# Patient Record
Sex: MALE | Race: WHITE | Employment: UNEMPLOYED | URBAN - METROPOLITAN AREA
[De-identification: names, ages, dates, MRNs, and addresses within clinical notes are randomized per-mention and may not be internally consistent; named-entity substitution may affect disease eponyms.]

---

## 2017-01-09 ENCOUNTER — APPOINTMENT (OUTPATIENT)
Dept: INFUSION THERAPY | Age: 57
End: 2017-01-09

## 2017-01-10 ENCOUNTER — APPOINTMENT (OUTPATIENT)
Dept: INFUSION THERAPY | Age: 57
End: 2017-01-10

## 2017-01-16 ENCOUNTER — HOSPITAL ENCOUNTER (OUTPATIENT)
Dept: INFUSION THERAPY | Age: 57
Discharge: HOME | End: 2017-01-16
Payer: MEDICARE

## 2017-01-16 VITALS
RESPIRATION RATE: 18 BRPM | SYSTOLIC BLOOD PRESSURE: 151 MMHG | TEMPERATURE: 98.1 F | HEART RATE: 76 BPM | DIASTOLIC BLOOD PRESSURE: 87 MMHG

## 2017-01-16 PROCEDURE — 96372 THER/PROPH/DIAG INJ SC/IM: CPT

## 2017-01-16 PROCEDURE — 74011250636 HC RX REV CODE- 250/636: Performed by: INTERNAL MEDICINE

## 2017-01-16 RX ADMIN — THYROTROPIN ALFA 0.9 MG: KIT at 13:47

## 2017-01-16 NOTE — PROGRESS NOTES
[facility] Progress Note    3658 Pt admit to [facility] for Thyrogen ambulatory in stable condition. Assessment completed. No new concerns voiced. Medications:  Thyrogen 0.9 MG IM left Deltoid    Patient observed for 30 minutes post injection with no reactions noted. 4797 Pt tolerated treatment well. D/c home ambulatory in no distress. Pt aware of next appointment scheduled for 01/17/17.

## 2017-01-17 ENCOUNTER — HOSPITAL ENCOUNTER (OUTPATIENT)
Dept: INFUSION THERAPY | Age: 57
Discharge: HOME | End: 2017-01-17
Payer: MEDICARE

## 2017-01-17 VITALS
HEART RATE: 78 BPM | SYSTOLIC BLOOD PRESSURE: 149 MMHG | OXYGEN SATURATION: 100 % | TEMPERATURE: 97.5 F | DIASTOLIC BLOOD PRESSURE: 90 MMHG | RESPIRATION RATE: 18 BRPM

## 2017-01-17 PROCEDURE — 74011250636 HC RX REV CODE- 250/636: Performed by: INTERNAL MEDICINE

## 2017-01-17 PROCEDURE — 96372 THER/PROPH/DIAG INJ SC/IM: CPT

## 2017-01-17 RX ADMIN — THYROTROPIN ALFA 0.9 MG: KIT at 14:16

## 2017-01-17 NOTE — PROGRESS NOTES
OPIC Short Consult Note:    1330Pt arrived at [facility] ambulatory and in no distress for thyrogen dose 2. No new complaints voiced. Visit Vitals    /90 (BP 1 Location: Left arm, BP Patient Position: Sitting)    Pulse 78    Temp 97.5 °F (36.4 °C)    Resp 18    SpO2 100%       Medications received:  Thyrogen 0.9 mg IM right hip     1415 Tolerated treatment well, no adverse reaction noted. D/Cd from [facility] ambulatory and in no distress.   Pt has schedule for thyroid tests

## 2017-01-18 ENCOUNTER — HOSPITAL ENCOUNTER (OUTPATIENT)
Dept: NUCLEAR MEDICINE | Age: 57
Discharge: HOME | End: 2017-01-18
Attending: INTERNAL MEDICINE
Payer: MEDICARE

## 2017-01-18 DIAGNOSIS — C79.9 METASTASIS FROM THYROID CANCER (HCC): ICD-10-CM

## 2017-01-18 DIAGNOSIS — C73 METASTASIS FROM THYROID CANCER (HCC): ICD-10-CM

## 2017-01-18 PROCEDURE — 78018 THYROID MET IMAGING BODY: CPT

## 2017-01-20 ENCOUNTER — HOSPITAL ENCOUNTER (EMERGENCY)
Age: 57
Discharge: HOME | End: 2017-01-20
Attending: EMERGENCY MEDICINE
Payer: MEDICARE

## 2017-01-20 ENCOUNTER — HOSPITAL ENCOUNTER (OUTPATIENT)
Dept: NUCLEAR MEDICINE | Age: 57
End: 2017-01-20
Attending: INTERNAL MEDICINE
Payer: MEDICARE

## 2017-01-20 ENCOUNTER — APPOINTMENT (OUTPATIENT)
Dept: GENERAL RADIOLOGY | Age: 57
End: 2017-01-20
Attending: EMERGENCY MEDICINE
Payer: MEDICARE

## 2017-01-20 VITALS
WEIGHT: 210 LBS | DIASTOLIC BLOOD PRESSURE: 68 MMHG | RESPIRATION RATE: 22 BRPM | BODY MASS INDEX: 31.01 KG/M2 | HEART RATE: 87 BPM | SYSTOLIC BLOOD PRESSURE: 160 MMHG | TEMPERATURE: 98.6 F | OXYGEN SATURATION: 95 %

## 2017-01-20 DIAGNOSIS — T78.1XXA REACTION TO FOOD, INITIAL ENCOUNTER: ICD-10-CM

## 2017-01-20 DIAGNOSIS — R11.2 NON-INTRACTABLE VOMITING WITH NAUSEA, UNSPECIFIED VOMITING TYPE: Primary | ICD-10-CM

## 2017-01-20 DIAGNOSIS — R73.9 BLOOD GLUCOSE ELEVATED: ICD-10-CM

## 2017-01-20 DIAGNOSIS — R07.9 CHEST PAIN, UNSPECIFIED TYPE: ICD-10-CM

## 2017-01-20 LAB
ALBUMIN SERPL BCP-MCNC: 3.4 G/DL (ref 3.5–5)
ALBUMIN/GLOB SERPL: 0.7 {RATIO} (ref 1.1–2.2)
ALP SERPL-CCNC: 175 U/L (ref 45–117)
ALT SERPL-CCNC: 20 U/L (ref 12–78)
ANION GAP BLD CALC-SCNC: 9 MMOL/L (ref 5–15)
AST SERPL W P-5'-P-CCNC: 10 U/L (ref 15–37)
ATRIAL RATE: 98 BPM
BASOPHILS # BLD AUTO: 0 K/UL (ref 0–0.1)
BASOPHILS # BLD: 0 % (ref 0–1)
BILIRUB SERPL-MCNC: 0.2 MG/DL (ref 0.2–1)
BUN SERPL-MCNC: 55 MG/DL (ref 6–20)
BUN/CREAT SERPL: 28 (ref 12–20)
CALCIUM SERPL-MCNC: 8.6 MG/DL (ref 8.5–10.1)
CALCULATED P AXIS, ECG09: 20 DEGREES
CALCULATED R AXIS, ECG10: -47 DEGREES
CALCULATED T AXIS, ECG11: 42 DEGREES
CHLORIDE SERPL-SCNC: 98 MMOL/L (ref 97–108)
CK SERPL-CCNC: 78 U/L (ref 39–308)
CK SERPL-CCNC: 99 U/L (ref 39–308)
CO2 SERPL-SCNC: 28 MMOL/L (ref 21–32)
CREAT SERPL-MCNC: 1.97 MG/DL (ref 0.7–1.3)
DIAGNOSIS, 93000: NORMAL
EOSINOPHIL # BLD: 0.1 K/UL (ref 0–0.4)
EOSINOPHIL NFR BLD: 1 % (ref 0–7)
ERYTHROCYTE [DISTWIDTH] IN BLOOD BY AUTOMATED COUNT: 13 % (ref 11.5–14.5)
GLOBULIN SER CALC-MCNC: 4.7 G/DL (ref 2–4)
GLUCOSE BLD STRIP.AUTO-MCNC: 372 MG/DL (ref 65–100)
GLUCOSE SERPL-MCNC: 397 MG/DL (ref 65–100)
HCT VFR BLD AUTO: 40.6 % (ref 36.6–50.3)
HGB BLD-MCNC: 13.9 G/DL (ref 12.1–17)
INR PPP: 0.9 (ref 0.9–1.1)
LIPASE SERPL-CCNC: 220 U/L (ref 73–393)
LYMPHOCYTES # BLD AUTO: 7 % (ref 12–49)
LYMPHOCYTES # BLD: 0.9 K/UL (ref 0.8–3.5)
MCH RBC QN AUTO: 30 PG (ref 26–34)
MCHC RBC AUTO-ENTMCNC: 34.2 G/DL (ref 30–36.5)
MCV RBC AUTO: 87.5 FL (ref 80–99)
MONOCYTES # BLD: 0.6 K/UL (ref 0–1)
MONOCYTES NFR BLD AUTO: 5 % (ref 5–13)
NEUTS SEG # BLD: 11.8 K/UL (ref 1.8–8)
NEUTS SEG NFR BLD AUTO: 87 % (ref 32–75)
P-R INTERVAL, ECG05: 134 MS
PLATELET # BLD AUTO: 306 K/UL (ref 150–400)
POTASSIUM SERPL-SCNC: 4.8 MMOL/L (ref 3.5–5.1)
PROT SERPL-MCNC: 8.1 G/DL (ref 6.4–8.2)
PROTHROMBIN TIME: 9.4 SEC (ref 9–11.1)
Q-T INTERVAL, ECG07: 330 MS
QRS DURATION, ECG06: 76 MS
QTC CALCULATION (BEZET), ECG08: 421 MS
RBC # BLD AUTO: 4.64 M/UL (ref 4.1–5.7)
SERVICE CMNT-IMP: ABNORMAL
SODIUM SERPL-SCNC: 135 MMOL/L (ref 136–145)
TROPONIN I SERPL-MCNC: <0.04 NG/ML
TROPONIN I SERPL-MCNC: <0.04 NG/ML
VENTRICULAR RATE, ECG03: 98 BPM
WBC # BLD AUTO: 13.5 K/UL (ref 4.1–11.1)

## 2017-01-20 PROCEDURE — 36415 COLL VENOUS BLD VENIPUNCTURE: CPT | Performed by: EMERGENCY MEDICINE

## 2017-01-20 PROCEDURE — 74011250636 HC RX REV CODE- 250/636: Performed by: EMERGENCY MEDICINE

## 2017-01-20 PROCEDURE — 96375 TX/PRO/DX INJ NEW DRUG ADDON: CPT

## 2017-01-20 PROCEDURE — 82962 GLUCOSE BLOOD TEST: CPT

## 2017-01-20 PROCEDURE — 74011250636 HC RX REV CODE- 250/636

## 2017-01-20 PROCEDURE — 85025 COMPLETE CBC W/AUTO DIFF WBC: CPT | Performed by: EMERGENCY MEDICINE

## 2017-01-20 PROCEDURE — A9270 NON-COVERED ITEM OR SERVICE: HCPCS | Performed by: EMERGENCY MEDICINE

## 2017-01-20 PROCEDURE — 74011250637 HC RX REV CODE- 250/637: Performed by: EMERGENCY MEDICINE

## 2017-01-20 PROCEDURE — 74011636637 HC RX REV CODE- 636/637: Performed by: EMERGENCY MEDICINE

## 2017-01-20 PROCEDURE — 96361 HYDRATE IV INFUSION ADD-ON: CPT

## 2017-01-20 PROCEDURE — 99285 EMERGENCY DEPT VISIT HI MDM: CPT

## 2017-01-20 PROCEDURE — 80053 COMPREHEN METABOLIC PANEL: CPT | Performed by: EMERGENCY MEDICINE

## 2017-01-20 PROCEDURE — 96374 THER/PROPH/DIAG INJ IV PUSH: CPT

## 2017-01-20 PROCEDURE — 85610 PROTHROMBIN TIME: CPT | Performed by: EMERGENCY MEDICINE

## 2017-01-20 PROCEDURE — 82550 ASSAY OF CK (CPK): CPT | Performed by: EMERGENCY MEDICINE

## 2017-01-20 PROCEDURE — 71010 XR CHEST PORT: CPT

## 2017-01-20 PROCEDURE — 83690 ASSAY OF LIPASE: CPT | Performed by: EMERGENCY MEDICINE

## 2017-01-20 PROCEDURE — 93005 ELECTROCARDIOGRAM TRACING: CPT

## 2017-01-20 PROCEDURE — 84484 ASSAY OF TROPONIN QUANT: CPT | Performed by: EMERGENCY MEDICINE

## 2017-01-20 RX ORDER — ONDANSETRON 2 MG/ML
4 INJECTION INTRAMUSCULAR; INTRAVENOUS
Status: COMPLETED | OUTPATIENT
Start: 2017-01-20 | End: 2017-01-20

## 2017-01-20 RX ORDER — ONDANSETRON 4 MG/1
4 TABLET, ORALLY DISINTEGRATING ORAL
Status: COMPLETED | OUTPATIENT
Start: 2017-01-20 | End: 2017-01-20

## 2017-01-20 RX ORDER — ONDANSETRON 2 MG/ML
INJECTION INTRAMUSCULAR; INTRAVENOUS
Status: COMPLETED
Start: 2017-01-20 | End: 2017-01-20

## 2017-01-20 RX ORDER — ONDANSETRON 4 MG/1
4 TABLET, ORALLY DISINTEGRATING ORAL
Qty: 8 TAB | Refills: 0 | Status: SHIPPED | OUTPATIENT
Start: 2017-01-20 | End: 2017-01-22

## 2017-01-20 RX ORDER — DIPHENHYDRAMINE HYDROCHLORIDE 50 MG/ML
25 INJECTION, SOLUTION INTRAMUSCULAR; INTRAVENOUS
Status: COMPLETED | OUTPATIENT
Start: 2017-01-20 | End: 2017-01-20

## 2017-01-20 RX ORDER — HYDROMORPHONE HYDROCHLORIDE 1 MG/ML
1 INJECTION, SOLUTION INTRAMUSCULAR; INTRAVENOUS; SUBCUTANEOUS
Status: COMPLETED | OUTPATIENT
Start: 2017-01-20 | End: 2017-01-20

## 2017-01-20 RX ADMIN — ONDANSETRON 4 MG: 2 INJECTION INTRAMUSCULAR; INTRAVENOUS at 07:09

## 2017-01-20 RX ADMIN — HYDROMORPHONE HYDROCHLORIDE 1 MG: 1 INJECTION, SOLUTION INTRAMUSCULAR; INTRAVENOUS; SUBCUTANEOUS at 07:07

## 2017-01-20 RX ADMIN — INSULIN HUMAN 10 UNITS: 100 INJECTION, SOLUTION PARENTERAL at 07:07

## 2017-01-20 RX ADMIN — DIPHENHYDRAMINE HYDROCHLORIDE 25 MG: 50 INJECTION, SOLUTION INTRAMUSCULAR; INTRAVENOUS at 07:17

## 2017-01-20 RX ADMIN — ONDANSETRON 4 MG: 4 TABLET, ORALLY DISINTEGRATING ORAL at 12:20

## 2017-01-20 RX ADMIN — SODIUM CHLORIDE 500 ML: 900 INJECTION, SOLUTION INTRAVENOUS at 07:05

## 2017-01-20 NOTE — ED PROVIDER NOTES
HPI Comments: David Rivas is a 49 y.o. male with history significant for HTN and DM presenting via EMS to [facility] ED with c/o sudden onset of nausea and vomiting. Pt additionally informs of chest pain and congestion as well. Per EMS, pt called EMS around 0000 today after the onset of his nausea and vomiting. EMS states pt informs the onset of his chest pain began secondary to the nausea and one episode of emesis. EMS reports pt informed of diffuse chest pain which has now since resolved. Pt states he believes he might have had an episode of emesis secondary to ingesting peanut butter, which is known to make him feel unwell. Pt additionally informs he has thyroid cancer and has been undergoing chemotherapy with his last session x 2 days and his next session x today. Pt notes he has been having increased nausea recently at baseline due to the Thyrogen shots he receives. Pt also informs the shots have been altering his BG levels, leaving them a bit uncontrolled, albeit compliance with all medications. Pt states his BG levels normally run in the 160's. EMS additionally notes they administered 324 mg Aspirin en route. Pt specifically denies any fevers, chills, diarrhea, constipation, urinary complications, abdominal pain, or SOB. PCP: Juan Pablo Leyva MD    PMHx: hyperlipidemia  PSHx: cardiac catheterization  Social Hx: - EtOH; - Smoker; - Illicit Drugs    There are no other changes, complaints or physical findings at this time. The history is provided by the patient and the EMS personnel.       Past Medical History:   Diagnosis Date    Adverse effect of anesthesia      \" STOP BREATHING 1 TIME C ANESTH\"    Calculus of kidney     Cancer ([facility] [address] 2004     thyroid cancer    Chronic kidney disease     Chronic pain      BACK SHOULDER AND ARM    Depression     Diabetes ([address]     Hypercholesterolemia     Hypertension     Nausea & vomiting     Other ill-defined conditions(799.89)      cholesterol, thyroid    Sleep apnea      doesn't wear cpap    Thyroid cancer ([facility] [address]     TIA (transient ischemic attack) 2011     Past Surgical History:   Procedure Laterality Date    Vascular surgery procedure unlist       cardiac cath NEG.  Hx orthopaedic       back     Hx orthopaedic       ARM AND SHOULDER    Hx retinal detachment repair       left eye    Hx heent       THROAT SURGERY X 4    Hx other surgical       thyroid, lymphnode     Family History:   Problem Relation Age of Onset    Diabetes Mother     Elevated Lipids Mother    Stevo Moros Bladder Disease Mother     Headache Mother     Migraines Mother     Heart Disease Mother     Hypertension Mother     Stroke Mother     Other Mother      ANEURSYM BRAIN    Bleeding Prob Father     Cancer Father      LEUKEMIA    Diabetes Father     Elevated Lipids Father     Mental Retardation Sister     Psychiatric Disorder Sister     Cancer Brother      LUNGS     Social History     Social History    Marital status: LEGALLY      Spouse name: N/A    Number of children: N/A    Years of education: N/A     Occupational History    Not on file. Social History Main Topics    Smoking status: Former Smoker     Quit date: 8/22/1994    Smokeless tobacco: Never Used    Alcohol use No    Drug use: No    Sexual activity: No     Other Topics Concern    Not on file     Social History Narrative     ALLERGIES: Anesthetics - amide type; Flexeril [cyclobenzaprine]; and Tramadol    Review of Systems   Constitutional: Negative for chills and fever. HENT: Positive for congestion. Negative for rhinorrhea, sneezing and sore throat. Eyes: Negative. Negative for redness and visual disturbance. Respiratory: Negative. Negative for cough, shortness of breath and wheezing. Cardiovascular: Positive for chest pain. Negative for leg swelling. Gastrointestinal: Positive for nausea and vomiting. Negative for abdominal pain and diarrhea. Genitourinary: Negative.   Negative for difficulty urinating, discharge and frequency. Musculoskeletal: Negative. Negative for arthralgias, back pain, myalgias and neck stiffness. Skin: Negative. Negative for color change and rash. Neurological: Negative. Negative for dizziness, syncope, weakness, numbness and headaches. Hematological: Negative for adenopathy. Psychiatric/Behavioral: Negative. All other systems reviewed and are negative. Vitals:    01/20/17 1000 01/20/17 1030 01/20/17 1100 01/20/17 1130   BP: 156/84 172/84 160/68    Pulse: 87 86 86 87   Resp:       Temp:       SpO2: 93% 94% 93% 95%   Weight:              Physical Exam   Constitutional: He is oriented to person, place, and time. Obese   HENT:   Head: Atraumatic. 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   Cardiovascular: Normal rate, regular rhythm, normal heart sounds and intact distal pulses. Exam reveals no gallop and no friction rub. No murmur heard. Pulmonary/Chest: Effort normal and breath sounds normal. No respiratory distress. He has no wheezes. He has no rales. He exhibits no tenderness. Abdominal: Soft. Bowel sounds are normal. He exhibits no distension and no mass. There is no tenderness. There is no rebound and no guarding. Musculoskeletal: Normal range of motion. He exhibits no edema or tenderness. Neurological: He is alert and oriented to person, place, and time. Psychiatric: He has a normal mood and affect. Nursing note and vitals reviewed. MDM  Number of Diagnoses or Management Options  Diagnosis management comments: DDx: Uncontrolled BG levels possibly secondary to Thyrogen treatments, hypoglycemic reaction possibly, aspiration PNA, pneumonitis.  Allergic reaction to known allergin (peanut better); low suspicion dehydration or electrolyte abnormality secondary to one known episode of emesis; ACS and acute MI unlikely, will evaluate to rule out albeit pt denies current onset of chest pain. Amount and/or Complexity of Data Reviewed  Clinical lab tests: ordered and reviewed  Tests in the radiology section of CPT®: ordered and reviewed  Tests in the medicine section of CPT®: reviewed and ordered  Obtain history from someone other than the patient: yes (EMS)  Review and summarize past medical records: yes  Independent visualization of images, tracings, or specimens: yes    Patient Progress  Patient progress: stable    ED Course     Procedures    EKG interpretation: (Preliminary) 0502  Rhythm: normal sinus rhythm; and regular . Rate (approx.): 98; Axis: left axis deviation; AK interval: normal; QRS interval: normal ; ST/T wave: normal;   This note is prepared by Ange Grewal acting as Scribe for Kimberli Hazel MD.    Progress Note:   5:30 AM  Pt actively vomiting peanut butter. Written by Ange Grewal, ED Scribe, as dictated by Kimberli Hazel MD.     SIGN OUT:  7:07 AM  Patient's presentation, labs/imaging and plan of care was reviewed with Bill Nettles. Boy Velez MD as part of sign out. They will follow up with the repeat cardiac work-up as part of the plan discussed with the patient. Bill Velez MD's assistance in completion of this plan is greatly appreciated but it should be noted that I will be the provider of record for this patient. Kimberli Hazel MD    This note above is prepared by Ange Grewal acting as Scribe for Kimberli Hazel MD.    Kimberli Hazel MD: This scribe's documentation has been prepared under my direction and personally reviewed by me in its entirety. I confirm that the note above accurately reflects all work, treatment procedures and medical decision makings performed by me. CONSULT NOTE:  11:30 AM  Bill Velez MD spoke with Jaylene Lomas MD  Specialty: Cardiology  Discussed pt's hx, disposition, and available diagnostic and imaging results.  Reviewed care plans. Consultant recommends discharging pt. LABORATORY TESTS:  Recent Results (from the past 12 hour(s))   EKG, 12 LEAD, INITIAL    Collection Time: 01/20/17  5:01 AM   Result Value Ref Range    Ventricular Rate 98 BPM    Atrial Rate 98 BPM    P-R Interval 134 ms    QRS Duration 76 ms    Q-T Interval 330 ms    QTC Calculation (Bezet) 421 ms    Calculated P Axis 20 degrees    Calculated R Axis -47 degrees    Calculated T Axis 42 degrees    Diagnosis       Normal sinus rhythm  Left axis deviation    Confirmed by Katt Arroyo ([id number]) on 1/20/2017 7:56:34 AM     GLUCOSE, POC    Collection Time: 01/20/17  5:02 AM   Result Value Ref Range    Glucose (POC) 372 (H) 65 - 100 mg/dL    Performed by Catracho DENNIS    CBC WITH AUTOMATED DIFF    Collection Time: 01/20/17  5:17 AM   Result Value Ref Range    WBC 13.5 (H) 4.1 - 11.1 K/uL    RBC 4.64 4.10 - 5.70 M/uL    HGB 13.9 12.1 - 17.0 g/dL    HCT 40.6 36.6 - 50.3 %    MCV 87.5 80.0 - 99.0 FL    MCH 30.0 26.0 - 34.0 PG    MCHC 34.2 30.0 - 36.5 g/dL    RDW 13.0 11.5 - 14.5 %    PLATELET 331 364 - 229 K/uL    NEUTROPHILS 87 (H) 32 - 75 %    LYMPHOCYTES 7 (L) 12 - 49 %    MONOCYTES 5 5 - 13 %    EOSINOPHILS 1 0 - 7 %    BASOPHILS 0 0 - 1 %    ABS. NEUTROPHILS 11.8 (H) 1.8 - 8.0 K/UL    ABS. LYMPHOCYTES 0.9 0.8 - 3.5 K/UL    ABS. MONOCYTES 0.6 0.0 - 1.0 K/UL    ABS. EOSINOPHILS 0.1 0.0 - 0.4 K/UL    ABS.  BASOPHILS 0.0 0.0 - 0.1 K/UL   METABOLIC PANEL, COMPREHENSIVE    Collection Time: 01/20/17  5:17 AM   Result Value Ref Range    Sodium 135 (L) 136 - 145 mmol/L    Potassium 4.8 3.5 - 5.1 mmol/L    Chloride 98 97 - 108 mmol/L    CO2 28 21 - 32 mmol/L    Anion gap 9 5 - 15 mmol/L    Glucose 397 (H) 65 - 100 mg/dL    BUN 55 (H) 6 - 20 MG/DL    Creatinine 1.97 (H) 0.70 - 1.30 MG/DL    BUN/Creatinine ratio 28 (H) 12 - 20      GFR est AA 43 (L) >60 ml/min/1.73m2    GFR est non-AA 36 (L) >60 ml/min/1.73m2    Calcium 8.6 8.5 - 10.1 MG/DL    Bilirubin, total 0.2 0.2 - 1.0 MG/DL    ALT 20 12 - 78 U/L    AST 10 (L) 15 - 37 U/L    Alk. phosphatase 175 (H) 45 - 117 U/L    Protein, total 8.1 6.4 - 8.2 g/dL    Albumin 3.4 (L) 3.5 - 5.0 g/dL    Globulin 4.7 (H) 2.0 - 4.0 g/dL    A-G Ratio 0.7 (L) 1.1 - 2.2     TROPONIN I    Collection Time: 01/20/17  5:17 AM   Result Value Ref Range    Troponin-I, Qt. <0.04 <0.05 ng/mL   CK W/ REFLX CKMB    Collection Time: 01/20/17  5:17 AM   Result Value Ref Range    CK 99 39 - 308 U/L   PROTHROMBIN TIME + INR    Collection Time: 01/20/17  5:17 AM   Result Value Ref Range    INR 0.9 0.9 - 1.1      Prothrombin time 9.4 9.0 - 11.1 sec   LIPASE    Collection Time: 01/20/17  5:17 AM   Result Value Ref Range    Lipase 220 73 - 393 U/L   TROPONIN I    Collection Time: 01/20/17  9:33 AM   Result Value Ref Range    Troponin-I, Qt. <0.04 <0.05 ng/mL   CK W/ REFLX CKMB    Collection Time: 01/20/17  9:33 AM   Result Value Ref Range    CK 78 39 - 308 U/L     IMAGING RESULTS:  Clinical history: Chest pain  INDICATION: Chest pain     COMPARISON: 10/16/2016     FINDINGS:   AP semiupright view of the chest is obtained . The cardiopericardial silhouette  is stable. There is no pleural effusion, pneumothorax or focal consolidation  present. Chronic left clavicular dislocation. Orthopedic stabilization hardware  in the left humerus.     IMPRESSION  IMPRESSION:     No acute thoracic disease.     MEDICATIONS GIVEN:  Medications   sodium chloride 0.9 % bolus infusion 500 mL (0 mL IntraVENous IV Completed 1/20/17 0805)   insulin regular (NOVOLIN R, HUMULIN R) injection 10 Units (10 Units IntraVENous Given 1/20/17 0707)   HYDROmorphone (PF) (DILAUDID) injection 1 mg (1 mg IntraVENous Given 1/20/17 0707)   ondansetron (ZOFRAN) injection 4 mg (4 mg IntraVENous Given 1/20/17 0709)   diphenhydrAMINE (BENADRYL) injection 25 mg (25 mg IntraVENous Given 1/20/17 8796)   ondansetron (ZOFRAN ODT) tablet 4 mg (4 mg Oral Given 1/20/17 1220)     IMPRESSION:  1. Non-intractable vomiting with nausea, unspecified vomiting type    2. Reaction to food, initial encounter    3. Blood glucose elevated    4. Chest pain, unspecified type      PLAN:  1. Discharge Medication List as of 1/20/2017 11:39 AM      CONTINUE these medications which have NOT CHANGED    Details   HYDROcodone-acetaminophen (NORCO) 5-325 mg per tablet Take 1 Tab by mouth every six (6) hours as needed for Pain. Max Daily Amount: 4 Tabs., Print, Disp-12 Tab, R-0      Lancets misc Use 3-4 times daily to check blood sugar. DX E11.22, Normal, Disp-200 Each, R-11      glucose blood VI test strips (PHARMACIST CHOICE) strip Check glucose 3-4 times daily. DX E11.22, Normal, Disp-300 Strip, R-3      levothyroxine (SYNTHROID) 200 mcg tablet Take 1 Tab by mouth Daily (before breakfast). , Normal, Disp-90 Tab, R-3      glipiZIDE (GLUCOTROL) 5 mg tablet Take 1 Tab by mouth Before breakfast and dinner., Normal, Disp-180 Tab, R-3      insulin degludec (TRESIBA FLEXTOUCH U-200) 200 unit/mL (3 mL) inpn 20 Units by SubCUTAneous route nightly., Normal, Disp-1 Package, R-7      Insulin Needles, Disposable, 31 gauge x 5/16\" ndle by SubCUTAneous route daily. , Normal, Disp-1 Package, R-11      atorvastatin (LIPITOR) 40 mg tablet Take 1 Tab by mouth daily. , Normal, Disp-90 Tab, R-3      mupirocin (BACTROBAN) 2 % ointment Apply  to affected area two (2) times a day. Around wound site, Normal, Disp-22 g, R-0      ondansetron (ZOFRAN ODT) 4 mg disintegrating tablet Take 1 Tab by mouth every eight (8) hours as needed for Nausea. , Print, Disp-8 Tab, R-1      lisinopril (PRINIVIL, ZESTRIL) 20 mg tablet Take 20 mg by mouth daily. , Historical Med      Cholecalciferol, Vitamin D3, (VITAMIN D3) 2,000 unit cap capsule Take 2,000 Units by mouth daily. Indications: VITAMIN D DEFICIENCY (HIGH DOSE THERAPY), Normal, Disp-90 Cap, R-5           2.    Follow-up Information     Follow up With Details Comments Contact Info    Tyler Sanders MD In 1 day  [facility] [address], [place] In 3 days As needed FOR EVALUATION OF YOUR CHEST PAIN [address]  [phone]        Hem Onc as previously scheduled     [facility] [place] [facility]  As needed, If symptoms worsen/recur [address] [facility]  [address] [facility]  [phone]        Return to ED if worse     DISCHARGE NOTE:    11:39AM  The patient is ready for discharge. The patient signs, symptoms, diagnosis, and discharge instructions have been discussed and the patient has conveyed their understanding. The patient is to follow-up as reccommended or returned to the ER should their symptoms worsen. Plan has been discussed and patient is in agreement. This note is prepared by Ethel Pagan, acting as Scribe for Juan Antonio Browne. Rojas Trevino MD.    Juan Antonio Browne. Rojas Trevino MD: The scribe's documentation has been prepared under my direction and personally reviewed by me in its entirety. I confirm that the note above accurately reflects all work, treatment, procedures, and medical decision making performed by me.

## 2017-01-20 NOTE — ED NOTES
50yo male Pt with PMH of HTN, DM, thyroid CA, CKD, TIA, depression, sleep apnea and left retinal repair presents to ED for nausea, vomiting bile, pt currently on radiation iodine therapy for thyroid cancer, last dose 1/18/2017, BIBA, oriented to room and call bell, cardiac and continuous spO2 monitoring initiated, IV started, blood samples collected and sent to lab for analysis, EKG completed, plan of care reviewed, call bell in reach, side rails up x2, will continue to monitor.

## 2017-01-20 NOTE — DISCHARGE INSTRUCTIONS
Chest Pain: Care Instructions  Your Care Instructions  There are many things that can cause chest pain. Some are not serious and will get better on their own in a few days. But some kinds of chest pain need more testing and treatment. Your doctor may have recommended a follow-up visit in the next 8 to 12 hours. If you are not getting better, you may need more tests or treatment. Even though your doctor has released you, you still need to watch for any problems. The doctor carefully checked you, but sometimes problems can develop later. If you have new symptoms or if your symptoms do not get better, get medical care right away. If you have worse or different chest pain or pressure that lasts more than 5 minutes or you passed out (lost consciousness), call 911 or seek other emergency help right away. A medical visit is only one step in your treatment. Even if you feel better, you still need to do what your doctor recommends, such as going to all suggested follow-up appointments and taking medicines exactly as directed. This will help you recover and help prevent future problems. How can you care for yourself at home? · Rest until you feel better. · Take your medicine exactly as prescribed. Call your doctor if you think you are having a problem with your medicine. · Do not drive after taking a prescription pain medicine. When should you call for help? Call 911 if:  · You passed out (lost consciousness). · You have severe difficulty breathing. · You have symptoms of a heart attack. These may include:  ¨ Chest pain or pressure, or a strange feeling in your chest.  ¨ Sweating. ¨ Shortness of breath. ¨ Nausea or vomiting. ¨ Pain, pressure, or a strange feeling in your back, neck, jaw, or upper belly or in one or both shoulders or arms. ¨ Lightheadedness or sudden weakness. ¨ A fast or irregular heartbeat.   After you call 911, the  may tell you to chew 1 adult-strength or 2 to 4 low-dose aspirin. Wait for an ambulance. Do not try to drive yourself. Call your doctor today if:  · You have any trouble breathing. · Your chest pain gets worse. · You are dizzy or lightheaded, or you feel like you may faint. · You are not getting better as expected. · You are having new or different chest pain. Where can you learn more? Go to http://mayo-kelly.info/. Enter A120 in the search box to learn more about \"Chest Pain: Care Instructions. \"  Current as of: May 27, 2016  Content Version: 11.1  © 9998-0155 [employer]. Care instructions adapted under license by [employer] (which disclaims liability or warranty for this information). If you have questions about a medical condition or this instruction, always ask your healthcare professional. [address] any warranty or liability for your use of this information. Nausea and Vomiting: Care Instructions  Your Care Instructions    When you are nauseated, you may feel weak and sweaty and notice a lot of saliva in your mouth. Nausea often leads to vomiting. Most of the time you do not need to worry about nausea and vomiting, but they can be signs of other illnesses. Two common causes of nausea and vomiting are stomach flu and food poisoning. Nausea and vomiting from viral stomach flu will usually start to improve within 24 hours. Nausea and vomiting from food poisoning may last from 12 to 48 hours. The doctor has checked you carefully, but problems can develop later. If you notice any problems or new symptoms, get medical treatment right away. Follow-up care is a key part of your treatment and safety. Be sure to make and go to all appointments, and call your doctor if you are having problems. It's also a good idea to know your test results and keep a list of the medicines you take. How can you care for yourself at home?   · To prevent dehydration, drink plenty of fluids, enough so that your urine is light yellow or clear like water. Choose water and other caffeine-free clear liquids until you feel better. If you have kidney, heart, or liver disease and have to limit fluids, talk with your doctor before you increase the amount of fluids you drink. · Rest in bed until you feel better. · When you are able to eat, try clear soups, mild foods, and liquids until all symptoms are gone for 12 to 48 hours. Other good choices include dry toast, crackers, cooked cereal, and gelatin dessert, such as Jell-O. When should you call for help? Call 911 anytime you think you may need emergency care. For example, call if:  · You passed out (lost consciousness). Call your doctor now or seek immediate medical care if:  · You have symptoms of dehydration, such as:  ¨ Dry eyes and a dry mouth. ¨ Passing only a little dark urine. ¨ Feeling thirstier than usual.  · You have new or worsening belly pain. · You have a new or higher fever. · You vomit blood or what looks like coffee grounds. Watch closely for changes in your health, and be sure to contact your doctor if:  · You have ongoing nausea and vomiting. · Your vomiting is getting worse. · Your vomiting lasts longer than 2 days. · You are not getting better as expected. Where can you learn more? Go to http://mayo-kelly.info/. Enter 46 [id number] in the search box to learn more about \"Nausea and Vomiting: Care Instructions. \"  Current as of: May 27, 2016  Content Version: 11.1  © 5208-5173 [employer], "[employer]. Care instructions adapted under license by [employer] (which disclaims liability or warranty for this information). If you have questions about a medical condition or this instruction, always ask your healthcare professional. [address] any warranty or liability for your use of this information.

## 2017-01-20 NOTE — ED NOTES
Pt sleeping; pt woke up and states he has right sided chest pain 8/10 and then immediately falls back to sleep, will notify MD

## 2017-01-20 NOTE — ED NOTES
Assumed care, pt resting in bed, call bell within reach, pt states pain is 8/10 presently but states he just received pain medication, will continue to monitor

## 2017-01-23 ENCOUNTER — HOSPITAL ENCOUNTER (OUTPATIENT)
Dept: NUCLEAR MEDICINE | Age: 57
Discharge: HOME | End: 2017-01-23
Attending: INTERNAL MEDICINE
Payer: MEDICARE

## 2017-01-30 ENCOUNTER — OFFICE VISIT (OUTPATIENT)
Dept: ENDOCRINOLOGY | Age: 57
End: 2017-01-30

## 2017-01-30 VITALS
HEIGHT: 69 IN | DIASTOLIC BLOOD PRESSURE: 85 MMHG | SYSTOLIC BLOOD PRESSURE: 139 MMHG | WEIGHT: 223.6 LBS | HEART RATE: 73 BPM | BODY MASS INDEX: 33.12 KG/M2

## 2017-01-30 DIAGNOSIS — C73 PAPILLARY THYROID CARCINOMA (HCC): ICD-10-CM

## 2017-01-30 DIAGNOSIS — E78.5 HYPERLIPIDEMIA, UNSPECIFIED HYPERLIPIDEMIA TYPE: ICD-10-CM

## 2017-01-30 DIAGNOSIS — Z79.4 TYPE 2 DIABETES MELLITUS WITH STAGE 3 CHRONIC KIDNEY DISEASE, WITH LONG-TERM CURRENT USE OF INSULIN (HCC): Primary | ICD-10-CM

## 2017-01-30 DIAGNOSIS — N18.30 TYPE 2 DIABETES MELLITUS WITH STAGE 3 CHRONIC KIDNEY DISEASE, WITH LONG-TERM CURRENT USE OF INSULIN (HCC): Primary | ICD-10-CM

## 2017-01-30 DIAGNOSIS — E11.22 TYPE 2 DIABETES MELLITUS WITH STAGE 3 CHRONIC KIDNEY DISEASE, WITH LONG-TERM CURRENT USE OF INSULIN (HCC): Primary | ICD-10-CM

## 2017-01-30 DIAGNOSIS — I10 ESSENTIAL HYPERTENSION WITH GOAL BLOOD PRESSURE LESS THAN 130/80: ICD-10-CM

## 2017-01-30 LAB — HBA1C MFR BLD HPLC: 9.5 %

## 2017-01-30 RX ORDER — INSULIN DEGLUDEC 200 U/ML
25 INJECTION, SOLUTION SUBCUTANEOUS
Qty: 6 PEN | Refills: 3 | Status: SHIPPED | OUTPATIENT
Start: 2017-01-30 | End: 2017-10-18 | Stop reason: SDUPTHER

## 2017-01-30 RX ORDER — GLIPIZIDE 10 MG/1
10 TABLET ORAL
Qty: 90 TAB | Refills: 3 | Status: SHIPPED | OUTPATIENT
Start: 2017-01-30 | End: 2017-09-18 | Stop reason: SDUPTHER

## 2017-01-30 NOTE — PATIENT INSTRUCTIONS
Increase Tresiba to 25 units  Increase Glipizide to 10mg with breakfast and dinner         Learning About Diabetes Food Guidelines  Your Care Instructions  Meal planning is important to manage diabetes. It helps keep your blood sugar at a target level (which you set with your doctor). You don't have to eat special foods. You can eat what your family eats, including sweets once in a while. But you do have to pay attention to how often you eat and how much you eat of certain foods. You may want to work with a dietitian or a certified diabetes educator (CDE) to help you plan meals and snacks. A dietitian or CDE can also help you lose weight if that is one of your goals. What should you know about eating carbs? Managing the amount of carbohydrate (carbs) you eat is an important part of healthy meals when you have diabetes. Carbohydrate is found in many foods. · Learn which foods have carbs. And learn the amounts of carbs in different foods. ¨ Bread, cereal, pasta, and rice have about 15 grams of carbs in a serving. A serving is 1 slice of bread (1 ounce), ½ cup of cooked cereal, or 1/3 cup of cooked pasta or rice. ¨ Fruits have 15 grams of carbs in a serving. A serving is 1 small fresh fruit, such as an apple or orange; ½ of a banana; ½ cup of cooked or canned fruit; ½ cup of fruit juice; 1 cup of melon or raspberries; or 2 tablespoons of dried fruit. ¨ Milk and no-sugar-added yogurt have 15 grams of carbs in a serving. A serving is 1 cup of milk or 2/3 cup of no-sugar-added yogurt. ¨ Starchy vegetables have 15 grams of carbs in a serving. A serving is ½ cup of mashed potatoes or sweet potato; 1 cup winter squash; ½ of a small baked potato; ½ cup of cooked beans; or ½ cup cooked corn or green peas. · Learn how much carbs to eat each day and at each meal. A dietitian or CDE can teach you how to keep track of the amount of carbs you eat. This is called carbohydrate counting.   · If you are not sure how to count carbohydrate grams, use the Plate Method to plan meals. It is a good, quick way to make sure that you have a balanced meal. It also helps you spread carbs throughout the day. ¨ Divide your plate by types of foods. Put non-starchy vegetables on half the plate, meat or other protein food on one-quarter of the plate, and a grain or starchy vegetable in the final quarter of the plate. To this you can add a small piece of fruit and 1 cup of milk or yogurt, depending on how many carbs you are supposed to eat at a meal.  · Try to eat about the same amount of carbs at each meal. Do not \"save up\" your daily allowance of carbs to eat at one meal.  · Proteins have very little or no carbs per serving. Examples of proteins are beef, chicken, turkey, fish, eggs, tofu, cheese, cottage cheese, and peanut butter. A serving size of meat is 3 ounces, which is about the size of a deck of cards. Examples of meat substitute serving sizes (equal to 1 ounce of meat) are 1/4 cup of cottage cheese, 1 egg, 1 tablespoon of peanut butter, and ½ cup of tofu. How can you eat out and still eat healthy? · Learn to estimate the serving sizes of foods that have carbohydrate. If you measure food at home, it will be easier to estimate the amount in a serving of restaurant food. · If the meal you order has too much carbohydrate (such as potatoes, corn, or baked beans), ask to have a low-carbohydrate food instead. Ask for a salad or green vegetables. · If you use insulin, check your blood sugar before and after eating out to help you plan how much to eat in the future. · If you eat more carbohydrate at a meal than you had planned, take a walk or do other exercise. This will help lower your blood sugar. What else should you know? · Limit saturated fat, such as the fat from meat and dairy products. This is a healthy choice because people who have diabetes are at higher risk of heart disease.  So choose lean cuts of meat and nonfat or low-fat dairy products. Use olive or canola oil instead of butter or shortening when cooking. · Don't skip meals. Your blood sugar may drop too low if you skip meals and take insulin or certain medicines for diabetes. · Check with your doctor before you drink alcohol. Alcohol can cause your blood sugar to drop too low. Alcohol can also cause a bad reaction if you take certain diabetes medicines. Follow-up care is a key part of your treatment and safety. Be sure to make and go to all appointments, and call your doctor if you are having problems. It's also a good idea to know your test results and keep a list of the medicines you take. Where can you learn more? Go to http://mayo-kelly.info/. Enter W032 in the search box to learn more about \"Learning About Diabetes Food Guidelines. \"  Current as of: May 23, 2016  Content Version: 11.1  © 2714-9198 [employer]. Care instructions adapted under license by [employer] (which disclaims liability or warranty for this information). If you have questions about a medical condition or this instruction, always ask your healthcare professional. [address] any warranty or liability for your use of this information.

## 2017-01-30 NOTE — PROGRESS NOTES
CHIEF COMPLAINT: f/u evaluation for uncontrolled type 2 diabetes    HISTORY OF PRESENT ILLNESS:   Sue Spear is a 49 y.o. male with a PMHx as noted below who presents to the endocrinology clinic for f/u evaluation of uncontrolled type 2 diabetes. Diabetes type 2:    Currently taking the following meds:  Tresiba (changed from mixed insulin prev) taking 18 units at bedtime  Glipizide 5mg with breakfast and dinner    Review of home blood glucose: checks BID  Noon: 185-245  Bedtime: 180-400    Feel achy due to chronic pain        Thyroid Cancer:     2002 Biopsy suggesting PTC   S/p total thyroidectomy   S/p STRATTON  [id number]  Reports negative WBS  10/10/16  Repeat surgery, 2 right paratrachial LN's   Surgical Path: poorly differentiated PTC   Patient with hoarseness of voice, persistent   Continued on 200mcg LT4  11/29/16  Initial visit with me for thyroid cancer and diabetes  11/30/16 Tg 10.1, TgAb negative, TSH 1.93 (patient notes this is the lowest Tg level compared with prior)  12/14/16 Neck Ultrasound: \"Thyroid bed is empty, no anterior cervical mass or significant adenopathy\"  01/18/17 Thyrogen stimulated WBS: \"No evidence of uptake in thyroid bed, No evidence of metastatic disease\"      PAST MEDICAL/SURGICAL HISTORY:   Past Medical History   Diagnosis Date    Adverse effect of anesthesia      \" STOP BREATHING 1 TIME C ANESTH\"    Calculus of kidney     Cancer ([address] 2004     thyroid cancer    Chronic kidney disease     Chronic pain      BACK SHOULDER AND ARM    Depression     Diabetes ([address]     Hypercholesterolemia     Hypertension     Nausea & vomiting     Other ill-defined conditions(799.89)      cholesterol, thyroid    Sleep apnea      doesn't wear cpap    Thyroid cancer ([address]     TIA (transient ischemic attack) 2011     Past Surgical History   Procedure Laterality Date    Vascular surgery procedure unlist       cardiac cath NEG.     Hx orthopaedic       back     Hx orthopaedic       ARM AND SHOULDER    Hx retinal detachment repair       left eye    Hx heent       THROAT SURGERY X 4    Hx other surgical       thyroid, lymphnode       ALLERGIES:   Allergies   Allergen Reactions    Anesthetics - Amide Type Shortness of Breath    Flexeril [Cyclobenzaprine] Hives    Tramadol Hives       MEDICATIONS ON ADMISSION:     Current Outpatient Prescriptions:     Lancets misc, Use 3-4 times daily to check blood sugar. DX E11.22, Disp: 200 Each, Rfl: 11    glucose blood VI test strips (PHARMACIST CHOICE) strip, Check glucose 3-4 times daily. DX E11.22, Disp: 300 Strip, Rfl: 3    levothyroxine (SYNTHROID) 200 mcg tablet, Take 1 Tab by mouth Daily (before breakfast). , Disp: 90 Tab, Rfl: 3    glipiZIDE (GLUCOTROL) 5 mg tablet, Take 1 Tab by mouth Before breakfast and dinner., Disp: 180 Tab, Rfl: 3    insulin degludec (TRESIBA FLEXTOUCH U-200) 200 unit/mL (3 mL) inpn, 20 Units by SubCUTAneous route nightly., Disp: 1 Package, Rfl: 7    Insulin Needles, Disposable, 31 gauge x 5/16\" ndle, by SubCUTAneous route daily. , Disp: 1 Package, Rfl: 11    atorvastatin (LIPITOR) 40 mg tablet, Take 1 Tab by mouth daily. , Disp: 90 Tab, Rfl: 3    mupirocin (BACTROBAN) 2 % ointment, Apply  to affected area two (2) times a day. Around wound site, Disp: 22 g, Rfl: 0    Cholecalciferol, Vitamin D3, (VITAMIN D3) 2,000 unit cap capsule, Take 2,000 Units by mouth daily. Indications: VITAMIN D DEFICIENCY (HIGH DOSE THERAPY), Disp: 90 Cap, Rfl: 5    HYDROcodone-acetaminophen (NORCO) 5-325 mg per tablet, Take 1 Tab by mouth every six (6) hours as needed for Pain. Max Daily Amount: 4 Tabs., Disp: 12 Tab, Rfl: 0    lisinopril (PRINIVIL, ZESTRIL) 20 mg tablet, Take 20 mg by mouth daily. , Disp: , Rfl:     SOCIAL HISTORY:   Social History     Social History    Marital status: LEGALLY      Spouse name: N/A    Number of children: N/A    Years of education: N/A     Occupational History    Not on file.      Social History Main Topics    Smoking status: Former Smoker     Quit date: 8/22/1994    Smokeless tobacco: Never Used    Alcohol use No    Drug use: No    Sexual activity: No     Other Topics Concern    Not on file     Social History Narrative       FAMILY HISTORY:  Family History   Problem Relation Age of Onset    Diabetes Mother     Elevated Lipids Mother    Tintah Moros Bladder Disease Mother     Headache Mother     Migraines Mother     Heart Disease Mother     Hypertension Mother     Stroke Mother     Other Mother      ANEURSYM BRAIN    Bleeding Prob Father     Cancer Father      LEUKEMIA    Diabetes Father     Elevated Lipids Father     Mental Retardation Sister     Psychiatric Disorder Sister     Cancer Brother      LUNGS       REVIEW OF SYSTEMS: Complete ROS assessed and noted for that which is described above, all else are negative. Eyes: normal  ENT: hoarseness of voice  CVS: normal  Resp: normal  GI: normal  : normal  GYN: normal  Endocrine: normal  Integument: normal  Musculoskeletal: chronic pain  Neuro: normal  Psych: normal      PHYSICAL EXAMINATION:    VITAL SIGNS:  Visit Vitals    /85 (BP 1 Location: Left arm, BP Patient Position: Sitting)    Pulse 73    Ht 5' 9\" (1.753 m)    Wt 223 lb 9.6 oz (101.4 kg)    BMI 33.02 kg/m2       GENERAL: NCAT, Sitting comfortably, NAD  EYES: EOMI, non-icteric, no proptosis  Ear/Nose/Throat: NCAT, no inflammation, no masses  LYMPH NODES: No LAD  CARDIOVASCULAR: S1 S2, RRR, No murmur, 2+ radial pulses  RESPIRATORY: CTA b/l, no wheeze/rales  GASTROINTESTINAL: NT, ND,  MUSCULOSKELETAL: Normal ROM, no atrophy  SKIN: warm, no edema/rash/ or other skin changes  NEUROLOGIC: 5/5 power all extremities, no tremors, AAOx3  PSYCHIATRIC: Normal affect, Normal insight and judgement         REVIEW OF LABORATORY AND RADIOLOGY DATA:   Labs and documentation have been reviewed as described above.      ASSESSMENT AND PLAN:   David Rivas is a 49 y.o. male with a PMHx as noted above who presents to the endocrinology clinic for f/u evaluation of uncontrolled type 2 diabetes and thyroid cancer. DM2 uncontrolled  HTN  HLD  Thyroid cancer        DM2:  Increase Lantus to 25 units, instructed on when to increase up to 30 units  Increase Glipizide to 10mg BID  Next step will be a dose of short acting insulin with largest meal of the day (GFR 30-45 limits SGLT2 inhibitors + Hx of thyroid cancer limits incretin based therapy)  Continue to check glucose 2-3 times daily    HTN: BP stable continue current meds  HLD: stable, continue lipitor 40 mg daily    Labs: POC A1c today, result 9.5%, uncontrolled    Thyroid CA:   Reassuring neck ultrasound and thyrogen stimulated WBS without evidence of disease. We discussed however the need to watch his Tg level trends carefully with poorly differentiated thyroid cancer. Discussed role of TSH suppression with LT4, will check Tg, TgAb, TSH, FT4, adjust as needed. Will aim for a TSH of 0.1-0.5 initially for the duration of 2017, then will re-evaluate. *Advised patient to call in 2 weeks to discuss his blood sugars after the above changes. Would like to be more aggressive at stepping up his therapy and getting his diabetes under control. Also at that time we can discuss with him any pain management resources at [facility] per his request, as he is dealing with chronic pain. 3 month f/u visit. Suhas President.  [id number] [facility]

## 2017-01-30 NOTE — MR AVS SNAPSHOT
Visit Information Date & Time Provider Department Dept. Phone Encounter #  
 1/30/2017 12:10 PM Danielle Rod, [address] [place] [address] Diabetes and Endocrinology [phone] [id number] Follow-up Instructions Return in about 3 months (around 4/30/2017). Your Appointments 3/6/2017  3:30 PM  
New Patient with Benita Evans MD  
[facility] [address] [facility]) Appt Note: new pt for depression and anxiety [address] [facility] [address]  
  
   
 [address] [facility] [address] [facility] [address] Upcoming Health Maintenance Date Due Hepatitis C Screening 1960 EYE EXAM RETINAL OR DILATED Q1 2/25/1972 Pneumococcal 19-64 Highest Risk (1 of 3 - PCV13) 2/25/1981 DTaP/Tdap/Td series (1 - Tdap) 2/25/1983 FOBT Q 1 YEAR AGE 50-75 2/25/2012 HEMOGLOBIN A1C Q6M 4/10/2017 MICROALBUMIN Q1 8/22/2017 LIPID PANEL Q1 10/31/2017 FOOT EXAM Q1 11/29/2017 Allergies as of 1/30/2017  Review Complete On: 1/30/2017 By: Danielle Rod MD  
  
 Severity Noted Reaction Type Reactions Anesthetics - Amide Type  03/01/2016    Shortness of Breath Flexeril [Cyclobenzaprine]  07/26/2016    Hives Tramadol  03/01/2016    Hives Current Immunizations  Reviewed on 1/17/2017 Name Date Influenza Vaccine (Quad) PF 10/11/2016 10:16 AM  
  
 Not reviewed this visit You Were Diagnosed With   
  
 Codes Comments Type 2 diabetes mellitus with stage 3 chronic kidney disease, with long-term current use of insulin (HCC)    -  Primary ICD-10-CM: E11.22, N18.3, Z79.4 ICD-9-CM: 250.40, 585.3, V58.67 Essential hypertension with goal blood pressure less than 130/80     ICD-10-CM: I10 
ICD-9-CM: 401.9 Hyperlipidemia, unspecified hyperlipidemia type     ICD-10-CM: E78.5 ICD-9-CM: 272.4 Papillary thyroid carcinoma ([facility] [address]     ICD-10-CM: S24 ICD-9-CM: 970 Vitals BP Pulse Height(growth percentile) Weight(growth percentile) BMI Smoking Status 139/85 (BP 1 Location: Left arm, BP Patient Position: Sitting) 73 5' 9\" (1.753 m) 223 lb 9.6 oz (101.4 kg) 33.02 kg/m2 Former Smoker BMI and BSA Data Body Mass Index Body Surface Area 33.02 kg/m 2 2.22 m 2 Preferred Pharmacy Pharmacy Name Phone [facility] #[id number] [facility] [phone] Your Updated Medication List  
  
   
This list is accurate as of: 1/30/17 12:31 PM.  Always use your most recent med list.  
  
  
  
  
 atorvastatin 40 mg tablet Commonly known as:  LIPITOR Take 1 Tab by mouth daily. Cholecalciferol (Vitamin D3) 2,000 unit Cap capsule Commonly known as:  VITAMIN D3 Take 2,000 Units by mouth daily. Indications: VITAMIN D DEFICIENCY (HIGH DOSE THERAPY) glipiZIDE 10 mg tablet Commonly known as:  Grace Petri Take 1 Tab by mouth Before breakfast and dinner. glucose blood VI test strips strip Commonly known as:  PHARMACIST CHOICE Check glucose 3-4 times daily. DX E11.22 HYDROcodone-acetaminophen 5-325 mg per tablet Commonly known as:  Luis Gissell Take 1 Tab by mouth every six (6) hours as needed for Pain. Max Daily Amount: 4 Tabs. insulin degludec 200 unit/mL (3 mL) Inpn Commonly known as:  TRESIBA FLEXTOUCH U-200  
25 Units by SubCUTAneous route nightly. Insulin Needles (Disposable) 31 gauge x 5/16\" Ndle  
by SubCUTAneous route daily. Lancets Misc Use 3-4 times daily to check blood sugar. DX E11.22  
  
 levothyroxine 200 mcg tablet Commonly known as:  SYNTHROID Take 1 Tab by mouth Daily (before breakfast). lisinopril 20 mg tablet Commonly known as:  Melvin Wuman Take 20 mg by mouth daily. mupirocin 2 % ointment Commonly known as:  [facility] Apply  to affected area two (2) times a day. Around wound site Prescriptions Sent to Pharmacy Refills  
 glipiZIDE (GLUCOTROL) 10 mg tablet 3 Sig: Take 1 Tab by mouth Before breakfast and dinner. Class: Normal  
 Pharmacy: [address] Ph #: [phone] Route: Oral  
 insulin degludec (TRESIBA FLEXTOUCH U-200) 200 unit/mL (3 mL) inpn 3 Si Units by SubCUTAneous route nightly. Class: Normal  
 Pharmacy: [address] Ph #: [phone] Route: SubCUTAneous We Performed the Following AMB POC HEMOGLOBIN A1C [24182 CPT(R)] THYROGLOBULIN AB + THYROGLOBULIN C1160440 CPT(R)] Follow-up Instructions Return in about 3 months (around 2017). To-Do List   
 2017 9:15 AM  
  Appointment with [facility] MRI 2 at [facility] ([phone]) 1. Please bring a list or a bag of your current medications to your appointment 2. Please be sure to remove ALL hair clips, pins, extensions, etc., prior to arriving for your MRI procedure. 3. Bring any non [facility] films or CDs pertaining to the area being imaged with you on the day of appointment. 4. A written order with a valid diagnosis and Physicians  signature is required for all scheduled tests. 5. Check in at registration 30min before your appointment time unless you were instructed to do otherwise. Patient Instructions Increase Tresiba to 25 units Increase Glipizide to 10mg with breakfast and dinner Learning About Diabetes Food Guidelines Your Care Instructions Meal planning is important to manage diabetes. It helps keep your blood sugar at a target level (which you set with your doctor). You don't have to eat special foods. You can eat what your family eats, including sweets once in a while. But you do have to pay attention to how often you eat and how much you eat of certain foods. You may want to work with a dietitian or a certified diabetes educator (CDE) to help you plan meals and snacks. A dietitian or CDE can also help you lose weight if that is one of your goals. What should you know about eating carbs? Managing the amount of carbohydrate (carbs) you eat is an important part of healthy meals when you have diabetes. Carbohydrate is found in many foods. · Learn which foods have carbs. And learn the amounts of carbs in different foods. ¨ Bread, cereal, pasta, and rice have about 15 grams of carbs in a serving. A serving is 1 slice of bread (1 ounce), ½ cup of cooked cereal, or 1/3 cup of cooked pasta or rice. ¨ Fruits have 15 grams of carbs in a serving. A serving is 1 small fresh fruit, such as an apple or orange; ½ of a banana; ½ cup of cooked or canned fruit; ½ cup of fruit juice; 1 cup of melon or raspberries; or 2 tablespoons of dried fruit. ¨ Milk and no-sugar-added yogurt have 15 grams of carbs in a serving. A serving is 1 cup of milk or 2/3 cup of no-sugar-added yogurt. ¨ Starchy vegetables have 15 grams of carbs in a serving. A serving is ½ cup of mashed potatoes or sweet potato; 1 cup winter squash; ½ of a small baked potato; ½ cup of cooked beans; or ½ cup cooked corn or green peas. · Learn how much carbs to eat each day and at each meal. A dietitian or CDE can teach you how to keep track of the amount of carbs you eat. This is called carbohydrate counting. · If you are not sure how to count carbohydrate grams, use the Plate Method to plan meals. It is a good, quick way to make sure that you have a balanced meal. It also helps you spread carbs throughout the day. ¨ Divide your plate by types of foods. Put non-starchy vegetables on half the plate, meat or other protein food on one-quarter of the plate, and a grain or starchy vegetable in the final quarter of the plate.  To this you can add a small piece of fruit and 1 cup of milk or yogurt, depending on how many carbs you are supposed to eat at a meal. 
· Try to eat about the same amount of carbs at each meal. Do not \"save up\" your daily allowance of carbs to eat at one meal. 
· Proteins have very little or no carbs per serving. Examples of proteins are beef, chicken, turkey, fish, eggs, tofu, cheese, cottage cheese, and peanut butter. A serving size of meat is 3 ounces, which is about the size of a deck of cards. Examples of meat substitute serving sizes (equal to 1 ounce of meat) are 1/4 cup of cottage cheese, 1 egg, 1 tablespoon of peanut butter, and ½ cup of tofu. How can you eat out and still eat healthy? · Learn to estimate the serving sizes of foods that have carbohydrate. If you measure food at home, it will be easier to estimate the amount in a serving of restaurant food. · If the meal you order has too much carbohydrate (such as potatoes, corn, or baked beans), ask to have a low-carbohydrate food instead. Ask for a salad or green vegetables. · If you use insulin, check your blood sugar before and after eating out to help you plan how much to eat in the future. · If you eat more carbohydrate at a meal than you had planned, take a walk or do other exercise. This will help lower your blood sugar. What else should you know? · Limit saturated fat, such as the fat from meat and dairy products. This is a healthy choice because people who have diabetes are at higher risk of heart disease. So choose lean cuts of meat and nonfat or low-fat dairy products. Use olive or canola oil instead of butter or shortening when cooking. · Don't skip meals. Your blood sugar may drop too low if you skip meals and take insulin or certain medicines for diabetes. · Check with your doctor before you drink alcohol. Alcohol can cause your blood sugar to drop too low. Alcohol can also cause a bad reaction if you take certain diabetes medicines. Follow-up care is a key part of your treatment and safety.  Be sure to make and go to all appointments, and call your doctor if you are having problems. It's also a good idea to know your test results and keep a list of the medicines you take. Where can you learn more? Go to http://mayo-kelly.info/. Enter Q482 in the search box to learn more about \"Learning About Diabetes Food Guidelines. \" Current as of: May 23, 2016 Content Version: 11.1 © 9754-8813 [employer]. Care instructions adapted under license by [facility] (which disclaims liability or warranty for this information). If you have questions about a medical condition or this instruction, always ask your healthcare professional. [address] any warranty or liability for your use of this information. Introducing [facility]! Phi Irby introduces [employer] patient portal. Now you can access parts of your medical record, email your doctor's office, and request medication refills online. 1. In your internet browser, go to [email or web address] 2. Click on the First Time User? Click Here link in the Sign In box. You will see the New Member Sign Up page. 3. Enter your [employer] Access Code exactly as it appears below. You will not need to use this code after youve completed the sign-up process. If you do not sign up before the expiration date, you must request a new code. · [employer] Access Code: [email or web address] Expires: 4/30/2017  7:49 AM 
 
4. Enter the last four digits of your Social Security Number (xxxx) and Date of Birth (mm/dd/yyyy) as indicated and click Submit. You will be taken to the next sign-up page. 5. Create a [employer] ID. This will be your [employer] login ID and cannot be changed, so think of one that is secure and easy to remember. 6. Create a [employer] password. You can change your password at any time. 7. Enter your Password Reset Question and Answer.  This can be used at a later time if you forget your password. 8. Enter your e-mail address. You will receive e-mail notification when new information is available in [address] 9. Click Sign Up. You can now view and download portions of your medical record. 10. Click the Download Summary menu link to download a portable copy of your medical information. If you have questions, please visit the Frequently Asked Questions section of the [employer] website. Remember, [employer] is NOT to be used for urgent needs. For medical emergencies, dial 911. Now available from your iPhone and Android! Please provide this summary of care documentation to your next provider. Your primary care clinician is listed as Mony Shepherd. If you have any questions after today's visit, please call [phone].

## 2017-05-14 ENCOUNTER — APPOINTMENT (OUTPATIENT)
Dept: GENERAL RADIOLOGY | Age: 57
End: 2017-05-14
Attending: EMERGENCY MEDICINE
Payer: MEDICARE

## 2017-05-14 ENCOUNTER — HOSPITAL ENCOUNTER (EMERGENCY)
Age: 57
Discharge: HOME | End: 2017-05-14
Attending: EMERGENCY MEDICINE
Payer: MEDICARE

## 2017-05-14 VITALS
RESPIRATION RATE: 16 BRPM | HEART RATE: 73 BPM | HEIGHT: 69 IN | WEIGHT: 224.21 LBS | TEMPERATURE: 98.7 F | OXYGEN SATURATION: 92 % | BODY MASS INDEX: 33.21 KG/M2 | SYSTOLIC BLOOD PRESSURE: 124 MMHG | DIASTOLIC BLOOD PRESSURE: 75 MMHG

## 2017-05-14 DIAGNOSIS — R07.9 CHEST PAIN, UNSPECIFIED TYPE: Primary | ICD-10-CM

## 2017-05-14 LAB
ALBUMIN SERPL BCP-MCNC: 2.7 G/DL (ref 3.5–5)
ALBUMIN/GLOB SERPL: 0.6 {RATIO} (ref 1.1–2.2)
ALP SERPL-CCNC: 135 U/L (ref 45–117)
ALT SERPL-CCNC: 15 U/L (ref 12–78)
ANION GAP BLD CALC-SCNC: 10 MMOL/L (ref 5–15)
AST SERPL W P-5'-P-CCNC: 7 U/L (ref 15–37)
BASOPHILS # BLD AUTO: 0.1 K/UL (ref 0–0.1)
BASOPHILS # BLD: 1 % (ref 0–1)
BILIRUB SERPL-MCNC: 0.3 MG/DL (ref 0.2–1)
BUN SERPL-MCNC: 18 MG/DL (ref 6–20)
BUN/CREAT SERPL: 10 (ref 12–20)
CALCIUM SERPL-MCNC: 8.3 MG/DL (ref 8.5–10.1)
CHLORIDE SERPL-SCNC: 100 MMOL/L (ref 97–108)
CO2 SERPL-SCNC: 26 MMOL/L (ref 21–32)
CREAT SERPL-MCNC: 1.86 MG/DL (ref 0.7–1.3)
D DIMER PPP FEU-MCNC: 0.24 MG/L FEU (ref 0–0.65)
EOSINOPHIL # BLD: 0.2 K/UL (ref 0–0.4)
EOSINOPHIL NFR BLD: 3 % (ref 0–7)
ERYTHROCYTE [DISTWIDTH] IN BLOOD BY AUTOMATED COUNT: 13.3 % (ref 11.5–14.5)
GLOBULIN SER CALC-MCNC: 4.3 G/DL (ref 2–4)
GLUCOSE SERPL-MCNC: 356 MG/DL (ref 65–100)
HCT VFR BLD AUTO: 43 % (ref 36.6–50.3)
HGB BLD-MCNC: 15.1 G/DL (ref 12.1–17)
LYMPHOCYTES # BLD AUTO: 14 % (ref 12–49)
LYMPHOCYTES # BLD: 1 K/UL (ref 0.8–3.5)
MCH RBC QN AUTO: 30.3 PG (ref 26–34)
MCHC RBC AUTO-ENTMCNC: 35.1 G/DL (ref 30–36.5)
MCV RBC AUTO: 86.3 FL (ref 80–99)
MONOCYTES # BLD: 0.7 K/UL (ref 0–1)
MONOCYTES NFR BLD AUTO: 9 % (ref 5–13)
NEUTS SEG # BLD: 5.2 K/UL (ref 1.8–8)
NEUTS SEG NFR BLD AUTO: 73 % (ref 32–75)
PLATELET # BLD AUTO: 271 K/UL (ref 150–400)
POTASSIUM SERPL-SCNC: 3.8 MMOL/L (ref 3.5–5.1)
PROT SERPL-MCNC: 7 G/DL (ref 6.4–8.2)
RBC # BLD AUTO: 4.98 M/UL (ref 4.1–5.7)
SODIUM SERPL-SCNC: 136 MMOL/L (ref 136–145)
TROPONIN I SERPL-MCNC: <0.04 NG/ML
TROPONIN I SERPL-MCNC: <0.04 NG/ML
WBC # BLD AUTO: 7.2 K/UL (ref 4.1–11.1)

## 2017-05-14 PROCEDURE — 36415 COLL VENOUS BLD VENIPUNCTURE: CPT | Performed by: EMERGENCY MEDICINE

## 2017-05-14 PROCEDURE — 99285 EMERGENCY DEPT VISIT HI MDM: CPT

## 2017-05-14 PROCEDURE — 71020 XR CHEST PA LAT: CPT

## 2017-05-14 PROCEDURE — 74011250636 HC RX REV CODE- 250/636: Performed by: EMERGENCY MEDICINE

## 2017-05-14 PROCEDURE — 80053 COMPREHEN METABOLIC PANEL: CPT | Performed by: EMERGENCY MEDICINE

## 2017-05-14 PROCEDURE — 74011250637 HC RX REV CODE- 250/637: Performed by: EMERGENCY MEDICINE

## 2017-05-14 PROCEDURE — 85025 COMPLETE CBC W/AUTO DIFF WBC: CPT | Performed by: EMERGENCY MEDICINE

## 2017-05-14 PROCEDURE — 93005 ELECTROCARDIOGRAM TRACING: CPT

## 2017-05-14 PROCEDURE — 85379 FIBRIN DEGRADATION QUANT: CPT | Performed by: EMERGENCY MEDICINE

## 2017-05-14 PROCEDURE — 96374 THER/PROPH/DIAG INJ IV PUSH: CPT

## 2017-05-14 PROCEDURE — 96376 TX/PRO/DX INJ SAME DRUG ADON: CPT

## 2017-05-14 PROCEDURE — 84484 ASSAY OF TROPONIN QUANT: CPT | Performed by: EMERGENCY MEDICINE

## 2017-05-14 RX ORDER — MORPHINE SULFATE 4 MG/ML
4 INJECTION, SOLUTION INTRAMUSCULAR; INTRAVENOUS ONCE
Status: COMPLETED | OUTPATIENT
Start: 2017-05-14 | End: 2017-05-14

## 2017-05-14 RX ORDER — NITROGLYCERIN 0.4 MG/1
0.4 TABLET SUBLINGUAL
Status: DISCONTINUED | OUTPATIENT
Start: 2017-05-14 | End: 2017-05-14 | Stop reason: HOSPADM

## 2017-05-14 RX ORDER — ONDANSETRON 4 MG/1
8 TABLET, ORALLY DISINTEGRATING ORAL
Status: COMPLETED | OUTPATIENT
Start: 2017-05-14 | End: 2017-05-14

## 2017-05-14 RX ADMIN — MORPHINE SULFATE 4 MG: 4 INJECTION, SOLUTION INTRAMUSCULAR; INTRAVENOUS at 13:58

## 2017-05-14 RX ADMIN — ONDANSETRON 8 MG: 4 TABLET, ORALLY DISINTEGRATING ORAL at 13:59

## 2017-05-14 RX ADMIN — MORPHINE SULFATE 4 MG: 4 INJECTION, SOLUTION INTRAMUSCULAR; INTRAVENOUS at 11:27

## 2017-05-14 RX ADMIN — NITROGLYCERIN 0.4 MG: 0.4 TABLET SUBLINGUAL at 09:02

## 2017-05-14 NOTE — ED NOTES
Pt discharged by MD Vance Phillips. Pt provided with discharge instructions Rx and instructions on follow up care. Pt ambulated out of ED.

## 2017-05-14 NOTE — ED PROVIDER NOTES
HPI Comments: 50yom with PMH of thyroid CA, HTN, DM, who presents with CP/SOB, x 2 days. Started in the AM 2 days ago, and woke him up from sleep. States it is sharp and pleuritic left sided CP that radiates around to his left back, and is associated with left abd pain. States nausea, no vomiting, nonproductive cough, and also no fever. States he has been out of his anti hypertensives for the past few weeks. Patient is a 56 y.o. male presenting with shortness of breath and chest pain. The history is provided by the patient. Shortness of Breath   This is a new problem. The average episode lasts 2 days. The problem occurs intermittently. The current episode started 2 days ago. The problem has been gradually improving. Associated symptoms include cough, chest pain and abdominal pain. Pertinent negatives include no fever, no headaches, no coryza, no vomiting, no rash and no leg swelling. Associated medical issues do not include asthma, COPD, PE, past MI or DVT. Chest Pain (Angina)    Associated symptoms include abdominal pain, cough, nausea and shortness of breath. Pertinent negatives include no dizziness, no fever, no headaches, no palpitations and no vomiting. His past medical history does not include DVT or PE.         Past Medical History:   Diagnosis Date    Adverse effect of anesthesia     \" STOP BREATHING 1 TIME C ANESTH\"    Calculus of kidney     Cancer ([address] 2004    thyroid cancer    Chronic kidney disease     Chronic pain     BACK SHOULDER AND ARM    Depression     Diabetes ([address]     Hypercholesterolemia     Hypertension     Nausea & vomiting     Other ill-defined conditions     cholesterol, thyroid    Sleep apnea     doesn't wear cpap    Thyroid cancer ([address]     TIA (transient ischemic attack) 2011       Past Surgical History:   Procedure Laterality Date    CARDIAC SURG PROCEDURE UNLIST      HX HEENT      THROAT SURGERY X 4    HX ORTHOPAEDIC      back     HX ORTHOPAEDIC      ARM AND SHOULDER    HX OTHER SURGICAL      thyroid, lymphnode    HX RETINAL DETACHMENT REPAIR      left eye    VASCULAR SURGERY PROCEDURE UNLIST      cardiac cath NEG. Family History:   Problem Relation Age of Onset    Diabetes Mother     Elevated Lipids Mother    Tyler Mungo Bladder Disease Mother     Headache Mother     Migraines Mother     Heart Disease Mother     Hypertension Mother     Stroke Mother     Other Mother      ANEURSYM BRAIN    Bleeding Prob Father     Cancer Father      LEUKEMIA    Diabetes Father     Elevated Lipids Father     Mental Retardation Sister     Psychiatric Disorder Sister     Cancer Brother      LUNGS       Social History     Social History    Marital status: LEGALLY      Spouse name: N/A    Number of children: N/A    Years of education: N/A     Occupational History    Not on file. Social History Main Topics    Smoking status: Former Smoker     Quit date: 8/22/1994    Smokeless tobacco: Never Used    Alcohol use No    Drug use: No    Sexual activity: No     Other Topics Concern    Not on file     Social History Narrative         ALLERGIES: Anesthetics - amide type; Flexeril [cyclobenzaprine]; and Tramadol    Review of Systems   Constitutional: Negative for chills and fever. Eyes: Negative for photophobia and visual disturbance. Respiratory: Positive for cough and shortness of breath. Negative for chest tightness. Cardiovascular: Positive for chest pain. Negative for palpitations and leg swelling. Gastrointestinal: Positive for abdominal pain and nausea. Negative for vomiting. Musculoskeletal: Negative for arthralgias and myalgias. Skin: Negative for color change and rash. Neurological: Negative for dizziness, syncope and headaches. Psychiatric/Behavioral: Negative for agitation and behavioral problems.        Vitals:    05/14/17 0917 05/14/17 0930 05/14/17 0945 05/14/17 0946   BP: 141/79 141/79 124/75 124/75   Pulse:  71 73    Resp: 17 16    Temp:       SpO2:  97% 92%    Weight:       Height:                Physical Exam   Constitutional: He is oriented to person, place, and time. He appears well-developed and well-nourished. No distress. HENT:   Head: Normocephalic and atraumatic. Eyes: EOM are normal. Pupils are equal, round, and reactive to light. Neck: No JVD present. No tracheal deviation present. Cardiovascular: Normal rate and regular rhythm. Exam reveals no friction rub. No murmur heard. Pulmonary/Chest: Effort normal. No respiratory distress. He has no wheezes. He exhibits tenderness. Diminished breath sounds   Abdominal: Soft. He exhibits no distension. There is tenderness. There is no rebound and no guarding. Musculoskeletal: Normal range of motion. He exhibits no edema (negative for posterior  calf tenderness, kimberly's sign, and unilateral edema). Neurological: He is alert and oriented to person, place, and time. No cranial nerve deficit. Skin: Skin is warm and dry. He is not diaphoretic. Psychiatric: He has a normal mood and affect. His behavior is normal.        MDM  Number of Diagnoses or Management Options  Diagnosis management comments: 50yom with PMH of HTN and DM and thyroid CA presents with atypical pleuritic CP and SOB x 2 days, and presents with HTN, systolic > 366. DDx includes MI, ACS, PNA, PE, HTN emergency    Will check CBC to r/o anemia and bandemia  Will check BMP to evaluate kidney function and electrolytes  Will check troponin to evaluate for myocardial ischemia  Will check EKG to r/o arrhythmia and evaluate for STEMI  Will check CXR to evaluate cardiac silhouette and lung rodríguez  Will check Dimer, and give nitro for symptoms.       H- 0/2  E- 0/2  A- 1/2  R- 2/2  T- 0/2  Total HEART score is 3       Amount and/or Complexity of Data Reviewed  Clinical lab tests: ordered and reviewed  Tests in the radiology section of CPT®: ordered and reviewed  Review and summarize past medical records: yes    Patient Progress  Patient progress: stable    ED Course     1113: Pt has mild improvement in his pain, still waiting for 2nd troponin    1338: 2nd trop negative, will d/c with f/u with PCP  All findings were discussed with the patient/family, and the plan was explained to them. They state understanding of this, and agree with the plan. Procedures    EKG interpretation:   8:43 AM  Rhythm: normal sinus rhythm; and regular . Rate (approx.): 78 bpm; Axis: normal; HI interval: normal; QRS interval: normal ; ST/T wave: normal; No significant changes. Other: Possible left atrial enlargement. Written by [facility] ED Scribe, as dictated by Alexandro Lyles MD.    EKG interpretation:  12:40 PM  Rhythm: normal sinus rhythm; and regular . Rate (approx.): 65 bpm; Axis: left axis deviation; HI interval: normal; QRS interval: normal ; ST/T wave: normal; Other findings: No significant changes.   Written by [facility] ED Scribshan, as dictated by Alexandro Lyles MD.         LABORATORY TESTS:  Recent Results (from the past 12 hour(s))   EKG, 12 LEAD, INITIAL    Collection Time: 05/14/17  8:43 AM   Result Value Ref Range    Ventricular Rate 78 BPM    Atrial Rate 78 BPM    P-R Interval 152 ms    QRS Duration 90 ms    Q-T Interval 392 ms    QTC Calculation (Bezet) 446 ms    Calculated P Axis 71 degrees    Calculated R Axis -49 degrees    Calculated T Axis 45 degrees    Diagnosis       Normal sinus rhythm  Possible Left atrial enlargement  Pulmonary disease pattern  Left anterior fascicular block  Abnormal ECG  When compared with ECG of 20-JAN-2017 05:02,  No significant change was found     CBC WITH AUTOMATED DIFF    Collection Time: 05/14/17  8:57 AM   Result Value Ref Range    WBC 7.2 4.1 - 11.1 K/uL    RBC 4.98 4.10 - 5.70 M/uL    HGB 15.1 12.1 - 17.0 g/dL    HCT 43.0 36.6 - 50.3 %    MCV 86.3 80.0 - 99.0 FL    MCH 30.3 26.0 - 34.0 PG    MCHC 35.1 30.0 - 36.5 g/dL    RDW 13.3 11.5 - 14.5 %    PLATELET 252 461 - 806 K/uL    NEUTROPHILS 73 32 - 75 %    LYMPHOCYTES 14 12 - 49 %    MONOCYTES 9 5 - 13 %    EOSINOPHILS 3 0 - 7 %    BASOPHILS 1 0 - 1 %    ABS. NEUTROPHILS 5.2 1.8 - 8.0 K/UL    ABS. LYMPHOCYTES 1.0 0.8 - 3.5 K/UL    ABS. MONOCYTES 0.7 0.0 - 1.0 K/UL    ABS. EOSINOPHILS 0.2 0.0 - 0.4 K/UL    ABS. BASOPHILS 0.1 0.0 - 0.1 K/UL   METABOLIC PANEL, COMPREHENSIVE    Collection Time: 05/14/17  8:57 AM   Result Value Ref Range    Sodium 136 136 - 145 mmol/L    Potassium 3.8 3.5 - 5.1 mmol/L    Chloride 100 97 - 108 mmol/L    CO2 26 21 - 32 mmol/L    Anion gap 10 5 - 15 mmol/L    Glucose 356 (H) 65 - 100 mg/dL    BUN 18 6 - 20 MG/DL    Creatinine 1.86 (H) 0.70 - 1.30 MG/DL    BUN/Creatinine ratio 10 (L) 12 - 20      GFR est AA 46 (L) >60 ml/min/1.73m2    GFR est non-AA 38 (L) >60 ml/min/1.73m2    Calcium 8.3 (L) 8.5 - 10.1 MG/DL    Bilirubin, total 0.3 0.2 - 1.0 MG/DL    ALT (SGPT) 15 12 - 78 U/L    AST (SGOT) 7 (L) 15 - 37 U/L    Alk.  phosphatase 135 (H) 45 - 117 U/L    Protein, total 7.0 6.4 - 8.2 g/dL    Albumin 2.7 (L) 3.5 - 5.0 g/dL    Globulin 4.3 (H) 2.0 - 4.0 g/dL    A-G Ratio 0.6 (L) 1.1 - 2.2     TROPONIN I    Collection Time: 05/14/17  8:57 AM   Result Value Ref Range    Troponin-I, Qt. <0.04 <0.05 ng/mL   D DIMER    Collection Time: 05/14/17  8:57 AM   Result Value Ref Range    D-dimer 0.24 0.00 - 0.65 mg/L FEU   EKG, 12 LEAD, SUBSEQUENT    Collection Time: 05/14/17 12:40 PM   Result Value Ref Range    Ventricular Rate 65 BPM    Atrial Rate 65 BPM    P-R Interval 166 ms    QRS Duration 86 ms    Q-T Interval 422 ms    QTC Calculation (Bezet) 438 ms    Calculated P Axis 59 degrees    Calculated R Axis -34 degrees    Calculated T Axis 30 degrees    Diagnosis       Normal sinus rhythm  Left axis deviation  Abnormal ECG  When compared with ECG of 14-MAY-2017 08:43,  MANUAL COMPARISON REQUIRED, DATA IS UNCONFIRMED     TROPONIN I    Collection Time: 05/14/17 12:53 PM   Result Value Ref Range    Troponin-I, Qt. <0.04 <0.05 ng/mL       IMAGING RESULTS:  INDICATION: Shortness of breath and chest pain today.     COMPARISON: 1/20/2017.     FINDINGS: PA and lateral views were obtained of the chest.   The heart is normal in size. The lungs are clear. No consolidation, pulmonary edema, or pleural effusion is evident. The regional osseous structures are unremarkable.      IMPRESSION  IMPRESSION:   No pneumonia or CHF. MEDICATIONS GIVEN:  Medications   nitroglycerin (NITROSTAT) tablet 0.4 mg (0.4 mg SubLINGual Given 5/14/17 0902)   ondansetron (ZOFRAN ODT) tablet 8 mg (not administered)   morphine injection 4 mg (not administered)   morphine injection 4 mg (4 mg IntraVENous Given 5/14/17 1127)       IMPRESSION:  No diagnosis found. PLAN:  1. Discharge to f/u with PCP for atypical CP  Return to ED if worse                 CONSULT NOTE:   10:30 AM  Isabell Forde spoke with Suzi Sorto,   Specialty: Cardiology  Discussed pt's hx, disposition, and available diagnostic and imaging results. Reviewed care plans.  Consultant agrees to see patient, recommends 2 troponins and outpatient stress test

## 2017-05-14 NOTE — ED NOTES
Pt in bed resting at this time with no acute distress noted; Pt states chest pain continues to improve and is 4/10.

## 2017-05-14 NOTE — DISCHARGE INSTRUCTIONS
Chest Pain: Care Instructions  Your Care Instructions  There are many things that can cause chest pain. Some are not serious and will get better on their own in a few days. But some kinds of chest pain need more testing and treatment. Your doctor may have recommended a follow-up visit in the next 8 to 12 hours. If you are not getting better, you may need more tests or treatment. Even though your doctor has released you, you still need to watch for any problems. The doctor carefully checked you, but sometimes problems can develop later. If you have new symptoms or if your symptoms do not get better, get medical care right away. If you have worse or different chest pain or pressure that lasts more than 5 minutes or you passed out (lost consciousness), call 911 or seek other emergency help right away. A medical visit is only one step in your treatment. Even if you feel better, you still need to do what your doctor recommends, such as going to all suggested follow-up appointments and taking medicines exactly as directed. This will help you recover and help prevent future problems. How can you care for yourself at home? · Rest until you feel better. · Take your medicine exactly as prescribed. Call your doctor if you think you are having a problem with your medicine. · Do not drive after taking a prescription pain medicine. When should you call for help? Call 911 if:  · You passed out (lost consciousness). · You have severe difficulty breathing. · You have symptoms of a heart attack. These may include:  ¨ Chest pain or pressure, or a strange feeling in your chest.  ¨ Sweating. ¨ Shortness of breath. ¨ Nausea or vomiting. ¨ Pain, pressure, or a strange feeling in your back, neck, jaw, or upper belly or in one or both shoulders or arms. ¨ Lightheadedness or sudden weakness. ¨ A fast or irregular heartbeat.   After you call 911, the  may tell you to chew 1 adult-strength or 2 to 4 low-dose aspirin. Wait for an ambulance. Do not try to drive yourself. Call your doctor today if:  · You have any trouble breathing. · Your chest pain gets worse. · You are dizzy or lightheaded, or you feel like you may faint. · You are not getting better as expected. · You are having new or different chest pain. Where can you learn more? Go to http://mayo-kelly.info/. Enter A120 in the search box to learn more about \"Chest Pain: Care Instructions. \"  Current as of: May 27, 2016  Content Version: 11.2  © 9509-2855 [employer]. Care instructions adapted under license by [employer] (which disclaims liability or warranty for this information). If you have questions about a medical condition or this instruction, always ask your healthcare professional. [address] any warranty or liability for your use of this information.

## 2017-05-14 NOTE — CONSULTS
Cardiology Consult Note    CC: CP    PCP:Hans Hope MD  Reason for consult:  CP  Requesting MD:  Dr. Pascale Goncalves Date: 5/14/2017   Today's Date: 5/14/2017    Cardiac Assessment/Plan:   CP: atypical/non-cardiac CP (reproducible); Neg ecg x2 and enzymes x2. He needs Sx Rx for reproducible chest wall pain. For his chronic NUÑEZ/risk factors, he should have an outpt stress cardiolite; please have the patient call [phone] to schedule. HTN: stable. Rhythm: NSR. Volume status: looks euvolemic  CKD: stable  (cr 1.9 range). The patient reports neg stress test 15 yrs ago; he has chronic NUÑEZ (certainly could be related to reported untreated SHOLA). Usually has no CP; on disability for \"thyroid ca\" per pt. Yesterday and today, he's had several hours of chest pain (sharp; central; reproducible; no radiation/acute dyspnea). No PND, orthopnea, palpitations, pre-syncope, syncope, peripheral edema, or decrease in exercise tolerance. No current complaints. ECG: NSR; LAFB: no change on f/u; no acute ischemic changes. CXR: unremarkable; no chf. Notable labs: Normal trop x2.     Notable prior cardiac history:   none  ______________________________________________________________________  Patient Active Problem List    Diagnosis Date Noted    Papillary thyroid carcinoma ([address] 11/29/2016    Sepsis ([address] 10/16/2016    Chronic left shoulder pain 10/06/2016    Mixed hypercholesterolemia and hypertriglyceridemia 08/31/2016    Diabetes type 2, uncontrolled ([address] 08/31/2016    Hypovitaminosis D 08/31/2016    Proteinuria 08/31/2016    Microalbuminuria 08/31/2016    Essential hypertension with goal blood pressure less than 130/80 08/31/2016    Acquired hypothyroidism 08/31/2016        Past Medical History:   Diagnosis Date    Adverse effect of anesthesia     \" STOP BREATHING 1 TIME C ANESTH\"    Calculus of kidney     Cancer ([address] 2004    thyroid cancer    Chronic kidney disease     Chronic pain BACK SHOULDER AND ARM    Depression     Diabetes [facility]     Hypercholesterolemia     Hypertension     Nausea & vomiting     Other ill-defined conditions     cholesterol, thyroid    Sleep apnea     doesn't wear cpap    Thyroid cancer ([facility]     TIA (transient ischemic attack) 2011      Past Surgical History:   Procedure Laterality Date    CARDIAC SURG PROCEDURE UNLIST      HX HEENT      THROAT SURGERY X 4    HX ORTHOPAEDIC      back     HX ORTHOPAEDIC      ARM AND SHOULDER    HX OTHER SURGICAL      thyroid, lymphnode    HX RETINAL DETACHMENT REPAIR      left eye    VASCULAR SURGERY PROCEDURE UNLIST      cardiac cath NEG. Allergies   Allergen Reactions    Anesthetics - Amide Type Shortness of Breath    Flexeril [Cyclobenzaprine] Hives    Tramadol Hives      Family History   Problem Relation Age of Onset    Diabetes Mother     Elevated Lipids Mother    Jessica Mazzoni Bladder Disease Mother     Headache Mother     Migraines Mother     Heart Disease Mother     Hypertension Mother     Stroke Mother     Other Mother      ANEURSYM BRAIN    Bleeding Prob Father     Cancer Father      LEUKEMIA    Diabetes Father     Elevated Lipids Father     Mental Retardation Sister     Psychiatric Disorder Sister     Cancer Brother      LUNGS      Social History     Social History    Marital status: LEGALLY      Spouse name: N/A    Number of children: N/A    Years of education: N/A     Occupational History    Not on file.      Social History Main Topics    Smoking status: Former Smoker     Quit date: 8/22/1994    Smokeless tobacco: Never Used    Alcohol use No    Drug use: No    Sexual activity: No     Other Topics Concern    Not on file     Social History Narrative     Current Facility-Administered Medications   Medication Dose Route Frequency    nitroglycerin (NITROSTAT) tablet 0.4 mg  0.4 mg SubLINGual Q5MIN PRN    ondansetron (ZOFRAN ODT) tablet 8 mg  8 mg Oral NOW    morphine injection 4 mg  4 mg IntraVENous ONCE     Current Outpatient Prescriptions   Medication Sig    glipiZIDE (GLUCOTROL) 10 mg tablet Take 1 Tab by mouth Before breakfast and dinner.  insulin degludec (TRESIBA FLEXTOUCH U-200) 200 unit/mL (3 mL) inpn 25 Units by SubCUTAneous route nightly.  HYDROcodone-acetaminophen (NORCO) 5-325 mg per tablet Take 1 Tab by mouth every six (6) hours as needed for Pain. Max Daily Amount: 4 Tabs.  Lancets misc Use 3-4 times daily to check blood sugar. DX E11.22    glucose blood VI test strips (PHARMACIST CHOICE) strip Check glucose 3-4 times daily. DX E11.22    levothyroxine (SYNTHROID) 200 mcg tablet Take 1 Tab by mouth Daily (before breakfast).  Insulin Needles, Disposable, 31 gauge x 5/16\" ndle by SubCUTAneous route daily.  atorvastatin (LIPITOR) 40 mg tablet Take 1 Tab by mouth daily.  mupirocin (BACTROBAN) 2 % ointment Apply  to affected area two (2) times a day. Around wound site    lisinopril (PRINIVIL, ZESTRIL) 20 mg tablet Take 20 mg by mouth daily.  Cholecalciferol, Vitamin D3, (VITAMIN D3) 2,000 unit cap capsule Take 2,000 Units by mouth daily. Indications: VITAMIN D DEFICIENCY (HIGH DOSE THERAPY)        No reported FHx of early CAD or SCD    Prior to Admission Medications:  Prior to Admission medications    Medication Sig Start Date End Date Taking? Authorizing Provider   glipiZIDE (GLUCOTROL) 10 mg tablet Take 1 Tab by mouth Before breakfast and dinner. 1/30/17   Benedicto French MD   insulin degludec (TRESIBA FLEXTOUCH U-200) 200 unit/mL (3 mL) inpn 25 Units by SubCUTAneous route nightly. 1/30/17   Benedicto French MD   HYDROcodone-acetaminophen [facility]) 5-325 mg per tablet Take 1 Tab by mouth every six (6) hours as needed for Pain. Max Daily Amount: 4 Tabs. 12/31/16   JOSÉ Hall   Lancets misc Use 3-4 times daily to check blood sugar.  DX E11.22 12/1/16   Benedicto French MD   glucose blood VI test strips (PHARMACIST CHOICE) strip Check glucose 3-4 times daily. DX E11.22 16   Cynthia Danielle MD   levothyroxine (SYNTHROID) 200 mcg tablet Take 1 Tab by mouth Daily (before breakfast). 16   Cynthia Danielle MD   Insulin Needles, Disposable, 31 gauge x \" ndle by SubCUTAneous route daily. 16   Cynthia Danielle MD   atorvastatin (LIPITOR) 40 mg tablet Take 1 Tab by mouth daily. 16   Cynthia Danielle MD   mupirocin [facility]) 2 % ointment Apply  to affected area two (2) times a day. Around wound site 10/20/16   Wander Hopper MD   lisinopril (PRINIVIL, ZESTRIL) 20 mg tablet Take 20 mg by mouth daily. Historical Provider   Cholecalciferol, Vitamin D3, (VITAMIN D3) 2,000 unit cap capsule Take 2,000 Units by mouth daily. Indications: VITAMIN D DEFICIENCY (HIGH DOSE THERAPY) 16   Brooks Nielson MD        Review of Symptoms: Except as noted in HPI, patient denies recent fever or chills, nausea, vomiting, diarrhea, hemoptysis, hematemesis, dysuria, myalgias, focal neurologic symptoms, ecchymosis, angioedema, odynophagia, dysphagia, sore throat, earache,rash, melena, hematochezia, depression, GERD, cold intolerance, petechia, bleeding gums, or significant weight loss.      24 hr VS reviewed, overall VSSAF  Temp (24hrs), Av.7 °F (37.1 °C), Min:98.7 °F (37.1 °C), Max:98.7 °F (37.1 °C)    Patient Vitals for the past 8 hrs:   Pulse   17 0945 73   17 0930 71   17 0902 74   17 0900 75   17 0851 79   17 0845 79    Patient Vitals for the past 8 hrs:   Resp   17 0945 16   17 0930 17   17 0900 10   17 0851 16   17 0845 18    Patient Vitals for the past 8 hrs:   BP   17 0946 124/75   17 0945 124/75   17 0930 141/79   17 0917 141/79   17 0916 137/69   17 0915 137/69   17 0902 164/88   17 0900 164/88   17 0851 168/82   17 0845 (!) 211/97        No intake or output data in the 24 hours ending 17 1353      Physical Exam (complete single organ system exam)    Cons: The patient is no distress. Appears stated age. HEENT: Normal conjunctivae; poor dentition. No xanthelasma. Neck: Flat JVP without appreciable HJR. Resp: Normal respiratory effort with clear lungs bilaterally. CV: Regular rate and rhythm. PMI not palpated. Normal S1,S2  No gallop or rubs appreciated. No murmur appreciated. Intact carotid upstroke bilaterally without appreciated bruits. Abdominal aorta not palpated; no abdominal bruit noted. Intact pedal pulses. No peripheral edema. GI: No abd mass noted, soft; no organomegaly noted. Bowel sounds present. Muscular:  No significant kyphosis. Strength WNL for age. Ext: No cyanosis, clubbing, or stigmata of peripheral embolization. Derm: No ulcers or stasis dermatitis of lower extremities. Neuro: Alert and oriented x 3;  Grossly non-focal. Normal mood and affect.      Labs:   Recent Results (from the past 24 hour(s))   EKG, 12 LEAD, INITIAL    Collection Time: 05/14/17  8:43 AM   Result Value Ref Range    Ventricular Rate 78 BPM    Atrial Rate 78 BPM    P-R Interval 152 ms    QRS Duration 90 ms    Q-T Interval 392 ms    QTC Calculation (Bezet) 446 ms    Calculated P Axis 71 degrees    Calculated R Axis -49 degrees    Calculated T Axis 45 degrees    Diagnosis       Normal sinus rhythm  Possible Left atrial enlargement  Pulmonary disease pattern  Left anterior fascicular block  Abnormal ECG  When compared with ECG of 20-JAN-2017 05:02,  No significant change was found     CBC WITH AUTOMATED DIFF    Collection Time: 05/14/17  8:57 AM   Result Value Ref Range    WBC 7.2 4.1 - 11.1 K/uL    RBC 4.98 4.10 - 5.70 M/uL    HGB 15.1 12.1 - 17.0 g/dL    HCT 43.0 36.6 - 50.3 %    MCV 86.3 80.0 - 99.0 FL    MCH 30.3 26.0 - 34.0 PG    MCHC 35.1 30.0 - 36.5 g/dL    RDW 13.3 11.5 - 14.5 %    PLATELET 645 950 - 418 K/uL    NEUTROPHILS 73 32 - 75 %    LYMPHOCYTES 14 12 - 49 %    MONOCYTES 9 5 - 13 %    EOSINOPHILS 3 0 - 7 %    BASOPHILS 1 0 - 1 %    ABS. NEUTROPHILS 5.2 1.8 - 8.0 K/UL    ABS. LYMPHOCYTES 1.0 0.8 - 3.5 K/UL    ABS. MONOCYTES 0.7 0.0 - 1.0 K/UL    ABS. EOSINOPHILS 0.2 0.0 - 0.4 K/UL    ABS. BASOPHILS 0.1 0.0 - 0.1 K/UL   METABOLIC PANEL, COMPREHENSIVE    Collection Time: 05/14/17  8:57 AM   Result Value Ref Range    Sodium 136 136 - 145 mmol/L    Potassium 3.8 3.5 - 5.1 mmol/L    Chloride 100 97 - 108 mmol/L    CO2 26 21 - 32 mmol/L    Anion gap 10 5 - 15 mmol/L    Glucose 356 (H) 65 - 100 mg/dL    BUN 18 6 - 20 MG/DL    Creatinine 1.86 (H) 0.70 - 1.30 MG/DL    BUN/Creatinine ratio 10 (L) 12 - 20      GFR est AA 46 (L) >60 ml/min/1.73m2    GFR est non-AA 38 (L) >60 ml/min/1.73m2    Calcium 8.3 (L) 8.5 - 10.1 MG/DL    Bilirubin, total 0.3 0.2 - 1.0 MG/DL    ALT (SGPT) 15 12 - 78 U/L    AST (SGOT) 7 (L) 15 - 37 U/L    Alk.  phosphatase 135 (H) 45 - 117 U/L    Protein, total 7.0 6.4 - 8.2 g/dL    Albumin 2.7 (L) 3.5 - 5.0 g/dL    Globulin 4.3 (H) 2.0 - 4.0 g/dL    A-G Ratio 0.6 (L) 1.1 - 2.2     TROPONIN I    Collection Time: 05/14/17  8:57 AM   Result Value Ref Range    Troponin-I, Qt. <0.04 <0.05 ng/mL   D DIMER    Collection Time: 05/14/17  8:57 AM   Result Value Ref Range    D-dimer 0.24 0.00 - 0.65 mg/L FEU   EKG, 12 LEAD, SUBSEQUENT    Collection Time: 05/14/17 12:40 PM   Result Value Ref Range    Ventricular Rate 65 BPM    Atrial Rate 65 BPM    P-R Interval 166 ms    QRS Duration 86 ms    Q-T Interval 422 ms    QTC Calculation (Bezet) 438 ms    Calculated P Axis 59 degrees    Calculated R Axis -34 degrees    Calculated T Axis 30 degrees    Diagnosis       Normal sinus rhythm  Left axis deviation  Abnormal ECG  When compared with ECG of 14-MAY-2017 08:43,  MANUAL COMPARISON REQUIRED, DATA IS UNCONFIRMED     TROPONIN I    Collection Time: 05/14/17 12:53 PM   Result Value Ref Range    Troponin-I, Qt. <0.04 <0.05 ng/mL     Recent Labs      05/14/17   1253  05/14/17   0857   TROIQ  <0.04  <0.04       Zo Lyons, MD

## 2017-05-16 LAB
ATRIAL RATE: 65 BPM
ATRIAL RATE: 78 BPM
CALCULATED P AXIS, ECG09: 59 DEGREES
CALCULATED P AXIS, ECG09: 71 DEGREES
CALCULATED R AXIS, ECG10: -34 DEGREES
CALCULATED R AXIS, ECG10: -49 DEGREES
CALCULATED T AXIS, ECG11: 30 DEGREES
CALCULATED T AXIS, ECG11: 45 DEGREES
DIAGNOSIS, 93000: NORMAL
DIAGNOSIS, 93000: NORMAL
P-R INTERVAL, ECG05: 152 MS
P-R INTERVAL, ECG05: 166 MS
Q-T INTERVAL, ECG07: 392 MS
Q-T INTERVAL, ECG07: 422 MS
QRS DURATION, ECG06: 86 MS
QRS DURATION, ECG06: 90 MS
QTC CALCULATION (BEZET), ECG08: 438 MS
QTC CALCULATION (BEZET), ECG08: 446 MS
VENTRICULAR RATE, ECG03: 65 BPM
VENTRICULAR RATE, ECG03: 78 BPM

## 2017-05-23 ENCOUNTER — APPOINTMENT (OUTPATIENT)
Dept: GENERAL RADIOLOGY | Age: 57
End: 2017-05-23
Attending: EMERGENCY MEDICINE
Payer: MEDICARE

## 2017-05-23 ENCOUNTER — HOSPITAL ENCOUNTER (EMERGENCY)
Age: 57
Discharge: HOME | End: 2017-05-23
Attending: EMERGENCY MEDICINE
Payer: MEDICARE

## 2017-05-23 VITALS
RESPIRATION RATE: 16 BRPM | SYSTOLIC BLOOD PRESSURE: 177 MMHG | WEIGHT: 217.59 LBS | HEART RATE: 81 BPM | DIASTOLIC BLOOD PRESSURE: 89 MMHG | TEMPERATURE: 97.7 F | OXYGEN SATURATION: 100 % | BODY MASS INDEX: 34.15 KG/M2 | HEIGHT: 67 IN

## 2017-05-23 DIAGNOSIS — M54.41 ACUTE RIGHT-SIDED LOW BACK PAIN WITH RIGHT-SIDED SCIATICA: Primary | ICD-10-CM

## 2017-05-23 PROCEDURE — 99282 EMERGENCY DEPT VISIT SF MDM: CPT

## 2017-05-23 PROCEDURE — 72100 X-RAY EXAM L-S SPINE 2/3 VWS: CPT

## 2017-05-23 RX ORDER — OXYCODONE HYDROCHLORIDE 5 MG/1
5 CAPSULE ORAL
Qty: 8 CAP | Refills: 0 | Status: SHIPPED | OUTPATIENT
Start: 2017-05-23 | End: 2017-08-08

## 2017-05-23 RX ORDER — DIAZEPAM 2 MG/1
2 TABLET ORAL
Qty: 8 TAB | Refills: 0 | Status: SHIPPED | OUTPATIENT
Start: 2017-05-23 | End: 2017-09-18 | Stop reason: SDUPTHER

## 2017-05-23 RX ORDER — PREDNISONE 10 MG/1
60 TABLET ORAL
Qty: 27 TAB | Refills: 0 | Status: SHIPPED | OUTPATIENT
Start: 2017-05-23 | End: 2017-08-16

## 2017-05-23 NOTE — DISCHARGE INSTRUCTIONS
Use medications as prescribed. Please monitor your blood sugar at home as the prednisone may elevate your blood sugar. Return for fever, uncontrolled pain or loss of bowel or bladder control. Low Back Pain: Exercises  Your Care Instructions  Here are some examples of typical rehabilitation exercises for your condition. Start each exercise slowly. Ease off the exercise if you start to have pain. Your doctor or physical therapist will tell you when you can start these exercises and which ones will work best for you. How to do the exercises  Press-up    1. Lie on your stomach, supporting your body with your forearms. 2. Press your elbows down into the floor to raise your upper back. As you do this, relax your stomach muscles and allow your back to arch without using your back muscles. As your press up, do not let your hips or pelvis come off the floor. 3. Hold for 15 to 30 seconds, then relax. 4. Repeat 2 to 4 times. Alternate arm and leg (bird dog) exercise    Note: Do this exercise slowly. Try to keep your body straight at all times, and do not let one hip drop lower than the other. 1. Start on the floor, on your hands and knees. 2. Tighten your belly muscles. 3. Raise one leg off the floor, and hold it straight out behind you. Be careful not to let your hip drop down, because that will twist your trunk. 4. Hold for about 6 seconds, then lower your leg and switch to the other leg. 5. Repeat 8 to 12 times on each leg. 6. Over time, work up to holding for 10 to 30 seconds each time. 7. If you feel stable and secure with your leg raised, try raising the opposite arm straight out in front of you at the same time. Knee-to-chest exercise    1. Lie on your back with your knees bent and your feet flat on the floor. 2. Bring one knee to your chest, keeping the other foot flat on the floor (or keeping the other leg straight, whichever feels better on your lower back).   3. Keep your lower back pressed to the floor. Hold for at least 15 to 30 seconds. 4. Relax, and lower the knee to the starting position. 5. Repeat with the other leg. Repeat 2 to 4 times with each leg. 6. To get more stretch, put your other leg flat on the floor while pulling your knee to your chest.  Curl-ups    1. Lie on the floor on your back with your knees bent at a 90-degree angle. Your feet should be flat on the floor, about 12 inches from your buttocks. 2. Cross your arms over your chest. If this bothers your neck, try putting your hands behind your neck (not your head), with your elbows spread apart. 3. Slowly tighten your belly muscles and raise your shoulder blades off the floor. 4. Keep your head in line with your body, and do not press your chin to your chest.  5. Hold this position for 1 or 2 seconds, then slowly lower yourself back down to the floor. 6. Repeat 8 to 12 times. Pelvic tilt exercise    1. Lie on your back with your knees bent. 2. \"Brace\" your stomach. This means to tighten your muscles by pulling in and imagining your belly button moving toward your spine. You should feel like your back is pressing to the floor and your hips and pelvis are rocking back. 3. Hold for about 6 seconds while you breathe smoothly. 4. Repeat 8 to 12 times. Heel dig bridging    1. Lie on your back with both knees bent and your ankles bent so that only your heels are digging into the floor. Your knees should be bent about 90 degrees. 2. Then push your heels into the floor, squeeze your buttocks, and lift your hips off the floor until your shoulders, hips, and knees are all in a straight line. 3. Hold for about 6 seconds as you continue to breathe normally, and then slowly lower your hips back down to the floor and rest for up to 10 seconds. 4. Do 8 to 12 repetitions. Hamstring stretch in doorway    1. Lie on your back in a doorway, with one leg through the open door. 2. Slide your leg up the wall to straighten your knee.  You should feel a gentle stretch down the back of your leg. 3. Hold the stretch for at least 15 to 30 seconds. Do not arch your back, point your toes, or bend either knee. Keep one heel touching the floor and the other heel touching the wall. 4. Repeat with your other leg. 5. Do 2 to 4 times for each leg. Hip flexor stretch    1. Kneel on the floor with one knee bent and one leg behind you. Place your forward knee over your foot. Keep your other knee touching the floor. 2. Slowly push your hips forward until you feel a stretch in the upper thigh of your rear leg. 3. Hold the stretch for at least 15 to 30 seconds. Repeat with your other leg. 4. Do 2 to 4 times on each side. Wall sit    1. Stand with your back 10 to 12 inches away from a wall. 2. Lean into the wall until your back is flat against it. 3. Slowly slide down until your knees are slightly bent, pressing your lower back into the wall. 4. Hold for about 6 seconds, then slide back up the wall. 5. Repeat 8 to 12 times. Follow-up care is a key part of your treatment and safety. Be sure to make and go to all appointments, and call your doctor if you are having problems. It's also a good idea to know your test results and keep a list of the medicines you take. Where can you learn more? Go to http://mayo-kelly.info/. Enter L555 in the search box to learn more about \"Low Back Pain: Exercises. \"  Current as of: May 23, 2016  Content Version: 11.2  © 6790-5276 [employer]. Care instructions adapted under license by [employer] (which disclaims liability or warranty for this information). If you have questions about a medical condition or this instruction, always ask your healthcare professional. [address] any warranty or liability for your use of this information.
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
## 2017-08-08 ENCOUNTER — HOSPITAL ENCOUNTER (EMERGENCY)
Age: 57
Discharge: HOME | End: 2017-08-08
Attending: EMERGENCY MEDICINE
Payer: MEDICARE

## 2017-08-08 VITALS
DIASTOLIC BLOOD PRESSURE: 85 MMHG | HEIGHT: 69 IN | TEMPERATURE: 98.9 F | WEIGHT: 231.48 LBS | OXYGEN SATURATION: 96 % | BODY MASS INDEX: 34.29 KG/M2 | SYSTOLIC BLOOD PRESSURE: 160 MMHG | HEART RATE: 70 BPM | RESPIRATION RATE: 18 BRPM

## 2017-08-08 DIAGNOSIS — M54.32 BILATERAL SCIATICA: ICD-10-CM

## 2017-08-08 DIAGNOSIS — R03.0 ELEVATED BLOOD PRESSURE READING: ICD-10-CM

## 2017-08-08 DIAGNOSIS — M54.31 BILATERAL SCIATICA: ICD-10-CM

## 2017-08-08 DIAGNOSIS — M51.36 DEGENERATIVE DISC DISEASE, LUMBAR: Primary | ICD-10-CM

## 2017-08-08 PROCEDURE — 99283 EMERGENCY DEPT VISIT LOW MDM: CPT

## 2017-08-08 PROCEDURE — 74011250637 HC RX REV CODE- 250/637: Performed by: PHYSICIAN ASSISTANT

## 2017-08-08 RX ORDER — OXYCODONE AND ACETAMINOPHEN 5; 325 MG/1; MG/1
.5-1 TABLET ORAL
Qty: 20 TAB | Refills: 0 | Status: SHIPPED | OUTPATIENT
Start: 2017-08-08 | End: 2017-08-16

## 2017-08-08 RX ORDER — OXYCODONE AND ACETAMINOPHEN 5; 325 MG/1; MG/1
1 TABLET ORAL
Status: COMPLETED | OUTPATIENT
Start: 2017-08-08 | End: 2017-08-08

## 2017-08-08 RX ADMIN — OXYCODONE HYDROCHLORIDE AND ACETAMINOPHEN 1 TABLET: 5; 325 TABLET ORAL at 20:58

## 2017-08-09 NOTE — ED NOTES
Assumed care of patient from triage. Patient is A&Ox4, respirations even and unlabored. Pt. States he has had pain running down his legs after lifting something heavy yesterday, with history of back pain. Denies numbness, tingling, loss of bowels/bladder. Pt. Merl Media in low bed in POC, side rail x1, call light within reach.

## 2017-08-09 NOTE — DISCHARGE INSTRUCTIONS
Elevated Blood Pressure: Care Instructions  Your Care Instructions    Blood pressure is a measure of how hard the blood pushes against the walls of your arteries. It's normal for blood pressure to go up and down throughout the day. But if it stays up over time, you have high blood pressure. Two numbers tell you your blood pressure. The first number is the systolic pressure. It shows how hard the blood pushes when your heart is pumping. The second number is the diastolic pressure. It shows how hard the blood pushes between heartbeats, when your heart is relaxed and filling with blood. An ideal blood pressure in adults is less than 120/80 (say \"120 over 80\"). High blood pressure is 140/90 or higher. You have high blood pressure if your top number is 140 or higher or your bottom number is 90 or higher, or both. The main test for high blood pressure is simple, fast, and painless. To diagnose high blood pressure, your doctor will test your blood pressure at different times. After testing your blood pressure, your doctor may ask you to test it again when you are home. If you are diagnosed with high blood pressure, you can work with your doctor to make a long-term plan to manage it. Follow-up care is a key part of your treatment and safety. Be sure to make and go to all appointments, and call your doctor if you are having problems. It's also a good idea to know your test results and keep a list of the medicines you take. How can you care for yourself at home? · Do not smoke. Smoking increases your risk for heart attack and stroke. If you need help quitting, talk to your doctor about stop-smoking programs and medicines. These can increase your chances of quitting for good. · Stay at a healthy weight. · Try to limit how much sodium you eat to less than 2,300 milligrams (mg) a day. Your doctor may ask you to try to eat less than 1,500 mg a day. · Be physically active.  Get at least 30 minutes of exercise on most days of the week. Walking is a good choice. You also may want to do other activities, such as running, swimming, cycling, or playing tennis or team sports. · Avoid or limit alcohol. Talk to your doctor about whether you can drink any alcohol. · Eat plenty of fruits, vegetables, and low-fat dairy products. Eat less saturated and total fats. · Learn how to check your blood pressure at home. When should you call for help? Call your doctor now or seek immediate medical care if:  · Your blood pressure is much higher than normal (such as 180/110 or higher). · You think high blood pressure is causing symptoms such as:  ¨ Severe headache. ¨ Blurry vision. Watch closely for changes in your health, and be sure to contact your doctor if:  · You do not get better as expected. Where can you learn more? Go to http://mayo-kelly.info/. Enter D891 in the search box to learn more about \"Elevated Blood Pressure: Care Instructions. \"  Current as of: April 3, 2017  Content Version: 11.3  © 5322-2499 [employer]. Care instructions adapted under license by [employer] (which disclaims liability or warranty for this information). If you have questions about a medical condition or this instruction, always ask your healthcare professional. [address] any warranty or liability for your use of this information. Learning About Degenerative Disc Disease  What is degenerative disc disease? Degenerative disc disease is not really a disease. It's a term used to describe the normal changes in your spinal discs as you age. Spinal discs are small, spongy discs that separate the bones (vertebrae) that make up the spine. The discs act as shock absorbers for the spine, so it can flex, bend, and twist.  Degenerative disc disease can take place in one or more places along the spine. It most often occurs in the discs in the lower back and the neck. What causes it?   As we age, our spinal discs break down, or degenerate. This breakdown causes the symptoms of degenerative disc disease in some people. When the discs break down, they can lose fluid and dry out, and their outer layers can have tiny cracks or tears. This leads to less padding and less space between the bones in the spine. The body reacts to this by making bony growths on the spine called bone spurs. These spurs can press on the spinal nerve roots or spinal cord. This can cause pain and can affect how well the nerves work. What are the symptoms? Many people with degenerative disc disease have no pain. But others have severe pain or other symptoms that limit their activities. Some of the most common symptoms are:  · Pain in the back or neck. Where the pain occurs depends on which discs are affected. · Pain that gets worse when you move, such as bending over, reaching up, or twisting. · Pain that may occur in the rear end (buttocks), arm, or leg if a nerve is pinched. · Numbness or tingling in your arm or leg. How is it diagnosed? A doctor can often diagnose degenerative disc disease while doing a physical exam. If your exam shows no signs of a serious condition, imaging tests (such as an X-ray) aren't likely to help your doctor find the cause of your symptoms. Sometimes degenerative disc disease is found when an X-ray is taken for another reason, such as an injury or other health problem. But even if the doctor finds degenerative disc disease, that doesn't always mean that you will have symptoms. How is it treated? There are several things you can do at home to manage pain from this problem. · To relieve pain, use ice or heat (whichever feels better) on the affected area. ¨ Put ice or a cold pack on the area for 10 to 20 minutes at a time. Put a thin cloth between the ice and your skin. ¨ Put a warm water bottle, a heating pad set on low, or a warm cloth on your back.  Put a thin cloth between the heating pad and your skin. Do not go to sleep with a heating pad on your skin. · Ask your doctor if you can take acetaminophen (such as Tylenol) or nonsteroidal anti-inflammatory drugs, such as ibuprofen or naproxen. Your doctor can prescribe stronger medicines if needed. Be safe with medicines. Read and follow all instructions on the label. · Get some exercise every day. Exercise is one of the best ways to help your back feel better and stay better. It's best to start each exercise slowly. You may notice a little soreness, and that's okay. But if an exercise makes your pain worse, stop doing it. Here are things you can try:  ¨ Walking. It's the simplest and maybe the best activity for your back. It gets your blood moving and helps your muscles stay strong. ¨ Exercises that gently stretch and strengthen your stomach, back, and leg muscles. The stronger those muscles are, the better they're able to protect your back. If you have constant or severe pain in your back or spine, you may need other treatments, such as physical therapy. In some cases, your doctor may suggest surgery. Follow-up care is a key part of your treatment and safety. Be sure to make and go to all appointments, and call your doctor if you are having problems. It's also a good idea to know your test results and keep a list of the medicines you take. Where can you learn more? Go to http://mayo-kelly.info/. Enter U766 in the search box to learn more about \"Learning About Degenerative Disc Disease. \"  Current as of: March 21, 2017  Content Version: 11.3  © 6549-9757 [employer]. Care instructions adapted under license by [employer] (which disclaims liability or warranty for this information). If you have questions about a medical condition or this instruction, always ask your healthcare professional. [address] any warranty or liability for your use of this information.          Sciatica: Care Instructions  Your Care Instructions    Sciatica (say \"vza-VG-mc-kuh\") is an irritation of one of the sciatic nerves, which come from the spinal cord in the lower back. The sciatic nerves and their branches extend down through the buttock to the foot. Sciatica can develop when an injured disc in the back presses against a spinal nerve root. Its main symptom is pain, numbness, or weakness that is often worse in the leg or foot than in the back. Sciatica often will improve and go away with time. Early treatment usually includes medicines and exercises to relieve pain. Follow-up care is a key part of your treatment and safety. Be sure to make and go to all appointments, and call your doctor if you are having problems. It's also a good idea to know your test results and keep a list of the medicines you take. How can you care for yourself at home? · Take pain medicines exactly as directed. ¨ If the doctor gave you a prescription medicine for pain, take it as prescribed. ¨ If you are not taking a prescription pain medicine, ask your doctor if you can take an over-the-counter medicine. · Use heat or ice to relieve pain. ¨ To apply heat, put a warm water bottle, heating pad set on low, or warm cloth on your back. Do not go to sleep with a heating pad on your skin. ¨ To use ice, put ice or a cold pack on the area for 10 to 20 minutes at a time. Put a thin cloth between the ice and your skin. · Avoid sitting if possible, unless it feels better than standing. · Alternate lying down with short walks. Increase your walking distance as you are able to without making your symptoms worse. · Do not do anything that makes your symptoms worse. When should you call for help? Call 911 anytime you think you may need emergency care. For example, call if:  · You are unable to move a leg at all. Call your doctor now or seek immediate medical care if:  · You have new or worse symptoms in your legs or buttocks.  Symptoms may include:  ¨ Numbness or tingling. ¨ Weakness. ¨ Pain. · You lose bladder or bowel control. Watch closely for changes in your health, and be sure to contact your doctor if:  · You are not getting better as expected. Where can you learn more? Go to http://mayo-kelly.info/. Enter [phone] in the search box to learn more about \"Sciatica: Care Instructions. \"  Current as of: March 21, 2017  Content Version: 11.3  © 4752-9847 [employer]. Care instructions adapted under license by [employer] (which disclaims liability or warranty for this information). If you have questions about a medical condition or this instruction, always ask your healthcare professional. [address] any warranty or liability for your use of this information. Sciatica: Exercises  Your Care Instructions  Here are some examples of typical rehabilitation exercises for your condition. Start each exercise slowly. Ease off the exercise if you start to have pain. Your doctor or physical therapist will tell you when you can start these exercises and which ones will work best for you. When you are not being active, find a comfortable position for rest. Some people are comfortable on the floor or a medium-firm bed with a small pillow under their head and another under their knees. Some people prefer to lie on their side with a pillow between their knees. Don't stay in one position for too long. Take short walks (10 to 20 minutes) every 2 to 3 hours. Avoid slopes, hills, and stairs until you feel better. Walk only distances you can manage without pain, especially leg pain. How to do the exercises  Back stretches    1. Get down on your hands and knees on the floor. 2. Relax your head and allow it to droop. Round your back up toward the ceiling until you feel a nice stretch in your upper, middle, and lower back.  Hold this stretch for as long as it feels comfortable, or about 15 to 30 seconds. 3. Return to the starting position with a flat back while you are on your hands and knees. 4. Let your back sway by pressing your stomach toward the floor. Lift your buttocks toward the ceiling. 5. Hold this position for 15 to 30 seconds. 6. Repeat 2 to 4 times. Follow-up care is a key part of your treatment and safety. Be sure to make and go to all appointments, and call your doctor if you are having problems. It's also a good idea to know your test results and keep a list of the medicines you take. Where can you learn more? Go to http://mayo-kelly.info/. Enter U763 in the search box to learn more about \"Sciatica: Exercises. \"  Current as of: March 21, 2017  Content Version: 11.3  © 9189-6415 [employer]. Care instructions adapted under license by [employer] (which disclaims liability or warranty for this information). If you have questions about a medical condition or this instruction, always ask your healthcare professional. [address] any warranty or liability for your use of this information.

## 2017-08-09 NOTE — ED PROVIDER NOTES
HPI Comments: Brittney Borrero is a 56 y.o. male with PMHx of chronic back pain, presenting ambulatory to ED c/o acute on chronic constant diffuse low back pain radiating down his BLE's since picking up something heavy yesterday. Pt notes the pain is worse with any movement. He rates current pain 8/10 and notes it is unchanged with taking 6 tablets of Motrin today. Per pt records, his back XR reveals DDD. He reports he has seen an ortho physician with [facility] in the past. Pt notes history of DM and reports his BGL has been ~ 220 recently. He specifically denies fecal/urinary incontinence or saddle anesthesia. PCP: Aye Almonte MD  Social Hx: former smoker (quit date: 8/22/1994); - EtOH; - drug use. There are no other complaints, changes, or physical findings at this time. Written by Sweta Kirby ED Scribshan, as dictated by Myrna Jaime PA-C. The history is provided by the patient. Past Medical History:   Diagnosis Date    Adverse effect of anesthesia     \" STOP BREATHING 1 TIME C ANESTH\"    Calculus of kidney     Cancer ([address] 2004    thyroid cancer    Chronic kidney disease     Chronic pain     BACK SHOULDER AND ARM    Depression     Diabetes ([address]     Hypercholesterolemia     Hypertension     Nausea & vomiting     Other ill-defined conditions     cholesterol, thyroid    Sleep apnea     doesn't wear cpap    Thyroid cancer ([address]     TIA (transient ischemic attack) 2011       Past Surgical History:   Procedure Laterality Date    CARDIAC SURG PROCEDURE UNLIST      HX HEENT      THROAT SURGERY X 4    HX ORTHOPAEDIC      back     HX ORTHOPAEDIC      ARM AND SHOULDER    HX OTHER SURGICAL      thyroid, lymphnode    HX RETINAL DETACHMENT REPAIR      left eye    VASCULAR SURGERY PROCEDURE UNLIST      cardiac cath NEG.          Family History:   Problem Relation Age of Onset    Diabetes Mother     Elevated Lipids Mother    Ragini Carolus Bladder Disease Mother     Headache Mother    [facility] Migraines Mother     Heart Disease Mother     Hypertension Mother     Stroke Mother     Other Mother      ANEURSYM BRAIN    Bleeding Prob Father     Cancer Father      LEUKEMIA    Diabetes Father     Elevated Lipids Father     Mental Retardation Sister     Psychiatric Disorder Sister     Cancer Brother      LUNGS       Social History     Social History    Marital status: LEGALLY      Spouse name: N/A    Number of children: N/A    Years of education: N/A     Occupational History    Not on file. Social History Main Topics    Smoking status: Former Smoker     Quit date: 8/22/1994    Smokeless tobacco: Never Used    Alcohol use No    Drug use: No    Sexual activity: No     Other Topics Concern    Not on file     Social History Narrative         ALLERGIES: Anesthetics - amide type; Flexeril [cyclobenzaprine]; and Tramadol    Review of Systems   Constitutional: Negative. Negative for fever. HENT: Negative. Eyes: Negative. Respiratory: Negative. Cardiovascular: Negative. Gastrointestinal: Negative. Negative for constipation, diarrhea, nausea and vomiting. Denies liver disease   Endocrine:        Denies thyroid disease   Genitourinary: Negative. Negative for dysuria. Denies kidney disease   Musculoskeletal: Positive for back pain (diffuse lower, radiating down BLE's). Skin: Negative. Neurological: Negative. Denies fecal/urinary incontinence; denies saddle anesthesia; All other systems reviewed and are negative. Vitals:    08/08/17 2012   BP: (!) 168/95   Pulse: 78   Resp: 16   Temp: 98.9 °F (37.2 °C)   SpO2: 97%   Weight: 105 kg (231 lb 7.7 oz)   Height: 5' 9\" (1.753 m)            Physical Exam   Constitutional: He is oriented to person, place, and time. He appears well-developed and well-nourished. No distress. Overweight;   HENT:   Head: Normocephalic and atraumatic.    Right Ear: External ear normal.   Left Ear: External ear normal. Nose: Nose normal.   Mouth/Throat: Oropharynx is clear and moist. No oropharyngeal exudate. Eyes: Conjunctivae and EOM are normal. Pupils are equal, round, and reactive to light. Right eye exhibits no discharge. Left eye exhibits no discharge. No scleral icterus. Neck: Normal range of motion. Neck supple. No tracheal deviation present. Cardiovascular: Normal rate, regular rhythm, normal heart sounds and intact distal pulses. Exam reveals no gallop and no friction rub. No murmur heard. Pulmonary/Chest: Effort normal and breath sounds normal. No respiratory distress. He has no wheezes. He has no rales. He exhibits no tenderness. Abdominal: Soft. Bowel sounds are normal. He exhibits no distension and no mass. There is no tenderness. There is no rebound and no guarding. Musculoskeletal: He exhibits no edema.   + SLR bilaterally; NVI intact distally to BLE's; tenderness to BL lumbar musculature and BL glutes; no bony tenderness of the spine or contusion or deformity of the spine;   Lymphadenopathy:     He has no cervical adenopathy. Neurological: He is alert and oriented to person, place, and time. No cranial nerve deficit. Coordination normal.   Skin: Skin is warm and dry. No rash noted. No erythema. Psychiatric: He has a normal mood and affect. His behavior is normal. Judgment and thought content normal.   Nursing note and vitals reviewed. MDM  Number of Diagnoses or Management Options  Diagnosis management comments: DDx: DDD, sciatica, sprain, strain. Amount and/or Complexity of Data Reviewed  Review and summarize past medical records: yes    Patient Progress  Patient progress: stable    Procedures    MEDICATIONS GIVEN:  Medications   oxyCODONE-acetaminophen (PERCOCET) 5-325 mg per tablet 1 Tab (1 Tab Oral Given 8/8/17 2058)       IMPRESSION:  1. Degenerative disc disease, lumbar    2. Bilateral sciatica    3. Elevated blood pressure reading        PLAN:  1.    Current Discharge Medication List      START taking these medications    Details   oxyCODONE-acetaminophen (PERCOCET) 5-325 mg per tablet Take 0.5-1 Tabs by mouth every six (6) hours as needed for Pain. Max Daily Amount: 4 Tabs. Qty: 20 Tab, Refills: 0         STOP taking these medications       oxyCODONE (OXYIR) 5 mg capsule Comments:   Reason for Stopping:         HYDROcodone-acetaminophen ([address]) 5-325 mg per tablet Comments:   Reason for Stoppin.   Follow-up Information     Follow up With Details Comments [id number] MD   [address]  [phone]      [facility] Schedule an appointment as soon as possible for a visit      [facility]  If symptoms worsen [address] [place] [address]  [phone]        Return to ED if worse     DISCHARGE NOTE  9:08 PM  The patient has been re-evaluated and is ready for discharge. Reviewed available results with patient. Counseled pt on diagnosis and care plan. Pt has expressed understanding, and all questions have been answered. Pt agrees with plan and agrees to F/U as recommended, or return to the ED if their sxs worsen. Discharge instructions have been provided and explained to the pt, along with reasons to return to the ED. Written by Damon Mota, ED Scribe, as dictated by Jose De Jesus Giles PA-C. This note is prepared by Damon Mota, acting as Scribe for [employer]. Jose De Jesus Giles PA-C: The scribe's documentation has been prepared under my direction and personally reviewed by me in its entirety. I confirm that the note above accurately reflects all work, treatment, procedures, and medical decision making performed by me.

## 2017-08-09 NOTE — ED NOTES
.Discharge instructions reviewed with patient and given to pt per JOSÉ Martinez. Pt able to return/verbalize discharge instructions. Copy of discharge instructions given. RX given to pt. Pt condition stable, no further complaints. Pt out of ER, accompanied by self. Ambulatory, steady gait. Wheelchair offered & pt declined. Patient out of ED prior to obtaining discharge vitals. Belongings & discharge paperwork out of ED with patient. Family to drive patient home.

## 2017-08-16 ENCOUNTER — HOSPITAL ENCOUNTER (EMERGENCY)
Age: 57
Discharge: HOME | End: 2017-08-16
Attending: EMERGENCY MEDICINE
Payer: MEDICARE

## 2017-08-16 ENCOUNTER — APPOINTMENT (OUTPATIENT)
Dept: GENERAL RADIOLOGY | Age: 57
End: 2017-08-16
Attending: PHYSICIAN ASSISTANT
Payer: MEDICARE

## 2017-08-16 VITALS
OXYGEN SATURATION: 98 % | BODY MASS INDEX: 32.58 KG/M2 | DIASTOLIC BLOOD PRESSURE: 80 MMHG | TEMPERATURE: 97.6 F | HEIGHT: 69 IN | SYSTOLIC BLOOD PRESSURE: 159 MMHG | HEART RATE: 62 BPM | RESPIRATION RATE: 16 BRPM | WEIGHT: 220 LBS

## 2017-08-16 DIAGNOSIS — M79.602 PAIN OF LEFT UPPER EXTREMITY: Primary | ICD-10-CM

## 2017-08-16 PROCEDURE — 99283 EMERGENCY DEPT VISIT LOW MDM: CPT

## 2017-08-16 PROCEDURE — 73060 X-RAY EXAM OF HUMERUS: CPT

## 2017-08-16 PROCEDURE — 73080 X-RAY EXAM OF ELBOW: CPT

## 2017-08-16 PROCEDURE — 74011250637 HC RX REV CODE- 250/637: Performed by: PHYSICIAN ASSISTANT

## 2017-08-16 RX ORDER — ACETAMINOPHEN 325 MG/1
650 TABLET ORAL
Qty: 20 TAB | Refills: 0 | Status: SHIPPED | OUTPATIENT
Start: 2017-08-16 | End: 2017-09-18 | Stop reason: ALTCHOICE

## 2017-08-16 RX ORDER — ACETAMINOPHEN 325 MG/1
650 TABLET ORAL
Status: COMPLETED | OUTPATIENT
Start: 2017-08-16 | End: 2017-08-16

## 2017-08-16 RX ADMIN — ACETAMINOPHEN 650 MG: 325 TABLET, FILM COATED ORAL at 15:02

## 2017-08-16 NOTE — ED TRIAGE NOTES
C/o LUE pain since working on a vehicle yesterday, \"pulling a lot,\" denies specific injury/trauma, no obvious deformities noted

## 2017-08-16 NOTE — DISCHARGE INSTRUCTIONS
Arm Pain: Care Instructions  Your Care Instructions  You can hurt your arm by using it too much or by injuring it. Biking, wrestling, and home repair projects are examples of activities that can lead to arm pain. Everyday wear and tear, especially as you get older, can cause arm pain. Your forearms, wrists, hands, and fingers are the parts of your arm that are most likely to become painful. A minor arm injury usually will heal on its own with home treatment to relieve swelling and pain. If you have a more serious injury, you may need tests and treatment. Follow-up care is a key part of your treatment and safety. Be sure to make and go to all appointments, and call your doctor if you are having problems. Its also a good idea to know your test results and keep a list of the medicines you take. How can you care for yourself at home? · Take pain medicines exactly as directed. ¨ If the doctor gave you a prescription medicine for pain, take it as prescribed. ¨ If you are not taking a prescription pain medicine, ask your doctor if you can take an over-the-counter medicine. · Rest and protect your arm. Take a break from any activity that may cause pain. · Put ice or a cold pack on your arm for 10 to 20 minutes at a time. Put a thin cloth between the ice and your skin. · Prop up the sore arm on a pillow when you ice it or anytime you sit or lie down during the next 3 days. Try to keep it above the level of your heart. This will help reduce swelling. · If your doctor recommends a sling to support your arm, wear it as directed. When should you call for help? Call 911 anytime you think you may need emergency care. For example, call if:  · Your arm or hand is cool or pale or changes color. Call your doctor now or seek immediate medical care if:  · You cannot use your arm. · You have signs of infection, such as:  ¨ Increased pain, swelling, warmth, or redness. ¨ Red streaks running up or down your arm.   ¨ Pus draining from an area of your arm. ¨ A fever. · You have tingling, weakness, or numbness in your arm. Watch closely for changes in your health, and be sure to contact your doctor if:  · You do not get better as expected. Where can you learn more? Go to http://mayo-kelly.info/. Enter B641 in the search box to learn more about \"Arm Pain: Care Instructions. \"  Current as of: March 20, 2017  Content Version: 11.3  © 6034-9580 [employer]. Care instructions adapted under license by [employer] (which disclaims liability or warranty for this information). If you have questions about a medical condition or this instruction, always ask your healthcare professional. [address] any warranty or liability for your use of this information.

## 2017-08-16 NOTE — ED NOTES
Emergency Department Nursing Plan of Care       The Nursing Plan of Care is developed from the Nursing assessment and Emergency Department Attending provider initial evaluation. The plan of care may be reviewed in the ED Provider note. The Plan of Care was developed with the following considerations:   Patient / Family readiness to learn indicated by:verbalized understanding  Persons(s) to be included in education: patient  Barriers to Learning/Limitations:No    Signed     Kirti Chávez    8/16/2017   2:08 PM    See nursing assessment    Patient is alert and oriented x 4 and in no acute distress at this time. Respirations are at a regular rate, depth, and pattern. Patient updated on plan of care and has no questions or concerns at this time.

## 2017-08-16 NOTE — ED PROVIDER NOTES
Patient is a 56 y.o. male presenting with arm pain. The history is provided by the patient. Arm Pain    This is a new (Pt ednorses left upper arm pain after working on car yesterday and falling on it.) problem. The current episode started yesterday. The problem occurs constantly. The problem has not changed since onset. The pain is present in the left arm. The quality of the pain is described as aching. The pain is at a severity of 8/10. The pain is moderate. Pertinent negatives include no numbness, full range of motion, no stiffness, no tingling, no itching, no back pain and no neck pain. The symptoms are aggravated by activity, movement, contact and palpation. He has tried nothing for the symptoms. There has been a history of trauma. Past Medical History:   Diagnosis Date    Adverse effect of anesthesia     \" STOP BREATHING 1 TIME C ANESTH\"    Calculus of kidney     Cancer ([address] 2004    thyroid cancer    Chronic kidney disease     Chronic pain     BACK SHOULDER AND ARM    Depression     Diabetes ([address]     Hypercholesterolemia     Hypertension     Nausea & vomiting     Other ill-defined conditions     cholesterol, thyroid    Sleep apnea     doesn't wear cpap    Thyroid cancer ([address]     TIA (transient ischemic attack) 2011       Past Surgical History:   Procedure Laterality Date    CARDIAC SURG PROCEDURE UNLIST      HX HEENT      THROAT SURGERY X 4    HX ORTHOPAEDIC      back     HX ORTHOPAEDIC      ARM AND SHOULDER    HX OTHER SURGICAL      thyroid, lymphnode    HX RETINAL DETACHMENT REPAIR      left eye    VASCULAR SURGERY PROCEDURE UNLIST      cardiac cath NEG.          Family History:   Problem Relation Age of Onset    Diabetes Mother     Elevated Lipids Mother    Lesia Lipschutz Bladder Disease Mother     Headache Mother    [facility] Migraines Mother     Heart Disease Mother     Hypertension Mother     Stroke Mother     Other Mother      ANEURSYM BRAIN    Bleeding Prob Father     Cancer Father      LEUKEMIA    Diabetes Father    [facility] Elevated Lipids Father     Mental Retardation Sister     Psychiatric Disorder Sister     Cancer Brother      LUNGS       Social History     Social History    Marital status: LEGALLY      Spouse name: N/A    Number of children: N/A    Years of education: N/A     Occupational History    Not on file. Social History Main Topics    Smoking status: Former Smoker     Quit date: 8/22/1994    Smokeless tobacco: Never Used    Alcohol use No    Drug use: No    Sexual activity: No     Other Topics Concern    Not on file     Social History Narrative         ALLERGIES: Anesthetics - amide type; Flexeril [cyclobenzaprine]; and Tramadol    Review of Systems   Constitutional: Negative for activity change, chills, fatigue and fever. HENT: Negative. Eyes: Negative. Respiratory: Negative. Negative for cough and shortness of breath. Cardiovascular: Negative for chest pain, palpitations and leg swelling. Gastrointestinal: Negative for abdominal pain, diarrhea, nausea and vomiting. Genitourinary: Negative for dysuria. Musculoskeletal: Positive for arthralgias. Negative for back pain, joint swelling, neck pain, neck stiffness and stiffness. Skin: Negative. Negative for itching, rash and wound. Neurological: Negative. Negative for tingling, weakness, numbness and headaches. Psychiatric/Behavioral: Negative. Vitals:    08/16/17 1402   BP: 159/80   Pulse: 62   Resp: 16   Temp: 97.6 °F (36.4 °C)   SpO2: 98%   Weight: 99.8 kg (220 lb)   Height: 5' 9\" (1.753 m)            Physical Exam   Constitutional: He is oriented to person, place, and time. He appears well-developed and well-nourished. He appears distressed. HENT:   Head: Normocephalic and atraumatic.    Right Ear: Hearing and external ear normal.   Left Ear: Hearing and external ear normal.   Nose: Nose normal.   Eyes: Conjunctivae and EOM are normal. Pupils are equal, round, and reactive to light. Neck: Normal range of motion. Neck supple. Pulmonary/Chest: Effort normal. No accessory muscle usage. No respiratory distress. Musculoskeletal:        Left shoulder: He exhibits tenderness and pain. He exhibits normal range of motion, no bony tenderness, no swelling, no effusion, no crepitus, no deformity, no laceration, no spasm and normal strength. Right elbow: Normal.       Left elbow: He exhibits normal range of motion, no swelling, no effusion, no deformity and no laceration. Tenderness found. Right upper arm: Normal.        Left upper arm: He exhibits tenderness and bony tenderness. He exhibits no swelling, no edema, no deformity and no laceration. Left forearm: Normal.        Arms:  Neurological: He is alert and oriented to person, place, and time. No cranial nerve deficit. Skin: Skin is warm, dry and intact. No abrasion, no bruising, no ecchymosis, no laceration and no rash noted. He is not diaphoretic. No erythema. No pallor. Psychiatric: He has a normal mood and affect. His speech is normal and behavior is normal. Judgment and thought content normal.   Nursing note and vitals reviewed. MDM  Number of Diagnoses or Management Options  Pain of left upper extremity:   Diagnosis management comments: DDx: strain, sprain, contusion, fx, dislocation unlikely    LABORATORY TESTS:  No results found for this or any previous visit (from the past 12 hour(s)). IMAGING RESULTS:  XR ELBOW LT MIN 3 V   Final Result  FINDINGS: Three views of the left elbow demonstrate no fracture, dislocation,  effusion or other acute abnormality. Prior internal fixation device distal  humerus, proximal radius and ulna.     IMPRESSION  IMPRESSION: No acute abnormality. XR HUMERUS LT   Final Result   FINDINGS: Two views of the left humerus demonstrate no fracture or other acute  osseous, articular or soft tissue abnormality. Prior internal fixation plates. Alignment.    IMPRESSION  IMPRESSION:  No acute abnormality. MEDICATIONS GIVEN:  Medications  acetaminophen (TYLENOL) tablet 650 mg (650 mg Oral Given 8/16/17 1502)    IMPRESSION:  Pain of left upper extremity  (primary encounter diagnosis)    PLAN:  1. Current Discharge Medication List    START taking these medications    acetaminophen (TYLENOL) 325 mg tablet  Take 2 Tabs by mouth every four (4) hours as needed for Pain. Qty: 20 Tab Refills: 0      CONTINUE these medications which have NOT CHANGED    diazePAM (VALIUM) 2 mg tablet  Take 1 Tab by mouth every eight (8) hours as needed (muscle spasm) for up to 8 doses. Max Daily Amount: 6 mg. Qty: 8 Tab Refills: 0    glipiZIDE (GLUCOTROL) 10 mg tablet  Take 1 Tab by mouth Before breakfast and dinner. Qty: 90 Tab Refills: 3    insulin degludec (TRESIBA FLEXTOUCH U-200) 200 unit/mL (3 mL) inpn  25 Units by SubCUTAneous route nightly. Qty: 6 Pen Refills: 3    Lancets misc  Use 3-4 times daily to check blood sugar. DX E11.22  Qty: 200 Each Refills: 11    glucose blood VI test strips (PHARMACIST CHOICE) strip  Check glucose 3-4 times daily. DX E11.22  Qty: 300 Strip Refills: 3    levothyroxine (SYNTHROID) 200 mcg tablet  Take 1 Tab by mouth Daily (before breakfast). Qty: 90 Tab Refills: 3    Insulin Needles, Disposable, 31 gauge x 5/16\" ndle  by SubCUTAneous route daily. Qty: 1 Package Refills: 11  Comments: Diagnosis type 2 diabetes    lisinopril (PRINIVIL, ZESTRIL) 20 mg tablet  Take 20 mg by mouth daily.         2. Follow-up Information     Follow up With Details Comments Contact Info    Merline Stack, MD Schedule an appointment as soon as possible for a visit   in 1 week As needed, If symptoms worsen [id number] [facility]  [address]  [phone]        Return to ED if worse                  Amount and/or Complexity of Data Reviewed  Tests in the radiology section of CPT®: ordered and reviewed  Tests in the medicine section of CPT®: ordered and reviewed    Patient Progress  Patient progress: stable    ED Course       Procedures    3:57 PM  I have discussed with patient their diagnosis, treatment, and follow up plan. The patient agrees to follow up as outlined in discharge paperwork and also to return to the ED with any worsening.  Rah Cornelius PA-C

## 2017-09-18 ENCOUNTER — HOSPITAL ENCOUNTER (OUTPATIENT)
Dept: LAB | Age: 57
Discharge: HOME | End: 2017-09-18
Payer: MEDICARE

## 2017-09-18 ENCOUNTER — OFFICE VISIT (OUTPATIENT)
Dept: FAMILY MEDICINE CLINIC | Age: 57
End: 2017-09-18

## 2017-09-18 VITALS
HEART RATE: 70 BPM | DIASTOLIC BLOOD PRESSURE: 85 MMHG | WEIGHT: 231.8 LBS | SYSTOLIC BLOOD PRESSURE: 164 MMHG | HEIGHT: 69 IN | OXYGEN SATURATION: 99 % | TEMPERATURE: 97.2 F | BODY MASS INDEX: 34.33 KG/M2 | RESPIRATION RATE: 18 BRPM

## 2017-09-18 DIAGNOSIS — C73 PAPILLARY THYROID CARCINOMA (HCC): ICD-10-CM

## 2017-09-18 DIAGNOSIS — E55.9 HYPOVITAMINOSIS D: ICD-10-CM

## 2017-09-18 DIAGNOSIS — Z00.00 MEDICARE ANNUAL WELLNESS VISIT, SUBSEQUENT: Primary | ICD-10-CM

## 2017-09-18 DIAGNOSIS — Z11.59 NEED FOR HEPATITIS C SCREENING TEST: ICD-10-CM

## 2017-09-18 DIAGNOSIS — E78.2 MIXED HYPERCHOLESTEROLEMIA AND HYPERTRIGLYCERIDEMIA: ICD-10-CM

## 2017-09-18 DIAGNOSIS — I10 ESSENTIAL HYPERTENSION WITH GOAL BLOOD PRESSURE LESS THAN 130/80: ICD-10-CM

## 2017-09-18 DIAGNOSIS — E11.00 UNCONTROLLED TYPE 2 DIABETES MELLITUS WITH HYPEROSMOLARITY WITHOUT COMA, UNSPECIFIED LONG TERM INSULIN USE STATUS: ICD-10-CM

## 2017-09-18 DIAGNOSIS — M54.5 BILATERAL LOW BACK PAIN, UNSPECIFIED CHRONICITY, WITH SCIATICA PRESENCE UNSPECIFIED: ICD-10-CM

## 2017-09-18 LAB
BILIRUB UR QL STRIP: NEGATIVE
GLUCOSE POC: 228 MG/DL
GLUCOSE UR-MCNC: NORMAL MG/DL
HBA1C MFR BLD HPLC: 10.8 %
KETONES P FAST UR STRIP-MCNC: NEGATIVE MG/DL
PH UR STRIP: 5.5 [PH] (ref 4.6–8)
PROT UR QL STRIP: NORMAL MG/DL
SP GR UR STRIP: 1.02 (ref 1–1.03)
UA UROBILINOGEN AMB POC: NORMAL (ref 0.2–1)
URINALYSIS CLARITY POC: CLEAR
URINALYSIS COLOR POC: YELLOW
URINE BLOOD POC: NORMAL
URINE LEUKOCYTES POC: NEGATIVE
URINE NITRITES POC: NEGATIVE

## 2017-09-18 PROCEDURE — 80053 COMPREHEN METABOLIC PANEL: CPT

## 2017-09-18 PROCEDURE — 84443 ASSAY THYROID STIM HORMONE: CPT

## 2017-09-18 PROCEDURE — 82043 UR ALBUMIN QUANTITATIVE: CPT

## 2017-09-18 PROCEDURE — 85027 COMPLETE CBC AUTOMATED: CPT

## 2017-09-18 PROCEDURE — 86803 HEPATITIS C AB TEST: CPT

## 2017-09-18 PROCEDURE — 36415 COLL VENOUS BLD VENIPUNCTURE: CPT

## 2017-09-18 PROCEDURE — 80061 LIPID PANEL: CPT

## 2017-09-18 PROCEDURE — 82306 VITAMIN D 25 HYDROXY: CPT

## 2017-09-18 RX ORDER — GLIPIZIDE 10 MG/1
10 TABLET ORAL
Qty: 90 TAB | Refills: 3 | Status: SHIPPED | OUTPATIENT
Start: 2017-09-18 | End: 2017-11-22 | Stop reason: SDUPTHER

## 2017-09-18 RX ORDER — LANCETS
EACH MISCELLANEOUS
Qty: 200 EACH | Refills: 11 | Status: SHIPPED | OUTPATIENT
Start: 2017-09-18 | End: 2017-10-18 | Stop reason: ALTCHOICE

## 2017-09-18 RX ORDER — DIAZEPAM 2 MG/1
2 TABLET ORAL
Qty: 8 TAB | Refills: 0 | Status: SHIPPED | OUTPATIENT
Start: 2017-09-18 | End: 2017-10-20 | Stop reason: ALTCHOICE

## 2017-09-18 NOTE — MR AVS SNAPSHOT
Visit Information Date & Time Provider Department Dept. Phone Encounter #  
 9/18/2017 10:00 AM Fransico Giordano MD [facility] at [address] [id number] Follow-up Instructions Return in about 4 weeks (around 10/16/2017), or if symptoms worsen or fail to improve, for routine follow up. Your Appointments 11/24/2017 12:10 PM  
Follow Up with MD Mayank Brown Diabetes and Endocrinology [facility] Appt Note: f/u visit [employer] [phone] [facility] Upcoming Health Maintenance Date Due Hepatitis C Screening 1960 EYE EXAM RETINAL OR DILATED Q1 2/25/1972 MEDICARE YEARLY EXAM 2/25/1980 FOBT Q 1 YEAR AGE 50-75 2/25/2012 HEMOGLOBIN A1C Q6M 7/30/2017 MICROALBUMIN Q1 8/22/2017 LIPID PANEL Q1 10/31/2017 FOOT EXAM Q1 11/29/2017 Pneumococcal 19-64 Highest Risk (2 of 3 - PCV13) 9/18/2018 DTaP/Tdap/Td series (2 - Td) 9/18/2027 Allergies as of 9/18/2017  Review Complete On: 9/18/2017 By: Fransico Giordano MD  
  
 Severity Noted Reaction Type Reactions Anesthetics - Amide Type  03/01/2016    Shortness of Breath Flexeril [Cyclobenzaprine]  07/26/2016    Hives Tramadol  03/01/2016    Hives Current Immunizations  Reviewed on 1/17/2017 Name Date Influenza Vaccine (Quad) PF 10/11/2016 10:16 AM  
  
 Not reviewed this visit You Were Diagnosed With   
  
 Codes Comments Medicare annual wellness visit, subsequent    -  Primary ICD-10-CM: Z00.00 ICD-9-CM: V70.0 Bilateral low back pain, unspecified chronicity, with sciatica presence unspecified     ICD-10-CM: M54.5 ICD-9-CM: 724.2 Uncontrolled type 2 diabetes mellitus with hyperosmolarity without coma, unspecified long term insulin use status (HCC)     ICD-10-CM: E11.00 ICD-9-CM: 250.22   
 Need for hepatitis C screening test     ICD-10-CM: Z11.59 
ICD-9-CM: V73.89 Mixed hypercholesterolemia and hypertriglyceridemia     ICD-10-CM: E78.2 ICD-9-CM: 272.2 Hypovitaminosis D     ICD-10-CM: E55.9 ICD-9-CM: 268.9 Essential hypertension with goal blood pressure less than 130/80     ICD-10-CM: I10 
ICD-9-CM: 401.9 Papillary thyroid carcinoma ([facility] [address]     ICD-10-CM: F59 ICD-9-CM: 677 Vitals BP Pulse Temp Resp Height(growth percentile) Weight(growth percentile) 164/85 (BP 1 Location: Left arm, BP Patient Position: Sitting) 70 97.2 °F (36.2 °C) (Oral) 18 5' 9\" (1.753 m) 231 lb 12.8 oz (105.1 kg) SpO2 BMI Smoking Status 99% 34.23 kg/m2 Former Smoker Vitals History BMI and BSA Data Body Mass Index Body Surface Area  
 34.23 kg/m 2 2.26 m 2 Preferred Pharmacy Pharmacy Name Phone [id number] [phone] [facility] [phone] Your Updated Medication List  
  
   
This list is accurate as of: 9/18/17 12:15 PM.  Always use your most recent med list.  
  
  
  
  
 diazePAM 2 mg tablet Commonly known as:  VALIUM Take 1 Tab by mouth every eight (8) hours as needed (muscle spasm) for up to 20 doses. Max Daily Amount: 6 mg.  
  
 glipiZIDE 10 mg tablet Commonly known as:  Zaira Gibbons Take 1 Tab by mouth Before breakfast and dinner. glucose blood VI test strips strip Commonly known as:  PHARMACIST CHOICE Check glucose 3-4 times daily. DX E11.22  
  
 insulin degludec 200 unit/mL (3 mL) Inpn Commonly known as:  TRESIBA FLEXTOUCH U-200  
25 Units by SubCUTAneous route nightly. Insulin Needles (Disposable) 31 gauge x 5/16\" Ndle  
by SubCUTAneous route daily. Lancets Misc Use 3-4 times daily to check blood sugar. DX E11.22  
  
 levothyroxine 200 mcg tablet Commonly known as:  SYNTHROID Take 1 Tab by mouth Daily (before breakfast). lisinopril 20 mg tablet Commonly known as:  Tildon Asher Take 20 mg by mouth daily. Prescriptions Printed Refills  
 diazePAM (VALIUM) 2 mg tablet 0 Sig: Take 1 Tab by mouth every eight (8) hours as needed (muscle spasm) for up to 20 doses. Max Daily Amount: 6 mg. Class: Print Route: Oral  
  
Prescriptions Sent to Pharmacy Refills  
 glipiZIDE (GLUCOTROL) 10 mg tablet 3 Sig: Take 1 Tab by mouth Before breakfast and dinner. Class: Normal  
 Pharmacy: [address] Ph #: [phone] Route: Oral  
 Lancets misc 11 Sig: Use 3-4 times daily to check blood sugar. DX E11.22 Class: Normal  
 Pharmacy: [address] Ph #: [phone] We Performed the Following AMB POC GLUCOSE BLOOD, BY GLUCOSE MONITORING DEVICE [50600 CPT(R)] AMB POC HEMOGLOBIN A1C [31301 CPT(R)] AMB POC URINALYSIS DIP STICK AUTO W/O MICRO [64853 CPT(R)] CBC W/O DIFF [04391 CPT(R)] HEPATITIS C AB [17768 CPT(R)] LIPID PANEL [35119 CPT(R)] METABOLIC PANEL, COMPREHENSIVE [58689 CPT(R)] MICROALBUMIN, UR, RAND W/ MICROALBUMIN/CREA RATIO [id number] CPT(R)] TSH 3RD GENERATION [55272 CPT(R)] VITAMIN D, 25 HYDROXY [id number] CPT(R)] Follow-up Instructions Return in about 4 weeks (around 10/16/2017), or if symptoms worsen or fail to improve, for routine follow up. Patient Instructions Well Visit, Men 48 to 72: Care Instructions Your Care Instructions Physical exams can help you stay healthy. Your doctor has checked your overall health and may have suggested ways to take good care of yourself. He or she also may have recommended tests. At home, you can help prevent illness with healthy eating, regular exercise, and other steps. Follow-up care is a key part of your treatment and safety. Be sure to make and go to all appointments, and call your doctor if you are having problems. It's also a good idea to know your test results and keep a list of the medicines you take. How can you care for yourself at home? · Reach and stay at a healthy weight. This will lower your risk for many problems, such as obesity, diabetes, heart disease, and high blood pressure. · Get at least 30 minutes of exercise on most days of the week. Walking is a good choice. You also may want to do other activities, such as running, swimming, cycling, or playing tennis or team sports. · Do not smoke. Smoking can make health problems worse. If you need help quitting, talk to your doctor about stop-smoking programs and medicines. These can increase your chances of quitting for good. · Protect your skin from too much sun. When you're outdoors from 10 a.m. to 4 p.m., stay in the shade or cover up with clothing and a hat with a wide brim. Wear sunglasses that block UV rays. Even when it's cloudy, put broad-spectrum sunscreen (SPF 30 or higher) on any exposed skin. · See a dentist one or two times a year for checkups and to have your teeth cleaned. · Wear a seat belt in the car. · Limit alcohol to 2 drinks a day. Too much alcohol can cause health problems. Follow your doctor's advice about when to have certain tests. These tests can spot problems early. · Cholesterol. Your doctor will tell you how often to have this done based on your overall health and other things that can increase your risk for heart attack and stroke. · Blood pressure. Have your blood pressure checked during a routine doctor visit. Your doctor will tell you how often to check your blood pressure based on your age, your blood pressure results, and other factors.  
· Prostate exam. Talk to your doctor about whether you should have a blood test (called a PSA test) for prostate cancer. Experts disagree on whether men should have this test. Some experts recommend that you discuss the benefits and risks of the test with your doctor. · Diabetes. Ask your doctor whether you should have tests for diabetes. · Vision. Some experts recommend that you have yearly exams for glaucoma and other age-related eye problems starting at age 48. · Hearing. Tell your doctor if you notice any change in your hearing. You can have tests to find out how well you hear. · Colon cancer. You should begin tests for colon cancer at age 48. You may have one of several tests. Your doctor will tell you how often to have tests based on your age and risk. Risks include whether you already had a precancerous polyp removed from your colon or whether your parent, brother, sister, or child has had colon cancer. · Heart attack and stroke risk. At least every 4 to 6 years, you should have your risk for heart attack and stroke assessed. Your doctor uses factors such as your age, blood pressure, cholesterol, and whether you smoke or have diabetes to show what your risk for a heart attack or stroke is over the next 10 years. · Abdominal aortic aneurysm. Ask your doctor whether you should have a test to check for an aneurysm. You may need a test if you ever smoked or if your parent, brother, sister, or child has had an aneurysm. When should you call for help? Watch closely for changes in your health, and be sure to contact your doctor if you have any problems or symptoms that concern you. Where can you learn more? Go to http://mayo-kelly.info/. Enter J672 in the search box to learn more about \"Well Visit, Men 48 to 72: Care Instructions. \" Current as of: July 19, 2016 Content Version: 11.3 © 5480-3822 Healthwise, Incorporated.  Care instructions adapted under license by [employer] (which disclaims liability or warranty for this information). If you have questions about a medical condition or this instruction, always ask your healthcare professional. [address] any warranty or liability for your use of this information. Introducing [facility] SERVICES! Maximiliano Burks introduces [employer] patient portal. Now you can access parts of your medical record, email your doctor's office, and request medication refills online. 1. In your internet browser, go to [email or web address] 2. Click on the First Time User? Click Here link in the Sign In box. You will see the New Member Sign Up page. 3. Enter your Accellos Access Code exactly as it appears below. You will not need to use this code after youve completed the sign-up process. If you do not sign up before the expiration date, you must request a new code. · Accellos Access Code: [email or web address] Expires: 11/14/2017  3:57 PM 
 
4. Enter the last four digits of your Social Security Number (xxxx) and Date of Birth (mm/dd/yyyy) as indicated and click Submit. You will be taken to the next sign-up page. 5. Create a Accellos ID. This will be your Accellos login ID and cannot be changed, so think of one that is secure and easy to remember. 6. Create a Accellos password. You can change your password at any time. 7. Enter your Password Reset Question and Answer. This can be used at a later time if you forget your password. 8. Enter your e-mail address. You will receive e-mail notification when new information is available in [address] 9. Click Sign Up. You can now view and download portions of your medical record. 10. Click the Download Summary menu link to download a portable copy of your medical information. If you have questions, please visit the Frequently Asked Questions section of the Acc[employer]os website. Remember, [employer]os is NOT to be used for urgent needs. For medical emergencies, dial 911. Now available from your iPhone and Android! Please provide this summary of care documentation to your next provider. Your primary care clinician is listed as Croona Issa. If you have any questions after today's visit, please call [phone].

## 2017-09-18 NOTE — PROGRESS NOTES
1. Have you been to the ER, urgent care clinic since your last visit? Hospitalized since your last visit? Yes Where: [facility]    2. Have you seen or consulted any other health care providers outside of the [address] [place] [address] since your last visit? Include any pap smears or colon screening.  No     Pt states he is here for follow up on diabetes and back pain

## 2017-09-18 NOTE — PATIENT INSTRUCTIONS
Well Visit, Men 48 to 72: Care Instructions  Your Care Instructions  Physical exams can help you stay healthy. Your doctor has checked your overall health and may have suggested ways to take good care of yourself. He or she also may have recommended tests. At home, you can help prevent illness with healthy eating, regular exercise, and other steps. Follow-up care is a key part of your treatment and safety. Be sure to make and go to all appointments, and call your doctor if you are having problems. It's also a good idea to know your test results and keep a list of the medicines you take. How can you care for yourself at home? · Reach and stay at a healthy weight. This will lower your risk for many problems, such as obesity, diabetes, heart disease, and high blood pressure. · Get at least 30 minutes of exercise on most days of the week. Walking is a good choice. You also may want to do other activities, such as running, swimming, cycling, or playing tennis or team sports. · Do not smoke. Smoking can make health problems worse. If you need help quitting, talk to your doctor about stop-smoking programs and medicines. These can increase your chances of quitting for good. · Protect your skin from too much sun. When you're outdoors from 10 a.m. to 4 p.m., stay in the shade or cover up with clothing and a hat with a wide brim. Wear sunglasses that block UV rays. Even when it's cloudy, put broad-spectrum sunscreen (SPF 30 or higher) on any exposed skin. · See a dentist one or two times a year for checkups and to have your teeth cleaned. · Wear a seat belt in the car. · Limit alcohol to 2 drinks a day. Too much alcohol can cause health problems. Follow your doctor's advice about when to have certain tests. These tests can spot problems early. · Cholesterol.  Your doctor will tell you how often to have this done based on your overall health and other things that can increase your risk for heart attack and stroke. · Blood pressure. Have your blood pressure checked during a routine doctor visit. Your doctor will tell you how often to check your blood pressure based on your age, your blood pressure results, and other factors. · Prostate exam. Talk to your doctor about whether you should have a blood test (called a PSA test) for prostate cancer. Experts disagree on whether men should have this test. Some experts recommend that you discuss the benefits and risks of the test with your doctor. · Diabetes. Ask your doctor whether you should have tests for diabetes. · Vision. Some experts recommend that you have yearly exams for glaucoma and other age-related eye problems starting at age 48. · Hearing. Tell your doctor if you notice any change in your hearing. You can have tests to find out how well you hear. · Colon cancer. You should begin tests for colon cancer at age 48. You may have one of several tests. Your doctor will tell you how often to have tests based on your age and risk. Risks include whether you already had a precancerous polyp removed from your colon or whether your parent, brother, sister, or child has had colon cancer. · Heart attack and stroke risk. At least every 4 to 6 years, you should have your risk for heart attack and stroke assessed. Your doctor uses factors such as your age, blood pressure, cholesterol, and whether you smoke or have diabetes to show what your risk for a heart attack or stroke is over the next 10 years. · Abdominal aortic aneurysm. Ask your doctor whether you should have a test to check for an aneurysm. You may need a test if you ever smoked or if your parent, brother, sister, or child has had an aneurysm. When should you call for help? Watch closely for changes in your health, and be sure to contact your doctor if you have any problems or symptoms that concern you. Where can you learn more? Go to http://mayo-kelly.info/.   Enter H698 in the search box to learn more about \"Well Visit, Men 48 to 72: Care Instructions. \"  Current as of: July 19, 2016  Content Version: 11.3  © 1506-2090 [employer]. Care instructions adapted under license by [employer] (which disclaims liability or warranty for this information). If you have questions about a medical condition or this instruction, always ask your healthcare professional. [place] [address] any warranty or liability for your use of this information.

## 2017-09-18 NOTE — PROGRESS NOTES
Chief Complaint   Patient presents with    Annual Wellness Visit    Back Pain     HPI:  Mohsen Valdez is a 56 y.[facility]. [facility] significant medical history listed below presents for long overdue follow up. Also due for Medicare Annual Wellness Visit. Patient is complaining of lower back pain, this chronic, denies urinary sx, fever/chills. Review of Systems  As per hpi    Past Medical History:   Diagnosis Date    Adverse effect of anesthesia     \" STOP BREATHING 1 TIME C ANESTH\"    Calculus of kidney     Cancer ([address] 2004    thyroid cancer    Chronic kidney disease     Chronic pain     BACK SHOULDER AND ARM    Depression     Diabetes ([address]     Hypercholesterolemia     Hypertension     Nausea & vomiting     Other ill-defined conditions     cholesterol, thyroid    Sleep apnea     doesn't wear cpap    [facility]     TIA (transient ischemic attack) 2011     Past Surgical History:   Procedure Laterality Date    CARDIAC SURG PROCEDURE UNLIST      HX HEENT      THROAT SURGERY X 4    HX ORTHOPAEDIC      back     HX ORTHOPAEDIC      ARM AND SHOULDER    HX OTHER SURGICAL      thyroid, lymphnode    HX RETINAL DETACHMENT REPAIR      left eye    VASCULAR SURGERY PROCEDURE UNLIST      cardiac cath NEG. Social History     Social History    Marital status: LEGALLY      Spouse name: N/A    Number of children: N/A    Years of education: N/A     Social History Main Topics    Smoking status: Former Smoker     Quit date: 8/22/1994    Smokeless tobacco: Never Used    Alcohol use No    Drug use: No    Sexual activity: No     Other Topics Concern    None     Social History Narrative     Current Outpatient Prescriptions   Medication Sig Dispense Refill    diazePAM (VALIUM) 2 mg tablet Take 1 Tab by mouth every eight (8) hours as needed (muscle spasm) for up to 8 doses.  Max Daily Amount: 6 mg. 8 Tab 0    glipiZIDE (GLUCOTROL) 10 mg tablet Take 1 Tab by mouth Before breakfast and dinner. 90 Tab 3    insulin degludec (TRESIBA FLEXTOUCH U-200) 200 unit/mL (3 mL) inpn 25 Units by SubCUTAneous route nightly. 6 Pen 3    Lancets misc Use 3-4 times daily to check blood sugar. DX E11.22 200 Each 11    glucose blood VI test strips (PHARMACIST CHOICE) strip Check glucose 3-4 times daily. DX E11.22 300 Strip 3    levothyroxine (SYNTHROID) 200 mcg tablet Take 1 Tab by mouth Daily (before breakfast). 90 Tab 3    Insulin Needles, Disposable, 31 gauge x 5/16\" ndle by SubCUTAneous route daily. 1 Package 11    lisinopril (PRINIVIL, ZESTRIL) 20 mg tablet Take 20 mg by mouth daily.        Allergies   Allergen Reactions    Anesthetics - Amide Type Shortness of Breath    Flexeril [Cyclobenzaprine] Hives    Tramadol Hives     Objective:  Visit Vitals    /85 (BP 1 Location: Left arm, BP Patient Position: Sitting)    Pulse 70    Temp 97.2 °F (36.2 °C) (Oral)    Resp 18    Ht 5' 9\" (1.753 m)    Wt 231 lb 12.8 oz (105.1 kg)    SpO2 99%    BMI 34.23 kg/m2     Physical Exam:   General appearance - alert, well appearing in no distress  Mental status - alert, oriented to person, place, and time  EYE-PERRL, EOMI  Neck - supple, no significant adenopathy   Chest - clear to auscultation, no wheezes, rales or rhonchi  Heart - normal rate, regular rhythm, elevated blood pressure  Abdomen - soft, nontender, nondistended, no organomegaly  Ext-peripheral pulses normal, no pedal edema  Neuro -alert, oriented, normal speech, no focal findings     Results for orders placed or performed in visit on 09/18/17   AMB POC URINALYSIS DIP STICK AUTO W/O MICRO   Result Value Ref Range    Color (UA POC) Yellow     Clarity (UA POC) Clear     Glucose (UA POC) 2+ Negative    Bilirubin (UA POC) Negative Negative    Ketones (UA POC) Negative Negative    Specific gravity (UA POC) 1.025 1.001 - 1.035    Blood (UA POC) 1+ Negative    pH (UA POC) 5.5 4.6 - 8.0    Protein (UA POC) 3+ Negative mg/dL    Urobilinogen (UA POC) 0.2 mg/dL 0.2 - 1    Nitrites (UA POC) Negative Negative    Leukocyte esterase (UA POC) Negative Negative   AMB POC GLUCOSE BLOOD, BY GLUCOSE MONITORING DEVICE   Result Value Ref Range    Glucose  mg/dL   AMB POC HEMOGLOBIN A1C   Result Value Ref Range    Hemoglobin A1c (POC) 10.8 %     Assessment/Plan:    ICD-10-CM ICD-9-CM    1. Medicare annual wellness visit, subsequent Z00.00 V70.0 CBC W/O DIFF      METABOLIC PANEL, COMPREHENSIVE   2. Bilateral low back pain, unspecified chronicity, with sciatica presence unspecified M54.5 724.2 AMB POC URINALYSIS DIP STICK AUTO W/O MICRO      diazePAM (VALIUM) 2 mg tablet   3. Uncontrolled type 2 diabetes mellitus with hyperosmolarity without coma, unspecified long term insulin use status E11.00 250.22 AMB POC GLUCOSE BLOOD, BY GLUCOSE MONITORING DEVICE      AMB POC HEMOGLOBIN A1C      glipiZIDE (GLUCOTROL) 10 mg tablet      Lancets misc      METABOLIC PANEL, COMPREHENSIVE      MICROALBUMIN, UR, RAND W/ MICROALBUMIN/CREA RATIO   4. Need for hepatitis C screening test Z11.59 V73.89 HEPATITIS C AB   5. Mixed hypercholesterolemia and hypertriglyceridemia E78.2 272.2 LIPID PANEL   6. Hypovitaminosis D E55.9 268.9 VITAMIN D, 25 HYDROXY   7. Essential hypertension with goal blood pressure less than 130/80 E86 548.9 METABOLIC PANEL, COMPREHENSIVE   8. [facility] ([facility] C73 193 TSH 3RD GENERATION     Patient Instructions        Well Visit, Men 48 to 72: Care Instructions  Your Care Instructions  Physical exams can help you stay healthy. Your doctor has checked your overall health and may have suggested ways to take good care of yourself. He or she also may have recommended tests. At home, you can help prevent illness with healthy eating, regular exercise, and other steps. Follow-up care is a key part of your treatment and safety. Be sure to make and go to all appointments, and call your doctor if you are having problems.  It's also a good idea to know your test results and keep a list of the medicines you take. How can you care for yourself at home? · Reach and stay at a healthy weight. This will lower your risk for many problems, such as obesity, diabetes, heart disease, and high blood pressure. · Get at least 30 minutes of exercise on most days of the week. Walking is a good choice. You also may want to do other activities, such as running, swimming, cycling, or playing tennis or team sports. · Do not smoke. Smoking can make health problems worse. If you need help quitting, talk to your doctor about stop-smoking programs and medicines. These can increase your chances of quitting for good. · Protect your skin from too much sun. When you're outdoors from 10 a.m. to 4 p.m., stay in the shade or cover up with clothing and a hat with a wide brim. Wear sunglasses that block UV rays. Even when it's cloudy, put broad-spectrum sunscreen (SPF 30 or higher) on any exposed skin. · See a dentist one or two times a year for checkups and to have your teeth cleaned. · Wear a seat belt in the car. · Limit alcohol to 2 drinks a day. Too much alcohol can cause health problems. Follow your doctor's advice about when to have certain tests. These tests can spot problems early. · Cholesterol. Your doctor will tell you how often to have this done based on your overall health and other things that can increase your risk for heart attack and stroke. · Blood pressure. Have your blood pressure checked during a routine doctor visit. Your doctor will tell you how often to check your blood pressure based on your age, your blood pressure results, and other factors. · Prostate exam. Talk to your doctor about whether you should have a blood test (called a PSA test) for prostate cancer. Experts disagree on whether men should have this test. Some experts recommend that you discuss the benefits and risks of the test with your doctor. · Diabetes.  Ask your doctor whether you should have tests for diabetes. · Vision. Some experts recommend that you have yearly exams for glaucoma and other age-related eye problems starting at age 48. · Hearing. Tell your doctor if you notice any change in your hearing. You can have tests to find out how well you hear. · Colon cancer. You should begin tests for colon cancer at age 48. You may have one of several tests. Your doctor will tell you how often to have tests based on your age and risk. Risks include whether you already had a precancerous polyp removed from your colon or whether your parent, brother, sister, or child has had colon cancer. · Heart attack and stroke risk. At least every 4 to 6 years, you should have your risk for heart attack and stroke assessed. Your doctor uses factors such as your age, blood pressure, cholesterol, and whether you smoke or have diabetes to show what your risk for a heart attack or stroke is over the next 10 years. · Abdominal aortic aneurysm. Ask your doctor whether you should have a test to check for an aneurysm. You may need a test if you ever smoked or if your parent, brother, sister, or child has had an aneurysm. When should you call for help? Watch closely for changes in your health, and be sure to contact your doctor if you have any problems or symptoms that concern you. Where can you learn more? Go to http://mayo-kelly.info/. Enter F338 in the search box to learn more about \"Well Visit, Men 48 to 72: Care Instructions. \"  Current as of: July 19, 2016  Content Version: 11.3  © 9228-4865 [employer]. Care instructions adapted under license by [employer] (which disclaims liability or warranty for this information). If you have questions about a medical condition or this instruction, always ask your healthcare professional. [place] [address] any warranty or liability for your use of this information.       Follow-up Disposition:  Return in about 4 weeks (around 10/16/2017), or if symptoms worsen or fail to improve, for routine follow up.

## 2017-09-20 LAB
25(OH)D3+25(OH)D2 SERPL-MCNC: 18.4 NG/ML (ref 30–100)
ALBUMIN SERPL-MCNC: 3.8 G/DL (ref 3.5–5.5)
ALBUMIN/CREAT UR: 2606.9 MG/G CREAT (ref 0–30)
ALBUMIN/GLOB SERPL: 1.2 {RATIO} (ref 1.2–2.2)
ALP SERPL-CCNC: 92 IU/L (ref 39–117)
ALT SERPL-CCNC: 13 IU/L (ref 0–44)
AST SERPL-CCNC: 8 IU/L (ref 0–40)
BILIRUB SERPL-MCNC: 0.4 MG/DL (ref 0–1.2)
BUN SERPL-MCNC: 38 MG/DL (ref 6–24)
BUN/CREAT SERPL: 24 (ref 9–20)
CALCIUM SERPL-MCNC: 8.8 MG/DL (ref 8.7–10.2)
CHLORIDE SERPL-SCNC: 100 MMOL/L (ref 96–106)
CHOLEST SERPL-MCNC: 322 MG/DL (ref 100–199)
CO2 SERPL-SCNC: 21 MMOL/L (ref 18–29)
COMMENT, 011824: ABNORMAL
CREAT SERPL-MCNC: 1.58 MG/DL (ref 0.76–1.27)
CREAT UR-MCNC: 79.8 MG/DL
ERYTHROCYTE [DISTWIDTH] IN BLOOD BY AUTOMATED COUNT: 13.3 % (ref 12.3–15.4)
GLOBULIN SER CALC-MCNC: 3.1 G/DL (ref 1.5–4.5)
GLUCOSE SERPL-MCNC: 250 MG/DL (ref 65–99)
HCT VFR BLD AUTO: 44.2 % (ref 37.5–51)
HCV AB S/CO SERPL IA: 0.1 S/CO RATIO (ref 0–0.9)
HDLC SERPL-MCNC: 39 MG/DL
HGB BLD-MCNC: 14.6 G/DL (ref 12.6–17.7)
INTERPRETATION: NORMAL
LDLC SERPL CALC-MCNC: 228 MG/DL (ref 0–99)
Lab: NORMAL
MCH RBC QN AUTO: 29.7 PG (ref 26.6–33)
MCHC RBC AUTO-ENTMCNC: 33 G/DL (ref 31.5–35.7)
MCV RBC AUTO: 90 FL (ref 79–97)
MICROALBUMIN UR-MCNC: 2080.3 UG/ML
PLATELET # BLD AUTO: 331 X10E3/UL (ref 150–379)
POTASSIUM SERPL-SCNC: 4.5 MMOL/L (ref 3.5–5.2)
PROT SERPL-MCNC: 6.9 G/DL (ref 6–8.5)
RBC # BLD AUTO: 4.91 X10E6/UL (ref 4.14–5.8)
SODIUM SERPL-SCNC: 138 MMOL/L (ref 134–144)
TRIGL SERPL-MCNC: 277 MG/DL (ref 0–149)
TSH SERPL DL<=0.005 MIU/L-ACNC: 0.59 UIU/ML (ref 0.45–4.5)
VLDLC SERPL CALC-MCNC: 55 MG/DL (ref 5–40)
WBC # BLD AUTO: 6.9 X10E3/UL (ref 3.4–10.8)

## 2017-10-16 ENCOUNTER — OFFICE VISIT (OUTPATIENT)
Dept: FAMILY MEDICINE CLINIC | Age: 57
End: 2017-10-16

## 2017-10-16 ENCOUNTER — TELEPHONE (OUTPATIENT)
Dept: FAMILY MEDICINE CLINIC | Age: 57
End: 2017-10-16

## 2017-10-16 VITALS
RESPIRATION RATE: 20 BRPM | OXYGEN SATURATION: 97 % | SYSTOLIC BLOOD PRESSURE: 170 MMHG | HEART RATE: 72 BPM | TEMPERATURE: 96.9 F | HEIGHT: 69 IN | BODY MASS INDEX: 34.83 KG/M2 | WEIGHT: 235.2 LBS | DIASTOLIC BLOOD PRESSURE: 83 MMHG

## 2017-10-16 DIAGNOSIS — E78.2 MIXED HYPERCHOLESTEROLEMIA AND HYPERTRIGLYCERIDEMIA: ICD-10-CM

## 2017-10-16 DIAGNOSIS — R80.9 MICROALBUMINURIA: ICD-10-CM

## 2017-10-16 DIAGNOSIS — I10 ESSENTIAL HYPERTENSION WITH GOAL BLOOD PRESSURE LESS THAN 130/80: ICD-10-CM

## 2017-10-16 DIAGNOSIS — E08.21 DIABETIC NEPHROPATHY ASSOCIATED WITH DIABETES MELLITUS DUE TO UNDERLYING CONDITION (HCC): ICD-10-CM

## 2017-10-16 DIAGNOSIS — E55.9 HYPOVITAMINOSIS D: ICD-10-CM

## 2017-10-16 RX ORDER — HYDROCODONE BITARTRATE AND ACETAMINOPHEN 5; 325 MG/1; MG/1
TABLET ORAL
Refills: 0 | COMMUNITY
Start: 2017-08-17 | End: 2017-10-20 | Stop reason: ALTCHOICE

## 2017-10-16 RX ORDER — DIAZEPAM 5 MG/1
TABLET ORAL
Refills: 0 | COMMUNITY
Start: 2017-08-16 | End: 2017-10-20 | Stop reason: ALTCHOICE

## 2017-10-16 RX ORDER — CHOLECALCIFEROL TAB 125 MCG (5000 UNIT) 125 MCG
5000 TAB ORAL DAILY
Qty: 90 TAB | Refills: 1 | Status: SHIPPED | OUTPATIENT
Start: 2017-10-16 | End: 2018-02-02 | Stop reason: SDUPTHER

## 2017-10-16 RX ORDER — GEMFIBROZIL 600 MG/1
600 TABLET, FILM COATED ORAL 2 TIMES DAILY
Qty: 60 TAB | Refills: 7 | Status: SHIPPED | OUTPATIENT
Start: 2017-10-16 | End: 2017-10-18 | Stop reason: ALTCHOICE

## 2017-10-16 RX ORDER — OXYCODONE AND ACETAMINOPHEN 5; 325 MG/1; MG/1
TABLET ORAL
Refills: 0 | COMMUNITY
Start: 2017-08-13 | End: 2017-10-20 | Stop reason: ALTCHOICE

## 2017-10-16 RX ORDER — LISINOPRIL 20 MG/1
20 TABLET ORAL DAILY
Qty: 30 TAB | Refills: 11 | Status: SHIPPED | OUTPATIENT
Start: 2017-10-16 | End: 2017-11-22 | Stop reason: SDUPTHER

## 2017-10-16 NOTE — PROGRESS NOTES
Chief Complaint   Patient presents with    Diabetes    Hypertension     HPI:  Kamar Nguyen is a 56 y.o.  male with hypertension, diabetes type 2, hypercholesterolemia, severe hypothyroidism presents for lab results. Tot chol, trig, LDL are elevated, HDL is below normal indicating mixed hypercholesterolemia. Blood pressure is elevated, diabetes is poorly controlled with A1C of 10.8%, serum vit D level is way below normal. There results are not any better than last. He seems to be non compliant with treatment. Patient does follow endocrinology.      Review of Systems  As per hpi    Past Medical History:   Diagnosis Date    Adverse effect of anesthesia     \" STOP BREATHING 1 TIME C ANESTH\"    Calculus of kidney     Cancer ([address] 2004    thyroid cancer    Chronic kidney disease     Chronic pain     BACK SHOULDER AND ARM    Depression     Diabetes ([address]     Hypercholesterolemia     Hypertension     Nausea & vomiting     Other ill-defined conditions(799.89)     cholesterol, thyroid    Sleep apnea     doesn't wear cpap    [facility]     TIA (transient ischemic attack) 2011     Past Surgical History:   Procedure Laterality Date    CARDIAC SURG PROCEDURE UNLIST      HX HEENT      THROAT SURGERY X 4    HX ORTHOPAEDIC      back     HX ORTHOPAEDIC      ARM AND SHOULDER    HX OTHER SURGICAL      thyroid, lymphnode    HX RETINAL DETACHMENT REPAIR      left eye    VASCULAR SURGERY PROCEDURE UNLIST      cardiac cath NEG.      Social History     Social History    Marital status: LEGALLY      Spouse name: N/A    Number of children: N/A    Years of education: N/A     Social History Main Topics    Smoking status: Former Smoker     Quit date: 8/22/1994    Smokeless tobacco: Never Used    Alcohol use No    Drug use: No    Sexual activity: No     Other Topics Concern    None     Social History Narrative     Current Outpatient Prescriptions   Medication Sig Dispense Refill    glipiZIDE (GLUCOTROL) 10 mg tablet Take 1 Tab by mouth Before breakfast and dinner. 90 Tab 3    levothyroxine (SYNTHROID) 200 mcg tablet Take 1 Tab by mouth Daily (before breakfast). 90 Tab 3    diazePAM (VALIUM) 5 mg tablet TAKE 1 TABLET PO QID FOR 5 DAYS  0    HYDROcodone-acetaminophen (NORCO) 5-325 mg per tablet TAKE 1 TABLET PO Q 4 TO 6 HOURS PRN FOR PAIN  0    oxyCODONE-acetaminophen (PERCOCET) 5-325 mg per tablet TAKE 1 TO 2 TABLETS BY MOUTH EVERY 4 TO 6 HOURS AS NEEDED FOR PAIN  0    diazePAM (VALIUM) 2 mg tablet Take 1 Tab by mouth every eight (8) hours as needed (muscle spasm) for up to 20 doses. Max Daily Amount: 6 mg. 8 Tab 0    Lancets misc Use 3-4 times daily to check blood sugar. DX E11.22 200 Each 11    insulin degludec (TRESIBA FLEXTOUCH U-200) 200 unit/mL (3 mL) inpn 25 Units by SubCUTAneous route nightly. 6 Pen 3    glucose blood VI test strips (PHARMACIST CHOICE) strip Check glucose 3-4 times daily. DX E11.22 300 Strip 3    Insulin Needles, Disposable, 31 gauge x 5/16\" ndle by SubCUTAneous route daily. 1 Package 11    lisinopril (PRINIVIL, ZESTRIL) 20 mg tablet Take 20 mg by mouth daily.        Allergies   Allergen Reactions    Anesthetics - Amide Type Shortness of Breath    Flexeril [Cyclobenzaprine] Hives    Tramadol Hives     Objective:  Visit Vitals    /83 (BP 1 Location: Right arm, BP Patient Position: Sitting)    Pulse 72    Temp 96.9 °F (36.1 °C) (Oral)    Resp 20    Ht 5' 9\" (1.753 m)    Wt 235 lb 3.2 oz (106.7 kg)    SpO2 97%    BMI 34.73 kg/m2     Physical Exam:   General appearance - alert, well appearing in no distress  Mental status - alert, oriented to person, place, and time  EYE-PERRL, EOMI  Neck - supple, no significant adenopathy   Chest - clear to auscultation, no wheezes, rales or rhonchi  Heart - normal rate, regular rhythm, normal blood pressure  Abdomen - soft, nontender, nondistended, no organomegaly  Ext-peripheral pulses normal, no pedal edema  Neuro -alert, oriented, normal speech, no focal findings     Results for orders placed or performed in visit on 09/18/17   HEPATITIS C AB   Result Value Ref Range    Hep C Virus Ab 0.1 0.0 - 0.9 s/co ratio   CBC W/O DIFF   Result Value Ref Range    WBC 6.9 3.4 - 10.8 x10E3/uL    RBC 4.91 4.14 - 5.80 x10E6/uL    HGB 14.6 12.6 - 17.7 g/dL    HCT 44.2 37.5 - 51.0 %    MCV 90 79 - 97 fL    MCH 29.7 26.6 - 33.0 pg    MCHC 33.0 31.5 - 35.7 g/dL    RDW 13.3 12.3 - 15.4 %    PLATELET 770 545 - 051 x10E3/uL   LIPID PANEL   Result Value Ref Range    Cholesterol, total 322 (H) 100 - 199 mg/dL    Triglyceride 277 (H) 0 - 149 mg/dL    HDL Cholesterol 39 (L) >39 mg/dL    VLDL, calculated 55 (H) 5 - 40 mg/dL    LDL, calculated 228 (H) 0 - 99 mg/dL    Comment Comment    METABOLIC PANEL, COMPREHENSIVE   Result Value Ref Range    Glucose 250 (H) 65 - 99 mg/dL    BUN 38 (H) 6 - 24 mg/dL    Creatinine 1.58 (H) 0.76 - 1.27 mg/dL    GFR est non-AA 49 (L) >59 mL/min/1.73    GFR est AA 56 (L) >59 mL/min/1.73    BUN/Creatinine ratio 24 (H) 9 - 20    Sodium 138 134 - 144 mmol/L    Potassium 4.5 3.5 - 5.2 mmol/L    Chloride 100 96 - 106 mmol/L    CO2 21 18 - 29 mmol/L    Calcium 8.8 8.7 - 10.2 mg/dL    Protein, total 6.9 6.0 - 8.5 g/dL    Albumin 3.8 3.5 - 5.5 g/dL    GLOBULIN, TOTAL 3.1 1.5 - 4.5 g/dL    A-G Ratio 1.2 1.2 - 2.2    Bilirubin, total 0.4 0.0 - 1.2 mg/dL    Alk. phosphatase 92 39 - 117 IU/L    AST (SGOT) 8 0 - 40 IU/L    ALT (SGPT) 13 0 - 44 IU/L   TSH 3RD GENERATION   Result Value Ref Range    TSH 0.595 0.450 - 4.500 uIU/mL   VITAMIN D, 25 HYDROXY   Result Value Ref Range    VITAMIN D, 25-HYDROXY 18.4 (L) 30.0 - 100.0 ng/mL   MICROALBUMIN, UR, RAND W/ MICROALBUMIN/CREA RATIO   Result Value Ref Range    Creatinine, urine 79.8 Not Estab. mg/dL    Microalbumin, urine 2080. 3 Not Estab. ug/mL    Microalb/Creat ratio (ug/mg creat.) 2606.9 (H) 0.0 - 30.0 mg/g creat   CKD REPORT   Result Value Ref Range    Interpretation Note DIABETES PATIENT EDUCATION   Result Value Ref Range    PDF Image Not applicable    AMB POC URINALYSIS DIP STICK AUTO W/O MICRO   Result Value Ref Range    Color (UA POC) Yellow     Clarity (UA POC) Clear     Glucose (UA POC) 2+ Negative    Bilirubin (UA POC) Negative Negative    Ketones (UA POC) Negative Negative    Specific gravity (UA POC) 1.025 1.001 - 1.035    Blood (UA POC) 1+ Negative    pH (UA POC) 5.5 4.6 - 8.0    Protein (UA POC) 3+ Negative mg/dL    Urobilinogen (UA POC) 0.2 mg/dL 0.2 - 1    Nitrites (UA POC) Negative Negative    Leukocyte esterase (UA POC) Negative Negative   AMB POC GLUCOSE BLOOD, BY GLUCOSE MONITORING DEVICE   Result Value Ref Range    Glucose  mg/dL   AMB POC HEMOGLOBIN A1C   Result Value Ref Range    Hemoglobin A1c (POC) 10.8 %     Assessment/Plan:    ICD-10-CM ICD-9-CM    1. Uncontrolled type 2 diabetes mellitus with diabetic nephropathy, without long-term current use of insulin ([facility]) E11.21 250.42     E11.65 583.81    2. Hypovitaminosis D E55.9 268.9 cholecalciferol, VITAMIN D3, (VITAMIN D3) 5,000 unit tab tablet   3. Mixed hypercholesterolemia and hypertriglyceridemia E78.2 272.2 DISCONTINUED: gemfibrozil (LOPID) 600 mg tablet   4. Microalbuminuria R80.9 791.0 REFERRAL TO NEPHROLOGY   5. Essential hypertension with goal blood pressure less than 130/80 I10 401.9 lisinopril (PRINIVIL, ZESTRIL) 20 mg tablet   6. Diabetic nephropathy associated with diabetes mellitus due to underlying condition ([facility] E08.21 249.40 lisinopril (PRINIVIL, ZESTRIL) 20 mg tablet     583.81 REFERRAL TO NEPHROLOGY     Patient Instructions        Eating Healthy Foods: Care Instructions  Your Care Instructions  Eating healthy foods can help lower your risk for disease. Healthy food gives you energy and keeps your heart strong, your brain active, your muscles working, and your bones strong.   A healthy diet includes a variety of foods from the basic food groups: grains, vegetables, fruits, milk and milk products, and meat and beans. Some people may eat more of their favorite foods from only one food group and, as a result, miss getting the nutrients they need. So, it is important to pay attention not only to what you eat but also to what you are missing from your diet. You can eat a healthy, balanced diet by making a few small changes. Follow-up care is a key part of your treatment and safety. Be sure to make and go to all appointments, and call your doctor if you are having problems. It's also a good idea to know your test results and keep a list of the medicines you take. How can you care for yourself at home? Look at what you eat  · Keep a food diary for a week or two and record everything you eat or drink. Track the number of servings you eat from each food group. · For a balanced diet every day, eat a variety of:  ¨ 6 or more ounce-equivalents of grains, such as cereals, breads, crackers, rice, or pasta, every day. An ounce-equivalent is 1 slice of bread, 1 cup of ready-to-eat cereal, or ½ cup of cooked rice, cooked pasta, or cooked cereal.  ¨ 2½ cups of vegetables, especially:  § Dark-green vegetables such as broccoli and spinach. § Orange vegetables such as carrots and sweet potatoes. § Dry beans (such as woody and kidney beans) and peas (such as lentils). ¨ 2 cups of fresh, frozen, or canned fruit. A small apple or 1 banana or orange equals 1 cup. ¨ 3 cups of nonfat or low-fat milk, yogurt, or other milk products. ¨ 5½ ounces of meat and beans, such as chicken, fish, lean meat, beans, nuts, and seeds. One egg, 1 tablespoon of peanut butter, ½ ounce nuts or seeds, or ¼ cup of cooked beans equals 1 ounce of meat. · Learn how to read food labels for serving sizes and ingredients. Fast-food and convenience-food meals often contain few or no fruits or vegetables. Make sure you eat some fruits and vegetables to make the meal more nutritious. · Look at your food diary.  For each food group, add up what you have eaten and then divide the total by the number of days. This will give you an idea of how much you are eating from each food group. See if you can find some ways to change your diet to make it more healthy. Start small  · Do not try to make dramatic changes to your diet all at once. You might feel that you are missing out on your favorite foods and then be more likely to fail. · Start slowly, and gradually change your habits. Try some of the following:  ¨ Use whole wheat bread instead of white bread. ¨ Use nonfat or low-fat milk instead of whole milk. ¨ Eat brown rice instead of white rice, and eat whole wheat pasta instead of white-flour pasta. ¨ Try low-fat cheeses and low-fat yogurt. ¨ Add more fruits and vegetables to meals and have them for snacks. ¨ Add lettuce, tomato, cucumber, and onion to sandwiches. ¨ Add fruit to yogurt and cereal.  Enjoy food  · You can still eat your favorite foods. You just may need to eat less of them. If your favorite foods are high in fat, salt, and sugar, limit how often you eat them, but do not cut them out entirely. · Eat a wide variety of foods. Make healthy choices when eating out  · The type of restaurant you choose can help you make healthy choices. Even fast-food chains are now offering more low-fat or healthier choices on the menu. · Choose smaller portions, or take half of your meal home. · When eating out, try:  ¨ A veggie pizza with a whole wheat crust or grilled chicken (instead of sausage or pepperoni). ¨ Pasta with roasted vegetables, grilled chicken, or marinara sauce instead of cream sauce. ¨ A vegetable wrap or grilled chicken wrap. ¨ Broiled or poached food instead of fried or breaded items. Make healthy choices easy  · Buy packaged, prewashed, ready-to-eat fresh vegetables and fruits, such as baby carrots, salad mixes, and chopped or shredded broccoli and cauliflower.   · Buy packaged, presliced fruits, such as melon or pineapple. · Choose 100% fruit or vegetable juice instead of soda. Limit juice intake to 4 to 6 oz (½ to ¾ cup) a day. · Blend low-fat yogurt, fruit juice, and canned or frozen fruit to make a smoothie for breakfast or a snack. Where can you learn more? Go to http://mayo-kelly.info/. Enter T756 in the search box to learn more about \"Eating Healthy Foods: Care Instructions. \"  Current as of: April 3, 2017  Content Version: 11.3  © 1578-6229 [employer]. Care instructions adapted under license by [employer] (which disclaims liability or warranty for this information). If you have questions about a medical condition or this instruction, always ask your healthcare professional. [address] any warranty or liability for your use of this information. Follow-up Disposition:  Return in about 3 months (around 1/16/2018), or if symptoms worsen or fail to improve, for routine follow up.

## 2017-10-16 NOTE — LETTER
10/16/2017 12:05 PM 
 
Mr. Inessa Mg [address] [place] [address] [address] [id number] Dear Inessa Mg: 
 
Please find your most recent results below. Increased urine albumin, cholesterol continues to go up, ckd, vit d level is low Resulted Orders AMB POC URINALYSIS DIP STICK AUTO W/O MICRO Result Value Ref Range Color (UA POC) Yellow Clarity (UA POC) Clear Glucose (UA POC) 2+ Negative Bilirubin (UA POC) Negative Negative Ketones (UA POC) Negative Negative Specific gravity (UA POC) 1.025 1.001 - 1.035 Blood (UA POC) 1+ Negative pH (UA POC) 5.5 4.6 - 8.0 Protein (UA POC) 3+ Negative mg/dL Urobilinogen (UA POC) 0.2 mg/dL 0.2 - 1 Nitrites (UA POC) Negative Negative Leukocyte esterase (UA POC) Negative Negative AMB POC GLUCOSE BLOOD, BY GLUCOSE MONITORING DEVICE Result Value Ref Range Glucose  mg/dL AMB POC HEMOGLOBIN A1C Result Value Ref Range Hemoglobin A1c (POC) 10.8 % HEPATITIS C AB Result Value Ref Range Hep C Virus Ab 0.1 0.0 - 0.9 s/co ratio Comment:  
                                     Negative:     < 0.8 Indeterminate: 0.8 - 0.9 Positive:     > 0.9 The CDC recommends that a positive HCV antibody result 
 be followed up with a HCV Nucleic Acid Amplification 
 test (508756). Narrative Performed at:  [address]  [id number] : Rodrigo Briscoe MD, Phone:  [phone] CBC W/O DIFF Result Value Ref Range WBC 6.9 3.4 - 10.8 x10E3/uL  
 RBC 4.91 4.14 - 5.80 x10E6/uL HGB 14.6 12.6 - 17.7 g/dL HCT 44.2 37.5 - 51.0 % MCV 90 79 - 97 fL  
 MCH 29.7 26.6 - 33.0 pg  
 MCHC 33.0 31.5 - 35.7 g/dL  
 RDW 13.3 12.3 - 15.4 % PLATELET 023 953 - 106 x10E3/uL Narrative Performed at:  [address]  [id number] : David Wilcox MD, Phone:  [phone] LIPID PANEL Result Value Ref Range Cholesterol, total 322 (H) 100 - 199 mg/dL Triglyceride 277 (H) 0 - 149 mg/dL HDL Cholesterol 39 (L) >39 mg/dL VLDL, calculated 55 (H) 5 - 40 mg/dL LDL, calculated 228 (H) 0 - 99 mg/dL Comment Comment Comment:  
   Possible Familial Hypercholesterolemia. FH should be suspected when 
fasting LDL cholesterol is above 189 mg/dL or non-HDL cholesterol 
is above 219 mg/dL. A family history of high cholesterol and heart 
disease in 1st degree relatives should be collected. J Clin Lipidol 8879;0:591-401 Narrative Performed at:  [address]  [id number] : David Wilcox MD, Phone:  [phone] METABOLIC PANEL, COMPREHENSIVE Result Value Ref Range Glucose 250 (H) 65 - 99 mg/dL Comment:  
   Specimen received in contact with cells. No visible hemolysis 
present. However GLUC may be decreased and K increased. Clinical 
correlation indicated. BUN 38 (H) 6 - 24 mg/dL Creatinine 1.58 (H) 0.76 - 1.27 mg/dL GFR est non-AA 49 (L) >59 mL/min/1.73 GFR est AA 56 (L) >59 mL/min/1.73  
 BUN/Creatinine ratio 24 (H) 9 - 20 Sodium 138 134 - 144 mmol/L Potassium 4.5 3.5 - 5.2 mmol/L Chloride 100 96 - 106 mmol/L  
 CO2 21 18 - 29 mmol/L Calcium 8.8 8.7 - 10.2 mg/dL Protein, total 6.9 6.0 - 8.5 g/dL Albumin 3.8 3.5 - 5.5 g/dL GLOBULIN, TOTAL 3.1 1.5 - 4.5 g/dL A-G Ratio 1.2 1.2 - 2.2 Bilirubin, total 0.4 0.0 - 1.2 mg/dL Alk. phosphatase 92 39 - 117 IU/L  
 AST (SGOT) 8 0 - 40 IU/L  
 ALT (SGPT) 13 0 - 44 IU/L Narrative Performed at:  [address]  [id number] : David Wilcox MD, Phone:  [phone] TSH 3RD GENERATION Result Value Ref Range TSH 0.595 0.450 - 4.500 uIU/mL Narrative Performed at:  [place] [address] [address]  [id number] : Lyubov Avitia MD, Phone:  [phone] VITAMIN D, 25 HYDROXY Result Value Ref Range VITAMIN D, 25-HYDROXY 18.4 (L) 30.0 - 100.0 ng/mL Comment:  
   Vitamin D deficiency has been defined by the [address] [place] [address] practice guideline as a 
level of serum 25-OH vitamin D less than 20 ng/mL (1,2). The Endocrine Society went on to further define vitamin D 
insufficiency as a level between 21 and 29 ng/mL (2). 1. IOM ([place] of [employer]). 2010. Dietary reference 
   intakes for calcium and D. [facility]: The 
   [employer]. 2. Garcia MF, Arsalan NC, Mohinder MARQUEZ, et al. 
   Evaluation, treatment, and prevention of vitamin D 
   deficiency: an Endocrine Society clinical practice 
   guideline. JCEM. 2011 Jul; 96(7):1911-30. Narrative Performed at:  [address]  [id number] : Lyubov Avitia MD, Phone:  [phone] MICROALBUMIN, UR, RAND W/ MICROALBUMIN/CREA RATIO Result Value Ref Range Creatinine, urine 79.8 Not Estab. mg/dL Microalbumin, urine 2080. 3 Not Estab. ug/mL Comment:  
   Results confirmed on 
dilution. Microalb/Creat ratio (ug/mg creat.) 2606.9 (H) 0.0 - 30.0 mg/g creat Narrative Performed at:  [address]  [id number] : Lyubov Avitia MD, Phone:  [phone] CKD REPORT Result Value Ref Range Interpretation Note Comment:  
   Supplement report is available. Narrative Performed at:  [facility] [address] [place] [address]  [id number] : Octavio Robb PhD, Phone:  [phone] DIABETES PATIENT EDUCATION Result Value Ref Range PDF Image Not applicable Narrative Performed at:  [facility] [address] [place] [address]  [id number] : Simon Du PhD, Phone:  [phone] RECOMMENDATIONS: 
Discussed in clinic today Please call me if you have any questions: [phone] Sincerely, Tyler Sanders MD

## 2017-10-16 NOTE — TELEPHONE ENCOUNTER
[facility], [address]            Phone:  Fax: Afua Perales [phone]        Pt calling about apptmnt w/Dr. Lorie Gilford - (Info above)     He called to schedule apptmnt and was told that his PCP is supposed to do that and send over records   Pls call to advise     Best number to reach him is [phone]

## 2017-10-16 NOTE — MR AVS SNAPSHOT
Visit Information Date & Time Provider Department Dept. Phone Encounter #  
 10/16/2017 11:30 AM Maurizio Mancera MD [facility] at [address] [id number] Follow-up Instructions Return in about 3 months (around 1/16/2018), or if symptoms worsen or fail to improve, for routine follow up. Your Appointments 10/18/2017 12:10 PM  
Follow Up with MD Mayank [facility] Appt Note: f/u  
 [address] [facility] [address] [id number] [address] [facility] Upcoming Health Maintenance Date Due  
 EYE EXAM RETINAL OR DILATED Q1 2/25/1972 FOBT Q 1 YEAR AGE 50-75 2/25/2012 FOOT EXAM Q1 11/29/2017 HEMOGLOBIN A1C Q6M 3/18/2018 Pneumococcal 19-64 Highest Risk (2 of 3 - PCV13) 9/18/2018 MICROALBUMIN Q1 9/18/2018 LIPID PANEL Q1 9/18/2018 MEDICARE YEARLY EXAM 9/19/2018 DTaP/Tdap/Td series (2 - Td) 9/18/2027 Allergies as of 10/16/2017  Review Complete On: 10/16/2017 By: Luma Bowling LPN Severity Noted Reaction Type Reactions Anesthetics - Amide Type  03/01/2016    Shortness of Breath Flexeril [Cyclobenzaprine]  07/26/2016    Hives Tramadol  03/01/2016    Hives Current Immunizations  Reviewed on 1/17/2017 Name Date Influenza Vaccine (Quad) PF 10/11/2016 10:16 AM  
  
 Not reviewed this visit You Were Diagnosed With   
  
 Codes Comments Uncontrolled type 2 diabetes mellitus with diabetic nephropathy, without long-term current use of insulin [facility]    -  Primary ICD-10-CM: E11.21, E11.65 ICD-9-CM: 250.42, 583.81 Hypovitaminosis D     ICD-10-CM: E55.9 ICD-9-CM: 268.9 Mixed hypercholesterolemia and hypertriglyceridemia     ICD-10-CM: E78.2 ICD-9-CM: 272.2 Microalbuminuria     ICD-10-CM: R80.9 ICD-9-CM: 791.0 Essential hypertension with goal blood pressure less than 130/80     ICD-10-CM: I10 
ICD-9-CM: 401.9 Diabetic nephropathy associated with diabetes mellitus due to underlying condition ([facility].)     ICD-10-CM: E08.21 
ICD-9-CM: 249.40, 583.81 Vitals BP Pulse Temp Resp Height(growth percentile) Weight(growth percentile) 170/83 (BP 1 Location: Right arm, BP Patient Position: Sitting) 72 96.9 °F (36.1 °C) (Oral) 20 5' 9\" (1.753 m) 235 lb 3.2 oz (106.7 kg) SpO2 BMI Smoking Status 97% 34.73 kg/m2 Former Smoker Vitals History BMI and BSA Data Body Mass Index Body Surface Area 34.73 kg/m 2 2.28 m 2 Preferred Pharmacy Pharmacy Name Phone [id number] Flory Croft [address] [place] [phone] Your Updated Medication List  
  
   
This list is accurate as of: 10/16/17 12:24 PM.  Always use your most recent med list.  
  
  
  
  
 cholecalciferol (VITAMIN D3) 5,000 unit Tab tablet Commonly known as:  VITAMIN D3 Take 1 Tab by mouth daily. * diazePAM 5 mg tablet Commonly known as:  VALIUM  
TAKE 1 TABLET PO QID FOR 5 DAYS  
  
 * diazePAM 2 mg tablet Commonly known as:  VALIUM Take 1 Tab by mouth every eight (8) hours as needed (muscle spasm) for up to 20 doses. Max Daily Amount: 6 mg.  
  
 gemfibrozil 600 mg tablet Commonly known as:  LOPID Take 1 Tab by mouth two (2) times a day. Indications: hypertriglyceridemia  
  
 glipiZIDE 10 mg tablet Commonly known as:  [place] Take 1 Tab by mouth Before breakfast and dinner. glucose blood VI test strips strip Commonly known as:  PHARMACIST CHOICE Check glucose 3-4 times daily. DX E11.22 HYDROcodone-acetaminophen 5-325 mg per tablet Commonly known as:  NORCO  
TAKE 1 TABLET PO Q 4 TO 6 HOURS PRN FOR PAIN  
  
 insulin degludec 200 unit/mL (3 mL) Inpn Commonly known as:  TRESIBA FLEXTOUCH U-200  
25 Units by SubCUTAneous route nightly. Insulin Needles (Disposable) 31 gauge x 5/16\" Ndle  
by SubCUTAneous route daily. Lancets Misc Use 3-4 times daily to check blood sugar. DX E11.22  
  
 levothyroxine 200 mcg tablet Commonly known as:  SYNTHROID Take 1 Tab by mouth Daily (before breakfast). lisinopril 20 mg tablet Commonly known as:  Lisa Lights Take 1 Tab by mouth daily. Indications: Diabetic Nephropathy, hypertension  
  
 oxyCODONE-acetaminophen 5-325 mg per tablet Commonly known as:  PERCOCET TAKE 1 TO 2 TABLETS BY MOUTH EVERY 4 TO 6 HOURS AS NEEDED FOR PAIN  
  
 * Notice: This list has 2 medication(s) that are the same as other medications prescribed for you. Read the directions carefully, and ask your doctor or other care provider to review them with you. Prescriptions Sent to Pharmacy Refills  
 lisinopril (PRINIVIL, ZESTRIL) 20 mg tablet 11 Sig: Take 1 Tab by mouth daily. Indications: Diabetic Nephropathy, hypertension Class: Normal  
 Pharmacy: [address] [place] [address] Ph #: [phone] Route: Oral  
 gemfibrozil (LOPID) 600 mg tablet 7 Sig: Take 1 Tab by mouth two (2) times a day. Indications: hypertriglyceridemia Class: Normal  
 Pharmacy: [address] [place] [address] Ph #: [phone] Route: Oral  
 cholecalciferol, VITAMIN D3, (VITAMIN D3) 5,000 unit tab tablet 1 Sig: Take 1 Tab by mouth daily. Class: Normal  
 Pharmacy: [address] [place] [address] Ph #: [phone] Route: Oral  
  
We Performed the Following REFERRAL TO NEPHROLOGY [UXQ64 Custom] Comments:  
 Diabetic nephropathy Follow-up Instructions Return in about 3 months (around 1/16/2018), or if symptoms worsen or fail to improve, for routine follow up. Referral Information Referral ID Referred By Referred To [id number] [facility] Woo Tiwari, [facility] Visits Status Start Date End Date 1 New Request 10/16/17 10/16/18 If your referral has a status of pending review or denied, additional information will be sent to support the outcome of this decision. Patient Instructions Eating Healthy Foods: Care Instructions Your Care Instructions Eating healthy foods can help lower your risk for disease. Healthy food gives you energy and keeps your heart strong, your brain active, your muscles working, and your bones strong. A healthy diet includes a variety of foods from the basic food groups: grains, vegetables, fruits, milk and milk products, and meat and beans. Some people may eat more of their favorite foods from only one food group and, as a result, miss getting the nutrients they need. So, it is important to pay attention not only to what you eat but also to what you are missing from your diet. You can eat a healthy, balanced diet by making a few small changes. Follow-up care is a key part of your treatment and safety. Be sure to make and go to all appointments, and call your doctor if you are having problems. It's also a good idea to know your test results and keep a list of the medicines you take. How can you care for yourself at home? Look at what you eat · Keep a food diary for a week or two and record everything you eat or drink. Track the number of servings you eat from each food group. · For a balanced diet every day, eat a variety of: ¨ 6 or more ounce-equivalents of grains, such as cereals, breads, crackers, rice, or pasta, every day. An ounce-equivalent is 1 slice of bread, 1 cup of ready-to-eat cereal, or ½ cup of cooked rice, cooked pasta, or cooked cereal. 
¨ 2½ cups of vegetables, especially: § Dark-green vegetables such as broccoli and spinach. § Orange vegetables such as carrots and sweet potatoes. § Dry beans (such as woody and kidney beans) and peas (such as lentils). ¨ 2 cups of fresh, frozen, or canned fruit. A small apple or 1 banana or orange equals 1 cup. ¨ 3 cups of nonfat or low-fat milk, yogurt, or other milk products. ¨ 5½ ounces of meat and beans, such as chicken, fish, lean meat, beans, nuts, and seeds. One egg, 1 tablespoon of peanut butter, ½ ounce nuts or seeds, or ¼ cup of cooked beans equals 1 ounce of meat. · Learn how to read food labels for serving sizes and ingredients. Fast-food and convenience-food meals often contain few or no fruits or vegetables. Make sure you eat some fruits and vegetables to make the meal more nutritious. · Look at your food diary. For each food group, add up what you have eaten and then divide the total by the number of days. This will give you an idea of how much you are eating from each food group. See if you can find some ways to change your diet to make it more healthy. Start small · Do not try to make dramatic changes to your diet all at once. You might feel that you are missing out on your favorite foods and then be more likely to fail. · Start slowly, and gradually change your habits. Try some of the following: ¨ Use whole wheat bread instead of white bread. ¨ Use nonfat or low-fat milk instead of whole milk. ¨ Eat brown rice instead of white rice, and eat whole wheat pasta instead of white-flour pasta. ¨ Try low-fat cheeses and low-fat yogurt. ¨ Add more fruits and vegetables to meals and have them for snacks. ¨ Add lettuce, tomato, cucumber, and onion to sandwiches. ¨ Add fruit to yogurt and cereal. 
Enjoy food · You can still eat your favorite foods. You just may need to eat less of them. If your favorite foods are high in fat, salt, and sugar, limit how often you eat them, but do not cut them out entirely. · Eat a wide variety of foods. Make healthy choices when eating out · The type of restaurant you choose can help you make healthy choices. Even fast-food chains are now offering more low-fat or healthier choices on the menu. · Choose smaller portions, or take half of your meal home. · When eating out, try: ¨ A veggie pizza with a whole wheat crust or grilled chicken (instead of sausage or pepperoni). ¨ Pasta with roasted vegetables, grilled chicken, or marinara sauce instead of cream sauce. ¨ A vegetable wrap or grilled chicken wrap. ¨ Broiled or poached food instead of fried or breaded items. Make healthy choices easy · Buy packaged, prewashed, ready-to-eat fresh vegetables and fruits, such as baby carrots, salad mixes, and chopped or shredded broccoli and cauliflower. · Buy packaged, presliced fruits, such as melon or pineapple. · Choose 100% fruit or vegetable juice instead of soda. Limit juice intake to 4 to 6 oz (½ to ¾ cup) a day. · Blend low-fat yogurt, fruit juice, and canned or frozen fruit to make a smoothie for breakfast or a snack. Where can you learn more? Go to http://mayo-kelly.info/. Enter T756 in the search box to learn more about \"Eating Healthy Foods: Care Instructions. \" Current as of: April 3, 2017 Content Version: 11.3 © 3235-2403 [employer]. Care instructions adapted under license by [employer] (which disclaims liability or warranty for this information). If you have questions about a medical condition or this instruction, always ask your healthcare professional. [place] [address] any warranty or liability for your use of this information. Introducing [facility] & HE[facility] SERVICES! [facility] introduces [employer] patient portal. Now you can access parts of your medical record, email your doctor's office, and request medication refills online. 1. In your internet browser, go to [email or web address] 2. Click on the First Time User? Click Here link in the Sign In box. You will see the New Member Sign Up page. 3. Enter your HowDo Access Code exactly as it appears below. You will not need to use this code after youve completed the sign-up process. If you do not sign up before the expiration date, you must request a new code. · HowDo Access Code: [email or web address] Expires: 11/14/2017  3:57 PM 
 
4. Enter the last four digits of your Social Security Number (xxxx) and Date of Birth (mm/dd/yyyy) as indicated and click Submit. You will be taken to the next sign-up page. 5. Create a HowDo ID. This will be your HowDo login ID and cannot be changed, so think of one that is secure and easy to remember. 6. Create a HowDo password. You can change your password at any time. 7. Enter your Password Reset Question and Answer. This can be used at a later time if you forget your password. 8. Enter your e-mail address. You will receive e-mail notification when new information is available in [address] 9. Click Sign Up. You can now view and download portions of your medical record. 10. Click the Download Summary menu link to download a portable copy of your medical information. If you have questions, please visit the Frequently Asked Questions section of the HowDo website. Remember, HowDo is NOT to be used for urgent needs. For medical emergencies, dial 911. Now available from your iPhone and Android! Please provide this summary of care documentation to your next provider. Your primary care clinician is listed as Patti Nazario. If you have any questions after today's visit, please call [phone].

## 2017-10-16 NOTE — PATIENT INSTRUCTIONS

## 2017-10-18 ENCOUNTER — OFFICE VISIT (OUTPATIENT)
Dept: ENDOCRINOLOGY | Age: 57
End: 2017-10-18

## 2017-10-18 VITALS
HEART RATE: 79 BPM | SYSTOLIC BLOOD PRESSURE: 115 MMHG | BODY MASS INDEX: 34.7 KG/M2 | HEIGHT: 69 IN | WEIGHT: 234.3 LBS | DIASTOLIC BLOOD PRESSURE: 78 MMHG

## 2017-10-18 DIAGNOSIS — E78.5 HYPERLIPIDEMIA, UNSPECIFIED HYPERLIPIDEMIA TYPE: ICD-10-CM

## 2017-10-18 DIAGNOSIS — C73 PAPILLARY THYROID CARCINOMA (HCC): ICD-10-CM

## 2017-10-18 DIAGNOSIS — Z79.4 TYPE 2 DIABETES MELLITUS WITH STAGE 3 CHRONIC KIDNEY DISEASE, WITH LONG-TERM CURRENT USE OF INSULIN (HCC): Primary | ICD-10-CM

## 2017-10-18 DIAGNOSIS — N18.30 TYPE 2 DIABETES MELLITUS WITH STAGE 3 CHRONIC KIDNEY DISEASE, WITH LONG-TERM CURRENT USE OF INSULIN (HCC): Primary | ICD-10-CM

## 2017-10-18 DIAGNOSIS — I10 ESSENTIAL HYPERTENSION WITH GOAL BLOOD PRESSURE LESS THAN 130/80: ICD-10-CM

## 2017-10-18 DIAGNOSIS — E89.0 POSTOPERATIVE HYPOTHYROIDISM: ICD-10-CM

## 2017-10-18 DIAGNOSIS — E11.22 TYPE 2 DIABETES MELLITUS WITH STAGE 3 CHRONIC KIDNEY DISEASE, WITH LONG-TERM CURRENT USE OF INSULIN (HCC): Primary | ICD-10-CM

## 2017-10-18 RX ORDER — INSULIN DEGLUDEC 200 U/ML
25 INJECTION, SOLUTION SUBCUTANEOUS
Qty: 6 PEN | Refills: 3 | Status: SHIPPED | OUTPATIENT
Start: 2017-10-18 | End: 2018-10-04 | Stop reason: SDUPTHER

## 2017-10-18 RX ORDER — ROSUVASTATIN CALCIUM 20 MG/1
20 TABLET, COATED ORAL
Qty: 90 TAB | Refills: 3 | Status: SHIPPED | OUTPATIENT
Start: 2017-10-18 | End: 2018-01-15 | Stop reason: CLARIF

## 2017-10-18 RX ORDER — INSULIN PUMP SYRINGE, 3 ML
EACH MISCELLANEOUS
Qty: 1 KIT | Refills: 0 | Status: SHIPPED | OUTPATIENT
Start: 2017-10-18 | End: 2020-07-30 | Stop reason: SDUPTHER

## 2017-10-18 RX ORDER — LANCETS
EACH MISCELLANEOUS
Qty: 2 PACKAGE | Refills: 3 | Status: SHIPPED | OUTPATIENT
Start: 2017-10-18 | End: 2018-02-02 | Stop reason: SDUPTHER

## 2017-10-18 NOTE — MR AVS SNAPSHOT
Visit Information Date & Time Provider Department Dept. Phone Encounter #  
 10/18/2017 12:10 PM Wagner Marquez, [address] [place] [address] Diabetes and Endocrinology [phone] [id number] Follow-up Instructions Return in about 6 weeks (around 11/29/2017). Upcoming Health Maintenance Date Due  
 EYE EXAM RETINAL OR DILATED Q1 2/25/1972 FOBT Q 1 YEAR AGE 50-75 2/25/2012 FOOT EXAM Q1 11/29/2017 HEMOGLOBIN A1C Q6M 3/18/2018 Pneumococcal 19-64 Highest Risk (2 of 3 - PCV13) 9/18/2018 MICROALBUMIN Q1 9/18/2018 LIPID PANEL Q1 9/18/2018 MEDICARE YEARLY EXAM 9/19/2018 DTaP/Tdap/Td series (2 - Td) 9/18/2027 Allergies as of 10/18/2017  Review Complete On: 10/18/2017 By: Wagner Marquez MD  
  
 Severity Noted Reaction Type Reactions Anesthetics - Amide Type  03/01/2016    Shortness of Breath Flexeril [Cyclobenzaprine]  07/26/2016    Hives Tramadol  03/01/2016    Hives Current Immunizations  Reviewed on 1/17/2017 Name Date Influenza Vaccine (Quad) PF 10/11/2016 10:16 AM  
  
 Not reviewed this visit You Were Diagnosed With   
  
 Codes Comments Type 2 diabetes mellitus with stage 3 chronic kidney disease, with long-term current use of insulin (HCC)    -  Primary ICD-10-CM: E11.22, N18.3, Z79.4 ICD-9-CM: 250.40, 585.3, V58.67 Essential hypertension with goal blood pressure less than 130/80     ICD-10-CM: I10 
ICD-9-CM: 401.9 Hyperlipidemia, unspecified hyperlipidemia type     ICD-10-CM: E78.5 ICD-9-CM: 272.4 Papillary thyroid carcinoma ([facility] [address]     ICD-10-CM: R64 ICD-9-CM: 720 Postoperative hypothyroidism     ICD-10-CM: E89.0 ICD-9-CM: 244.0 Vitals BP Pulse Height(growth percentile) Weight(growth percentile) BMI Smoking Status 115/78 (BP 1 Location: Left arm, BP Patient Position: Sitting) 79 5' 9\" (1.753 m) 234 lb 4.8 oz (106.3 kg) 34.6 kg/m2 Former Smoker BMI and BSA Data Body Mass Index Body Surface Area 34.6 kg/m 2 2.27 m 2 Preferred Pharmacy Pharmacy Name Phone [id number], [facility] [phone] Your Updated Medication List  
  
   
This list is accurate as of: 10/18/17 12:33 PM.  Always use your most recent med list.  
  
  
  
  
 Blood-Glucose Meter monitoring kit 1 preferred brand glucometer for checking home glucose, E11.22  
  
 cholecalciferol (VITAMIN D3) 5,000 unit Tab tablet Commonly known as:  VITAMIN D3 Take 1 Tab by mouth daily. * diazePAM 5 mg tablet Commonly known as:  VALIUM  
TAKE 1 TABLET PO QID FOR 5 DAYS  
  
 * diazePAM 2 mg tablet Commonly known as:  VALIUM Take 1 Tab by mouth every eight (8) hours as needed (muscle spasm) for up to 20 doses. Max Daily Amount: 6 mg.  
  
 glipiZIDE 10 mg tablet Commonly known as:  Seferino Correa Take 1 Tab by mouth Before breakfast and dinner. glucose blood VI test strips strip Commonly known as:  PHARMACIST CHOICE Use preferred strips: Check glucose 3-4 times daily. DX E11.22 HYDROcodone-acetaminophen 5-325 mg per tablet Commonly known as:  NORCO  
TAKE 1 TABLET PO Q 4 TO 6 HOURS PRN FOR PAIN  
  
 insulin degludec 200 unit/mL (3 mL) Inpn Commonly known as:  TRESIBA FLEXTOUCH U-200  
25 Units by SubCUTAneous route nightly. Insulin Needles (Disposable) 31 gauge x 5/16\" Ndle  
by SubCUTAneous route daily. Lancets Misc Use preferred brand; Check glucose 3-4 times daily, Diagnosis E11.22  
  
 levothyroxine 200 mcg tablet Commonly known as:  SYNTHROID Take 1 Tab by mouth Daily (before breakfast). lisinopril 20 mg tablet Commonly known as:  Char Pinch Take 1 Tab by mouth daily. Indications: Diabetic Nephropathy, hypertension  
  
 oxyCODONE-acetaminophen 5-325 mg per tablet Commonly known as:  PERCOCET TAKE 1 TO 2 TABLETS BY MOUTH EVERY 4 TO 6 HOURS AS NEEDED FOR PAIN  
  
 rosuvastatin 20 mg tablet Commonly known as:  CRESTOR Take 1 Tab by mouth nightly. * Notice: This list has 2 medication(s) that are the same as other medications prescribed for you. Read the directions carefully, and ask your doctor or other care provider to review them with you. Prescriptions Sent to Pharmacy Refills  
 glucose blood VI test strips (PHARMACIST CHOICE) strip 3 Sig: Use preferred strips: Check glucose 3-4 times daily. DX E11. Class: Normal  
 Pharmacy: [address] [place] [address] Ph #: [phone] Lancets misc 3 Sig: Use preferred brand; Check glucose 3-4 times daily, Diagnosis E11 Class: Normal  
 Pharmacy: [address] [place] [address] Ph #: [phone] Blood-Glucose Meter monitoring kit 0 Si preferred brand glucometer for checking home glucose,  Class: Normal  
 Pharmacy: [address] [place] [address] Ph #: [phone]  
 rosuvastatin (CRESTOR) 20 mg tablet 3 Sig: Take 1 Tab by mouth nightly. Class: Normal  
 Pharmacy: [address] [place] [address] Ph #: [phone] Route: Oral  
 insulin degludec (TRESIBA FLEXTOUCH U-200) 200 unit/mL (3 mL) inpn 3 Si Units by SubCUTAneous route nightly. Class: Normal  
 Pharmacy: [address] [place] [address] Ph #: [phone] Route: SubCUTAneous Follow-up Instructions Return in about 6 weeks (around 2017). Patient Instructions Start taking Tresiba at 25 units each bedtime Continue taking 10mg of glipizide with breakfast and dinner Start taking crestor 20 mg each evening for your cholesterol STOP the gemfibrozil Plan to have your thyroid tumor marker completed at the lab, order provided.   
Plan for a repeat thyroid ultrasound before your next visit in 6 weeks, 
 Continue 200 mcg of levothyroxine daily Franck Aleman. [address] [facility] Diabetes & Endocrinology [address] 
 
---------------------------------------------------------------------------------------------------------------------- Below you will find a glucose log sheet which you can use to record your blood sugars. Without checking and recording what your home glucose levels are, it will be difficult to make any changes to your medication dose, even when significant changes may be needed. Please feel free to use the log below to record your home glucose levels. At the very least, I would like for you to login the entire 2-3 weeks just before your visit so we can make your visit much more productive and beneficial to you. GLUCOSE LOG SHEET: 
 
Date Breakfast Lunch Dinner Bedtime Comments ? GLUCOSE LOG SHEET: 
 
Date Breakfast Lunch Dinner Bedtime Comments ? GLUCOSE LOG SHEET: 
 
Date Breakfast Lunch Dinner Bedtime Comments ? Introducing [facility]! Dana Delgado introduces Self-[employer] patient portal. Now you can access parts of your medical record, email your doctor's office, and request medication refills online.    
 
1. In your internet browser, go to [email or web address] 2. Click on the First Time User? Click Here link in the Sign In box. You will see the New Member Sign Up page. 3. Enter your [employer] Access Code exactly as it appears below. You will not need to use this code after youve completed the sign-up process. If you do not sign up before the expiration date, you must request a new code. · [employer] Access Code: [email or web address] Expires: 11/14/2017  3:57 PM 
 
4. Enter the last four digits of your Social Security Number (xxxx) and Date of Birth (mm/dd/yyyy) as indicated and click Submit. You will be taken to the next sign-up page. 5. Create a [employer] ID. This will be your [employer] login ID and cannot be changed, so think of one that is secure and easy to remember. 6. Create a [employer] password. You can change your password at any time. 7. Enter your Password Reset Question and Answer. This can be used at a later time if you forget your password. 8. Enter your e-mail address. You will receive e-mail notification when new information is available in [address] 9. Click Sign Up. You can now view and download portions of your medical record. 10. Click the Download Summary menu link to download a portable copy of your medical information. If you have questions, please visit the Frequently Asked Questions section of the [employer] website. Remember, [employer] is NOT to be used for urgent needs. For medical emergencies, dial 911. Now available from your iPhone and Android! Please provide this summary of care documentation to your next provider. Your primary care clinician is listed as Patti Nazario. If you have any questions after today's visit, please call [phone].

## 2017-10-18 NOTE — PROGRESS NOTES
CHIEF COMPLAINT: f/u evaluation for uncontrolled type 2 diabetes    HISTORY OF PRESENT ILLNESS:   Valeria Alczaar is a 56 y.o. male with a PMHx as noted below who presents to the endocrinology clinic for f/u evaluation of uncontrolled type 2 diabetes. Patient has not followed up since last visit in January, had appointments rescheduled. Diabetes poorly controlled with A1c around 10% recently,   Has not been taking his insulin or checking his blood sugars over the past few weeks. Diabetes type 2:  Currently taking the following meds:  Tresiba 25 units (was only taking 25, ran out 3 weeks ago, has not been taking)  Glipizide 10 mg with breakfast and dinner    Review of home blood glucose:   Has not checked for 1 month,   Notes his machine is not working    Review of most recent diabetes-related labs:  Lab Results   Component Value Date    HBA1C 9.7 (H) 10/10/2016    HBA1C 11.1 (H) 10/06/2016    HBA1C 8.9 (H) 06/17/2009    QMY0VBTQ 10.8 09/18/2017    KOW3MUGM 9.5 01/30/2017    OIP7IXQX 11.5 08/22/2016    CHOL 322 (H) 09/18/2017    LDLC 228 (H) 09/18/2017    GFRAA 56 (L) 09/18/2017    GFRNA 49 (L) 09/18/2017    MCACR 2606.9 (H) 09/18/2017    TSH 0.595 09/18/2017    VITD3 18.4 (L) 09/18/2017     125206 = IA-2 pancreatic islet cell autoantibody  GADLT = CLAIRE-65 autoantibody   MCACR = Urine Microalbumin    --------------------------------------------------    Thyroid Cancer:     2002 Biopsy suggesting PTC   S/p total thyroidectomy   S/p STRATTON  [id number]  Reports negative WBS  10/10/16  Repeat surgery, 2 right paratrachial LN's   Surgical Path: poorly differentiated PTC   Patient with hoarseness of voice, persistent   Continued on 200mcg LT4  11/29/16  Initial visit with me for thyroid cancer and diabetes  11/30/16 Tg 10.1, TgAb negative, TSH 1.93 (patient notes this is the lowest Tg level compared with prior)  12/14/16 Neck Ultrasound:  \"Thyroid bed is empty, no anterior cervical mass or significant adenopathy\"  01/18/17 Thyrogen stimulated WBS: \"No evidence of uptake in thyroid bed, No evidence of metastatic disease\"  Did not follow up since January. Reports he is feeling well,  Continues on 200 mcg of levothyroxine daily, takes with his other meds, but TSH remains 0.5,  Follows up with his ENT. Needs a new ultrasound and thyroid globulin level. PAST MEDICAL/SURGICAL HISTORY:   Past Medical History:   Diagnosis Date    Adverse effect of anesthesia     \" STOP BREATHING 1 TIME C ANESTH\"    Calculus of kidney     Cancer ([address] 2004    thyroid cancer    Chronic kidney disease     Chronic pain     BACK SHOULDER AND ARM    Depression     Diabetes ([address]     Hypercholesterolemia     Hypertension     Nausea & vomiting     Other ill-defined conditions(799.89)     cholesterol, thyroid    Sleep apnea     doesn't wear cpap    Thyroid cancer ([address]     TIA (transient ischemic attack) 2011     Past Surgical History:   Procedure Laterality Date    CARDIAC SURG PROCEDURE UNLIST      HX HEENT      THROAT SURGERY X 4    HX ORTHOPAEDIC      back     HX ORTHOPAEDIC      ARM AND SHOULDER    HX OTHER SURGICAL      thyroid, lymphnode    HX RETINAL DETACHMENT REPAIR      left eye    VASCULAR SURGERY PROCEDURE UNLIST      cardiac cath NEG. ALLERGIES:   Allergies   Allergen Reactions    Anesthetics - Amide Type Shortness of Breath    Flexeril [Cyclobenzaprine] Hives    Tramadol Hives       MEDICATIONS ON ADMISSION:     Current Outpatient Prescriptions:     diazePAM (VALIUM) 5 mg tablet, TAKE 1 TABLET PO QID FOR 5 DAYS, Disp: , Rfl: 0    lisinopril (PRINIVIL, ZESTRIL) 20 mg tablet, Take 1 Tab by mouth daily. Indications: Diabetic Nephropathy, hypertension, Disp: 30 Tab, Rfl: 11    gemfibrozil (LOPID) 600 mg tablet, Take 1 Tab by mouth two (2) times a day.  Indications: hypertriglyceridemia, Disp: 60 Tab, Rfl: 7    cholecalciferol, VITAMIN D3, (VITAMIN D3) 5,000 unit tab tablet, Take 1 Tab by mouth daily., Disp: 90 Tab, Rfl: 1    glipiZIDE (GLUCOTROL) 10 mg tablet, Take 1 Tab by mouth Before breakfast and dinner., Disp: 90 Tab, Rfl: 3    Lancets misc, Use 3-4 times daily to check blood sugar. DX E11.22, Disp: 200 Each, Rfl: 11    glucose blood VI test strips (PHARMACIST CHOICE) strip, Check glucose 3-4 times daily. DX E11.22, Disp: 300 Strip, Rfl: 3    levothyroxine (SYNTHROID) 200 mcg tablet, Take 1 Tab by mouth Daily (before breakfast). , Disp: 90 Tab, Rfl: 3    Insulin Needles, Disposable, 31 gauge x 5/16\" ndle, by SubCUTAneous route daily. , Disp: 1 Package, Rfl: 11    HYDROcodone-acetaminophen (NORCO) 5-325 mg per tablet, TAKE 1 TABLET PO Q 4 TO 6 HOURS PRN FOR PAIN, Disp: , Rfl: 0    oxyCODONE-acetaminophen (PERCOCET) 5-325 mg per tablet, TAKE 1 TO 2 TABLETS BY MOUTH EVERY 4 TO 6 HOURS AS NEEDED FOR PAIN, Disp: , Rfl: 0    diazePAM (VALIUM) 2 mg tablet, Take 1 Tab by mouth every eight (8) hours as needed (muscle spasm) for up to 20 doses. Max Daily Amount: 6 mg., Disp: 8 Tab, Rfl: 0    insulin degludec (TRESIBA FLEXTOUCH U-200) 200 unit/mL (3 mL) inpn, 25 Units by SubCUTAneous route nightly., Disp: 6 Pen, Rfl: 3    SOCIAL HISTORY:   Social History     Social History    Marital status: LEGALLY      Spouse name: N/A    Number of children: N/A    Years of education: N/A     Occupational History    Not on file.      Social History Main Topics    Smoking status: Former Smoker     Quit date: 8/22/1994    Smokeless tobacco: Never Used    Alcohol use No    Drug use: No    Sexual activity: No     Other Topics Concern    Not on file     Social History Narrative       FAMILY HISTORY:  Family History   Problem Relation Age of Onset    Diabetes Mother     Elevated Lipids Mother    Hazeline Popper Bladder Disease Mother     Headache Mother    [facility] Migraines Mother     Heart Disease Mother     Hypertension Mother     Stroke Mother     Other Mother      ANEURSYM BRAIN    Bleeding Prob Father     Cancer Father      LEUKEMIA    Diabetes Father    Calixto Dominguez Elevated Lipids Father     Mental Retardation Sister     Psychiatric Disorder Sister     Cancer Brother      LUNGS       REVIEW OF SYSTEMS: Complete ROS assessed and noted for that which is described above, all else are negative. Eyes: normal  ENT: hoarseness of voice  CVS: normal  Resp: normal  GI: normal  : normal  GYN: normal  Endocrine: normal  Integument: normal  Musculoskeletal: chronic pain  Neuro: normal  Psych: normal      PHYSICAL EXAMINATION:    VITAL SIGNS:  Visit Vitals    /78 (BP 1 Location: Left arm, BP Patient Position: Sitting)    Pulse 79    Ht 5' 9\" (1.753 m)    Wt 234 lb 4.8 oz (106.3 kg)    BMI 34.6 kg/m2       GENERAL: NCAT, Sitting comfortably, NAD  EYES: EOMI, non-icteric, no proptosis  Ear/Nose/Throat: NCAT, no inflammation, no masses  LYMPH NODES: No LAD  CARDIOVASCULAR: S1 S2, RRR, No murmur, 2+ radial pulses  RESPIRATORY: CTA b/l, no wheeze/rales  GASTROINTESTINAL: NT, ND,  MUSCULOSKELETAL: Normal ROM, no atrophy  SKIN: warm, no edema/rash/ or other skin changes  NEUROLOGIC: 5/5 power all extremities, no tremors, AAOx3  PSYCHIATRIC: Normal affect, Normal insight and judgement         REVIEW OF LABORATORY AND RADIOLOGY DATA:   Labs and documentation have been reviewed as described above. ASSESSMENT AND PLAN:   Tutu Kenyon is a 56 y.o. male with a PMHx as noted above who presents to the endocrinology clinic for f/u evaluation of uncontrolled type 2 diabetes and thyroid cancer. DM2 uncontrolled  HTN  HLD  Thyroid cancer  Post surgical hypothyrodism    Patient seems to have fallen behind in his diabetes self management. He missed a few appointments since the last visit, has not taken insulin in 3 weeks, and has not checked his blood sugar in 1 month, with an A1c just above >10%. Today we are refilling his medications, with minor adjustments, and prescribing new supplies including glucometer / strips / lancets. Concerning his thyroid it is necessary to repeat an ultrasound and tumor markers as his level was elevated before at 10. His negative WBS and US had been reassuring however. DM2:  Restart Tresiba at 25 units, reordered  Continue Glipizide to 10mg BID  Prescribed all new diabetes supplies  Next step will be a dose of short acting insulin with largest meal of the day (GFR 30-45 limits SGLT2 inhibitors + Hx of thyroid cancer limits incretin based therapy)  Continue to check glucose 2-3 times daily    HTN: BP stable continue current meds  HLD:  VERY HIGH, Stop gemfibrozil, trial on crestor 20mg, notes hx of dizziness on lipitor in the past. The statin will have a greater benefit toward his lipids and cardiovascular risk, diabetes control will improve his triglycerides. Preferred to avoid gemfibrozil with statin due to interaction. Thyroid CA:   Goal TSH 0.1-0.5, currently stable  Continue 200 mcg daily, though taking it with other meds and not optimal, I am reassured that he is at least taking it and keeping his TSH at goal. This is true considering he has not taken some of his other meds regularly. It would be preferred in this case not to complicate his dosing regimen to maintain the best adherence to his meds. Will check a f/u thyroid ultrasound and Tg / TgAb. Advised to f/u in 6 weeks with thyroid US / thyroid prelabs to be completed few days prior. Gabrielle Green.  [address] [facility] Diabetes & Endocrinology

## 2017-10-18 NOTE — PATIENT INSTRUCTIONS
Start taking Tresiba at 25 units each bedtime  Continue taking 10mg of glipizide with breakfast and dinner  Start taking crestor 20 mg each evening for your cholesterol  STOP the gemfibrozil     Plan to have your thyroid tumor marker completed at the lab, order provided. Plan for a repeat thyroid ultrasound before your next visit in 6 weeks,  Continue 200 mcg of levothyroxine daily    [facility] [address] End[facility]  [address] [place] [address]    ----------------------------------------------------------------------------------------------------------------------    Below you will find a glucose log sheet which you can use to record your blood sugars. Without checking and recording what your home glucose levels are, it will be difficult to make any changes to your medication dose, even when significant changes may be needed. Please feel free to use the log below to record your home glucose levels. At the very least, I would like for you to login the entire 2-3 weeks just before your visit so we can make your visit much more productive and beneficial to you. GLUCOSE LOG SHEET:    Date Breakfast Lunch Dinner Bedtime Comments ? GLUCOSE LOG SHEET:    Date Breakfast Lunch Dinner Bedtime Comments ? GLUCOSE LOG SHEET:    Date Breakfast Lunch Dinner Bedtime Comments ?

## 2017-11-08 ENCOUNTER — HOSPITAL ENCOUNTER (OUTPATIENT)
Dept: ULTRASOUND IMAGING | Age: 57
Discharge: HOME | End: 2017-11-08
Attending: INTERNAL MEDICINE
Payer: MEDICARE

## 2017-11-08 DIAGNOSIS — C73 PAPILLARY THYROID CARCINOMA (HCC): ICD-10-CM

## 2017-11-08 PROCEDURE — 76536 US EXAM OF HEAD AND NECK: CPT

## 2017-11-09 ENCOUNTER — TELEPHONE (OUTPATIENT)
Dept: ENDOCRINOLOGY | Age: 57
End: 2017-11-09

## 2017-11-09 NOTE — TELEPHONE ENCOUNTER
I attempted to call Sara Julia Harmon and reached his voice mail. I left a message regarding the information from Dr. Sheng Brown and said to give me a call back with any questions.   Jesse Ro

## 2017-11-09 NOTE — PROGRESS NOTES
Great News, Ultrasound of thyroid looks good. No evidence of residual thyroid tissue or abnormal lymph nodes. We will continue routine monitoring of his prior thyroid cancer as we had discussed. Lane Hooker.  [address] [facility] Endocrinology  [address] [place] [address]

## 2017-11-09 NOTE — TELEPHONE ENCOUNTER
----- Message from Lucille Munguia MD sent at 11/9/2017  9:24 AM EST -----  Great News, Ultrasound of thyroid looks good. No evidence of residual thyroid tissue or abnormal lymph nodes. We will continue routine monitoring of his prior thyroid cancer as we had discussed. Gabrielle Green.  [address] [facility] Endocrinology  [address] [place] [address]

## 2017-11-22 DIAGNOSIS — E11.00 UNCONTROLLED TYPE 2 DIABETES MELLITUS WITH HYPEROSMOLARITY WITHOUT COMA, UNSPECIFIED LONG TERM INSULIN USE STATUS: ICD-10-CM

## 2017-11-22 DIAGNOSIS — E08.21 DIABETIC NEPHROPATHY ASSOCIATED WITH DIABETES MELLITUS DUE TO UNDERLYING CONDITION (HCC): ICD-10-CM

## 2017-11-22 DIAGNOSIS — I10 ESSENTIAL HYPERTENSION WITH GOAL BLOOD PRESSURE LESS THAN 130/80: ICD-10-CM

## 2017-11-24 RX ORDER — GLIPIZIDE 10 MG/1
10 TABLET ORAL
Qty: 180 TAB | Refills: 1 | Status: SHIPPED | OUTPATIENT
Start: 2017-11-24 | End: 2018-11-18 | Stop reason: SDUPTHER

## 2017-11-24 RX ORDER — LISINOPRIL 20 MG/1
20 TABLET ORAL DAILY
Qty: 90 TAB | Refills: 1 | Status: SHIPPED | OUTPATIENT
Start: 2017-11-24 | End: 2018-01-30 | Stop reason: DRUGHIGH

## 2018-01-05 RX ORDER — LEVOTHYROXINE SODIUM 200 UG/1
200 TABLET ORAL
Qty: 90 TAB | Refills: 3 | Status: SHIPPED | OUTPATIENT
Start: 2018-01-05 | End: 2018-10-17 | Stop reason: SDUPTHER

## 2018-01-15 ENCOUNTER — TELEPHONE (OUTPATIENT)
Dept: ENDOCRINOLOGY | Age: 58
End: 2018-01-15

## 2018-01-15 RX ORDER — SIMVASTATIN 20 MG/1
20 TABLET, FILM COATED ORAL
Qty: 90 TAB | Refills: 3 | Status: SHIPPED | OUTPATIENT
Start: 2018-01-15 | End: 2020-07-28 | Stop reason: SDUPTHER

## 2018-01-15 NOTE — TELEPHONE ENCOUNTER
Received letter from patients pharmacy noting that he will no longer have coverage for crestor for his cholesterol. Previously he noted that the lipitor had given him dizziness. We can either retrial the lipitor or trial him on zocor (simvastatin) instead. I am not sure if he has tried the simvastatin previously so if he has not, then we could try that,     Thanks,     Apryl Judge.  39 [facility]

## 2018-01-15 NOTE — TELEPHONE ENCOUNTER
I called  Natasha Mansoor and relayed the message from Dr. Benavidez Monday. He seemed to understand the information and will do as instructed.   Timbo Hopper

## 2018-01-15 NOTE — TELEPHONE ENCOUNTER
Ordered, 20mg simvastatin to take each evening. Take after completing his crestor. Do not take together with crestor.

## 2018-01-28 ENCOUNTER — APPOINTMENT (OUTPATIENT)
Dept: GENERAL RADIOLOGY | Age: 58
End: 2018-01-28
Attending: EMERGENCY MEDICINE
Payer: MEDICARE

## 2018-01-28 ENCOUNTER — HOSPITAL ENCOUNTER (EMERGENCY)
Age: 58
Discharge: HOME | End: 2018-01-28
Attending: EMERGENCY MEDICINE
Payer: MEDICARE

## 2018-01-28 VITALS
TEMPERATURE: 98.4 F | OXYGEN SATURATION: 94 % | DIASTOLIC BLOOD PRESSURE: 74 MMHG | BODY MASS INDEX: 37.36 KG/M2 | HEART RATE: 106 BPM | RESPIRATION RATE: 27 BRPM | SYSTOLIC BLOOD PRESSURE: 140 MMHG | WEIGHT: 252.21 LBS | HEIGHT: 69 IN

## 2018-01-28 DIAGNOSIS — R07.89 ATYPICAL CHEST PAIN: ICD-10-CM

## 2018-01-28 DIAGNOSIS — R11.2 NAUSEA AND VOMITING, INTRACTABILITY OF VOMITING NOT SPECIFIED, UNSPECIFIED VOMITING TYPE: Primary | ICD-10-CM

## 2018-01-28 LAB
ALBUMIN SERPL-MCNC: 2.7 G/DL (ref 3.5–5)
ALBUMIN/GLOB SERPL: 0.7 {RATIO} (ref 1.1–2.2)
ALP SERPL-CCNC: 87 U/L (ref 45–117)
ALT SERPL-CCNC: 29 U/L (ref 12–78)
ANION GAP SERPL CALC-SCNC: 10 MMOL/L (ref 5–15)
AST SERPL-CCNC: 13 U/L (ref 15–37)
ATRIAL RATE: 81 BPM
BASOPHILS # BLD: 0 K/UL (ref 0–0.1)
BASOPHILS NFR BLD: 0 % (ref 0–1)
BILIRUB SERPL-MCNC: 0.3 MG/DL (ref 0.2–1)
BUN SERPL-MCNC: 50 MG/DL (ref 6–20)
BUN/CREAT SERPL: 23 (ref 12–20)
CALCIUM SERPL-MCNC: 7.8 MG/DL (ref 8.5–10.1)
CALCULATED P AXIS, ECG09: 64 DEGREES
CALCULATED R AXIS, ECG10: -40 DEGREES
CALCULATED T AXIS, ECG11: 45 DEGREES
CHLORIDE SERPL-SCNC: 99 MMOL/L (ref 97–108)
CO2 SERPL-SCNC: 25 MMOL/L (ref 21–32)
CREAT SERPL-MCNC: 2.17 MG/DL (ref 0.7–1.3)
DIAGNOSIS, 93000: NORMAL
DIFFERENTIAL METHOD BLD: ABNORMAL
EOSINOPHIL # BLD: 0 K/UL (ref 0–0.4)
EOSINOPHIL NFR BLD: 0 % (ref 0–7)
ERYTHROCYTE [DISTWIDTH] IN BLOOD BY AUTOMATED COUNT: 12.8 % (ref 11.5–14.5)
GLOBULIN SER CALC-MCNC: 4 G/DL (ref 2–4)
GLUCOSE SERPL-MCNC: 357 MG/DL (ref 65–100)
HCT VFR BLD AUTO: 39.3 % (ref 36.6–50.3)
HGB BLD-MCNC: 13.5 G/DL (ref 12.1–17)
IMM GRANULOCYTES # BLD: 0.1 K/UL (ref 0–0.04)
IMM GRANULOCYTES NFR BLD AUTO: 1 % (ref 0–0.5)
LYMPHOCYTES # BLD: 0.3 K/UL (ref 0.8–3.5)
LYMPHOCYTES NFR BLD: 2 % (ref 12–49)
MCH RBC QN AUTO: 29.8 PG (ref 26–34)
MCHC RBC AUTO-ENTMCNC: 34.4 G/DL (ref 30–36.5)
MCV RBC AUTO: 86.8 FL (ref 80–99)
MONOCYTES # BLD: 1.3 K/UL (ref 0–1)
MONOCYTES NFR BLD: 9 % (ref 5–13)
NEUTS SEG # BLD: 12.9 K/UL (ref 1.8–8)
NEUTS SEG NFR BLD: 88 % (ref 32–75)
NRBC # BLD: 0 K/UL (ref 0–0.01)
NRBC BLD-RTO: 0 PER 100 WBC
P-R INTERVAL, ECG05: 138 MS
PLATELET # BLD AUTO: 289 K/UL (ref 150–400)
PMV BLD AUTO: 10.3 FL (ref 8.9–12.9)
POTASSIUM SERPL-SCNC: 4.5 MMOL/L (ref 3.5–5.1)
PROT SERPL-MCNC: 6.7 G/DL (ref 6.4–8.2)
Q-T INTERVAL, ECG07: 362 MS
QRS DURATION, ECG06: 90 MS
QTC CALCULATION (BEZET), ECG08: 420 MS
RBC # BLD AUTO: 4.53 M/UL (ref 4.1–5.7)
RBC MORPH BLD: ABNORMAL
SODIUM SERPL-SCNC: 134 MMOL/L (ref 136–145)
TROPONIN I SERPL-MCNC: <0.04 NG/ML
TROPONIN I SERPL-MCNC: <0.04 NG/ML
VENTRICULAR RATE, ECG03: 81 BPM
WBC # BLD AUTO: 14.6 K/UL (ref 4.1–11.1)

## 2018-01-28 PROCEDURE — 85025 COMPLETE CBC W/AUTO DIFF WBC: CPT | Performed by: EMERGENCY MEDICINE

## 2018-01-28 PROCEDURE — 74011250636 HC RX REV CODE- 250/636

## 2018-01-28 PROCEDURE — 96374 THER/PROPH/DIAG INJ IV PUSH: CPT

## 2018-01-28 PROCEDURE — 94640 AIRWAY INHALATION TREATMENT: CPT

## 2018-01-28 PROCEDURE — 80053 COMPREHEN METABOLIC PANEL: CPT | Performed by: EMERGENCY MEDICINE

## 2018-01-28 PROCEDURE — 84484 ASSAY OF TROPONIN QUANT: CPT | Performed by: EMERGENCY MEDICINE

## 2018-01-28 PROCEDURE — 71045 X-RAY EXAM CHEST 1 VIEW: CPT

## 2018-01-28 PROCEDURE — 99285 EMERGENCY DEPT VISIT HI MDM: CPT

## 2018-01-28 PROCEDURE — 74011250637 HC RX REV CODE- 250/637: Performed by: EMERGENCY MEDICINE

## 2018-01-28 PROCEDURE — 74011250636 HC RX REV CODE- 250/636: Performed by: EMERGENCY MEDICINE

## 2018-01-28 PROCEDURE — 77030029684 HC NEB SM VOL KT MONA -A

## 2018-01-28 PROCEDURE — 36415 COLL VENOUS BLD VENIPUNCTURE: CPT | Performed by: EMERGENCY MEDICINE

## 2018-01-28 PROCEDURE — 74011000250 HC RX REV CODE- 250: Performed by: EMERGENCY MEDICINE

## 2018-01-28 PROCEDURE — 93005 ELECTROCARDIOGRAM TRACING: CPT

## 2018-01-28 PROCEDURE — 96361 HYDRATE IV INFUSION ADD-ON: CPT

## 2018-01-28 PROCEDURE — 96375 TX/PRO/DX INJ NEW DRUG ADDON: CPT

## 2018-01-28 RX ORDER — METOCLOPRAMIDE HYDROCHLORIDE 5 MG/ML
10 INJECTION INTRAMUSCULAR; INTRAVENOUS
Status: COMPLETED | OUTPATIENT
Start: 2018-01-28 | End: 2018-01-28

## 2018-01-28 RX ORDER — ASPIRIN 325 MG
325 TABLET ORAL ONCE
Status: DISCONTINUED | OUTPATIENT
Start: 2018-01-28 | End: 2018-01-28

## 2018-01-28 RX ORDER — PANTOPRAZOLE SODIUM 40 MG/1
40 TABLET, DELAYED RELEASE ORAL DAILY
Qty: 30 TAB | Refills: 0 | Status: SHIPPED | OUTPATIENT
Start: 2018-01-28 | End: 2018-01-30 | Stop reason: ALTCHOICE

## 2018-01-28 RX ORDER — MORPHINE SULFATE 4 MG/ML
INJECTION, SOLUTION INTRAMUSCULAR; INTRAVENOUS
Status: COMPLETED
Start: 2018-01-28 | End: 2018-01-28

## 2018-01-28 RX ORDER — IPRATROPIUM BROMIDE AND ALBUTEROL SULFATE 2.5; .5 MG/3ML; MG/3ML
3 SOLUTION RESPIRATORY (INHALATION) ONCE
Status: COMPLETED | OUTPATIENT
Start: 2018-01-28 | End: 2018-01-28

## 2018-01-28 RX ORDER — MORPHINE SULFATE 4 MG/ML
4 INJECTION, SOLUTION INTRAMUSCULAR; INTRAVENOUS ONCE
Status: COMPLETED | OUTPATIENT
Start: 2018-01-28 | End: 2018-01-28

## 2018-01-28 RX ORDER — FAMOTIDINE 10 MG/ML
20 INJECTION INTRAVENOUS
Status: COMPLETED | OUTPATIENT
Start: 2018-01-28 | End: 2018-01-28

## 2018-01-28 RX ORDER — ONDANSETRON 2 MG/ML
8 INJECTION INTRAMUSCULAR; INTRAVENOUS
Status: COMPLETED | OUTPATIENT
Start: 2018-01-28 | End: 2018-01-28

## 2018-01-28 RX ORDER — DIPHENHYDRAMINE HYDROCHLORIDE 50 MG/ML
25 INJECTION, SOLUTION INTRAMUSCULAR; INTRAVENOUS
Status: COMPLETED | OUTPATIENT
Start: 2018-01-28 | End: 2018-01-28

## 2018-01-28 RX ORDER — NITROGLYCERIN 0.4 MG/1
0.4 TABLET SUBLINGUAL
Status: COMPLETED | OUTPATIENT
Start: 2018-01-28 | End: 2018-01-28

## 2018-01-28 RX ADMIN — FAMOTIDINE 20 MG: 10 INJECTION, SOLUTION INTRAVENOUS at 06:05

## 2018-01-28 RX ADMIN — ONDANSETRON HYDROCHLORIDE 8 MG: 2 INJECTION, SOLUTION INTRAMUSCULAR; INTRAVENOUS at 06:03

## 2018-01-28 RX ADMIN — NITROGLYCERIN 0.4 MG: 0.4 TABLET SUBLINGUAL at 06:06

## 2018-01-28 RX ADMIN — DIPHENHYDRAMINE HYDROCHLORIDE 25 MG: 50 INJECTION, SOLUTION INTRAMUSCULAR; INTRAVENOUS at 06:43

## 2018-01-28 RX ADMIN — IPRATROPIUM BROMIDE AND ALBUTEROL SULFATE 3 ML: .5; 3 SOLUTION RESPIRATORY (INHALATION) at 08:20

## 2018-01-28 RX ADMIN — MORPHINE SULFATE 4 MG: 4 INJECTION, SOLUTION INTRAMUSCULAR; INTRAVENOUS at 06:07

## 2018-01-28 RX ADMIN — IPRATROPIUM BROMIDE AND ALBUTEROL SULFATE 3 ML: .5; 3 SOLUTION RESPIRATORY (INHALATION) at 06:52

## 2018-01-28 RX ADMIN — SODIUM CHLORIDE 1000 ML: 900 INJECTION, SOLUTION INTRAVENOUS at 06:43

## 2018-01-28 RX ADMIN — METOCLOPRAMIDE 10 MG: 5 INJECTION, SOLUTION INTRAMUSCULAR; INTRAVENOUS at 06:43

## 2018-01-28 NOTE — ED NOTES
Splinting cough deep breathing with pt before the nebulizer and after the nebulizer  Pt is not making the exhaling high pitch whistle sound the neb mask off

## 2018-01-28 NOTE — ED NOTES
Received pt very fatigue  Pt flushed  Pt has the iv infusing  Pt cough is coarse and gutteral tight  Pt has received the benadryl and is tired now  Finished the neb with him I held it for him because he is weak  Voided clear yellow urine in urinal  Cool cloth to head and neck and face  Placed on oxygen 1lpm nc due to sats 84-91%  Bedside report with Flex MATA

## 2018-01-28 NOTE — ED NOTES
Patient identified and read over and explained discharge instructions with time for questions by attending MD/PA. Patient has verbalized understanding of discharge instructions.  wc to discharge

## 2018-01-28 NOTE — ED PROVIDER NOTES
EMERGENCY DEPARTMENT HISTORY AND PHYSICAL EXAM      Date: 1/28/2018  Patient Name: Kemal Beltre    History of Presenting Illness     Chief Complaint   Patient presents with    Chest Pain     Woke up at 0430 with this chest pain described as burning sensation    Nausea       History Provided By: Patient    HPI: Kemal Beltre, 56 y.o. male with PMHx significant for CKD, DM, presents ambulatory to the ED with c/o acute onset non-radiating substernal CP that he describes as burning that woke him up from his sleep ~1 hour ago. He reports experiencing constant nausea since then. Additionally, he notes mild productive cough with yellow sputum. Pt states he was evaluated by an outside hospital 6 days ago for bronchitis and was treated with Albuterol, Z-nasrin, and Prednisone. He reports he is currently only prescribed 20mg Lisinopril. Pt denies any recent vomiting, SOB, leg swelling, or abdominal pain. PCP: Kaleigh Aleman MD    There are no other complaints, changes, or physical findings at this time. Current Outpatient Prescriptions   Medication Sig Dispense Refill    pantoprazole (PROTONIX) 40 mg tablet Take 1 Tab by mouth daily for 20 days. 30 Tab 0    simvastatin (ZOCOR) 20 mg tablet Take 1 Tab by mouth nightly. 90 Tab 3    levothyroxine (SYNTHROID) 200 mcg tablet Take 1 Tab by mouth Daily (before breakfast). 90 Tab 3    lisinopril (PRINIVIL, ZESTRIL) 20 mg tablet Take 1 Tab by mouth daily. Indications: Diabetic Nephropathy, hypertension 90 Tab 1    glipiZIDE (GLUCOTROL) 10 mg tablet Take 1 Tab by mouth Before breakfast and dinner. 180 Tab 1    glucose blood VI test strips (PHARMACIST CHOICE) strip Use preferred strips: Check glucose 3-4 times daily. DX E11.22 300 Strip 3    Lancets misc Use preferred brand;  Check glucose 3-4 times daily, Diagnosis E11.22 2 Package 3    Blood-Glucose Meter monitoring kit 1 preferred brand glucometer for checking home glucose, E11.22 1 Kit 0    insulin degludec (TRESIBA FLEXTOUCH U-200) 200 unit/mL (3 mL) inpn 25 Units by SubCUTAneous route nightly. 6 Pen 3    cholecalciferol, VITAMIN D3, (VITAMIN D3) 5,000 unit tab tablet Take 1 Tab by mouth daily. 90 Tab 1       Past History     Past Medical History:  Past Medical History:   Diagnosis Date    Adverse effect of anesthesia     \" STOP BREATHING 1 TIME C ANESTH\"    Calculus of kidney     Cancer ([facility] [address] 2004    thyroid cancer    Chronic kidney disease     Chronic pain     BACK SHOULDER AND ARM    Depression     Diabetes ([address]     Hypercholesterolemia     Hypertension     Nausea & vomiting     Other ill-defined conditions(799.89)     cholesterol, thyroid    Sleep apnea     doesn't wear cpap    Thyroid cancer ([address]     TIA (transient ischemic attack) 2011       Past Surgical History:  Past Surgical History:   Procedure Laterality Date    CARDIAC SURG PROCEDURE UNLIST      HX HEENT      THROAT SURGERY X 4    HX ORTHOPAEDIC      back     HX ORTHOPAEDIC      ARM AND SHOULDER    HX OTHER SURGICAL      thyroid, lymphnode    HX RETINAL DETACHMENT REPAIR      left eye    VASCULAR SURGERY PROCEDURE UNLIST      cardiac cath NEG. Family History:  Family History   Problem Relation Age of Onset    Diabetes Mother     Elevated Lipids Mother    Mohit Pollen Bladder Disease Mother     Headache Mother    [facility] Migraines Mother     Heart Disease Mother     Hypertension Mother     Stroke Mother     Other Mother      ANEURSYM BRAIN    Bleeding Prob Father     Cancer Father      LEUKEMIA    Diabetes Father     Elevated Lipids Father     Mental Retardation Sister     Psychiatric Disorder Sister     Cancer Brother      LUNGS       Social History:  Social History   Substance Use Topics    Smoking status: Former Smoker     Quit date: 8/22/1994    Smokeless tobacco: Never Used    Alcohol use No       Allergies:   Allergies   Allergen Reactions    Anesthetics - Amide Type Shortness of Breath    Flexeril [Cyclobenzaprine] Hives    Tramadol Hives         Review of Systems   Review of Systems   Constitutional: Negative for activity change, appetite change, chills, fever and unexpected weight change. HENT: Negative for congestion. Eyes: Negative for pain and visual disturbance. Respiratory: Positive for cough. Negative for shortness of breath. Cardiovascular: Positive for chest pain. Gastrointestinal: Positive for nausea. Negative for abdominal pain, diarrhea and vomiting. Genitourinary: Negative for dysuria. Musculoskeletal: Negative for back pain. Skin: Negative for rash. Neurological: Negative for headaches. Physical Exam   Physical Exam   Constitutional: He is oriented to person, place, and time. He appears well-developed and well-nourished. Appears anxious   HENT:   Head: Normocephalic and atraumatic. Mouth/Throat: Oropharynx is clear and moist.   Eyes: Conjunctivae and EOM are normal. Pupils are equal, round, and reactive to light. Right eye exhibits no discharge. Left eye exhibits no discharge. Neck: Normal range of motion. Neck supple. Cardiovascular: Normal rate, regular rhythm and normal heart sounds. No murmur heard. Pulmonary/Chest: Effort normal and breath sounds normal. No respiratory distress. He has no wheezes. He has no rales. Bronchospasm present   Abdominal: Soft. Bowel sounds are normal. He exhibits no distension and no mass. There is no tenderness. There is no rebound and no guarding. Musculoskeletal: Normal range of motion. He exhibits no edema. Neurological: He is alert and oriented to person, place, and time. No cranial nerve deficit. He exhibits normal muscle tone. Skin: Skin is warm and dry. No rash noted. He is not diaphoretic. Nursing note and vitals reviewed.         Diagnostic Study Results     Labs -     Recent Results (from the past 12 hour(s))   EKG, 12 LEAD, INITIAL    Collection Time: 01/28/18  5:31 AM   Result Value Ref Range Ventricular Rate 81 BPM    Atrial Rate 81 BPM    P-R Interval 138 ms    QRS Duration 90 ms    Q-T Interval 362 ms    QTC Calculation (Bezet) 420 ms    Calculated P Axis 64 degrees    Calculated R Axis -40 degrees    Calculated T Axis 45 degrees    Diagnosis       Normal sinus rhythm  Possible Left atrial enlargement  Left axis deviation  Pulmonary disease pattern  When compared with ECG of 14-MAY-2017 12:40,  No significant change was found     CBC WITH AUTOMATED DIFF    Collection Time: 01/28/18  5:48 AM   Result Value Ref Range    WBC 14.6 (H) 4.1 - 11.1 K/uL    RBC 4.53 4.10 - 5.70 M/uL    HGB 13.5 12.1 - 17.0 g/dL    HCT 39.3 36.6 - 50.3 %    MCV 86.8 80.0 - 99.0 FL    MCH 29.8 26.0 - 34.0 PG    MCHC 34.4 30.0 - 36.5 g/dL    RDW 12.8 11.5 - 14.5 %    PLATELET 736 252 - 457 K/uL    MPV 10.3 8.9 - 12.9 FL    NRBC 0.0 0  WBC    ABSOLUTE NRBC 0.00 0.00 - 0.01 K/uL    NEUTROPHILS 88 (H) 32 - 75 %    LYMPHOCYTES 2 (L) 12 - 49 %    MONOCYTES 9 5 - 13 %    EOSINOPHILS 0 0 - 7 %    BASOPHILS 0 0 - 1 %    IMMATURE GRANULOCYTES 1 (H) 0.0 - 0.5 %    ABS. NEUTROPHILS 12.9 (H) 1.8 - 8.0 K/UL    ABS. LYMPHOCYTES 0.3 (L) 0.8 - 3.5 K/UL    ABS. MONOCYTES 1.3 (H) 0.0 - 1.0 K/UL    ABS. EOSINOPHILS 0.0 0.0 - 0.4 K/UL    ABS. BASOPHILS 0.0 0.0 - 0.1 K/UL    ABS. IMM.  GRANS. 0.1 (H) 0.00 - 0.04 K/UL    DF SMEAR SCANNED      RBC COMMENTS NORMOCYTIC, NORMOCHROMIC     METABOLIC PANEL, COMPREHENSIVE    Collection Time: 01/28/18  5:48 AM   Result Value Ref Range    Sodium 134 (L) 136 - 145 mmol/L    Potassium 4.5 3.5 - 5.1 mmol/L    Chloride 99 97 - 108 mmol/L    CO2 25 21 - 32 mmol/L    Anion gap 10 5 - 15 mmol/L    Glucose 357 (H) 65 - 100 mg/dL    BUN 50 (H) 6 - 20 MG/DL    Creatinine 2.17 (H) 0.70 - 1.30 MG/DL    BUN/Creatinine ratio 23 (H) 12 - 20      GFR est AA 38 (L) >60 ml/min/1.73m2    GFR est non-AA 32 (L) >60 ml/min/1.73m2    Calcium 7.8 (L) 8.5 - 10.1 MG/DL    Bilirubin, total 0.3 0.2 - 1.0 MG/DL    ALT (SGPT) 29 12 - 78 U/L    AST (SGOT) 13 (L) 15 - 37 U/L    Alk. phosphatase 87 45 - 117 U/L    Protein, total 6.7 6.4 - 8.2 g/dL    Albumin 2.7 (L) 3.5 - 5.0 g/dL    Globulin 4.0 2.0 - 4.0 g/dL    A-G Ratio 0.7 (L) 1.1 - 2.2     TROPONIN I    Collection Time: 01/28/18  5:48 AM   Result Value Ref Range    Troponin-I, Qt. <0.04 <0.05 ng/mL   TROPONIN I    Collection Time: 01/28/18  8:23 AM   Result Value Ref Range    Troponin-I, Qt. <0.04 <0.05 ng/mL       Radiologic Studies -   XR CHEST PORT   Final Result        CT Results  (Last 48 hours)    None        CXR Results  (Last 48 hours)               01/28/18 0553  XR CHEST PORT Final result    Impression:  IMPRESSION:   1. No radiographic evidence of acute cardiopulmonary disease. Narrative:  INDICATION: . Chest Pain   COMPARISON: Previous chest xray, 5/14/2017. LIMITATIONS: Portable technique. Calli Barboza FINDINGS: Single frontal view of the chest.    .   Lines/tubes/surgical: Cardiac monitor leads overly the patient. Heart/mediastinum: Unremarkable. Lungs/pleura:  No focal consolidation or mass. No visualized pleural effusion or   pneumothorax. Additional Comments: Surgical clips projecting of the left side of the neck. .               Medical Decision Making   I am the first provider for this patient. I reviewed the vital signs, available nursing notes, past medical history, past surgical history, family history and social history. Vital Signs-Reviewed the patient's vital signs.   Patient Vitals for the past 12 hrs:   Temp Pulse Resp BP SpO2   01/28/18 0826 - 92 21 153/79 96 %   01/28/18 0747 - 100 24 - 93 %   01/28/18 0746 - 96 22 - 95 %   01/28/18 0716 98.4 °F (36.9 °C) 96 21 119/87 (!) 88 %   01/28/18 0645 - 87 14 (!) 184/97 95 %   01/28/18 0600 - 78 17 (!) 215/108 99 %   01/28/18 0534 98.6 °F (37 °C) 81 14 (!) 211/114 100 %       Pulse Oximetry Analysis - 100% on RA    Cardiac Monitor:   Rate: 81 bpm  Rhythm: Normal Sinus Rhythm      EKG interpretation: (Preliminary) 05:31  Rhythm: normal sinus rhythm; and regular . Rate (approx.): 81; Axis: left axis deviation; WA interval: normal; QRS interval: normal ; ST/T wave: normal.  Written by BASHIR Bee, as dictated by Sandy Montoya MD.    Records Reviewed: Nursing Notes, Old Medical Records, Previous Radiology Studies and Previous Laboratory Studies    Provider Notes (Medical Decision Making):   Recent treatment for bronchitis with Prednisone now presenting with dyspepsia type symptoms of nausea and CP. EKG without ischemic appearance. Given symptoms are possibly due to Prednisone course, will hold on ASA and will treat with pain control medication including NTG and attempt to reduce BP. ED Course:   Initial assessment performed. The patients presenting problems have been discussed, and they are in agreement with the care plan formulated and outlined with them. I have encouraged them to ask questions as they arise throughout their visit.    6:26 AM  Re-evaluated pt. He states his CP has resolved, but is still nauseas. BP now 185/93.     6:55 AM  Per RN, pt had an episode of vomiting that was brown and pink tinged. SIGN OUT:  7:47 AM  Patient's presentation, labs/imaging and plan of care was reviewed with Prudencio Holland M.D as part of sign out. They will wait for labs and dispo as part of the plan discussed with the patient. Prudencio Holland M.D's assistance in completion of this plan is greatly appreciated but it should be noted that I will be the provider of record for this patient. Sandy Montoya MD    8:30 AM  Pt's oxygen sats dipped to 89-90% when asleep. Pt is obese and has a thick neck. Not currently on CPAP. Pt reports undergoing a sleep study many years ago. When pt awakens and sits up, sats are improved. Advised him to f/u about getting CPAP and re-evaluation for bronchitis ASAP. White count likely related to being on high dose of steroids and/or vomiting. CXR negative for pneumonia. Pt is not septic at this time. Written by Jessica Cline ED Scribe, as dictated by Joshua Verma MD    9:05 AM  Pt's 2nd troponin is negative. Written by Jessica Cline ED Scribe, as dictated by Joshua Verma MD    Disposition:  Discharge Note:  9:16 AM  The patient has been re-evaluated and is ready for discharge. Reviewed available results with patient. Counseled patient/parent/guardian on diagnosis and care plan. Patient has expressed understanding, and all questions have been answered. Patient agrees with plan and agrees to follow up as recommended, or return to the ED if their symptoms worsen. Discharge instructions have been provided and explained to the patient, along with reasons to return to the ED. PLAN:  1. Current Discharge Medication List      START taking these medications    Details   pantoprazole (PROTONIX) 40 mg tablet Take 1 Tab by mouth daily for 20 days. Qty: 30 Tab, Refills: 0           2. Follow-up Information     Follow up With Details Comments Contact Info    [facility] EMERGENCY DEPT  If symptoms worsen [address] Art Levy MD Call in 1 day For a recheck appointment [address]  [phone]          Return to ED if worse     Diagnosis     Clinical Impression:   1. Nausea and vomiting, intractability of vomiting not specified, unspecified vomiting type    2. Atypical chest pain        Attestations: This note is prepared by Wendy Lopez, acting as Scribe for Alexey Wetzel MD.    The scribe's documentation has been prepared under my direction and personally reviewed by me in its entirety. I confirm that the note above accurately reflects all work, treatment, procedures, and medical decision making performed by me.   Alexey Wetzel MD

## 2018-01-28 NOTE — ED NOTES
Bedside shift change report given to [address] (oncoming nurse) by Eldon Saint RN (offgoing nurse). Report included the following information SBAR, Kardex, ED Summary, [facility] and Recent Results.

## 2018-01-28 NOTE — DISCHARGE INSTRUCTIONS
Please stop taking your prednisone      Indigestion (Dyspepsia or Heartburn): Care Instructions  Your Care Instructions  Sometimes it can be hard to pinpoint the cause of indigestion. (It is also called dyspepsia or heartburn.) Most cases of an upset stomach with bloating, burning, burping, and nausea are minor and go away within several hours. Home treatment and over-the-counter medicine often are able to control symptoms. But if you take medicine to relieve your indigestion without making diet and lifestyle changes, your symptoms are likely to return again and again. If you get indigestion often, it may be a sign of a more serious medical problem. Be sure to follow up with your doctor, who may want to do tests to be sure of the cause of your indigestion. Follow-up care is a key part of your treatment and safety. Be sure to make and go to all appointments, and call your doctor if you are having problems. It's also a good idea to know your test results and keep a list of the medicines you take. How can you care for yourself at home? · Your doctor may recommend over-the-counter medicine. For mild or occasional indigestion, antacids such as Gaviscon, Mylanta, Maalox, or Tums, may help. Be safe with medicines. Be careful when you take over-the-counter antacid medicines. Many of these medicines have aspirin in them. Read the label to make sure that you are not taking more than the recommended dose. Too much aspirin can be harmful. · Your doctor also may recommend over-the-counter acid reducers, such as Pepcid AC, Tagamet HB, Zantac 75, or Prilosec. Read and follow all instructions on the label. If you use these medicines often, talk with your doctor. · Change your eating habits. ¨ It's best to eat several small meals instead of two or three large meals. ¨ After you eat, wait 2 to 3 hours before you lie down. ¨ Chocolate, mint, and alcohol can make GERD worse.   ¨ Spicy foods, foods that have a lot of acid (like tomatoes and oranges), and coffee can make GERD symptoms worse in some people. If your symptoms are worse after you eat a certain food, you may want to stop eating that food to see if your symptoms get better. · Do not smoke or chew tobacco. Smoking can make GERD worse. If you need help quitting, talk to your doctor about stop-smoking programs and medicines. These can increase your chances of quitting for good. · If you have GERD symptoms at night, raise the head of your bed 6 to 8 inches. You can do this by putting the frame on blocks or placing a foam wedge under the head of your mattress. (Adding extra pillows does not work.)  · Do not wear tight clothing around your middle. · Lose weight if you need to. Losing just 5 to 10 pounds can help. · Do not take anti-inflammatory medicines, such as aspirin, ibuprofen (Advil, Motrin), or naproxen (Aleve). These can irritate the stomach. If you need a pain medicine, try acetaminophen (Tylenol), which does not cause stomach upset. When should you call for help? Call your doctor now or seek immediate medical care if:  ? · You have new or worse belly pain. ? · You are vomiting. ? Watch closely for changes in your health, and be sure to contact your doctor if:  ? · You have new or worse symptoms of indigestion. ? · You have trouble or pain swallowing. ? · You are losing weight. ? · You do not get better as expected. Where can you learn more? Go to http://mayo-kelly.info/. Enter F941 in the search box to learn more about \"Indigestion (Dyspepsia or Heartburn): Care Instructions. \"  Current as of: May 12, 2017  Content Version: 11.4  © 7069-9471 [employer]. Care instructions adapted under license by [employer] (which disclaims liability or warranty for this information).  If you have questions about a medical condition or this instruction, always ask your healthcare professional. [employer]  any warranty or liability for your use of this information.

## 2018-01-28 NOTE — ED TRIAGE NOTES
Patient states that he woke up about an hour ago with chest pain. Patient reports nausea at the time of waking up as well. Patient denies eating or drinking anything recently. Patient reports being sick with a cough and had a fever earlier this week of 101. Patient states he was seen at another hospital and was put on a Z-nasrin, steroid, and albuterol for his cough and cold on Tuesday. Patient denies other symptoms. Patient updated on plan of care and call bell within reach.

## 2018-01-30 ENCOUNTER — OFFICE VISIT (OUTPATIENT)
Dept: FAMILY MEDICINE CLINIC | Age: 58
End: 2018-01-30

## 2018-01-30 VITALS
WEIGHT: 247 LBS | HEART RATE: 76 BPM | BODY MASS INDEX: 36.58 KG/M2 | HEIGHT: 69 IN | DIASTOLIC BLOOD PRESSURE: 96 MMHG | OXYGEN SATURATION: 96 % | RESPIRATION RATE: 20 BRPM | TEMPERATURE: 96.8 F | SYSTOLIC BLOOD PRESSURE: 165 MMHG

## 2018-01-30 DIAGNOSIS — E11.21 TYPE 2 DIABETES MELLITUS WITH NEPHROPATHY (HCC): ICD-10-CM

## 2018-01-30 DIAGNOSIS — E03.9 ACQUIRED HYPOTHYROIDISM: ICD-10-CM

## 2018-01-30 DIAGNOSIS — I10 ESSENTIAL HYPERTENSION WITH GOAL BLOOD PRESSURE LESS THAN 130/80: ICD-10-CM

## 2018-01-30 DIAGNOSIS — E55.9 HYPOVITAMINOSIS D: ICD-10-CM

## 2018-01-30 DIAGNOSIS — E78.2 MIXED HYPERCHOLESTEROLEMIA AND HYPERTRIGLYCERIDEMIA: ICD-10-CM

## 2018-01-30 DIAGNOSIS — J20.9 ACUTE BRONCHITIS DUE TO INFECTION: ICD-10-CM

## 2018-01-30 LAB
QUICKVUE INFLUENZA TEST: NEGATIVE
VALID INTERNAL CONTROL?: YES

## 2018-01-30 RX ORDER — AMOXICILLIN AND CLAVULANATE POTASSIUM 500; 125 MG/1; MG/1
1 TABLET, FILM COATED ORAL EVERY 12 HOURS
Qty: 20 TAB | Refills: 0 | Status: SHIPPED | OUTPATIENT
Start: 2018-01-30 | End: 2018-02-09

## 2018-01-30 RX ORDER — LISINOPRIL 20 MG/1
40 TABLET ORAL DAILY
Qty: 60 TAB | Refills: 5 | Status: SHIPPED | OUTPATIENT
Start: 2018-01-30 | End: 2018-03-21 | Stop reason: SDUPTHER

## 2018-01-30 RX ORDER — ALBUTEROL SULFATE 90 UG/1
AEROSOL, METERED RESPIRATORY (INHALATION)
Refills: 0 | COMMUNITY
Start: 2018-01-24 | End: 2020-07-28 | Stop reason: SDUPTHER

## 2018-01-30 RX ORDER — PROMETHAZINE HYDROCHLORIDE AND DEXTROMETHORPHAN HYDROBROMIDE 6.25; 15 MG/5ML; MG/5ML
5 SYRUP ORAL
Qty: 118 ML | Refills: 0 | Status: SHIPPED | OUTPATIENT
Start: 2018-01-30 | End: 2018-02-06

## 2018-01-30 NOTE — PROGRESS NOTES
Chief Complaint   Patient presents with    Cold Symptoms     HPI:  Maverick Choi is a 56 y.o.  male with hypertension, diabetes type 2, hypercholesterolemia, severe hypothyroidism presents for long overdue follow up. Cardiovascular Review:  Patient has hypertension and hyperlipidemia. Current medications are Zocor and lisinopril. Diet and Lifestyle: generally does not follow low fat/cholesterol, low sodium diet. Does not exercises   Home BP Monitoring: is not measured at home. Patient is not taking medications as instructed, no chest pain on exertion, no dyspnea on exertion, no swelling of ankles. Blood pressure is elevated in clinic today    Diabetes Mellitus Review:  Patient has diabetes mellitus. Last A1C=10%4 months ago. Patient follows specialist.  Diabetic ROS - medication compliance: compliant some of the time, diabetic diet compliance: compliant some of the time, home glucose monitoring: not performed. Known diabetic complications: diabetic retinopathy  Cardiovascular risk factors: family history, dyslipidemia, diabetes mellitus, obesity, hypertension  Current diabetic medications include oral agents/insulin   Eye exam current (within one year): yes  Weight trend: stable  Prior visit with dietician: yes  Current diet: \"healthy\" diet  in general  Current exercise: walking  Current monitoring regimen: home blood tests - some of the time     Hypothyroidism:   last TSH was within normal range, current treatment is levothyroxine 200 mg    Acute bronchitis:  Patient complains of congestion and cough described as productive of yellow sputum for 2 weeks. He denies a history of fevers, shortness of breath and wheezing. Patient does smoke cigarettes.      Review of Systems  As per hpi    Past Medical History:   Diagnosis Date    Adverse effect of anesthesia     \" STOP BREATHING 1 TIME C ANESTH\"    Calculus of kidney     Cancer ([facility] [address] 2004    thyroid cancer    Chronic kidney disease     Chronic pain     BACK SHOULDER AND ARM    Depression     Diabetes ([address]     Hypercholesterolemia     Hypertension     Nausea & vomiting     Other ill-defined conditions(799.89)     cholesterol, thyroid    Sleep apnea     doesn't wear cpap    Thyroid cancer ([facility] [address]     TIA (transient ischemic attack) 2011     Past Surgical History:   Procedure Laterality Date    CARDIAC SURG PROCEDURE UNLIST      HX HEENT      THROAT SURGERY X 4    HX ORTHOPAEDIC      back     HX ORTHOPAEDIC      ARM AND SHOULDER    HX OTHER SURGICAL      thyroid, lymphnode    HX RETINAL DETACHMENT REPAIR      left eye    VASCULAR SURGERY PROCEDURE UNLIST      cardiac cath NEG. Social History     Social History    Marital status: LEGALLY      Spouse name: N/A    Number of children: N/A    Years of education: N/A     Social History Main Topics    Smoking status: Former Smoker     Quit date: 8/22/1994    Smokeless tobacco: Never Used    Alcohol use No    Drug use: No    Sexual activity: No     Other Topics Concern    None     Social History Narrative     Current Outpatient Prescriptions   Medication Sig Dispense Refill    simvastatin (ZOCOR) 20 mg tablet Take 1 Tab by mouth nightly. 90 Tab 3    levothyroxine (SYNTHROID) 200 mcg tablet Take 1 Tab by mouth Daily (before breakfast). 90 Tab 3    lisinopril (PRINIVIL, ZESTRIL) 20 mg tablet Take 1 Tab by mouth daily. Indications: Diabetic Nephropathy, hypertension 90 Tab 1    glipiZIDE (GLUCOTROL) 10 mg tablet Take 1 Tab by mouth Before breakfast and dinner. 180 Tab 1    glucose blood VI test strips (PHARMACIST CHOICE) strip Use preferred strips: Check glucose 3-4 times daily. DX E11.22 300 Strip 3    Blood-Glucose Meter monitoring kit 1 preferred brand glucometer for checking home glucose, E11.22 1 Kit 0    insulin degludec (TRESIBA FLEXTOUCH U-200) 200 unit/mL (3 mL) inpn 25 Units by SubCUTAneous route nightly.  6 Pen 3    cholecalciferol, VITAMIN D3, (VITAMIN D3) 5,000 unit tab tablet Take 1 Tab by mouth daily. 90 Tab 1    VENTOLIN HFA 90 mcg/actuation inhaler TAKE 1-2 PUFFS EVEERY 4-6 HOURS AS NEEDED FOR DYSPNEA AND WHEEZING  0    pantoprazole (PROTONIX) 40 mg tablet Take 1 Tab by mouth daily for 20 days. 30 Tab 0    Lancets misc Use preferred brand;  Check glucose 3-4 times daily, Diagnosis E11.22 2 Package 3     Allergies   Allergen Reactions    Anesthetics - Amide Type Shortness of Breath    Flexeril [Cyclobenzaprine] Hives    Tramadol Hives     Objective:  Visit Vitals    BP (!) 165/96    Pulse 76    Temp 96.8 °F (36 °C) (Oral)    Resp 20    Ht 5' 9\" (1.753 m)    Wt 247 lb (112 kg)    SpO2 96%    BMI 36.48 kg/m2     Physical Exam:   General appearance - alert, obese, well appearing in no distress  EYE-PERRL, EOMI  ENT-ENT exam normal, no neck nodes or sinus tenderness  Nose - normal and patent, no erythema, discharge or polyps  Mouth - mucous membranes moist, pharynx normal without lesions  Neck - supple, no significant adenopathy   Chest - clear to auscultation, no wheezes, rales or rhonchi  Heart - normal rate, regular rhythm, normal blood pressure  Abdomen - soft, nontender, nondistended, no organomegaly  Ext-peripheral pulses normal, no pedal edema  Neuro -alert, oriented, normal speech, no focal findings   Back-full range of motion, no tenderness, palpable spasm or pain on motion     Recent Results (from the past 12 hour(s))   AMB POC RAPID INFLUENZA TEST    Collection Time: 01/30/18 10:56 AM   Result Value Ref Range    VALID INTERNAL CONTROL POC Yes     QuickVue Influenza test Negative Negative     Assessment/Plan:  Diagnoses and all orders for this visit:    Cold  -     AMB POC RAPID INFLUENZA TEST    Hypovitaminosis D  -     VITAMIN D, 25 HYDROXY    Mixed hypercholesterolemia and hypertriglyceridemia  -     LIPID PANEL    Type 2 diabetes mellitus with nephropathy (HCC)  -     METABOLIC PANEL, COMPREHENSIVE    Essential hypertension with goal blood pressure less than 613/02  -     METABOLIC PANEL, COMPREHENSIVE  -     lisinopril (PRINIVIL, ZESTRIL) 20 mg tablet; Take 2 Tabs by mouth daily. , Normal, Disp-60 Tab, R-5    Acquired hypothyroidism  -     TSH 3RD GENERATION    Acute bronchitis due to infection  -     METABOLIC PANEL, COMPREHENSIVE  -     amoxicillin-clavulanate (AUGMENTIN) 500-125 mg per tablet; Take 1 Tab by mouth every twelve (12) hours for 10 days. , Normal, Disp-20 Tab, R-0  -     promethazine-dextromethorphan (PROMETHAZINE-DM) 6.25-15 mg/5 mL syrup; Take 5 mL by mouth every four (4) hours as needed for Cough for up to 7 days. Indications: Cough, Normal, Disp-118 mL, R-0      Patient Instructions          Bronchitis: Care Instructions  Your Care Instructions    Bronchitis is inflammation of the bronchial tubes, which carry air to the lungs. The tubes swell and produce mucus, or phlegm. The mucus and inflamed bronchial tubes make you cough. You may have trouble breathing. Most cases of bronchitis are caused by viruses like those that cause colds. Antibiotics usually do not help and they may be harmful. Bronchitis usually develops rapidly and lasts about 2 to 3 weeks in otherwise healthy people. Follow-up care is a key part of your treatment and safety. Be sure to make and go to all appointments, and call your doctor if you are having problems. It's also a good idea to know your test results and keep a list of the medicines you take. How can you care for yourself at home? · Take all medicines exactly as prescribed. Call your doctor if you think you are having a problem with your medicine. · Get some extra rest.  · Take an over-the-counter pain medicine, such as acetaminophen (Tylenol), ibuprofen (Advil, Motrin), or naproxen (Aleve) to reduce fever and relieve body aches. Read and follow all instructions on the label. · Do not take two or more pain medicines at the same time unless the doctor told you to.  Many pain medicines have acetaminophen, which is Tylenol. Too much acetaminophen (Tylenol) can be harmful. · Take an over-the-counter cough medicine that contains dextromethorphan to help quiet a dry, hacking cough so that you can sleep. Avoid cough medicines that have more than one active ingredient. Read and follow all instructions on the label. · Breathe moist air from a humidifier, hot shower, or sink filled with hot water. The heat and moisture will thin mucus so you can cough it out. · Do not smoke. Smoking can make bronchitis worse. If you need help quitting, talk to your doctor about stop-smoking programs and medicines. These can increase your chances of quitting for good. When should you call for help? Call 911 anytime you think you may need emergency care. For example, call if:  ? · You have severe trouble breathing. ?Call your doctor now or seek immediate medical care if:  ? · You have new or worse trouble breathing. ? · You cough up dark brown or bloody mucus (sputum). ? · You have a new or higher fever. ? · You have a new rash. ? Watch closely for changes in your health, and be sure to contact your doctor if:  ? · You cough more deeply or more often, especially if you notice more mucus or a change in the color of your mucus. ? · You are not getting better as expected. Where can you learn more? Go to http://mayo-kelly.info/. Enter H333 in the search box to learn more about \"Bronchitis: Care Instructions. \"  Current as of: May 12, 2017  Content Version: 11.4  © 3863-5364 [employer]. Care instructions adapted under license by [employer] (which disclaims liability or warranty for this information). If you have questions about a medical condition or this instruction, always ask your healthcare professional. [place] [address] any warranty or liability for your use of this information.       Follow-up Disposition:  Return in about 4 months (around 5/30/2018), or if symptoms worsen or fail to improve, for routine follow up.

## 2018-01-30 NOTE — PROGRESS NOTES
Chief Complaint   Patient presents with    Cold Symptoms     Patient here today with cold symptoms, chest congestion ,fever & chills with vomiting and diarrhea x 2 weeks. Patient went [facility] 2 weeks ago and [facility]. Patient given prescribed medications for the symptoms.

## 2018-01-30 NOTE — MR AVS SNAPSHOT
[address] [facility] 
[phone] Patient: Governor Elias MRN: [id number] CTR:[id number] Visit Information Date & Time Provider Department Dept. Phone Encounter #  
 1/30/2018 10:15 AM Leyla Lobo MD [facility] at [address] [id number] Follow-up Instructions Return in about 4 months (around 5/30/2018), or if symptoms worsen or fail to improve, for routine follow up. Your Appointments 2/2/2018 11:50 AM  
Follow Up with MD [facility] [facility]) Appt Note: F/u, diabetes [address] [phone] [address] [place] [address] Upcoming Health Maintenance Date Due  
 EYE EXAM RETINAL OR DILATED Q1 2/25/1972 FOBT Q 1 YEAR AGE 50-75 2/25/2012 FOOT EXAM Q1 11/29/2017 HEMOGLOBIN A1C Q6M 3/18/2018 Pneumococcal 19-64 Highest Risk (2 of 3 - PCV13) 9/18/2018 MICROALBUMIN Q1 9/18/2018 LIPID PANEL Q1 9/18/2018 MEDICARE YEARLY EXAM 9/19/2018 DTaP/Tdap/Td series (2 - Td) 9/18/2027 Allergies as of 1/30/2018  Review Complete On: 1/30/2018 By: Leyla Lobo MD  
  
 Severity Noted Reaction Type Reactions Anesthetics - Amide Type  03/01/2016    Shortness of Breath Flexeril [Cyclobenzaprine]  07/26/2016    Hives Tramadol  03/01/2016    Hives Current Immunizations  Reviewed on 1/17/2017 Name Date Influenza Vaccine (Quad) PF 10/11/2016 10:16 AM  
  
 Not reviewed this visit You Were Diagnosed With   
  
 Codes Comments Cold    -  Primary ICD-10-CM: Gio Rota ICD-9-CM: 204 Hypovitaminosis D     ICD-10-CM: E55.9 ICD-9-CM: 268.9 Mixed hypercholesterolemia and hypertriglyceridemia     ICD-10-CM: E78.2 ICD-9-CM: 272.2  Type 2 diabetes mellitus with nephropathy (HCC)     ICD-10-CM: E11.21 
 ICD-9-CM: 250.40, 583.81 Essential hypertension with goal blood pressure less than 130/80     ICD-10-CM: I10 
ICD-9-CM: 401.9 Acquired hypothyroidism     ICD-10-CM: E03.9 ICD-9-CM: 244.9 Acute bronchitis due to infection     ICD-10-CM: J20.8 ICD-9-CM: 466.0 Vitals BP Pulse Temp Resp Height(growth percentile) Weight(growth percentile) (!) 165/96 76 96.8 °F (36 °C) (Oral) 20 5' 9\" (1.753 m) 247 lb (112 kg) SpO2 BMI Smoking Status 96% 36.48 kg/m2 Former Smoker Vitals History BMI and BSA Data Body Mass Index Body Surface Area  
 36.48 kg/m 2 2.34 m 2 Preferred Pharmacy Pharmacy Name Phone [id number] [address] [phone] Your Updated Medication List  
  
   
This list is accurate as of: 1/30/18 11:34 AM.  Always use your most recent med list.  
  
  
  
  
 amoxicillin-clavulanate 500-125 mg per tablet Commonly known as:  AUGMENTIN Take 1 Tab by mouth every twelve (12) hours for 10 days. Blood-Glucose Meter monitoring kit 1 preferred brand glucometer for checking home glucose, E11.22  
  
 cholecalciferol (VITAMIN D3) 5,000 unit Tab tablet Commonly known as:  VITAMIN D3 Take 1 Tab by mouth daily. glipiZIDE 10 mg tablet Commonly known as:  Leonetta Fili Take 1 Tab by mouth Before breakfast and dinner. glucose blood VI test strips strip Commonly known as:  PHARMACIST CHOICE Use preferred strips: Check glucose 3-4 times daily. DX E11.22  
  
 insulin degludec 200 unit/mL (3 mL) Inpn Commonly known as:  TRESIBA FLEXTOUCH U-200  
25 Units by SubCUTAneous route nightly. Lancets Misc Use preferred brand; Check glucose 3-4 times daily, Diagnosis E11.22  
  
 levothyroxine 200 mcg tablet Commonly known as:  SYNTHROID Take 1 Tab by mouth Daily (before breakfast). lisinopril 20 mg tablet Commonly known as:  Kristian Dumont  
 Take 2 Tabs by mouth daily. promethazine-dextromethorphan 6.25-15 mg/5 mL syrup Commonly known as:  PROMETHAZINE-DM Take 5 mL by mouth every four (4) hours as needed for Cough for up to 7 days. Indications: Cough  
  
 simvastatin 20 mg tablet Commonly known as:  ZOCOR Take 1 Tab by mouth nightly. VENTOLIN HFA 90 mcg/actuation inhaler Generic drug:  albuterol TAKE 1-2 PUFFS EVEERY 4-6 HOURS AS NEEDED FOR DYSPNEA AND WHEEZING Prescriptions Sent to Pharmacy Refills  
 amoxicillin-clavulanate (AUGMENTIN) 500-125 mg per tablet 0 Sig: Take 1 Tab by mouth every twelve (12) hours for 10 days. Class: Normal  
 Pharmacy: [address] [place] [address] Ph #: [phone] Route: Oral  
 promethazine-dextromethorphan (PROMETHAZINE-DM) 6.25-15 mg/5 mL syrup 0 Sig: Take 5 mL by mouth every four (4) hours as needed for Cough for up to 7 days. Indications: Cough Class: Normal  
 Pharmacy: [address] [place] [address] Ph #: [phone] Route: Oral  
 lisinopril (PRINIVIL, ZESTRIL) 20 mg tablet 5 Sig: Take 2 Tabs by mouth daily. Class: Normal  
 Pharmacy: [address] [place] [address] #: [phone] Route: Oral  
  
We Performed the Following AMB POC RAPID INFLUENZA TEST [16654 CPT(R)] LIPID PANEL [80995 CPT(R)] METABOLIC PANEL, COMPREHENSIVE [44168 CPT(R)] TSH 3RD GENERATION [99460 CPT(R)] VITAMIN D, 25 HYDROXY [id number] CPT(R)] Follow-up Instructions Return in about 4 months (around 5/30/2018), or if symptoms worsen or fail to improve, for routine follow up. Patient Instructions Bronchitis: Care Instructions Your Care Instructions Bronchitis is inflammation of the bronchial tubes, which carry air to the lungs. The tubes swell and produce mucus, or phlegm.  The mucus and inflamed bronchial tubes make you cough. You may have trouble breathing. Most cases of bronchitis are caused by viruses like those that cause colds. Antibiotics usually do not help and they may be harmful. Bronchitis usually develops rapidly and lasts about 2 to 3 weeks in otherwise healthy people. Follow-up care is a key part of your treatment and safety. Be sure to make and go to all appointments, and call your doctor if you are having problems. It's also a good idea to know your test results and keep a list of the medicines you take. How can you care for yourself at home? · Take all medicines exactly as prescribed. Call your doctor if you think you are having a problem with your medicine. · Get some extra rest. 
· Take an over-the-counter pain medicine, such as acetaminophen (Tylenol), ibuprofen (Advil, Motrin), or naproxen (Aleve) to reduce fever and relieve body aches. Read and follow all instructions on the label. · Do not take two or more pain medicines at the same time unless the doctor told you to. Many pain medicines have acetaminophen, which is Tylenol. Too much acetaminophen (Tylenol) can be harmful. · Take an over-the-counter cough medicine that contains dextromethorphan to help quiet a dry, hacking cough so that you can sleep. Avoid cough medicines that have more than one active ingredient. Read and follow all instructions on the label. · Breathe moist air from a humidifier, hot shower, or sink filled with hot water. The heat and moisture will thin mucus so you can cough it out. · Do not smoke. Smoking can make bronchitis worse. If you need help quitting, talk to your doctor about stop-smoking programs and medicines. These can increase your chances of quitting for good. When should you call for help? Call 911 anytime you think you may need emergency care. For example, call if: 
? · You have severe trouble breathing. ?Call your doctor now or seek immediate medical care if: ? · You have new or worse trouble breathing. ? · You cough up dark brown or bloody mucus (sputum). ? · You have a new or higher fever. ? · You have a new rash. ? Watch closely for changes in your health, and be sure to contact your doctor if: 
? · You cough more deeply or more often, especially if you notice more mucus or a change in the color of your mucus. ? · You are not getting better as expected. Where can you learn more? Go to http://mayo-kelly.info/. Enter H333 in the search box to learn more about \"Bronchitis: Care Instructions. \" Current as of: May 12, 2017 Content Version: 11.4 © 2616-0873 [employer]. Care instructions adapted under license by [employer] (which disclaims liability or warranty for this information). If you have questions about a medical condition or this instruction, always ask your healthcare professional. [address] any warranty or liability for your use of this information. Introducing [facility]! Pierce Mello introduces Repligen patient portal. Now you can access parts of your medical record, email your doctor's office, and request medication refills online. 1. In your internet browser, go to [email or web address] 2. Click on the First Time User? Click Here link in the Sign In box. You will see the New Member Sign Up page. 3. Enter your Repligen Access Code exactly as it appears below. You will not need to use this code after youve completed the sign-up process. If you do not sign up before the expiration date, you must request a new code. · Repligen Access Code: [email or web address] Expires: 4/28/2018  9:12 AM 
 
4. Enter the last four digits of your Social Security Number (xxxx) and Date of Birth (mm/dd/yyyy) as indicated and click Submit. You will be taken to the next sign-up page. 5. Create a Repligen ID.  This will be your Repligen login ID and cannot be changed, so think of one that is secure and easy to remember. 6. Create a Callvine password. You can change your password at any time. 7. Enter your Password Reset Question and Answer. This can be used at a later time if you forget your password. 8. Enter your e-mail address. You will receive e-mail notification when new information is available in [address] 9. Click Sign Up. You can now view and download portions of your medical record. 10. Click the Download Summary menu link to download a portable copy of your medical information. If you have questions, please visit the Frequently Asked Questions section of the Callvine website. Remember, Callvine is NOT to be used for urgent needs. For medical emergencies, dial 911. Now available from your iPhone and Android! Please provide this summary of care documentation to your next provider. Your primary care clinician is listed as Griselda Huff. If you have any questions after today's visit, please call [phone].

## 2018-01-30 NOTE — PATIENT INSTRUCTIONS
Bronchitis: Care Instructions  Your Care Instructions    Bronchitis is inflammation of the bronchial tubes, which carry air to the lungs. The tubes swell and produce mucus, or phlegm. The mucus and inflamed bronchial tubes make you cough. You may have trouble breathing. Most cases of bronchitis are caused by viruses like those that cause colds. Antibiotics usually do not help and they may be harmful. Bronchitis usually develops rapidly and lasts about 2 to 3 weeks in otherwise healthy people. Follow-up care is a key part of your treatment and safety. Be sure to make and go to all appointments, and call your doctor if you are having problems. It's also a good idea to know your test results and keep a list of the medicines you take. How can you care for yourself at home? · Take all medicines exactly as prescribed. Call your doctor if you think you are having a problem with your medicine. · Get some extra rest.  · Take an over-the-counter pain medicine, such as acetaminophen (Tylenol), ibuprofen (Advil, Motrin), or naproxen (Aleve) to reduce fever and relieve body aches. Read and follow all instructions on the label. · Do not take two or more pain medicines at the same time unless the doctor told you to. Many pain medicines have acetaminophen, which is Tylenol. Too much acetaminophen (Tylenol) can be harmful. · Take an over-the-counter cough medicine that contains dextromethorphan to help quiet a dry, hacking cough so that you can sleep. Avoid cough medicines that have more than one active ingredient. Read and follow all instructions on the label. · Breathe moist air from a humidifier, hot shower, or sink filled with hot water. The heat and moisture will thin mucus so you can cough it out. · Do not smoke. Smoking can make bronchitis worse. If you need help quitting, talk to your doctor about stop-smoking programs and medicines. These can increase your chances of quitting for good.   When should you call for help? Call 911 anytime you think you may need emergency care. For example, call if:  ? · You have severe trouble breathing. ?Call your doctor now or seek immediate medical care if:  ? · You have new or worse trouble breathing. ? · You cough up dark brown or bloody mucus (sputum). ? · You have a new or higher fever. ? · You have a new rash. ? Watch closely for changes in your health, and be sure to contact your doctor if:  ? · You cough more deeply or more often, especially if you notice more mucus or a change in the color of your mucus. ? · You are not getting better as expected. Where can you learn more? Go to http://mayo-kelly.info/. Enter H333 in the search box to learn more about \"Bronchitis: Care Instructions. \"  Current as of: May 12, 2017  Content Version: 11.4  © 9248-0670 [employer]. Care instructions adapted under license by [employer] (which disclaims liability or warranty for this information). If you have questions about a medical condition or this instruction, always ask your healthcare professional. [address] any warranty or liability for your use of this information.

## 2018-01-31 ENCOUNTER — HOSPITAL ENCOUNTER (OUTPATIENT)
Dept: LAB | Age: 58
Discharge: HOME | End: 2018-01-31
Payer: MEDICARE

## 2018-01-31 PROCEDURE — 80061 LIPID PANEL: CPT

## 2018-01-31 PROCEDURE — 80053 COMPREHEN METABOLIC PANEL: CPT

## 2018-01-31 PROCEDURE — 82306 VITAMIN D 25 HYDROXY: CPT

## 2018-01-31 PROCEDURE — 36415 COLL VENOUS BLD VENIPUNCTURE: CPT

## 2018-01-31 PROCEDURE — 84443 ASSAY THYROID STIM HORMONE: CPT

## 2018-02-01 LAB
25(OH)D3+25(OH)D2 SERPL-MCNC: 23.7 NG/ML (ref 30–100)
ALBUMIN SERPL-MCNC: 2.9 G/DL (ref 3.5–5.5)
ALBUMIN/GLOB SERPL: 1.1 {RATIO} (ref 1.2–2.2)
ALP SERPL-CCNC: 89 IU/L (ref 39–117)
ALT SERPL-CCNC: 18 IU/L (ref 0–44)
AST SERPL-CCNC: 11 IU/L (ref 0–40)
BILIRUB SERPL-MCNC: <0.2 MG/DL (ref 0–1.2)
BUN SERPL-MCNC: 29 MG/DL (ref 6–24)
BUN/CREAT SERPL: 16 (ref 9–20)
CALCIUM SERPL-MCNC: 7.5 MG/DL (ref 8.7–10.2)
CHLORIDE SERPL-SCNC: 105 MMOL/L (ref 96–106)
CHOLEST SERPL-MCNC: 129 MG/DL (ref 100–199)
CO2 SERPL-SCNC: 22 MMOL/L (ref 18–29)
CREAT SERPL-MCNC: 1.81 MG/DL (ref 0.76–1.27)
GFR SERPLBLD CREATININE-BSD FMLA CKD-EPI: 41 ML/MIN/1.73
GFR SERPLBLD CREATININE-BSD FMLA CKD-EPI: 48 ML/MIN/1.73
GLOBULIN SER CALC-MCNC: 2.6 G/DL (ref 1.5–4.5)
GLUCOSE SERPL-MCNC: 240 MG/DL (ref 65–99)
HDLC SERPL-MCNC: 29 MG/DL
INTERPRETATION: NORMAL
LDLC SERPL CALC-MCNC: 56 MG/DL (ref 0–99)
Lab: NORMAL
POTASSIUM SERPL-SCNC: 4.2 MMOL/L (ref 3.5–5.2)
PROT SERPL-MCNC: 5.5 G/DL (ref 6–8.5)
SODIUM SERPL-SCNC: 143 MMOL/L (ref 134–144)
TRIGL SERPL-MCNC: 218 MG/DL (ref 0–149)
TSH SERPL DL<=0.005 MIU/L-ACNC: 2.22 UIU/ML (ref 0.45–4.5)
VLDLC SERPL CALC-MCNC: 44 MG/DL (ref 5–40)

## 2018-02-02 ENCOUNTER — OFFICE VISIT (OUTPATIENT)
Dept: ENDOCRINOLOGY | Age: 58
End: 2018-02-02

## 2018-02-02 VITALS
BODY MASS INDEX: 37.03 KG/M2 | SYSTOLIC BLOOD PRESSURE: 154 MMHG | DIASTOLIC BLOOD PRESSURE: 82 MMHG | WEIGHT: 250 LBS | HEART RATE: 72 BPM | HEIGHT: 69 IN

## 2018-02-02 DIAGNOSIS — E11.22 TYPE 2 DIABETES MELLITUS WITH STAGE 3 CHRONIC KIDNEY DISEASE, WITH LONG-TERM CURRENT USE OF INSULIN (HCC): Primary | ICD-10-CM

## 2018-02-02 DIAGNOSIS — E89.0 POSTOPERATIVE HYPOTHYROIDISM: ICD-10-CM

## 2018-02-02 DIAGNOSIS — E78.5 HYPERLIPIDEMIA, UNSPECIFIED HYPERLIPIDEMIA TYPE: ICD-10-CM

## 2018-02-02 DIAGNOSIS — I10 ESSENTIAL HYPERTENSION WITH GOAL BLOOD PRESSURE LESS THAN 130/80: ICD-10-CM

## 2018-02-02 DIAGNOSIS — Z79.4 TYPE 2 DIABETES MELLITUS WITH STAGE 3 CHRONIC KIDNEY DISEASE, WITH LONG-TERM CURRENT USE OF INSULIN (HCC): Primary | ICD-10-CM

## 2018-02-02 DIAGNOSIS — N18.30 TYPE 2 DIABETES MELLITUS WITH STAGE 3 CHRONIC KIDNEY DISEASE, WITH LONG-TERM CURRENT USE OF INSULIN (HCC): Primary | ICD-10-CM

## 2018-02-02 DIAGNOSIS — E55.9 HYPOVITAMINOSIS D: ICD-10-CM

## 2018-02-02 DIAGNOSIS — C73 PAPILLARY THYROID CARCINOMA (HCC): ICD-10-CM

## 2018-02-02 LAB — HBA1C MFR BLD HPLC: 8.8 %

## 2018-02-02 RX ORDER — LANCETS
EACH MISCELLANEOUS
Qty: 2 PACKAGE | Refills: 3 | Status: SHIPPED | OUTPATIENT
Start: 2018-02-02 | End: 2021-01-01 | Stop reason: SDUPTHER

## 2018-02-02 RX ORDER — CHOLECALCIFEROL TAB 125 MCG (5000 UNIT) 125 MCG
5000 TAB ORAL DAILY
Qty: 90 TAB | Refills: 3 | Status: SHIPPED | OUTPATIENT
Start: 2018-02-02 | End: 2020-07-28

## 2018-02-02 NOTE — MR AVS SNAPSHOT
[address] [place] [address] [address] [address] [phone] Patient: Zay Jay MRN: [id number] HDK:[birth year]54[birth year]8 Visit Information Date & Time Provider Department Dept. Phone Encounter #  
 2/2/2018 11:50 AM Leonardo Almeida, [address] [place] [address] Diabetes and Endocrinology [phone] [id number] Follow-up Instructions Return in about 3 months (around 5/2/2018). Upcoming Health Maintenance Date Due  
 EYE EXAM RETINAL OR DILATED Q1 2/25/1972 FOBT Q 1 YEAR AGE 50-75 2/25/2012 FOOT EXAM Q1 11/29/2017 HEMOGLOBIN A1C Q6M 3/18/2018 Pneumococcal 19-64 Highest Risk (2 of 3 - PCV13) 9/18/2018 MICROALBUMIN Q1 9/18/2018 MEDICARE YEARLY EXAM 9/19/2018 LIPID PANEL Q1 1/31/2019 DTaP/Tdap/Td series (2 - Td) 9/18/2027 Allergies as of 2/2/2018  Review Complete On: 2/2/2018 By: Leonardo Almeida MD  
  
 Severity Noted Reaction Type Reactions Anesthetics - Amide Type  03/01/2016    Shortness of Breath Flexeril [Cyclobenzaprine]  07/26/2016    Hives Tramadol  03/01/2016    Hives Current Immunizations  Reviewed on 1/17/2017 Name Date Influenza Vaccine (Quad) PF 10/11/2016 10:16 AM  
  
 Not reviewed this visit You Were Diagnosed With   
  
 Codes Comments Type 2 diabetes mellitus with stage 3 chronic kidney disease, with long-term current use of insulin (HCC)    -  Primary ICD-10-CM: E11.22, N18.3, Z79.4 ICD-9-CM: 250.40, 585.3, V58.67 Essential hypertension with goal blood pressure less than 130/80     ICD-10-CM: I10 
ICD-9-CM: 401.9 Hyperlipidemia, unspecified hyperlipidemia type     ICD-10-CM: E78.5 ICD-9-CM: 272.4 Papillary thyroid carcinoma ([facility] [address]     ICD-10-CM: B10 ICD-9-CM: 958 Postoperative hypothyroidism     ICD-10-CM: E89.0 ICD-9-CM: 244.0 Hypovitaminosis D     ICD-10-CM: E55.9 ICD-9-CM: 268.9 Vitals BP Pulse Height(growth percentile) Weight(growth percentile) BMI Smoking Status 154/82 (BP 1 Location: Right arm, BP Patient Position: Sitting) 72 5' 9\" (1.753 m) 250 lb (113.4 kg) 36.92 kg/m2 Former Smoker Vitals History BMI and BSA Data Body Mass Index Body Surface Area  
 36.92 kg/m 2 2.35 m 2 Preferred Pharmacy Pharmacy Name Phone [id number] Adis Armenta, [address] [place] [address] [phone] Your Updated Medication List  
  
   
This list is accurate as of: 2/2/18 12:28 PM.  Always use your most recent med list.  
  
  
  
  
 amoxicillin-clavulanate 500-125 mg per tablet Commonly known as:  AUGMENTIN Take 1 Tab by mouth every twelve (12) hours for 10 days. Blood-Glucose Meter monitoring kit 1 preferred brand glucometer for checking home glucose, E11.22  
  
 cholecalciferol (VITAMIN D3) 5,000 unit Tab tablet Commonly known as:  VITAMIN D3 Take 1 Tab by mouth daily. glipiZIDE 10 mg tablet Commonly known as:  Martín Barbone Take 1 Tab by mouth Before breakfast and dinner. glucose blood VI test strips strip Commonly known as:  PHARMACIST CHOICE One Touch  Check glucose 3-4 times daily. DX E11.22  
  
 insulin degludec 200 unit/mL (3 mL) Inpn Commonly known as:  TRESIBA FLEXTOUCH U-200  
25 Units by SubCUTAneous route nightly. Lancets Misc Use preferred brand; Check glucose 3-4 times daily, Diagnosis E11.22  
  
 levothyroxine 200 mcg tablet Commonly known as:  SYNTHROID Take 1 Tab by mouth Daily (before breakfast). lisinopril 20 mg tablet Commonly known as:  Loralie Abebe Take 2 Tabs by mouth daily. promethazine-dextromethorphan 6.25-15 mg/5 mL syrup Commonly known as:  PROMETHAZINE-DM Take 5 mL by mouth every four (4) hours as needed for Cough for up to 7 days. Indications: Cough  
  
 simvastatin 20 mg tablet Commonly known as:  ZOCOR Take 1 Tab by mouth nightly. VENTOLIN HFA 90 mcg/actuation inhaler Generic drug:  albuterol TAKE 1-2 PUFFS EVEERY 4-6 HOURS AS NEEDED FOR DYSPNEA AND WHEEZING Prescriptions Sent to Pharmacy Refills  
 cholecalciferol, VITAMIN D3, (VITAMIN D3) 5,000 unit tab tablet 3 Sig: Take 1 Tab by mouth daily. Class: Normal  
 Pharmacy: [address] [place] [address] Ph #: [phone] Route: Oral  
 glucose blood VI test strips (PHARMACIST CHOICE) strip 3 Sig: One Touch  Check glucose 3-4 times daily. DX E11.22 Class: Normal  
 Pharmacy: [address] [place] [address] Ph #: [phone] Lancets misc 3 Sig: Use preferred brand; Check glucose 3-4 times daily, Diagnosis E11.22 Class: Normal  
 Pharmacy: [address] [place] [address] Ph #: [phone] We Performed the Following AMB POC HEMOGLOBIN A1C [53210 CPT(R)] [facility] AND LH [39935 CPT(R)]  DIABETES FOOT EXAM [HM7 Custom] TESTOSTERONE, FREE & TOTAL [48081 CPT(R)] THYROGLOBULIN AB + THYROGLOBULIN U5112404 CPT(R)] Follow-up Instructions Return in about 3 months (around 5/2/2018). Introducing [facility]! Jyoti Turner introduces HackMyPic patient portal. Now you can access parts of your medical record, email your doctor's office, and request medication refills online. 1. In your internet browser, go to [email or web address] 2. Click on the First Time User? Click Here link in the Sign In box. You will see the New Member Sign Up page. 3. Enter your HackMyPic Access Code exactly as it appears below. You will not need to use this code after youve completed the sign-up process. If you do not sign up before the expiration date, you must request a new code. · HackMyPic Access Code: [email or web address] Expires: 4/28/2018  9:12 AM 
 
4.  Enter the last four digits of your Social Security Number (xxxx) and Date of Birth (mm/dd/yyyy) as indicated and click Submit. You will be taken to the next sign-up page. 5. Create a Conjur ID. This will be your Conjur login ID and cannot be changed, so think of one that is secure and easy to remember. 6. Create a Conjur password. You can change your password at any time. 7. Enter your Password Reset Question and Answer. This can be used at a later time if you forget your password. 8. Enter your e-mail address. You will receive e-mail notification when new information is available in [address] 9. Click Sign Up. You can now view and download portions of your medical record. 10. Click the Download Summary menu link to download a portable copy of your medical information. If you have questions, please visit the Frequently Asked Questions section of the Conjur website. Remember, Conjur is NOT to be used for urgent needs. For medical emergencies, dial 911. Now available from your iPhone and Android! Please provide this summary of care documentation to your next provider. Your primary care clinician is listed as Anthony Padilla. If you have any questions after today's visit, please call [phone].

## 2018-02-02 NOTE — PROGRESS NOTES
CHIEF COMPLAINT: f/u evaluation for uncontrolled type 2 diabetes and thyroid cancer s/p thyroidectomy and STRATTON    HISTORY OF PRESENT ILLNESS:   Silverio Bee is a 56 y.o. male with a PMHx as noted below who presents to the endocrinology clinic for f/u evaluation of uncontrolled type 2 diabetes. A1c today is 8.8%, previously had not taken insulin for 3 weeks, was restarted. Also his cholesterol was high, we restarted crestor which caused dizziness so we changed to simvastatin with improved lipids. Prescribed all new DM supplies on last visit as he had not been checking his blood sugars. Has had a respiratory infection in the past few weeks, reports having taken a z-pack previously and had been on steroids. He is also concerned about his testosterone level reporting he was told it may be low.      Diabetes type 2:  Currently taking the following meds:  Tresiba 25 units   Glipizide 10 mg with breakfast and dinner (forgets dinner dose at times)    Review of home blood glucose:   AM: Reports 80's -140's    Review of most recent diabetes-related labs:  Lab Results   Component Value Date    HBA1C 9.7 (H) 10/10/2016    HBA1C 11.1 (H) 10/06/2016    HBA1C 8.9 (H) 06/17/2009    DBH2EELP 10.8 09/18/2017    OEW0UCEW 9.5 01/30/2017    VQH3GQTO 11.5 08/22/2016    CHOL 129 01/31/2018    LDLC 56 01/31/2018    GFRAA 48 (L) 01/31/2018    GFRNA 41 (L) 01/31/2018    MCACR 2606.9 (H) 09/18/2017    TSH 2.220 01/31/2018    VITD3 23.7 (L) 01/31/2018     880388 = IA-2 pancreatic islet cell autoantibody  GADLT = CLAIRE-65 autoantibody   MCACR = Urine Microalbumin    --------------------------------------------------    Thyroid Cancer:     2002 Biopsy suggesting PTC   S/p total thyroidectomy   S/p STRATTON  5829-1767  Reports negative WBS  10/10/16  Repeat surgery, 2 right paratrachial LN's   Surgical Path: poorly differentiated PTC   Patient with hoarseness of voice, persistent   Continued on 200mcg LT4  11/29/16  Initial visit with me for thyroid cancer and diabetes  11/30/16 Tg 10.1, TgAb negative, TSH 1.93 (patient notes this is the lowest Tg level compared with prior)  12/14/16 Neck Ultrasound: \"Thyroid bed is empty, no anterior cervical mass or significant adenopathy\"  01/18/17 Thyrogen stimulated WBS: \"No evidence of uptake in thyroid bed, No evidence of metastatic disease\"  11/08/17 Thyroid US, no residual thyroid tissue, no pathologic adenopathy. 1/31/18: TSH 2.2 on 200 mcg LT4,  Tg/TgAb was not collected  Reports he is feeling well,  Continues on 200 mcg of levothyroxine daily, takes with his other meds, but TSH remains normal at 2.2. Previously ordered thyroglobulin and antibody levels for evaluation, was not obtained. Feeling well, swallowing and breathing comfortably. PAST MEDICAL/SURGICAL HISTORY:   Past Medical History:   Diagnosis Date    Adverse effect of anesthesia     \" STOP BREATHING 1 TIME C ANESTH\"    Calculus of kidney     Cancer ([address] 2004    thyroid cancer    Chronic kidney disease     Chronic pain     BACK SHOULDER AND ARM    Depression     Diabetes ([address]     Hypercholesterolemia     Hypertension     Nausea & vomiting     Other ill-defined conditions(799.89)     cholesterol, thyroid    Sleep apnea     doesn't wear cpap    Thyroid cancer ([address]     TIA (transient ischemic attack) 2011     Past Surgical History:   Procedure Laterality Date    CARDIAC SURG PROCEDURE UNLIST      HX HEENT      THROAT SURGERY X 4    HX ORTHOPAEDIC      back     HX ORTHOPAEDIC      ARM AND SHOULDER    HX OTHER SURGICAL      thyroid, lymphnode    HX RETINAL DETACHMENT REPAIR      left eye    VASCULAR SURGERY PROCEDURE UNLIST      cardiac cath NEG.        ALLERGIES:   Allergies   Allergen Reactions    Anesthetics - Amide Type Shortness of Breath    Flexeril [Cyclobenzaprine] Hives    Tramadol Hives       MEDICATIONS ON ADMISSION:     Current Outpatient Prescriptions:     VENTOLIN HFA 90 mcg/actuation inhaler, TAKE 1-2 PUFFS EVEERY 4-6 HOURS AS NEEDED FOR DYSPNEA AND WHEEZING, Disp: , Rfl: 0    amoxicillin-clavulanate (AUGMENTIN) 500-125 mg per tablet, Take 1 Tab by mouth every twelve (12) hours for 10 days. , Disp: 20 Tab, Rfl: 0    promethazine-dextromethorphan (PROMETHAZINE-DM) 6.25-15 mg/5 mL syrup, Take 5 mL by mouth every four (4) hours as needed for Cough for up to 7 days. Indications: Cough, Disp: 118 mL, Rfl: 0    lisinopril (PRINIVIL, ZESTRIL) 20 mg tablet, Take 2 Tabs by mouth daily. , Disp: 60 Tab, Rfl: 5    simvastatin (ZOCOR) 20 mg tablet, Take 1 Tab by mouth nightly., Disp: 90 Tab, Rfl: 3    levothyroxine (SYNTHROID) 200 mcg tablet, Take 1 Tab by mouth Daily (before breakfast). , Disp: 90 Tab, Rfl: 3    glipiZIDE (GLUCOTROL) 10 mg tablet, Take 1 Tab by mouth Before breakfast and dinner., Disp: 180 Tab, Rfl: 1    glucose blood VI test strips (PHARMACIST CHOICE) strip, Use preferred strips: Check glucose 3-4 times daily. DX E11.22 (Patient taking differently: One Touch  Check glucose 3-4 times daily. DX E11.22), Disp: 300 Strip, Rfl: 3    Lancets misc, Use preferred brand; Check glucose 3-4 times daily, Diagnosis E11.22, Disp: 2 Package, Rfl: 3    Blood-Glucose Meter monitoring kit, 1 preferred brand glucometer for checking home glucose, E11.22 (Patient taking differently: 1 preferred brand glucometer for checking home glucose, E11.22 One Touch), Disp: 1 Kit, Rfl: 0    insulin degludec (TRESIBA FLEXTOUCH U-200) 200 unit/mL (3 mL) inpn, 25 Units by SubCUTAneous route nightly., Disp: 6 Pen, Rfl: 3    cholecalciferol, VITAMIN D3, (VITAMIN D3) 5,000 unit tab tablet, Take 1 Tab by mouth daily. , Disp: 80 Tab, Rfl: 1    SOCIAL HISTORY:   Social History     Social History    Marital status: LEGALLY      Spouse name: N/A    Number of children: N/A    Years of education: N/A     Occupational History    Not on file.      Social History Main Topics    Smoking status: Former Smoker     Quit date: 8/22/1994    Smokeless tobacco: Never Used    Alcohol use No    Drug use: No    Sexual activity: No     Other Topics Concern    Not on file     Social History Narrative       FAMILY HISTORY:  Family History   Problem Relation Age of Onset    Diabetes Mother     Elevated Lipids Mother    Vanessa Mon Bladder Disease Mother     Headache Mother    [facility] Migraines Mother     Heart Disease Mother     Hypertension Mother     Stroke Mother     Other Mother      ANEURSYM BRAIN    Bleeding Prob Father     Cancer Father      LEUKEMIA    Diabetes Father     Elevated Lipids Father     Mental Retardation Sister     Psychiatric Disorder Sister     Cancer Brother      LUNGS       REVIEW OF SYSTEMS: Complete ROS assessed and noted for that which is described above, all else are negative.   Eyes: normal  ENT: hoarseness of voice  CVS: normal  Resp: normal  GI: normal  : normal  GYN: normal  Endocrine: normal  Integument: normal  Musculoskeletal: chronic pain  Neuro: normal  Psych: normal      PHYSICAL EXAMINATION:    VITAL SIGNS:  Visit Vitals    /82 (BP 1 Location: Right arm, BP Patient Position: Sitting)    Pulse 72    Ht 5' 9\" (1.753 m)    Wt 250 lb (113.4 kg)    BMI 36.92 kg/m2       GENERAL: NCAT, Sitting comfortably, NAD  EYES: EOMI, non-icteric, no proptosis  Ear/Nose/Throat: NCAT, no inflammation, no masses  LYMPH NODES: No LAD  CARDIOVASCULAR: S1 S2, RRR, No murmur, 2+ radial pulses  RESPIRATORY: CTA b/l, no wheeze/rales  GASTROINTESTINAL: NT, ND,  MUSCULOSKELETAL: Normal ROM, no atrophy  SKIN: warm, no edema/rash/ or other skin changes  NEUROLOGIC: 5/5 power all extremities, no tremors, AAOx3  PSYCHIATRIC: Normal affect, Normal insight and judgement            Diabetic foot exam performed by Meaghan Pina MD     Measurement  Response Nurse Comment Physician Comment   Monofilament  R - normal sensation with micro filament  L - normal sensation with micro filament     Pulse DP R - 2+ (normal)  L - 2+ (normal) Vibration R - Normal    L - Normal     Structural deformity R - Mild - Thick pads at ball of foot  L - Mild - Thick pads at ball of foot     Skin Integrity / Deformity R - Yes - Thick nails needing care  L - Yes - Thick nails needing care        Reviewed by:          REVIEW OF LABORATORY AND RADIOLOGY DATA:   Labs and documentation have been reviewed as described above. ASSESSMENT AND PLAN:   Felisha Burns is a 56 y.o. male with a PMHx as noted above who presents to the endocrinology clinic for f/u evaluation of uncontrolled type 2 diabetes and thyroid cancer. DM2 uncontrolled  HTN  HLD  Thyroid cancer  Post surgical hypothyrodism    Taking his medications which is a good change. A1c is down from >10 to 8.8% which is partly affected by his recent illness and steroids. Current home blood sugars reported seem to be much better though based only on fasting numbers. If A1c does not continue to trend down will consider optimizing post prandial blood sugars as that would be the most likely issue. He does onte that he forgets to take glipizide with dinner at times. Tolerating his simvastatin with improvement in his cholesterol. DM2:  Tresiba at 25 units, reordered  Continue Glipizide to 10mg BID  Next step will be a dose of short acting insulin with largest meal of the day   Continue to check glucose 2 times daily,  Foot exam was completed today,    HTN: BP slightly elevated as recovering from illness, reports recent change in lisinopril, continue current meds  HLD: LDL improved on simvastatin 20mg, tolerating. Testosterone: ? Will check level as he is concerned about it being low.    Vitamin D deficiency: requesting refills, refilled his 5000 units, was previously low    Thyroid CA:   Continue 200 mcg once daily  Goal TSH close to 0.5 however he takes with his other meds and thus the high dose, will be difficult to achieve the optimal level in his case but TSH of 2 is reasonable without evidence of recurrence at this time. Ultrasound was reassuring. Will obtain Tg and TgAb at this time. Dami Griffin.  [address] [facility] Diabetes & Endocrinology

## 2018-02-03 LAB
FSH SERPL-ACNC: 11.3 MIU/ML (ref 1.5–12.4)
INTERPRETATION: NORMAL
LH SERPL-ACNC: 7.6 MIU/ML (ref 1.7–8.6)
Lab: NORMAL
TESTOST FREE SERPL-MCNC: 6.2 PG/ML (ref 7.2–24)
TESTOST SERPL-MCNC: 403 NG/DL (ref 264–916)
THYROGLOB AB SERPL-ACNC: <1 IU/ML (ref 0–0.9)
THYROGLOB SERPL-MCNC: 24.4 NG/ML (ref 1.4–29.2)

## 2018-02-09 ENCOUNTER — TELEPHONE (OUTPATIENT)
Dept: ENDOCRINOLOGY | Age: 58
End: 2018-02-09

## 2018-02-09 DIAGNOSIS — C79.9 METASTASIS FROM THYROID CANCER (HCC): Primary | ICD-10-CM

## 2018-02-09 DIAGNOSIS — C73 METASTASIS FROM THYROID CANCER (HCC): Primary | ICD-10-CM

## 2018-02-09 NOTE — TELEPHONE ENCOUNTER
Patients unstimulated thyroglobulin level continues to slowly rise. See below. Note also that he is thyroglobulin antibody negative, thus the values are valid and suggestive of slowly progressive residual disease. 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 He was reported to have had STRATTON ab lation post-op years ago, the records of which are not available. Notably:  Component      Latest Ref Rng & Units 2/2/2018 11/30/2016          12:29 PM  2:23 PM   Thyroglobulin by LOCO      1.4 - 29.2 ng/mL 24.4 10.1     Negative whole body scan on 1/18/17  Negative ultrasound on 11/8/17    I recommend repeating a whole body scan, and if negative, we may consider checking a PET/CT for further evaluation while monitoring thyroglobulin levels. With poorly differentiated disease, disease may be present which may not take up iodine on WBS. Concerning his testosterone which he wanted for us to check, Total is >400 however his free T is low around 6, not clear why this is the case, albumin is low not elevated and no medications in his list would be suspected to cause this. Recommend repeating in the future. I have called the patient to discuss all of the above with him, he is in agreement. America Milner.  [facility] Endocrinology  [address] [place] [address]

## 2018-02-14 ENCOUNTER — HOSPITAL ENCOUNTER (OUTPATIENT)
Dept: INFUSION THERAPY | Age: 58
End: 2018-02-14

## 2018-02-15 ENCOUNTER — APPOINTMENT (OUTPATIENT)
Dept: INFUSION THERAPY | Age: 58
End: 2018-02-15

## 2018-02-19 ENCOUNTER — HOSPITAL ENCOUNTER (OUTPATIENT)
Dept: INFUSION THERAPY | Age: 58
Discharge: HOME | End: 2018-02-19
Payer: MEDICARE

## 2018-02-19 VITALS
HEART RATE: 70 BPM | RESPIRATION RATE: 18 BRPM | SYSTOLIC BLOOD PRESSURE: 189 MMHG | DIASTOLIC BLOOD PRESSURE: 90 MMHG | TEMPERATURE: 98.6 F | OXYGEN SATURATION: 98 %

## 2018-02-19 PROCEDURE — 74011250636 HC RX REV CODE- 250/636: Performed by: INTERNAL MEDICINE

## 2018-02-19 PROCEDURE — 96372 THER/PROPH/DIAG INJ SC/IM: CPT

## 2018-02-19 RX ADMIN — THYROTROPIN ALFA 0.9 MG: KIT at 15:16

## 2018-02-19 NOTE — PROGRESS NOTES
[facility] Visit Note:     1500 Pt arrived at [facility] ambulatory and in no distress for Thyrogen day 1. Assessment unremarkable, no new concerns voiced. Pt aware of scheduled appointments at [facility] Nuclear Medicine on Wed and Fri. Visit Vitals    /90 (BP 1 Location: Left arm, BP Patient Position: Sitting)    Pulse 70    Temp 98.6 °F (37 °C)    Resp 18    SpO2 98%       Medications received:  Thyrogen 0.9mg IM to right buttock    1520 Tolerated treatment well, no adverse reaction noted. D/Cd from [facility] ambulatory and in no distress accompanied by self. Next appt 2/20/18 @ 1500.

## 2018-02-20 ENCOUNTER — HOSPITAL ENCOUNTER (OUTPATIENT)
Dept: INFUSION THERAPY | Age: 58
Discharge: HOME | End: 2018-02-20
Payer: MEDICARE

## 2018-02-20 VITALS
SYSTOLIC BLOOD PRESSURE: 173 MMHG | OXYGEN SATURATION: 97 % | TEMPERATURE: 99.6 F | DIASTOLIC BLOOD PRESSURE: 84 MMHG | HEART RATE: 81 BPM | RESPIRATION RATE: 20 BRPM

## 2018-02-20 PROCEDURE — 74011250636 HC RX REV CODE- 250/636: Performed by: INTERNAL MEDICINE

## 2018-02-20 PROCEDURE — 96372 THER/PROPH/DIAG INJ SC/IM: CPT

## 2018-02-20 RX ADMIN — THYROTROPIN ALFA 0.9 MG: KIT at 15:14

## 2018-02-20 NOTE — PROGRESS NOTES
1450 Pt arrived at [facility] ambulatory and in no distress for Thyrogen Day 2. Assessment unchanged, no new complaints voiced. Reminded pt of his scheduled appointments at [facility] Nuclear Medicine on Wed and Fri, pt verbalized understanding. Visit Vitals    /84 (BP 1 Location: Left arm, BP Patient Position: At rest)    Pulse 81    Temp 99.6 °F (37.6 °C)    Resp 20    SpO2 97%       Medications received:  Thyrogen 0.9 mg IM in Left Buttock    1520 Tolerated treatment well, no adverse reaction noted. D/Cd from [facility] ambulatory and in no distress accompanied by self.

## 2018-02-21 ENCOUNTER — HOSPITAL ENCOUNTER (OUTPATIENT)
Dept: NUCLEAR MEDICINE | Age: 58
Discharge: HOME | End: 2018-02-21
Attending: INTERNAL MEDICINE

## 2018-02-21 DIAGNOSIS — C79.9 METASTASIS FROM THYROID CANCER (HCC): ICD-10-CM

## 2018-02-21 DIAGNOSIS — C73 METASTASIS FROM THYROID CANCER (HCC): ICD-10-CM

## 2018-02-23 ENCOUNTER — HOSPITAL ENCOUNTER (OUTPATIENT)
Dept: NUCLEAR MEDICINE | Age: 58
Discharge: HOME | End: 2018-02-23
Attending: INTERNAL MEDICINE
Payer: MEDICARE

## 2018-02-23 PROCEDURE — A9528 IODINE I-131 IODIDE CAP, DX: HCPCS

## 2018-03-07 ENCOUNTER — TELEPHONE (OUTPATIENT)
Dept: ENDOCRINOLOGY | Age: 58
End: 2018-03-07

## 2018-03-07 NOTE — TELEPHONE ENCOUNTER
Called patient to discuss the results of the whole body scan which did suggest any abnormal uptake. However, with his rising thyroglobulin level, it seems there is likely poorly differentiated thyroid tissue remaining, and is consistent with his prior hx of residual disease in the neck for which he had surgery in the past.     Needs further investigation with CT neck and chest. Notably Ultrasound of the neck was not revealing in November 2017. No answer, left general message for patient to call back,    America Milner.  39 [facility] Endocrinology  [address] [place] [address]

## 2018-03-08 ENCOUNTER — TELEPHONE (OUTPATIENT)
Dept: ENDOCRINOLOGY | Age: 58
End: 2018-03-08

## 2018-03-08 DIAGNOSIS — C73 METASTASIS FROM THYROID CANCER (HCC): Primary | ICD-10-CM

## 2018-03-08 DIAGNOSIS — C79.9 METASTASIS FROM THYROID CANCER (HCC): Primary | ICD-10-CM

## 2018-03-08 DIAGNOSIS — R97.8 OTHER ABNORMAL TUMOR MARKERS: ICD-10-CM

## 2018-03-08 NOTE — TELEPHONE ENCOUNTER
Thank you, attempted to reach him yesterday left message. I have attempted to reach him again by phone now, unable to reach, left a general message. I will plan to try to call again soon. Thanks,     Shashank Houser.  [address]

## 2018-03-08 NOTE — TELEPHONE ENCOUNTER
Called patient, attempt #4, was able to reach him, discussed the normal uptake on whole body scan despite elevated thyroglobulin level, and the normal thyroid ultrasound back in November. Discussed the need to proceed with a CT Neck/chest/abdomen/pelvis for further evaluation. He agrees and we will consider if he will need certain treatments/surgery/radiation depending on his result. Not clear that he would benefit from STRATTON considering known poor differentiation, and lack of uptake on whole body scan. Jonathan Mitchell.  [address] [facility] Endocrinology  [facility]

## 2018-03-08 NOTE — TELEPHONE ENCOUNTER
Attempted to reach patient again just now, attempt #3, no answer, left another general message to call back. Will try to reach him again tomorrow. Deshaun Leija.  [address]

## 2018-03-21 DIAGNOSIS — I10 ESSENTIAL HYPERTENSION WITH GOAL BLOOD PRESSURE LESS THAN 130/80: ICD-10-CM

## 2018-03-21 RX ORDER — LISINOPRIL 20 MG/1
40 TABLET ORAL DAILY
Qty: 180 TAB | Refills: 1 | Status: SHIPPED | OUTPATIENT
Start: 2018-03-21 | End: 2019-03-31 | Stop reason: SDUPTHER

## 2018-03-22 ENCOUNTER — HOSPITAL ENCOUNTER (OUTPATIENT)
Dept: CT IMAGING | Age: 58
Discharge: HOME | End: 2018-03-22
Attending: INTERNAL MEDICINE
Payer: MEDICARE

## 2018-03-22 ENCOUNTER — TELEPHONE (OUTPATIENT)
Dept: ENDOCRINOLOGY | Age: 58
End: 2018-03-22

## 2018-03-22 ENCOUNTER — APPOINTMENT (OUTPATIENT)
Dept: CT IMAGING | Age: 58
End: 2018-03-22
Attending: INTERNAL MEDICINE
Payer: MEDICARE

## 2018-03-22 DIAGNOSIS — R97.8 OTHER ABNORMAL TUMOR MARKERS: ICD-10-CM

## 2018-03-22 DIAGNOSIS — C73 METASTASIS FROM THYROID CANCER (HCC): ICD-10-CM

## 2018-03-22 DIAGNOSIS — C73 METASTASIS FROM THYROID CANCER (HCC): Primary | ICD-10-CM

## 2018-03-22 DIAGNOSIS — C79.9 METASTASIS FROM THYROID CANCER (HCC): Primary | ICD-10-CM

## 2018-03-22 DIAGNOSIS — C79.9 METASTASIS FROM THYROID CANCER (HCC): ICD-10-CM

## 2018-03-22 PROCEDURE — 71250 CT THORAX DX C-: CPT

## 2018-03-22 PROCEDURE — 74011636320 HC RX REV CODE- 636/320: Performed by: INTERNAL MEDICINE

## 2018-03-22 PROCEDURE — 70490 CT SOFT TISSUE NECK W/O DYE: CPT

## 2018-03-22 PROCEDURE — 82565 ASSAY OF CREATININE: CPT

## 2018-03-22 PROCEDURE — 74176 CT ABD & PELVIS W/O CONTRAST: CPT

## 2018-03-22 RX ORDER — SODIUM CHLORIDE 9 MG/ML
50 INJECTION, SOLUTION INTRAVENOUS
Status: DISCONTINUED | OUTPATIENT
Start: 2018-03-22 | End: 2018-03-22

## 2018-03-22 RX ORDER — SODIUM CHLORIDE 0.9 % (FLUSH) 0.9 %
10 SYRINGE (ML) INJECTION
Status: DISCONTINUED | OUTPATIENT
Start: 2018-03-22 | End: 2018-03-22

## 2018-03-22 RX ADMIN — IOHEXOL 40 ML: 240 INJECTION, SOLUTION INTRATHECAL; INTRAVASCULAR; INTRAVENOUS; ORAL at 14:04

## 2018-03-22 NOTE — TELEPHONE ENCOUNTER
I called Sara Tracey Lira and relayed the message from Dr. Hilary Jurado regarding the CT results. He understood the information and would like to see a Oncologist. I told him that Dr. Hilary Jurado would make that referral and somone would be contacting him to schedule a appointment.   Andrew Marsh

## 2018-03-22 NOTE — TELEPHONE ENCOUNTER
Thank you,    Referral placed for patient to visit Dr. Kimberly Arenas. Would be good to establish care for consideration of advanced therapies in the setting of metastatic poorly differentiated thyroid cancer with multiple lung nodules that take up iodine poorly. Giovana Ramon.  39 [facility] Endocrinology  [address] [place] [address]

## 2018-03-22 NOTE — PROGRESS NOTES
CT chest abdomen noted for pulmonary nodules which have reportedly mostly decreased in size, onlly several of which have increased slightly in size. Per CT check in September 2016 it was suggested that the pulmonary nodules were slow indolent growths, particularly metastasis from his thyroid cancer. I attempted to call patient to discuss since we are aware that he has persistent metastatic disease, and that it would be a good idea to establish care with an oncologist for joint treatment plans moving forward. His lesions are not iodine-avid and further STRATTON therapy will not likely produce effective treatment. There had been some studies in th past demonstrating restoration of iodine-avidity following TKI therapy, just interesting to note since future STRATTON may still be a consideration. ATTEMPTED TO CALL: NO ANSWER, LEFT GENERAL MESSAGE    Zara DENNIS.  39 [facility] Endocrinology  [address] [place] [address]

## 2018-03-23 ENCOUNTER — OFFICE VISIT (OUTPATIENT)
Dept: ONCOLOGY | Age: 58
End: 2018-03-23

## 2018-03-23 VITALS
TEMPERATURE: 98.6 F | HEIGHT: 69 IN | SYSTOLIC BLOOD PRESSURE: 176 MMHG | WEIGHT: 252.6 LBS | RESPIRATION RATE: 16 BRPM | OXYGEN SATURATION: 96 % | BODY MASS INDEX: 37.41 KG/M2 | HEART RATE: 75 BPM | DIASTOLIC BLOOD PRESSURE: 91 MMHG

## 2018-03-23 DIAGNOSIS — C73 PAPILLARY THYROID CARCINOMA (HCC): Primary | ICD-10-CM

## 2018-03-23 DIAGNOSIS — C78.00 MALIGNANT NEOPLASM METASTATIC TO LUNG, UNSPECIFIED LATERALITY (HCC): ICD-10-CM

## 2018-03-23 LAB — CREAT BLD-MCNC: 1.9 MG/DL (ref 0.6–1.3)

## 2018-03-23 NOTE — PROGRESS NOTES
Ronny Benton is a 66 y.o. male new patient referred by Dr. Martinez Pinedo to provider for met from thyroid cancer. CT on 3/22/18 shows multiple pulmonary nodules, the majority of which have decreased in size, however several have increased slightly in size. Elevated B/P noted; pt states he takes his B/P medication at night and his B/P is always elevated.; other VS stable. Patient denies pain. Good appetite. Patient denies N/V/D and constipation. Visit Vitals    BP (!) 176/91 (BP 1 Location: Left arm, BP Patient Position: Sitting)    Pulse 75    Temp 98.6 °F (37 °C) (Oral)    Resp 16    Ht 5' 9\" (1.753 m)    Wt 252 lb 9.6 oz (114.6 kg)    SpO2 96%    BMI 37.3 kg/m2     Health Maintenance Review: Patient reminded of \"due or due soon\" health maintenance. I have asked the patient to contact his/her primary care provider (PCP) for follow-up on his/her health maintenance.

## 2018-03-24 PROBLEM — E66.01 SEVERE OBESITY (BMI 35.0-39.9) WITH COMORBIDITY (HCC): Status: ACTIVE | Noted: 2018-03-24

## 2018-03-24 NOTE — PROGRESS NOTES
Oncology Consultation Note        Patient: Felisha Burns MRN: [id number]  SSN: [id number]    YOB: 1960  Age: 66 y.o. Sex: male      Subjective:      Felisha Burns is a 66 y.o. male who has been referred for evaluation of recurrent/metastatic carcinoma of the thyroid. He was diagnosed with papillary thyroid in 2002. He underwent a total thyroidectomy by Dr. Dr. Nathaniel Cain. This was followed by STRATTON ablation. He has been on TSH suppression since that time. He is relatively asymptomatic. Last serum thyroglobulin obtained in Feb 2018 is within normal range. A CT obtained recently shows numerous small lung nodules. He is asymptomatic. He denies bone pains, dyspnea, weight loss. He suffers with hoarseness of voice for a long time.        Review of Systems:    Constitutional: negative  Eyes: negative  Ears, Nose, Mouth, Throat, and Face: negative  Respiratory: negative  Cardiovascular: negative  Gastrointestinal: negative  Genitourinary:negative  Integument/Breast: negative  Hematologic/Lymphatic: negative  Musculoskeletal:negative  Neurological: negative        Past Medical History:   Diagnosis Date    Adverse effect of anesthesia     \" STOP BREATHING 1 TIME C ANESTH\"    Calculus of kidney     Cancer ([address] 2004    thyroid cancer    Chronic kidney disease     Chronic pain     BACK SHOULDER AND ARM    Depression     Diabetes ([address]     Hypercholesterolemia     Hypertension     Nausea & vomiting     Other ill-defined conditions(799.89)     cholesterol, thyroid    Sleep apnea     doesn't wear cpap    Thyroid cancer ([address]     TIA (transient ischemic attack) 2011     Past Surgical History:   Procedure Laterality Date    CARDIAC SURG PROCEDURE UNLIST      HX HEENT      THROAT SURGERY X 4    HX ORTHOPAEDIC      back     HX ORTHOPAEDIC      ARM AND SHOULDER    HX OTHER SURGICAL      thyroid, lymphnode    HX RETINAL DETACHMENT REPAIR      left eye    VASCULAR SURGERY PROCEDURE UNLIST cardiac cath NEG. Family History   Problem Relation Age of Onset    Diabetes Mother     Elevated Lipids Mother    Shaneka Waller Bladder Disease Mother     Headache Mother    [facility] Migraines Mother     Heart Disease Mother     Hypertension Mother     Stroke Mother     Other Mother      ANEURSYM BRAIN    Bleeding Prob Father     Cancer Father      LEUKEMIA    Diabetes Father     Elevated Lipids Father     Mental Retardation Sister     Psychiatric Disorder Sister     Cancer Brother      LUNGS     Social History   Substance Use Topics    Smoking status: Former Smoker     Quit date: 8/22/1994    Smokeless tobacco: Never Used    Alcohol use Yes      Comment: Occasionally      Prior to Admission medications    Medication Sig Start Date End Date Taking? Authorizing Provider   lisinopril (PRINIVIL, ZESTRIL) 20 mg tablet Take 2 Tabs by mouth daily. 3/21/18  Yes Griselda Huff MD   cholecalciferol, VITAMIN D3, (VITAMIN D3) 5,000 unit tab tablet Take 1 Tab by mouth daily. 2/2/18  Yes Maryam Estrada MD   glucose blood VI test strips (PHARMACIST CHOICE) strip One Touch  Check glucose 3-4 times daily. DX E11.22 2/2/18  Yes Maryam Estrada MD   Lancets misc Use preferred brand; Check glucose 3-4 times daily, Diagnosis E11.22 2/2/18  Yes Maryam Estrada MD   simvastatin (ZOCOR) 20 mg tablet Take 1 Tab by mouth nightly. 1/15/18  Yes Maryam Estrada MD   levothyroxine (SYNTHROID) 200 mcg tablet Take 1 Tab by mouth Daily (before breakfast). 1/5/18  Yes Chilango Murcia MD   glipiZIDE (GLUCOTROL) 10 mg tablet Take 1 Tab by mouth Before breakfast and dinner.  11/24/17  Yes Griselda Huff MD   Blood-Glucose Meter monitoring kit 1 preferred brand glucometer for checking home glucose, E11.22  Patient taking differently: 1 preferred brand glucometer for checking home glucose, E11.22 One Touch 10/18/17  Yes Maryam Estrada MD   insulin degludec (TRESIBA FLEXTOUCH U-200) 200 unit/mL (3 mL) inpn 25 Units by SubCUTAneous route nightly. 10/18/17  Yes Nia Quinones MD   VENTOLIN HFA 90 mcg/actuation inhaler TAKE 1-2 PUFFS EVEERY 4-6 HOURS AS NEEDED FOR DYSPNEA AND WHEEZING 1/24/18   Historical Provider              Allergies   Allergen Reactions    Anesthetics - Amide Type Shortness of Breath    Flexeril [Cyclobenzaprine] Hives    Tramadol Hives           Objective:     Vitals:    03/23/18 1430   BP: (!) 176/91   Pulse: 75   Resp: 16   Temp: 98.6 °F (37 °C)   TempSrc: Oral   SpO2: 96%   Weight: 252 lb 9.6 oz (114.6 kg)   Height: 5' 9\" (1.753 m)            Physical Exam:    GENERAL: alert, cooperative, no distress, appears stated age  EYE: negative  LYMPHATIC: Cervical, supraclavicular, and axillary nodes normal.   THROAT & NECK: normal and no erythema or exudates noted. Scar from thyroidectomy. LUNG: clear to auscultation bilaterally  HEART: regular rate and rhythm  ABDOMEN: soft, non-tender  EXTREMITIES:  no edema  SKIN: Normal.  NEUROLOGIC: negative        CT Results (most recent):    Results from Hospital Encounter encounter on 03/22/18   CT NECK SOFT TISSUE WO CONT   Narrative INDICATION: Poorly differentiated thyroid cancer, elevated tumor marker, -131  thyroid scan. CT of the neck is performed with 2.5 mm collimation and CT of the chest, abdomen  and pelvis is performed with 5 mm collimation. Study is performed with PO  contrast only. Sagittal and coronal reformatted images were also performed. CT dose reduction was achieved with the use of the standardized protocol  tailored for this examination and automatic exposure control for dose  modulation. Direct comparison is made to prior thyroid scan dated February 23, 2018, thyroid  ultrasound dated November 2017, prior CT of the abdomen and pelvis dated October 2016 and prior CT of the chest dated September 2016. Neck: There is no cervical lymphadenopathy. There is no focal fluid collection  or soft tissue mass. Airway is midline and patent.  Thyroid gland is surgically  absent and there is no soft tissue within the thyroid surgical bed to suggest  recurrent or residual disease. No lytic or sclerotic osseous lesion is  visualized. Mastoid air cells are well pneumatized. Chest:    Lungs: There are multiple bilateral subcentimeter pulmonary nodules,  predominantly within the lung bases. The majority of the lung nodules have  decreased slightly in size, over several have increased slightly in size,  compared to prior CT dated September 2016. Largest left lung nodules located  within the left lower lobe and measures 8 mm in diameter measured 9 mm on prior  CT dated September 2016. Largest right lung nodule is located within the right  lower lobe and measures 1.1 cm in diameter and measured 9 mm in size on prior CT  dated September 2016. As an additional example, there is a 3 mm pulmonary nodule  within the right lower lobe which measured 5 mm in size on prior CT dated  September 2016. Lymph nodes: There is no mediastinal, hilar or axillary lymphadenopathy. Pleura: The pleural spaces are normal.    Heart: The heart is normal in size and there is no pericardial fluid. Bones: No lytic or sclerotic osseous lesion is visualized. Abdomen/pelvis:    Liver: The liver is normal on noncontrast images. Lymph nodes: There is no jude hepatis, mesenteric, retroperitoneal or pelvic  lymphadenopathy. Spleen: The spleen is normal on noncontrast images. Pancreas: The pancreas is normal on noncontrast images. Adrenals: There is a 1.7 cm x 1.5 cm left adrenal adenoma, unchanged. Right  adrenal gland is normal.    Gallbladder: There are several gallstones layering within the gallbladder; there  is no CT evidence of acute cholecystitis. Kidneys: The kidneys are normal on noncontrast images. Bowel: No dilated or thickened loop of large or small bowel is seen.     Appendix: The appendix is normal.    Urinary bladder: Urinary bladder is partially filled and grossly normal.    Bones: No lytic or sclerotic osseous lesion is visualized. Miscellaneous: There is no free intraperitoneal gas or fluid. There is no focal  fluid collection to suggest abscess. Impression IMPRESSION: Multiple pulmonary nodules, the majority of which have decreased in  size, however several have increased slightly in size. Assessment:     1. 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   Radio-iodine refractory     ECOG PS 0  Intent of treatment - palliative      2002 Biopsy suggesting PTC                        S/p total thyroidectomy                        S/p STRATTON  [id number]  Reports negative WBS  10/10/16  Repeat surgery, 2 right paratrachial LN's                        Surgical Path: poorly differentiated PTC                        Patient with hoarseness of voice, persistent                          On 200mcg LT4    11/30/16 Tg 10.1, TgAb negative, TSH 1.93  12/14/16 Neck Ultrasound: \"Thyroid bed is empty, no anterior cervical mass or significant adenopathy\"  01/18/17 Thyrogen stimulated WBS: \"No evidence of uptake in thyroid bed, No evidence of metastatic disease\"    He has never been on any of the approved TKI   CT shows numerous lung nodules.     He is asymptomatic from lung nodules     I discussed possibility of observation and close follow up. I also discussed approved treatment such as lenvatinib and sorafenib, both treatment are endorsed by NCCN guidelines.      SELECT trial - a phase 3, randomized, double-blind, multicenter study involving patients with progressive thyroid cancer that was refractory to iodine-131 were randomly assigned to receive lenvatinib (at a daily dose of 24 mg per day in 28-day cycles) or to receive placebo. At the time of disease progression, patients in the placebo group could receive open-label lenvatinib.  The primary end point was progression-freesurvival. Secondary end points included the response rate, overall survival, and safety. The median progression-free survival was 18.3 months in the lenvatinib group and 3.6 months in the placebo group (hazard ratio for progression or death, 0.21; 99% confidence interval, 0.14 to 0.31; P<0.001). A progression-free survival benefit associated with lenvatinib was observed in all prespecified subgroups. The response rate was 64.8% in the lenvatinib group (4 complete responses and 165 partial responses) and 1.5% in the placebo group (P<0.001). The median overall survival was not reached in either group.      Treatment-related adverse effects of any grade, which occurred in more than 40% of patients in the lenvatinib group, were hypertension (in 67.8% of the patients), diarrhea (in 59.4%), fatigue or asthenia (in 59.0%), decreased appetite (in 50.2%), decreased weight (in 46.4%), and nausea (in 41.0%). Discontinuations of the study drug because of adverse effects occurred in 37 patients who received lenvatinib (14.2%) and 3 patients who received placebo (2.3%). In the lenvatinib group, 6 of 20 deaths that occurred during the treatment period were considered to be drug-related.      I spent 65 minute with the patient in a face-to-face encounter. I explained him the stage of the disease, pathophysiology of the disease and the treatment approaches. I answered all his questions. More than 50% of the time was utilized in education, counseling and co-ordination of care. After a detailed discussion, I recommended observation at this time. If and when the disease progressed significantly or he becomes symptomatic from the existing disease, I will treat him with Lenvatinib. The patient's emotional well being was addressed during this office visit and patient seems to be coping well with the diagnosis and the treatment. Patient will be meeting with navigation services to discuss any financial barriers to care/estimated cost of care. Plan:       1. TSH suppression with Levothyroxine   2. Observation  3. Bone scan  4. Return in 6 months with CT of the lungs        Signed By: Agata Enriquez MD     March 24, 2018          CC. Gabriel Gray MD  CC.  Oracio Arango MD

## 2018-04-04 ENCOUNTER — HOSPITAL ENCOUNTER (OUTPATIENT)
Dept: NUCLEAR MEDICINE | Age: 58
Discharge: HOME | End: 2018-04-04
Attending: INTERNAL MEDICINE
Payer: MEDICARE

## 2018-04-04 DIAGNOSIS — C73 PAPILLARY THYROID CARCINOMA (HCC): ICD-10-CM

## 2018-04-04 PROCEDURE — A9503 TC99M MEDRONATE: HCPCS

## 2018-07-05 ENCOUNTER — OFFICE VISIT (OUTPATIENT)
Dept: ENDOCRINOLOGY | Age: 58
End: 2018-07-05

## 2018-07-05 VITALS
SYSTOLIC BLOOD PRESSURE: 130 MMHG | BODY MASS INDEX: 36.18 KG/M2 | HEIGHT: 69 IN | DIASTOLIC BLOOD PRESSURE: 75 MMHG | HEART RATE: 73 BPM | WEIGHT: 244.3 LBS

## 2018-07-05 DIAGNOSIS — E78.5 HYPERLIPIDEMIA, UNSPECIFIED HYPERLIPIDEMIA TYPE: ICD-10-CM

## 2018-07-05 DIAGNOSIS — E89.0 POST-SURGICAL HYPOTHYROIDISM: ICD-10-CM

## 2018-07-05 DIAGNOSIS — I10 ESSENTIAL HYPERTENSION WITH GOAL BLOOD PRESSURE LESS THAN 130/80: ICD-10-CM

## 2018-07-05 DIAGNOSIS — E11.22 TYPE 2 DIABETES MELLITUS WITH STAGE 3 CHRONIC KIDNEY DISEASE, WITH LONG-TERM CURRENT USE OF INSULIN (HCC): Primary | ICD-10-CM

## 2018-07-05 DIAGNOSIS — C73 METASTASIS FROM THYROID CANCER (HCC): ICD-10-CM

## 2018-07-05 DIAGNOSIS — E29.1 MALE HYPOGONADISM: ICD-10-CM

## 2018-07-05 DIAGNOSIS — N18.30 TYPE 2 DIABETES MELLITUS WITH STAGE 3 CHRONIC KIDNEY DISEASE, WITH LONG-TERM CURRENT USE OF INSULIN (HCC): Primary | ICD-10-CM

## 2018-07-05 DIAGNOSIS — Z12.5 ENCOUNTER FOR SCREENING FOR MALIGNANT NEOPLASM OF PROSTATE: ICD-10-CM

## 2018-07-05 DIAGNOSIS — C73 PAPILLARY THYROID CARCINOMA (HCC): ICD-10-CM

## 2018-07-05 DIAGNOSIS — C79.9 METASTASIS FROM THYROID CANCER (HCC): ICD-10-CM

## 2018-07-05 DIAGNOSIS — Z79.4 TYPE 2 DIABETES MELLITUS WITH STAGE 3 CHRONIC KIDNEY DISEASE, WITH LONG-TERM CURRENT USE OF INSULIN (HCC): Primary | ICD-10-CM

## 2018-07-05 RX ORDER — METFORMIN HYDROCHLORIDE 500 MG/1
500 TABLET, EXTENDED RELEASE ORAL
Qty: 180 TAB | Refills: 3 | Status: SHIPPED | OUTPATIENT
Start: 2018-07-05 | End: 2020-07-22

## 2018-07-05 RX ORDER — TESTOSTERONE 30 MG/1.5ML
2 SOLUTION TOPICAL DAILY
Qty: 1 BOTTLE | Refills: 5 | Status: SHIPPED | OUTPATIENT
Start: 2018-07-05 | End: 2019-01-08 | Stop reason: SDUPTHER

## 2018-07-05 NOTE — PROGRESS NOTES
CHIEF COMPLAINT: f/u evaluation for uncontrolled type 2 diabetes and thyroid cancer s/p thyroidectomy and STRATTON    HISTORY OF PRESENT ILLNESS:   Inessa Mg is a 66 y.o. male with a PMHx as noted below who presents to the endocrinology clinic for f/u evaluation of uncontrolled type 2 diabetes. A1c today climbed to 9.2%, Not     Diabetes type 2:  Currently taking the following meds:  Tresiba 25 units   Glipizide 10 mg with breakfast and dinner (forgets dinner dose at times)    Review of home blood glucose:   AM: 147 - 170's  No log or meter    Review of most recent diabetes-related labs:  Lab Results   Component Value Date    HBA1C 9.7 (H) 10/10/2016    HBA1C 11.1 (H) 10/06/2016    HBA1C 8.9 (H) 06/17/2009    TYL2IBTZ 8.8 02/02/2018    EUJ7BNUF 10.8 09/18/2017    MIG9DMGP 9.5 01/30/2017    CHOL 129 01/31/2018    LDLC 56 01/31/2018    GFRAA 48 (L) 01/31/2018    GFRNA 41 (L) 01/31/2018    MCACR 2606.9 (H) 09/18/2017    TSH 2.220 01/31/2018    VITD3 23.7 (L) 01/31/2018     896508 = IA-2 pancreatic islet cell autoantibody  GADLT = CLAIRE-65 autoantibody   MCACR = Urine Microalbumin    --------------------------------------------------    Thyroid Cancer:     2002 Biopsy suggesting PTC   S/p total thyroidectomy   S/p STRATTON  [id number]  Reports negative WBS  10/10/16  Repeat surgery, 2 right paratrachial LN's   Surgical Path: poorly differentiated PTC   Patient with hoarseness of voice, persistent   Continued on 200mcg LT4  11/29/16  Initial visit with me for thyroid cancer and diabetes  11/30/16 Tg 10.1, TgAb negative, TSH 1.93 (patient notes this is the lowest Tg level compared with prior)  12/14/16 Neck Ultrasound: \"Thyroid bed is empty, no anterior cervical mass or significant adenopathy\"  01/18/17 Thyrogen stimulated WBS: \"No evidence of uptake in thyroid bed, No evidence of metastatic disease\"  11/08/17 Thyroid US, no residual thyroid tissue, no pathologic adenopathy.    01/31/18: TSH 2.2 on 200 mcg LT4,  Tg/TgAb was not collected  02/02/18: Tg 24.4 unstimulated, TgAb <1  (led to further eval below)  02/21/18: I-131 WBS: no uptake in thyroid, normal physiologic update otherwise noted. 03/22/18: CT Neck/chest/abdomen: Multiple pulm nodules, some larger some smaller. 04/04/18: NM Bone Scan: No evidence of bony metastases. Reports he is feeling well,  Continues on 200 mcg of levothyroxine daily,  He met with Dr. Beata Momin to discuss his condition and they agreed on monitoring for asymptomatic disease for now.       ------------  Male hypogonadism:   Checked based on patients request previously,   Free testosterone is low and patient is symptomatic,  Component      Latest Ref Rng & Units 2/2/2018 2/2/2018          12:29 PM 12:29 PM   Testosterone      264 - 916 ng/dL  403   Free testosterone (Direct)      7.2 - 24.0 pg/mL  6.2 (L)   Luteinizing hormone      1.7 - 8.6 mIU/mL 7.6    FSH      1.5 - 12.4 mIU/mL 11.3        PAST MEDICAL/SURGICAL HISTORY:   Past Medical History:   Diagnosis Date    Adverse effect of anesthesia     \" STOP BREATHING 1 TIME C ANESTH\"    Calculus of kidney     Cancer ([address] 2004    thyroid cancer    Chronic kidney disease     Chronic pain     BACK SHOULDER AND ARM    Depression     Diabetes ([address]     Hypercholesterolemia     Hypertension     Nausea & vomiting     Other ill-defined conditions(799.89)     cholesterol, thyroid    Sleep apnea     doesn't wear cpap    Thyroid cancer ([address]     TIA (transient ischemic attack) 2011     Past Surgical History:   Procedure Laterality Date    CARDIAC SURG PROCEDURE UNLIST      HX HEENT      THROAT SURGERY X 4    HX ORTHOPAEDIC      back     HX ORTHOPAEDIC      ARM AND SHOULDER    HX OTHER SURGICAL      thyroid, lymphnode    HX RETINAL DETACHMENT REPAIR      left eye    VASCULAR SURGERY PROCEDURE UNLIST      cardiac cath NEG.        ALLERGIES:   Allergies   Allergen Reactions    Anesthetics - Amide Type Shortness of Breath    Flexeril [Cyclobenzaprine] Hives    Tramadol Hives       MEDICATIONS ON ADMISSION:     Current Outpatient Prescriptions:     lisinopril (PRINIVIL, ZESTRIL) 20 mg tablet, Take 2 Tabs by mouth daily. , Disp: 180 Tab, Rfl: 1    cholecalciferol, VITAMIN D3, (VITAMIN D3) 5,000 unit tab tablet, Take 1 Tab by mouth daily. , Disp: 90 Tab, Rfl: 3    glucose blood VI test strips (PHARMACIST CHOICE) strip, One Touch  Check glucose 3-4 times daily. DX E11.22, Disp: 300 Strip, Rfl: 3    Lancets misc, Use preferred brand; Check glucose 3-4 times daily, Diagnosis E11.22, Disp: 2 Package, Rfl: 3    simvastatin (ZOCOR) 20 mg tablet, Take 1 Tab by mouth nightly., Disp: 90 Tab, Rfl: 3    glipiZIDE (GLUCOTROL) 10 mg tablet, Take 1 Tab by mouth Before breakfast and dinner., Disp: 180 Tab, Rfl: 1    Blood-Glucose Meter monitoring kit, 1 preferred brand glucometer for checking home glucose, E11.22 (Patient taking differently: 1 preferred brand glucometer for checking home glucose, E11.22 One Touch), Disp: 1 Kit, Rfl: 0    VENTOLIN HFA 90 mcg/actuation inhaler, TAKE 1-2 PUFFS EVEERY 4-6 HOURS AS NEEDED FOR DYSPNEA AND WHEEZING, Disp: , Rfl: 0    levothyroxine (SYNTHROID) 200 mcg tablet, Take 1 Tab by mouth Daily (before breakfast). , Disp: 90 Tab, Rfl: 3    insulin degludec (TRESIBA FLEXTOUCH U-200) 200 unit/mL (3 mL) inpn, 25 Units by SubCUTAneous route nightly., Disp: 6 Pen, Rfl: 3    SOCIAL HISTORY:   Social History     Social History    Marital status: LEGALLY      Spouse name: N/A    Number of children: N/A    Years of education: N/A     Occupational History    Not on file.      Social History Main Topics    Smoking status: Former Smoker     Quit date: 8/22/1994    Smokeless tobacco: Never Used    Alcohol use Yes      Comment: Occasionally    Drug use: No    Sexual activity: No     Other Topics Concern    Not on file     Social History Narrative       FAMILY HISTORY:  Family History   Problem Relation Age of Onset    Diabetes Mother     Elevated Lipids Mother    Hazeline Popper Bladder Disease Mother     Headache Mother    [facility] Migraines Mother     Heart Disease Mother     Hypertension Mother     Stroke Mother     Other Mother      ANEURSYM BRAIN    Bleeding Prob Father     Cancer Father      LEUKEMIA    Diabetes Father     Elevated Lipids Father     Mental Retardation Sister     Psychiatric Disorder Sister     Cancer Brother      LUNGS       REVIEW OF SYSTEMS: Complete ROS assessed and noted for that which is described above, all else are negative. Eyes: normal  ENT: hoarseness of voice  CVS: normal  Resp: normal  GI: normal  : normal  GYN: normal  Endocrine: normal  Integument: normal  Musculoskeletal: chronic pain  Neuro: normal  Psych: normal      PHYSICAL EXAMINATION:    VITAL SIGNS:  Visit Vitals    /75 (BP 1 Location: Right arm, BP Patient Position: Sitting)    Pulse 73    Ht 5' 9\" (1.753 m)    Wt 244 lb 4.8 oz (110.8 kg)    BMI 36.08 kg/m2       GENERAL: NCAT, Sitting comfortably, NAD  EYES: EOMI, non-icteric, no proptosis  Ear/Nose/Throat: NCAT, no inflammation, no masses  LYMPH NODES: No LAD  CARDIOVASCULAR: S1 S2, RRR, No murmur, 2+ radial pulses  RESPIRATORY: CTA b/l, no wheeze/rales  GASTROINTESTINAL: NT, ND,  MUSCULOSKELETAL: Normal ROM, no atrophy  SKIN: warm, no edema/rash/ or other skin changes  NEUROLOGIC: 5/5 power all extremities, no tremors, AAOx3  PSYCHIATRIC: Normal affect, Normal insight and judgement    REVIEW OF LABORATORY AND RADIOLOGY DATA:   Labs and documentation have been reviewed as described above. ASSESSMENT AND PLAN:   Kamar Nguyen is a 66 y.o. male with a PMHx as noted above who presents to the endocrinology clinic for f/u evaluation of uncontrolled type 2 diabetes and thyroid cancer.     DM2 uncontrolled  HTN  HLD  Thyroid cancer with active metastases   Post surgical hypothyroidism  Male Hypogonadism    Ultimately patients diabetes remains poorly controled as he himself noted due to missing the tresiba doses often. He does still try to take it now and then. Glipizide alone will not get his diabetes controlled but I believe half dose of metformin should be a big help, and his GFR is in the 40's which is reasonable, and I believe complication from his blood sugars is more significantly likely compared with any complication from his treatment. Concerning his testosterone I believe a trial of testosterone gel is warranted to improve the quality of his life, and he is interested in treatment. I advised him of the monitoring parameters/labs, and the need to notify me if he begins to feel ill or uncomfortable. His thyroid status is stable for now, though his unstimulated thyroglobulin level has been trending upward with known pulmonary nodules. He will be monitoring it for now and has established care with Dr. Nithya Alejo, and I am thankful for his help on this case. DM2:  Tresiba: reduce to 18 units  Start Metformin 500mg BID, stop only if GFR <30  Continue Glipizide to 10mg BID  Next step will be a dose of short acting insulin with largest meal of the day   Continue to check glucose 2 times daily,  Foot exam was completed today,    HTN: BP stable, on lisinopril  HLD: simvastatin 20mg, tolerating. Male Hypogonadism: Start trial of testosterone gel, 2 pumps / day, will reassess with pre-labs. Vitamin D deficiency:  5000 units/day    Thyroid CA:   Continue 200 mcg once daily  TSH/Tg/TgAb  He will f/u with Dr. Nithya Alejo in the future with repeat imaging  Monitoring for progression while asymptomatic for now,    RTC in 3 months with Richelle dasilva  [address] [facility]

## 2018-07-05 NOTE — MR AVS SNAPSHOT
[address] [address] [phone] Patient: Leroy Perez MRN: [id number] ZWS:2/23/3120 Visit Information Date & Time Provider Department Dept. Phone Encounter #  
 7/5/2018  3:50 PM Edinson Louie, 1024 [facility] [id number] [phone] Follow-up Instructions Return in about 3 months (around 10/5/2018). Your Appointments 9/27/2018  1:15 PM  
ESTABLISHED PATIENT with Chandrakant Julien MD  
[address] [facility] at [facility]) Appt Note: onc 6 mth f/u  
 200 [facility] [address] [address]  
327 [facility] 600 [facility] [address] Dates Dr Mitch Potter Upcoming Health Maintenance Date Due  
 EYE EXAM RETINAL OR DILATED Q1 2/25/1972 FOBT Q 1 YEAR AGE 50-75 2/25/2012 HEMOGLOBIN A1C Q6M 8/2/2018 Influenza Age 5 to Adult 8/1/2018 Pneumococcal 19-64 Highest Risk (2 of 3 - PCV13) 9/18/2018 MICROALBUMIN Q1 9/18/2018 MEDICARE YEARLY EXAM 9/19/2018 LIPID PANEL Q1 1/31/2019 FOOT EXAM Q1 2/2/2019 DTaP/Tdap/Td series (2 - Td) 9/18/2027 Allergies as of 7/5/2018  Review Complete On: 7/5/2018 By: Edinson Louie MD  
  
 Severity Noted Reaction Type Reactions Anesthetics - Amide Type  03/01/2016    Shortness of Breath Flexeril [Cyclobenzaprine]  07/26/2016    Hives Tramadol  03/01/2016    Hives Current Immunizations  Reviewed on 2/20/2018 Name Date Influenza Vaccine (Quad) PF 10/11/2016 10:16 AM  
  
 Not reviewed this visit You Were Diagnosed With   
  
 Codes Comments Type 2 diabetes mellitus with stage 3 chronic kidney disease, with long-term current use of insulin (HCC)    -  Primary ICD-10-CM: E11.22, N18.3, Z79.4 ICD-9-CM: 250.40, 585.3, V58.67  Essential hypertension with goal blood pressure less than 130/80 ICD-10-CM: I10 
ICD-9-CM: 401.9 Hyperlipidemia, unspecified hyperlipidemia type     ICD-10-CM: E78.5 ICD-9-CM: 272.4 Papillary thyroid carcinoma ([facility]     ICD-10-CM: E12 ICD-9-CM: 883 Metastasis from thyroid cancer ([facility].)     ICD-10-CM: C79.9, C73 ICD-9-CM: 199.1, 193 Male hypogonadism     ICD-10-CM: E29.1 ICD-9-CM: 257.2 Post-surgical hypothyroidism     ICD-10-CM: E89.0 ICD-9-CM: 244.0 Encounter for screening for malignant neoplasm of prostate     ICD-10-CM: Z12.5 ICD-9-CM: V76.44 Vitals BP Pulse Height(growth percentile) Weight(growth percentile) BMI Smoking Status 130/75 (BP 1 Location: Right arm, BP Patient Position: Sitting) 73 5' 9\" (1.753 m) 244 lb 4.8 oz (110.8 kg) 36.08 kg/m2 Former Smoker Vitals History BMI and BSA Data Body Mass Index Body Surface Area 36.08 kg/m 2 2.32 m 2 Preferred Pharmacy Pharmacy Name Phone [id number] [place] [phone] Your Updated Medication List  
  
   
This list is accurate as of 7/5/18  4:42 PM.  Always use your most recent med list.  
  
  
  
  
 Blood-Glucose Meter monitoring kit 1 preferred brand glucometer for checking home glucose, E11.22  
  
 cholecalciferol (VITAMIN D3) 5,000 unit Tab tablet Commonly known as:  VITAMIN D3 Take 1 Tab by mouth daily. glipiZIDE 10 mg tablet Commonly known as:  Tyesha Betters Take 1 Tab by mouth Before breakfast and dinner. glucose blood VI test strips strip Commonly known as:  PHARMACIST CHOICE One Touch  Check glucose 3-4 times daily. DX E11.22  
  
 insulin degludec 200 unit/mL (3 mL) Inpn Commonly known as:  TRESIBA FLEXTOUCH U-200  
25 Units by SubCUTAneous route nightly. Lancets Misc Use preferred brand; Check glucose 3-4 times daily, Diagnosis E11.22  
  
 levothyroxine 200 mcg tablet Commonly known as:  SYNTHROID Take 1 Tab by mouth Daily (before breakfast). lisinopril 20 mg tablet Commonly known as:  Sabrina Needy Take 2 Tabs by mouth daily. metFORMIN  mg tablet Commonly known as:  GLUCOPHAGE XR Take 1 Tab by mouth Before breakfast and dinner. simvastatin 20 mg tablet Commonly known as:  ZOCOR Take 1 Tab by mouth nightly. testosterone 30 mg/actuation (1.5 mL) Slpm  
2 Actuation(s) by TransDERmal route daily. Max Daily Amount: 2 Actuation(s). VENTOLIN HFA 90 mcg/actuation inhaler Generic drug:  albuterol TAKE 1-2 PUFFS EVEERY 4-6 HOURS AS NEEDED FOR DYSPNEA AND WHEEZING Prescriptions Printed Refills  
 testosterone 30 mg/actuation (1.5 mL) slpm 5 Si Actuation(s) by TransDERmal route daily. Max Daily Amount: 2 Actuation(s). Class: Print Route: TransDERmal  
  
Prescriptions Sent to Pharmacy Refills  
 metFORMIN ER (GLUCOPHAGE XR) 500 mg tablet 3 Sig: Take 1 Tab by mouth Before breakfast and dinner. Class: Normal  
 Pharmacy: [address] [place] [address] Ph #: [phone] Route: Oral  
  
We Performed the Following AMB POC HEMOGLOBIN A1C [48800 CPT(R)] CBC W/O DIFF [61570 CPT(R)] HEMOGLOBIN A1C WITH EAG [88313 CPT(R)] MICROALBUMIN, UR, RAND W/ MICROALB/CREAT RATIO [id number] CPT(R)] PSA SCREENING (SCREENING) [ HCP] T4, FREE [id number] CPT(R)] TESTOSTERONE, FREE & TOTAL [35740 CPT(R)] THYROGLOBULIN AB + THYROGLOBULIN [id number] CPT(R)] TSH 3RD GENERATION [44463 CPT(R)] Follow-up Instructions Return in about 3 months (around 10/5/2018). To-Do List   
 2018 10:30 AM  
  Appointment with [facility] 2 at [facility] ([phone]) NON-CONTRAST STUDY: 1. Bring any non [facility] facility films/images pertaining to the area of interest with you on the day of appointment.  2. Check in at registration at least 30 minutes before appt time unless you were instructed to do otherwise. 3. If you have to drink oral contrast please pick it up any weekday prior to your appointment, if you cannot please check in 2 hrs  before appt time. Patient Instructions Diabetes:  
Toni Dines: Reduce to 18 units once daily Glipizide: Continue 10mg with breakfast and dinner Start metformin, take twice daily with the glipizide Thyroid:  
Continue 200 mcg daily of levothyroxine, We will check your levels with labs before next visit. Testosterone:  
Start testosterone gel, use 2 pumps daily, one pump for each shoulder, Wash hands after, and allow to dry before coming into contact with anyone or putting on your shirt, Be sure to repeat your labs the week before your next visit,  
 
Call with Glen foster. [address] [facility]! Janelle Delcid introduces [employer] patient portal. Now you can access parts of your medical record, email your doctor's office, and request medication refills online. 1. In your internet browser, go to [email or web address] 2. Click on the First Time User? Click Here link in the Sign In box. You will see the New Member Sign Up page. 3. Enter your [employer] Access Code exactly as it appears below. You will not need to use this code after youve completed the sign-up process. If you do not sign up before the expiration date, you must request a new code. · [employer] Access Code: [email or web address] Expires: 10/3/2018  4:30 PM 
 
4. Enter the last four digits of your Social Security Number (xxxx) and Date of Birth (mm/dd/yyyy) as indicated and click Submit. You will be taken to the next sign-up page. 5. Create a [employer] ID. This will be your [employer] login ID and cannot be changed, so think of one that is secure and easy to remember. 6. Create a [employer] password. You can change your password at any time. 7. Enter your Password Reset Question and Answer. This can be used at a later time if you forget your password. 8. Enter your e-mail address. You will receive e-mail notification when new information is available in [address] 9. Click Sign Up. You can now view and download portions of your medical record. 10. Click the Download Summary menu link to download a portable copy of your medical information. If you have questions, please visit the Frequently Asked Questions section of the [employer] website. Remember, [employer] is NOT to be used for urgent needs. For medical emergencies, dial 911. Now available from your iPhone and Android! Please provide this summary of care documentation to your next provider. Your primary care clinician is listed as Ulisses Reeder. If you have any questions after today's visit, please call [phone].

## 2018-07-05 NOTE — PATIENT INSTRUCTIONS
Diabetes:   Massimo Young: Reduce to 18 units once daily  Glipizide: Continue 10mg with breakfast and dinner  Start metformin, take twice daily with the glipizide    Thyroid:   Continue 200 mcg daily of levothyroxine,   We will check your levels with labs before next visit. Testosterone:   Start testosterone gel, use 2 pumps daily, one pump for each shoulder,   Wash hands after, and allow to dry before coming into contact with anyone or putting on your shirt,   Be sure to repeat your labs the week before your next visit,     Call with Lane foster.  39 [facility] Endocrinology  [address] [place] [address]

## 2018-07-06 ENCOUNTER — TELEPHONE (OUTPATIENT)
Dept: ENDOCRINOLOGY | Age: 58
End: 2018-07-06

## 2018-07-06 DIAGNOSIS — E29.1 MALE HYPOGONADISM: Primary | ICD-10-CM

## 2018-07-06 LAB — HBA1C MFR BLD HPLC: 9.2 %

## 2018-07-06 NOTE — TELEPHONE ENCOUNTER
Patient is calling in regards to a prescription that was given to him on yesterday(07.05.18) at his visit , [facility] and was informed that [facility] was \"something wrong with the script. \"    Patient contact:  [phone].

## 2018-07-06 NOTE — TELEPHONE ENCOUNTER
I returned Mr. Box's call. He said that the testosterone that was prescribed yesterday needed a P/A. I told him I was already working on this but I needed him to go to the lab and have a 2nd testosterone level before I could go any further with this. He will go have this done.   Abhishek Cuellar

## 2018-07-11 ENCOUNTER — TELEPHONE (OUTPATIENT)
Dept: ENDOCRINOLOGY | Age: 58
End: 2018-07-11

## 2018-07-11 LAB
ALBUMIN/CREAT UR: 1802.8 MG/G CREAT (ref 0–30)
CREAT UR-MCNC: 68.3 MG/DL
ERYTHROCYTE [DISTWIDTH] IN BLOOD BY AUTOMATED COUNT: 13.7 % (ref 12.3–15.4)
EST. AVERAGE GLUCOSE BLD GHB EST-MCNC: 226 MG/DL
HBA1C MFR BLD: 9.5 % (ref 4.8–5.6)
HCT VFR BLD AUTO: 37.9 % (ref 37.5–51)
HGB BLD-MCNC: 12.4 G/DL (ref 13–17.7)
INTERPRETATION: NORMAL
INTERPRETATION: NORMAL
Lab: NORMAL
MCH RBC QN AUTO: 29.6 PG (ref 26.6–33)
MCHC RBC AUTO-ENTMCNC: 32.7 G/DL (ref 31.5–35.7)
MCV RBC AUTO: 91 FL (ref 79–97)
MICROALBUMIN UR-MCNC: 1231.3 UG/ML
PLATELET # BLD AUTO: 357 X10E3/UL (ref 150–379)
PSA SERPL-MCNC: 1 NG/ML (ref 0–4)
RBC # BLD AUTO: 4.19 X10E6/UL (ref 4.14–5.8)
T4 FREE SERPL-MCNC: 1.49 NG/DL (ref 0.82–1.77)
TESTOST FREE SERPL-MCNC: 11.1 PG/ML (ref 7.2–24)
TESTOST SERPL-MCNC: 347 NG/DL (ref 264–916)
THYROGLOB AB SERPL-ACNC: <1 IU/ML (ref 0–0.9)
THYROGLOB SERPL-MCNC: 13.6 NG/ML (ref 1.4–29.2)
TSH SERPL DL<=0.005 MIU/L-ACNC: 0.07 UIU/ML (ref 0.45–4.5)
WBC # BLD AUTO: 8.2 X10E3/UL (ref 3.4–10.8)

## 2018-07-11 NOTE — TELEPHONE ENCOUNTER
Joselin Narayanan,    1. Could you notify Mr. Puja Bar that his repeat testosterone level was normal and healthy. It is not clear why it was low before but he certainly does not need treatment currently. 2. The lab collected all of his orders that were dated for the October, rather than the new testosterone order we were repeating. Can you make our [address] Mahesh aware of this, and advise her that we need feedback about why orders like this are being collected before their start dates ? This is a very important issue. I would like for her to look into his case and get back to us because this patient and other should not get charged for these mistakes. He should not be charged for any of the labs that were just completed other than testosterone since that was ordered separately to be checked. Thank you,    Rosa Maria Lin.  39 [facility] Endocrinology  [address] [place] [address]

## 2018-07-11 NOTE — TELEPHONE ENCOUNTER
I attempted to call Mr. Box. I reached his voice mail. I left him a message asking him to give me a call back. I will also call the [facility] Rep and arrange for her to come in to look into this.   Franky

## 2018-07-11 NOTE — TELEPHONE ENCOUNTER
Allegra Mcpherson called me back and I relayed the message from Dr. Jada Jones. He understood the information.   Pino Mack

## 2018-07-16 ENCOUNTER — APPOINTMENT (OUTPATIENT)
Dept: ULTRASOUND IMAGING | Age: 58
End: 2018-07-16
Attending: PHYSICIAN ASSISTANT
Payer: MEDICARE

## 2018-07-16 ENCOUNTER — HOSPITAL ENCOUNTER (EMERGENCY)
Age: 58
Discharge: LEFT BEFORE DISCHARGE | End: 2018-07-16
Attending: EMERGENCY MEDICINE
Payer: MEDICARE

## 2018-07-16 VITALS
SYSTOLIC BLOOD PRESSURE: 164 MMHG | HEART RATE: 84 BPM | TEMPERATURE: 98.4 F | DIASTOLIC BLOOD PRESSURE: 93 MMHG | RESPIRATION RATE: 16 BRPM | OXYGEN SATURATION: 100 %

## 2018-07-16 PROCEDURE — 75810000275 HC EMERGENCY DEPT VISIT NO LEVEL OF CARE

## 2018-09-14 ENCOUNTER — HOSPITAL ENCOUNTER (OUTPATIENT)
Dept: CT IMAGING | Age: 58
Discharge: HOME | End: 2018-09-14
Attending: INTERNAL MEDICINE
Payer: MEDICARE

## 2018-09-14 DIAGNOSIS — C73 PAPILLARY THYROID CARCINOMA (HCC): ICD-10-CM

## 2018-09-14 PROCEDURE — 71250 CT THORAX DX C-: CPT

## 2018-09-27 ENCOUNTER — OFFICE VISIT (OUTPATIENT)
Dept: ONCOLOGY | Age: 58
End: 2018-09-27

## 2018-09-27 VITALS
BODY MASS INDEX: 35.4 KG/M2 | TEMPERATURE: 98.8 F | SYSTOLIC BLOOD PRESSURE: 163 MMHG | HEART RATE: 74 BPM | RESPIRATION RATE: 18 BRPM | DIASTOLIC BLOOD PRESSURE: 96 MMHG | HEIGHT: 69 IN | OXYGEN SATURATION: 95 % | WEIGHT: 239 LBS

## 2018-09-27 DIAGNOSIS — C78.00 MALIGNANT NEOPLASM METASTATIC TO LUNG, UNSPECIFIED LATERALITY (HCC): ICD-10-CM

## 2018-09-27 DIAGNOSIS — C73 PAPILLARY THYROID CARCINOMA (HCC): Primary | ICD-10-CM

## 2018-09-27 NOTE — PROGRESS NOTES
Joseph Bojorquez is a 66 y.o. male    Chief Complaint   Patient presents with    Follow-up     6 month       1. Have you been to the ER, urgent care clinic since your last visit? Hospitalized since your last visit? ER for tick bite at [facility] about 3 month ago. 2. Have you seen or consulted any other health care providers outside of the [address] [place] [address] since your last visit? Include any pap smears or colon screening.  No

## 2018-09-27 NOTE — PROGRESS NOTES
Oncology Follow Up Note        Patient: Matthieu Hoang MRN: [id number]  SSN: [id number]    YOB: 1960  Age: 66 y.o. Sex: male      Subjective:      Matthieu Hoang is a 66 y.o. male with a diagnosis of recurrent/metastatic carcinoma of the thyroid. He was diagnosed with papillary thyroid in 2002. He underwent a total thyroidectomy by Dr. Dr. Jahaira Fowler. This was followed by STRATTON ablation. He has been on TSH suppression since that time. He is relatively asymptomatic. Last serum thyroglobulin obtained in Feb 2018 is within normal range. A CT obtained recently shows numerous small lung nodules. He is asymptomatic. He denies bone pains, dyspnea, weight loss. He suffers with hoarseness of voice for a long time. Review of Systems:    Constitutional: negative  Eyes: negative  Ears, Nose, Mouth, Throat, and Face: negative  Respiratory: negative  Cardiovascular: negative  Gastrointestinal: negative  Genitourinary:negative  Integument/Breast: negative  Hematologic/Lymphatic: negative  Musculoskeletal:negative  Neurological: negative        Past Medical History:   Diagnosis Date    Adverse effect of anesthesia     \" STOP BREATHING 1 TIME C ANESTH\"    Calculus of kidney     Cancer ([address] 2004    thyroid cancer    Chronic kidney disease     Chronic pain     BACK SHOULDER AND ARM    Depression     Diabetes ([address]     Hypercholesterolemia     Hypertension     Nausea & vomiting     Other ill-defined conditions(799.89)     cholesterol, thyroid    Sleep apnea     doesn't wear cpap    Thyroid cancer ([address]     TIA (transient ischemic attack) 2011     Past Surgical History:   Procedure Laterality Date    CARDIAC SURG PROCEDURE UNLIST      HX HEENT      THROAT SURGERY X 4    HX ORTHOPAEDIC      back     HX ORTHOPAEDIC      ARM AND SHOULDER    HX OTHER SURGICAL      thyroid, lymphnode    HX RETINAL DETACHMENT REPAIR      left eye    VASCULAR SURGERY PROCEDURE UNLIST      cardiac cath NEG. Family History   Problem Relation Age of Onset    Diabetes Mother     Elevated Lipids Mother    Jered Ennis Bladder Disease Mother     Headache Mother    [facility] Migraines Mother     Heart Disease Mother     Hypertension Mother     Stroke Mother     Other Mother      ANEURSYM BRAIN    Bleeding Prob Father     Cancer Father      LEUKEMIA    Diabetes Father     Elevated Lipids Father     Mental Retardation Sister     Psychiatric Disorder Sister     Cancer Brother      LUNGS     Social History   Substance Use Topics    Smoking status: Former Smoker     Quit date: 8/22/1994    Smokeless tobacco: Never Used    Alcohol use Yes      Comment: Occasionally      Prior to Admission medications    Medication Sig Start Date End Date Taking? Authorizing Provider   metFORMIN ER (GLUCOPHAGE XR) 500 mg tablet Take 1 Tab by mouth Before breakfast and dinner. 7/5/18   Arron Freitas MD   testosterone 30 mg/actuation (1.5 mL) slpm 2 Actuation(s) by TransDERmal route daily. Max Daily Amount: 2 Actuation(s). 7/5/18   Arron Freitas MD   lisinopril (PRINIVIL, ZESTRIL) 20 mg tablet Take 2 Tabs by mouth daily. 3/21/18   Shun Holland MD   cholecalciferol, VITAMIN D3, (VITAMIN D3) 5,000 unit tab tablet Take 1 Tab by mouth daily. 2/2/18   Arron Freitas MD   glucose blood VI test strips (PHARMACIST CHOICE) strip One Touch  Check glucose 3-4 times daily. DX E11.22 2/2/18   Arron Freitas MD   Lancets misc Use preferred brand; Check glucose 3-4 times daily, Diagnosis E11.22 2/2/18   Arron Freitas MD   VENTOLIN HFA 90 mcg/actuation inhaler TAKE 1-2 PUFFS EVEERY 4-6 HOURS AS NEEDED FOR DYSPNEA AND WHEEZING 1/24/18   Historical Provider   simvastatin (ZOCOR) 20 mg tablet Take 1 Tab by mouth nightly. 1/15/18   Arron Freitas MD   levothyroxine (SYNTHROID) 200 mcg tablet Take 1 Tab by mouth Daily (before breakfast).  1/5/18   Shai Ospina MD   glipiZIDE (GLUCOTROL) 10 mg tablet Take 1 Tab by mouth Before breakfast and dinner. 11/24/17   Mary Daniels MD   Blood-Glucose Meter monitoring kit 1 preferred brand glucometer for checking home glucose, E11.22  Patient taking differently: 1 preferred brand glucometer for checking home glucose, E11.22 One Touch 10/18/17   Rodrigo Betancur MD   insulin degludec (TRESIBA FLEXTOUCH U-200) 200 unit/mL (3 mL) inpn 25 Units by SubCUTAneous route nightly. 10/18/17   Rodrigo Betancur MD              Allergies   Allergen Reactions    Anesthetics - Amide Type Shortness of Breath    Flexeril [Cyclobenzaprine] Hives    Tramadol Hives           Objective: Wt Readings from Last 3 Encounters:   10/04/18 238 lb 12.8 oz (108.3 kg)   09/27/18 239 lb (108.4 kg)   07/05/18 244 lb 4.8 oz (110.8 kg)     Temp Readings from Last 3 Encounters:   09/27/18 98.8 °F (37.1 °C) (Oral)   07/16/18 98.4 °F (36.9 °C)   03/23/18 98.6 °F (37 °C) (Oral)     BP Readings from Last 3 Encounters:   10/04/18 136/77   09/27/18 (!) 163/96   07/16/18 (!) 164/93     Pulse Readings from Last 3 Encounters:   10/04/18 80   09/27/18 74   07/16/18 84             Physical Exam:    GENERAL: alert, cooperative, no distress, appears stated age  EYE: negative  LYMPHATIC: Cervical, supraclavicular, and axillary nodes normal.   THROAT & NECK: normal and no erythema or exudates noted. Scar from thyroidectomy. LUNG: clear to auscultation bilaterally  HEART: regular rate and rhythm  ABDOMEN: soft, non-tender  EXTREMITIES:  no edema  SKIN: Normal.  NEUROLOGIC: negative        CT Results (most recent):    Results from Hospital Encounter encounter on 09/14/18   CT CHEST WO CONT   Narrative INDICATION: metastatic thyroid cancer lung nodules     Noncontrast CT of the chest is performed with 5 mm collimation. Coronal and  sagittal reformatted images were also performed. CT dose reduction was achieved through use of a standardized protocol tailored  for this examination and automatic exposure control for dose modulation.     Direct comparison is made to prior March 2018. Chest:     Lungs: There are multiple bilateral pulmonary nodules which are unchanged in  size and number compared to prior CT dated March 2018. The largest left lung  pulmonary nodule is located within the left lower lobe and measures 6 mm  unchanged compared to prior CT dated March 2018. The largest right lung lesion  is located within the medial right lower lobe and measures 1.1 cm x 1.1 cm  unchanged compared to prior CT dated arch 2018. Lymph nodes: There is no axillary, mediastinal or hilar lymphadenopathy. Heart: The heart is of normal size and there is no pericardial effusion. Pleura: There is no pleural fluid. Bones: No lytic or sclerotic osseous lesion is visualized. Upper abdomen: There is a 1.6 cm x 1.4 cm left adrenal adenoma, unchanged  compared prior CT dated March 2018. The visualized abdominal structures are  otherwise normal.         Impression IMPRESSION: Multiple bilateral pulmonary nodules, not significantly changed in  size and number compared to prior CT dated March 2018. Assessment:     1. 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   Radio-iodine refractory     ECOG PS 0  Intent of treatment - palliative      2002 Biopsy suggesting PTC                        S/p total thyroidectomy                        S/p STRATTON  [id number]  Reports negative WBS  10/10/16  Repeat surgery, 2 right paratrachial LN's                        Surgical Path: poorly differentiated PTC                        Patient with hoarseness of voice, persistent                          On 200mcg LT4    11/30/16 Tg 10.1, TgAb negative, TSH 1.93  12/14/16 Neck Ultrasound: \"Thyroid bed is empty, no anterior cervical mass or significant adenopathy\"  01/18/17 Thyrogen stimulated WBS: \"No evidence of uptake in thyroid bed, No evidence of metastatic disease\"    He has never been on any of the approved TKI   CT shows numerous lung nodules.   He is asymptomatic from lung nodules  Continue observation       Plan:       1. TSH suppression with Levothyroxine   2. Observation  3. Return in 6 months with CT of the lungs      Signed by: Donna Kathleen MD                     October 21, 2018        CC. Evelyne Brown MD  CC.  Saskia Guillermo MD

## 2018-09-28 LAB
ERYTHROCYTE [DISTWIDTH] IN BLOOD BY AUTOMATED COUNT: 13.5 % (ref 12.3–15.4)
EST. AVERAGE GLUCOSE BLD GHB EST-MCNC: 212 MG/DL
HBA1C MFR BLD: 9 % (ref 4.8–5.6)
HCT VFR BLD AUTO: 38.1 % (ref 37.5–51)
HGB BLD-MCNC: 12.3 G/DL (ref 13–17.7)
INTERPRETATION: NORMAL
Lab: NORMAL
MCH RBC QN AUTO: 29.5 PG (ref 26.6–33)
MCHC RBC AUTO-ENTMCNC: 32.3 G/DL (ref 31.5–35.7)
MCV RBC AUTO: 91 FL (ref 79–97)
PLATELET # BLD AUTO: 355 X10E3/UL (ref 150–379)
PSA SERPL-MCNC: 0.7 NG/ML (ref 0–4)
RBC # BLD AUTO: 4.17 X10E6/UL (ref 4.14–5.8)
T4 FREE SERPL-MCNC: 1.65 NG/DL (ref 0.82–1.77)
TESTOST FREE SERPL-MCNC: 8.7 PG/ML (ref 7.2–24)
TESTOST SERPL-MCNC: 487 NG/DL (ref 264–916)
THYROGLOB AB SERPL-ACNC: <1 IU/ML (ref 0–0.9)
THYROGLOB SERPL-MCNC: 11.7 NG/ML (ref 1.4–29.2)
TSH SERPL DL<=0.005 MIU/L-ACNC: 0.01 UIU/ML (ref 0.45–4.5)
WBC # BLD AUTO: 8.4 X10E3/UL (ref 3.4–10.8)

## 2018-10-04 ENCOUNTER — OFFICE VISIT (OUTPATIENT)
Dept: ENDOCRINOLOGY | Age: 58
End: 2018-10-04

## 2018-10-04 VITALS
WEIGHT: 238.8 LBS | DIASTOLIC BLOOD PRESSURE: 77 MMHG | SYSTOLIC BLOOD PRESSURE: 136 MMHG | HEIGHT: 69 IN | HEART RATE: 80 BPM | BODY MASS INDEX: 35.37 KG/M2

## 2018-10-04 DIAGNOSIS — N18.30 TYPE 2 DIABETES MELLITUS WITH STAGE 3 CHRONIC KIDNEY DISEASE, WITH LONG-TERM CURRENT USE OF INSULIN (HCC): Primary | ICD-10-CM

## 2018-10-04 DIAGNOSIS — I10 ESSENTIAL HYPERTENSION WITH GOAL BLOOD PRESSURE LESS THAN 130/80: ICD-10-CM

## 2018-10-04 DIAGNOSIS — E11.22 TYPE 2 DIABETES MELLITUS WITH STAGE 3 CHRONIC KIDNEY DISEASE, WITH LONG-TERM CURRENT USE OF INSULIN (HCC): Primary | ICD-10-CM

## 2018-10-04 DIAGNOSIS — Z79.4 TYPE 2 DIABETES MELLITUS WITH STAGE 3 CHRONIC KIDNEY DISEASE, WITH LONG-TERM CURRENT USE OF INSULIN (HCC): Primary | ICD-10-CM

## 2018-10-04 DIAGNOSIS — C73 METASTASIS FROM THYROID CANCER (HCC): ICD-10-CM

## 2018-10-04 DIAGNOSIS — C79.9 METASTASIS FROM THYROID CANCER (HCC): ICD-10-CM

## 2018-10-04 DIAGNOSIS — E78.5 HYPERLIPIDEMIA, UNSPECIFIED HYPERLIPIDEMIA TYPE: ICD-10-CM

## 2018-10-04 DIAGNOSIS — E89.0 POST-SURGICAL HYPOTHYROIDISM: ICD-10-CM

## 2018-10-04 DIAGNOSIS — C73 PAPILLARY THYROID CARCINOMA (HCC): ICD-10-CM

## 2018-10-04 DIAGNOSIS — E29.1 HYPOGONADISM IN MALE: ICD-10-CM

## 2018-10-04 RX ORDER — PEN NEEDLE, DIABETIC 31 GX3/16"
NEEDLE, DISPOSABLE MISCELLANEOUS
Qty: 100 PEN NEEDLE | Refills: 3 | Status: SHIPPED | OUTPATIENT
Start: 2018-10-04 | End: 2020-07-28

## 2018-10-04 RX ORDER — INSULIN DEGLUDEC 200 U/ML
15 INJECTION, SOLUTION SUBCUTANEOUS
Qty: 6 PEN | Refills: 3 | Status: SHIPPED | OUTPATIENT
Start: 2018-10-04 | End: 2020-01-31

## 2018-10-04 NOTE — MR AVS SNAPSHOT
[address] [address] [phone] Patient: Sharon Jacques MRN: [id number] [facility]:0/21/2271 Visit Information Date & Time Provider Department Dept. Phone Encounter #  
 10/4/2018  3:30 PM Franca Morales, [address] [facility] Diabetes and Endocrinology [phone] [id number] Follow-up Instructions Return in about 4 months (around 2/4/2019). Your Appointments 3/27/2019  9:15 AM  
ESTABLISHED PATIENT with Shonna Chacko MD  
[address]) Appt Note: 6 month f/u  
 [address] [facility] [address] [address]  
327 [facility] [address] [facility] [address] Mitch Potter Upcoming Health Maintenance Date Due  
 EYE EXAM RETINAL OR DILATED Q1 2/25/1972 Shingrix Vaccine Age 50> (1 of 2) 2/25/2012 FOBT Q 1 YEAR AGE 50-75 2/25/2012 Influenza Age 5 to Adult 8/1/2018 Pneumococcal 19-64 Highest Risk (2 of 3 - PCV13) 9/18/2018 MEDICARE YEARLY EXAM 9/19/2018 LIPID PANEL Q1 1/31/2019 FOOT EXAM Q1 2/2/2019 HEMOGLOBIN A1C Q6M 3/27/2019 MICROALBUMIN Q1 7/5/2019 DTaP/Tdap/Td series (2 - Td) 9/18/2027 Allergies as of 10/4/2018  Review Complete On: 10/4/2018 By: Blake Renteria Severity Noted Reaction Type Reactions Anesthetics - Amide Type  03/01/2016    Shortness of Breath Flexeril [Cyclobenzaprine]  07/26/2016    Hives Tramadol  03/01/2016    Hives Current Immunizations  Reviewed on 2/20/2018 Name Date Influenza Vaccine (Quad) PF 10/11/2016 10:16 AM  
  
 Not reviewed this visit You Were Diagnosed With   
  
 Codes Comments Type 2 diabetes mellitus with stage 3 chronic kidney disease, with long-term current use of insulin (HCC)    -  Primary ICD-10-CM: E11.22, N18.3, Z79.4 ICD-9-CM: 250.40, 585.3, V58.67   
 Essential hypertension with goal blood pressure less than 130/80     ICD-10-CM: I10 
ICD-9-CM: 401.9 Hyperlipidemia, unspecified hyperlipidemia type     ICD-10-CM: E78.5 ICD-9-CM: 272.4 Papillary thyroid carcinoma ([facility] [address]     ICD-10-CM: H18 ICD-9-CM: 948 Metastasis from thyroid cancer ([facility]     ICD-10-CM: C79.9, C73 ICD-9-CM: 199.1, 193 Post-surgical hypothyroidism     ICD-10-CM: E89.0 ICD-9-CM: 244.0 Hypogonadism in male     ICD-10-CM: E29.1 ICD-9-CM: 257.2 Vitals BP Pulse Height(growth percentile) Weight(growth percentile) BMI Smoking Status 136/77 (BP 1 Location: Left arm, BP Patient Position: Sitting) 80 5' 9\" (1.753 m) 238 lb 12.8 oz (108.3 kg) 35.26 kg/m2 Former Smoker BMI and BSA Data Body Mass Index Body Surface Area  
 35.26 kg/m 2 2.3 m 2 Preferred Pharmacy Pharmacy Name Phone [id number] Jovan Jorgensen, [address] [place] [address] [phone] Your Updated Medication List  
  
   
This list is accurate as of 10/4/18  3:59 PM.  Always use your most recent med list.  
  
  
  
  
 Blood-Glucose Meter monitoring kit 1 preferred brand glucometer for checking home glucose, E11.22  
  
 cholecalciferol (VITAMIN D3) 5,000 unit Tab tablet Commonly known as:  VITAMIN D3 Take 1 Tab by mouth daily. glipiZIDE 10 mg tablet Commonly known as:  Deyanira Charly Take 1 Tab by mouth Before breakfast and dinner. glucose blood VI test strips strip Commonly known as:  PHARMACIST CHOICE One Touch  Check glucose 3-4 times daily. DX E11.22  
  
 insulin degludec 200 unit/mL (3 mL) Inpn Commonly known as:  TRESIBA FLEXTOUCH U-200  
15 Units by SubCUTAneous route nightly. Insulin Needles (Disposable) 32 gauge x 5/32\" Ndle Commonly known as:  Jigna Pen Needle Use once daily with insulin pen Lancets Misc Use preferred brand; Check glucose 3-4 times daily, Diagnosis E11.22  
  
 levothyroxine 200 mcg tablet Commonly known as:  SYNTHROID Take 1 Tab by mouth Daily (before breakfast). lisinopril 20 mg tablet Commonly known as:  Cydne Kevin Take 2 Tabs by mouth daily. metFORMIN  mg tablet Commonly known as:  GLUCOPHAGE XR Take 1 Tab by mouth Before breakfast and dinner. simvastatin 20 mg tablet Commonly known as:  ZOCOR Take 1 Tab by mouth nightly. testosterone 30 mg/actuation (1.5 mL) Slpm  
2 Actuation(s) by TransDERmal route daily. Max Daily Amount: 2 Actuation(s). VENTOLIN HFA 90 mcg/actuation inhaler Generic drug:  albuterol TAKE 1-2 PUFFS EVEERY 4-6 HOURS AS NEEDED FOR DYSPNEA AND WHEEZING Prescriptions Sent to Pharmacy Refills  
 insulin degludec (TRESIBA FLEXTOUCH U-200) 200 unit/mL (3 mL) inpn 3 Sig: 15 Units by SubCUTAneous route nightly. Class: Normal  
 Pharmacy: [facility] Ph #: [phone] Route: SubCUTAneous Insulin Needles, Disposable, (DINH PEN NEEDLE) 32 gauge x 5/32\" ndle 3 Sig: Use once daily with insulin pen Class: Normal  
 Pharmacy: [facility] Ph #: [phone] We Performed the Following HEMOGLOBIN A1C WITH EAG [18699 CPT(R)] LIPID PANEL [32753 CPT(R)] METABOLIC PANEL, BASIC [90562 CPT(R)] MICROALBUMIN, UR, RAND W/ MICROALB/CREAT RATIO [id number] CPT(R)] TESTOSTERONE, FREE & TOTAL [54345 CPT(R)] TSH 3RD GENERATION [26143 CPT(R)] Follow-up Instructions Return in about 4 months (around 2/4/2019). Patient Instructions Diabetes:  
Hina Lopez at 15 units Continue Metformin 500mg twice daily, stop only if GFR <30 Continue Glipizide to 10mg with breakfast and dinner Call and let us know what meter you were using as we may have one that can replace it Levothyroxine: Reduce to 200 mcg 6 days/week, and 1/2 tab once per week Testosterone: Start trial of testosterone gel, 2 pumps / day REPEAT LABS 3 DAYS BEFORE NEXT VISIT 
 
. ........... Please note our new policy, you must arrive to the clinic 15 minutes before your appointment time to allow enough time for proper check-in, adequate time to spend with your doctor, and also to respect the appointment time of the next patient. Not arriving 15 minutes in advance may result in having your appointment rescheduled for the next available day/time. 
---------------------------------------------------------------------------------------------------------------------- Below you will find a glucose log sheet which you can use to record your blood sugars. Without checking and recording what your home glucose levels are, it will be difficult to make any changes to your medication dose, even when significant changes may be needed. Please feel free to use the log below to record your home glucose levels. At the very least, I would like for you to login the entire 2-3 weeks just before your visit so we can make your visit much more productive and beneficial to you. GLUCOSE LOG SHEET: 
 
Date Breakfast Lunch Dinner Bedtime Comments ? GLUCOSE LOG SHEET: 
 
Date Breakfast Lunch Dinner Bedtime Comments ? GLUCOSE LOG SHEET: 
 
Date Breakfast Lunch Dinner Bedtime Comments ? Introducing [facility] [place] [facility]! [facility] introduces OneTouchEMR patient portal. Now you can access parts of your medical record, email your doctor's office, and request medication refills online. 1. In your internet browser, go to [email or web address] 2. Click on the First Time User? Click Here link in the Sign In box. You will see the New Member Sign Up page. 3. Enter your OneTouchEMR Access Code exactly as it appears below. You will not need to use this code after youve completed the sign-up process. If you do not sign up before the expiration date, you must request a new code. · OneTouchEMR Access Code: [email or web address] Expires: 1/2/2019  3:54 PM 
 
4. Enter the last four digits of your Social Security Number (xxxx) and Date of Birth (mm/dd/yyyy) as indicated and click Submit. You will be taken to the next sign-up page. 5. Create a OneTouchEMR ID. This will be your OneTouchEMR login ID and cannot be changed, so think of one that is secure and easy to remember. 6. Create a OneTouchEMR password. You can change your password at any time. 7. Enter your Password Reset Question and Answer. This can be used at a later time if you forget your password. 8. Enter your e-mail address. You will receive e-mail notification when new information is available in [address] 9. Click Sign Up. You can now view and download portions of your medical record. 10. Click the Download Summary menu link to download a portable copy of your medical information. If you have questions, please visit the Frequently Asked Questions section of the OneTouchEMR website. Remember, OneTouchEMR is NOT to be used for urgent needs. For medical emergencies, dial 911. Now available from your iPhone and Android! Please provide this summary of care documentation to your next provider. Your primary care clinician is listed as Betsey Perera. If you have any questions after today's visit, please call [phone].

## 2018-10-04 NOTE — PATIENT INSTRUCTIONS
Diabetes:   Dennisjoseiftikhar Freddie at 15 units  Continue Metformin 500mg twice daily, stop only if GFR <30  Continue Glipizide to 10mg with breakfast and dinner  Call and let us know what meter you were using as we may have one that can replace it    Levothyroxine: Reduce to 200 mcg 6 days/week, and 1/2 tab once per week    Testosterone: Start trial of testosterone gel, 2 pumps / day    REPEAT LABS 3 DAYS BEFORE NEXT VISIT    . ........... Please note our new policy, you must arrive to the clinic 15 minutes before your appointment time to allow enough time for proper check-in, adequate time to spend with your doctor, and also to respect the appointment time of the next patient. Not arriving 15 minutes in advance may result in having your appointment rescheduled for the next available day/time.  ----------------------------------------------------------------------------------------------------------------------    Below you will find a glucose log sheet which you can use to record your blood sugars. Without checking and recording what your home glucose levels are, it will be difficult to make any changes to your medication dose, even when significant changes may be needed. Please feel free to use the log below to record your home glucose levels. At the very least, I would like for you to login the entire 2-3 weeks just before your visit so we can make your visit much more productive and beneficial to you. GLUCOSE LOG SHEET:    Date Breakfast Lunch Dinner Bedtime Comments ? GLUCOSE LOG SHEET:    Date Breakfast Lunch Dinner Bedtime Comments ? GLUCOSE LOG SHEET:    Date Breakfast Lunch Dinner Bedtime Comments ?

## 2018-10-04 NOTE — PROGRESS NOTES
CHIEF COMPLAINT: f/u evaluation for uncontrolled type 2 diabetes and thyroid cancer s/p thyroidectomy and STRATTON    HISTORY OF PRESENT ILLNESS:   Karina Trevino is a 66 y.o. male with a PMHx as noted below who presents to the endocrinology clinic for f/u evaluation of uncontrolled type 2 diabetes. A1c remains elevated at 9%    Diabetes type 2:  Currently taking the following meds:  Metformin 500mg BID, was taking once daily  Tresiba 18 units, Ran out a month ago and did not take since then  Glipizide 10 mg with breakfast and dinner (forgets dinner dose at times)    Review of home blood glucose:   No log or meter today  Can't find his meter, has not checked in a few weeks    Review of most recent diabetes-related labs:  Lab Results   Component Value Date    HBA1C 9.0 (H) 09/27/2018    HBA1C 9.5 (H) 07/10/2018    HBA1C 9.7 (H) 10/10/2016    NGD8IIXD 9.2 07/05/2018    DYK2MKIE 8.8 02/02/2018    YDR5VXQP 10.8 09/18/2017    CHOL 129 01/31/2018    LDLC 56 01/31/2018    GFRAA 48 (L) 01/31/2018    GFRNA 41 (L) 01/31/2018    MCACR 1802.8 (H) 07/05/2018    TSH 0.010 (L) 09/27/2018    VITD3 23.7 (L) 01/31/2018     843156 = IA-2 pancreatic islet cell autoantibody  GADLT = CLAIRE-65 autoantibody   MCACR = Urine Microalbumin    --------------------------------------------------    Thyroid Cancer:     2002 Biopsy suggesting PTC   S/p total thyroidectomy   S/p STRATTON  [id number]  Reports negative WBS  10/10/16  Repeat surgery, 2 right paratrachial LN's   Surgical Path: poorly differentiated PTC   Patient with hoarseness of voice, persistent   Continued on 200mcg LT4  11/29/16  Initial visit with me for thyroid cancer and diabetes  11/30/16 Tg 10.1, TgAb negative, TSH 1.93 (patient notes this is the lowest Tg level compared with prior)  12/14/16 Neck Ultrasound:  \"Thyroid bed is empty, no anterior cervical mass or significant adenopathy\"  01/18/17 Thyrogen stimulated WBS: \"No evidence of uptake in thyroid bed, No evidence of metastatic disease\"  11/08/17 Thyroid US, no residual thyroid tissue, no pathologic adenopathy. 01/31/18: TSH 2.2 on 200 mcg LT4,  Tg/TgAb was not collected  02/02/18: Tg 24.4 unstimulated, TgAb <1  (led to further eval below)  02/21/18: I-131 WBS: no uptake in thyroid, normal physiologic update otherwise noted. 03/22/18: CT Neck/chest/abdomen: Multiple pulm nodules, some larger some smaller. 03/23/18: Oncology consultation w/ Dr. Sandhya Parker, agreed on monitoring asymptomatic disease  04/04/18: NM Bone Scan: No evidence of bony metastases. 09/14/18: CT Chest: Multiple bilateral pulmonary nodules, not significantly changed in size /number. 09/27/18: Tg 11.7 unstimulated, stable, TgAb <1.0, TSH 0.01, FT4 1.65  Denies symptoms of hyperthyroidism,  Continues on 200 mcg of levothyroxine daily,  Stable Tg levels on TSH suppression currently.    ------------  Male hypogonadism:   Testosterone panel stable but fluctuates and patient feeling worn down,  Has picked up the testosterone ordered previously but not started it yet. PAST MEDICAL/SURGICAL HISTORY:   Past Medical History:   Diagnosis Date    Adverse effect of anesthesia     \" STOP BREATHING 1 TIME C ANESTH\"    Calculus of kidney     Cancer ([address] 2004    thyroid cancer    Chronic kidney disease     Chronic pain     BACK SHOULDER AND ARM    Depression     Diabetes ([address]     Hypercholesterolemia     Hypertension     Nausea & vomiting     Other ill-defined conditions(799.89)     cholesterol, thyroid    Sleep apnea     doesn't wear cpap    Thyroid cancer ([address]     TIA (transient ischemic attack) 2011     Past Surgical History:   Procedure Laterality Date    CARDIAC SURG PROCEDURE UNLIST      HX HEENT      THROAT SURGERY X 4    HX ORTHOPAEDIC      back     HX ORTHOPAEDIC      ARM AND SHOULDER    HX OTHER SURGICAL      thyroid, lymphnode    HX RETINAL DETACHMENT REPAIR      left eye    VASCULAR SURGERY PROCEDURE UNLIST      cardiac cath NEG. ALLERGIES:   Allergies   Allergen Reactions    Anesthetics - Amide Type Shortness of Breath    Flexeril [Cyclobenzaprine] Hives    Tramadol Hives       MEDICATIONS ON ADMISSION:     Current Outpatient Prescriptions:     metFORMIN ER (GLUCOPHAGE XR) 500 mg tablet, Take 1 Tab by mouth Before breakfast and dinner., Disp: 180 Tab, Rfl: 3    lisinopril (PRINIVIL, ZESTRIL) 20 mg tablet, Take 2 Tabs by mouth daily. , Disp: 180 Tab, Rfl: 1    cholecalciferol, VITAMIN D3, (VITAMIN D3) 5,000 unit tab tablet, Take 1 Tab by mouth daily. , Disp: 90 Tab, Rfl: 3    glucose blood VI test strips (PHARMACIST CHOICE) strip, One Touch  Check glucose 3-4 times daily. DX E11.22, Disp: 300 Strip, Rfl: 3    Lancets misc, Use preferred brand; Check glucose 3-4 times daily, Diagnosis E11.22, Disp: 2 Package, Rfl: 3    simvastatin (ZOCOR) 20 mg tablet, Take 1 Tab by mouth nightly., Disp: 90 Tab, Rfl: 3    levothyroxine (SYNTHROID) 200 mcg tablet, Take 1 Tab by mouth Daily (before breakfast). , Disp: 90 Tab, Rfl: 3    glipiZIDE (GLUCOTROL) 10 mg tablet, Take 1 Tab by mouth Before breakfast and dinner., Disp: 180 Tab, Rfl: 1    Blood-Glucose Meter monitoring kit, 1 preferred brand glucometer for checking home glucose, E11.22 (Patient taking differently: 1 preferred brand glucometer for checking home glucose, E11.22 One Touch), Disp: 1 Kit, Rfl: 0    testosterone 30 mg/actuation (1.5 mL) slpm, 2 Actuation(s) by TransDERmal route daily. Max Daily Amount: 2 Actuation(s). , Disp: 1 Bottle, Rfl: 5    VENTOLIN HFA 90 mcg/actuation inhaler, TAKE 1-2 PUFFS EVEERY 4-6 HOURS AS NEEDED FOR DYSPNEA AND WHEEZING, Disp: , Rfl: 0    insulin degludec (TRESIBA FLEXTOUCH U-200) 200 unit/mL (3 mL) inpn, 25 Units by SubCUTAneous route nightly., Disp: 6 Pen, Rfl: 3    SOCIAL HISTORY:   Social History     Social History    Marital status: LEGALLY      Spouse name: N/A    Number of children: N/A    Years of education: N/A     Occupational History    Not on file. Social History Main Topics    Smoking status: Former Smoker     Quit date: 8/22/1994    Smokeless tobacco: Never Used    Alcohol use Yes      Comment: Occasionally    Drug use: No    Sexual activity: No     Other Topics Concern    Not on file     Social History Narrative       FAMILY HISTORY:  Family History   Problem Relation Age of Onset    Diabetes Mother     Elevated Lipids Mother    Nisreen Reba Bladder Disease Mother     Headache Mother     Migraines Mother     Heart Disease Mother     Hypertension Mother     Stroke Mother     Other Mother      ANEURSYM BRAIN    Bleeding Prob Father     Cancer Father      LEUKEMIA    Diabetes Father     Elevated Lipids Father     Mental Retardation Sister     Psychiatric Disorder Sister     Cancer Brother      LUNGS       REVIEW OF SYSTEMS: Complete ROS assessed and noted for that which is described above, all else are negative. Eyes: normal  ENT: hoarseness of voice  CVS: normal  Resp: normal  GI: normal  : normal  GYN: normal  Endocrine: normal  Integument: normal  Musculoskeletal: chronic pain  Neuro: normal  Psych: normal      PHYSICAL EXAMINATION:    VITAL SIGNS:  Visit Vitals    /77 (BP 1 Location: Left arm, BP Patient Position: Sitting)    Pulse 80    Ht 5' 9\" (1.753 m)    Wt 238 lb 12.8 oz (108.3 kg)    BMI 35.26 kg/m2       GENERAL: NCAT, Sitting comfortably, NAD  EYES: EOMI, non-icteric, no proptosis  Ear/Nose/Throat: NCAT, no inflammation, no masses  LYMPH NODES: No LAD  CARDIOVASCULAR: S1 S2, RRR, No murmur, 2+ radial pulses  RESPIRATORY: CTA b/l, no wheeze/rales  GASTROINTESTINAL: NT, ND,  MUSCULOSKELETAL: Normal ROM, no atrophy  SKIN: warm, no edema/rash/ or other skin changes  NEUROLOGIC: 5/5 power all extremities, no tremors, AAOx3  PSYCHIATRIC: Normal affect, Normal insight and judgement    REVIEW OF LABORATORY AND RADIOLOGY DATA:   Labs and documentation have been reviewed as described above. ASSESSMENT AND PLAN:   Erika Davis is a 66 y.o. male with a PMHx as noted above who presents to the endocrinology clinic for f/u evaluation of uncontrolled type 2 diabetes and thyroid cancer. DM2 uncontrolled  HTN  HLD  Thyroid cancer with active metastases   Post surgical hypothyroidism  Male Hypogonadism    DM2:  Has been off his insulin, advised him to call and let us know before he ever runs out of medicine. Reordered together with new pen needles. Katerine Severino at 15 units  Continue Metformin 500mg twice daily, stop only if GFR <30  Continue Glipizide to 10mg with breakfast and dinner    HTN: BP stable, on lisinopril  HLD: simvastatin 20mg, tolerating. Male Hypogonadism: Start trial of testosterone gel, 2 pumps / day, will reassess with pre-labs. Vitamin D deficiency:  5000 units/day, ran out, he will  some more    Thyroid Cancer / Hypothyroidism  Stable imaging and his thyroid tumor marker came down by half,  Thyroid hormone suppression ok, but currently over-suppressed, will adjust as follows:  Reduce to 200 mcg 6 days/week, and 1/2 tab once per week  TSH/FT4 on next visit,  Conservative treatment with surveillance and TSH suppression at this time      RTC in 4 months with prelabs, ordered    Arpan Yusuf.  [address] [facility] End[facility]

## 2018-10-05 LAB
BUN SERPL-MCNC: 45 MG/DL (ref 6–24)
BUN/CREAT SERPL: 21 (ref 9–20)
CALCIUM SERPL-MCNC: 9.4 MG/DL (ref 8.7–10.2)
CHLORIDE SERPL-SCNC: 107 MMOL/L (ref 96–106)
CO2 SERPL-SCNC: 19 MMOL/L (ref 20–29)
CREAT SERPL-MCNC: 2.11 MG/DL (ref 0.76–1.27)
GLUCOSE SERPL-MCNC: 228 MG/DL (ref 65–99)
INTERPRETATION: NORMAL
Lab: NORMAL
POTASSIUM SERPL-SCNC: 5.3 MMOL/L (ref 3.5–5.2)
SODIUM SERPL-SCNC: 139 MMOL/L (ref 134–144)

## 2018-10-18 RX ORDER — LEVOTHYROXINE SODIUM 200 UG/1
200 TABLET ORAL
Qty: 90 TAB | Refills: 3 | Status: SHIPPED | OUTPATIENT
Start: 2018-10-18 | End: 2019-11-04 | Stop reason: SDUPTHER

## 2018-11-18 DIAGNOSIS — E11.00 UNCONTROLLED TYPE 2 DIABETES MELLITUS WITH HYPEROSMOLARITY WITHOUT COMA (HCC): ICD-10-CM

## 2018-11-19 RX ORDER — GLIPIZIDE 10 MG/1
10 TABLET ORAL
Qty: 180 TAB | Refills: 1 | Status: SHIPPED | OUTPATIENT
Start: 2018-11-19 | End: 2020-07-22

## 2019-01-08 DIAGNOSIS — E29.1 MALE HYPOGONADISM: ICD-10-CM

## 2019-01-08 RX ORDER — TESTOSTERONE 30 MG/1.5ML
2 SOLUTION TOPICAL DAILY
Qty: 1 BOTTLE | Refills: 5 | Status: SHIPPED | OUTPATIENT
Start: 2019-01-08 | End: 2020-02-03

## 2019-03-27 ENCOUNTER — OFFICE VISIT (OUTPATIENT)
Dept: ONCOLOGY | Age: 59
End: 2019-03-27

## 2019-03-27 VITALS
TEMPERATURE: 98.1 F | OXYGEN SATURATION: 99 % | WEIGHT: 239 LBS | HEART RATE: 71 BPM | HEIGHT: 69 IN | RESPIRATION RATE: 18 BRPM | BODY MASS INDEX: 35.4 KG/M2 | DIASTOLIC BLOOD PRESSURE: 88 MMHG | SYSTOLIC BLOOD PRESSURE: 172 MMHG

## 2019-03-27 DIAGNOSIS — C73 PRIMARY CANCER OF THYROID WITH METASTASIS TO OTHER SITE (HCC): Primary | ICD-10-CM

## 2019-03-27 DIAGNOSIS — C78.00 MALIGNANT NEOPLASM METASTATIC TO LUNG, UNSPECIFIED LATERALITY (HCC): ICD-10-CM

## 2019-03-27 DIAGNOSIS — C73 PAPILLARY THYROID CARCINOMA (HCC): ICD-10-CM

## 2019-03-27 NOTE — PROGRESS NOTES
Follow-Up Note        Patient: Avtar Brown MRN: [id number]  SSN: [id number]    YOB: 1960  Age: 64 y.o. Sex: male      Subjective:      Avtar Brown is a 64 y.o. male with a diagnosis of recurrent/metastatic carcinoma of the thyroid. He was diagnosed with papillary thyroid in 2002. He underwent a total thyroidectomy by Dr. Dr. Jarvis Whitman. This was followed by STRATTON ablation. He has been on TSH suppression since that time. He is relatively asymptomatic. Last serum thyroglobulin obtained in Feb 2018 is within normal range. His voice has been hoarse for some time. A CT showed numerous small lung nodules which remain stable. He feels well today with no complaints. He denies bone pains, dyspnea, weight loss. Review of Systems:    Constitutional: negative  Eyes: negative  Ears, Nose, Mouth, Throat, and Face: hoarseness  Respiratory: negative  Cardiovascular: negative  Gastrointestinal: negative  Genitourinary:negative  Integument/Breast: negative  Hematologic/Lymphatic: negative  Musculoskeletal:negative  Neurological: negative        Past Medical History:   Diagnosis Date    Adverse effect of anesthesia     \" STOP BREATHING 1 TIME C ANESTH\"    Calculus of kidney     Cancer ([address] 2004    thyroid cancer    Chronic kidney disease     Chronic pain     BACK SHOULDER AND ARM    Depression     Diabetes ([address]     Hypercholesterolemia     Hypertension     Nausea & vomiting     Other ill-defined conditions(799.89)     cholesterol, thyroid    Sleep apnea     doesn't wear cpap    Thyroid cancer ([address]     TIA (transient ischemic attack) 2011     Past Surgical History:   Procedure Laterality Date    CARDIAC SURG PROCEDURE UNLIST      HX HEENT      THROAT SURGERY X 4    HX ORTHOPAEDIC      back     HX ORTHOPAEDIC      ARM AND SHOULDER    HX OTHER SURGICAL      thyroid, lymphnode    HX RETINAL DETACHMENT REPAIR      left eye    VASCULAR SURGERY PROCEDURE UNLIST      cardiac cath NEG. Family History   Problem Relation Age of Onset    Diabetes Mother     Elevated Lipids Mother    Dylon Camacho Bladder Disease Mother     Headache Mother    [facility] Migraines Mother     Heart Disease Mother     Hypertension Mother     Stroke Mother     Other Mother         ANEURSYM BRAIN    Bleeding Prob Father     Cancer Father         LEUKEMIA    Diabetes Father     Elevated Lipids Father     Mental Retardation Sister     Psychiatric Disorder Sister     Cancer Brother         LUNGS     Social History     Tobacco Use    Smoking status: Former Smoker     Last attempt to quit: 1994     Years since quittin.6    Smokeless tobacco: Never Used   Substance Use Topics    Alcohol use: Yes     Comment: Occasionally      Prior to Admission medications    Medication Sig Start Date End Date Taking? Authorizing Provider   testosterone 30 mg/actuation (1.5 mL) slpm 2 Actuation(s) by TransDERmal route daily. Max Daily Amount: 2 Actuation(s). 19   Maty Olson MD   glipiZIDE (GLUCOTROL) 10 mg tablet TAKE 1 TAB BY MOUTH BEFORE BREAKFAST AND DINNER. 18   Elizabeth Prado MD   levothyroxine (SYNTHROID) 200 mcg tablet TAKE 1 TAB BY MOUTH DAILY (BEFORE BREAKFAST). 10/18/18   Jordon Paniagua MD   insulin degludec (TRESIBA FLEXTOUCH U-200) 200 unit/mL (3 mL) inpn 15 Units by SubCUTAneous route nightly. 10/4/18   Jordon Paniagua MD   Insulin Needles, Disposable, (DINH PEN NEEDLE) 32 gauge x 32\" ndle Use once daily with insulin pen 10/4/18   Jordon Paniagua MD   metFORMIN ER (GLUCOPHAGE XR) 500 mg tablet Take 1 Tab by mouth Before breakfast and dinner. 18   Jordon Paniagua MD   lisinopril (PRINIVIL, ZESTRIL) 20 mg tablet Take 2 Tabs by mouth daily. 3/21/18   Elizabeth Prado MD   cholecalciferol, VITAMIN D3, (VITAMIN D3) 5,000 unit tab tablet Take 1 Tab by mouth daily.  18   Jordon Paniagua MD   glucose blood VI test strips (PHARMACIST CHOICE) strip One Touch  Check glucose 3-4 times daily. DX E11.22 2/2/18   Thao Bernstein MD   Lancets misc Use preferred brand; Check glucose 3-4 times daily, Diagnosis E11.22 2/2/18   Thao Bernstein MD   VENTOLIN HFA 90 mcg/actuation inhaler TAKE 1-2 PUFFS EVEERY 4-6 HOURS AS NEEDED FOR DYSPNEA AND WHEEZING 1/24/18   Provider, Historical   simvastatin (ZOCOR) 20 mg tablet Take 1 Tab by mouth nightly. 1/15/18   Thao Bernstein MD   Blood-Glucose Meter monitoring kit 1 preferred brand glucometer for checking home glucose, E11.22  Patient taking differently: 1 preferred brand glucometer for checking home glucose, E11.22 One Touch 10/18/17   Thao Bernstein MD              Allergies   Allergen Reactions    Anesthetics - Amide Type Shortness of Breath    Flexeril [Cyclobenzaprine] Hives    Tramadol Hives           Objective:     Visit Vitals  /88 (BP 1 Location: Left arm, BP Patient Position: Sitting)   Pulse 71   Temp 98.1 °F (36.7 °C) (Oral)   Resp 18   Ht 5' 9\" (1.753 m)   Wt 239 lb (108.4 kg)   SpO2 99%   BMI 35.29 kg/m²       Pain Scale: 0 - No pain/10  Pain Location:       Physical Exam:    GENERAL: alert, cooperative, no distress, appears stated age  EYE: negative  LYMPHATIC: Cervical, supraclavicular, and axillary nodes normal.   THROAT & NECK: normal and no erythema or exudates noted. Scar from thyroidectomy. LUNG: clear to auscultation bilaterally  HEART: regular rate and rhythm  ABDOMEN: soft, non-tender  EXTREMITIES:  no edema  SKIN: Normal.  NEUROLOGIC: negative        CT Results (most recent):  Results from Hospital Encounter encounter on 09/14/18   CT CHEST WO CONT    Narrative INDICATION: metastatic thyroid cancer lung nodules     Noncontrast CT of the chest is performed with 5 mm collimation. Coronal and  sagittal reformatted images were also performed. CT dose reduction was achieved through use of a standardized protocol tailored  for this examination and automatic exposure control for dose modulation.     Direct comparison is made to prior March 2018. Chest:     Lungs: There are multiple bilateral pulmonary nodules which are unchanged in  size and number compared to prior CT dated March 2018. The largest left lung  pulmonary nodule is located within the left lower lobe and measures 6 mm  unchanged compared to prior CT dated March 2018. The largest right lung lesion  is located within the medial right lower lobe and measures 1.1 cm x 1.1 cm  unchanged compared to prior CT dated arch 2018. Lymph nodes: There is no axillary, mediastinal or hilar lymphadenopathy. Heart: The heart is of normal size and there is no pericardial effusion. Pleura: There is no pleural fluid. Bones: No lytic or sclerotic osseous lesion is visualized. Upper abdomen: There is a 1.6 cm x 1.4 cm left adrenal adenoma, unchanged  compared prior CT dated March 2018. The visualized abdominal structures are  otherwise normal.      Impression IMPRESSION: Multiple bilateral pulmonary nodules, not significantly changed in  size and number compared to prior CT dated March 2018. Assessment:     1. 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   Radio-iodine refractory     ECOG PS 0  Intent of treatment - palliative      2002 Biopsy suggesting PTC                        S/p total thyroidectomy                        S/p STRATTON  [id number]  Reports negative WBS  10/10/16  Repeat surgery, 2 right paratrachial LN's                        Surgical Path: poorly differentiated PTC                        Patient with hoarseness of voice, persistent                          On 200mcg LT4    11/30/16 Tg 10.1, TgAb negative, TSH 1.93  12/14/16 Neck Ultrasound: \"Thyroid bed is empty, no anterior cervical mass or significant adenopathy\"  01/18/17 Thyrogen stimulated WBS: \"No evidence of uptake in thyroid bed, No evidence of metastatic disease\"    He has never been on any of the approved TKI   CT shows numerous lung nodules.   He is asymptomatic from lung nodules  Continue observation     Symptom management form reviewed with patient. Plan:       > TSH suppression with Levothyroxine   > Observation  > MSI and BRAF testing on tumor tissue  > CT Chest, Neck  > Follow-up in 6 months        Signed by: Ritika Emery MD                     March 27, 2019        CC. Emely Rao MD  CC.  Milton Mendoza MD

## 2019-03-27 NOTE — PROGRESS NOTES
Lilia Torres is a 64 y.o. male here today for recurrent/metastatic carcinoma of the thyroid f/u. Elevated B/P; pt denies taking B/P medication today; other VS stable. Patient denies pain. Good appetite. Patient denies N/V/D and constipation. Patient denies numbness and tingling. Patient denies mouth ulcers. Patient denies cough. Patient denies SOB. Patient denies falls. Visit Vitals  /88 (BP 1 Location: Left arm, BP Patient Position: Sitting)   Pulse 71   Temp 98.1 °F (36.7 °C) (Oral)   Resp 18   Ht 5' 9\" (1.753 m)   Wt 239 lb (108.4 kg)   SpO2 99%   BMI 35.29 kg/m²       Pain Scale: 0 - No pain/10  Pain Location:     1. Have you been to the ER, urgent care clinic since your last visit? Hospitalized since your last visit? No    2. Have you seen or consulted any other health care providers outside of the [address] [place] [address] since your last visit? Include any pap smears or colon screening. No     Health Maintenance Review: Patient reminded of \"due or due soon\" health maintenance. I have asked the patient to contact his/her primary care provider (PCP) for follow-up on his/her health maintenance.

## 2019-04-10 ENCOUNTER — HOSPITAL ENCOUNTER (OUTPATIENT)
Dept: CT IMAGING | Age: 59
Discharge: HOME | End: 2019-04-10
Attending: INTERNAL MEDICINE
Payer: MEDICARE

## 2019-04-10 DIAGNOSIS — C78.00 MALIGNANT NEOPLASM METASTATIC TO LUNG, UNSPECIFIED LATERALITY (HCC): ICD-10-CM

## 2019-04-10 DIAGNOSIS — C73 PAPILLARY THYROID CARCINOMA (HCC): ICD-10-CM

## 2019-04-10 PROCEDURE — 70490 CT SOFT TISSUE NECK W/O DYE: CPT

## 2019-04-10 PROCEDURE — 71250 CT THORAX DX C-: CPT

## 2019-04-11 NOTE — PROGRESS NOTES
Slight increase in the lung nodules. Continue follow up with the office visit and CT in the future. No treatment at this time.

## 2019-04-15 DIAGNOSIS — I10 ESSENTIAL HYPERTENSION WITH GOAL BLOOD PRESSURE LESS THAN 130/80: ICD-10-CM

## 2019-04-15 RX ORDER — LISINOPRIL 20 MG/1
40 TABLET ORAL DAILY
Qty: 180 TAB | Refills: 1 | Status: SHIPPED | OUTPATIENT
Start: 2019-04-15 | End: 2020-07-22

## 2019-04-15 NOTE — TELEPHONE ENCOUNTER
PCP: Severo Carol, MD    Last appt: 1/30/2018  Future Appointments   Date Time Provider Ness Hawkins   5/6/2019  3:30 PM Gioia Leventhal, MD RDE [place] [address] [facility]   9/27/2019 10:15 AM [facility]       Requested Prescriptions      No prescriptions requested or ordered in this encounter       Prior labs and Blood pressures:  BP Readings from Last 3 Encounters:   03/27/19 172/88   10/04/18 136/77   09/27/18 (!) 163/96     Lab Results   Component Value Date/Time    Sodium 139 10/04/2018 04:05 PM    Potassium 5.3 (H) 10/04/2018 04:05 PM    Chloride 107 (H) 10/04/2018 04:05 PM    CO2 19 (L) 10/04/2018 04:05 PM    Anion gap 10 01/28/2018 05:48 AM    Glucose 228 (H) 10/04/2018 04:05 PM    BUN 45 (H) 10/04/2018 04:05 PM    Creatinine 2.11 (H) 10/04/2018 04:05 PM    BUN/Creatinine ratio 21 (H) 10/04/2018 04:05 PM    GFR est AA 39 (L) 10/04/2018 04:05 PM    GFR est non-AA 34 (L) 10/04/2018 04:05 PM    Calcium 9.4 10/04/2018 04:05 PM     Lab Results   Component Value Date/Time    Hemoglobin A1c 9.0 (H) 09/27/2018 12:01 PM    Hemoglobin A1c (POC) 9.2 07/05/2018 03:45 PM     Lab Results   Component Value Date/Time    Cholesterol, total 129 01/31/2018 08:24 AM    HDL Cholesterol 29 (L) 01/31/2018 08:24 AM    LDL, calculated 56 01/31/2018 08:24 AM    VLDL, calculated 44 (H) 01/31/2018 08:24 AM    Triglyceride 218 (H) 01/31/2018 08:24 AM     Lab Results   Component Value Date/Time    VITAMIN D, 25-HYDROXY 23.7 (L) 01/31/2018 08:24 AM       Lab Results   Component Value Date/Time    TSH 0.010 (L) 09/27/2018 12:01 PM

## 2019-05-03 LAB
ALBUMIN/CREAT UR: 1464.7 MG/G CREAT (ref 0–30)
BUN SERPL-MCNC: 39 MG/DL (ref 6–24)
BUN/CREAT SERPL: 17 (ref 9–20)
CALCIUM SERPL-MCNC: 8.6 MG/DL (ref 8.7–10.2)
CHLORIDE SERPL-SCNC: 109 MMOL/L (ref 96–106)
CHOLEST SERPL-MCNC: 198 MG/DL (ref 100–199)
CO2 SERPL-SCNC: 17 MMOL/L (ref 20–29)
CREAT SERPL-MCNC: 2.32 MG/DL (ref 0.76–1.27)
CREAT UR-MCNC: 103.6 MG/DL
EST. AVERAGE GLUCOSE BLD GHB EST-MCNC: 166 MG/DL
GLUCOSE SERPL-MCNC: 198 MG/DL (ref 65–99)
HBA1C MFR BLD: 7.4 % (ref 4.8–5.6)
HDLC SERPL-MCNC: 35 MG/DL
INTERPRETATION, 910389: NORMAL
INTERPRETATION: NORMAL
LDLC SERPL CALC-MCNC: 133 MG/DL (ref 0–99)
Lab: NORMAL
MICROALBUMIN UR-MCNC: 1517.4 UG/ML
PDF IMAGE, 910387: NORMAL
POTASSIUM SERPL-SCNC: 4.9 MMOL/L (ref 3.5–5.2)
SODIUM SERPL-SCNC: 139 MMOL/L (ref 134–144)
TESTOST FREE SERPL-MCNC: 17.8 PG/ML (ref 7.2–24)
TESTOST SERPL-MCNC: 721 NG/DL (ref 264–916)
TRIGL SERPL-MCNC: 149 MG/DL (ref 0–149)
TSH SERPL DL<=0.005 MIU/L-ACNC: 0.02 UIU/ML (ref 0.45–4.5)
VLDLC SERPL CALC-MCNC: 30 MG/DL (ref 5–40)

## 2019-05-06 ENCOUNTER — OFFICE VISIT (OUTPATIENT)
Dept: ENDOCRINOLOGY | Age: 59
End: 2019-05-06

## 2019-05-06 VITALS
WEIGHT: 240 LBS | HEART RATE: 69 BPM | HEIGHT: 69 IN | SYSTOLIC BLOOD PRESSURE: 156 MMHG | DIASTOLIC BLOOD PRESSURE: 84 MMHG | BODY MASS INDEX: 35.55 KG/M2

## 2019-05-06 DIAGNOSIS — E78.5 HYPERLIPIDEMIA, UNSPECIFIED HYPERLIPIDEMIA TYPE: ICD-10-CM

## 2019-05-06 DIAGNOSIS — C73 PAPILLARY THYROID CARCINOMA (HCC): ICD-10-CM

## 2019-05-06 DIAGNOSIS — C79.9 METASTASIS FROM THYROID CANCER (HCC): ICD-10-CM

## 2019-05-06 DIAGNOSIS — I10 ESSENTIAL HYPERTENSION WITH GOAL BLOOD PRESSURE LESS THAN 130/80: ICD-10-CM

## 2019-05-06 DIAGNOSIS — E11.22 TYPE 2 DIABETES MELLITUS WITH STAGE 3 CHRONIC KIDNEY DISEASE, WITH LONG-TERM CURRENT USE OF INSULIN (HCC): Primary | ICD-10-CM

## 2019-05-06 DIAGNOSIS — N18.30 TYPE 2 DIABETES MELLITUS WITH STAGE 3 CHRONIC KIDNEY DISEASE, WITH LONG-TERM CURRENT USE OF INSULIN (HCC): Primary | ICD-10-CM

## 2019-05-06 DIAGNOSIS — Z79.4 TYPE 2 DIABETES MELLITUS WITH STAGE 3 CHRONIC KIDNEY DISEASE, WITH LONG-TERM CURRENT USE OF INSULIN (HCC): Primary | ICD-10-CM

## 2019-05-06 DIAGNOSIS — E29.1 HYPOGONADISM IN MALE: ICD-10-CM

## 2019-05-06 DIAGNOSIS — E89.0 POST-SURGICAL HYPOTHYROIDISM: ICD-10-CM

## 2019-05-06 DIAGNOSIS — Z12.5 ENCOUNTER FOR SCREENING FOR MALIGNANT NEOPLASM OF PROSTATE: ICD-10-CM

## 2019-05-06 DIAGNOSIS — C73 METASTASIS FROM THYROID CANCER (HCC): ICD-10-CM

## 2019-05-06 NOTE — PROGRESS NOTES
CHIEF COMPLAINT: f/u evaluation for uncontrolled type 2 diabetes and thyroid cancer s/p thyroidectomy and STRATTON    HISTORY OF PRESENT ILLNESS:   Jose G Miguel is a 64 y.o. male with a PMHx as noted below who presents to the endocrinology clinic for f/u evaluation of uncontrolled type 2 diabetes. A1c 7.4%. Diabetes type 2:  Currently taking the following meds:  Metformin 500mg BID  Tresiba 15 units (NOT TAKING), reports was feeling shakey (likely low), was taking 20 units  Glipizide 10 mg with breakfast and dinner    Review of home blood glucose:   AM: Mostly 150-180 mostly reported    Review of most recent diabetes-related labs:  Lab Results   Component Value Date    HBA1C 7.4 (H) 05/02/2019    HBA1C 9.0 (H) 09/27/2018    HBA1C 9.5 (H) 07/10/2018    ZXX2ZDGG 9.2 07/05/2018    AKF3JGIQ 8.8 02/02/2018    AIK6AYBX 10.8 09/18/2017    CHOL 198 05/02/2019    LDLC 133 (H) 05/02/2019    GFRAA 35 (L) 05/02/2019    GFRNA 30 (L) 05/02/2019    MCACR 1,464.7 (H) 05/02/2019    TSH 0.020 (L) 05/02/2019    VITD3 23.7 (L) 01/31/2018     624236 = IA-2 pancreatic islet cell autoantibody  GADLT = CLAIRE-65 autoantibody   MCACR = Urine Microalbumin    --------------------------------------------------    Thyroid Cancer:     2002 Biopsy suggesting PTC   S/p total thyroidectomy   S/p STRATTON  [id number]  Reports negative WBS  10/10/16  Repeat surgery, 2 right paratrachial LN's   Surgical Path: poorly differentiated PTC   Patient with hoarseness of voice, persistent   Continued on 200mcg LT4  11/29/16  Initial visit with me for thyroid cancer and diabetes  11/30/16 Tg 10.1, TgAb negative, TSH 1.93 (patient notes this is the lowest Tg level compared with prior)  12/14/16 Neck Ultrasound:  \"Thyroid bed is empty, no anterior cervical mass or significant adenopathy\"  01/18/17 Thyrogen stimulated WBS: \"No evidence of uptake in thyroid bed, No evidence of metastatic disease\"  11/08/17 Thyroid US, no residual thyroid tissue, no pathologic adenopathy. 01/31/18: TSH 2.2 on 200 mcg LT4,  Tg/TgAb was not collected  02/02/18: Tg 24.4 unstimulated, TgAb <1  (led to further eval below)  02/21/18: I-131 WBS: no uptake in thyroid, normal physiologic update otherwise noted. 03/22/18: CT Neck/chest/abdomen: Multiple pulm nodules, some larger some smaller. 03/23/18: Oncology consultation w/ Dr. Nicolette Victor, agreed on monitoring asymptomatic disease  04/04/18: NM Bone Scan: No evidence of bony metastases. 09/14/18: CT Chest: Multiple bilateral pulmonary nodules, not significantly changed in size /number. 09/27/18: Tg 11.7 unstimulated, stable, TgAb <1.0, TSH 0.01, FT4 1.65  Denies symptoms of hyperthyroidism,  Taking 200 mcg 6 days/week, and 1/2 tab once per week  Recent TSH 0.02, purposeful TSH suppression in setting of metastatic thyroid cancer,  Has seen Oncology in March, conservative management / observation.     ------------  Male hypogonadism:   Started on testosterone gel, 2 pumps daily back in October,   Levels look stable, could even be reduced a bit,       PAST MEDICAL/SURGICAL HISTORY:   Past Medical History:   Diagnosis Date    Adverse effect of anesthesia     \" STOP BREATHING 1 TIME C ANESTH\"    Calculus of kidney     Cancer ([address] 2004    thyroid cancer    Chronic kidney disease     Chronic pain     BACK SHOULDER AND ARM    Depression     Diabetes ([address]     Hypercholesterolemia     Hypertension     Nausea & vomiting     Other ill-defined conditions(799.89)     cholesterol, thyroid    Sleep apnea     doesn't wear cpap    Thyroid cancer ([address]     TIA (transient ischemic attack) 2011     Past Surgical History:   Procedure Laterality Date    CARDIAC SURG PROCEDURE UNLIST      HX HEENT      THROAT SURGERY X 4    HX ORTHOPAEDIC      back     HX ORTHOPAEDIC      ARM AND SHOULDER    HX OTHER SURGICAL      thyroid, lymphnode    HX RETINAL DETACHMENT REPAIR      left eye    VASCULAR SURGERY PROCEDURE UNLIST      cardiac cath NEG. ALLERGIES:   Allergies   Allergen Reactions    Anesthetics - Amide Type Shortness of Breath    Flexeril [Cyclobenzaprine] Hives    Tramadol Hives       MEDICATIONS ON ADMISSION:     Current Outpatient Medications:     lisinopril (PRINIVIL, ZESTRIL) 20 mg tablet, Take 2 Tabs by mouth daily. , Disp: 180 Tab, Rfl: 1    testosterone 30 mg/actuation (1.5 mL) slpm, 2 Actuation(s) by TransDERmal route daily. Max Daily Amount: 2 Actuation(s). , Disp: 1 Bottle, Rfl: 5    glipiZIDE (GLUCOTROL) 10 mg tablet, TAKE 1 TAB BY MOUTH BEFORE BREAKFAST AND DINNER., Disp: 180 Tab, Rfl: 1    levothyroxine (SYNTHROID) 200 mcg tablet, TAKE 1 TAB BY MOUTH DAILY (BEFORE BREAKFAST). , Disp: 90 Tab, Rfl: 3    metFORMIN ER (GLUCOPHAGE XR) 500 mg tablet, Take 1 Tab by mouth Before breakfast and dinner., Disp: 180 Tab, Rfl: 3    cholecalciferol, VITAMIN D3, (VITAMIN D3) 5,000 unit tab tablet, Take 1 Tab by mouth daily. , Disp: 90 Tab, Rfl: 3    glucose blood VI test strips (PHARMACIST CHOICE) strip, One Touch  Check glucose 3-4 times daily. DX E11.22, Disp: 300 Strip, Rfl: 3    Lancets misc, Use preferred brand;  Check glucose 3-4 times daily, Diagnosis E11.22, Disp: 2 Package, Rfl: 3    Blood-Glucose Meter monitoring kit, 1 preferred brand glucometer for checking home glucose, E11.22 (Patient taking differently: 1 preferred brand glucometer for checking home glucose, E11.22 One Touch), Disp: 1 Kit, Rfl: 0    insulin degludec (TRESIBA FLEXTOUCH U-200) 200 unit/mL (3 mL) inpn, 15 Units by SubCUTAneous route nightly., Disp: 6 Pen, Rfl: 3    Insulin Needles, Disposable, (DINH PEN NEEDLE) 32 gauge x 5/32\" ndle, Use once daily with insulin pen, Disp: 100 Pen Needle, Rfl: 3    VENTOLIN HFA 90 mcg/actuation inhaler, TAKE 1-2 PUFFS EVEERY 4-6 HOURS AS NEEDED FOR DYSPNEA AND WHEEZING, Disp: , Rfl: 0    simvastatin (ZOCOR) 20 mg tablet, Take 1 Tab by mouth nightly., Disp: 90 Tab, Rfl: 3    SOCIAL HISTORY:   Social History Socioeconomic History    Marital status: LEGALLY      Spouse name: Not on file    Number of children: Not on file    Years of education: Not on file    Highest education level: Not on file   Occupational History    Not on file   Social Needs    Financial resource strain: Not on file    Food insecurity:     Worry: Not on file     Inability: Not on file    Transportation needs:     Medical: Not on file     Non-medical: Not on file   Tobacco Use    Smoking status: Former Smoker     Last attempt to quit: 1994     Years since quittin.7    Smokeless tobacco: Never Used   Substance and Sexual Activity    Alcohol use: Yes     Comment: Occasionally    Drug use: No    Sexual activity: Never   Lifestyle    Physical activity:     Days per week: Not on file     Minutes per session: Not on file    Stress: Not on file   Relationships    Social connections:     Talks on phone: Not on file     Gets together: Not on file     Attends Restorationism service: Not on file     Active member of club or organization: Not on file     Attends meetings of clubs or organizations: Not on file     Relationship status: Not on file    Intimate partner violence:     Fear of current or ex partner: Not on file     Emotionally abused: Not on file     Physically abused: Not on file     Forced sexual activity: Not on file   Other Topics Concern    Not on file   Social History Narrative    Not on file       FAMILY HISTORY:  Family History   Problem Relation Age of Onset    Diabetes Mother     Elevated Lipids Mother    Kyra Bream Bladder Disease Mother     Headache Mother     Migraines Mother     Heart Disease Mother     Hypertension Mother     Stroke Mother     Other Mother         ANEURSYM BRAIN    Bleeding Prob Father     Cancer Father         LEUKEMIA    Diabetes Father     Elevated Lipids Father     Mental Retardation Sister     Psychiatric Disorder Sister     Cancer Brother         LUNGS       REVIEW OF SYSTEMS: Complete ROS assessed and noted for that which is described above, all else are negative. Eyes: normal  ENT: hoarseness of voice  CVS: normal  Resp: normal  GI: normal  : normal  GYN: normal  Endocrine: normal  Integument: normal  Musculoskeletal: chronic pain  Neuro: normal  Psych: normal      PHYSICAL EXAMINATION:    VITAL SIGNS:  Visit Vitals  /84 (BP 1 Location: Right arm, BP Patient Position: Sitting)   Pulse 69   Ht 5' 9\" (1.753 m)   Wt 240 lb (108.9 kg)   BMI 35.44 kg/m²       GENERAL: NCAT, Sitting comfortably, NAD  EYES: EOMI, non-icteric, no proptosis  Ear/Nose/Throat: NCAT, no inflammation, no masses  LYMPH NODES: No LAD  CARDIOVASCULAR: S1 S2, RRR, No murmur, 2+ radial pulses  RESPIRATORY: CTA b/l, no wheeze/rales  GASTROINTESTINAL: NT, ND,  MUSCULOSKELETAL: Normal ROM, no atrophy  SKIN: warm, no edema/rash/ or other skin changes  NEUROLOGIC: 5/5 power all extremities, no tremors, AAOx3  PSYCHIATRIC: Normal affect, Normal insight and judgement    REVIEW OF LABORATORY AND RADIOLOGY DATA:   Labs and documentation have been reviewed as described above. ASSESSMENT AND PLAN:   Ofelia Kearney is a 64 y.o. male with a PMHx as noted above who presents to the endocrinology clinic for f/u evaluation of uncontrolled type 2 diabetes and thyroid cancer.     DM2 uncontrolled  HTN  HLD  Thyroid cancer with active metastases   Post surgical hypothyroidism  Male Hypogonadism    DM2:  Tresiba: Ollen Cleverly at only 10 units  Continue Metformin 500mg twice daily, stop only if GFR <30  Continue Glipizide to 10mg with breakfast and dinner    HTN: BP elevated, on lisinopril (did not take yet today)  HLD: simvastatin 20mg, had held for a while reporting he was not feeling well, will restart,   Male Hypogonadism: Testosterone gel, reduce to 1 pump per day,  Vitamin D deficiency:  5000 units/day, taking, continue,    Thyroid Cancer / Hypothyroidism  Levothyroxine 200 mcg 6 days/week, and 1/2 tab once per week  Conservative treatment with surveillance and TSH suppression,      RTC in 4 months with prelabs, ordered    Fam Gutierrez.  [address] [facility]

## 2019-05-06 NOTE — PATIENT INSTRUCTIONS
DIABETES:   Pepe Allen Fix at only 10 units  Continue Metformin 500mg twice daily, stop only if GFR <30  Continue Glipizide to 10mg with breakfast and dinner    Blood PRESSURE:  lisinopril   CHOLESTEROL: simvastatin 20mg  Male Hypogonadism: Testosterone gel, reduce to 1 pump per day,  Vitamin D deficiency:  5000 units/day    THYROID  Levothyroxine 200 mcg 6 days/week, and 1/2 tab once per week    Return to clinic in 4 months with labs few days prior,       Francesco DE LA PAZ  [address] [facility]  [address] [place] [address]

## 2019-09-27 ENCOUNTER — OFFICE VISIT (OUTPATIENT)
Dept: ONCOLOGY | Age: 59
End: 2019-09-27

## 2019-09-27 VITALS
OXYGEN SATURATION: 97 % | HEIGHT: 69 IN | RESPIRATION RATE: 16 BRPM | DIASTOLIC BLOOD PRESSURE: 83 MMHG | HEART RATE: 76 BPM | SYSTOLIC BLOOD PRESSURE: 179 MMHG | WEIGHT: 223.6 LBS | BODY MASS INDEX: 33.12 KG/M2 | TEMPERATURE: 98.3 F

## 2019-09-27 DIAGNOSIS — C73 PRIMARY CANCER OF THYROID WITH METASTASIS TO OTHER SITE (HCC): Primary | ICD-10-CM

## 2019-09-27 DIAGNOSIS — C73 PAPILLARY THYROID CARCINOMA (HCC): ICD-10-CM

## 2019-09-27 NOTE — PROGRESS NOTES
2001 [facility]  at [address] [place] [address], [address] [facility]  [phone]        Follow-Up Note        Patient: Braeden Bui MRN: [id number]  SSN: [id number]    YOB: 1960  Age: 64 y.o. Sex: male      Subjective:      Braeden Bui is a 64 y.o. 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 He was diagnosed with papillary thyroid carcinoma in 2002. He underwent a total thyroidectomy by Dr. Kvng Anderson. This was followed by STRATTON ablation. He has been on TSH suppression since that time. He is relatively asymptomatic. Last serum thyroglobulin obtained in Feb 2018 is within normal range. His voice has been hoarse for some time. A CT showed numerous small lung nodules which is increasing only slightly in size. He feels well today with no complaints. He denies bone pains, dyspnea, weight loss.         Review of Systems:    Constitutional: negative  Eyes: negative  Ears, Nose, Mouth, Throat, and Face: hoarseness  Respiratory: negative  Cardiovascular: negative  Gastrointestinal: negative  Genitourinary:negative  Integument/Breast: negative  Hematologic/Lymphatic: negative  Musculoskeletal:negative  Neurological: negative        Past Medical History:   Diagnosis Date    Adverse effect of anesthesia     \" STOP BREATHING 1 TIME C ANESTH\"    Calculus of kidney     Cancer ([address] 2004    thyroid cancer    Chronic kidney disease     Chronic pain     BACK SHOULDER AND ARM    Depression     Diabetes ([address]     Hypercholesterolemia     Hypertension     Nausea & vomiting     Other ill-defined conditions(799.89)     cholesterol, thyroid    Sleep apnea     doesn't wear cpap    Thyroid cancer ([address]     TIA (transient ischemic attack) 2011     Past Surgical History:   Procedure Laterality Date    CARDIAC SURG PROCEDURE UNLIST      HX HEENT      THROAT SURGERY X 4    HX ORTHOPAEDIC back     HX ORTHOPAEDIC      ARM AND SHOULDER    HX OTHER SURGICAL      thyroid, lymphnode    HX RETINAL DETACHMENT REPAIR      left eye    VASCULAR SURGERY PROCEDURE UNLIST      cardiac cath NEG. Family History   Problem Relation Age of Onset    Diabetes Mother     Elevated Lipids Mother    Natalie Raymond Bladder Disease Mother     Headache Mother    Nakita Orchreba Migraines Mother     Heart Disease Mother     Hypertension Mother     Stroke Mother     Other Mother         ANEURSYM BRAIN    Bleeding Prob Father     Cancer Father         LEUKEMIA    Diabetes Father     Elevated Lipids Father     Mental Retardation Sister     Psychiatric Disorder Sister     Cancer Brother         LUNGS     Social History     Tobacco Use    Smoking status: Former Smoker     Last attempt to quit: 1994     Years since quittin.1    Smokeless tobacco: Never Used   Substance Use Topics    Alcohol use: Yes     Comment: Occasionally      Prior to Admission medications    Medication Sig Start Date End Date Taking? Authorizing Provider   lisinopril (PRINIVIL, ZESTRIL) 20 mg tablet Take 2 Tabs by mouth daily. 4/15/19  Yes Melba David MD   testosterone 30 mg/actuation (1.5 mL) slpm 2 Actuation(s) by TransDERmal route daily. Max Daily Amount: 2 Actuation(s). 19  Yes Nahid Montesinos MD   glipiZIDE (GLUCOTROL) 10 mg tablet TAKE 1 TAB BY MOUTH BEFORE BREAKFAST AND DINNER. 18  Yes Camelia TANNER MD   levothyroxine (SYNTHROID) 200 mcg tablet TAKE 1 TAB BY MOUTH DAILY (BEFORE BREAKFAST). 10/18/18  Yes Miguel Clemente MD   insulin degludec (TRESIBA FLEXTOUCH U-200) 200 unit/mL (3 mL) inpn 15 Units by SubCUTAneous route nightly. 10/4/18  Yes Miguel Clemente MD   Insulin Needles, Disposable, (DINH PEN NEEDLE) 32 gauge x /32\" ndle Use once daily with insulin pen 10/4/18  Yes Miguel Clemente MD   metFORMIN ER (GLUCOPHAGE XR) 500 mg tablet Take 1 Tab by mouth Before breakfast and dinner.  18  Yes Miguel Clemente MD   cholecalciferol, VITAMIN D3, (VITAMIN D3) 5,000 unit tab tablet Take 1 Tab by mouth daily. 2/2/18  Yes Aric Ye MD   glucose blood VI test strips (PHARMACIST CHOICE) strip One Touch  Check glucose 3-4 times daily. DX E11.22 2/2/18  Yes Aric Ye MD   Lancets misc Use preferred brand; Check glucose 3-4 times daily, Diagnosis E11.22 2/2/18  Yes Aric Ye MD   VENTOLIN HFA 90 mcg/actuation inhaler TAKE 1-2 PUFFS EVEERY 4-6 HOURS AS NEEDED FOR DYSPNEA AND WHEEZING 1/24/18  Yes Provider, Historical   simvastatin (ZOCOR) 20 mg tablet Take 1 Tab by mouth nightly. 1/15/18  Yes Aric Ye MD   Blood-Glucose Meter monitoring kit 1 preferred brand glucometer for checking home glucose, E11.22  Patient taking differently: 1 preferred brand glucometer for checking home glucose, E11.22 One Touch 10/18/17  Yes Aric Ye MD              Allergies   Allergen Reactions    Anesthetics - Amide Type Shortness of Breath    Flexeril [Cyclobenzaprine] Hives    Tramadol Hives           Objective:     Visit Vitals  /83 (BP 1 Location: Left arm, BP Patient Position: At rest)   Pulse 76   Temp 98.3 °F (36.8 °C) (Oral)   Resp 16   Ht 5' 9\" (1.753 m)   Wt 223 lb 9.6 oz (101.4 kg)   SpO2 97% Comment: RA   BMI 33.02 kg/m²       Pain Scale: 4/10  Pain Location: Neck (LEFT SIDE R/T SURGERY INTERMITTENT, SHOULDER,STOMA)      Physical Exam:    GENERAL: alert, cooperative, no distress, appears stated age  EYE: negative  LYMPHATIC: Cervical, supraclavicular, and axillary nodes normal.   THROAT & NECK: normal and no erythema or exudates noted. Scar from thyroidectomy.    LUNG: clear to auscultation bilaterally  HEART: regular rate and rhythm  ABDOMEN: soft, non-tender  EXTREMITIES:  no edema  SKIN: Normal.  NEUROLOGIC: negative        CT Results (most recent):  Results from Hospital Encounter encounter on 04/10/19   CT NECK SOFT TISSUE WO CONT    Addendum Addendum:   Correction to the technique as follows: Intravenous contrast was not administered. Marvin Matt MD 4/16/2019  4:17 PM          Narrative CT NECK WITH CONTRAST, 4/10/2019 1:13 PM  INDICATION: Malignant neoplasm of thyroid gland Restaging disease  COMPARISON: CT of the neck, chest, abdomen and pelvis, 3/22/2018. CT of the  chest, 9/21/2016. [facility] TECHNIQUE:   Multislice helical CT was performed from the hemidiaphragms to the skull base  after intravenous administration of iodine-based contrast for the primary  purpose of visualizing the soft tissues of the neck. Portions of the brain,  face, and cervical spine were also included in the imaging field. Supplemental  2D reformatted images were generated and reviewed as needed. CT dose reduction was achieved through use of a standardized protocol tailored  for this examination and automatic exposure control for dose modulation. [facility] FINDINGS:  HEAD and NECK:  Soft tissues/Face: Mucous retention cyst versus polyp in the left maxillary  sinus. Left sternocleidomastoid muscle is surgically absent versus atrophied. Normal parotid and submandibular glands. Brain: Visualized portions unremarkable. Oral Cavity/Pharynx: Within normal limits. Larynx: Within normal limits. Thyroid: Status post thyroidectomy. Lymph nodes: No pathologically enlarged nodes. .  CHEST:  Chest wall: Within normal limits. Mediastinum/derek: Patulous appearance of the esophagus. Heart/vessels: Calcifications in the coronary arteries. Lungs/Pleura: There are numerous pulmonary nodules throughout the lungs which do  not appear significantly changed. There is a nodule in the right upper lobe  (#26, series 4) which is minimally increased in size, currently measuring 0.3  cm. There are 2 pulmonary nodules in the right lower lobe which have increased  in size, one measures 1.3 x 1.1 cm as compared to 1.1 x 0.8 cm previously and  the other measures 1 x 0.9 cm as compared to 0.9 x 0.5 cm previously.   .  INCIDENTALLY IMAGED ABDOMEN:  Left adrenal adenoma is not significantly changed. Tiny, ill-defined, small area  of diminished attenuation in the spleen possibly no clinical significance and  nonspecific measuring 0.8 cm   . MSK:   Fixation hardware in the left humerus. .    Impression IMPRESSION:  1. Status post thyroidectomy. 2. Increased size of pulmonary nodules in the right upper lobe and right lower  lobe as above. 3. Incidental findings as above. I personally reviewed the images. Slightly increased pulmonary nodules. Assessment:     1. 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   Radio-iodine refractory               BRAF mutation - detected              MSI - stable       ECOG PS 0  Intent of treatment - palliative      2002 Biopsy suggesting PTC                        S/p total thyroidectomy                        S/p STRATTON  [id number]  Reports negative WBS  10/10/16  Repeat surgery, 2 right paratrachial LN's                        Surgical Path: poorly differentiated PTC                        Patient with hoarseness of voice, persistent                          On 200mcg LT4    11/30/16 Tg 10.1, TgAb negative, TSH 1.93  12/14/16 Neck Ultrasound: \"Thyroid bed is empty, no anterior cervical mass or significant adenopathy\"  01/18/17 Thyrogen stimulated WBS: \"No evidence of uptake in thyroid bed, No evidence of metastatic disease\"    He has never been on any of the approved TKI   CT shows numerous lung nodules. He is asymptomatic from lung nodules  Continue observation     Symptom management form reviewed with patient. Plan:       > TSH suppression with Levothyroxine   > Observation  > CT Chest next month  > Follow-up in 6 months        Signed by: Robbie Baumgarten, MD                     September 28, 2019        CC. Ryan Cole MD  CC.  Ashely Soto MD

## 2019-09-27 NOTE — PROGRESS NOTES
65 y/o cauc male here for f/u appt for thyroid cancer. 4/11/19 pt had CT scan done which showed slight increase. Pt has BRAF (+). 1. Have you been to the ER, urgent care clinic since your last visit? Hospitalized since your last visit? No    2. Have you seen or consulted any other health care providers outside of the [address] [place] [address] since your last visit? Include any pap smears or colon screening.  No

## 2019-10-23 ENCOUNTER — HOSPITAL ENCOUNTER (OUTPATIENT)
Dept: CT IMAGING | Age: 59
Discharge: HOME | End: 2019-10-23
Attending: INTERNAL MEDICINE
Payer: MEDICARE

## 2019-10-23 DIAGNOSIS — C73 PAPILLARY THYROID CARCINOMA (HCC): ICD-10-CM

## 2019-10-23 PROCEDURE — 71250 CT THORAX DX C-: CPT

## 2019-11-04 RX ORDER — LEVOTHYROXINE SODIUM 200 UG/1
TABLET ORAL
Qty: 90 TAB | Refills: 1 | Status: SHIPPED | OUTPATIENT
Start: 2019-11-04 | End: 2019-11-04 | Stop reason: SDUPTHER

## 2019-11-04 RX ORDER — LEVOTHYROXINE SODIUM 200 UG/1
TABLET ORAL
Qty: 90 TAB | Refills: 1 | Status: SHIPPED | OUTPATIENT
Start: 2019-11-04 | End: 2020-11-18

## 2020-01-01 ENCOUNTER — HOSPITAL ENCOUNTER (OUTPATIENT)
Dept: LAB | Age: 60
Discharge: HOME | End: 2020-12-28
Payer: MEDICARE

## 2020-01-01 ENCOUNTER — OFFICE VISIT (OUTPATIENT)
Dept: FAMILY MEDICINE CLINIC | Age: 60
End: 2020-01-01
Payer: MEDICARE

## 2020-01-01 VITALS
TEMPERATURE: 98 F | OXYGEN SATURATION: 96 % | BODY MASS INDEX: 32.7 KG/M2 | HEIGHT: 69 IN | WEIGHT: 220.8 LBS | RESPIRATION RATE: 20 BRPM | DIASTOLIC BLOOD PRESSURE: 75 MMHG | SYSTOLIC BLOOD PRESSURE: 128 MMHG | HEART RATE: 64 BPM

## 2020-01-01 DIAGNOSIS — N18.4 CKD (CHRONIC KIDNEY DISEASE) STAGE 4, GFR 15-29 ML/MIN (HCC): Primary | ICD-10-CM

## 2020-01-01 DIAGNOSIS — I10 ESSENTIAL HYPERTENSION WITH GOAL BLOOD PRESSURE LESS THAN 130/80: ICD-10-CM

## 2020-01-01 DIAGNOSIS — C79.9 METASTATIC CARCINOMA (HCC): Primary | ICD-10-CM

## 2020-01-01 DIAGNOSIS — C73 MALIGNANT NEOPLASM OF THYROID GLAND (HCC): ICD-10-CM

## 2020-01-01 DIAGNOSIS — Z12.11 COLON CANCER SCREENING: ICD-10-CM

## 2020-01-01 DIAGNOSIS — I10 HYPERTENSION, WELL CONTROLLED: ICD-10-CM

## 2020-01-01 DIAGNOSIS — D64.9 NORMOCHROMIC NORMOCYTIC ANEMIA: ICD-10-CM

## 2020-01-01 DIAGNOSIS — E11.21 CONTROLLED TYPE 2 DIABETES MELLITUS WITH DIABETIC NEPHROPATHY, WITHOUT LONG-TERM CURRENT USE OF INSULIN (HCC): ICD-10-CM

## 2020-01-01 DIAGNOSIS — E03.9 ACQUIRED HYPOTHYROIDISM: ICD-10-CM

## 2020-01-01 LAB
ALBUMIN SERPL-MCNC: 3.9 G/DL (ref 3.8–4.9)
ALBUMIN/GLOB SERPL: 1.4 {RATIO} (ref 1.2–2.2)
ALP SERPL-CCNC: 114 IU/L (ref 39–117)
ALT SERPL-CCNC: 14 IU/L (ref 0–44)
AST SERPL-CCNC: 13 IU/L (ref 0–40)
BILIRUB SERPL-MCNC: 0.2 MG/DL (ref 0–1.2)
BUN SERPL-MCNC: 80 MG/DL (ref 6–24)
BUN/CREAT SERPL: 25 (ref 9–20)
CALCIUM SERPL-MCNC: 9.5 MG/DL (ref 8.7–10.2)
CHLORIDE SERPL-SCNC: 110 MMOL/L (ref 96–106)
CO2 SERPL-SCNC: 16 MMOL/L (ref 20–29)
CREAT SERPL-MCNC: 3.19 MG/DL (ref 0.76–1.27)
ERYTHROCYTE [DISTWIDTH] IN BLOOD BY AUTOMATED COUNT: 14.5 % (ref 11.6–15.4)
GLOBULIN SER CALC-MCNC: 2.7 G/DL (ref 1.5–4.5)
GLUCOSE SERPL-MCNC: 134 MG/DL (ref 65–99)
HCT VFR BLD AUTO: 31.9 % (ref 37.5–51)
HGB BLD-MCNC: 10.4 G/DL (ref 13–17.7)
INTERPRETATION: NORMAL
Lab: NORMAL
MCH RBC QN AUTO: 28.7 PG (ref 26.6–33)
MCHC RBC AUTO-ENTMCNC: 32.6 G/DL (ref 31.5–35.7)
MCV RBC AUTO: 88 FL (ref 79–97)
PLATELET # BLD AUTO: 269 X10E3/UL (ref 150–450)
POTASSIUM SERPL-SCNC: 5.4 MMOL/L (ref 3.5–5.2)
PROT SERPL-MCNC: 6.6 G/DL (ref 6–8.5)
RBC # BLD AUTO: 3.62 X10E6/UL (ref 4.14–5.8)
SODIUM SERPL-SCNC: 138 MMOL/L (ref 134–144)
TSH SERPL DL<=0.005 MIU/L-ACNC: 0.04 UIU/ML (ref 0.45–4.5)
WBC # BLD AUTO: 9.6 X10E3/UL (ref 3.4–10.8)

## 2020-01-01 PROCEDURE — 99495 TRANSJ CARE MGMT MOD F2F 14D: CPT | Performed by: INTERNAL MEDICINE

## 2020-01-01 PROCEDURE — 84443 ASSAY THYROID STIM HORMONE: CPT

## 2020-01-01 PROCEDURE — 36415 COLL VENOUS BLD VENIPUNCTURE: CPT

## 2020-01-01 PROCEDURE — 85027 COMPLETE CBC AUTOMATED: CPT

## 2020-01-01 PROCEDURE — 80053 COMPREHEN METABOLIC PANEL: CPT

## 2020-01-01 PROCEDURE — G8427 DOCREV CUR MEDS BY ELIG CLIN: HCPCS | Performed by: INTERNAL MEDICINE

## 2020-01-01 RX ORDER — OLMESARTAN MEDOXOMIL 20 MG/1
TABLET ORAL
Qty: 30 TAB | Refills: 4 | Status: SHIPPED | OUTPATIENT
Start: 2020-01-01 | End: 2021-01-01 | Stop reason: SDUPTHER

## 2020-01-01 RX ORDER — OMEPRAZOLE 20 MG/1
20 CAPSULE, DELAYED RELEASE ORAL DAILY
Qty: 90 CAP | Refills: 1 | Status: SHIPPED | OUTPATIENT
Start: 2020-01-01 | End: 2021-01-01 | Stop reason: SDUPTHER

## 2020-01-31 ENCOUNTER — OFFICE VISIT (OUTPATIENT)
Dept: ENDOCRINOLOGY | Age: 60
End: 2020-01-31

## 2020-01-31 VITALS
BODY MASS INDEX: 34.36 KG/M2 | WEIGHT: 232 LBS | HEIGHT: 69 IN | DIASTOLIC BLOOD PRESSURE: 89 MMHG | HEART RATE: 83 BPM | SYSTOLIC BLOOD PRESSURE: 155 MMHG

## 2020-01-31 DIAGNOSIS — N18.30 TYPE 2 DIABETES MELLITUS WITH STAGE 3 CHRONIC KIDNEY DISEASE, WITH LONG-TERM CURRENT USE OF INSULIN (HCC): Primary | ICD-10-CM

## 2020-01-31 DIAGNOSIS — E29.1 HYPOGONADISM IN MALE: ICD-10-CM

## 2020-01-31 DIAGNOSIS — Z12.5 ENCOUNTER FOR SCREENING FOR MALIGNANT NEOPLASM OF PROSTATE: ICD-10-CM

## 2020-01-31 DIAGNOSIS — I10 ESSENTIAL HYPERTENSION WITH GOAL BLOOD PRESSURE LESS THAN 130/80: ICD-10-CM

## 2020-01-31 DIAGNOSIS — E89.0 POST-SURGICAL HYPOTHYROIDISM: ICD-10-CM

## 2020-01-31 DIAGNOSIS — C73 METASTASIS FROM THYROID CANCER (HCC): ICD-10-CM

## 2020-01-31 DIAGNOSIS — E11.22 TYPE 2 DIABETES MELLITUS WITH STAGE 3 CHRONIC KIDNEY DISEASE, WITH LONG-TERM CURRENT USE OF INSULIN (HCC): Primary | ICD-10-CM

## 2020-01-31 DIAGNOSIS — C79.9 METASTASIS FROM THYROID CANCER (HCC): ICD-10-CM

## 2020-01-31 DIAGNOSIS — Z79.4 TYPE 2 DIABETES MELLITUS WITH STAGE 3 CHRONIC KIDNEY DISEASE, WITH LONG-TERM CURRENT USE OF INSULIN (HCC): Primary | ICD-10-CM

## 2020-01-31 DIAGNOSIS — C73 PAPILLARY THYROID CARCINOMA (HCC): ICD-10-CM

## 2020-01-31 DIAGNOSIS — E78.5 HYPERLIPIDEMIA, UNSPECIFIED HYPERLIPIDEMIA TYPE: ICD-10-CM

## 2020-01-31 LAB — HBA1C MFR BLD HPLC: 8 %

## 2020-01-31 RX ORDER — IBUPROFEN 200 MG
TABLET ORAL
COMMUNITY
End: 2020-07-22

## 2020-01-31 NOTE — PROGRESS NOTES
CHIEF COMPLAINT: f/u evaluation for uncontrolled type 2 diabetes and thyroid cancer s/p thyroidectomy and STRATTON    HISTORY OF PRESENT ILLNESS:   Maryam Frankel is a 64 y.o. male with a PMHx as noted below who presents to the endocrinology clinic for f/u evaluation of uncontrolled type 2 diabetes. Not seen since May 2019, A1c today is 8.0%, was at 7.4. Diabetes type 2:  Currently taking the following meds:  Tresiba: not taking (was causing him to feel light headed)  Metformin 500mg twice daily, stop only if GFR <30 (not taking due to reports of harmful effects)  Glipizide to 10mg with breakfast and dinner    Review of most recent diabetes-related labs:  Lab Results   Component Value Date    HBA1C 7.4 (H) 05/02/2019    HBA1C 9.0 (H) 09/27/2018    HBA1C 9.5 (H) 07/10/2018    PMD1CPSO 9.2 07/05/2018    CXR1PJFV 8.8 02/02/2018    NZG2WETP 10.8 09/18/2017    CHOL 198 05/02/2019    LDLC 133 (H) 05/02/2019    GFRAA 35 (L) 05/02/2019    GFRNA 30 (L) 05/02/2019    MCACR 1,464.7 (H) 05/02/2019    TSH 0.020 (L) 05/02/2019    VITD3 23.7 (L) 01/31/2018     948341 = IA-2 pancreatic islet cell autoantibody  GADLT = CLAIRE-65 autoantibody   MCACR = Urine Microalbumin    --------------------------------------------------    Thyroid Cancer:     2002 Biopsy suggesting PTC   S/p total thyroidectomy   S/p STRATTON  [id number]  Reports negative WBS  10/10/16  Repeat surgery, 2 right paratrachial LN's   Surgical Path: poorly differentiated PTC   Patient with hoarseness of voice, persistent   Continued on 200mcg LT4  11/29/16  Initial visit with me for thyroid cancer and diabetes  11/30/16 Tg 10.1, TgAb negative, TSH 1.93 (patient notes this is the lowest Tg level compared with prior)  12/14/16 Neck Ultrasound:  \"Thyroid bed is empty, no anterior cervical mass or significant adenopathy\"  01/18/17 Thyrogen stimulated WBS: \"No evidence of uptake in thyroid bed, No evidence of metastatic disease\"  11/08/17 Thyroid US, no residual thyroid tissue, no pathologic adenopathy. 01/31/18: TSH 2.2 on 200 mcg LT4,  Tg/TgAb was not collected  02/02/18: Tg 24.4 unstimulated, TgAb <1  (led to further eval below)  02/21/18: I-131 WBS: no uptake in thyroid, normal physiologic update otherwise noted. 03/22/18: CT Neck/chest/abdomen: Multiple pulm nodules, some larger some smaller. 03/23/18: Oncology consultation w/ Dr. Mary Cherry, agreed on monitoring asymptomatic disease  04/04/18: NM Bone Scan: No evidence of bony metastases. 09/14/18: CT Chest: Multiple bilateral pulmonary nodules, not significantly changed in size /number.   09/27/18: Tg 11.7 unstimulated, stable, TgAb <1.0, TSH 0.01, FT4 1.65  Denies symptoms of hyperthyroidism,  Taking 200 mcg 6 days/week, and 1/2 tab once per week  Recent TSH 0.02, purposeful TSH suppression in setting of metastatic thyroid cancer,  Has seen Oncology in March, conservative management / observation.     ------------  Male hypogonadism:   Testosterone gel, prev reduced to 1 pump daily,  He has been taking only 2 days per week, feeling fatigue,  No recent levels      PAST MEDICAL/SURGICAL HISTORY:   Past Medical History:   Diagnosis Date    Adverse effect of anesthesia     \" STOP BREATHING 1 TIME C ANESTH\"    Calculus of kidney     Cancer ([address] 2004    thyroid cancer    Chronic kidney disease     Chronic pain     BACK SHOULDER AND ARM    Depression     Diabetes ([address]     Hypercholesterolemia     Hypertension     Nausea & vomiting     Other ill-defined conditions(799.89)     cholesterol, thyroid    Sleep apnea     doesn't wear cpap    Thyroid cancer ([address]     TIA (transient ischemic attack) 2011     Past Surgical History:   Procedure Laterality Date    CARDIAC SURG PROCEDURE UNLIST      HX HEENT      THROAT SURGERY X 4    HX ORTHOPAEDIC      back     HX ORTHOPAEDIC      ARM AND SHOULDER    HX OTHER SURGICAL      thyroid, lymphnode    HX RETINAL DETACHMENT REPAIR      left eye    VASCULAR SURGERY PROCEDURE UNLIST      cardiac cath NEG. ALLERGIES:   Allergies   Allergen Reactions    Anesthetics - Amide Type Shortness of Breath    Flexeril [Cyclobenzaprine] Hives    Tramadol Hives       MEDICATIONS ON ADMISSION:     Current Outpatient Medications:     ibuprofen (ADVIL) 200 mg tablet, Take  by mouth., Disp: , Rfl:     levothyroxine (SYNTHROID) 200 mcg tablet, TAKE 1 TABLET BY MOUTH ONCE DAILY BEFORE BREAKFAST, Disp: 90 Tab, Rfl: 1    lisinopril (PRINIVIL, ZESTRIL) 20 mg tablet, Take 2 Tabs by mouth daily. , Disp: 180 Tab, Rfl: 1    testosterone 30 mg/actuation (1.5 mL) slpm, 2 Actuation(s) by TransDERmal route daily. Max Daily Amount: 2 Actuation(s). , Disp: 1 Bottle, Rfl: 5    glipiZIDE (GLUCOTROL) 10 mg tablet, TAKE 1 TAB BY MOUTH BEFORE BREAKFAST AND DINNER., Disp: 180 Tab, Rfl: 1    insulin degludec (TRESIBA FLEXTOUCH U-200) 200 unit/mL (3 mL) inpn, 15 Units by SubCUTAneous route nightly., Disp: 6 Pen, Rfl: 3    Insulin Needles, Disposable, (DINH PEN NEEDLE) 32 gauge x 5/32\" ndle, Use once daily with insulin pen, Disp: 100 Pen Needle, Rfl: 3    metFORMIN ER (GLUCOPHAGE XR) 500 mg tablet, Take 1 Tab by mouth Before breakfast and dinner., Disp: 180 Tab, Rfl: 3    cholecalciferol, VITAMIN D3, (VITAMIN D3) 5,000 unit tab tablet, Take 1 Tab by mouth daily. , Disp: 90 Tab, Rfl: 3    glucose blood VI test strips (PHARMACIST CHOICE) strip, One Touch  Check glucose 3-4 times daily. DX E11.22, Disp: 300 Strip, Rfl: 3    Lancets misc, Use preferred brand;  Check glucose 3-4 times daily, Diagnosis E11.22, Disp: 2 Package, Rfl: 3    VENTOLIN HFA 90 mcg/actuation inhaler, TAKE 1-2 PUFFS EVEERY 4-6 HOURS AS NEEDED FOR DYSPNEA AND WHEEZING, Disp: , Rfl: 0    simvastatin (ZOCOR) 20 mg tablet, Take 1 Tab by mouth nightly., Disp: 90 Tab, Rfl: 3    Blood-Glucose Meter monitoring kit, 1 preferred brand glucometer for checking home glucose, E11.22 (Patient taking differently: 1 preferred brand glucometer for checking home glucose, E11.22 One Touch), Disp: 1 Kit, Rfl: 0    SOCIAL HISTORY:   Social History     Socioeconomic History    Marital status: LEGALLY      Spouse name: Not on file    Number of children: Not on file    Years of education: Not on file    Highest education level: Not on file   Occupational History    Not on file   Social Needs    Financial resource strain: Not on file    Food insecurity:     Worry: Not on file     Inability: Not on file    Transportation needs:     Medical: Not on file     Non-medical: Not on file   Tobacco Use    Smoking status: Former Smoker     Last attempt to quit: 1994     Years since quittin.4    Smokeless tobacco: Never Used   Substance and Sexual Activity    Alcohol use: Yes     Comment: Occasionally    Drug use: No    Sexual activity: Never   Lifestyle    Physical activity:     Days per week: Not on file     Minutes per session: Not on file    Stress: Not on file   Relationships    Social connections:     Talks on phone: Not on file     Gets together: Not on file     Attends Anabaptist service: Not on file     Active member of club or organization: Not on file     Attends meetings of clubs or organizations: Not on file     Relationship status: Not on file    Intimate partner violence:     Fear of current or ex partner: Not on file     Emotionally abused: Not on file     Physically abused: Not on file     Forced sexual activity: Not on file   Other Topics Concern    Not on file   Social History Narrative    Not on file       FAMILY HISTORY:  Family History   Problem Relation Age of Onset    Diabetes Mother     Elevated Lipids Mother    Tere Dye Bladder Disease Mother     Headache Mother     Migraines Mother     Heart Disease Mother     Hypertension Mother     Stroke Mother     Other Mother         ANEURSYM BRAIN    Bleeding Prob Father     Cancer Father         LEUKEMIA    Diabetes Father     Elevated Lipids Father     Mental Retardation Sister     Psychiatric Disorder Sister     Cancer Brother         LUNGS       REVIEW OF SYSTEMS: Complete ROS assessed and noted for that which is described above, all else are negative. Eyes: normal  ENT: hoarseness of voice  CVS: normal  Resp: normal  GI: normal  : normal  GYN: normal  Endocrine: normal  Integument: normal  Musculoskeletal: chronic pain  Neuro: normal  Psych: normal      PHYSICAL EXAMINATION:    VITAL SIGNS:  Visit Vitals  /89 (BP 1 Location: Left arm, BP Patient Position: Sitting)   Pulse 83   Ht 5' 9\" (1.753 m)   Wt 232 lb (105.2 kg)   BMI 34.26 kg/m²       GENERAL: NCAT, Sitting comfortably, NAD  EYES: EOMI, non-icteric, no proptosis  Ear/Nose/Throat: NCAT, no inflammation, no masses  LYMPH NODES: No LAD  CARDIOVASCULAR: S1 S2, RRR, No murmur, 2+ radial pulses  RESPIRATORY: CTA b/l, no wheeze/rales  GASTROINTESTINAL: NT, ND,  MUSCULOSKELETAL: Normal ROM, no atrophy  SKIN: warm, no edema/rash/ or other skin changes  NEUROLOGIC: 5/5 power all extremities, no tremors, AAOx3  PSYCHIATRIC: Normal affect, Normal insight and judgement    REVIEW OF LABORATORY AND RADIOLOGY DATA:   Labs and documentation have been reviewed as described above. ASSESSMENT AND PLAN:   Juan Gutierrez is a 64 y.o. male with a PMHx as noted above who presents to the endocrinology clinic for f/u evaluation of uncontrolled type 2 diabetes and thyroid cancer.     DM2 uncontrolled  HTN  HLD  Thyroid cancer with active metastases   Post surgical hypothyroidism  Male Hypogonadism    DM2:  Tresiba: Ok to stay off  Restart Metformin 500mg twice daily, stop only if GFR <30   Assured that if there was any confirmation of harm from metformin will advise him  Glipizide to 10mg with breakfast and dinner    HTN: BP reviewed, didn't take his BP med today  HLD: simvastatin 20mg (not taking)  Male Hypogonadism: Testosterone gel, 1 pump per day, prelabs for next visit  Vitamin D deficiency:  5000 units/day, not taking any more, reports drinking lots of milk instead    Thyroid Cancer / Hypothyroidism  Levothyroxine 200 mcg 6 days/week, and 1/2 tab once per week,  Conservative treatment with surveillance and TSH suppression,  Following with oncology,  Prior CT showing some growth of lung nodule, no new nodules,    Need to assure TSH suppression, check level today,  Needs to complete his prelabs for evaluation      RTC in 3 months with full set of prelabs ordered, will check a TSH/FT4 level today however    Romi Prophet.  [address] [facility]

## 2020-01-31 NOTE — PATIENT INSTRUCTIONS
DIABETES:   Denis Carrera: ok to stay off  restart Metformin 500mg twice daily, stop only if GFR <30  Continue Glipizide to 10mg with breakfast and dinner    BLOOD PRESSURE:  lisinopril   CHOLESTEROL: simvastatin 20mg   Male Hypogonadism: Testosterone gel, reduce to 1 pump per day,  Vitamin D deficiency:  5000 units/day, ok to stay off but may need again later    THYROID  Levothyroxine 200 mcg 6 days/week, and 1/2 tab once per week     ** Lab today, only TSH and FT4  ** Lab: full panel 3 days before next visit    Return to clinic in 3 months,         Yessica DE LA PAZ  [address] [facility]  [address] [place] [address]

## 2020-02-01 LAB
T4 FREE SERPL-MCNC: 1.55 NG/DL (ref 0.82–1.77)
TSH SERPL DL<=0.005 MIU/L-ACNC: 0.04 UIU/ML (ref 0.45–4.5)

## 2020-02-17 ENCOUNTER — DOCUMENTATION ONLY (OUTPATIENT)
Dept: ENDOCRINOLOGY | Age: 60
End: 2020-02-17

## 2020-03-24 DIAGNOSIS — C73 PRIMARY CANCER OF THYROID WITH METASTASIS TO OTHER SITE (HCC): Primary | ICD-10-CM

## 2020-05-11 ENCOUNTER — DOCUMENTATION ONLY (OUTPATIENT)
Dept: ENDOCRINOLOGY | Age: 60
End: 2020-05-11

## 2020-05-11 NOTE — PROGRESS NOTES
I made 3 phone calls to Mr. Box and left 3 messages regarding his VIRTUAL APPOINTMENT today. The final one said that he would have to give us a call to reschedule this appointment.   Richard Wood

## 2020-05-17 ENCOUNTER — HOSPITAL ENCOUNTER (EMERGENCY)
Age: 60
Discharge: HOME | End: 2020-05-17
Attending: EMERGENCY MEDICINE
Payer: MEDICARE

## 2020-05-17 ENCOUNTER — APPOINTMENT (OUTPATIENT)
Dept: GENERAL RADIOLOGY | Age: 60
End: 2020-05-17
Attending: NURSE PRACTITIONER
Payer: MEDICARE

## 2020-05-17 VITALS
BODY MASS INDEX: 34.43 KG/M2 | SYSTOLIC BLOOD PRESSURE: 162 MMHG | OXYGEN SATURATION: 100 % | HEART RATE: 77 BPM | DIASTOLIC BLOOD PRESSURE: 75 MMHG | TEMPERATURE: 98.1 F | HEIGHT: 69 IN | RESPIRATION RATE: 17 BRPM | WEIGHT: 232.44 LBS

## 2020-05-17 DIAGNOSIS — R79.89 LOW SERUM THYROID STIMULATING HORMONE (TSH): Primary | ICD-10-CM

## 2020-05-17 DIAGNOSIS — K21.00 GASTROESOPHAGEAL REFLUX DISEASE WITH ESOPHAGITIS: ICD-10-CM

## 2020-05-17 DIAGNOSIS — R11.2 NON-INTRACTABLE VOMITING WITH NAUSEA, UNSPECIFIED VOMITING TYPE: ICD-10-CM

## 2020-05-17 LAB
ALBUMIN SERPL-MCNC: 3.2 G/DL (ref 3.5–5)
ALBUMIN/GLOB SERPL: 0.8 {RATIO} (ref 1.1–2.2)
ALP SERPL-CCNC: 87 U/L (ref 45–117)
ALT SERPL-CCNC: 18 U/L (ref 12–78)
ANION GAP SERPL CALC-SCNC: 13 MMOL/L (ref 5–15)
AST SERPL-CCNC: 13 U/L (ref 15–37)
BASOPHILS # BLD: 0.1 K/UL (ref 0–0.1)
BASOPHILS NFR BLD: 1 % (ref 0–1)
BILIRUB SERPL-MCNC: 0.3 MG/DL (ref 0.2–1)
BUN SERPL-MCNC: 73 MG/DL (ref 6–20)
BUN/CREAT SERPL: 20 (ref 12–20)
CALCIUM SERPL-MCNC: 8.2 MG/DL (ref 8.5–10.1)
CHLORIDE SERPL-SCNC: 106 MMOL/L (ref 97–108)
CO2 SERPL-SCNC: 18 MMOL/L (ref 21–32)
CREAT SERPL-MCNC: 3.72 MG/DL (ref 0.7–1.3)
DIFFERENTIAL METHOD BLD: ABNORMAL
EOSINOPHIL # BLD: 0.1 K/UL (ref 0–0.4)
EOSINOPHIL NFR BLD: 1 % (ref 0–7)
ERYTHROCYTE [DISTWIDTH] IN BLOOD BY AUTOMATED COUNT: 13.2 % (ref 11.5–14.5)
GLOBULIN SER CALC-MCNC: 4.1 G/DL (ref 2–4)
GLUCOSE SERPL-MCNC: 193 MG/DL (ref 65–100)
HCT VFR BLD AUTO: 33.4 % (ref 36.6–50.3)
HGB BLD-MCNC: 10.9 G/DL (ref 12.1–17)
IMM GRANULOCYTES # BLD AUTO: 0 K/UL (ref 0–0.04)
IMM GRANULOCYTES NFR BLD AUTO: 0 % (ref 0–0.5)
LYMPHOCYTES # BLD: 1 K/UL (ref 0.8–3.5)
LYMPHOCYTES NFR BLD: 10 % (ref 12–49)
MCH RBC QN AUTO: 29.1 PG (ref 26–34)
MCHC RBC AUTO-ENTMCNC: 32.6 G/DL (ref 30–36.5)
MCV RBC AUTO: 89.3 FL (ref 80–99)
MONOCYTES # BLD: 0.5 K/UL (ref 0–1)
MONOCYTES NFR BLD: 5 % (ref 5–13)
NEUTS SEG # BLD: 8.6 K/UL (ref 1.8–8)
NEUTS SEG NFR BLD: 83 % (ref 32–75)
NRBC # BLD: 0 K/UL (ref 0–0.01)
NRBC BLD-RTO: 0 PER 100 WBC
PLATELET # BLD AUTO: 282 K/UL (ref 150–400)
PMV BLD AUTO: 9.9 FL (ref 8.9–12.9)
POTASSIUM SERPL-SCNC: 6.2 MMOL/L (ref 3.5–5.1)
PROT SERPL-MCNC: 7.3 G/DL (ref 6.4–8.2)
RBC # BLD AUTO: 3.74 M/UL (ref 4.1–5.7)
SODIUM SERPL-SCNC: 137 MMOL/L (ref 136–145)
TROPONIN I SERPL-MCNC: <0.05 NG/ML
TSH SERPL DL<=0.05 MIU/L-ACNC: 0.03 UIU/ML (ref 0.36–3.74)
WBC # BLD AUTO: 10.3 K/UL (ref 4.1–11.1)

## 2020-05-17 PROCEDURE — 84443 ASSAY THYROID STIM HORMONE: CPT

## 2020-05-17 PROCEDURE — 99284 EMERGENCY DEPT VISIT MOD MDM: CPT

## 2020-05-17 PROCEDURE — 93005 ELECTROCARDIOGRAM TRACING: CPT

## 2020-05-17 PROCEDURE — 74011250637 HC RX REV CODE- 250/637: Performed by: NURSE PRACTITIONER

## 2020-05-17 PROCEDURE — 36415 COLL VENOUS BLD VENIPUNCTURE: CPT

## 2020-05-17 PROCEDURE — 84484 ASSAY OF TROPONIN QUANT: CPT

## 2020-05-17 PROCEDURE — 80053 COMPREHEN METABOLIC PANEL: CPT

## 2020-05-17 PROCEDURE — 85025 COMPLETE CBC W/AUTO DIFF WBC: CPT

## 2020-05-17 PROCEDURE — 71045 X-RAY EXAM CHEST 1 VIEW: CPT

## 2020-05-17 RX ORDER — MAG HYDROX/ALUMINUM HYD/SIMETH 200-200-20
30 SUSPENSION, ORAL (FINAL DOSE FORM) ORAL
Status: COMPLETED | OUTPATIENT
Start: 2020-05-17 | End: 2020-05-17

## 2020-05-17 RX ORDER — ONDANSETRON 4 MG/1
4 TABLET, ORALLY DISINTEGRATING ORAL
Status: COMPLETED | OUTPATIENT
Start: 2020-05-17 | End: 2020-05-17

## 2020-05-17 RX ORDER — ONDANSETRON 4 MG/1
4 TABLET, ORALLY DISINTEGRATING ORAL
Qty: 20 TAB | Refills: 0 | Status: SHIPPED | OUTPATIENT
Start: 2020-05-17 | End: 2020-07-28

## 2020-05-17 RX ORDER — MAG HYDROX/ALUMINUM HYD/SIMETH 200-200-20
30 SUSPENSION, ORAL (FINAL DOSE FORM) ORAL
Qty: 1 BOTTLE | Refills: 0 | Status: SHIPPED | OUTPATIENT
Start: 2020-05-17 | End: 2020-07-28 | Stop reason: CLARIF

## 2020-05-17 RX ORDER — FAMOTIDINE 20 MG/1
20 TABLET, FILM COATED ORAL 2 TIMES DAILY
Qty: 20 TAB | Refills: 0 | Status: SHIPPED | OUTPATIENT
Start: 2020-05-17 | End: 2020-05-27

## 2020-05-17 RX ADMIN — ONDANSETRON 4 MG: 4 TABLET, ORALLY DISINTEGRATING ORAL at 17:02

## 2020-05-17 RX ADMIN — ALUMINUM HYDROXIDE, MAGNESIUM HYDROXIDE, AND SIMETHICONE 30 ML: 200; 200; 20 SUSPENSION ORAL at 15:59

## 2020-05-17 NOTE — DISCHARGE INSTRUCTIONS
Patient Education        Gastroesophageal Reflux Disease (GERD): Care Instructions  Your Care Instructions    Gastroesophageal reflux disease (GERD) is the backward flow of stomach acid into the esophagus. The esophagus is the tube that leads from your throat to your stomach. A one-way valve prevents the stomach acid from moving up into this tube. When you have GERD, this valve does not close tightly enough. If you have mild GERD symptoms including heartburn, you may be able to control the problem with antacids or over-the-counter medicine. Changing your diet, losing weight, and making other lifestyle changes can also help reduce symptoms. Follow-up care is a key part of your treatment and safety. Be sure to make and go to all appointments, and call your doctor if you are having problems. It's also a good idea to know your test results and keep a list of the medicines you take. How can you care for yourself at home? · Take your medicines exactly as prescribed. Call your doctor if you think you are having a problem with your medicine. · Your doctor may recommend over-the-counter medicine. For mild or occasional indigestion, antacids, such as Tums, Gaviscon, Mylanta, or Maalox, may help. Your doctor also may recommend over-the-counter acid reducers, such as Pepcid AC, Tagamet HB, Zantac 75, or Prilosec. Read and follow all instructions on the label. If you use these medicines often, talk with your doctor. · Change your eating habits. ? It's best to eat several small meals instead of two or three large meals. ? After you eat, wait 2 to 3 hours before you lie down. ? Chocolate, mint, and alcohol can make GERD worse. ? Spicy foods, foods that have a lot of acid (like tomatoes and oranges), and coffee can make GERD symptoms worse in some people. If your symptoms are worse after you eat a certain food, you may want to stop eating that food to see if your symptoms get better.   · Do not smoke or chew tobacco. Smoking can make GERD worse. If you need help quitting, talk to your doctor about stop-smoking programs and medicines. These can increase your chances of quitting for good. · If you have GERD symptoms at night, raise the head of your bed 6 to 8 inches by putting the frame on blocks or placing a foam wedge under the head of your mattress. (Adding extra pillows does not work.)  · Do not wear tight clothing around your middle. · Lose weight if you need to. Losing just 5 to 10 pounds can help. When should you call for help? Call your doctor now or seek immediate medical care if:    · You have new or different belly pain.     · Your stools are black and tarlike or have streaks of blood.    Watch closely for changes in your health, and be sure to contact your doctor if:    · Your symptoms have not improved after 2 days.     · Food seems to catch in your throat or chest.   Where can you learn more? Go to http://mayo-kelly.info/  Enter V190 in the search box to learn more about \"Gastroesophageal Reflux Disease (GERD): Care Instructions. \"  Current as of: August 11, 2019Content Version: 12.4  © 6040-8405 [employer]. Care instructions adapted under license by [employer] (which disclaims liability or warranty for this information). If you have questions about a medical condition or this instruction, always ask your healthcare professional. [address] any warranty or liability for your use of this information.

## 2020-05-17 NOTE — ED TRIAGE NOTES
EMS reported patient transported for chest pain. The patient reported chest pain subsided following one episode of vomiting. Stated clear vomitus with \"some blood in it\".   C/O throat burning

## 2020-05-18 ENCOUNTER — PATIENT OUTREACH (OUTPATIENT)
Dept: INTERNAL MEDICINE CLINIC | Age: 60
End: 2020-05-18

## 2020-05-18 LAB
ATRIAL RATE: 82 BPM
CALCULATED P AXIS, ECG09: 46 DEGREES
CALCULATED R AXIS, ECG10: -42 DEGREES
CALCULATED T AXIS, ECG11: 51 DEGREES
DIAGNOSIS, 93000: NORMAL
P-R INTERVAL, ECG05: 148 MS
Q-T INTERVAL, ECG07: 366 MS
QRS DURATION, ECG06: 80 MS
QTC CALCULATION (BEZET), ECG08: 427 MS
VENTRICULAR RATE, ECG03: 82 BPM

## 2020-05-18 NOTE — ED PROVIDER NOTES
EMERGENCY DEPARTMENT HISTORY AND PHYSICAL EXAM    Date: 5/17/2020  Patient Name: Abbi Gonzales    History of Presenting Illness     Chief Complaint   Patient presents with    Sore Throat         History Provided By: Patient    Chief Complaint: sore throat  Duration: 2 Weeks  Timing:  Gradual and Progressive  Location: throat and esophagus  Quality: Burning  Severity: 10 out of 10  Modifying Factors: none  Associated Symptoms: nausea vomiting      HPI: Abbi Gonzales is a 64 y.o. male with a PMH of thyroid cancer lung cancer diabetes who presents with sore throat for 2 weeks. states he has had nausea and yesterday vomited a large amount of water. States he has not eaten today. States he did eat yesterday. States he has had a burning sensation in his chest and back of his throat. Patient reports occasional cough but states he always has a cough. Denies fever. Denies shortness of breath. Denies abdominal pain headache numbness or tingling. PCP: Namita Gipson MD    Current Outpatient Medications   Medication Sig Dispense Refill    ondansetron (Zofran ODT) 4 mg disintegrating tablet Take 1 Tab by mouth every eight (8) hours as needed for Nausea or Vomiting. 20 Tab 0    famotidine (Pepcid) 20 mg tablet Take 1 Tab by mouth two (2) times a day for 10 days. 20 Tab 0    alum-mag hydroxide-simeth (MYLANTA) 200-200-20 mg/5 mL susp Take 30 mL by mouth every four (4) hours as needed for Indigestion. 1 Bottle 0    testosterone 30 mg/actuation (1.5 mL) slpm Apply 1-2 pumps daily as directed on your visit 90 mL 3    ibuprofen (ADVIL) 200 mg tablet Take  by mouth.  levothyroxine (SYNTHROID) 200 mcg tablet TAKE 1 TABLET BY MOUTH ONCE DAILY BEFORE BREAKFAST 90 Tab 1    lisinopril (PRINIVIL, ZESTRIL) 20 mg tablet Take 2 Tabs by mouth daily.  180 Tab 1    glipiZIDE (GLUCOTROL) 10 mg tablet TAKE 1 TAB BY MOUTH BEFORE BREAKFAST AND DINNER. 180 Tab 1    Insulin Needles, Disposable, (DINH PEN NEEDLE) 32 gauge x 5/32\" ndle Use once daily with insulin pen 100 Pen Needle 3    metFORMIN ER (GLUCOPHAGE XR) 500 mg tablet Take 1 Tab by mouth Before breakfast and dinner. 180 Tab 3    cholecalciferol, VITAMIN D3, (VITAMIN D3) 5,000 unit tab tablet Take 1 Tab by mouth daily. 90 Tab 3    glucose blood VI test strips (PHARMACIST CHOICE) strip One Touch  Check glucose 3-4 times daily. DX E11.22 300 Strip 3    Lancets misc Use preferred brand; Check glucose 3-4 times daily, Diagnosis E11.22 2 Package 3    VENTOLIN HFA 90 mcg/actuation inhaler TAKE 1-2 PUFFS EVEERY 4-6 HOURS AS NEEDED FOR DYSPNEA AND WHEEZING  0    simvastatin (ZOCOR) 20 mg tablet Take 1 Tab by mouth nightly. 90 Tab 3    Blood-Glucose Meter monitoring kit 1 preferred brand glucometer for checking home glucose, E11.22 (Patient taking differently: 1 preferred brand glucometer for checking home glucose, E11.22 One Touch) 1 Kit 0       Past History     Past Medical History:  Past Medical History:   Diagnosis Date    Adverse effect of anesthesia     \" STOP BREATHING 1 TIME C ANESTH\"    Calculus of kidney     Cancer ([address] 2004    thyroid cancer    Chronic kidney disease     Chronic pain     BACK SHOULDER AND ARM    Depression     Diabetes ([address]     Hypercholesterolemia     Hypertension     Nausea & vomiting     Other ill-defined conditions(799.89)     cholesterol, thyroid    Sleep apnea     doesn't wear cpap    Thyroid cancer ([address]     TIA (transient ischemic attack) 2011       Past Surgical History:  Past Surgical History:   Procedure Laterality Date    CARDIAC SURG PROCEDURE UNLIST      HX HEENT      THROAT SURGERY X 4    HX ORTHOPAEDIC      back     HX ORTHOPAEDIC      ARM AND SHOULDER    HX OTHER SURGICAL      thyroid, lymphnode    HX RETINAL DETACHMENT REPAIR      left eye    VASCULAR SURGERY PROCEDURE UNLIST      cardiac cath NEG.        Family History:  Family History   Problem Relation Age of Onset    Diabetes Mother     Elevated Lipids Mother    [facility] Gall Bladder Disease Mother     Headache Mother    [facility] Migraines Mother     Heart Disease Mother     Hypertension Mother     Stroke Mother     Other Mother         ANEURSYM BRAIN    Bleeding Prob Father     Cancer Father         LEUKEMIA    Diabetes Father     Elevated Lipids Father     Mental Retardation Sister     Psychiatric Disorder Sister     Cancer Brother         LUNGS       Social History:  Social History     Tobacco Use    Smoking status: Former Smoker     Last attempt to quit: 1994     Years since quittin.7    Smokeless tobacco: Never Used   Substance Use Topics    Alcohol use: Yes     Comment: Occasionally    Drug use: No       Allergies: Allergies   Allergen Reactions    Anesthetics - Amide Type Shortness of Breath    Flexeril [Cyclobenzaprine] Hives    Tramadol Hives         Review of Systems   Review of Systems   Constitutional: Negative for chills, fatigue and fever. HENT: Positive for sore throat. Negative for congestion. Eyes: Negative for redness. Respiratory: Negative for cough, chest tightness and wheezing. Cardiovascular: Negative for chest pain. Burning in chest   Gastrointestinal: Positive for nausea and vomiting. Negative for abdominal pain. Genitourinary: Negative for dysuria. Musculoskeletal: Negative for arthralgias, back pain, myalgias, neck pain and neck stiffness. Skin: Negative for rash. Neurological: Negative for dizziness, syncope, weakness, light-headedness, numbness and headaches. Hematological: Negative for adenopathy. Psychiatric/Behavioral: Negative for agitation and behavioral problems. All other systems reviewed and are negative. Physical Exam     Vitals:    20 1402 20 1612   BP: 175/71 162/75   Pulse: 79 77   Resp: 20 17   Temp: 98.1 °F (36.7 °C)    SpO2: 100% 100%   Weight: 105.4 kg (232 lb 7 oz)    Height: 5' 9\" (1.753 m)      Physical Exam  Vitals signs and nursing note reviewed.    Constitutional: Appearance: He is well-developed. He is obese. HENT:      Head: Normocephalic and atraumatic. Right Ear: External ear normal.   Eyes:      General:         Right eye: No discharge. Left eye: No discharge. Conjunctiva/sclera: Conjunctivae normal.   Neck:      Musculoskeletal: Normal range of motion and neck supple. Cardiovascular:      Rate and Rhythm: Normal rate and regular rhythm. Heart sounds: Normal heart sounds. Pulmonary:      Effort: Pulmonary effort is normal. No respiratory distress. Breath sounds: Normal breath sounds. No wheezing. Abdominal:      General: Bowel sounds are normal.      Palpations: Abdomen is soft. Tenderness: There is no abdominal tenderness. Musculoskeletal: Normal range of motion. Lymphadenopathy:      Cervical: No cervical adenopathy. Skin:     General: Skin is warm and dry. Neurological:      Mental Status: He is alert and oriented to person, place, and time. Cranial Nerves: No cranial nerve deficit. Psychiatric:         Behavior: Behavior normal.         Thought Content:  Thought content normal.         Judgment: Judgment normal.           Diagnostic Study Results     Labs -     Recent Results (from the past 12 hour(s))   EKG, 12 LEAD, INITIAL    Collection Time: 05/17/20  1:51 PM   Result Value Ref Range    Ventricular Rate 82 BPM    Atrial Rate 82 BPM    P-R Interval 148 ms    QRS Duration 80 ms    Q-T Interval 366 ms    QTC Calculation (Bezet) 427 ms    Calculated P Axis 46 degrees    Calculated R Axis -42 degrees    Calculated T Axis 51 degrees    Diagnosis       Normal sinus rhythm  Left axis deviation  Pulmonary disease pattern  Abnormal ECG  When compared with ECG of 28-JAN-2018 05:31,  No significant change was found     METABOLIC PANEL, COMPREHENSIVE    Collection Time: 05/17/20  2:30 PM   Result Value Ref Range    Sodium 137 136 - 145 mmol/L    Potassium 6.2 (H) 3.5 - 5.1 mmol/L    Chloride 106 97 - 108 mmol/L CO2 18 (L) 21 - 32 mmol/L    Anion gap 13 5 - 15 mmol/L    Glucose 193 (H) 65 - 100 mg/dL    BUN 73 (H) 6 - 20 MG/DL    Creatinine 3.72 (H) 0.70 - 1.30 MG/DL    BUN/Creatinine ratio 20 12 - 20      GFR est AA 20 (L) >60 ml/min/1.73m2    GFR est non-AA 17 (L) >60 ml/min/1.73m2    Calcium 8.2 (L) 8.5 - 10.1 MG/DL    Bilirubin, total 0.3 0.2 - 1.0 MG/DL    ALT (SGPT) 18 12 - 78 U/L    AST (SGOT) 13 (L) 15 - 37 U/L    Alk. phosphatase 87 45 - 117 U/L    Protein, total 7.3 6.4 - 8.2 g/dL    Albumin 3.2 (L) 3.5 - 5.0 g/dL    Globulin 4.1 (H) 2.0 - 4.0 g/dL    A-G Ratio 0.8 (L) 1.1 - 2.2     TROPONIN I    Collection Time: 05/17/20  2:30 PM   Result Value Ref Range    Troponin-I, Qt. <0.05 <0.05 ng/mL   TSH 3RD GENERATION    Collection Time: 05/17/20  2:30 PM   Result Value Ref Range    TSH 0.03 (L) 0.36 - 3.74 uIU/mL   CBC WITH AUTOMATED DIFF    Collection Time: 05/17/20  3:33 PM   Result Value Ref Range    WBC 10.3 4.1 - 11.1 K/uL    RBC 3.74 (L) 4.10 - 5.70 M/uL    HGB 10.9 (L) 12.1 - 17.0 g/dL    HCT 33.4 (L) 36.6 - 50.3 %    MCV 89.3 80.0 - 99.0 FL    MCH 29.1 26.0 - 34.0 PG    MCHC 32.6 30.0 - 36.5 g/dL    RDW 13.2 11.5 - 14.5 %    PLATELET 759 350 - 006 K/uL    MPV 9.9 8.9 - 12.9 FL    NRBC 0.0 0  WBC    ABSOLUTE NRBC 0.00 0.00 - 0.01 K/uL    NEUTROPHILS 83 (H) 32 - 75 %    LYMPHOCYTES 10 (L) 12 - 49 %    MONOCYTES 5 5 - 13 %    EOSINOPHILS 1 0 - 7 %    BASOPHILS 1 0 - 1 %    IMMATURE GRANULOCYTES 0 0.0 - 0.5 %    ABS. NEUTROPHILS 8.6 (H) 1.8 - 8.0 K/UL    ABS. LYMPHOCYTES 1.0 0.8 - 3.5 K/UL    ABS. MONOCYTES 0.5 0.0 - 1.0 K/UL    ABS. EOSINOPHILS 0.1 0.0 - 0.4 K/UL    ABS. BASOPHILS 0.1 0.0 - 0.1 K/UL    ABS. IMM. GRANS. 0.0 0.00 - 0.04 K/UL    DF AUTOMATED         Radiologic Studies -   XR CHEST PORT   Final Result   IMPRESSION:   No acute process.         CT Results  (Last 48 hours)    None        CXR Results  (Last 48 hours)               05/17/20 1510  XR CHEST PORT Final result    Impression:  IMPRESSION: No acute process. Narrative:  INDICATION: cp and covid testing       EXAM:  AP CHEST RADIOGRAPH       COMPARISON: January 28, 2018       FINDINGS:       AP portable view of the chest demonstrates a normal cardiomediastinal   silhouette. There is no edema, effusion, consolidation, or pneumothorax. The   osseous structures are unremarkable. EKG: normal EKG, normal sinus rhythm, left axis deviation pulmonary disease pattern. Medical Decision Making   I am the first provider for this patient. I reviewed the vital signs, available nursing notes, past medical history, past surgical history, family history and social history. Vital Signs-Reviewed the patient's vital signs. Records Reviewed: Nursing Notes and Old Medical Records            Disposition:  Home patient states he feels better . States burning in throat relieved after mylanta. Taking oral fluids . No vomiting . DISCHARGE NOTE:         Care plan outlined and precautions discussed. Patient has no new complaints, changes, or physical findings. Results of tests were reviewed with the patient. All medications were reviewed with the patient; will d/c home with zofran mylanta. All of pt's questions and concerns were addressed. Patient was instructed and agrees to follow up with PCP, as well as to return to the ED upon further deterioration. Patient is ready to go home. Follow-up Information     Follow up With Specialties Details Why Contact Info    Pavel Holden MD Internal Medicine In 3 days  [address]  [phone]            Discharge Medication List as of 5/17/2020  5:10 PM      START taking these medications    Details   ondansetron (Zofran ODT) 4 mg disintegrating tablet Take 1 Tab by mouth every eight (8) hours as needed for Nausea or Vomiting., Normal, Disp-20 Tab, R-0      famotidine (Pepcid) 20 mg tablet Take 1 Tab by mouth two (2) times a day for 10 days. , Normal, Disp-20 Tab, R-0      alum-mag hydroxide-simeth (MYLANTA) 200-200-20 mg/5 mL susp Take 30 mL by mouth every four (4) hours as needed for Indigestion. , Normal, Disp-1 Bottle, R-0         CONTINUE these medications which have NOT CHANGED    Details   testosterone 30 mg/actuation (1.5 mL) slpm Apply 1-2 pumps daily as directed on your visit, Normal, Disp-90 mL, R-3      ibuprofen (ADVIL) 200 mg tablet Take  by mouth., Historical Med      levothyroxine (SYNTHROID) 200 mcg tablet TAKE 1 TABLET BY MOUTH ONCE DAILY BEFORE BREAKFAST, Normal, Disp-90 Tab, R-1      lisinopril (PRINIVIL, ZESTRIL) 20 mg tablet Take 2 Tabs by mouth daily. , Normal, Disp-180 Tab, R-1      glipiZIDE (GLUCOTROL) 10 mg tablet TAKE 1 TAB BY MOUTH BEFORE BREAKFAST AND DINNER., Normal, Disp-180 Tab, R-1      Insulin Needles, Disposable, (DINH PEN NEEDLE) 32 gauge x 5/32\" ndle Use once daily with insulin pen, Normal, Disp-100 Pen Needle, R-3      metFORMIN ER (GLUCOPHAGE XR) 500 mg tablet Take 1 Tab by mouth Before breakfast and dinner., Normal, Disp-180 Tab, R-3      cholecalciferol, VITAMIN D3, (VITAMIN D3) 5,000 unit tab tablet Take 1 Tab by mouth daily. , Normal, Disp-90 Tab, R-3      glucose blood VI test strips (PHARMACIST CHOICE) strip One Touch  Check glucose 3-4 times daily. DX E11.22, Normal, Disp-300 Strip, R-3      Lancets misc Use preferred brand;  Check glucose 3-4 times daily, Diagnosis E11.22, Normal, Disp-2 Package, R-3      VENTOLIN HFA 90 mcg/actuation inhaler TAKE 1-2 PUFFS EVEERY 4-6 HOURS AS NEEDED FOR DYSPNEA AND WHEEZING, Historical Med, R-0, LISA      simvastatin (ZOCOR) 20 mg tablet Take 1 Tab by mouth nightly., Normal, Disp-90 Tab, R-3      Blood-Glucose Meter monitoring kit 1 preferred brand glucometer for checking home glucose, E11.22, Normal, Disp-1 Kit, R-0             Provider Notes (Medical Decision Making):   DDX esophagitis GERD dyspepsia dehydration PUD  Procedures:  Procedures    Please note that this dictation was completed with Dragon, computer voice recognition software. Quite often unanticipated grammatical, syntax, homophones, and other interpretive errors are inadvertently transcribed by the computer software. Please disregard these errors. Additionally, please excuse any errors that have escaped final proofreading. Diagnosis     Clinical Impression:   1. Low serum thyroid stimulating hormone (TSH)    2. Gastroesophageal reflux disease with esophagitis    3.  Non-intractable vomiting with nausea, unspecified vomiting type

## 2020-06-24 ENCOUNTER — HOSPITAL ENCOUNTER (OUTPATIENT)
Dept: NUCLEAR MEDICINE | Age: 60
Discharge: HOME | End: 2020-06-24
Attending: INTERNAL MEDICINE
Payer: MEDICARE

## 2020-06-24 ENCOUNTER — HOSPITAL ENCOUNTER (OUTPATIENT)
Dept: CT IMAGING | Age: 60
Discharge: HOME | End: 2020-06-24
Attending: INTERNAL MEDICINE
Payer: MEDICARE

## 2020-06-24 DIAGNOSIS — C73 PRIMARY CANCER OF THYROID WITH METASTASIS TO OTHER SITE (HCC): ICD-10-CM

## 2020-06-24 PROCEDURE — 71250 CT THORAX DX C-: CPT

## 2020-06-24 PROCEDURE — 78306 BONE IMAGING WHOLE BODY: CPT

## 2020-07-17 ENCOUNTER — APPOINTMENT (OUTPATIENT)
Dept: GENERAL RADIOLOGY | Age: 60
DRG: 392 | End: 2020-07-17
Attending: EMERGENCY MEDICINE
Payer: MEDICARE

## 2020-07-17 ENCOUNTER — HOSPITAL ENCOUNTER (INPATIENT)
Age: 60
LOS: 5 days | Discharge: HOME | DRG: 392 | End: 2020-07-22
Attending: EMERGENCY MEDICINE | Admitting: INTERNAL MEDICINE
Payer: MEDICARE

## 2020-07-17 ENCOUNTER — APPOINTMENT (OUTPATIENT)
Dept: CT IMAGING | Age: 60
DRG: 392 | End: 2020-07-17
Attending: EMERGENCY MEDICINE
Payer: MEDICARE

## 2020-07-17 ENCOUNTER — ANESTHESIA EVENT (OUTPATIENT)
Dept: SURGERY | Age: 60
DRG: 392 | End: 2020-07-17
Payer: MEDICARE

## 2020-07-17 ENCOUNTER — ANESTHESIA (OUTPATIENT)
Dept: SURGERY | Age: 60
DRG: 392 | End: 2020-07-17
Payer: MEDICARE

## 2020-07-17 DIAGNOSIS — R11.2 NON-INTRACTABLE VOMITING WITH NAUSEA, UNSPECIFIED VOMITING TYPE: ICD-10-CM

## 2020-07-17 DIAGNOSIS — N28.9 ACUTE ON CHRONIC RENAL INSUFFICIENCY: Primary | ICD-10-CM

## 2020-07-17 DIAGNOSIS — R19.7 DIARRHEA, UNSPECIFIED TYPE: ICD-10-CM

## 2020-07-17 DIAGNOSIS — N18.9 ACUTE ON CHRONIC RENAL INSUFFICIENCY: Primary | ICD-10-CM

## 2020-07-17 DIAGNOSIS — D72.829 LEUKOCYTOSIS, UNSPECIFIED TYPE: ICD-10-CM

## 2020-07-17 PROBLEM — N17.9 AKI (ACUTE KIDNEY INJURY) (HCC): Status: ACTIVE | Noted: 2020-07-17

## 2020-07-17 LAB
25(OH)D3 SERPL-MCNC: 24.9 NG/ML (ref 30–100)
ALBUMIN SERPL-MCNC: 3.3 G/DL (ref 3.5–5)
ALBUMIN/GLOB SERPL: 0.8 {RATIO} (ref 1.1–2.2)
ALP SERPL-CCNC: 132 U/L (ref 45–117)
ALT SERPL-CCNC: 23 U/L (ref 12–78)
ANION GAP SERPL CALC-SCNC: 9 MMOL/L (ref 5–15)
APPEARANCE UR: CLEAR
AST SERPL-CCNC: 8 U/L (ref 15–37)
ATRIAL RATE: 87 BPM
BACTERIA URNS QL MICRO: NEGATIVE /HPF
BASOPHILS # BLD: 0.1 K/UL (ref 0–0.1)
BASOPHILS NFR BLD: 1 % (ref 0–1)
BILIRUB SERPL-MCNC: 0.2 MG/DL (ref 0.2–1)
BILIRUB UR QL: NEGATIVE
BNP SERPL-MCNC: 934 PG/ML
BUN SERPL-MCNC: 74 MG/DL (ref 6–20)
BUN/CREAT SERPL: 18 (ref 12–20)
CALCIUM SERPL-MCNC: 8.3 MG/DL (ref 8.5–10.1)
CALCIUM SERPL-MCNC: 8.4 MG/DL (ref 8.5–10.1)
CALCULATED P AXIS, ECG09: 66 DEGREES
CALCULATED R AXIS, ECG10: -31 DEGREES
CALCULATED T AXIS, ECG11: 54 DEGREES
CHLORIDE SERPL-SCNC: 113 MMOL/L (ref 97–108)
CHLORIDE UR-SCNC: 48 MMOL/L
CK SERPL-CCNC: 90 U/L (ref 39–308)
CO2 SERPL-SCNC: 20 MMOL/L (ref 21–32)
COLOR UR: ABNORMAL
COMMENT, HOLDF: NORMAL
COMMENT, HOLDF: NORMAL
CREAT SERPL-MCNC: 4.01 MG/DL (ref 0.7–1.3)
CREAT UR-MCNC: 105 MG/DL
CREAT UR-MCNC: 92.5 MG/DL
DIAGNOSIS, 93000: NORMAL
DIFFERENTIAL METHOD BLD: ABNORMAL
EOSINOPHIL # BLD: 0.3 K/UL (ref 0–0.4)
EOSINOPHIL #/AREA URNS HPF: NEGATIVE /[HPF]
EOSINOPHIL NFR BLD: 2 % (ref 0–7)
EPITH CASTS URNS QL MICRO: ABNORMAL /LPF
ERYTHROCYTE [DISTWIDTH] IN BLOOD BY AUTOMATED COUNT: 13.9 % (ref 11.5–14.5)
GLOBULIN SER CALC-MCNC: 4.4 G/DL (ref 2–4)
GLUCOSE BLD STRIP.AUTO-MCNC: 101 MG/DL (ref 65–100)
GLUCOSE BLD STRIP.AUTO-MCNC: 102 MG/DL (ref 65–100)
GLUCOSE BLD STRIP.AUTO-MCNC: 130 MG/DL (ref 65–100)
GLUCOSE BLD STRIP.AUTO-MCNC: 289 MG/DL (ref 65–100)
GLUCOSE BLD STRIP.AUTO-MCNC: 83 MG/DL (ref 65–100)
GLUCOSE BLD STRIP.AUTO-MCNC: 97 MG/DL (ref 65–100)
GLUCOSE SERPL-MCNC: 142 MG/DL (ref 65–100)
GLUCOSE UR STRIP.AUTO-MCNC: NEGATIVE MG/DL
HCT VFR BLD AUTO: 34 % (ref 36.6–50.3)
HGB BLD-MCNC: 10.9 G/DL (ref 12.1–17)
HGB UR QL STRIP: ABNORMAL
HYALINE CASTS URNS QL MICRO: ABNORMAL /LPF (ref 0–5)
IMM GRANULOCYTES # BLD AUTO: 0.1 K/UL (ref 0–0.04)
IMM GRANULOCYTES NFR BLD AUTO: 1 % (ref 0–0.5)
KETONES UR QL STRIP.AUTO: NEGATIVE MG/DL
LEUKOCYTE ESTERASE UR QL STRIP.AUTO: NEGATIVE
LYMPHOCYTES # BLD: 1.1 K/UL (ref 0.8–3.5)
LYMPHOCYTES NFR BLD: 7 % (ref 12–49)
MCH RBC QN AUTO: 29.2 PG (ref 26–34)
MCHC RBC AUTO-ENTMCNC: 32.1 G/DL (ref 30–36.5)
MCV RBC AUTO: 91.2 FL (ref 80–99)
MONOCYTES # BLD: 0.8 K/UL (ref 0–1)
MONOCYTES NFR BLD: 6 % (ref 5–13)
NEUTS SEG # BLD: 12.8 K/UL (ref 1.8–8)
NEUTS SEG NFR BLD: 83 % (ref 32–75)
NITRITE UR QL STRIP.AUTO: NEGATIVE
NRBC # BLD: 0 K/UL (ref 0–0.01)
NRBC BLD-RTO: 0 PER 100 WBC
P-R INTERVAL, ECG05: 148 MS
PH UR STRIP: 5 [PH] (ref 5–8)
PHOSPHATE SERPL-MCNC: 3.6 MG/DL (ref 2.6–4.7)
PLATELET # BLD AUTO: 293 K/UL (ref 150–400)
PMV BLD AUTO: 10.7 FL (ref 8.9–12.9)
POTASSIUM SERPL-SCNC: 5.3 MMOL/L (ref 3.5–5.1)
POTASSIUM SERPL-SCNC: 5.3 MMOL/L (ref 3.5–5.1)
PROT SERPL-MCNC: 7.7 G/DL (ref 6.4–8.2)
PROT UR STRIP-MCNC: 300 MG/DL
PROT UR-MCNC: 196 MG/DL (ref 0–11.9)
PROT UR-MCNC: 227 MG/DL (ref 0–11.9)
PROT/CREAT UR-RTO: 2.1
PROT/CREAT UR-RTO: 2.2
PTH-INTACT SERPL-MCNC: 183.6 PG/ML (ref 18.4–88)
Q-T INTERVAL, ECG07: 344 MS
QRS DURATION, ECG06: 84 MS
QTC CALCULATION (BEZET), ECG08: 413 MS
RBC # BLD AUTO: 3.73 M/UL (ref 4.1–5.7)
RBC #/AREA URNS HPF: ABNORMAL /HPF (ref 0–5)
SAMPLES BEING HELD,HOLD: NORMAL
SERVICE CMNT-IMP: ABNORMAL
SERVICE CMNT-IMP: NORMAL
SERVICE CMNT-IMP: NORMAL
SODIUM SERPL-SCNC: 142 MMOL/L (ref 136–145)
SODIUM UR-SCNC: 43 MMOL/L
SP GR UR REFRACTOMETRY: 1.01 (ref 1–1.03)
T4 FREE SERPL-MCNC: 1.2 NG/DL (ref 0.8–1.5)
TROPONIN I SERPL-MCNC: <0.05 NG/ML
TSH SERPL DL<=0.05 MIU/L-ACNC: 0.02 UIU/ML (ref 0.36–3.74)
UA: UC IF INDICATED,UAUC: ABNORMAL
UROBILINOGEN UR QL STRIP.AUTO: 0.2 EU/DL (ref 0.2–1)
VENTRICULAR RATE, ECG03: 87 BPM
WBC # BLD AUTO: 15.1 K/UL (ref 4.1–11.1)
WBC URNS QL MICRO: ABNORMAL /HPF (ref 0–4)

## 2020-07-17 PROCEDURE — 74011250637 HC RX REV CODE- 250/637: Performed by: EMERGENCY MEDICINE

## 2020-07-17 PROCEDURE — 88305 TISSUE EXAM BY PATHOLOGIST: CPT

## 2020-07-17 PROCEDURE — 0DB68ZX EXCISION OF STOMACH, VIA NATURAL OR ARTIFICIAL OPENING ENDOSCOPIC, DIAGNOSTIC: ICD-10-PCS | Performed by: SPECIALIST

## 2020-07-17 PROCEDURE — 0DB48ZX EXCISION OF ESOPHAGOGASTRIC JUNCTION, VIA NATURAL OR ARTIFICIAL OPENING ENDOSCOPIC, DIAGNOSTIC: ICD-10-PCS | Performed by: SPECIALIST

## 2020-07-17 PROCEDURE — 85025 COMPLETE CBC W/AUTO DIFF WBC: CPT

## 2020-07-17 PROCEDURE — 83970 ASSAY OF PARATHORMONE: CPT

## 2020-07-17 PROCEDURE — 84156 ASSAY OF PROTEIN URINE: CPT

## 2020-07-17 PROCEDURE — 77030008684 HC TU ET CUF COVD -B: Performed by: ANESTHESIOLOGY

## 2020-07-17 PROCEDURE — 0D738ZZ DILATION OF LOWER ESOPHAGUS, VIA NATURAL OR ARTIFICIAL OPENING ENDOSCOPIC: ICD-10-PCS | Performed by: SPECIALIST

## 2020-07-17 PROCEDURE — 76010000138 HC OR TIME 0.5 TO 1 HR: Performed by: SPECIALIST

## 2020-07-17 PROCEDURE — 77030019988 HC FCPS ENDOSC DISP BSC -B: Performed by: SPECIALIST

## 2020-07-17 PROCEDURE — 83880 ASSAY OF NATRIURETIC PEPTIDE: CPT

## 2020-07-17 PROCEDURE — 82550 ASSAY OF CK (CPK): CPT

## 2020-07-17 PROCEDURE — 96374 THER/PROPH/DIAG INJ IV PUSH: CPT

## 2020-07-17 PROCEDURE — 71046 X-RAY EXAM CHEST 2 VIEWS: CPT

## 2020-07-17 PROCEDURE — 74011250636 HC RX REV CODE- 250/636: Performed by: INTERNAL MEDICINE

## 2020-07-17 PROCEDURE — 84300 ASSAY OF URINE SODIUM: CPT

## 2020-07-17 PROCEDURE — 96372 THER/PROPH/DIAG INJ SC/IM: CPT

## 2020-07-17 PROCEDURE — 74011250636 HC RX REV CODE- 250/636: Performed by: SPECIALIST

## 2020-07-17 PROCEDURE — 93005 ELECTROCARDIOGRAM TRACING: CPT

## 2020-07-17 PROCEDURE — 77030020268 HC MISC GENERAL SUPPLY: Performed by: SPECIALIST

## 2020-07-17 PROCEDURE — 77030018712 HC DEV BLLN INFL BSC -B: Performed by: SPECIALIST

## 2020-07-17 PROCEDURE — 84439 ASSAY OF FREE THYROXINE: CPT

## 2020-07-17 PROCEDURE — 77030026438 HC STYL ET INTUB CARD -A: Performed by: ANESTHESIOLOGY

## 2020-07-17 PROCEDURE — 74011250636 HC RX REV CODE- 250/636: Performed by: PHYSICIAN ASSISTANT

## 2020-07-17 PROCEDURE — 74011636637 HC RX REV CODE- 636/637: Performed by: INTERNAL MEDICINE

## 2020-07-17 PROCEDURE — 74011250637 HC RX REV CODE- 250/637: Performed by: INTERNAL MEDICINE

## 2020-07-17 PROCEDURE — C1726 CATH, BAL DIL, NON-VASCULAR: HCPCS | Performed by: SPECIALIST

## 2020-07-17 PROCEDURE — 74011000258 HC RX REV CODE- 258: Performed by: INTERNAL MEDICINE

## 2020-07-17 PROCEDURE — 36415 COLL VENOUS BLD VENIPUNCTURE: CPT

## 2020-07-17 PROCEDURE — 0DB28ZX EXCISION OF MIDDLE ESOPHAGUS, VIA NATURAL OR ARTIFICIAL OPENING ENDOSCOPIC, DIAGNOSTIC: ICD-10-PCS | Performed by: SPECIALIST

## 2020-07-17 PROCEDURE — 0DB98ZX EXCISION OF DUODENUM, VIA NATURAL OR ARTIFICIAL OPENING ENDOSCOPIC, DIAGNOSTIC: ICD-10-PCS | Performed by: SPECIALIST

## 2020-07-17 PROCEDURE — C9113 INJ PANTOPRAZOLE SODIUM, VIA: HCPCS | Performed by: PHYSICIAN ASSISTANT

## 2020-07-17 PROCEDURE — 74011250637 HC RX REV CODE- 250/637: Performed by: SPECIALIST

## 2020-07-17 PROCEDURE — 74011250636 HC RX REV CODE- 250/636: Performed by: NURSE ANESTHETIST, CERTIFIED REGISTERED

## 2020-07-17 PROCEDURE — 84132 ASSAY OF SERUM POTASSIUM: CPT

## 2020-07-17 PROCEDURE — 84484 ASSAY OF TROPONIN QUANT: CPT

## 2020-07-17 PROCEDURE — 74011250636 HC RX REV CODE- 250/636: Performed by: EMERGENCY MEDICINE

## 2020-07-17 PROCEDURE — 74011000250 HC RX REV CODE- 250: Performed by: INTERNAL MEDICINE

## 2020-07-17 PROCEDURE — 65660000000 HC RM CCU STEPDOWN

## 2020-07-17 PROCEDURE — 81001 URINALYSIS AUTO W/SCOPE: CPT

## 2020-07-17 PROCEDURE — 82962 GLUCOSE BLOOD TEST: CPT

## 2020-07-17 PROCEDURE — 87205 SMEAR GRAM STAIN: CPT

## 2020-07-17 PROCEDURE — 84443 ASSAY THYROID STIM HORMONE: CPT

## 2020-07-17 PROCEDURE — 84100 ASSAY OF PHOSPHORUS: CPT

## 2020-07-17 PROCEDURE — 80053 COMPREHEN METABOLIC PANEL: CPT

## 2020-07-17 PROCEDURE — 82436 ASSAY OF URINE CHLORIDE: CPT

## 2020-07-17 PROCEDURE — 74176 CT ABD & PELVIS W/O CONTRAST: CPT

## 2020-07-17 PROCEDURE — 82306 VITAMIN D 25 HYDROXY: CPT

## 2020-07-17 PROCEDURE — 99284 EMERGENCY DEPT VISIT MOD MDM: CPT

## 2020-07-17 PROCEDURE — 76060000032 HC ANESTHESIA 0.5 TO 1 HR: Performed by: SPECIALIST

## 2020-07-17 PROCEDURE — 77030039825 HC MSK NSL PAP SUPERNO2VA VYRM -B: Performed by: ANESTHESIOLOGY

## 2020-07-17 PROCEDURE — 74011000250 HC RX REV CODE- 250: Performed by: PHYSICIAN ASSISTANT

## 2020-07-17 PROCEDURE — 76210000063 HC OR PH I REC FIRST 0.5 HR: Performed by: SPECIALIST

## 2020-07-17 RX ORDER — FENTANYL CITRATE 50 UG/ML
25 INJECTION, SOLUTION INTRAMUSCULAR; INTRAVENOUS
Status: DISCONTINUED | OUTPATIENT
Start: 2020-07-17 | End: 2020-07-17 | Stop reason: HOSPADM

## 2020-07-17 RX ORDER — METOCLOPRAMIDE HYDROCHLORIDE 5 MG/ML
10 INJECTION INTRAMUSCULAR; INTRAVENOUS
Status: COMPLETED | OUTPATIENT
Start: 2020-07-17 | End: 2020-07-17

## 2020-07-17 RX ORDER — HEPARIN SODIUM 5000 [USP'U]/ML
5000 INJECTION, SOLUTION INTRAVENOUS; SUBCUTANEOUS EVERY 8 HOURS
Status: DISCONTINUED | OUTPATIENT
Start: 2020-07-17 | End: 2020-07-22 | Stop reason: HOSPADM

## 2020-07-17 RX ORDER — SUCCINYLCHOLINE CHLORIDE 20 MG/ML
INJECTION INTRAMUSCULAR; INTRAVENOUS AS NEEDED
Status: DISCONTINUED | OUTPATIENT
Start: 2020-07-17 | End: 2020-07-17 | Stop reason: HOSPADM

## 2020-07-17 RX ORDER — IPRATROPIUM BROMIDE AND ALBUTEROL SULFATE 2.5; .5 MG/3ML; MG/3ML
3 SOLUTION RESPIRATORY (INHALATION)
Status: DISCONTINUED | OUTPATIENT
Start: 2020-07-17 | End: 2020-07-22 | Stop reason: HOSPADM

## 2020-07-17 RX ORDER — METOCLOPRAMIDE HYDROCHLORIDE 5 MG/ML
10 INJECTION INTRAMUSCULAR; INTRAVENOUS
Status: DISCONTINUED | OUTPATIENT
Start: 2020-07-17 | End: 2020-07-22 | Stop reason: HOSPADM

## 2020-07-17 RX ORDER — SODIUM CHLORIDE 0.9 % (FLUSH) 0.9 %
5-40 SYRINGE (ML) INJECTION EVERY 8 HOURS
Status: DISCONTINUED | OUTPATIENT
Start: 2020-07-17 | End: 2020-07-22 | Stop reason: HOSPADM

## 2020-07-17 RX ORDER — ONDANSETRON 4 MG/1
4 TABLET, ORALLY DISINTEGRATING ORAL
Status: DISCONTINUED | OUTPATIENT
Start: 2020-07-17 | End: 2020-07-22 | Stop reason: HOSPADM

## 2020-07-17 RX ORDER — FAMOTIDINE 20 MG/1
20 TABLET, FILM COATED ORAL
Status: COMPLETED | OUTPATIENT
Start: 2020-07-17 | End: 2020-07-17

## 2020-07-17 RX ORDER — DEXAMETHASONE SODIUM PHOSPHATE 4 MG/ML
INJECTION, SOLUTION INTRA-ARTICULAR; INTRALESIONAL; INTRAMUSCULAR; INTRAVENOUS; SOFT TISSUE AS NEEDED
Status: DISCONTINUED | OUTPATIENT
Start: 2020-07-17 | End: 2020-07-17 | Stop reason: HOSPADM

## 2020-07-17 RX ORDER — ONDANSETRON 2 MG/ML
INJECTION INTRAMUSCULAR; INTRAVENOUS AS NEEDED
Status: DISCONTINUED | OUTPATIENT
Start: 2020-07-17 | End: 2020-07-17 | Stop reason: HOSPADM

## 2020-07-17 RX ORDER — HYDROCODONE BITARTRATE AND ACETAMINOPHEN 10; 325 MG/1; MG/1
1 TABLET ORAL
Status: DISCONTINUED | OUTPATIENT
Start: 2020-07-17 | End: 2020-07-22 | Stop reason: HOSPADM

## 2020-07-17 RX ORDER — SODIUM CHLORIDE, SODIUM LACTATE, POTASSIUM CHLORIDE, CALCIUM CHLORIDE 600; 310; 30; 20 MG/100ML; MG/100ML; MG/100ML; MG/100ML
25 INJECTION, SOLUTION INTRAVENOUS CONTINUOUS
Status: DISCONTINUED | OUTPATIENT
Start: 2020-07-17 | End: 2020-07-17 | Stop reason: HOSPADM

## 2020-07-17 RX ORDER — FENTANYL CITRATE 50 UG/ML
50 INJECTION, SOLUTION INTRAMUSCULAR; INTRAVENOUS AS NEEDED
Status: DISCONTINUED | OUTPATIENT
Start: 2020-07-17 | End: 2020-07-17 | Stop reason: HOSPADM

## 2020-07-17 RX ORDER — SODIUM CHLORIDE 9 MG/ML
25 INJECTION, SOLUTION INTRAVENOUS CONTINUOUS
Status: DISCONTINUED | OUTPATIENT
Start: 2020-07-18 | End: 2020-07-17 | Stop reason: HOSPADM

## 2020-07-17 RX ORDER — MORPHINE SULFATE 10 MG/ML
2 INJECTION, SOLUTION INTRAMUSCULAR; INTRAVENOUS
Status: DISCONTINUED | OUTPATIENT
Start: 2020-07-17 | End: 2020-07-17 | Stop reason: HOSPADM

## 2020-07-17 RX ORDER — INSULIN LISPRO 100 [IU]/ML
INJECTION, SOLUTION INTRAVENOUS; SUBCUTANEOUS
Status: DISCONTINUED | OUTPATIENT
Start: 2020-07-17 | End: 2020-07-22 | Stop reason: HOSPADM

## 2020-07-17 RX ORDER — AMLODIPINE BESYLATE 5 MG/1
5 TABLET ORAL DAILY
Status: DISCONTINUED | OUTPATIENT
Start: 2020-07-17 | End: 2020-07-19

## 2020-07-17 RX ORDER — DEXTROSE 50 % IN WATER (D50W) INTRAVENOUS SYRINGE
12.5-25 AS NEEDED
Status: DISCONTINUED | OUTPATIENT
Start: 2020-07-17 | End: 2020-07-22 | Stop reason: HOSPADM

## 2020-07-17 RX ORDER — ONDANSETRON 2 MG/ML
4 INJECTION INTRAMUSCULAR; INTRAVENOUS
Status: DISCONTINUED | OUTPATIENT
Start: 2020-07-17 | End: 2020-07-22 | Stop reason: HOSPADM

## 2020-07-17 RX ORDER — PROPOFOL 10 MG/ML
INJECTION, EMULSION INTRAVENOUS AS NEEDED
Status: DISCONTINUED | OUTPATIENT
Start: 2020-07-17 | End: 2020-07-17 | Stop reason: HOSPADM

## 2020-07-17 RX ORDER — HYDRALAZINE HYDROCHLORIDE 20 MG/ML
20 INJECTION INTRAMUSCULAR; INTRAVENOUS
Status: DISCONTINUED | OUTPATIENT
Start: 2020-07-17 | End: 2020-07-22 | Stop reason: HOSPADM

## 2020-07-17 RX ORDER — PROPOFOL 10 MG/ML
INJECTION, EMULSION INTRAVENOUS
Status: DISCONTINUED | OUTPATIENT
Start: 2020-07-17 | End: 2020-07-17 | Stop reason: HOSPADM

## 2020-07-17 RX ORDER — SODIUM CHLORIDE 0.9 % (FLUSH) 0.9 %
5-40 SYRINGE (ML) INJECTION AS NEEDED
Status: DISCONTINUED | OUTPATIENT
Start: 2020-07-17 | End: 2020-07-22 | Stop reason: HOSPADM

## 2020-07-17 RX ORDER — LEVOTHYROXINE SODIUM 200 UG/1
200 TABLET ORAL
Status: DISCONTINUED | OUTPATIENT
Start: 2020-07-17 | End: 2020-07-22 | Stop reason: HOSPADM

## 2020-07-17 RX ORDER — SODIUM CHLORIDE 9 MG/ML
100 INJECTION, SOLUTION INTRAVENOUS CONTINUOUS
Status: DISPENSED | OUTPATIENT
Start: 2020-07-17 | End: 2020-07-17

## 2020-07-17 RX ORDER — PHENYLEPHRINE HCL IN 0.9% NACL 0.4MG/10ML
SYRINGE (ML) INTRAVENOUS AS NEEDED
Status: DISCONTINUED | OUTPATIENT
Start: 2020-07-17 | End: 2020-07-17 | Stop reason: HOSPADM

## 2020-07-17 RX ORDER — MAGNESIUM SULFATE 100 %
4 CRYSTALS MISCELLANEOUS AS NEEDED
Status: DISCONTINUED | OUTPATIENT
Start: 2020-07-17 | End: 2020-07-22 | Stop reason: HOSPADM

## 2020-07-17 RX ORDER — SODIUM CHLORIDE 0.9 % (FLUSH) 0.9 %
10 SYRINGE (ML) INJECTION
Status: DISCONTINUED | OUTPATIENT
Start: 2020-07-17 | End: 2020-07-17

## 2020-07-17 RX ORDER — MIDAZOLAM HYDROCHLORIDE 1 MG/ML
1 INJECTION, SOLUTION INTRAMUSCULAR; INTRAVENOUS AS NEEDED
Status: DISCONTINUED | OUTPATIENT
Start: 2020-07-17 | End: 2020-07-17 | Stop reason: HOSPADM

## 2020-07-17 RX ORDER — ONDANSETRON 2 MG/ML
4 INJECTION INTRAMUSCULAR; INTRAVENOUS AS NEEDED
Status: DISCONTINUED | OUTPATIENT
Start: 2020-07-17 | End: 2020-07-17 | Stop reason: HOSPADM

## 2020-07-17 RX ADMIN — SODIUM CHLORIDE 25 ML/HR: 900 INJECTION, SOLUTION INTRAVENOUS at 14:21

## 2020-07-17 RX ADMIN — SODIUM CHLORIDE 40 MG: 9 INJECTION, SOLUTION INTRAMUSCULAR; INTRAVENOUS; SUBCUTANEOUS at 09:54

## 2020-07-17 RX ADMIN — INSULIN LISPRO 3 UNITS: 100 INJECTION, SOLUTION INTRAVENOUS; SUBCUTANEOUS at 21:49

## 2020-07-17 RX ADMIN — PROPOFOL 50 MCG/KG/MIN: 10 INJECTION, EMULSION INTRAVENOUS at 14:37

## 2020-07-17 RX ADMIN — SODIUM CHLORIDE 100 ML/HR: 900 INJECTION, SOLUTION INTRAVENOUS at 07:43

## 2020-07-17 RX ADMIN — LEVOTHYROXINE SODIUM 200 MCG: 200 TABLET ORAL at 07:43

## 2020-07-17 RX ADMIN — SODIUM BICARBONATE: 84 INJECTION, SOLUTION INTRAVENOUS at 11:34

## 2020-07-17 RX ADMIN — METOCLOPRAMIDE 10 MG: 5 INJECTION, SOLUTION INTRAMUSCULAR; INTRAVENOUS at 04:06

## 2020-07-17 RX ADMIN — Medication 80 MCG: at 14:53

## 2020-07-17 RX ADMIN — HYDROCODONE BITARTRATE AND ACETAMINOPHEN 1 TABLET: 10; 325 TABLET ORAL at 16:08

## 2020-07-17 RX ADMIN — FAMOTIDINE 20 MG: 20 TABLET, FILM COATED ORAL at 04:18

## 2020-07-17 RX ADMIN — SODIUM CHLORIDE 40 MG: 9 INJECTION, SOLUTION INTRAMUSCULAR; INTRAVENOUS; SUBCUTANEOUS at 21:39

## 2020-07-17 RX ADMIN — Medication 80 MCG: at 14:57

## 2020-07-17 RX ADMIN — ONDANSETRON HYDROCHLORIDE 4 MG: 2 INJECTION, SOLUTION INTRAMUSCULAR; INTRAVENOUS at 15:00

## 2020-07-17 RX ADMIN — HEPARIN SODIUM 5000 UNITS: 5000 INJECTION INTRAVENOUS; SUBCUTANEOUS at 07:38

## 2020-07-17 RX ADMIN — Medication 80 MCG: at 15:04

## 2020-07-17 RX ADMIN — Medication 80 MCG: at 15:01

## 2020-07-17 RX ADMIN — HEPARIN SODIUM 5000 UNITS: 5000 INJECTION INTRAVENOUS; SUBCUTANEOUS at 21:41

## 2020-07-17 RX ADMIN — DEXAMETHASONE SODIUM PHOSPHATE 8 MG: 4 INJECTION, SOLUTION INTRAMUSCULAR; INTRAVENOUS at 15:00

## 2020-07-17 RX ADMIN — AMLODIPINE BESYLATE 5 MG: 5 TABLET ORAL at 09:54

## 2020-07-17 RX ADMIN — Medication 80 MCG: at 14:55

## 2020-07-17 RX ADMIN — HYDROCODONE BITARTRATE AND ACETAMINOPHEN 1 TABLET: 10; 325 TABLET ORAL at 09:54

## 2020-07-17 RX ADMIN — Medication 3 AMPULE: at 14:21

## 2020-07-17 RX ADMIN — ALUMINUM HYDROXIDE AND MAGNESIUM HYDROXIDE 30 ML: 200; 200 SUSPENSION ORAL at 04:18

## 2020-07-17 RX ADMIN — SUCCINYLCHOLINE CHLORIDE 160 MG: 20 INJECTION, SOLUTION INTRAMUSCULAR; INTRAVENOUS at 14:45

## 2020-07-17 RX ADMIN — SODIUM CHLORIDE: 900 INJECTION, SOLUTION INTRAVENOUS at 14:35

## 2020-07-17 RX ADMIN — PROPOFOL 50 MG: 10 INJECTION, EMULSION INTRAVENOUS at 14:45

## 2020-07-17 RX ADMIN — Medication 10 ML: at 21:40

## 2020-07-17 NOTE — PERIOP NOTES
1350 - SBAR IN NOTE. PT ARRIVED INTO PRE-OP HOLDING 16.  PT A&OX4. ZAPATA TO COMMAND. RESP EVEN AND UNLABORED. PT SOB ON EXERTION. BSB AND CLEAR. POX ON 2L NC > 97%. LAC IV SITE BENIGN. PT DENIES DISCOMFORT 0/10 ON PAIN SCALE. RESTS IN NAPS. SR UP X3.  CB IN PLACE.

## 2020-07-17 NOTE — PROGRESS NOTES
See egd note    Plan:  PPI  Check biopsies  Clear liquids today, ok for gi lite tomorrow if he does well and hida is back     No definite cause of nausea and vomiting seen    It is possible it is a dysphagia equivalent--empiric biopsy performed. I ordered HIDA scan for tomorrow     He may need an eus for the duodenal mass in the future.   (ct was negative but no oral contrast)    Call partner to see tomorrow 7.18    Jovita Weinstein MD  3:36 PM  7/17/2020

## 2020-07-17 NOTE — PERIOP NOTES
TRANSFER - OUT REPORT:    Verbal report given to [facility]) on [address]  being transferred to 2134(unit) for routine progression of care       Report consisted of patients Situation, Background, Assessment and   Recommendations(SBAR). Information from the following report(s) OR Summary, Intake/Output and MAR was reviewed with the receiving nurse. Opportunity for questions and clarification was provided.       Patient transported with:   Monitor  Registered Nurse

## 2020-07-17 NOTE — ANESTHESIA PREPROCEDURE EVALUATION
Anesthetic History     PONV          Review of Systems / Medical History  Patient summary reviewed, nursing notes reviewed and pertinent labs reviewed    Pulmonary        Sleep apnea: No treatment           Neuro/Psych         TIA and psychiatric history     Cardiovascular    Hypertension: well controlled          Hyperlipidemia    Exercise tolerance: >4 METS     GI/Hepatic/Renal         Renal disease: stones       Endo/Other    Diabetes: type 2, using insulin  Hypothyroidism  Obesity     Other Findings   Comments: hypertension, diabetes type 2, hypercholesterolemia, severe hypothyroidism     Hoarseness sp thryroidectomy         Physical Exam    Airway  Mallampati: II  TM Distance: 4 - 6 cm  Neck ROM: normal range of motion   Mouth opening: Normal     Cardiovascular  Regular rate and rhythm,  S1 and S2 normal,  no murmur, click, rub, or gallop             Dental    Dentition: Poor dentition     Pulmonary  Breath sounds clear to auscultation              Comments: Hoarse voice Abdominal  GI exam deferred       Other Findings            Anesthetic Plan    ASA: 3  Anesthesia type: total IV anesthesia and MAC          Induction: Intravenous  Anesthetic plan and risks discussed with: Patient

## 2020-07-17 NOTE — PROCEDURES
Esophagogastroduodenoscopy    Indications:  Dysphagia, esophageal  Nausea and vomiting      Medications:  See anesthesia form  The procedure was stopped after the diagnostic portion so that anesthesia could intubate the patient  We resumed after intubation. Assistants:  Tiffanie Macias RN      Post procedure diagnosis:  spencer metzger tear, duodenitis, duodenal polyp, gastric polyps, successful dilation of esophagus    Description of Procedure:    Prior to the procedure its objectives, risks, consequences and alternatives were discussed with the patient who then elected to proceed. The Olympus video endoscope was inserted under direct vision into the mouth and then into the esophagus. The esophagus looked normal to the distal esophagus. The z-line was located at 39 cm. There was a small Spencer Metzger tear. It was not bleeding. There was an irregular z line with 1 cm tongues of columnar mucosa extending proximally. A few 5mm gastric polyps were present in the body of the stomach. There were no other diagnostic abnormalities of the body, fundus, antrum, cardia and incisura of the stomach. This included direct and retroflexion examination. The first portion of the duodenum showed erythema consistent with duodenitis. The second portion of the duodenum showed a 15mm polyp adjacent to the papilla. It appeared adenomatous by nbi. The remaining mucosa appeared normal.  I took biopsies of the duodenum, stomach (including of a polyp), esophago-gastric junction and the mid-esophagus. I took biopsies of the duodenal polyp in a separate jar. I then dilated the distal esophagus with a through the scope balloon. I dilated from 15mm to 16.5mm to 18 mm. Each time the balloon was inflated for sixty seconds. There were no apparent complications. Complications: There were no apparent complications and the patient tolerated the procedure well.         Implants:  none    Estimated Blood Loss: About 15ml at biopsy sites  Specimens Removed:  Duodenum  Stomach and stomach polyps  ge junction  Mid esophagus  Duodenal polyp  Impressions:  Padmini gauthier tear, small  Duodenitis  Possible Garza's  Gastric polyps, not impressive  15mm duodenal polyp      Signed By: Adelfo Amin MD                        July 17, 2020     3:28 PM

## 2020-07-17 NOTE — ED PROVIDER NOTES
EMERGENCY DEPARTMENT HISTORY AND PHYSICAL EXAM     ----------------------------------------------------------------------------  Please note that this dictation was completed with [employer], the computer voice recognition software. Quite often unanticipated grammatical, syntax, homophones, and other interpretive errors are inadvertently transcribed by the computer software. Please disregard these errors. Please excuse any errors that have escaped final proofreading  ----------------------------------------------------------------------------      Date: 7/17/2020  Patient Name: Carmen Kunz    History of Presenting Illness     Chief Complaint   Patient presents with    Shortness of Breath     Pt reports SOB and burning in his chest starting about 1.5hours ago suddenly. Pt also reports n/v/d, and lower abd pain o2domvka. Pt has hx of HTN. Pt also has wet hacking cough and is loosing his voice. History Provided By:  Patient    HPI: Carmen Kunz is a 64 y.o. male, with significant pmhx of hypertension, diabetes, cholesterol, TIA, sleep apnea, chronic kidney disease, previous thyroid cancer, who presents via private vehicle to the ED with c/o nausea, vomiting, diarrhea. Patient reports that his diarrhea started several hours ago and is that he has been vomiting for the last 2 months. Patient previously provided with antiemetic (Zofran) but notes that it is not very helpful. Patient notes that when he vomits it often affects his vocal cords and causes him to have a voice change. Patient is having associated shortness of breath with the vomiting and diarrhea this evening. Denies recent fever, denies known sick contacts. Patient also specifically denies any associated CP, pain, changes in BM, urinary sxs, or headache. Social Hx: denie tobacco  denies EtOH , denies Illicit Drugs    There are no other complaints, changes, or physical findings at this time.      PCP: Carmen Mcnamara MD    Allergies Allergen Reactions    Anesthetics - Amide Type Shortness of Breath    Flexeril [Cyclobenzaprine] Hives    Tramadol Hives       Current Facility-Administered Medications   Medication Dose Route Frequency Provider Last Rate Last Dose    metoclopramide HCl (REGLAN) injection 10 mg  10 mg IntraVENous Q6H PRN Jose Hamilton MD         Current Outpatient Medications   Medication Sig Dispense Refill    ondansetron (Zofran ODT) 4 mg disintegrating tablet Take 1 Tab by mouth every eight (8) hours as needed for Nausea or Vomiting. 20 Tab 0    alum-mag hydroxide-simeth (MYLANTA) 200-200-20 mg/5 mL susp Take 30 mL by mouth every four (4) hours as needed for Indigestion. 1 Bottle 0    testosterone 30 mg/actuation (1.5 mL) slpm Apply 1-2 pumps daily as directed on your visit 90 mL 3    ibuprofen (ADVIL) 200 mg tablet Take  by mouth.  levothyroxine (SYNTHROID) 200 mcg tablet TAKE 1 TABLET BY MOUTH ONCE DAILY BEFORE BREAKFAST 90 Tab 1    lisinopril (PRINIVIL, ZESTRIL) 20 mg tablet Take 2 Tabs by mouth daily. 180 Tab 1    glipiZIDE (GLUCOTROL) 10 mg tablet TAKE 1 TAB BY MOUTH BEFORE BREAKFAST AND DINNER. 180 Tab 1    Insulin Needles, Disposable, (DINH PEN NEEDLE) 32 gauge x 5/32\" ndle Use once daily with insulin pen 100 Pen Needle 3    metFORMIN ER (GLUCOPHAGE XR) 500 mg tablet Take 1 Tab by mouth Before breakfast and dinner. 180 Tab 3    cholecalciferol, VITAMIN D3, (VITAMIN D3) 5,000 unit tab tablet Take 1 Tab by mouth daily. 90 Tab 3    glucose blood VI test strips (PHARMACIST CHOICE) strip One Touch  Check glucose 3-4 times daily. DX E11.22 300 Strip 3    Lancets misc Use preferred brand; Check glucose 3-4 times daily, Diagnosis E11.22 2 Package 3    VENTOLIN HFA 90 mcg/actuation inhaler TAKE 1-2 PUFFS EVEERY 4-6 HOURS AS NEEDED FOR DYSPNEA AND WHEEZING  0    simvastatin (ZOCOR) 20 mg tablet Take 1 Tab by mouth nightly.  90 Tab 3    Blood-Glucose Meter monitoring kit 1 preferred brand glucometer for checking home glucose, E11.22 (Patient taking differently: 1 preferred brand glucometer for checking home glucose, E11.22 One Touch) 1 Kit 0       Past History     Past Medical History:  Past Medical History:   Diagnosis Date    Adverse effect of anesthesia     \" STOP BREATHING 1 TIME C ANESTH\"    Calculus of kidney     Cancer ([facility] [address]     thyroid cancer    Chronic kidney disease     Chronic pain     BACK SHOULDER AND ARM    Depression     Diabetes ([facility] [address]     Hypercholesterolemia     Hypertension     Nausea & vomiting     Other ill-defined conditions(799.89)     cholesterol, thyroid    Sleep apnea     doesn't wear cpap    Thyroid cancer ([facility] [address]     TIA (transient ischemic attack)        Past Surgical History:  Past Surgical History:   Procedure Laterality Date    CARDIAC SURG PROCEDURE UNLIST      HX HEENT      THROAT SURGERY X 4    HX ORTHOPAEDIC      back     HX ORTHOPAEDIC      ARM AND SHOULDER    HX OTHER SURGICAL      thyroid, lymphnode    HX RETINAL DETACHMENT REPAIR      left eye    VASCULAR SURGERY PROCEDURE UNLIST      cardiac cath NEG. Family History:  Family History   Problem Relation Age of Onset    Diabetes Mother     Elevated Lipids Mother    [place] Bladder Disease Mother     Headache Mother    [facility] Migraines Mother     Heart Disease Mother     Hypertension Mother     Stroke Mother     Other Mother         ANEURSYM BRAIN    Bleeding Prob Father     Cancer Father         LEUKEMIA    Diabetes Father     Elevated Lipids Father     Mental Retardation Sister     Psychiatric Disorder Sister     Cancer Brother         LUNGS       Social History:  Social History     Tobacco Use    Smoking status: Former Smoker     Last attempt to quit: 1994     Years since quittin.9    Smokeless tobacco: Never Used   Substance Use Topics    Alcohol use: Yes     Comment: Occasionally    Drug use: No       Allergies:   Allergies   Allergen Reactions    Anesthetics - Amide Type Shortness of Breath    Flexeril [Cyclobenzaprine] Hives    Tramadol Hives         Review of Systems   Review of Systems   Constitutional: Negative for chills and fever. HENT: Negative. Eyes: Negative. Respiratory: Positive for cough and shortness of breath. Negative for chest tightness. Cardiovascular: Negative for chest pain and leg swelling. Gastrointestinal: Positive for diarrhea, nausea and vomiting. Endocrine: Negative. Genitourinary: Negative for difficulty urinating and dysuria. Musculoskeletal: Negative for myalgias. Skin: Negative. Neurological: Negative. Psychiatric/Behavioral: Negative. All other systems reviewed and are negative. Physical Exam   Physical Exam  Vitals signs and nursing note reviewed. Constitutional:       General: He is not in acute distress. Appearance: He is well-developed. He is not diaphoretic. HENT:      Head: Normocephalic and atraumatic. Nose: Nose normal.   Eyes:      Conjunctiva/sclera: Conjunctivae normal.      Pupils: Pupils are equal, round, and reactive to light. Neck:      Musculoskeletal: Normal range of motion and neck supple. Vascular: No JVD. Cardiovascular:      Rate and Rhythm: Normal rate and regular rhythm. Heart sounds: Normal heart sounds. No murmur. No friction rub. Pulmonary:      Effort: Pulmonary effort is normal. Tachypnea present. No respiratory distress. Breath sounds: Stridor (Mild, pt with chronic hoarseness per old notes) present. Examination of the right-lower field reveals decreased breath sounds. Examination of the left-lower field reveals decreased breath sounds. Decreased breath sounds and wheezing present. No rales. Abdominal:      General: Bowel sounds are normal. There is no distension. Palpations: Abdomen is soft. Tenderness: There is no abdominal tenderness. There is no rebound. Musculoskeletal: Normal range of motion. General: No tenderness. Skin:     General: Skin is warm and dry. Findings: No rash. Neurological:      Mental Status: He is alert and oriented to person, place, and time. Cranial Nerves: No cranial nerve deficit. Psychiatric:         Speech: Speech normal.         Behavior: Behavior normal.         Thought Content: Thought content normal.         Judgment: Judgment normal.           Diagnostic Study Results     Labs -     Recent Results (from the past 12 hour(s))   EKG, 12 LEAD, INITIAL    Collection Time: 07/17/20  3:46 AM   Result Value Ref Range    Ventricular Rate 87 BPM    Atrial Rate 87 BPM    P-R Interval 148 ms    QRS Duration 84 ms    Q-T Interval 344 ms    QTC Calculation (Bezet) 413 ms    Calculated P Axis 66 degrees    Calculated R Axis -31 degrees    Calculated T Axis 54 degrees    Diagnosis       Normal sinus rhythm  Left axis deviation  When compared with ECG of 17-MAY-2020 13:51,  No significant change was found     CBC WITH AUTOMATED DIFF    Collection Time: 07/17/20  3:53 AM   Result Value Ref Range    WBC 15.1 (H) 4.1 - 11.1 K/uL    RBC 3.73 (L) 4.10 - 5.70 M/uL    HGB 10.9 (L) 12.1 - 17.0 g/dL    HCT 34.0 (L) 36.6 - 50.3 %    MCV 91.2 80.0 - 99.0 FL    MCH 29.2 26.0 - 34.0 PG    MCHC 32.1 30.0 - 36.5 g/dL    RDW 13.9 11.5 - 14.5 %    PLATELET 246 929 - 757 K/uL    MPV 10.7 8.9 - 12.9 FL    NRBC 0.0 0  WBC    ABSOLUTE NRBC 0.00 0.00 - 0.01 K/uL    NEUTROPHILS 83 (H) 32 - 75 %    LYMPHOCYTES 7 (L) 12 - 49 %    MONOCYTES 6 5 - 13 %    EOSINOPHILS 2 0 - 7 %    BASOPHILS 1 0 - 1 %    IMMATURE GRANULOCYTES 1 (H) 0.0 - 0.5 %    ABS. NEUTROPHILS 12.8 (H) 1.8 - 8.0 K/UL    ABS. LYMPHOCYTES 1.1 0.8 - 3.5 K/UL    ABS. MONOCYTES 0.8 0.0 - 1.0 K/UL    ABS. EOSINOPHILS 0.3 0.0 - 0.4 K/UL    ABS. BASOPHILS 0.1 0.0 - 0.1 K/UL    ABS. IMM.  GRANS. 0.1 (H) 0.00 - 0.04 K/UL    DF AUTOMATED     METABOLIC PANEL, COMPREHENSIVE    Collection Time: 07/17/20  3:53 AM   Result Value Ref Range    Sodium 142 136 - 145 mmol/L Potassium 5.3 (H) 3.5 - 5.1 mmol/L    Chloride 113 (H) 97 - 108 mmol/L    CO2 20 (L) 21 - 32 mmol/L    Anion gap 9 5 - 15 mmol/L    Glucose 142 (H) 65 - 100 mg/dL    BUN 74 (H) 6 - 20 MG/DL    Creatinine 4.01 (H) 0.70 - 1.30 MG/DL    BUN/Creatinine ratio 18 12 - 20      GFR est AA 19 (L) >60 ml/min/1.73m2    GFR est non-AA 15 (L) >60 ml/min/1.73m2    Calcium 8.3 (L) 8.5 - 10.1 MG/DL    Bilirubin, total 0.2 0.2 - 1.0 MG/DL    ALT (SGPT) 23 12 - 78 U/L    AST (SGOT) 8 (L) 15 - 37 U/L    Alk. phosphatase 132 (H) 45 - 117 U/L    Protein, total 7.7 6.4 - 8.2 g/dL    Albumin 3.3 (L) 3.5 - 5.0 g/dL    Globulin 4.4 (H) 2.0 - 4.0 g/dL    A-G Ratio 0.8 (L) 1.1 - 2.2     CK W/ REFLX CKMB    Collection Time: 07/17/20  3:53 AM   Result Value Ref Range    CK 90 39 - 308 U/L   TROPONIN I    Collection Time: 07/17/20  3:53 AM   Result Value Ref Range    Troponin-I, Qt. <0.05 <0.05 ng/mL   SAMPLES BEING HELD    Collection Time: 07/17/20  3:53 AM   Result Value Ref Range    COMMENT        Add-on orders for these samples will be processed based on acceptable specimen integrity and analyte stability, which may vary by analyte.    NT-PRO BNP    Collection Time: 07/17/20  3:53 AM   Result Value Ref Range    NT pro- (H) <125 PG/ML   URINALYSIS W/ REFLEX CULTURE    Collection Time: 07/17/20  4:37 AM    Specimen: Urine   Result Value Ref Range    Color YELLOW/STRAW      Appearance CLEAR CLEAR      Specific gravity 1.014 1.003 - 1.030      pH (UA) 5.0 5.0 - 8.0      Protein 300 (A) NEG mg/dL    Glucose Negative NEG mg/dL    Ketone Negative NEG mg/dL    Bilirubin Negative NEG      Blood TRACE (A) NEG      Urobilinogen 0.2 0.2 - 1.0 EU/dL    Nitrites Negative NEG      Leukocyte Esterase Negative NEG      WBC 0-4 0 - 4 /hpf    RBC 5-10 0 - 5 /hpf    Epithelial cells FEW FEW /lpf    Bacteria Negative NEG /hpf    UA:UC IF INDICATED CULTURE NOT INDICATED BY UA RESULT CNI      Hyaline cast 2-5 0 - 5 /lpf       Radiologic Studies -   CT ABD PELV WO CONT   Final Result   IMPRESSION:      1. No acute process in the abdomen or pelvis. 2. Bibasilar pulmonary nodules without definite change. 3. Left adrenal adenoma, unchanged. XR CHEST PA LAT   Final Result   IMPRESSION:   No acute process. CT Results  (Last 48 hours)               07/17/20 0542  CT ABD PELV WO CONT Final result    Impression:  IMPRESSION:       1. No acute process in the abdomen or pelvis. 2. Bibasilar pulmonary nodules without definite change. 3. Left adrenal adenoma, unchanged. Narrative:  INDICATION: n/v/d, sob       COMPARISON: CT chest June 24, 2020 and prior CT abdomen March 22, 2018       TECHNIQUE:    Noncontrast thin axial images were obtained through the abdomen and pelvis. Coronal and sagittal reconstructions were generated. CT dose reduction was   achieved through use of a standardized protocol tailored for this examination   and automatic exposure control for dose modulation. FINDINGS:    LUNG BASES: Multiple bilateral pulmonary nodules, measuring up to 17 mm medial   right lower lobe without definite change. LIVER: No mass or biliary dilatation. GALLBLADDER: Contracted. SPLEEN: No enlargement or lesion. PANCREAS: No mass or ductal dilatation. ADRENALS: 15 mm left adrenal adenoma, unchanged. No right adrenal mass. KIDNEYS: No nephrolithiasis or hydronephrosis. GI TRACT:  No bowel obstruction. Difficult to assess bowel wall thickening given   lack of oral contrast material.   PERITONEUM: No free air or free fluid. APPENDIX: Unremarkable. RETROPERITONEUM: No aortic aneurysm. LYMPH NODES:  None enlarged. ADDITIONAL COMMENTS: N/A.       URINARY BLADDER: Unremarkable. REPRODUCTIVE ORGANS: Unremarkable. LYMPH NODES:  None enlarged. FREE FLUID:  None. BONES: No destructive bone lesion. ADDITIONAL COMMENTS: N/A.                CXR Results  (Last 48 hours)               07/17/20 0405  XR CHEST PA LAT Final result    Impression:  IMPRESSION:   No acute process. Narrative:  INDICATION: sob       COMPARISON: May 17, 2020       FINDINGS:       Frontal and lateral views of the chest demonstrate a normal cardiomediastinal   silhouette. The lungs are adequately expanded. There is no edema, effusion,   consolidation, or pneumothorax. The osseous structures are unremarkable. Medical Decision Making   I am the first provider for this patient. I reviewed the vital signs, available nursing notes, past medical history, past surgical history, family history and social history. Vital Signs-Reviewed the patient's vital signs. Patient Vitals for the past 12 hrs:   Temp Pulse Resp BP SpO2   07/17/20 0430 -- 83 18 172/84 98 %   07/17/20 0420 -- 89 18 163/69 100 %   07/17/20 0415 -- 84 15 163/69 100 %   07/17/20 0341 98.7 °F (37.1 °C) 85 18 (!) 202/77 100 %       Pulse Oximetry Analysis - 100% on RA    Cardiac Monitor:   Rate: 85 bpm  Rhythm: Normal sinus      Provider Notes (Medical Decision Making):     DDX:  Pneumonia, COVID-19, electrolyte abnormality, dehydration, UTI, arrhythmia, heart failure, pulmonary edema    Plan:  EKG, labs, chest x-ray, UA, antiemetic    Impression:  Acute on chronic renal insufficiency, hyperkalemia, nausea, vomiting, diarrhea, leukocytosis    ED Course:   Initial assessment performed. The patients presenting problems have been discussed, and they are in agreement with the care plan formulated and outlined with them. I have encouraged them to ask questions as they arise throughout their visit. I reviewed our electronic medical record system for any past medical records that were available that may contribute to the patients current condition, the nursing notes and and vital signs from today's visit    Nursing notes will be reviewed as they become available in realtime while the pt has been in the ED.   Franklin Baires MD    EKG interpretation 7841: NSR, L Axis, rate 87; , QRS 84, QTc 413; no acute ischemia; interpreted by Virgilio Sanchez MD    I personally reviewed/interpreted pt's imaging. Agree with official read by radiology as noted above. Virgilio Sanchez MD    PROGRESS NOTE:  6:13 AM  Pt notes that he does not follow with anybody for nephrology and is not been told of chronic renal insufficiency. This that he does not have a primary care doctor and only sees Dr. Sotero Dias and Dr. Brian Hernández for endocrine. Patient informed of elevated white blood cell count as well without known source. CT scan without contrast notes no acute findings. Will discuss with hospitalist for admission with further work-up. Virgilio Sanchez MD    CONSULT NOTE:   Guy Kirk MD spoke with Dr. Becca Hartman,   Specialty: Cher Hartman due to concerns for acute on chronic renal insufficiency without proper follow-up or evaluation. Patient also with leukocytosis without known source. Discussed pt's HPI and available diagnostic results thus far. Expressed concerns for needed admission. Consultant will evaluate for admission. Virgilio Sanchez MD      ADMISSION NOTE:  6:14 AM  Patient is being admitted to the hospital by Dr. Becca Hartman. The results of their tests and reasons for their admission have been discussed with them and/or available family. They convey agreement and understanding for the need to be admitted and for their admission diagnosis. Virgilio Sanchez MD             Critical Care Time:     none      Diagnosis     Clinical Impression:   1. Acute on chronic renal insufficiency    2. Leukocytosis, unspecified type    3. Non-intractable vomiting with nausea, unspecified vomiting type    4. Diarrhea, unspecified type        PLAN:  1. Admit to hospitalist          This note will not be viewable in [address]

## 2020-07-17 NOTE — ANESTHESIA POSTPROCEDURE EVALUATION
Procedure(s):  ESOPHAGOGASTRODUODENOSCOPY (EGD) WITH DILATION, ESOPHAGOGASTRODUODENOSCOPY (EGD) WITH BIOPSY. total IV anesthesia, MAC    Anesthesia Post Evaluation        Patient location during evaluation: PACU  Note status: Adequate. Level of consciousness: responsive to verbal stimuli and sleepy but conscious  Pain management: satisfactory to patient  Airway patency: patent  Anesthetic complications: no  Cardiovascular status: acceptable  Respiratory status: acceptable  Hydration status: acceptable  Comments: +Post-Anesthesia Evaluation and Assessment    Patient: Aniya Alejandra MRN: [id number]  SSN: [id number]   YOB: 1960  Age: 64 y.o. Sex: male      Cardiovascular Function/Vital Signs    /42   Pulse 83   Temp 37.1 °C (98.8 °F)   Resp 18   Ht 5' 9\" (1.753 m)   Wt 103.9 kg (229 lb 0.9 oz)   SpO2 98%   BMI 33.83 kg/m²     Patient is status post Procedure(s):  ESOPHAGOGASTRODUODENOSCOPY (EGD) WITH DILATION, ESOPHAGOGASTRODUODENOSCOPY (EGD) WITH BIOPSY. Nausea/Vomiting: Controlled. Postoperative hydration reviewed and adequate. Pain:  Pain Scale 1: Numeric (0 - 10) (07/17/20 1530)  Pain Intensity 1: 0 (07/17/20 1521)   Managed. Neurological Status:   Neuro (WDL): Exceptions to WDL (07/17/20 1521)   At baseline. Mental Status and Level of Consciousness: Arousable. Pulmonary Status:   O2 Device: Room air (07/17/20 1529)   Adequate oxygenation and airway patent. Complications related to anesthesia: None    Post-anesthesia assessment completed. No concerns. Signed By: Cecily Reyes MD    7/17/2020  Post anesthesia nausea and vomiting:  controlled      INITIAL Post-op Vital signs:   Vitals Value Taken Time   /42 7/17/2020  3:30 PM   Temp 37.1 °C (98.8 °F) 7/17/2020  3:21 PM   Pulse 80 7/17/2020  3:44 PM   Resp 15 7/17/2020  3:44 PM   SpO2 96 % 7/17/2020  3:44 PM   Vitals shown include unvalidated device data.

## 2020-07-17 NOTE — PERIOP NOTES
1312:  TRANSFER - IN REPORT:    Verbal report received from [place], [facility] on [address]ball[address]  being received from 2134 for ordered procedure      Report consisted of patients Situation, Background, Assessment and   Recommendations(SBAR). Information from the following report(s) SBAR, Kardex, Intake/Output and MAR was reviewed with the receiving nurse. Opportunity for questions and clarification was provided. Assessment completed upon patients arrival to unit and care assumed.

## 2020-07-17 NOTE — ED NOTES
Bedside shift change report given to River RN  (oncoming nurse) by Renu Spicer  (offgoing nurse). Report included the following information SBAR, Kardex and ED Summary.
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
## 2020-07-17 NOTE — H&P
Hospitalist Admission Note    NAME: Aniya Alejandra   :  1960   MRN:  [id number]     Date/Time:  2020 6:35 AM    Patient PCP: Preston Tracy MD  ______________________________________________________________________  Given the patient's current clinical presentation, I have a high level of concern for decompensation if discharged from the emergency department. Complex decision making was performed, which includes reviewing the patient's available past medical records, laboratory results, and x-ray films. My assessment of this patient's clinical condition and my plan of care is as follows. Assessment / Plan:  Nausea and vomiting POA  Chronic NSAIDs use  Epigastric pain    -2 months history of nausea and vomiting, associated with food. Takes 2 to 3 tablets of Advil daily  -Received Pepcid in the ED  -We will ask GI opinion, regarding EGD, has high concern for peptic ulcer disease, PPI as per GI  -N.p.o. for now  -PRN Zofran    NERY POA  Mild Hyperkalemia  Likely underlying CKD stage III  -Creatinine 2.32 in May 2019, May 2020 creatinine 3.72 and today 4.01.  -CT abdomen without any hydronephrosis  -CK normal, check PTH/phosphorus  -Consult nephrology  -Gentle hydration for now, recheck K  -Hold lisinopril, metformin, and other nephrotoxic medication    History of diabetes mellitus  History of hypertension  -Hold nephrotoxic medication  -SSI, check A1c    History of thyroid cancer with mets to lung  -Follows Dr. Shawna Pantoja  -Continue levothyroxine        Code Status: Full code  Surrogate Decision Maker: Daughter Francisco    DVT Prophylaxis: Heparin  GI Prophylaxis: not indicated    Baseline: Ambulatory      Subjective:   CHIEF COMPLAINT: Nausea and vomiting    HISTORY OF PRESENT ILLNESS:     Enedelia Kohli is a 64 y.o.    male who presents with past medical history of recurrent/metastatic STRATTON refractory carcinoma thyroid, diabetes mellitus, hypertension presented to ED with chief complaint of nausea and vomiting. As per him he has been having vomiting at least 2 or 3 times a day since past 2 months. He reports he has a burning sensation in the chest with the food and also burning sensation in the epigastric region. He takes 3 tablets of Advil daily for at least past 1 year. Denies any fever, chills, shortness of breath, chest pain, palpitation. He denies any melena or hematemesis. During ED evaluation he also found to have a acute kidney injury, he is not aware of any kidney problem in the past does not follow any nephrology. We were asked to admit for work up and evaluation of the above problems. Past Medical History:   Diagnosis Date    Adverse effect of anesthesia     \" STOP BREATHING 1 TIME C ANESTH\"    Calculus of kidney     Cancer ([address]     thyroid cancer    Chronic kidney disease     Chronic pain     BACK SHOULDER AND ARM    Depression     Diabetes ([address]     Hypercholesterolemia     Hypertension     Nausea & vomiting     Other ill-defined conditions(799.89)     cholesterol, thyroid    Sleep apnea     doesn't wear cpap    Thyroid cancer ([address]     TIA (transient ischemic attack)         Past Surgical History:   Procedure Laterality Date    CARDIAC SURG PROCEDURE UNLIST      HX HEENT      THROAT SURGERY X 4    HX ORTHOPAEDIC      back     HX ORTHOPAEDIC      ARM AND SHOULDER    HX OTHER SURGICAL      thyroid, lymphnode    HX RETINAL DETACHMENT REPAIR      left eye    VASCULAR SURGERY PROCEDURE UNLIST      cardiac cath NEG.        Social History     Tobacco Use    Smoking status: Former Smoker     Last attempt to quit: 1994     Years since quittin.9    Smokeless tobacco: Never Used   Substance Use Topics    Alcohol use: Yes     Comment: Occasionally        Family History   Problem Relation Age of Onset    Diabetes Mother     Elevated Lipids Mother    [place] Bladder Disease Mother     Headache Mother     Migraines Mother    [facility] Heart Disease Mother     Hypertension Mother     Stroke Mother     Other Mother         ANEURSYM BRAIN    Bleeding Prob Father     Cancer Father         LEUKEMIA    Diabetes Father     Elevated Lipids Father     Mental Retardation Sister     Psychiatric Disorder Sister     Cancer Brother         LUNGS     Allergies   Allergen Reactions    Anesthetics - Amide Type Shortness of Breath    Flexeril [Cyclobenzaprine] Hives    Tramadol Hives        Prior to Admission medications    Medication Sig Start Date End Date Taking? Authorizing Provider   ondansetron (Zofran ODT) 4 mg disintegrating tablet Take 1 Tab by mouth every eight (8) hours as needed for Nausea or Vomiting. 5/17/20   Yvonne Butterfield NP   alum-mag hydroxide-simeth (MYLANTA) 200-200-20 mg/5 mL susp Take 30 mL by mouth every four (4) hours as needed for Indigestion. 5/17/20   Yvonne Butterfield NP   testosterone 30 mg/actuation (1.5 mL) slpm Apply 1-2 pumps daily as directed on your visit 2/3/20   Agnieszka Hutson MD   ibuprofen (ADVIL) 200 mg tablet Take  by mouth. Provider, Historical   levothyroxine (SYNTHROID) 200 mcg tablet TAKE 1 TABLET BY MOUTH ONCE DAILY BEFORE BREAKFAST 11/4/19   Agnieszka Hutson MD   lisinopril (PRINIVIL, ZESTRIL) 20 mg tablet Take 2 Tabs by mouth daily. 4/15/19   Natalie Prado MD   glipiZIDE (GLUCOTROL) 10 mg tablet TAKE 1 TAB BY MOUTH BEFORE BREAKFAST AND DINNER. 11/19/18   Natalie Prado MD   Insulin Needles, Disposable, (DINH PEN NEEDLE) 32 gauge x 5/32\" ndle Use once daily with insulin pen 10/4/18   Agnieszka Hutson MD   metFORMIN ER (GLUCOPHAGE XR) 500 mg tablet Take 1 Tab by mouth Before breakfast and dinner. 7/5/18   Agnieszka Hutson MD   cholecalciferol, VITAMIN D3, (VITAMIN D3) 5,000 unit tab tablet Take 1 Tab by mouth daily. 2/2/18   Agnieszka Hutson MD   glucose blood VI test strips (PHARMACIST CHOICE) strip One Touch  Check glucose 3-4 times daily.  DX E11.22 2/2/18   Agnieszka Hutson MD Lancets misc Use preferred brand; Check glucose 3-4 times daily, Diagnosis E11.22 2/2/18   Nikky Mcfarlane MD   VENTOLIN HFA 90 mcg/actuation inhaler TAKE 1-2 PUFFS EVEERY 4-6 HOURS AS NEEDED FOR DYSPNEA AND WHEEZING 1/24/18   Provider, Historical   simvastatin (ZOCOR) 20 mg tablet Take 1 Tab by mouth nightly. 1/15/18   Nikky Mcfarlane MD   Blood-Glucose Meter monitoring kit 1 preferred brand glucometer for checking home glucose, E11.22  Patient taking differently: 1 preferred brand glucometer for checking home glucose, E11.22 One Touch 10/18/17   Nikky Mcfarlane MD       REVIEW OF SYSTEMS:     I am not able to complete the review of systems because:    The patient is intubated and sedated    The patient has altered mental status due to his acute medical problems    The patient has baseline aphasia from prior stroke(s)    The patient has baseline dementia and is not reliable historian    The patient is in acute medical distress and unable to provide information           Total of 12 systems reviewed as follows:       POSITIVE= underlined text  Negative = text not underlined  General:  fever, chills, sweats, generalized weakness, weight loss/gain,      loss of appetite   Eyes:    blurred vision, eye pain, loss of vision, double vision  ENT:    rhinorrhea, pharyngitis   Respiratory:   cough, sputum production, SOB, NUÑEZ, wheezing, pleuritic pain   Cardiology:   chest pain, palpitations, orthopnea, PND, edema, syncope   Gastrointestinal:  abdominal pain , N/V, diarrhea, dysphagia, constipation, bleeding   Genitourinary:  frequency, urgency, dysuria, hematuria, incontinence   Muskuloskeletal :  arthralgia, myalgia, back pain  Hematology:  easy bruising, nose or gum bleeding, lymphadenopathy   Dermatological: rash, ulceration, pruritis, color change / jaundice  Endocrine:   hot flashes or polydipsia   Neurological:  headache, dizziness, confusion, focal weakness, paresthesia,     Speech difficulties, memory loss, gait difficulty  Psychological: Feelings of anxiety, depression, agitation    Objective:   VITALS:    Visit Vitals  /84   Pulse 83   Temp 98.7 °F (37.1 °C)   Resp 18   Ht 5' 9\" (1.753 m)   Wt 103.9 kg (229 lb 0.9 oz)   SpO2 98%   BMI 33.83 kg/m²       PHYSICAL EXAM:    General:    Alert, cooperative, no distress, appears stated age. HEENT: Atraumatic, anicteric sclerae, pink conjunctivae  Neck:  Supple, symmetrical,  thyroid: non tender  Lungs:   Clear to auscultation bilaterally. No Wheezing or Rhonchi. No rales. Chest wall:  No tenderness  No Accessory muscle use. Heart:   Regular  rhythm,  No  murmur   No edema  Abdomen:   Soft, non-tender. Not distended. Bowel sounds normal  Extremities: No cyanosis. No clubbing,    Skin:     Not pale. Not Jaundiced  No rashes   Psych:  Good insight. Not depressed. Not anxious or agitated. Neurologic: EOMs intact. No facial asymmetry. No aphasia or slurred speech. Symmetrical strength, Sensation grossly intact.  Alert and oriented X 4.     _______________________________________________________________________  Care Plan discussed with:    Comments   Patient x    Family      RN x    Care Manager                    Consultant:      _______________________________________________________________________  Expected  Disposition:   Home with Family x   HH/PT/OT/RN    SNF/LTC    [facility]    ________________________________________________________________________  TOTAL TIME:  48 Minutes    Critical Care Provided     Minutes non procedure based      Comments     Reviewed previous records   >50% of visit spent in counseling and coordination of care  Discussion with patient and/or family and questions answered       ________________________________________________________________________  Signed: Calli Martinez MD    Procedures: see electronic medical records for all procedures/Xrays and details which were not copied into this note but were reviewed prior to creation of Plan. LAB DATA REVIEWED:    Recent Results (from the past 24 hour(s))   EKG, 12 LEAD, INITIAL    Collection Time: 07/17/20  3:46 AM   Result Value Ref Range    Ventricular Rate 87 BPM    Atrial Rate 87 BPM    P-R Interval 148 ms    QRS Duration 84 ms    Q-T Interval 344 ms    QTC Calculation (Bezet) 413 ms    Calculated P Axis 66 degrees    Calculated R Axis -31 degrees    Calculated T Axis 54 degrees    Diagnosis       Normal sinus rhythm  Left axis deviation  When compared with ECG of 17-MAY-2020 13:51,  No significant change was found     CBC WITH AUTOMATED DIFF    Collection Time: 07/17/20  3:53 AM   Result Value Ref Range    WBC 15.1 (H) 4.1 - 11.1 K/uL    RBC 3.73 (L) 4.10 - 5.70 M/uL    HGB 10.9 (L) 12.1 - 17.0 g/dL    HCT 34.0 (L) 36.6 - 50.3 %    MCV 91.2 80.0 - 99.0 FL    MCH 29.2 26.0 - 34.0 PG    MCHC 32.1 30.0 - 36.5 g/dL    RDW 13.9 11.5 - 14.5 %    PLATELET 530 357 - 191 K/uL    MPV 10.7 8.9 - 12.9 FL    NRBC 0.0 0  WBC    ABSOLUTE NRBC 0.00 0.00 - 0.01 K/uL    NEUTROPHILS 83 (H) 32 - 75 %    LYMPHOCYTES 7 (L) 12 - 49 %    MONOCYTES 6 5 - 13 %    EOSINOPHILS 2 0 - 7 %    BASOPHILS 1 0 - 1 %    IMMATURE GRANULOCYTES 1 (H) 0.0 - 0.5 %    ABS. NEUTROPHILS 12.8 (H) 1.8 - 8.0 K/UL    ABS. LYMPHOCYTES 1.1 0.8 - 3.5 K/UL    ABS. MONOCYTES 0.8 0.0 - 1.0 K/UL    ABS. EOSINOPHILS 0.3 0.0 - 0.4 K/UL    ABS. BASOPHILS 0.1 0.0 - 0.1 K/UL    ABS. IMM.  GRANS. 0.1 (H) 0.00 - 0.04 K/UL    DF AUTOMATED     METABOLIC PANEL, COMPREHENSIVE    Collection Time: 07/17/20  3:53 AM   Result Value Ref Range    Sodium 142 136 - 145 mmol/L    Potassium 5.3 (H) 3.5 - 5.1 mmol/L    Chloride 113 (H) 97 - 108 mmol/L    CO2 20 (L) 21 - 32 mmol/L    Anion gap 9 5 - 15 mmol/L    Glucose 142 (H) 65 - 100 mg/dL    BUN 74 (H) 6 - 20 MG/DL    Creatinine 4.01 (H) 0.70 - 1.30 MG/DL    BUN/Creatinine ratio 18 12 - 20      GFR est AA 19 (L) >60 ml/min/1.73m2    GFR est non-AA 15 (L) >60 ml/min/1.73m2    Calcium 8.3 (L) 8.5 - 10.1 MG/DL    Bilirubin, total 0.2 0.2 - 1.0 MG/DL    ALT (SGPT) 23 12 - 78 U/L    AST (SGOT) 8 (L) 15 - 37 U/L    Alk. phosphatase 132 (H) 45 - 117 U/L    Protein, total 7.7 6.4 - 8.2 g/dL    Albumin 3.3 (L) 3.5 - 5.0 g/dL    Globulin 4.4 (H) 2.0 - 4.0 g/dL    A-G Ratio 0.8 (L) 1.1 - 2.2     CK W/ REFLX CKMB    Collection Time: 07/17/20  3:53 AM   Result Value Ref Range    CK 90 39 - 308 U/L   TROPONIN I    Collection Time: 07/17/20  3:53 AM   Result Value Ref Range    Troponin-I, Qt. <0.05 <0.05 ng/mL   SAMPLES BEING HELD    Collection Time: 07/17/20  3:53 AM   Result Value Ref Range    COMMENT        Add-on orders for these samples will be processed based on acceptable specimen integrity and analyte stability, which may vary by analyte.    NT-PRO BNP    Collection Time: 07/17/20  3:53 AM   Result Value Ref Range    NT pro- (H) <125 PG/ML   URINALYSIS W/ REFLEX CULTURE    Collection Time: 07/17/20  4:37 AM    Specimen: Urine   Result Value Ref Range    Color YELLOW/STRAW      Appearance CLEAR CLEAR      Specific gravity 1.014 1.003 - 1.030      pH (UA) 5.0 5.0 - 8.0      Protein 300 (A) NEG mg/dL    Glucose Negative NEG mg/dL    Ketone Negative NEG mg/dL    Bilirubin Negative NEG      Blood TRACE (A) NEG      Urobilinogen 0.2 0.2 - 1.0 EU/dL    Nitrites Negative NEG      Leukocyte Esterase Negative NEG      WBC 0-4 0 - 4 /hpf    RBC 5-10 0 - 5 /hpf    Epithelial cells FEW FEW /lpf    Bacteria Negative NEG /hpf    UA:UC IF INDICATED CULTURE NOT INDICATED BY UA RESULT CNI      Hyaline cast 2-5 0 - 5 /lpf

## 2020-07-17 NOTE — PROGRESS NOTES
Comprehensive Nutrition Assessment    Type and Reason for Visit: Initial    Nutrition Recommendations/Plan:   · RD will continue to follow for further nutrition intervention and care pending EGD, further medical workup and goals of care. Nutrition Assessment:    7/17:  Chart reviewed; med noted for NERY, SOB. Pt arrives with persistent n/v and abdominal pain x 2 months with no relief. Hx of thyroid cancer. Other significant PMH includes GERD, weight loss as documented by GI PA @ 30 lbs x 6 months. Symptoms appear to worsen with some foods/beverages. Also, with progressive dysphagia. Noted that pt now has mets to the lungs. Currently, NPO for EGD. Estimated Daily Nutrient Needs:  Energy (kcal):  2402 (BMR 1848 x 1. 3AF)  Protein (g):  104 (1.0 g/kg bw)       Fluid (ml/day):  2400 ml/day    Nutrition Related Findings:  Persistent n/v      Wounds:    None       Current Nutrition Therapies:   DIET NPO    Anthropometric Measures:  · Height:  5' 9\" (175.3 cm)  · Current Body Wt:  103.9 kg (229 lb 0.9 oz)    · Ideal Body Wt:  160:  143.2 %   · BMI Categories:  Obese class 1 (BMI 30.0-34. 9)       Nutrition Diagnosis:   · Inadequate protein-energy intake related to catabolic illness as evidenced by NPO or clear liquid status due to medical condition(weight loss)    Nutrition Interventions:   Food and/or Nutrient Delivery: Continue NPO  Nutrition Education and Counseling: No recommendations at this time  Coordination of Nutrition Care: No recommendation at this time    Goals:  Pt will be able to resume a nutrition regimen within 24-72 hrs       Nutrition Monitoring and Evaluation:   Food/Nutrient Intake Outcomes: Diet advancement/tolerance  Physical Signs/Symptoms Outcomes: Biochemical data, GI status, Weight    Discharge Planning:     Too soon to determine     Electronically signed by Laverne Dumont RD on 7/17/2020 at 1:53 PM

## 2020-07-17 NOTE — PROGRESS NOTES
Angelica Kitchen, RN called from ED t o give report    26[address] Stevan Fox RN from Endoscopy called for report    [address], RN called from Endoscopy pt will be having EGD in OR due to respiratory issues. He is scheduled for [address], RN OR called for report    200 - Transport here to pick pt up for procedure    [address] [facility], RN called from PACU to give report for pt returning to the unit.     1600 - Pt back on unit, VS obtained, dual skin completed

## 2020-07-17 NOTE — ROUTINE PROCESS
TRANSFER - IN REPORT:    Verbal report received from CARLOS Lenz RN(name) on Katy Rubio  being received from OR(unit) for routine post - op      Report consisted of patients Situation, Background, Assessment and   Recommendations(SBAR). Information from the following report(s) OR Summary was reviewed with the receiving nurse. Opportunity for questions and clarification was provided. Assessment completed upon patients arrival to unit and care assumed.

## 2020-07-17 NOTE — H&P
Pre-endoscopy H and P    The patient was seen and examined in the endoscopy suite. The airway was assessed and docuemented. The problem list, past medical history, and medications were reviewed. Patient Active Problem List   Diagnosis Code    Mixed hypercholesterolemia and hypertriglyceridemia E78.2    Diabetes type 2, uncontrolled ([facility] E11.65    Hypovitaminosis D E55.9    Proteinuria R80.9    Microalbuminuria R80.9    Essential hypertension with goal blood pressure less than 130/80 I10    Acquired hypothyroidism E03.9    Chronic left shoulder pain M25.512, G89.29    Sepsis ([facility] A41.9    [facility]illary thyroid carcinoma [facility] C73    Type 2 diabetes mellitus with nephropathy ([facility] E11.21    Severe obesity (BMI 35.0-39. 9) with comorbidity (HCC) E66.01    NERY (acute kidney injury) ([facility] N17.9     Social History     Socioeconomic History    Marital status: LEGALLY      Spouse name: Not on file    Number of children: Not on file    Years of education: Not on file    Highest education level: Not on file   Occupational History    Not on file   Social Needs    Financial resource strain: Not on file    Food insecurity     Worry: Not on file     Inability: Not on file    Transportation needs     Medical: Not on file     Non-medical: Not on file   Tobacco Use    Smoking status: Former Smoker     Last attempt to quit: 1994     Years since quittin.9    Smokeless tobacco: Never Used   Substance and Sexual Activity    Alcohol use: Yes     Comment: Occasionally    Drug use: No    Sexual activity: Never   Lifestyle    Physical activity     Days per week: Not on file     Minutes per session: Not on file    Stress: Not on file   Relationships    Social connections     Talks on phone: Not on file     Gets together: Not on file     Attends Yazdanism service: Not on file     Active member of club or organization: Not on file     Attends meetings of clubs or organizations: Not on file Relationship status: Not on file    Intimate partner violence     Fear of current or ex partner: Not on file     Emotionally abused: Not on file     Physically abused: Not on file     Forced sexual activity: Not on file   Other Topics Concern    Not on file   Social History Narrative    Not on file     Past Medical History:   Diagnosis Date    Adverse effect of anesthesia     \" STOP BREATHING 1 TIME C ANESTH\"    Calculus of kidney     Cancer ([facility]     thyroid cancer    Chronic kidney disease     Chronic pain     BACK SHOULDER AND ARM    Depression     Diabetes ([facility]     Encounter for long-term (current) use of NSAIDs     Hypercholesterolemia     Hypertension     Nausea & vomiting     Other ill-defined conditions(799.89)     cholesterol, thyroid    Sleep apnea     doesn't wear cpap    Thyroid cancer ([facility]     TIA (transient ischemic attack)     Vitamin D deficiency      The patient has a family history of na    Prior to Admission Medications   Prescriptions Last Dose Informant Patient Reported? Taking? Blood-Glucose Meter monitoring kit   No No   Si preferred brand glucometer for checking home glucose, E11.22   Patient taking differently: 1 preferred brand glucometer for checking home glucose, E11.22 One Touch   Insulin Needles, Disposable, (DINH PEN NEEDLE) 32 gauge x 5/32\" ndle   No No   Sig: Use once daily with insulin pen   Lancets misc   No No   Sig: Use preferred brand; Check glucose 3-4 times daily, Diagnosis E11.22   VENTOLIN HFA 90 mcg/actuation inhaler   Yes No   Sig: TAKE 1-2 PUFFS EVEERY 4-6 HOURS AS NEEDED FOR DYSPNEA AND WHEEZING   alum-mag hydroxide-simeth (MYLANTA) 200-200-20 mg/5 mL susp   No No   Sig: Take 30 mL by mouth every four (4) hours as needed for Indigestion. cholecalciferol, VITAMIN D3, (VITAMIN D3) 5,000 unit tab tablet   No No   Sig: Take 1 Tab by mouth daily.    glipiZIDE (GLUCOTROL) 10 mg tablet   No No   Sig: TAKE 1 TAB BY MOUTH BEFORE BREAKFAST AND DINNER. glucose blood VI test strips (PHARMACIST CHOICE) strip   No No   Sig: One Touch  Check glucose 3-4 times daily. DX E11.22   ibuprofen (ADVIL) 200 mg tablet   Yes No   Sig: Take  by mouth.   levothyroxine (SYNTHROID) 200 mcg tablet   No No   Sig: TAKE 1 TABLET BY MOUTH ONCE DAILY BEFORE BREAKFAST   lisinopril (PRINIVIL, ZESTRIL) 20 mg tablet   No No   Sig: Take 2 Tabs by mouth daily. metFORMIN ER (GLUCOPHAGE XR) 500 mg tablet   No No   Sig: Take 1 Tab by mouth Before breakfast and dinner. ondansetron (Zofran ODT) 4 mg disintegrating tablet   No No   Sig: Take 1 Tab by mouth every eight (8) hours as needed for Nausea or Vomiting.   simvastatin (ZOCOR) 20 mg tablet   No No   Sig: Take 1 Tab by mouth nightly. testosterone 30 mg/actuation (1.5 mL) slpm   No No   Sig: Apply 1-2 pumps daily as directed on your visit      Facility-Administered Medications: None           The review of systems is:  negative for shortness of breath or chest pain      The heart, lungs, and mental status were satisfactory for the administration of anesthesia sedation and for the procedure. I discussed with the patient the objectives, risks, consequences and alternatives to the procedure. The patient was counseled at length about the risks of sly Covid-19 during their perioperative period and any recovery window from their procedure. The patient was made aware that sly Covid-19  may worsen their prognosis for recovering from their procedure and lend to a higher morbidity and/or mortality risk. All material risks, benefits, and reasonable alternatives including postponing the procedure were discussed. The patient does  wish to proceed with the procedure at this time.         Adelfo Amin MD  7/17/2020  2:18 PM

## 2020-07-17 NOTE — CONSULTS
Nephrology Consult Note     José Miguel Butt     [email or web address]. [email or web address]              Phone - [phone]   Patient: Unique Brown   YOB: 1960    Date- 7/17/2020  MRN: [id number]             REASON FOR CONSULTATION: NERY ON CKD  CONSULTING PHYSICIAN: DR. ORTEGA    ADMIT DATE:7/17/2020 PATIENT PCP:Hans Prado MD     IMPRESSION:   Acute Kidney injury secondary to multiple possibilities. 1.Pre renal factors -  Dehydration due to vomiting  Hemodynamic changes - lisinopril , NSAIDS/advile use - drop in GFR in setting of dehydration  2. Intra reanl factors -progression of ckd , AIN due to NSAIDS. 3.Post renal factors -no hydro on renal usg    Hyperkalemia  proteinuria  CKD 3- bl. Cr. 2.1-2.3  Nausea and vomiting  H/o thyroid cancer  Adrenal adenoma  Hypertension  Hyperlipidemia  DM 2- uncontrolled   PLAN:   Continue ivf- add bicarb to ivf  Check urine lytes  Check post void bladder scan  Hold metformin  Hold lisinopril  Check serology - c3,c4,librado, hepatitis panel, spep  Check urine pr/cr  Avoid NSAIDS- advil, toradol  Start norvasc  Check bmp in am     · Active Problems:  ·   NERY (acute kidney injury) ([facility] (7/17/2020)  ·   ·     [x] High complexity decision making was performed  [x] Patient is at high-risk of decompensation with multiple organ involvement    Subjective:   HPI: Unique Brown is a 64 y.o.  male. He is admitted with c.o nausea and vomiting. He is found to have nery with cr 3.7 and hyperkalemia with k 6.2 on admission  His cr was 2.3 in may 2019  He has longstanding h/o dm and htn  His dm is not well controlled.   He denies any h/o renal stone  He is on lisinopril as out pt  He has been taking advil  He has h/o thyroid cancer-metastatic  He has anemia with hb 10.9  No recent antibiotics use  His BP IS high since admission  No hypotension since admission  No recent iv contrast exposure  Patient denies any history of hematuria, proteinuria. - HIS URINE IS POSITIVE FOR BLOOD AND PROTEIN  No history of nephrolithiasis in the past.   no history of pyelonephritis. no history of hepatitis or jaundice. No h/o  herbal supplements use      Review of Systems:     A 11 point review of system was performed today. Pertinent positives and negatives are mentioned in the HPI. The reminder of the ROS is negative and noncontributory. Past Medical History:   Diagnosis Date    Adverse effect of anesthesia     \" STOP BREATHING 1 TIME C ANESTH\"    Calculus of kidney     Cancer ([address] 2004    thyroid cancer    Chronic kidney disease     Chronic pain     BACK SHOULDER AND ARM    Depression     Diabetes ([address]     Encounter for long-term (current) use of NSAIDs     Hypercholesterolemia     Hypertension     Nausea & vomiting     Other ill-defined conditions(799.89)     cholesterol, thyroid    Sleep apnea     doesn't wear cpap    Thyroid cancer ([address]     TIA (transient ischemic attack) 2011    Vitamin D deficiency       Past Surgical History:   Procedure Laterality Date    CARDIAC SURG PROCEDURE UNLIST      HX HEENT      THROAT SURGERY X 4    HX ORTHOPAEDIC      back     HX ORTHOPAEDIC      ARM AND SHOULDER    HX OTHER SURGICAL      thyroid, lymphnode    HX RETINAL DETACHMENT REPAIR      left eye    VASCULAR SURGERY PROCEDURE UNLIST      cardiac cath NEG. Prior to Admission medications    Medication Sig Start Date End Date Taking? Authorizing Provider   ondansetron (Zofran ODT) 4 mg disintegrating tablet Take 1 Tab by mouth every eight (8) hours as needed for Nausea or Vomiting. 5/17/20   Rika Sears NP   alum-mag hydroxide-simeth (MYLANTA) 200-200-20 mg/5 mL susp Take 30 mL by mouth every four (4) hours as needed for Indigestion. 5/17/20   Riak Sears NP   testosterone 30 mg/actuation (1.5 mL) slpm Apply 1-2 pumps daily as directed on your visit 2/3/20   Jn Coker MD   ibuprofen (ADVIL) 200 mg tablet Take  by mouth.     Provider, Historical   levothyroxine (SYNTHROID) 200 mcg tablet TAKE 1 TABLET BY MOUTH ONCE DAILY BEFORE BREAKFAST 19   Nellie Bender MD   lisinopril (PRINIVIL, ZESTRIL) 20 mg tablet Take 2 Tabs by mouth daily. 4/15/19   Eliel Prado MD   glipiZIDE (GLUCOTROL) 10 mg tablet TAKE 1 TAB BY MOUTH BEFORE BREAKFAST AND DINNER. 18   Eliel Prado MD   Insulin Needles, Disposable, (DINH PEN NEEDLE) 32 gauge x /32\" ndle Use once daily with insulin pen 10/4/18   Nellie Bender MD   metFORMIN ER (GLUCOPHAGE XR) 500 mg tablet Take 1 Tab by mouth Before breakfast and dinner. 18   Nellie Bender MD   cholecalciferol, VITAMIN D3, (VITAMIN D3) 5,000 unit tab tablet Take 1 Tab by mouth daily. 18   Nellie Bender MD   glucose blood VI test strips (PHARMACIST CHOICE) strip One Touch  Check glucose 3-4 times daily. DX E11.22 18   Nellie Bender MD   Lancets misc Use preferred brand; Check glucose 3-4 times daily, Diagnosis E11.22 18   Nellie Bender MD   VENTOLIN HFA 90 mcg/actuation inhaler TAKE 1-2 PUFFS EVEERY 4-6 HOURS AS NEEDED FOR DYSPNEA AND WHEEZING 18   Provider, Historical   simvastatin (ZOCOR) 20 mg tablet Take 1 Tab by mouth nightly.  1/15/18   Nellie Bender MD   Blood-Glucose Meter monitoring kit 1 preferred brand glucometer for checking home glucose, E11.22  Patient taking differently: 1 preferred brand glucometer for checking home glucose, E11.22 One Touch 10/18/17   Nellie Bender MD     Allergies   Allergen Reactions    Anesthetics - Amide Type Shortness of Breath    Flexeril [Cyclobenzaprine] Hives    Tramadol Hives      Social History     Tobacco Use    Smoking status: Former Smoker     Last attempt to quit: 1994     Years since quittin.9    Smokeless tobacco: Never Used   Substance Use Topics    Alcohol use: Yes     Comment: Occasionally      Family History   Problem Relation Age of Onset    Diabetes Mother     Elevated Lipids Mother    Karie Peabody Gall Bladder Disease Mother     Headache Mother    [facility] Migraines Mother     Heart Disease Mother     Hypertension Mother     Stroke Mother     Other Mother         ANEURSYM BRAIN    Bleeding Prob Father     Cancer Father         LEUKEMIA    Diabetes Father     Elevated Lipids Father     Mental Retardation Sister     Psychiatric Disorder Sister     Cancer Brother         LUNGS        Objective:      Patient Vitals for the past 24 hrs:   Temp Pulse Resp BP SpO2   07/17/20 0757 97.8 °F (36.6 °C) 79 22 159/80 100 %   07/17/20 0430 -- 83 18 172/84 98 %   07/17/20 0420 -- 89 18 163/69 100 %   07/17/20 0415 -- 84 15 163/69 100 %   07/17/20 0341 98.7 °F (37.1 °C) 85 18 (!) 202/77 100 %     07/17 0701 - 07/17 1900  In: -   Out: 250 [Urine:250]  Physical Exam:  General:Alert, No distress,   Eyes:No scleral icterus, No conjunctival pallor  Neck:Supple,no mass palpable,no thyromegaly  Lungs:Clears to auscultation Bilaterally, normal respiratory effort  CVS:RRR, S1 S2 normal,  No rub,  Abdomen:Soft, Non tender, No hepatosplenomegaly  Extremities: no LE edema  Skin:No rash or lesions, Warm and DRY   Psych: appropriate affect    :  No estrada  Musculoskeletal : no redness, no joint tenderness  NEURO: Normal Speech, Non focal        CODE STATUS:  full  Care Plan discussed with:  Patient,     Chart reviewed.    y Reviewed previous records   y Discussion with patient and/or family and questions answered     ECG[de-identified] Rev:yes  Xray/CT/US/MRI REV:yes  FINDINGS:   LUNG BASES: Multiple bilateral pulmonary nodules, measuring up to 17 mm medial  right lower lobe without definite change. LIVER: No mass or biliary dilatation. GALLBLADDER: Contracted. SPLEEN: No enlargement or lesion. PANCREAS: No mass or ductal dilatation. ADRENALS: 15 mm left adrenal adenoma, unchanged. No right adrenal mass. KIDNEYS: No nephrolithiasis or hydronephrosis. GI TRACT:  No bowel obstruction.  Difficult to assess bowel wall thickening given  lack of oral contrast material.  PERITONEUM: No free air or free fluid. APPENDIX: Unremarkable. RETROPERITONEUM: No aortic aneurysm. LYMPH NODES:  None enlarged. ADDITIONAL COMMENTS: N/A.     URINARY BLADDER: Unremarkable. REPRODUCTIVE ORGANS: Unremarkable. LYMPH NODES:  None enlarged. FREE FLUID:  None. BONES: No destructive bone lesion. ADDITIONAL COMMENTS: N/A.     IMPRESSION  IMPRESSION:     1. No acute process in the abdomen or pelvis. 2. Bibasilar pulmonary nodules without definite change. 3. Left adrenal adenoma, unchanged  Lab Data Personally Reviewed: (see below)  Recent Labs     07/17/20  0649 07/17/20  0353   WBC  --  15.1*   HGB  --  10.9*   PLT  --  293   ANEU  --  12.8*   NA  --  142   K  --  5.3*   GLU  --  142*   BUN  --  74*   CREA  --  4.01*   ALT  --  23   TBILI  --  0.2   AP  --  132*   CA  --  8.3*   PHOS 3.6  --      Lab Results   Component Value Date/Time    Color YELLOW/STRAW 07/17/2020 04:37 AM    Appearance CLEAR 07/17/2020 04:37 AM    Specific gravity 1.014 07/17/2020 04:37 AM    pH (UA) 5.0 07/17/2020 04:37 AM    Protein 300 (A) 07/17/2020 04:37 AM    Glucose Negative 07/17/2020 04:37 AM    Ketone Negative 07/17/2020 04:37 AM    Bilirubin Negative 07/17/2020 04:37 AM    Urobilinogen 0.2 07/17/2020 04:37 AM    Nitrites Negative 07/17/2020 04:37 AM    Leukocyte Esterase Negative 07/17/2020 04:37 AM    Epithelial cells FEW 07/17/2020 04:37 AM    Bacteria Negative 07/17/2020 04:37 AM    WBC 0-4 07/17/2020 04:37 AM    RBC 5-10 07/17/2020 04:37 AM       No results found for: IRON, FE, TIBC, IBCT, PSAT, FERR  Lab Results   Component Value Date/Time    Culture result: HEAVY  STAPHYLOCOCCUS AUREUS   10/16/2016 01:15 PM    Culture result: MODERATE  ENTEROBACTER CLOACAE   10/16/2016 01:15 PM    Culture result: NO ANAEROBES ISOLATED 10/16/2016 01:15 PM     Prior to Admission Medications   Prescriptions Last Dose Informant Patient Reported? Taking?    Blood-Glucose Meter monitoring kit   No No Si preferred brand glucometer for checking home glucose, E11.22   Patient taking differently: 1 preferred brand glucometer for checking home glucose, E11.22 One Touch   Insulin Needles, Disposable, (DINH PEN NEEDLE) 32 gauge x \" ndle   No No   Sig: Use once daily with insulin pen   Lancets misc   No No   Sig: Use preferred brand; Check glucose 3-4 times daily, Diagnosis E11.22   VENTOLIN HFA 90 mcg/actuation inhaler   Yes No   Sig: TAKE 1-2 PUFFS EVEERY 4-6 HOURS AS NEEDED FOR DYSPNEA AND WHEEZING   alum-mag hydroxide-simeth (MYLANTA) 200-200-20 mg/5 mL susp   No No   Sig: Take 30 mL by mouth every four (4) hours as needed for Indigestion. cholecalciferol, VITAMIN D3, (VITAMIN D3) 5,000 unit tab tablet   No No   Sig: Take 1 Tab by mouth daily. glipiZIDE (GLUCOTROL) 10 mg tablet   No No   Sig: TAKE 1 TAB BY MOUTH BEFORE BREAKFAST AND DINNER. glucose blood VI test strips (PHARMACIST CHOICE) strip   No No   Sig: One Touch  Check glucose 3-4 times daily. DX E11.22   ibuprofen (ADVIL) 200 mg tablet   Yes No   Sig: Take  by mouth.   levothyroxine (SYNTHROID) 200 mcg tablet   No No   Sig: TAKE 1 TABLET BY MOUTH ONCE DAILY BEFORE BREAKFAST   lisinopril (PRINIVIL, ZESTRIL) 20 mg tablet   No No   Sig: Take 2 Tabs by mouth daily. metFORMIN ER (GLUCOPHAGE XR) 500 mg tablet   No No   Sig: Take 1 Tab by mouth Before breakfast and dinner. ondansetron (Zofran ODT) 4 mg disintegrating tablet   No No   Sig: Take 1 Tab by mouth every eight (8) hours as needed for Nausea or Vomiting.   simvastatin (ZOCOR) 20 mg tablet   No No   Sig: Take 1 Tab by mouth nightly. testosterone 30 mg/actuation (1.5 mL) slpm   No No   Sig: Apply 1-2 pumps daily as directed on your visit      Facility-Administered Medications: None     Imaging:    Medications list Personally Reviewed   [x]      Yes     []               No    Thank you for allowing us to participate in the care this patient. We will follow patient with you.   Signed By: Amanda Hamilton, [facility]  [address] [facility]  [address], [place] [address]  [place], [address]  Phone - [phone]         Fax - [phone] [facility] Office  [address] [place] [address]  Phone - [phone]        Fax - [phone]     [email or web address]

## 2020-07-17 NOTE — PROGRESS NOTES
General Surgery End of Shift Nursing Note    Bedside shift change report given to Cristina Max RN (oncoming nurse) by Laverne Keita RN (offgoing nurse). Report included the following information SBAR, Kardex and MAR. Shift worked:   [id number]   Summary of shift:    Pt went to the OR today for his EGD. He has been resting in bed. Pt complained of a sore throat and being uncomfortable. His daughter visited for several hours and he was able to facetime with his family. Pt requested PRN pain medication once this shift. His lungs sounds are coarse with wheezing. Issues for physician to address:   None at this time     Number times ambulated in hallway past shift: 0    Number of times OOB to chair past shift: 0    Pain Management:  Current medication: See MAR  Patient states pain is manageable on current pain medication: YES    GI:    Current diet:  DIET CLEAR LIQUID    Tolerating current diet: YES  Passing flatus: YES  Last Bowel Movement: today    Respiratory:    Incentive Spirometer at bedside: YES  Patient instructed on use: YES    Patient Safety:    Falls Score: 2  Bed Alarm On? No  Sitter?  No    Estefanía Dec

## 2020-07-17 NOTE — ED NOTES
Beau Pérez MD at bedside for eval    ED visit d/t sob / change in voice quality     Reports having nausea / vomiting / lower abd pain for 2 months - no relief from zofran with sxs - pt also reports when he vomits, it affected his vocal cords leading change in voice quality - pt is also having sob with persistent N&V    Hx of thyroid CA / diabetes / cholesterol / TIA / chronic kidney disease       Denies fever / sick contacts / changes in BM / urinary sxs / headache / change in mentation / change in vision      0400 - pt sent and returned from imaging - reapplied to cardiac monitoring - family member at bedside for comfort and care;    0430 - pt ambulated to the bathroom to urinate;; strong and steady gait    0520 - pt sleeping comfortable in bed with no acute distress noted at this time - remains on cardiac monitor x4 - daughter at bedside for comfort and care - pending for reeval from MD for plan of care     6552 - pt sent and returned from radiology - reapplied to cardiac monitor x4     Te Vann MD at bedside for reeval and discussion of results with the pt and his daughter     12 - TRANSFER - OUT REPORT:    Verbal report given to [employer] (name) on Dru Duke  being transferred to [facility] (unit) for routine progression of care       Report consisted of patients Situation, Background, Assessment and   Recommendations(SBAR). Information from the following report(s) SBAR, Kardex, ED Summary, Intake/Output and MAR was reviewed with the receiving nurse. Lines:   Peripheral IV 07/17/20 Left Antecubital (Active)   Site Assessment Clean, dry, & intact 07/17/20 0352   Phlebitis Assessment 0 07/17/20 0352   Infiltration Assessment 0 07/17/20 0352   Dressing Status Clean, dry, & intact 07/17/20 0352   Dressing Type Tape;Transparent 07/17/20 0352        Opportunity for questions and clarification was provided.       Patient transported with:   Monitor

## 2020-07-18 ENCOUNTER — APPOINTMENT (OUTPATIENT)
Dept: NUCLEAR MEDICINE | Age: 60
DRG: 392 | End: 2020-07-18
Attending: SPECIALIST
Payer: MEDICARE

## 2020-07-18 LAB
ALBUMIN SERPL-MCNC: 2.9 G/DL (ref 3.5–5)
ANION GAP SERPL CALC-SCNC: 6 MMOL/L (ref 5–15)
BASOPHILS # BLD: 0 K/UL (ref 0–0.1)
BASOPHILS NFR BLD: 0 % (ref 0–1)
BUN SERPL-MCNC: 74 MG/DL (ref 6–20)
BUN/CREAT SERPL: 18 (ref 12–20)
CALCIUM SERPL-MCNC: 8.4 MG/DL (ref 8.5–10.1)
CHLORIDE SERPL-SCNC: 113 MMOL/L (ref 97–108)
CO2 SERPL-SCNC: 19 MMOL/L (ref 21–32)
CREAT SERPL-MCNC: 4.07 MG/DL (ref 0.7–1.3)
DIFFERENTIAL METHOD BLD: ABNORMAL
EOSINOPHIL # BLD: 0 K/UL (ref 0–0.4)
EOSINOPHIL NFR BLD: 0 % (ref 0–7)
ERYTHROCYTE [DISTWIDTH] IN BLOOD BY AUTOMATED COUNT: 14 % (ref 11.5–14.5)
EST. AVERAGE GLUCOSE BLD GHB EST-MCNC: 134 MG/DL
GLUCOSE BLD STRIP.AUTO-MCNC: 182 MG/DL (ref 65–100)
GLUCOSE BLD STRIP.AUTO-MCNC: 202 MG/DL (ref 65–100)
GLUCOSE BLD STRIP.AUTO-MCNC: 240 MG/DL (ref 65–100)
GLUCOSE BLD STRIP.AUTO-MCNC: 257 MG/DL (ref 65–100)
GLUCOSE SERPL-MCNC: 289 MG/DL (ref 65–100)
HAV IGM SER QL: NONREACTIVE
HBA1C MFR BLD: 6.3 % (ref 4–5.6)
HBV CORE IGM SER QL: NONREACTIVE
HBV SURFACE AG SER QL: <0.1 INDEX
HBV SURFACE AG SER QL: NEGATIVE
HCT VFR BLD AUTO: 31.8 % (ref 36.6–50.3)
HCV AB SERPL QL IA: NONREACTIVE
HCV COMMENT,HCGAC: NORMAL
HGB BLD-MCNC: 10.3 G/DL (ref 12.1–17)
IMM GRANULOCYTES # BLD AUTO: 0 K/UL (ref 0–0.04)
IMM GRANULOCYTES NFR BLD AUTO: 0 % (ref 0–0.5)
LYMPHOCYTES # BLD: 0.5 K/UL (ref 0.8–3.5)
LYMPHOCYTES NFR BLD: 5 % (ref 12–49)
MAGNESIUM SERPL-MCNC: 2.1 MG/DL (ref 1.6–2.4)
MCH RBC QN AUTO: 29.9 PG (ref 26–34)
MCHC RBC AUTO-ENTMCNC: 32.4 G/DL (ref 30–36.5)
MCV RBC AUTO: 92.4 FL (ref 80–99)
MONOCYTES # BLD: 0.1 K/UL (ref 0–1)
MONOCYTES NFR BLD: 1 % (ref 5–13)
NEUTS SEG # BLD: 10.7 K/UL (ref 1.8–8)
NEUTS SEG NFR BLD: 94 % (ref 32–75)
NRBC # BLD: 0 K/UL (ref 0–0.01)
NRBC BLD-RTO: 0 PER 100 WBC
PHOSPHATE SERPL-MCNC: 4.2 MG/DL (ref 2.6–4.7)
PLATELET # BLD AUTO: 273 K/UL (ref 150–400)
PMV BLD AUTO: 10.6 FL (ref 8.9–12.9)
POTASSIUM SERPL-SCNC: 6.5 MMOL/L (ref 3.5–5.1)
RBC # BLD AUTO: 3.44 M/UL (ref 4.1–5.7)
SERVICE CMNT-IMP: ABNORMAL
SODIUM SERPL-SCNC: 138 MMOL/L (ref 136–145)
SP1: NORMAL
SP2: NORMAL
SP3: NORMAL
WBC # BLD AUTO: 11.4 K/UL (ref 4.1–11.1)

## 2020-07-18 PROCEDURE — 74011250636 HC RX REV CODE- 250/636: Performed by: INTERNAL MEDICINE

## 2020-07-18 PROCEDURE — 80074 ACUTE HEPATITIS PANEL: CPT

## 2020-07-18 PROCEDURE — 82784 ASSAY IGA/IGD/IGG/IGM EACH: CPT

## 2020-07-18 PROCEDURE — A9537 TC99M MEBROFENIN: HCPCS

## 2020-07-18 PROCEDURE — 80069 RENAL FUNCTION PANEL: CPT

## 2020-07-18 PROCEDURE — 74011250637 HC RX REV CODE- 250/637: Performed by: INTERNAL MEDICINE

## 2020-07-18 PROCEDURE — C9113 INJ PANTOPRAZOLE SODIUM, VIA: HCPCS | Performed by: PHYSICIAN ASSISTANT

## 2020-07-18 PROCEDURE — 74011000250 HC RX REV CODE- 250: Performed by: PHYSICIAN ASSISTANT

## 2020-07-18 PROCEDURE — 83036 HEMOGLOBIN GLYCOSYLATED A1C: CPT

## 2020-07-18 PROCEDURE — 86038 ANTINUCLEAR ANTIBODIES: CPT

## 2020-07-18 PROCEDURE — 85025 COMPLETE CBC W/AUTO DIFF WBC: CPT

## 2020-07-18 PROCEDURE — 86160 COMPLEMENT ANTIGEN: CPT

## 2020-07-18 PROCEDURE — 74011000258 HC RX REV CODE- 258: Performed by: INTERNAL MEDICINE

## 2020-07-18 PROCEDURE — 83735 ASSAY OF MAGNESIUM: CPT

## 2020-07-18 PROCEDURE — 74011250636 HC RX REV CODE- 250/636: Performed by: PHYSICIAN ASSISTANT

## 2020-07-18 PROCEDURE — 94760 N-INVAS EAR/PLS OXIMETRY 1: CPT

## 2020-07-18 PROCEDURE — 74011636637 HC RX REV CODE- 636/637: Performed by: INTERNAL MEDICINE

## 2020-07-18 PROCEDURE — 74011000250 HC RX REV CODE- 250: Performed by: INTERNAL MEDICINE

## 2020-07-18 PROCEDURE — 65660000000 HC RM CCU STEPDOWN

## 2020-07-18 PROCEDURE — 82595 ASSAY OF CRYOGLOBULIN: CPT

## 2020-07-18 PROCEDURE — 82962 GLUCOSE BLOOD TEST: CPT

## 2020-07-18 PROCEDURE — 83520 IMMUNOASSAY QUANT NOS NONAB: CPT

## 2020-07-18 PROCEDURE — 36415 COLL VENOUS BLD VENIPUNCTURE: CPT

## 2020-07-18 RX ADMIN — HYDROCODONE BITARTRATE AND ACETAMINOPHEN 1 TABLET: 10; 325 TABLET ORAL at 16:33

## 2020-07-18 RX ADMIN — INSULIN LISPRO 2 UNITS: 100 INJECTION, SOLUTION INTRAVENOUS; SUBCUTANEOUS at 21:15

## 2020-07-18 RX ADMIN — SODIUM BICARBONATE: 84 INJECTION, SOLUTION INTRAVENOUS at 21:20

## 2020-07-18 RX ADMIN — HEPARIN SODIUM 5000 UNITS: 5000 INJECTION INTRAVENOUS; SUBCUTANEOUS at 05:54

## 2020-07-18 RX ADMIN — Medication 10 ML: at 05:55

## 2020-07-18 RX ADMIN — SODIUM CHLORIDE 40 MG: 9 INJECTION, SOLUTION INTRAMUSCULAR; INTRAVENOUS; SUBCUTANEOUS at 21:14

## 2020-07-18 RX ADMIN — LEVOTHYROXINE SODIUM 200 MCG: 200 TABLET ORAL at 05:53

## 2020-07-18 RX ADMIN — Medication 10 ML: at 21:21

## 2020-07-18 RX ADMIN — SODIUM CHLORIDE 40 MG: 9 INJECTION, SOLUTION INTRAMUSCULAR; INTRAVENOUS; SUBCUTANEOUS at 08:12

## 2020-07-18 RX ADMIN — Medication 10 ML: at 15:42

## 2020-07-18 RX ADMIN — SODIUM BICARBONATE: 84 INJECTION, SOLUTION INTRAVENOUS at 04:30

## 2020-07-18 RX ADMIN — HYDROCODONE BITARTRATE AND ACETAMINOPHEN 1 TABLET: 10; 325 TABLET ORAL at 08:12

## 2020-07-18 RX ADMIN — HYDROCODONE BITARTRATE AND ACETAMINOPHEN 1 TABLET: 10; 325 TABLET ORAL at 03:25

## 2020-07-18 RX ADMIN — HEPARIN SODIUM 5000 UNITS: 5000 INJECTION INTRAVENOUS; SUBCUTANEOUS at 15:41

## 2020-07-18 RX ADMIN — METOCLOPRAMIDE 10 MG: 5 INJECTION, SOLUTION INTRAMUSCULAR; INTRAVENOUS at 15:41

## 2020-07-18 RX ADMIN — AMLODIPINE BESYLATE 5 MG: 5 TABLET ORAL at 08:12

## 2020-07-18 RX ADMIN — HEPARIN SODIUM 5000 UNITS: 5000 INJECTION INTRAVENOUS; SUBCUTANEOUS at 21:14

## 2020-07-18 NOTE — PROGRESS NOTES
[address]  YOB: 1960          Assessment & Plan:   NERY presumed secondary to IVVD  CKD 3  HTN  Hyperkalemia  Acidosis  N/V  H/o metastatic thyroid Ca    Rec:  Continue IVF with BCB  Awaiting labs today  No need for RRT  Anticipate improvement       Subjective:   CC: f/u NERY  HPI: Good UOP. Labs pending from this am. On 1/2NS + 75 bcb. HTN is controlled. ROS: No n/v/sob  Current Facility-Administered Medications   Medication Dose Route Frequency    metoclopramide HCl (REGLAN) injection 10 mg  10 mg IntraVENous Q6H PRN    sodium chloride (NS) flush 5-40 mL  5-40 mL IntraVENous Q8H    sodium chloride (NS) flush 5-40 mL  5-40 mL IntraVENous PRN    HYDROcodone-acetaminophen (NORCO)  mg tablet 1 Tab  1 Tab Oral Q4H PRN    ondansetron (ZOFRAN) injection 4 mg  4 mg IntraVENous Q4H PRN    heparin (porcine) injection 5,000 Units  5,000 Units SubCUTAneous Q8H    albuterol-ipratropium (DUO-NEB) 2.5 MG-0.5 MG/3 ML  3 mL Nebulization Q4H PRN    ondansetron (ZOFRAN ODT) tablet 4 mg  4 mg Oral Q8H PRN    levothyroxine (SYNTHROID) tablet 200 mcg  200 mcg Oral 6am    insulin lispro (HUMALOG) injection   SubCUTAneous AC&HS    glucose chewable tablet 16 g  4 Tab Oral PRN    dextrose (D50W) injection syrg 12.5-25 g  12.5-25 g IntraVENous PRN    glucagon (GLUCAGEN) injection 1 mg  1 mg IntraMUSCular PRN    pantoprazole (PROTONIX) 40 mg in 0.9% sodium chloride 10 mL injection  40 mg IntraVENous Q12H    amLODIPine (NORVASC) tablet 5 mg  5 mg Oral DAILY    hydrALAZINE (APRESOLINE) 20 mg/mL injection 20 mg  20 mg IntraVENous Q6H PRN    0.45% sodium chloride 1,000 mL with sodium bicarbonate (8.4%) 75 mEq infusion   IntraVENous CONTINUOUS          Objective:     Vitals:  Blood pressure 145/62, pulse 75, temperature 98.6 °F (37 °C), resp. rate 18, height 5' 9\" (1.753 m), weight 103.9 kg (229 lb 0.9 oz), SpO2 96 %.   Temp (24hrs), Av.3 °F (36.8 °C), Min:97.8 °F (36.6 °C), Max:98.8 °F (37.1 °C)      Intake and Output:  No intake/output data recorded.  1901 -  0700  In: 636.7 [I.V.:636.7]  Out: 900 [Urine:900]    Physical Exam:               GENERAL ASSESSMENT: NAD  CHEST: CTA  HEART: S1S2  ABDOMEN: Soft,NT  EXTREMITY: no EDEMA      ECG/rhythm:    Data Review      No results for input(s): TNIPOC in the last 72 hours. No lab exists for component: ITNL   Recent Labs     20   TROIQ <0.05     Recent Labs     20  0530 20  0126 20  0809 20  0649 20  0353   NA  --   --   --   --  142   K  --   --  5.3*  --  5.3*   CL  --   --   --   --  113*   CO2  --   --   --   --  20*   BUN  --   --   --   --  74*   CREA  --   --   --   --  4.01*   GLU  --   --   --   --  142*   PHOS  --   --   --  3.6  --    MG 2.1  --   --   --   --    CA  --   --   --  8.4* 8.3*   ALB  --   --   --   --  3.3*   WBC  --  11.4*  --   --  15.1*   HGB  --  10.3*  --   --  10.9*   HCT  --  31.8*  --   --  34.0*   PLT  --  273  --   --  293      No results for input(s): INR, PTP, APTT, INREXT in the last 72 hours. Needs: urine analysis, urine sodium, protein and creatinine  Lab Results   Component Value Date/Time    Sodium,urine random 43 2020 07:39 PM    Creatinine, urine 92.50 2020 07:39 PM           : Makayla Guzman MD  2020        [facility]:  [email or web address]  [facility] [place] office:  [address] [place] [address], [address] [place] [address] [address]  [place], [address] [facility]  Phone: [phone]  Fax :     [phone]    [place] office:  [address] [facility], [address] [facility]  Phone - [phone]  Fax - [phone]

## 2020-07-18 NOTE — PROGRESS NOTES
Problem: Falls - Risk of  Goal: *Absence of Falls  Description: Document Jaden Kumar Fall Risk and appropriate interventions in the flowsheet.   Outcome: Progressing Towards Goal  Note: Fall Risk Interventions:            Medication Interventions: Patient to call before getting OOB    Elimination Interventions: Call light in reach

## 2020-07-18 NOTE — PROGRESS NOTES
Verbal and bedside report given to Luise Dubin by Ge Nino. Report included SBAR, Kardex, Intake/Output, Mar and Recent results. Pt had Hida scan performed today.

## 2020-07-18 NOTE — PROGRESS NOTES
Hospitalist Progress Note    NAME: Carmen Kunz   :  1960   MRN:  [id number]       Assessment / Plan:  Patient is a 64 YOCM w/Hx recurrent/metastatic STRATTON carcinoma of the thyroid, DM, HTN, admitted for renal failure 2/2 IVVD from PO intolerance with nausea and vomiting. He is now s/p EGD which showed a spencer quynh tear, duodenitis, duodenal polyp, gastric polyps, and dilation fo the esophagus. He also had a HIDA scan which showed non visualization fo the duodenum at 90 minutes, and possible common bile duct obstruction. He is awaiting further eval for possible cholecystectomy on the [facility] floor. Nausea and vomiting POA  Chronic NSAIDs use  Epigastric pain     -2 months history of nausea and vomiting, associated with food. Takes 2 to 3 tablets of Advil daily  -Received Pepcid in the ED  -now s/p EGD which showed:  spencer quynh tear, duodenitis, duodenal polyp, gastric polyps, and dilation of the esophagus  -N.p.o. for now  -PRN Zofran     Acute renal failure 2/2 Intravascular volume depletion and NSAID use, POA  Mild Hyperkalemia  Likely underlying CKD stage III  -Creatinine 2.32 in May 2019, May 2020 creatinine 3.72 and no 4.07, unchanged from admission  -CT abdomen without any hydronephrosis  -CK normal, check PTH/phosphorus  -Nephrology following, patient getting Bicarb drip and IV fluids  -Gentle hydration for now, recheck K  -Hold lisinopril, metformin, and other nephrotoxic medication     History of diabetes mellitus  History of hypertension  -Hold nephrotoxic medication  -SSI, check A1c     History of thyroid cancer with mets to lung  -Follows Dr. Michael Condon  -Continue levothyroxine           Code Status: Full code  Surrogate Decision Maker: Daughter Santo Sanchez     DVT Prophylaxis: Heparin  GI Prophylaxis: not indicated     Baseline: Ambulatory     Subjective:     Chief Complaint / Reason for Physician Visit  \"I feel good, but I still cant eat. \". Discussed with RN events overnight. Review of Systems:  Symptom Y/N Comments  Symptom Y/N Comments   Fever/Chills n   Chest Pain n    Poor Appetite y   Edema n    Cough n   Abdominal Pain y    Sputum n   Joint Pain n    SOB/NUÑEZ n   Pruritis/Rash n    Nausea/vomit y   Tolerating PT/OT nna    Diarrhea n   Tolerating Diet n    Constipation n   Other       Could NOT obtain due to:      Objective:     VITALS:   Last 24hrs VS reviewed since prior progress note. Most recent are:  Patient Vitals for the past 24 hrs:   Temp Pulse Resp BP SpO2   07/18/20 1453 98.5 °F (36.9 °C) 75 18 135/64 98 %   07/18/20 1048 98.7 °F (37.1 °C) 78 18 142/60 96 %   07/18/20 0730 98.6 °F (37 °C) 75 18 145/62 96 %   07/18/20 0306 98 °F (36.7 °C) 71 18 157/66 98 %   07/17/20 1856 98.5 °F (36.9 °C) 77 18 139/61 96 %   07/17/20 1656 98.2 °F (36.8 °C) 74 18 143/71 97 %       Intake/Output Summary (Last 24 hours) at 7/18/2020 1635  Last data filed at 7/18/2020 1048  Gross per 24 hour   Intake --   Output 1000 ml   Net -1000 ml        PHYSICAL EXAM:  General: WD, WN. Alert, cooperative, no acute distress    EENT:  EOMI. Anicteric sclerae. MMM  Resp:  CTA bilaterally, no wheezing or rales. No accessory muscle use  CV:  Regular  rhythm,  No edema  GI:  Soft, Non distended, Non tender.  +Bowel sounds  Neurologic:  Alert and oriented X 3, normal speech,   Psych:   Good insight. Not anxious nor agitated  Skin:  No rashes.   No jaundice    Reviewed most current lab test results and cultures  YES  Reviewed most current radiology test results   YES  Review and summation of old records today    NO  Reviewed patient's current orders and MAR    YES  PMH/SH reviewed - no change compared to H&P  ________________________________________________________________________  Care Plan discussed with:    Comments   Patient x    Family      RN x    Care Manager     Consultant                        Multidiciplinary team rounds were held today with , nursing, pharmacist and clinical coordinator. Patient's plan of care was discussed; medications were reviewed and discharge planning was addressed. ________________________________________________________________________  Total NON critical care TIME: 35  Minutes    Total CRITICAL CARE TIME Spent:   Minutes non procedure based      Comments   >50% of visit spent in counseling and coordination of care     ________________________________________________________________________  Bonita Burciaga MD     Procedures: see electronic medical records for all procedures/Xrays and details which were not copied into this note but were reviewed prior to creation of Plan. LABS:  I reviewed today's most current labs and imaging studies.   Pertinent labs include:  Recent Labs     07/18/20  0126 07/17/20  0353   WBC 11.4* 15.1*   HGB 10.3* 10.9*   HCT 31.8* 34.0*    293     Recent Labs     07/18/20  0530 07/17/20  0809 07/17/20  0649 07/17/20  0353     --   --  142   K 6.5* 5.3*  --  5.3*   *  --   --  113*   CO2 19*  --   --  20*   *  --   --  142*   BUN 74*  --   --  74*   CREA 4.07*  --   --  4.01*   CA 8.4*  --  8.4* 8.3*   MG 2.1  --   --   --    PHOS 4.2  --  3.6  --    ALB 2.9*  --   --  3.3*   TBILI  --   --   --  0.2   ALT  --   --   --  23       Signed: Bonita Burciaga MD

## 2020-07-18 NOTE — PROGRESS NOTES
[address]  [address]  [phone]                                                GI PROGRESS NOTE      NAME: Laquita Alexandre   :  1960   MRN:  [id number]          Assessment:   He is on PPI  Biopsies pending  HIDA didn't fill duodenum but LFTs all normal  Ok for gi lite today  No nausea and vomiting today          Plan:   He may need an eus for the duodenal mass in the future. (ct was negative but no oral contrast)  Could probably go home soon    ·     Subjective:   Discussed with RN events overnight. Complaint Y/N Description   Abdominal Pain     Hematemesis     Hematochezia     Melena     Constipation     Diarrhea     Dyspepsia     Dysphagia     Jaundiced         Review of Systems:    Symptom Y/N Comments  Symptom Y/N Comments   Fever/Chills    Chest Pain     Cough    Abdominal Pain     Sputum    Joint Pain     SOB/NUÑEZ    Pruritis/Rash     Nausea/vomit    Tolerating PT/OT     Diarrhea    Tolerating Diet     Constipation    Other       Could not obtain due to AMS vs intubation        Objective:     VITALS:   Last 24hrs VS reviewed since prior progress note. Most recent are:  Visit Vitals  /64   Pulse 75   Temp 98.5 °F (36.9 °C)   Resp 18   Ht 5' 9\" (1.753 m)   Wt 103.9 kg (229 lb 0.9 oz)   SpO2 98%   BMI 33.83 kg/m²       Intake/Output Summary (Last 24 hours) at 2020 1806  Last data filed at 2020 1048  Gross per 24 hour   Intake --   Output 800 ml   Net -800 ml       PHYSICAL EXAM:  General: WD, WN. Alert, cooperative, no acute distress    HEENT: NC, Atraumatic. PERRLA, EOMI. Anicteric sclerae. Lungs:  CTA Bilaterally. No Wheezing/Rhonchi/Rales. Heart:  Regular  rhythm,  No murmur (), No Rubs, No Gallops  Abdomen: Soft, Non distended, Non tender.  +Bowel sounds, no HSM  Extremities: No c/c/e  Neurologic:  CN 2-12 gi, Alert and oriented X 3.   No acute neurological distress   Psych:   Good insight. Not anxious nor agitated. Lab Data Reviewed: (see below)    Medications Reviewed: (see below)    PMH/SH reviewed - no change compared to H&P  ________________________________________________________________________  Total time spent with patient: 20 minutes     Critical Care Provided     Minutes non procedure based    Care Plan discussed with:  Patient    Family     RN    Care Manager    Consultant/Specialist:       >50% of visit spent in counseling and coordination of care       Recommended Disposition:   Home with Family    HH/PT/OT/RN    SNF/LTC    [facility]      Code Status:  Full Code    DNR/DNI      ________________________________________________________________________  [facility], MD   ________________________________________________________________________________________________________________________________________________  Procedures: see electronic medical records for all procedures/Xrays and details which  were not copied into this note but were reviewed prior to creation of Plan. LABS:  Recent Labs     07/18/20  0126 07/17/20  0353   WBC 11.4* 15.1*   HGB 10.3* 10.9*   HCT 31.8* 34.0*    293     Recent Labs     07/18/20  0530 07/17/20  0809 07/17/20  0649 07/17/20  0353     --   --  142   K 6.5* 5.3*  --  5.3*   *  --   --  113*   CO2 19*  --   --  20*   BUN 74*  --   --  74*   CREA 4.07*  --   --  4.01*   *  --   --  142*   CA 8.4*  --  8.4* 8.3*   MG 2.1  --   --   --    PHOS 4.2  --  3.6  --      Recent Labs     07/18/20 0530 07/17/20  0353   AP  --  132*   TP  --  7.7   ALB 2.9* 3.3*   GLOB  --  4.4*     No results for input(s): INR, PTP, APTT, INREXT in the last 72 hours. No results for input(s): FE, TIBC, PSAT, FERR in the last 72 hours. No results found for: FOL, RBCF  No results for input(s): PH, PCO2, PO2 in the last 72 hours. No results for input(s): CPK, CKMB in the last 72 hours.     No lab exists for component: TROPONINI  Lab Results   Component Value Date/Time Color YELLOW/STRAW 07/17/2020 04:37 AM    Appearance CLEAR 07/17/2020 04:37 AM    Specific gravity 1.014 07/17/2020 04:37 AM    pH (UA) 5.0 07/17/2020 04:37 AM    Protein 300 (A) 07/17/2020 04:37 AM    Glucose Negative 07/17/2020 04:37 AM    Ketone Negative 07/17/2020 04:37 AM    Bilirubin Negative 07/17/2020 04:37 AM    Urobilinogen 0.2 07/17/2020 04:37 AM    Nitrites Negative 07/17/2020 04:37 AM    Leukocyte Esterase Negative 07/17/2020 04:37 AM    Epithelial cells FEW 07/17/2020 04:37 AM    Bacteria Negative 07/17/2020 04:37 AM    WBC 0-4 07/17/2020 04:37 AM    RBC 5-10 07/17/2020 04:37 AM       MEDICATIONS:  Current Facility-Administered Medications   Medication Dose Route Frequency    metoclopramide HCl (REGLAN) injection 10 mg  10 mg IntraVENous Q6H PRN    sodium chloride (NS) flush 5-40 mL  5-40 mL IntraVENous Q8H    sodium chloride (NS) flush 5-40 mL  5-40 mL IntraVENous PRN    HYDROcodone-acetaminophen (NORCO)  mg tablet 1 Tab  1 Tab Oral Q4H PRN    ondansetron (ZOFRAN) injection 4 mg  4 mg IntraVENous Q4H PRN    heparin (porcine) injection 5,000 Units  5,000 Units SubCUTAneous Q8H    albuterol-ipratropium (DUO-NEB) 2.5 MG-0.5 MG/3 ML  3 mL Nebulization Q4H PRN    ondansetron (ZOFRAN ODT) tablet 4 mg  4 mg Oral Q8H PRN    levothyroxine (SYNTHROID) tablet 200 mcg  200 mcg Oral 6am    insulin lispro (HUMALOG) injection   SubCUTAneous AC&HS    glucose chewable tablet 16 g  4 Tab Oral PRN    dextrose (D50W) injection syrg 12.5-25 g  12.5-25 g IntraVENous PRN    glucagon (GLUCAGEN) injection 1 mg  1 mg IntraMUSCular PRN    pantoprazole (PROTONIX) 40 mg in 0.9% sodium chloride 10 mL injection  40 mg IntraVENous Q12H    amLODIPine (NORVASC) tablet 5 mg  5 mg Oral DAILY    hydrALAZINE (APRESOLINE) 20 mg/mL injection 20 mg  20 mg IntraVENous Q6H PRN    0.45% sodium chloride 1,000 mL with sodium bicarbonate (8.4%) 75 mEq infusion   IntraVENous CONTINUOUS

## 2020-07-19 PROBLEM — R11.2 NAUSEA AND VOMITING: Status: ACTIVE | Noted: 2020-07-19

## 2020-07-19 LAB
ANION GAP SERPL CALC-SCNC: 3 MMOL/L (ref 5–15)
BUN SERPL-MCNC: 61 MG/DL (ref 6–20)
BUN/CREAT SERPL: 17 (ref 12–20)
C3 SERPL-MCNC: 104 MG/DL (ref 82–167)
C4 SERPL-MCNC: 31 MG/DL (ref 14–44)
CALCIUM SERPL-MCNC: 8.7 MG/DL (ref 8.5–10.1)
CHLORIDE SERPL-SCNC: 107 MMOL/L (ref 97–108)
CO2 SERPL-SCNC: 29 MMOL/L (ref 21–32)
CREAT SERPL-MCNC: 3.52 MG/DL (ref 0.7–1.3)
GLUCOSE BLD STRIP.AUTO-MCNC: 106 MG/DL (ref 65–100)
GLUCOSE BLD STRIP.AUTO-MCNC: 106 MG/DL (ref 65–100)
GLUCOSE BLD STRIP.AUTO-MCNC: 107 MG/DL (ref 65–100)
GLUCOSE BLD STRIP.AUTO-MCNC: 89 MG/DL (ref 65–100)
GLUCOSE SERPL-MCNC: 115 MG/DL (ref 65–100)
POTASSIUM SERPL-SCNC: 4.6 MMOL/L (ref 3.5–5.1)
SERVICE CMNT-IMP: ABNORMAL
SERVICE CMNT-IMP: NORMAL
SODIUM SERPL-SCNC: 139 MMOL/L (ref 136–145)

## 2020-07-19 PROCEDURE — 74011250637 HC RX REV CODE- 250/637: Performed by: INTERNAL MEDICINE

## 2020-07-19 PROCEDURE — C9113 INJ PANTOPRAZOLE SODIUM, VIA: HCPCS | Performed by: PHYSICIAN ASSISTANT

## 2020-07-19 PROCEDURE — 65660000000 HC RM CCU STEPDOWN

## 2020-07-19 PROCEDURE — 74011250636 HC RX REV CODE- 250/636: Performed by: INTERNAL MEDICINE

## 2020-07-19 PROCEDURE — 80048 BASIC METABOLIC PNL TOTAL CA: CPT

## 2020-07-19 PROCEDURE — 82962 GLUCOSE BLOOD TEST: CPT

## 2020-07-19 PROCEDURE — 74011250636 HC RX REV CODE- 250/636: Performed by: PHYSICIAN ASSISTANT

## 2020-07-19 PROCEDURE — 36415 COLL VENOUS BLD VENIPUNCTURE: CPT

## 2020-07-19 PROCEDURE — 74011000250 HC RX REV CODE- 250: Performed by: INTERNAL MEDICINE

## 2020-07-19 PROCEDURE — 74011000250 HC RX REV CODE- 250: Performed by: PHYSICIAN ASSISTANT

## 2020-07-19 PROCEDURE — 74011000258 HC RX REV CODE- 258: Performed by: INTERNAL MEDICINE

## 2020-07-19 RX ORDER — AMLODIPINE BESYLATE 5 MG/1
5 TABLET ORAL 2 TIMES DAILY
Status: DISCONTINUED | OUTPATIENT
Start: 2020-07-19 | End: 2020-07-22 | Stop reason: HOSPADM

## 2020-07-19 RX ORDER — SODIUM CHLORIDE 450 MG/100ML
50 INJECTION, SOLUTION INTRAVENOUS CONTINUOUS
Status: DISPENSED | OUTPATIENT
Start: 2020-07-19 | End: 2020-07-20

## 2020-07-19 RX ADMIN — HYDROCODONE BITARTRATE AND ACETAMINOPHEN 1 TABLET: 10; 325 TABLET ORAL at 19:55

## 2020-07-19 RX ADMIN — HEPARIN SODIUM 5000 UNITS: 5000 INJECTION INTRAVENOUS; SUBCUTANEOUS at 13:44

## 2020-07-19 RX ADMIN — AMLODIPINE BESYLATE 5 MG: 5 TABLET ORAL at 08:26

## 2020-07-19 RX ADMIN — HYDRALAZINE HYDROCHLORIDE 20 MG: 20 INJECTION INTRAMUSCULAR; INTRAVENOUS at 15:13

## 2020-07-19 RX ADMIN — Medication 10 ML: at 13:44

## 2020-07-19 RX ADMIN — HEPARIN SODIUM 5000 UNITS: 5000 INJECTION INTRAVENOUS; SUBCUTANEOUS at 05:18

## 2020-07-19 RX ADMIN — HEPARIN SODIUM 5000 UNITS: 5000 INJECTION INTRAVENOUS; SUBCUTANEOUS at 21:07

## 2020-07-19 RX ADMIN — HYDROCODONE BITARTRATE AND ACETAMINOPHEN 1 TABLET: 10; 325 TABLET ORAL at 04:10

## 2020-07-19 RX ADMIN — SODIUM CHLORIDE 50 ML/HR: 450 INJECTION, SOLUTION INTRAVENOUS at 17:54

## 2020-07-19 RX ADMIN — Medication 10 ML: at 05:20

## 2020-07-19 RX ADMIN — SODIUM CHLORIDE 40 MG: 9 INJECTION, SOLUTION INTRAMUSCULAR; INTRAVENOUS; SUBCUTANEOUS at 08:25

## 2020-07-19 RX ADMIN — SODIUM BICARBONATE: 84 INJECTION, SOLUTION INTRAVENOUS at 05:31

## 2020-07-19 RX ADMIN — HYDROCODONE BITARTRATE AND ACETAMINOPHEN 1 TABLET: 10; 325 TABLET ORAL at 14:20

## 2020-07-19 RX ADMIN — AMLODIPINE BESYLATE 5 MG: 5 TABLET ORAL at 17:55

## 2020-07-19 RX ADMIN — Medication 10 ML: at 21:07

## 2020-07-19 RX ADMIN — SODIUM CHLORIDE 40 MG: 9 INJECTION, SOLUTION INTRAMUSCULAR; INTRAVENOUS; SUBCUTANEOUS at 21:06

## 2020-07-19 RX ADMIN — LEVOTHYROXINE SODIUM 200 MCG: 200 TABLET ORAL at 05:18

## 2020-07-19 NOTE — PROGRESS NOTES
Hospitalist Progress Note    NAME: Juliocesar Byers   :  1960   MRN:  [id number]       Assessment / Plan:  Patient is a 64 YOCM w/Hx recurrent/metastatic STRATTON carcinoma of the thyroid, DM, HTN, admitted for renal failure 2/2 IVVD from PO intolerance with nausea and vomiting. Nausea and vomiting POA  Chronic NSAIDs use  Epigastric pain  -2 months history of nausea and vomiting, associated with food.   Takes 2 to 3 tablets of Advil daily  -Received Pepcid in the ED  -now s/p EGD which showed:  spencer quynh tear, duodenitis, duodenal polyp, gastric polyps, and dilation of the esophagus  -HIDA scan which showed non visualization fo the duodenum at 90 minutes, and possible common bile duct obstruction.  -Per patient, he was told that he would need cholecystectomy  -Clears, advancedd to GI ite diet  -PRN Zofran     Acute renal failure 2/2 Intravascular volume depletion and NSAID use, POA  Mild Hyperkalemia  Likely underlying CKD stage III  -Creatinine 2.32 in May 2019, May 2020 creatinine 3.72 and no 4.07, unchanged from admission  -CT abdomen without any hydronephrosis  -CK normal, check PTH/phosphorus  -Nephrology following, patient getting Bicarb drip and IV fluids  -Gentle hydration for now, recheck K  -Hold lisinopril, metformin, and other nephrotoxic medication     History of diabetes mellitus  History of hypertension  -Hold nephrotoxic medication  -SSI, check A1c     History of thyroid cancer with mets to lung  -Follows Dr. Shanika Laboy  -Continue levothyroxine     GI lite diet     Code Status: Full code  Surrogate Decision Maker: Daughter Magalie Beyer   DVT Prophylaxis: Heparin    Plan: Follow GI     Baseline: Ambulatory     Subjective:     Chief Complaint / Reason for Physician Visit  Still has upper abd pain/tenderness mainly in RUQ region    Review of Systems:  Symptom Y/N Comments  Symptom Y/N Comments   Fever/Chills n   Chest Pain n    Poor Appetite y   Edema n    Cough n   Abdominal Pain y    Sputum n Joint Pain n    SOB/NUÑEZ n   Pruritis/Rash n    Nausea/vomit y   Tolerating PT/OT nna    Diarrhea n   Tolerating Diet y    Constipation n   Other       Could NOT obtain due to:      Objective:     VITALS:   Last 24hrs VS reviewed since prior progress note. Most recent are:  Patient Vitals for the past 24 hrs:   Temp Pulse Resp BP SpO2   07/19/20 1106 98 °F (36.7 °C) 70 18 191/84 97 %   07/19/20 0719 98 °F (36.7 °C) 73 18 157/69 96 %   07/19/20 0314 98.5 °F (36.9 °C) 73 18 160/79 98 %   07/18/20 1930 98.1 °F (36.7 °C) 78 18 165/55 96 %   07/18/20 1453 98.5 °F (36.9 °C) 75 18 135/64 98 %       Intake/Output Summary (Last 24 hours) at 7/19/2020 1137  Last data filed at 7/19/2020 0719  Gross per 24 hour   Intake --   Output 1800 ml   Net -1800 ml        PHYSICAL EXAM:  General: WD, WN. Alert, cooperative, no acute distress    EENT:  EOMI. Anicteric sclerae. MMM  Resp:  CTA bilaterally, no wheezing or rales. No accessory muscle use  CV:  Regular  rhythm,  No edema  GI:  Soft, Non distended, Non tender.  +Bowel sounds  Neurologic:  Alert and oriented X 3, normal speech,   Psych:   Good insight. Not anxious nor agitated  Skin:  No rashes. No jaundice    Reviewed most current lab test results and cultures  YES  Reviewed most current radiology test results   YES  Review and summation of old records today    NO  Reviewed patient's current orders and MAR    YES  PMH/ reviewed - no change compared to H&P  ________________________________________________________________________  Care Plan discussed with:    Comments   Patient x    Family      RN x    Care Manager     Consultant                        Multidiciplinary team rounds were held today with , nursing, pharmacist and clinical coordinator. Patient's plan of care was discussed; medications were reviewed and discharge planning was addressed.      ________________________________________________________________________  Total NON critical care TIME: 35 Minutes    Total CRITICAL CARE TIME Spent:   Minutes non procedure based      Comments   >50% of visit spent in counseling and coordination of care     ________________________________________________________________________  Kilo Tony MD     Procedures: see electronic medical records for all procedures/Xrays and details which were not copied into this note but were reviewed prior to creation of Plan. LABS:  I reviewed today's most current labs and imaging studies.   Pertinent labs include:  Recent Labs     07/18/20  0126 07/17/20  0353   WBC 11.4* 15.1*   HGB 10.3* 10.9*   HCT 31.8* 34.0*    293     Recent Labs     07/18/20  0530 07/17/20  0809 07/17/20  0649 07/17/20  0353     --   --  142   K 6.5* 5.3*  --  5.3*   *  --   --  113*   CO2 19*  --   --  20*   *  --   --  142*   BUN 74*  --   --  74*   CREA 4.07*  --   --  4.01*   CA 8.4*  --  8.4* 8.3*   MG 2.1  --   --   --    PHOS 4.2  --  3.6  --    ALB 2.9*  --   --  3.3*   TBILI  --   --   --  0.2   ALT  --   --   --  23       Signed: Kilo Tony MD

## 2020-07-19 NOTE — PROGRESS NOTES
Nephrology Progress Note  [facility]  [place] [facility] [employer]  [address], [place], [address]  Phone - [phone]               Fax - [phone]  Patient: Nico Sanches    YOB: 1960     Admit Date: 7/17/2020   Date- 7/19/2020     CC: Follow up for cynthia, hyperkalemia       IMPRESSION & PLAN:   CYNTHIA secondary to multiple possibilities. 1.Pre renal factors -  Dehydration due to vomiting  Hemodynamic changes - lisinopril , NSAIDS/advile use - drop in GFR in setting of dehydration  2. Intra reanl factors -progression of ckd , AIN due to NSAIDS. 3.Post renal factors -no hydro on renal usg     - CHECK STAT BMP NOW  - CONTINUE IVF      HYPERKALEMIA  - check bmp now  - hold lisinopril    HYPERTENSION  - increase norvasc to 5 mg bid    Proteinuria  - work up in progress    CKD 3- bl. Cr. 2.1-2.3  Nausea and vomiting  H/o thyroid cancer  Adrenal adenoma  Hypertension  Hyperlipidemia  DM 2- uncontrolled         Subjective: Interval History:   -  No labs done today  k was high 6.5 yesterday  ivf stopped this am  bp high       ROS:- No c/o sob,  No c/o chest pain,   No c/o nausea or vomiting, No c/o  fever. Objective:   Vitals:    07/19/20 0314 07/19/20 0719 07/19/20 1106 07/19/20 1508   BP: 160/79 157/69 191/84 167/73   Pulse: 73 73 70 69   Resp: 18 18 18 18   Temp: 98.5 °F (36.9 °C) 98 °F (36.7 °C) 98 °F (36.7 °C) 98.4 °F (36.9 °C)   SpO2: 98% 96% 97% 96%   Weight:       Height:          07/18 0701 - 07/19 0700  In: -   Out: 2400 [Urine:2400]  Physical exam:   GEN: NAD  NECK- Supple, no mass  RESP: Clear b/l, no wheezing  CVS: S1,S2  RRR  NEURO: Normal speech, Non focal  Abdo- soft, NT  EXT: No Edema   PSYCH: Normal Mood    Discussed with:- Nurse , Patient    Chart reviewed. Pertinent Notes reviewed.      Data Review :  Recent Labs     07/18/20  0530 07/17/20  0809 07/17/20  2319 07/17/20  5128   --   --  142   K 6.5* 5.3*  --  5.3*   *  --   --  113*   CO2 19*  --   --  20*   BUN 74*  --   --  74*   CREA 4.07*  --   --  4.01*   *  --   --  142*   CA 8.4*  --  8.4* 8.3*   MG 2.1  --   --   --    PHOS 4.2  --  3.6  --      Recent Labs     07/18/20  0126 07/17/20  0353   WBC 11.4* 15.1*   HGB 10.3* 10.9*   HCT 31.8* 34.0*    293     No results for input(s): FE, TIBC, PSAT, FERR in the last 72 hours.    Medication list  reviewed  Current Facility-Administered Medications   Medication Dose Route Frequency    metoclopramide HCl (REGLAN) injection 10 mg  10 mg IntraVENous Q6H PRN    sodium chloride (NS) flush 5-40 mL  5-40 mL IntraVENous Q8H    sodium chloride (NS) flush 5-40 mL  5-40 mL IntraVENous PRN    HYDROcodone-acetaminophen (NORCO)  mg tablet 1 Tab  1 Tab Oral Q4H PRN    ondansetron (ZOFRAN) injection 4 mg  4 mg IntraVENous Q4H PRN    heparin (porcine) injection 5,000 Units  5,000 Units SubCUTAneous Q8H    albuterol-ipratropium (DUO-NEB) 2.5 MG-0.5 MG/3 ML  3 mL Nebulization Q4H PRN    ondansetron (ZOFRAN ODT) tablet 4 mg  4 mg Oral Q8H PRN    levothyroxine (SYNTHROID) tablet 200 mcg  200 mcg Oral 6am    insulin lispro (HUMALOG) injection   SubCUTAneous AC&HS    glucose chewable tablet 16 g  4 Tab Oral PRN    dextrose (D50W) injection syrg 12.5-25 g  12.5-25 g IntraVENous PRN    glucagon (GLUCAGEN) injection 1 mg  1 mg IntraMUSCular PRN    pantoprazole (PROTONIX) 40 mg in 0.9% sodium chloride 10 mL injection  40 mg IntraVENous Q12H    amLODIPine (NORVASC) tablet 5 mg  5 mg Oral DAILY    hydrALAZINE (APRESOLINE) 20 mg/mL injection 20 mg  20 mg IntraVENous Q6H PRN                          MD [facility]                   [email or web address]

## 2020-07-19 NOTE — PROGRESS NOTES
Problem: Falls - Risk of  Goal: *Absence of Falls  Description: Document Hermelinda Evans Fall Risk and appropriate interventions in the flowsheet.   Outcome: Progressing Towards Goal  Note: Fall Risk Interventions:            Medication Interventions: Patient to call before getting OOB    Elimination Interventions: Call light in reach

## 2020-07-19 NOTE — PROGRESS NOTES
Repeat labs noted  k improved  Cr. Much better  Change ivf  .45 nacl at 50 cc/hr      Sole Inman MD    Results for Lakeisha Mistry (MRN [id number]) as of 7/19/2020 17:20   Ref.  Range 5/17/2020 14:30 7/17/2020 03:53 7/17/2020 06:49 7/17/2020 08:09 7/18/2020 01:26 7/18/2020 05:30 7/19/2020 16:33   Sodium Latest Ref Range: 136 - 145 mmol/L 137 142    138 139   Potassium Latest Ref Range: 3.5 - 5.1 mmol/L 6.2 (H) 5.3 (H)  5.3 (H)  6.5 (H) 4.6   Chloride Latest Ref Range: 97 - 108 mmol/L 106 113 (H)    113 (H) 107   CO2 Latest Ref Range: 21 - 32 mmol/L 18 (L) 20 (L)    19 (L) 29   Anion gap Latest Ref Range: 5 - 15 mmol/L 13 9    6 3 (L)   Glucose Latest Ref Range: 65 - 100 mg/dL 193 (H) 142 (H)    289 (H) 115 (H)   BUN Latest Ref Range: 6 - 20 MG/DL 73 (H) 74 (H)    74 (H) 61 (H)   Creatinine Latest Ref Range: 0.70 - 1.30 MG/DL 3.72 (H) 4.01 (H)    4.07 (H) 3.52 (H)   BUN/Creatinine ratio Latest Ref Range: 12 - 20   20 18    18 17   Calcium Latest Ref Range: 8.5 - 10.1 MG/DL 8.2 (L) 8.3 (L) 8.4 (L)   8.4 (L) 8.7   Phosphorus Latest Ref Range: 2.6 - 4.7 MG/DL   3.6   4.2    Magnesium Latest Ref Range: 1.6 - 2.4 mg/dL      2.1    GFR est non-AA Latest Ref Range: >60 ml/min/1.73m2 17 (L) 15 (L)    15 (L) 18 (L)   GFR est AA Latest Ref Range: >60 ml/min/1.73m2 20 (L) 19 (L)    18 (L) 22 (L)   Bilirubin, total Latest Ref Range: 0.2 - 1.0 MG/DL 0.3 0.2        Protein, total Latest Ref Range: 6.4 - 8.2 g/dL 7.3 7.7

## 2020-07-19 NOTE — PROGRESS NOTES
[address]  [address] [facility], [address] [place] [address]  [phone]                                                GI PROGRESS NOTE      NAME: Olivia Mg   :  1960   MRN:  [id number]          Assessment:   He is on PPI  Biopsies pending  HIDA didn't fill duodenum but LFTs all normal  Now taking GI lite diet  No nausea and vomiting today       Plan:   He may need an eus for the duodenal mass in the future. (CT was negative but no oral contrast)  Could probably go home soon  Still has some mild upper abdominal discomfort but no tenderness    ·     Subjective:   Discussed with RN events overnight. Complaint Y/N Description   Abdominal Pain     Hematemesis     Hematochezia     Melena     Constipation     Diarrhea     Dyspepsia     Dysphagia     Jaundiced         Review of Systems:    Symptom Y/N Comments  Symptom Y/N Comments   Fever/Chills    Chest Pain     Cough    Abdominal Pain     Sputum    Joint Pain     SOB/NUÑEZ    Pruritis/Rash     Nausea/vomit    Tolerating PT/OT     Diarrhea    Tolerating Diet     Constipation    Other       Could not obtain due to AMS vs intubation        Objective:     VITALS:   Last 24hrs VS reviewed since prior progress note. Most recent are:  Visit Vitals  /84   Pulse 70   Temp 98 °F (36.7 °C)   Resp 18   Ht 5' 9\" (1.753 m)   Wt 103.9 kg (229 lb 0.9 oz)   SpO2 97%   BMI 33.83 kg/m²       Intake/Output Summary (Last 24 hours) at 2020 1435  Last data filed at 2020 1148  Gross per 24 hour   Intake --   Output 2500 ml   Net -2500 ml       PHYSICAL EXAM:  General: WD, WN. Alert, cooperative, no acute distress    HEENT: NC, Atraumatic. PERRLA, EOMI. Anicteric sclerae. Lungs:  CTA Bilaterally. No Wheezing/Rhonchi/Rales. Heart:  Regular  rhythm,  No murmur (), No Rubs, No Gallops  Abdomen: Soft, Non distended, Non tender.  +Bowel sounds, no HSM  Extremities: No c/c/e  Neurologic:  CN 2-12 gi, Alert and oriented X 3.   No acute neurological distress   Psych:   Good insight. Not anxious nor agitated. Lab Data Reviewed: (see below)    Medications Reviewed: (see below)    PMH/SH reviewed - no change compared to H&P  ________________________________________________________________________  Total time spent with patient: 20 minutes     Critical Care Provided     Minutes non procedure based    Care Plan discussed with:  Patient    Family     RN    Care Manager    Consultant/Specialist:       >50% of visit spent in counseling and coordination of care       Recommended Disposition:   Home with Family    HH/PT/OT/RN    SNF/LTC    MARION      Code Status:  Full Code    DNR/DNI      ________________________________________________________________________  Jim Linares MD   ________________________________________________________________________________________________________________________________________________  Procedures: see electronic medical records for all procedures/Xrays and details which  were not copied into this note but were reviewed prior to creation of Plan. LABS:  Recent Labs     07/18/20  0126 07/17/20  0353   WBC 11.4* 15.1*   HGB 10.3* 10.9*   HCT 31.8* 34.0*    293     Recent Labs     07/18/20  0530 07/17/20  08 07/17/20  0649 07/17/20  0353     --   --  142   K 6.5* 5.3*  --  5.3*   *  --   --  113*   CO2 19*  --   --  20*   BUN 74*  --   --  74*   CREA 4.07*  --   --  4.01*   *  --   --  142*   CA 8.4*  --  8.4* 8.3*   MG 2.1  --   --   --    PHOS 4.2  --  3.6  --      Recent Labs     07/18/20  0530 07/17/20  0353   AP  --  132*   TP  --  7.7   ALB 2.9* 3.3*   GLOB  --  4.4*     No results for input(s): INR, PTP, APTT, INREXT, INREXT in the last 72 hours. No results for input(s): FE, TIBC, PSAT, FERR in the last 72 hours. No results found for: FOL, RBCF  No results for input(s): PH, PCO2, PO2 in the last 72 hours. No results for input(s): CPK, CKMB in the last 72 hours.     No lab exists for component: TROPONINI  Lab Results   Component Value Date/Time    Color YELLOW/STRAW 07/17/2020 04:37 AM    Appearance CLEAR 07/17/2020 04:37 AM    Specific gravity 1.014 07/17/2020 04:37 AM    pH (UA) 5.0 07/17/2020 04:37 AM    Protein 300 (A) 07/17/2020 04:37 AM    Glucose Negative 07/17/2020 04:37 AM    Ketone Negative 07/17/2020 04:37 AM    Bilirubin Negative 07/17/2020 04:37 AM    Urobilinogen 0.2 07/17/2020 04:37 AM    Nitrites Negative 07/17/2020 04:37 AM    Leukocyte Esterase Negative 07/17/2020 04:37 AM    Epithelial cells FEW 07/17/2020 04:37 AM    Bacteria Negative 07/17/2020 04:37 AM    WBC 0-4 07/17/2020 04:37 AM    RBC 5-10 07/17/2020 04:37 AM       MEDICATIONS:  Current Facility-Administered Medications   Medication Dose Route Frequency    metoclopramide HCl (REGLAN) injection 10 mg  10 mg IntraVENous Q6H PRN    sodium chloride (NS) flush 5-40 mL  5-40 mL IntraVENous Q8H    sodium chloride (NS) flush 5-40 mL  5-40 mL IntraVENous PRN    HYDROcodone-acetaminophen (NORCO)  mg tablet 1 Tab  1 Tab Oral Q4H PRN    ondansetron (ZOFRAN) injection 4 mg  4 mg IntraVENous Q4H PRN    heparin (porcine) injection 5,000 Units  5,000 Units SubCUTAneous Q8H    albuterol-ipratropium (DUO-NEB) 2.5 MG-0.5 MG/3 ML  3 mL Nebulization Q4H PRN    ondansetron (ZOFRAN ODT) tablet 4 mg  4 mg Oral Q8H PRN    levothyroxine (SYNTHROID) tablet 200 mcg  200 mcg Oral 6am    insulin lispro (HUMALOG) injection   SubCUTAneous AC&HS    glucose chewable tablet 16 g  4 Tab Oral PRN    dextrose (D50W) injection syrg 12.5-25 g  12.5-25 g IntraVENous PRN    glucagon (GLUCAGEN) injection 1 mg  1 mg IntraMUSCular PRN    pantoprazole (PROTONIX) 40 mg in 0.9% sodium chloride 10 mL injection  40 mg IntraVENous Q12H    amLODIPine (NORVASC) tablet 5 mg  5 mg Oral DAILY    hydrALAZINE (APRESOLINE) 20 mg/mL injection 20 mg  20 mg IntraVENous Q6H PRN

## 2020-07-19 NOTE — PROGRESS NOTES
Verbal and bedside report given to [address] [place] [address] by Ge Nino. Report included SBAR, Kardex, Intake/Output, Mar and Recent results.

## 2020-07-20 ENCOUNTER — APPOINTMENT (OUTPATIENT)
Dept: ULTRASOUND IMAGING | Age: 60
DRG: 392 | End: 2020-07-20
Attending: SURGERY
Payer: MEDICARE

## 2020-07-20 ENCOUNTER — APPOINTMENT (OUTPATIENT)
Dept: GENERAL RADIOLOGY | Age: 60
DRG: 392 | End: 2020-07-20
Attending: PHYSICIAN ASSISTANT
Payer: MEDICARE

## 2020-07-20 LAB
ALBUMIN SERPL-MCNC: 2.6 G/DL (ref 3.5–5)
ALBUMIN/GLOB SERPL: 0.7 {RATIO} (ref 1.1–2.2)
ALP SERPL-CCNC: 92 U/L (ref 45–117)
ALT SERPL-CCNC: 14 U/L (ref 12–78)
ANA SER QL: NEGATIVE
ANION GAP SERPL CALC-SCNC: 6 MMOL/L (ref 5–15)
AST SERPL-CCNC: 12 U/L (ref 15–37)
BASOPHILS # BLD: 0.1 K/UL (ref 0–0.1)
BASOPHILS NFR BLD: 1 % (ref 0–1)
BILIRUB SERPL-MCNC: 0.5 MG/DL (ref 0.2–1)
BUN SERPL-MCNC: 60 MG/DL (ref 6–20)
BUN/CREAT SERPL: 18 (ref 12–20)
CALCIUM SERPL-MCNC: 8.3 MG/DL (ref 8.5–10.1)
CHLORIDE SERPL-SCNC: 108 MMOL/L (ref 97–108)
CO2 SERPL-SCNC: 25 MMOL/L (ref 21–32)
CREAT SERPL-MCNC: 3.35 MG/DL (ref 0.7–1.3)
DIFFERENTIAL METHOD BLD: ABNORMAL
EOSINOPHIL # BLD: 0.4 K/UL (ref 0–0.4)
EOSINOPHIL NFR BLD: 5 % (ref 0–7)
ERYTHROCYTE [DISTWIDTH] IN BLOOD BY AUTOMATED COUNT: 13.4 % (ref 11.5–14.5)
GLOBULIN SER CALC-MCNC: 3.7 G/DL (ref 2–4)
GLUCOSE BLD STRIP.AUTO-MCNC: 128 MG/DL (ref 65–100)
GLUCOSE BLD STRIP.AUTO-MCNC: 132 MG/DL (ref 65–100)
GLUCOSE BLD STRIP.AUTO-MCNC: 134 MG/DL (ref 65–100)
GLUCOSE BLD STRIP.AUTO-MCNC: 211 MG/DL (ref 65–100)
GLUCOSE SERPL-MCNC: 123 MG/DL (ref 65–100)
HCT VFR BLD AUTO: 29.6 % (ref 36.6–50.3)
HGB BLD-MCNC: 9.5 G/DL (ref 12.1–17)
IMM GRANULOCYTES # BLD AUTO: 0 K/UL (ref 0–0.04)
IMM GRANULOCYTES NFR BLD AUTO: 0 % (ref 0–0.5)
LYMPHOCYTES # BLD: 1.6 K/UL (ref 0.8–3.5)
LYMPHOCYTES NFR BLD: 19 % (ref 12–49)
MCH RBC QN AUTO: 29.4 PG (ref 26–34)
MCHC RBC AUTO-ENTMCNC: 32.1 G/DL (ref 30–36.5)
MCV RBC AUTO: 91.6 FL (ref 80–99)
MONOCYTES # BLD: 0.8 K/UL (ref 0–1)
MONOCYTES NFR BLD: 9 % (ref 5–13)
NEUTS SEG # BLD: 5.4 K/UL (ref 1.8–8)
NEUTS SEG NFR BLD: 66 % (ref 32–75)
NRBC # BLD: 0 K/UL (ref 0–0.01)
NRBC BLD-RTO: 0 PER 100 WBC
PLATELET # BLD AUTO: 276 K/UL (ref 150–400)
PMV BLD AUTO: 10.9 FL (ref 8.9–12.9)
POTASSIUM SERPL-SCNC: 4.8 MMOL/L (ref 3.5–5.1)
PROT SERPL-MCNC: 6.3 G/DL (ref 6.4–8.2)
RBC # BLD AUTO: 3.23 M/UL (ref 4.1–5.7)
SERVICE CMNT-IMP: ABNORMAL
SODIUM SERPL-SCNC: 139 MMOL/L (ref 136–145)
WBC # BLD AUTO: 8.2 K/UL (ref 4.1–11.1)

## 2020-07-20 PROCEDURE — 74011636637 HC RX REV CODE- 636/637: Performed by: INTERNAL MEDICINE

## 2020-07-20 PROCEDURE — 74011250637 HC RX REV CODE- 250/637: Performed by: PHYSICIAN ASSISTANT

## 2020-07-20 PROCEDURE — 85025 COMPLETE CBC W/AUTO DIFF WBC: CPT

## 2020-07-20 PROCEDURE — 76705 ECHO EXAM OF ABDOMEN: CPT

## 2020-07-20 PROCEDURE — 74011250637 HC RX REV CODE- 250/637: Performed by: INTERNAL MEDICINE

## 2020-07-20 PROCEDURE — C9113 INJ PANTOPRAZOLE SODIUM, VIA: HCPCS | Performed by: PHYSICIAN ASSISTANT

## 2020-07-20 PROCEDURE — 82962 GLUCOSE BLOOD TEST: CPT

## 2020-07-20 PROCEDURE — 65660000000 HC RM CCU STEPDOWN

## 2020-07-20 PROCEDURE — 80053 COMPREHEN METABOLIC PANEL: CPT

## 2020-07-20 PROCEDURE — 74011250636 HC RX REV CODE- 250/636: Performed by: PHYSICIAN ASSISTANT

## 2020-07-20 PROCEDURE — 74018 RADEX ABDOMEN 1 VIEW: CPT

## 2020-07-20 PROCEDURE — 74011000250 HC RX REV CODE- 250: Performed by: PHYSICIAN ASSISTANT

## 2020-07-20 PROCEDURE — 74011250636 HC RX REV CODE- 250/636: Performed by: INTERNAL MEDICINE

## 2020-07-20 PROCEDURE — 36415 COLL VENOUS BLD VENIPUNCTURE: CPT

## 2020-07-20 RX ORDER — POLYETHYLENE GLYCOL 3350 17 G/17G
17 POWDER, FOR SOLUTION ORAL 2 TIMES DAILY
Status: DISCONTINUED | OUTPATIENT
Start: 2020-07-20 | End: 2020-07-22 | Stop reason: HOSPADM

## 2020-07-20 RX ADMIN — HEPARIN SODIUM 5000 UNITS: 5000 INJECTION INTRAVENOUS; SUBCUTANEOUS at 05:57

## 2020-07-20 RX ADMIN — SODIUM CHLORIDE 40 MG: 9 INJECTION, SOLUTION INTRAMUSCULAR; INTRAVENOUS; SUBCUTANEOUS at 08:10

## 2020-07-20 RX ADMIN — POLYETHYLENE GLYCOL 3350 17 G: 17 POWDER, FOR SOLUTION ORAL at 17:32

## 2020-07-20 RX ADMIN — Medication 10 ML: at 16:47

## 2020-07-20 RX ADMIN — AMLODIPINE BESYLATE 5 MG: 5 TABLET ORAL at 08:10

## 2020-07-20 RX ADMIN — AMLODIPINE BESYLATE 5 MG: 5 TABLET ORAL at 17:32

## 2020-07-20 RX ADMIN — LEVOTHYROXINE SODIUM 200 MCG: 200 TABLET ORAL at 05:57

## 2020-07-20 RX ADMIN — SODIUM CHLORIDE 40 MG: 9 INJECTION, SOLUTION INTRAMUSCULAR; INTRAVENOUS; SUBCUTANEOUS at 22:10

## 2020-07-20 RX ADMIN — INSULIN LISPRO 2 UNITS: 100 INJECTION, SOLUTION INTRAVENOUS; SUBCUTANEOUS at 21:49

## 2020-07-20 RX ADMIN — METOCLOPRAMIDE 10 MG: 5 INJECTION, SOLUTION INTRAMUSCULAR; INTRAVENOUS at 08:10

## 2020-07-20 RX ADMIN — HEPARIN SODIUM 5000 UNITS: 5000 INJECTION INTRAVENOUS; SUBCUTANEOUS at 21:49

## 2020-07-20 RX ADMIN — Medication 10 ML: at 21:49

## 2020-07-20 RX ADMIN — Medication 10 ML: at 05:58

## 2020-07-20 RX ADMIN — HEPARIN SODIUM 5000 UNITS: 5000 INJECTION INTRAVENOUS; SUBCUTANEOUS at 16:46

## 2020-07-20 NOTE — PROGRESS NOTES
Hospitalist Progress Note    NAME: Stephenie Talbot   :  1960   MRN:  [id number]       Assessment / Plan:  Patient is a 64 YOCM w/Hx recurrent/metastatic STRATTON carcinoma of the thyroid, DM, HTN, admitted for renal failure 2/2 IVVD from PO intolerance with nausea and vomiting. Nausea and vomiting POA  Chronic NSAIDs use  Epigastric pain  -2 months history of nausea and vomiting, associated with food. Takes 2 to 3 tablets of Advil daily  -Received Pepcid in the ED  -now s/p EGD which showed:  spencer quynh tear, duodenitis, duodenal polyp, gastric polyps, and dilation of the esophagus  -HIDA scan which showed non visualization fo the duodenum at 90 minutes, and possible common bile duct obstruction.  -Per patient, he was told that he would need cholecystectomy  -Clears, advancedd to GI ite diet  -PRN Zofran  - HIDA scan showed possible CBD obstruction   - surgery consulted , US abdomen to be done  - awaiting path from bx done during EGD   - pt still c/o RUQ pain 7/10     Acute renal failure 2/2 Intravascular volume depletion and NSAID use, POA  Mild Hyperkalemia resolved   Likely underlying CKD stage III  -Creatinine 2.32 in May 2019, May 2020 creatinine 3.72 and no 4.07, unchanged from admission  -CT abdomen without any hydronephrosis  -CK normal, check PTH/phosphorus  -Nephrology following, patient getting Bicarb drip and IV fluids  S/p ivf k down to 4.8 and creat trending down   -Hold lisinopril, metformin, and other nephrotoxic medication     History of diabetes mellitus  History of hypertension  -Hold nephrotoxic medication  -SSI, A1c 6.3     History of thyroid cancer with mets to lung  -Follows Dr. Julio C Adrian  -Continue levothyroxine     GI lite diet     Code Status: Full code  Surrogate Decision Maker: Daughter Tapan Garcia   DVT Prophylaxis: Heparin    Plan: Follow GI     Baseline: Ambulatory     Subjective:     Chief Complaint / Reason for Physician Visit  FU abdomninal pain .  Persistent RUQ pain 7/10, not much appetite     Review of Systems:  Symptom Y/N Comments  Symptom Y/N Comments   Fever/Chills n   Chest Pain n    Poor Appetite y   Edema n    Cough n   Abdominal Pain y    Sputum n   Joint Pain n    SOB/NUÑEZ n   Pruritis/Rash n    Nausea/vomit y   Tolerating PT/OT nna    Diarrhea n   Tolerating Diet y    Constipation n   Other       Could NOT obtain due to:      Objective:     VITALS:   Last 24hrs VS reviewed since prior progress note. Most recent are:  Patient Vitals for the past 24 hrs:   Temp Pulse Resp BP SpO2   07/20/20 0720 97.8 °F (36.6 °C) 75 18 163/64 91 %   07/20/20 0344 98 °F (36.7 °C) 66 18 164/75 92 %   07/19/20 2331 98.4 °F (36.9 °C) 75 18 144/66 94 %   07/19/20 2029 98.5 °F (36.9 °C) 76 18 173/78 98 %   07/19/20 1508 98.4 °F (36.9 °C) 69 18 167/73 96 %   07/19/20 1106 98 °F (36.7 °C) 70 18 191/84 97 %       Intake/Output Summary (Last 24 hours) at 7/20/2020 0809  Last data filed at 7/20/2020 0749  Gross per 24 hour   Intake --   Output 1950 ml   Net -1950 ml        PHYSICAL EXAM:  General: WD, WN. Alert, cooperative, no acute distress    EENT:  EOMI. Anicteric sclerae. MMM  Resp:  CTA bilaterally, no wheezing or rales. No accessory muscle use  CV:  Regular  rhythm,  No edema  GI:  Soft, Non distended, +tender.  +Bowel sounds  Neurologic:  Alert and oriented X 3, normal speech,   Psych:   Good insight. Not anxious nor agitated  Skin:  No rashes.   No jaundice    Reviewed most current lab test results and cultures  YES  Reviewed most current radiology test results   YES  Review and summation of old records today    NO  Reviewed patient's current orders and MAR    YES  PMH/SH reviewed - no change compared to H&P  ________________________________________________________________________  Care Plan discussed with:    Comments   Patient x    Family      RN x    Care Manager     Consultant                        Multidiciplinary team rounds were held today with , nursing, pharmacist and clinical coordinator. Patient's plan of care was discussed; medications were reviewed and discharge planning was addressed. ________________________________________________________________________  Total NON critical care TIME: 35  Minutes    Total CRITICAL CARE TIME Spent:   Minutes non procedure based      Comments   >50% of visit spent in counseling and coordination of care x    ________________________________________________________________________  Kate Canas MD     Procedures: see electronic medical records for all procedures/Xrays and details which were not copied into this note but were reviewed prior to creation of Plan. LABS:  I reviewed today's most current labs and imaging studies.   Pertinent labs include:  Recent Labs     07/20/20  0332 07/18/20  0126   WBC 8.2 11.4*   HGB 9.5* 10.3*   HCT 29.6* 31.8*    273     Recent Labs     07/20/20  0332 07/19/20  1633 07/18/20  0530    139 138   K 4.8 4.6 6.5*    107 113*   CO2 25 29 19*   * 115* 289*   BUN 60* 61* 74*   CREA 3.35* 3.52* 4.07*   CA 8.3* 8.7 8.4*   MG  --   --  2.1   PHOS  --   --  4.2   ALB 2.6*  --  2.9*   TBILI 0.5  --   --    ALT 14  --   --        Signed: Kate Canas MD

## 2020-07-20 NOTE — CONSULTS
Consult Date: 7/20/2020    IP CONSULT TO GENERAL SURGERY  Consult performed by: Mandeep Mendoza MD  Consult ordered by: JOSÉ Luong  Reason for consult: CBD obstruction  Assessment/Recommendations: This HIDA scan is a rather interesting finding since on admission he had a CT scan which showed a completely collapsed gallbladder and no dilated ducts. I think it is unlikely that he has continued common duct obstruction given he is also noted to have a normal alkaline phosphatase today and normal bilirubin. Could he have had transient edema of the ampulla due to the periampullary biopsy? HIDA scan has already ruled out acute cholecystitis. We will check an ultrasound today to look for stones and get another look at the ducts. Follow LFTs. Agree with following up on the pathology and treating the duodenitis. Could consider cholecystectomy with cholangiogram if biliary colic symptoms remain. Subjective      63 yo with history of metastatic thyroid cancer. He has been admitted with a 2-month history of nausea and vomiting and epigastric pain. He reports lower chest dysphasia. Denies bloating or diarrhea. On admission pain was in the epigastrium now it is epigastric and right upper quadrant. He underwent EGD by Dr Shane Mcmahon on Friday:  Wilhemena Haus tear, small  Duodenitis  Possible Garza's  Gastric polyps, not impressive  15mm duodenal polyp  Path pending      I was asked to evaluate Morgan Dayhoff due to a HIDA scan that was obtained on Saturday that revealed prompt filling of the gallbladder and common bile duct but no emptying into the duodenum. Concerning for common bile duct obstruction. In 2016 GB was worked up and [address] [facility] showed trace sludge but no cholecystitis.         Past Medical History:   Diagnosis Date    Adverse effect of anesthesia     \" STOP BREATHING 1 TIME C ANESTH\"    Calculus of kidney     Cancer ([facility] 2004    thyroid cancer    Chronic kidney disease     Chronic pain     BACK SHOULDER AND ARM    Depression     Diabetes ([facility]     Encounter for long-term (current) use of NSAIDs     Hypercholesterolemia     Hypertension     Nausea & vomiting     Other ill-defined conditions(799.89)     cholesterol, thyroid    Sleep apnea     doesn't wear cpap    Thyroid cancer ([facility]     TIA (transient ischemic attack)     Vitamin D deficiency       Past Surgical History:   Procedure Laterality Date    CARDIAC SURG PROCEDURE UNLIST      HX HEENT      THROAT SURGERY X 4    HX ORTHOPAEDIC      back     HX ORTHOPAEDIC      ARM AND SHOULDER    HX OTHER SURGICAL      thyroid, lymphnode    HX RETINAL DETACHMENT REPAIR      left eye    UPPER GI ENDOSCOPY,BALL DIL,30MM  2020         UPPER GI ENDOSCOPY,BIOPSY  2020         VASCULAR SURGERY PROCEDURE UNLIST      cardiac cath NEG.      Family History   Problem Relation Age of Onset    Diabetes Mother     Elevated Lipids Mother    Conda Dys Bladder Disease Mother     Headache Mother    [facility] Migraines Mother     Heart Disease Mother     Hypertension Mother     Stroke Mother     Other Mother         ANEURSYM BRAIN    Bleeding Prob Father     Cancer Father         LEUKEMIA    Diabetes Father     Elevated Lipids Father     Mental Retardation Sister     Psychiatric Disorder Sister     Cancer Brother         LUNGS      Social History     Tobacco Use    Smoking status: Former Smoker     Last attempt to quit: 1994     Years since quittin.9    Smokeless tobacco: Never Used   Substance Use Topics    Alcohol use: Yes     Comment: Occasionally       Current Facility-Administered Medications   Medication Dose Route Frequency Provider Last Rate Last Dose    polyethylene glycol (MIRALAX) packet 17 g  17 g Oral BID Preisner, Cheyenne BARRIOS PA        amLODIPine (NORVASC) tablet 5 mg  5 mg Oral BID Leatha Barrett MD   5 mg at 20 0810    0.45% sodium chloride infusion  50 mL/hr IntraVENous CONTINUOUS Logan Panda MD 50 mL/hr at 07/19/20 1754 50 mL/hr at 07/19/20 1754    metoclopramide HCl (REGLAN) injection 10 mg  10 mg IntraVENous Q6H PRN Conchita Vences MD   10 mg at 07/20/20 0810    sodium chloride (NS) flush 5-40 mL  5-40 mL IntraVENous Q8H Conchita Vences MD   10 mL at 07/20/20 0558    sodium chloride (NS) flush 5-40 mL  5-40 mL IntraVENous PRN Conchita Vences MD        HYDROcodone-acetaminophen [facility])  mg tablet 1 Tab  1 Tab Oral Q4H PRN Conchita Vences MD   1 Tab at 07/19/20 1955    ondansetron (ZOFRAN) injection 4 mg  4 mg IntraVENous Q4H PRN Conchita Vences MD        heparin (porcine) injection 5,000 Units  5,000 Units SubCUTAneous Q8H Conchita Vences MD   5,000 Units at 07/20/20 0557    albuterol-ipratropium (DUO-NEB) 2.5 MG-0.5 MG/3 ML  3 mL Nebulization Q4H PRN Conchita Vences MD        ondansetron (ZOFRAN ODT) tablet 4 mg  4 mg Oral Q8H PRN Conchita Vences MD        levothyroxine (SYNTHROID) tablet 200 mcg  200 mcg Oral Jair Graham MD   200 mcg at 07/20/20 0557    insulin lispro (HUMALOG) injection   SubCUTAneous AC&HS Conchita Vences MD   Stopped at 07/19/20 0730    glucose chewable tablet 16 g  4 Tab Oral PRN Conchita Vences MD        dextrose (D50W) injection syrg 12.5-25 g  12.5-25 g IntraVENous PRN Conchita Vences MD        glucagon (GLUCAGEN) injection 1 mg  1 mg IntraMUSCular PRN Conchita Vences MD        pantoprazole (PROTONIX) 40 mg in 0.9% sodium chloride 10 mL injection  40 mg IntraVENous Q12H Kathrene Castleman H, PA   40 mg at 07/20/20 0810    hydrALAZINE (APRESOLINE) 20 mg/mL injection 20 mg  20 mg IntraVENous Q6H PRN Logan Panda MD   20 mg at 07/19/20 1513        Review of Systems   Constitutional: Negative for chills and fever. HENT: Negative for nosebleeds, sore throat and trouble swallowing. Gastrointestinal: Positive for abdominal pain, nausea and vomiting. Negative for constipation.    Musculoskeletal: Negative for neck pain and neck stiffness. Skin: Negative for rash. Neurological: Negative for dizziness and seizures. Psychiatric/Behavioral: Negative for agitation and hallucinations.        Objective     Vital signs for last 24 hours:  Visit Vitals  /63   Pulse 78   Temp 98.3 °F (36.8 °C)   Resp 18   Ht 5' 9\" (1.753 m)   Wt 103.9 kg (229 lb 0.9 oz)   SpO2 94%   BMI 33.83 kg/m²       Intake/Output this shift:  Current Shift: 07/20 0701 - 07/20 1900  In: -   Out: 270 [Urine:270]  Last 3 Shifts: 07/18 1901 - 07/20 0700  In: -   Out: 3600 [Urine:3600]    Data Review:   Recent Results (from the past 24 hour(s))   GLUCOSE, POC    Collection Time: 07/19/20  3:54 PM   Result Value Ref Range    Glucose (POC) 106 (H) 65 - 100 mg/dL    Performed by Erman Rubinstein (MARINE RN)    METABOLIC PANEL, BASIC    Collection Time: 07/19/20  4:33 PM   Result Value Ref Range    Sodium 139 136 - 145 mmol/L    Potassium 4.6 3.5 - 5.1 mmol/L    Chloride 107 97 - 108 mmol/L    CO2 29 21 - 32 mmol/L    Anion gap 3 (L) 5 - 15 mmol/L    Glucose 115 (H) 65 - 100 mg/dL    BUN 61 (H) 6 - 20 MG/DL    Creatinine 3.52 (H) 0.70 - 1.30 MG/DL    BUN/Creatinine ratio 17 12 - 20      GFR est AA 22 (L) >60 ml/min/1.73m2    GFR est non-AA 18 (L) >60 ml/min/1.73m2    Calcium 8.7 8.5 - 10.1 MG/DL   GLUCOSE, POC    Collection Time: 07/19/20  9:16 PM   Result Value Ref Range    Glucose (POC) 107 (H) 65 - 100 mg/dL    Performed by Vaishali Castro (MARINE RN)    CBC WITH AUTOMATED DIFF    Collection Time: 07/20/20  3:32 AM   Result Value Ref Range    WBC 8.2 4.1 - 11.1 K/uL    RBC 3.23 (L) 4.10 - 5.70 M/uL    HGB 9.5 (L) 12.1 - 17.0 g/dL    HCT 29.6 (L) 36.6 - 50.3 %    MCV 91.6 80.0 - 99.0 FL    MCH 29.4 26.0 - 34.0 PG    MCHC 32.1 30.0 - 36.5 g/dL    RDW 13.4 11.5 - 14.5 %    PLATELET 019 708 - 636 K/uL    MPV 10.9 8.9 - 12.9 FL    NRBC 0.0 0  WBC    ABSOLUTE NRBC 0.00 0.00 - 0.01 K/uL    NEUTROPHILS 66 32 - 75 %    LYMPHOCYTES 19 12 - 49 % MONOCYTES 9 5 - 13 %    EOSINOPHILS 5 0 - 7 %    BASOPHILS 1 0 - 1 %    IMMATURE GRANULOCYTES 0 0.0 - 0.5 %    ABS. NEUTROPHILS 5.4 1.8 - 8.0 K/UL    ABS. LYMPHOCYTES 1.6 0.8 - 3.5 K/UL    ABS. MONOCYTES 0.8 0.0 - 1.0 K/UL    ABS. EOSINOPHILS 0.4 0.0 - 0.4 K/UL    ABS. BASOPHILS 0.1 0.0 - 0.1 K/UL    ABS. IMM. GRANS. 0.0 0.00 - 0.04 K/UL    DF AUTOMATED     METABOLIC PANEL, COMPREHENSIVE    Collection Time: 07/20/20  3:32 AM   Result Value Ref Range    Sodium 139 136 - 145 mmol/L    Potassium 4.8 3.5 - 5.1 mmol/L    Chloride 108 97 - 108 mmol/L    CO2 25 21 - 32 mmol/L    Anion gap 6 5 - 15 mmol/L    Glucose 123 (H) 65 - 100 mg/dL    BUN 60 (H) 6 - 20 MG/DL    Creatinine 3.35 (H) 0.70 - 1.30 MG/DL    BUN/Creatinine ratio 18 12 - 20      GFR est AA 23 (L) >60 ml/min/1.73m2    GFR est non-AA 19 (L) >60 ml/min/1.73m2    Calcium 8.3 (L) 8.5 - 10.1 MG/DL    Bilirubin, total 0.5 0.2 - 1.0 MG/DL    ALT (SGPT) 14 12 - 78 U/L    AST (SGOT) 12 (L) 15 - 37 U/L    Alk. phosphatase 92 45 - 117 U/L    Protein, total 6.3 (L) 6.4 - 8.2 g/dL    Albumin 2.6 (L) 3.5 - 5.0 g/dL    Globulin 3.7 2.0 - 4.0 g/dL    A-G Ratio 0.7 (L) 1.1 - 2.2     GLUCOSE, POC    Collection Time: 07/20/20  6:34 AM   Result Value Ref Range    Glucose (POC) 128 (H) 65 - 100 mg/dL    Performed by Mera Narvaez    GLUCOSE, POC    Collection Time: 07/20/20 10:44 AM   Result Value Ref Range    Glucose (POC) 132 (H) 65 - 100 mg/dL    Performed by Jazz Ratliff PCT        Physical Exam  Constitutional:       General: He is not in acute distress. Appearance: He is well-developed. HENT:      Head: Normocephalic. Mouth/Throat:      Pharynx: No oropharyngeal exudate. Eyes:      General: No scleral icterus. Pupils: Pupils are equal, round, and reactive to light. Neck:      Musculoskeletal: Neck supple. Trachea: No tracheal deviation. Cardiovascular:      Rate and Rhythm: Normal rate and regular rhythm. Heart sounds: Normal heart sounds. Pulmonary:      Effort: Pulmonary effort is normal.      Breath sounds: Normal breath sounds. No wheezing. Abdominal:      General: There is no distension. Palpations: Abdomen is soft. There is no mass. Tenderness: There is abdominal tenderness in the right upper quadrant, epigastric area and left upper quadrant. Hernia: No hernia is present. Musculoskeletal: Normal range of motion. Lymphadenopathy:      Cervical: No cervical adenopathy. Skin:     General: Skin is warm. Findings: No erythema or rash. Neurological:      Mental Status: He is alert and oriented to person, place, and time.    Psychiatric:         Behavior: Behavior normal.

## 2020-07-20 NOTE — PROGRESS NOTES
Nephrology Progress Note  [address] [facility]  [place] [facility] [employer]  [address], [place], [address]  Phone - [phone]               Fax - [phone]  Patient: Roldan Cassidy    YOB: 1960     Admit Date: 7/17/2020   Date- 7/20/2020     CC: Follow up for nery, hyperkalemia       IMPRESSION & PLAN:   # NERY on CKD3  - baseline Scr: 2.1-2.3  - etiology most likely due to volume depletion from ongoing N/V/poor PO intake  - possibly contributive factors:  lisinopril , NSAIDS use   - on IVF, progressively improving  - peak Scr: 4.0  - current Scr: 3.3, down from 3.5 yesterday  - good UOP  - continue monitoring I/O's  - avoid nephrotoxics  - renal panel daily    # HTN  - reasonably controlled  - continue Amlodipine 10mg/day  - PRN Hydralazine     # Ongoing nausea/vomiting  - GI following  -  HIDA showing possible CBD obstruction, Surgery consulted for possible cholecystectomy    #H/o recurrent  thyroid cancer w/ pulmonary mets  #Hyperlipidemia  #DM 2- uncontrolled         Subjective: Interval History:   -  Remains w/ N/V/poor PO intake  - on IVF 1/2NS 50cc/h         ROS:-c/o nausea/ vomiting/ poor PO intake and abd pain  Denies diarrhea   No SOB, chest pain      Objective:   Vitals:    07/19/20 2331 07/20/20 0344 07/20/20 0720 07/20/20 1042   BP: 144/66 164/75 163/64 141/63   Pulse: 75 66 75 78   Resp: 18 18 18 18   Temp: 98.4 °F (36.9 °C) 98 °F (36.7 °C) 97.8 °F (36.6 °C) 98.3 °F (36.8 °C)   SpO2: 94% 92% 91% 94%   Weight:       Height:          07/19 0701 - 07/20 0700  In: -   Out: 2000 [Urine:2000]  Physical exam:   GEN: NAD  NECK- Supple, no mass  RESP: Clear b/l, no wheezing  CVS: S1,S2  RRR  NEURO: Normal speech, Non focal  Abdo- soft, NT  EXT: No Edema   PSYCH: Normal Mood    Discussed with:- Nurse , Patient    Chart reviewed. Pertinent Notes reviewed.      Data Review :  Recent Labs 07/20/20  0332 07/19/20  1633 07/18/20  0530    139 138   K 4.8 4.6 6.5*    107 113*   CO2 25 29 19*   BUN 60* 61* 74*   CREA 3.35* 3.52* 4.07*   * 115* 289*   CA 8.3* 8.7 8.4*   MG  --   --  2.1   PHOS  --   --  4.2     Recent Labs     07/20/20  0332 07/18/20  0126   WBC 8.2 11.4*   HGB 9.5* 10.3*   HCT 29.6* 31.8*    273     No results for input(s): FE, TIBC, PSAT, FERR in the last 72 hours.    Medication list  reviewed  Current Facility-Administered Medications   Medication Dose Route Frequency    polyethylene glycol (MIRALAX) packet 17 g  17 g Oral BID    amLODIPine (NORVASC) tablet 5 mg  5 mg Oral BID    0.45% sodium chloride infusion  50 mL/hr IntraVENous CONTINUOUS    metoclopramide HCl (REGLAN) injection 10 mg  10 mg IntraVENous Q6H PRN    sodium chloride (NS) flush 5-40 mL  5-40 mL IntraVENous Q8H    sodium chloride (NS) flush 5-40 mL  5-40 mL IntraVENous PRN    HYDROcodone-acetaminophen (NORCO)  mg tablet 1 Tab  1 Tab Oral Q4H PRN    ondansetron (ZOFRAN) injection 4 mg  4 mg IntraVENous Q4H PRN    heparin (porcine) injection 5,000 Units  5,000 Units SubCUTAneous Q8H    albuterol-ipratropium (DUO-NEB) 2.5 MG-0.5 MG/3 ML  3 mL Nebulization Q4H PRN    ondansetron (ZOFRAN ODT) tablet 4 mg  4 mg Oral Q8H PRN    levothyroxine (SYNTHROID) tablet 200 mcg  200 mcg Oral 6am    insulin lispro (HUMALOG) injection   SubCUTAneous AC&HS    glucose chewable tablet 16 g  4 Tab Oral PRN    dextrose (D50W) injection syrg 12.5-25 g  12.5-25 g IntraVENous PRN    glucagon (GLUCAGEN) injection 1 mg  1 mg IntraMUSCular PRN    pantoprazole (PROTONIX) 40 mg in 0.9% sodium chloride 10 mL injection  40 mg IntraVENous Q12H    hydrALAZINE (APRESOLINE) 20 mg/mL injection 20 mg  20 mg IntraVENous Q6H PRN                          Sandra Meyer MD                   [facility]                   [email or web address]

## 2020-07-20 NOTE — PROGRESS NOTES
Nutrition Assessment     Type and Reason for Visit: Reassess    Nutrition Recommendations/Plan: Continue diet  RD to add Ensure Clear BID     Nutrition Assessment:   Chart reviewed, GI in speaking with pt at time of attempted visit. Surgeon is following for possible CBD obstruction. US done today, may need cholecystectomy. Per H&P pt had 30lb wt loss over the past 6 months. Only 10lb wt loss noted per EMR hx. Will need to assess malnutrition status upon F/U once NFPE and interview can be completed. Still having issues with n/v per GI. No zofran given today thus far. Poor appetite, will add PO supplements. -132. Wt Readings from Last 15 Encounters:   07/17/20 103.9 kg (229 lb 0.9 oz)   05/17/20 105.4 kg (232 lb 7 oz)   01/31/20 105.2 kg (232 lb)   09/27/19 101.4 kg (223 lb 9.6 oz)   05/06/19 108.9 kg (240 lb)   03/27/19 108.4 kg (239 lb)   10/04/18 108.3 kg (238 lb 12.8 oz)   09/27/18 108.4 kg (239 lb)   07/05/18 110.8 kg (244 lb 4.8 oz)   03/23/18 114.6 kg (252 lb 9.6 oz)   02/02/18 113.4 kg (250 lb)   01/30/18 112 kg (247 lb)   01/28/18 114.4 kg (252 lb 3.3 oz)   10/18/17 106.3 kg (234 lb 4.8 oz)   10/16/17 106.7 kg (235 lb 3.2 oz)     Malnutrition Assessment:  Malnutrition Status:   Not yet assessed. Estimated Daily Nutrient Needs:  Energy (kcal):  2402 (BMR 1848 x 1. 3AF)  Protein (g):  104 (1.0 g/kg bw)       Fluid (ml/day):  2400 ml/day    Nutrition Related Findings:  Persistent n/v, BM 7/15     Current Nutrition Therapies:  DIET GI LITE (POST SURGICAL)  DIET NUTRITIONAL SUPPLEMENTS Breakfast, Dinner; Ensure Clear    Anthropometric Measures:  · Height:  5' 9\" (175.3 cm)  · Current Body Wt:  103.9 kg (229 lb 0.9 oz)  · BMI: 33.8    Nutrition Diagnosis:   · Inadequate protein-energy intake related to catabolic illness as evidenced by NPO or clear liquid status due to medical condition(weight loss) (continues)      Nutrition Intervention:  Food and/or Nutrient Delivery: Continue current diet, Start oral nutrition supplement  Nutrition Education and Counseling: No recommendations at this time  Coordination of Nutrition Care: No recommendation at this time    Goals:  Pt will consume >50% of meals/supplements with no vomiting in 2-4 days. Nutrition Monitoring and Evaluation:   Food/Nutrient Intake Outcomes: Diet advancement/tolerance, Supplement intake  Physical Signs/Symptoms Outcomes: Biochemical data, Weight, Skin, GI status, Nausea/vomiting    Discharge Planning:     Too soon to determine     Electronically signed by Dale Damian RD on 7/20/2020 at 3:06 PM    Contact Number: [phone]

## 2020-07-20 NOTE — PROGRESS NOTES
Gastroenterology Progress Note  JOSÉ Jones   for Dr. Irwin Beasley    7/20/2020    Admit Date: 7/17/2020    Subjective: Follow up for:  1) Epigastric pain, N/V    2) Duodenal polyp- path pending    3) abnormal HIDA scan    4) Dysphagia    Patient is on GI lite diet. Pain: Patient complains of abdominal pain yes. The pain is located in the epigastrium. He declines any reproduction of pain or N/V with HIDA. Still having dysphagia and reports some vomiting this morning after feeling food getting stuck in lower chest area. Otherwise tolerating most of diet. Bowel Movements: None since last week    There is no bleeding    HIDA yesterday: FINDINGS:  Initial images demonstrate prompt hepatic tracer uptake. The gallbladder is  visualized at 14 minutes. It demonstrates progressive filling. The common bile  duct is visualized at 17minutes. The duodenum is not clearly visualized.     IMPRESSION  IMPRESSION:   1. Nonvisualization of the duodenum at 90 minutes. Possible common bile duct  obstruction. 2. No cystic duct obstruction.     Path pending      Current Facility-Administered Medications   Medication Dose Route Frequency    amLODIPine (NORVASC) tablet 5 mg  5 mg Oral BID    0.45% sodium chloride infusion  50 mL/hr IntraVENous CONTINUOUS    metoclopramide HCl (REGLAN) injection 10 mg  10 mg IntraVENous Q6H PRN    sodium chloride (NS) flush 5-40 mL  5-40 mL IntraVENous Q8H    sodium chloride (NS) flush 5-40 mL  5-40 mL IntraVENous PRN    HYDROcodone-acetaminophen (NORCO)  mg tablet 1 Tab  1 Tab Oral Q4H PRN    ondansetron (ZOFRAN) injection 4 mg  4 mg IntraVENous Q4H PRN    heparin (porcine) injection 5,000 Units  5,000 Units SubCUTAneous Q8H    albuterol-ipratropium (DUO-NEB) 2.5 MG-0.5 MG/3 ML  3 mL Nebulization Q4H PRN    ondansetron (ZOFRAN ODT) tablet 4 mg  4 mg Oral Q8H PRN    levothyroxine (SYNTHROID) tablet 200 mcg  200 mcg Oral 6am    insulin lispro (HUMALOG) injection SubCUTAneous AC&HS    glucose chewable tablet 16 g  4 Tab Oral PRN    dextrose (D50W) injection syrg 12.5-25 g  12.5-25 g IntraVENous PRN    glucagon (GLUCAGEN) injection 1 mg  1 mg IntraMUSCular PRN    pantoprazole (PROTONIX) 40 mg in 0.9% sodium chloride 10 mL injection  40 mg IntraVENous Q12H    hydrALAZINE (APRESOLINE) 20 mg/mL injection 20 mg  20 mg IntraVENous Q6H PRN        Objective:     Blood pressure 163/64, pulse 75, temperature 97.8 °F (36.6 °C), resp. rate 18, height 5' 9\" (1.753 m), weight 103.9 kg (229 lb 0.9 oz), SpO2 91 %.    07/20 0701 - 07/20 1900  In: -   Out: 270 [Urine:270]    07/18 1901 - 07/20 0700  In: -   Out: 3600 [Urine:3600]    EXAM:    GEN: WM, NAD  HEENT: NCAT  Heart: RRR  Lungs: CTA  Abdomen: obese, soft, epigastric but worse RUQ tenderness, no guarding or rebound.   Normal BS  Ext: no edema    Data Review    Recent Results (from the past 24 hour(s))   GLUCOSE, POC    Collection Time: 07/19/20 11:15 AM   Result Value Ref Range    Glucose (POC) 89 65 - 100 mg/dL    Performed by George Mann (MARINE MATA)    GLUCOSE, POC    Collection Time: 07/19/20  3:54 PM   Result Value Ref Range    Glucose (POC) 106 (H) 65 - 100 mg/dL    Performed by George Mann (MARINE MATA)    METABOLIC PANEL, BASIC    Collection Time: 07/19/20  4:33 PM   Result Value Ref Range    Sodium 139 136 - 145 mmol/L    Potassium 4.6 3.5 - 5.1 mmol/L    Chloride 107 97 - 108 mmol/L    CO2 29 21 - 32 mmol/L    Anion gap 3 (L) 5 - 15 mmol/L    Glucose 115 (H) 65 - 100 mg/dL    BUN 61 (H) 6 - 20 MG/DL    Creatinine 3.52 (H) 0.70 - 1.30 MG/DL    BUN/Creatinine ratio 17 12 - 20      GFR est AA 22 (L) >60 ml/min/1.73m2    GFR est non-AA 18 (L) >60 ml/min/1.73m2    Calcium 8.7 8.5 - 10.1 MG/DL   GLUCOSE, POC    Collection Time: 07/19/20  9:16 PM   Result Value Ref Range    Glucose (POC) 107 (H) 65 - 100 mg/dL    Performed by Alysha Burciaga (MARINE RN)    CBC WITH AUTOMATED DIFF    Collection Time: 07/20/20  3:32 AM   Result Value Ref Range    WBC 8.2 4.1 - 11.1 K/uL    RBC 3.23 (L) 4.10 - 5.70 M/uL    HGB 9.5 (L) 12.1 - 17.0 g/dL    HCT 29.6 (L) 36.6 - 50.3 %    MCV 91.6 80.0 - 99.0 FL    MCH 29.4 26.0 - 34.0 PG    MCHC 32.1 30.0 - 36.5 g/dL    RDW 13.4 11.5 - 14.5 %    PLATELET 174 995 - 431 K/uL    MPV 10.9 8.9 - 12.9 FL    NRBC 0.0 0  WBC    ABSOLUTE NRBC 0.00 0.00 - 0.01 K/uL    NEUTROPHILS 66 32 - 75 %    LYMPHOCYTES 19 12 - 49 %    MONOCYTES 9 5 - 13 %    EOSINOPHILS 5 0 - 7 %    BASOPHILS 1 0 - 1 %    IMMATURE GRANULOCYTES 0 0.0 - 0.5 %    ABS. NEUTROPHILS 5.4 1.8 - 8.0 K/UL    ABS. LYMPHOCYTES 1.6 0.8 - 3.5 K/UL    ABS. MONOCYTES 0.8 0.0 - 1.0 K/UL    ABS. EOSINOPHILS 0.4 0.0 - 0.4 K/UL    ABS. BASOPHILS 0.1 0.0 - 0.1 K/UL    ABS. IMM. GRANS. 0.0 0.00 - 0.04 K/UL    DF AUTOMATED     METABOLIC PANEL, COMPREHENSIVE    Collection Time: 07/20/20  3:32 AM   Result Value Ref Range    Sodium 139 136 - 145 mmol/L    Potassium 4.8 3.5 - 5.1 mmol/L    Chloride 108 97 - 108 mmol/L    CO2 25 21 - 32 mmol/L    Anion gap 6 5 - 15 mmol/L    Glucose 123 (H) 65 - 100 mg/dL    BUN 60 (H) 6 - 20 MG/DL    Creatinine 3.35 (H) 0.70 - 1.30 MG/DL    BUN/Creatinine ratio 18 12 - 20      GFR est AA 23 (L) >60 ml/min/1.73m2    GFR est non-AA 19 (L) >60 ml/min/1.73m2    Calcium 8.3 (L) 8.5 - 10.1 MG/DL    Bilirubin, total 0.5 0.2 - 1.0 MG/DL    ALT (SGPT) 14 12 - 78 U/L    AST (SGOT) 12 (L) 15 - 37 U/L    Alk.  phosphatase 92 45 - 117 U/L    Protein, total 6.3 (L) 6.4 - 8.2 g/dL    Albumin 2.6 (L) 3.5 - 5.0 g/dL    Globulin 3.7 2.0 - 4.0 g/dL    A-G Ratio 0.7 (L) 1.1 - 2.2     GLUCOSE, POC    Collection Time: 07/20/20  6:34 AM   Result Value Ref Range    Glucose (POC) 128 (H) 65 - 100 mg/dL    Performed by Megan Gee      Recent Labs     07/20/20  0332 07/18/20  0126   WBC 8.2 11.4*   HGB 9.5* 10.3*   HCT 29.6* 31.8*    273     Recent Labs     07/20/20  0332 07/19/20  1633 07/18/20  0530    139 138   K 4.8 4.6 6.5*    107 113*   CO2 25 29 19*   BUN 60* 61* 74*   CREA 3.35* 3.52* 4.07*   * 115* 289*   CA 8.3* 8.7 8.4*   MG  --   --  2.1   PHOS  --   --  4.2     Recent Labs     07/20/20  0332 07/18/20  0530   ALT 14  --    AP 92  --    TBILI 0.5  --    TP 6.3*  --    ALB 2.6* 2.9*   GLOB 3.7  --      No results for input(s): INR, PTP, APTT, INREXT in the last 72 hours. No results for input(s): FE, TIBC, PSAT, FERR in the last 72 hours. No results found for: FOL, RBCF   No results for input(s): PH, PCO2, PO2 in the last 72 hours. No results for input(s): CPK, CKNDX, TROIQ in the last 72 hours.     No lab exists for component: CPKMB  Lab Results   Component Value Date/Time    Cholesterol, total 198 05/02/2019 04:04 PM    HDL Cholesterol 35 (L) 05/02/2019 04:04 PM    LDL, calculated 133 (H) 05/02/2019 04:04 PM    Triglyceride 149 05/02/2019 04:04 PM     Lab Results   Component Value Date/Time    Glucose (POC) 128 (H) 07/20/2020 06:34 AM    Glucose (POC) 107 (H) 07/19/2020 09:16 PM    Glucose (POC) 106 (H) 07/19/2020 03:54 PM    Glucose (POC) 89 07/19/2020 11:15 AM    Glucose (POC) 106 (H) 07/19/2020 06:11 AM     Lab Results   Component Value Date/Time    Color YELLOW/STRAW 07/17/2020 04:37 AM    Appearance CLEAR 07/17/2020 04:37 AM    Specific gravity 1.014 07/17/2020 04:37 AM    pH (UA) 5.0 07/17/2020 04:37 AM    Protein 300 (A) 07/17/2020 04:37 AM    Glucose Negative 07/17/2020 04:37 AM    Ketone Negative 07/17/2020 04:37 AM    Bilirubin Negative 07/17/2020 04:37 AM    Urobilinogen 0.2 07/17/2020 04:37 AM    Nitrites Negative 07/17/2020 04:37 AM    Leukocyte Esterase Negative 07/17/2020 04:37 AM    Epithelial cells FEW 07/17/2020 04:37 AM    Bacteria Negative 07/17/2020 04:37 AM    WBC 0-4 07/17/2020 04:37 AM    RBC 5-10 07/17/2020 04:37 AM     Nuclear Medicine Results (most recent):  Results from Hospital Encounter encounter on 07/17/20   NM HEPATOBILIARY DUCT SCAN    Narrative EXAM:  NM HEPATOBILIARY DUCT SCAN    INDICATION:  Nausea and vomiting, intermittent bile duct obstruction by duodenal  polyp. COMPARISON: CT abdomen pelvis 7/17/2020. TRACER: 6 mCi of Tc-99m Choletec. TECHNIQUE:   Following the uneventful intravenous administration of Tc 99m Choletec, imaging  of the abdomen was performed in the anterior projection for 60 minutes. Delayed  anterior and right lateral views were also obtained. FINDINGS:  Initial images demonstrate prompt hepatic tracer uptake. The gallbladder is  visualized at 14 minutes. It demonstrates progressive filling. The common bile  duct is visualized at 17minutes. The duodenum is not clearly visualized. Impression IMPRESSION:   1. Nonvisualization of the duodenum at 90 minutes. Possible common bile duct  obstruction. 2. No cystic duct obstruction. Assessment:     Principal Problem:    Nausea and vomiting (7/19/2020)    Active Problems:    NERY (acute kidney injury) ([address] (7/17/2020)    Abnormal HIDA scan: possible CBD obstruction    Plan:   Patient continues with pain mostly in RUQ with tenderness. Tolerating most of diet. Path from EGD pending. HIDA showing possible CBD obstruction. Considered MRCP but elevated Cr and patient adamantly refuses MRI due to claustrophobia, declines premedication. Will place general surgery consult to consider CCY given symptoms and HIDA findings, may have biliary dyskinesia. Would benefit from O/P EUS to further evaluate CBD as well as duodenal polyp.     JOSÉ Dyer    07/20/20  9:03 AM    [address] [facility], [address] [place] [address]  [address] [id number] [address] [address] [place]: [phone]

## 2020-07-20 NOTE — PROGRESS NOTES
Reason for Admission: NERY                    RUR Score:  18                   Plan for utilizing home health: not at this time         PCP: First and Last name:  Pt looking for new PCP   Name of Practice:    Are you a current patient: Yes/No:    Approximate date of last visit: March 2019   Can you participate in a virtual visit with your PCP:                     Current Advanced Directive/Advance Care Plan:                          Transition of Care Plan:   Prior to admission pt was independent with ADL/IADL to include driving. Patient denies past HH/Rehab and DME use. Patients support system includes, brother, 4 daughters and sister. Patient stated that he no longer see PCP on file. CM offered to set pt up with a new PCP, pt declined, stating that he would like to find his own PCP. Patient does not have any needs or concerns at this time. CM will continue to follow. PCP-pt looking for new PCP  Pharmacy-[facility] in [facility] Management Interventions  PCP Verified by CM: Yes(none)  Mode of Transport at Discharge: Other (see comment)(daughter)  Transition of Care Consult (CM Consult): Discharge Planning  Discharge Durable Medical Equipment: No(no DME use)  Physical Therapy Consult: No  Occupational Therapy Consult: No  Speech Therapy Consult: No  Current Support Network:  Other, Family Lives Nearby(Lives with brother in a one story home wtih 3 YULIANA)  Confirm Follow Up Transport: Self  Discharge Location  Discharge Placement: (Anticipate home)      [id number]    KIMMY Plan:     *Home w/family   *daughter to transport at d/c

## 2020-07-21 ENCOUNTER — APPOINTMENT (OUTPATIENT)
Dept: NUCLEAR MEDICINE | Age: 60
DRG: 392 | End: 2020-07-21
Attending: SPECIALIST
Payer: MEDICARE

## 2020-07-21 LAB
ALBUMIN SERPL ELPH-MCNC: 3 G/DL (ref 2.9–4.4)
ALBUMIN/GLOB SERPL: 1.1 {RATIO} (ref 0.7–1.7)
ALPHA1 GLOB SERPL ELPH-MCNC: 0.2 G/DL (ref 0–0.4)
ALPHA2 GLOB SERPL ELPH-MCNC: 0.9 G/DL (ref 0.4–1)
B-GLOBULIN SERPL ELPH-MCNC: 0.8 G/DL (ref 0.7–1.3)
C-ANCA TITR SER IF: NORMAL TITER
GAMMA GLOB SERPL ELPH-MCNC: 1 G/DL (ref 0.4–1.8)
GLOBULIN SER-MCNC: 2.9 G/DL (ref 2.2–3.9)
GLUCOSE BLD STRIP.AUTO-MCNC: 103 MG/DL (ref 65–100)
GLUCOSE BLD STRIP.AUTO-MCNC: 123 MG/DL (ref 65–100)
GLUCOSE BLD STRIP.AUTO-MCNC: 144 MG/DL (ref 65–100)
GLUCOSE BLD STRIP.AUTO-MCNC: 179 MG/DL (ref 65–100)
IGA SERPL-MCNC: 292 MG/DL (ref 90–386)
IGG SERPL-MCNC: 1061 MG/DL (ref 603–1613)
IGM SERPL-MCNC: 37 MG/DL (ref 20–172)
INTERPRETATION SERPL IEP-IMP: ABNORMAL
KAPPA LC FREE SER-MCNC: 109.9 MG/L (ref 3.3–19.4)
KAPPA LC FREE/LAMBDA FREE SER: 1.93 {RATIO} (ref 0.26–1.65)
LAMBDA LC FREE SERPL-MCNC: 56.8 MG/L (ref 5.7–26.3)
M PROTEIN SERPL ELPH-MCNC: ABNORMAL G/DL
MYELOPEROXIDASE AB SER IA-ACNC: <9 U/ML (ref 0–9)
P-ANCA ATYPICAL TITR SER IF: NORMAL TITER
P-ANCA TITR SER IF: NORMAL TITER
PROT SERPL-MCNC: 5.9 G/DL (ref 6–8.5)
PROTEINASE3 AB SER IA-ACNC: <3.5 U/ML (ref 0–3.5)
SERVICE CMNT-IMP: ABNORMAL

## 2020-07-21 PROCEDURE — 74011250636 HC RX REV CODE- 250/636: Performed by: PHYSICIAN ASSISTANT

## 2020-07-21 PROCEDURE — 74011250637 HC RX REV CODE- 250/637: Performed by: INTERNAL MEDICINE

## 2020-07-21 PROCEDURE — 74011250636 HC RX REV CODE- 250/636: Performed by: INTERNAL MEDICINE

## 2020-07-21 PROCEDURE — A9541 TC99M SULFUR COLLOID: HCPCS

## 2020-07-21 PROCEDURE — 94760 N-INVAS EAR/PLS OXIMETRY 1: CPT

## 2020-07-21 PROCEDURE — 65660000000 HC RM CCU STEPDOWN

## 2020-07-21 PROCEDURE — 82962 GLUCOSE BLOOD TEST: CPT

## 2020-07-21 PROCEDURE — 74011636637 HC RX REV CODE- 636/637: Performed by: INTERNAL MEDICINE

## 2020-07-21 PROCEDURE — 74011000250 HC RX REV CODE- 250: Performed by: PHYSICIAN ASSISTANT

## 2020-07-21 PROCEDURE — C9113 INJ PANTOPRAZOLE SODIUM, VIA: HCPCS | Performed by: PHYSICIAN ASSISTANT

## 2020-07-21 RX ORDER — ACETAMINOPHEN 325 MG/1
650 TABLET ORAL
Status: DISCONTINUED | OUTPATIENT
Start: 2020-07-21 | End: 2020-07-22 | Stop reason: HOSPADM

## 2020-07-21 RX ORDER — CARVEDILOL 6.25 MG/1
6.25 TABLET ORAL 2 TIMES DAILY WITH MEALS
Status: DISCONTINUED | OUTPATIENT
Start: 2020-07-21 | End: 2020-07-22

## 2020-07-21 RX ADMIN — HYDROCODONE BITARTRATE AND ACETAMINOPHEN 1 TABLET: 10; 325 TABLET ORAL at 23:55

## 2020-07-21 RX ADMIN — CARVEDILOL 6.25 MG: 6.25 TABLET, FILM COATED ORAL at 17:07

## 2020-07-21 RX ADMIN — INSULIN LISPRO 2 UNITS: 100 INJECTION, SOLUTION INTRAVENOUS; SUBCUTANEOUS at 11:45

## 2020-07-21 RX ADMIN — HEPARIN SODIUM 5000 UNITS: 5000 INJECTION INTRAVENOUS; SUBCUTANEOUS at 05:45

## 2020-07-21 RX ADMIN — Medication 10 ML: at 14:00

## 2020-07-21 RX ADMIN — LEVOTHYROXINE SODIUM 200 MCG: 200 TABLET ORAL at 05:46

## 2020-07-21 RX ADMIN — Medication 10 ML: at 22:21

## 2020-07-21 RX ADMIN — AMLODIPINE BESYLATE 5 MG: 5 TABLET ORAL at 17:06

## 2020-07-21 RX ADMIN — HEPARIN SODIUM 5000 UNITS: 5000 INJECTION INTRAVENOUS; SUBCUTANEOUS at 22:21

## 2020-07-21 RX ADMIN — AMLODIPINE BESYLATE 5 MG: 5 TABLET ORAL at 09:25

## 2020-07-21 RX ADMIN — Medication 10 ML: at 05:46

## 2020-07-21 RX ADMIN — HEPARIN SODIUM 5000 UNITS: 5000 INJECTION INTRAVENOUS; SUBCUTANEOUS at 14:00

## 2020-07-21 RX ADMIN — SODIUM CHLORIDE 40 MG: 9 INJECTION, SOLUTION INTRAMUSCULAR; INTRAVENOUS; SUBCUTANEOUS at 21:21

## 2020-07-21 RX ADMIN — Medication 10 ML: at 09:28

## 2020-07-21 RX ADMIN — SODIUM CHLORIDE 40 MG: 9 INJECTION, SOLUTION INTRAMUSCULAR; INTRAVENOUS; SUBCUTANEOUS at 09:26

## 2020-07-21 RX ADMIN — ACETAMINOPHEN 650 MG: 325 TABLET ORAL at 18:11

## 2020-07-21 NOTE — PROGRESS NOTES
Nephrology Progress Note  [address] [facility]  [facility] / [facility], [place], [address]  Phone - [phone]               Fax - [phone]  Patient: Nico Sanches    YOB: 1960     Admit Date: 7/17/2020   Date- 7/21/2020     CC: Follow up for NERY  IMPRESSION & PLAN:   # NERY on CKD3  - baseline Scr: 2.1-2.3  - etiology most likely due to volume depletion from ongoing N/V/poor PO intake  - possibly contributive factors:  lisinopril , NSAIDS use   -progressively improving on IVF. Today off IVF, BP on the high side  - peak Scr: 4.0  -  Yesterday's Scr: 3.3, down from 3.5 the day before. No labs today, ordered   - good UOP  - continue monitoring I/O's  - avoid nephrotoxics  - renal panel daily    # HTN  - uncontrolled  - start Carvedilol 6.25mg BID  - continue Amlodipine 10mg/day  - PRN Hydralazine   - monitor BP/pulse  - low sodium diet    # Nausea/vomiting/abd pain  - improving  - GI following:\" Pain is a lot better today, tolerated diet without N/V.  GES normal, US does not show any biliary abnormalities. Dr. Chlio Shah may consider CCY if biliary colic persists. Awaiting path from EGD\"    #H/o recurrent  thyroid cancer w/ pulmonary mets  #Hyperlipidemia  #DM 2- uncontrolled         Subjective: Interval History:   -  Remains w/ N/V/poor PO intake  - on IVF 1/2NS 50cc/h         ROS:-c/o nausea/ vomiting/ poor PO intake and abd pain  Denies diarrhea   No SOB, chest pain      Objective:   Vitals:    07/20/20 1852 07/20/20 2345 07/21/20 0330 07/21/20 0716   BP: 159/59 175/85 172/65 174/60   Pulse: 81 73 83 87   Resp: 18 18 17 17   Temp: 98.8 °F (37.1 °C) 98.4 °F (36.9 °C) 98.3 °F (36.8 °C) 98.4 °F (36.9 °C)   SpO2: 94% 96% 96% 97%   Weight:       Height:          07/20 0701 - 07/21 0700  In: 1754.2 [P.O.:500;  I.V.:1254.2]  Out: 1115 [Urine:1115]  Physical exam:   GEN: AAOx3, in NAD  NECK- Supple, no mass  RESP: Clear b/l, no wheezing  CVS: S1,S2  RRR  NEURO: Normal speech, Non focal  Abdo- soft, NT, obese  EXT: moves all, No Edema   PSYCH: Normal Mood    Discussed with:- Nurse , Patient    Chart reviewed. Pertinent Notes reviewed. Data Review :  Recent Labs     07/20/20  0332 07/19/20  1633    139   K 4.8 4.6    107   CO2 25 29   BUN 60* 61*   CREA 3.35* 3.52*   * 115*   CA 8.3* 8.7     Recent Labs     07/20/20  0332   WBC 8.2   HGB 9.5*   HCT 29.6*        No results for input(s): FE, TIBC, PSAT, FERR in the last 72 hours.    Medication list  reviewed  Current Facility-Administered Medications   Medication Dose Route Frequency    polyethylene glycol (MIRALAX) packet 17 g  17 g Oral BID    amLODIPine (NORVASC) tablet 5 mg  5 mg Oral BID    metoclopramide HCl (REGLAN) injection 10 mg  10 mg IntraVENous Q6H PRN    sodium chloride (NS) flush 5-40 mL  5-40 mL IntraVENous Q8H    sodium chloride (NS) flush 5-40 mL  5-40 mL IntraVENous PRN    HYDROcodone-acetaminophen (NORCO)  mg tablet 1 Tab  1 Tab Oral Q4H PRN    ondansetron (ZOFRAN) injection 4 mg  4 mg IntraVENous Q4H PRN    heparin (porcine) injection 5,000 Units  5,000 Units SubCUTAneous Q8H    albuterol-ipratropium (DUO-NEB) 2.5 MG-0.5 MG/3 ML  3 mL Nebulization Q4H PRN    ondansetron (ZOFRAN ODT) tablet 4 mg  4 mg Oral Q8H PRN    levothyroxine (SYNTHROID) tablet 200 mcg  200 mcg Oral 6am    insulin lispro (HUMALOG) injection   SubCUTAneous AC&HS    glucose chewable tablet 16 g  4 Tab Oral PRN    dextrose (D50W) injection syrg 12.5-25 g  12.5-25 g IntraVENous PRN    glucagon (GLUCAGEN) injection 1 mg  1 mg IntraMUSCular PRN    pantoprazole (PROTONIX) 40 mg in 0.9% sodium chloride 10 mL injection  40 mg IntraVENous Q12H    hydrALAZINE (APRESOLINE) 20 mg/mL injection 20 mg  20 mg IntraVENous Q6H PRN                          Pilo Regina, MD                   [facility] [email or web address]

## 2020-07-21 NOTE — PROGRESS NOTES
Gastroenterology Progress Note  JOSÉ Martinez   for Dr. Nereyda Hernandez    7/21/2020    Admit Date: 7/17/2020    Subjective: Follow up for:  1) Epigastric pain, N/V    2) Duodenal polyp- path pending    3) abnormal HIDA scan    4) Dysphagia    Patient is on GI lite diet. Tolerated breakfast    Pain: Patient complains of abdominal pain no. Feeling a lot better,      Bowel Movements: Passed a significant amount of stool and some liquid stool. Feels cleaned out now after miralax.      There is no bleeding    US done showing normal patially imaging GB and CBD was 0.3 cm,  Mild right hydronephrosis    Normal GES    KUB yesterday was normal     Path pending from EGD    Dr. Deonte Nguyen note reviewed    No repeat labs today      Current Facility-Administered Medications   Medication Dose Route Frequency    polyethylene glycol (MIRALAX) packet 17 g  17 g Oral BID    amLODIPine (NORVASC) tablet 5 mg  5 mg Oral BID    metoclopramide HCl (REGLAN) injection 10 mg  10 mg IntraVENous Q6H PRN    sodium chloride (NS) flush 5-40 mL  5-40 mL IntraVENous Q8H    sodium chloride (NS) flush 5-40 mL  5-40 mL IntraVENous PRN    HYDROcodone-acetaminophen (NORCO)  mg tablet 1 Tab  1 Tab Oral Q4H PRN    ondansetron (ZOFRAN) injection 4 mg  4 mg IntraVENous Q4H PRN    heparin (porcine) injection 5,000 Units  5,000 Units SubCUTAneous Q8H    albuterol-ipratropium (DUO-NEB) 2.5 MG-0.5 MG/3 ML  3 mL Nebulization Q4H PRN    ondansetron (ZOFRAN ODT) tablet 4 mg  4 mg Oral Q8H PRN    levothyroxine (SYNTHROID) tablet 200 mcg  200 mcg Oral 6am    insulin lispro (HUMALOG) injection   SubCUTAneous AC&HS    glucose chewable tablet 16 g  4 Tab Oral PRN    dextrose (D50W) injection syrg 12.5-25 g  12.5-25 g IntraVENous PRN    glucagon (GLUCAGEN) injection 1 mg  1 mg IntraMUSCular PRN    pantoprazole (PROTONIX) 40 mg in 0.9% sodium chloride 10 mL injection  40 mg IntraVENous Q12H    hydrALAZINE (APRESOLINE) 20 mg/mL injection 20 mg  20 mg IntraVENous Q6H PRN        Objective:     Blood pressure 174/60, pulse 87, temperature 98.4 °F (36.9 °C), resp. rate 17, height 5' 9\" (1.753 m), weight 103.9 kg (229 lb 0.9 oz), SpO2 97 %. No intake/output data recorded. 07/19 1901 - 07/21 0700  In: 1754.2 [P.O.:500; I.V.:1254.2]  Out: 2215 [Urine:2215]    EXAM:    GEN: Pleasant WM, NAD  HEENT: NCAT  Heart: RRR  Lungs: CTA  Abdomen: obese, soft, mild RUQ tenderness, no guarding or rebound. Normal BS  Ext: no edema    Data Review    Recent Results (from the past 24 hour(s))   GLUCOSE, POC    Collection Time: 07/20/20 10:44 AM   Result Value Ref Range    Glucose (POC) 132 (H) 65 - 100 mg/dL    Performed by [facility]    GLUCOSE, POC    Collection Time: 07/20/20  3:09 PM   Result Value Ref Range    Glucose (POC) 134 (H) 65 - 100 mg/dL    Performed by [facility]    GLUCOSE, POC    Collection Time: 07/20/20  6:50 PM   Result Value Ref Range    Glucose (POC) 211 (H) 65 - 100 mg/dL    Performed by [facility]    GLUCOSE, POC    Collection Time: 07/21/20  7:22 AM   Result Value Ref Range    Glucose (POC) 103 (H) 65 - 100 mg/dL    Performed by [facility] (PCT)      Recent Labs     07/20/20  0332   WBC 8.2   HGB 9.5*   HCT 29.6*        Recent Labs     07/20/20  0332 07/19/20  1633    139   K 4.8 4.6    107   CO2 25 29   BUN 60* 61*   CREA 3.35* 3.52*   * 115*   CA 8.3* 8.7     Recent Labs     07/20/20  0332   ALT 14   AP 92   TBILI 0.5   TP 6.3*   ALB 2.6*   GLOB 3.7     No results for input(s): INR, PTP, APTT, INREXT, INREXT in the last 72 hours. No results for input(s): FE, TIBC, PSAT, FERR in the last 72 hours. No results found for: FOL, RBCF   No results for input(s): PH, PCO2, PO2 in the last 72 hours. No results for input(s): CPK, CKNDX, TROIQ in the last 72 hours.     No lab exists for component: CPKMB  Lab Results   Component Value Date/Time    Cholesterol, total 198 05/02/2019 04:04 PM    HDL Cholesterol 35 (L) 05/02/2019 04:04 PM    LDL, calculated 133 (H) 05/02/2019 04:04 PM    Triglyceride 149 05/02/2019 04:04 PM     Lab Results   Component Value Date/Time    Glucose (POC) 103 (H) 07/21/2020 07:22 AM    Glucose (POC) 211 (H) 07/20/2020 06:50 PM    Glucose (POC) 134 (H) 07/20/2020 03:09 PM    Glucose (POC) 132 (H) 07/20/2020 10:44 AM    Glucose (POC) 128 (H) 07/20/2020 06:34 AM     Lab Results   Component Value Date/Time    Color YELLOW/STRAW 07/17/2020 04:37 AM    Appearance CLEAR 07/17/2020 04:37 AM    Specific gravity 1.014 07/17/2020 04:37 AM    pH (UA) 5.0 07/17/2020 04:37 AM    Protein 300 (A) 07/17/2020 04:37 AM    Glucose Negative 07/17/2020 04:37 AM    Ketone Negative 07/17/2020 04:37 AM    Bilirubin Negative 07/17/2020 04:37 AM    Urobilinogen 0.2 07/17/2020 04:37 AM    Nitrites Negative 07/17/2020 04:37 AM    Leukocyte Esterase Negative 07/17/2020 04:37 AM    Epithelial cells FEW 07/17/2020 04:37 AM    Bacteria Negative 07/17/2020 04:37 AM    WBC 0-4 07/17/2020 04:37 AM    RBC 5-10 07/17/2020 04:37 AM     Nuclear Medicine Results (most recent):  Results from Hospital Encounter encounter on 07/17/20   NM GASTRIC EMPTY STDY    Narrative EXAM: NM GASTRIC EMPTY STDY    INDICATION: vomitng and nausea. History of diabetes    COMPARISON: None. CORRELATIVE IMAGING STUDIES:    TRACER: 0.5 mCi Tc 99m sulfur colloid. TECHNIQUE: Following oral administration of a solid meal consisting of Tc 99m  sulfur colloid in oatmeal, imaging of abdomen was performed in the anterior and  posterior projections for 90 minutes. Gastric emptying curve was calculated. FINDINGS: The images demonstrate normal gastric emptying. T 1/2 for gastric  emptying was approximately 48 minutes (normal is less than 90 minutes). Appropriate small bowel activity is noted. No gastroesophageal reflux is  evident. Impression IMPRESSION: Normal gastric emptying study.        US Results (most recent):  Results from [facility] [place] on [place]/17/20   US ABD LTD    Narrative Limited ultrasound OF THE RIGHT UPPER QUADRANT    INDICATION: Epigastric and right upper quadrant abdominal pain. COMPARISON: 10/16/2016; CT abdomen pelvis 7/17/2020. TECHNIQUE:  Limited sonography of the right upper quadrant was performed. FINDINGS:    LIVER: Poorly visualized due to body habitus and ribs. GALLBLADDER: Partially imaged. Grossly normal.  COMMON DUCT: 0.3 cm in diameter. The duct is normal caliber. PANCREAS: A small portion of the visualized proximal pancreas is normal; the  remainder of the pancreas is obscured by overlying bowel gas. RIGHT KIDNEY: 11.0 cm in length. Mild hydronephrosis. Atrophic. Impression IMPRESSION:   Mild right hydronephrosis. Limited study due to body habitus. Assessment:     Principal Problem:    Nausea and vomiting (7/19/2020)    Active Problems:    NERY (acute kidney injury) ([facility] [address] (7/17/2020)    Abnormal HIDA scan: possible CBD obstruction    Plan:   Pain is a lot better today, tolerated diet without N/V.  GES normal, US does not show any biliary abnormalities. Dr. Gisele De La Cruz may consider CCY if biliary colic persists. For worsening dysphagia consider EMOT. Awaiting path from EGD, can f/u to further monitor progress.      JOSÉ Vail    07/21/20  9:03 AM    [address] [facility], [address] [place] [address]  [phone]  [address] [place] [address] [place]: [phone] Manual Repair Warning Statement: We plan on removing the manually selected variable below in favor of our much easier automatic structured text blocks found in the previous tab. We decided to do this to help make the flow better and give you the full power of structured data. Manual selection is never going to be ideal in our platform and I would encourage you to avoid using manual selection from this point on, especially since I will be sunsetting this feature. It is important that you do one of two things with the customized text below. First, you can save all of the text in a word file so you can have it for future reference. Second, transfer the text to the appropriate area in the Library tab. Lastly, if there is a flap or graft type which we do not have you need to let us know right away so I can add it in before the variable is hidden. No need to panic, we plan to give you roughly 6 months to make the change.

## 2020-07-21 NOTE — PROGRESS NOTES
General Surgery End of Shift Nursing Note    admitted 07/17/20 Gary Nguyen is a 64 y.o.  male , c/o nausea and vomiting. per patient vomiting at least 2 or 3 times a day for past 2 mo's. Reports a burning sensation in the chest with the food and also burning sensation in the epigastric region. takes 3 tablets of Advil daily for at least past 1 year. past medical history of recurrent/metastatic STRATTON refractory carcinoma thyroid, diabetes mellitus, hypertension,Hypercholesterolemia,Thyroid cancer. TIA. During ED evaluation he also found to have a acute kidney injury, he is not aware of any kidney problem in the past does not follow any nephrology. 1) Epigastric pain, N/V  2) Duodenal polyp- path pending  3) abnormal HIDA scan  4) Dysphagia  Plan:  Patient continues with pain mostly in RUQ with tenderness. Tolerating most of diet. Path from EGD pending. HIDA showing possible CBD obstruction. Considered MRCP but elevated Cr and patient adamantly refuses MRI due to claustrophobia, declines premedication. Will place general surgery consult to consider CCY given symptoms and HIDA findings, may have biliary dyskinesia. Would benefit from O/P EUS to further evaluate CBD as well as duodenal polyp. Shift worked:  1900 - -0700   Summary of shift:    A&O x4 verbal able to voice needs and wants, c/o pain x1 medicated and relief achieved.  Tolerated all PO meds whole with thin liquids   Issues for physician to address:  no new issues to report     Number times ambulated in hallway past shift: 0    Number of times OOB to chair past shift: 0    Pain Management:  Current medication: see MAR   Patient states pain is manageable on current pain medication: YES    GI:    Current diet:  DIET GI LITE (POST SURGICAL)  DIET NUTRITIONAL SUPPLEMENTS Breakfast, Dinner; Ensure Clear    Tolerating current diet: YES  Passing flatus: YES    Respiratory:    Incentive Spirometer at bedside: YES  Patient instructed on use: YES    Patient Safety:    Falls Score: 2  Bed Alarm On? No  Sitter?  No    Kandy Norman, MARIBETHN

## 2020-07-21 NOTE — PROGRESS NOTES
Surgical Note    Date/Time:  7/21/2020 2:20 PM        Assessment :    Plan:  Patient Active Problem List   Diagnosis Code    Mixed hypercholesterolemia and hypertriglyceridemia E78.2    Diabetes type 2, uncontrolled ([facility] [address] E11.65    Hypovitaminosis D E55.9    Proteinuria R80.9    Microalbuminuria R80.9    Essential hypertension with goal blood pressure less than 130/80 I10    Acquired hypothyroidism E03.9    Chronic left shoulder pain M25.512, G89.29    Sepsis ([facility] [address] A41.9    Papillary thyroid carcinoma ([facility] [address] C73    Type 2 diabetes mellitus with nephropathy ([facility] [address] E11.21    Severe obesity (BMI 35.0-39. 9) with comorbidity ([facility] [address] E66.01    NERY (acute kidney injury) ([facility] [address] N17.9    Nausea and vomiting R11.2      Feeling better  Nausea resolved    Tenderness better epigastric/RUQ    Discussed with Dr Petra Don the GI w/o    GES normal today    Would treat the duodenitis  Consider cholecystectomy for biliary dyskinesia if symptoms return/continue  Can discuss in the office. Subjective:     Chief Complaint:      Review of Systems:  Y  N       Y  N  [] [x]  Fever/chills                                               [] [x]  Chest Pain  [] [x]  Cough                                                       [] [x]  Diarrhea   [] [x]  Sputum                                                     [] [x]  Constipation  [] [x]  SOB/NUÑEZ                                                [] [x]  Nausea/Vomit  [x] []  Abd Pain                                                    [x] []  Tolerating diet  [] [x]  Dysuria                                                           []Unable to obtain  ROS due to  []mental status change  []sedated   []intubated    Objective:     VITALS:   Last 24hrs VS reviewed since prior hospitalist progress note.  Most recent are:  Visit Vitals  /64   Pulse 87   Temp 98.6 °F (37 °C)   Resp 18   Ht 5' 9\" (1.753 m)   Wt 103.9 kg (229 lb 0.9 oz)   SpO2 98%   BMI 33.83 kg/m²     Temp (24hrs), Av.4 °F (36.9 °C), Min:98 °F (36.7 °C), Max:98.8 °F (37.1 °C)      Intake/Output Summary (Last 24 hours) at 2020 1420  Last data filed at 2020 1213  Gross per 24 hour   Intake 2354.17 ml   Output 725 ml   Net 1629.17 ml         []Day []NGT  []Intubated on vent    PHYSICAL EXAM:  Gen:  [] A&O  []non-toxic  [x] No acute distress             [] ill apearing  []  Critical        HEENT:   [x]NC/AT/PERRLA/EOMI    []pink conjunctivae      []pale conjunctivae                  PERRL  []yes  []no      []moist mucosa    []dry mucosa    hearing intact to voice []yes  []No    RESP:   [x]CTA bialterally/no wheezing/rhonchi/rales/crackles    []clear bilaterally  []wheezing   []rhonchi   []crackles     use of accessory muscles []yes []no    CARD:   [x] regular rate and rhythm/No murmurs/rubs/gallops    murmur  []yes ()  []no      Rubs  []yes  []no       Gallops []yes  []no    Rate [] regular  [] irregular        carotid bruits  []Right  [] Left                 LE edema []yes  []no           JVP  [] yes   [] no    ABD:    [x]soft  [x]non distended  [x] tender mild RUQ/epigastric  [] NABS    SKIN:   Rashes [] yes   [x] no    Ulcers [] yes   [x] no               [x]normal   []tight to palpitation    skin turgor [] good  []poor  []decreased               Cyanosis/clubbing [] yes [x] no    NEUR:   [x]cranial nerves II-XII grossly intact       []Cranial nerves deficit                 [] facial droop    [] slurred speech   []aphasic     []Strength normal     [] weakness  [] LUE  []  RUE/ [] LLE  []  RLE    follows commands  [] yes [] no           PSYCH:   insight []poor [x]good   Alert and Oriented to  [x]person  [x]place  [x] time                    []depressed []anxious []agitated  []lethargic []stuporous  []sedated           Lab Data Reviewed: (see below)    Medications Reviewed: (see below)    PMH/SH reviewed - no change compared to H&P    Care Plan discussed with:  [x]Patient   [x]Family    []Care Manager []RN    []Consultant/Specialist :    Prophylaxis:  []Lovenox  []Coumadin  []Hep SQ  []SCDs  []H2B/PPI    Disposition:  []Home w/ Family   []HH PT,OT,RN   []SNF/LTC   []SAH/Rehab    Ancillary Serices:   [] PT     []OT      []SW      []Nut      []HH ________________________________________________________________________  LABS:  Recent Labs     07/20/20  0332   WBC 8.2   HGB 9.5*   HCT 29.6*        Recent Labs     07/20/20  0332 07/19/20  1633    139   K 4.8 4.6    107   CO2 25 29   BUN 60* 61*   CREA 3.35* 3.52*   * 115*   CA 8.3* 8.7     Recent Labs     07/20/20  0332   ALT 14   AP 92   TBILI 0.5   TP 6.3*   ALB 2.6*   GLOB 3.7     No results for input(s): INR, PTP, APTT, INREXT in the last 72 hours. No results for input(s): FE, TIBC, PSAT, FERR in the last 72 hours. No results for input(s): PH, PCO2, PO2 in the last 72 hours. No results for input(s): CPK, CKMB in the last 72 hours.     No lab exists for component: TROPONINI  Lab Results   Component Value Date/Time    Glucose (POC) 144 (H) 07/21/2020 11:37 AM    Glucose (POC) 103 (H) 07/21/2020 07:22 AM    Glucose (POC) 211 (H) 07/20/2020 06:50 PM    Glucose (POC) 134 (H) 07/20/2020 03:09 PM    Glucose (POC) 132 (H) 07/20/2020 10:44 AM       MEDICATIONS:  Current Facility-Administered Medications   Medication Dose Route Frequency    carvediloL (COREG) tablet 6.25 mg  6.25 mg Oral BID WITH MEALS    polyethylene glycol (MIRALAX) packet 17 g  17 g Oral BID    amLODIPine (NORVASC) tablet 5 mg  5 mg Oral BID    metoclopramide HCl (REGLAN) injection 10 mg  10 mg IntraVENous Q6H PRN    sodium chloride (NS) flush 5-40 mL  5-40 mL IntraVENous Q8H    sodium chloride (NS) flush 5-40 mL  5-40 mL IntraVENous PRN    HYDROcodone-acetaminophen (NORCO)  mg tablet 1 Tab  1 Tab Oral Q4H PRN    ondansetron (ZOFRAN) injection 4 mg  4 mg IntraVENous Q4H PRN    heparin (porcine) injection 5,000 Units  5,000 Units SubCUTAneous Q8H    albuterol-ipratropium (DUO-NEB) 2.5 MG-0.5 MG/3 ML  3 mL Nebulization Q4H PRN    ondansetron (ZOFRAN ODT) tablet 4 mg  4 mg Oral Q8H PRN    levothyroxine (SYNTHROID) tablet 200 mcg  200 mcg Oral 6am    insulin lispro (HUMALOG) injection   SubCUTAneous AC&HS    glucose chewable tablet 16 g  4 Tab Oral PRN    dextrose (D50W) injection syrg 12.5-25 g  12.5-25 g IntraVENous PRN    glucagon (GLUCAGEN) injection 1 mg  1 mg IntraMUSCular PRN    pantoprazole (PROTONIX) 40 mg in 0.9% sodium chloride 10 mL injection  40 mg IntraVENous Q12H    hydrALAZINE (APRESOLINE) 20 mg/mL injection 20 mg  20 mg IntraVENous Q6H PRN

## 2020-07-21 NOTE — PROGRESS NOTES
Hospitalist Progress Note    NAME: Daryn Lee   :  1960   MRN:  [id number]       Assessment / Plan:    Nausea and vomiting POA  Epigastric pain POA  Duodenitis POA  - 2 months history of nausea and vomiting, associated with food. - Takes 2 to 3 tablets of Advil daily  - Received Pepcid in the ED  - EGD which showed:  spencer quynh tear, duodenitis, duodenal polyp, gastric polyps, and dilation of the esophagus  -HIDA scan which showed non visualization fo the duodenum at 90 minutes, and possible common bile duct obstruction. - GI lite diet  - clinically improved  - PRN Zofran  - PPI  - abdominal pain and N/V resolved, Dr Ga Prior holding on cholecystectomy       Outpatient follow up if symptoms recurrent  - likely d/c soon if symptoms remain resolved and renal function improving  - Spoke with daughter at baseline     Acute renal injury POA creat 4.04  Mild Hyperkalemia resolved   CKD stage III POA baseline creatinine 2.0 to 2.2 last year  - 2/2 Intravascular volume depletion and NSAID use   -CT abdomen without any hydronephrosis  -CK normal, check PTH/phosphorus  -Nephrology following, s/p Bicarb drip and IV fluids  -Hold lisinopril, metformin, and other nephrotoxic medication  -recheck creatinine in AM  - consider discharge if continues to improve  - No NSAIDs     History of diabetes mellitus  History of hypertension  -Hold nephrotoxic medication  -SSI, A1c 6.3     History of thyroid cancer with mets to lung  -Follows Dr. Enrique Linares  -Continue levothyroxine     Code Status: Full code  Surrogate Decision Maker: Daughter Amber Wadsworth   DVT Prophylaxis: Heparin    Plan: Follow GI     Baseline: Ambulatory     Subjective:     Chief Complaint / Reason for Physician Visit  FU abdomninal pain .   \"My stomach is not really hurting now\"  Significant improvement in his abdominal pain, no further N/V  Tolerating GI lite diet    Review of Systems:  Symptom Y/N Comments  Symptom Y/N Comments   Fever/Chills n   Chest Pain n    Poor Appetite    Edema     Cough n   Abdominal Pain less    Sputum    Joint Pain n    SOB/NUÑEZ n   Pruritis/Rash n    Nausea/vomit n   Tolerating PT/OT     Diarrhea n   Tolerating Diet y    Constipation    Other       Could NOT obtain due to:      Objective:     VITALS:   Last 24hrs VS reviewed since prior progress note. Most recent are:  Patient Vitals for the past 24 hrs:   Temp Pulse Resp BP SpO2   07/21/20 1533 98.6 °F (37 °C) 69 18 176/78 96 %   07/21/20 1131 98.6 °F (37 °C) 87 18 167/64 98 %   07/21/20 0716 98.4 °F (36.9 °C) 87 17 174/60 97 %   07/21/20 0330 98.3 °F (36.8 °C) 83 17 172/65 96 %   07/20/20 2345 98.4 °F (36.9 °C) 73 18 175/85 96 %   07/20/20 1852 98.8 °F (37.1 °C) 81 18 159/59 94 %       Intake/Output Summary (Last 24 hours) at 7/21/2020 1557  Last data filed at 7/21/2020 1213  Gross per 24 hour   Intake 2354.17 ml   Output 725 ml   Net 1629.17 ml        PHYSICAL EXAM:  General: WD, WN. Alert, cooperative, no acute distress    EENT:  EOMI. Anicteric sclerae. MMM  Resp:  CTA bilaterally, no wheezing or rales. No accessory muscle use  CV:  Regular  rhythm,  No edema  GI:  Soft, Non distended, non tender.  +Bowel sounds  Neurologic:  Alert and oriented X 3, normal speech,   Psych:   Good insight. Not anxious nor agitated  Skin:  No rashes. No jaundice    Reviewed most current lab test results and cultures  YES  Reviewed most current radiology test results   YES  Review and summation of old records today    NO  Reviewed patient's current orders and MAR    YES  PMH/SH reviewed - no change compared to H&P  ________________________________________________________________________  Care Plan discussed with:    Comments   Patient x    Family  x Daughter at bedside   RN x    Care Manager     Consultant                        [facility] rounds were held today with , nursing, pharmacist and clinical coordinator.   Patient's plan of care was discussed; medications were reviewed and discharge planning was addressed. ________________________________________________________________________      Comments   >50% of visit spent in counseling and coordination of care x    ________________________________________________________________________  Michaela Ahn MD     Procedures: see electronic medical records for all procedures/Xrays and details which were not copied into this note but were reviewed prior to creation of Plan. LABS:  I reviewed today's most current labs and imaging studies.   Pertinent labs include:  Recent Labs     07/20/20  0332   WBC 8.2   HGB 9.5*   HCT 29.6*        Recent Labs     07/20/20 0332 07/19/20  1633    139   K 4.8 4.6    107   CO2 25 29   * 115*   BUN 60* 61*   CREA 3.35* 3.52*   CA 8.3* 8.7   ALB 2.6*  --    TBILI 0.5  --    ALT 14  --        Signed: Michaela Ahn MD

## 2020-07-21 NOTE — PROGRESS NOTES
General Surgery End of Shift Nursing Note    Bedside shift change report given to [address] [place] [address] (oncoming nurse) by Lilia Chavez (offgoing nurse). Report included the following information SBAR, Kardex, Intake/Output, MAR and Recent Results. Shift worked:   7a-7p   Summary of shift:    Pt uneventful. Voiding in urinal. No c/o pain. Pt went for U/S of abdomen. Consults from GI and gen-surg came to see patient as well. Gave prn anti-nausea medication as well.    Issues for physician to address:   None          Sherlyn

## 2020-07-21 NOTE — PROGRESS NOTES
General Surgery End of Shift Nursing Note    Bedside shift change report given to Philippe (oncoming nurse) by Abigail Mckeon (offgoing nurse). Report included the following information SBAR, Kardex, Intake/Output, MAR and Recent Results. Shift worked:   7a-7p   Summary of shift:    Pt uneventful. Voiding in Charli Garcia LPN and Maki Lim RN. Assisted with care of patient today. Pt went to nuc med for test done. Tylenol was given for complaint of headache.    Issues for physician to address:   None          [employer]

## 2020-07-22 VITALS
BODY MASS INDEX: 33.93 KG/M2 | RESPIRATION RATE: 18 BRPM | OXYGEN SATURATION: 95 % | TEMPERATURE: 98 F | WEIGHT: 229.06 LBS | HEIGHT: 69 IN | HEART RATE: 65 BPM | DIASTOLIC BLOOD PRESSURE: 70 MMHG | SYSTOLIC BLOOD PRESSURE: 160 MMHG

## 2020-07-22 LAB
ALBUMIN SERPL-MCNC: 2.6 G/DL (ref 3.5–5)
ALBUMIN SERPL-MCNC: 2.6 G/DL (ref 3.5–5)
ALBUMIN/GLOB SERPL: 0.7 {RATIO} (ref 1.1–2.2)
ALP SERPL-CCNC: 100 U/L (ref 45–117)
ALT SERPL-CCNC: 30 U/L (ref 12–78)
ANION GAP SERPL CALC-SCNC: 4 MMOL/L (ref 5–15)
AST SERPL-CCNC: 26 U/L (ref 15–37)
BASOPHILS # BLD: 0 K/UL (ref 0–0.1)
BASOPHILS NFR BLD: 1 % (ref 0–1)
BILIRUB DIRECT SERPL-MCNC: 0.1 MG/DL (ref 0–0.2)
BILIRUB SERPL-MCNC: 0.2 MG/DL (ref 0.2–1)
BUN SERPL-MCNC: 49 MG/DL (ref 6–20)
BUN/CREAT SERPL: 16 (ref 12–20)
CALCIUM SERPL-MCNC: 8.6 MG/DL (ref 8.5–10.1)
CHLORIDE SERPL-SCNC: 107 MMOL/L (ref 97–108)
CO2 SERPL-SCNC: 27 MMOL/L (ref 21–32)
CREAT SERPL-MCNC: 3.06 MG/DL (ref 0.7–1.3)
CRYOGLOB SER QL 1D COLD INC: NORMAL
DIFFERENTIAL METHOD BLD: ABNORMAL
EOSINOPHIL # BLD: 0.3 K/UL (ref 0–0.4)
EOSINOPHIL NFR BLD: 5 % (ref 0–7)
ERYTHROCYTE [DISTWIDTH] IN BLOOD BY AUTOMATED COUNT: 13.2 % (ref 11.5–14.5)
GLOBULIN SER CALC-MCNC: 4 G/DL (ref 2–4)
GLUCOSE BLD STRIP.AUTO-MCNC: 145 MG/DL (ref 65–100)
GLUCOSE BLD STRIP.AUTO-MCNC: 161 MG/DL (ref 65–100)
GLUCOSE SERPL-MCNC: 153 MG/DL (ref 65–100)
HCT VFR BLD AUTO: 30 % (ref 36.6–50.3)
HGB BLD-MCNC: 9.7 G/DL (ref 12.1–17)
IMM GRANULOCYTES # BLD AUTO: 0 K/UL (ref 0–0.04)
IMM GRANULOCYTES NFR BLD AUTO: 1 % (ref 0–0.5)
LYMPHOCYTES # BLD: 1.3 K/UL (ref 0.8–3.5)
LYMPHOCYTES NFR BLD: 19 % (ref 12–49)
MCH RBC QN AUTO: 29 PG (ref 26–34)
MCHC RBC AUTO-ENTMCNC: 32.3 G/DL (ref 30–36.5)
MCV RBC AUTO: 89.8 FL (ref 80–99)
MONOCYTES # BLD: 0.7 K/UL (ref 0–1)
MONOCYTES NFR BLD: 10 % (ref 5–13)
NEUTS SEG # BLD: 4.6 K/UL (ref 1.8–8)
NEUTS SEG NFR BLD: 64 % (ref 32–75)
NRBC # BLD: 0 K/UL (ref 0–0.01)
NRBC BLD-RTO: 0 PER 100 WBC
PHOSPHATE SERPL-MCNC: 3.3 MG/DL (ref 2.6–4.7)
PLATELET # BLD AUTO: 250 K/UL (ref 150–400)
PMV BLD AUTO: 10.7 FL (ref 8.9–12.9)
POTASSIUM SERPL-SCNC: 4.9 MMOL/L (ref 3.5–5.1)
PROT SERPL-MCNC: 6.6 G/DL (ref 6.4–8.2)
RBC # BLD AUTO: 3.34 M/UL (ref 4.1–5.7)
SERVICE CMNT-IMP: ABNORMAL
SERVICE CMNT-IMP: ABNORMAL
SODIUM SERPL-SCNC: 138 MMOL/L (ref 136–145)
WBC # BLD AUTO: 6.9 K/UL (ref 4.1–11.1)

## 2020-07-22 PROCEDURE — 74011250637 HC RX REV CODE- 250/637: Performed by: INTERNAL MEDICINE

## 2020-07-22 PROCEDURE — 85025 COMPLETE CBC W/AUTO DIFF WBC: CPT

## 2020-07-22 PROCEDURE — 94760 N-INVAS EAR/PLS OXIMETRY 1: CPT

## 2020-07-22 PROCEDURE — 74011250636 HC RX REV CODE- 250/636: Performed by: PHYSICIAN ASSISTANT

## 2020-07-22 PROCEDURE — 80069 RENAL FUNCTION PANEL: CPT

## 2020-07-22 PROCEDURE — 74011000250 HC RX REV CODE- 250: Performed by: PHYSICIAN ASSISTANT

## 2020-07-22 PROCEDURE — C9113 INJ PANTOPRAZOLE SODIUM, VIA: HCPCS | Performed by: PHYSICIAN ASSISTANT

## 2020-07-22 PROCEDURE — 74011250636 HC RX REV CODE- 250/636: Performed by: INTERNAL MEDICINE

## 2020-07-22 PROCEDURE — 80076 HEPATIC FUNCTION PANEL: CPT

## 2020-07-22 PROCEDURE — 82962 GLUCOSE BLOOD TEST: CPT

## 2020-07-22 PROCEDURE — 36415 COLL VENOUS BLD VENIPUNCTURE: CPT

## 2020-07-22 PROCEDURE — 74011636637 HC RX REV CODE- 636/637: Performed by: INTERNAL MEDICINE

## 2020-07-22 RX ORDER — CARVEDILOL 12.5 MG/1
12.5 TABLET ORAL 2 TIMES DAILY WITH MEALS
Qty: 60 TAB | Refills: 3 | Status: SHIPPED | OUTPATIENT
Start: 2020-07-22 | End: 2020-08-27

## 2020-07-22 RX ORDER — PHENOL/SODIUM PHENOLATE
20 AEROSOL, SPRAY (ML) MUCOUS MEMBRANE DAILY
Qty: 30 TAB | Refills: 1 | Status: SHIPPED | OUTPATIENT
Start: 2020-07-22 | End: 2020-09-01 | Stop reason: SDUPTHER

## 2020-07-22 RX ORDER — CARVEDILOL 12.5 MG/1
12.5 TABLET ORAL 2 TIMES DAILY WITH MEALS
Status: DISCONTINUED | OUTPATIENT
Start: 2020-07-22 | End: 2020-07-22 | Stop reason: HOSPADM

## 2020-07-22 RX ORDER — AMLODIPINE BESYLATE 5 MG/1
5 TABLET ORAL 2 TIMES DAILY
Qty: 60 TAB | Refills: 3 | Status: ON HOLD | OUTPATIENT
Start: 2020-07-22 | End: 2020-08-27 | Stop reason: SDUPTHER

## 2020-07-22 RX ADMIN — SODIUM CHLORIDE 40 MG: 9 INJECTION, SOLUTION INTRAMUSCULAR; INTRAVENOUS; SUBCUTANEOUS at 08:09

## 2020-07-22 RX ADMIN — LEVOTHYROXINE SODIUM 200 MCG: 200 TABLET ORAL at 06:14

## 2020-07-22 RX ADMIN — INSULIN LISPRO 2 UNITS: 100 INJECTION, SOLUTION INTRAVENOUS; SUBCUTANEOUS at 08:09

## 2020-07-22 RX ADMIN — CARVEDILOL 6.25 MG: 6.25 TABLET, FILM COATED ORAL at 08:10

## 2020-07-22 RX ADMIN — INSULIN LISPRO 2 UNITS: 100 INJECTION, SOLUTION INTRAVENOUS; SUBCUTANEOUS at 11:42

## 2020-07-22 RX ADMIN — AMLODIPINE BESYLATE 5 MG: 5 TABLET ORAL at 08:10

## 2020-07-22 RX ADMIN — Medication 10 ML: at 06:17

## 2020-07-22 RX ADMIN — HEPARIN SODIUM 5000 UNITS: 5000 INJECTION INTRAVENOUS; SUBCUTANEOUS at 06:14

## 2020-07-22 NOTE — PROGRESS NOTES
Gastroenterology Daily Progress Note   (Petty Friedman PA-C for Dr. DENIA GEORGE [facility])   [facility]    Admit Date: 7/17/2020       Subjective:       Patient seen during morning rounds. He reports no further pain at all. No N/V, tolerated breakfast without any issues. Path returned:    FINAL PATHOLOGIC DIAGNOSIS   1. 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 No blunting of villi or increased intraepithelial lymphocytes. 2. Gastric and gastric polyps, biopsy:   Few dilated fundic glands, rule out small fundic gland polyp. Benign gastric oxyntic mucosa, no pathologic diagnosis. 3. Gastroesophageal junction, biopsy:   Chronic esophagitis, suggestive of reflux. Gastric junctional mucosa; negative for specialized metaplastic epithelium and dysplasia. 4. Mid esophagus, biopsy:   Focal acute esophagitis with erosions and histologic features suggestive of reflux. Intramucosal eosinophils are not significantly increased. Negative for viral cytopathic effect, dysplasia, carcinoma and fungal organisms on routine stain. 5. Duodenal polyp, biopsy:   Brunner's gland hyperplasia; negative for adenomatous epithelium.      Current Facility-Administered Medications   Medication Dose Route Frequency    carvediloL (COREG) tablet 12.5 mg  12.5 mg Oral BID WITH MEALS    acetaminophen (TYLENOL) tablet 650 mg  650 mg Oral Q6H PRN    polyethylene glycol (MIRALAX) packet 17 g  17 g Oral BID    amLODIPine (NORVASC) tablet 5 mg  5 mg Oral BID    metoclopramide HCl (REGLAN) injection 10 mg  10 mg IntraVENous Q6H PRN    sodium chloride (NS) flush 5-40 mL  5-40 mL IntraVENous Q8H    sodium chloride (NS) flush 5-40 mL  5-40 mL IntraVENous PRN    HYDROcodone-acetaminophen (NORCO)  mg tablet 1 Tab  1 Tab Oral Q4H PRN    ondansetron (ZOFRAN) injection 4 mg  4 mg IntraVENous Q4H PRN    heparin (porcine) injection 5,000 Units  5,000 Units SubCUTAneous Q8H    albuterol-ipratropium (DUO-NEB) 2.5 MG-0.5 MG/3 ML  3 mL Nebulization Q4H PRN    ondansetron (ZOFRAN ODT) tablet 4 mg  4 mg Oral Q8H PRN    levothyroxine (SYNTHROID) tablet 200 mcg  200 mcg Oral 6am    insulin lispro (HUMALOG) injection   SubCUTAneous AC&HS    glucose chewable tablet 16 g  4 Tab Oral PRN    dextrose (D50W) injection syrg 12.5-25 g  12.5-25 g IntraVENous PRN    glucagon (GLUCAGEN) injection 1 mg  1 mg IntraMUSCular PRN    pantoprazole (PROTONIX) 40 mg in 0.9% sodium chloride 10 mL injection  40 mg IntraVENous Q12H    hydrALAZINE (APRESOLINE) 20 mg/mL injection 20 mg  20 mg IntraVENous Q6H PRN        Objective:     Visit Vitals  /70   Pulse 65   Temp 98 °F (36.7 °C)   Resp 18   Ht 5' 9\" (1.753 m)   Wt 103.9 kg (229 lb 0.9 oz)   SpO2 95%   BMI 33.83 kg/m²   Blood pressure 160/70, pulse 65, temperature 98 °F (36.7 °C), resp. rate 18, height 5' 9\" (1.753 m), weight 103.9 kg (229 lb 0.9 oz), SpO2 95 %. No intake/output data recorded. 07/20 1901 - 07/22 0700  In: 1958 [P.O.:1958]  Out: 550 [Urine:550]      Intake/Output Summary (Last 24 hours) at 7/22/2020 1121  Last data filed at 7/22/2020 0423  Gross per 24 hour   Intake 1460 ml   Output 125 ml   Net 1335 ml         Physical Exam:       General: WM, NAD  Chest:  CTA, No rhonchi, rales or rubs.   Heart: S1, S2, RRR  GI: Obese, Soft, NT, ND + bowel sounds  Extremities:   CNS: CN II-XII normal.      Labs:       Recent Results (from the past 24 hour(s))   GLUCOSE, POC    Collection Time: 07/21/20 11:37 AM   Result Value Ref Range    Glucose (POC) 144 (H) 65 - 100 mg/dL    Performed by Braxton Orozco (PCT)    GLUCOSE, POC    Collection Time: 07/21/20  4:12 PM   Result Value Ref Range    Glucose (POC) 123 (H) 65 - 100 mg/dL    Performed by Tamara PYLE    GLUCOSE, POC    Collection Time: 07/21/20 11:37 PM   Result Value Ref Range    Glucose (POC) 179 (H) 65 - 100 mg/dL    Performed by Jesus Galindo RENAL FUNCTION PANEL    Collection Time: 07/22/20  2:24 AM   Result Value Ref Range    Sodium 138 136 - 145 mmol/L    Potassium 4.9 3.5 - 5.1 mmol/L    Chloride 107 97 - 108 mmol/L    CO2 27 21 - 32 mmol/L    Anion gap 4 (L) 5 - 15 mmol/L    Glucose 153 (H) 65 - 100 mg/dL    BUN 49 (H) 6 - 20 MG/DL    Creatinine 3.06 (H) 0.70 - 1.30 MG/DL    BUN/Creatinine ratio 16 12 - 20      GFR est AA 26 (L) >60 ml/min/1.73m2    GFR est non-AA 21 (L) >60 ml/min/1.73m2    Calcium 8.6 8.5 - 10.1 MG/DL    Phosphorus 3.3 2.6 - 4.7 MG/DL    Albumin 2.6 (L) 3.5 - 5.0 g/dL   HEPATIC FUNCTION PANEL    Collection Time: 07/22/20  2:24 AM   Result Value Ref Range    Protein, total 6.6 6.4 - 8.2 g/dL    Albumin 2.6 (L) 3.5 - 5.0 g/dL    Globulin 4.0 2.0 - 4.0 g/dL    A-G Ratio 0.7 (L) 1.1 - 2.2      Bilirubin, total 0.2 0.2 - 1.0 MG/DL    Bilirubin, direct 0.1 0.0 - 0.2 MG/DL    Alk. phosphatase 100 45 - 117 U/L    AST (SGOT) 26 15 - 37 U/L    ALT (SGPT) 30 12 - 78 U/L   CBC WITH AUTOMATED DIFF    Collection Time: 07/22/20  2:24 AM   Result Value Ref Range    WBC 6.9 4.1 - 11.1 K/uL    RBC 3.34 (L) 4.10 - 5.70 M/uL    HGB 9.7 (L) 12.1 - 17.0 g/dL    HCT 30.0 (L) 36.6 - 50.3 %    MCV 89.8 80.0 - 99.0 FL    MCH 29.0 26.0 - 34.0 PG    MCHC 32.3 30.0 - 36.5 g/dL    RDW 13.2 11.5 - 14.5 %    PLATELET 890 602 - 656 K/uL    MPV 10.7 8.9 - 12.9 FL    NRBC 0.0 0  WBC    ABSOLUTE NRBC 0.00 0.00 - 0.01 K/uL    NEUTROPHILS 64 32 - 75 %    LYMPHOCYTES 19 12 - 49 %    MONOCYTES 10 5 - 13 %    EOSINOPHILS 5 0 - 7 %    BASOPHILS 1 0 - 1 %    IMMATURE GRANULOCYTES 1 (H) 0.0 - 0.5 %    ABS. NEUTROPHILS 4.6 1.8 - 8.0 K/UL    ABS. LYMPHOCYTES 1.3 0.8 - 3.5 K/UL    ABS. MONOCYTES 0.7 0.0 - 1.0 K/UL    ABS. EOSINOPHILS 0.3 0.0 - 0.4 K/UL    ABS. BASOPHILS 0.0 0.0 - 0.1 K/UL    ABS. IMM.  GRANS. 0.0 0.00 - 0.04 K/UL    DF AUTOMATED     GLUCOSE, POC    Collection Time: 07/22/20  6:13 AM   Result Value Ref Range    Glucose (POC) 161 (H) 65 - 100 mg/dL Performed by Ge Woody    GLUCOSE, POC    Collection Time: 07/22/20 11:17 AM   Result Value Ref Range    Glucose (POC) 145 (H) 65 - 100 mg/dL    Performed by Ivory Gross PCT    LABRCNT(wbc:2,hgb:2,hct:2,plt:2,)  Recent Labs     07/22/20 0224 07/20/20 0332 07/19/20  1633    139 139   K 4.9 4.8 4.6    108 107   CO2 27 25 29   BUN 49* 60* 61*   CREA 3.06* 3.35* 3.52*   * 123* 115*   CA 8.6 8.3* 8.7   PHOS 3.3  --   --    LABRCNT(sgot:3,gpt:3,ap:3,tbiL:3,TP:3,ALB:3,GLOB:3,ggt:3,aml:3,amyp:3,lpse:3,hlpse:3)No results for input(s): INR, PTP, APTT, INREXT in the last 72 hours. Recent Labs     07/22/20 0224 07/20/20 0332    92   TP 6.6 6.3*   ALB 2.6*  2.6* 2.6*   GLOB 4.0 3.7   BRIEFLAB(B12,FOL,FOLAT,RBCF)No results found for: FOL, RBCFLABRCNT(CPK:3,CpKMB:3,ckndx:3,troiq:3)No components found for: GLPOCBRIEFLAB(CHOL,CHOLX,CHOLP,CHLST,CHOLV,HDL,HDLC,HDLP,LDL,DLDL,LDLC,DLDLP,TGL,TGLX,TRIGL,TRIGP,CHHD,CHHDX)No results for input(s): PH, PCO2, PO2 in the last 72 hours. LABRCNT(CPK:3,CpKMB:3,ckndx:3,troiq:3)JOSÉ Lopez  No results for input(s): CPK, CKNDX, TROIQ in the last 72 hours. 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 Recommend continue PPI on discharge. Will arrange for further O/P w/u with EMOT for further evaluate dysphagia. Also consider EUS to further evaluate duodenal polyp. Reviewed with patient and he is in agreement with plan.   General surgery considering CCY if biliary sx persist.  D/C plans per hospitalist. JOSÉ Flores    7/22/2020  11:21 AM   [id number] [facility], [facility]  [facility] [id number]  [address] [place] [address]: [phone]

## 2020-07-22 NOTE — PROGRESS NOTES
- General Surgery End of Shift Nursing Note    admitted 07/17/20 Claudette Alvarado is a 64 y.o.  male , c/o nausea and vomiting. per patient vomiting at least 2 or 3 times a day for past 2 mo's. Reports a burning sensation in the chest with the food and also burning sensation in the epigastric region. takes 3 tablets of Advil daily for at least past 1 year. past medical history of recurrent/metastatic STRATTON refractory carcinoma thyroid, diabetes mellitus, hypertension,Hypercholesterolemia,Thyroid cancer. TIA. During ED evaluation he also found to have a acute kidney injury, he is not aware of any kidney problem in the past does not follow any nephrology. 1) Epigastric pain, N/V  2) Duodenal polyp- path pending  3) abnormal HIDA scan  4) Dysphagia  Plan:  Patient continues with pain mostly in RUQ with tenderness. HIDA showing possible CBD obstruction. Considered MRCP but elevated Cr and patient adamantly refuses MRI due to claustrophobia. Will place general surgery consult to consider CCY given symptoms and HIDA findings, may have biliary dyskinesia. Would benefit from O/P EUS to further evaluate CBD as well as duodenal polyp. Feeling better, Nausea resolved, Tenderness better epigastric/RUQ, Continuing the GI w/o. treat the duodenitis  Consider cholecystectomy for biliary dyskinesia if symptoms return/continue  07/21/20 - abdominal pain and N/V resolved, Dr Jermain Parrish holding on cholecystectomy, Outpatient follow up if symptoms recurrent, - likely d/c soon if symptoms remain resolved and renal function improving      Shift worked:   1900 - 0700   Summary of shift:      A&O x4 verbal able to voice needs and wants, c/o pain x1 medicated and relief achieved.  Tolerated all PO meds whole with thin liquids   Issues for physician to address:   No new issues to report     Number times ambulated in hallway past shift: 0    Number of times OOB to chair past shift: 0    Pain Management:  Current medication: Irais  Patient states pain is manageable on current pain medication: YES    GI:    Current diet:  DIET GI LITE (POST SURGICAL)  DIET NUTRITIONAL SUPPLEMENTS Breakfast, Dinner; Ensure Clear    Tolerating current diet: YES  Passing flatus: YES  Last Bowel Movement: yesterday   Appearance: watery    Respiratory:    Incentive Spirometer at bedside: YES  Patient instructed on use: YES    Patient Safety:    Falls Score: 2  Bed Alarm On? No  Sitter?  No    Betty Muir LPN

## 2020-07-22 NOTE — PROGRESS NOTES
Problem: Falls - Risk of  Goal: *Absence of Falls  Description: Document [place] Fall Risk and appropriate interventions in the flowsheet.   Outcome: Progressing Towards Goal  Note: Fall Risk Interventions:            Medication Interventions: Patient to call before getting OOB    Elimination Interventions: Call light in reach              Problem: Patient Education: Go to Patient Education Activity  Goal: Patient/Family Education  Outcome: Progressing Towards Goal

## 2020-07-22 NOTE — PROGRESS NOTES
1900--bedside report received from [address] [place] [address], rn pt resting in bed oriented x 4, has no complaints at this time, LPN, jose, also assigned to this pt, call bell in reach assessment as noted    8665--norco given per prn orders    0400--pt resting comfortably call bell in reach    0700--bedside report given to harinder abreu who is assuming care of pt

## 2020-07-22 NOTE — PROGRESS NOTES
Nephrology Progress Note  [address] [facility]  [place] [facility] [employer]  [address], [address]  [place], [address]  Phone - [phone]               Fax - [phone]  Patient: Antonio Kendrick    YOB: 1960     Admit Date: 7/17/2020   Date- 7/22/2020     CC: Follow up for NERY  IMPRESSION & PLAN:   # NERY on CKD3  - baseline Scr: 2.1-2.3  - etiology most likely due to volume depletion from ongoing N/V/poor PO intake  - possibly contributive factors:  lisinopril , NSAIDS use   -progressively improving on IVF, stopped on 7/21 due to HTN and pt now already accepting diet and PO fluid intake  - peak Scr: 4.0  - current Scr: 3.0<-3.3<-3.5   - good UOP  -  Encouraged higher volume of PO fluid intake  - avoid nephrotoxics  - renal panel daily while inpt  - please do not resume ACEi/ARBs until Scr reach baseline  ( can be done in outpt setting after checking renal panel by his nephrologist)    # HTN  - uncontrolled  - increased Carvedilol to 12.5mg BID  - continue Amlodipine 10mg/day  - PRN Hydralazine   - monitor BP/pulse  - low sodium diet    # Nausea/vomiting/abd pain  - improving  - GI following:\" Pain is a lot better today, tolerated diet without N/V.  GES normal, US does not show any biliary abnormalities. Dr. Iwona Ernst may consider CCY if biliary colic persists. Awaiting path from EGD\"    #H/o recurrent  thyroid cancer w/ pulmonary mets  #Hyperlipidemia  #DM 2- uncontrolled         Subjective:   Interval History:   -  Reports improvement in  N/V/abd pain  Good appetite, better PO intake       ROS:-Denies diarrhea   No SOB, chest pain      Objective:   Vitals:    07/21/20 2344 07/22/20 0322 07/22/20 0712 07/22/20 1113   BP: 160/66 167/67 163/58 160/70   Pulse: 69 70 66 65   Resp: 18 18 16 18   Temp: 98.8 °F (37.1 °C) 98.4 °F (36.9 °C) 97.9 °F (36.6 °C) 98 °F (36.7 °C)   SpO2: 95% 96% 96% 95%   Weight:       Height: 07/21 0701 - 07/22 0700  In: 1460 [P.O.:1460]  Out: 125 [Urine:125]  Physical exam:   GEN: AAOx3, in NAD  NECK- Supple, no mass  RESP: Clear b/l, no wheezing  CVS: S1,S2  RRR  NEURO: Normal speech, Non focal  Abdo- soft, NT, obese  EXT: moves all, No Edema   PSYCH: Normal Mood    Discussed with: Patient    Chart reviewed. Pertinent Notes reviewed. Data Review :  Recent Labs     07/22/20 0224 07/20/20 0332 07/19/20  1633    139 139   K 4.9 4.8 4.6    108 107   CO2 27 25 29   BUN 49* 60* 61*   CREA 3.06* 3.35* 3.52*   * 123* 115*   CA 8.6 8.3* 8.7   PHOS 3.3  --   --      Recent Labs     07/22/20 0224 07/20/20 0332   WBC 6.9 8.2   HGB 9.7* 9.5*   HCT 30.0* 29.6*    276     No results for input(s): FE, TIBC, PSAT, FERR in the last 72 hours.    Medication list  reviewed  Current Facility-Administered Medications   Medication Dose Route Frequency    carvediloL (COREG) tablet 12.5 mg  12.5 mg Oral BID WITH MEALS    acetaminophen (TYLENOL) tablet 650 mg  650 mg Oral Q6H PRN    polyethylene glycol (MIRALAX) packet 17 g  17 g Oral BID    amLODIPine (NORVASC) tablet 5 mg  5 mg Oral BID    metoclopramide HCl (REGLAN) injection 10 mg  10 mg IntraVENous Q6H PRN    sodium chloride (NS) flush 5-40 mL  5-40 mL IntraVENous Q8H    sodium chloride (NS) flush 5-40 mL  5-40 mL IntraVENous PRN    HYDROcodone-acetaminophen (NORCO)  mg tablet 1 Tab  1 Tab Oral Q4H PRN    ondansetron (ZOFRAN) injection 4 mg  4 mg IntraVENous Q4H PRN    heparin (porcine) injection 5,000 Units  5,000 Units SubCUTAneous Q8H    albuterol-ipratropium (DUO-NEB) 2.5 MG-0.5 MG/3 ML  3 mL Nebulization Q4H PRN    ondansetron (ZOFRAN ODT) tablet 4 mg  4 mg Oral Q8H PRN    levothyroxine (SYNTHROID) tablet 200 mcg  200 mcg Oral 6am    insulin lispro (HUMALOG) injection   SubCUTAneous AC&HS    glucose chewable tablet 16 g  4 Tab Oral PRN    dextrose (D50W) injection syrg 12.5-25 g  12.5-25 g IntraVENous PRN  glucagon (GLUCAGEN) injection 1 mg  1 mg IntraMUSCular PRN    pantoprazole (PROTONIX) 40 mg in 0.9% sodium chloride 10 mL injection  40 mg IntraVENous Q12H    hydrALAZINE (APRESOLINE) 20 mg/mL injection 20 mg  20 mg IntraVENous Q6H PRN                          Zachary Casanova MD                   [facility]                   [email or web address]

## 2020-07-22 NOTE — DISCHARGE INSTRUCTIONS
Patient Discharge Instructions    Nik Elkins / [id number] : 1960    Admitted 2020 Discharged: 2020         DISCHARGE DIAGNOSIS:       Nausea and vomiting (2020) and abdominal pain      NERY (acute kidney injury) ([facility] [address] (2020)       What to do at Home    1. Recommended diet: Cardiac Diet    2. Recommended activity: Activity as tolerated    3. If you experience any of the following symptoms then please call your primary care physician or return to the emergency room if you cannot get hold of your doctor:   Fevers > 100.5, chills   Nausea or vomiting, persistent diarrhea > 24 hours   Blood in stool or black stools   Chest pain or SOB      Follow-up Information     Follow up With Specialties Details Why Contact Info    Sonya Bender MD Internal Medicine Schedule an appointment as soon as possible for a visit in 1 week recheck kidney function [address]  [address] [facility]      Arben Lloyd MD Gastroenterology Schedule an appointment as soon as possible for a visit in 4 weeks follow up for stomach pain [address] [facility]      Helga Brady MD Nephrology Schedule an appointment as soon as possible for a visit in 2 weeks follow up for kidney infection [address]  [phone]          Stop lisinopril due to the kidney failure    Stop any NSAIDs like motrin/ibuprofen, aleve/naprosyn    Stop the metformin    Hold the glipizide till you see Dr Ze Godfrey obtained by :  I understand that if any problems occur once I am at home I am to contact my physician. I understand and acknowledge receipt of the instructions indicated above.                                                                                                                                            Physician's or R.N.'s Signature Date/Time                                                                                                                                              Patient or Representative Signature                                                          Date/Time

## 2020-07-22 NOTE — PROGRESS NOTES
Spiritual Care Assessment/Progress Note  [facility]      NAME: Jarod Cervantes      MRN: [id number]  AGE: 64 y.o.  SEX: male  Gnosticism Affiliation: Adventist   Language: English     7/22/2020     Total Time (in minutes): 10     Spiritual Assessment begun in [facility] GENERAL SURGERY through conversation with:         [x]Patient        [] Family    [] Friend(s)        Reason for Consult: Initial/Spiritual assessment, patient floor     Spiritual beliefs: (Please include comment if needed)     [x] Identifies with a roselia tradition:         [] Supported by a roselia community:            [] Claims no spiritual orientation:           [] Seeking spiritual identity:                [] Adheres to an individual form of spirituality:           [] Not able to assess:                           Identified resources for coping:      [] Prayer                               [] Music                  [] Guided Imagery     [x] Family/friends                 [] Pet visits     [] Devotional reading                         [] Unknown     [] Other:                                             Interventions offered during this visit: (See comments for more details)    Patient Interventions: Initial/Spiritual assessment, patient floor, Affirmation of emotions/emotional suffering, Catharsis/review of pertinent events in supportive environment           Plan of Care:     [] Support spiritual and/or cultural needs    [] Support AMD and/or advance care planning process      [] Support grieving process   [] Coordinate Rites and/or Rituals    [] Coordination with community clergy   [x] No spiritual needs identified at this time   [] Detailed Plan of Care below (See Comments)  [] Make referral to Music Therapy  [] Make referral to Pet Therapy     [] Make referral to Addiction services  [] Make referral to [facility]  [] Make referral to Spiritual Care Partner  [] No future visits requested        [x] Follow up visits as needed Comments: Provided support for this pt in [facility]134. Facilitated life review to assess support needs. Pt offered review of events that led to this admission and feelings about the initial experience of pt's medical situation. Pt had family that came in room as [address] [place] [address] was leaving. Pt expressed roselia in God as a coping strategy in difficult times. Pt expressed no support needs at this time. Affirmed ongoing availability of support. Donavon Romero MDiv.  Staff   Request  Support/Spiritual Care Services via [address] [place] [address]

## 2020-07-22 NOTE — PROGRESS NOTES
KIMMY Plan:                 *Home w/family              *daughter to transport at d/c     *IM letter signed & placed on chart    Patient is discharging home today without any needs or concerns. Follow-up appointment is on the S    Care Management Interventions  PCP Verified by CM: Yes(none)  Mode of Transport at Discharge: Other (see comment)(daughter)  Transition of Care Consult (CM Consult): Discharge Planning  Discharge Durable Medical Equipment: No(no DME use)  Physical Therapy Consult: No  Occupational Therapy Consult: No  Speech Therapy Consult: No  Current Support Network:  Other, Family Lives Nearby(Lives with brother in a one story home wtih 3 YUILANA)  Confirm Follow Up Transport: Self  Discharge Location  Discharge Placement: Home with family assistance      Roel Deluna  Ext [phone]

## 2020-07-22 NOTE — PROGRESS NOTES
Problem: Falls - Risk of  Goal: *Absence of Falls  Description: Document Jeffrey Parekh Fall Risk and appropriate interventions in the flowsheet.   Outcome: Progressing Towards Goal  Note: Fall Risk Interventions:            Medication Interventions: Patient to call before getting OOB, Teach patient to arise slowly    Elimination Interventions: Call light in reach, Toilet paper/wipes in reach, Toileting schedule/hourly rounds              Problem: Patient Education: Go to Patient Education Activity  Goal: Patient/Family Education  Outcome: Progressing Towards Goal

## 2020-07-22 NOTE — ROUTINE PROCESS
The following appointments have been successfully scheduled:    Date/time Tuesday, July 28, 2020 11:30 AM  Patient Eren Bobo 1960 (52FY M) #[id number] O#[id number]  Department [facility] [address]  Appointment type Transitional Care  Provider [facility]

## 2020-07-22 NOTE — DISCHARGE SUMMARY
Hospitalist Discharge Note    NAME: Juliocesar Byers   :  1960   MRN:  [id number]     Admit date: 2020    Discharge date: 20    PCP: Laquita Mora MD    Discharge Diagnoses:    Nausea and vomiting POA    Epigastric pain POA    Duodenitis POA     Acute renal injury POA creat 4.04     Mild Hyperkalemia resolved     Padmini-gauthier tear POA not clinically significant    CKD stage III POA baseline creatinine 2.0 to 2.2 last year    Diabetes mellitus type 2 HgBa1c 6.3    Essential hypertension POA     Thyroid cancer with mets to lung    Hypothyroidism POA     Code Status: Full code      Discharge Medications:  Current Discharge Medication List      START taking these medications    Details   amLODIPine (NORVASC) 5 mg tablet Take 1 Tab by mouth two (2) times a day. Qty: 60 Tab, Refills: 3      carvediloL (COREG) 12.5 mg tablet Take 1 Tab by mouth two (2) times daily (with meals). Indications: high blood pressure  Qty: 60 Tab, Refills: 3      Omeprazole delayed release (PRILOSEC D/R) 20 mg tablet Take 1 Tab by mouth daily. Qty: 30 Tab, Refills: 1         CONTINUE these medications which have NOT CHANGED    Details   ondansetron (Zofran ODT) 4 mg disintegrating tablet Take 1 Tab by mouth every eight (8) hours as needed for Nausea or Vomiting. Qty: 20 Tab, Refills: 0      alum-mag hydroxide-simeth (MYLANTA) 200-200-20 mg/5 mL susp Take 30 mL by mouth every four (4) hours as needed for Indigestion.   Qty: 1 Bottle, Refills: 0      testosterone 30 mg/actuation (1.5 mL) slpm Apply 1-2 pumps daily as directed on your visit  Qty: 90 mL, Refills: 3    Associated Diagnoses: Male hypogonadism      levothyroxine (SYNTHROID) 200 mcg tablet TAKE 1 TABLET BY MOUTH ONCE DAILY BEFORE BREAKFAST  Qty: 90 Tab, Refills: 1      Insulin Needles, Disposable, (DINH PEN NEEDLE) 32 gauge x \" ndle Use once daily with insulin pen  Qty: 100 Pen Needle, Refills: 3      cholecalciferol, VITAMIN D3, (VITAMIN D3) 5,000 unit tab tablet Take 1 Tab by mouth daily. Qty: 90 Tab, Refills: 3    Associated Diagnoses: Hypovitaminosis D      glucose blood VI test strips (PHARMACIST CHOICE) strip One Touch  Check glucose 3-4 times daily. DX E11.22  Qty: 300 Strip, Refills: 3      Lancets misc Use preferred brand; Check glucose 3-4 times daily, Diagnosis E11.22  Qty: 2 Package, Refills: 3      VENTOLIN HFA 90 mcg/actuation inhaler TAKE 1-2 PUFFS EVEERY 4-6 HOURS AS NEEDED FOR DYSPNEA AND WHEEZING  Refills: 0      simvastatin (ZOCOR) 20 mg tablet Take 1 Tab by mouth nightly. Qty: 90 Tab, Refills: 3      Blood-Glucose Meter monitoring kit 1 preferred brand glucometer for checking home glucose, E11.22  Qty: 1 Kit, Refills: 0         STOP taking these medications       ibuprofen (ADVIL) 200 mg tablet Comments:   Reason for Stopping:         lisinopril (PRINIVIL, ZESTRIL) 20 mg tablet Comments:   Reason for Stopping:         glipiZIDE (GLUCOTROL) 10 mg tablet Comments:   Reason for Stopping:         metFORMIN ER (GLUCOPHAGE XR) 500 mg tablet Comments:   Reason for Stopping:                Follow-up Information     Follow up With Specialties Details Why Contact Info    Juliana Grigsby MD Internal Medicine Go on 7/28/2020 recheck kidney function at 11:30AM  [address]      Rudy Logan MD Gastroenterology Schedule an appointment as soon as possible for a visit in 4 weeks follow up for stomach pain [address]  [phone]      Evelyne Ruiz MD Nephrology Schedule an appointment as soon as possible for a visit in 2 weeks follow up for kidney infection [address] [facility]  [phone]            Time spent on discharge:   I spent greater than 30 minutes on discharge, seeing and examining the patient, reconciling home meds and new meds, coordinating care with case management, doing the discharge papers and the D/C summary    Discharge disposition: home    Discharge Condition: Stable    Summary of admission H+P(copied from Dr Sandy Boyd Note):     CHIEF COMPLAINT: Nausea and vomiting     HISTORY OF PRESENT ILLNESS:     Sudheer Pete is a 64 y.o.  male who presents with past medical history of recurrent/metastatic STRATTON refractory carcinoma thyroid, diabetes mellitus, hypertension presented to ED with chief complaint of nausea and vomiting. As per him he has been having vomiting at least 2 or 3 times a day since past 2 months. He reports he has a burning sensation in the chest with the food and also burning sensation in the epigastric region. He takes 3 tablets of Advil daily for at least past 1 year. Denies any fever, chills, shortness of breath, chest pain, palpitation. He denies any melena or hematemesis. During ED evaluation he also found to have a acute kidney injury, he is not aware of any kidney problem in the past does not follow any nephrology.     We were asked to admit for work up and evaluation of the above problems.           Past Medical History:   Diagnosis Date    Adverse effect of anesthesia       \" STOP BREATHING 1 TIME C ANESTH\"    Calculus of kidney      Cancer ([address] 2004     thyroid cancer    Chronic kidney disease      Chronic pain       BACK SHOULDER AND ARM    Depression      Diabetes ([address]      Hypercholesterolemia      Hypertension      Nausea & vomiting      Other ill-defined conditions(799.89)       cholesterol, thyroid    Sleep apnea       doesn't wear cpap    Thyroid cancer ([address]      TIA (transient ischemic attack) 2011      Admit CXR read by radiology FINDINGS:  Frontal and lateral views of the chest demonstrate a normal cardiomediastinal  silhouette. The lungs are adequately expanded. There is no edema, effusion,  consolidation, or pneumothorax. The osseous structures are unremarkable. IMPRESSION:  No acute process.      Admit CT abdomen/pelvis read by radiology FINDINGS:   LUNG BASES: Multiple bilateral pulmonary nodules, measuring up to 17 mm medial  right lower lobe without definite change. LIVER: No mass or biliary dilatation. GALLBLADDER: Contracted. SPLEEN: No enlargement or lesion. PANCREAS: No mass or ductal dilatation. ADRENALS: 15 mm left adrenal adenoma, unchanged. No right adrenal mass. KIDNEYS: No nephrolithiasis or hydronephrosis. GI TRACT:  No bowel obstruction. Difficult to assess bowel wall thickening given  lack of oral contrast material.  PERITONEUM: No free air or free fluid. APPENDIX: Unremarkable. RETROPERITONEUM: No aortic aneurysm. LYMPH NODES:  None enlarged. ADDITIONAL COMMENTS: N/A.  URINARY BLADDER: Unremarkable. REPRODUCTIVE ORGANS: Unremarkable. LYMPH NODES:  None enlarged. FREE FLUID:  None. BONES: No destructive bone lesion. ADDITIONAL COMMENTS: N/A. IMPRESSION:  1. No acute process in the abdomen or pelvis. 2. Bibasilar pulmonary nodules without definite change. 3. Left adrenal adenoma, unchanged. Hospital course:       Nausea and vomiting POA  Epigastric pain POA  Duodenitis POA  - 2 months history of nausea and vomiting, associated with food. - Takes 2 to 3 tablets of Advil daily  - Received Pepcid in the ED  - EGD which showed:  spencer quynh tear, duodenitis, duodenal polyp, gastric polyps, and dilation of the esophagus  -HIDA scan which showed non visualization fo the duodenum at 90 minutes, and possible common bile duct obstruction.   - GI lite diet  - clinically improved, no N/V or abdominal pain  - PRN Zofran  - PPI  - abdominal pain and N/V resolved, Dr David Jensen holding on cholecystectomy       Outpatient follow up if symptoms recurrent  - D/C to home with outpatient GI follow up  - Spoke with daughter at bedside     Acute renal injury POA creat 4.04  Mild Hyperkalemia resolved   CKD stage III POA baseline creatinine 2.0 to 2.2 last year  - 2/2 Intravascular volume depletion and NSAID use   -CT abdomen without any hydronephrosis  -CK normal, check PTH/phosphorus  -Nephrology following, s/p Bicarb drip and IV fluids  -Hold lisinopril, metformin, and other nephrotoxic medication  - Creatinine improving to 3.0  - No NSAIDs  - Discharge to home with recheck creatinine next week    History of diabetes mellitus  History of hypertension  - Hold hypoglycemic drugs till outpatient follow up  - SSI, A1c 6.3  - BS stable     History of thyroid cancer with mets to lung  -Follows Dr. Yusef Harris  -Continue levothyroxine     Code Status: Full code    Surrogate Decision Maker: Daughter Mile Holm     DVT Prophylaxis: Heparin    Plan: Follow GI     Baseline: Ambulatory     Subjective:     Chief Complaint / Reason for Physician Visit  FU abdominal pain . \"My stomach feels good\"  No abdominal pain, no further N/V, feels ready to go home  Tolerating GI lite diet  Creatinine improving    Review of Systems:  Symptom Y/N Comments  Symptom Y/N Comments   Fever/Chills n   Chest Pain n    Poor Appetite    Edema     Cough n   Abdominal Pain less    Sputum    Joint Pain n    SOB/NUÑEZ n   Pruritis/Rash n    Nausea/vomit n   Tolerating PT/OT     Diarrhea n   Tolerating Diet y    Constipation    Other       Could NOT obtain due to:      Objective:     VITALS:   Last 24hrs VS reviewed since prior progress note. Most recent are:  Patient Vitals for the past 24 hrs:   Temp Pulse Resp BP SpO2   07/22/20 1113 98 °F (36.7 °C) 65 18 160/70 95 %   07/22/20 0712 97.9 °F (36.6 °C) 66 16 163/58 96 %   07/22/20 0322 98.4 °F (36.9 °C) 70 18 167/67 96 %   07/21/20 2344 98.8 °F (37.1 °C) 69 18 160/66 95 %   07/21/20 2039 98.2 °F (36.8 °C) 70 18 182/83 96 %   07/21/20 1533 98.6 °F (37 °C) 69 18 176/78 96 %       Intake/Output Summary (Last 24 hours) at 7/22/2020 1217  Last data filed at 7/22/2020 0423  Gross per 24 hour   Intake 860 ml   Output 125 ml   Net 735 ml        PHYSICAL EXAM:  General: WD, WN. Alert, cooperative, no acute distress    EENT:  EOMI. Anicteric sclerae. MMM  Resp:  CTA bilaterally, no wheezing or rales. No accessory muscle use  CV:  Regular  rhythm,  No edema  GI:  Soft, Non distended, non tender.  +Bowel sounds  Neurologic:  Alert and oriented X 3, normal speech,   Psych:   Good insight. Not anxious nor agitated  Skin:  No rashes. No jaundice    Reviewed most current lab test results and cultures  YES  Reviewed most current radiology test results   YES  Review and summation of old records today    NO  Reviewed patient's current orders and MAR    YES  PMH/SH reviewed - no change compared to H&P  ________________________________________________________________________  Care Plan discussed with:    Comments   Patient x    Family  x Daughter at bedside   RN x    Care Manager     Consultant                        Multidiciplinary team rounds were held today with , nursing, pharmacist and clinical coordinator. Patient's plan of care was discussed; medications were reviewed and discharge planning was addressed. ________________________________________________________________________      Comments   >50% of visit spent in counseling and coordination of care x    ________________________________________________________________________  Baldo Brewer MD     Procedures: see electronic medical records for all procedures/Xrays and details which were not copied into this note but were reviewed prior to creation of Plan. LABS:  I reviewed today's most current labs and imaging studies.   Pertinent labs include:  Recent Labs     07/22/20 0224 07/20/20  0332   WBC 6.9 8.2   HGB 9.7* 9.5*   HCT 30.0* 29.6*    276     Recent Labs     07/22/20 0224 07/20/20  0332 07/19/20  1633    139 139   K 4.9 4.8 4.6    108 107   CO2 27 25 29   * 123* 115*   BUN 49* 60* 61*   CREA 3.06* 3.35* 3.52*   CA 8.6 8.3* 8.7   PHOS 3.3  --   --    ALB 2.6*  2.6* 2.6*  --    TBILI 0.2 0.5  --    ALT 30 14  --        Signed: Baldo Brewer, MD

## 2020-07-22 NOTE — PROGRESS NOTES
Problem: Falls - Risk of  Goal: *Absence of Falls  Description: Document Krystyna Cristobal Fall Risk and appropriate interventions in the flowsheet.   7/22/2020 1308 by Ashleigh An  Outcome: Resolved/Met  7/22/2020 1049 by Lalo LANDEROS  Outcome: Progressing Towards Goal  Note: Fall Risk Interventions:            Medication Interventions: Patient to call before getting OOB, Teach patient to arise slowly    Elimination Interventions: Call light in reach, Toilet paper/wipes in reach, Toileting schedule/hourly rounds              Problem: Patient Education: Go to Patient Education Activity  Goal: Patient/Family Education  7/22/2020 1308 by Ashleigh An  Outcome: Resolved/Met  7/22/2020 1049 by Lalo LANDEROS  Outcome: Progressing Towards Goal

## 2020-07-22 NOTE — PROGRESS NOTES
Discharge teaching done at the bedside with patient. Peripheral IV taken out, catheter tip intact. Patient has all personal belongings with them. Prescriptions sent to the pharmacy. The opportunity for any questions were given, patient did not have any questions or concerns at this time. Patient ready for discharge. Patient taken down with: Caryl Everett.  MANDY Barber

## 2020-07-23 ENCOUNTER — PATIENT OUTREACH (OUTPATIENT)
Dept: CASE MANAGEMENT | Age: 60
End: 2020-07-23

## 2020-07-23 NOTE — PROGRESS NOTES
Patient was admitted to [facility] on 20 and discharged on 20 for N/V, Acute Renal Injury. Patient was contacted within 1 business day of discharge. Top Discharge Challenges to be reviewed by the provider   Additional needs identified to be addressed with provider yes  medications- currently not on any DM meds, Glipizide & Metformin stopped d/t renal function. , labs- needs renal function checked. Serum Creat baseline 2.1-2.3 and needs Neph 2 week f/u & GI 4 week f/u appts  Discussed COVID-19 related testing which was not done at this time. Test results were not done. Patient informed of results, if available? n/a Results for Carlos Reed (MRN [id number]) as of 2020 10:44   2020 07:22 2020 11:37 2020 16:12 2020 23:37 2020 02:24 2020 02:24 2020 06:13   GLUCOSE,FAST -  (H) 144 (H) 123 (H) 179 (H)   161 (H)   RBC     3.34 (L)     HGB     9.7 (L)     HCT     30.0 (L)     Anion gap     4 (L)     Glucose     153 (H)     BUN     49 (H)     Creatinine     3.06 (H)     GFR est non-AA     21 (L)     GFR est AA     26 (L)     Albumin     2.6 (L) 2.6 (L)    A-G Ratio      0.7 (L)      Method of communication with provider : chart routing       Advance Care Planning:   Does patient have an Advance Directive:  decision makers updated     Inpatient Readmission Risk score: n/a  Was this a readmission? no   Patient stated reason for the admission: n/a  Patients top risk factors for readmission: medical condition, medication management and PCP relationship  Interventions to address risk factors: pt to start new PCP relationship on 20 w/ Dr Refugio Castro. Attend scheduled appts w/ Specialists. Med adherence, obtain needed refills during PCP appt. Avoid Nephrotoxic meds. Clear OTC meds & Supplments w/ PCP, Neph. Care Transition Nurse (CTN) contacted the patient by telephone to perform post hospital discharge assessment.  Verified name and  with patient as identifiers. Provided introduction to self, and explanation of the CTN role. CTN reviewed discharge instructions, medical action plan and red flags with patient who verbalized understanding. Patient given an opportunity to ask questions and does not have any further questions or concerns at this time. The patient agrees to contact the PCP office for questions related to their healthcare. Medication reconciliation was performed with patient, who verbalizes understanding of administration of home medications. Advised obtaining a 90-day supply of all daily and as-needed medications. 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 Education provided regarding infection prevention, and signs and symptoms of COVID-19 and when to seek medical attention with patient who verbalized understanding. Discussed exposure protocols and quarantine From CDC: Are you at higher risk for severe illness?  and given an opportunity for questions and concerns. The patient agrees to contact the COVID-19 hotline [phone] or PCP office for questions related to COVID-19. For more information on steps you can take to protect yourself, see CDC's How to Protect Yourself     Patient/family/caregiver given information for Carey Raymond and agrees to enroll no  Patient's preferred e-mail: declines  Patient's preferred phone number: declines    Discussed follow-up appointments.  If no appointment was previously scheduled, appointment scheduling offered: yes  [facility] follow up appointment(s):   Future Appointments   Date Time Provider Ness Hawkins   7/28/2020 11:30 AM Yao Lyle MD [address]   9/23/2020 11:00 AM [facility]     [facility] follow [facility] appointment([facility]): needs Neph 2 week appt & GI 4 week appt. Pt to schedule per his request.    Plan for follow-up call in ~ 2 1/2 weeks based on severity of symptoms and risk factors. CTN provided contact information for future needs. Goals Addressed                 This Visit's Progress     Transition of Care- collaboration & coordination of care to prevent complications post hospitalization. 7/23/20- pt denied n/v, dysphagia, epigastric pain, fever, SOB. +Hoarseness. + Right side abd pain-intermittent. Last BM 7/22/20 night, formed brown colored stool. Med rec done. Pt reported only meds in home are 3 news Rxs Amlodipine, Coreg, Omeprazole & routine med Levothyroxine. Reported needs refill for Simvastatin. Pt inquired about \"Glipizide & Metformin\". Advised meds d/c'd d/t kidney function. Currently not on any DM meds. +SMBG 2-3x/day. 7/22/20 HS BG \"238\". FBS today \"138\". Advised PCP to determine DM meds based on kidney function tests. Reiterated avoidance of NSAIDs. No OTC meds or Supplements w/o discussing w/ PCP, Neph. PCP appt 7/28/20 @ 11:30 AM. Needs Neph 2 week appt & GI 4 week appt. Plan- (pt) med adherence. Refill for Simvastatin. Renal function labs during PCP appt. Discuss DM meds w/ PCP. Attend 7/28/20 PCP appt. Cont Nep & GI offices to schedule appts. CTN to collaborate w/ pt & Care Team Members to coordinate care as needed. CTN F/u ~ 2 1/2 weeks-ID.

## 2020-07-28 ENCOUNTER — OFFICE VISIT (OUTPATIENT)
Dept: FAMILY MEDICINE CLINIC | Age: 60
End: 2020-07-28

## 2020-07-28 VITALS
TEMPERATURE: 98.2 F | HEIGHT: 69 IN | HEART RATE: 74 BPM | RESPIRATION RATE: 20 BRPM | OXYGEN SATURATION: 98 % | SYSTOLIC BLOOD PRESSURE: 146 MMHG | DIASTOLIC BLOOD PRESSURE: 66 MMHG | BODY MASS INDEX: 34.51 KG/M2 | WEIGHT: 233 LBS

## 2020-07-28 DIAGNOSIS — Z76.89 ENCOUNTER FOR SUPPORT AND COORDINATION OF TRANSITION OF CARE: Primary | ICD-10-CM

## 2020-07-28 DIAGNOSIS — E55.9 VITAMIN D DEFICIENCY: ICD-10-CM

## 2020-07-28 DIAGNOSIS — E11.21 CONTROLLED TYPE 2 DIABETES MELLITUS WITH DIABETIC NEPHROPATHY, WITHOUT LONG-TERM CURRENT USE OF INSULIN (HCC): ICD-10-CM

## 2020-07-28 DIAGNOSIS — D64.9 NORMOCHROMIC NORMOCYTIC ANEMIA: ICD-10-CM

## 2020-07-28 DIAGNOSIS — Z00.00 ENCOUNTER FOR MEDICARE ANNUAL WELLNESS EXAM: ICD-10-CM

## 2020-07-28 DIAGNOSIS — I10 ESSENTIAL HYPERTENSION WITH GOAL BLOOD PRESSURE LESS THAN 130/80: ICD-10-CM

## 2020-07-28 DIAGNOSIS — E03.9 ACQUIRED HYPOTHYROIDISM: ICD-10-CM

## 2020-07-28 DIAGNOSIS — E66.01 SEVERE OBESITY (BMI 35.0-39.9) WITH COMORBIDITY (HCC): ICD-10-CM

## 2020-07-28 DIAGNOSIS — Z12.5 ENCOUNTER FOR PROSTATE CANCER SCREENING: ICD-10-CM

## 2020-07-28 DIAGNOSIS — N18.4 CKD (CHRONIC KIDNEY DISEASE) STAGE 4, GFR 15-29 ML/MIN (HCC): ICD-10-CM

## 2020-07-28 DIAGNOSIS — Z12.11 COLON CANCER SCREENING: ICD-10-CM

## 2020-07-28 DIAGNOSIS — E78.5 DYSLIPIDEMIA (HIGH LDL; LOW HDL): ICD-10-CM

## 2020-07-28 DIAGNOSIS — E11.9 COMPREHENSIVE DIABETIC FOOT EXAMINATION, TYPE 2 DM, ENCOUNTER FOR (HCC): ICD-10-CM

## 2020-07-28 RX ORDER — ALBUTEROL SULFATE 90 UG/1
AEROSOL, METERED RESPIRATORY (INHALATION)
Qty: 1 INHALER | Refills: 2 | Status: SHIPPED | OUTPATIENT
Start: 2020-07-28

## 2020-07-28 RX ORDER — SIMVASTATIN 20 MG/1
20 TABLET, FILM COATED ORAL
Qty: 90 TAB | Refills: 3 | Status: SHIPPED | OUTPATIENT
Start: 2020-07-28 | End: 2021-01-01 | Stop reason: SDUPTHER

## 2020-07-28 RX ORDER — INSULIN PUMP SYRINGE, 3 ML
EACH MISCELLANEOUS
Qty: 1 KIT | Refills: 0 | Status: CANCELLED | OUTPATIENT
Start: 2020-07-28

## 2020-07-28 RX ORDER — LANCING DEVICE
EACH MISCELLANEOUS
Qty: 300 STRIP | Refills: 3 | Status: CANCELLED | OUTPATIENT
Start: 2020-07-28

## 2020-07-28 NOTE — PROGRESS NOTES
.  Chief Complaint   Patient presents with    Transitions Of Care     HPI:  Nighat Stevens is a 64 y.o.  male male with h/o recurrent/metastatic STRATTON refractory carcinoma thyroid, diabetes mellitus hypertension, diabetes type 2(recent A1C of 6.2%), hypercholesterolemia, severe hypothyroidism is seen for transition care. Patient was admitted 7/17/20-7/22/20 with nausea/vomiting found to have chronic renal failure, duodenitis. He was advised to follow up with nephrologist and  Gastroenterologist.  Patient reports is doing a lot better since discharge. Note, pt has not been seen in clinic since Jan/2018. He is also due for medicare wellness visit. This is a Subsequent Medicare Annual Wellness Exam (AWV) (Performed 12 months after IPPE or effective date of Medicare Part B enrollment)  I have reviewed the patient's medical history in detail and updated the computerized patient record.      History     Past Medical History:   Diagnosis Date    Adverse effect of anesthesia     \" STOP BREATHING 1 TIME C ANESTH\"    Calculus of kidney     Cancer ([address] 2004    thyroid cancer    Chronic kidney disease     Chronic pain     BACK SHOULDER AND ARM    Depression     Diabetes ([address]     Encounter for long-term (current) use of NSAIDs     Hypercholesterolemia     Hypertension     Nausea & vomiting     Other ill-defined conditions(799.89)     cholesterol, thyroid    Sleep apnea     doesn't wear cpap    Thyroid cancer ([address]     TIA (transient ischemic attack) 2011    Vitamin D deficiency       Past Surgical History:   Procedure Laterality Date    CARDIAC SURG PROCEDURE UNLIST      HX HEENT      THROAT SURGERY X 4    HX ORTHOPAEDIC      back     HX ORTHOPAEDIC      ARM AND SHOULDER    HX OTHER SURGICAL      thyroid, lymphnode    HX RETINAL DETACHMENT REPAIR      left eye    UPPER GI ENDOSCOPY,BALL DIL,30MM  7/17/2020         UPPER GI ENDOSCOPY,BIOPSY  7/17/2020         VASCULAR SURGERY PROCEDURE UNLIST      cardiac cath NEG. Current Outpatient Medications   Medication Sig Dispense Refill    amLODIPine (NORVASC) 5 mg tablet Take 1 Tab by mouth two (2) times a day. 60 Tab 3    carvediloL (COREG) 12.5 mg tablet Take 1 Tab by mouth two (2) times daily (with meals). Indications: high blood pressure 60 Tab 3    Omeprazole delayed release (PRILOSEC D/R) 20 mg tablet Take 1 Tab by mouth daily. 30 Tab 1    testosterone 30 mg/actuation (1.5 mL) slpm Apply 1-2 pumps daily as directed on your visit 90 mL 3    levothyroxine (SYNTHROID) 200 mcg tablet TAKE 1 TABLET BY MOUTH ONCE DAILY BEFORE BREAKFAST 90 Tab 1    glucose blood VI test strips (PHARMACIST CHOICE) strip One Touch  Check glucose 3-4 times daily. DX E11.22 300 Strip 3    VENTOLIN HFA 90 mcg/actuation inhaler TAKE 1-2 PUFFS EVEERY 4-6 HOURS AS NEEDED FOR DYSPNEA AND WHEEZING  0    simvastatin (ZOCOR) 20 mg tablet Take 1 Tab by mouth nightly. 90 Tab 3    Blood-Glucose Meter monitoring kit 1 preferred brand glucometer for checking home glucose, E11.22 (Patient taking differently: 1 preferred brand glucometer for checking home glucose, E11.22 One Touch) 1 Kit 0    Lancets misc Use preferred brand;  Check glucose 3-4 times daily, Diagnosis E11.22 2 Package 3     Allergies   Allergen Reactions    Anesthetics - Amide Type Shortness of Breath    Flexeril [Cyclobenzaprine] Hives    Tramadol Hives     Family History   Problem Relation Age of Onset    Diabetes Mother     Elevated Lipids Mother    Cherlynn Cambric Bladder Disease Mother     Headache Mother    Steele Gess Migraines Mother     Heart Disease Mother     Hypertension Mother     Stroke Mother     Other Mother         ANEURSYM BRAIN    Bleeding Prob Father     Cancer Father         LEUKEMIA    Diabetes Father     Elevated Lipids Father     Mental Retardation Sister     Psychiatric Disorder Sister     Cancer Brother         LUNGS     Social History     Tobacco Use    Smoking status: Former Smoker Last attempt to quit: 1994     Years since quittin.9    Smokeless tobacco: Never Used   Substance Use Topics    Alcohol use: Yes     Comment: Occasionally     Patient Active Problem List   Diagnosis Code    Mixed hypercholesterolemia and hypertriglyceridemia E78.2    Diabetes type 2, uncontrolled ([facility] E11.65    Hypovitaminosis D E55.9    Proteinuria R80.9    Microalbuminuria R80.9    Essential hypertension with goal blood pressure less than 130/80 I10    Acquired hypothyroidism E03.9    Chronic left shoulder pain M25.512, G89.29    Sepsis ([facility] A41.9    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 9) with comorbidity ([facility] E66.01    NERY (acute kidney injury) ([facility] N17.9    Nausea and vomiting R11.2       Depression Risk Factor Screening:     3 most recent PHQ Screens 3/27/2019   Little interest or pleasure in doing things Not at all   Feeling down, depressed, irritable, or hopeless Not at all   Total Score PHQ 2 0     Alcohol Risk Factor Screening: You do not drink alcohol or very rarely. Functional Ability and Level of Safety:   Hearing Loss  Hearing is good. Activities of Daily Living  The home contains: no safety equipment. Patient does total self care    Fall Risk  Fall Risk Assessment, last 12 mths 3/27/2019   Able to walk? Yes   Fall in past 12 months?  No       Abuse Screen  Patient is not abused    Cognitive Screening   Evaluation of Cognitive Function:  Has your family/caregiver stated any concerns about your memory: no  Normal    Patient Care Team   Patient Care Team:  Yana Delacruz MD as PCP - General (Internal Medicine)  Yana Delacruz MD as PCP - Rosangela Rushing Provider  Nikky Mcfarlane MD (Endocrinology)  Cassidy Kramer MD (Hematology and Oncology)  Sheridan Ohara, RN as Care Transitions Nurse    Assessment/Plan   Education and counseling provided:  Are appropriate based on today's review and evaluation  Diagnoses and all orders for this visit:    Encounter for Medicare annual wellness exam  -     METABOLIC PANEL, COMPREHENSIVE    CKD (chronic kidney disease) stage 4, GFR 15-29 ml/min ([facility])  -     REFERRAL TO NEPHROLOGY  -     METABOLIC PANEL, COMPREHENSIVE  -     MICROALBUMIN, UR, RAND W/ MICROALB/CREAT RATIO    Severe obesity (BMI 35.0-39. 9) with comorbidity ([facility]  -     LIPID PANEL    Colon cancer screening  -     REFERRAL TO GASTROENTEROLOGY    Encounter for support and coordination of transition of care  -     REFERRAL TO NEPHROLOGY  -     REFERRAL TO GASTROENTEROLOGY    Comprehensive diabetic foot examination, type 2 DM, encounter for ([facility]  -     REFERRAL TO PODIATRY    Dyslipidemia (high LDL; low HDL)  -     simvastatin (ZOCOR) 20 mg tablet; Take 1 Tab by mouth nightly., Normal, Disp-90 Tab,R-3  -     LIPID PANEL    Normochromic normocytic anemia  -     CBC W/O DIFF    Vitamin D deficiency  -     VITAMIN D, 25 HYDROXY; Future    Encounter for prostate cancer screening  -     PSA SCREENING (SCREENING)    Essential hypertension with goal blood pressure less than 070/37  -     METABOLIC PANEL, COMPREHENSIVE    Acquired hypothyroidism    Controlled type 2 diabetes mellitus with diabetic nephropathy, without long-term current use of insulin ([facility])  -     METABOLIC PANEL, COMPREHENSIVE  -     MICROALBUMIN, UR, RAND W/ MICROALB/CREAT RATIO    Other orders  -     Ventolin HFA 90 mcg/actuation inhaler; TAKE 1-2 PUFFS EVEERY 4-6 HOURS AS NEEDED FOR DYSPNEA AND WHEEZING, Normal, Disp-1 Inhaler,R-2,LISA  -     Cancel: Blood-Glucose Meter monitoring kit; 1 preferred brand glucometer for checking home glucose, E11.22 One Touch, Normal, Disp-1 Kit,R-0  -     Cancel: glucose blood VI test strips (Pharmacist Choice) strip; One Touch  Check glucose 3-4 times daily.  DX E11.22, Normal, Disp-300 Strip,R-3      Patient Instructions        DASH Diet: Care Instructions  Your Care Instructions     The DASH diet is an eating plan that can help lower your blood pressure. DASH stands for Dietary Approaches to Stop Hypertension. Hypertension is high blood pressure. The DASH diet focuses on eating foods that are high in calcium, potassium, and magnesium. These nutrients can lower blood pressure. The foods that are highest in these nutrients are fruits, vegetables, low-fat dairy products, nuts, seeds, and legumes. But taking calcium, potassium, and magnesium supplements instead of eating foods that are high in those nutrients does not have the same effect. The DASH diet also includes whole grains, fish, and poultry. The DASH diet is one of several lifestyle changes your doctor may recommend to lower your high blood pressure. Your doctor may also want you to decrease the amount of sodium in your diet. Lowering sodium while following the DASH diet can lower blood pressure even further than just the DASH diet alone. Follow-up care is a key part of your treatment and safety. Be sure to make and go to all appointments, and call your doctor if you are having problems. It's also a good idea to know your test results and keep a list of the medicines you take. How can you care for yourself at home? Following the DASH diet  · Eat 4 to 5 servings of fruit each day. A serving is 1 medium-sized piece of fruit, ½ cup chopped or canned fruit, 1/4 cup dried fruit, or 4 ounces (½ cup) of fruit juice. Choose fruit more often than fruit juice. · Eat 4 to 5 servings of vegetables each day. A serving is 1 cup of lettuce or raw leafy vegetables, ½ cup of chopped or cooked vegetables, or 4 ounces (½ cup) of vegetable juice. Choose vegetables more often than vegetable juice. · Get 2 to 3 servings of low-fat and fat-free dairy each day. A serving is 8 ounces of milk, 1 cup of yogurt, or 1 ½ ounces of cheese. · Eat 6 to 8 servings of grains each day.  A serving is 1 slice of bread, 1 ounce of dry cereal, or ½ cup of cooked rice, pasta, or cooked cereal. Try to choose whole-grain products as much as possible. · Limit lean meat, poultry, and fish to 2 servings each day. A serving is 3 ounces, about the size of a deck of cards. · Eat 4 to 5 servings of nuts, seeds, and legumes (cooked dried beans, lentils, and split peas) each week. A serving is 1/3 cup of nuts, 2 tablespoons of seeds, or ½ cup of cooked beans or peas. · Limit fats and oils to 2 to 3 servings each day. A serving is 1 teaspoon of vegetable oil or 2 tablespoons of salad dressing. · Limit sweets and added sugars to 5 servings or less a week. A serving is 1 tablespoon jelly or jam, ½ cup sorbet, or 1 cup of lemonade. · Eat less than 2,300 milligrams (mg) of sodium a day. If you limit your sodium to 1,500 mg a day, you can lower your blood pressure even more. Tips for success  · Start small. Do not try to make dramatic changes to your diet all at once. You might feel that you are missing out on your favorite foods and then be more likely to not follow the plan. Make small changes, and stick with them. Once those changes become habit, add a few more changes. · Try some of the following:  ? Make it a goal to eat a fruit or vegetable at every meal and at snacks. This will make it easy to get the recommended amount of fruits and vegetables each day. ? Try yogurt topped with fruit and nuts for a snack or healthy dessert. ? Add lettuce, tomato, cucumber, and onion to sandwiches. ? Combine a ready-made pizza crust with low-fat mozzarella cheese and lots of vegetable toppings. Try using tomatoes, squash, spinach, broccoli, carrots, cauliflower, and onions. ? Have a variety of cut-up vegetables with a low-fat dip as an appetizer instead of chips and dip. ? Sprinkle sunflower seeds or chopped almonds over salads. Or try adding chopped walnuts or almonds to cooked vegetables. ? Try some vegetarian meals using beans and peas. Add garbanzo or kidney beans to salads.  Make burritos and tacos with mashed woody beans or black beans. Where can you learn more? Go to http://www.[employer].com/  Enter H967 in the search box to learn more about \"DASH Diet: Care Instructions. \"  Current as of: December 16, 2019               Content Version: 12.5  © 1767-9650 [employer]. Care instructions adapted under license by [employer] (which disclaims liability or warranty for this information). If you have questions about a medical condition or this instruction, always ask your healthcare professional. [place] [address] any warranty or liability for your use of this information. Follow-up and Dispositions    · Return in about 3 months (around 10/28/2020), or if symptoms worsen or fail to improve, for routine follow up.

## 2020-07-28 NOTE — PATIENT INSTRUCTIONS
DASH Diet: Care Instructions  Your Care Instructions     The DASH diet is an eating plan that can help lower your blood pressure. DASH stands for Dietary Approaches to Stop Hypertension. Hypertension is high blood pressure. The DASH diet focuses on eating foods that are high in calcium, potassium, and magnesium. These nutrients can lower blood pressure. The foods that are highest in these nutrients are fruits, vegetables, low-fat dairy products, nuts, seeds, and legumes. But taking calcium, potassium, and magnesium supplements instead of eating foods that are high in those nutrients does not have the same effect. The DASH diet also includes whole grains, fish, and poultry. The DASH diet is one of several lifestyle changes your doctor may recommend to lower your high blood pressure. Your doctor may also want you to decrease the amount of sodium in your diet. Lowering sodium while following the DASH diet can lower blood pressure even further than just the DASH diet alone. Follow-up care is a key part of your treatment and safety. Be sure to make and go to all appointments, and call your doctor if you are having problems. It's also a good idea to know your test results and keep a list of the medicines you take. How can you care for yourself at home? Following the DASH diet  · Eat 4 to 5 servings of fruit each day. A serving is 1 medium-sized piece of fruit, ½ cup chopped or canned fruit, 1/4 cup dried fruit, or 4 ounces (½ cup) of fruit juice. Choose fruit more often than fruit juice. · Eat 4 to 5 servings of vegetables each day. A serving is 1 cup of lettuce or raw leafy vegetables, ½ cup of chopped or cooked vegetables, or 4 ounces (½ cup) of vegetable juice. Choose vegetables more often than vegetable juice. · Get 2 to 3 servings of low-fat and fat-free dairy each day. A serving is 8 ounces of milk, 1 cup of yogurt, or 1 ½ ounces of cheese. · Eat 6 to 8 servings of grains each day.  A serving is 1 slice of bread, 1 ounce of dry cereal, or ½ cup of cooked rice, pasta, or cooked cereal. Try to choose whole-grain products as much as possible. · Limit lean meat, poultry, and fish to 2 servings each day. A serving is 3 ounces, about the size of a deck of cards. · Eat 4 to 5 servings of nuts, seeds, and legumes (cooked dried beans, lentils, and split peas) each week. A serving is 1/3 cup of nuts, 2 tablespoons of seeds, or ½ cup of cooked beans or peas. · Limit fats and oils to 2 to 3 servings each day. A serving is 1 teaspoon of vegetable oil or 2 tablespoons of salad dressing. · Limit sweets and added sugars to 5 servings or less a week. A serving is 1 tablespoon jelly or jam, ½ cup sorbet, or 1 cup of lemonade. · Eat less than 2,300 milligrams (mg) of sodium a day. If you limit your sodium to 1,500 mg a day, you can lower your blood pressure even more. Tips for success  · Start small. Do not try to make dramatic changes to your diet all at once. You might feel that you are missing out on your favorite foods and then be more likely to not follow the plan. Make small changes, and stick with them. Once those changes become habit, add a few more changes. · Try some of the following:  ? Make it a goal to eat a fruit or vegetable at every meal and at snacks. This will make it easy to get the recommended amount of fruits and vegetables each day. ? Try yogurt topped with fruit and nuts for a snack or healthy dessert. ? Add lettuce, tomato, cucumber, and onion to sandwiches. ? Combine a ready-made pizza crust with low-fat mozzarella cheese and lots of vegetable toppings. Try using tomatoes, squash, spinach, broccoli, carrots, cauliflower, and onions. ? Have a variety of cut-up vegetables with a low-fat dip as an appetizer instead of chips and dip. ? Sprinkle sunflower seeds or chopped almonds over salads. Or try adding chopped walnuts or almonds to cooked vegetables.   ? Try some vegetarian meals using beans and peas. Add garbanzo or kidney beans to salads. Make burritos and tacos with mashed woody beans or black beans. Where can you learn more? Go to http://mayo-kelly.info/  Enter H967 in the search box to learn more about \"DASH Diet: Care Instructions. \"  Current as of: December 16, 2019               Content Version: 12.5  © 5723-6475 [employer]. Care instructions adapted under license by [employer] (which disclaims liability or warranty for this information). If you have questions about a medical condition or this instruction, always ask your healthcare professional. [address] any warranty or liability for your use of this information.

## 2020-07-29 ENCOUNTER — HOSPITAL ENCOUNTER (OUTPATIENT)
Dept: LAB | Age: 60
Discharge: HOME | End: 2020-07-29
Payer: MEDICARE

## 2020-07-29 PROCEDURE — 85027 COMPLETE CBC AUTOMATED: CPT

## 2020-07-29 PROCEDURE — 36415 COLL VENOUS BLD VENIPUNCTURE: CPT

## 2020-07-29 PROCEDURE — 80061 LIPID PANEL: CPT

## 2020-07-29 PROCEDURE — 80053 COMPREHEN METABOLIC PANEL: CPT

## 2020-07-29 PROCEDURE — 82043 UR ALBUMIN QUANTITATIVE: CPT

## 2020-07-29 PROCEDURE — 84153 ASSAY OF PSA TOTAL: CPT

## 2020-07-29 NOTE — CDMP QUERY
Patient was admitted with nausea and vomiting and epigastric pain. EGD 
revealed multiple diagnoses including a small MWT. The patient did undergo an 
esophageal dilation for dysphagia at the end of the procedure as well. For clarification purposes, and to determine reportability, was the small [employer] tear? 
 
=> Spencer Metzger tear was clinically significant 
=> Spencer Metzger tear was not clinically significant 
=> Other_________________________________ 
=> Clinically indeterminable Risk Factors: 
Nausea & Vomiting Clinical Indicators: EGD which showed:  spencer quynh tear, duodenitis, duodenal polyp, gastric polyps, and dilation of the esophagus Treatment: None documented Thank you MARLIN GillilandN, RN, [address] [place] [address] Supervisor -[facility]

## 2020-07-30 ENCOUNTER — TELEPHONE (OUTPATIENT)
Dept: ENDOCRINOLOGY | Age: 60
End: 2020-07-30

## 2020-07-30 DIAGNOSIS — E11.22 TYPE 2 DIABETES MELLITUS WITH STAGE 3 CHRONIC KIDNEY DISEASE, WITH LONG-TERM CURRENT USE OF INSULIN (HCC): Primary | ICD-10-CM

## 2020-07-30 DIAGNOSIS — N18.30 TYPE 2 DIABETES MELLITUS WITH STAGE 3 CHRONIC KIDNEY DISEASE, WITH LONG-TERM CURRENT USE OF INSULIN (HCC): Primary | ICD-10-CM

## 2020-07-30 DIAGNOSIS — Z79.4 TYPE 2 DIABETES MELLITUS WITH STAGE 3 CHRONIC KIDNEY DISEASE, WITH LONG-TERM CURRENT USE OF INSULIN (HCC): Primary | ICD-10-CM

## 2020-07-30 LAB
ALBUMIN SERPL-MCNC: 3.6 G/DL (ref 3.8–4.9)
ALBUMIN/CREAT UR: 910 MG/G CREAT (ref 0–29)
ALBUMIN/GLOB SERPL: 1.4 {RATIO} (ref 1.2–2.2)
ALP SERPL-CCNC: 126 IU/L (ref 39–117)
ALT SERPL-CCNC: 21 IU/L (ref 0–44)
AST SERPL-CCNC: 9 IU/L (ref 0–40)
BILIRUB SERPL-MCNC: 0.2 MG/DL (ref 0–1.2)
BUN SERPL-MCNC: 50 MG/DL (ref 6–24)
BUN/CREAT SERPL: 15 (ref 9–20)
CALCIUM SERPL-MCNC: 9 MG/DL (ref 8.7–10.2)
CHLORIDE SERPL-SCNC: 104 MMOL/L (ref 96–106)
CHOLEST SERPL-MCNC: 175 MG/DL (ref 100–199)
CO2 SERPL-SCNC: 20 MMOL/L (ref 20–29)
CREAT SERPL-MCNC: 3.33 MG/DL (ref 0.76–1.27)
CREAT UR-MCNC: 86.5 MG/DL
ERYTHROCYTE [DISTWIDTH] IN BLOOD BY AUTOMATED COUNT: 13.2 % (ref 11.6–15.4)
GLOBULIN SER CALC-MCNC: 2.5 G/DL (ref 1.5–4.5)
GLUCOSE SERPL-MCNC: 272 MG/DL (ref 65–99)
HCT VFR BLD AUTO: 30.5 % (ref 37.5–51)
HDLC SERPL-MCNC: 31 MG/DL
HGB BLD-MCNC: 9.9 G/DL (ref 13–17.7)
INTERPRETATION: NORMAL
LDLC SERPL CALC-MCNC: 102 MG/DL (ref 0–99)
Lab: NORMAL
MCH RBC QN AUTO: 28.6 PG (ref 26.6–33)
MCHC RBC AUTO-ENTMCNC: 32.5 G/DL (ref 31.5–35.7)
MCV RBC AUTO: 88 FL (ref 79–97)
MICROALBUMIN UR-MCNC: 787.3 UG/ML
PLATELET # BLD AUTO: 321 X10E3/UL (ref 150–450)
POTASSIUM SERPL-SCNC: 5.4 MMOL/L (ref 3.5–5.2)
PROT SERPL-MCNC: 6.1 G/DL (ref 6–8.5)
PSA SERPL-MCNC: 1 NG/ML (ref 0–4)
RBC # BLD AUTO: 3.46 X10E6/UL (ref 4.14–5.8)
SODIUM SERPL-SCNC: 138 MMOL/L (ref 134–144)
TRIGL SERPL-MCNC: 212 MG/DL (ref 0–149)
VLDLC SERPL CALC-MCNC: 42 MG/DL (ref 5–40)
WBC # BLD AUTO: 9.4 X10E3/UL (ref 3.4–10.8)

## 2020-07-30 RX ORDER — INSULIN PUMP SYRINGE, 3 ML
EACH MISCELLANEOUS
Qty: 1 KIT | Refills: 0 | Status: ON HOLD | OUTPATIENT
Start: 2020-07-30 | End: 2021-01-01

## 2020-07-30 RX ORDER — PEN NEEDLE, DIABETIC 29 G X1/2"
1 NEEDLE, DISPOSABLE MISCELLANEOUS DAILY
Qty: 90 SYRINGE | Refills: 3 | Status: ON HOLD | OUTPATIENT
Start: 2020-07-30 | End: 2021-01-01

## 2020-07-30 RX ORDER — IBUPROFEN 200 MG
CAPSULE ORAL
Qty: 300 STRIP | Refills: 3 | Status: ON HOLD | OUTPATIENT
Start: 2020-07-30 | End: 2021-01-01

## 2020-07-30 NOTE — TELEPHONE ENCOUNTER
----- Message from Arthur Good sent at 7/28/2020  3:30 PM EDT -----  Regarding: Dr Oksana Lynch  General Message/Vendor Calls    Caller's first and last name: Kaushal Muse, daughter      Reason for call: Ada Goncalves  is requesting a KIMMY appt, Pt was hospitalized at [address] [facility], Discharged on 07/22/20 Dx- Chronic Kidney Failure. Pt was referred by the hospital to follow up with Dr Fatimah Coello due to his sugar levels being off.  Caller is also indicating that pt is in need of a new Meter, Test Strips, Syringes and Insulin      Callback required yes/no and why:yes      Best contact number(s):[phone]      Details to clarify the request:      Arthur Good

## 2020-07-30 NOTE — TELEPHONE ENCOUNTER
7/30/2020  3:58 PM    See message below 7/28/2020  Mr. Ugarte Seats daughter Britton called stating that her dad need a  New meter,Test Strips, Syringes and Insulin.       Thanks

## 2020-08-02 ENCOUNTER — HOSPITAL ENCOUNTER (OUTPATIENT)
Dept: PREADMISSION TESTING | Age: 60
Discharge: HOME | End: 2020-08-02
Payer: MEDICARE

## 2020-08-02 PROCEDURE — 87635 SARS-COV-2 COVID-19 AMP PRB: CPT

## 2020-08-02 NOTE — PROGRESS NOTES
Placed call to patient when he had not arrived for testing by 1000, patient states that he was told to arrive between 1000 and 1400, nurse will wait for patient

## 2020-08-03 LAB
SARS-COV-2, COV2NT: NOT DETECTED
SOURCE, COVRS: NORMAL
SPECIMEN SOURCE, FCOV2M: NORMAL

## 2020-08-04 ENCOUNTER — VIRTUAL VISIT (OUTPATIENT)
Dept: ENDOCRINOLOGY | Age: 60
End: 2020-08-04
Payer: MEDICARE

## 2020-08-04 DIAGNOSIS — I10 ESSENTIAL HYPERTENSION WITH GOAL BLOOD PRESSURE LESS THAN 130/80: ICD-10-CM

## 2020-08-04 DIAGNOSIS — Z79.4 TYPE 2 DIABETES MELLITUS WITH STAGE 3 CHRONIC KIDNEY DISEASE, WITH LONG-TERM CURRENT USE OF INSULIN (HCC): Primary | ICD-10-CM

## 2020-08-04 DIAGNOSIS — Z12.5 PROSTATE CANCER SCREENING: ICD-10-CM

## 2020-08-04 DIAGNOSIS — E89.0 POST-SURGICAL HYPOTHYROIDISM: ICD-10-CM

## 2020-08-04 DIAGNOSIS — N18.30 TYPE 2 DIABETES MELLITUS WITH STAGE 3 CHRONIC KIDNEY DISEASE, WITH LONG-TERM CURRENT USE OF INSULIN (HCC): Primary | ICD-10-CM

## 2020-08-04 DIAGNOSIS — E78.5 HYPERLIPIDEMIA, UNSPECIFIED HYPERLIPIDEMIA TYPE: ICD-10-CM

## 2020-08-04 DIAGNOSIS — E11.22 TYPE 2 DIABETES MELLITUS WITH STAGE 3 CHRONIC KIDNEY DISEASE, WITH LONG-TERM CURRENT USE OF INSULIN (HCC): Primary | ICD-10-CM

## 2020-08-04 DIAGNOSIS — C73 PAPILLARY THYROID CARCINOMA (HCC): ICD-10-CM

## 2020-08-04 DIAGNOSIS — E29.1 MALE HYPOGONADISM: ICD-10-CM

## 2020-08-04 DIAGNOSIS — C79.9 METASTASIS FROM THYROID CANCER (HCC): ICD-10-CM

## 2020-08-04 DIAGNOSIS — C73 METASTASIS FROM THYROID CANCER (HCC): ICD-10-CM

## 2020-08-04 PROCEDURE — 99442 PR PHYS/QHP TELEPHONE EVALUATION 11-20 MIN: CPT | Performed by: INTERNAL MEDICINE

## 2020-08-04 NOTE — PROGRESS NOTES
**DUE TO PANDEMIC AND HEALTH CONCERNS IN THE COMMUNITY, THIS PATIENT WAS EITHER ILL OR FOUND TO BE HIGH RISK FOR IN-PERSON EVALUATION WITHIN THE CLINIC. THE FOLLOWING IS A VIRTUAL VISIT VIA A TELEPHONE ENCOUNTER TO WHICH THE PATIENT AGREED. THE PURPOSE IS TO LIMIT INTERRUPTIONS IN HEALTHCARE AND TO PROVIDE FOR ONGOING URGENT NEEDS UNDER THE CURRENT CONDITIONS. THE PATIENT CONFIRMS THEY ARE AWARE OF THE LIMITATIONS OF THE TELEPHONE VISIT. CHIEF COMPLAINT: f/u evaluation for uncontrolled type 2 diabetes and thyroid cancer s/p thyroidectomy and STRATTON 
 
HISTORY OF PRESENT ILLNESS:  
Dru Duke is a 64 y.o. male with a PMHx as noted below who presents to the endocrinology clinic for f/u evaluation of uncontrolled type 2 diabetes. A1c recently 6.3% and stable. Had been on metformin though renal function has declined since then. Diabetes type 2: 
Currently taking the following meds: 
Was taken off glipizide and metformin in the hospital 
He was sent home on NPH 2-10 units Reports home sugars 130's-150 in the AM 
 
Review of most recent diabetes-related labs: 
Lab Results Component Value Date HBA1C 6.3 (H) 07/18/2020 HBA1C 7.4 (H) 05/02/2019 HBA1C 9.0 (H) 09/27/2018 BFH3FFUI 8.0 01/31/2020 JDJ7QQLW 9.2 07/05/2018 WXC0ZMUS 8.8 02/02/2018 CHOL 175 07/29/2020 LDLC 102 (H) 07/29/2020 GFRAA 22 (L) 07/29/2020 GFRNA 19 (L) 07/29/2020 MCACR 910 (H) 07/29/2020 TSH 0.02 (L) 07/17/2020 VITD3 24.9 (L) 07/17/2020 [id number] = IA-2 pancreatic islet cell autoantibody GADLT = CLAIRE-65 autoantibody MCACR = Urine Microalbumin 
 
-------------------------------------------------- Thyroid Cancer:  
 
2002 Biopsy suggesting PTC 
 S/p total thyroidectomy S/p STRATTON 
[id number]  Reports negative WBS 
10/10/16  Repeat surgery, 2 right paratrachial LN's 
 Surgical Path: poorly differentiated PTC Patient with hoarseness of voice, persistent  Continued on 200mcg LT4 
 11/29/16  Initial visit with me for thyroid cancer and diabetes 11/30/16 Tg 10.1, TgAb negative, TSH 1.93 (patient notes this is the lowest Tg level compared with prior) 12/14/16 Neck Ultrasound: \"Thyroid bed is empty, no anterior cervical mass or significant adenopathy\" 01/18/17 Thyrogen stimulated WBS: \"No evidence of uptake in thyroid bed, No evidence of metastatic disease\" 11/08/17 Thyroid US, no residual thyroid tissue, no pathologic adenopathy. 01/31/18: TSH 2.2 on 200 mcg LT4,  Tg/TgAb was not collected 02/02/18: Tg 24.4 unstimulated, TgAb <1  (led to further eval below) 02/21/18: I-131 WBS: no uptake in thyroid, normal physiologic update otherwise noted. 03/22/18: CT Neck/chest/abdomen: Multiple pulm nodules, some larger some smaller. 03/23/18: Oncology consultation w/ Dr. Hasmukh Lorenzo, agreed on monitoring asymptomatic disease 04/04/18: NM Bone Scan: No evidence of bony metastases. 09/14/18: CT Chest: Multiple bilateral pulmonary nodules, not significantly changed in size /number. 09/27/18: Tg 11.7 unstimulated, stable, TgAb <1.0, TSH 0.01, FT4 1.65 Denies symptoms of hyperthyroidism, Taking 200 mcg 6 days/week, and 1/2 tab once per week Recent TSH is again 0.02, purposeful TSH suppression in setting of metastatic thyroid cancer Has seen Oncology in March, conservative management / observation. He has not yet repeated his labs / Thyroglobulin levels, Review of most recent thyroid function: 
Lab Results Component Value Date TSH 0.02 (L) 07/17/2020 TSH 0.03 (L) 05/17/2020 TSH 0.038 (L) 01/31/2020 FT4 1.2 07/17/2020 FT4 1.55 01/31/2020 FT4 1.65 09/27/2018 FRET3 2.1 (L) 10/16/2016 THYG 11.7 09/27/2018 THYG 13.6 07/10/2018 THYG 24.4 02/02/2018 TGAB <1.0 09/27/2018 TGAB <1.0 07/10/2018 TGAB <1.0 02/02/2018 Thyroid Lab Key: 
TSILT = Thyroid stimulating antibodies TRALT = TSH Receptor Antibodies TMCLT = TPO antibodies T3LT = Total T3 levels [id number] = Direct FT4 
[id number] = Free T3 
 
------------ Male hypogonadism:  
Testosterone gel, 1 pump daily, Taking, No recent levels, PAST MEDICAL/SURGICAL HISTORY:  
Past Medical History:  
Diagnosis Date  Adverse effect of anesthesia \" STOP BREATHING 1 TIME C ANESTH\"  Calculus of kidney  Cancer [facility]) 2004 thyroid cancer  Chronic kidney disease  Chronic pain BACK SHOULDER AND ARM  Depression  Diabetes ([facility] [address]  Encounter for long-term (current) use of NSAIDs  Hypercholesterolemia  Hypertension  Nausea & vomiting  Other ill-defined conditions(799.89) cholesterol, thyroid  Sleep apnea   
 doesn't wear cpap  Thyroid cancer ([facility] [address]  TIA (transient ischemic attack) 2011  Vitamin D deficiency Past Surgical History:  
Procedure Laterality Date  CARDIAC SURG PROCEDURE UNLIST  HX HEENT    
 THROAT SURGERY X 4  
 HX ORTHOPAEDIC    
 back  HX ORTHOPAEDIC    
 ARM AND SHOULDER  
 HX OTHER SURGICAL    
 thyroid, lymphnode  HX RETINAL DETACHMENT REPAIR    
 left eye  UPPER GI ENDOSCOPY,BALL DIL,30MM  7/17/2020  UPPER GI ENDOSCOPY,BIOPSY  7/17/2020  VASCULAR SURGERY PROCEDURE UNLIST    
 cardiac cath NEG. ALLERGIES:  
Allergies Allergen Reactions  Anesthetics - Amide Type Shortness of Breath  Flexeril [Cyclobenzaprine] Hives  Tramadol Hives MEDICATIONS ON ADMISSION:  
 
Current Outpatient Medications:  
  insulin NPH (HumuLIN N NPH U-100 Insulin) 100 unit/mL injection, 2-10 units daily, Disp: 3 Vial, Rfl: 3 
  simvastatin (ZOCOR) 20 mg tablet, Take 1 Tab by mouth nightly., Disp: 90 Tab, Rfl: 3   Ventolin HFA 90 mcg/actuation inhaler, TAKE 1-2 PUFFS EVEERY 4-6 HOURS AS NEEDED FOR DYSPNEA AND WHEEZING, Disp: 1 Inhaler, Rfl: 2 
  amLODIPine (NORVASC) 5 mg tablet, Take 1 Tab by mouth two (2) times a day., Disp: 60 Tab, Rfl: 3   carvediloL (COREG) 12.5 mg tablet, Take 1 Tab by mouth two (2) times daily (with meals). Indications: high blood pressure, Disp: 60 Tab, Rfl: 3 
  Omeprazole delayed release (PRILOSEC D/R) 20 mg tablet, Take 1 Tab by mouth daily. , Disp: 30 Tab, Rfl: 1 
  testosterone 30 mg/actuation (1.5 mL) slpm, Apply 1-2 pumps daily as directed on your visit, Disp: 90 mL, Rfl: 3 
  levothyroxine (SYNTHROID) 200 mcg tablet, TAKE 1 TABLET BY MOUTH ONCE DAILY BEFORE BREAKFAST, Disp: 90 Tab, Rfl: 1   Blood-Glucose Meter monitoring kit, 1 preferred brand glucometer for checking home glucose, E11.22, Disp: 1 Kit, Rfl: 0 
  glucose blood VI test strips (blood glucose test) strip, Pharmacist to choose preferred meter and strips. Dx:E11.65, Z79.4 Monitor 3 times daily, Disp: 300 Strip, Rfl: 3 
  Insulin Syringe-Needle U-100 (Advocate Syringes) 0.3 mL 30 gauge x 5/16\" syrg, 1 Each by Does Not Apply route daily. , Disp: 90 Syringe, Rfl: 3 
  glucose blood VI test strips (PHARMACIST CHOICE) strip, One Touch  Check glucose 3-4 times daily. DX E11.22, Disp: 300 Strip, Rfl: 3 
  Lancets misc, Use preferred brand; Check glucose 3-4 times daily, Diagnosis E11.22, Disp: 2 Package, Rfl: 3 SOCIAL HISTORY:  
Social History Socioeconomic History  Marital status: LEGALLY  Spouse name: Not on file  Number of children: Not on file  Years of education: Not on file  Highest education level: Not on file Occupational History  Not on file Social Needs  Financial resource strain: Not on file  Food insecurity Worry: Not on file Inability: Not on file  Transportation needs Medical: Not on file Non-medical: Not on file Tobacco Use  Smoking status: Former Smoker Last attempt to quit: 1994 Years since quittin.9  Smokeless tobacco: Never Used Substance and Sexual Activity  Alcohol use: Yes Comment: Occasionally  Drug use: No  
 Sexual activity: Never Lifestyle  Physical activity Days per week: Not on file Minutes per session: Not on file  Stress: Not on file Relationships  Social connections Talks on phone: Not on file Gets together: Not on file Attends Sabianism service: Not on file Active member of club or organization: Not on file Attends meetings of clubs or organizations: Not on file Relationship status: Not on file  Intimate partner violence Fear of current or ex partner: Not on file Emotionally abused: Not on file Physically abused: Not on file Forced sexual activity: Not on file Other Topics Concern  Not on file Social History Narrative  Not on file FAMILY HISTORY: 
Family History Problem Relation Age of Onset  Diabetes Mother  Elevated Lipids Mother Cherlynn Cambric Bladder Disease Mother  Headache Mother  Migraines Mother  Heart Disease Mother  Hypertension Mother  Stroke Mother [employer] Other Mother ANEURSYM BRAIN  
 Bleeding Prob Father  Cancer Father LEUKEMIA  Diabetes Father  Elevated Lipids Father  Mental Retardation Sister  Psychiatric Disorder Sister  Cancer Brother LUNGS REVIEW OF SYSTEMS: Complete ROS assessed and noted for that which is described above, all else are negative. Eyes: normal 
ENT: hoarseness of voice CVS: normal 
Resp: normal 
GI: normal 
: normal 
GYN: normal 
Endocrine: normal 
Integument: normal 
Musculoskeletal: chronic pain Neuro: normal 
Psych: normal 
 
 
PHYSICAL EXAMINATION: 
Telephone visit REVIEW OF LABORATORY AND RADIOLOGY DATA:  
Labs and documentation have been reviewed as described above. ASSESSMENT AND PLAN:  
Ava Hwang is a 64 y.o. male with a PMHx as noted above who presents to the endocrinology clinic for f/u evaluation of uncontrolled type 2 diabetes and thyroid cancer. DM2 uncontrolled HTN 
HLD Thyroid cancer with active metastases Post surgical hypothyroidism Male Hypogonadism DM2: 
Rudy Celestin: Ok to stay off Metformin off due to renal function Glipizide off per hospital discharge NPH insulin: trial 5 units BID 
 
HTN: telemedicine visit HLD: simvastatin 20mg Male Hypogonadism: Testosterone gel, 1 pump per day, checking level Vitamin D deficiency:  Calcium and D were low, not on supplement as reported he was consuming dietary sources, will need to reconsider supplement. Thyroid Cancer / Hypothyroidism Levothyroxine 200 mcg 6 days/week, and 1/2 tab once per week, Conservative treatment with surveillance and TSH suppression, Following with oncology, 
Prior CT showing some growth of lung nodule, no new nodules, Need to assure TSH suppression, check level today, Again, updating his thyroid panel for assessment RTC in 3-4 months, Nov 24 at 11:30 
 
20 minutes spent toward telephone visit today of which >50% of this time was spent in counseling and coordination of care. Fleet Grate. [address] [facility]

## 2020-08-06 ENCOUNTER — HOSPITAL ENCOUNTER (OUTPATIENT)
Age: 60
Setting detail: OUTPATIENT SURGERY
Discharge: HOME | End: 2020-08-06
Attending: SPECIALIST | Admitting: SPECIALIST
Payer: MEDICARE

## 2020-08-06 ENCOUNTER — TELEPHONE (OUTPATIENT)
Dept: FAMILY MEDICINE CLINIC | Age: 60
End: 2020-08-06

## 2020-08-06 VITALS
WEIGHT: 234 LBS | OXYGEN SATURATION: 95 % | BODY MASS INDEX: 34.66 KG/M2 | RESPIRATION RATE: 22 BRPM | HEIGHT: 69 IN | SYSTOLIC BLOOD PRESSURE: 155 MMHG | DIASTOLIC BLOOD PRESSURE: 69 MMHG | HEART RATE: 78 BPM

## 2020-08-06 PROCEDURE — 76040000019: Performed by: SPECIALIST

## 2020-08-06 RX ORDER — LIDOCAINE HYDROCHLORIDE 20 MG/ML
JELLY TOPICAL ONCE
Status: DISCONTINUED | OUTPATIENT
Start: 2020-08-06 | End: 2020-08-06 | Stop reason: HOSPADM

## 2020-08-06 NOTE — DISCHARGE INSTRUCTIONS
Vicki Moyer  [id number]  1960      MANOMETRY DISCHARGE INSTRUCTION    You may resume your regular diet as tolerated. You may resume your normal daily activities. If you develop a sore throat- throat lozenges or warm salt water gargles will help. Call your Physician if you have any complications or questions.

## 2020-08-06 NOTE — TELEPHONE ENCOUNTER
Dajuan Camarillo dtr calling     States patient is unable to sleep       pls call her at [phone]

## 2020-08-06 NOTE — PROGRESS NOTES
Pt unable to have lidocaine gel. Hurricane spray discussed with pt and is ok. Sprayed back of pt throat and inserted probe into left nare without difficulty, however, during advancement of probe, pt began coughing. Pt requested probe be removed and did not want to proceed any further.

## 2020-08-06 NOTE — TELEPHONE ENCOUNTER
Phoned daughter is not listed on HIP PA form no information given. Patient phoned at number listed no answer voicemail message left to call the office.

## 2020-08-15 ENCOUNTER — HOSPITAL ENCOUNTER (INPATIENT)
Age: 60
LOS: 12 days | Discharge: HOME HEALTH | DRG: 673 | End: 2020-08-27
Attending: HOSPITALIST | Admitting: HOSPITALIST
Payer: MEDICARE

## 2020-08-15 ENCOUNTER — APPOINTMENT (OUTPATIENT)
Dept: CT IMAGING | Age: 60
DRG: 673 | End: 2020-08-15
Attending: HOSPITALIST
Payer: MEDICARE

## 2020-08-15 DIAGNOSIS — C79.9 METASTATIC CARCINOMA (HCC): ICD-10-CM

## 2020-08-15 DIAGNOSIS — Z71.89 ADVANCED CARE PLANNING/COUNSELING DISCUSSION: ICD-10-CM

## 2020-08-15 DIAGNOSIS — Z71.89 GOALS OF CARE, COUNSELING/DISCUSSION: ICD-10-CM

## 2020-08-15 DIAGNOSIS — E79.0 HYPERURICEMIA: ICD-10-CM

## 2020-08-15 DIAGNOSIS — F43.21 GRIEF: ICD-10-CM

## 2020-08-15 PROBLEM — D64.9 ANEMIA: Status: ACTIVE | Noted: 2020-08-15

## 2020-08-15 PROBLEM — Z20.822 SUSPECTED COVID-19 VIRUS INFECTION: Status: ACTIVE | Noted: 2020-08-15

## 2020-08-15 PROBLEM — C34.90 LUNG CANCER (HCC): Status: ACTIVE | Noted: 2020-08-15

## 2020-08-15 PROBLEM — J18.9 PNEUMONIA: Status: ACTIVE | Noted: 2020-08-15

## 2020-08-15 PROBLEM — R07.9 CHEST PAIN: Status: ACTIVE | Noted: 2020-08-15

## 2020-08-15 LAB
ANION GAP SERPL CALC-SCNC: 7 MMOL/L (ref 5–15)
BASOPHILS # BLD: 0.1 K/UL (ref 0–0.1)
BASOPHILS NFR BLD: 1 % (ref 0–1)
BNP SERPL-MCNC: 3674 PG/ML
BUN SERPL-MCNC: 60 MG/DL (ref 6–20)
BUN/CREAT SERPL: 15 (ref 12–20)
CALCIUM SERPL-MCNC: 8.1 MG/DL (ref 8.5–10.1)
CHLORIDE SERPL-SCNC: 110 MMOL/L (ref 97–108)
CO2 SERPL-SCNC: 23 MMOL/L (ref 21–32)
CREAT SERPL-MCNC: 4.03 MG/DL (ref 0.7–1.3)
DIFFERENTIAL METHOD BLD: ABNORMAL
EOSINOPHIL # BLD: 0.4 K/UL (ref 0–0.4)
EOSINOPHIL NFR BLD: 3 % (ref 0–7)
ERYTHROCYTE [DISTWIDTH] IN BLOOD BY AUTOMATED COUNT: 13.5 % (ref 11.5–14.5)
EST. AVERAGE GLUCOSE BLD GHB EST-MCNC: 171 MG/DL
GLUCOSE BLD STRIP.AUTO-MCNC: 174 MG/DL (ref 65–100)
GLUCOSE BLD STRIP.AUTO-MCNC: 214 MG/DL (ref 65–100)
GLUCOSE BLD STRIP.AUTO-MCNC: 225 MG/DL (ref 65–100)
GLUCOSE BLD STRIP.AUTO-MCNC: 254 MG/DL (ref 65–100)
GLUCOSE SERPL-MCNC: 222 MG/DL (ref 65–100)
HBA1C MFR BLD: 7.6 % (ref 4–5.6)
HCT VFR BLD AUTO: 24.9 % (ref 36.6–50.3)
HEALTH STATUS, XMCV2T: NORMAL
HGB BLD-MCNC: 7.9 G/DL (ref 12.1–17)
IMM GRANULOCYTES # BLD AUTO: 0.1 K/UL (ref 0–0.04)
IMM GRANULOCYTES NFR BLD AUTO: 1 % (ref 0–0.5)
LYMPHOCYTES # BLD: 0.9 K/UL (ref 0.8–3.5)
LYMPHOCYTES NFR BLD: 9 % (ref 12–49)
MAGNESIUM SERPL-MCNC: 2 MG/DL (ref 1.6–2.4)
MCH RBC QN AUTO: 28.8 PG (ref 26–34)
MCHC RBC AUTO-ENTMCNC: 31.7 G/DL (ref 30–36.5)
MCV RBC AUTO: 90.9 FL (ref 80–99)
MONOCYTES # BLD: 0.8 K/UL (ref 0–1)
MONOCYTES NFR BLD: 8 % (ref 5–13)
NEUTS SEG # BLD: 8.1 K/UL (ref 1.8–8)
NEUTS SEG NFR BLD: 78 % (ref 32–75)
NRBC # BLD: 0 K/UL (ref 0–0.01)
NRBC BLD-RTO: 0 PER 100 WBC
PLATELET # BLD AUTO: 356 K/UL (ref 150–400)
PMV BLD AUTO: 9.8 FL (ref 8.9–12.9)
POTASSIUM SERPL-SCNC: 4.9 MMOL/L (ref 3.5–5.1)
RBC # BLD AUTO: 2.74 M/UL (ref 4.1–5.7)
SARS-COV-2, COV2: NOT DETECTED
SERVICE CMNT-IMP: ABNORMAL
SODIUM SERPL-SCNC: 140 MMOL/L (ref 136–145)
SOURCE, COVRS: NORMAL
SPECIMEN SOURCE, FCOV2M: NORMAL
SPECIMEN TYPE, XMCV1T: NORMAL
TROPONIN I SERPL-MCNC: <0.05 NG/ML
TROPONIN I SERPL-MCNC: <0.05 NG/ML
WBC # BLD AUTO: 10.2 K/UL (ref 4.1–11.1)

## 2020-08-15 PROCEDURE — 74011000250 HC RX REV CODE- 250: Performed by: HOSPITALIST

## 2020-08-15 PROCEDURE — 74011250636 HC RX REV CODE- 250/636: Performed by: INTERNAL MEDICINE

## 2020-08-15 PROCEDURE — 87449 NOS EACH ORGANISM AG IA: CPT

## 2020-08-15 PROCEDURE — 74011000258 HC RX REV CODE- 258: Performed by: HOSPITALIST

## 2020-08-15 PROCEDURE — 84484 ASSAY OF TROPONIN QUANT: CPT

## 2020-08-15 PROCEDURE — 36415 COLL VENOUS BLD VENIPUNCTURE: CPT

## 2020-08-15 PROCEDURE — 80048 BASIC METABOLIC PNL TOTAL CA: CPT

## 2020-08-15 PROCEDURE — 85025 COMPLETE CBC W/AUTO DIFF WBC: CPT

## 2020-08-15 PROCEDURE — C9113 INJ PANTOPRAZOLE SODIUM, VIA: HCPCS | Performed by: HOSPITALIST

## 2020-08-15 PROCEDURE — 71250 CT THORAX DX C-: CPT

## 2020-08-15 PROCEDURE — 82962 GLUCOSE BLOOD TEST: CPT

## 2020-08-15 PROCEDURE — 83036 HEMOGLOBIN GLYCOSYLATED A1C: CPT

## 2020-08-15 PROCEDURE — 77010033678 HC OXYGEN DAILY

## 2020-08-15 PROCEDURE — 74011636637 HC RX REV CODE- 636/637: Performed by: HOSPITALIST

## 2020-08-15 PROCEDURE — 83735 ASSAY OF MAGNESIUM: CPT

## 2020-08-15 PROCEDURE — 83880 ASSAY OF NATRIURETIC PEPTIDE: CPT

## 2020-08-15 PROCEDURE — 94640 AIRWAY INHALATION TREATMENT: CPT

## 2020-08-15 PROCEDURE — 65660000000 HC RM CCU STEPDOWN

## 2020-08-15 PROCEDURE — 87635 SARS-COV-2 COVID-19 AMP PRB: CPT

## 2020-08-15 PROCEDURE — 74011250637 HC RX REV CODE- 250/637: Performed by: HOSPITALIST

## 2020-08-15 PROCEDURE — 74011250636 HC RX REV CODE- 250/636: Performed by: HOSPITALIST

## 2020-08-15 RX ORDER — AMLODIPINE BESYLATE 5 MG/1
5 TABLET ORAL 2 TIMES DAILY
Status: DISCONTINUED | OUTPATIENT
Start: 2020-08-15 | End: 2020-08-18

## 2020-08-15 RX ORDER — ONDANSETRON 2 MG/ML
4 INJECTION INTRAMUSCULAR; INTRAVENOUS
Status: DISCONTINUED | OUTPATIENT
Start: 2020-08-15 | End: 2020-08-27 | Stop reason: HOSPADM

## 2020-08-15 RX ORDER — DEXTROSE 50 % IN WATER (D50W) INTRAVENOUS SYRINGE
12.5-25 AS NEEDED
Status: DISCONTINUED | OUTPATIENT
Start: 2020-08-15 | End: 2020-08-27 | Stop reason: HOSPADM

## 2020-08-15 RX ORDER — SODIUM CHLORIDE 0.9 % (FLUSH) 0.9 %
5-40 SYRINGE (ML) INJECTION EVERY 8 HOURS
Status: DISCONTINUED | OUTPATIENT
Start: 2020-08-15 | End: 2020-08-27 | Stop reason: HOSPADM

## 2020-08-15 RX ORDER — SODIUM CHLORIDE 9 MG/ML
75 INJECTION, SOLUTION INTRAVENOUS CONTINUOUS
Status: DISCONTINUED | OUTPATIENT
Start: 2020-08-15 | End: 2020-08-16

## 2020-08-15 RX ORDER — ACETAMINOPHEN 325 MG/1
650 TABLET ORAL
Status: DISCONTINUED | OUTPATIENT
Start: 2020-08-15 | End: 2020-08-27 | Stop reason: HOSPADM

## 2020-08-15 RX ORDER — SUCRALFATE 1 G/1
1 TABLET ORAL
Status: DISCONTINUED | OUTPATIENT
Start: 2020-08-15 | End: 2020-08-27 | Stop reason: HOSPADM

## 2020-08-15 RX ORDER — INSULIN LISPRO 100 [IU]/ML
INJECTION, SOLUTION INTRAVENOUS; SUBCUTANEOUS
Status: DISCONTINUED | OUTPATIENT
Start: 2020-08-15 | End: 2020-08-27 | Stop reason: HOSPADM

## 2020-08-15 RX ORDER — ALBUTEROL SULFATE 90 UG/1
2 AEROSOL, METERED RESPIRATORY (INHALATION)
Status: DISCONTINUED | OUTPATIENT
Start: 2020-08-15 | End: 2020-08-15

## 2020-08-15 RX ORDER — ALBUTEROL SULFATE 90 UG/1
2 AEROSOL, METERED RESPIRATORY (INHALATION)
Status: DISCONTINUED | OUTPATIENT
Start: 2020-08-15 | End: 2020-08-27 | Stop reason: HOSPADM

## 2020-08-15 RX ORDER — POLYETHYLENE GLYCOL 3350 17 G/17G
17 POWDER, FOR SOLUTION ORAL DAILY PRN
Status: DISCONTINUED | OUTPATIENT
Start: 2020-08-15 | End: 2020-08-17

## 2020-08-15 RX ORDER — LEVOTHYROXINE SODIUM 100 UG/1
200 TABLET ORAL
Status: DISCONTINUED | OUTPATIENT
Start: 2020-08-15 | End: 2020-08-27 | Stop reason: HOSPADM

## 2020-08-15 RX ORDER — ATORVASTATIN CALCIUM 10 MG/1
10 TABLET, FILM COATED ORAL
Status: DISCONTINUED | OUTPATIENT
Start: 2020-08-15 | End: 2020-08-27 | Stop reason: HOSPADM

## 2020-08-15 RX ORDER — MORPHINE SULFATE 2 MG/ML
1 INJECTION, SOLUTION INTRAMUSCULAR; INTRAVENOUS
Status: DISCONTINUED | OUTPATIENT
Start: 2020-08-15 | End: 2020-08-17

## 2020-08-15 RX ORDER — SODIUM CHLORIDE 0.9 % (FLUSH) 0.9 %
5-40 SYRINGE (ML) INJECTION AS NEEDED
Status: DISCONTINUED | OUTPATIENT
Start: 2020-08-15 | End: 2020-08-27 | Stop reason: HOSPADM

## 2020-08-15 RX ORDER — ACETAMINOPHEN 650 MG/1
650 SUPPOSITORY RECTAL
Status: DISCONTINUED | OUTPATIENT
Start: 2020-08-15 | End: 2020-08-27 | Stop reason: HOSPADM

## 2020-08-15 RX ORDER — CARVEDILOL 12.5 MG/1
12.5 TABLET ORAL 2 TIMES DAILY WITH MEALS
Status: DISCONTINUED | OUTPATIENT
Start: 2020-08-15 | End: 2020-08-18

## 2020-08-15 RX ORDER — MAGNESIUM SULFATE 100 %
4 CRYSTALS MISCELLANEOUS AS NEEDED
Status: DISCONTINUED | OUTPATIENT
Start: 2020-08-15 | End: 2020-08-27 | Stop reason: HOSPADM

## 2020-08-15 RX ORDER — PROMETHAZINE HYDROCHLORIDE 25 MG/1
12.5 TABLET ORAL
Status: DISCONTINUED | OUTPATIENT
Start: 2020-08-15 | End: 2020-08-27 | Stop reason: HOSPADM

## 2020-08-15 RX ADMIN — ALBUTEROL SULFATE 2 PUFF: 90 AEROSOL, METERED RESPIRATORY (INHALATION) at 11:12

## 2020-08-15 RX ADMIN — LEVOTHYROXINE SODIUM 200 MCG: 0.15 TABLET ORAL at 08:12

## 2020-08-15 RX ADMIN — SUCRALFATE 1 G: 1 TABLET ORAL at 11:39

## 2020-08-15 RX ADMIN — INSULIN LISPRO 2 UNITS: 100 INJECTION, SOLUTION INTRAVENOUS; SUBCUTANEOUS at 09:19

## 2020-08-15 RX ADMIN — SUCRALFATE 1 G: 1 TABLET ORAL at 08:14

## 2020-08-15 RX ADMIN — SODIUM CHLORIDE 10 ML: 9 INJECTION, SOLUTION INTRAMUSCULAR; INTRAVENOUS; SUBCUTANEOUS at 13:41

## 2020-08-15 RX ADMIN — SODIUM CHLORIDE 75 ML/HR: 900 INJECTION, SOLUTION INTRAVENOUS at 08:11

## 2020-08-15 RX ADMIN — INSULIN LISPRO 3 UNITS: 100 INJECTION, SOLUTION INTRAVENOUS; SUBCUTANEOUS at 17:19

## 2020-08-15 RX ADMIN — SODIUM CHLORIDE 10 ML: 9 INJECTION, SOLUTION INTRAMUSCULAR; INTRAVENOUS; SUBCUTANEOUS at 22:48

## 2020-08-15 RX ADMIN — AMLODIPINE BESYLATE 5 MG: 5 TABLET ORAL at 17:19

## 2020-08-15 RX ADMIN — AZITHROMYCIN MONOHYDRATE 500 MG: 500 INJECTION, POWDER, LYOPHILIZED, FOR SOLUTION INTRAVENOUS at 08:11

## 2020-08-15 RX ADMIN — AMLODIPINE BESYLATE 5 MG: 5 TABLET ORAL at 08:13

## 2020-08-15 RX ADMIN — SODIUM CHLORIDE 40 MG: 9 INJECTION, SOLUTION INTRAMUSCULAR; INTRAVENOUS; SUBCUTANEOUS at 22:47

## 2020-08-15 RX ADMIN — SODIUM CHLORIDE 40 MG: 9 INJECTION, SOLUTION INTRAMUSCULAR; INTRAVENOUS; SUBCUTANEOUS at 08:12

## 2020-08-15 RX ADMIN — CARVEDILOL 12.5 MG: 12.5 TABLET, FILM COATED ORAL at 08:13

## 2020-08-15 RX ADMIN — INSULIN LISPRO 2 UNITS: 100 INJECTION, SOLUTION INTRAVENOUS; SUBCUTANEOUS at 22:47

## 2020-08-15 RX ADMIN — SUCRALFATE 1 G: 1 TABLET ORAL at 16:12

## 2020-08-15 RX ADMIN — ATORVASTATIN CALCIUM 10 MG: 20 TABLET, FILM COATED ORAL at 22:47

## 2020-08-15 RX ADMIN — CEFTRIAXONE SODIUM 1 G: 1 INJECTION, POWDER, FOR SOLUTION INTRAMUSCULAR; INTRAVENOUS at 08:20

## 2020-08-15 RX ADMIN — CARVEDILOL 12.5 MG: 12.5 TABLET, FILM COATED ORAL at 16:12

## 2020-08-15 RX ADMIN — SUCRALFATE 1 G: 1 TABLET ORAL at 22:47

## 2020-08-15 NOTE — CONSULTS
NEPHROLOGY CONSULT NOTE     Patient: Roldan Cassidy MRN: [id number]  PCP: Adelaide Chilel MD   :     1960  Age:   64 y.o. Sex:  male      Referring physician: Marnie Chawla MD  Reason for consultation: 64 y.o. male with Pneumonia [J18.9]  Suspected COVID-19 virus infection [Z20.828]  NERY (acute kidney injury) ([facility] [address] [N17.9]  Anemia [D64.9]  Lung cancer ([facility] [address] [C34.90]  Chest pain [I86.9] complicated by NERY   Admission Date: 8/15/2020  5:47 AM  LOS: 0 days      ASSESSMENT and PLAN :   NERY on CKD:  - 2/2 volume depletion vs ATN, AIN or obstruction  - repeat labs pending  - start NS at 75cc/hr  - daily labs  - repeat U/S once COVID ruled out    CKD III w/ baseline Cr around 2    COVID-19 PUI    Hypoxia:  - stable on RA  - on abx per primary team  - CT chest w/o contrast ordered    HTN:  - on norvasc and coreg  - no ACE or ARBs    ? CHF:  - ECHO being performed  - no prior EF on file    Chronic Anemia:  - 2/2 CKD, ? GI losses  - repeat CBC pending     DM2:  - per hospitalist    Hx of thyroid cancer w/ lung mets       Active Problems / Assessment AAActive  : Active Problems:    NERY (acute kidney injury) ([facility] [address] (2020)      Lung cancer ([facility] [address] (8/15/2020)      Chest pain (8/15/2020)      Pneumonia (8/15/2020)      Anemia (8/15/2020)      Suspected COVID-19 virus infection (8/15/2020)         Subjective:   HPI: Roldan Cassidy is a 64 y.o.  male who has been admitted to the hospital for CP and SOB. He presented to an OSH w/ the above complaints. CXR showed possible viral PNA. Currently COVID-19 PUI. Hx obtained from chart. He has ahx of CKD III, recent admission for NERY on CKD at [address] 2/2 volume depletion from n/v and poor po intake. His U/S showed mild R hydro at that time. His Cr improved from 4 to 3 prior to transfer to [address] [place] [address]. Per report, his Cr was elevated at 4.6 at the outside ER prior to transfer. Awaiting current repeat labs. Not on fluids. O2 sats stable.   No complaints or other issues per RN.    Past Medical Hx:   Past Medical History:   Diagnosis Date    Adverse effect of anesthesia     \" STOP BREATHING 1 TIME C ANESTH\"    Calculus of kidney     Cancer ([facility] [address] 2004    thyroid cancer    Chronic kidney disease     Chronic pain     BACK SHOULDER AND ARM    Depression     Diabetes ([facility] [address]     Encounter for long-term (current) use of NSAIDs     Hypercholesterolemia     Hypertension     Nausea & vomiting     Other ill-defined conditions(799.89)     cholesterol, thyroid    Sleep apnea     doesn't wear cpap    Thyroid cancer ([facility] [address]     TIA (transient ischemic attack) 2011    Vitamin D deficiency         Past Surgical Hx:     Past Surgical History:   Procedure Laterality Date    CARDIAC SURG PROCEDURE UNLIST      HX HEENT      THROAT SURGERY X 4    HX ORTHOPAEDIC      back     HX ORTHOPAEDIC      ARM AND SHOULDER    HX OTHER SURGICAL      thyroid, lymphnode    HX RETINAL DETACHMENT REPAIR      left eye    UPPER GI ENDOSCOPY,BALL DIL,30MM  7/17/2020         UPPER GI ENDOSCOPY,BIOPSY  7/17/2020         VASCULAR SURGERY PROCEDURE UNLIST      cardiac cath NEG. Medications:  Prior to Admission medications    Medication Sig Start Date End Date Taking? Authorizing Provider   Blood-Glucose Meter monitoring kit 1 preferred brand glucometer for checking home glucose, E11.22 7/30/20  Yes Mark Monsalve MD   glucose blood VI test strips (blood glucose test) strip Pharmacist to choose preferred meter and strips. Dx:E11.65, Z79.4 Monitor 3 times daily 7/30/20  Yes Mark Monsalve MD   insulin NPH (HumuLIN N NPH U-100 Insulin) 100 unit/mL injection 2-10 units daily 7/30/20  Yes Mark Monsalve MD   Insulin Syringe-Needle U-100 (Advocate Syringes) 0.3 mL 30 gauge x 5/16\" syrg 1 Each by Does Not Apply route daily. 7/30/20  Yes Mark Monsalve MD   amLODIPine (NORVASC) 5 mg tablet Take 1 Tab by mouth two (2) times a day.  7/22/20  Yes Ignacia Curtis MD   carvediloL (COREG) 12.5 mg tablet Take 1 Tab by mouth two (2) times daily (with meals). Indications: high blood pressure 7/22/20  Yes Lisseth Florentino MD   levothyroxine (SYNTHROID) 200 mcg tablet TAKE 1 TABLET BY MOUTH ONCE DAILY BEFORE BREAKFAST 11/4/19  Yes Lele Alonzo MD   glucose blood VI test strips (PHARMACIST CHOICE) strip One Touch  Check glucose 3-4 times daily. DX E11.22 2/2/18  Yes Lele Alonzo MD   Lancets misc Use preferred brand; Check glucose 3-4 times daily, Diagnosis E11.22 2/2/18  Yes Lele Alonzo MD   simvastatin (ZOCOR) 20 mg tablet Take 1 Tab by mouth nightly. 7/28/20   Vincenzo Prado MD   Ventolin HFA 90 mcg/actuation inhaler TAKE 1-2 PUFFS EVEERY 4-6 HOURS AS NEEDED FOR DYSPNEA AND WHEEZING 7/28/20   Vincenzo Prado MD   Omeprazole delayed release (PRILOSEC D/R) 20 mg tablet Take 1 Tab by mouth daily. 7/22/20   Lisseth Florentino MD   testosterone 30 mg/actuation (1.5 mL) slpm Apply 1-2 pumps daily as directed on your visit 2/3/20   Lele Alonzo MD       Allergies   Allergen Reactions    Anesthetics - Amide Type Shortness of Breath    Flexeril [Cyclobenzaprine] Hives    Tramadol Hives       Social Hx:  reports that he quit smoking about 26 years ago. He has never used smokeless tobacco. He reports current alcohol use. He reports that he does not use drugs. Family History   Problem Relation Age of Onset    Diabetes Mother     Elevated Lipids Mother    Ama Fickle Bladder Disease Mother     Headache Mother    Aishwarya Acres Migraines Mother     Heart Disease Mother     Hypertension Mother     Stroke Mother     Other Mother         ANEURSYM BRAIN    Bleeding Prob Father     Cancer Father         LEUKEMIA    Diabetes Father     Elevated Lipids Father     Mental Retardation Sister     Psychiatric Disorder Sister     Cancer Brother         LUNGS       Review of Systems:  A twelve point review of system was performed today. Pertinent positives and negatives are mentioned in the HPI.  The reminder of the ROS is negative and noncontributory. Objective:    Vitals:    Vitals:    08/15/20 0552   BP: (!) 156/113   Pulse: 74   Resp: 18   Temp: 98.1 °F (36.7 °C)   SpO2: 100%     I&O's:  No intake/output data recorded.   Visit Vitals  BP (!) 156/113   Pulse 74   Temp 98.1 °F (36.7 °C)   Resp 18   SpO2 100%       Physical Exam:    Not examined in the room due to COVID-19 PUI:    General:Appears stable and in NAD  Lungs : stable oxygenation on NC  CVS: RRR on the monitor  Abdomen: obese  Neurologic: non focal per RN  : no estrada    Laboratory Results:    Lab Results   Component Value Date    BUN 50 (H) 07/29/2020     07/29/2020    K 5.4 (H) 07/29/2020     07/29/2020    CO2 20 07/29/2020       Lab Results   Component Value Date    BUN 50 (H) 07/29/2020    BUN 49 (H) 07/22/2020    BUN 60 (H) 07/20/2020    BUN 61 (H) 07/19/2020    BUN 74 (H) 07/18/2020    K 5.4 (H) 07/29/2020    K 4.9 07/22/2020    K 4.8 07/20/2020    K 4.6 07/19/2020    K 6.5 (H) 07/18/2020       Lab Results   Component Value Date    WBC 9.4 07/29/2020    RBC 3.46 (L) 07/29/2020    HGB 9.9 (L) 07/29/2020    HCT 30.5 (L) 07/29/2020    MCV 88 07/29/2020    MCH 28.6 07/29/2020    RDW 13.2 07/29/2020     07/29/2020       Lab Results   Component Value Date    PHOS 3.3 07/22/2020       Urine dipstick:   Lab Results   Component Value Date/Time    Color YELLOW/STRAW 07/17/2020 04:37 AM    Appearance CLEAR 07/17/2020 04:37 AM    Specific gravity 1.014 07/17/2020 04:37 AM    pH (UA) 5.0 07/17/2020 04:37 AM    Protein 300 (A) 07/17/2020 04:37 AM    Glucose Negative 07/17/2020 04:37 AM    Ketone Negative 07/17/2020 04:37 AM    Bilirubin Negative 07/17/2020 04:37 AM    Urobilinogen 0.2 07/17/2020 04:37 AM    Nitrites Negative 07/17/2020 04:37 AM    Leukocyte Esterase Negative 07/17/2020 04:37 AM    Epithelial cells FEW 07/17/2020 04:37 AM    Bacteria Negative 07/17/2020 04:37 AM    WBC 0-4 07/17/2020 04:37 AM    RBC 5-10 07/17/2020 04:37 AM       I have reviewed the following: All pertinent labs, microbiology data, radiology imaging for my assessment           Thank you for allowing us to participate in the care of this patient. We will follow patient. Please dont hesitate to call with any questions    Renetta Stewart MD  8/15/2020        [facility] [address]  Phone - [phone]   Fax - [phone]  www. [facility]

## 2020-08-15 NOTE — PROGRESS NOTES
0700H- Verbal shift change report given to [facility] RN (oncoming nurse) by Garett Vargas RN (offgoing nurse). Report included the following     information Procedure Summary, Intake/Output, MAR and Recent Results. 0900H- PCR-swab for covid19 collected and sent. 1200H- Seen and examined by Dr. Benitez Bañuelos no new orders was made. 1650H- CT chest was done. 1900H- Verbal shift change report given to [address] [place] [address] (oncoming nurse) by [facility] RN (offgoing nurse). Report included the following information Kardex, Procedure Summary, Intake/Output and MAR.

## 2020-08-15 NOTE — PROGRESS NOTES
[id number] TRANSFER - IN REPORT:    Verbal report received from Stacy Wallace on Farhat Dials  being received from [facility] ED for routine progression of care      Report consisted of patients Situation, Background, Assessment and   Recommendations(SBAR). Information from the following report(s) SBAR, ED Summary, Recent Results and Cardiac Rhythm NSR was reviewed with the receiving nurse. Opportunity for questions and clarification was provided. Assessment completed upon patients arrival to unit and care assumed.

## 2020-08-15 NOTE — PROGRESS NOTES
Unable to do echo at this time, per protocol, echo will be completed once patient is off droplet precautions. Unless emergent.

## 2020-08-15 NOTE — H&P
Hospitalist Admission Note    NAME: Unique Brown   :  1960   MRN:  [id number]     Date/Time:  8/15/2020 2:50 AM    Patient PCP: Oumar Bennett MD  _____________________________________________________________________  Given the patient's current clinical presentation, I have a high level of concern for decompensation if discharged from the emergency department. Complex decision making was performed, which includes reviewing the patient's available past medical records, laboratory results, and x-ray films. My assessment of this patient's clinical condition and my plan of care is as follows. Assessment / Plan:    Acute hypoxic resp. Failure  Currently on 2l NC sat. 100 and No distress  ProBNP 39K  Per report CXR at [facility]   Viral pneumonia vs edema vs lungs mets  B/L pneumonia vs pulm edema to r/o other causes to r/o COVID19  Per ed MD at [facility] they did not send covid pcr  Last night pending result  Cont. Complete covid isolation  -CT Chest wo con done at [facility]  But no report in the chart(Disc is in the chart)  -Primary team to upload the CT scan to the system in am and need to review with radiologist  -cont iv ceftriaxone/azihtro for now  -urine antigen  -check covid markers and procalcitonin level    Probable CHF:POA-  -check 2d echo to asses LVEF Cont tele serial enzymes will low Gonzalez prn in view of his h/o renal failure    Recurrent Chest pain could be 2/2 His gastritis/duodenitis  -IV PPI Sucralfate   -Primary GI  [facility] gastroenterologist specilaist consulted    Acute on chronic anemia- Hct last night 26 at [facility].  Previously  was 30  -Possible GI bleed  -Check FOBT  -CLD  -IVPP  -GI Consulted  -closely monitor h/h      NERY on CKD stage III POA  creatinine  Was 3.3 on 2020  Today at [facility] was 4.6  -Nephrology consulted  [facility]   -Hold  nephrotoxic medication  - Creatinine improving to 3.0  - No NSAIDs       History of diabetes mellitus  - SSI, A1c 6.3 on 7/2020     History of thyroid cancer with mets to lung  -Follows Dr. Lynda Larsen  -Hypothyroidsim-Continue levothyroxine      HX of Duodenitis POA  - 7/2020 EGD which showed:  spencer quynh tear, duodenitis, duodenal polyp, gastric polyps, and dilation of the esophagus    HTN-POA-Cont. Home meds      Code Status: full  Surrogate Decision Maker:  Valeria Coto  Daughter  [phone]          DVT Prophylaxis:   GI Prophylaxis: not indicated    Baseline: independent        Subjective:   CHIEF COMPLAINT: SOB / CP    HISTORY OF PRESENT ILLNESS:   Tre Elkins is a 64 y.o.  male who presents with past medical history of recurrent/metastatic STRATTON refractory carcinoma thyroid ,Mets to the lung, diabetes mellitus, hypertension, gastritis/deuodenitis currenntly on PPI  presented to ED with chief complaint of  Sob and was found to Hypoxic in 80s on RA  And 100% On 2L. Pt. c/o intermittent chest pain  For last few days . Pt. Was recently admitted to [facility] [place] 7/2020  Denies Flu like symptoms or sick Contact. Per pt. After Discharge from [facility] in 7/2020 he was seen by GI recently but they could not  Do the EGD. We were asked to admit for work up and evaluation of the above problems. 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 Recommend continue PPI on discharge. Will arrange for further O/P w/u with EMOT for further evaluate dysphagia. Also consider EUS to further evaluate duodenal polyp. Reviewed with patient and he is in agreement with plan.   General surgery considering CCY if biliary sx persist.  D/C plans per hospitalist.                Past Medical History:   Diagnosis Date    Adverse effect of anesthesia     \" STOP BREATHING 1 TIME C ANESTH\"    Calculus of kidney     Cancer ([facility]     thyroid cancer    Chronic kidney disease     Chronic pain     BACK SHOULDER AND ARM    Depression     Diabetes ([facility]     Encounter for long-term (current) use of NSAIDs     Hypercholesterolemia     Hypertension     Nausea & vomiting     Other ill-defined conditions(799.89)     cholesterol, thyroid    Sleep apnea     doesn't wear cpap    Thyroid cancer ([facility]     TIA (transient ischemic attack)     Vitamin D deficiency         Past Surgical History:   Procedure Laterality Date    CARDIAC SURG PROCEDURE UNLIST      HX HEENT      THROAT SURGERY X 4    HX ORTHOPAEDIC      back     HX ORTHOPAEDIC      ARM AND SHOULDER    HX OTHER SURGICAL      thyroid, lymphnode    HX RETINAL DETACHMENT REPAIR      left eye    UPPER GI ENDOSCOPY,BALL DIL,30MM  2020         UPPER GI ENDOSCOPY,BIOPSY  2020         VASCULAR SURGERY PROCEDURE UNLIST      cardiac cath NEG. Social History     Tobacco Use    Smoking status: Former Smoker     Last attempt to quit: 1994     Years since quittin.0    Smokeless tobacco: Never Used   Substance Use Topics    Alcohol use: Yes     Comment: Occasionally        Family History   Problem Relation Age of Onset    Diabetes Mother     Elevated Lipids Mother    Lesta Mast Bladder Disease Mother     Headache Mother    Zada Sprain Migraines Mother     Heart Disease Mother     Hypertension Mother     Stroke Mother     Other Mother         ANEURSYM BRAIN    Bleeding Prob Father     Cancer Father         LEUKEMIA    Diabetes Father     Elevated Lipids Father     Mental Retardation Sister     Psychiatric Disorder Sister     Cancer Brother         LUNGS     Allergies   Allergen Reactions    Anesthetics - Amide Type Shortness of Breath    Flexeril [Cyclobenzaprine] Hives    Tramadol Hives        Prior to Admission medications    Medication Sig Start Date End Date Taking?  Authorizing Provider   Blood-Glucose Meter monitoring kit 1 preferred brand glucometer for checking home glucose, E11.22 7/30/20   Christine Brian MD   glucose blood VI test strips (blood glucose test) strip Pharmacist to choose preferred meter and strips. Dx:E11.65, Z79.4 Monitor 3 times daily 7/30/20   Christine Brian MD   insulin NPH (HumuLIN N NPH U-100 Insulin) 100 unit/mL injection 2-10 units daily 7/30/20   Christine Brian MD   Insulin Syringe-Needle U-100 ([facility] Syringes) 0.3 mL 30 gauge x 5/16\" syrg 1 Each by Does Not Apply route daily. 7/30/20   Christine Brian MD   simvastatin (ZOCOR) 20 mg tablet Take 1 Tab by mouth nightly. 7/28/20   Ankush Prado MD   Ventolin HFA 90 mcg/actuation inhaler TAKE 1-2 PUFFS EVEERY 4-6 HOURS AS NEEDED FOR DYSPNEA AND WHEEZING 7/28/20   Ankush Prado MD   amLODIPine (NORVASC) 5 mg tablet Take 1 Tab by mouth two (2) times a day. 7/22/20   Samy Wallace MD   carvediloL (COREG) 12.5 mg tablet Take 1 Tab by mouth two (2) times daily (with meals). Indications: high blood pressure 7/22/20   Akash Infante MD   Omeprazole delayed release (PRILOSEC D/R) 20 mg tablet Take 1 Tab by mouth daily. 7/22/20   Samy Wallace MD   testosterone 30 mg/actuation (1.5 mL) slpm Apply 1-2 pumps daily as directed on your visit 2/3/20   Juan Luis Ireland MD   levothyroxine (SYNTHROID) 200 mcg tablet TAKE 1 TABLET BY MOUTH ONCE DAILY BEFORE BREAKFAST 11/4/19   Juan Luis Ireland MD   glucose blood VI test strips (PHARMACIST CHOICE) strip One Touch  Check glucose 3-4 times daily. DX E11.22 2/2/18   Juan Luis Ireland MD   Lancets misc Use preferred brand; Check glucose 3-4 times daily, Diagnosis E11.22 2/2/18   Juan Luis Ireland MD       REVIEW OF SYSTEMS:     I am not able to complete the review of systems because:    The patient is intubated and sedated    The patient has altered mental status due to his acute medical problems    The patient has baseline aphasia from prior stroke(s)    The patient has baseline dementia and is not reliable historian    The patient is in acute medical distress and unable to provide information           Total of 12 systems reviewed as follows:       POSITIVE= underlined text  Negative = text not underlined  General:  fever, chills, sweats, generalized weakness, weight loss/gain,      loss of appetite   Eyes:    blurred vision, eye pain, loss of vision, double vision  ENT:    rhinorrhea, pharyngitis   Respiratory:   cough, sputum production, SOB, NUÑEZ, wheezing, pleuritic pain   Cardiology:   chest pain, palpitations, orthopnea, PND, edema, syncope   Gastrointestinal:  abdominal pain , N/V, diarrhea, dysphagia, constipation, bleeding   Genitourinary:  frequency, urgency, dysuria, hematuria, incontinence   Muskuloskeletal :  arthralgia, myalgia, back pain  Hematology:  easy bruising, nose or gum bleeding, lymphadenopathy   Dermatological: rash, ulceration, pruritis, color change / jaundice  Endocrine:   hot flashes or polydipsia   Neurological:  headache, dizziness, confusion, focal weakness, paresthesia,     Speech difficulties, memory loss, gait difficulty  Psychological: Feelings of anxiety, depression, agitation    Objective:   VITALS:    There were no vitals taken for this visit. PHYSICAL EXAM:    General:    Alert, cooperative, no distress, appears stated age. HEENT: Atraumatic, anicteric sclerae, pink conjunctivae     No oral ulcers, mucosa moist, throat clear, dentition fair  Neck:  Supple, symmetrical,  thyroid: non tender  Lungs:   Crackles and  Rales to auscultation bilaterally. No Wheezing   Chest wall:  No tenderness  No Accessory muscle use. Heart:   Regular  rhythm,  No  murmur   No edema  Abdomen:   Soft, non-tender. Not distended. Bowel sounds normal  Extremities: No cyanosis. No clubbing,      Skin turgor normal, Capillary refill normal, Radial dial pulse 2+  Skin:     Not pale. Not Jaundiced  No rashes   Psych:  Good insight. Not depressed. Not anxious or agitated. Neurologic: EOMs intact. No facial asymmetry. No aphasia or slurred speech. Symmetrical strength, Sensation grossly intact. Alert and oriented X 4.     _______________________________________________________________________  Care Plan discussed with:    Comments   Patient y    Family      RN y    Care Manager                    Consultant:  lizandro Ed md   _______________________________________________________________________  Expected  Disposition:   Home with Family y   HH/PT/OT/RN    SNF/LTC    [facility]    ________________________________________________________________________  TOTAL TIME: 61   Minutes    Critical Care Provided     Minutes non procedure based      Comments    y Reviewed previous records   >50% of visit spent in counseling and coordination of care y Discussion with patient and/or family and questions answered       Given the patient's current clinical presentation, I have a high level of concern for decompensation if discharged from the ED. Complex decision making was performed which includes reviewing the patient's available past medical records, laboratory results, and Xray films. I have also directly communicated my plan and discussed this case with the involved ED physician.     ____________________________________________________________________  Chula Khan MD    Procedures: see electronic medical records for all procedures/Xrays and details which were not copied into this note but were reviewed prior to creation of Plan. LAB DATA REVIEWED:    No results found for this or any previous visit (from the past 24 hour(s)).

## 2020-08-15 NOTE — PROGRESS NOTES
Patient at ct scan  I will see tomorrow    No role for egd or colonoscopy at this time    Please call for questions or concerns     Lucho Lozada MD  5:07 PM  8/15/2020

## 2020-08-16 ENCOUNTER — APPOINTMENT (OUTPATIENT)
Dept: NON INVASIVE DIAGNOSTICS | Age: 60
DRG: 673 | End: 2020-08-16
Attending: HOSPITALIST
Payer: MEDICARE

## 2020-08-16 LAB
ALBUMIN SERPL-MCNC: 2.6 G/DL (ref 3.5–5)
ANION GAP SERPL CALC-SCNC: 7 MMOL/L (ref 5–15)
BUN SERPL-MCNC: 59 MG/DL (ref 6–20)
BUN/CREAT SERPL: 15 (ref 12–20)
CALCIUM SERPL-MCNC: 8.4 MG/DL (ref 8.5–10.1)
CHLORIDE SERPL-SCNC: 111 MMOL/L (ref 97–108)
CO2 SERPL-SCNC: 20 MMOL/L (ref 21–32)
CREAT SERPL-MCNC: 3.93 MG/DL (ref 0.7–1.3)
ECHO AO ROOT DIAM: 2.98 CM
ECHO AV AREA PEAK VELOCITY: 3.17 CM2
ECHO AV CUSP MM: 2.02 CM
ECHO AV PEAK GRADIENT: 6.34 MMHG
ECHO AV PEAK VELOCITY: 125.62 CM/S
ECHO EST RA PRESSURE: 10 MMHG
ECHO LA MAJOR AXIS: 4.1 CM
ECHO LA TO AORTIC ROOT RATIO: 1.28
ECHO LA TO AORTIC ROOT RATIO: 1.28
ECHO LV E' SEPTAL VELOCITY: 7.85 CM/S
ECHO LV INTERNAL DIMENSION DIASTOLIC: 5.01 CM (ref 4.2–5.9)
ECHO LV INTERNAL DIMENSION SYSTOLIC: 2.76 CM
ECHO LV IVSD: 1.51 CM (ref 0.6–1)
ECHO LV IVSS: 2.41 CM
ECHO LV MASS 2D: 349.7 G (ref 88–224)
ECHO LV POSTERIOR WALL DIASTOLIC: 1.65 CM (ref 0.6–1)
ECHO LV POSTERIOR WALL SYSTOLIC: 2.02 CM
ECHO LVOT DIAM: 2.15 CM
ECHO LVOT PEAK GRADIENT: 4.85 MMHG
ECHO LVOT PEAK VELOCITY: 110.1 CM/S
ECHO MV A VELOCITY: 110.47 CM/S
ECHO MV E DECELERATION TIME (DT): 0.2 S
ECHO MV E VELOCITY: 112.29 CM/S
ECHO MV E/A RATIO: 1.02
ECHO MV E/E' SEPTAL: 14.3
ECHO PV MAX VELOCITY: 93.41 CM/S
ECHO PV PEAK INSTANTANEOUS GRADIENT SYSTOLIC: 3.51 MMHG
ECHO RIGHT VENTRICULAR SYSTOLIC PRESSURE (RVSP): 35.09 MMHG
ECHO RV INTERNAL DIMENSION: 2.98 CM
ECHO TV REGURGITANT MAX VELOCITY: 250.46 CM/S
ECHO TV REGURGITANT PEAK GRADIENT: 25.09 MMHG
ERYTHROCYTE [DISTWIDTH] IN BLOOD BY AUTOMATED COUNT: 13.8 % (ref 11.5–14.5)
GLUCOSE BLD STRIP.AUTO-MCNC: 144 MG/DL (ref 65–100)
GLUCOSE BLD STRIP.AUTO-MCNC: 172 MG/DL (ref 65–100)
GLUCOSE BLD STRIP.AUTO-MCNC: 219 MG/DL (ref 65–100)
GLUCOSE BLD STRIP.AUTO-MCNC: 220 MG/DL (ref 65–100)
GLUCOSE SERPL-MCNC: 120 MG/DL (ref 65–100)
HCT VFR BLD AUTO: 24.2 % (ref 36.6–50.3)
HGB BLD-MCNC: 7.7 G/DL (ref 12.1–17)
MCH RBC QN AUTO: 28.9 PG (ref 26–34)
MCHC RBC AUTO-ENTMCNC: 31.8 G/DL (ref 30–36.5)
MCV RBC AUTO: 91 FL (ref 80–99)
NRBC # BLD: 0 K/UL (ref 0–0.01)
NRBC BLD-RTO: 0 PER 100 WBC
PHOSPHATE SERPL-MCNC: 5 MG/DL (ref 2.6–4.7)
PLATELET # BLD AUTO: 334 K/UL (ref 150–400)
PMV BLD AUTO: 9.2 FL (ref 8.9–12.9)
POTASSIUM SERPL-SCNC: 4.9 MMOL/L (ref 3.5–5.1)
RBC # BLD AUTO: 2.66 M/UL (ref 4.1–5.7)
SERVICE CMNT-IMP: ABNORMAL
SODIUM SERPL-SCNC: 138 MMOL/L (ref 136–145)
WBC # BLD AUTO: 10.8 K/UL (ref 4.1–11.1)

## 2020-08-16 PROCEDURE — C9113 INJ PANTOPRAZOLE SODIUM, VIA: HCPCS | Performed by: HOSPITALIST

## 2020-08-16 PROCEDURE — 74011000250 HC RX REV CODE- 250: Performed by: HOSPITALIST

## 2020-08-16 PROCEDURE — 36415 COLL VENOUS BLD VENIPUNCTURE: CPT

## 2020-08-16 PROCEDURE — 74011000258 HC RX REV CODE- 258: Performed by: HOSPITALIST

## 2020-08-16 PROCEDURE — 82962 GLUCOSE BLOOD TEST: CPT

## 2020-08-16 PROCEDURE — 80069 RENAL FUNCTION PANEL: CPT

## 2020-08-16 PROCEDURE — 74011636637 HC RX REV CODE- 636/637: Performed by: HOSPITALIST

## 2020-08-16 PROCEDURE — 74011250637 HC RX REV CODE- 250/637: Performed by: INTERNAL MEDICINE

## 2020-08-16 PROCEDURE — 93306 TTE W/DOPPLER COMPLETE: CPT

## 2020-08-16 PROCEDURE — 74011250636 HC RX REV CODE- 250/636: Performed by: HOSPITALIST

## 2020-08-16 PROCEDURE — 85027 COMPLETE CBC AUTOMATED: CPT

## 2020-08-16 PROCEDURE — 65660000000 HC RM CCU STEPDOWN

## 2020-08-16 PROCEDURE — 74011250637 HC RX REV CODE- 250/637: Performed by: HOSPITALIST

## 2020-08-16 PROCEDURE — 74011250636 HC RX REV CODE- 250/636: Performed by: INTERNAL MEDICINE

## 2020-08-16 PROCEDURE — 77010033678 HC OXYGEN DAILY

## 2020-08-16 RX ORDER — FUROSEMIDE 10 MG/ML
80 INJECTION INTRAMUSCULAR; INTRAVENOUS 2 TIMES DAILY
Status: DISCONTINUED | OUTPATIENT
Start: 2020-08-16 | End: 2020-08-17

## 2020-08-16 RX ORDER — LANOLIN ALCOHOL/MO/W.PET/CERES
3 CREAM (GRAM) TOPICAL
Status: DISCONTINUED | OUTPATIENT
Start: 2020-08-16 | End: 2020-08-27 | Stop reason: HOSPADM

## 2020-08-16 RX ORDER — PANTOPRAZOLE SODIUM 40 MG/1
40 TABLET, DELAYED RELEASE ORAL
Status: DISCONTINUED | OUTPATIENT
Start: 2020-08-16 | End: 2020-08-19

## 2020-08-16 RX ORDER — LORAZEPAM 1 MG/1
1 TABLET ORAL
Status: DISCONTINUED | OUTPATIENT
Start: 2020-08-16 | End: 2020-08-27 | Stop reason: HOSPADM

## 2020-08-16 RX ADMIN — SUCRALFATE 1 G: 1 TABLET ORAL at 16:37

## 2020-08-16 RX ADMIN — MORPHINE SULFATE 1 MG: 2 INJECTION, SOLUTION INTRAMUSCULAR; INTRAVENOUS at 16:37

## 2020-08-16 RX ADMIN — INSULIN LISPRO 2 UNITS: 100 INJECTION, SOLUTION INTRAVENOUS; SUBCUTANEOUS at 17:41

## 2020-08-16 RX ADMIN — MORPHINE SULFATE 1 MG: 2 INJECTION, SOLUTION INTRAMUSCULAR; INTRAVENOUS at 20:53

## 2020-08-16 RX ADMIN — SUCRALFATE 1 G: 1 TABLET ORAL at 23:51

## 2020-08-16 RX ADMIN — AMLODIPINE BESYLATE 5 MG: 5 TABLET ORAL at 19:12

## 2020-08-16 RX ADMIN — CARVEDILOL 12.5 MG: 12.5 TABLET, FILM COATED ORAL at 08:15

## 2020-08-16 RX ADMIN — SUCRALFATE 1 G: 1 TABLET ORAL at 08:15

## 2020-08-16 RX ADMIN — PANTOPRAZOLE SODIUM 40 MG: 40 TABLET, DELAYED RELEASE ORAL at 16:37

## 2020-08-16 RX ADMIN — LORAZEPAM 1 MG: 1 TABLET ORAL at 01:27

## 2020-08-16 RX ADMIN — SODIUM CHLORIDE 40 MG: 9 INJECTION, SOLUTION INTRAMUSCULAR; INTRAVENOUS; SUBCUTANEOUS at 09:54

## 2020-08-16 RX ADMIN — FUROSEMIDE 80 MG: 10 INJECTION, SOLUTION INTRAMUSCULAR; INTRAVENOUS at 19:10

## 2020-08-16 RX ADMIN — LEVOTHYROXINE SODIUM 200 MCG: 0.15 TABLET ORAL at 05:51

## 2020-08-16 RX ADMIN — LORAZEPAM 1 MG: 1 TABLET ORAL at 23:51

## 2020-08-16 RX ADMIN — ACETAMINOPHEN 650 MG: 325 TABLET ORAL at 11:55

## 2020-08-16 RX ADMIN — AZITHROMYCIN MONOHYDRATE 500 MG: 500 INJECTION, POWDER, LYOPHILIZED, FOR SOLUTION INTRAVENOUS at 05:50

## 2020-08-16 RX ADMIN — AMLODIPINE BESYLATE 5 MG: 5 TABLET ORAL at 08:15

## 2020-08-16 RX ADMIN — ATORVASTATIN CALCIUM 10 MG: 20 TABLET, FILM COATED ORAL at 23:51

## 2020-08-16 RX ADMIN — CEFTRIAXONE SODIUM 1 G: 1 INJECTION, POWDER, FOR SOLUTION INTRAMUSCULAR; INTRAVENOUS at 09:55

## 2020-08-16 RX ADMIN — ACETAMINOPHEN 650 MG: 325 TABLET ORAL at 19:12

## 2020-08-16 RX ADMIN — SODIUM CHLORIDE 10 ML: 9 INJECTION, SOLUTION INTRAMUSCULAR; INTRAVENOUS; SUBCUTANEOUS at 05:51

## 2020-08-16 RX ADMIN — SODIUM CHLORIDE 75 ML/HR: 900 INJECTION, SOLUTION INTRAVENOUS at 03:52

## 2020-08-16 RX ADMIN — MELATONIN 3 MG: at 01:27

## 2020-08-16 RX ADMIN — SUCRALFATE 1 G: 1 TABLET ORAL at 11:55

## 2020-08-16 RX ADMIN — SODIUM CHLORIDE 10 ML: 9 INJECTION, SOLUTION INTRAMUSCULAR; INTRAVENOUS; SUBCUTANEOUS at 23:55

## 2020-08-16 RX ADMIN — SODIUM CHLORIDE 10 ML: 9 INJECTION, SOLUTION INTRAMUSCULAR; INTRAVENOUS; SUBCUTANEOUS at 06:13

## 2020-08-16 NOTE — PROGRESS NOTES
1900:Verbal  shift change report given to Po park RN (oncoming nurse) by Kia Troncoso RN  (offgoing nurse). Report included the following information SBAR, Kardex, [facility] and Cardiac Rhythm nsr.     0115: Pt is requesting to leave AMA. Spoke with Pt and told him all the consequesnses of leaving the facility AMA. Pt still wanted to leave. Daughter called and told me that she was not picking him and renee she feels as though he is having anxiety because he is accustomed to being with someone all the time. Went in and talked to Pt again. Asked if I could get him some medicine to help him rest and help with the anxiety if he would be willing to stay. Pt stated that he would stay \" if his needs were met this time, that he had told the Dr that he needed something to sleep and to help him calm down\". Will contact tele hospitalist     Sent message to tele med. Orders for Melatonin and ativan at bedtime     Both melatonin and ativan given ( See MAR)     0238: Pt resting quietly in bed. No complaints at this time, No acute changes in Pt condition    *Negative Covid test noted in results review. 0630: Pt accidentally pulled out IV. Will place new line     0650: 20G in LAC See LDA     6009: MD at bedside. 0720: Bedside and Verbal shift change report given to Felicitas Dwyer  (oncoming nurse) by Po park RN  (offgoing nurse). Report included the following information SBAR, Kardex, MAR and Cardiac Rhythm NSR .

## 2020-08-16 NOTE — PROGRESS NOTES
[facility]   [address] [address], [facility] [address], [facility]  Phone: [phone]   [phone]       Nephrology Progress Note  Unique Brown     1960     [id number]  Date of Admission : 8/15/2020  08/16/20    CC: Follow up for NERY      Assessment and Plan   NERY on CKD:  - 2/2 volume depletion vs ATN, AIN or obstruction  -he seems volume replete - hold IVF given his resp status  -crat remains elevated but stable  -renal US in am     CKD III w/ baseline Cr around 2     COVID-19 PUI  -COVID negative x 2 over the past 2 weeks     Hypoxia:  - CT chest results noted     HTN:  - on norvasc and coreg  - no ACE or ARBs     ? CHF:  - ECHO pending  - no prior EF on file     Chronic Anemia:  - 2/2 CKD, ? GI losses  - repeat CBC pending     DM2:  - per hospitalist     Hx of thyroid cancer w/ lung mets     Interval History:  Decent U/O; COVID (-); ECHO in progress; BP stable      Review of Systems: Pertinent items are noted in HPI.     Current Medications:   Current Facility-Administered Medications   Medication Dose Route Frequency    melatonin tablet 3 mg  3 mg Oral QHS PRN    LORazepam (ATIVAN) tablet 1 mg  1 mg Oral Q6H PRN    amLODIPine (NORVASC) tablet 5 mg  5 mg Oral BID    carvediloL (COREG) tablet 12.5 mg  12.5 mg Oral BID WITH MEALS    levothyroxine (SYNTHROID) tablet 200 mcg  200 mcg Oral 6am    atorvastatin (LIPITOR) tablet 10 mg  10 mg Oral QHS    sodium chloride (NS) flush 5-40 mL  5-40 mL IntraVENous Q8H    sodium chloride (NS) flush 5-40 mL  5-40 mL IntraVENous PRN    acetaminophen (TYLENOL) tablet 650 mg  650 mg Oral Q6H PRN    Or    acetaminophen (TYLENOL) suppository 650 mg  650 mg Rectal Q6H PRN    polyethylene glycol (MIRALAX) packet 17 g  17 g Oral DAILY PRN    promethazine (PHENERGAN) tablet 12.5 mg  12.5 mg Oral Q6H PRN    Or    ondansetron (ZOFRAN) injection 4 mg  4 mg IntraVENous Q6H PRN    pantoprazole (PROTONIX) 40 mg in 0.9% sodium chloride 10 mL injection 40 mg IntraVENous Q12H    sucralfate (CARAFATE) tablet 1 g  1 g Oral AC&HS    azithromycin (ZITHROMAX) 500 mg in 0.9% sodium chloride (MBP/ADV) 250 mL  500 mg IntraVENous Q24H    cefTRIAXone (ROCEPHIN) 1 g in 0.9% sodium chloride (MBP/ADV) 50 mL  1 g IntraVENous Q24H    morphine injection 1 mg  1 mg IntraVENous Q4H PRN    insulin lispro (HUMALOG) injection   SubCUTAneous AC&HS    glucose chewable tablet 16 g  4 Tab Oral PRN    dextrose (D50W) injection syrg 12.5-25 g  12.5-25 g IntraVENous PRN    glucagon (GLUCAGEN) injection 1 mg  1 mg IntraMUSCular PRN    albuterol (PROVENTIL HFA, VENTOLIN HFA, PROAIR HFA) inhaler 2 Puff  2 Puff Inhalation Q4H PRN      Allergies   Allergen Reactions    Anesthetics - Amide Type Shortness of Breath    Flexeril [Cyclobenzaprine] Hives    Tramadol Hives       Objective:  Vitals:    Vitals:    08/16/20 0000 08/16/20 0350 08/16/20 0800 08/16/20 0816   BP:  131/53  153/63   Pulse: 63 67 71 71   Resp:  20  20   Temp:  97.9 °F (36.6 °C)  97.1 °F (36.2 °C)   SpO2:  93%  97%     Intake and Output:  No intake/output data recorded. 08/14 1901 - 08/16 0700  In: 850 [P.O.:850]  Out: 750 [Urine:750]    Physical Examination:  Pt intubated    No  General: NAD  Neck:  Supple, no JVD  Resp:  Few caorse rales and ronchi  CV:  RRR,  no rub, 1+ LE edema  GI:  Soft, NT, + Bowel sounds  Neurologic:  Non focal  Psych:             lethargic  Skin:  No Rash  :  No estrada    []    High complexity decision making was performed  []    Patient is at high-risk of decompensation with multiple organ involvement    Lab Data Personally Reviewed: I have reviewed all the pertinent labs, microbiology data and radiology studies during assessment.     Recent Labs     08/16/20  0400 08/15/20  0822    140   K 4.9 4.9   * 110*   CO2 20* 23   * 222*   BUN 59* 60*   CREA 3.93* 4.03*   CA 8.4* 8.1*   MG  --  2.0   PHOS 5.0*  --    ALB 2.6*  --      Recent Labs     08/16/20  0400 08/15/20  2806 WBC 10.8 10.2   HGB 7.7* 7.9*   HCT 24.2* 24.9*    356     Lab Results   Component Value Date/Time    Specimen Description: BLOOD 11/09/2010 09:40 AM     Lab Results   Component Value Date/Time    Culture result: HEAVY  STAPHYLOCOCCUS AUREUS   10/16/2016 01:15 PM    Culture result: MODERATE  ENTEROBACTER CLOACAE   10/16/2016 01:15 PM    Culture result: NO ANAEROBES ISOLATED 10/16/2016 01:15 PM    Culture result: NO GROWTH 6 DAYS 10/16/2016 03:56 AM    Culture result: NO GROWTH 6 DAYS 10/16/2016 03:56 AM     Recent Results (from the past 24 hour(s))   GLUCOSE, POC    Collection Time: 08/15/20 11:38 AM   Result Value Ref Range    Glucose (POC) 174 (H) 65 - 100 mg/dL    Performed by Radha Jauregui RN    TROPONIN I    Collection Time: 08/15/20  2:35 PM   Result Value Ref Range    Troponin-I, Qt. <0.05 <0.05 ng/mL   GLUCOSE, POC    Collection Time: 08/15/20  4:10 PM   Result Value Ref Range    Glucose (POC) 254 (H) 65 - 100 mg/dL    Performed by Radha Jauregui RN    GLUCOSE, POC    Collection Time: 08/15/20 10:30 PM   Result Value Ref Range    Glucose (POC) 214 (H) 65 - 100 mg/dL    Performed by Emerald Ivey RN    RENAL FUNCTION PANEL    Collection Time: 08/16/20  4:00 AM   Result Value Ref Range    Sodium 138 136 - 145 mmol/L    Potassium 4.9 3.5 - 5.1 mmol/L    Chloride 111 (H) 97 - 108 mmol/L    CO2 20 (L) 21 - 32 mmol/L    Anion gap 7 5 - 15 mmol/L    Glucose 120 (H) 65 - 100 mg/dL    BUN 59 (H) 6 - 20 MG/DL    Creatinine 3.93 (H) 0.70 - 1.30 MG/DL    BUN/Creatinine ratio 15 12 - 20      GFR est AA 19 (L) >60 ml/min/1.73m2    GFR est non-AA 16 (L) >60 ml/min/1.73m2    Calcium 8.4 (L) 8.5 - 10.1 MG/DL    Phosphorus 5.0 (H) 2.6 - 4.7 MG/DL    Albumin 2.6 (L) 3.5 - 5.0 g/dL   CBC W/O DIFF    Collection Time: 08/16/20  4:00 AM   Result Value Ref Range    WBC 10.8 4.1 - 11.1 K/uL    RBC 2.66 (L) 4.10 - 5.70 M/uL    HGB 7.7 (L) 12.1 - 17.0 g/dL    HCT 24.2 (L) 36.6 - 50.3 %    MCV 91.0 80.0 - 99.0 FL    MCH 28.9 26.0 - 34.0 PG    MCHC 31.8 30.0 - 36.5 g/dL    RDW 13.8 11.5 - 14.5 %    PLATELET 195 484 - 671 K/uL    MPV 9.2 8.9 - 12.9 FL    NRBC 0.0 0  WBC    ABSOLUTE NRBC 0.00 0.00 - 0.01 K/uL   GLUCOSE, POC    Collection Time: 08/16/20  8:39 AM   Result Value Ref Range    Glucose (POC) 144 (H) 65 - 100 mg/dL    Performed by Jose Suazo            Total time spent with patient:  xxx   min. Care Plan discussed with:  Patient     Family      RN      Consulting Physician [address] [facility],         I have reviewed the flowsheets. Chart and Pertinent Notes have been reviewed. No change in PMH ,family and social history from Consult note.       Joshua Verduzco MD

## 2020-08-16 NOTE — PROGRESS NOTES
Hospitalist Progress Note    NAME: Carmen Kunz   :  1960   MRN:  [id number]     Admit date: 8/15/2020    Today's date: 20    PCP: MD Perla Smith M.D. Cell [phone]      Assessment / Plan:    Acute hypoxic respiratory Failure POA  Acute on chronic diastolic CHF POA    ? Bacterial pneumonia POA  Currently on NC O2  ProBNP 3674  CT reviewed patchy ASD, mod bilateral effusions  ? B/L pneumonia vs pulm edema       Clinically suspect CHF  SARS-CoV-2 testing negative  Continue antibiotics, check procalcitonin  D/C isolation  Add IV diuretics, watch creatinine     Recurrent Chest pain could be 2/2 His gastritis/duodenitis  -PPI and Sucralfate   -Primary GI  [facility] gastroenterologist specilaist consulted     Acute on chronic anemia POA  -Check FOBT  -GI Consulted     NERY on CKD stage III POA  creatinine  Was 3.3 on 2020  Today at osh was 4.6  - Nephrology consulted  [facility]   - Hold  nephrotoxic medication  - No NSAIDs     History of diabetes mellitus  - SSI, A1c 6.3 on 2020     History of thyroid cancer with mets to lung  -Follows Dr. Michael Condon  -Hypothyroidsim-Continue levothyroxine        HX of Duodenitis POA  - 2020 EGD which showed:  spencer quynh tear, duodenitis, duodenal polyp, gastric polyps, and dilation of the esophagus     HTN-POA-Cont. Home meds        Code Status: full  Surrogate Decision Maker:  Valeria Coto  Daughter  [phone]         DVT Prophylaxis: SCDs  GI Prophylaxis: not indicated     Baseline: independent       Subjective:     Chief Complaint / Reason for Physician Visit  \" my breathing feels okay\". Discussed with RN events overnight.    Currently with mild SOB  No more CP  Denies fevers, cough, sore throat, myalgias  COVID-19 test negative  Hoarse voice which he says is chronic and unchanged    Review of Systems:  Symptom Y/N Comments  Symptom Y/N Comments   Fever/Chills n   Chest Pain n    Poor Appetite    Edema     Cough n   Abdominal Pain n    Sputum    Joint Pain     SOB/NUÑEZ y   Headache     Nausea/vomit n   Tolerating PT/OT     Diarrhea n   Tolerating Diet y    Constipation    Other       Could NOT obtain due to:      Objective:     VITALS:   Last 24hrs VS reviewed since prior progress note. Most recent are:  Patient Vitals for the past 24 hrs:   Temp Pulse Resp BP SpO2   08/16/20 1152 (!) 96.6 °F (35.9 °C) 61 20 111/50 96 %   08/16/20 0816 97.1 °F (36.2 °C) 71 20 153/63 97 %   08/16/20 0800 -- 71 -- -- --   08/16/20 0350 97.9 °F (36.6 °C) 67 20 131/53 93 %   08/16/20 0000 -- 63 -- -- --   08/15/20 2234 97.9 °F (36.6 °C) 63 16 116/48 96 %   08/15/20 2230 -- 65 -- 116/48 92 %   08/15/20 2009 97.6 °F (36.4 °C) 60 19 121/64 93 %       Intake/Output Summary (Last 24 hours) at 8/16/2020 1624  Last data filed at 8/15/2020 1830  Gross per 24 hour   Intake 300 ml   Output 300 ml   Net 0 ml        Wt Readings from Last 12 Encounters:   08/06/20 106.1 kg (234 lb)   07/28/20 105.7 kg (233 lb)   07/17/20 103.9 kg (229 lb 0.9 oz)   05/17/20 105.4 kg (232 lb 7 oz)   01/31/20 105.2 kg (232 lb)   09/27/19 101.4 kg (223 lb 9.6 oz)   05/06/19 108.9 kg (240 lb)   03/27/19 108.4 kg (239 lb)   10/04/18 108.3 kg (238 lb 12.8 oz)   09/27/18 108.4 kg (239 lb)   07/05/18 110.8 kg (244 lb 4.8 oz)   03/23/18 114.6 kg (252 lb 9.6 oz)       PHYSICAL EXAM:  General: WD, WN. Alert, cooperative, no acute distress    EENT:  PERRL. Anicteric sclerae. MMM  Neck:  No meningismus, no thyromegaly  Resp:  Decreased BS bilaterally, no wheezing or rales. No accessory muscle use  CV:  Regular  rhythm,  No edema  GI:  Soft, Non distended, Non tender. +Bowel sounds, no rebound  LN:  No cervical or inguinal ARIAS  Neurologic:  Alert and oriented X 3, normal speech, non focal motor exam  Psych:   Good insight. Not anxious nor agitated  Skin:  No rashes.   No jaundice    Reviewed most current lab test results and cultures  YES  Reviewed most current radiology test results   YES  Review and summation of old records today    NO  Reviewed patient's current orders and MAR    YES  PMH/SH reviewed - no change compared to H&P  ________________________________________________________________________  Care Plan discussed with:    Comments   Patient x    Family      RN x    Care Manager     Consultant                        Multidiciplinary team rounds were held today with , nursing, pharmacist and clinical coordinator. Patient's plan of care was discussed; medications were reviewed and discharge planning was addressed. ________________________________________________________________________      Comments   >50% of visit spent in counseling and coordination of care     ________________________________________________________________________  Louise Matias MD     Procedures: see electronic medical records for all procedures/Xrays and details which were not copied into this note but were reviewed prior to creation of Plan. LABS:  I reviewed today's most current labs and imaging studies.   Pertinent labs include:  Recent Labs     08/16/20  0400 08/15/20  0822   WBC 10.8 10.2   HGB 7.7* 7.9*   HCT 24.2* 24.9*    356     Recent Labs     08/16/20  0400 08/15/20  0822    140   K 4.9 4.9   * 110*   CO2 20* 23   * 222*   BUN 59* 60*   CREA 3.93* 4.03*   CA 8.4* 8.1*   MG  --  2.0   PHOS 5.0*  --    ALB 2.6*  --

## 2020-08-16 NOTE — PROGRESS NOTES
Bedside shift change report given to akin (oncoming nurse) by Laron Moon (offgoing nurse). Report included the following information SBAR, MAR, Recent Results and Cardiac Rhythm nsr. Rec'd report, pt resting in bed, 0 s/sx of distress.

## 2020-08-16 NOTE — PROGRESS NOTES
[address] RN gave report for pt coming to unit. 56 - Pt arrived on unit  General Surgery End of Shift Nursing Note    Bedside shift change report given to Catrachito Camacho RN (oncoming nurse) by Manolo Hudson RN (offgoing nurse). Report included the following information SBAR, Kardex and MAR. Shift worked:   [id number]   Summary of shift:   Pt arrived on unit, dual skin completed, and vitals obtained. Had difficulty obtaining pt's temperature, attempted multiple times via oral and axillary route without success. Obtained a temperature of 94.2 via rectal. Heart rate was 54. Pt's MEWS score was a 3, perfect served Dr. Pramod Duke regarding recent vital signs and MEWS score. Treated pt for bilateral leg pain with PRN Tylenol. Issues for physician to address:  Difficulty obtaining pt's temperature. Low temperature. Number times ambulated in hallway past shift: 0    Number of times OOB to chair past shift: 0    Pain Management:  Current medication: See MAR  Patient states pain is manageable on current pain medication: YES    GI:    Current diet:  DIET CLEAR LIQUID    Tolerating current diet: YES    Respiratory:    Head of bed elevated    Patient Safety:    Falls Score: 2  Bed Alarm On? No  Sitter?  No    Sary Almaguer

## 2020-08-16 NOTE — CONSULTS
[address] [facility] Name:  Lico Moreland 
MR#:  [id number] :  1960 ACCOUNT #:  [de-identified] DATE OF SERVICE:  2020 CHIEF COMPLAINT:  I am asked to see by Dr. Aldo Gibbons because of gastrointestinal bleeding ? Cassia Marks HISTORY OF PRESENT ILLNESS:  The history is obtained from the patient. It is difficult to obtain, because his speech is hard to understand and he does not give a lot of details. I reviewed the admission history and physical from Dr. Timothy Ariza and also my previous evaluation. The patient was admitted with acute hypoxic respiratory failure. His main symptoms are respiratory. He has had some burning, indigestion in the past, but no recent burning, indigestion, sour stomach, heartburn, black or bloody stools. His GI workup 2020 is remarkable for the followin. Evaluation for chronic nausea and vomiting, weight loss and dysphagia on 2020. 
2. Upper endoscopy 2020, which showed duodenitis, a small Padmini-Metzger tear, possible Garza's and a \"duodenal polyp. \"  It is worth noting that this was performed in the operating room and that [employer] 27 anesthesia was started and the patient was switched to general anesthesia during the procedure. Biopsies of the \"duodenal polyp\" showed Brunner's gland hyperplasia and with no evidence of a polyp. Mid esophageal biopsy showed acute esophagitis suggestive of reflux. GE junction biopsy did not show Garza's esophagus, and biopsy of the gastric polyps showed a few dilated fundic glands and benign gastric oxyntic mucosa. In other words, biopsies were unremarkable. I do not have a record of the last colonoscopy. PAST MEDICAL HISTORY:  Diabetes mellitus, thyroid cancer, chronic kidney disease with superimposed acute kidney injury. PAST SURGICAL HISTORY:  Orthopedic procedures, thyroid and eye procedures.  
 
PRIOR TO ADMISSION MEDICATIONS:  Listed on the history and physical and included Coreg, Synthroid, Zocor, Ventolin, omeprazole, testosterone, amlodipine. ALLERGIES:  HE IS ALLERGIC TO FLEXERIL, TRAMADOL AND CERTAIN TYPES OF ANESTHETICS. SOCIAL HISTORY:  He quit smoking 26 years ago. He uses alcohol. FAMILY HISTORY:  Positive for diabetes. REVIEW OF SYSTEMS:  Unchanged since the July visit except his respiratory symptoms. The remainder as noted above. PHYSICAL EXAMINATION: 
GENERAL:  Appears considerably older than his started age of 64. VITAL SIGNS:  Temperature 97.9, pulse 67, respirations 20, blood pressure 131/53. HEENT:  Round face, small mouth, crowded oropharynx without evidence of thrush. Extraocular motions intact. CHEST:  Poor airway movement. No abnormal sounds. HEART:  Regular rate and rhythm. No abnormal sounds. ABDOMEN:  Distended. Bowel sounds are present. No palpable masses. LYMPHATIC:  No anterior, posterior cervical, supraclavicular or inguinal lymphadenopathy. NEUROLOGIC:  Alert and oriented. LABORATORIES:  Reviewed. Today's hemoglobin 7.7, baseline two and a half weeks ago 9.9. Sodium 139, potassium 4.9, chloride 111, CO2 20, albumin 2.6. Cross sectional imaging, no new abdominal imaging. I note that his gastric emptying study on 07/21/2020 was normal and that his HIDA scan on 07/18/2020 showed non-visualization of the duodenum, possible common bile duct obstruction. No cystic duct obstruction. He was seen by Dr. Antonietta Rios regarding that during that admission. IMPRESSION REPORT AND PLAN: 
1. Acute-on-chronic anemia. 2.  High risk patient with respiratory issues and prior need for general anesthesia for endoscopy. COVID-19 has been performed once and a second test is pending. 3.  His nausea and vomiting has improved. If we were to get recurrent nausea and vomiting, consideration for cholecystectomy. 4.  We will consider colonoscopy in several days after his respiratory status has improved. 5.  I will transfuse for a drop in hemoglobin that is significant and if he had major hematochezia, I would check red blood cell scan. I thank Dr. Cheryl Bragg for this interesting consultation. MD KATI [id number] 
D:  08/16/2020 7:26 
T:  08/16/2020 12:54 JOB #:  [id number] CC:  MD Zen Castillo MD

## 2020-08-16 NOTE — PROGRESS NOTES
Dictated  [id number]    No egd or colonosocopy at this time  We will follow  Supportive care    Morro Crane MD  7:26 AM  8/16/2020

## 2020-08-17 ENCOUNTER — PATIENT OUTREACH (OUTPATIENT)
Dept: CASE MANAGEMENT | Age: 60
End: 2020-08-17

## 2020-08-17 ENCOUNTER — APPOINTMENT (OUTPATIENT)
Dept: ULTRASOUND IMAGING | Age: 60
DRG: 673 | End: 2020-08-17
Attending: INTERNAL MEDICINE
Payer: MEDICARE

## 2020-08-17 LAB
ALBUMIN SERPL-MCNC: 2.7 G/DL (ref 3.5–5)
ANION GAP SERPL CALC-SCNC: 7 MMOL/L (ref 5–15)
BASOPHILS # BLD: 0.1 K/UL (ref 0–0.1)
BASOPHILS NFR BLD: 1 % (ref 0–1)
BUN SERPL-MCNC: 70 MG/DL (ref 6–20)
BUN/CREAT SERPL: 14 (ref 12–20)
CALCIUM SERPL-MCNC: 8.4 MG/DL (ref 8.5–10.1)
CHLORIDE SERPL-SCNC: 109 MMOL/L (ref 97–108)
CO2 SERPL-SCNC: 20 MMOL/L (ref 21–32)
CREAT SERPL-MCNC: 4.94 MG/DL (ref 0.7–1.3)
DIFFERENTIAL METHOD BLD: ABNORMAL
EOSINOPHIL # BLD: 0.3 K/UL (ref 0–0.4)
EOSINOPHIL NFR BLD: 4 % (ref 0–7)
ERYTHROCYTE [DISTWIDTH] IN BLOOD BY AUTOMATED COUNT: 13.8 % (ref 11.5–14.5)
GLUCOSE BLD STRIP.AUTO-MCNC: 110 MG/DL (ref 65–100)
GLUCOSE BLD STRIP.AUTO-MCNC: 140 MG/DL (ref 65–100)
GLUCOSE BLD STRIP.AUTO-MCNC: 176 MG/DL (ref 65–100)
GLUCOSE BLD STRIP.AUTO-MCNC: 99 MG/DL (ref 65–100)
GLUCOSE SERPL-MCNC: 106 MG/DL (ref 65–100)
HCT VFR BLD AUTO: 25.4 % (ref 36.6–50.3)
HGB BLD-MCNC: 7.9 G/DL (ref 12.1–17)
IMM GRANULOCYTES # BLD AUTO: 0.1 K/UL (ref 0–0.04)
IMM GRANULOCYTES NFR BLD AUTO: 1 % (ref 0–0.5)
L PNEUMO1 AG UR QL IA: NEGATIVE
LYMPHOCYTES # BLD: 0.9 K/UL (ref 0.8–3.5)
LYMPHOCYTES NFR BLD: 10 % (ref 12–49)
MCH RBC QN AUTO: 28.6 PG (ref 26–34)
MCHC RBC AUTO-ENTMCNC: 31.1 G/DL (ref 30–36.5)
MCV RBC AUTO: 92 FL (ref 80–99)
MONOCYTES # BLD: 0.7 K/UL (ref 0–1)
MONOCYTES NFR BLD: 8 % (ref 5–13)
NEUTS SEG # BLD: 6.8 K/UL (ref 1.8–8)
NEUTS SEG NFR BLD: 76 % (ref 32–75)
NRBC # BLD: 0 K/UL (ref 0–0.01)
NRBC BLD-RTO: 0 PER 100 WBC
PHOSPHATE SERPL-MCNC: 6.4 MG/DL (ref 2.6–4.7)
PLATELET # BLD AUTO: 384 K/UL (ref 150–400)
PMV BLD AUTO: 9.7 FL (ref 8.9–12.9)
POTASSIUM SERPL-SCNC: 5.4 MMOL/L (ref 3.5–5.1)
PROCALCITONIN SERPL-MCNC: 0.06 NG/ML
RBC # BLD AUTO: 2.76 M/UL (ref 4.1–5.7)
SERVICE CMNT-IMP: ABNORMAL
SERVICE CMNT-IMP: NORMAL
SODIUM SERPL-SCNC: 136 MMOL/L (ref 136–145)
SPECIMEN SOURCE: NORMAL
TROPONIN I SERPL-MCNC: <0.05 NG/ML
WBC # BLD AUTO: 8.8 K/UL (ref 4.1–11.1)

## 2020-08-17 PROCEDURE — 74011250637 HC RX REV CODE- 250/637: Performed by: HOSPITALIST

## 2020-08-17 PROCEDURE — 84145 PROCALCITONIN (PCT): CPT

## 2020-08-17 PROCEDURE — 36415 COLL VENOUS BLD VENIPUNCTURE: CPT

## 2020-08-17 PROCEDURE — 74011250637 HC RX REV CODE- 250/637: Performed by: PHYSICIAN ASSISTANT

## 2020-08-17 PROCEDURE — 74011250637 HC RX REV CODE- 250/637: Performed by: INTERNAL MEDICINE

## 2020-08-17 PROCEDURE — 74011000258 HC RX REV CODE- 258: Performed by: HOSPITALIST

## 2020-08-17 PROCEDURE — 65660000000 HC RM CCU STEPDOWN

## 2020-08-17 PROCEDURE — 84484 ASSAY OF TROPONIN QUANT: CPT

## 2020-08-17 PROCEDURE — 85025 COMPLETE CBC W/AUTO DIFF WBC: CPT

## 2020-08-17 PROCEDURE — 80069 RENAL FUNCTION PANEL: CPT

## 2020-08-17 PROCEDURE — 82962 GLUCOSE BLOOD TEST: CPT

## 2020-08-17 PROCEDURE — 74011250636 HC RX REV CODE- 250/636: Performed by: INTERNAL MEDICINE

## 2020-08-17 PROCEDURE — 77010033678 HC OXYGEN DAILY

## 2020-08-17 PROCEDURE — 74011250636 HC RX REV CODE- 250/636: Performed by: HOSPITALIST

## 2020-08-17 PROCEDURE — 76770 US EXAM ABDO BACK WALL COMP: CPT

## 2020-08-17 PROCEDURE — 74011000250 HC RX REV CODE- 250: Performed by: INTERNAL MEDICINE

## 2020-08-17 RX ORDER — SODIUM BICARBONATE IN D5W 150/1000ML
PLASTIC BAG, INJECTION (ML) INTRAVENOUS CONTINUOUS
Status: DISPENSED | OUTPATIENT
Start: 2020-08-17 | End: 2020-08-18

## 2020-08-17 RX ORDER — SEVELAMER CARBONATE 800 MG/1
800 TABLET, FILM COATED ORAL
Status: DISCONTINUED | OUTPATIENT
Start: 2020-08-17 | End: 2020-08-20

## 2020-08-17 RX ORDER — SEVELAMER HYDROCHLORIDE 800 MG/1
800 TABLET, FILM COATED ORAL
Status: DISCONTINUED | OUTPATIENT
Start: 2020-08-17 | End: 2020-08-17

## 2020-08-17 RX ORDER — HYDROCODONE BITARTRATE AND ACETAMINOPHEN 7.5; 325 MG/1; MG/1
1 TABLET ORAL
Status: DISCONTINUED | OUTPATIENT
Start: 2020-08-17 | End: 2020-08-27 | Stop reason: HOSPADM

## 2020-08-17 RX ORDER — MORPHINE SULFATE 2 MG/ML
4 INJECTION, SOLUTION INTRAMUSCULAR; INTRAVENOUS
Status: DISCONTINUED | OUTPATIENT
Start: 2020-08-17 | End: 2020-08-23

## 2020-08-17 RX ORDER — POLYETHYLENE GLYCOL 3350 17 G/17G
17 POWDER, FOR SOLUTION ORAL 2 TIMES DAILY
Status: DISCONTINUED | OUTPATIENT
Start: 2020-08-17 | End: 2020-08-27 | Stop reason: HOSPADM

## 2020-08-17 RX ADMIN — HYDROCODONE BITARTRATE AND ACETAMINOPHEN 1 TABLET: 7.5; 325 TABLET ORAL at 18:54

## 2020-08-17 RX ADMIN — SUCRALFATE 1 G: 1 TABLET ORAL at 17:35

## 2020-08-17 RX ADMIN — POLYETHYLENE GLYCOL 3350 17 G: 17 POWDER, FOR SOLUTION ORAL at 12:00

## 2020-08-17 RX ADMIN — SODIUM CHLORIDE 10 ML: 9 INJECTION, SOLUTION INTRAMUSCULAR; INTRAVENOUS; SUBCUTANEOUS at 21:50

## 2020-08-17 RX ADMIN — CARVEDILOL 12.5 MG: 12.5 TABLET, FILM COATED ORAL at 17:35

## 2020-08-17 RX ADMIN — SODIUM CHLORIDE 10 ML: 9 INJECTION, SOLUTION INTRAMUSCULAR; INTRAVENOUS; SUBCUTANEOUS at 07:33

## 2020-08-17 RX ADMIN — ATORVASTATIN CALCIUM 10 MG: 20 TABLET, FILM COATED ORAL at 21:50

## 2020-08-17 RX ADMIN — SODIUM BICARBONATE 150 MEQ/1,000 ML IN DEXTROSE 5 % INTRAVENOUS: SOLUTION at 13:39

## 2020-08-17 RX ADMIN — SUCRALFATE 1 G: 1 TABLET ORAL at 22:00

## 2020-08-17 RX ADMIN — PANTOPRAZOLE SODIUM 40 MG: 40 TABLET, DELAYED RELEASE ORAL at 06:39

## 2020-08-17 RX ADMIN — LEVOTHYROXINE SODIUM 200 MCG: 0.15 TABLET ORAL at 06:39

## 2020-08-17 RX ADMIN — SEVELAMER CARBONATE 800 MG: 800 TABLET, FILM COATED ORAL at 12:05

## 2020-08-17 RX ADMIN — SEVELAMER CARBONATE 800 MG: 800 TABLET, FILM COATED ORAL at 17:35

## 2020-08-17 RX ADMIN — AMLODIPINE BESYLATE 5 MG: 5 TABLET ORAL at 09:34

## 2020-08-17 RX ADMIN — MORPHINE SULFATE 4 MG: 2 INJECTION, SOLUTION INTRAMUSCULAR; INTRAVENOUS at 15:48

## 2020-08-17 RX ADMIN — SUCRALFATE 1 G: 1 TABLET ORAL at 06:39

## 2020-08-17 RX ADMIN — PANTOPRAZOLE SODIUM 40 MG: 40 TABLET, DELAYED RELEASE ORAL at 17:35

## 2020-08-17 RX ADMIN — AZITHROMYCIN MONOHYDRATE 500 MG: 500 INJECTION, POWDER, LYOPHILIZED, FOR SOLUTION INTRAVENOUS at 07:34

## 2020-08-17 RX ADMIN — SODIUM CHLORIDE 10 ML: 9 INJECTION, SOLUTION INTRAMUSCULAR; INTRAVENOUS; SUBCUTANEOUS at 15:54

## 2020-08-17 RX ADMIN — MORPHINE SULFATE 4 MG: 2 INJECTION, SOLUTION INTRAMUSCULAR; INTRAVENOUS at 09:54

## 2020-08-17 RX ADMIN — AMLODIPINE BESYLATE 5 MG: 5 TABLET ORAL at 17:35

## 2020-08-17 RX ADMIN — CEFTRIAXONE SODIUM 1 G: 1 INJECTION, POWDER, FOR SOLUTION INTRAMUSCULAR; INTRAVENOUS at 06:38

## 2020-08-17 RX ADMIN — CARVEDILOL 12.5 MG: 12.5 TABLET, FILM COATED ORAL at 09:33

## 2020-08-17 RX ADMIN — SUCRALFATE 1 G: 1 TABLET ORAL at 12:00

## 2020-08-17 RX ADMIN — POLYETHYLENE GLYCOL 3350 17 G: 17 POWDER, FOR SOLUTION ORAL at 17:35

## 2020-08-17 NOTE — PROGRESS NOTES
Pt left floor at 0813 for CT with tele box ST-17      Pt returned to floor at 0915 with tele box ST-17        General Surgery End of Shift Nursing Note    Bedside shift change report given to Marleny Armando (oncoming nurse) by Tricia Ventura (offgoing nurse). Report included the following information SBAR, Intake/Output, MAR and Recent Results. Shift worked:   7a-7p   Summary of shift:   Pts pain was controlled by Morphine and Norco and repositioning. Pt has difficulty breathing at rest, night nurse is to give albuterol treatment. Pts temperature was stable today. Issues for physician to address:   None     Number times ambulated in hallway past shift: 0    Number of times OOB to chair past shift: 0    Pain Management:  Current medication: See MAR  Patient states pain is manageable on current pain medication: YES    GI:    Current diet:  DIET RENAL Regular; FR 1500ML    Tolerating current diet: YES  Passing flatus: YES  Last Bowel Movement: yesterday   Appearance: unknown    Respiratory:    Incentive Spirometer at bedside: NO  Patient instructed on use: NO    Patient Safety:    Falls Score: 2  Bed Alarm On? No  Sitter?  Leah Martinez

## 2020-08-17 NOTE — PROGRESS NOTES
[facility]   [address] [address], [place] [facility]  Phone: [phone]   [phone]       Nephrology Progress Note  Marcial Bar     1960     [id number]  Date of Admission : 8/15/2020  08/17/20    CC: Follow up for NERY      Assessment and Plan   NERY on CKD   - 2/2 volume depletion vs ATN or AIN   - no hydronephrosis on renal US  - worsening renal function  - Lasix held   - check FEUrea  - associated metabolic acidosis and hyperkalemia today ( start bicarb drip lower rate as tolerated)   - if no improvement in renal function, may need dialysis over the next 24-48hs  - will follow closely  - strict I/O's  - renal diet  - daily weights    Hyperphosphatemia  - started binders  - follow daily    CKD IV w/ baseline Cr around 3 on 7/22     Hypoxic resp failure/pneumonia in CT chest  In the setting of lung mets  - on Abx and O2     HTN:  - controlled  - on norvasc and coreg  - no ACE or ARBs     ? CHF:  - ECHO pending  - no prior EF on file     Chronic Anemia:  - 2/2 CKD, ? GI losses  - repeat CBC pending     DM2:  - per hospitalist     Hx of thyroid cancer w/ lung mets     Interval History:  Decent U/O; COVID (-); ECHO in progress; BP stable      Review of Systems: Pertinent items are noted in HPI.     Current Medications:   Current Facility-Administered Medications   Medication Dose Route Frequency    morphine injection 4 mg  4 mg IntraVENous Q4H PRN    HYDROcodone-acetaminophen (NORCO) 7.5-325 mg per tablet 1 Tab  1 Tab Oral Q4H PRN    polyethylene glycol (MIRALAX) packet 17 g  17 g Oral BID    melatonin tablet 3 mg  3 mg Oral QHS PRN    LORazepam (ATIVAN) tablet 1 mg  1 mg Oral Q6H PRN    pantoprazole (PROTONIX) tablet 40 mg  40 mg Oral ACB&D    amLODIPine (NORVASC) tablet 5 mg  5 mg Oral BID    carvediloL (COREG) tablet 12.5 mg  12.5 mg Oral BID WITH MEALS    levothyroxine (SYNTHROID) tablet 200 mcg  200 mcg Oral 6am    atorvastatin (LIPITOR) tablet 10 mg  10 mg Oral QHS    sodium chloride (NS) flush 5-40 mL  5-40 mL IntraVENous Q8H    sodium chloride (NS) flush 5-40 mL  5-40 mL IntraVENous PRN    acetaminophen (TYLENOL) tablet 650 mg  650 mg Oral Q6H PRN    Or    acetaminophen (TYLENOL) suppository 650 mg  650 mg Rectal Q6H PRN    promethazine (PHENERGAN) tablet 12.5 mg  12.5 mg Oral Q6H PRN    Or    ondansetron (ZOFRAN) injection 4 mg  4 mg IntraVENous Q6H PRN    sucralfate (CARAFATE) tablet 1 g  1 g Oral AC&HS    azithromycin (ZITHROMAX) 500 mg in 0.9% sodium chloride (MBP/ADV) 250 mL  500 mg IntraVENous Q24H    cefTRIAXone (ROCEPHIN) 1 g in 0.9% sodium chloride (MBP/ADV) 50 mL  1 g IntraVENous Q24H    insulin lispro (HUMALOG) injection   SubCUTAneous AC&HS    glucose chewable tablet 16 g  4 Tab Oral PRN    dextrose (D50W) injection syrg 12.5-25 g  12.5-25 g IntraVENous PRN    glucagon (GLUCAGEN) injection 1 mg  1 mg IntraMUSCular PRN    albuterol (PROVENTIL HFA, VENTOLIN HFA, PROAIR HFA) inhaler 2 Puff  2 Puff Inhalation Q4H PRN      Allergies   Allergen Reactions    Anesthetics - Amide Type Shortness of Breath    Flexeril [Cyclobenzaprine] Hives    Tramadol Hives       Objective:  Vitals:    Vitals:    08/17/20 0256 08/17/20 0637 08/17/20 0728 08/17/20 1110   BP: 114/59 148/54 145/55 141/71   Pulse: 71 79 79 67   Resp: 18 18 18 18   Temp: 97.9 °F (36.6 °C) 98.2 °F (36.8 °C) 98.2 °F (36.8 °C) 97.8 °F (36.6 °C)   SpO2: 100% 96% 98% 98%     Intake and Output:  08/17 0701 - 08/17 1900  In: 120 [P.O.:120]  Out: -   No intake/output data recorded.     Physical Examination:  Pt intubated    No  General: NAD  Neck:  Supple, no JVD  Resp:  Unlabored breaths  CV:  RRR  Neurologic:  Non focal  :  No estrada    [x]    High complexity decision making was performed  [x]    Patient is at high-risk of decompensation with multiple organ involvement    Lab Data Personally Reviewed: I have reviewed all the pertinent labs, microbiology data and radiology studies during assessment.     Recent Labs     08/17/20  0419 08/16/20  0400 08/15/20  0822    138 140   K 5.4* 4.9 4.9   * 111* 110*   CO2 20* 20* 23   * 120* 222*   BUN 70* 59* 60*   CREA 4.94* 3.93* 4.03*   CA 8.4* 8.4* 8.1*   MG  --   --  2.0   PHOS 6.4* 5.0*  --    ALB 2.7* 2.6*  --      Recent Labs     08/17/20  0419 08/16/20  0400 08/15/20  0822   WBC 8.8 10.8 10.2   HGB 7.9* 7.7* 7.9*   HCT 25.4* 24.2* 24.9*    334 356     Lab Results   Component Value Date/Time    Specimen Description: BLOOD 11/09/2010 09:40 AM     Lab Results   Component Value Date/Time    Culture result: HEAVY  STAPHYLOCOCCUS AUREUS   10/16/2016 01:15 PM    Culture result: MODERATE  ENTEROBACTER CLOACAE   10/16/2016 01:15 PM    Culture result: NO ANAEROBES ISOLATED 10/16/2016 01:15 PM    Culture result: NO GROWTH 6 DAYS 10/16/2016 03:56 AM    Culture result: NO GROWTH 6 DAYS 10/16/2016 03:56 AM     Recent Results (from the past 24 hour(s))   GLUCOSE, POC    Collection Time: 08/16/20 12:17 PM   Result Value Ref Range    Glucose (POC) 220 (H) 65 - 100 mg/dL    Performed by Trent Opitz    GLUCOSE, POC    Collection Time: 08/16/20  5:07 PM   Result Value Ref Range    Glucose (POC) 219 (H) 65 - 100 mg/dL    Performed by Trent Opitz    GLUCOSE, POC    Collection Time: 08/16/20  9:13 PM   Result Value Ref Range    Glucose (POC) 172 (H) 65 - 100 mg/dL    Performed by Kristi Colon    CBC WITH AUTOMATED DIFF    Collection Time: 08/17/20  4:19 AM   Result Value Ref Range    WBC 8.8 4.1 - 11.1 K/uL    RBC 2.76 (L) 4.10 - 5.70 M/uL    HGB 7.9 (L) 12.1 - 17.0 g/dL    HCT 25.4 (L) 36.6 - 50.3 %    MCV 92.0 80.0 - 99.0 FL    MCH 28.6 26.0 - 34.0 PG    MCHC 31.1 30.0 - 36.5 g/dL    RDW 13.8 11.5 - 14.5 %    PLATELET 253 949 - 345 K/uL    MPV 9.7 8.9 - 12.9 FL    NRBC 0.0 0  WBC    ABSOLUTE NRBC 0.00 0.00 - 0.01 K/uL    NEUTROPHILS 76 (H) 32 - 75 %    LYMPHOCYTES 10 (L) 12 - 49 %    MONOCYTES 8 5 - 13 %    EOSINOPHILS 4 0 - 7 % BASOPHILS 1 0 - 1 %    IMMATURE GRANULOCYTES 1 (H) 0.0 - 0.5 %    ABS. NEUTROPHILS 6.8 1.8 - 8.0 K/UL    ABS. LYMPHOCYTES 0.9 0.8 - 3.5 K/UL    ABS. MONOCYTES 0.7 0.0 - 1.0 K/UL    ABS. EOSINOPHILS 0.3 0.0 - 0.4 K/UL    ABS. BASOPHILS 0.1 0.0 - 0.1 K/UL    ABS. IMM. GRANS. 0.1 (H) 0.00 - 0.04 K/UL    DF AUTOMATED     TROPONIN I    Collection Time: 08/17/20  4:19 AM   Result Value Ref Range    Troponin-I, Qt. <0.05 <0.05 ng/mL   PROCALCITONIN    Collection Time: 08/17/20  4:19 AM   Result Value Ref Range    Procalcitonin 0.06 ng/mL   RENAL FUNCTION PANEL    Collection Time: 08/17/20  4:19 AM   Result Value Ref Range    Sodium 136 136 - 145 mmol/L    Potassium 5.4 (H) 3.5 - 5.1 mmol/L    Chloride 109 (H) 97 - 108 mmol/L    CO2 20 (L) 21 - 32 mmol/L    Anion gap 7 5 - 15 mmol/L    Glucose 106 (H) 65 - 100 mg/dL    BUN 70 (H) 6 - 20 MG/DL    Creatinine 4.94 (H) 0.70 - 1.30 MG/DL    BUN/Creatinine ratio 14 12 - 20      GFR est AA 15 (L) >60 ml/min/1.73m2    GFR est non-AA 12 (L) >60 ml/min/1.73m2    Calcium 8.4 (L) 8.5 - 10.1 MG/DL    Phosphorus 6.4 (H) 2.6 - 4.7 MG/DL    Albumin 2.7 (L) 3.5 - 5.0 g/dL   GLUCOSE, POC    Collection Time: 08/17/20  6:44 AM   Result Value Ref Range    Glucose (POC) 99 65 - 100 mg/dL    Performed by Dangelo Ro I have reviewed the flowsheets. Chart and Pertinent Notes have been reviewed. No change in PMH ,family and social history from Consult note.       Danielle Omer MD

## 2020-08-17 NOTE — PROGRESS NOTES
CTN KIMMY F/U Progress Note     Goals Addressed                 This Visit's Progress     COMPLETED: Transition of Care- collaboration & coordination of care to prevent complications post hospitalization. 8/17/20- Hosp ADT message pt admitted 8/15/20 to [facility] Dx Acute Resp Failure. Per EMR review noted pt was a Tessy Adm 8/6/20 for Esophageal Manometry, but unsuccessful. Plan- current KIMMY Episode resolved-ID.    7/23/20- pt denied n/v, dysphagia, epigastric pain, fever, SOB. +Hoarseness. + Right side abd pain-intermittent. Last BM 7/22/20 night, formed brown colored stool. Med rec done. Pt reported only meds in home are 3 news Rxs Amlodipine, Coreg, Omeprazole & routine med Levothyroxine. Reported needs refill for Simvastatin. Pt inquired about \"Glipizide & Metformin\". Advised meds d/c'd d/t kidney function. Currently not on any DM meds. +SMBG 2-3x/day. 7/22/20 HS BG \"238\". FBS today \"138\". Advised PCP to determine DM meds based on kidney function tests. Reiterated avoidance of NSAIDs. No OTC meds or Supplements w/o discussing w/ PCP, Neph. PCP appt 7/28/20 @ 11:30 AM. Needs Neph 2 week appt & GI 4 week appt. Plan- (pt) med adherence. Refill for Simvastatin. Renal function labs during PCP appt. Discuss DM meds w/ PCP. Attend 7/28/20 PCP appt. Cont Nep & GI offices to schedule appts. CTN to collaborate w/ pt & Care Team Members to coordinate care as needed. CTN F/u ~ 2 1/2 weeks-ID.

## 2020-08-17 NOTE — PROGRESS NOTES
Nani Peguero w/ MEWS Score (last day)     Date/Time MEWS Score Pulse Resp Temp BP Level of Consciousness SpO2    08/16/20 2055  3  (!) 58  17  (!) 94.7 °F (34.8 °C)  120/56  Alert  95 %    08/16/20 1804  3  (!) 54  17  (!) 94.2 °F (34.6 °C)  121/53  Alert  98 %    08/16/20 1633  --  (!) 57  20  --  113/55  Alert  99 %    08/16/20 1600  --  (!) 58  --  --  --  --  --    08/16/20 1152  1  61  20  (!) 96.6 °F (35.9 °C)  111/50  Alert  96 %    08/16/20 0816  1  71  20  97.1 °F (36.2 °C)  153/63  Alert  97 %    08/16/20 0800  --  71  --  --  --  --  --    08/16/20 0350  1  67  20  97.9 °F (36.6 °C)  131/53  Alert  93 %    08/16/20 0000  --  63  --  --  --  --  --    08/15/20 2234  1  63  16  97.9 °F (36.6 °C)  116/48  Alert  96 %    08/15/20 2230  --  65  --  --  116/48  --  92 %    08/15/20 2009  1  60  19  97.6 °F (36.4 °C)  121/64  Alert  93 %    08/15/20 1500  1  61  19  97 °F (36.1 °C)  139/76  Alert  93 %    08/15/20 1112  --  --  --  --  --  --  96 %    08/15/20 1100  1  63  20  98.2 °F (36.8 °C)  134/75  Alert  98 %    08/15/20 0730  1  71  19  98.1 °F (36.7 °C)  (!) 160/93  Alert  97 %    08/15/20 0552  1  74  18  98.1 °F (36.7 °C)  (!) 156/113  Alert  100 %            2135 - notified MD of low rectal & axillary temperatures.

## 2020-08-17 NOTE — PROGRESS NOTES
Problem: Falls - Risk of  Goal: *Absence of Falls  Description: Document Romain Greer Fall Risk and appropriate interventions in the flowsheet. Outcome: Progressing Towards Goal  Note: Fall Risk Interventions:  Mobility Interventions: Patient to call before getting OOB         Medication Interventions: Patient to call before getting OOB    Elimination Interventions: Call light in reach              Problem: Diabetes Self-Management  Goal: *Disease process and treatment process  Description: Define diabetes and identify own type of diabetes; list 3 options for treating diabetes. Outcome: Progressing Towards Goal  Goal: *Incorporating nutritional management into lifestyle  Description: Describe effect of type, amount and timing of food on blood glucose; list 3 methods for planning meals. Outcome: Progressing Towards Goal  Goal: *Incorporating physical activity into lifestyle  Description: State effect of exercise on blood glucose levels. Outcome: Progressing Towards Goal  Goal: *Developing strategies to promote health/change behavior  Description: Define the ABC's of diabetes; identify appropriate screenings, schedule and personal plan for screenings. Outcome: Progressing Towards Goal  Goal: *Using medications safely  Description: State effect of diabetes medications on diabetes; name diabetes medication taking, action and side effects. Outcome: Progressing Towards Goal  Goal: *Monitoring blood glucose, interpreting and using results  Description: Identify recommended blood glucose targets  and personal targets. Outcome: Progressing Towards Goal  Goal: *Prevention, detection, treatment of acute complications  Description: List symptoms of hyper- and hypoglycemia; describe how to treat low blood sugar and actions for lowering  high blood glucose level.   Outcome: Progressing Towards Goal  Goal: *Prevention, detection and treatment of chronic complications  Description: Define the natural course of diabetes and describe the relationship of blood glucose levels to long term complications of diabetes.   Outcome: Progressing Towards Goal  Goal: *Developing strategies to address psychosocial issues  Description: Describe feelings about living with diabetes; identify support needed and support network  Outcome: Progressing Towards Goal  Goal: *Insulin pump training  Outcome: Progressing Towards Goal  Goal: *Sick day guidelines  Outcome: Progressing Towards Goal  Goal: *Patient Specific Goal (EDIT GOAL, INSERT TEXT)  Outcome: Progressing Towards Goal

## 2020-08-17 NOTE — PROGRESS NOTES
Hospitalist Progress Note    NAME: Olivia Mg   :  1960   MRN:  [id number]     Admit date: 8/15/2020    Today's date: 20    PCP: MD Ryan Adame M.D. Cell [phone]      Assessment / Plan:    Acute hypoxic respiratory Failure POA  Acute on chronic diastolic CHF POA    ? Bacterial pneumonia POA  Currently on NC O2 4 liters  ProBNP 3674  CT reviewed patchy ASD, mod bilateral effusions  ? B/L pneumonia vs pulm edema       Clinically suspect CHF  SARS-CoV-2 testing negative  Continue antibiotics x 5 days, procalcitonin 0.06  Creatinine rising, hold diuretics     Recurrent Chest pain could be 2/2 His gastritis/duodenitis  -PPI and Sucralfate   -Primary GI  [facility] gastro[facility]ologist specilaist consulted  - Resolved, serial cardiac enzymes negative     Acute on chronic anemia POA  HX of Duodenitis POA  - 2020 EGD which showed:  spencer quynh tear, duodenitis, duodenal polyp, gastric polyps, and dilation of the esophagus  -GI Consulted and following  - HgB stable     Acute kidney injury POA creat 4.03  CKD stage IV POA ? Baseline creat 3.0 to 3.3 recently  - Nephrology consulted  [facility]   - Hold  nephrotoxic medication, No NSAIDs  - stop diuretics with rising creatinine  - Renal US     History of diabetes mellitus  - SSI, A1c 6.3 on 2020  - 144, 220, 219, 172, 99     History of thyroid cancer with mets to lung  -Follows Dr. Ana Maria Johnston  -Hypothyroidsim-Continue levothyroxine     Essential HTN POA-Cont. Home meds        Code Status: full  Surrogate Decision Maker:  Valeria Coto  Daughter  [phone]         DVT Prophylaxis: SCDs  GI Prophylaxis: not indicated     Baseline: independent       Subjective:     Chief Complaint / Reason for Physician Visit  \"No problems\". Discussed with RN events overnight.    Denies SOB, remains on 4 liters NC  No more CP  Denies fevers, cough, sore throat, myalgias  Hoarse voice which he says is chronic and unchanged    Review of Systems:  Symptom Y/N Comments  Symptom Y/N Comments   Fever/Chills n   Chest Pain n    Poor Appetite    Edema     Cough n   Abdominal Pain n    Sputum    Joint Pain     SOB/NUÑEZ y   Headache     Nausea/vomit n   Tolerating PT/OT     Diarrhea n   Tolerating Diet y    Constipation    Other       Could NOT obtain due to:      Objective:     VITALS:   Last 24hrs VS reviewed since prior progress note. Most recent are:  Patient Vitals for the past 24 hrs:   Temp Pulse Resp BP SpO2   08/17/20 0728 98.2 °F (36.8 °C) 79 18 145/55 98 %   08/17/20 0637 98.2 °F (36.8 °C) 79 18 148/54 96 %   08/17/20 0256 97.9 °F (36.6 °C) 71 18 114/59 100 %   08/16/20 2253 (!) 94 °F (34.4 °C) (!) 59 18 114/55 91 %   08/16/20 2055 (!) 94.7 °F (34.8 °C) (!) 58 17 120/56 95 %   08/16/20 1804 (!) 94.2 °F (34.6 °C) (!) 54 17 121/53 98 %   08/16/20 1633 -- (!) 57 20 113/55 99 %   08/16/20 1600 -- (!) 58 -- -- --   08/16/20 1152 (!) 96.6 °F (35.9 °C) 61 20 111/50 96 %       Intake/Output Summary (Last 24 hours) at 8/17/2020 0828  Last data filed at 8/17/2020 0825  Gross per 24 hour   Intake 120 ml   Output --   Net 120 ml        Wt Readings from Last 12 Encounters:   08/06/20 106.1 kg (234 lb)   07/28/20 105.7 kg (233 lb)   07/17/20 103.9 kg (229 lb 0.9 oz)   05/17/20 105.4 kg (232 lb 7 oz)   01/31/20 105.2 kg (232 lb)   09/27/19 101.4 kg (223 lb 9.6 oz)   05/06/19 108.9 kg (240 lb)   03/27/19 108.4 kg (239 lb)   10/04/18 108.3 kg (238 lb 12.8 oz)   09/27/18 108.4 kg (239 lb)   07/05/18 110.8 kg (244 lb 4.8 oz)   03/23/18 114.6 kg (252 lb 9.6 oz)       PHYSICAL EXAM:  General: WD, WN. Alert, cooperative, no acute distress    EENT:  PERRL. Anicteric sclerae. MMM  Resp:  Decreased BS bilaterally, no wheezing or rales. No accessory muscle use  CV:  Regular  rhythm,  No edema  GI:  Soft, Non distended, Non tender.   +Bowel sounds, no rebound  Neurologic:  Alert and oriented X 3, normal speech, non focal motor exam  Psych:   Good insight. Not anxious nor agitated  Skin:  No rashes. No jaundice    Reviewed most current lab test results and cultures  YES  Reviewed most current radiology test results   YES  Review and summation of old records today    NO  Reviewed patient's current orders and MAR    YES  PMH/SH reviewed - no change compared to H&P  ________________________________________________________________________  Care Plan discussed with:    Comments   Patient x    Family      RN x    Care Manager     Consultant                        Multidiciplinary team rounds were held today with , nursing, pharmacist and clinical coordinator. Patient's plan of care was discussed; medications were reviewed and discharge planning was addressed. ________________________________________________________________________      Comments   >50% of visit spent in counseling and coordination of care     ________________________________________________________________________  Mariam Carrillo MD     Procedures: see electronic medical records for all procedures/Xrays and details which were not copied into this note but were reviewed prior to creation of Plan. LABS:  I reviewed today's most current labs and imaging studies.   Pertinent labs include:  Recent Labs     08/17/20 0419 08/16/20  0400 08/15/20  0822   WBC 8.8 10.8 10.2   HGB 7.9* 7.7* 7.9*   HCT 25.4* 24.2* 24.9*    334 356     Recent Labs     08/17/20 0419 08/16/20  0400 08/15/20  0822    138 140   K 5.4* 4.9 4.9   * 111* 110*   CO2 20* 20* 23   * 120* 222*   BUN 70* 59* 60*   CREA 4.94* 3.93* 4.03*   CA 8.4* 8.4* 8.1*   MG  --   --  2.0   PHOS 6.4* 5.0*  --    ALB 2.7* 2.6*  --

## 2020-08-17 NOTE — PROGRESS NOTES
Gastroenterology Progress Note  JOSÉ Kwon for Dr. Magdiel Broderick    8/17/2020    Admit Date: 8/15/2020    Subjective: Follow up for:  1)  Acute on chronic anemia    2) Nausea and vomiting- Improved    3) Respiratory failure    Patient is on Clear Liquid. Pain: Patient complains of abdominal pain no. Bowel Movements: None, LBM was Saturday. Has hx of constipation. There is no bleeding. Breathing is a little better, maybe about 75% today per patient, on 4L NC O2.     Repeat COVID testing was negative    Hgb 7.9 today from 7.7 yesterday, Bun up to 70, Cr 4.94, nephrology following    Stool for occult blood ordered    Current Facility-Administered Medications   Medication Dose Route Frequency    morphine injection 4 mg  4 mg IntraVENous Q4H PRN    HYDROcodone-acetaminophen (NORCO) 7.5-325 mg per tablet 1 Tab  1 Tab Oral Q4H PRN    melatonin tablet 3 mg  3 mg Oral QHS PRN    LORazepam (ATIVAN) tablet 1 mg  1 mg Oral Q6H PRN    pantoprazole (PROTONIX) tablet 40 mg  40 mg Oral ACB&D    amLODIPine (NORVASC) tablet 5 mg  5 mg Oral BID    carvediloL (COREG) tablet 12.5 mg  12.5 mg Oral BID WITH MEALS    levothyroxine (SYNTHROID) tablet 200 mcg  200 mcg Oral 6am    atorvastatin (LIPITOR) tablet 10 mg  10 mg Oral QHS    sodium chloride (NS) flush 5-40 mL  5-40 mL IntraVENous Q8H    sodium chloride (NS) flush 5-40 mL  5-40 mL IntraVENous PRN    acetaminophen (TYLENOL) tablet 650 mg  650 mg Oral Q6H PRN    Or    acetaminophen (TYLENOL) suppository 650 mg  650 mg Rectal Q6H PRN    polyethylene glycol (MIRALAX) packet 17 g  17 g Oral DAILY PRN    promethazine (PHENERGAN) tablet 12.5 mg  12.5 mg Oral Q6H PRN    Or    ondansetron (ZOFRAN) injection 4 mg  4 mg IntraVENous Q6H PRN    sucralfate (CARAFATE) tablet 1 g  1 g Oral AC&HS    azithromycin (ZITHROMAX) 500 mg in 0.9% sodium chloride (MBP/ADV) 250 mL  500 mg IntraVENous Q24H    cefTRIAXone (ROCEPHIN) 1 g in 0.9% sodium chloride (MBP/ADV) 50 mL  1 g IntraVENous Q24H    insulin lispro (HUMALOG) injection   SubCUTAneous AC&HS    glucose chewable tablet 16 g  4 Tab Oral PRN    dextrose (D50W) injection syrg 12.5-25 g  12.5-25 g IntraVENous PRN    glucagon (GLUCAGEN) injection 1 mg  1 mg IntraMUSCular PRN    albuterol (PROVENTIL HFA, VENTOLIN HFA, PROAIR HFA) inhaler 2 Puff  2 Puff Inhalation Q4H PRN        Objective:     Blood pressure 145/55, pulse 79, temperature 98.2 °F (36.8 °C), resp. rate 18, SpO2 98 %.    08/17 0701 - 08/17 1900  In: 120 [P.O.:120]  Out: -     No intake/output data recorded.     EXAM:   GEN: Pleasant WM, NAD  HEENT: NCAT, on 4L O2  Heart: RRR  Lungs; CTA  Abdomen: Obese, soft, NT, ND, normal BS  Ext: no edema    Data Review    Recent Results (from the past 24 hour(s))   ECHO ADULT COMPLETE    Collection Time: 08/16/20 10:36 AM   Result Value Ref Range    IVSd 1.51 (A) 0.6 - 1.0 cm    IVSs 2.41 cm    LVIDd 5.01 4.2 - 5.9 cm    LVIDs 2.76 cm    LVOT d 2.15 cm    LVPWd 1.65 (A) 0.6 - 1.0 cm    LVPWs 2.02 cm    LVOT Peak Gradient 4.85 mmHg    LVOT Peak Velocity 110.10 cm/s    RVIDd 2.98 cm    RVSP 35.09 mmHg    Left Atrium Major Axis 4.10 cm    Left Atrium to Aortic Root Ratio 1.28     Left Atrium to Aortic Root Ratio 1.28     Est. RA Pressure 10.00 mmHg    AV Cusp 2.02 cm    Aortic Valve Area by Continuity of Peak Velocity 3.17 cm2    AoV PG 6.34 mmHg    Aortic Valve Systolic Peak Velocity 585.29 cm/s    MV A Rigoberto 110.47 cm/s    Mitral Valve E Wave Deceleration Time 0.20 s    MV E Rigoberto 112.29 cm/s    E/E' septal 14.30     LV E' Septal Velocity 7.85 cm/s    Pulmonic Valve Systolic Peak Instantaneous Gradient 3.51 mmHg    Pulmonic Valve Max Velocity 93.41 cm/s    Triscuspid Valve Regurgitation Peak Gradient 25.09 mmHg    TR Max Velocity 250.46 cm/s    Ao Root D 2.98 cm    MV E/A 1.02     LV Mass .7 88 - 224 g   GLUCOSE, POC    Collection Time: 08/16/20 12:17 PM   Result Value Ref Range    Glucose (POC) 220 (H) 65 - 100 mg/dL    Performed by Cheryl Ruggiero    GLUCOSE, POC    Collection Time: 08/16/20  5:07 PM   Result Value Ref Range    Glucose (POC) 219 (H) 65 - 100 mg/dL    Performed by Cheryl Ruggiero    GLUCOSE, POC    Collection Time: 08/16/20  9:13 PM   Result Value Ref Range    Glucose (POC) 172 (H) 65 - 100 mg/dL    Performed by Keanu Osullivan    CBC WITH AUTOMATED DIFF    Collection Time: 08/17/20  4:19 AM   Result Value Ref Range    WBC 8.8 4.1 - 11.1 K/uL    RBC 2.76 (L) 4.10 - 5.70 M/uL    HGB 7.9 (L) 12.1 - 17.0 g/dL    HCT 25.4 (L) 36.6 - 50.3 %    MCV 92.0 80.0 - 99.0 FL    MCH 28.6 26.0 - 34.0 PG    MCHC 31.1 30.0 - 36.5 g/dL    RDW 13.8 11.5 - 14.5 %    PLATELET 937 516 - 071 K/uL    MPV 9.7 8.9 - 12.9 FL    NRBC 0.0 0  WBC    ABSOLUTE NRBC 0.00 0.00 - 0.01 K/uL    NEUTROPHILS 76 (H) 32 - 75 %    LYMPHOCYTES 10 (L) 12 - 49 %    MONOCYTES 8 5 - 13 %    EOSINOPHILS 4 0 - 7 %    BASOPHILS 1 0 - 1 %    IMMATURE GRANULOCYTES 1 (H) 0.0 - 0.5 %    ABS. NEUTROPHILS 6.8 1.8 - 8.0 K/UL    ABS. LYMPHOCYTES 0.9 0.8 - 3.5 K/UL    ABS. MONOCYTES 0.7 0.0 - 1.0 K/UL    ABS. EOSINOPHILS 0.3 0.0 - 0.4 K/UL    ABS. BASOPHILS 0.1 0.0 - 0.1 K/UL    ABS. IMM.  GRANS. 0.1 (H) 0.00 - 0.04 K/UL    DF AUTOMATED     TROPONIN I    Collection Time: 08/17/20  4:19 AM   Result Value Ref Range    Troponin-I, Qt. <0.05 <0.05 ng/mL   PROCALCITONIN    Collection Time: 08/17/20  4:19 AM   Result Value Ref Range    Procalcitonin 0.06 ng/mL   RENAL FUNCTION PANEL    Collection Time: 08/17/20  4:19 AM   Result Value Ref Range    Sodium 136 136 - 145 mmol/L    Potassium 5.4 (H) 3.5 - 5.1 mmol/L    Chloride 109 (H) 97 - 108 mmol/L    CO2 20 (L) 21 - 32 mmol/L    Anion gap 7 5 - 15 mmol/L    Glucose 106 (H) 65 - 100 mg/dL    BUN 70 (H) 6 - 20 MG/DL    Creatinine 4.94 (H) 0.70 - 1.30 MG/DL    BUN/Creatinine ratio 14 12 - 20      GFR est AA 15 (L) >60 ml/min/1.73m2    GFR est non-AA 12 (L) >60 ml/min/1.73m2    Calcium 8.4 (L) 8.5 - 10.1 MG/DL Phosphorus 6.4 (H) 2.6 - 4.7 MG/DL    Albumin 2.7 (L) 3.5 - 5.0 g/dL   GLUCOSE, POC    Collection Time: 08/17/20  6:44 AM   Result Value Ref Range    Glucose (POC) 99 65 - 100 mg/dL    Performed by Misti Ross      Recent Labs     08/17/20  0419 08/16/20  0400   WBC 8.8 10.8   HGB 7.9* 7.7*   HCT 25.4* 24.2*    334     Recent Labs     08/17/20  0419 08/16/20  0400 08/15/20  0822    138 140   K 5.4* 4.9 4.9   * 111* 110*   CO2 20* 20* 23   BUN 70* 59* 60*   CREA 4.94* 3.93* 4.03*   * 120* 222*   CA 8.4* 8.4* 8.1*   MG  --   --  2.0   PHOS 6.4* 5.0*  --      Recent Labs     08/17/20 0419 08/16/20  0400   ALB 2.7* 2.6*     No results for input(s): INR, PTP, APTT, INREXT in the last 72 hours. No results for input(s): FE, TIBC, PSAT, FERR in the last 72 hours. No results found for: FOL, RBCF   No results for input(s): PH, PCO2, PO2 in the last 72 hours.   Recent Labs     08/17/20  0419 08/15/20  1435 08/15/20  0822   TROIQ <0.05 <0.05 <0.05     Lab Results   Component Value Date/Time    Cholesterol, total 175 07/29/2020 12:09 PM    HDL Cholesterol 31 (L) 07/29/2020 12:09 PM    LDL, calculated 102 (H) 07/29/2020 12:09 PM    Triglyceride 212 (H) 07/29/2020 12:09 PM     Lab Results   Component Value Date/Time    Glucose (POC) 99 08/17/2020 06:44 AM    Glucose (POC) 172 (H) 08/16/2020 09:13 PM    Glucose (POC) 219 (H) 08/16/2020 05:07 PM    Glucose (POC) 220 (H) 08/16/2020 12:17 PM    Glucose (POC) 144 (H) 08/16/2020 08:39 AM     Lab Results   Component Value Date/Time    Color YELLOW/STRAW 07/17/2020 04:37 AM    Appearance CLEAR 07/17/2020 04:37 AM    Specific gravity 1.014 07/17/2020 04:37 AM    pH (UA) 5.0 07/17/2020 04:37 AM    Protein 300 (A) 07/17/2020 04:37 AM    Glucose Negative 07/17/2020 04:37 AM    Ketone Negative 07/17/2020 04:37 AM    Bilirubin Negative 07/17/2020 04:37 AM    Urobilinogen 0.2 07/17/2020 04:37 AM    Nitrites Negative 07/17/2020 04:37 AM    Leukocyte Esterase Negative 07/17/2020 04:37 AM    Epithelial cells FEW 07/17/2020 04:37 AM    Bacteria Negative 07/17/2020 04:37 AM    WBC 0-4 07/17/2020 04:37 AM    RBC 5-10 07/17/2020 04:37 AM           Assessment:     Active Problems:    NERY (acute kidney injury) ([facility] [address] (7/17/2020)      Lung cancer ([facility] [address] (8/15/2020)      Chest pain (8/15/2020)      Pneumonia (8/15/2020)      Anemia (8/15/2020)      Suspected COVID-19 virus infection (8/15/2020)        Plan:   Repeat COVID testing was negative, respiratory status per patient is a little better. Will see how he is feeling tomorrow, if continues to improve consider prepping tomorrow and colonoscopy Wednesday. Continue to monitor hgb, for any acute bleeding proceed with NM tagged RBC scan.      JOSÉ Burrell    08/17/20  9:10 AM  [address] [facility], [address] [place] [address]. [id number]  [address] [place] [address] [place]: [phone]

## 2020-08-17 NOTE — PROGRESS NOTES
2300 - MD ordered Bear josé miguelgger to increase pt temp. Pt rectal temp was below 95.0. 
0300 - Axillary temperature up to 97.9 bringing MEWS up to 1 from a 3. General Surgery End of Shift Nursing Note Bedside shift change report given to *** (oncoming nurse) by Kaley Watson RN (offgoing nurse). Report included the following information SBAR, Kardex, Intake/Output, MAR and Recent Results. Shift worked:   [id number] Summary of shift:    Patient had very low temperature. MD contacted and bear hugger applied. Pt temp back to normal.  Otherwise no acute events. Patient was medicated for pain as needed and rested comfortably throughout the shift. Issues for physician to address:   Not at this time. Clement Banks

## 2020-08-17 NOTE — PROGRESS NOTES
Reason for Readmission:   Pneumonia           RUR Score/Risk Level:   25      PCP: First and Last name:  Ashtyn Martell MD   Name of Practice: [facility]   Are you a current patient: Yes/No: Yes   Approximate date of last visit: last month after d/c   Can you participate in a virtual visit with your PCP:     Is a Care Conference indicated:       Did you attend your follow up appointment (s): If not, why not:Yes         Resources/supports as identified by patient/family:  7 children        Top Challenges facing patient (as identified by patient/family and CM): Finances/Medication cost?   No issues identified    Transportation     No issues identified   Support system or lack thereof? Living arrangements? Lives alone in a one story home with 3 YULIANA         Self-care/ADLs/Cognition? Independent         Current Advanced Directive/Advance Care Plan:             Plan for utilizing home health:  TBD              Transition of Care Plan:    Based on readmission, the patient's previous Plan of Care   has been evaluated and/or modified. The current Transition of Care Plan is:         CM met with pt at bedside. Prior to admission pt was independent with ADL/IADL to include driving. Patient denies past HH/Rehab. Patient has home oxygen provided by [employer]. Patients support system includes 7 children, 3 daughter and 4 son's. Patient does not have any needs or concerns at this time. CM will continue to follow. PCP- Dr Melly Harman in [address] [place] [address] Interventions  PCP Verified by CM: Jason Carballo MD)  Mode of Transport at Discharge:  Other (see comment)(daughter)  Transition of Care Consult (CM Consult): Discharge Planning  Discharge Durable Medical Equipment: No(oxygen-[employer])  Physical Therapy Consult: No  Occupational Therapy Consult: No  Speech Therapy Consult: No  Current Support Network: Lives Alone, Family Lives Nearby(Lives in a one story home with 3 YULIANA)  Confirm Follow Up Transport: Self  Discharge Location  Discharge Placement: (Anticipate home)      [facility]  Ext [phone]    KIMMY Plan:     *Home   *daughter to transport at d/c   *BPCI provided

## 2020-08-17 NOTE — PROGRESS NOTES
Problem: Falls - Risk of  Goal: *Absence of Falls  Description: Document Maryellen Gandhi Fall Risk and appropriate interventions in the flowsheet. Outcome: Progressing Towards Goal  Note: Fall Risk Interventions:  Mobility Interventions: Patient to call before getting OOB         Medication Interventions: Patient to call before getting OOB    Elimination Interventions: Call light in reach              Problem: Patient Education: Go to Patient Education Activity  Goal: Patient/Family Education  Outcome: Progressing Towards Goal     Problem: Diabetes Self-Management  Goal: *Disease process and treatment process  Description: Define diabetes and identify own type of diabetes; list 3 options for treating diabetes. Outcome: Progressing Towards Goal  Goal: *Incorporating nutritional management into lifestyle  Description: Describe effect of type, amount and timing of food on blood glucose; list 3 methods for planning meals. Outcome: Progressing Towards Goal  Goal: *Incorporating physical activity into lifestyle  Description: State effect of exercise on blood glucose levels. Outcome: Progressing Towards Goal  Goal: *Developing strategies to promote health/change behavior  Description: Define the ABC's of diabetes; identify appropriate screenings, schedule and personal plan for screenings. Outcome: Progressing Towards Goal  Goal: *Using medications safely  Description: State effect of diabetes medications on diabetes; name diabetes medication taking, action and side effects. Outcome: Progressing Towards Goal  Goal: *Monitoring blood glucose, interpreting and using results  Description: Identify recommended blood glucose targets  and personal targets. Outcome: Progressing Towards Goal  Goal: *Prevention, detection, treatment of acute complications  Description: List symptoms of hyper- and hypoglycemia; describe how to treat low blood sugar and actions for lowering  high blood glucose level.   Outcome: Progressing Towards Goal  Goal: *Prevention, detection and treatment of chronic complications  Description: Define the natural course of diabetes and describe the relationship of blood glucose levels to long term complications of diabetes. Outcome: Progressing Towards Goal  Goal: *Developing strategies to address psychosocial issues  Description: Describe feelings about living with diabetes; identify support needed and support network  Outcome: Progressing Towards Goal  Goal: *Insulin pump training  Outcome: Progressing Towards Goal  Goal: *Sick day guidelines  Outcome: Progressing Towards Goal  Goal: *Patient Specific Goal (EDIT GOAL, INSERT TEXT)  Outcome: Progressing Towards Goal     Problem: Patient Education: Go to Patient Education Activity  Goal: Patient/Family Education  Outcome: Progressing Towards Goal     Problem: Pressure Injury - Risk of  Goal: *Prevention of pressure injury  Description: Document Ge Scale and appropriate interventions in the flowsheet.   Outcome: Progressing Towards Goal  Note: Pressure Injury Interventions:       Moisture Interventions: Absorbent underpads    Activity Interventions: Increase time out of bed    Mobility Interventions: HOB 30 degrees or less    Nutrition Interventions: Document food/fluid/supplement intake

## 2020-08-18 ENCOUNTER — APPOINTMENT (OUTPATIENT)
Dept: GENERAL RADIOLOGY | Age: 60
DRG: 673 | End: 2020-08-18
Attending: INTERNAL MEDICINE
Payer: MEDICARE

## 2020-08-18 ENCOUNTER — HOSPITAL ENCOUNTER (INPATIENT)
Dept: INTERVENTIONAL RADIOLOGY/VASCULAR | Age: 60
Discharge: HOME | DRG: 673 | End: 2020-08-18
Attending: INTERNAL MEDICINE
Payer: MEDICARE

## 2020-08-18 LAB
AMORPH CRY URNS QL MICRO: ABNORMAL
ANION GAP SERPL CALC-SCNC: 4 MMOL/L (ref 5–15)
APPEARANCE UR: ABNORMAL
ARTERIAL PATENCY WRIST A: YES
BACTERIA URNS QL MICRO: ABNORMAL /HPF
BASE DEFICIT BLD-SCNC: 7 MMOL/L
BDY SITE: ABNORMAL
BILIRUB UR QL: NEGATIVE
BUN SERPL-MCNC: 82 MG/DL (ref 6–20)
BUN/CREAT SERPL: 13 (ref 12–20)
CA-I BLD-SCNC: 1.25 MMOL/L (ref 1.12–1.32)
CALCIUM SERPL-MCNC: 7.6 MG/DL (ref 8.5–10.1)
CHLORIDE SERPL-SCNC: 104 MMOL/L (ref 97–108)
CO2 SERPL-SCNC: 26 MMOL/L (ref 21–32)
COLOR UR: ABNORMAL
CREAT SERPL-MCNC: 6.49 MG/DL (ref 0.7–1.3)
EPITH CASTS URNS QL MICRO: ABNORMAL /LPF
ERYTHROCYTE [DISTWIDTH] IN BLOOD BY AUTOMATED COUNT: 13.9 % (ref 11.5–14.5)
GAS FLOW.O2 O2 DELIVERY SYS: ABNORMAL L/MIN
GAS FLOW.O2 SETTING OXYMISER: 4 L/M
GLUCOSE BLD STRIP.AUTO-MCNC: 190 MG/DL (ref 65–100)
GLUCOSE BLD STRIP.AUTO-MCNC: 202 MG/DL (ref 65–100)
GLUCOSE BLD STRIP.AUTO-MCNC: 219 MG/DL (ref 65–100)
GLUCOSE BLD STRIP.AUTO-MCNC: 87 MG/DL (ref 65–100)
GLUCOSE BLD STRIP.AUTO-MCNC: 88 MG/DL (ref 65–100)
GLUCOSE SERPL-MCNC: 195 MG/DL (ref 65–100)
GLUCOSE UR STRIP.AUTO-MCNC: NEGATIVE MG/DL
HCO3 BLD-SCNC: 21.2 MMOL/L (ref 22–26)
HCT VFR BLD AUTO: 23.8 % (ref 36.6–50.3)
HGB BLD-MCNC: 7.3 G/DL (ref 12.1–17)
HGB UR QL STRIP: ABNORMAL
KETONES UR QL STRIP.AUTO: NEGATIVE MG/DL
LEUKOCYTE ESTERASE UR QL STRIP.AUTO: NEGATIVE
MCH RBC QN AUTO: 28.7 PG (ref 26–34)
MCHC RBC AUTO-ENTMCNC: 30.7 G/DL (ref 30–36.5)
MCV RBC AUTO: 93.7 FL (ref 80–99)
MUCOUS THREADS URNS QL MICRO: ABNORMAL /LPF
NITRITE UR QL STRIP.AUTO: NEGATIVE
NRBC # BLD: 0 K/UL (ref 0–0.01)
NRBC BLD-RTO: 0 PER 100 WBC
PCO2 BLD: 53.2 MMHG (ref 35–45)
PH BLD: 7.21 [PH] (ref 7.35–7.45)
PH UR STRIP: 5 [PH] (ref 5–8)
PLATELET # BLD AUTO: 325 K/UL (ref 150–400)
PMV BLD AUTO: 9.7 FL (ref 8.9–12.9)
PO2 BLD: 57 MMHG (ref 80–100)
POTASSIUM SERPL-SCNC: 6 MMOL/L (ref 3.5–5.1)
PROT UR STRIP-MCNC: 100 MG/DL
RBC # BLD AUTO: 2.54 M/UL (ref 4.1–5.7)
RBC #/AREA URNS HPF: ABNORMAL /HPF (ref 0–5)
SAO2 % BLD: 82 % (ref 92–97)
SERVICE CMNT-IMP: ABNORMAL
SERVICE CMNT-IMP: NORMAL
SERVICE CMNT-IMP: NORMAL
SODIUM SERPL-SCNC: 134 MMOL/L (ref 136–145)
SP GR UR REFRACTOMETRY: 1.02 (ref 1–1.03)
SPECIMEN TYPE: ABNORMAL
TOTAL RESP. RATE, ITRR: 20
URATE SERPL-MCNC: 12.5 MG/DL (ref 3.5–7.2)
UROBILINOGEN UR QL STRIP.AUTO: 0.2 EU/DL (ref 0.2–1)
WBC # BLD AUTO: 8.4 K/UL (ref 4.1–11.1)
WBC URNS QL MICRO: ABNORMAL /HPF (ref 0–4)

## 2020-08-18 PROCEDURE — 74011250637 HC RX REV CODE- 250/637: Performed by: INTERNAL MEDICINE

## 2020-08-18 PROCEDURE — 74011250636 HC RX REV CODE- 250/636: Performed by: INTERNAL MEDICINE

## 2020-08-18 PROCEDURE — 36600 WITHDRAWAL OF ARTERIAL BLOOD: CPT

## 2020-08-18 PROCEDURE — 65660000000 HC RM CCU STEPDOWN

## 2020-08-18 PROCEDURE — 74011250636 HC RX REV CODE- 250/636: Performed by: HOSPITALIST

## 2020-08-18 PROCEDURE — 71045 X-RAY EXAM CHEST 1 VIEW: CPT

## 2020-08-18 PROCEDURE — 74011000258 HC RX REV CODE- 258: Performed by: INTERNAL MEDICINE

## 2020-08-18 PROCEDURE — 74011250636 HC RX REV CODE- 250/636: Performed by: STUDENT IN AN ORGANIZED HEALTH CARE EDUCATION/TRAINING PROGRAM

## 2020-08-18 PROCEDURE — 94760 N-INVAS EAR/PLS OXIMETRY 1: CPT

## 2020-08-18 PROCEDURE — 90935 HEMODIALYSIS ONE EVALUATION: CPT

## 2020-08-18 PROCEDURE — 74011000250 HC RX REV CODE- 250: Performed by: INTERNAL MEDICINE

## 2020-08-18 PROCEDURE — 81001 URINALYSIS AUTO W/SCOPE: CPT

## 2020-08-18 PROCEDURE — 85027 COMPLETE CBC AUTOMATED: CPT

## 2020-08-18 PROCEDURE — 82803 BLOOD GASES ANY COMBINATION: CPT

## 2020-08-18 PROCEDURE — 02HV33Z INSERTION OF INFUSION DEVICE INTO SUPERIOR VENA CAVA, PERCUTANEOUS APPROACH: ICD-10-PCS | Performed by: STUDENT IN AN ORGANIZED HEALTH CARE EDUCATION/TRAINING PROGRAM

## 2020-08-18 PROCEDURE — 74011000258 HC RX REV CODE- 258: Performed by: HOSPITALIST

## 2020-08-18 PROCEDURE — 74011636637 HC RX REV CODE- 636/637: Performed by: INTERNAL MEDICINE

## 2020-08-18 PROCEDURE — 74011250637 HC RX REV CODE- 250/637: Performed by: HOSPITALIST

## 2020-08-18 PROCEDURE — P9047 ALBUMIN (HUMAN), 25%, 50ML: HCPCS | Performed by: INTERNAL MEDICINE

## 2020-08-18 PROCEDURE — 94640 AIRWAY INHALATION TREATMENT: CPT

## 2020-08-18 PROCEDURE — 77010033678 HC OXYGEN DAILY

## 2020-08-18 PROCEDURE — 87340 HEPATITIS B SURFACE AG IA: CPT

## 2020-08-18 PROCEDURE — 77030005513 HC CATH URETH FOL11 MDII -B

## 2020-08-18 PROCEDURE — C1892 INTRO/SHEATH,FIXED,PEEL-AWAY: HCPCS

## 2020-08-18 PROCEDURE — 80048 BASIC METABOLIC PNL TOTAL CA: CPT

## 2020-08-18 PROCEDURE — 36415 COLL VENOUS BLD VENIPUNCTURE: CPT

## 2020-08-18 PROCEDURE — 74011636637 HC RX REV CODE- 636/637: Performed by: HOSPITALIST

## 2020-08-18 PROCEDURE — 84550 ASSAY OF BLOOD/URIC ACID: CPT

## 2020-08-18 PROCEDURE — 36556 INSERT NON-TUNNEL CV CATH: CPT

## 2020-08-18 PROCEDURE — 82962 GLUCOSE BLOOD TEST: CPT

## 2020-08-18 PROCEDURE — 74011250636 HC RX REV CODE- 250/636

## 2020-08-18 PROCEDURE — C1752 CATH,HEMODIALYSIS,SHORT-TERM: HCPCS

## 2020-08-18 RX ORDER — HEPARIN SODIUM 200 [USP'U]/100ML
200 INJECTION, SOLUTION INTRAVENOUS ONCE
Status: DISCONTINUED | OUTPATIENT
Start: 2020-08-18 | End: 2020-08-19 | Stop reason: HOSPADM

## 2020-08-18 RX ORDER — SODIUM CHLORIDE 0.9 % (FLUSH) 0.9 %
5-40 SYRINGE (ML) INJECTION EVERY 8 HOURS
Status: CANCELLED | OUTPATIENT
Start: 2020-08-18

## 2020-08-18 RX ORDER — HEPARIN 100 UNIT/ML
500 SYRINGE INTRAVENOUS ONCE
Status: COMPLETED | OUTPATIENT
Start: 2020-08-18 | End: 2020-08-18

## 2020-08-18 RX ORDER — DEXTROMETHORPHAN/PSEUDOEPHED 2.5-7.5/.8
1.2 DROPS ORAL
Status: CANCELLED | OUTPATIENT
Start: 2020-08-18

## 2020-08-18 RX ORDER — CHLOROPROCAINE HYDROCHLORIDE 30 MG/ML
INJECTION, SOLUTION EPIDURAL; INFILTRATION; INTRACAUDAL; PERINEURAL ONCE
Status: DISCONTINUED | OUTPATIENT
Start: 2020-08-18 | End: 2020-08-18

## 2020-08-18 RX ORDER — MIDODRINE HYDROCHLORIDE 5 MG/1
15 TABLET ORAL ONCE
Status: ACTIVE | OUTPATIENT
Start: 2020-08-18 | End: 2020-08-19

## 2020-08-18 RX ORDER — EPINEPHRINE 0.1 MG/ML
1 INJECTION INTRACARDIAC; INTRAVENOUS
Status: CANCELLED | OUTPATIENT
Start: 2020-08-18 | End: 2020-08-19

## 2020-08-18 RX ORDER — MIDAZOLAM HYDROCHLORIDE 1 MG/ML
.25-5 INJECTION, SOLUTION INTRAMUSCULAR; INTRAVENOUS
Status: CANCELLED | OUTPATIENT
Start: 2020-08-18 | End: 2020-08-18

## 2020-08-18 RX ORDER — IPRATROPIUM BROMIDE AND ALBUTEROL SULFATE 2.5; .5 MG/3ML; MG/3ML
3 SOLUTION RESPIRATORY (INHALATION)
Status: DISPENSED | OUTPATIENT
Start: 2020-08-18 | End: 2020-08-19

## 2020-08-18 RX ORDER — NALOXONE HYDROCHLORIDE 0.4 MG/ML
0.4 INJECTION, SOLUTION INTRAMUSCULAR; INTRAVENOUS; SUBCUTANEOUS
Status: CANCELLED | OUTPATIENT
Start: 2020-08-18 | End: 2020-08-18

## 2020-08-18 RX ORDER — DEXTROSE 50 % IN WATER (D50W) INTRAVENOUS SYRINGE
25 ONCE
Status: COMPLETED | OUTPATIENT
Start: 2020-08-18 | End: 2020-08-18

## 2020-08-18 RX ORDER — SODIUM CHLORIDE 9 MG/ML
75 INJECTION, SOLUTION INTRAVENOUS CONTINUOUS
Status: CANCELLED | OUTPATIENT
Start: 2020-08-18 | End: 2020-08-18

## 2020-08-18 RX ORDER — ATROPINE SULFATE 0.1 MG/ML
0.5 INJECTION INTRAVENOUS
Status: CANCELLED | OUTPATIENT
Start: 2020-08-18 | End: 2020-08-19

## 2020-08-18 RX ORDER — FLUMAZENIL 0.1 MG/ML
0.2 INJECTION INTRAVENOUS
Status: CANCELLED | OUTPATIENT
Start: 2020-08-18 | End: 2020-08-18

## 2020-08-18 RX ORDER — NALOXONE HYDROCHLORIDE 0.4 MG/ML
INJECTION, SOLUTION INTRAMUSCULAR; INTRAVENOUS; SUBCUTANEOUS
Status: COMPLETED
Start: 2020-08-18 | End: 2020-08-18

## 2020-08-18 RX ORDER — NALOXONE HYDROCHLORIDE 0.4 MG/ML
0.4 INJECTION, SOLUTION INTRAMUSCULAR; INTRAVENOUS; SUBCUTANEOUS ONCE
Status: COMPLETED | OUTPATIENT
Start: 2020-08-18 | End: 2020-08-18

## 2020-08-18 RX ORDER — SODIUM CHLORIDE 0.9 % (FLUSH) 0.9 %
5-40 SYRINGE (ML) INJECTION AS NEEDED
Status: CANCELLED | OUTPATIENT
Start: 2020-08-18

## 2020-08-18 RX ORDER — ALBUMIN HUMAN 250 G/1000ML
12.5 SOLUTION INTRAVENOUS AS NEEDED
Status: DISCONTINUED | OUTPATIENT
Start: 2020-08-18 | End: 2020-08-27 | Stop reason: HOSPADM

## 2020-08-18 RX ORDER — CHLOROPROCAINE HYDROCHLORIDE 30 MG/ML
INJECTION, SOLUTION EPIDURAL; INFILTRATION; INTRACAUDAL; PERINEURAL ONCE
Status: COMPLETED | OUTPATIENT
Start: 2020-08-18 | End: 2020-08-18

## 2020-08-18 RX ADMIN — NALOXONE HYDROCHLORIDE 0.4 MG: 0.4 INJECTION, SOLUTION INTRAMUSCULAR; INTRAVENOUS; SUBCUTANEOUS at 11:51

## 2020-08-18 RX ADMIN — SODIUM CHLORIDE, PRESERVATIVE FREE 500 UNITS: 5 INJECTION INTRAVENOUS at 14:02

## 2020-08-18 RX ADMIN — ALBUMIN (HUMAN) 12.5 G: 0.25 INJECTION, SOLUTION INTRAVENOUS at 19:30

## 2020-08-18 RX ADMIN — Medication 1 AMPULE: at 22:13

## 2020-08-18 RX ADMIN — SODIUM CHLORIDE 10 ML: 9 INJECTION, SOLUTION INTRAMUSCULAR; INTRAVENOUS; SUBCUTANEOUS at 05:12

## 2020-08-18 RX ADMIN — INSULIN LISPRO 2 UNITS: 100 INJECTION, SOLUTION INTRAVENOUS; SUBCUTANEOUS at 09:10

## 2020-08-18 RX ADMIN — CEFTRIAXONE SODIUM 1 G: 1 INJECTION, POWDER, FOR SOLUTION INTRAMUSCULAR; INTRAVENOUS at 09:41

## 2020-08-18 RX ADMIN — LORAZEPAM 1 MG: 1 TABLET ORAL at 04:13

## 2020-08-18 RX ADMIN — ALBUMIN (HUMAN) 12.5 G: 0.25 INJECTION, SOLUTION INTRAVENOUS at 18:30

## 2020-08-18 RX ADMIN — SODIUM CHLORIDE 1 G: 9 INJECTION, SOLUTION INTRAVENOUS at 12:35

## 2020-08-18 RX ADMIN — SODIUM CHLORIDE 10 ML: 9 INJECTION, SOLUTION INTRAMUSCULAR; INTRAVENOUS; SUBCUTANEOUS at 22:19

## 2020-08-18 RX ADMIN — HUMAN INSULIN 15 UNITS: 100 INJECTION, SOLUTION SUBCUTANEOUS at 13:10

## 2020-08-18 RX ADMIN — HYDROCODONE BITARTRATE AND ACETAMINOPHEN 1 TABLET: 7.5; 325 TABLET ORAL at 04:13

## 2020-08-18 RX ADMIN — IPRATROPIUM BROMIDE AND ALBUTEROL SULFATE 3 ML: .5; 3 SOLUTION RESPIRATORY (INHALATION) at 23:03

## 2020-08-18 RX ADMIN — LEVOTHYROXINE SODIUM 200 MCG: 0.15 TABLET ORAL at 05:12

## 2020-08-18 RX ADMIN — CHLOROPROCAINE HYDROCHLORIDE: 30 INJECTION, SOLUTION EPIDURAL; INFILTRATION; INTRACAUDAL; PERINEURAL at 13:00

## 2020-08-18 RX ADMIN — AZITHROMYCIN MONOHYDRATE 500 MG: 500 INJECTION, POWDER, LYOPHILIZED, FOR SOLUTION INTRAVENOUS at 06:02

## 2020-08-18 RX ADMIN — DEXTROSE MONOHYDRATE 25 G: 25 INJECTION, SOLUTION INTRAVENOUS at 13:09

## 2020-08-18 NOTE — PROGRESS NOTES
8/18/2020    INTENSIVIST PROGRESS NOTE:     Patient seen and evaluated, chart reviewed   65 yo male obese admitted with the dx of pneumonia, started on IV abx  Today transferred to ccu by hospitalist because of worsening respiratory failure  Now pt in CCU awake, on NC O2  No severe distress    ROS: + SOB, no CP    Visit Vitals  /56   Pulse (!) 53   Temp 97.9 °F (36.6 °C)   Resp 18   SpO2 97%       General: moderately ill  Eyes: anicteric  HEENT: clear  Neck: FROM  CV: RRR  Lungs: decreased BS  Abd: soft, obese  : no flank pain  Ext: no edema  Skin: no rash  Musculoskeletal: normal inspection  Neuro: non focal    CXR: none done today    Labs reviewed    A/P:  - acute respiratory failure: O2  - pneumonia: IV abx per hospitalist  - AMS: due to worsening renal failure and hypoxemia most likely  - ESRD: needs RRT  - hyperkalemia: needs RRT  - DVT prophylaxis  - avoid sedatives  - Will assist on disposition planning when stable for transfer  Dinora Griffin MD

## 2020-08-18 NOTE — PROGRESS NOTES
I talked with the patient's daughter, Amber Wadsworth,  and she was updated on the plan, all questions were answered.

## 2020-08-18 NOTE — PROGRESS NOTES
Talk to Dr. Tomas Tobar from interventional radiology and he already received a consult and he will put in a temporary dialysis catheter now. Dr. Juan Burns input is appreciated.

## 2020-08-18 NOTE — PROGRESS NOTES
Gastroenterology Progress Note  Karthikeyan Desir MD for Dr. Nereyda Hernandez    8/18/2020    Admit Date: 8/15/2020    Subjective: Follow up for:  1)  Acute on chronic anemia    2) Nausea and vomiting- Improved    3) Respiratory failure    Patient is on a diet  He was sedated and is not awake despite my trying. There is no bleeding.   Breathing is a  better  Repeat COVID testing was negative  Hgb 7.9 from 7 yesterday      Current Facility-Administered Medications   Medication Dose Route Frequency    morphine injection 4 mg  4 mg IntraVENous Q4H PRN    HYDROcodone-acetaminophen (NORCO) 7.5-325 mg per tablet 1 Tab  1 Tab Oral Q4H PRN    polyethylene glycol (MIRALAX) packet 17 g  17 g Oral BID    sodium bicarbonate 150 mEq/1000 mL D5W (premix)   IntraVENous CONTINUOUS    sevelamer carbonate (RENVELA) tab 800 mg  800 mg Oral TID WITH MEALS    melatonin tablet 3 mg  3 mg Oral QHS PRN    LORazepam (ATIVAN) tablet 1 mg  1 mg Oral Q6H PRN    pantoprazole (PROTONIX) tablet 40 mg  40 mg Oral ACB&D    amLODIPine (NORVASC) tablet 5 mg  5 mg Oral BID    carvediloL (COREG) tablet 12.5 mg  12.5 mg Oral BID WITH MEALS    levothyroxine (SYNTHROID) tablet 200 mcg  200 mcg Oral 6am    atorvastatin (LIPITOR) tablet 10 mg  10 mg Oral QHS    sodium chloride (NS) flush 5-40 mL  5-40 mL IntraVENous Q8H    sodium chloride (NS) flush 5-40 mL  5-40 mL IntraVENous PRN    acetaminophen (TYLENOL) tablet 650 mg  650 mg Oral Q6H PRN    Or    acetaminophen (TYLENOL) suppository 650 mg  650 mg Rectal Q6H PRN    promethazine (PHENERGAN) tablet 12.5 mg  12.5 mg Oral Q6H PRN    Or    ondansetron (ZOFRAN) injection 4 mg  4 mg IntraVENous Q6H PRN    sucralfate (CARAFATE) tablet 1 g  1 g Oral AC&HS    azithromycin (ZITHROMAX) 500 mg in 0.9% sodium chloride (MBP/ADV) 250 mL  500 mg IntraVENous Q24H    cefTRIAXone (ROCEPHIN) 1 g in 0.9% sodium chloride (MBP/ADV) 50 mL  1 g IntraVENous Q24H    insulin lispro (HUMALOG) injection SubCUTAneous AC&HS    glucose chewable tablet 16 g  4 Tab Oral PRN    dextrose (D50W) injection syrg 12.5-25 g  12.5-25 g IntraVENous PRN    glucagon (GLUCAGEN) injection 1 mg  1 mg IntraMUSCular PRN    albuterol (PROVENTIL HFA, VENTOLIN HFA, PROAIR HFA) inhaler 2 Puff  2 Puff Inhalation Q4H PRN        Objective:     Blood pressure 130/50, pulse 66, temperature 97.9 °F (36.6 °C), resp. rate 18, SpO2 94 %. No intake/output data recorded. 08/16 1901 - 08/18 0700  In: 673.8 [P.O.:240; I.V.:433.8]  Out: 750 [Urine:750]    EXAM:   GEN: Sedated  HEENT: eyes closed  Heart: RRR  Lungs; snoring  Abdomen: Obese, soft,  Ext: NA    Data Review    Recent Results (from the past 24 hour(s))   GLUCOSE, POC    Collection Time: 08/17/20 11:44 AM   Result Value Ref Range    Glucose (POC) 110 (H) 65 - 100 mg/dL    Performed by Meli Calles*    GLUCOSE, POC    Collection Time: 08/17/20  5:27 PM   Result Value Ref Range    Glucose (POC) 140 (H) 65 - 100 mg/dL    Performed by Ross More (ESPERANZA)    GLUCOSE, POC    Collection Time: 08/17/20  9:41 PM   Result Value Ref Range    Glucose (POC) 176 (H) 65 - 100 mg/dL    Performed by Bobbi Solis LPN    GLUCOSE, POC    Collection Time: 08/18/20  6:32 AM   Result Value Ref Range    Glucose (POC) 219 (H) 65 - 100 mg/dL    Performed by Cortney Madden      Recent Labs     08/17/20  0419 08/16/20  0400   WBC 8.8 10.8   HGB 7.9* 7.7*   HCT 25.4* 24.2*    334     Recent Labs     08/17/20  0419 08/16/20  0400 08/15/20  0822    138 140   K 5.4* 4.9 4.9   * 111* 110*   CO2 20* 20* 23   BUN 70* 59* 60*   CREA 4.94* 3.93* 4.03*   * 120* 222*   CA 8.4* 8.4* 8.1*   MG  --   --  2.0   PHOS 6.4* 5.0*  --      Recent Labs     08/17/20  0419 08/16/20  0400   ALB 2.7* 2.6*     No results for input(s): INR, PTP, APTT, INREXT, INREXT in the last 72 hours. No results for input(s): FE, TIBC, PSAT, FERR in the last 72 hours.    No results found for: FOL, RBCF   No results for input(s): PH, PCO2, PO2 in the last 72 hours. Recent Labs     08/17/20  0419 08/15/20  1435 08/15/20  0822   TROIQ <0.05 <0.05 <0.05     Lab Results   Component Value Date/Time    Cholesterol, total 175 07/29/2020 12:09 PM    HDL Cholesterol 31 (L) 07/29/2020 12:09 PM    LDL, calculated 102 (H) 07/29/2020 12:09 PM    Triglyceride 212 (H) 07/29/2020 12:09 PM     Lab Results   Component Value Date/Time    Glucose (POC) 219 (H) 08/18/2020 06:32 AM    Glucose (POC) 176 (H) 08/17/2020 09:41 PM    Glucose (POC) 140 (H) 08/17/2020 05:27 PM    Glucose (POC) 110 (H) 08/17/2020 11:44 AM    Glucose (POC) 99 08/17/2020 06:44 AM     Lab Results   Component Value Date/Time    Color YELLOW/STRAW 07/17/2020 04:37 AM    Appearance CLEAR 07/17/2020 04:37 AM    Specific gravity 1.014 07/17/2020 04:37 AM    pH (UA) 5.0 07/17/2020 04:37 AM    Protein 300 (A) 07/17/2020 04:37 AM    Glucose Negative 07/17/2020 04:37 AM    Ketone Negative 07/17/2020 04:37 AM    Bilirubin Negative 07/17/2020 04:37 AM    Urobilinogen 0.2 07/17/2020 04:37 AM    Nitrites Negative 07/17/2020 04:37 AM    Leukocyte Esterase Negative 07/17/2020 04:37 AM    Epithelial cells FEW 07/17/2020 04:37 AM    Bacteria Negative 07/17/2020 04:37 AM    WBC 0-4 07/17/2020 04:37 AM    RBC 5-10 07/17/2020 04:37 AM           Assessment:     Active Problems:    NERY (acute kidney injury) ([address] (7/17/2020)      Lung cancer ([facility] [address] (8/15/2020)      Chest pain (8/15/2020)      Pneumonia (8/15/2020)      Anemia (8/15/2020)      Suspected COVID-19 virus infection (8/15/2020)        Plan:   Repeat COVID testing was negative and his respiratory status is improving    We will prep today for colonoscopy tomorrow. Discussed with his RNs at bedside. CLD today with PEG 3350 prep and NPO after MN.     Amauri Cook MD    08/18/20  9:10 AM  [address] [facility], [address] [place] [address]  [phone] [id number]  [address] [place] [address] [place]: [phone]

## 2020-08-18 NOTE — PROGRESS NOTES
1300 Patient transferred from general surgery for worsening resp failure, hypoxia and lethargy. Recent labs reflect potassium level of 6.0. Patient arrived on 100% NRB, oxygen sats 100%, changed over to nasal cannula. Scattered wheezes noted throughout lung fields. Patient lethargic, but opens eyes to name, answers questions appropriately with short answers. Oriented x2. Initial /44, sinus otto on the monitor. 555 [facility] placed for hemodialysis. 1410 Xray obtained. Minimal urinary output, patient states he feels like he needs to pee. Straight cath for only 15 ml of urine. All sent to lab for required labs. 1815 HD to start    1900 Report given to ESPERANZA Fitzgerald.

## 2020-08-18 NOTE — PROGRESS NOTES
Hospitalist Progress Note    NAME: Nighat Stevens   :  1960   MRN:  [id number]       Assessment / Plan:    Acute hypoxic respiratory failure, POA  Acute on chronic diastolic heart failure, POA  Community-acquired pneumonia, likely bacterial, POA  - COVID-19 test negative on admission  - Was started on antibiotics, will continue  - The setting today with oxygen saturation around 80s on 4 L nasal cannula but is not very awake. ABG revealed hypoxemia, respiratory acidosis. Will try nonrebreather he may need to be intubated if no response  - Transferred to ICU for appropriate level of care  - Blood pressure on the lower side, diuresis continue to be on hold    Acute encephalopathy, unknown etiology yet  - ABG revealed respiratory acidosis, hypoxemia  -She was given IV Ativan and opioid early this a.m. around 4 AM.  1 dose of Narcan was given as well today.  -Avoid all sedating medications  -Worsening kidney function today. Discussed with nephrology and they will start hemodialysis today but they prefer that to be in ICU given the patient may require pressors with his low blood pressure  -If no improvement of his mentation then will obtain CT head    Acute kidney disease, worsening, POA  Hyperkalemia with junctional rhythm on EKG  Hypophosphatemia  -CKD stage IV  - Kidney functions much worse today  - Discussed with nephrology as above and they will start hemodialysis. - Status post calcium gluconate, regular insulin, D50  -Continue to p.o. diuresis  - Renal ultrasound with renal cortical thinning, no overt hydronephrosis  -Discussed with nephrology. Follow-up BMP acid level. Will consult heme-onc for possible tumor lysis syndrome and eval for rasburicase treatment    Acute on chronic anemia,.   History of duodenitis, POA  -EGD 2020 revealed Padmini-Metzger tear, duodenitis, duodenal polyp, gastric polyps, and dilation of the esophagus  -Hemoglobin on the lower side but has been stable although it is trending down    Bradycardia  -Heart rate around 40s to 50s  -Likely secondary to hyperkalemia  - Treatment as above  -EKG with no AV block    Diabetes mellitus type 2  -Hemoglobin A1c 6.3% in July 2020    History of thyroid cancer with mets to lung  -Follows Dr. Petar Smith  -Hypothyroidsim-Continue levothyroxine     Essential HTN POA-hold home medications given hypotension at this time    Code Status: full  Surrogate Decision Maker:  Valeria Coto  Daughter  [phone]          DVT Prophylaxis: SCDs  GI Prophylaxis: not indicated     Baseline: independent       Subjective:     Chief Complaint / Reason for Physician Visit  Patient is lying in bed, barely following commands very lethargic and sleepy. Review of Systems:  Symptom Y/N Comments  Symptom Y/N Comments   Fever/Chills    Chest Pain     Poor Appetite    Edema     Cough    Abdominal Pain     Sputum    Joint Pain     SOB/NUÑEZ    Pruritis/Rash     Nausea/vomit    Tolerating PT/OT     Diarrhea    Tolerating Diet     Constipation    Other       Could NOT obtain due to: Unable to obtain     Objective:     VITALS:   Last 24hrs VS reviewed since prior progress note. Most recent are:  Patient Vitals for the past 24 hrs:   Temp Pulse Resp BP SpO2   08/18/20 1236 -- (!) 53 -- -- 97 %   08/18/20 1139 97.9 °F (36.6 °C) (!) 44 18 92/51 98 %   08/18/20 0801 98.9 °F (37.2 °C) 67 18 91/56 96 %   08/18/20 0000 97.9 °F (36.6 °C) 66 18 130/50 94 %   08/17/20 1955 97.9 °F (36.6 °C) 66 18 136/60 94 %   08/17/20 1447 97.5 °F (36.4 °C) 68 18 134/53 90 %       Intake/Output Summary (Last 24 hours) at 8/18/2020 1238  Last data filed at 8/18/2020 0000  Gross per 24 hour   Intake 553.75 ml   Output 350 ml   Net 203.75 ml        PHYSICAL EXAM:  General: Lethargic, not following commands, no acute distress    EENT:  EOMI. Anicteric sclerae. MMM  Resp:  CTA bilaterally, no wheezing or rales.   No accessory muscle use  CV:  Regular  rhythm,  No edema  GI:  Soft, Non distended, Non tender.  +Bowel sounds  Neurologic:  Move all extremities, not following commands  Psych:   Poor insight. Not anxious nor agitated  Skin:  No rashes. No jaundice    Reviewed most current lab test results and cultures  YES  Reviewed most current radiology test results   YES  Review and summation of old records today    NO  Reviewed patient's current orders and MAR    YES  PMH/SH reviewed - no change compared to H&P  ________________________________________________________________________  Care Plan discussed with:    Comments   Patient x    Family      RN x    Care Manager x    Consultant                       x Multidiciplinary team rounds were held today with , nursing, pharmacist and clinical coordinator. Patient's plan of care was discussed; medications were reviewed and discharge planning was addressed. ________________________________________________________________________  Total NON critical care TIME: 60   Minutes    Total CRITICAL CARE TIME Spent:  35 Minutes non procedure based      Comments   >50% of visit spent in counseling and coordination of care x    ________________________________________________________________________  Js Siddiqi MD     Procedures: see electronic medical records for all procedures/Xrays and details which were not copied into this note but were reviewed prior to creation of Plan. LABS:  I reviewed today's most current labs and imaging studies.   Pertinent labs include:  Recent Labs     08/18/20  1117 08/17/20  0419 08/16/20  0400   WBC 8.4 8.8 10.8   HGB 7.3* 7.9* 7.7*   HCT 23.8* 25.4* 24.2*    384 334     Recent Labs     08/18/20  1117 08/17/20  0419 08/16/20  0400   * 136 138   K 6.0* 5.4* 4.9    109* 111*   CO2 26 20* 20*   * 106* 120*   BUN 82* 70* 59*   CREA 6.49* 4.94* 3.93*   CA 7.6* 8.4* 8.4*   PHOS  --  6.4* 5.0*   ALB  --  2.7* 2.6*       Signed: Js Siddiqi MD

## 2020-08-18 NOTE — DIALYSIS
Robi Dialysis Team [facility] Acutes  [phone]    Vitals   Pre   Post   Assessment   Pre   Post     Temp  Temp: (!) 95.7 °F (35.4 °C)(Viri Hugger is on) (08/18/20 1830)  96. 1-CORE LOC  Mostly lethargic, can be aroused Lethargic,hard to arouse   HR   Pulse (Heart Rate): (!) 57 (08/18/20 1830) 55 Lungs   Scattered insp/exp wheezes, humidified O2 5L/nc, sats 92-94%  Wheezes,unlabored respirations, sats 94%   B/P   BP: (!) 87/29(Albumin 12.5 5 g IV is infusing) (08/18/20 1830) 98/29 Cardiac   SB  SB   Resp   Resp Rate: 14 (08/18/20 1830) 14 Skin   W/D  W/D   Pain level  Denies Denies Edema    Traces   Traces   Orders:    Duration:   Start:   2594 End:    2035 Total:   2 hours   Dialyzer:   Dialyzer/Set Up Inspection: Akin Landaverde (08/18/20 1830)   K Bath:   Dialysate K (mEq/L): 2 (08/18/20 1830)   Ca Bath:   Dialysate CA (mEq/L): 2.5 (08/18/20 1830)   Na/Bicarb:   Dialysate NA (mEq/L): 138 (08/18/20 1830)   Target Fluid Removal:   Goal/Amount of Fluid to Remove (mL): 500 mL (08/18/20 1830)   Access     Type & Location:   R IJ non tunneled CVC inserted and verified for use 08/18/20. Minimal drainage under dsg. Each catheter limb disinfected for 60 seconds per limb with alcohol swabs. Caps removed, dialysis CVC hub scrubbed with Prevantics for 5 seconds, followed by a 5 second dry time per Hospital P&P.        Labs     Obtained/Reviewed   Critical Results Called   Date when labs were drawn-  Hgb-    HGB   Date Value Ref Range Status   08/18/2020 7.3 (L) 12.1 - 17.0 g/dL Final     K-    Potassium   Date Value Ref Range Status   08/18/2020 6.0 (H) 3.5 - 5.1 mmol/L Final     Ca-   Calcium   Date Value Ref Range Status   08/18/2020 7.6 (L) 8.5 - 10.1 MG/DL Final     Bun-   BUN   Date Value Ref Range Status   08/18/2020 82 (H) 6 - 20 MG/DL Final     Creat-   Creatinine   Date Value Ref Range Status   08/18/2020 6.49 (H) 0.70 - 1.30 MG/DL Final     Comment:     INVESTIGATED PER DELTA CHECK PROTOCOL        Medications/ Blood Products Given     Name   Dose   Route and Time     Albumin 12.5 g  IV @4216,8537             Blood Volume Processed (BVP):    28.9 Net Fluid   Removed:  +100 ml   Comments   Time Out Done: 8399  Primary Nurse Rpt [place], RN  Primary Nurse Rpt Alta Ahumada RN  Pt Education:Procedure to Primary RN, pt is lethargic. Care Plan:Hypotension management. Consider CRRT. HD as per nephrologists. Tx Summary:Completed 1st acute HD. Pt did not receive Midodrine po d/t lethargy and responded poorly to Albumin IV X 2 .NO fluid pulled . All blood returned AET, ports flushed and packed with NS only, new caps applied and clamps secured. Pt remains lethargic, discussed condition with pt's primary RN Lia. Admiting Diagnosis:  Pt's previous clinic-N/A  Consent signed - Informed Consent Verified: Yes (08/18/20 2407)  Robi Consent - Obtained 08/18/20  Hepatitis Status- Pending 08/18/20  Machine #- Machine Number: [id number] (08/18/20 1830)  Telemetry status-Bedside/remote  Pre-dialysis wt. - Pre-Dialysis Weight: 113.5 kg (250 lb 3.6 oz) (08/18/20 1830)

## 2020-08-18 NOTE — PROGRESS NOTES
Attempted Initial Spiritual Assessment in CCU. Unable to assess patients needs. Patient was unable to carry on a meaningful conversation at the time of the visit. No family present at this time. Consulted with nurse. Chaplains will follow as able and/or as needed. Rev.  Shena Guzman EdD  MDiv     For  Assistance Page [phone])

## 2020-08-18 NOTE — PROGRESS NOTES
[facility]   [address] [facility], [facility] [address], [facility]  Phone: [phone]   [phone]       Nephrology Progress Note  Farhat Croft     1960     [id number]  Date of Admission : 8/15/2020  08/18/20    CC:   Follow up for NERY      Assessment and Plan   Nonoliguric severe NERY on CKD   -unknown etiology at this time  - worsening renal function despite diuretics discontinuation and bicarb drip  -  Worsening in hyperkalemia and AMS today  - IR consult for urgent non-TDC placement   - 1st HD today for clearance mainly, 2hs/low flows to avoid dysequilibrium syndrome  - Midodrine 15mg once as borderline low BP  - may need pressors ( agree with transfer to ICU)  - UOP:750cc/24hs  - will follow closely  - strict I/O's  - renal diet  - daily weights    Hyperphosphatemia   - check uric acid to r/o TLS  - Check uric acid and recommend Heme/Onc consult to eval for Rasburicase and commend on pt's prognosis given h/o  thyroid cancer w/ lung mets  - continue binders  - follow daily    CKD IV w/ baseline Cr around 3 on 7/22     Hypoxic resp failure/pneumonia in CT chest  In the setting of lung mets  - on Abx and O2     HTN:  - hypotensive today  - hold antihypertensives  - no ACE or ARBs       Anemia  - 2/2 CKD, malignancy?  - repeat CBC pending  - Hb: 7.3<-7.9  - if ok with Heme/Onc, recommend to start EPO    DM2:  - per hospitalist     Hx of thyroid cancer w/ lung mets  ?treatment     Interval History:   Worsening clinical picture, renal function and electrolyte disturbances    Review of Systems:   unable to do due to medical condition  ( AMS)    Current Medications:   Current Facility-Administered Medications   Medication Dose Route Frequency    peg 3350-electrolytes (COLYTE) 4000 mL  4,000 mL Oral ONCE    insulin regular (NOVOLIN R, HUMULIN R) injection 15 Units  15 Units IntraVENous ONCE    dextrose (D50W) injection syrg 25 g  25 g IntraVENous ONCE    calcium gluconate 1 g in 0.9% sodium chloride 100 mL IVPB  1 g IntraVENous ONCE    morphine injection 4 mg  4 mg IntraVENous Q4H PRN    HYDROcodone-acetaminophen (NORCO) 7.5-325 mg per tablet 1 Tab  1 Tab Oral Q4H PRN    polyethylene glycol (MIRALAX) packet 17 g  17 g Oral BID    sevelamer carbonate (RENVELA) tab 800 mg  800 mg Oral TID WITH MEALS    melatonin tablet 3 mg  3 mg Oral QHS PRN    LORazepam (ATIVAN) tablet 1 mg  1 mg Oral Q6H PRN    pantoprazole (PROTONIX) tablet 40 mg  40 mg Oral ACB&D    amLODIPine (NORVASC) tablet 5 mg  5 mg Oral BID    carvediloL (COREG) tablet 12.5 mg  12.5 mg Oral BID WITH MEALS    levothyroxine (SYNTHROID) tablet 200 mcg  200 mcg Oral 6am    atorvastatin (LIPITOR) tablet 10 mg  10 mg Oral QHS    sodium chloride (NS) flush 5-40 mL  5-40 mL IntraVENous Q8H    sodium chloride (NS) flush 5-40 mL  5-40 mL IntraVENous PRN    acetaminophen (TYLENOL) tablet 650 mg  650 mg Oral Q6H PRN    Or    acetaminophen (TYLENOL) suppository 650 mg  650 mg Rectal Q6H PRN    promethazine (PHENERGAN) tablet 12.5 mg  12.5 mg Oral Q6H PRN    Or    ondansetron (ZOFRAN) injection 4 mg  4 mg IntraVENous Q6H PRN    sucralfate (CARAFATE) tablet 1 g  1 g Oral AC&HS    azithromycin (ZITHROMAX) 500 mg in 0.9% sodium chloride (MBP/ADV) 250 mL  500 mg IntraVENous Q24H    cefTRIAXone (ROCEPHIN) 1 g in 0.9% sodium chloride (MBP/ADV) 50 mL  1 g IntraVENous Q24H    insulin lispro (HUMALOG) injection   SubCUTAneous AC&HS    glucose chewable tablet 16 g  4 Tab Oral PRN    dextrose (D50W) injection syrg 12.5-25 g  12.5-25 g IntraVENous PRN    glucagon (GLUCAGEN) injection 1 mg  1 mg IntraMUSCular PRN    albuterol (PROVENTIL HFA, VENTOLIN HFA, PROAIR HFA) inhaler 2 Puff  2 Puff Inhalation Q4H PRN      Allergies   Allergen Reactions    Anesthetics - Amide Type Shortness of Breath    Flexeril [Cyclobenzaprine] Hives    Tramadol Hives       Objective:  Vitals:    Vitals:    08/17/20 1955 08/18/20 0000 08/18/20 0801 08/18/20 1139   BP: 136/60 130/50 91/56 92/51   Pulse: 66 66 67 (!) 44   Resp: 18 18 18 18   Temp: 97.9 °F (36.6 °C) 97.9 °F (36.6 °C) 98.9 °F (37.2 °C) 97.9 °F (36.6 °C)   SpO2: 94% 94% 96% 98%     Intake and Output:  No intake/output data recorded. 08/16 1901 - 08/18 0700  In: 673.8 [P.O.:240; I.V.:433.8]  Out: 750 [Urine:750]    Physical Examination:  Pt intubated    No  General: NAD  Neck:  Supple, no JVD  Resp:  Unlabored breaths  CV:  RRR  Neurologic:  Non focal  :  No estrada    [x]    High complexity decision making was performed  [x]    Patient is at high-risk of decompensation with multiple organ involvement    Lab Data Personally Reviewed: I have reviewed all the pertinent labs, microbiology data and radiology studies during assessment.     Recent Labs     08/18/20  1117 08/17/20  0419 08/16/20  0400   * 136 138   K 6.0* 5.4* 4.9    109* 111*   CO2 26 20* 20*   * 106* 120*   BUN 82* 70* 59*   CREA 6.49* 4.94* 3.93*   CA 7.6* 8.4* 8.4*   PHOS  --  6.4* 5.0*   ALB  --  2.7* 2.6*     Recent Labs     08/18/20  1117 08/17/20  0419 08/16/20  0400   WBC 8.4 8.8 10.8   HGB 7.3* 7.9* 7.7*   HCT 23.8* 25.4* 24.2*    384 334     Lab Results   Component Value Date/Time    Specimen Description: BLOOD 11/09/2010 09:40 AM     Lab Results   Component Value Date/Time    Culture result: HEAVY  STAPHYLOCOCCUS AUREUS   10/16/2016 01:15 PM    Culture result: MODERATE  ENTEROBACTER CLOACAE   10/16/2016 01:15 PM    Culture result: NO ANAEROBES ISOLATED 10/16/2016 01:15 PM    Culture result: NO GROWTH 6 DAYS 10/16/2016 03:56 AM    Culture result: NO GROWTH 6 DAYS 10/16/2016 03:56 AM     Recent Results (from the past 24 hour(s))   GLUCOSE, POC    Collection Time: 08/17/20  5:27 PM   Result Value Ref Range    Glucose (POC) 140 (H) 65 - 100 mg/dL    Performed by Shai Odell (ESPERANZA)    GLUCOSE, POC    Collection Time: 08/17/20  9:41 PM   Result Value Ref Range    Glucose (POC) 176 (H) 65 - 100 mg/dL    Performed by [facility] AILYN GALVAN    GLUCOSE, POC    Collection Time: 08/18/20  6:32 AM   Result Value Ref Range    Glucose (POC) 219 (H) 65 - 100 mg/dL    Performed by Ayan Banerjee    GLUCOSE, POC    Collection Time: 08/18/20  7:45 AM   Result Value Ref Range    Glucose (POC) 202 (H) 65 - 100 mg/dL    Performed by Radha Delgado (PCT)    CBC W/O DIFF    Collection Time: 08/18/20 11:17 AM   Result Value Ref Range    WBC 8.4 4.1 - 11.1 K/uL    RBC 2.54 (L) 4.10 - 5.70 M/uL    HGB 7.3 (L) 12.1 - 17.0 g/dL    HCT 23.8 (L) 36.6 - 50.3 %    MCV 93.7 80.0 - 99.0 FL    MCH 28.7 26.0 - 34.0 PG    MCHC 30.7 30.0 - 36.5 g/dL    RDW 13.9 11.5 - 14.5 %    PLATELET 903 097 - 740 K/uL    MPV 9.7 8.9 - 12.9 FL    NRBC 0.0 0  WBC    ABSOLUTE NRBC 0.00 0.00 - 4.60 K/uL   METABOLIC PANEL, BASIC    Collection Time: 08/18/20 11:17 AM   Result Value Ref Range    Sodium 134 (L) 136 - 145 mmol/L    Potassium 6.0 (H) 3.5 - 5.1 mmol/L    Chloride 104 97 - 108 mmol/L    CO2 26 21 - 32 mmol/L    Anion gap 4 (L) 5 - 15 mmol/L    Glucose 195 (H) 65 - 100 mg/dL    BUN 82 (H) 6 - 20 MG/DL    Creatinine 6.49 (H) 0.70 - 1.30 MG/DL    BUN/Creatinine ratio 13 12 - 20      GFR est AA 11 (L) >60 ml/min/1.73m2    GFR est non-AA 9 (L) >60 ml/min/1.73m2    Calcium 7.6 (L) 8.5 - 10.1 MG/DL   GLUCOSE, POC    Collection Time: 08/18/20 11:43 AM   Result Value Ref Range    Glucose (POC) 190 (H) 65 - 100 mg/dL    Performed by Radha Delgado (PCT)    POC EG7    Collection Time: 08/18/20 12:15 PM   Result Value Ref Range    Calcium, ionized (POC) 1.25 1.12 - 1.32 mmol/L    pH (POC) 7.21 (LL) 7.35 - 7.45      pCO2 (POC) 53.2 (H) 35.0 - 45.0 MMHG    pO2 (POC) 57 (L) 80 - 100 MMHG    HCO3 (POC) 21.2 (L) 22 - 26 MMOL/L    Base deficit (POC) 7 mmol/L    sO2 (POC) 82 (L) 92 - 97 %    Site LEFT RADIAL      Device: NASAL CANNULA      Flow rate (POC) 4 L/M    Allens test (POC) YES      Specimen type (POC) ARTERIAL      Total resp.  rate 20             I have reviewed the flowsheets. Chart and Pertinent Notes have been reviewed. No change in PMH ,family and social history from Consult note.       Master Orellana MD

## 2020-08-18 NOTE — PROGRESS NOTES
This RN came onto to shift and this pt was very lethargic. Pt could be aroused with taping or shaking. At 1130 this RN noticed the pts HR was 44. The pts O2 saturation was at 98% and began to drop and steady around 88-90. This RN notifed Jaylene, RRT for an opinion. A 12 lead EKG was done. Pt had a potassium of 6 and a creatinine of 6.49. ABGs were ordered and done. The pt was put on a non-rebreather mask to help with O2. The decision was to transfer this pt to CCU. Pt was transferred to CCU. Report was given to CCU RN by this RN.

## 2020-08-18 NOTE — PROGRESS NOTES
[id number] [facility] Progress Note  JOSÉ Hurt   for Dr. Braxton Lawson    Chart reviewed, spoke with nurse about patient's current respiratory status and he remains lethargic. Prepping patient for colonoscopy would be a challenge, await for improvement in mentation and respiratory status. Will cancel colonoscopy and reaccess tomorrow.      JOSÉ Salcido  08/18/20   4:44 PM

## 2020-08-19 ENCOUNTER — TELEPHONE (OUTPATIENT)
Dept: ONCOLOGY | Age: 60
End: 2020-08-19

## 2020-08-19 PROBLEM — C79.9 METASTATIC CARCINOMA (HCC): Status: ACTIVE | Noted: 2020-08-19

## 2020-08-19 PROBLEM — E79.0 HYPERURICEMIA: Status: ACTIVE | Noted: 2020-08-19

## 2020-08-19 LAB
ALBUMIN SERPL-MCNC: 2.9 G/DL (ref 3.5–5)
ANION GAP SERPL CALC-SCNC: 7 MMOL/L (ref 5–15)
ARTERIAL PATENCY WRIST A: YES
ARTERIAL PATENCY WRIST A: YES
BASE DEFICIT BLD-SCNC: 1 MMOL/L
BASE DEFICIT BLD-SCNC: 2 MMOL/L
BDY SITE: ABNORMAL
BDY SITE: ABNORMAL
BUN SERPL-MCNC: 58 MG/DL (ref 6–20)
BUN/CREAT SERPL: 11 (ref 12–20)
CA-I BLD-SCNC: 1.3 MMOL/L (ref 1.12–1.32)
CA-I BLD-SCNC: 1.35 MMOL/L (ref 1.12–1.32)
CALCIUM SERPL-MCNC: 7.9 MG/DL (ref 8.5–10.1)
CHLORIDE SERPL-SCNC: 106 MMOL/L (ref 97–108)
CO2 SERPL-SCNC: 23 MMOL/L (ref 21–32)
CREAT SERPL-MCNC: 5.36 MG/DL (ref 0.7–1.3)
ERYTHROCYTE [DISTWIDTH] IN BLOOD BY AUTOMATED COUNT: 14 % (ref 11.5–14.5)
GAS FLOW.O2 O2 DELIVERY SYS: ABNORMAL L/MIN
GAS FLOW.O2 O2 DELIVERY SYS: ABNORMAL L/MIN
GLUCOSE BLD STRIP.AUTO-MCNC: 72 MG/DL (ref 65–100)
GLUCOSE BLD STRIP.AUTO-MCNC: 74 MG/DL (ref 65–100)
GLUCOSE BLD STRIP.AUTO-MCNC: 82 MG/DL (ref 65–100)
GLUCOSE BLD STRIP.AUTO-MCNC: 82 MG/DL (ref 65–100)
GLUCOSE SERPL-MCNC: 83 MG/DL (ref 65–100)
HBV SURFACE AG SER QL: 0.41 INDEX
HBV SURFACE AG SER QL: NEGATIVE
HCO3 BLD-SCNC: 24.5 MMOL/L (ref 22–26)
HCO3 BLD-SCNC: 25.6 MMOL/L (ref 22–26)
HCT VFR BLD AUTO: 22.5 % (ref 36.6–50.3)
HGB BLD-MCNC: 7.2 G/DL (ref 12.1–17)
LACTATE SERPL-SCNC: 0.8 MMOL/L (ref 0.4–2)
MCH RBC QN AUTO: 29.5 PG (ref 26–34)
MCHC RBC AUTO-ENTMCNC: 32 G/DL (ref 30–36.5)
MCV RBC AUTO: 92.2 FL (ref 80–99)
NRBC # BLD: 0.03 K/UL (ref 0–0.01)
NRBC BLD-RTO: 0.2 PER 100 WBC
O2/TOTAL GAS SETTING VFR VENT: 50 %
O2/TOTAL GAS SETTING VFR VENT: 50 %
PCO2 BLD: 47.8 MMHG (ref 35–45)
PCO2 BLD: 55.9 MMHG (ref 35–45)
PEEP RESPIRATORY: 6 CMH2O
PH BLD: 7.27 [PH] (ref 7.35–7.45)
PH BLD: 7.32 [PH] (ref 7.35–7.45)
PHOSPHATE SERPL-MCNC: 7 MG/DL (ref 2.6–4.7)
PIP ISTAT,IPIP: 12
PLATELET # BLD AUTO: 284 K/UL (ref 150–400)
PMV BLD AUTO: 9.5 FL (ref 8.9–12.9)
PO2 BLD: 87 MMHG (ref 80–100)
PO2 BLD: 94 MMHG (ref 80–100)
POTASSIUM SERPL-SCNC: 5.3 MMOL/L (ref 3.5–5.1)
RBC # BLD AUTO: 2.44 M/UL (ref 4.1–5.7)
SAO2 % BLD: 95 % (ref 92–97)
SAO2 % BLD: 96 % (ref 92–97)
SERVICE CMNT-IMP: NORMAL
SODIUM SERPL-SCNC: 136 MMOL/L (ref 136–145)
SPECIMEN TYPE: ABNORMAL
SPECIMEN TYPE: ABNORMAL
TOTAL RESP. RATE, ITRR: 15
TOTAL RESP. RATE, ITRR: 18
WBC # BLD AUTO: 12.8 K/UL (ref 4.1–11.1)

## 2020-08-19 PROCEDURE — 77010033678 HC OXYGEN DAILY

## 2020-08-19 PROCEDURE — 74011000250 HC RX REV CODE- 250: Performed by: INTERNAL MEDICINE

## 2020-08-19 PROCEDURE — 5A1D70Z PERFORMANCE OF URINARY FILTRATION, INTERMITTENT, LESS THAN 6 HOURS PER DAY: ICD-10-PCS | Performed by: INTERNAL MEDICINE

## 2020-08-19 PROCEDURE — 85027 COMPLETE CBC AUTOMATED: CPT

## 2020-08-19 PROCEDURE — 82962 GLUCOSE BLOOD TEST: CPT

## 2020-08-19 PROCEDURE — 65610000006 HC RM INTENSIVE CARE

## 2020-08-19 PROCEDURE — 74011250636 HC RX REV CODE- 250/636: Performed by: INTERNAL MEDICINE

## 2020-08-19 PROCEDURE — 74011250637 HC RX REV CODE- 250/637: Performed by: INTERNAL MEDICINE

## 2020-08-19 PROCEDURE — 36600 WITHDRAWAL OF ARTERIAL BLOOD: CPT

## 2020-08-19 PROCEDURE — 36415 COLL VENOUS BLD VENIPUNCTURE: CPT

## 2020-08-19 PROCEDURE — 74011000258 HC RX REV CODE- 258: Performed by: INTERNAL MEDICINE

## 2020-08-19 PROCEDURE — 83605 ASSAY OF LACTIC ACID: CPT

## 2020-08-19 PROCEDURE — 82803 BLOOD GASES ANY COMBINATION: CPT

## 2020-08-19 PROCEDURE — 74011250636 HC RX REV CODE- 250/636: Performed by: HOSPITALIST

## 2020-08-19 PROCEDURE — 94660 CPAP INITIATION&MGMT: CPT

## 2020-08-19 PROCEDURE — 80069 RENAL FUNCTION PANEL: CPT

## 2020-08-19 PROCEDURE — 90935 HEMODIALYSIS ONE EVALUATION: CPT

## 2020-08-19 PROCEDURE — 5A09357 ASSISTANCE WITH RESPIRATORY VENTILATION, LESS THAN 24 CONSECUTIVE HOURS, CONTINUOUS POSITIVE AIRWAY PRESSURE: ICD-10-PCS | Performed by: INTERNAL MEDICINE

## 2020-08-19 PROCEDURE — 87040 BLOOD CULTURE FOR BACTERIA: CPT

## 2020-08-19 PROCEDURE — 74011000258 HC RX REV CODE- 258: Performed by: HOSPITALIST

## 2020-08-19 PROCEDURE — C9113 INJ PANTOPRAZOLE SODIUM, VIA: HCPCS | Performed by: INTERNAL MEDICINE

## 2020-08-19 PROCEDURE — 99221 1ST HOSP IP/OBS SF/LOW 40: CPT | Performed by: INTERNAL MEDICINE

## 2020-08-19 RX ORDER — HEPARIN SODIUM 1000 [USP'U]/ML
2500 INJECTION, SOLUTION INTRAVENOUS; SUBCUTANEOUS
Status: DISCONTINUED | OUTPATIENT
Start: 2020-08-19 | End: 2020-08-26

## 2020-08-19 RX ADMIN — SODIUM CHLORIDE 10 ML: 9 INJECTION, SOLUTION INTRAMUSCULAR; INTRAVENOUS; SUBCUTANEOUS at 16:00

## 2020-08-19 RX ADMIN — SODIUM CHLORIDE 20 ML: 9 INJECTION, SOLUTION INTRAMUSCULAR; INTRAVENOUS; SUBCUTANEOUS at 21:28

## 2020-08-19 RX ADMIN — CEFTRIAXONE SODIUM 1 G: 1 INJECTION, POWDER, FOR SOLUTION INTRAMUSCULAR; INTRAVENOUS at 08:14

## 2020-08-19 RX ADMIN — SODIUM CHLORIDE 10 ML: 9 INJECTION, SOLUTION INTRAMUSCULAR; INTRAVENOUS; SUBCUTANEOUS at 06:11

## 2020-08-19 RX ADMIN — Medication 1 AMPULE: at 21:29

## 2020-08-19 RX ADMIN — Medication 1 AMPULE: at 08:14

## 2020-08-19 RX ADMIN — SODIUM CHLORIDE 40 MG: 9 INJECTION, SOLUTION INTRAMUSCULAR; INTRAVENOUS; SUBCUTANEOUS at 11:42

## 2020-08-19 RX ADMIN — AZITHROMYCIN MONOHYDRATE 500 MG: 500 INJECTION, POWDER, LYOPHILIZED, FOR SOLUTION INTRAVENOUS at 06:12

## 2020-08-19 RX ADMIN — HEPARIN SODIUM 2500 UNITS: 1000 INJECTION INTRAVENOUS; SUBCUTANEOUS at 11:54

## 2020-08-19 RX ADMIN — SODIUM CHLORIDE 6 MG: 9 INJECTION, SOLUTION INTRAVENOUS at 19:00

## 2020-08-19 NOTE — PROGRESS NOTES
Gastroenterology Progress Note  JOSÉ Gardner for Dr. Efrain Mcdonough    8/19/2020    Admit Date: 8/15/2020    Subjective: Follow up for:  1)  Acute on chronic anemia    2) Nausea and vomiting- Improved    3) Respiratory failure    Patient remains lethargic, cannot provide history. Getting dialysis today. No improvement in respiratory status.      Hgb today 7.2 from 7.3 yesterday        Current Facility-Administered Medications   Medication Dose Route Frequency    albumin human 25% (BUMINATE) solution 12.5 g  12.5 g IntraVENous PRN    alcohol 62% (NOZIN) nasal  1 Ampule  1 Ampule Topical Q12H    albuterol-ipratropium (DUO-NEB) 2.5 MG-0.5 MG/3 ML  3 mL Nebulization Q6H PRN    morphine injection 4 mg  4 mg IntraVENous Q4H PRN    HYDROcodone-acetaminophen (NORCO) 7.5-325 mg per tablet 1 Tab  1 Tab Oral Q4H PRN    polyethylene glycol (MIRALAX) packet 17 g  17 g Oral BID    sevelamer carbonate (RENVELA) tab 800 mg  800 mg Oral TID WITH MEALS    melatonin tablet 3 mg  3 mg Oral QHS PRN    LORazepam (ATIVAN) tablet 1 mg  1 mg Oral Q6H PRN    pantoprazole (PROTONIX) tablet 40 mg  40 mg Oral ACB&D    levothyroxine (SYNTHROID) tablet 200 mcg  200 mcg Oral 6am    atorvastatin (LIPITOR) tablet 10 mg  10 mg Oral QHS    sodium chloride (NS) flush 5-40 mL  5-40 mL IntraVENous Q8H    sodium chloride (NS) flush 5-40 mL  5-40 mL IntraVENous PRN    acetaminophen (TYLENOL) tablet 650 mg  650 mg Oral Q6H PRN    Or    acetaminophen (TYLENOL) suppository 650 mg  650 mg Rectal Q6H PRN    promethazine (PHENERGAN) tablet 12.5 mg  12.5 mg Oral Q6H PRN    Or    ondansetron (ZOFRAN) injection 4 mg  4 mg IntraVENous Q6H PRN    sucralfate (CARAFATE) tablet 1 g  1 g Oral AC&HS    azithromycin (ZITHROMAX) 500 mg in 0.9% sodium chloride (MBP/ADV) 250 mL  500 mg IntraVENous Q24H    cefTRIAXone (ROCEPHIN) 1 g in 0.9% sodium chloride (MBP/ADV) 50 mL  1 g IntraVENous Q24H    insulin lispro (HUMALOG) injection   SubCUTAneous AC&HS    glucose chewable tablet 16 g  4 Tab Oral PRN    dextrose (D50W) injection syrg 12.5-25 g  12.5-25 g IntraVENous PRN    glucagon (GLUCAGEN) injection 1 mg  1 mg IntraMUSCular PRN    albuterol (PROVENTIL HFA, VENTOLIN HFA, PROAIR HFA) inhaler 2 Puff  2 Puff Inhalation Q4H PRN        Objective:     Blood pressure 110/40, pulse 67, temperature 98.9 °F (37.2 °C), resp. rate 15, weight 113.5 kg (250 lb 3.6 oz), SpO2 98 %.    08/19 0701 - 08/19 1900  In: 50 [I.V.:50]  Out: -     08/17 1901 - 08/19 0700  In: 1863.8 [P.O.:120;  I.V.:1743.8]  Out: 315 [Urine:215]    EXAM:   GEN: Lethargic WM, Increased WOB  HEENT: eyes closed  Heart: RRR  Lungs: snoring, using accessory muscles to breath  Abdomen: Obese, soft, NT, ND, normal BS  Ext: No edema    Data Review    Recent Results (from the past 24 hour(s))   CBC W/O DIFF    Collection Time: 08/18/20 11:17 AM   Result Value Ref Range    WBC 8.4 4.1 - 11.1 K/uL    RBC 2.54 (L) 4.10 - 5.70 M/uL    HGB 7.3 (L) 12.1 - 17.0 g/dL    HCT 23.8 (L) 36.6 - 50.3 %    MCV 93.7 80.0 - 99.0 FL    MCH 28.7 26.0 - 34.0 PG    MCHC 30.7 30.0 - 36.5 g/dL    RDW 13.9 11.5 - 14.5 %    PLATELET 093 693 - 647 K/uL    MPV 9.7 8.9 - 12.9 FL    NRBC 0.0 0  WBC    ABSOLUTE NRBC 0.00 0.00 - 6.63 K/uL   METABOLIC PANEL, BASIC    Collection Time: 08/18/20 11:17 AM   Result Value Ref Range    Sodium 134 (L) 136 - 145 mmol/L    Potassium 6.0 (H) 3.5 - 5.1 mmol/L    Chloride 104 97 - 108 mmol/L    CO2 26 21 - 32 mmol/L    Anion gap 4 (L) 5 - 15 mmol/L    Glucose 195 (H) 65 - 100 mg/dL    BUN 82 (H) 6 - 20 MG/DL    Creatinine 6.49 (H) 0.70 - 1.30 MG/DL    BUN/Creatinine ratio 13 12 - 20      GFR est AA 11 (L) >60 ml/min/1.73m2    GFR est non-AA 9 (L) >60 ml/min/1.73m2    Calcium 7.6 (L) 8.5 - 10.1 MG/DL   GLUCOSE, POC    Collection Time: 08/18/20 11:43 AM   Result Value Ref Range    Glucose (POC) 190 (H) 65 - 100 mg/dL    Performed by Radha Delgado (PCT)    POC EG7 Collection Time: 08/18/20 12:15 PM   Result Value Ref Range    Calcium, ionized (POC) 1.25 1.12 - 1.32 mmol/L    pH (POC) 7.21 (LL) 7.35 - 7.45      pCO2 (POC) 53.2 (H) 35.0 - 45.0 MMHG    pO2 (POC) 57 (L) 80 - 100 MMHG    HCO3 (POC) 21.2 (L) 22 - 26 MMOL/L    Base deficit (POC) 7 mmol/L    sO2 (POC) 82 (L) 92 - 97 %    Site LEFT RADIAL      Device: NASAL CANNULA      Flow rate (POC) 4 L/M    Allens test (POC) YES      Specimen type (POC) ARTERIAL      Total resp. rate 20     URINALYSIS W/ RFLX MICROSCOPIC    Collection Time: 08/18/20  5:34 PM   Result Value Ref Range    Color YELLOW/STRAW      Appearance CLOUDY (A) CLEAR      Specific gravity 1.019 1.003 - 1.030      pH (UA) 5.0 5.0 - 8.0      Protein 100 (A) NEG mg/dL    Glucose Negative NEG mg/dL    Ketone Negative NEG mg/dL    Bilirubin Negative NEG      Blood TRACE (A) NEG      Urobilinogen 0.2 0.2 - 1.0 EU/dL    Nitrites Negative NEG      Leukocyte Esterase Negative NEG      WBC 0-4 0 - 4 /hpf    RBC 5-10 0 - 5 /hpf    Epithelial cells MODERATE (A) FEW /lpf    Bacteria 1+ (A) NEG /hpf    Mucus TRACE (A) NEG /lpf    Amorphous Crystals 2+ (A) NEG   URIC ACID    Collection Time: 08/18/20  5:38 PM   Result Value Ref Range    Uric acid 12.5 (H) 3.5 - 7.2 MG/DL   HEP B SURFACE AG    Collection Time: 08/18/20  5:38 PM   Result Value Ref Range    Hepatitis B surface Ag 0.41 Index    Hep B surface Ag Interp.  Negative NEG     GLUCOSE, POC    Collection Time: 08/18/20  6:21 PM   Result Value Ref Range    Glucose (POC) 87 65 - 100 mg/dL    Performed by Digna Russ [facility]) [facility]    GLUCOSE, POC    Collection Time: 08/18/20 10:10 PM   Result Value Ref Range    Glucose (POC) 88 65 - 100 mg/dL    Performed by Alison Kiser RN BSN    RENAL FUNCTION PANEL    Collection Time: 08/19/20  3:55 AM   Result Value Ref Range    Sodium 136 136 - 145 mmol/L    Potassium 5.3 (H) 3.5 - 5.1 mmol/L    Chloride 106 97 - 108 mmol/L    CO2 23 21 - 32 mmol/L    Anion gap 7 5 - 15 mmol/L    Glucose 83 65 - 100 mg/dL    BUN 58 (H) 6 - 20 MG/DL    Creatinine 5.36 (H) 0.70 - 1.30 MG/DL    BUN/Creatinine ratio 11 (L) 12 - 20      GFR est AA 13 (L) >60 ml/min/1.73m2    GFR est non-AA 11 (L) >60 ml/min/1.73m2    Calcium 7.9 (L) 8.5 - 10.1 MG/DL    Phosphorus 7.0 (H) 2.6 - 4.7 MG/DL    Albumin 2.9 (L) 3.5 - 5.0 g/dL   CBC W/O DIFF    Collection Time: 08/19/20  3:55 AM   Result Value Ref Range    WBC 12.8 (H) 4.1 - 11.1 K/uL    RBC 2.44 (L) 4.10 - 5.70 M/uL    HGB 7.2 (L) 12.1 - 17.0 g/dL    HCT 22.5 (L) 36.6 - 50.3 %    MCV 92.2 80.0 - 99.0 FL    MCH 29.5 26.0 - 34.0 PG    MCHC 32.0 30.0 - 36.5 g/dL    RDW 14.0 11.5 - 14.5 %    PLATELET 045 000 - 837 K/uL    MPV 9.5 8.9 - 12.9 FL    NRBC 0.2 (H) 0  WBC    ABSOLUTE NRBC 0.03 (H) 0.00 - 0.01 K/uL   GLUCOSE, POC    Collection Time: 08/19/20  8:16 AM   Result Value Ref Range    Glucose (POC) 72 65 - 100 mg/dL    Performed by [facility]      Recent Labs     08/19/20  0355 08/18/20  1117   WBC 12.8* 8.4   HGB 7.2* 7.3*   HCT 22.5* 23.8*    325     Recent Labs     08/19/20  0355 08/18/20  1738 08/18/20  1117 08/17/20  0419     --  134* 136   K 5.3*  --  6.0* 5.4*     --  104 109*   CO2 23  --  26 20*   BUN 58*  --  82* 70*   CREA 5.36*  --  6.49* 4.94*   GLU 83  --  195* 106*   CA 7.9*  --  7.6* 8.4*   PHOS 7.0*  --   --  6.4*   URICA  --  12.5*  --   --      Recent Labs     08/19/20  0355 08/17/20  0419   ALB 2.9* 2.7*     No results for input(s): INR, PTP, APTT, INREXT, INREXT in the last 72 hours. No results for input(s): FE, TIBC, PSAT, FERR in the last 72 hours. No results found for: FOL, RBCF   No results for input(s): PH, PCO2, PO2 in the last 72 hours.   Recent Labs     08/17/20  0419   TROIQ <0.05     Lab Results   Component Value Date/Time    Cholesterol, total 175 07/29/2020 12:09 PM    HDL Cholesterol 31 (L) 07/29/2020 12:09 PM    LDL, calculated 102 (H) 07/29/2020 12:09 PM    Triglyceride 212 (H) 07/29/2020 12:09 PM     Lab Results   Component Value Date/Time    Glucose (POC) 72 08/19/2020 08:16 AM    Glucose (POC) 88 08/18/2020 10:10 PM    Glucose (POC) 87 08/18/2020 06:21 PM    Glucose (POC) 190 (H) 08/18/2020 11:43 AM    Glucose (POC) 202 (H) 08/18/2020 07:45 AM     Lab Results   Component Value Date/Time    Color YELLOW/STRAW 08/18/2020 05:34 PM    Appearance CLOUDY (A) 08/18/2020 05:34 PM    Specific gravity 1.019 08/18/2020 05:34 PM    pH (UA) 5.0 08/18/2020 05:34 PM    Protein 100 (A) 08/18/2020 05:34 PM    Glucose Negative 08/18/2020 05:34 PM    Ketone Negative 08/18/2020 05:34 PM    Bilirubin Negative 08/18/2020 05:34 PM    Urobilinogen 0.2 08/18/2020 05:34 PM    Nitrites Negative 08/18/2020 05:34 PM    Leukocyte Esterase Negative 08/18/2020 05:34 PM    Epithelial cells MODERATE (A) 08/18/2020 05:34 PM    Bacteria 1+ (A) 08/18/2020 05:34 PM    WBC 0-4 08/18/2020 05:34 PM    RBC 5-10 08/18/2020 05:34 PM           Assessment:     Active Problems:    NERY (acute kidney injury) ([facility] [address] (7/17/2020)      Lung cancer ([facility] [address] (8/15/2020)      Chest pain (8/15/2020)      Pneumonia (8/15/2020)      Anemia (8/15/2020)      Suspected COVID-19 virus infection (8/15/2020)        Plan:   Patient remains lethargic and respiratory status does not appear to be improved. Starting on dialysis for worsening renal function and electrolyte abnormalities. No active bleeding, continue to monitor hgb. Hold off on colonoscopy until patient is stabilized and will tolerate prep. We will follow.     JOSÉ Betancourt    08/19/20  10:02 AM  [address] [facility], [address] [place] [address]  [address] [id number]  [facility]: [phone]

## 2020-08-19 NOTE — PROGRESS NOTES
1945 Bedside and Verbal shift change report given to ESPERANZA Fitzgerald (oncoming nurse) by Srikanth Wagner (offgoing nurse). Report included the following information SBAR, Kardex, ED Summary, Intake/Output, MAR, Recent Results and Cardiac Rhythm sinus otto. 2130 shift assessment complete,see flowsheet for details. 2235 Noted patient to be wheezing more. too lethargic for inhaler, paged tele-hospitalist for nebulizer    0015 reassessment complete , see flowsheet for details    0300 Pt desat into upper 80%. Placed Ventimask 50%. 8296 reassessment complete, see flowsheet for details.     0700 Report given to Maverick Wright

## 2020-08-19 NOTE — PROGRESS NOTES
[facility]   [address] [facility], [facility] [address], [facility]  Phone: [phone]   [phone]       Nephrology Progress Note  Reagan Laughlin     1960     [id number]  Date of Admission : 8/15/2020  08/19/20    CC: Follow up for NERY      Assessment and Plan   Anuric severe NERY on CKD   -unknown etiology at this time( likely ATN from sepsis from resp source vs hemodynamics)  -  HD started yesterday due to worsening azotemia, AMS and hyperkalemia  - completed 2hs but unable to remove any fluid due to hypotension  - BP better this AM( SBP up to 150's). Remains encephalopatic, with hyperkalemia  - will attempt 2nd HD ( 2:30hs; increasing flows, with sodium profile and low temperature to avoid hypotension) 2k; 3Ca; UF goal 1L if tolerated  - may need pressors during HD. If so and remains hypotensive, will transition to CRRT  tomorrow  - will follow closely  - strict I/O's  - renal diet once able to eat  - daily weights    Hyperphosphatemia   - change Sevelamer to Lanthanum as pt is NPO  - follow daily    CKD IV w/ baseline Cr around 3 on 7/22  - likely due to longstanding HTN     Hypoxic resp failure/atypical pneumonia in CT chest  In the setting of lung mets  - on Abx and O2  - CT chest: There are bilateral pleural effusions. There are multifocal areas of airspace disease which may represent atypical infection.  Pulmonary nodules are obscured by the multifocal airspace disease except right lower lobe nodule measuring 9 mm which is stable.     HTN:  - continue to  hold antihypertensives  - no ACE or ARBs       Anemia  - 2/2 CKD, malignancy?  - Hb: 7.2<-7.3<-7.9  - if ok with Heme/Onc, recommend to start EPO  - transfuse PRBCs PRN  to keep Hb>7    DM2:  - per hospitalist     Hx of thyroid cancer w/ lung mets  ?treatment     Interval History:   Worsening clinical picture, didn't tolerate fluid removal yesterday due to hypotension    Review of Systems:   unable to do due to medical condition  ( AMS)    Current Medications:   Current Facility-Administered Medications   Medication Dose Route Frequency    albumin human 25% (BUMINATE) solution 12.5 g  12.5 g IntraVENous PRN    alcohol 62% (NOZIN) nasal  1 Ampule  1 Ampule Topical Q12H    albuterol-ipratropium (DUO-NEB) 2.5 MG-0.5 MG/3 ML  3 mL Nebulization Q6H PRN    morphine injection 4 mg  4 mg IntraVENous Q4H PRN    HYDROcodone-acetaminophen (NORCO) 7.5-325 mg per tablet 1 Tab  1 Tab Oral Q4H PRN    polyethylene glycol (MIRALAX) packet 17 g  17 g Oral BID    sevelamer carbonate (RENVELA) tab 800 mg  800 mg Oral TID WITH MEALS    melatonin tablet 3 mg  3 mg Oral QHS PRN    LORazepam (ATIVAN) tablet 1 mg  1 mg Oral Q6H PRN    pantoprazole (PROTONIX) tablet 40 mg  40 mg Oral ACB&D    levothyroxine (SYNTHROID) tablet 200 mcg  200 mcg Oral 6am    atorvastatin (LIPITOR) tablet 10 mg  10 mg Oral QHS    sodium chloride (NS) flush 5-40 mL  5-40 mL IntraVENous Q8H    sodium chloride (NS) flush 5-40 mL  5-40 mL IntraVENous PRN    acetaminophen (TYLENOL) tablet 650 mg  650 mg Oral Q6H PRN    Or    acetaminophen (TYLENOL) suppository 650 mg  650 mg Rectal Q6H PRN    promethazine (PHENERGAN) tablet 12.5 mg  12.5 mg Oral Q6H PRN    Or    ondansetron (ZOFRAN) injection 4 mg  4 mg IntraVENous Q6H PRN    sucralfate (CARAFATE) tablet 1 g  1 g Oral AC&HS    azithromycin (ZITHROMAX) 500 mg in 0.9% sodium chloride (MBP/ADV) 250 mL  500 mg IntraVENous Q24H    cefTRIAXone (ROCEPHIN) 1 g in 0.9% sodium chloride (MBP/ADV) 50 mL  1 g IntraVENous Q24H    insulin lispro (HUMALOG) injection   SubCUTAneous AC&HS    glucose chewable tablet 16 g  4 Tab Oral PRN    dextrose (D50W) injection syrg 12.5-25 g  12.5-25 g IntraVENous PRN    glucagon (GLUCAGEN) injection 1 mg  1 mg IntraMUSCular PRN    albuterol (PROVENTIL HFA, VENTOLIN HFA, PROAIR HFA) inhaler 2 Puff  2 Puff Inhalation Q4H PRN      Allergies   Allergen Reactions    Anesthetics - Amide Type Shortness of Breath    Flexeril [Cyclobenzaprine] Hives    Tramadol Hives       Objective:  Vitals:    Vitals:    08/19/20 0600 08/19/20 0630 08/19/20 0700 08/19/20 0730   BP: 132/44 (!) 142/38 122/40 110/40   Pulse: 72 73 72 67   Resp: 17 16 16 15   Temp:       TempSrc:       SpO2: 92% 95% 94% 98%   Weight:         Intake and Output:  No intake/output data recorded. 08/17 1901 - 08/19 0700  In: 1863.8 [P.O.:120; I.V.:1743.8]  Out: 315 [Urine:215]    Physical Examination:  General:encephalopathic,agitated on bed  HEENT: AT/NC  Neck:Supple,no JVD  Lungs: on NC, SO2 92%  CVS:RRR, S1 S2 normal  Abdomen: obese, Soft, no tenderness   Extremities:moves all,1+ pitting LE edema  Skin:No rash or lesions seen     [x]    High complexity decision making was performed  [x]    Patient is at high-risk of decompensation with multiple organ involvement    Lab Data Personally Reviewed: I have reviewed all the pertinent labs, microbiology data and radiology studies during assessment.     Recent Labs     08/19/20  0355 08/18/20 1117 08/17/20 0419    134* 136   K 5.3* 6.0* 5.4*    104 109*   CO2 23 26 20*   GLU 83 195* 106*   BUN 58* 82* 70*   CREA 5.36* 6.49* 4.94*   CA 7.9* 7.6* 8.4*   PHOS 7.0*  --  6.4*   ALB 2.9*  --  2.7*     Recent Labs     08/19/20  0355 08/18/20  1117 08/17/20  0419   WBC 12.8* 8.4 8.8   HGB 7.2* 7.3* 7.9*   HCT 22.5* 23.8* 25.4*    325 384     Lab Results   Component Value Date/Time    Specimen Description: BLOOD 11/09/2010 09:40 AM     Recent Results (from the past 24 hour(s))   CBC W/O DIFF    Collection Time: 08/18/20 11:17 AM   Result Value Ref Range    WBC 8.4 4.1 - 11.1 K/uL    RBC 2.54 (L) 4.10 - 5.70 M/uL    HGB 7.3 (L) 12.1 - 17.0 g/dL    HCT 23.8 (L) 36.6 - 50.3 %    MCV 93.7 80.0 - 99.0 FL    MCH 28.7 26.0 - 34.0 PG    MCHC 30.7 30.0 - 36.5 g/dL    RDW 13.9 11.5 - 14.5 %    PLATELET 747 872 - 123 K/uL    MPV 9.7 8.9 - 12.9 FL    NRBC 0.0 0  WBC    ABSOLUTE NRBC 0.00 0.00 - 0.82 K/uL   METABOLIC PANEL, BASIC    Collection Time: 08/18/20 11:17 AM   Result Value Ref Range    Sodium 134 (L) 136 - 145 mmol/L    Potassium 6.0 (H) 3.5 - 5.1 mmol/L    Chloride 104 97 - 108 mmol/L    CO2 26 21 - 32 mmol/L    Anion gap 4 (L) 5 - 15 mmol/L    Glucose 195 (H) 65 - 100 mg/dL    BUN 82 (H) 6 - 20 MG/DL    Creatinine 6.49 (H) 0.70 - 1.30 MG/DL    BUN/Creatinine ratio 13 12 - 20      GFR est AA 11 (L) >60 ml/min/1.73m2    GFR est non-AA 9 (L) >60 ml/min/1.73m2    Calcium 7.6 (L) 8.5 - 10.1 MG/DL   GLUCOSE, POC    Collection Time: 08/18/20 11:43 AM   Result Value Ref Range    Glucose (POC) 190 (H) 65 - 100 mg/dL    Performed by Ifrah Laguna (PCT)    POC EG7    Collection Time: 08/18/20 12:15 PM   Result Value Ref Range    Calcium, ionized (POC) 1.25 1.12 - 1.32 mmol/L    pH (POC) 7.21 (LL) 7.35 - 7.45      pCO2 (POC) 53.2 (H) 35.0 - 45.0 MMHG    pO2 (POC) 57 (L) 80 - 100 MMHG    HCO3 (POC) 21.2 (L) 22 - 26 MMOL/L    Base deficit (POC) 7 mmol/L    sO2 (POC) 82 (L) 92 - 97 %    Site LEFT RADIAL      Device: NASAL CANNULA      Flow rate (POC) 4 L/M    Allens test (POC) YES      Specimen type (POC) ARTERIAL      Total resp.  rate 20     URINALYSIS W/ RFLX MICROSCOPIC    Collection Time: 08/18/20  5:34 PM   Result Value Ref Range    Color YELLOW/STRAW      Appearance CLOUDY (A) CLEAR      Specific gravity 1.019 1.003 - 1.030      pH (UA) 5.0 5.0 - 8.0      Protein 100 (A) NEG mg/dL    Glucose Negative NEG mg/dL    Ketone Negative NEG mg/dL    Bilirubin Negative NEG      Blood TRACE (A) NEG      Urobilinogen 0.2 0.2 - 1.0 EU/dL    Nitrites Negative NEG      Leukocyte Esterase Negative NEG      WBC 0-4 0 - 4 /hpf    RBC 5-10 0 - 5 /hpf    Epithelial cells MODERATE (A) FEW /lpf    Bacteria 1+ (A) NEG /hpf    Mucus TRACE (A) NEG /lpf    Amorphous Crystals 2+ (A) NEG   URIC ACID    Collection Time: 08/18/20  5:38 PM   Result Value Ref Range    Uric acid 12.5 (H) 3.5 - 7.2 MG/DL   HEP B SURFACE AG Collection Time: 08/18/20  5:38 PM   Result Value Ref Range    Hepatitis B surface Ag 0.41 Index    Hep B surface Ag Interp. Negative NEG     GLUCOSE, POC    Collection Time: 08/18/20  6:21 PM   Result Value Ref Range    Glucose (POC) 87 65 - 100 mg/dL    Performed by Kathleen Vargas [facility]    GLUCOSE, POC    Collection Time: 08/18/20 10:10 PM   Result Value Ref Range    Glucose (POC) 88 65 - 100 mg/dL    Performed by Mariam Osman RN BSN    RENAL FUNCTION PANEL    Collection Time: 08/19/20  3:55 AM   Result Value Ref Range    Sodium 136 136 - 145 mmol/L    Potassium 5.3 (H) 3.5 - 5.1 mmol/L    Chloride 106 97 - 108 mmol/L    CO2 23 21 - 32 mmol/L    Anion gap 7 5 - 15 mmol/L    Glucose 83 65 - 100 mg/dL    BUN 58 (H) 6 - 20 MG/DL    Creatinine 5.36 (H) 0.70 - 1.30 MG/DL    BUN/Creatinine ratio 11 (L) 12 - 20      GFR est AA 13 (L) >60 ml/min/1.73m2    GFR est non-AA 11 (L) >60 ml/min/1.73m2    Calcium 7.9 (L) 8.5 - 10.1 MG/DL    Phosphorus 7.0 (H) 2.6 - 4.7 MG/DL    Albumin 2.9 (L) 3.5 - 5.0 g/dL   CBC W/O DIFF    Collection Time: 08/19/20  3:55 AM   Result Value Ref Range    WBC 12.8 (H) 4.1 - 11.1 K/uL    RBC 2.44 (L) 4.10 - 5.70 M/uL    HGB 7.2 (L) 12.1 - 17.0 g/dL    HCT 22.5 (L) 36.6 - 50.3 %    MCV 92.2 80.0 - 99.0 FL    MCH 29.5 26.0 - 34.0 PG    MCHC 32.0 30.0 - 36.5 g/dL    RDW 14.0 11.5 - 14.5 %    PLATELET 685 747 - 211 K/uL    MPV 9.5 8.9 - 12.9 FL    NRBC 0.2 (H) 0  WBC    ABSOLUTE NRBC 0.03 (H) 0.00 - 0.01 K/uL   GLUCOSE, POC    Collection Time: 08/19/20  8:16 AM   Result Value Ref Range    Glucose (POC) 72 65 - 100 mg/dL    Performed by Viral Miranda            I have reviewed the flowsheets. Chart and Pertinent Notes have been reviewed. No change in PMH ,family and social history from Consult note.       Bonnie Jacinto MD

## 2020-08-19 NOTE — PROGRESS NOTES
8/19/2020    INTENSIVIST PROGRESS NOTE:     Patient seen and evaluated, chart reviewed   65 yo male obese admitted with the dx of pneumonia, started on IV abx  Today transferred to ccu by hospitalist because of worsening respiratory failure  Underwent HD late yesterday  Now pt in CCU awake, on O2  No severe distress    ROS: + SOB, no CP    Visit Vitals  /49   Pulse 68   Temp 99 °F (37.2 °C)   Resp 16   Wt 113.5 kg (250 lb 3.6 oz)   SpO2 94%   BMI 36.95 kg/m²       General: moderately ill  Eyes: anicteric  HEENT: clear  Neck: FROM  CV: RRR  Lungs: decreased BS  Abd: soft, obese  : no flank pain  Ext: no edema  Skin: no rash  Musculoskeletal: normal inspection  Neuro: non focal    CXR: none done today    Labs reviewed    A/P:  - acute respiratory failure: wean O2  - pneumonia: IV abx per hospitalist  - AMS: due to worsening renal failure and hypoxemia most likely  - ESRD: RRT per renal  - hyperkalemia: RRT per renal  - DVT prophylaxis  - avoid sedatives  - Will assist on disposition planning when stable for transfer  Jackie Sun MD

## 2020-08-19 NOTE — PROGRESS NOTES
Received patient from [place] [id number] at 1600. Patient is lethargic and now on bipap. Intensivist has been notified. Patient blood gas, cultures and lactic drawn and sent. Patient 1800 medication still waiting for med to come in from [facility] per pharmacy. Patient daughter is requesting for the doctor to speak with her tomorrow. Patient daughter is aware that the intensivist is no longer here this evening to take such a call.

## 2020-08-19 NOTE — TELEPHONE ENCOUNTER
CONSULT    Patient:  Orlando Pederson     : 1960     Referring Physician: Teresa Carlos    Reason:   In [address]    Room #:  6943    Nurse: Landy Perkins     Number: [phone]

## 2020-08-19 NOTE — PROCEDURES
[facility] Dialysis Team [facility]  [phone]    Vitals   Pre   Post   Assessment   Pre   Post     Temp  Temp: 98.9 °F (37.2 °C) (08/19/20 0800)  97.6 ax LOC  A & o X 1 No change   HR   Pulse (Heart Rate): 70 (08/19/20 0945) 73 Lungs   Coarse rales  No change   B/P   BP: (!) 121/37 (08/19/20 0945) 188/59 Cardiac   Reg/Irreg  No Change   Resp   Resp Rate: 17 (08/19/20 0945) 18 Skin   Warm dry & intact  no change   Pain level  Pain Intensity 1: 0 (08/19/20 0800) 0 Edema  Generalized     No change   Orders:    Duration:   Start:    0945 End:    1215 Total:   2.5   Dialyzer:   Dialyzer/Set Up Inspection: Revaclear (08/19/20 0945)   K Bath:   Dialysate K (mEq/L): 2 (08/19/20 0945)   Ca Bath:   Dialysate CA (mEq/L): 3.5 (08/19/20 0945)   Na/Bicarb:   Dialysate NA (mEq/L): 140 (08/19/20 0945)   Target Fluid Removal:   Goal/Amount of Fluid to Remove (mL): 1000 mL (08/19/20 0945)   Access  CVC   Type & Location:   Right IJ quintion cath   Labs     Obtained/Reviewed   Critical Results Called   Date when labs were drawn-  Hgb-    HGB   Date Value Ref Range Status   08/19/2020 7.2 (L) 12.1 - 17.0 g/dL Final     K-    Potassium   Date Value Ref Range Status   08/19/2020 5.3 (H) 3.5 - 5.1 mmol/L Final     Ca-   Calcium   Date Value Ref Range Status   08/19/2020 7.9 (L) 8.5 - 10.1 MG/DL Final     Bun-   BUN   Date Value Ref Range Status   08/19/2020 58 (H) 6 - 20 MG/DL Final     Creat-   Creatinine   Date Value Ref Range Status   08/19/2020 5.36 (H) 0.70 - 1.30 MG/DL Final        Medications/ Blood Products Given     Name   Dose   Route and Time     Heparin arterial port 1.1ml  IV 1215   Heparin venous 1.4ml IV 1215        Blood Volume Processed (BVP):    45.0 Net Fluid   Removed:  1500mkl   Comments RN reviewed LPN assessment and completed RN assessment. RN completed patient assessment. RN reviewed technicians vital signs and procedure note. Tx completed.  Reviewed by ESPEARNZA Medina  Time Out Done: 0991  Primary Nurse Rpt Pre:ESPERANZA Ley  Primary Nurse Rpt Post: ESPERANZA Ley  Pt Education:access care  Care Plan:Continue HD  Tx Summary:RIJ CVC: Dressing CDI. No s/s of infection. Both lumens aspirate & flush well. Running well at . SBAR received from Primary RN. I arrived to pt's room A&Ox4. Consent signed & on file. 0945: Both lumens of Rubio disinfected with Prevantics per policy. Each lumen aspirated for blood return and flushed with Normal Saline per policy. Labs drawn per request/ order. VSS. Dialysis Tx initiated. 1000: Pt. Restless,remove breathing mask. 1015: Pt. Stable, lines secure and visible  1030: Pt. Appears to be more relaxed. Lines secure and visible  1045: Pt. Resting well, no s/s of distress  1100: Pt. Stable, resting well,lines secure and visible  1115: Pt. Resting well, lines secure  1130: Increased goal due to bp. Lines secure  1145: Pt. Stable, lines secure   1215: Tx ended. VSS. All possible blood returned to patient. Central line catheter flushed with normal saline per policy. Ports disinfected with Prevantics per policy, lines disconnected, Heparin dwells instilled, and lines capped using aseptic technique. Bed locked and in the lowest position, call bell and belongings in reach. SBAR given to Primary, RN. Patient is stable at time of their/ my departure. All Dialysis related medications have been reviewed. Admiting Diagnosis:Acute hypoxic resp. Failure  Pt's previous clinic- Acute  Consent signed - Informed Consent Verified: Yes (08/19/20 0945)  Robi Consent - verified  Hepatitis Status- negative/Susc 8/18/20  Machine #- Machine Number: [id number] (08/19/20 0945)  Telemetry status- yes  Pre-dialysis wt. - Pre-Dialysis Weight: 113.5 kg (250 lb 3.6 oz) (08/18/20 1830)

## 2020-08-19 NOTE — PROGRESS NOTES
HD TRANSFER - OUT REPORT:    Verbal report given to Ynhi< RNon Daryn Lee being transferred to CCU (Unit) for ordered procedure       Report consisted of patient's Situation, Background, Assessment and   Recommendations(SBAR). Information from the following report(s) Kardex was reviewed with the receiving nurse. Method:  $$ Method: Hemodialysis (08/19/20 1215)    Fluid Removed  NET Fluid Removed (mL): 1500 ml (08/19/20 1215)     Patient response to treatment:  Stable    End Time  Hemodialysis End Time: 1485 (08/19/20 1215)  If not documented, dialysis nurse to update post-dialysis row in HD/Filtration flowsheet     Medications /Volume expansion agents or Fluid boluses administered during treatment? no    Post-dialysis medication administration due?  no  Remind nurse to administer post-HD medication upon return to unit. Fistula hemostasis? no    Line heparinization? yes    Lines: secure    Opportunity for questions and clarification was provided.       Patient transported with: HD at bedside

## 2020-08-19 NOTE — PROGRESS NOTES
0900 Bedside and Verbal shift change report received from Amarilys Olivares 05, [id number] (offgoing nurse). Report included the following information SBAR, Kardex, Intake/Output, MAR, Accordion and Recent Results. 0945 Dialysis RN at bedside.

## 2020-08-19 NOTE — INTERDISCIPLINARY ROUNDS
Interdisciplinary team rounds were held 8/19/2020 with the following team members:Care Management, Diabetes Treatment Specialist, Nursing, Nutrition, Pharmacy, Physical Therapy, Physician, Respiratory Therapy and Clinical Coordinator. Plan of care discussed. See clinical pathway and/or care plan for interventions and desired outcomes.

## 2020-08-19 NOTE — PROGRESS NOTES
Hospitalist Progress Note    NAME: Nik Elkins   :  1960   MRN:  [id number]       Assessment / Plan:    Acute hypoxic respiratory failure, POA  Acute on chronic diastolic heart failure, POA  Community-acquired pneumonia, likely bacterial, POA  - COVID-19 test negative on admission  - Was started on antibiotics, will continue  - on 6 L o2 Sat 93%  - cont ICU care  - send blood cultures   - Blood pressure improved now    Acute encephalopathy, unknown etiology yet  - ABG revealed respiratory acidosis, hypoxemia  -Avoid all sedating medications  -Worsening kidney function .    -If no improvement of his mentation then will obtain CT head    Acute kidney disease, worsening, POA  Hyperkalemia with junctional rhythm on EKG  Hypophosphatemia  -CKD stage IV  - Kidney functions improved a little today  - s/p hemodialysis  and one more session today. - Status post calcium gluconate, regular insulin, D50   -Continue to p.o. diuresis  - Renal ultrasound with renal cortical thinning, no overt hydronephrosis  -uric acid level are elevated. heme-onc for possible tumor lysis syndrome and eval for rasburicase treatment    Acute on chronic anemia,.   History of duodenitis, POA  -EGD 2020 revealed Padmini-Metzger tear, duodenitis, duodenal polyp, gastric polyps, and dilation of the esophagus  -Hemoglobin on the lower side but has been stable although it is trending down  -transfuse if Hbg 7 or less    Bradycardia  -Heart rate around 40s to 50s on   -Likely secondary to hyperkalemia  - Treatment as above  -EKG with no AV block    Diabetes mellitus type 2  -Hemoglobin A1c 6.3% in 2020    History of thyroid cancer with mets to lung  -Follows Dr. Larry Sanches  -Hypothyroidsim-Continue levothyroxine     Essential HTN POA-hold home medications given hypotension at this time    Code Status: full  Surrogate Decision Maker:  Valeria Coto  Daughter  [phone]          DVT Prophylaxis: SCDs  GI Prophylaxis: not indicated     Baseline: independent       Subjective:     Chief Complaint / Reason for Physician Visit  Patient is lying in bed, barely following commands very lethargic and sleepy. Review of Systems:  Symptom Y/N Comments  Symptom Y/N Comments   Fever/Chills    Chest Pain     Poor Appetite    Edema     Cough    Abdominal Pain     Sputum    Joint Pain     SOB/NUÑEZ    Pruritis/Rash     Nausea/vomit    Tolerating PT/OT     Diarrhea    Tolerating Diet     Constipation    Other       Could NOT obtain due to: Unable to obtain     Objective:     VITALS:   Last 24hrs VS reviewed since prior progress note.  Most recent are:  Patient Vitals for the past 24 hrs:   Temp Pulse Resp BP SpO2   08/19/20 1215 97.6 °F (36.4 °C) 73 13 -- --   08/19/20 1200 -- 71 15 172/51 93 %   08/19/20 1145 -- 70 16 161/52 92 %   08/19/20 1130 -- 67 16 167/51 93 %   08/19/20 1115 -- 66 17 159/59 95 %   08/19/20 1100 -- 67 18 158/49 95 %   08/19/20 1045 -- 71 17 175/52 94 %   08/19/20 1030 -- 72 18 168/53 --   08/19/20 1015 -- 73 18 143/73 --   08/19/20 1000 -- 69 18 134/80 --   08/19/20 0945 -- 70 17 (!) 121/37 93 %   08/19/20 0911 -- 69 21 123/49 --   08/19/20 0800 98.9 °F (37.2 °C) -- -- -- --   08/19/20 0730 -- 67 15 110/40 98 %   08/19/20 0700 -- 72 16 122/40 94 %   08/19/20 0630 -- 73 16 (!) 142/38 95 %   08/19/20 0600 -- 72 17 132/44 92 %   08/19/20 0530 -- 72 22 162/51 95 %   08/19/20 0500 -- 68 16 148/49 94 %   08/19/20 0430 -- 73 22 -- 93 %   08/19/20 0400 -- 70 20 122/85 96 %   08/19/20 0345 99 °F (37.2 °C) 68 15 -- 96 %   08/19/20 0330 -- 72 20 156/52 96 %   08/19/20 0303 -- 71 20 121/75 92 %   08/19/20 0300 -- 72 22 -- 91 %   08/19/20 0230 -- 69 23 130/74 91 %   08/19/20 0200 -- 67 14 (!) 120/35 95 %   08/19/20 0145 -- 64 16 (!) 129/38 95 %   08/19/20 0130 -- 65 16 (!) 130/39 94 %   08/19/20 0115 -- 65 16 130/47 94 %   08/19/20 0100 -- 64 15 (!) 126/39 98 %   08/19/20 0045 -- 62 15 (!) 133/39 98 %   08/19/20 0030 -- 62 15 (!) 126/37 98 %   08/19/20 0015 -- 66 13 129/43 94 %   08/19/20 0003 98.5 °F (36.9 °C) 69 18 (!) 144/38 92 %   08/19/20 0000 -- 72 19 -- 92 %   08/18/20 2345 -- 68 18 (!) 95/32 --   08/18/20 2330 -- 68 20 134/51 92 %   08/18/20 2315 -- 66 20 138/61 90 %   08/18/20 2300 -- 63 17 134/54 96 %   08/18/20 2245 -- 62 16 133/44 96 %   08/18/20 2230 -- 62 15 (!) 129/39 95 %   08/18/20 2215 -- 62 15 (!) 122/35 97 %   08/18/20 2200 -- 60 14 (!) 113/36 96 %   08/18/20 2145 -- (!) 57 14 (!) 115/35 95 %   08/18/20 2130 98.2 °F (36.8 °C) (!) 59 14 (!) 121/24 96 %   08/18/20 2115 -- (!) 57 15 (!) 102/22 93 %   08/18/20 2100 -- (!) 55 14 (!) 94/20 94 %   08/18/20 2045 -- (!) 55 13 (!) 98/29 92 %   08/18/20 2035 -- (!) 55 14 (!) 98/20 90 %   08/18/20 2030 -- (!) 57 13 (!) 98/12 92 %   08/18/20 2015 -- (!) 57 17 (!) 105/27 (!) 89 %   08/18/20 2000 -- (!) 52 15 (!) 108/26 93 %   08/18/20 1950 -- (!) 52 14 (!) 101/20 95 %   08/18/20 1945 -- (!) 53 -- (!) 88/12 --   08/18/20 1930 97.7 °F (36.5 °C) (!) 53 13 100/68 92 %   08/18/20 1915 -- (!) 54 -- (!) 103/22 --   08/18/20 1900 -- (!) 56 17 96/42 90 %   08/18/20 1845 -- (!) 55 -- (!) 104/33 --   08/18/20 1830 (!) 95.7 °F (35.4 °C) (!) 57 14 (!) 87/29 93 %   08/18/20 1815 -- (!) 57 15 98/52 94 %   08/18/20 1700 -- (!) 57 14 (!) 92/36 93 %   08/18/20 1600 (!) 96 °F (35.6 °C) (!) 57 15 (!) 102/35 96 %   08/18/20 1516 -- (!) 54 17 (!) 102/28 90 %   08/18/20 1500 -- (!) 52 14 (!) 83/36 (!) 89 %       Intake/Output Summary (Last 24 hours) at 8/19/2020 1435  Last data filed at 8/19/2020 1215  Gross per 24 hour   Intake 50 ml   Output 1600 ml   Net -1550 ml        PHYSICAL EXAM:  General: Lethargic, not following commands, no acute distress    EENT:  EOMI. Anicteric sclerae. MMM  Resp:  CTA bilaterally, no wheezing or rales.   No accessory muscle use  CV:  Regular  rhythm,  No edema  GI:  Soft, Non distended, Non tender.  +Bowel sounds  Neurologic:  Move all extremities, not following commands  Psych:   Poor insight. Not anxious nor agitated  Skin:  No rashes. No jaundice    Reviewed most current lab test results and cultures  YES  Reviewed most current radiology test results   YES  Review and summation of old records today    NO  Reviewed patient's current orders and MAR    YES  PMH/SH reviewed - no change compared to H&P  ________________________________________________________________________  Care Plan discussed with:    Comments   Patient x    Family      RN x    Care Manager x    Consultant                       x Multidiciplinary team rounds were held today with , nursing, pharmacist and clinical coordinator. Patient's plan of care was discussed; medications were reviewed and discharge planning was addressed. ______________________________________________________________________      Comments   >50% of visit spent in counseling and coordination of care x    ________________________________________________________________________  David Dai MD     Procedures: see electronic medical records for all procedures/Xrays and details which were not copied into this note but were reviewed prior to creation of Plan. LABS:  I reviewed today's most current labs and imaging studies.   Pertinent labs include:  Recent Labs     08/19/20  0355 08/18/20  1117 08/17/20  0419   WBC 12.8* 8.4 8.8   HGB 7.2* 7.3* 7.9*   HCT 22.5* 23.8* 25.4*    325 384     Recent Labs     08/19/20  0355 08/18/20  1117 08/17/20  0419    134* 136   K 5.3* 6.0* 5.4*    104 109*   CO2 23 26 20*   GLU 83 195* 106*   BUN 58* 82* 70*   CREA 5.36* 6.49* 4.94*   CA 7.9* 7.6* 8.4*   PHOS 7.0*  --  6.4*   ALB 2.9*  --  2.7*       Signed: David Dai MD

## 2020-08-19 NOTE — PROGRESS NOTES
TRANSFER - IN REPORT:    Verbal report received from ESPERANZA Helms on Elizabeth Bass  being received from CCU(unit) for ordered procedure      Report consisted of patients Situation, Background, Assessment and   Recommendations(SBAR). Information from the following report(s) Kardex was reviewed with the receiving nurse. Opportunity for questions and clarification was provided. Assessment completed upon patients arrival to unit and care assumed.

## 2020-08-19 NOTE — PROGRESS NOTES
telemed: Pt is wheezing, currently has an active order for albuterol inhaler but is too sleepy/lethargic to properly use. Can I have an order for albuterol nebulization?     Plan: Nebulizer ordered

## 2020-08-19 NOTE — CONSULTS
[facility]  at [address] [facility], , [address] [place] [address] [address] [place], [address] [facility]  [phone]    Oncology Inpatient Consult      Reason for consultation:     Metastatic thyroid carcinoma admitted with respiratory failure - evaluate for rasburicase treatment    Subjective:     Vicki Moyer is a 64 y.o.male who we were asked to see by Dr. Kymberly Vail. 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 He was diagnosed with papillary thyroid carcinoma in 2002. He underwent a total thyroidectomy by Dr. Noble Joy. This was followed by STRATTON ablation. He has been on TSH suppression since that time. Subsequent CT scans showed numerous small lung nodules which have been stable. He was transferred from [facility] ED with acute hypoxic respiratory failure and admitted for further evaluation and management. He is currently on ventimask in the CCU and unable to give any history.        Past Medical History:   Diagnosis Date    Adverse effect of anesthesia     \" STOP BREATHING 1 TIME C ANESTH\"    Calculus of kidney     Cancer ([address] 2004    thyroid cancer    Chronic kidney disease     Chronic pain     BACK SHOULDER AND ARM    Depression     Diabetes ([address]     Encounter for long-term (current) use of NSAIDs     Hypercholesterolemia     Hypertension     Nausea & vomiting     Other ill-defined conditions(799.89)     cholesterol, thyroid    Sleep apnea     doesn't wear cpap    Thyroid cancer ([address]     TIA (transient ischemic attack) 2011    Vitamin D deficiency      Past Surgical History:   Procedure Laterality Date    CARDIAC SURG PROCEDURE UNLIST      HX HEENT      THROAT SURGERY X 4    HX ORTHOPAEDIC      back     HX ORTHOPAEDIC      ARM AND SHOULDER    HX OTHER SURGICAL      thyroid, lymphnode    HX RETINAL DETACHMENT REPAIR      left eye    IR INSERT NON TUNL CVC OVER 5 YRS  8/18/2020    UPPER GI ENDOSCOPY,BALL DIL,30MM 2020         UPPER GI ENDOSCOPY,BIOPSY  2020         VASCULAR SURGERY PROCEDURE UNLIST      cardiac cath NEG.       Family History   Problem Relation Age of Onset    Diabetes Mother     Elevated Lipids Mother    Carley [place] Bladder Disease Mother     Headache Mother     Migraines Mother     Heart Disease Mother     Hypertension Mother     Stroke Mother     Other Mother         ANEURSYM BRAIN    Bleeding Prob Father     Cancer Father         LEUKEMIA    Diabetes Father     Elevated Lipids Father     Mental Retardation Sister     Psychiatric Disorder Sister     Cancer Brother         LUNGS     Social History     Tobacco Use    Smoking status: Former Smoker     Last attempt to quit: 1994     Years since quittin.0    Smokeless tobacco: Never Used   Substance Use Topics    Alcohol use: Yes     Comment: Occasionally      Current Facility-Administered Medications   Medication Dose Route Frequency Provider Last Rate Last Dose    pantoprazole (PROTONIX) 40 mg in 0.9% sodium chloride 10 mL injection  40 mg IntraVENous DAILY Lena Adkins MD   40 mg at 20 1142    heparin (porcine) 1,000 unit/mL injection 2,500 Units  2,500 Units Hemodialysis DIALYSIS PRN Kevin Reyes MD   2,500 Units at 20 1154    rasburicase (ELITEK) 6 mg in 0.9% sodium chloride 50 mL IVPB  6 mg IntraVENous ONCE Fox Hart MD        albumin human 25% (BUMINATE) solution 12.5 g  12.5 g IntraVENous PRN Kevin Reyes MD   12.5 g at 20 1930    alcohol 62% (NOZIN) nasal  1 Ampule  1 Ampule Topical Q12H Nina Bonilla MD   1 Ampule at 20 0814    albuterol-ipratropium (DUO-NEB) 2.5 MG-0.5 MG/3 ML  3 mL Nebulization Q6H PRN Lisa Her MD   3 mL at 20 2303    morphine injection 4 mg  4 mg IntraVENous Q4H PRN Pam Edward MD   4 mg at 20 1548    HYDROcodone-acetaminophen (NORCO) 7.5-325 mg per tablet 1 Tab  1 Tab Oral Q4H PRN Hebert Rodriguez MD   1 Tab at 08/18/20 0413    polyethylene glycol (MIRALAX) packet 17 g  17 g Oral BID [place]   Stopped at 08/18/20 0912    sevelamer carbonate (RENVELA) tab 800 mg  800 mg Oral TID WITH MEALS Leilani Ivey MD   Stopped at 08/18/20 0914    melatonin tablet 3 mg  3 mg Oral QHS PRN Krystyna Sanabria MD   3 mg at 08/16/20 0127    LORazepam (ATIVAN) tablet 1 mg  1 mg Oral Q6H PRN Krystyna Sanabria MD   1 mg at 08/18/20 0413    levothyroxine (SYNTHROID) tablet 200 mcg  200 mcg Oral Jona Ohara MD   Stopped at 08/19/20 0600    atorvastatin (LIPITOR) tablet 10 mg  10 mg Oral QHS Leslie Davila MD   Stopped at 08/18/20 2200    sodium chloride (NS) flush 5-40 mL  5-40 mL IntraVENous Q8H Din, Vidal STOUT MD   10 mL at 08/19/20 1600    sodium chloride (NS) flush 5-40 mL  5-40 mL IntraVENous PRN Leslie Davila MD   10 mL at 08/18/20 0512    acetaminophen (TYLENOL) tablet 650 mg  650 mg Oral Q6H PRN Leslie Davila MD   650 mg at 08/16/20 1912    Or    acetaminophen (TYLENOL) suppository 650 mg  650 mg Rectal Q6H PRN Din, Rosanna Huang MD        promethazine (PHENERGAN) tablet 12.5 mg  12.5 mg Oral Q6H PRN Din, Rosanna Huang MD        Or    ondansetron (ZOFRAN) injection 4 mg  4 mg IntraVENous Q6H PRN Din, Rosanna Huang MD        sucralfate (CARAFATE) tablet 1 g  1 g Oral AC&HS Leslie Davila MD   Stopped at 08/18/20 0913    cefTRIAXone (ROCEPHIN) 1 g in 0.9% sodium chloride (MBP/ADV) 50 mL  1 g IntraVENous Q24H Nicholas STOUT  mL/hr at 08/19/20 0814 1 g at 08/19/20 1430    insulin lispro (HUMALOG) injection   SubCUTAneous AC&ALONZO Davila MD   Stopped at 08/18/20 1130    glucose chewable tablet 16 g  4 Tab Oral PRN Leslie Davila MD        dextrose (D50W) injection syrg 12.5-25 g  12.5-25 g IntraVENous PRN Rosanna Cano MD        glucagon (GLUCAGEN) injection 1 mg  1 mg IntraMUSCular PRN Rosanna Cano MD        albuterol (PROVENTIL HFA, VENTOLIN HFA, PROAIR HFA) inhaler 2 Puff  2 Puff Inhalation Q4H PRN Nicholas Lovell MD GIRISH   2 Puff at 08/15/20 1112        Allergies   Allergen Reactions    Anesthetics - Amide Type Shortness of Breath    Flexeril [Cyclobenzaprine] Hives    Tramadol Hives        Review of Systems:  Review of systems not obtained due to patient factors.        Objective:     Patient Vitals for the past 8 hrs:   BP Temp Temp src Pulse Resp SpO2   20 1600 149/54 98.6 °F (37 °C) -- 68 16 95 %   20 1500 157/56 -- -- 68 17 94 %   20 1400 186/52 -- -- 74 19 93 %   20 1300 164/55 -- -- 69 15 95 %   20 1215 -- 97.6 °F (36.4 °C) Axillary 73 13 --   20 1200 172/51 -- -- 74 18 92 %   20 1145 161/52 -- -- 70 16 92 %   20 1130 167/51 -- -- 67 16 93 %   20 1115 159/59 -- -- 66 17 95 %   20 1100 158/49 -- -- 65 14 94 %   20 1045 175/52 -- -- 71 17 94 %   20 1030 168/53 -- -- 72 18 --   20 1015 143/73 -- -- 73 18 --   20 1000 134/80 -- -- 71 19 92 %   20 0945 (!) 121/37 -- -- 70 17 93 %   20 0911 123/49 -- -- 69 21 --     Temp (24hrs), Av °F (36.7 °C), Min:95.7 °F (35.4 °C), Max:99 °F (37.2 °C)     07 -  1900  In: 50 [I.V.:50]  Out: 1500     Physical Exam:   General appearance: Lethargic, obese, on O2  Lungs: CTA bilaterally  Heart: regular rate and rhythm  Abdomen: soft, non-tender  Extremities: extremities normal, atraumatic, no cyanosis or edema  Neurologic: arousable    Lab/Data Review:  Recent Results (from the past 24 hour(s))   URINALYSIS W/ RFLX MICROSCOPIC    Collection Time: 20  5:34 PM   Result Value Ref Range    Color YELLOW/STRAW      Appearance CLOUDY (A) CLEAR      Specific gravity 1.019 1.003 - 1.030      pH (UA) 5.0 5.0 - 8.0      Protein 100 (A) NEG mg/dL    Glucose Negative NEG mg/dL    Ketone Negative NEG mg/dL    Bilirubin Negative NEG      Blood TRACE (A) NEG      Urobilinogen 0.2 0.2 - 1.0 EU/dL    Nitrites Negative NEG      Leukocyte Esterase Negative NEG      WBC 0-4 0 - 4 /hpf    RBC 5-10 0 - 5 /hpf    Epithelial cells MODERATE (A) FEW /lpf    Bacteria 1+ (A) NEG /hpf    Mucus TRACE (A) NEG /lpf    Amorphous Crystals 2+ (A) NEG   URIC ACID    Collection Time: 08/18/20  5:38 PM   Result Value Ref Range    Uric acid 12.5 (H) 3.5 - 7.2 MG/DL   HEP B SURFACE AG    Collection Time: 08/18/20  5:38 PM   Result Value Ref Range    Hepatitis B surface Ag 0.41 Index    Hep B surface Ag Interp.  Negative NEG     GLUCOSE, POC    Collection Time: 08/18/20  6:21 PM   Result Value Ref Range    Glucose (POC) 87 65 - 100 mg/dL    Performed by Jackie Devine [facility]    GLUCOSE, POC    Collection Time: 08/18/20 10:10 PM   Result Value Ref Range    Glucose (POC) 88 65 - 100 mg/dL    Performed by Nica Gonzalez RN BSN    RENAL FUNCTION PANEL    Collection Time: 08/19/20  3:55 AM   Result Value Ref Range    Sodium 136 136 - 145 mmol/L    Potassium 5.3 (H) 3.5 - 5.1 mmol/L    Chloride 106 97 - 108 mmol/L    CO2 23 21 - 32 mmol/L    Anion gap 7 5 - 15 mmol/L    Glucose 83 65 - 100 mg/dL    BUN 58 (H) 6 - 20 MG/DL    Creatinine 5.36 (H) 0.70 - 1.30 MG/DL    BUN/Creatinine ratio 11 (L) 12 - 20      GFR est AA 13 (L) >60 ml/min/1.73m2    GFR est non-AA 11 (L) >60 ml/min/1.73m2    Calcium 7.9 (L) 8.5 - 10.1 MG/DL    Phosphorus 7.0 (H) 2.6 - 4.7 MG/DL    Albumin 2.9 (L) 3.5 - 5.0 g/dL   CBC W/O DIFF    Collection Time: 08/19/20  3:55 AM   Result Value Ref Range    WBC 12.8 (H) 4.1 - 11.1 K/uL    RBC 2.44 (L) 4.10 - 5.70 M/uL    HGB 7.2 (L) 12.1 - 17.0 g/dL    HCT 22.5 (L) 36.6 - 50.3 %    MCV 92.2 80.0 - 99.0 FL    MCH 29.5 26.0 - 34.0 PG    MCHC 32.0 30.0 - 36.5 g/dL    RDW 14.0 11.5 - 14.5 %    PLATELET 054 140 - 087 K/uL    MPV 9.5 8.9 - 12.9 FL    NRBC 0.2 (H) 0  WBC    ABSOLUTE NRBC 0.03 (H) 0.00 - 0.01 K/uL   GLUCOSE, POC    Collection Time: 08/19/20  8:16 AM   Result Value Ref Range    Glucose (POC) 72 65 - 100 mg/dL    Performed by Gio Her    GLUCOSE, POC    Collection Time: 08/19/20 11:39 AM   Result Value Ref Range    Glucose (POC) 82 65 - 100 mg/dL    Performed by German Armenta (MARINE RN)    POC EG7    Collection Time: 08/19/20  4:10 PM   Result Value Ref Range    Calcium, ionized (POC) 1.35 (H) 1.12 - 1.32 mmol/L    FIO2 (POC) 50 %    pH (POC) 7.27 (L) 7.35 - 7.45      pCO2 (POC) 55.9 (H) 35.0 - 45.0 MMHG    pO2 (POC) 87 80 - 100 MMHG    HCO3 (POC) 25.6 22 - 26 MMOL/L    Base deficit (POC) 1 mmol/L    sO2 (POC) 95 92 - 97 %    Site RIGHT RADIAL      Device: VENTURI MASK      Allens test (POC) YES      Specimen type (POC) ARTERIAL      Total resp. rate 15             Assessment:     1. Anemia    Check ferritin and iron profile  Transfuse for Hgb < 7.0  Okay to start EPO - nephrology to dose      2. Hyperuricemia    Uric acid - 12.5  Rasburicase 6 mg IV once  Monitor      3. 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              Radio-iodine refractory               BRAF mutation - detected              MSI - stable       ECOG PS 0  Intent of treatment - palliative      2002 Biopsy suggesting PTC                        S/p total thyroidectomy                        S/p STRATTON  0226-7472  Reports negative WBS  10/10/16  Repeat surgery, 2 right paratrachial LN's                        Surgical Path: poorly differentiated PTC                        Patient with hoarseness of voice, persistent     11/30/16 Tg 10.1, TgAb negative, TSH 1.93  12/14/16 Neck Ultrasound:  \"Thyroid bed is empty, no anterior cervical mass or significant adenopathy\"  01/18/17 Thyrogen stimulated WBS: \"No evidence of uptake in thyroid bed, No evidence of metastatic disease\"     He has never been on any of the approved TKI   CT shows numerous lung nodules - stable on most recent CT scan  He is asymptomatic from lung nodules  Continue observation      Plan:     > Rasburicase 6 mg IV once  > Additional labs: iron profile and ferritin  > Start EPO per nephrology  > Supportive care      Signed By: Ke García NP     August 19, 2020

## 2020-08-19 NOTE — PROGRESS NOTES
Problem: Falls - Risk of  Goal: *Absence of Falls  Description: Document Michelle Hoyt Fall Risk and appropriate interventions in the flowsheet.   Outcome: Progressing Towards Goal  Note: Fall Risk Interventions:  Mobility Interventions: Bed/chair exit alarm    Mentation Interventions: Bed/chair exit alarm    Medication Interventions: Bed/chair exit alarm    Elimination Interventions: Bed/chair exit alarm              Problem: Patient Education: Go to Patient Education Activity  Goal: Patient/Family Education  Outcome: Progressing Towards Goal

## 2020-08-20 ENCOUNTER — APPOINTMENT (OUTPATIENT)
Dept: GENERAL RADIOLOGY | Age: 60
DRG: 673 | End: 2020-08-20
Attending: INTERNAL MEDICINE
Payer: MEDICARE

## 2020-08-20 LAB
ALBUMIN SERPL-MCNC: 3 G/DL (ref 3.5–5)
AMMONIA PLAS-SCNC: 26 UMOL/L
ANION GAP SERPL CALC-SCNC: 8 MMOL/L (ref 5–15)
ARTERIAL PATENCY WRIST A: YES
BASE DEFICIT BLD-SCNC: 1 MMOL/L
BDY SITE: ABNORMAL
BUN SERPL-MCNC: 50 MG/DL (ref 6–20)
BUN/CREAT SERPL: 11 (ref 12–20)
CA-I BLD-SCNC: 1.24 MMOL/L (ref 1.12–1.32)
CALCIUM SERPL-MCNC: 8.2 MG/DL (ref 8.5–10.1)
CHLORIDE SERPL-SCNC: 103 MMOL/L (ref 97–108)
CO2 SERPL-SCNC: 26 MMOL/L (ref 21–32)
CREAT SERPL-MCNC: 4.36 MG/DL (ref 0.7–1.3)
CREAT UR-MCNC: 202 MG/DL
ERYTHROCYTE [DISTWIDTH] IN BLOOD BY AUTOMATED COUNT: 14 % (ref 11.5–14.5)
FERRITIN SERPL-MCNC: 312 NG/ML (ref 26–388)
GAS FLOW.O2 O2 DELIVERY SYS: ABNORMAL L/MIN
GLUCOSE BLD STRIP.AUTO-MCNC: 120 MG/DL (ref 65–100)
GLUCOSE BLD STRIP.AUTO-MCNC: 65 MG/DL (ref 65–100)
GLUCOSE BLD STRIP.AUTO-MCNC: 71 MG/DL (ref 65–100)
GLUCOSE BLD STRIP.AUTO-MCNC: 76 MG/DL (ref 65–100)
GLUCOSE BLD STRIP.AUTO-MCNC: 78 MG/DL (ref 65–100)
GLUCOSE BLD STRIP.AUTO-MCNC: 82 MG/DL (ref 65–100)
GLUCOSE SERPL-MCNC: 176 MG/DL (ref 65–100)
HCO3 BLD-SCNC: 25.3 MMOL/L (ref 22–26)
HCT VFR BLD AUTO: 23.6 % (ref 36.6–50.3)
HGB BLD-MCNC: 7.5 G/DL (ref 12.1–17)
IRON SATN MFR SERPL: 16 % (ref 20–50)
IRON SERPL-MCNC: 32 UG/DL (ref 35–150)
MCH RBC QN AUTO: 29 PG (ref 26–34)
MCHC RBC AUTO-ENTMCNC: 31.8 G/DL (ref 30–36.5)
MCV RBC AUTO: 91.1 FL (ref 80–99)
NRBC # BLD: 0.02 K/UL (ref 0–0.01)
NRBC BLD-RTO: 0.2 PER 100 WBC
O2/TOTAL GAS SETTING VFR VENT: 0.5 %
PCO2 BLD: 47.8 MMHG (ref 35–45)
PH BLD: 7.33 [PH] (ref 7.35–7.45)
PHOSPHATE SERPL-MCNC: 5.1 MG/DL (ref 2.6–4.7)
PLATELET # BLD AUTO: 311 K/UL (ref 150–400)
PMV BLD AUTO: 10.7 FL (ref 8.9–12.9)
PO2 BLD: 40 MMHG (ref 80–100)
POTASSIUM SERPL-SCNC: 4.3 MMOL/L (ref 3.5–5.1)
PROCALCITONIN SERPL-MCNC: 0.15 NG/ML
RBC # BLD AUTO: 2.59 M/UL (ref 4.1–5.7)
SAO2 % BLD: 71 % (ref 92–97)
SERVICE CMNT-IMP: ABNORMAL
SERVICE CMNT-IMP: NORMAL
SODIUM SERPL-SCNC: 137 MMOL/L (ref 136–145)
SPECIMEN TYPE: ABNORMAL
TIBC SERPL-MCNC: 202 UG/DL (ref 250–450)
TOTAL RESP. RATE, ITRR: 15
URATE SERPL-MCNC: <0.2 MG/DL (ref 3.5–7.2)
URATE SERPL-MCNC: <0.2 MG/DL (ref 3.5–7.2)
UUN UR-MCNC: 390 MG/DL
WBC # BLD AUTO: 11.2 K/UL (ref 4.1–11.1)

## 2020-08-20 PROCEDURE — 84550 ASSAY OF BLOOD/URIC ACID: CPT

## 2020-08-20 PROCEDURE — 82140 ASSAY OF AMMONIA: CPT

## 2020-08-20 PROCEDURE — 74011000250 HC RX REV CODE- 250: Performed by: INTERNAL MEDICINE

## 2020-08-20 PROCEDURE — 74011250637 HC RX REV CODE- 250/637: Performed by: INTERNAL MEDICINE

## 2020-08-20 PROCEDURE — 85027 COMPLETE CBC AUTOMATED: CPT

## 2020-08-20 PROCEDURE — 80069 RENAL FUNCTION PANEL: CPT

## 2020-08-20 PROCEDURE — 82962 GLUCOSE BLOOD TEST: CPT

## 2020-08-20 PROCEDURE — 36415 COLL VENOUS BLD VENIPUNCTURE: CPT

## 2020-08-20 PROCEDURE — 84145 PROCALCITONIN (PCT): CPT

## 2020-08-20 PROCEDURE — 77010033678 HC OXYGEN DAILY

## 2020-08-20 PROCEDURE — 83540 ASSAY OF IRON: CPT

## 2020-08-20 PROCEDURE — 82728 ASSAY OF FERRITIN: CPT

## 2020-08-20 PROCEDURE — 84540 ASSAY OF URINE/UREA-N: CPT

## 2020-08-20 PROCEDURE — 82803 BLOOD GASES ANY COMBINATION: CPT

## 2020-08-20 PROCEDURE — C9113 INJ PANTOPRAZOLE SODIUM, VIA: HCPCS | Performed by: INTERNAL MEDICINE

## 2020-08-20 PROCEDURE — 74011250636 HC RX REV CODE- 250/636: Performed by: INTERNAL MEDICINE

## 2020-08-20 PROCEDURE — 74011250636 HC RX REV CODE- 250/636: Performed by: HOSPITALIST

## 2020-08-20 PROCEDURE — 90935 HEMODIALYSIS ONE EVALUATION: CPT

## 2020-08-20 PROCEDURE — 65610000006 HC RM INTENSIVE CARE

## 2020-08-20 PROCEDURE — 71045 X-RAY EXAM CHEST 1 VIEW: CPT

## 2020-08-20 PROCEDURE — 74011000258 HC RX REV CODE- 258: Performed by: HOSPITALIST

## 2020-08-20 PROCEDURE — 36600 WITHDRAWAL OF ARTERIAL BLOOD: CPT

## 2020-08-20 PROCEDURE — 74011000250 HC RX REV CODE- 250: Performed by: HOSPITALIST

## 2020-08-20 PROCEDURE — 82570 ASSAY OF URINE CREATININE: CPT

## 2020-08-20 RX ORDER — LANTHANUM CARBONATE 500 MG/1
1000 TABLET, CHEWABLE ORAL EVERY 12 HOURS
Status: DISCONTINUED | OUTPATIENT
Start: 2020-08-20 | End: 2020-08-21

## 2020-08-20 RX ADMIN — CEFTRIAXONE SODIUM 1 G: 1 INJECTION, POWDER, FOR SOLUTION INTRAMUSCULAR; INTRAVENOUS at 08:42

## 2020-08-20 RX ADMIN — HEPARIN SODIUM 2500 UNITS: 1000 INJECTION INTRAVENOUS; SUBCUTANEOUS at 18:24

## 2020-08-20 RX ADMIN — SODIUM CHLORIDE 10 ML: 9 INJECTION, SOLUTION INTRAMUSCULAR; INTRAVENOUS; SUBCUTANEOUS at 21:45

## 2020-08-20 RX ADMIN — SODIUM CHLORIDE 30 ML: 9 INJECTION, SOLUTION INTRAMUSCULAR; INTRAVENOUS; SUBCUTANEOUS at 06:08

## 2020-08-20 RX ADMIN — SODIUM CHLORIDE 40 MG: 9 INJECTION, SOLUTION INTRAMUSCULAR; INTRAVENOUS; SUBCUTANEOUS at 08:41

## 2020-08-20 RX ADMIN — SODIUM CHLORIDE 10 ML: 9 INJECTION, SOLUTION INTRAMUSCULAR; INTRAVENOUS; SUBCUTANEOUS at 08:41

## 2020-08-20 RX ADMIN — SODIUM CHLORIDE 10 ML: 9 INJECTION, SOLUTION INTRAMUSCULAR; INTRAVENOUS; SUBCUTANEOUS at 15:07

## 2020-08-20 RX ADMIN — DEXTROSE MONOHYDRATE 12.5 G: 25 INJECTION, SOLUTION INTRAVENOUS at 02:57

## 2020-08-20 RX ADMIN — Medication 1 AMPULE: at 08:13

## 2020-08-20 RX ADMIN — Medication 1 AMPULE: at 21:44

## 2020-08-20 NOTE — PROCEDURES
[facility]i Dialysis Team [facility]  [phone]    Vitals   Pre   Post   Assessment   Pre   Post     Temp  Temp: 98.9 °F (37.2 °C) (08/20/20 1540)  98. .9 LOC  A/O x3 No change   HR   Pulse (Heart Rate): 72 (08/20/20 1540) 79 Lungs   Diminished  No Change   B/P   BP: 168/61 (08/20/20 1540) 180/80   Cardiac   Regular  No change   Resp   Resp Rate: 16 (08/20/20 1540) 21 Skin   Warm/dry/intact  No Change   Pain level  0 0 Edema  Generalized     No Change   Orders:    Duration:   Start:    1540 End:    1840 Total:   3 hrs   Dialyzer:   Dialyzer/Set Up Inspection: Revaclear (08/20/20 1540)   K Bath:   Dialysate K (mEq/L): 3 (08/20/20 1540)   Ca Bath:   Dialysate CA (mEq/L): 3.0 (08/20/20 1540)   Na/Bicarb:   Dialysate NA (mEq/L): 140 (08/20/20 1540)   Target Fluid Removal:   Goal/Amount of Fluid to Remove (mL): 2500 mL (08/20/20 1540)   Access     Type & Location:   RIJ Rubio Catheter aspirated and flushed with NS with no  problems. Prepped with Alcohol as per policy. Site dry and intact. No S/S of infection. Dressing Change today   Labs     Obtained/Reviewed   Critical Results Called   Date when labs were drawn-  Hgb-    HGB   Date Value Ref Range Status   08/20/2020 7.5 (L) 12.1 - 17.0 g/dL Final     K-    Potassium   Date Value Ref Range Status   08/20/2020 4.3 3.5 - 5.1 mmol/L Final     Ca-   Calcium   Date Value Ref Range Status   08/20/2020 8.2 (L) 8.5 - 10.1 MG/DL Final     Bun-   BUN   Date Value Ref Range Status   08/20/2020 50 (H) 6 - 20 MG/DL Final     Creat-   Creatinine   Date Value Ref Range Status   08/20/2020 4.36 (H) 0.70 - 1.30 MG/DL Final        Medications/ Blood Products Given     Name   Dose   Route and Time     Heparin  2,500 units IV at 1845             Blood Volume Processed (BVP):    58.9 L Net Fluid   Removed:  2500 ML   Comments   Time Out Done: 5412  Primary Nurse Rpt Pre:Prince Carrera RN  Primary Nurse Rpt Post:Prince Hall RN  Pt Education:Procedural  Care Plan:Continue HD Plan of Care  Tx Summary:Arrived at pt's room HD set up HD RN assessment done by [facility] (UNIT 6 AND 7) RN. After Timeout done HD started  1540: HD started with no problems  1615:Pt Resting no distress  1700: Access secured Lines visible  1730: Tolerating treatment  1840:HD completed all possible blood returned with NS rinsed back. Catheter flushed with NS and blocked with Heparin A 1.1 ml V 1.5 ml and disinfected and capped. Pt stable in the room and left in the lowest possible position with call bell within reached. report given to James Whitehead RN     HD Related Medications Reviewed      Admiting Diagnosis: Acte Hypoxic, Respiratory Failure  Pt's previous clinic-  Consent signed - Informed Consent Verified: Yes (08/20/20 1540)  Robi Consent - Yes on File  Hepatitis Status- Hep B Antigen Negative 8/18/20 Hep B Antibody Susceptible 8/18/20  Machine #- Machine Number: [employer] (08/20/20 1540)  Telemetry status-  Pre-dialysis wt. - Pre-Dialysis Weight: 113.5 kg (250 lb 3.6 oz) (08/18/20 1830)

## 2020-08-20 NOTE — PROGRESS NOTES
Bedside and Verbal shift change report given to [facility]    (oncoming nurse) by Bharath Bird (offgoing nurse). Report included the following information SBAR, Kardex, Procedure Summary, Intake/Output, MAR, Recent Results, Med Rec Status and Cardiac Rhythm nsr.     1954. Assessment as noted. Pt a&oX3, no complaints. Breathing somewhat labored without a activity. Has just completed an HD treatment, is slightly hypertensive but SBP <180 and decreasing.    2040. Updated daughter on status via phone call.    0100. Assessment unchanged, pt voided x1 continent in urinal.  No complaints, resting.    0400. Assessment, no changes. Resting intermittantly, no complaints.

## 2020-08-20 NOTE — PROGRESS NOTES
0130- Pt increasingly restless, continuously pulling off bipap and ventimask when switched. Dr Moulton Son on unit and updated. Orders received for restraints to ensure patients receives O2 and Climmie Leni is protected, ok to trial NC and ventimask to keep sats >90%. Orders also received for chest xray in am and ABG for any distress.

## 2020-08-20 NOTE — PROGRESS NOTES
Problem: Non-Violent Restraints  Goal: *Removal from restraints as soon as assessed to be safe  Outcome: Progressing Towards Goal  Goal: *No harm/injury to patient while restraints in use  Outcome: Progressing Towards Goal  Goal: *Patient's dignity will be maintained  Outcome: Progressing Towards Goal

## 2020-08-20 NOTE — PROGRESS NOTES
19: 10-Received bedside report from Kenney Phoenix and [employer]. Pt on BIPAP, lethargic but responsive to voice, eyes open,  follows basic commands, still nonverbal, no distress observed. 19:15-RT at bedside, new ABG obtained. 19:38-Complex assessment completed. 21:30-Pt still lethargic and nonverbal. Will respond to voice, open eyes, but quickly goes back to sleep. Not able to take medications. 00:45-pt started to arouse and began pulling at his Bipap.   01:15 with RT tried transitioning pt to Ventimask @50%, pt tolerated but continued to try to pull off Ventimask.  at bedside, discussed pts ABGs, vital signs, and mentation. Will put in orders for PRN restraints if pt does not tolerate trialing nasal cannal and ventimask and to prevent pt from pulling sultana catheter out, chest Xray for the morning, and ABG for any AMS changes or distress. Goal for SPO2, per Hospitalist, is above 90%. 02:15-Pt tolerating nasal cannala, SPO2 above 90%. 03:00-Pt extremely agitated off of nasal cannal, transitioned back to [email or web address]. Checked Pt blood sugar, was 65. Gave pt D50 per Mar protocol.  03:20-rechecked pt sugar, 120. Pt still agitated, but able to minimally verbalize needs at this time. 05:00-Pt labs obtained and sent down, repositioned in bed, and gown changed. 05:36-Paged on-Call Nephrology, Dr. Montez Jose, pt BP has been elevated 137S systolically, MD stated that pt would have dialysis today and to not treat the elevated blood pressure. 07:30-Gave report to ESPERANZA Morrison at bedside.

## 2020-08-20 NOTE — PROGRESS NOTES
[facility]  Pulmonary, Critical Care, and Sleep Medicine    Name: Betsy Kaye MRN: [id number]   : 1960 Hospital: [place] [address]   Date: 2020        Critical Care    IMPRESSION:   · Acute hypoxic/hypercapnic respiratory failure  · Acute pulmonary edema  · Acute anuric renal failure requiring dialysis  · Pneumonia - COVID negative  · Metabolic encephalopathy   · Anemia  · Hyperuricemia   · Metastatic thyroid cancer with lung mets      RECOMMENDATIONS:   · O2 with PRN NIV  · Check ABG  · Bronchodilators  · RRT per nephrology, probably needs more volume off  · GI evaluation ongoing  · Empiric antibiotics   · Oncology following  · DVT prophylaxis      Subjective/History:     20: started on BiPAP yesterday with improving alertness. Converted to ventimask this morning. He denies complaint currently.      Past Medical History:   Diagnosis Date    Adverse effect of anesthesia     \" STOP BREATHING 1 TIME C ANESTH\"    Calculus of kidney     Cancer ([address]     thyroid cancer    Chronic kidney disease     Chronic pain     BACK SHOULDER AND ARM    Depression     Diabetes ([address]     Encounter for long-term (current) use of NSAIDs     Hypercholesterolemia     Hypertension     Nausea & vomiting     Other ill-defined conditions(799.89)     cholesterol, thyroid    Sleep apnea     doesn't wear cpap    Thyroid cancer ([address]     TIA (transient ischemic attack)     Vitamin D deficiency       Past Surgical History:   Procedure Laterality Date    CARDIAC SURG PROCEDURE UNLIST      HX HEENT      THROAT SURGERY X 4    HX ORTHOPAEDIC      back     HX ORTHOPAEDIC      ARM AND SHOULDER    HX OTHER SURGICAL      thyroid, lymphnode    HX RETINAL DETACHMENT REPAIR      left eye    IR INSERT NON TUNL CVC OVER 5 YRS  2020    UPPER GI ENDOSCOPY,BALL DIL,30MM  2020         UPPER GI ENDOSCOPY,BIOPSY  2020         VASCULAR SURGERY PROCEDURE UNLIST cardiac cath NEG. Prior to Admission medications    Medication Sig Start Date End Date Taking? Authorizing Provider   Blood-Glucose Meter monitoring kit 1 preferred brand glucometer for checking home glucose, E11.22 7/30/20  Yes Garett Alvarado MD   glucose blood VI test strips (blood glucose test) strip Pharmacist to choose preferred meter and strips. Dx:E11.65, Z79.4 Monitor 3 times daily 7/30/20  Yes Garett Alvarado MD   insulin NPH (HumuLIN N NPH U-100 Insulin) 100 unit/mL injection 2-10 units daily 7/30/20  Yes Garett Alvarado MD   Insulin Syringe-Needle U-100 ([employer] Syringes) 0.3 mL 30 gauge x 5/16\" syrg 1 Each by Does Not Apply route daily. 7/30/20  Yes Garett Alvarado MD   amLODIPine (NORVASC) 5 mg tablet Take 1 Tab by mouth two (2) times a day. 7/22/20  Yes Dl Burgess MD   carvediloL (COREG) 12.5 mg tablet Take 1 Tab by mouth two (2) times daily (with meals). Indications: high blood pressure 7/22/20  Yes Dl Burgess MD   Omeprazole delayed release (PRILOSEC D/R) 20 mg tablet Take 1 Tab by mouth daily. 7/22/20  Yes Dl Burgess MD   testosterone 30 mg/actuation (1.5 mL) slpm Apply 1-2 pumps daily as directed on your visit 2/3/20  Yes Rody Mccray MD   levothyroxine (SYNTHROID) 200 mcg tablet TAKE 1 TABLET BY MOUTH ONCE DAILY BEFORE BREAKFAST 11/4/19  Yes Rody Mccray MD   glucose blood VI test strips (PHARMACIST CHOICE) strip One Touch  Check glucose 3-4 times daily. DX E11.22 2/2/18  Yes Rody Mccray MD   Lancets misc Use preferred brand; Check glucose 3-4 times daily, Diagnosis E11.22 2/2/18  Yes Rody Mccray MD   simvastatin (ZOCOR) 20 mg tablet Take 1 Tab by mouth nightly.  7/28/20   Emilie Prado MD   Ventolin HFA 90 mcg/actuation inhaler TAKE 1-2 PUFFS EVEERY 4-6 HOURS AS NEEDED FOR DYSPNEA AND WHEEZING 7/28/20   Preston Tracy MD     Current Facility-Administered Medications   Medication Dose Route Frequency    pantoprazole (PROTONIX) 40 mg in 0.9% sodium chloride 10 mL injection  40 mg IntraVENous DAILY    alcohol 62% (NOZIN) nasal  1 Ampule  1 Ampule Topical Q12H    polyethylene glycol (MIRALAX) packet 17 g  17 g Oral BID    sevelamer carbonate (RENVELA) tab 800 mg  800 mg Oral TID WITH MEALS    levothyroxine (SYNTHROID) tablet 200 mcg  200 mcg Oral 6am    atorvastatin (LIPITOR) tablet 10 mg  10 mg Oral QHS    sodium chloride (NS) flush 5-40 mL  5-40 mL IntraVENous Q8H    sucralfate (CARAFATE) tablet 1 g  1 g Oral AC&HS    cefTRIAXone (ROCEPHIN) 1 g in 0.9% sodium chloride (MBP/ADV) 50 mL  1 g IntraVENous Q24H    insulin lispro (HUMALOG) injection   SubCUTAneous AC&HS     Allergies   Allergen Reactions    Anesthetics - Amide Type Shortness of Breath    Flexeril [Cyclobenzaprine] Hives    Tramadol Hives      Social History     Tobacco Use    Smoking status: Former Smoker     Last attempt to quit: 1994     Years since quittin.0    Smokeless tobacco: Never Used   Substance Use Topics    Alcohol use: Yes     Comment: Occasionally      Family History   Problem Relation Age of Onset    Diabetes Mother     Elevated Lipids Mother    Sarath James Bladder Disease Mother     Headache Mother    Linnea Ely Migraines Mother     Heart Disease Mother     Hypertension Mother     Stroke Mother     Other Mother         ANEURSYM BRAIN    Bleeding Prob Father     Cancer Father         LEUKEMIA    Diabetes Father     Elevated Lipids Father     Mental Retardation Sister     Psychiatric Disorder Sister     Cancer Brother         LUNGS        Objective:   Vital Signs:    Visit Vitals  /59 (BP 1 Location: Right arm, BP Patient Position: At rest)   Pulse 69   Temp 99.3 °F (37.4 °C)   Resp 18   Wt 113.7 kg (250 lb 10.6 oz)   SpO2 96%   BMI 37.02 kg/m²       O2 Device: Ventimask   O2 Flow Rate (L/min): 6 l/min   Temp (24hrs), Av.7 °F (37.1 °C), Min:97.6 °F (36.4 °C), Max:99.5 °F (37.5 °C)       Intake/Output:   Last shift:      No intake/output data recorded. Last 3 shifts: 08/18 1901 - 08/20 0700  In: 50 [I.V.:50]  Out: 1600     Intake/Output Summary (Last 24 hours) at 8/20/2020 0835  Last data filed at 8/19/2020 1938  Gross per 24 hour   Intake --   Output 1500 ml   Net -1500 ml     Hemodynamics:   PAP:   CO:     Wedge:   CI:     CVP:    SVR:       PVR:       Ventilator Settings:  Mode Rate Tidal Volume Pressure FiO2 PEEP            50 %       Peak airway pressure:      Minute ventilation: 5.1 l/min      Physical Exam:    General:  Alert, obese, ill appearing   Head:  Normocephalic, without obvious abnormality, atraumatic. Eyes:  Conjunctivae/corneas clear. Nose: Nares normal. Septum midline. Mucosa normal.     Throat: Lips, mucosa, and tongue normal. Teeth and gums normal.   Neck: Supple, symmetrical, trachea midline    Lungs:   Bilateral rales   Chest wall:  No tenderness or deformity. Heart:  Regular rate and rhythm    Abdomen:   Soft, non-tender. Bowel sounds normal. No masses,  No organomegaly.    Extremities: Extremities normal, atraumatic, no cyanosis or clubbing   Skin: Skin color, texture, turgor normal. No rashes or lesions   Neurologic: Grossly nonfocal     Data:     Current Facility-Administered Medications   Medication Dose Route Frequency    pantoprazole (PROTONIX) 40 mg in 0.9% sodium chloride 10 mL injection  40 mg IntraVENous DAILY    alcohol 62% (NOZIN) nasal  1 Ampule  1 Ampule Topical Q12H    polyethylene glycol (MIRALAX) packet 17 g  17 g Oral BID    sevelamer carbonate (RENVELA) tab 800 mg  800 mg Oral TID WITH MEALS    levothyroxine (SYNTHROID) tablet 200 mcg  200 mcg Oral 6am    atorvastatin (LIPITOR) tablet 10 mg  10 mg Oral QHS    sodium chloride (NS) flush 5-40 mL  5-40 mL IntraVENous Q8H    sucralfate (CARAFATE) tablet 1 g  1 g Oral AC&HS    cefTRIAXone (ROCEPHIN) 1 g in 0.9% sodium chloride (MBP/ADV) 50 mL  1 g IntraVENous Q24H    insulin lispro (HUMALOG) injection   SubCUTAneous AC&HS Labs:  Recent Labs     08/20/20  0446 08/19/20  0355 08/18/20  1117   WBC 11.2* 12.8* 8.4   HGB 7.5* 7.2* 7.3*   HCT 23.6* 22.5* 23.8*    284 325     Recent Labs     08/20/20  0446 08/19/20  0355 08/18/20  1117    136 134*   K 4.3 5.3* 6.0*    106 104   CO2 26 23 26   * 83 195*   BUN 50* 58* 82*   CREA 4.36* 5.36* 6.49*   CA 8.2* 7.9* 7.6*   PHOS 5.1* 7.0*  --    ALB 3.0* 2.9*  --      Recent Labs     08/19/20  1916 08/19/20  1610 08/18/20  1215   PHI 7.32* 7.27* 7.21*   PCO2I 47.8* 55.9* 53.2*   PO2I 94 87 57*   HCO3I 24.5 25.6 21.2*   FIO2I 50 50  --      Imaging:  I have personally reviewed the patients radiographs and have reviewed the reports:  Increased edema vs infiltrate        Total critical care time exclusive of procedures: 25 minutes  Omar Lovell MD

## 2020-08-20 NOTE — PROGRESS NOTES
TRANSFER - IN REPORT:    Verbal report received from Corrinne Cheng RN  on Laquita Alexandre  being received from [id number]    Report consisted of patients Situation, Background, Assessment and   Recommendations(SBAR). Information from the following report(s) [employer]   was reviewed with the receiving nurse. Opportunity for questions and clarification was provided. Assessment completed upon patients arrival to unit and care assumed.

## 2020-08-20 NOTE — PROGRESS NOTES
Care Management:    TO Plan: TBD  Second IM letter. Bundle Letter. ? [facility]  ? New HD. Started on HD while here in hospital ad we will follow and watch for kidney recovery. Anticipate family will transport home, he has seven children. Follow up appointments. Patient is in the ICU on venti mask and started on HD yesterday. Hx of CHF , thyroid cancer with lung mets. He has 02 at home through [employer]. He lives alone in a one story home and has seven children who are supportive. Independent PTA.      Luis E Shannon RN

## 2020-08-20 NOTE — PROGRESS NOTES
6410 - report received from Mariel/Steff RN. 1815 - patient able to take small sips but UNABLE to swallow / chew pills at this time. 1905 - report given to [facility].

## 2020-08-20 NOTE — PROGRESS NOTES
Hospitalist Progress Note    NAME: Vicki Moyer   :  1960   MRN:  [id number]       Assessment / Plan:    Acute hypoxic respiratory failure, POA  Acute on chronic diastolic heart failure, POA  Community-acquired pneumonia, likely bacterial, POA  - COVID-19 test negative on admission  - Was started on antibiotics, will continue. Last day   - on ventimask L o2 Sat 96%  - cont ICU care  - blood cultures pending  - Blood pressure improved now    Acute encephalopathy, unknown etiology yet  - ABG revealed respiratory acidosis, hypoxemia  -Avoid all sedating medications  -resolved  -Worsening kidney function . Acute kidney disease, worsening, POA  Hyperkalemia with junctional rhythm on EKG  Hypophosphatemia  -CKD stage IV  - Kidney functions cont to improve today  - s/p hemodialysis  and one more session .  - Status post calcium gluconate, regular insulin, D50   - Renal ultrasound with renal cortical thinning, no overt hydronephrosis  -uric acid level are elevated. heme-onc's input is appreciated, on rasburicase treatment    Acute on chronic anemia,.   History of duodenitis, POA  -EGD 2020 revealed Padmini-Metzger tear, duodenitis, duodenal polyp, gastric polyps, and dilation of the esophagus  -Hemoglobin on the lower side but has been stable although it is trending down  -transfuse if Hbg 7 or less    Bradycardia  -Heart rate around 40s to 50s on   -Likely secondary to hyperkalemia  - Treatment as above  -EKG with no AV block  -resolved    Diabetes mellitus type 2  -Hemoglobin A1c 6.3% in 2020    History of thyroid cancer with mets to lung  -Follows Dr. Sotero Dias  -Hypothyroidsim-Continue levothyroxine     Essential HTN POA-hold home medications given hypotension at this time    Code Status: full  Surrogate Decision Maker:  Valeria Coto  Daughter  [phone]          DVT Prophylaxis: SCDs  GI Prophylaxis: not indicated     Baseline: independent Subjective:     Chief Complaint / Reason for Physician Visit  Patient is lying in bed,     Awake and following all commands. I talked with the patient's daughter and she was updated on the plan, all questions were answered. \"feeling better\"    Review of Systems:  Symptom Y/N Comments  Symptom Y/N Comments   Fever/Chills n   Chest Pain n    Poor Appetite    Edema     Cough n   Abdominal Pain n    Sputum    Joint Pain     SOB/NUÑEZ n   Pruritis/Rash     Nausea/vomit n   Tolerating PT/OT     Diarrhea    Tolerating Diet     Constipation    Other       Could NOT obtain due to:          Objective:     VITALS:   Last 24hrs VS reviewed since prior progress note.  Most recent are:  Patient Vitals for the past 24 hrs:   Temp Pulse Resp BP SpO2   08/20/20 1400 -- 70 17 169/59 93 %   08/20/20 1300 -- 67 14 154/46 92 %   08/20/20 1200 98.6 °F (37 °C) 71 21 174/65 92 %   08/20/20 1100 -- 70 17 170/59 95 %   08/20/20 1000 -- 75 27 172/65 98 %   08/20/20 0900 -- 73 20 179/63 92 %   08/20/20 0800 99.3 °F (37.4 °C) 69 18 159/59 96 %   08/20/20 0700 -- 73 19 166/65 --   08/20/20 0500 -- 74 24 174/63 95 %   08/20/20 0400 99.5 °F (37.5 °C) 74 21 173/59 97 %   08/20/20 0330 -- 77 27 175/59 94 %   08/20/20 0230 -- 76 14 147/52 (!) 82 %   08/20/20 0130 -- 76 18 (!) 146/36 93 %   08/20/20 0030 -- 71 18 172/68 95 %   08/20/20 0000 98.7 °F (37.1 °C) 75 20 162/52 94 %   08/19/20 2330 -- 70 15 143/48 96 %   08/19/20 2300 -- 73 20 161/57 96 %   08/19/20 2200 -- 70 15 147/51 96 %   08/19/20 2130 -- 69 15 137/51 90 %   08/19/20 2030 -- 73 18 150/54 95 %   08/19/20 1942 99.2 °F (37.3 °C) 73 16 138/47 99 %   08/19/20 1938 99.2 °F (37.3 °C) 73 15 -- 97 %   08/19/20 1930 -- 74 18 153/53 97 %   08/19/20 1900 -- 71 16 142/52 96 %   08/19/20 1800 -- 71 15 146/55 96 %   08/19/20 1700 -- 73 16 177/59 94 %   08/19/20 1656 -- -- -- -- 94 %   08/19/20 1600 98.6 °F (37 °C) 68 16 149/54 95 %   08/19/20 1500 -- 68 17 157/56 94 %       Intake/Output Summary (Last 24 hours) at 8/20/2020 1452  Last data filed at 8/20/2020 1048  Gross per 24 hour   Intake 50 ml   Output 250 ml   Net -200 ml        PHYSICAL EXAM:  General: awake, following commands, no acute distress    EENT:  EOMI. Anicteric sclerae. MMM  Resp:  CTA bilaterally, no wheezing or rales. No accessory muscle use  CV:  Regular  rhythm,  No edema  GI:  Soft, Non distended, Non tender.  +Bowel sounds  Neurologic:  Move all extremities, following commands, normal speech, no focal deficit   Psych:   Good insight. Not anxious nor agitated  Skin:  No rashes. No jaundice    Reviewed most current lab test results and cultures  YES  Reviewed most current radiology test results   YES  Review and summation of old records today    NO  Reviewed patient's current orders and MAR    YES  PMH/SH reviewed - no change compared to H&P  ________________________________________________________________________  Care Plan discussed with:    Comments   Patient x    Family  x    RN x    Care Manager     Consultant                        Multidiciplinary team rounds were held today with , nursing, pharmacist and clinical coordinator. Patient's plan of care was discussed; medications were reviewed and discharge planning was addressed. ______________________________________________________________________      Comments   >50% of visit spent in counseling and coordination of care x    ________________________________________________________________________  Markie Contreras MD     Procedures: see electronic medical records for all procedures/Xrays and details which were not copied into this note but were reviewed prior to creation of Plan. LABS:  I reviewed today's most current labs and imaging studies.   Pertinent labs include:  Recent Labs     08/20/20  0446 08/19/20  0355 08/18/20  1117   WBC 11.2* 12.8* 8.4   HGB 7.5* 7.2* 7.3*   HCT 23.6* 22.5* 23.8*    284 325     Recent Labs     08/20/20  0446 08/19/20  0355 08/18/20  1117    136 134*   K 4.3 5.3* 6.0*    106 104   CO2 26 23 26   * 83 195*   BUN 50* 58* 82*   CREA 4.36* 5.36* 6.49*   CA 8.2* 7.9* 7.6*   PHOS 5.1* 7.0*  --    ALB 3.0* 2.9*  --        Signed: Seamus Rodrigez MD

## 2020-08-20 NOTE — PROGRESS NOTES
[facility]   [id number] [facility], [facility] [address], [facility]  Phone: [phone]   [phone]       Nephrology Progress Note  Vassie Gilford     1960     [id number]  Date of Admission : 8/15/2020  08/20/20    CC: Follow up for NERY      Assessment and Plan   Anuric severe NERY on CKD   -unknown etiology at this time( likely ATN from sepsis from resp source vs hemodynamics)  -  HD started on 8/18 due to worsening azotemia, AMS and hyperkalemia  - 2nd HD yesterday, some improvement in AMS  - 3rd HD today  - will follow closely  - strict I/O's  - renal diet once able to eat  - daily weights    Hyperphosphatemia   - changed Sevelamer to Lanthanum as pt is NPO  - follow daily    CKD IV w/ baseline Cr around 3 on 7/22  - likely due to longstanding HTN     Hypoxic resp failure/atypical pneumonia in CT chest  In the setting of lung mets  - on Abx and O2  - CT chest: There are bilateral pleural effusions. There are multifocal areas of airspace disease which may represent atypical infection.  Pulmonary nodules are obscured by the multifocal airspace disease except right lower lobe nodule measuring 9 mm which is stable.     HTN:  - continue to  hold antihypertensives  - no ACE or ARBs       Anemia  - 2/2 CKD, malignancy?  - Hb: 7.5<-7.2<-7.3<-7.9  - if ok with Heme/Onc, recommend to start EPO  - transfuse PRBCs PRN  to keep Hb>7    DM2:       Hx of thyroid cancer w/ lung mets  ?treatment     Interval History:   Worsening clinical picture, didn't tolerate fluid removal yesterday due to hypotension    Review of Systems:   unable to do due to medical condition  ( AMS)    Current Medications:   Current Facility-Administered Medications   Medication Dose Route Frequency    uric acid reminder note  1 Each Other Q12H    pantoprazole (PROTONIX) 40 mg in 0.9% sodium chloride 10 mL injection  40 mg IntraVENous DAILY    heparin (porcine) 1,000 unit/mL injection 2,500 Units  2,500 Units Hemodialysis DIALYSIS PRN    albumin human 25% (BUMINATE) solution 12.5 g  12.5 g IntraVENous PRN    alcohol 62% (NOZIN) nasal  1 Ampule  1 Ampule Topical Q12H    morphine injection 4 mg  4 mg IntraVENous Q4H PRN    HYDROcodone-acetaminophen (NORCO) 7.5-325 mg per tablet 1 Tab  1 Tab Oral Q4H PRN    polyethylene glycol (MIRALAX) packet 17 g  17 g Oral BID    sevelamer carbonate (RENVELA) tab 800 mg  800 mg Oral TID WITH MEALS    melatonin tablet 3 mg  3 mg Oral QHS PRN    LORazepam (ATIVAN) tablet 1 mg  1 mg Oral Q6H PRN    levothyroxine (SYNTHROID) tablet 200 mcg  200 mcg Oral 6am    atorvastatin (LIPITOR) tablet 10 mg  10 mg Oral QHS    sodium chloride (NS) flush 5-40 mL  5-40 mL IntraVENous Q8H    sodium chloride (NS) flush 5-40 mL  5-40 mL IntraVENous PRN    acetaminophen (TYLENOL) tablet 650 mg  650 mg Oral Q6H PRN    Or    acetaminophen (TYLENOL) suppository 650 mg  650 mg Rectal Q6H PRN    promethazine (PHENERGAN) tablet 12.5 mg  12.5 mg Oral Q6H PRN    Or    ondansetron (ZOFRAN) injection 4 mg  4 mg IntraVENous Q6H PRN    sucralfate (CARAFATE) tablet 1 g  1 g Oral AC&HS    cefTRIAXone (ROCEPHIN) 1 g in 0.9% sodium chloride (MBP/ADV) 50 mL  1 g IntraVENous Q24H    insulin lispro (HUMALOG) injection   SubCUTAneous AC&HS    glucose chewable tablet 16 g  4 Tab Oral PRN    dextrose (D50W) injection syrg 12.5-25 g  12.5-25 g IntraVENous PRN    glucagon (GLUCAGEN) injection 1 mg  1 mg IntraMUSCular PRN    albuterol (PROVENTIL HFA, VENTOLIN HFA, PROAIR HFA) inhaler 2 Puff  2 Puff Inhalation Q4H PRN      Allergies   Allergen Reactions    Anesthetics - Amide Type Shortness of Breath    Flexeril [Cyclobenzaprine] Hives    Tramadol Hives       Objective:  Vitals:    Vitals:    08/20/20 1600 08/20/20 1615 08/20/20 1630 08/20/20 1645   BP: 175/57 (!) 167/144 182/60 180/59   Pulse: 72 75 75 72   Resp: 17 19 21 19   Temp: 98.9 °F (37.2 °C)      TempSrc:       SpO2: 96% 96% 97% 96%   Weight: Intake and Output:  08/20 0701 - 08/20 1900  In: 50 [I.V.:50]  Out: 250 [Urine:250]  08/18 1901 - 08/20 0700  In: 50 [I.V.:50]  Out: 1600     Physical Examination:  General:more awake but still on restraints on bed  HEENT: AT/NC  Neck:Supple,no JVD  Lungs: on Venturi mask  CVS:RRR, S1 S2 normal  Abdomen: obese, Soft, no tenderness   Extremities:moves all,no LE edema  Skin:No rash or lesions seen     [x]    High complexity decision making was performed  [x]    Patient is at high-risk of decompensation with multiple organ involvement    Lab Data Personally Reviewed: I have reviewed all the pertinent labs, microbiology data and radiology studies during assessment.     Recent Labs     08/20/20 0446 08/19/20 0355 08/18/20  1117    136 134*   K 4.3 5.3* 6.0*    106 104   CO2 26 23 26   * 83 195*   BUN 50* 58* 82*   CREA 4.36* 5.36* 6.49*   CA 8.2* 7.9* 7.6*   PHOS 5.1* 7.0*  --    ALB 3.0* 2.9*  --      Recent Labs     08/20/20 0446 08/19/20 0355 08/18/20  1117   WBC 11.2* 12.8* 8.4   HGB 7.5* 7.2* 7.3*   HCT 23.6* 22.5* 23.8*    284 325     Lab Results   Component Value Date/Time    Specimen Description: BLOOD 11/09/2010 09:40 AM     Recent Results (from the past 24 hour(s))   CULTURE, BLOOD, PAIRED    Collection Time: 08/19/20  5:03 PM    Specimen: Blood   Result Value Ref Range    Special Requests: NO SPECIAL REQUESTS      Culture result: NO GROWTH AFTER 14 HOURS     LACTIC ACID    Collection Time: 08/19/20  5:03 PM   Result Value Ref Range    Lactic acid 0.8 0.4 - 2.0 MMOL/L   POC EG7    Collection Time: 08/19/20  7:16 PM   Result Value Ref Range    Calcium, ionized (POC) 1.30 1.12 - 1.32 mmol/L    FIO2 (POC) 50 %    pH (POC) 7.32 (L) 7.35 - 7.45      pCO2 (POC) 47.8 (H) 35.0 - 45.0 MMHG    pO2 (POC) 94 80 - 100 MMHG    HCO3 (POC) 24.5 22 - 26 MMOL/L    Base deficit (POC) 2 mmol/L    sO2 (POC) 96 92 - 97 %    Site LEFT RADIAL      Device: BIPAP      PEEP/CPAP (POC) 6 cmH2O    PIP (POC) 12      Allens test (POC) YES      Specimen type (POC) ARTERIAL      Total resp.  rate 18     GLUCOSE, POC    Collection Time: 08/19/20  9:24 PM   Result Value Ref Range    Glucose (POC) 82 65 - 100 mg/dL    Performed by Laura Cueto RN    GLUCOSE, POC    Collection Time: 08/20/20  2:53 AM   Result Value Ref Range    Glucose (POC) 65 65 - 100 mg/dL    Performed by Laura Cueto RN    GLUCOSE, POC    Collection Time: 08/20/20  3:19 AM   Result Value Ref Range    Glucose (POC) 120 (H) 65 - 100 mg/dL    Performed by Laura Cueto RN    RENAL FUNCTION PANEL    Collection Time: 08/20/20  4:46 AM   Result Value Ref Range    Sodium 137 136 - 145 mmol/L    Potassium 4.3 3.5 - 5.1 mmol/L    Chloride 103 97 - 108 mmol/L    CO2 26 21 - 32 mmol/L    Anion gap 8 5 - 15 mmol/L    Glucose 176 (H) 65 - 100 mg/dL    BUN 50 (H) 6 - 20 MG/DL    Creatinine 4.36 (H) 0.70 - 1.30 MG/DL    BUN/Creatinine ratio 11 (L) 12 - 20      GFR est AA 17 (L) >60 ml/min/1.73m2    GFR est non-AA 14 (L) >60 ml/min/1.73m2    Calcium 8.2 (L) 8.5 - 10.1 MG/DL    Phosphorus 5.1 (H) 2.6 - 4.7 MG/DL    Albumin 3.0 (L) 3.5 - 5.0 g/dL   CBC W/O DIFF    Collection Time: 08/20/20  4:46 AM   Result Value Ref Range    WBC 11.2 (H) 4.1 - 11.1 K/uL    RBC 2.59 (L) 4.10 - 5.70 M/uL    HGB 7.5 (L) 12.1 - 17.0 g/dL    HCT 23.6 (L) 36.6 - 50.3 %    MCV 91.1 80.0 - 99.0 FL    MCH 29.0 26.0 - 34.0 PG    MCHC 31.8 30.0 - 36.5 g/dL    RDW 14.0 11.5 - 14.5 %    PLATELET 114 993 - 616 K/uL    MPV 10.7 8.9 - 12.9 FL    NRBC 0.2 (H) 0  WBC    ABSOLUTE NRBC 0.02 (H) 0.00 - 0.01 K/uL   URIC ACID    Collection Time: 08/20/20  4:46 AM   Result Value Ref Range    Uric acid <0.2 (L) 3.5 - 7.2 MG/DL   FERRITIN    Collection Time: 08/20/20  4:58 AM   Result Value Ref Range    Ferritin 312 26 - 388 NG/ML   IRON PROFILE    Collection Time: 08/20/20  4:58 AM   Result Value Ref Range    Iron 32 (L) 35 - 150 ug/dL    TIBC 202 (L) 250 - 450 ug/dL    Iron % saturation 16 (L) 20 - 50 % PROCALCITONIN    Collection Time: 08/20/20  6:02 AM   Result Value Ref Range    Procalcitonin 0.15 ng/mL   GLUCOSE, POC    Collection Time: 08/20/20  8:11 AM   Result Value Ref Range    Glucose (POC) 76 65 - 100 mg/dL    Performed by [id number]    Collection Time: 08/20/20  8:51 AM   Result Value Ref Range    Ammonia 26 <32 UMOL/L   POC EG7    Collection Time: 08/20/20  9:34 AM   Result Value Ref Range    Calcium, ionized (POC) 1.24 1.12 - 1.32 mmol/L    FIO2 (POC) 0.50 %    pH (POC) 7.33 (L) 7.35 - 7.45      pCO2 (POC) 47.8 (H) 35.0 - 45.0 MMHG    pO2 (POC) 40 (LL) 80 - 100 MMHG    HCO3 (POC) 25.3 22 - 26 MMOL/L    Base deficit (POC) 1 mmol/L    sO2 (POC) 71 (L) 92 - 97 %    Site LEFT RADIAL      Device: VENTURI MASK      Allens test (POC) YES      Specimen type (POC) BLOOD DARK/?VENOUS      Total resp. rate 15     UREA NITROGEN, UR, RANDOM    Collection Time: 08/20/20 11:00 AM   Result Value Ref Range    Urea Nitrogen,urine random 390 MG/DL   CREATININE, UR, RANDOM    Collection Time: 08/20/20 11:00 AM   Result Value Ref Range    Creatinine, urine 202.00 mg/dL   GLUCOSE, POC    Collection Time: 08/20/20 11:25 AM   Result Value Ref Range    Glucose (POC) 78 65 - 100 mg/dL    Performed by [address] ACID    Collection Time: 08/20/20  3:08 PM   Result Value Ref Range    Uric acid <0.2 (L) 3.5 - 7.2 MG/DL   GLUCOSE, POC    Collection Time: 08/20/20  4:51 PM   Result Value Ref Range    Glucose (POC) 82 65 - 100 mg/dL    Performed by Wilmar Harry I have reviewed the flowsheets. Chart and Pertinent Notes have been reviewed. No change in PMH ,family and social history from Consult note.       Milagros Desir MD

## 2020-08-21 ENCOUNTER — APPOINTMENT (OUTPATIENT)
Dept: GENERAL RADIOLOGY | Age: 60
DRG: 673 | End: 2020-08-21
Attending: INTERNAL MEDICINE
Payer: MEDICARE

## 2020-08-21 LAB
ALBUMIN SERPL-MCNC: 2.7 G/DL (ref 3.5–5)
ALBUMIN/GLOB SERPL: 0.7 {RATIO} (ref 1.1–2.2)
ALP SERPL-CCNC: 115 U/L (ref 45–117)
ALT SERPL-CCNC: 21 U/L (ref 12–78)
ANION GAP SERPL CALC-SCNC: 4 MMOL/L (ref 5–15)
AST SERPL-CCNC: 30 U/L (ref 15–37)
BASOPHILS # BLD: 0.1 K/UL (ref 0–0.1)
BASOPHILS NFR BLD: 1 % (ref 0–1)
BILIRUB SERPL-MCNC: 0.6 MG/DL (ref 0.2–1)
BUN SERPL-MCNC: 32 MG/DL (ref 6–20)
BUN/CREAT SERPL: 12 (ref 12–20)
CALCIUM SERPL-MCNC: 8.4 MG/DL (ref 8.5–10.1)
CHLORIDE SERPL-SCNC: 103 MMOL/L (ref 97–108)
CO2 SERPL-SCNC: 31 MMOL/L (ref 21–32)
CREAT SERPL-MCNC: 2.74 MG/DL (ref 0.7–1.3)
DIFFERENTIAL METHOD BLD: ABNORMAL
EOSINOPHIL # BLD: 0.2 K/UL (ref 0–0.4)
EOSINOPHIL NFR BLD: 2 % (ref 0–7)
ERYTHROCYTE [DISTWIDTH] IN BLOOD BY AUTOMATED COUNT: 13.6 % (ref 11.5–14.5)
GLOBULIN SER CALC-MCNC: 4.1 G/DL (ref 2–4)
GLUCOSE BLD STRIP.AUTO-MCNC: 101 MG/DL (ref 65–100)
GLUCOSE BLD STRIP.AUTO-MCNC: 101 MG/DL (ref 65–100)
GLUCOSE BLD STRIP.AUTO-MCNC: 75 MG/DL (ref 65–100)
GLUCOSE BLD STRIP.AUTO-MCNC: 80 MG/DL (ref 65–100)
GLUCOSE BLD STRIP.AUTO-MCNC: 99 MG/DL (ref 65–100)
GLUCOSE SERPL-MCNC: 70 MG/DL (ref 65–100)
HCT VFR BLD AUTO: 24.5 % (ref 36.6–50.3)
HGB BLD-MCNC: 7.9 G/DL (ref 12.1–17)
IMM GRANULOCYTES # BLD AUTO: 0.1 K/UL (ref 0–0.04)
IMM GRANULOCYTES NFR BLD AUTO: 1 % (ref 0–0.5)
LYMPHOCYTES # BLD: 0.8 K/UL (ref 0.8–3.5)
LYMPHOCYTES NFR BLD: 8 % (ref 12–49)
MAGNESIUM SERPL-MCNC: 2 MG/DL (ref 1.6–2.4)
MCH RBC QN AUTO: 28.7 PG (ref 26–34)
MCHC RBC AUTO-ENTMCNC: 32.2 G/DL (ref 30–36.5)
MCV RBC AUTO: 89.1 FL (ref 80–99)
MONOCYTES # BLD: 1 K/UL (ref 0–1)
MONOCYTES NFR BLD: 10 % (ref 5–13)
NEUTS SEG # BLD: 7.9 K/UL (ref 1.8–8)
NEUTS SEG NFR BLD: 78 % (ref 32–75)
NRBC # BLD: 0 K/UL (ref 0–0.01)
NRBC BLD-RTO: 0 PER 100 WBC
PHOSPHATE SERPL-MCNC: 3.6 MG/DL (ref 2.6–4.7)
PLATELET # BLD AUTO: 267 K/UL (ref 150–400)
PMV BLD AUTO: 9.9 FL (ref 8.9–12.9)
POTASSIUM SERPL-SCNC: 4 MMOL/L (ref 3.5–5.1)
PROT SERPL-MCNC: 6.8 G/DL (ref 6.4–8.2)
RBC # BLD AUTO: 2.75 M/UL (ref 4.1–5.7)
RBC MORPH BLD: ABNORMAL
SERVICE CMNT-IMP: ABNORMAL
SERVICE CMNT-IMP: ABNORMAL
SERVICE CMNT-IMP: NORMAL
SODIUM SERPL-SCNC: 138 MMOL/L (ref 136–145)
URATE SERPL-MCNC: 0.2 MG/DL (ref 3.5–7.2)
URATE SERPL-MCNC: 0.6 MG/DL (ref 3.5–7.2)
WBC # BLD AUTO: 10.1 K/UL (ref 4.1–11.1)

## 2020-08-21 PROCEDURE — 74011000258 HC RX REV CODE- 258: Performed by: HOSPITALIST

## 2020-08-21 PROCEDURE — 74011250636 HC RX REV CODE- 250/636: Performed by: INTERNAL MEDICINE

## 2020-08-21 PROCEDURE — 92610 EVALUATE SWALLOWING FUNCTION: CPT

## 2020-08-21 PROCEDURE — 74011250636 HC RX REV CODE- 250/636: Performed by: HOSPITALIST

## 2020-08-21 PROCEDURE — 80053 COMPREHEN METABOLIC PANEL: CPT

## 2020-08-21 PROCEDURE — 83735 ASSAY OF MAGNESIUM: CPT

## 2020-08-21 PROCEDURE — 71045 X-RAY EXAM CHEST 1 VIEW: CPT

## 2020-08-21 PROCEDURE — 84100 ASSAY OF PHOSPHORUS: CPT

## 2020-08-21 PROCEDURE — 74011250637 HC RX REV CODE- 250/637: Performed by: PHYSICIAN ASSISTANT

## 2020-08-21 PROCEDURE — 74011250637 HC RX REV CODE- 250/637: Performed by: INTERNAL MEDICINE

## 2020-08-21 PROCEDURE — 82962 GLUCOSE BLOOD TEST: CPT

## 2020-08-21 PROCEDURE — 36415 COLL VENOUS BLD VENIPUNCTURE: CPT

## 2020-08-21 PROCEDURE — 65610000006 HC RM INTENSIVE CARE

## 2020-08-21 PROCEDURE — 74011000250 HC RX REV CODE- 250: Performed by: INTERNAL MEDICINE

## 2020-08-21 PROCEDURE — 74011250637 HC RX REV CODE- 250/637: Performed by: HOSPITALIST

## 2020-08-21 PROCEDURE — C9113 INJ PANTOPRAZOLE SODIUM, VIA: HCPCS | Performed by: INTERNAL MEDICINE

## 2020-08-21 PROCEDURE — 77010033678 HC OXYGEN DAILY

## 2020-08-21 PROCEDURE — 85025 COMPLETE CBC W/AUTO DIFF WBC: CPT

## 2020-08-21 PROCEDURE — 84550 ASSAY OF BLOOD/URIC ACID: CPT

## 2020-08-21 RX ADMIN — SODIUM CHLORIDE 10 ML: 9 INJECTION, SOLUTION INTRAMUSCULAR; INTRAVENOUS; SUBCUTANEOUS at 05:43

## 2020-08-21 RX ADMIN — LORAZEPAM 1 MG: 1 TABLET ORAL at 08:39

## 2020-08-21 RX ADMIN — SUCRALFATE 1 G: 1 TABLET ORAL at 22:47

## 2020-08-21 RX ADMIN — CEFTRIAXONE SODIUM 1 G: 1 INJECTION, POWDER, FOR SOLUTION INTRAMUSCULAR; INTRAVENOUS at 08:40

## 2020-08-21 RX ADMIN — SODIUM CHLORIDE 10 ML: 9 INJECTION, SOLUTION INTRAMUSCULAR; INTRAVENOUS; SUBCUTANEOUS at 22:48

## 2020-08-21 RX ADMIN — MORPHINE SULFATE 4 MG: 2 INJECTION, SOLUTION INTRAMUSCULAR; INTRAVENOUS at 06:17

## 2020-08-21 RX ADMIN — HYDROCODONE BITARTRATE AND ACETAMINOPHEN 1 TABLET: 7.5; 325 TABLET ORAL at 08:40

## 2020-08-21 RX ADMIN — ATORVASTATIN CALCIUM 10 MG: 20 TABLET, FILM COATED ORAL at 22:47

## 2020-08-21 RX ADMIN — SUCRALFATE 1 G: 1 TABLET ORAL at 08:39

## 2020-08-21 RX ADMIN — POLYETHYLENE GLYCOL 3350 17 G: 17 POWDER, FOR SOLUTION ORAL at 08:40

## 2020-08-21 RX ADMIN — Medication 1 AMPULE: at 08:41

## 2020-08-21 RX ADMIN — SODIUM CHLORIDE 40 MG: 9 INJECTION, SOLUTION INTRAMUSCULAR; INTRAVENOUS; SUBCUTANEOUS at 08:39

## 2020-08-21 RX ADMIN — Medication 1 AMPULE: at 22:47

## 2020-08-21 RX ADMIN — SUCRALFATE 1 G: 1 TABLET ORAL at 16:12

## 2020-08-21 RX ADMIN — SODIUM CHLORIDE 10 ML: 9 INJECTION, SOLUTION INTRAMUSCULAR; INTRAVENOUS; SUBCUTANEOUS at 14:27

## 2020-08-21 RX ADMIN — POLYETHYLENE GLYCOL 3350 17 G: 17 POWDER, FOR SOLUTION ORAL at 18:41

## 2020-08-21 NOTE — PROGRESS NOTES
Comprehensive Nutrition Assessment    Type and Reason for Visit: Initial, RD nutrition re-screen/LOS    Nutrition Recommendations/Plan:   Continue diet per SLP  RD to add Magic Cup, Ensure Clear and Glucerna (must be thickened)    Nutrition Assessment:   Pt admitted with NERY. PMH: CKD, HTN, DM, thyroid CA + mets. Chart reviewed, case discussed during CCU rounds. Pt sleeping soundly at time of visit. Daughter at the bedside reports appetite poor PTA 2' lethargy. Although MST is negative for poor appetite or wt loss PTA? He has been NPO the past few days 2' mental status and poor respiratory function. SLP evaluated today and advanced to dysphagia diet with thickened liquids. He is for HD tomorrow. Ka dn phos WNL today but have been high a few days ago. Will add PO supplements and monitor lytes. I'm not optimistic he will eat much unless he perks up a lot more so am not too concerned about lytes increasing significantly. Malnutrition Assessment:  Malnutrition Status:     UTD    Estimated Daily Nutrient Needs:  Energy (kcal):  2100 (MSJ 1947 x 1.2 -250 for obesity)  Protein (g):   (0.8-1gPro/kg)       Fluid (ml/day):  1800mL    Nutrition Related Findings:  Meds: humalog, protonix, miralax, carafate. BM 8/20      Wounds:            Current Nutrition Therapies:   DIET PUREED 3 Honey/3 Moderately Thick  DIET NUTRITIONAL SUPPLEMENTS Lunch; Magic Cups  DIET NUTRITIONAL SUPPLEMENTS Dinner; Glucerna Shake  DIET NUTRITIONAL SUPPLEMENTS Breakfast; Ensure Clear    Anthropometric Measures:  · Height:  5' 9\" (175.3 cm)  · Current Body Wt:  113.7 kg (250 lb 10.6 oz)   · Ideal Body Wt:  160 lbs:  156.7 %   · BMI Category:  Obese class 2 (BMI 35.0-39. 9)       Nutrition Diagnosis:   · Inadequate protein-energy intake related to swallowing difficulty, cognitive or neurological impairment as evidenced by intake 0-25%      Nutrition Interventions:   Food and/or Nutrient Delivery: Continue current diet, Start oral nutrition supplement  Nutrition Education and Counseling: No recommendations at this time  Coordination of Nutrition Care: Interdisciplinary rounds    Goals:  Pt will consume >50% of meals/supplements in 2-4 days with brennan ROBLERO       Nutrition Monitoring and Evaluation:   Food/Nutrient Intake Outcomes: Diet advancement/tolerance, Supplement intake  Physical Signs/Symptoms Outcomes: Biochemical data, Fluid status or edema, Weight, GI status    Discharge Planning:     Too soon to determine     Electronically signed by Dilan King RD, [address]  on 8/21/2020 at 3:35 PM    Contact: [phone]

## 2020-08-21 NOTE — PROGRESS NOTES
Hospitalist Progress Note    NAME: Duke Huang   :  1960   MRN:  [id number]       Assessment / Plan:    Acute hypoxic respiratory failure, POA  Acute on chronic diastolic heart failure, POA  Community-acquired pneumonia, likely bacterial, POA  - COVID-19 test negative on admission  - completed antibiotics,  Last day   - on NC todayL o2 Sat 94%  - cont ICU care  - blood cultures pending  -ECHO this admission   · LV: Estimated LVEF is 65 - 70%. Visually measured ejection fraction. Normal cavity size and systolic function (ejection fraction normal). Moderate concentric hypertrophy. Wall motion: normal. Age-appropriate left ventricular diastolic function. · PA: Pulmonary arterial systolic pressure is 35 mmHg. · MV: Trace mitral valve regurgitation is present. · LA: Mildly dilated left atrium.  - Blood pressure improved now    Acute encephalopathy, unknown etiology yet  - ABG revealed respiratory acidosis, hypoxemia  -Avoid all sedating medications  -resolved, cont to be alert and awake  -2/2  Worsening kidney function . Acute kidney disease, worsening, POA  Hyperkalemia with junctional rhythm on EKG  Hypophosphatemia  -CKD stage IV  - Kidney functions cont to improve today  - s/p hemodialysis started   - Status post calcium gluconate, regular insulin, D50   - Renal ultrasound with renal cortical thinning, no overt hydronephrosis  -uric acid level are elevated. heme-onc's input is appreciated, on rasburicase treatment    Acute on chronic anemia,.   History of duodenitis, POA  -EGD 2020 revealed Padmini-Metzger tear, duodenitis, duodenal polyp, gastric polyps, and dilation of the esophagus  -Hemoglobin on the lower side but has been stable,   -transfuse if Hbg 7 or less    Bradycardia  -Heart rate around 40s to 50s on   -Likely secondary to hyperkalemia  - Treatment as above  -EKG with no AV block  -resolved    Diabetes mellitus type 2  -Hemoglobin A1c 6.3% in July 2020    History of thyroid cancer with mets to lung  -Follows Dr. Kalpesh Devries  -Hypothyroidsim-Continue levothyroxine     Essential HTN POA-hold home medications given hypotension at this time    Code Status: full  Surrogate Decision Maker:  Valeria Coto  Daughter  [phone]          DVT Prophylaxis: SCDs  GI Prophylaxis: not indicated     Baseline: independent       Subjective:     Chief Complaint / Reason for Physician Visit  Patient is lying in bed,     Awake and following all commands. I talked with the patient's daughter, at bedside, and she was updated on the plan, all questions were answered. \"doing well but tired\"    Review of Systems:  Symptom Y/N Comments  Symptom Y/N Comments   Fever/Chills n   Chest Pain n    Poor Appetite    Edema     Cough n   Abdominal Pain n    Sputum    Joint Pain     SOB/NUÑEZ n   Pruritis/Rash     Nausea/vomit n   Tolerating PT/OT     Diarrhea    Tolerating Diet     Constipation    Other       Could NOT obtain due to:          Objective:     VITALS:   Last 24hrs VS reviewed since prior progress note.  Most recent are:  Patient Vitals for the past 24 hrs:   Temp Pulse Resp BP SpO2   08/21/20 1300 -- 64 14 152/50 90 %   08/21/20 1200 98 °F (36.7 °C) 60 14 137/49 93 %   08/21/20 1130 -- 64 15 146/61 --   08/21/20 1100 -- 62 14 144/49 98 %   08/21/20 1000 -- 66 14 -- 96 %   08/21/20 0900 -- 73 19 -- 97 %   08/21/20 0830 -- 70 14 138/53 96 %   08/21/20 0800 98.2 °F (36.8 °C) 68 14 158/77 100 %   08/21/20 0730 -- 67 14 150/76 94 %   08/21/20 0700 -- 68 13 151/72 98 %   08/21/20 0600 -- 66 20 169/69 --   08/21/20 0500 -- 68 16 -- --   08/21/20 0400 -- 69 17 145/53 99 %   08/21/20 0330 -- 74 21 138/46 97 %   08/21/20 0326 -- 74 22 168/73 100 %   08/21/20 0300 -- 74 17 -- 100 %   08/21/20 0200 -- 69 14 167/67 100 %   08/21/20 0100 -- 74 17 181/55 100 %   08/21/20 0000 98 °F (36.7 °C) 74 17 175/56 100 %   08/20/20 2300 -- 73 17 184/63 98 %   08/20/20 2230 -- 75 22 176/63 94 %   08/20/20 2200 -- 75 24 174/77 100 %   08/20/20 2139 -- 71 16 178/55 100 %   08/20/20 2100 -- 73 15 -- 100 %   08/20/20 2016 -- -- -- -- 96 %   08/20/20 2015 -- 76 17 185/65 --   08/20/20 2000 -- 73 18 174/54 96 %   08/20/20 1915 98.7 °F (37.1 °C) 75 15 185/60 96 %   08/20/20 1911 -- 78 19 192/58 97 %   08/20/20 1840 98.9 °F (37.2 °C) 78 19 175/60 97 %   08/20/20 1830 -- 77 17 187/64 97 %   08/20/20 1815 -- 79 19 190/62 96 %   08/20/20 1800 -- 77 18 185/69 96 %   08/20/20 1745 -- 76 16 174/63 97 %   08/20/20 1730 -- 78 24 195/74 97 %   08/20/20 1715 -- 74 16 184/55 98 %   08/20/20 1700 -- 74 17 185/60 97 %   08/20/20 1645 -- 72 19 180/59 96 %   08/20/20 1630 -- 75 21 182/60 97 %   08/20/20 1615 -- 75 19 (!) 167/144 96 %   08/20/20 1600 98.9 °F (37.2 °C) 72 17 175/57 96 %   08/20/20 1540 98.9 °F (37.2 °C) 72 16 168/61 97 %   08/20/20 1500 -- 69 17 168/61 96 %   08/20/20 1400 -- 70 17 169/59 93 %       Intake/Output Summary (Last 24 hours) at 8/21/2020 1342  Last data filed at 8/21/2020 1100  Gross per 24 hour   Intake 150 ml   Output 5375 ml   Net -5225 ml        PHYSICAL EXAM:  General: awake, following commands, no acute distress    EENT:  EOMI. Anicteric sclerae. MMM  Resp:  CTA bilaterally, no wheezing or rales. No accessory muscle use  CV:  Regular  rhythm,  No edema  GI:  Soft, Non distended, Non tender.  +Bowel sounds  Neurologic:  Move all extremities, following commands, normal speech, no focal deficit   Psych:   Good insight. Not anxious nor agitated  Skin:  No rashes.   No jaundice    Reviewed most current lab test results and cultures  YES  Reviewed most current radiology test results   YES  Review and summation of old records today    NO  Reviewed patient's current orders and MAR    YES  PMH/SH reviewed - no change compared to H&P  ________________________________________________________________________  Care Plan discussed with:    Comments   Patient x    Family  x    RN x    Care Manager Consultant                        Multidiciplinary team rounds were held today with , nursing, pharmacist and clinical coordinator. Patient's plan of care was discussed; medications were reviewed and discharge planning was addressed. ______________________________________________________________________      Comments   >50% of visit spent in counseling and coordination of care x    ________________________________________________________________________  Ry Stanley MD     Procedures: see electronic medical records for all procedures/Xrays and details which were not copied into this note but were reviewed prior to creation of Plan. LABS:  I reviewed today's most current labs and imaging studies.   Pertinent labs include:  Recent Labs     08/21/20  0305 08/20/20  0446 08/19/20  0355   WBC 10.1 11.2* 12.8*   HGB 7.9* 7.5* 7.2*   HCT 24.5* 23.6* 22.5*    311 284     Recent Labs     08/21/20  0305 08/20/20  0446 08/19/20  0355    137 136   K 4.0 4.3 5.3*    103 106   CO2 31 26 23   GLU 70 176* 83   BUN 32* 50* 58*   CREA 2.74* 4.36* 5.36*   CA 8.4* 8.2* 7.9*   MG 2.0  --   --    PHOS 3.6 5.1* 7.0*   ALB 2.7* 3.0* 2.9*   TBILI 0.6  --   --    ALT 21  --   --        Signed: Ry Stanley MD

## 2020-08-21 NOTE — PROGRESS NOTES
[facility]  Pulmonary, Critical Care, and Sleep Medicine    Name: Laquita Alexandre MRN: [id number]   : 1960 Hospital: [place] [address]   Date: 2020        Critical Care    IMPRESSION:   · Acute hypoxic/hypercapnic respiratory failure  · Acute pulmonary edema  · Acute anuric renal failure requiring dialysis  · Pneumonia - COVID negative  · Metabolic encephalopathy   · Anemia  · Hyperuricemia   · Metastatic thyroid cancer with lung mets      RECOMMENDATIONS:   · O2, wean as tolerated; PRN NIV  · Bronchodilators  · RRT per nephrology  · GI evaluation ongoing  · Empiric antibiotics   · Oncology following  · DVT prophylaxis      Subjective/History:     20: denies complaints. On ventimask with SpO2 100%  20: started on BiPAP yesterday with improving alertness. Converted to ventimask this morning. He denies complaint currently.      Past Medical History:   Diagnosis Date    Adverse effect of anesthesia     \" STOP BREATHING 1 TIME C ANESTH\"    Calculus of kidney     Cancer ([address]     thyroid cancer    Chronic kidney disease     Chronic pain     BACK SHOULDER AND ARM    Depression     Diabetes ([address]     Encounter for long-term (current) use of NSAIDs     Hypercholesterolemia     Hypertension     Nausea & vomiting     Other ill-defined conditions(799.89)     cholesterol, thyroid    Sleep apnea     doesn't wear cpap    Thyroid cancer ([address]     TIA (transient ischemic attack)     Vitamin D deficiency       Past Surgical History:   Procedure Laterality Date    CARDIAC SURG PROCEDURE UNLIST      HX HEENT      THROAT SURGERY X 4    HX ORTHOPAEDIC      back     HX ORTHOPAEDIC      ARM AND SHOULDER    HX OTHER SURGICAL      thyroid, lymphnode    HX RETINAL DETACHMENT REPAIR      left eye    IR INSERT NON TUNL CVC OVER 5 YRS  2020    UPPER GI ENDOSCOPY,BALL DIL,30MM  2020         UPPER GI ENDOSCOPY,BIOPSY  2020         VASCULAR SURGERY PROCEDURE UNLIST      cardiac cath NEG. Prior to Admission medications    Medication Sig Start Date End Date Taking? Authorizing Provider   Blood-Glucose Meter monitoring kit 1 preferred brand glucometer for checking home glucose, E11.22 7/30/20  Yes Komal Gonzalez MD   glucose blood VI test strips (blood glucose test) strip Pharmacist to choose preferred meter and strips. Dx:E11.65, Z79.4 Monitor 3 times daily 7/30/20  Yes Komal Gonzalez MD   insulin NPH (HumuLIN N NPH U-100 Insulin) 100 unit/mL injection 2-10 units daily 7/30/20  Yes Komal Gonzalez MD   Insulin Syringe-Needle U-100 ([facility] Syringes) 0.3 mL 30 gauge x 5/16\" syrg 1 Each by Does Not Apply route daily. 7/30/20  Yes Komal Gonzalez MD   amLODIPine (NORVASC) 5 mg tablet Take 1 Tab by mouth two (2) times a day. 7/22/20  Yes Leilani Ivey MD   carvediloL (COREG) 12.5 mg tablet Take 1 Tab by mouth two (2) times daily (with meals). Indications: high blood pressure 7/22/20  Yes Leilani Ivey MD   Omeprazole delayed release (PRILOSEC D/R) 20 mg tablet Take 1 Tab by mouth daily. 7/22/20  Yes Leilani Ivey MD   testosterone 30 mg/actuation (1.5 mL) slpm Apply 1-2 pumps daily as directed on your visit 2/3/20  Yes Frederick Oppenheim, MD   levothyroxine (SYNTHROID) 200 mcg tablet TAKE 1 TABLET BY MOUTH ONCE DAILY BEFORE BREAKFAST 11/4/19  Yes Frederick Oppenheim, MD   glucose blood VI test strips (PHARMACIST CHOICE) strip One Touch  Check glucose 3-4 times daily. DX E11.22 2/2/18  Yes Frederick Oppenheim, MD   Lancets misc Use preferred brand; Check glucose 3-4 times daily, Diagnosis E11.22 2/2/18  Yes Frederick Oppenheim, MD   simvastatin (ZOCOR) 20 mg tablet Take 1 Tab by mouth nightly.  7/28/20   Elvia Prado MD   Ventolin HFA 90 mcg/actuation inhaler TAKE 1-2 PUFFS EVEERY 4-6 HOURS AS NEEDED FOR DYSPNEA AND WHEEZING 7/28/20   Briana Jang MD     Current Facility-Administered Medications   Medication Dose Route Frequency    uric acid reminder note  1 Each Other Q12H    lanthanum (FOSRENOL) chewable tablet 1,000 mg  1,000 mg Oral Q12H    pantoprazole (PROTONIX) 40 mg in 0.9% sodium chloride 10 mL injection  40 mg IntraVENous DAILY    alcohol 62% (NOZIN) nasal  1 Ampule  1 Ampule Topical Q12H    polyethylene glycol (MIRALAX) packet 17 g  17 g Oral BID    levothyroxine (SYNTHROID) tablet 200 mcg  200 mcg Oral 6am    atorvastatin (LIPITOR) tablet 10 mg  10 mg Oral QHS    sodium chloride (NS) flush 5-40 mL  5-40 mL IntraVENous Q8H    sucralfate (CARAFATE) tablet 1 g  1 g Oral AC&HS    cefTRIAXone (ROCEPHIN) 1 g in 0.9% sodium chloride (MBP/ADV) 50 mL  1 g IntraVENous Q24H    insulin lispro (HUMALOG) injection   SubCUTAneous AC&HS     Allergies   Allergen Reactions    Anesthetics - Amide Type Shortness of Breath    Flexeril [Cyclobenzaprine] Hives    Tramadol Hives      Social History     Tobacco Use    Smoking status: Former Smoker     Last attempt to quit: 1994     Years since quittin.0    Smokeless tobacco: Never Used   Substance Use Topics    Alcohol use: Yes     Comment: Occasionally      Family History   Problem Relation Age of Onset    Diabetes Mother     Elevated Lipids Mother    Yuki Gess Bladder Disease Mother     Headache Mother    [facility] Migraines Mother     Heart Disease Mother     Hypertension Mother     Stroke Mother     Other Mother         ANEURSYM BRAIN    Bleeding Prob Father     Cancer Father         LEUKEMIA    Diabetes Father     Elevated Lipids Father     Mental Retardation Sister     Psychiatric Disorder Sister     Cancer Brother         LUNGS        Objective:   Vital Signs:    Visit Vitals  /69   Pulse 68   Temp 98 °F (36.7 °C)   Resp 13   Wt 113.7 kg (250 lb 10.6 oz)   SpO2 98%   BMI 37.02 kg/m²       O2 Device: Ventimask   O2 Flow Rate (L/min): 15 l/min   Temp (24hrs), Av.7 °F (37.1 °C), Min:98 °F (36.7 °C), Max:98.9 °F (37.2 °C)       Intake/Output:   Last shift: No intake/output data recorded. Last 3 shifts: 08/19 1901 - 08/21 0700  In: 50 [I.V.:50]  Out: 5625 [Urine:625]    Intake/Output Summary (Last 24 hours) at 8/21/2020 0804  Last data filed at 8/21/2020 0000  Gross per 24 hour   Intake 50 ml   Output 5625 ml   Net -5575 ml     Hemodynamics:   PAP:   CO:     Wedge:   CI:     CVP:    SVR:       PVR:       Ventilator Settings:  Mode Rate Tidal Volume Pressure FiO2 PEEP            50 %       Peak airway pressure:      Minute ventilation: 5.1 l/min      Physical Exam:    General:  Alert, obese, ill appearing   Head:  Normocephalic, without obvious abnormality, atraumatic. Eyes:  Conjunctivae/corneas clear. Nose: Nares normal. Septum midline. Mucosa normal.     Throat: Lips, mucosa, and tongue normal. Teeth and gums normal.   Neck: Supple, symmetrical, trachea midline    Lungs:   Bilateral rales   Chest wall:  No tenderness or deformity. Heart:  Regular rate and rhythm    Abdomen:   Soft, non-tender. Bowel sounds normal. No masses,  No organomegaly.    Extremities: Extremities normal, atraumatic, no cyanosis or clubbing   Skin: Skin color, texture, turgor normal. No rashes or lesions   Neurologic: Grossly nonfocal     Data:     Current Facility-Administered Medications   Medication Dose Route Frequency    uric acid reminder note  1 Each Other Q12H    lanthanum (FOSRENOL) chewable tablet 1,000 mg  1,000 mg Oral Q12H    pantoprazole (PROTONIX) 40 mg in 0.9% sodium chloride 10 mL injection  40 mg IntraVENous DAILY    alcohol 62% (NOZIN) nasal  1 Ampule  1 Ampule Topical Q12H    polyethylene glycol (MIRALAX) packet 17 g  17 g Oral BID    levothyroxine (SYNTHROID) tablet 200 mcg  200 mcg Oral 6am    atorvastatin (LIPITOR) tablet 10 mg  10 mg Oral QHS    sodium chloride (NS) flush 5-40 mL  5-40 mL IntraVENous Q8H    sucralfate (CARAFATE) tablet 1 g  1 g Oral AC&HS    cefTRIAXone (ROCEPHIN) 1 g in 0.9% sodium chloride (MBP/ADV) 50 mL  1 g IntraVENous Q24H    insulin lispro (HUMALOG) injection   SubCUTAneous AC&HS                Labs:  Recent Labs     08/21/20  0305 08/20/20  0446 08/19/20  0355   WBC 10.1 11.2* 12.8*   HGB 7.9* 7.5* 7.2*   HCT 24.5* 23.6* 22.5*    311 284     Recent Labs     08/21/20  0305 08/20/20  0446 08/19/20  0355    137 136   K 4.0 4.3 5.3*    103 106   CO2 31 26 23   GLU 70 176* 83   BUN 32* 50* 58*   CREA 2.74* 4.36* 5.36*   CA 8.4* 8.2* 7.9*   MG 2.0  --   --    PHOS 3.6 5.1* 7.0*   ALB 2.7* 3.0* 2.9*   TBILI 0.6  --   --    ALT 21  --   --      Recent Labs     08/20/20  0934 08/19/20  1916 08/19/20  1610   PHI 7.33* 7.32* 7.27*   PCO2I 47.8* 47.8* 55.9*   PO2I 40* 94 87   HCO3I 25.3 24.5 25.6   FIO2I 0.50 50 50     Imaging:  I have personally reviewed the patients radiographs and have reviewed the reports:  No change        Total critical care time exclusive of procedures: 25 minutes  Tutu Lin MD

## 2020-08-21 NOTE — INTERDISCIPLINARY ROUNDS
Critical care interdisciplinary rounds held on 08/21/2020. Following members present, Pharmacy, Diabetes Treatment, Case Management, Respiratory Therapy, Physical Therapy and Nutrition. Led by Kia Briones RN and Dr. Molly Troncoso. Plan of care discussed. See clinical pathway for plan of care and interventions and desired outcomes.

## 2020-08-21 NOTE — PROGRESS NOTES
Problem: Dysphagia (Adult)  Goal: *Acute Goals and Plan of Care (Insert Text)  Description: Speech pathology goals  Initiated 8/21/2020  1. Patient will tolerate puree/honey thick liquid diet with no overt s/s aspiration within 7 days  2. Patient will participate in [address] within 7 days  Outcome: Progressing Towards Goal     [address] EVALUATION  Patient: Dru Duek (59 y.o. male)  Date: 8/21/2020  Primary Diagnosis: Pneumonia [J18.9]  Suspected COVID-19 virus infection [Z20.828]  NERY (acute kidney injury) ([facility] [N17.9]  Anemia [D64.9]  Lung cancer ([facility] [C34.90]  Chest pain [R07.9]        Precautions: swallow       ASSESSMENT :  Based on the objective data described below, the patient presents with suspected moderate oropharyngeal dysphagia characterized by slow oral prep, delayed posterior propulsion, suspected delayed swallow initiation and decreased laryngeal elevation. No overt s/s aspiration observed, however patient is a known silent aspirator. Increased WOB noted with PO intake. Patient refused solids. Patient known to SLP from past admission in 2016 during which MBS was completed and showed silent aspiration of thin and nectar thick liquids, and honey thick liquids were recommended. Patient reported he did not drink honey thick liquids when he went home, and patient now admitted with pneumonia. 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 Patient with high pitch, strained, breathy vocal quality. Patient will benefit from skilled intervention to address the above impairments. Patients rehabilitation potential is considered to be Fair     PLAN :  Recommendations and Planned Interventions:  -Puree/honey thick liquid diet  -SLP to follow up next week for diet tolerance. May benefit from repeat MBS  Frequency/Duration: Patient will be followed by speech-language pathology 4 times a week to address goals.   Discharge Recommendations: To Be Determined     SUBJECTIVE:   Patient stated his name. Patient drowsy, confused. Oriented to person only. [address] and increased WOB evident with PO intake. OBJECTIVE:     Past Medical History:   Diagnosis Date    Adverse effect of anesthesia     \" STOP BREATHING 1 TIME C ANESTH\"    Calculus of kidney     Cancer ([facility] 2004    thyroid cancer    Chronic kidney disease     Chronic pain     BACK SHOULDER AND ARM    Depression     Diabetes ([facility]     Encounter for long-term (current) use of NSAIDs     Hypercholesterolemia     Hypertension     Nausea & vomiting     Other ill-defined conditions(799.89)     cholesterol, thyroid    Sleep apnea     doesn't wear cpap    Thyroid cancer ([facility]     TIA (transient ischemic attack) 2011    Vitamin D deficiency      Past Surgical History:   Procedure Laterality Date    CARDIAC SURG PROCEDURE UNLIST      HX HEENT      THROAT SURGERY X 4    HX ORTHOPAEDIC      back     HX ORTHOPAEDIC      ARM AND SHOULDER    HX OTHER SURGICAL      thyroid, lymphnode    HX RETINAL DETACHMENT REPAIR      left eye    IR INSERT NON TUNL CVC OVER 5 YRS  8/18/2020    UPPER GI ENDOSCOPY,BALL DIL,30MM  7/17/2020         UPPER GI ENDOSCOPY,BIOPSY  7/17/2020         VASCULAR SURGERY PROCEDURE UNLIST      cardiac cath NEG.      Prior Level of Function/Home Situation:   Home Situation  Home Environment: Private residence  One/Two Story Residence: One story  Living Alone: No  Support Systems: Child(puneet)  Patient Expects to be Discharged to[de-identified] Private residence  Current DME Used/Available at Home: None  Diet prior to admission: regular/thin  Current Diet:  NPO   Cognitive and Communication Status:  Neurologic State: Alert, Confused  Orientation Level: Oriented to person, Disoriented to time, Disoriented to situation, Disoriented to place  Cognition: Decreased attention/concentration, Decreased command following           Oral Assessment:  Oral Assessment  Labial: No impairment  Dentition: Natural;Limited  Oral Hygiene: moist oral mucosa  Lingual: Decreased rate;Decreased strength  Velum: Unable to visualize  Mandible: No impairment  P.O. Trials:  Patient Position: upright in bed  Vocal quality prior to P.O.: Breathy;Hoarse;Strain; Other (comment)(baseline)  Consistency Presented: Ice chips; Thin liquid;Puree; Honey thick liquid; Other (comment)(refused solid)  How Presented: SLP-fed/presented;Spoon;Straw;Successive swallows     Bolus Acceptance: No impairment  Bolus Formation/Control: Impaired  Type of Impairment: Delayed  Propulsion: Delayed (# of seconds)  Oral Residue: None  Initiation of Swallow: Delayed (# of seconds)  Laryngeal Elevation: Decreased  Aspiration Signs/Symptoms: None                Oral Phase Severity: Moderate  Pharyngeal Phase Severity : Moderate    NOMS:   The NOMS functional outcome measure was used to quantify this patient's level of swallowing impairment. Based on the NOMS, the patient was determined to be at level 3 for swallow function       NOMS Swallowing Levels:  Level 1 (CN): NPO  Level 2 (CM): NPO but takes consistency in therapy  Level 3 (CL): Takes less than 50% of nutrition p.o. and continues with nonoral feedings; and/or safe with mod cues; and/or max diet restriction  Level 4 (CK): Safe swallow but needs mod cues; and/or mod diet restriction; and/or still requires some nonoral feeding/supplements  Level 5 (CJ): Safe swallow with min diet restriction; and/or needs min cues  Level 6 (CI): Independent with p.o.; rare cues; usually self cues; may need to avoid some foods or needs extra time  Level 7 ([address] [place] [address]): Independent for all p.o.  RICH. (2003). National Outcomes Measurement System (NOMS): Adult Speech-Language Pathology User's Guide.        Pain:  Pain Scale 1: Visual  Pain Intensity 1: 10  Pain Location 1: Back    After treatment:   Patient left in no apparent distress in bed, Call bell within reach, Nursing notified, and Caregiver / family present    COMMUNICATION/EDUCATION:   Patient was educated regarding his deficit(s) of dysphagia as this relates to his diagnosis of pneumonia. He demonstrated Guarded understanding as evidenced by drowsiness, confusion. The patient's plan of care including recommendations, planned interventions, and recommended diet changes were discussed with: Registered nurse. Patient/family have participated as able in goal setting and plan of care. Patient/family agree to work toward stated goals and plan of care.     Thank you for this referral.  Arabella Joshi, SLP  Time Calculation: 20 mins

## 2020-08-21 NOTE — ROUTINE PROCESS
Bedside and Verbal shift change report received from DEEPIKA Combs RN(offgoing nurse). Report included the following information SBAR, Kardex, ED Summary, Procedure Summary, Intake/Output, MAR, Accordion, Recent Results, Med Rec Status, Cardiac Rhythm NSR, Alarm Parameters  and Quality Measures. He is arousable, and restless at intervals. 0840:He c/o low back pain, and anxiety, therefore I administered Norco and Ativan. 0940:Resting at long intervals with his eyes closed, no facial grimaces or SOB. 1100: His daughter Tala Mg is at the bedside. Otherwise, his assessment is unchanged  1200:Status is unchanged, continue the current plan of care. 1400:Lungs are clear bilat, no respiratory distress. 1500:SANG Copeland, SLP at the bedside for an evaluation  1540:Josefina remains at the bedside, her sister Herman Dawson called to received updates on his condition. Otherwise, his assessment is unchanged  1630:No further c/o pain or SOB. 1830:He accepted a small amount of his dinner well without c/o nausea or regurgitation. 1930:Bedside and Verbal shift change report given to ANNA Martinez RN (oncoming nurse) by myself (offgoing nurse). Report included the following information SBAR, Kardex, ED Summary, Procedure Summary, Intake/Output, MAR, Accordion, Recent Results, Med Rec Status, Cardiac Rhythm NSR., Alarm Parameters  and Quality Measures.

## 2020-08-21 NOTE — PROGRESS NOTES
Gastroenterology Progress Note  Richard Camilo MD     8/21/2020    Admit Date: 8/15/2020    Subjective: Follow up for:  1)  Acute on chronic anemia    2) Nausea and vomiting- Improved    3) Respiratory failure    Feels better. No BM. Hgb is 7. 9. Was 7.5.   No GI bleed reported      Current Facility-Administered Medications   Medication Dose Route Frequency    uric acid reminder note  1 Each Other Q12H    lanthanum (FOSRENOL) chewable tablet 1,000 mg  1,000 mg Oral Q12H    pantoprazole (PROTONIX) 40 mg in 0.9% sodium chloride 10 mL injection  40 mg IntraVENous DAILY    heparin (porcine) 1,000 unit/mL injection 2,500 Units  2,500 Units Hemodialysis DIALYSIS PRN    albumin human 25% (BUMINATE) solution 12.5 g  12.5 g IntraVENous PRN    alcohol 62% (NOZIN) nasal  1 Ampule  1 Ampule Topical Q12H    morphine injection 4 mg  4 mg IntraVENous Q4H PRN    HYDROcodone-acetaminophen (NORCO) 7.5-325 mg per tablet 1 Tab  1 Tab Oral Q4H PRN    polyethylene glycol (MIRALAX) packet 17 g  17 g Oral BID    melatonin tablet 3 mg  3 mg Oral QHS PRN    LORazepam (ATIVAN) tablet 1 mg  1 mg Oral Q6H PRN    levothyroxine (SYNTHROID) tablet 200 mcg  200 mcg Oral 6am    atorvastatin (LIPITOR) tablet 10 mg  10 mg Oral QHS    sodium chloride (NS) flush 5-40 mL  5-40 mL IntraVENous Q8H    sodium chloride (NS) flush 5-40 mL  5-40 mL IntraVENous PRN    acetaminophen (TYLENOL) tablet 650 mg  650 mg Oral Q6H PRN    Or    acetaminophen (TYLENOL) suppository 650 mg  650 mg Rectal Q6H PRN    promethazine (PHENERGAN) tablet 12.5 mg  12.5 mg Oral Q6H PRN    Or    ondansetron (ZOFRAN) injection 4 mg  4 mg IntraVENous Q6H PRN    sucralfate (CARAFATE) tablet 1 g  1 g Oral AC&HS    cefTRIAXone (ROCEPHIN) 1 g in 0.9% sodium chloride (MBP/ADV) 50 mL  1 g IntraVENous Q24H    insulin lispro (HUMALOG) injection   SubCUTAneous AC&HS    glucose chewable tablet 16 g  4 Tab Oral PRN    dextrose (D50W) injection syrg 12.5-25 g  12.5-25 g IntraVENous PRN    glucagon (GLUCAGEN) injection 1 mg  1 mg IntraMUSCular PRN    albuterol (PROVENTIL HFA, VENTOLIN HFA, PROAIR HFA) inhaler 2 Puff  2 Puff Inhalation Q4H PRN        Objective:     Blood pressure 138/53, pulse 70, temperature 98.2 °F (36.8 °C), resp. rate 14, weight 113.7 kg (250 lb 10.6 oz), SpO2 96 %. No intake/output data recorded. 08/19 1901 - 08/21 0700  In: 50 [I.V.:50]  Out: 5625 [Urine:625]    EXAM:   GEN: . WM,More conversant. Mild increased WOB  HEENT: eyes closed  Heart: RRR  Lungs: using some accessory muscles to breath  Abdomen: Obese, soft, NT, normal BS  Ext: No edema    Data Review    Recent Results (from the past 24 hour(s))   POC EG7    Collection Time: 08/20/20  9:34 AM   Result Value Ref Range    Calcium, ionized (POC) 1.24 1.12 - 1.32 mmol/L    FIO2 (POC) 0.50 %    pH (POC) 7.33 (L) 7.35 - 7.45      pCO2 (POC) 47.8 (H) 35.0 - 45.0 MMHG    pO2 (POC) 40 (LL) 80 - 100 MMHG    HCO3 (POC) 25.3 22 - 26 MMOL/L    Base deficit (POC) 1 mmol/L    sO2 (POC) 71 (L) 92 - 97 %    Site LEFT RADIAL      Device: VENTURI MASK      Allens test (POC) YES      Specimen type (POC) BLOOD DARK/?VENOUS      Total resp.  rate 15     UREA NITROGEN, UR, RANDOM    Collection Time: 08/20/20 11:00 AM   Result Value Ref Range    Urea Nitrogen,urine random 390 MG/DL   CREATININE, UR, RANDOM    Collection Time: 08/20/20 11:00 AM   Result Value Ref Range    Creatinine, urine 202.00 mg/dL   GLUCOSE, POC    Collection Time: 08/20/20 11:25 AM   Result Value Ref Range    Glucose (POC) 78 65 - 100 mg/dL    Performed by [address] ACID    Collection Time: 08/20/20  3:08 PM   Result Value Ref Range    Uric acid <0.2 (L) 3.5 - 7.2 MG/DL   GLUCOSE, POC    Collection Time: 08/20/20  4:51 PM   Result Value Ref Range    Glucose (POC) 82 65 - 100 mg/dL    Performed by [address], POC    Collection Time: 08/20/20  9:50 PM   Result Value Ref Range    Glucose (POC) 71 65 - 100 mg/dL    Performed by Sahra Martin RN    CBC WITH AUTOMATED DIFF    Collection Time: 08/21/20  3:05 AM   Result Value Ref Range    WBC 10.1 4.1 - 11.1 K/uL    RBC 2.75 (L) 4.10 - 5.70 M/uL    HGB 7.9 (L) 12.1 - 17.0 g/dL    HCT 24.5 (L) 36.6 - 50.3 %    MCV 89.1 80.0 - 99.0 FL    MCH 28.7 26.0 - 34.0 PG    MCHC 32.2 30.0 - 36.5 g/dL    RDW 13.6 11.5 - 14.5 %    PLATELET 610 421 - 469 K/uL    MPV 9.9 8.9 - 12.9 FL    NRBC 0.0 0  WBC    ABSOLUTE NRBC 0.00 0.00 - 0.01 K/uL    NEUTROPHILS 78 (H) 32 - 75 %    LYMPHOCYTES 8 (L) 12 - 49 %    MONOCYTES 10 5 - 13 %    EOSINOPHILS 2 0 - 7 %    BASOPHILS 1 0 - 1 %    IMMATURE GRANULOCYTES 1 (H) 0.0 - 0.5 %    ABS. NEUTROPHILS 7.9 1.8 - 8.0 K/UL    ABS. LYMPHOCYTES 0.8 0.8 - 3.5 K/UL    ABS. MONOCYTES 1.0 0.0 - 1.0 K/UL    ABS. EOSINOPHILS 0.2 0.0 - 0.4 K/UL    ABS. BASOPHILS 0.1 0.0 - 0.1 K/UL    ABS. IMM. GRANS. 0.1 (H) 0.00 - 0.04 K/UL    DF SMEAR SCANNED      RBC COMMENTS NORMOCYTIC, NORMOCHROMIC     METABOLIC PANEL, COMPREHENSIVE    Collection Time: 08/21/20  3:05 AM   Result Value Ref Range    Sodium 138 136 - 145 mmol/L    Potassium 4.0 3.5 - 5.1 mmol/L    Chloride 103 97 - 108 mmol/L    CO2 31 21 - 32 mmol/L    Anion gap 4 (L) 5 - 15 mmol/L    Glucose 70 65 - 100 mg/dL    BUN 32 (H) 6 - 20 MG/DL    Creatinine 2.74 (H) 0.70 - 1.30 MG/DL    BUN/Creatinine ratio 12 12 - 20      GFR est AA 29 (L) >60 ml/min/1.73m2    GFR est non-AA 24 (L) >60 ml/min/1.73m2    Calcium 8.4 (L) 8.5 - 10.1 MG/DL    Bilirubin, total 0.6 0.2 - 1.0 MG/DL    ALT (SGPT) 21 12 - 78 U/L    AST (SGOT) 30 15 - 37 U/L    Alk.  phosphatase 115 45 - 117 U/L    Protein, total 6.8 6.4 - 8.2 g/dL    Albumin 2.7 (L) 3.5 - 5.0 g/dL    Globulin 4.1 (H) 2.0 - 4.0 g/dL    A-G Ratio 0.7 (L) 1.1 - 2.2     MAGNESIUM    Collection Time: 08/21/20  3:05 AM   Result Value Ref Range    Magnesium 2.0 1.6 - 2.4 mg/dL   PHOSPHORUS    Collection Time: 08/21/20  3:05 AM   Result Value Ref Range    Phosphorus 3.6 2.6 - 4.7 MG/DL   URIC ACID    Collection Time: 08/21/20  3:05 AM   Result Value Ref Range    Uric acid 0.2 (L) 3.5 - 7.2 MG/DL   GLUCOSE, POC    Collection Time: 08/21/20  8:15 AM   Result Value Ref Range    Glucose (POC) 75 65 - 100 mg/dL    Performed by [id number], POC    Collection Time: 08/21/20  8:17 AM   Result Value Ref Range    Glucose (POC) 80 65 - 100 mg/dL    Performed by [id number]     08/21/20 0305 08/20/20 0446   WBC 10.1 11.2*   HGB 7.9* 7.5*   HCT 24.5* 23.6*    311     Recent Labs     08/21/20 0305 08/20/20  1508 08/20/20 0446 08/19/20 0355     --  137 136   K 4.0  --  4.3 5.3*     --  103 106   CO2 31  --  26 23   BUN 32*  --  50* 58*   CREA 2.74*  --  4.36* 5.36*   GLU 70  --  176* 83   CA 8.4*  --  8.2* 7.9*   MG 2.0  --   --   --    PHOS 3.6  --  5.1* 7.0*   URICA 0.2* <0.2* <0.2*  --      Recent Labs     08/21/20 0305 08/20/20 0446 08/19/20  0355   ALT 21  --   --      --   --    TBILI 0.6  --   --    TP 6.8  --   --    ALB 2.7* 3.0* 2.9*   GLOB 4.1*  --   --      No results for input(s): INR, PTP, APTT, INREXT, INREXT in the last 72 hours. Recent Labs     08/20/20  0458   TIBC 202*   PSAT 16*   FERR 312      No results found for: FOL, RBCF   No results for input(s): PH, PCO2, PO2 in the last 72 hours. No results for input(s): CPK, CKNDX, TROIQ in the last 72 hours.     No lab exists for component: CPKMB  Lab Results   Component Value Date/Time    Cholesterol, total 175 07/29/2020 12:09 PM    HDL Cholesterol 31 (L) 07/29/2020 12:09 PM    LDL, calculated 102 (H) 07/29/2020 12:09 PM    Triglyceride 212 (H) 07/29/2020 12:09 PM     Lab Results   Component Value Date/Time    Glucose (POC) 80 08/21/2020 08:17 AM    Glucose (POC) 75 08/21/2020 08:15 AM    Glucose (POC) 71 08/20/2020 09:50 PM    Glucose (POC) 82 08/20/2020 04:51 PM    Glucose (POC) 78 08/20/2020 11:25 AM     Lab Results   Component Value Date/Time    Color YELLOW/STRAW 08/18/2020 05:34 PM    Appearance CLOUDY (A) 08/18/2020 05:34 PM    Specific gravity 1.019 08/18/2020 05:34 PM    pH (UA) 5.0 08/18/2020 05:34 PM    Protein 100 (A) 08/18/2020 05:34 PM    Glucose Negative 08/18/2020 05:34 PM    Ketone Negative 08/18/2020 05:34 PM    Bilirubin Negative 08/18/2020 05:34 PM    Urobilinogen 0.2 08/18/2020 05:34 PM    Nitrites Negative 08/18/2020 05:34 PM    Leukocyte Esterase Negative 08/18/2020 05:34 PM    Epithelial cells MODERATE (A) 08/18/2020 05:34 PM    Bacteria 1+ (A) 08/18/2020 05:34 PM    WBC 0-4 08/18/2020 05:34 PM    RBC 5-10 08/18/2020 05:34 PM           Assessment:     Active Problems:    NERY (acute kidney injury) ([facility] [address] (7/17/2020)      Lung cancer ([facility] [address] (8/15/2020)      Chest pain (8/15/2020)      Pneumonia (8/15/2020)      Anemia (8/15/2020)      Suspected COVID-19 virus infection (8/15/2020)      Hyperuricemia (8/19/2020)      Metastatic carcinoma ([facility] [address] (8/19/2020)        Plan:   Patient's respiratory status is not ideal to prep or to sedate him for any GI procedure. There is no active bleeding and his hgb is actually better today. Hold off on colonoscopy for now. Please re-consult on call team this weekend if there is acute GI bleed. O/w f.u as OP. Plan d/w pt and his RN. We will sign off.     Dorothy Burks MD    08/21/20 [address] [facility], [address] [facility]  [phone] [id number] [address] [address] [place]: [phone]

## 2020-08-21 NOTE — PROGRESS NOTES
Speech pathology note  Attempted to see patient for swallowing evaluation, however patient drowsy s/p Ativan administration this morning. SLP will continue to follow for swallowing evaluation as alertness allows. Thank you.     Nickolas Flynn., CCC-SLP

## 2020-08-21 NOTE — PROGRESS NOTES
[facility]   [address] [facility], [address], [place]  Phone: [phone]   [phone]       Nephrology Progress Note  Юлия Courser     1960     [id number]  Date of Admission : 8/15/2020  08/21/20    CC: Follow up for NERY      Assessment and Plan   Oliguric severe NERY on CKD 4  -unknown etiology at this time( likely ATN from sepsis from resp source vs hemodynamics)  -  HD started on 8/18 due to worsening azotemia, AMS and hyperkalemia  - s/p 3 dialysis sessions with improvement in mental status and fluid overload  - no need for HD today  - plan for HD tomorrow   - will follow closely for signs of renal recovery  - improving UOP(~625cc/ last 24hs)  - strict I/O's  - renal diet  - daily weights    Hyperphosphatemia   - improved   - follow phosph daily  - stop binders for now as remains NPO    CKD IV w/ baseline Cr around 3 on 7/22  - likely due to longstanding HTN     Hypoxic resp failure/atypical pneumonia in CT chest  In the setting of lung mets  - on Abx and O2  - CT chest: There are bilateral pleural effusions. There are multifocal areas of airspace disease which may represent atypical infection.  Pulmonary nodules are obscured by the multifocal airspace disease except right lower lobe nodule measuring 9 mm which is stable.     HTN:  - BP stable off antihypertensives  - no ACE or ARBs for now       Anemia  - 2/2 CKD, malignancy?  - Hb:7.9<- 7.5<-7.2<-7.3<-7.9  - if ok with Heme/Onc, recommend to start EPO  - transfuse PRBCs PRN  to keep Hb>7    DM2:       Hx of thyroid cancer w/ lung mets  ?treatment     Interval History:   Much better today  Awake, oriented  On NC    Review of Systems:   No CP  better from SOB    Current Medications:   Current Facility-Administered Medications   Medication Dose Route Frequency    uric acid reminder note  1 Each Other Q12H    lanthanum (FOSRENOL) chewable tablet 1,000 mg  1,000 mg Oral Q12H    pantoprazole (PROTONIX) 40 mg in 0.9% sodium chloride 10 mL injection  40 mg IntraVENous DAILY    heparin (porcine) 1,000 unit/mL injection 2,500 Units  2,500 Units Hemodialysis DIALYSIS PRN    albumin human 25% (BUMINATE) solution 12.5 g  12.5 g IntraVENous PRN    alcohol 62% (NOZIN) nasal  1 Ampule  1 Ampule Topical Q12H    morphine injection 4 mg  4 mg IntraVENous Q4H PRN    HYDROcodone-acetaminophen (NORCO) 7.5-325 mg per tablet 1 Tab  1 Tab Oral Q4H PRN    polyethylene glycol (MIRALAX) packet 17 g  17 g Oral BID    melatonin tablet 3 mg  3 mg Oral QHS PRN    LORazepam (ATIVAN) tablet 1 mg  1 mg Oral Q6H PRN    levothyroxine (SYNTHROID) tablet 200 mcg  200 mcg Oral 6am    atorvastatin (LIPITOR) tablet 10 mg  10 mg Oral QHS    sodium chloride (NS) flush 5-40 mL  5-40 mL IntraVENous Q8H    sodium chloride (NS) flush 5-40 mL  5-40 mL IntraVENous PRN    acetaminophen (TYLENOL) tablet 650 mg  650 mg Oral Q6H PRN    Or    acetaminophen (TYLENOL) suppository 650 mg  650 mg Rectal Q6H PRN    promethazine (PHENERGAN) tablet 12.5 mg  12.5 mg Oral Q6H PRN    Or    ondansetron (ZOFRAN) injection 4 mg  4 mg IntraVENous Q6H PRN    sucralfate (CARAFATE) tablet 1 g  1 g Oral AC&HS    insulin lispro (HUMALOG) injection   SubCUTAneous AC&HS    glucose chewable tablet 16 g  4 Tab Oral PRN    dextrose (D50W) injection syrg 12.5-25 g  12.5-25 g IntraVENous PRN    glucagon (GLUCAGEN) injection 1 mg  1 mg IntraMUSCular PRN    albuterol (PROVENTIL HFA, VENTOLIN HFA, PROAIR HFA) inhaler 2 Puff  2 Puff Inhalation Q4H PRN      Allergies   Allergen Reactions    Anesthetics - Amide Type Shortness of Breath    Flexeril [Cyclobenzaprine] Hives    Tramadol Hives       Objective:  Vitals:    Vitals:    08/21/20 1100 08/21/20 1130 08/21/20 1200 08/21/20 1300   BP: 144/49 146/61 137/49 152/50   Pulse: 62 64 60 64   Resp: 14 15 14 14   Temp:   98 °F (36.7 °C)    TempSrc:       SpO2: 98%  93% 90%   Weight:         Intake and Output:  08/21 0701 - 08/21 1900  In: 150 [P.O.:100; I.V.:50]  Out: -   08/19 1901 - 08/21 0700  In: 48 [I.V.:50]  Out: 5625 [Urine:625]    Physical Examination:  General:AAOx3, in NAD, obese  HEENT: AT/NC  Neck:Supple,no JVD  Lungs: on NC, satting well  CVS:RRR, S1 S2 normal  Abdomen: obese, Soft, no tenderness   Extremities:moves all,no LE edema  Skin:No rash or lesions seen   HD access: RIJ non-TDC    [x]    High complexity decision making was performed  [x]    Patient is at high-risk of decompensation with multiple organ involvement    Lab Data Personally Reviewed: I have reviewed all the pertinent labs, microbiology data and radiology studies during assessment.     Recent Labs     08/21/20 0305 08/20/20 0446 08/19/20  0355    137 136   K 4.0 4.3 5.3*    103 106   CO2 31 26 23   GLU 70 176* 83   BUN 32* 50* 58*   CREA 2.74* 4.36* 5.36*   CA 8.4* 8.2* 7.9*   MG 2.0  --   --    PHOS 3.6 5.1* 7.0*   ALB 2.7* 3.0* 2.9*   ALT 21  --   --      Recent Labs     08/21/20 0305 08/20/20 0446 08/19/20  0355   WBC 10.1 11.2* 12.8*   HGB 7.9* 7.5* 7.2*   HCT 24.5* 23.6* 22.5*    311 284     Lab Results   Component Value Date/Time    Specimen Description: BLOOD 11/09/2010 09:40 AM     Recent Results (from the past 24 hour(s))   URIC ACID    Collection Time: 08/20/20  3:08 PM   Result Value Ref Range    Uric acid <0.2 (L) 3.5 - 7.2 MG/DL   GLUCOSE, POC    Collection Time: 08/20/20  4:51 PM   Result Value Ref Range    Glucose (POC) 82 65 - 100 mg/dL    Performed by [address], POC    Collection Time: 08/20/20  9:50 PM   Result Value Ref Range    Glucose (POC) 71 65 - 100 mg/dL    Performed by Floyd Zamora RN    CBC WITH AUTOMATED DIFF    Collection Time: 08/21/20  3:05 AM   Result Value Ref Range    WBC 10.1 4.1 - 11.1 K/uL    RBC 2.75 (L) 4.10 - 5.70 M/uL    HGB 7.9 (L) 12.1 - 17.0 g/dL    HCT 24.5 (L) 36.6 - 50.3 %    MCV 89.1 80.0 - 99.0 FL    MCH 28.7 26.0 - 34.0 PG    MCHC 32.2 30.0 - 36.5 g/dL    RDW 13.6 11.5 - 14.5 %    PLATELET 874 651 - 286 K/uL    MPV 9.9 8.9 - 12.9 FL    NRBC 0.0 0  WBC    ABSOLUTE NRBC 0.00 0.00 - 0.01 K/uL    NEUTROPHILS 78 (H) 32 - 75 %    LYMPHOCYTES 8 (L) 12 - 49 %    MONOCYTES 10 5 - 13 %    EOSINOPHILS 2 0 - 7 %    BASOPHILS 1 0 - 1 %    IMMATURE GRANULOCYTES 1 (H) 0.0 - 0.5 %    ABS. NEUTROPHILS 7.9 1.8 - 8.0 K/UL    ABS. LYMPHOCYTES 0.8 0.8 - 3.5 K/UL    ABS. MONOCYTES 1.0 0.0 - 1.0 K/UL    ABS. EOSINOPHILS 0.2 0.0 - 0.4 K/UL    ABS. BASOPHILS 0.1 0.0 - 0.1 K/UL    ABS. IMM. GRANS. 0.1 (H) 0.00 - 0.04 K/UL    DF SMEAR SCANNED      RBC COMMENTS NORMOCYTIC, NORMOCHROMIC     METABOLIC PANEL, COMPREHENSIVE    Collection Time: 08/21/20  3:05 AM   Result Value Ref Range    Sodium 138 136 - 145 mmol/L    Potassium 4.0 3.5 - 5.1 mmol/L    Chloride 103 97 - 108 mmol/L    CO2 31 21 - 32 mmol/L    Anion gap 4 (L) 5 - 15 mmol/L    Glucose 70 65 - 100 mg/dL    BUN 32 (H) 6 - 20 MG/DL    Creatinine 2.74 (H) 0.70 - 1.30 MG/DL    BUN/Creatinine ratio 12 12 - 20      GFR est AA 29 (L) >60 ml/min/1.73m2    GFR est non-AA 24 (L) >60 ml/min/1.73m2    Calcium 8.4 (L) 8.5 - 10.1 MG/DL    Bilirubin, total 0.6 0.2 - 1.0 MG/DL    ALT (SGPT) 21 12 - 78 U/L    AST (SGOT) 30 15 - 37 U/L    Alk.  phosphatase 115 45 - 117 U/L    Protein, total 6.8 6.4 - 8.2 g/dL    Albumin 2.7 (L) 3.5 - 5.0 g/dL    Globulin 4.1 (H) 2.0 - 4.0 g/dL    A-G Ratio 0.7 (L) 1.1 - 2.2     MAGNESIUM    Collection Time: 08/21/20  3:05 AM   Result Value Ref Range    Magnesium 2.0 1.6 - 2.4 mg/dL   PHOSPHORUS    Collection Time: 08/21/20  3:05 AM   Result Value Ref Range    Phosphorus 3.6 2.6 - 4.7 MG/DL   URIC ACID    Collection Time: 08/21/20  3:05 AM   Result Value Ref Range    Uric acid 0.2 (L) 3.5 - 7.2 MG/DL   GLUCOSE, POC    Collection Time: 08/21/20  8:15 AM   Result Value Ref Range    Glucose (POC) 75 65 - 100 mg/dL    Performed by Salomon Guillen, POC    Collection Time: 08/21/20  8:17 AM   Result Value Ref Range Glucose (POC) 80 65 - 100 mg/dL    Performed by Saji Sam    GLUCOSE, POC    Collection Time: 08/21/20 12:16 PM   Result Value Ref Range    Glucose (POC) 101 (H) 65 - 100 mg/dL    Performed by Saji Sam            I have reviewed the flowsheets. Chart and Pertinent Notes have been reviewed. No change in PMH ,family and social history from Consult note.       Damien Lemus MD

## 2020-08-22 ENCOUNTER — APPOINTMENT (OUTPATIENT)
Dept: GENERAL RADIOLOGY | Age: 60
DRG: 673 | End: 2020-08-22
Attending: INTERNAL MEDICINE
Payer: MEDICARE

## 2020-08-22 LAB
ALBUMIN SERPL-MCNC: 2.6 G/DL (ref 3.5–5)
ANION GAP SERPL CALC-SCNC: 4 MMOL/L (ref 5–15)
BUN SERPL-MCNC: 46 MG/DL (ref 6–20)
BUN/CREAT SERPL: 15 (ref 12–20)
CALCIUM SERPL-MCNC: 8.7 MG/DL (ref 8.5–10.1)
CHLORIDE SERPL-SCNC: 104 MMOL/L (ref 97–108)
CO2 SERPL-SCNC: 32 MMOL/L (ref 21–32)
CREAT SERPL-MCNC: 3.08 MG/DL (ref 0.7–1.3)
ERYTHROCYTE [DISTWIDTH] IN BLOOD BY AUTOMATED COUNT: 13.7 % (ref 11.5–14.5)
GLUCOSE BLD STRIP.AUTO-MCNC: 117 MG/DL (ref 65–100)
GLUCOSE BLD STRIP.AUTO-MCNC: 117 MG/DL (ref 65–100)
GLUCOSE BLD STRIP.AUTO-MCNC: 82 MG/DL (ref 65–100)
GLUCOSE BLD STRIP.AUTO-MCNC: 88 MG/DL (ref 65–100)
GLUCOSE SERPL-MCNC: 79 MG/DL (ref 65–100)
HCT VFR BLD AUTO: 25.6 % (ref 36.6–50.3)
HGB BLD-MCNC: 7.8 G/DL (ref 12.1–17)
MAGNESIUM SERPL-MCNC: 2.5 MG/DL (ref 1.6–2.4)
MCH RBC QN AUTO: 28.3 PG (ref 26–34)
MCHC RBC AUTO-ENTMCNC: 30.5 G/DL (ref 30–36.5)
MCV RBC AUTO: 92.8 FL (ref 80–99)
NRBC # BLD: 0 K/UL (ref 0–0.01)
NRBC BLD-RTO: 0 PER 100 WBC
PHOSPHATE SERPL-MCNC: 4 MG/DL (ref 2.6–4.7)
PLATELET # BLD AUTO: 273 K/UL (ref 150–400)
PMV BLD AUTO: 9.9 FL (ref 8.9–12.9)
POTASSIUM SERPL-SCNC: 4.3 MMOL/L (ref 3.5–5.1)
PROT SERPL-MCNC: 6.4 G/DL (ref 6.4–8.2)
RBC # BLD AUTO: 2.76 M/UL (ref 4.1–5.7)
SERVICE CMNT-IMP: ABNORMAL
SERVICE CMNT-IMP: ABNORMAL
SERVICE CMNT-IMP: NORMAL
SERVICE CMNT-IMP: NORMAL
SODIUM SERPL-SCNC: 140 MMOL/L (ref 136–145)
URATE SERPL-MCNC: 1.1 MG/DL (ref 3.5–7.2)
WBC # BLD AUTO: 10.2 K/UL (ref 4.1–11.1)

## 2020-08-22 PROCEDURE — C9113 INJ PANTOPRAZOLE SODIUM, VIA: HCPCS | Performed by: INTERNAL MEDICINE

## 2020-08-22 PROCEDURE — 85027 COMPLETE CBC AUTOMATED: CPT

## 2020-08-22 PROCEDURE — 74011000250 HC RX REV CODE- 250: Performed by: INTERNAL MEDICINE

## 2020-08-22 PROCEDURE — 74011250637 HC RX REV CODE- 250/637: Performed by: INTERNAL MEDICINE

## 2020-08-22 PROCEDURE — 80048 BASIC METABOLIC PNL TOTAL CA: CPT

## 2020-08-22 PROCEDURE — 90935 HEMODIALYSIS ONE EVALUATION: CPT

## 2020-08-22 PROCEDURE — 82962 GLUCOSE BLOOD TEST: CPT

## 2020-08-22 PROCEDURE — 74011000250 HC RX REV CODE- 250: Performed by: HOSPITALIST

## 2020-08-22 PROCEDURE — 82040 ASSAY OF SERUM ALBUMIN: CPT

## 2020-08-22 PROCEDURE — 36415 COLL VENOUS BLD VENIPUNCTURE: CPT

## 2020-08-22 PROCEDURE — 74011250637 HC RX REV CODE- 250/637: Performed by: PHYSICIAN ASSISTANT

## 2020-08-22 PROCEDURE — 74011250637 HC RX REV CODE- 250/637: Performed by: HOSPITALIST

## 2020-08-22 PROCEDURE — 84155 ASSAY OF PROTEIN SERUM: CPT

## 2020-08-22 PROCEDURE — 83735 ASSAY OF MAGNESIUM: CPT

## 2020-08-22 PROCEDURE — 97116 GAIT TRAINING THERAPY: CPT

## 2020-08-22 PROCEDURE — 84550 ASSAY OF BLOOD/URIC ACID: CPT

## 2020-08-22 PROCEDURE — 74011250636 HC RX REV CODE- 250/636: Performed by: INTERNAL MEDICINE

## 2020-08-22 PROCEDURE — 77010033678 HC OXYGEN DAILY

## 2020-08-22 PROCEDURE — 97161 PT EVAL LOW COMPLEX 20 MIN: CPT

## 2020-08-22 PROCEDURE — 84100 ASSAY OF PHOSPHORUS: CPT

## 2020-08-22 PROCEDURE — 65660000000 HC RM CCU STEPDOWN

## 2020-08-22 PROCEDURE — 71045 X-RAY EXAM CHEST 1 VIEW: CPT

## 2020-08-22 RX ORDER — PANTOPRAZOLE SODIUM 40 MG/1
40 TABLET, DELAYED RELEASE ORAL
Status: DISCONTINUED | OUTPATIENT
Start: 2020-08-23 | End: 2020-08-25 | Stop reason: CLARIF

## 2020-08-22 RX ADMIN — SODIUM CHLORIDE 10 ML: 9 INJECTION, SOLUTION INTRAMUSCULAR; INTRAVENOUS; SUBCUTANEOUS at 14:02

## 2020-08-22 RX ADMIN — SODIUM CHLORIDE 40 MG: 9 INJECTION, SOLUTION INTRAMUSCULAR; INTRAVENOUS; SUBCUTANEOUS at 08:01

## 2020-08-22 RX ADMIN — ATORVASTATIN CALCIUM 10 MG: 20 TABLET, FILM COATED ORAL at 23:12

## 2020-08-22 RX ADMIN — POLYETHYLENE GLYCOL 3350 17 G: 17 POWDER, FOR SOLUTION ORAL at 08:00

## 2020-08-22 RX ADMIN — SODIUM CHLORIDE 10 ML: 9 INJECTION, SOLUTION INTRAMUSCULAR; INTRAVENOUS; SUBCUTANEOUS at 23:13

## 2020-08-22 RX ADMIN — HEPARIN SODIUM 2500 UNITS: 1000 INJECTION INTRAVENOUS; SUBCUTANEOUS at 12:43

## 2020-08-22 RX ADMIN — Medication 1 AMPULE: at 23:13

## 2020-08-22 RX ADMIN — Medication 1 AMPULE: at 08:02

## 2020-08-22 RX ADMIN — SUCRALFATE 1 G: 1 TABLET ORAL at 11:35

## 2020-08-22 RX ADMIN — SODIUM CHLORIDE 10 ML: 9 INJECTION, SOLUTION INTRAMUSCULAR; INTRAVENOUS; SUBCUTANEOUS at 05:37

## 2020-08-22 RX ADMIN — LEVOTHYROXINE SODIUM 200 MCG: 0.15 TABLET ORAL at 05:40

## 2020-08-22 RX ADMIN — SUCRALFATE 1 G: 1 TABLET ORAL at 07:48

## 2020-08-22 RX ADMIN — SUCRALFATE 1 G: 1 TABLET ORAL at 23:12

## 2020-08-22 NOTE — PROGRESS NOTES
0730: Verbal shift change report given to Mickey (oncoming nurse) by Diann Salas (offgoing nurse). Report included the following information SBAR, Kardex, Intake/Output, MAR, Recent Results, Cardiac Rhythm NSR, Alarm Parameters  and Quality Measures. 0800: PT assessed, se flowsheets 0820: this RN spoke to dialysis RN, plan to call back if pt will be receiving HD today 
 
0900: Robi RN at bedside to set up for HD treatment. Pt assisted with breakfast, incontinent of urine and cleaned up accordingly. 1130: Pt daughter called for update, code verified, update provided. 1200: Pt reassessed, see flowsheets. 1410: PT/OT to bedside 
 
1600: TRANSFER - OUT REPORT: 
 
Verbal report given to Batsheva Valdivia (name) on [facility]  being transferred to Renal (unit) for routine progression of care Report consisted of patients Situation, Background, Assessment and  
Recommendations(SBAR). Information from the following report(s) SBAR, Intake/Output, MAR, Recent Results, Cardiac Rhythm NSR, Alarm Parameters  and Quality Measures was reviewed with the receiving nurse. Lines:  
Peripheral IV 08/18/20 Posterior;Right Hand (Active) Site Assessment Clean, dry, & intact 08/22/20 1200 Phlebitis Assessment 0 08/22/20 1200 Infiltration Assessment 0 08/22/20 1200 Dressing Status Clean, dry, & intact 08/22/20 1200 Dressing Type Transparent 08/22/20 1200 Hub Color/Line Status Blue;Patent;Capped 08/22/20 1200 Action Taken Open ports on tubing capped 08/22/20 1200 Alcohol Cap Used Yes 08/22/20 1200 Peripheral IV 08/21/20 Right Antecubital (Active) Site Assessment Clean, dry, & intact 08/22/20 1200 Phlebitis Assessment 0 08/22/20 1200 Infiltration Assessment 0 08/22/20 1200 Dressing Status Clean, dry, & intact 08/22/20 1200 Dressing Type Transparent;Tape 08/22/20 1200 Hub Color/Line Status Pink;Patent 08/22/20 1200 Action Taken Open ports on tubing capped 08/22/20 1200 Alcohol Cap Used Yes 08/22/20 1200 Opportunity for questions and clarification was provided. Patient transported with: 
 Monitor O2 @ 5 liters Registered Nurse Tech

## 2020-08-22 NOTE — PROGRESS NOTES
Hospitalist Progress Note    NAME: Farhat Croft   :  1960   MRN:  [id number]       Assessment / Plan:    Acute hypoxic respiratory failure, POA  Acute on chronic diastolic heart failure, POA  Community-acquired pneumonia, likely bacterial, POA  - COVID-19 test negative on admission  - completed antibiotics,  Last day   - on NC today 5L o2 Sat 94%  - cont ICU care  - blood cultures pending  -ECHO this admission   · LV: Estimated LVEF is 65 - 70%. Visually measured ejection fraction. Normal cavity size and systolic function (ejection fraction normal). Moderate concentric hypertrophy. Wall motion: normal. Age-appropriate left ventricular diastolic function. · PA: Pulmonary arterial systolic pressure is 35 mmHg. · MV: Trace mitral valve regurgitation is present. · LA: Mildly dilated left atrium.  - Blood pressure improved now    Acute encephalopathy, unknown etiology yet  - ABG revealed respiratory acidosis, hypoxemia  -Avoid all sedating medications  -resolved, cont to be alert and awake  -2/2  Worsening kidney function . Acute kidney disease, worsening, POA  Hyperkalemia with junctional rhythm on EKG  Hypophosphatemia  -CKD stage IV  - Kidney functions a little worse today   - s/p hemodialysis started . He was having a session when I saw him  - Status post calcium gluconate, regular insulin, D50   - Renal ultrasound with renal cortical thinning, no overt hydronephrosis  -uric acid level are elevated. heme-onc's input is appreciated, on rasburicase treatment    Acute on chronic anemia,.   History of duodenitis, POA  -EGD 2020 revealed Padmini-Metzger tear, duodenitis, duodenal polyp, gastric polyps, and dilation of the esophagus  -Hemoglobin on the lower side but has been stable,   -transfuse if Hbg 7 or less    Bradycardia  -Heart rate around 40s to 50s on   -Likely secondary to hyperkalemia  - Treatment as above  -EKG with no AV block  -resolved    Diabetes mellitus type 2  -Hemoglobin A1c 6.3% in July 2020    History of thyroid cancer with mets to lung  -Follows Dr. Monique Russ  -Hypothyroidsim-Continue levothyroxine     Essential HTN POA-hold home medications given hypotension at this time    Code Status: full  Surrogate Decision Maker:  Valeria Coto  Daughter  [phone]          DVT Prophylaxis: SCDs  GI Prophylaxis: not indicated     Baseline: independent       Subjective:     Chief Complaint / Reason for Physician Visit  Patient is lying in bed,     Awake and following all commands. Patient was seen and examined. No acute events overnight. \"feeling very good\"       \"doing well but tired\"    Review of Systems:  Symptom Y/N Comments  Symptom Y/N Comments   Fever/Chills n   Chest Pain n    Poor Appetite    Edema     Cough n   Abdominal Pain n    Sputum    Joint Pain     SOB/NUÑEZ n   Pruritis/Rash     Nausea/vomit n   Tolerating PT/OT     Diarrhea    Tolerating Diet     Constipation    Other       Could NOT obtain due to:          Objective:     VITALS:   Last 24hrs VS reviewed since prior progress note.  Most recent are:  Patient Vitals for the past 24 hrs:   Temp Pulse Resp BP SpO2   08/22/20 1115 -- 63 15 138/50 97 %   08/22/20 1100 -- 66 14 138/59 95 %   08/22/20 1045 -- 64 15 139/54 93 %   08/22/20 1030 -- 69 15 131/72 95 %   08/22/20 1015 -- 72 15 149/55 (!) 89 %   08/22/20 1000 -- 71 17 139/54 91 %   08/22/20 0945 -- 71 16 -- 94 %   08/22/20 0943 98.4 °F (36.9 °C) 72 16 147/52 96 %   08/22/20 0930 -- 73 16 147/50 (!) 89 %   08/22/20 0900 -- 79 18 164/63 90 %   08/22/20 0800 98.2 °F (36.8 °C) 76 14 123/80 94 %   08/22/20 0700 -- 64 14 130/55 94 %   08/22/20 0600 -- 71 14 -- 90 %   08/22/20 0500 -- 66 13 138/66 99 %   08/22/20 0400 98.4 °F (36.9 °C) 77 15 179/73 100 %   08/22/20 0300 -- 71 15 149/78 99 %   08/22/20 0200 -- 73 25 -- 100 %   08/22/20 0100 -- 68 15 107/50 98 %   08/22/20 0000 98.2 °F (36.8 °C) 65 17 109/43 97 %   08/21/20 2300 -- 72 18 143/60 93 %   08/21/20 2200 -- 66 14 138/66 93 %   08/21/20 2100 -- 64 13 128/62 94 %   08/21/20 2000 98.7 °F (37.1 °C) 72 14 160/73 95 %   08/21/20 1900 -- 63 21 119/45 95 %   08/21/20 1800 -- 63 16 135/52 95 %   08/21/20 1730 -- 68 14 128/47 94 %   08/21/20 1700 -- 62 14 (!) 92/32 94 %   08/21/20 1630 -- 65 14 123/44 94 %   08/21/20 1600 98 °F (36.7 °C) 62 12 130/60 94 %   08/21/20 1500 -- 70 19 134/41 92 %   08/21/20 1400 -- 67 14 (!) 134/36 93 %   08/21/20 1300 -- 64 14 152/50 90 %   08/21/20 1200 98 °F (36.7 °C) 60 14 137/49 93 %   08/21/20 1130 -- 64 15 146/61 --       Intake/Output Summary (Last 24 hours) at 8/22/2020 1122  Last data filed at 8/22/2020 0900  Gross per 24 hour   Intake 340 ml   Output 250 ml   Net 90 ml        PHYSICAL EXAM:  General: awake, following commands, no acute distress    EENT:  EOMI. Anicteric sclerae. MMM  Resp:  CTA bilaterally, no wheezing or rales. No accessory muscle use  CV:  Regular  rhythm,  No edema  GI:  Soft, Non distended, Non tender.  +Bowel sounds  Neurologic:  Move all extremities, following commands, normal speech, no focal deficit   Psych:   Good insight. Not anxious nor agitated  Skin:  No rashes. No jaundice    Reviewed most current lab test results and cultures  YES  Reviewed most current radiology test results   YES  Review and summation of old records today    NO  Reviewed patient's current orders and MAR    YES  PMH/SH reviewed - no change compared to H&P  ________________________________________________________________________  Care Plan discussed with:    Comments   Patient x    Family      RN x    Care Manager     Consultant                        Multidiciplinary team rounds were held today with , nursing, pharmacist and clinical coordinator. Patient's plan of care was discussed; medications were reviewed and discharge planning was addressed.      ______________________________________________________________________      Comments   >50% of visit spent in counseling and coordination of care x    ________________________________________________________________________  Karena Villa MD     Procedures: see electronic medical records for all procedures/Xrays and details which were not copied into this note but were reviewed prior to creation of Plan. LABS:  I reviewed today's most current labs and imaging studies.   Pertinent labs include:  Recent Labs     08/22/20 0527 08/21/20  0305 08/20/20  0446   WBC 10.2 10.1 11.2*   HGB 7.8* 7.9* 7.5*   HCT 25.6* 24.5* 23.6*    267 311     Recent Labs     08/22/20 0527 08/21/20 0305 08/20/20  0446    138 137   K 4.3 4.0 4.3    103 103   CO2 32 31 26   GLU 79 70 176*   BUN 46* 32* 50*   CREA 3.08* 2.74* 4.36*   CA 8.7 8.4* 8.2*   MG 2.5* 2.0  --    PHOS 4.0 3.6 5.1*   ALB 2.6* 2.7* 3.0*   TBILI  --  0.6  --    ALT  --  21  --        Signed: Karena Villa MD

## 2020-08-22 NOTE — PROGRESS NOTES
TRANSFER - IN REPORT:    Verbal report received from ELENA MELVIN RN(name) on Verdis Quiet  being received from CCU(unit) for routine progression of care      Report consisted of patients Situation, Background, Assessment and   Recommendations(SBAR). Information from the following report(s) SBAR, Kardex, ED Summary, [facility] and Recent Results was reviewed with the receiving nurse. Opportunity for questions and clarification was provided. Assessment completed upon patients arrival to unit and care assumed. 1813-Per Dr. Jose Qureshi Q12 Uric acid lab no longer needed. 1900-Bedside shift change report given to Moses Bolaños RN (oncoming nurse) by Jete Blount RN(offgoing nurse). Report included the following information SBAR, Kardex, ED Summary, [facility] and Recent Results.

## 2020-08-22 NOTE — PROGRESS NOTES
1900- Report received. Patient and chart visited. 2330-Placed back on BIPAP. 02 sats dropping with sleep.   0100- Resting better. Oxygenation improved. 3859- Placed back on NC.   0700- Report given. Patient and chart visited.

## 2020-08-22 NOTE — PROGRESS NOTES
Problem: Mobility Impaired (Adult and Pediatric)  Goal: *Acute Goals and Plan of Care (Insert Text)  Description: FUNCTIONAL STATUS PRIOR TO ADMISSION: Patient was independent and active without use of DME.    HOME SUPPORT PRIOR TO ADMISSION: The patient lived with his brother but did not require assist.    Physical Therapy Goals  Initiated 8/22/2020  1. Patient will move from supine to sit and sit to supine , scoot up and down, and roll side to side in bed with modified independence within 7 day(s). 2.  Patient will transfer from bed to chair and chair to bed with modified independence using the least restrictive device within 7 day(s). 3.  Patient will perform sit to stand with modified independence within 7 day(s). 4.  Patient will ambulate with modified independence for 150 feet with the least restrictive device within 7 day(s). 5.  Patient will ascend/descend 3 stairs with best method with minimal assistance/contact guard assist within 7 day(s). Outcome: Progressing Towards Goal   PHYSICAL THERAPY EVALUATION  Patient: Kaya Carballo (46 y.o. male)  Date: 8/22/2020  Primary Diagnosis: Pneumonia [J18.9]  Suspected COVID-19 virus infection [Z20.828]  NERY (acute kidney injury) ([facility] [N17.9]  Anemia [D64.9]  Lung cancer ([facility] [address] [C34.90]  Chest pain [R07.9]        Precautions:      ASSESSMENT  Based on the objective data described below, the patient presents with generalized weakness, new O2 dependence, impaired mobility, and decreased activity tolerance. Pt received supine in bed, agreeable to PT session. Daughter present and observing session. Pt demonstrates mobility at cga/min a x 1. Initially pt reporting feeling like he was falling when he stood at the bedside, but this feeling improved with further mobility. O2 sats decreased with activity to the low to mid 80s, requiring approx 1 min 30 sec to recover. Pt on 5L of O2.   Recommend next session occur with use of RW for energy conservation and improved gait quality. Pt should progress in the acute setting and be able to discharge home with HHPT to follow. .    Current Level of Function Impacting Discharge (mobility/balance): cga/min a x 1    Functional Outcome Measure: The patient scored 55/100 on the Barthel Index outcome measure which is indicative of 45% impaired function/adls. Other factors to consider for discharge: supportive daughters will assist him at discharge. Patient will benefit from skilled therapy intervention to address the above noted impairments. PLAN :  Recommendations and Planned Interventions: bed mobility training, transfer training, gait training, therapeutic exercises, patient and family training/education, and therapeutic activities      Frequency/Duration: Patient will be followed by physical therapy:  4 times a week to address goals. Recommendation for discharge: (in order for the patient to meet his/her long term goals)  Physical therapy at least 2 days/week in the home     This discharge recommendation:  A follow-up discussion with the attending provider and/or case management is planned    IF patient discharges home will need the following DME: rolling walker         SUBJECTIVE:   Patient stated \"I feel like I'm falling.     OBJECTIVE DATA SUMMARY:   HISTORY:    Past Medical History:   Diagnosis Date    Adverse effect of anesthesia     \" STOP BREATHING 1 TIME C ANESTH\"    Calculus of kidney     Cancer ([address] 2004    thyroid cancer    Chronic kidney disease     Chronic pain     BACK SHOULDER AND ARM    Depression     Diabetes ([address]     Encounter for long-term (current) use of NSAIDs     Hypercholesterolemia     Hypertension     Nausea & vomiting     Other ill-defined conditions(799.89)     cholesterol, thyroid    Sleep apnea     doesn't wear cpap    Thyroid cancer ([address]     TIA (transient ischemic attack) 2011    Vitamin D deficiency      Past Surgical History:   Procedure Laterality Date    CARDIAC SURG PROCEDURE UNLIST      HX HEENT      THROAT SURGERY X 4    HX ORTHOPAEDIC      back     HX ORTHOPAEDIC      ARM AND SHOULDER    HX OTHER SURGICAL      thyroid, lymphnode    HX RETINAL DETACHMENT REPAIR      left eye    IR INSERT NON TUNL CVC OVER 5 YRS  8/18/2020    UPPER GI ENDOSCOPY,BALL DIL,30MM  7/17/2020         UPPER GI ENDOSCOPY,BIOPSY  7/17/2020         VASCULAR SURGERY PROCEDURE UNLIST      cardiac cath NEG. Personal factors and/or comorbidities impacting plan of care:  medical status    Home Situation  Home Environment: Private residence  # Steps to Enter: (P) 3  Rails to Enter: (P) No(post at the porch)  One/Two Story Residence: One story  Living Alone: (P) No  Support Systems: Child(puneet)  Patient Expects to be Discharged to[de-identified] Private residence  Current DME Used/Available at Home: (P) None  Tub or Shower Type: (P) Tub/Shower combination    EXAMINATION/PRESENTATION/DECISION MAKING:   Critical Behavior:  Neurologic State: Alert  Orientation Level: Oriented X4  Cognition: Follows commands, Memory loss, Decreased attention/concentration     Hearing:   Auditory  Auditory Impairment: None  Range Of Motion:  AROM: Generally decreased, functional                       Strength:    Strength: Generally decreased, functional                    Tone & Sensation:   Tone: Normal                              Coordination:  Coordination: Within functional limits       Functional Mobility:  Bed Mobility:  Rolling: Contact guard assistance  Supine to Sit: Contact guard assistance  Sit to Supine: Stand-by assistance  Scooting: Stand-by assistance  Transfers:  Sit to Stand: Contact guard assistance;Minimum assistance  Stand to Sit: Stand-by assistance                       Balance:   Sitting: Intact  Standing: Impaired  Standing - Static: Constant support;Good;Fair  Standing - Dynamic : Fair  Ambulation/Gait Training:  Distance (ft): 16 Feet (ft)(8ft x 2)  Assistive Device: Gait belt(HHA x 2 intially, only gait belt second trial)  Ambulation - Level of Assistance: Contact guard assistance;Minimal assistance     Gait Description (WDL): Exceptions to WDL  Gait Abnormalities: Decreased step clearance        Base of Support: Widened     Speed/Maria De Jesus: Pace decreased (<100 feet/min)  Step Length: Left shortened;Right shortened     Functional Measure:  Barthel Index:    Bathin  Bladder: 10  Bowels: 10  Groomin  Dressin  Feedin  Mobility: 0  Stairs: 5  Toilet Use: 5  Transfer (Bed to Chair and Back): 10  Total: 55/100       The Barthel ADL Index: Guidelines  1. The index should be used as a record of what a patient does, not as a record of what a patient could do. 2. The main aim is to establish degree of independence from any help, physical or verbal, however minor and for whatever reason. 3. The need for supervision renders the patient not independent. 4. A patient's performance should be established using the best available evidence. Asking the patient, friends/relatives and nurses are the usual sources, but direct observation and common sense are also important. However direct testing is not needed. 5. Usually the patient's performance over the preceding 24-48 hours is important, but occasionally longer periods will be relevant. 6. Middle categories imply that the patient supplies over 50 per cent of the effort. 7. Use of aids to be independent is allowed. Madi Diaz., Barthel, D.W. (1344). Functional evaluation: the Barthel Index. [address] [facility] (14)2. Laurent Hodgkins der [place], J.J.M.F, Josh Richards., Abiola Nevarez., [place], [address] [place] [address] (). Measuring the change indisability after inpatient rehabilitation; comparison of the responsiveness of the Barthel Index and Functional [place] Measure. Journal of Neurology, Neurosurgery, and Psychiatry, 66(4), 987-309. Delaney Castro, N.J.A, MAE Martinez.SOPHIE, & Josep Elaine MSaraA. (2004.) Assessment of post-stroke quality of life in cost-effectiveness studies:  The usefulness of the Barthel Index and the EuroQoL-5D. Quality of Life Research, 15, 557-36           Physical Therapy Evaluation Charge Determination   History Examination Presentation Decision-Making   MEDIUM  Complexity : 1-2 comorbidities / personal factors will impact the outcome/ POC  MEDIUM Complexity : 3 Standardized tests and measures addressing body structure, function, activity limitation and / or participation in recreation  MEDIUM Complexity : Evolving with changing characteristics  LOW Complexity : FOTO score of       Based on the above components, the patient evaluation is determined to be of the following complexity level: LOW     Pain Rating:  None reported    Activity Tolerance:   Fair and desaturates with exertion and requires oxygen  Please refer to the flowsheet for vital signs taken during this treatment. After treatment patient left in no apparent distress:   Supine in bed, Call bell within reach, Bed / chair alarm activated, Caregiver / family present, and Side rails x 3    COMMUNICATION/EDUCATION:   The patients plan of care was discussed with: Registered nurse. Fall prevention education was provided and the patient/caregiver indicated understanding., Patient/family have participated as able in goal setting and plan of care. , and Patient/family agree to work toward stated goals and plan of care.     Thank you for this referral.  Niki Quezada, PT   Time Calculation: 29 mins

## 2020-08-22 NOTE — PROGRESS NOTES
[facility]   [address] [address], [facility] [address], [facility]  Phone: [phone]   [phone]       Nephrology Progress Note  Elizabeth Bass     1960     [id number]  Date of Admission : 8/15/2020  08/22/20    CC: Follow up for NERY      Assessment and Plan   Oliguric severe NERY on CKD 4  -unknown etiology at this time( likely ATN from sepsis from resp source vs hemodynamics)  -  HD started on 8/18 due to worsening azotemia, AMS and hyperkalemia  - seen on HD this AM  - some UO overnight but not impressive. Cr up slightly. - will follow closely for signs of renal recovery  - improving UOP noted over last couple days. - strict I/O's  - renal diet  - daily weights    Hyperphosphatemia   - improved   - follow phosph daily    CKD IV w/ baseline Cr around 3 on 7/22  - likely due to longstanding HTN     Hypoxic resp failure/atypical pneumonia in CT chest w/ lung mets  - on Abx and O2  - CT chest: There are bilateral pleural effusions. There are multifocal areas of airspace disease which may represent atypical infection. Pulmonary nodules are obscured by the multifocal airspace disease except right lower lobe nodule measuring 9 mm which is stable.     HTN:  - BP stable off antihypertensives  - no ACE or ARBs for now       Anemia  - 2/2 CKD, malignancy?  - transfuse PRBCs PRN  to keep Hb>7    DM2:     Hx of thyroid cancer w/ lung mets  ?treatment     Interval History:   Much better today  Awake, oriented  On NC  Seen on HD this AM.  Course reviewed w/ HD nurse. No treatment changes made.     Review of Systems:   No CP  better from SOB    Current Medications:   Current Facility-Administered Medications   Medication Dose Route Frequency    [START ON 8/23/2020] pantoprazole (PROTONIX) tablet 40 mg  40 mg Oral ACB    uric acid reminder note  1 Each Other Q12H    heparin (porcine) 1,000 unit/mL injection 2,500 Units  2,500 Units Hemodialysis DIALYSIS PRN    albumin human 25% (BUMINATE) solution 12.5 g  12.5 g IntraVENous PRN    alcohol 62% (NOZIN) nasal  1 Ampule  1 Ampule Topical Q12H    morphine injection 4 mg  4 mg IntraVENous Q4H PRN    HYDROcodone-acetaminophen (NORCO) 7.5-325 mg per tablet 1 Tab  1 Tab Oral Q4H PRN    polyethylene glycol (MIRALAX) packet 17 g  17 g Oral BID    melatonin tablet 3 mg  3 mg Oral QHS PRN    LORazepam (ATIVAN) tablet 1 mg  1 mg Oral Q6H PRN    levothyroxine (SYNTHROID) tablet 200 mcg  200 mcg Oral 6am    atorvastatin (LIPITOR) tablet 10 mg  10 mg Oral QHS    sodium chloride (NS) flush 5-40 mL  5-40 mL IntraVENous Q8H    sodium chloride (NS) flush 5-40 mL  5-40 mL IntraVENous PRN    acetaminophen (TYLENOL) tablet 650 mg  650 mg Oral Q6H PRN    Or    acetaminophen (TYLENOL) suppository 650 mg  650 mg Rectal Q6H PRN    promethazine (PHENERGAN) tablet 12.5 mg  12.5 mg Oral Q6H PRN    Or    ondansetron (ZOFRAN) injection 4 mg  4 mg IntraVENous Q6H PRN    sucralfate (CARAFATE) tablet 1 g  1 g Oral AC&HS    insulin lispro (HUMALOG) injection   SubCUTAneous AC&HS    glucose chewable tablet 16 g  4 Tab Oral PRN    dextrose (D50W) injection syrg 12.5-25 g  12.5-25 g IntraVENous PRN    glucagon (GLUCAGEN) injection 1 mg  1 mg IntraMUSCular PRN    albuterol (PROVENTIL HFA, VENTOLIN HFA, PROAIR HFA) inhaler 2 Puff  2 Puff Inhalation Q4H PRN      Allergies   Allergen Reactions    Anesthetics - Amide Type Shortness of Breath    Flexeril [Cyclobenzaprine] Hives    Tramadol Hives       Objective:  Vitals:    Vitals:    08/22/20 1115 08/22/20 1130 08/22/20 1145 08/22/20 1200   BP: 138/50 149/64 161/62 148/55   Pulse: 63 66 67 63   Resp: 15 13 14 15   Temp:       TempSrc:       SpO2: 97% 96% 96% 98%   Weight:       Height:         Intake and Output:  08/22 0701 - 08/22 1900  In: 240 [P.O.:240]  Out: -   08/20 1901 - 08/22 0700  In: 250 [P.O.:200;  I.V.:50]  Out: 9715 [Urine:375]    Physical Examination:  General:AAOx3, in NAD, obese  HEENT: AT/NC  Neck:Supple,no JVD  Lungs: on NC, satting well  CVS:RRR, S1 S2 normal  Abdomen: obese, Soft, no tenderness   Extremities:moves all,no LE edema  Skin:No rash or lesions seen   HD access: RIJ non-TDC    [x]    High complexity decision making was performed  [x]    Patient is at high-risk of decompensation with multiple organ involvement    Lab Data Personally Reviewed: I have reviewed all the pertinent labs, microbiology data and radiology studies during assessment.     Recent Labs     08/22/20 0527 08/21/20 0305 08/20/20 0446    138 137   K 4.3 4.0 4.3    103 103   CO2 32 31 26   GLU 79 70 176*   BUN 46* 32* 50*   CREA 3.08* 2.74* 4.36*   CA 8.7 8.4* 8.2*   MG 2.5* 2.0  --    PHOS 4.0 3.6 5.1*   ALB 2.6* 2.7* 3.0*   ALT  --  21  --      Recent Labs     08/22/20 0527 08/21/20 0305 08/20/20 0446   WBC 10.2 10.1 11.2*   HGB 7.8* 7.9* 7.5*   HCT 25.6* 24.5* 23.6*    267 311     Lab Results   Component Value Date/Time    Specimen Description: BLOOD 11/09/2010 09:40 AM     Recent Results (from the past 24 hour(s))   GLUCOSE, POC    Collection Time: 08/21/20 12:16 PM   Result Value Ref Range    Glucose (POC) 101 (H) 65 - 100 mg/dL    Performed by Joselin Baig    URIC ACID    Collection Time: 08/21/20  2:34 PM   Result Value Ref Range    Uric acid 0.6 (L) 3.5 - 7.2 MG/DL   GLUCOSE, POC    Collection Time: 08/21/20  4:04 PM   Result Value Ref Range    Glucose (POC) 99 65 - 100 mg/dL    Performed by Salomon Guillen, POC    Collection Time: 08/21/20 10:54 PM   Result Value Ref Range    Glucose (POC) 101 (H) 65 - 100 mg/dL    Performed by Mariah Mccartney    URIC ACID    Collection Time: 08/22/20  5:27 AM   Result Value Ref Range    Uric acid 1.1 (L) 3.5 - 7.2 MG/DL   METABOLIC PANEL, BASIC    Collection Time: 08/22/20  5:27 AM   Result Value Ref Range    Sodium 140 136 - 145 mmol/L    Potassium 4.3 3.5 - 5.1 mmol/L    Chloride 104 97 - 108 mmol/L    CO2 32 21 - 32 mmol/L Anion gap 4 (L) 5 - 15 mmol/L    Glucose 79 65 - 100 mg/dL    BUN 46 (H) 6 - 20 MG/DL    Creatinine 3.08 (H) 0.70 - 1.30 MG/DL    BUN/Creatinine ratio 15 12 - 20      GFR est AA 25 (L) >60 ml/min/1.73m2    GFR est non-AA 21 (L) >60 ml/min/1.73m2    Calcium 8.7 8.5 - 10.1 MG/DL   MAGNESIUM    Collection Time: 08/22/20  5:27 AM   Result Value Ref Range    Magnesium 2.5 (H) 1.6 - 2.4 mg/dL   PHOSPHORUS    Collection Time: 08/22/20  5:27 AM   Result Value Ref Range    Phosphorus 4.0 2.6 - 4.7 MG/DL   CBC W/O DIFF    Collection Time: 08/22/20  5:27 AM   Result Value Ref Range    WBC 10.2 4.1 - 11.1 K/uL    RBC 2.76 (L) 4.10 - 5.70 M/uL    HGB 7.8 (L) 12.1 - 17.0 g/dL    HCT 25.6 (L) 36.6 - 50.3 %    MCV 92.8 80.0 - 99.0 FL    MCH 28.3 26.0 - 34.0 PG    MCHC 30.5 30.0 - 36.5 g/dL    RDW 13.7 11.5 - 14.5 %    PLATELET 196 826 - 171 K/uL    MPV 9.9 8.9 - 12.9 FL    NRBC 0.0 0  WBC    ABSOLUTE NRBC 0.00 0.00 - 0.01 K/uL   ALBUMIN    Collection Time: 08/22/20  5:27 AM   Result Value Ref Range    Albumin 2.6 (L) 3.5 - 5.0 g/dL   PROTEIN, TOTAL    Collection Time: 08/22/20  5:27 AM   Result Value Ref Range    Protein, total 6.4 6.4 - 8.2 g/dL   GLUCOSE, POC    Collection Time: 08/22/20  7:49 AM   Result Value Ref Range    Glucose (POC) 88 65 - 100 mg/dL    Performed by Lana GUZMAN (TR)    GLUCOSE, POC    Collection Time: 08/22/20 11:34 AM   Result Value Ref Range    Glucose (POC) 82 65 - 100 mg/dL    Performed by Rhoderick Mahi BSN (TRV)            I have reviewed the flowsheets. Chart and Pertinent Notes have been reviewed. No change in PMH ,family and social history from Consult note.       Tera Molina MD

## 2020-08-22 NOTE — PROCEDURES
[facility] Dialysis Team [facility]  [phone]    Vitals   Pre   Post   Assessment   Pre   Post     Temp  Temp: 98.4 °F (36.9 °C) (08/22/20 0943)  98.8 LOC  A&OX2-3 A&OX2-3   HR   Pulse (Heart Rate): 72 (08/22/20 0943) 69 Lungs   CTA, diminished CTA, diminished   B/P   BP: 147/52 (08/22/20 0943) 155/65 Cardiac   Bedside telemetry, NSR per primary RN, HRR, S1 S2 present HRR, S1 S2 present   Resp   Resp Rate: 16 (08/22/20 0943) 16 Skin   Warm, dry, and intact Warm, dry, and intact   Pain level  Pain Intensity 1: 0 (08/22/20 0800) 0 Edema  None noted     None noted   Orders:    Duration:   Start:    09:43 End:    12:43 Total:   3 hrs   Dialyzer:   Dialyzer/Set Up Inspection: Andrew Kraus (08/22/20 0943)   Annmarie Ritchie Bath:   Dialysate K (mEq/L): 3 (08/22/20 0943)   Ca Bath:   Dialysate CA (mEq/L): 2.5 (08/22/20 0943)   Na/Bicarb:   Dialysate NA (mEq/L): 140 (08/22/20 0943)   Target Fluid Removal:   Goal/Amount of Fluid to Remove (mL): 0 mL (08/22/20 0943)   Access     Type & Location:   RIJ CVC: Dressing clean and dry last changed 08/20/2020. No s/s of infection. Both lumens aspirate & flush well. Running well in reverse at . Dressing change performed at end of tx using sterile technique.    Labs     Obtained/Reviewed   Critical Results Called   Date when labs were drawn-  Hgb-    HGB   Date Value Ref Range Status   08/22/2020 7.8 (L) 12.1 - 17.0 g/dL Final     K-    Potassium   Date Value Ref Range Status   08/22/2020 4.3 3.5 - 5.1 mmol/L Final     Ca-   Calcium   Date Value Ref Range Status   08/22/2020 8.7 8.5 - 10.1 MG/DL Final     Bun-   BUN   Date Value Ref Range Status   08/22/2020 46 (H) 6 - 20 MG/DL Final     Creat-   Creatinine   Date Value Ref Range Status   08/22/2020 3.08 (H) 0.70 - 1.30 MG/DL Final        Medications/ Blood Products Given     Name   Dose   Route and Time     Heparin 1000 units/ 1 ml  2500 units 1.1 ml/ 1100 units to dwell in arterial lumen of HD CVC at 12:43  1.4 ml/ 1400 units to dwell in venous lumen of HD CVC at 12:43             Blood Volume Processed (BVP):   60 L Net Fluid   Removed:  0 ml   Comments   Time Out Done: 09:40  Primary Nurse Rpt Pre: Henrique Platt RN  Primary Nurse Rpt Post: Henrique Platt RN  Pt Education: procedural, PPE  Care Plan: ongoing  Tx Summary:  SBAR received from Primary RN Henrique Platt. Arrived to patient room set up and tested machine and water per policy. Patient A&Ox2-3, some confusion noted. Consent signed & on file. 09:43- Each catheter limb disinfected for 60 seconds per limb with alcohol swabs. Caps removed, dialysis CVC hub scrubbed with Prevantics for 5 seconds, followed by a 5 second dry time per Hospital P&P. Each lumen aspirated for blood return and flushed with Normal Saline per policy. Labs drawn per request/ order. VSS. Dialysis Tx initiated. Dialysis access visualized and lines intact. 09:45- Patient arterial pressures fluctuating below -260, lines reversed. Catheter now running well at .  10:00- Patient resting quietly, VSS. Dialysis access visualized and lines intact. 10:30- Patient resting quietly, VSS. Dialysis access visualized and lines intact. 11:00- Patient resting quietly, VSS. Dialysis access visualized and lines intact. 11:30- Patient resting quietly, VSS. Dialysis access visualized and lines intact. 12:00- Patient resting quietly, VSS. Dialysis access visualized and lines intact. 12:05- MD Peppiat at bedside. 12:30- Patient resting quietly, VSS. Dialysis access visualized and lines intact. 12:43-Tx ended. VSS. All possible blood returned to patient. Central line catheter flushed with normal saline per policy. Each catheter limb disinfected for 60 seconds per limb with alcohol swabs. Dialysis CVC hubs scrubbed with Prevantics for 5 seconds, followed by a 5 second dry time per Hospital P&P, Heparin dwells instilled, and red and blue dialysis caps applied to ports using aseptic technique.  Bed locked and in the lowest position, call bell and belongings in reach. SBAR given to Primary, RN [facility]. Patient is stable at time of my departure. All Dialysis related medications have been reviewed. Admiting Diagnosis: SOB/ NERY  Pt's previous clinic- n/a  Consent signed - Informed Consent Verified: Yes (08/22/20 3461)  Robi Consent - on file  Hepatitis Status- HbAg negative 08/18/2020, HbAB susceptible  Machine #- Machine Number: [id number] (08/22/20 5610)  Telemetry status- bedside, NSR  Pre-dialysis wt. - Pre-Dialysis Weight: 113.5 kg (250 lb 3.6 oz) (08/18/20 1830)

## 2020-08-23 LAB
ALBUMIN SERPL-MCNC: 2.6 G/DL (ref 3.5–5)
ANION GAP SERPL CALC-SCNC: 4 MMOL/L (ref 5–15)
BUN SERPL-MCNC: 28 MG/DL (ref 6–20)
BUN/CREAT SERPL: 12 (ref 12–20)
CALCIUM SERPL-MCNC: 8.2 MG/DL (ref 8.5–10.1)
CHLORIDE SERPL-SCNC: 103 MMOL/L (ref 97–108)
CO2 SERPL-SCNC: 32 MMOL/L (ref 21–32)
CREAT SERPL-MCNC: 2.25 MG/DL (ref 0.7–1.3)
GLUCOSE BLD STRIP.AUTO-MCNC: 102 MG/DL (ref 65–100)
GLUCOSE BLD STRIP.AUTO-MCNC: 114 MG/DL (ref 65–100)
GLUCOSE BLD STRIP.AUTO-MCNC: 86 MG/DL (ref 65–100)
GLUCOSE BLD STRIP.AUTO-MCNC: 95 MG/DL (ref 65–100)
GLUCOSE SERPL-MCNC: 99 MG/DL (ref 65–100)
PHOSPHATE SERPL-MCNC: 2.4 MG/DL (ref 2.6–4.7)
POTASSIUM SERPL-SCNC: 4 MMOL/L (ref 3.5–5.1)
SERVICE CMNT-IMP: ABNORMAL
SERVICE CMNT-IMP: ABNORMAL
SERVICE CMNT-IMP: NORMAL
SERVICE CMNT-IMP: NORMAL
SODIUM SERPL-SCNC: 139 MMOL/L (ref 136–145)
URATE SERPL-MCNC: 1.3 MG/DL (ref 3.5–7.2)

## 2020-08-23 PROCEDURE — 82962 GLUCOSE BLOOD TEST: CPT

## 2020-08-23 PROCEDURE — 36415 COLL VENOUS BLD VENIPUNCTURE: CPT

## 2020-08-23 PROCEDURE — 97165 OT EVAL LOW COMPLEX 30 MIN: CPT

## 2020-08-23 PROCEDURE — 74011250636 HC RX REV CODE- 250/636: Performed by: INTERNAL MEDICINE

## 2020-08-23 PROCEDURE — 74011250637 HC RX REV CODE- 250/637: Performed by: INTERNAL MEDICINE

## 2020-08-23 PROCEDURE — 74011250637 HC RX REV CODE- 250/637: Performed by: HOSPITALIST

## 2020-08-23 PROCEDURE — 80069 RENAL FUNCTION PANEL: CPT

## 2020-08-23 PROCEDURE — 94760 N-INVAS EAR/PLS OXIMETRY 1: CPT

## 2020-08-23 PROCEDURE — 74011000250 HC RX REV CODE- 250: Performed by: HOSPITALIST

## 2020-08-23 PROCEDURE — 65660000000 HC RM CCU STEPDOWN

## 2020-08-23 PROCEDURE — 74011250636 HC RX REV CODE- 250/636: Performed by: HOSPITALIST

## 2020-08-23 PROCEDURE — 84550 ASSAY OF BLOOD/URIC ACID: CPT

## 2020-08-23 PROCEDURE — 97530 THERAPEUTIC ACTIVITIES: CPT

## 2020-08-23 PROCEDURE — 77010033678 HC OXYGEN DAILY

## 2020-08-23 RX ORDER — HYDRALAZINE HYDROCHLORIDE 20 MG/ML
10 INJECTION INTRAMUSCULAR; INTRAVENOUS
Status: DISCONTINUED | OUTPATIENT
Start: 2020-08-23 | End: 2020-08-27 | Stop reason: HOSPADM

## 2020-08-23 RX ORDER — MORPHINE SULFATE 2 MG/ML
1 INJECTION, SOLUTION INTRAMUSCULAR; INTRAVENOUS
Status: DISCONTINUED | OUTPATIENT
Start: 2020-08-23 | End: 2020-08-27 | Stop reason: HOSPADM

## 2020-08-23 RX ADMIN — MORPHINE SULFATE 1 MG: 2 INJECTION, SOLUTION INTRAMUSCULAR; INTRAVENOUS at 09:21

## 2020-08-23 RX ADMIN — SUCRALFATE 1 G: 1 TABLET ORAL at 12:18

## 2020-08-23 RX ADMIN — SUCRALFATE 1 G: 1 TABLET ORAL at 09:04

## 2020-08-23 RX ADMIN — SODIUM CHLORIDE 10 ML: 9 INJECTION, SOLUTION INTRAMUSCULAR; INTRAVENOUS; SUBCUTANEOUS at 16:57

## 2020-08-23 RX ADMIN — Medication 1 AMPULE: at 09:00

## 2020-08-23 RX ADMIN — MORPHINE SULFATE 1 MG: 2 INJECTION, SOLUTION INTRAMUSCULAR; INTRAVENOUS at 16:58

## 2020-08-23 RX ADMIN — SODIUM CHLORIDE 10 ML: 9 INJECTION, SOLUTION INTRAMUSCULAR; INTRAVENOUS; SUBCUTANEOUS at 02:49

## 2020-08-23 RX ADMIN — ONDANSETRON 4 MG: 2 INJECTION INTRAMUSCULAR; INTRAVENOUS at 14:02

## 2020-08-23 RX ADMIN — SUCRALFATE 1 G: 1 TABLET ORAL at 21:53

## 2020-08-23 RX ADMIN — SODIUM CHLORIDE 10 ML: 9 INJECTION, SOLUTION INTRAMUSCULAR; INTRAVENOUS; SUBCUTANEOUS at 06:39

## 2020-08-23 RX ADMIN — ATORVASTATIN CALCIUM 10 MG: 20 TABLET, FILM COATED ORAL at 21:52

## 2020-08-23 RX ADMIN — SODIUM CHLORIDE 10 ML: 9 INJECTION, SOLUTION INTRAMUSCULAR; INTRAVENOUS; SUBCUTANEOUS at 21:53

## 2020-08-23 RX ADMIN — MORPHINE SULFATE 4 MG: 2 INJECTION, SOLUTION INTRAMUSCULAR; INTRAVENOUS at 02:48

## 2020-08-23 RX ADMIN — LEVOTHYROXINE SODIUM 200 MCG: 0.15 TABLET ORAL at 06:39

## 2020-08-23 RX ADMIN — PANTOPRAZOLE SODIUM 40 MG: 40 TABLET, DELAYED RELEASE ORAL at 09:04

## 2020-08-23 RX ADMIN — MORPHINE SULFATE 1 MG: 2 INJECTION, SOLUTION INTRAMUSCULAR; INTRAVENOUS at 22:01

## 2020-08-23 NOTE — PROGRESS NOTES
Hospitalist Progress Note    NAME: Jarod Cervantes   :  1960   MRN:  [id number]       Assessment / Plan:    Acute hypoxic respiratory failure, POA  Acute on chronic diastolic heart failure, POA  Community-acquired pneumonia, likely bacterial, POA  - COVID-19 test negative on admission  - completed antibiotics,  Last day   - on NC today 4L o2 Sat 98%  - transferred from ICU   - blood cultures NGT  -ECHO this admission   · LV: Estimated LVEF is 65 - 70%. Visually measured ejection fraction. Normal cavity size and systolic function (ejection fraction normal). Moderate concentric hypertrophy. Wall motion: normal. Age-appropriate left ventricular diastolic function. · PA: Pulmonary arterial systolic pressure is 35 mmHg. · MV: Trace mitral valve regurgitation is present. · LA: Mildly dilated left atrium. Acute Metabolic encephalopathy  - ABG revealed respiratory acidosis, hypoxemia  -Avoid all sedating medications  -resolved, cont to be alert and awake  -2/2  Worsening kidney function . Acute kidney disease, worsening, POA  Hyperkalemia with junctional rhythm on EKG  Hypophosphatemia  -CKD stage IV  - Kidney functions a little better today   - s/p hemodialysis started   - Status post calcium gluconate, regular insulin, D50 for Hyper K . K has been stable  -UO is still minimum   - Renal ultrasound with renal cortical thinning, no overt hydronephrosis  -uric acid level are elevated on  but trending down.  heme-onc's input is appreciated, on rasburicase treatment    Acute on chronic anemia,.   History of duodenitis, POA  -EGD 2020 revealed Padmini-Metzger tear, duodenitis, duodenal polyp, gastric polyps, and dilation of the esophagus  -Hemoglobin on the lower side but has been stable,   -transfuse if Hbg 7 or less    Bradycardia  -Heart rate around 40s to 50s on   -Likely secondary to hyperkalemia  - Treatment as above  -EKG with no AV block  -resolved    Diabetes mellitus type 2  -Hemoglobin A1c 6.3% in July 2020    History of thyroid cancer with mets to lung  -Follows Dr. Petar Smith  -Hypothyroidsim-Continue levothyroxine     Essential HTN POA-hold home medications given hypotension at this time    Code Status: full  Surrogate Decision Maker:  Valeria Coto  Daughter  [phone]          DVT Prophylaxis: SCDs  GI Prophylaxis: not indicated     Baseline: independent       Subjective:     Chief Complaint / Reason for Physician Visit  Patient is lying in bed,     Awake and following all commands. Patient was seen and examined. No acute events overnight. \"doing very well\"       \"doing well but tired\"    Review of Systems:  Symptom Y/N Comments  Symptom Y/N Comments   Fever/Chills n   Chest Pain n    Poor Appetite    Edema     Cough n   Abdominal Pain n    Sputum    Joint Pain     SOB/NUÑEZ n   Pruritis/Rash     Nausea/vomit n   Tolerating PT/OT     Diarrhea    Tolerating Diet     Constipation    Other       Could NOT obtain due to:          Objective:     VITALS:   Last 24hrs VS reviewed since prior progress note.  Most recent are:  Patient Vitals for the past 24 hrs:   Temp Pulse Resp BP SpO2   08/23/20 1038 98.1 °F (36.7 °C) 72 16 181/72 98 %   08/23/20 0719 98.1 °F (36.7 °C) 72 16 172/61 98 %   08/23/20 0304 98.4 °F (36.9 °C) 74 19 164/58 100 %   08/22/20 2245 98.5 °F (36.9 °C) 71 18 126/58 95 %   08/22/20 1934 97.6 °F (36.4 °C) 66 17 119/63 94 %   08/22/20 1600 -- 67 16 127/52 91 %   08/22/20 1500 -- 76 18 160/71 90 %   08/22/20 1400 -- 75 19 138/57 91 %   08/22/20 1300 -- 71 19 114/49 95 %   08/22/20 1243 -- 69 16 155/65 97 %   08/22/20 1230 -- 67 18 148/58 95 %   08/22/20 1215 -- 63 14 154/68 96 %   08/22/20 1200 98.8 °F (37.1 °C) 63 15 148/55 98 %   08/22/20 1145 -- 67 14 161/62 96 %   08/22/20 1130 -- 66 13 149/64 96 %   08/22/20 1115 -- 63 15 138/50 97 %       Intake/Output Summary (Last 24 hours) at 8/23/2020 1103  Last data filed at 8/23/2020 0910  Gross per 24 hour   Intake 120 ml   Output 550 ml   Net -430 ml        PHYSICAL EXAM:  General: awake, following commands, no acute distress    EENT:  EOMI. Anicteric sclerae. MMM  Resp:  CTA bilaterally, no wheezing or rales. No accessory muscle use  CV:  Regular  rhythm,  No edema  GI:  Soft, Non distended, Non tender.  +Bowel sounds  Neurologic:  Move all extremities, following commands, normal speech, no focal deficit   Psych:   Good insight. Not anxious nor agitated  Skin:  No rashes. No jaundice    Reviewed most current lab test results and cultures  YES  Reviewed most current radiology test results   YES  Review and summation of old records today    NO  Reviewed patient's current orders and MAR    YES  PMH/SH reviewed - no change compared to H&P  ________________________________________________________________________  Care Plan discussed with:    Comments   Patient x    Family      RN x    Care Manager     Consultant                        Multidiciplinary team rounds were held today with , nursing, pharmacist and clinical coordinator. Patient's plan of care was discussed; medications were reviewed and discharge planning was addressed. ______________________________________________________________________      Comments   >50% of visit spent in counseling and coordination of care x    ________________________________________________________________________  Vladimir Fuentes MD     Procedures: see electronic medical records for all procedures/Xrays and details which were not copied into this note but were reviewed prior to creation of Plan. LABS:  I reviewed today's most current labs and imaging studies.   Pertinent labs include:  Recent Labs     08/22/20  0527 08/21/20  0305   WBC 10.2 10.1   HGB 7.8* 7.9*   HCT 25.6* 24.5*    267     Recent Labs     08/23/20  0252 08/22/20 0527 08/21/20  0305    140 138   K 4.0 4.3 4.0    104 103   CO2 32 32 31   GLU 99 79 70 BUN 28* 46* 32*   CREA 2.25* 3.08* 2.74*   CA 8.2* 8.7 8.4*   MG  --  2.5* 2.0   PHOS 2.4* 4.0 3.6   ALB 2.6* 2.6* 2.7*   TBILI  --   --  0.6   ALT  --   --  21       Signed: Js Siddiqi MD

## 2020-08-23 NOTE — PROGRESS NOTES
Problem: Self Care Deficits Care Plan (Adult)  Goal: *Acute Goals and Plan of Care (Insert Text)  Description:   FUNCTIONAL STATUS PRIOR TO ADMISSION: Patient was independent and active without use of DME.     HOME SUPPORT: The patient lived  with brother and needed no assist.    Occupational Therapy Goals  Initiated 8/23/2020  1. Patient will perform grooming with independence within 7 day(s). 2.  Patient will perform bathing with independence within 7 day(s). 3.  Patient will perform lower body dressing with independence within 7 day(s). 4.  Patient will perform toilet transfers with independence within 7 day(s). 5.  Patient will perform all aspects of toileting with independence within 7 day(s). 6.  Patient will participate in upper extremity therapeutic exercise/activities with independence for 10 minutes within 7 day(s). 7.  Patient will utilize energy conservation techniques during functional activities with verbal cues within 7 day(s). Outcome: Not Met     OCCUPATIONAL THERAPY EVALUATION  Patient: Helen Zamora (38 y.o. male)  Date: 8/23/2020  Primary Diagnosis: Pneumonia [J18.9]  Suspected COVID-19 virus infection [Z20.828]  NERY (acute kidney injury) ([facility] [N17.9]  Anemia [D64.9]  Lung cancer ([facility] [C34.90]  Chest pain [R07.9]        Precautions: fall       ASSESSMENT  Based on the objective data described below, the patient presents with decreased endurance and strength. Currently on 4 Lpm. Sats in high to mid 90's throughout session. Patient steadier with use of RW needing SBA with it and CGA otherwise. Educated on energy conservation. As medically improves will likely only need HH at discharge. Current Level of Function Impacting Discharge (ADLs/self-care): Min A    Functional Outcome Measure: The patient scored Total: 60/100 on the Barthel Index outcome measure which is indicative of 40% impaired ability to care for basic self needs/dependency on others.       Other factors to consider for discharge: none     Patient will benefit from skilled therapy intervention to address the above noted impairments. PLAN :  Recommendations and Planned Interventions: self care training, functional mobility training, therapeutic exercise, balance training, therapeutic activities, endurance activities, patient education, home safety training, and family training/education    Frequency/Duration: Patient will be followed by occupational therapy 4 times a week to address goals. Recommendation for discharge: (in order for the patient to meet his/her long term goals)  Occupational therapy at least 2 days/week in the home     This discharge recommendation:  A follow-up discussion with the attending provider and/or case management is planned    IF patient discharges home will need the following DME: none       SUBJECTIVE:   Patient stated I want a diet pepsi, but I can't have it because of the thickener.     OBJECTIVE DATA SUMMARY:   HISTORY:   Past Medical History:   Diagnosis Date    Adverse effect of anesthesia     \" STOP BREATHING 1 TIME C ANESTH\"    Calculus of kidney     Cancer ([address] 2004    thyroid cancer    Chronic kidney disease     Chronic pain     BACK SHOULDER AND ARM    Depression     Diabetes ([address]     Encounter for long-term (current) use of NSAIDs     Hypercholesterolemia     Hypertension     Nausea & vomiting     Other ill-defined conditions(799.89)     cholesterol, thyroid    Sleep apnea     doesn't wear cpap    Thyroid cancer ([address]     TIA (transient ischemic attack) 2011    Vitamin D deficiency      Past Surgical History:   Procedure Laterality Date    CARDIAC SURG PROCEDURE UNLIST      HX HEENT      THROAT SURGERY X 4    HX ORTHOPAEDIC      back     HX ORTHOPAEDIC      ARM AND SHOULDER    HX OTHER SURGICAL      thyroid, lymphnode    HX RETINAL DETACHMENT REPAIR      left eye    IR INSERT NON TUNL CVC OVER 5 YRS  8/18/2020    UPPER GI ENDOSCOPY,BALL DIL,30MM  7/17/2020         UPPER GI ENDOSCOPY,BIOPSY  2020         VASCULAR SURGERY PROCEDURE UNLIST      cardiac cath NEG. Expanded or extensive additional review of patient history:     Home Situation  Home Environment: Private residence  # Steps to Enter: 3  Rails to Enter: No(post at the [employer])  One/Two Story Residence: One story  Living Alone: No  Support Systems: Child(puneet)  Patient Expects to be Discharged to[de-identified] Private residence  Current DME Used/Available at Home: None  Tub or Shower Type: Tub/Shower combination    Hand dominance: Right    EXAMINATION OF PERFORMANCE DEFICITS:  Cognitive/Behavioral Status:  Neurologic State: Alert  Orientation Level: Oriented X4  Cognition: Follows commands             Skin: intact    Edema: general edema    Hearing: Auditory  Auditory Impairment: None    Vision/Perceptual:                           Acuity: Impaired near vision; Impaired far vision         Range of Motion:    AROM: Generally decreased, functional                         Strength:    Strength: Generally decreased, functional                Coordination:  Coordination: Within functional limits  Fine Motor Skills-Upper: Left Intact; Right Intact    Gross Motor Skills-Upper: Left Intact; Right Intact    Tone & Sensation:    Tone: Normal                         Balance:  Sitting: Intact  Standing: Impaired  Standing - Static: Fair  Standing - Dynamic : Fair;Constant support    Functional Mobility and Transfers for ADLs:  Bed Mobility:  Supine to Sit: Supervision    Transfers:  Sit to Stand: Contact guard assistance  Stand to Sit: Contact guard assistance    ADL Assessment:  Feeding: Independent    Oral Facial Hygiene/Grooming: Independent    Bathing: Minimum assistance    Upper Body Dressing: Setup    Lower Body Dressing: Minimum assistance    Toileting: Contact guard assistance                ADL Intervention and task modifications:                                           Functional Measure:  Barthel Index:    Bathin  Bladder: 10  Bowels: 10  Groomin  Dressin  Feeding: 10  Mobility: 0  Stairs: 5  Toilet Use: 5  Transfer (Bed to Chair and Back): 10  Total: 60/100        The Barthel ADL Index: Guidelines  1. The index should be used as a record of what a patient does, not as a record of what a patient could do. 2. The main aim is to establish degree of independence from any help, physical or verbal, however minor and for whatever reason. 3. The need for supervision renders the patient not independent. 4. A patient's performance should be established using the best available evidence. Asking the patient, friends/relatives and nurses are the usual sources, but direct observation and common sense are also important. However direct testing is not needed. 5. Usually the patient's performance over the preceding 24-48 hours is important, but occasionally longer periods will be relevant. 6. Middle categories imply that the patient supplies over 50 per cent of the effort. 7. Use of aids to be independent is allowed. Yesenia Vela., Barthel, D.W. ([id number]). Functional evaluation: the Barthel Index. [facility] (14)2. MARY KAY Sethi, Bill Berry., Emily Mckeon., [place], [address] [place] [address] (). Measuring the change indisability after inpatient rehabilitation; comparison of the responsiveness of the Barthel Index and Functional [place] Measure. Journal of Neurology, Neurosurgery, and Psychiatry, 66(4), 116-604. Veronica Fernández, N.J.A, TITA MartinezJ.SOPHIE, & Phyllis Ambrose M.A. (2004.) Assessment of post-stroke quality of life in cost-effectiveness studies: The usefulness of the Barthel Index and the EuroQoL-5D.  Quality of Life Research, 15, 798-80         Occupational Therapy Evaluation Charge Determination   History Examination Decision-Making   LOW Complexity : Brief history review  LOW Complexity : 1-3 performance deficits relating to physical, cognitive , or psychosocial skils that result in activity limitations and / or participation restrictions  LOW Complexity : No comorbidities that affect functional and no verbal or physical assistance needed to complete eval tasks       Based on the above components, the patient evaluation is determined to be of the following complexity level: LOW   Pain Rating:  No pain    Activity Tolerance:   desaturates with exertion and requires oxygen  Please refer to the flowsheet for vital signs taken during this treatment. After treatment patient left in no apparent distress:    Seated EOB    COMMUNICATION/EDUCATION:   The patients plan of care was discussed with: Registered nurse. Home safety education was provided and the patient/caregiver indicated understanding., Patient/family have participated as able in goal setting and plan of care. , and Patient/family agree to work toward stated goals and plan of care. This patients plan of care is appropriate for delegation to [facility].     Thank you for this referral.  Bhanu Castaneda  Time Calculation: 14 mins

## 2020-08-23 NOTE — PROGRESS NOTES
1907 Bedside report, Ax3 no s/s of acute distress noted. 0715 Bedside and Verbal shift change report given to Char MATA (oncoming nurse) by Mendoza Fitch (offgoing nurse). Report included the following information SBAR, Kardex, Intake/Output, MAR, Accordion, Recent Results, Med Rec Status, Cardiac Rhythm SB and Quality Measures.

## 2020-08-23 NOTE — PROGRESS NOTES
Bedside shift change report given to [address] [place] [address] Tess (oncoming nurse) by Kami Bundy RN (offgoing nurse). Report included the following information SBAR, Kardex, Procedure Summary, Intake/Output, MAR and Recent Results.

## 2020-08-23 NOTE — PROGRESS NOTES
Problem: Falls - Risk of  Goal: *Absence of Falls  Description: [facility] and appropriate interventions in the flowsheet. Outcome: Progressing Towards Goal  Note: Fall Risk Interventions:  Mobility Interventions: Communicate number of staff needed for ambulation/transfer, Patient to call before getting OOB    Mentation Interventions: Adequate sleep, hydration, pain control, Door open when patient unattended, More frequent rounding, Room close to nurse's station, Toileting rounds, Update white board    Medication Interventions: Patient to call before getting OOB, Teach patient to arise slowly    Elimination Interventions: Call light in reach, Stay With Me (per policy), Toilet paper/wipes in reach, Toileting schedule/hourly rounds, Urinal in reach              Problem: Patient Education: Go to Patient Education Activity  Goal: Patient/Family Education  Outcome: Progressing Towards Goal     Problem: Diabetes Self-Management  Goal: *Disease process and treatment process  Description: Define diabetes and identify own type of diabetes; list 3 options for treating diabetes. Outcome: Progressing Towards Goal  Goal: *Incorporating nutritional management into lifestyle  Description: Describe effect of type, amount and timing of food on blood glucose; list 3 methods for planning meals. Outcome: Progressing Towards Goal  Goal: *Incorporating physical activity into lifestyle  Description: State effect of exercise on blood glucose levels. Outcome: Progressing Towards Goal  Goal: *Developing strategies to promote health/change behavior  Description: Define the ABC's of diabetes; identify appropriate screenings, schedule and personal plan for screenings. Outcome: Progressing Towards Goal  Goal: *Using medications safely  Description: State effect of diabetes medications on diabetes; name diabetes medication taking, action and side effects.   Outcome: Progressing Towards Goal  Goal: *Monitoring blood glucose, interpreting and using results  Description: Identify recommended blood glucose targets  and personal targets. Outcome: Progressing Towards Goal  Goal: *Prevention, detection, treatment of acute complications  Description: List symptoms of hyper- and hypoglycemia; describe how to treat low blood sugar and actions for lowering  high blood glucose level. Outcome: Progressing Towards Goal  Goal: *Prevention, detection and treatment of chronic complications  Description: Define the natural course of diabetes and describe the relationship of blood glucose levels to long term complications of diabetes. Outcome: Progressing Towards Goal  Goal: *Developing strategies to address psychosocial issues  Description: Describe feelings about living with diabetes; identify support needed and support network  Outcome: Progressing Towards Goal     Problem: Patient Education: Go to Patient Education Activity  Goal: Patient/Family Education  Outcome: Progressing Towards Goal     Problem: Pressure Injury - Risk of  Goal: *Prevention of pressure injury  Description: Document Ge Scale and appropriate interventions in the flowsheet.   Outcome: Progressing Towards Goal  Note: Pressure Injury Interventions:  Sensory Interventions: Avoid rigorous massage over bony prominences, Pressure redistribution bed/mattress (bed type)    Moisture Interventions: Maintain skin hydration (lotion/cream)    Activity Interventions: Pressure redistribution bed/mattress(bed type)    Mobility Interventions: Pressure redistribution bed/mattress (bed type)    Nutrition Interventions: Document food/fluid/supplement intake    Friction and Shear Interventions: Foam dressings/transparent film/skin sealants, Apply protective barrier, creams and emollients, Minimize layers, Lift team/patient mobility team                Problem: Patient Education: Go to Patient Education Activity  Goal: Patient/Family Education  Outcome: Progressing Towards Goal     Problem: Patient Education: Go to Patient Education Activity  Goal: Patient/Family Education  Outcome: Progressing Towards Goal

## 2020-08-23 NOTE — PROGRESS NOTES
I talked with the patient's daughters over patient's face time and they were updated on the plan, all questions were answered.

## 2020-08-23 NOTE — PROGRESS NOTES
Problem: Falls - Risk of  Goal: *Absence of Falls  Description: Document Michelle Hoyt Fall Risk and appropriate interventions in the flowsheet.   Outcome: Progressing Towards Goal  Note: Fall Risk Interventions:  Mobility Interventions: Bed/chair exit alarm, Patient to call before getting OOB, Utilize walker, cane, or other assistive device, Strengthening exercises (ROM-active/passive)    Mentation Interventions: Bed/chair exit alarm, Adequate sleep, hydration, pain control, Door open when patient unattended, More frequent rounding, Reorient patient, Toileting rounds    Medication Interventions: Bed/chair exit alarm, Patient to call before getting OOB, Teach patient to arise slowly    Elimination Interventions: Call light in reach, Bed/chair exit alarm, Toileting schedule/hourly rounds

## 2020-08-23 NOTE — PROGRESS NOTES
RAPID RESPONSE TEAM- Follow Up    Rounded on patient due to recent transfer out of CCU. Admitted 8/15/20 with acute respiratory failure, CHF, PNA. Discussed with primary RNIsabella. No acute concerns, VSS, MEWS 1. Patient Vitals for the past 12 hrs:   Temp Pulse Resp BP SpO2   08/22/20 1934 97.6 °F (36.4 °C) 66 17 119/63 94 %   08/22/20 1600 -- 67 16 127/52 91 %   08/22/20 1500 -- 76 18 160/71 90 %   08/22/20 1400 -- 75 19 138/57 91 %   08/22/20 1300 -- 71 19 114/49 95 %   08/22/20 1243 -- 69 16 155/65 97 %   08/22/20 1230 -- 67 18 148/58 95 %   08/22/20 1215 -- 63 14 154/68 96 %   08/22/20 1200 98.8 °F (37.1 °C) 63 15 148/55 98 %   08/22/20 1145 -- 67 14 161/62 96 %   08/22/20 1130 -- 66 13 149/64 96 %   08/22/20 1115 -- 63 15 138/50 97 %   08/22/20 1100 -- 66 14 138/59 95 %   08/22/20 1045 -- 64 15 139/54 93 %   08/22/20 1030 -- 69 15 131/72 95 %   08/22/20 1015 -- 72 15 149/55 (!) 89 %   08/22/20 1000 -- 71 17 139/54 91 %   08/22/20 0945 -- 71 16 -- 94 %   08/22/20 0943 98.4 °F (36.9 °C) 72 16 147/52 96 %   08/22/20 0930 -- 73 16 147/50 (!) 89 %   08/22/20 0900 -- 79 18 164/63 90 %       No RRT interventions indicated at this time. Please call with any questions or concerns.      Teresa Carranza  Rapid Response ESPERANZA Rao

## 2020-08-24 LAB
ALBUMIN SERPL-MCNC: 2.3 G/DL (ref 3.5–5)
ANION GAP SERPL CALC-SCNC: 3 MMOL/L (ref 5–15)
BACTERIA SPEC CULT: NORMAL
BUN SERPL-MCNC: 26 MG/DL (ref 6–20)
BUN/CREAT SERPL: 11 (ref 12–20)
CALCIUM SERPL-MCNC: 8.2 MG/DL (ref 8.5–10.1)
CHLORIDE SERPL-SCNC: 104 MMOL/L (ref 97–108)
CO2 SERPL-SCNC: 32 MMOL/L (ref 21–32)
CREAT SERPL-MCNC: 2.37 MG/DL (ref 0.7–1.3)
ERYTHROCYTE [DISTWIDTH] IN BLOOD BY AUTOMATED COUNT: 13.8 % (ref 11.5–14.5)
GLUCOSE BLD STRIP.AUTO-MCNC: 105 MG/DL (ref 65–100)
GLUCOSE BLD STRIP.AUTO-MCNC: 118 MG/DL (ref 65–100)
GLUCOSE BLD STRIP.AUTO-MCNC: 119 MG/DL (ref 65–100)
GLUCOSE BLD STRIP.AUTO-MCNC: 147 MG/DL (ref 65–100)
GLUCOSE SERPL-MCNC: 111 MG/DL (ref 65–100)
HCT VFR BLD AUTO: 24.2 % (ref 36.6–50.3)
HGB BLD-MCNC: 7.8 G/DL (ref 12.1–17)
MCH RBC QN AUTO: 29.8 PG (ref 26–34)
MCHC RBC AUTO-ENTMCNC: 32.2 G/DL (ref 30–36.5)
MCV RBC AUTO: 92.4 FL (ref 80–99)
NRBC # BLD: 0 K/UL (ref 0–0.01)
NRBC BLD-RTO: 0 PER 100 WBC
PHOSPHATE SERPL-MCNC: 2.6 MG/DL (ref 2.6–4.7)
PLATELET # BLD AUTO: 208 K/UL (ref 150–400)
PMV BLD AUTO: 9.6 FL (ref 8.9–12.9)
POTASSIUM SERPL-SCNC: 4 MMOL/L (ref 3.5–5.1)
RBC # BLD AUTO: 2.62 M/UL (ref 4.1–5.7)
SERVICE CMNT-IMP: ABNORMAL
SERVICE CMNT-IMP: NORMAL
SODIUM SERPL-SCNC: 139 MMOL/L (ref 136–145)
WBC # BLD AUTO: 8.9 K/UL (ref 4.1–11.1)

## 2020-08-24 PROCEDURE — 74011250636 HC RX REV CODE- 250/636: Performed by: HOSPITALIST

## 2020-08-24 PROCEDURE — 97535 SELF CARE MNGMENT TRAINING: CPT

## 2020-08-24 PROCEDURE — 80069 RENAL FUNCTION PANEL: CPT

## 2020-08-24 PROCEDURE — 74011250637 HC RX REV CODE- 250/637: Performed by: INTERNAL MEDICINE

## 2020-08-24 PROCEDURE — 65660000000 HC RM CCU STEPDOWN

## 2020-08-24 PROCEDURE — 36415 COLL VENOUS BLD VENIPUNCTURE: CPT

## 2020-08-24 PROCEDURE — 97110 THERAPEUTIC EXERCISES: CPT

## 2020-08-24 PROCEDURE — 97116 GAIT TRAINING THERAPY: CPT

## 2020-08-24 PROCEDURE — 74011250637 HC RX REV CODE- 250/637: Performed by: HOSPITALIST

## 2020-08-24 PROCEDURE — 77010033678 HC OXYGEN DAILY

## 2020-08-24 PROCEDURE — 74011250636 HC RX REV CODE- 250/636: Performed by: INTERNAL MEDICINE

## 2020-08-24 PROCEDURE — 85027 COMPLETE CBC AUTOMATED: CPT

## 2020-08-24 PROCEDURE — 82962 GLUCOSE BLOOD TEST: CPT

## 2020-08-24 PROCEDURE — 94760 N-INVAS EAR/PLS OXIMETRY 1: CPT

## 2020-08-24 RX ORDER — AMLODIPINE BESYLATE 2.5 MG/1
2.5 TABLET ORAL DAILY
Status: DISCONTINUED | OUTPATIENT
Start: 2020-08-24 | End: 2020-08-27 | Stop reason: HOSPADM

## 2020-08-24 RX ADMIN — LEVOTHYROXINE SODIUM 200 MCG: 0.15 TABLET ORAL at 05:13

## 2020-08-24 RX ADMIN — SODIUM CHLORIDE 10 ML: 9 INJECTION, SOLUTION INTRAMUSCULAR; INTRAVENOUS; SUBCUTANEOUS at 13:49

## 2020-08-24 RX ADMIN — SODIUM CHLORIDE 10 ML: 9 INJECTION, SOLUTION INTRAMUSCULAR; INTRAVENOUS; SUBCUTANEOUS at 22:00

## 2020-08-24 RX ADMIN — PROMETHAZINE HYDROCHLORIDE 12.5 MG: 25 TABLET ORAL at 21:37

## 2020-08-24 RX ADMIN — SODIUM CHLORIDE 10 ML: 9 INJECTION, SOLUTION INTRAMUSCULAR; INTRAVENOUS; SUBCUTANEOUS at 05:13

## 2020-08-24 RX ADMIN — SUCRALFATE 1 G: 1 TABLET ORAL at 10:30

## 2020-08-24 RX ADMIN — EPOETIN ALFA-EPBX 12000 UNITS: 10000 INJECTION, SOLUTION INTRAVENOUS; SUBCUTANEOUS at 21:00

## 2020-08-24 RX ADMIN — PANTOPRAZOLE SODIUM 40 MG: 40 TABLET, DELAYED RELEASE ORAL at 08:41

## 2020-08-24 RX ADMIN — AMLODIPINE BESYLATE 2.5 MG: 2.5 TABLET ORAL at 10:28

## 2020-08-24 RX ADMIN — MORPHINE SULFATE 1 MG: 2 INJECTION, SOLUTION INTRAMUSCULAR; INTRAVENOUS at 21:37

## 2020-08-24 RX ADMIN — MORPHINE SULFATE 1 MG: 2 INJECTION, SOLUTION INTRAMUSCULAR; INTRAVENOUS at 17:03

## 2020-08-24 RX ADMIN — SUCRALFATE 1 G: 1 TABLET ORAL at 08:40

## 2020-08-24 RX ADMIN — SUCRALFATE 1 G: 1 TABLET ORAL at 22:00

## 2020-08-24 RX ADMIN — ONDANSETRON 4 MG: 2 INJECTION INTRAMUSCULAR; INTRAVENOUS at 08:38

## 2020-08-24 RX ADMIN — IRON SUCROSE 200 MG: 20 INJECTION, SOLUTION INTRAVENOUS at 10:28

## 2020-08-24 RX ADMIN — HYDROCODONE BITARTRATE AND ACETAMINOPHEN 1 TABLET: 7.5; 325 TABLET ORAL at 08:41

## 2020-08-24 RX ADMIN — Medication 1 AMPULE: at 08:45

## 2020-08-24 RX ADMIN — SUCRALFATE 1 G: 1 TABLET ORAL at 17:04

## 2020-08-24 RX ADMIN — Medication 1 AMPULE: at 21:46

## 2020-08-24 RX ADMIN — MORPHINE SULFATE 1 MG: 2 INJECTION, SOLUTION INTRAMUSCULAR; INTRAVENOUS at 02:15

## 2020-08-24 RX ADMIN — ATORVASTATIN CALCIUM 10 MG: 20 TABLET, FILM COATED ORAL at 22:00

## 2020-08-24 NOTE — PROGRESS NOTES
Bedside and Verbal shift change report given to Vi Rojas (oncoming nurse) by Vinny Pompa RN (offgoing nurse). Report included the following information Kardex, MAR and Recent Results.

## 2020-08-24 NOTE — PROGRESS NOTES
Hospitalist Progress Note    NAME: Roldan Cassidy   :  1960   MRN:  [id number]       Assessment / Plan:    Acute hypoxic respiratory failure, POA  Acute on chronic diastolic heart failure, POA  Community-acquired pneumonia, likely bacterial, POA  - COVID-19 test negative on admission  - completed antibiotics,  Last day   - on NC today 2 L o2 Sat 96%  - transferred from ICU   - blood cultures NGT  -ECHO this admission   · LV: Estimated LVEF is 65 - 70%. Visually measured ejection fraction. Normal cavity size and systolic function (ejection fraction normal). Moderate concentric hypertrophy. Wall motion: normal. Age-appropriate left ventricular diastolic function. · PA: Pulmonary arterial systolic pressure is 35 mmHg. · MV: Trace mitral valve regurgitation is present. · LA: Mildly dilated left atrium. Acute Metabolic encephalopathy  - ABG revealed respiratory acidosis, hypoxemia  -Avoid all sedating medications  -resolved, cont to be alert and awake  -2/2  Worsening kidney function . Acute kidney disease, worsening, POA  Hyperkalemia with junctional rhythm on EKG  Hypophosphatemia  -CKD stage IV  - Kidney functions almost the same  - s/p hemodialysis started   - Status post calcium gluconate, regular insulin, D50 for Hyper K . K has been stable  -UO is still minimum   - Renal ultrasound with renal cortical thinning, no overt hydronephrosis  -uric acid level are elevated on  but trending down.  heme-onc's input is appreciated, on rasburicase treatment    Acute on chronic anemia,.   History of duodenitis, POA  -EGD 2020 revealed Padmini-Metzger tear, duodenitis, duodenal polyp, gastric polyps, and dilation of the esophagus  -Hemoglobin on the lower side but has been stable,   -transfuse if Hbg 7 or less    Bradycardia  -Heart rate around 40s to 50s on   -Likely secondary to hyperkalemia  - Treatment as above  -EKG with no AV block  -resolved    Diabetes mellitus type 2  -Hemoglobin A1c 6.3% in July 2020    History of thyroid cancer with mets to lung  -Follows Dr. Petar Smith  -Hypothyroidsim-Continue levothyroxine     Essential HTN POA-hold home medications given hypotension at this time    Code Status: full  Surrogate Decision Maker:  Valeria Coto  Daughter  [phone]          DVT Prophylaxis: SCDs  GI Prophylaxis: not indicated     Baseline: independent       Subjective:     Chief Complaint / Reason for Physician Visit  Patient is lying in bed,     Awake and following all commands. Patient was seen and examined. No acute events overnight. \"feeling much better\"    Review of Systems:  Symptom Y/N Comments  Symptom Y/N Comments   Fever/Chills n   Chest Pain n    Poor Appetite    Edema     Cough n   Abdominal Pain n    Sputum    Joint Pain     SOB/NUÑEZ n   Pruritis/Rash     Nausea/vomit n   Tolerating PT/OT     Diarrhea    Tolerating Diet     Constipation    Other       Could NOT obtain due to:          Objective:     VITALS:   Last 24hrs VS reviewed since prior progress note. Most recent are:  Patient Vitals for the past 24 hrs:   Temp Pulse Resp BP SpO2   08/24/20 1504 98.1 °F (36.7 °C) 73 16 164/83 96 %   08/24/20 1037 98 °F (36.7 °C) 73 16 166/82 95 %   08/24/20 0714 98.5 °F (36.9 °C) 72 16 131/42 96 %   08/24/20 0303 98.3 °F (36.8 °C) 81 18 160/65 96 %   08/23/20 2225 98.3 °F (36.8 °C) 81 18 146/60 97 %   08/23/20 1943 98.3 °F (36.8 °C) 70 19 156/80 98 %       Intake/Output Summary (Last 24 hours) at 8/24/2020 1535  Last data filed at 8/24/2020 1405  Gross per 24 hour   Intake 660 ml   Output 750 ml   Net -90 ml        PHYSICAL EXAM:  General: awake, following commands, no acute distress    EENT:  EOMI. Anicteric sclerae. MMM  Resp:  CTA bilaterally, no wheezing or rales.   No accessory muscle use  CV:  Regular  rhythm,  No edema  GI:  Soft, Non distended, Non tender.  +Bowel sounds  Neurologic:  Move all extremities, following commands, normal speech, no focal deficit   Psych:   Good insight. Not anxious nor agitated  Skin:  No rashes. No jaundice    Reviewed most current lab test results and cultures  YES  Reviewed most current radiology test results   YES  Review and summation of old records today    NO  Reviewed patient's current orders and MAR    YES  PMH/SH reviewed - no change compared to H&P  ________________________________________________________________________  Care Plan discussed with:    Comments   Patient x    Family      RN x    Care Manager     Consultant                        Multidiciplinary team rounds were held today with , nursing, pharmacist and clinical coordinator. Patient's plan of care was discussed; medications were reviewed and discharge planning was addressed. ______________________________________________________________________      Comments   >50% of visit spent in counseling and coordination of care x    ________________________________________________________________________  Dennis Mitchell MD     Procedures: see electronic medical records for all procedures/Xrays and details which were not copied into this note but were reviewed prior to creation of Plan. LABS:  I reviewed today's most current labs and imaging studies.   Pertinent labs include:  Recent Labs     08/24/20 0205 08/22/20 0527   WBC 8.9 10.2   HGB 7.8* 7.8*   HCT 24.2* 25.6*    273     Recent Labs     08/24/20  0205 08/23/20 0252 08/22/20 0527    139 140   K 4.0 4.0 4.3    103 104   CO2 32 32 32   * 99 79   BUN 26* 28* 46*   CREA 2.37* 2.25* 3.08*   CA 8.2* 8.2* 8.7   MG  --   --  2.5*   PHOS 2.6 2.4* 4.0   ALB 2.3* 2.6* 2.6*       Signed: Dennis Mitchell MD

## 2020-08-24 NOTE — PROGRESS NOTES
Problem: Self Care Deficits Care Plan (Adult)  Goal: *Acute Goals and Plan of Care (Insert Text)  Description:   FUNCTIONAL STATUS PRIOR TO ADMISSION: Patient was independent and active without use of DME.     HOME SUPPORT: The patient lived  with brother and needed no assist.    Occupational Therapy Goals  Initiated 8/23/2020  1. Patient will perform grooming with independence within 7 day(s). 2.  Patient will perform bathing with independence within 7 day(s). 3.  Patient will perform lower body dressing with independence within 7 day(s). 4.  Patient will perform toilet transfers with independence within 7 day(s). 5.  Patient will perform all aspects of toileting with independence within 7 day(s). 6.  Patient will participate in upper extremity therapeutic exercise/activities with independence for 10 minutes within 7 day(s). 7.  Patient will utilize energy conservation techniques during functional activities with verbal cues within 7 day(s). Outcome: Progressing Towards Goal   OCCUPATIONAL THERAPY TREATMENT  Patient: Kaya Carballo (39 y.o. male)  Date: 8/24/2020  Diagnosis: Pneumonia [J18.9]  Suspected COVID-19 virus infection [Z20.828]  NERY (acute kidney injury) ([facility] [address] [N17.9]  Anemia [D64.9]  Lung cancer ([facility] [address] [C34.90]  Chest pain [R07.9]   <principal problem not specified>       Precautions: Fall, Aspiration, Skin(5LO2)  Chart, occupational therapy assessment, plan of care, and goals were reviewed. ASSESSMENT  Patient continues with skilled OT services and is progressing towards goals. Good participation; use of O2 new; poor skills with PLB; handouts and training improved skills; Unable to reach feet due to SOB with attempts to lean over. Current Level of Function Impacting Discharge (ADLs): set up Increased time UE ADLs;  Max A LE ADLs; will benefit from Hip kit; no need for toilet aide at this time; Min A functional mobility; receptive to bathroom DME needs/principles; he owns none    Other factors to consider for discharge: supportive family- need to see want can brother do to assist at discharge         PLAN :  Patient continues to benefit from skilled intervention to address the above impairments. Continue treatment per established plan of care. to address goals. Recommend with staff: up to chair for all meals    Recommend next OT session: SHERIDAN DOLAN for [facility]    Recommendation for discharge: (in order for the patient to meet his/her long term goals)  Occupational therapy at least 2 days/week in the home AND ensure assist and/or supervision for safety with bathing    This discharge recommendation:  A follow-up discussion with the attending provider and/or case management is planned    IF patient discharges home will need the following DME: AE: long handled bathing, AE: long handled dressing, bedside commode, and transfer bench       SUBJECTIVE:   Patient stated I can get the tools; that would help I think.     OBJECTIVE DATA SUMMARY:   Cognitive/Behavioral Status:  Neurologic State: Alert  Orientation Level: Oriented X4  Cognition: Appropriate decision making; Appropriate for age attention/concentration; Appropriate safety awareness; Follows commands  Perception: Appears intact  Perseveration: No perseveration noted  Safety/Judgement: Fall prevention; Awareness of environment; Insight into deficits; Decreased insight into deficits(willing to learn)    Functional Mobility and Transfers for ADLs:  Bed Mobility:  Rolling: Supervision  Supine to Sit: Supervision  Scooting: Supervision    Transfers:  Sit to Stand: Contact guard assistance  Functional Transfers  Toilet Transfer : Minimum assistance       Balance:  Sitting: Intact  Standing: Impaired; With support  Standing - Static: Constant support;Good  Standing - Dynamic : Constant support;Good;Fair    ADL Intervention:        Handouts for PLB and energy conservation provided                   Lower Body Dressing Assistance  Dressing Assistance: Maximum assistance  Position Performed: Seated in chair  Cues: Doff;Don;Physical assistance;Verbal cues provided(will benefit from LE; receptive to this; wants to try next )    Toileting  Toileting Assistance: Minimum assistance    Cognitive Retraining  Attention to Task: Single task;Multi-task  Maintains Attention For (Time): Greater than 10 minutes  Following Commands: Follows one step commands/directions; Follows two step commands/directions  Safety/Judgement: Fall prevention; Awareness of environment; Insight into deficits; Decreased insight into deficits(willing to learn)        Activity Tolerance:   Fair, desaturates with exertion and requires oxygen, requires frequent rest breaks, and observed SOB with activity  Please refer to the flowsheet for vital signs taken during this treatment. After treatment patient left in no apparent distress:   Sitting in chair, Call bell within reach, and Caregiver / family present    COMMUNICATION/COLLABORATION:   The patients plan of care was discussed with: Physical therapist and Registered nurse.      Opal Cuba OTR/L  Time Calculation: 25 mins

## 2020-08-24 NOTE — PROGRESS NOTES
Nephrology Progress Note  [address] [facility]  [facility], [address]  [place], [address]  Phone - [phone]               Fax - [phone]  Patient: Ember Ernst    YOB: 1960     Admit Date: 8/15/2020   Date- 8/24/2020     CC: Follow up for nery         IMPRESSION & PLAN:   NERY -unknown etiology at this time( likely ATN from sepsis from resp source vs hemodynamics)  - no dialysis today  - check bmp in am for hd needs   - avoid acei or arb     HYPERTENSION  -start norvasc     Anemia of ckd  - EPOGEN and venofer today    Hypoxic resp failure/atypical pneumonia in CT chest w/ lung mets  CKD 3- bl. Cr. 2.1-2.3  H/o thyroid cancer  Adrenal adenoma  Hyperlipidemia  DM 2- uncontrolled         Subjective: Interval History:   -  bp on high side  S/p hd on Saturday  Cr. Stable. - not much rise in cr level     ROS:- No c/o sob,  No c/o chest pain,   No c/o nausea or vomiting, No c/o  fever. Objective:   Vitals:    08/23/20 2151 08/23/20 2225 08/24/20 0303 08/24/20 0714   BP:  146/60 160/65 131/42   Pulse:  81 81 72   Resp:  18 18 16   Temp:  98.3 °F (36.8 °C) 98.3 °F (36.8 °C) 98.5 °F (36.9 °C)   TempSrc:       SpO2:  97% 96% 96%   Weight: 106.8 kg (235 lb 7.2 oz)      Height:          08/23 0701 - 08/24 0700  In: 800 [P.O.:800]  Out: 900 [Urine:900]  Last 3 Recorded Weights in this Encounter    08/21/20 1600 08/22/20 0400 08/23/20 2151   Weight: 108 kg (238 lb 1.6 oz) 108.3 kg (238 lb 12.1 oz) 106.8 kg (235 lb 7.2 oz)      Physical exam:   GEN: NAD  NECK- Supple, no mass  RESP: Clear b/l, no wheezing  CVS: S1,S2  RRR  NEURO: Normal speech, Non focal  Abdo- soft, NT  EXT: +Edema   PSYCH: Normal Mood    Discussed with:- Nurse , Patient    Chart reviewed. Pertinent Notes reviewed.      Data Review :  Recent Labs     08/24/20  0205 08/23/20  0252 08/22/20  0527 08/21/20  1434    139 140  --    K 4.0 4.0 4.3  --     103 104  --    CO2 32 32 32  --    BUN 26* 28* 46*  --    CREA 2.37* 2.25* 3.08*  --    * 99 79  --    CA 8.2* 8.2* 8.7  --    MG  --   --  2.5*  --    PHOS 2.6 2.4* 4.0  --    URICA  --  1.3* 1.1* 0.6*     Recent Labs     08/24/20  0205 08/22/20  0527   WBC 8.9 10.2   HGB 7.8* 7.8*   HCT 24.2* 25.6*    273     No results for input(s): FE, TIBC, PSAT, FERR in the last 72 hours.    Medication list  reviewed  Current Facility-Administered Medications   Medication Dose Route Frequency    morphine injection 1 mg  1 mg IntraVENous Q4H PRN    hydrALAZINE (APRESOLINE) 20 mg/mL injection 10 mg  10 mg IntraVENous Q6H PRN    pantoprazole (PROTONIX) tablet 40 mg  40 mg Oral ACB    heparin (porcine) 1,000 unit/mL injection 2,500 Units  2,500 Units Hemodialysis DIALYSIS PRN    albumin human 25% (BUMINATE) solution 12.5 g  12.5 g IntraVENous PRN    alcohol 62% (NOZIN) nasal  1 Ampule  1 Ampule Topical Q12H    HYDROcodone-acetaminophen (NORCO) 7.5-325 mg per tablet 1 Tab  1 Tab Oral Q4H PRN    polyethylene glycol (MIRALAX) packet 17 g  17 g Oral BID    melatonin tablet 3 mg  3 mg Oral QHS PRN    LORazepam (ATIVAN) tablet 1 mg  1 mg Oral Q6H PRN    levothyroxine (SYNTHROID) tablet 200 mcg  200 mcg Oral 6am    atorvastatin (LIPITOR) tablet 10 mg  10 mg Oral QHS    sodium chloride (NS) flush 5-40 mL  5-40 mL IntraVENous Q8H    sodium chloride (NS) flush 5-40 mL  5-40 mL IntraVENous PRN    acetaminophen (TYLENOL) tablet 650 mg  650 mg Oral Q6H PRN    Or    acetaminophen (TYLENOL) suppository 650 mg  650 mg Rectal Q6H PRN    promethazine (PHENERGAN) tablet 12.5 mg  12.5 mg Oral Q6H PRN    Or    ondansetron (ZOFRAN) injection 4 mg  4 mg IntraVENous Q6H PRN    sucralfate (CARAFATE) tablet 1 g  1 g Oral AC&HS    insulin lispro (HUMALOG) injection   SubCUTAneous AC&HS    glucose chewable tablet 16 g  4 Tab Oral PRN    dextrose (D50W) injection syrg 12.5-25 g 12.5-25 g IntraVENous PRN    glucagon (GLUCAGEN) injection 1 mg  1 mg IntraMUSCular PRN    albuterol (PROVENTIL HFA, VENTOLIN HFA, PROAIR HFA) inhaler 2 Puff  2 Puff Inhalation Q4H PRN                          Roberta Goddard MD                   [facility]                   [email or web address]

## 2020-08-24 NOTE — PROGRESS NOTES
Problem: Falls - Risk of  Goal: *Absence of Falls  Description: Document Jim Skelton Fall Risk and appropriate interventions in the flowsheet.   Outcome: Progressing Towards Goal  Note: Fall Risk Interventions:  Mobility Interventions: Bed/chair exit alarm    Mentation Interventions: Bed/chair exit alarm, Adequate sleep, hydration, pain control, Door open when patient unattended, More frequent rounding, Reorient patient, Toileting rounds    Medication Interventions: Bed/chair exit alarm, Patient to call before getting OOB, Teach patient to arise slowly    Elimination Interventions: Bed/chair exit alarm

## 2020-08-24 NOTE — PROGRESS NOTES
Bedside and Verbal shift change report given to Janay Sorensen RN (oncoming nurse) by Lisette Flower RN (offgoing nurse). Report included the following information SBAR, Kardex, Intake/Output and MAR and events of the day.

## 2020-08-24 NOTE — PROGRESS NOTES
Problem: Mobility Impaired (Adult and Pediatric)  Goal: *Acute Goals and Plan of Care (Insert Text)  Description: FUNCTIONAL STATUS PRIOR TO ADMISSION: Patient was independent and active without use of DME.    HOME SUPPORT PRIOR TO ADMISSION: The patient lived with his brother but did not require assist.    Physical Therapy Goals  Initiated 8/22/2020  1. Patient will move from supine to sit and sit to supine , scoot up and down, and roll side to side in bed with modified independence within 7 day(s). 2.  Patient will transfer from bed to chair and chair to bed with modified independence using the least restrictive device within 7 day(s). 3.  Patient will perform sit to stand with modified independence within 7 day(s). 4.  Patient will ambulate with modified independence for 150 feet with the least restrictive device within 7 day(s). 5.  Patient will ascend/descend 3 stairs with best method with minimal assistance/contact guard assist within 7 day(s). Outcome: Progressing Towards Goal   PHYSICAL THERAPY TREATMENT  Patient: Nico Sanches (13 y.o. male)  Date: 8/24/2020  Diagnosis: Pneumonia [J18.9]  Suspected COVID-19 virus infection [Z20.828]  NERY (acute kidney injury) ([facility] [address] [N17.9]  Anemia [D64.9]  Lung cancer ([facility] [address] [C34.90]  Chest pain [R07.9]   <principal problem not specified>       Precautions: Fall, Aspiration, Skin(5LO2)  Chart, physical therapy assessment, plan of care and goals were reviewed. ASSESSMENT  Patient continues with skilled PT services and is progressing towards goals. Pt received supine in bed, agreeable to PT session. Pt demonstrates functional mobility at cga to sba this visit. Gait quality and distance increased this visit. O2 sats decreased from 94% to 80% with ambulation on 2.5L of O2 and took 1 min to increase to 94%.     Current Level of Function Impacting Discharge (mobility/balance): cga to sba, decreased activity tolerance    Other factors to consider for discharge: pt functioning below his baseline, requires use of RW , O2 dependent         PLAN :  Patient continues to benefit from skilled intervention to address the above impairments. Continue treatment per established plan of care. to address goals. Recommendation for discharge: (in order for the patient to meet his/her long term goals)  Physical therapy at least 2 days/week in the home     This discharge recommendation:  Has been made in collaboration with the attending provider and/or case management    IF patient discharges home will need the following DME: rolling walker       SUBJECTIVE:   Patient stated I feel stronger today    OBJECTIVE DATA SUMMARY:   Critical Behavior:  Neurologic State: Alert, Appropriate for age, Eyes open spontaneously  Orientation Level: Oriented X4  Cognition: Appropriate decision making     Functional Mobility Training:  Bed Mobility:  Rolling: Supervision  Supine to Sit: Supervision     Scooting: Supervision        Transfers:  Sit to Stand: Contact guard assistance  Stand to Sit: Stand-by assistance                             Balance:  Sitting: Intact  Standing: Impaired; With support  Standing - Static: Constant support;Good  Standing - Dynamic : Constant support;Good;Fair  Ambulation/Gait Training:  Distance (ft): 45 Feet (ft)  Assistive Device: Gait belt;Walker, rolling  Ambulation - Level of Assistance: Contact guard assistance        Gait Abnormalities: Decreased step clearance              Speed/Maria De Jesus: Pace decreased (<100 feet/min)  Step Length: Left shortened;Right shortened               Therapeutic Exercises:   Seated:  BLE ankle pumps, laq, marches   2 sets x 10 reps    Pain Rating:  None reported    Activity Tolerance:   Fair and requires rest breaks  Please refer to the flowsheet for vital signs taken during this treatment.     After treatment patient left in no apparent distress:   Sitting in chair and Call bell within reach    COMMUNICATION/COLLABORATION:   The patients plan of care was discussed with: Registered nurse and Case management.      Martín Sebastian, PT   Time Calculation: 23 mins

## 2020-08-24 NOTE — PROGRESS NOTES
Bedside and Verbal shift change report given to [facility] (oncoming nurse) by Carly Botello RN (offgoing nurse). Report included the following information Kardex, MAR and Recent Results.

## 2020-08-25 ENCOUNTER — APPOINTMENT (OUTPATIENT)
Dept: GENERAL RADIOLOGY | Age: 60
DRG: 673 | End: 2020-08-25
Attending: INTERNAL MEDICINE
Payer: MEDICARE

## 2020-08-25 LAB
ALBUMIN SERPL-MCNC: 2.4 G/DL (ref 3.5–5)
ANION GAP SERPL CALC-SCNC: 2 MMOL/L (ref 5–15)
BUN SERPL-MCNC: 28 MG/DL (ref 6–20)
BUN/CREAT SERPL: 10 (ref 12–20)
CALCIUM SERPL-MCNC: 8.1 MG/DL (ref 8.5–10.1)
CHLORIDE SERPL-SCNC: 103 MMOL/L (ref 97–108)
CO2 SERPL-SCNC: 33 MMOL/L (ref 21–32)
CREAT SERPL-MCNC: 2.69 MG/DL (ref 0.7–1.3)
ERYTHROCYTE [DISTWIDTH] IN BLOOD BY AUTOMATED COUNT: 13.6 % (ref 11.5–14.5)
GLUCOSE BLD STRIP.AUTO-MCNC: 103 MG/DL (ref 65–100)
GLUCOSE BLD STRIP.AUTO-MCNC: 108 MG/DL (ref 65–100)
GLUCOSE BLD STRIP.AUTO-MCNC: 108 MG/DL (ref 65–100)
GLUCOSE BLD STRIP.AUTO-MCNC: 114 MG/DL (ref 65–100)
GLUCOSE BLD STRIP.AUTO-MCNC: 120 MG/DL (ref 65–100)
GLUCOSE SERPL-MCNC: 93 MG/DL (ref 65–100)
HBV SURFACE AB SER QL: NONREACTIVE
HBV SURFACE AB SER-ACNC: <3.1 MIU/ML
HCT VFR BLD AUTO: 25.9 % (ref 36.6–50.3)
HGB BLD-MCNC: 7.9 G/DL (ref 12.1–17)
MCH RBC QN AUTO: 28.2 PG (ref 26–34)
MCHC RBC AUTO-ENTMCNC: 30.5 G/DL (ref 30–36.5)
MCV RBC AUTO: 92.5 FL (ref 80–99)
NRBC # BLD: 0 K/UL (ref 0–0.01)
NRBC BLD-RTO: 0 PER 100 WBC
PHOSPHATE SERPL-MCNC: 2.4 MG/DL (ref 2.6–4.7)
PLATELET # BLD AUTO: 214 K/UL (ref 150–400)
PMV BLD AUTO: 10 FL (ref 8.9–12.9)
POTASSIUM SERPL-SCNC: 3.7 MMOL/L (ref 3.5–5.1)
RBC # BLD AUTO: 2.8 M/UL (ref 4.1–5.7)
SERVICE CMNT-IMP: ABNORMAL
SODIUM SERPL-SCNC: 138 MMOL/L (ref 136–145)
WBC # BLD AUTO: 8.9 K/UL (ref 4.1–11.1)

## 2020-08-25 PROCEDURE — 80069 RENAL FUNCTION PANEL: CPT

## 2020-08-25 PROCEDURE — 36415 COLL VENOUS BLD VENIPUNCTURE: CPT

## 2020-08-25 PROCEDURE — 94760 N-INVAS EAR/PLS OXIMETRY 1: CPT

## 2020-08-25 PROCEDURE — 82962 GLUCOSE BLOOD TEST: CPT

## 2020-08-25 PROCEDURE — 74011250636 HC RX REV CODE- 250/636: Performed by: INTERNAL MEDICINE

## 2020-08-25 PROCEDURE — 86706 HEP B SURFACE ANTIBODY: CPT

## 2020-08-25 PROCEDURE — 65660000000 HC RM CCU STEPDOWN

## 2020-08-25 PROCEDURE — 74011250637 HC RX REV CODE- 250/637: Performed by: HOSPITALIST

## 2020-08-25 PROCEDURE — 02HV33Z INSERTION OF INFUSION DEVICE INTO SUPERIOR VENA CAVA, PERCUTANEOUS APPROACH: ICD-10-PCS | Performed by: STUDENT IN AN ORGANIZED HEALTH CARE EDUCATION/TRAINING PROGRAM

## 2020-08-25 PROCEDURE — 74011250637 HC RX REV CODE- 250/637: Performed by: INTERNAL MEDICINE

## 2020-08-25 PROCEDURE — 0JH63XZ INSERTION OF TUNNELED VASCULAR ACCESS DEVICE INTO CHEST SUBCUTANEOUS TISSUE AND FASCIA, PERCUTANEOUS APPROACH: ICD-10-PCS | Performed by: STUDENT IN AN ORGANIZED HEALTH CARE EDUCATION/TRAINING PROGRAM

## 2020-08-25 PROCEDURE — 74011250636 HC RX REV CODE- 250/636: Performed by: HOSPITALIST

## 2020-08-25 PROCEDURE — 77010033678 HC OXYGEN DAILY

## 2020-08-25 PROCEDURE — 85027 COMPLETE CBC AUTOMATED: CPT

## 2020-08-25 RX ORDER — METOCLOPRAMIDE 10 MG/1
5 TABLET ORAL
Status: DISCONTINUED | OUTPATIENT
Start: 2020-08-25 | End: 2020-08-27 | Stop reason: HOSPADM

## 2020-08-25 RX ADMIN — ONDANSETRON 4 MG: 2 INJECTION INTRAMUSCULAR; INTRAVENOUS at 21:53

## 2020-08-25 RX ADMIN — SUCRALFATE 1 G: 1 TABLET ORAL at 18:07

## 2020-08-25 RX ADMIN — SUCRALFATE 1 G: 1 TABLET ORAL at 21:49

## 2020-08-25 RX ADMIN — MORPHINE SULFATE 1 MG: 2 INJECTION, SOLUTION INTRAMUSCULAR; INTRAVENOUS at 09:52

## 2020-08-25 RX ADMIN — MORPHINE SULFATE 1 MG: 2 INJECTION, SOLUTION INTRAMUSCULAR; INTRAVENOUS at 04:06

## 2020-08-25 RX ADMIN — SODIUM CHLORIDE 10 ML: 9 INJECTION, SOLUTION INTRAMUSCULAR; INTRAVENOUS; SUBCUTANEOUS at 05:30

## 2020-08-25 RX ADMIN — MORPHINE SULFATE 1 MG: 2 INJECTION, SOLUTION INTRAMUSCULAR; INTRAVENOUS at 21:49

## 2020-08-25 RX ADMIN — LANSOPRAZOLE 30 MG: KIT at 09:52

## 2020-08-25 RX ADMIN — MORPHINE SULFATE 1 MG: 2 INJECTION, SOLUTION INTRAMUSCULAR; INTRAVENOUS at 14:02

## 2020-08-25 RX ADMIN — ATORVASTATIN CALCIUM 10 MG: 20 TABLET, FILM COATED ORAL at 21:48

## 2020-08-25 RX ADMIN — ONDANSETRON 4 MG: 2 INJECTION INTRAMUSCULAR; INTRAVENOUS at 10:09

## 2020-08-25 RX ADMIN — SODIUM CHLORIDE 10 ML: 9 INJECTION, SOLUTION INTRAMUSCULAR; INTRAVENOUS; SUBCUTANEOUS at 21:53

## 2020-08-25 RX ADMIN — HEPARIN SODIUM 2500 UNITS: 1000 INJECTION INTRAVENOUS; SUBCUTANEOUS at 16:01

## 2020-08-25 RX ADMIN — AMLODIPINE BESYLATE 2.5 MG: 2.5 TABLET ORAL at 09:19

## 2020-08-25 RX ADMIN — METOCLOPRAMIDE 5 MG: 10 TABLET ORAL at 12:24

## 2020-08-25 RX ADMIN — IRON SUCROSE 100 MG: 20 INJECTION, SOLUTION INTRAVENOUS at 15:00

## 2020-08-25 RX ADMIN — ONDANSETRON 4 MG: 2 INJECTION INTRAMUSCULAR; INTRAVENOUS at 03:24

## 2020-08-25 RX ADMIN — SUCRALFATE 1 G: 1 TABLET ORAL at 12:24

## 2020-08-25 RX ADMIN — SODIUM CHLORIDE 20 ML: 9 INJECTION, SOLUTION INTRAMUSCULAR; INTRAVENOUS; SUBCUTANEOUS at 14:00

## 2020-08-25 RX ADMIN — SUCRALFATE 1 G: 1 TABLET ORAL at 09:18

## 2020-08-25 NOTE — PROGRESS NOTES
TRANSFER - IN REPORT:    Verbal report received from [address] [place] [address], RN on Antonio Kendrick  being received from [employer] for ordered procedure      Report consisted of patients Situation, Background, Assessment and   Recommendations(SBAR). Information from the following report(s) SBAR, Kardex and Cardiac Rhythm NSR was reviewed with the receiving nurse. Opportunity for questions and clarification was provided. Assessment completed upon patients arrival to unit and care assumed.          ** Report received for Hd in dialysis unit at noon today**

## 2020-08-25 NOTE — PROGRESS NOTES
HD TRANSFER - OUT REPORT:    Verbal report given to Ronnie Vines RN on Duke Huang being transferred to [facility] for routine progression of care       Report consisted of patient's Situation, Background, Assessment and   Recommendations(SBAR). Information from the following report(s) SBAR, Procedure Summary, Intake/Output, MAR and Recent Results was reviewed with the receiving nurse. Method:  $$ Method: Hemodialysis (08/22/20 9882)    Fluid Removed  NET Fluid Removed (mL): 2500 ml (08/25/20 1600)     Patient response to treatment:  Stable    End Time  Hemodialysis End Time: 1600 (08/25/20 1600)  If not documented, dialysis nurse to update post-dialysis row in HD/Filtration flowsheet     Medications /Volume expansion agents or Fluid boluses administered during treatment? no    Post-dialysis medication administration due?  yes  Remind nurse to administer post-HD medication upon return to unit. Fistula hemostasis? no    Line heparinization? yes    Lines: PRETTY weinberg    Opportunity for questions and clarification was provided.       Patient transported with: O2 @ 3.5L liters  Tech

## 2020-08-25 NOTE — PROGRESS NOTES
Hospitalist Progress Note    NAME: Stephenie Talbot   :  1960   MRN:  [id number]       Assessment / Plan:    Acute hypoxic respiratory failure, POA  Acute on chronic diastolic heart failure, POA  Community-acquired pneumonia, likely bacterial, POA  - COVID-19 test negative on admission  - completed antibiotics,  Last day   - on NC today 4 L o2 Sat 92%  - transferred from ICU   - blood cultures NGT  -ECHO this admission   · LV: Estimated LVEF is 65 - 70%. Visually measured ejection fraction. Normal cavity size and systolic function (ejection fraction normal). Moderate concentric hypertrophy. Wall motion: normal. Age-appropriate left ventricular diastolic function. · PA: Pulmonary arterial systolic pressure is 35 mmHg. · MV: Trace mitral valve regurgitation is present. · LA: Mildly dilated left atrium. Acute Metabolic encephalopathy  - ABG revealed respiratory acidosis, hypoxemia  -Avoid all sedating medications  -resolved, cont to be alert and awake  -2/2  Worsening kidney function . Acute kidney disease, worsening, POA  Hyperkalemia with junctional rhythm on EKG  Hypophosphatemia  -CKD stage IV  - Kidney functions almost the same  - s/p hemodialysis started . He might need perminant HD  - Status post calcium gluconate, regular insulin, D50 for Hyper K . K has been stable  -UO is still minimum   - Renal ultrasound with renal cortical thinning, no overt hydronephrosis  -uric acid level are elevated on  but trending down.  heme-onc's input is appreciated, completed rasburicase treatment    Acute on chronic anemia,.   History of duodenitis, POA  -EGD 2020 revealed Padmini-Metzger tear, duodenitis, duodenal polyp, gastric polyps, and dilation of the esophagus  -Hemoglobin on the lower side but has been stable,   -transfuse if Hbg 7 or less  -on KOFFI     Bradycardia  -Heart rate around 40s to 50s on   -Likely secondary to hyperkalemia  - Treatment as above  -EKG with no AV block  -resolved    Diabetes mellitus type 2  -Hemoglobin A1c 6.3% in July 2020    History of thyroid cancer with mets to lung  -Follows Dr. Gary Craig    Hypothyroidism  -Continue levothyroxine     Essential HTN POA  -resumed home medications     Code Status: full  Surrogate Decision Maker:  Valeria Coto  Daughter  [phone]          DVT Prophylaxis: SCDs  GI Prophylaxis: not indicated     Baseline: independent       Subjective:     Chief Complaint / Reason for Physician Visit  Patient is lying in bed,     Awake and following all commands. Patient was seen and examined. No acute events overnight. \"having Nausea today\"    Review of Systems:  Symptom Y/N Comments  Symptom Y/N Comments   Fever/Chills n   Chest Pain n    Poor Appetite    Edema     Cough n   Abdominal Pain n    Sputum    Joint Pain     SOB/NUÑEZ n   Pruritis/Rash     Nausea/vomit y   Tolerating PT/OT     Diarrhea    Tolerating Diet     Constipation    Other       Could NOT obtain due to:          Objective:     VITALS:   Last 24hrs VS reviewed since prior progress note. Most recent are:  Patient Vitals for the past 24 hrs:   Temp Pulse Resp BP SpO2   08/25/20 1108 99.9 °F (37.7 °C) 81 18 164/52 97 %   08/25/20 0740 98.2 °F (36.8 °C) 82 16 171/58 91 %   08/25/20 0353 97.5 °F (36.4 °C) 77 19 162/63 93 %   08/24/20 2355 98.9 °F (37.2 °C) 75 16 157/67 94 %   08/24/20 2030 98.3 °F (36.8 °C) -- 16 162/57 98 %   08/24/20 1900 98.3 °F (36.8 °C) 67 16 162/57 98 %   08/24/20 1504 98.1 °F (36.7 °C) 73 16 164/83 96 %       Intake/Output Summary (Last 24 hours) at 8/25/2020 1146  Last data filed at 8/25/2020 0306  Gross per 24 hour   Intake 340 ml   Output 375 ml   Net -35 ml        PHYSICAL EXAM:  General: awake, following commands, no acute distress    EENT:  EOMI. Anicteric sclerae. MMM  Resp:  CTA bilaterally, no wheezing or rales. No accessory muscle use  CV:  Regular  rhythm,  No edema  GI:  Soft, Non distended, Non tender.  +Bowel sounds  Neurologic:  Move all extremities, following commands, normal speech, no focal deficit   Psych:   Good insight. Not anxious nor agitated  Skin:  No rashes. No jaundice    Reviewed most current lab test results and cultures  YES  Reviewed most current radiology test results   YES  Review and summation of old records today    NO  Reviewed patient's current orders and MAR    YES  PMH/SH reviewed - no change compared to H&P  ________________________________________________________________________  Care Plan discussed with:    Comments   Patient x    Family      RN x    Care Manager     Consultant                        Multidiciplinary team rounds were held today with , nursing, pharmacist and clinical coordinator. Patient's plan of care was discussed; medications were reviewed and discharge planning was addressed. ______________________________________________________________________      Comments   >50% of visit spent in counseling and coordination of care x    ________________________________________________________________________  Bk Khan MD     Procedures: see electronic medical records for all procedures/Xrays and details which were not copied into this note but were reviewed prior to creation of Plan. LABS:  I reviewed today's most current labs and imaging studies.   Pertinent labs include:  Recent Labs     08/25/20  0150 08/24/20  0205   WBC 8.9 8.9   HGB 7.9* 7.8*   HCT 25.9* 24.2*    208     Recent Labs     08/25/20  0150 08/24/20  0205 08/23/20  0252    139 139   K 3.7 4.0 4.0    104 103   CO2 33* 32 32   GLU 93 111* 99   BUN 28* 26* 28*   CREA 2.69* 2.37* 2.25*   CA 8.1* 8.2* 8.2*   PHOS 2.4* 2.6 2.4*   ALB 2.4* 2.3* 2.6*       Signed: Bk Khan MD

## 2020-08-25 NOTE — PROGRESS NOTES
Bedside shift change report given to ESPERANZA Andrade (oncoming nurse) by Abigail Worthy RN (offgoing nurse). Report included the following information SBAR, Kardex, ED Summary, [facility] and Recent Results.

## 2020-08-25 NOTE — PROGRESS NOTES
Speech pathology note  Checked in with patient this morning for consent for MBS. Although patient vomited overnight, patient reported that he wanted to do MBS today. However, when transport came to take him for the test, patient was actively vomiting. Unable to complete MBS today, however will re-attempt tomorrow as appropriate. Thank you.     Janice Kowalski, Christiane Day., CCC-SLP

## 2020-08-25 NOTE — PROGRESS NOTES
Nephrology Progress Note  [address] [facility]  [facility] / [facility], [place], [address]  Phone - [phone]               Fax - [phone]  Patient: Dru Duke    YOB: 1960     Admit Date: 8/15/2020   Date- 8/25/2020     CC: Follow up for cynthia       IMPRESSION & PLAN:   Acute kidney injury -unknown etiology at this time( likely ATN from sepsis from resp source vs hemodynamics)  - dialysis today  - permcath placement tomorrow  - arrange out pt hd unit  - avoid acei or arb     Hypertension  - continue norvasc     Anemia of ckd  - give venofer today  - continue epogen    Hypoxic resp failure/atypical pneumonia in CT chest w/ lung mets  CKD 3- bl. Cr. 2.1-2.3  H/o thyroid cancer  Adrenal adenoma  Hyperlipidemia  DM 2- uncontrolled         Subjective: Interval History:   -  Seen on hd  No signs of renal recovery  C/o sob  Requiring oxygen  bp high- improving    ROS:- No c/o sob,  No c/o chest pain,   No c/o nausea or vomiting, No c/o  fever. Objective:   Vitals:    08/25/20 0149 08/25/20 0353 08/25/20 0740 08/25/20 1108   BP:  162/63 171/58 164/52   Pulse:  77 82 81   Resp:  19 16 18   Temp:  97.5 °F (36.4 °C) 98.2 °F (36.8 °C) 99.9 °F (37.7 °C)   TempSrc:       SpO2:  93% 91% 97%   Weight: 108.7 kg (239 lb 10.2 oz)      Height:          08/24 0701 - 08/25 0700  In: 80 [P.O.:580]  Out: 525 [Urine:325]  Last 3 Recorded Weights in this Encounter    08/22/20 0400 08/23/20 2151 08/25/20 0149   Weight: 108.3 kg (238 lb 12.1 oz) 106.8 kg (235 lb 7.2 oz) 108.7 kg (239 lb 10.2 oz)      Physical exam:   GEN: nad  NECK- Supple, no mass  RESP: clear b/l, no wheezing  CVS: S1,S2  RRR  NEURO: Normal speech,non focal  Abdo- soft, NT  EXT: +Edema   PSYCH: Normal mood    Discussed with:- Nurse , patient    Chart reviewed. Pertinent Notes reviewed.      Data Review :  Recent Labs 08/25/20  0150 08/24/20  0205 08/23/20  0252    139 139   K 3.7 4.0 4.0    104 103   CO2 33* 32 32   BUN 28* 26* 28*   CREA 2.69* 2.37* 2.25*   GLU 93 111* 99   CA 8.1* 8.2* 8.2*   PHOS 2.4* 2.6 2.4*   URICA  --   --  1.3*     Recent Labs     08/25/20  0150 08/24/20  0205   WBC 8.9 8.9   HGB 7.9* 7.8*   HCT 25.9* 24.2*    208     No results for input(s): FE, TIBC, PSAT, FERR in the last 72 hours.    Medication list  reviewed  Current Facility-Administered Medications   Medication Dose Route Frequency    lansoprazole (PREVACID) oral suspension 30 mg  30 mg Oral ACB    epoetin martha-epbx (RETACRIT) 12,000 Units combo injection  12,000 Units SubCUTAneous Q MON, WED & FRI    amLODIPine (NORVASC) tablet 2.5 mg  2.5 mg Oral DAILY    morphine injection 1 mg  1 mg IntraVENous Q4H PRN    hydrALAZINE (APRESOLINE) 20 mg/mL injection 10 mg  10 mg IntraVENous Q6H PRN    heparin (porcine) 1,000 unit/mL injection 2,500 Units  2,500 Units Hemodialysis DIALYSIS PRN    albumin human 25% (BUMINATE) solution 12.5 g  12.5 g IntraVENous PRN    alcohol 62% (NOZIN) nasal  1 Ampule  1 Ampule Topical Q12H    HYDROcodone-acetaminophen (NORCO) 7.5-325 mg per tablet 1 Tab  1 Tab Oral Q4H PRN    polyethylene glycol (MIRALAX) packet 17 g  17 g Oral BID    melatonin tablet 3 mg  3 mg Oral QHS PRN    LORazepam (ATIVAN) tablet 1 mg  1 mg Oral Q6H PRN    levothyroxine (SYNTHROID) tablet 200 mcg  200 mcg Oral 6am    atorvastatin (LIPITOR) tablet 10 mg  10 mg Oral QHS    sodium chloride (NS) flush 5-40 mL  5-40 mL IntraVENous Q8H    sodium chloride (NS) flush 5-40 mL  5-40 mL IntraVENous PRN    acetaminophen (TYLENOL) tablet 650 mg  650 mg Oral Q6H PRN    Or    acetaminophen (TYLENOL) suppository 650 mg  650 mg Rectal Q6H PRN    promethazine (PHENERGAN) tablet 12.5 mg  12.5 mg Oral Q6H PRN    Or    ondansetron (ZOFRAN) injection 4 mg  4 mg IntraVENous Q6H PRN    sucralfate (CARAFATE) tablet 1 g  1 g Oral AC&HS    insulin lispro (HUMALOG) injection   SubCUTAneous AC&HS    glucose chewable tablet 16 g  4 Tab Oral PRN    dextrose (D50W) injection syrg 12.5-25 g  12.5-25 g IntraVENous PRN    glucagon (GLUCAGEN) injection 1 mg  1 mg IntraMUSCular PRN    albuterol (PROVENTIL HFA, VENTOLIN HFA, PROAIR HFA) inhaler 2 Puff  2 Puff Inhalation Q4H PRN                          Zarina Moore MD                   [facility]                   [email or web address]

## 2020-08-25 NOTE — PROGRESS NOTES
Nutrition Assessment     Type and Reason for Visit: Reassess    Nutrition Recommendations/Plan:   Continue current diet and supplements as ordered      Nutrition Assessment:     Chart reviewed; medically noted for pneumonia, respiratory failure, NERY on CKD requiring HD, and nausea/vomiting. PMH metastatic thyrroid cancer, DM, HTN. Patient off the floor at time of attempted visit. SLP planning on MBS when able. K+ WNL and phos slightly low. BG under control. Continue diet without therapeutic restrictions at this time. PO fluctuating 0-70% of meals. Continue supplements. Will monitor PO, labs, and acceptance of ONS. Estimated Daily Nutrient Needs:  Energy (kcal):  2280 kcal (BMR 1900 x 1. 2AF)  Protein (g):  87-109g (0.8-1.0 g/kg bw)       Fluid (ml/day):  1800mL    Nutrition Related Findings:   BM 8/23  K+ 3.7, Phos 2.4, -623-  Medications: Norvasc, Atorvastatin, humalog, Synthroid, Miralax      Current Nutrition Therapies:  DIET PUREED 3 Honey/3 Moderately Thick  DIET NUTRITIONAL SUPPLEMENTS Lunch; Magic Cups  DIET NUTRITIONAL SUPPLEMENTS Dinner; Glucerna Shake  DIET NUTRITIONAL SUPPLEMENTS Breakfast; Ensure Clear    Anthropometric Measures:  · Height:  5' 9\" (175.3 cm)  · Current Body Wt:  108.7 kg (239 lb 10.2 oz)  · BMI: 35.4    Nutrition Diagnosis:   · Inadequate protein-energy intake related to swallowing difficulty, cognitive or neurological impairment as evidenced by intake 0-25%    Nutrition Intervention:  Food and/or Nutrient Delivery: Continue current diet, Continue oral nutrition supplement  Nutrition Education and Counseling: No recommendations at this time  Coordination of Nutrition Care: Speech therapy    Goals:  PO intake >50% of meals/supplements next 1-3 days       Nutrition Monitoring and Evaluation:   Behavioral-Environmental Outcomes: Knowledge or skill  Food/Nutrient Intake Outcomes: Food and nutrient intake, Supplement intake  Physical Signs/Symptoms Outcomes: Biochemical data, Weight, Fluid status or edema, Nausea/vomiting    Discharge Planning:     Too soon to determine     Electronically signed by Hayley Aguayo RD on 8/25/2020 at 2:30 PM    Contact Number: ext [phone]

## 2020-08-25 NOTE — PROGRESS NOTES
Physical Therapy    Chart reviewed. Attempted to see pt for PT tx session, however pt currently ABDIFATAH at dialysis. Will defer PT session at this time and continue to follow.

## 2020-08-25 NOTE — PROCEDURES
Robi Dialysis Team [facility]  [phone]    Vitals   Pre   Post   Assessment   Pre   Post     Temp  Temp: 98.6 °F (37 °C) (08/25/20 1258)  98.4, oral LOC  A&Ox4 A&Ox4   HR   Pulse (Heart Rate): 85 (08/25/20 1258) 83 Lungs   Dim bases Dim bases   B/P   BP: 140/69 (08/25/20 1258) 124/59 Cardiac   Tele, NSR Tele, NSR   Resp   Resp Rate: 18 (08/25/20 1258) 20 Skin   Excoriated bottom and heels Excoriated bottom and heels   Pain level  8/10; called primary for pain meds 0, medication effetive Edema  1+ BLE     Trace BLE   Orders:    Duration:   Start:    0801 End:    1600 Total:   3hr   Dialyzer:   Dialyzer/Set Up Inspection: Revaclear (08/25/20 1258)   K Bath:   Dialysate K (mEq/L): 3 (08/25/20 1258)   Ca Bath:   Dialysate CA (mEq/L): 3.0 (08/25/20 1258)   Na/Bicarb:   Dialysate NA (mEq/L): 140 (08/25/20 1258)   Target Fluid Removal:   Goal/Amount of Fluid to Remove (mL): 2500 mL (08/25/20 1258)   Access     Type & Location:   [facility]: Dressing CDI dated 8/22/20. No s/s of infection. Both lumens aspirate & flush well. Running well in reverse at .    Labs     Obtained/Reviewed   Critical Results Called   Date when labs were drawn-  Hgb-    HGB   Date Value Ref Range Status   08/25/2020 7.9 (L) 12.1 - 17.0 g/dL Final     K-    Potassium   Date Value Ref Range Status   08/25/2020 3.7 3.5 - 5.1 mmol/L Final     Ca-   Calcium   Date Value Ref Range Status   08/25/2020 8.1 (L) 8.5 - 10.1 MG/DL Final     Bun-   BUN   Date Value Ref Range Status   08/25/2020 28 (H) 6 - 20 MG/DL Final     Creat-   Creatinine   Date Value Ref Range Status   08/25/2020 2.69 (H) 0.70 - 1.30 MG/DL Final        Medications/ Blood Products Given     Name   Dose   Route and Time     Heparin 1:1000 1.1ml & 1.4ml  Dwell in CVC after HD   Venofer 100mg/5ml Iv at 1500        Blood Volume Processed (BVP):    67.7L Net Fluid   Removed:  2500ml   Comments   Time Out Done: 1255  Primary Nurse Rpt Pre: Chris Cotne RN  Primary Nurse Rpt Post: Christine Flood RN  Pt Education: deep breathing to shift focus from pain  Care Plan: ongoing  Tx Summary:   SBAR received from Primary RN. Pt arrived to HD suite A&Ox4. Consent signed & on file. 1258: Both lumens of Rubio disinfected with Prevantics per policy. Each lumen aspirated for blood return and flushed with Normal Saline per policy. VSS. Dialysis Tx initiated. 1315: Patient began moaning and writhing in bed in pain. Called Primary RN for pain medication. Medication not due until 1352. Informed patient. Patient calmed and is resting quietly. VSS.  1400: Primary RN here with pain medication. VSS. 1500: Noted order for Venofer. Venofer 10mg given IV. VSS.  ** no further issues with HD**  1600: Tx ended. VSS. All possible blood returned to patient. Central line catheter flushed with normal saline per policy. Ports disinfected with Prevantics per policy, lines disconnected, Heparin dwells instilled, and lines capped using aseptic technique. Bed locked and in the lowest position, call bell and belongings in reach. SBAR given to Primary, RN. Patient is stable at time of their departure. All Dialysis related medications have been reviewed. Admiting Diagnosis: PNA/ NERY/ thyroid CA with mets/ Anemia  Pt's previous clinic- N/A new start  Consent signed - Informed Consent Verified: Yes (08/25/20 1258)  Robi Consent - on file  Hepatitis Status- Neg Ag 8/18/2020  Machine #- Machine Number: [id number] (08/25/20 1258)  Telemetry status- tele, NSR  Pre-dialysis wt. - Pre-Dialysis Weight: 113.5 kg (250 lb 3.6 oz) (08/18/20 1830)

## 2020-08-25 NOTE — PROGRESS NOTES
KIMMY plan:    -  Home health PT/OT/SN/Aide   -  New OP HD setup - referral sent to [address] 8/25  -  OP f/u - PCP, Nephro, GI  -  Children to transport home - will need to be reminded to bring portable O2  -  BPCI-A Heart Failure letter - given 8/17  -  2nd IMM letter    Addendum 4:13 PM - Call received from [facility] with [address] Admissions. They have tentatively accepted pt for OP HD at [address] pending final clinic review. Tentative chair time is MWF at 11:00 pm. They will call CM with confirmation. Consult received for OP HD setup at [address]. Referral faxed to [facility] (fx. [phone]). CM attempted to meet with pt to discuss [address] however pt was ABDIFATAH for HD. CM will follow-up with pt tomorrow morning.      HEAVEN Gutierrez  Care Manager  [phone]

## 2020-08-25 NOTE — PROGRESS NOTES
Occupational Therapy  Chart reviewed; patient off unit at dialysis; will retry later as able.  Nalani Kayser OTR/L

## 2020-08-26 ENCOUNTER — APPOINTMENT (OUTPATIENT)
Dept: GENERAL RADIOLOGY | Age: 60
DRG: 673 | End: 2020-08-26
Attending: INTERNAL MEDICINE
Payer: MEDICARE

## 2020-08-26 ENCOUNTER — APPOINTMENT (OUTPATIENT)
Dept: INTERVENTIONAL RADIOLOGY/VASCULAR | Age: 60
DRG: 673 | End: 2020-08-26
Attending: INTERNAL MEDICINE
Payer: MEDICARE

## 2020-08-26 LAB
ALBUMIN SERPL-MCNC: 2.4 G/DL (ref 3.5–5)
ANION GAP SERPL CALC-SCNC: 3 MMOL/L (ref 5–15)
BUN SERPL-MCNC: 19 MG/DL (ref 6–20)
BUN/CREAT SERPL: 7 (ref 12–20)
CALCIUM SERPL-MCNC: 8.8 MG/DL (ref 8.5–10.1)
CHLORIDE SERPL-SCNC: 98 MMOL/L (ref 97–108)
CO2 SERPL-SCNC: 34 MMOL/L (ref 21–32)
CREAT SERPL-MCNC: 2.54 MG/DL (ref 0.7–1.3)
ERYTHROCYTE [DISTWIDTH] IN BLOOD BY AUTOMATED COUNT: 13.7 % (ref 11.5–14.5)
GLUCOSE BLD STRIP.AUTO-MCNC: 145 MG/DL (ref 65–100)
GLUCOSE BLD STRIP.AUTO-MCNC: 74 MG/DL (ref 65–100)
GLUCOSE BLD STRIP.AUTO-MCNC: 88 MG/DL (ref 65–100)
GLUCOSE BLD STRIP.AUTO-MCNC: 88 MG/DL (ref 65–100)
GLUCOSE SERPL-MCNC: 98 MG/DL (ref 65–100)
HCT VFR BLD AUTO: 25.8 % (ref 36.6–50.3)
HGB BLD-MCNC: 8 G/DL (ref 12.1–17)
MCH RBC QN AUTO: 29 PG (ref 26–34)
MCHC RBC AUTO-ENTMCNC: 31 G/DL (ref 30–36.5)
MCV RBC AUTO: 93.5 FL (ref 80–99)
NRBC # BLD: 0 K/UL (ref 0–0.01)
NRBC BLD-RTO: 0 PER 100 WBC
PHOSPHATE SERPL-MCNC: 2.1 MG/DL (ref 2.6–4.7)
PLATELET # BLD AUTO: 209 K/UL (ref 150–400)
PMV BLD AUTO: 10.6 FL (ref 8.9–12.9)
POTASSIUM SERPL-SCNC: 4.3 MMOL/L (ref 3.5–5.1)
RBC # BLD AUTO: 2.76 M/UL (ref 4.1–5.7)
SERVICE CMNT-IMP: ABNORMAL
SERVICE CMNT-IMP: NORMAL
SODIUM SERPL-SCNC: 135 MMOL/L (ref 136–145)
WBC # BLD AUTO: 26 K/UL (ref 4.1–11.1)

## 2020-08-26 PROCEDURE — 36415 COLL VENOUS BLD VENIPUNCTURE: CPT

## 2020-08-26 PROCEDURE — 74011250637 HC RX REV CODE- 250/637: Performed by: PHYSICIAN ASSISTANT

## 2020-08-26 PROCEDURE — C1750 CATH, HEMODIALYSIS,LONG-TERM: HCPCS

## 2020-08-26 PROCEDURE — 77001 FLUOROGUIDE FOR VEIN DEVICE: CPT

## 2020-08-26 PROCEDURE — 74011000250 HC RX REV CODE- 250: Performed by: STUDENT IN AN ORGANIZED HEALTH CARE EDUCATION/TRAINING PROGRAM

## 2020-08-26 PROCEDURE — 74011250637 HC RX REV CODE- 250/637: Performed by: HOSPITALIST

## 2020-08-26 PROCEDURE — 74011250637 HC RX REV CODE- 250/637: Performed by: INTERNAL MEDICINE

## 2020-08-26 PROCEDURE — 99223 1ST HOSP IP/OBS HIGH 75: CPT | Performed by: INTERNAL MEDICINE

## 2020-08-26 PROCEDURE — 74011250636 HC RX REV CODE- 250/636: Performed by: HOSPITALIST

## 2020-08-26 PROCEDURE — 77010033678 HC OXYGEN DAILY

## 2020-08-26 PROCEDURE — 77030031139 HC SUT VCRL2 J&J -A

## 2020-08-26 PROCEDURE — 80069 RENAL FUNCTION PANEL: CPT

## 2020-08-26 PROCEDURE — 90935 HEMODIALYSIS ONE EVALUATION: CPT

## 2020-08-26 PROCEDURE — 65660000000 HC RM CCU STEPDOWN

## 2020-08-26 PROCEDURE — 94760 N-INVAS EAR/PLS OXIMETRY 1: CPT

## 2020-08-26 PROCEDURE — 74011250636 HC RX REV CODE- 250/636: Performed by: INTERNAL MEDICINE

## 2020-08-26 PROCEDURE — 82962 GLUCOSE BLOOD TEST: CPT

## 2020-08-26 PROCEDURE — 74011250636 HC RX REV CODE- 250/636: Performed by: STUDENT IN AN ORGANIZED HEALTH CARE EDUCATION/TRAINING PROGRAM

## 2020-08-26 PROCEDURE — 85027 COMPLETE CBC AUTOMATED: CPT

## 2020-08-26 RX ORDER — HEPARIN SODIUM 1000 [USP'U]/ML
3800 INJECTION, SOLUTION INTRAVENOUS; SUBCUTANEOUS
Status: DISCONTINUED | OUTPATIENT
Start: 2020-08-26 | End: 2020-08-27 | Stop reason: HOSPADM

## 2020-08-26 RX ORDER — FENTANYL CITRATE 50 UG/ML
100 INJECTION, SOLUTION INTRAMUSCULAR; INTRAVENOUS
Status: DISCONTINUED | OUTPATIENT
Start: 2020-08-26 | End: 2020-08-26

## 2020-08-26 RX ORDER — MIDAZOLAM HYDROCHLORIDE 1 MG/ML
5 INJECTION, SOLUTION INTRAMUSCULAR; INTRAVENOUS
Status: DISCONTINUED | OUTPATIENT
Start: 2020-08-26 | End: 2020-08-26

## 2020-08-26 RX ORDER — HEPARIN SODIUM 1000 [USP'U]/ML
10000 INJECTION, SOLUTION INTRAVENOUS; SUBCUTANEOUS ONCE
Status: COMPLETED | OUTPATIENT
Start: 2020-08-26 | End: 2020-08-26

## 2020-08-26 RX ORDER — HEPARIN SODIUM 1000 [USP'U]/ML
3800 INJECTION, SOLUTION INTRAVENOUS; SUBCUTANEOUS
Status: DISCONTINUED | OUTPATIENT
Start: 2020-08-26 | End: 2020-08-26

## 2020-08-26 RX ORDER — CHLOROPROCAINE HYDROCHLORIDE 30 MG/ML
INJECTION, SOLUTION EPIDURAL; INFILTRATION; INTRACAUDAL; PERINEURAL
Status: DISCONTINUED | OUTPATIENT
Start: 2020-08-26 | End: 2020-08-27 | Stop reason: HOSPADM

## 2020-08-26 RX ORDER — GUAIFENESIN 600 MG/1
600 TABLET, EXTENDED RELEASE ORAL 2 TIMES DAILY
Status: DISCONTINUED | OUTPATIENT
Start: 2020-08-26 | End: 2020-08-26

## 2020-08-26 RX ORDER — HEPARIN SODIUM 200 [USP'U]/100ML
400 INJECTION, SOLUTION INTRAVENOUS ONCE
Status: COMPLETED | OUTPATIENT
Start: 2020-08-26 | End: 2020-08-26

## 2020-08-26 RX ORDER — GUAIFENESIN 600 MG/1
600 TABLET, EXTENDED RELEASE ORAL 2 TIMES DAILY
Status: DISCONTINUED | OUTPATIENT
Start: 2020-08-26 | End: 2020-08-27 | Stop reason: HOSPADM

## 2020-08-26 RX ADMIN — SODIUM CHLORIDE 10 ML: 9 INJECTION, SOLUTION INTRAMUSCULAR; INTRAVENOUS; SUBCUTANEOUS at 13:58

## 2020-08-26 RX ADMIN — Medication 1 AMPULE: at 20:33

## 2020-08-26 RX ADMIN — HYDRALAZINE HYDROCHLORIDE 10 MG: 20 INJECTION INTRAMUSCULAR; INTRAVENOUS at 08:51

## 2020-08-26 RX ADMIN — ONDANSETRON 4 MG: 2 INJECTION INTRAMUSCULAR; INTRAVENOUS at 20:31

## 2020-08-26 RX ADMIN — FENTANYL CITRATE 25 MCG: 50 INJECTION, SOLUTION INTRAMUSCULAR; INTRAVENOUS at 10:55

## 2020-08-26 RX ADMIN — SUCRALFATE 1 G: 1 TABLET ORAL at 21:01

## 2020-08-26 RX ADMIN — SODIUM CHLORIDE 5 ML: 9 INJECTION, SOLUTION INTRAMUSCULAR; INTRAVENOUS; SUBCUTANEOUS at 21:00

## 2020-08-26 RX ADMIN — HEPARIN SODIUM 3800 UNITS: 1000 INJECTION, SOLUTION INTRAVENOUS; SUBCUTANEOUS at 16:35

## 2020-08-26 RX ADMIN — MIDAZOLAM HYDROCHLORIDE 2 MG: 1 INJECTION, SOLUTION INTRAMUSCULAR; INTRAVENOUS at 10:50

## 2020-08-26 RX ADMIN — EPOETIN ALFA-EPBX 12000 UNITS: 10000 INJECTION, SOLUTION INTRAVENOUS; SUBCUTANEOUS at 20:30

## 2020-08-26 RX ADMIN — WATER 2 G: 1 INJECTION INTRAMUSCULAR; INTRAVENOUS; SUBCUTANEOUS at 10:23

## 2020-08-26 RX ADMIN — MORPHINE SULFATE 1 MG: 2 INJECTION, SOLUTION INTRAMUSCULAR; INTRAVENOUS at 03:03

## 2020-08-26 RX ADMIN — SODIUM CHLORIDE 10 ML: 9 INJECTION, SOLUTION INTRAMUSCULAR; INTRAVENOUS; SUBCUTANEOUS at 08:59

## 2020-08-26 RX ADMIN — SUCRALFATE 1 G: 1 TABLET ORAL at 18:18

## 2020-08-26 RX ADMIN — MORPHINE SULFATE 1 MG: 2 INJECTION, SOLUTION INTRAMUSCULAR; INTRAVENOUS at 13:57

## 2020-08-26 RX ADMIN — GUAIFENESIN 600 MG: 600 TABLET, EXTENDED RELEASE ORAL at 18:18

## 2020-08-26 RX ADMIN — FENTANYL CITRATE 50 MCG: 50 INJECTION, SOLUTION INTRAMUSCULAR; INTRAVENOUS at 10:50

## 2020-08-26 RX ADMIN — MIDAZOLAM HYDROCHLORIDE 1 MG: 1 INJECTION, SOLUTION INTRAMUSCULAR; INTRAVENOUS at 10:55

## 2020-08-26 RX ADMIN — HEPARIN SODIUM 3800 UNITS: 1000 INJECTION, SOLUTION INTRAVENOUS; SUBCUTANEOUS at 10:55

## 2020-08-26 RX ADMIN — ONDANSETRON 4 MG: 2 INJECTION INTRAMUSCULAR; INTRAVENOUS at 03:03

## 2020-08-26 RX ADMIN — ATORVASTATIN CALCIUM 10 MG: 20 TABLET, FILM COATED ORAL at 21:01

## 2020-08-26 RX ADMIN — MORPHINE SULFATE 1 MG: 2 INJECTION, SOLUTION INTRAMUSCULAR; INTRAVENOUS at 20:31

## 2020-08-26 RX ADMIN — MORPHINE SULFATE 1 MG: 2 INJECTION, SOLUTION INTRAMUSCULAR; INTRAVENOUS at 08:50

## 2020-08-26 RX ADMIN — LEVOTHYROXINE SODIUM 200 MCG: 0.15 TABLET ORAL at 06:42

## 2020-08-26 RX ADMIN — GUAIFENESIN 600 MG: 600 TABLET, EXTENDED RELEASE ORAL at 03:49

## 2020-08-26 RX ADMIN — POLYETHYLENE GLYCOL 3350 17 G: 17 POWDER, FOR SOLUTION ORAL at 18:18

## 2020-08-26 RX ADMIN — HEPARIN SODIUM IN SODIUM CHLORIDE 400 UNITS: 200 INJECTION INTRAVENOUS at 11:01

## 2020-08-26 NOTE — PROGRESS NOTES
Occupational Therapy Note:    Chart reviewed. Pt currently off the floor for HD. Will continue to follow and attempt OT treatment again as able. Thank you.     Marques Gonzalez OTR/AMILCAR

## 2020-08-26 NOTE — PALLIATIVE CARE
Brief summary of visit, full note will be placed. Patient wants to complete Advance directives,  today he is going for Perma cath, we decided to follow tomorrow. He feels anxious due to new onset renal failure , trying to cope , wants to continue with dialysis . We  briefly discuss his full code status , he wants to think through changing his code status .

## 2020-08-26 NOTE — PROGRESS NOTES
Nephrology Progress Note  [address] [facility]  [facility] / [facility] / [facility], [place], [address]  Phone - [phone]               Fax - [phone]  Patient: Olivia Mg    YOB: 1960     Admit Date: 8/15/2020   Date- 8/26/2020     CC: Follow up for NERY       IMPRESSION & PLAN:   nery  -unknown etiology at this time( likely ATN from sepsis from resp source vs hemodynamics)  - dialysis again today  - he will go to [facility] schedule  - permcath placement today  - avoid acei or arb     Hypertension  - continue norvasc     Anemia of ckd  - s/p venofer   - continue epogen    Hypoxic resp failure/atypical pneumonia in CT chest w/ lung mets  CKD 3- bl. Cr. 2.1-2.3  H/o thyroid cancer  Adrenal adenoma  Hyperlipidemia  DM 2- uncontrolled         Subjective: Interval History:   -  permcath placement today  bp high  No signs of renal recovery  Sob improving    Requiring oxygen      ROS:-No c/o chest pain,   No c/o nausea or vomiting, No c/o  fever. Objective:   Vitals:    08/25/20 1929 08/25/20 2233 08/26/20 0259 08/26/20 0752   BP: 125/52 127/65 152/72 182/79   Pulse: 82 84 82 75   Resp: 16 18 19 18   Temp: 98.2 °F (36.8 °C) 98.4 °F (36.9 °C) 98.2 °F (36.8 °C) 98.7 °F (37.1 °C)   TempSrc:       SpO2: 97% 96% 97% 90%   Weight:       Height:          08/25 0701 - 08/26 0700  In: -   Out: 3000 [Urine:500]  Last 3 Recorded Weights in this Encounter    08/22/20 0400 08/23/20 2151 08/25/20 0149   Weight: 108.3 kg (238 lb 12.1 oz) 106.8 kg (235 lb 7.2 oz) 108.7 kg (239 lb 10.2 oz)      Physical exam:   GEN: NAD  NECK- Supple, no mass  RESP: clear b/l, no wheezing  CVS: S1,S2  RRR  NEURO: Normal speech,non focal  Abdo- soft, non tender  EXT: +Edema   PSYCH: Normal mood    Discussed with:- Nurse , patient    Chart reviewed. Pertinent Notes reviewed.      Data Review :  Recent Labs 08/26/20  0247 08/25/20  0150 08/24/20  0205   * 138 139   K 4.3 3.7 4.0   CL 98 103 104   CO2 34* 33* 32   BUN 19 28* 26*   CREA 2.54* 2.69* 2.37*   GLU 98 93 111*   CA 8.8 8.1* 8.2*   PHOS 2.1* 2.4* 2.6     Recent Labs     08/26/20  0247 08/25/20  0150 08/24/20  0205   WBC 26.0* 8.9 8.9   HGB 8.0* 7.9* 7.8*   HCT 25.8* 25.9* 24.2*    214 208     No results for input(s): FE, TIBC, PSAT, FERR in the last 72 hours.    Medication list  reviewed  Current Facility-Administered Medications   Medication Dose Route Frequency    guaiFENesin ER (MUCINEX) tablet 600 mg  600 mg Oral BID    heparin (porcine) 1,000 unit/mL injection 10,000 Units  10,000 Units IntraVENous ONCE    heparinized saline 2 units/mL infusion 800 Units  400 mL Irrigation ONCE    chloroprocaine (NESACAINE-MPF) 30 mg/mL (3 %) injection   Peripheral Nerve Block Multiple    lansoprazole (PREVACID) oral suspension 30 mg  30 mg Oral ACB    metoclopramide HCl (REGLAN) tablet 5 mg  5 mg Oral Q6H PRN    epoetin martha-epbx (RETACRIT) 12,000 Units combo injection  12,000 Units SubCUTAneous Q MON, WED & FRI    amLODIPine (NORVASC) tablet 2.5 mg  2.5 mg Oral DAILY    morphine injection 1 mg  1 mg IntraVENous Q4H PRN    hydrALAZINE (APRESOLINE) 20 mg/mL injection 10 mg  10 mg IntraVENous Q6H PRN    heparin (porcine) 1,000 unit/mL injection 2,500 Units  2,500 Units Hemodialysis DIALYSIS PRN    albumin human 25% (BUMINATE) solution 12.5 g  12.5 g IntraVENous PRN    alcohol 62% (NOZIN) nasal  1 Ampule  1 Ampule Topical Q12H    HYDROcodone-acetaminophen (NORCO) 7.5-325 mg per tablet 1 Tab  1 Tab Oral Q4H PRN    polyethylene glycol (MIRALAX) packet 17 g  17 g Oral BID    melatonin tablet 3 mg  3 mg Oral QHS PRN    LORazepam (ATIVAN) tablet 1 mg  1 mg Oral Q6H PRN    levothyroxine (SYNTHROID) tablet 200 mcg  200 mcg Oral 6am    atorvastatin (LIPITOR) tablet 10 mg  10 mg Oral QHS    sodium chloride (NS) flush 5-40 mL  5-40 mL IntraVENous Q8H    sodium chloride (NS) flush 5-40 mL  5-40 mL IntraVENous PRN    acetaminophen (TYLENOL) tablet 650 mg  650 mg Oral Q6H PRN    Or    acetaminophen (TYLENOL) suppository 650 mg  650 mg Rectal Q6H PRN    promethazine (PHENERGAN) tablet 12.5 mg  12.5 mg Oral Q6H PRN    Or    ondansetron (ZOFRAN) injection 4 mg  4 mg IntraVENous Q6H PRN    sucralfate (CARAFATE) tablet 1 g  1 g Oral AC&HS    insulin lispro (HUMALOG) injection   SubCUTAneous AC&HS    glucose chewable tablet 16 g  4 Tab Oral PRN    dextrose (D50W) injection syrg 12.5-25 g  12.5-25 g IntraVENous PRN    glucagon (GLUCAGEN) injection 1 mg  1 mg IntraMUSCular PRN    albuterol (PROVENTIL HFA, VENTOLIN HFA, PROAIR HFA) inhaler 2 Puff  2 Puff Inhalation Q4H PRN                          Amanda Hamilton MD                   [facility]                   [email or web address]

## 2020-08-26 NOTE — PROCEDURES
Robi Dialysis Team [facility]  [phone]    Vitals   Pre   Post   Assessment   Pre   Post     Temp  Temp: 98.4 °F (36.9 °C) (08/26/20 1333)  98.2, oral LOC  A&Ox4 A&Ox4   HR   Pulse (Heart Rate): 79 (08/26/20 1333) 78 Lungs   Dim bases Dim bases   B/P   BP: 165/77 (08/26/20 1333) 159/71 Cardiac   Tele, NSR Tele, NSR   Resp   Resp Rate: 18 (08/26/20 1333) 18 Skin   Blanchable heels and buttocks Blanchable heels and buttocks    Pain level  Pain Intensity 1: 0 (08/26/20 1125) 0 Edema  generalized     generalized   Orders:    Duration:   Start:   0180 End:   7939 Total:   3hr   Dialyzer:   Dialyzer/Set Up Inspection: Revaclear (08/26/20 1333)   K Bath:   Dialysate K (mEq/L): 3 (08/26/20 1333)   Ca Bath:   Dialysate CA (mEq/L): 2.5 (08/26/20 1333)   Na/Bicarb:   Dialysate NA (mEq/L): 140 (08/26/20 1333)   Target Fluid Removal:   Goal/Amount of Fluid to Remove (mL): 2500 mL (08/26/20 1333)   Access     Type & Location:   [facility]J CVC: Dressing CDI. No s/s of infection. Both lumens aspirate & flush well. Running well at .    Labs     Obtained/Reviewed   Critical Results Called   Date when labs were drawn-  Hgb-    HGB   Date Value Ref Range Status   08/26/2020 8.0 (L) 12.1 - 17.0 g/dL Final     K-    Potassium   Date Value Ref Range Status   08/26/2020 4.3 3.5 - 5.1 mmol/L Final     Ca-   Calcium   Date Value Ref Range Status   08/26/2020 8.8 8.5 - 10.1 MG/DL Final     Bun-   BUN   Date Value Ref Range Status   08/26/2020 19 6 - 20 MG/DL Final     Creat-   Creatinine   Date Value Ref Range Status   08/26/2020 2.54 (H) 0.70 - 1.30 MG/DL Final        Medications/ Blood Products Given     Name   Dose   Route and Time     Heparin 1:1000  1.9ml & 1.9ml Dwell in CVC after HD             Blood Volume Processed (BVP):    66.8L Net Fluid   Removed:  2700ml   Comments   Time Out Done: 1330  Primary Nurse Rpt Pre: Lynnette Reza RN  Primary Nurse Rpt Post: Lynnette Reza RN  Pt Education: no pain medications in HD suite; ask for pain medications prior to severe pain  Care Plan: ongoing, got permcath today  Tx Summary:   SBAR received from Primary RN. Pt arrived to HD suite A&Ox4. Consent signed & on file. 1333: Both lumens of permcath disinfected with Prevantics per policy. Each lumen aspirated for blood return and flushed with Normal Saline per policy. VSS. Dialysis Tx initiated. **no issues during treatment**  1635: Tx ended. VSS. All possible blood returned to patient. Central line catheter flushed with normal saline per policy. Ports disinfected with Prevantics per policy, lines disconnected, Heparin dwells instilled, and lines capped using aseptic technique. Bed locked and in the lowest position, call bell and belongings in reach. SBAR given to Primary, RN. Patient is stable at time of their departure. All Dialysis related medications have been reviewed. Admiting Diagnosis: Metastatic Carcinoma/ Hyperuricemia/ NERY/ Anemia/ PNA  Pt's previous [facility]- [facility]  Consent signed - Informed Consent Verified: Yes (08/26/20 1333)  Robi Consent - on file  Hepatitis Status- Neg Ag 8/18/2020  Machine #- Machine Number: [id number] (08/26/20 1333)  Telemetry status- Tele, NSR  Pre-dialysis wt. - Pre-Dialysis Weight: 113.5 kg (250 lb 3.6 oz) (08/18/20 1830)

## 2020-08-26 NOTE — PROGRESS NOTES
Problem: Falls - Risk of  Goal: *Absence of Falls  Description: Document Jim Skelton Fall Risk and appropriate interventions in the flowsheet.   Note: Fall Risk Interventions:  Mobility Interventions: Communicate number of staff needed for ambulation/transfer, Patient to call before getting OOB    Mentation Interventions: Adequate sleep, hydration, pain control, Door open when patient unattended    Medication Interventions: Patient to call before getting OOB, Teach patient to arise slowly    Elimination Interventions: Call light in reach, Patient to call for help with toileting needs

## 2020-08-26 NOTE — PROGRESS NOTES
Physical Therapy    Chart reviewed. Pt currently ABDIFATAH for HD. Will defer and continue to follow.

## 2020-08-26 NOTE — PROGRESS NOTES
Attempted to visit pt without success as pt was out of room. Pt is a palliative consult. Chaplains will continue to offer support as needed.   Chaplain Mckeon MDiv, MS, [facility]  [phone]

## 2020-08-26 NOTE — PROGRESS NOTES
Bedside and Verbal shift change report given to Adriana Hackett (oncoming nurse) by Lisa Womack RN (offgoing nurse). Report included the following information Kardex, MAR and Recent Results.

## 2020-08-26 NOTE — PROGRESS NOTES
Physician Progress Note Anny Alejandre 
CSN #:                  [id number] :                       1960 ADMIT DATE:       8/15/2020 5:47 AM 
DISCH DATE: 
RESPONDING 
PROVIDER #:        Zack Miranda MD 
 
 
 
 
QUERY TEXT: 
 
Pt admitted with CAP, resp failure, chf.  Noted documentation of \"sepsis\" (). If possible, please document in progress notes and discharge summary: The medical record reflects the following: 
Risk Factors: thyroid ca w/ lung mets, CKD, pna 
Clinical Indicators: wbc 8, T 94--93--99, procal 0.15,  Nephrology- likely ATN from sepsis from resp source vs hemodynamics Treatment: Zithromax, Rocephin, Thanks, Yuniel Bernard RN/CDI  Options provided: 
-- Sepsis confirmed POA 
-- Sepsis confirmed not POA 
-- Sepsis ruled out 
-- Defer to consultant documentation regarding Sepsis -- Other - I will add my own diagnosis -- Disagree - Not applicable / Not valid -- Disagree - Clinically unable to determine / Unknown 
-- Refer to Clinical Documentation Reviewer PROVIDER RESPONSE TEXT: 
 
I defer to Nephrology consultant regarding documentation of Sepsis.  
 
Query created by: Emilee Davila on 2020 1:33 PM 
 
 
Electronically signed by:  Zack Miranda MD 2020 11:13 AM

## 2020-08-26 NOTE — PROGRESS NOTES
Name of procedure: Rubio to permacath    Sedation medications given: 3 mg Versed, 75 mcg Fentanyl    Sedation Time: 10 min    Sedation tolerated: well    Vital Signs: stable    Any complications related to procedure: none    Post Procedure Care Needed/order sets placed in connect care. See orders    Pt tolerated procedure well. VSS. No C/O pain. Dressing to site D&I. No bleeding or hematoma noted to site. HOB elevated. Pt taken to room by transport. NAD noted at time of transfer. Report called to floor RN who will assume care of pt.

## 2020-08-26 NOTE — PROGRESS NOTES
Hospitalist Progress Note    NAME: Nico Sanches   :  1960   MRN:  [id number]       Assessment / Plan:    Acute hypoxic respiratory failure, POA  Acute on chronic diastolic heart failure, POA  Community-acquired pneumonia, likely bacterial, POA  - COVID-19 test negative on admission  - completed antibiotics,  Last day   - on NC 4 L o2 Sat 90%  - transferred from ICU   - blood cultures NGT  -ECHO this admission   · LV: Estimated LVEF is 65 - 70%. Visually measured ejection fraction. Normal cavity size and systolic function (ejection fraction normal). Moderate concentric hypertrophy. Wall motion: normal. Age-appropriate left ventricular diastolic function. · PA: Pulmonary arterial systolic pressure is 35 mmHg. · MV: Trace mitral valve regurgitation is present. · LA: Mildly dilated left atrium. Acute Metabolic encephalopathy  -Avoid all sedating medications  -resolved, cont to be alert and awake  -2/2  Worsening kidney function . Acute kidney disease, worsening, POA  Hyperkalemia with junctional rhythm on EKG  Hypophosphatemia  -CKD stage IV  - Kidney functions almost the same  - s/p hemodialysis started . He might need perminant HD. perminant  Cath to be placed today. Discussed with Nephrology. -UO is still minimum 325 cc yesterday   - Renal ultrasound with renal cortical thinning, no overt hydronephrosis  -uric acid level are elevated on  but trending down.  heme-onc's input is appreciated, completed rasburicase treatment    Acute on chronic anemia,.   History of duodenitis, POA  -EGD 2020 revealed Padmini-Metzger tear, duodenitis, duodenal polyp, gastric polyps, and dilation of the esophagus  -Hemoglobin on the lower side but has been stable,   -transfuse if Hbg 7 or less  -on KOFFI     Bradycardia  -Heart rate around 40s to 50s on   -it was Likely secondary to hyperkalemia  - Treatment as above  -EKG with no AV block  -resolved    Diabetes mellitus type 2  -Hemoglobin A1c 6.3% in July 2020    History of thyroid cancer with mets to lung  -Follows Dr. Shun Grimm    Hypothyroidism  -Continue levothyroxine     Essential HTN POA  -resumed home medications     Code Status: full  Surrogate Decision Maker:  Valeria Coto  Daughter  [phone]          DVT Prophylaxis: SCDs  GI Prophylaxis: not indicated     Baseline: independent       Subjective:     Chief Complaint / Reason for Physician Visit  Patient is lying in bed,     Awake and following all commands. Patient was seen and examined. No acute events overnight. \"doing very well today\"    Review of Systems:  Symptom Y/N Comments  Symptom Y/N Comments   Fever/Chills n   Chest Pain n    Poor Appetite    Edema     Cough n   Abdominal Pain n    Sputum    Joint Pain     SOB/NUÑEZ n   Pruritis/Rash     Nausea/vomit n improved  Tolerating PT/OT     Diarrhea    Tolerating Diet y    Constipation    Other       Could NOT obtain due to:          Objective:     VITALS:   Last 24hrs VS reviewed since prior progress note.  Most recent are:  Patient Vitals for the past 24 hrs:   Temp Pulse Resp BP SpO2   08/26/20 0752 98.7 °F (37.1 °C) 75 18 182/79 90 %   08/26/20 0259 98.2 °F (36.8 °C) 82 19 152/72 97 %   08/25/20 2233 98.4 °F (36.9 °C) 84 18 127/65 96 %   08/25/20 1929 98.2 °F (36.8 °C) 82 16 125/52 97 %   08/25/20 1600 98.4 °F (36.9 °C) 83 20 124/59 --   08/25/20 1545 -- 77 18 (!) 104/37 --   08/25/20 1530 -- 84 18 123/58 --   08/25/20 1515 -- 78 18 124/52 --   08/25/20 1500 -- 80 18 126/51 --   08/25/20 1445 -- 83 18 120/46 --   08/25/20 1430 -- 82 18 118/44 --   08/25/20 1415 -- 85 18 121/47 --   08/25/20 1400 -- 86 18 132/40 --   08/25/20 1345 -- 90 18 143/48 --   08/25/20 1330 -- 94 20 169/65 --   08/25/20 1315 -- 85 18 105/42 --   08/25/20 1258 98.6 °F (37 °C) 85 18 140/69 --   08/25/20 1108 99.9 °F (37.7 °C) 81 18 164/52 97 %       Intake/Output Summary (Last 24 hours) at 8/26/2020 1002  Last data filed at 8/26/2020 0300  Gross per 24 hour   Intake --   Output 3000 ml   Net -3000 ml        PHYSICAL EXAM:  General: awake, following commands, no acute distress    EENT:  EOMI. Anicteric sclerae. MMM  Resp:  CTA bilaterally, no wheezing or rales. No accessory muscle use  CV:  Regular  rhythm,  No edema  GI:  Soft, Non distended, Non tender.  +Bowel sounds  Neurologic:  Move all extremities, following commands, normal speech, no focal deficit   Psych:   Good insight. Not anxious nor agitated  Skin:  No rashes. No jaundice    Reviewed most current lab test results and cultures  YES  Reviewed most current radiology test results   YES  Review and summation of old records today    NO  Reviewed patient's current orders and MAR    YES  PMH/SH reviewed - no change compared to H&P  ________________________________________________________________________  Care Plan discussed with:    Comments   Patient x    Family      RN x    Care Manager     Consultant  x Nephro                     Multidiciplinary team rounds were held today with , nursing, pharmacist and clinical coordinator. Patient's plan of care was discussed; medications were reviewed and discharge planning was addressed. ______________________________________________________________________      Comments   >50% of visit spent in counseling and coordination of care x    ________________________________________________________________________  Deepak Rothman MD     Procedures: see electronic medical records for all procedures/Xrays and details which were not copied into this note but were reviewed prior to creation of Plan. LABS:  I reviewed today's most current labs and imaging studies.   Pertinent labs include:  Recent Labs     08/26/20  0247 08/25/20  0150 08/24/20  0205   WBC 26.0* 8.9 8.9   HGB 8.0* 7.9* 7.8*   HCT 25.8* 25.9* 24.2*    214 208     Recent Labs     08/26/20  0247 08/25/20  0150 08/24/20  0205   * 138 139   K 4.3 3.7 4.0   CL 98 103 104   CO2 34* 33* 32   GLU 98 93 111*   BUN 19 28* 26*   CREA 2.54* 2.69* 2.37*   CA 8.8 8.1* 8.2*   PHOS 2.1* 2.4* 2.6   ALB 2.4* 2.4* 2.3*       Signed: Wolf Jung MD

## 2020-08-26 NOTE — PROGRESS NOTES
KIMMY plan:     -  Home health PT/OT/SN  -  New OP HD setup - accepted to [facility] M[facility] at 2:30 pm  -  OP f/u - PCP, Nephro, GI  -  DME - RW order sent to [facility] on 8/26  -  Children to transport home - will need to be reminded to bring portable O2  -  [facility] Heart Failure letter - given 8/17  -  2nd [facility] letter    Call received from [address] Admissions to confirm OP HD chair time - MWF at 2:30 pm. However, they are still reviewing for start date as they are unsure if pt will require to be dialyzed at another clinic due to PNA. CM requested start date for this Friday, 8/28 as pt will possibly be ready for d/c tomorrow - they will call CM back to confirm. CM attempted to meet with pt to share this information and discuss [facility] however pt was ABDIFATAH for HD.     Sanam Peterson MSW  Care Manager  [phone]

## 2020-08-26 NOTE — CONSULTS
Palliative Medicine Consult  [place]: [phone])    Patient Name: Ava Hwang  YOB: 1960    Date of Initial Consult: 20  Reason for Consult: care decisions  Requesting Provider: Vladimir Fuentes MD   Primary Care Physician: Alyse Oliver MD     SUMMARY:   Ava Hwang is a 64 y.o. Male with a past history of CKD stage 3, hx of  Metastatic thyroid cancer with mets to lungs ,  Acute on chronic anemia, hx of duodenitis EGD 2020, who was admitted on 8/15/2020 from home with a diagnosis of acute respiratory failure in asetting of acute on chronic diastolic CHF, EF 64-08% CAP and NERY . Hospital course is notable for progressive renal failure placed on dialysis and would need permanent dialysis . Per oncology he is not on systemic therapy for cancer      Current medical issues leading to Palliative Medicine involvement include: care decision, NO AMD.  Social history:  x 3 months, has 7 children, remote smoker , independent in function , moved recently with his daughter Jordan Booth, because patient was getting weaker . PALLIATIVE DIAGNOSES:   1. Goals of care   2. Advance directives counseling discussion   3. Weakness  4. Progressive Renal failure , placed on dialysis during this admission. 5. Grief          PLAN:   1. I reviewed chart prior to visit . 2. Met with patient who is alert oriented , pleasant , has base lien hoarse voice due to thyroidectomy. 3. Introduce Palliative care . 4.  Grief :He feels anxious due to new onset renal failure , trying to cope , wants to continue with dialysis . He is dealing grief after his wife  3 months ago . Team  will follow for supportive counseling . 5. Advance care planning Nd counseling ; educated on importance of the documents , including designating POA and living will /health care preferences at end of life, he would like to complete , he would like to appoint his daughter Lee Lozano as [place].    6. With patient permission I spoke to his daughter sandra and shared with her our conversation about Advance directives , Francesca Blank is Ok to be patient MPOA. 7. We discussed about his full code status , patient wants to think through , to make any changes in code status . 8. He has to go for Perma cath placement shortly , we will follow  tomorrow. 9. Initial consult note routed to primary continuity provider and/or primary health care team members  10. Communicated plan of care with: Palliative IDTArmin 192 Team     GOALS OF CARE / TREATMENT PREFERENCES:     GOALS OF CARE:  Patient/Health Care Proxy Stated Goals: Prolong life    TREATMENT PREFERENCES:   Code Status: Full Code    Advance Care Planning:  [] [facility] Interdisciplinary Team has updated the ACP Navigator with Health Care Decision Maker and Patient Capacity      Advance Care Planning 8/15/2020   Patient's Healthcare Decision Maker is: -   Primary Decision Maker Name -   Primary Decision Maker Phone Number -   Primary Decision Maker Relationship to Patient -   Confirm Advance Directive Yes, not on file   Patient Would Like to Complete Advance Directive -   Does the patient have other document types -       Medical Interventions: Full interventions     Other Instructions: Other:    As far as possible, the palliative care team has discussed with patient / health care proxy about goals of care / treatment preferences for patient. HISTORY:     History obtained from: chart , patient and bed side RN     CHIEF COMPLAINT: weak and cough     HPI/SUBJECTIVE:    The patient is:   [] Verbal and participatory  Feels anxious , because of new onset of dialysis.  He denies any pain , feels weak , has ongoing nausea ,   Clinical Pain Assessment (nonverbal scale for severity on nonverbal patients):   Clinical Pain Assessment  Severity: 0     Activity (Movement): Lying quietly, normal position    Duration: for how long has pt been experiencing pain (e.g., 2 days, 1 month, years)  Frequency: how often pain is an issue (e.g., several times per day, once every few days, constant)     FUNCTIONAL ASSESSMENT:     Palliative Performance Scale (PPS):          PSYCHOSOCIAL/SPIRITUAL SCREENING:     Palliative IDT has assessed this patient for cultural preferences / practices and a referral made as appropriate to needs (Cultural Services, Patient Advocacy, Ethics, etc.)    Any spiritual / Confucianist concerns:  [] Yes /  [x] No    Caregiver Burnout:  [] Yes /  [x] No /  [] No Caregiver Present      Anticipatory grief assessment:   [x] Normal  / [] Maladaptive       ESAS Anxiety: Anxiety: 2    ESAS Depression: Depression: 0        REVIEW OF SYSTEMS:     Positive and pertinent negative findings in ROS are noted above in HPI. The following systems were [x] reviewed / [] unable to be reviewed as noted in HPI  Other findings are noted below. Systems: constitutional, ears/nose/mouth/throat, respiratory, gastrointestinal, genitourinary, musculoskeletal, integumentary, neurologic, psychiatric, endocrine. Positive findings noted below. Modified ESAS Completed by: provider   Fatigue: 7 Drowsiness: 0   Depression: 0 Pain: 0   Anxiety: 2 Nausea: 4   Anorexia: 0 Dyspnea: 3           Stool Occurrence(s): 1        PHYSICAL EXAM:     From RN flowsheet:  Wt Readings from Last 3 Encounters:   08/25/20 239 lb 10.2 oz (108.7 kg)   08/06/20 234 lb (106.1 kg)   07/28/20 233 lb (105.7 kg)     Blood pressure 169/74, pulse 69, temperature 98.6 °F (37 °C), resp. rate 18, height 5' 9\" (1.753 m), weight 239 lb 10.2 oz (108.7 kg), SpO2 99 %. Pain Scale 1: Numeric (0 - 10)  Pain Intensity 1: 0  Pain Onset 1: chronic  Pain Location 1: Back  Pain Orientation 1: Posterior  Pain Description 1: Sharp  Pain Intervention(s) 1: Medication (see MAR)  Last bowel movement, if known:     Constitutional: well built , not in any distress, alert , oriented , speech is hoarse.   Eyes: pupils equal, anicteric  ENMT: no nasal discharge, moist mucous membranes  Cardiovascular: regular rhythm, distal pulses intact  Respiratory: breathing not labored, symmetric, decreased air entry . Gastrointestinal: soft non-tender, +bowel sounds  Musculoskeletal: no deformity, no tenderness to palpation  Skin: warm, dry  Neurologic: following commands, moving all extremities  Psychiatric: full affect, no hallucinations  Other:       HISTORY:     Active Problems:    NERY (acute kidney injury) ([address] (7/17/2020)      Lung cancer ([address] (8/15/2020)      Chest pain (8/15/2020)      Pneumonia (8/15/2020)      Anemia (8/15/2020)      Suspected COVID-19 virus infection (8/15/2020)      Hyperuricemia (8/19/2020)      Metastatic carcinoma ([address] (8/19/2020)      Past Medical History:   Diagnosis Date    Adverse effect of anesthesia     \" STOP BREATHING 1 TIME C ANESTH\"    Calculus of kidney     Cancer [facility] 2004    thyroid cancer    Chronic kidney disease     Chronic pain     BACK SHOULDER AND ARM    Depression     Diabetes ([address]     Encounter for long-term (current) use of NSAIDs     Hypercholesterolemia     Hypertension     Nausea & vomiting     Other ill-defined conditions(799.89)     cholesterol, thyroid    Sleep apnea     doesn't wear cpap    Thyroid cancer ([address]     TIA (transient ischemic attack) 2011    Vitamin D deficiency       Past Surgical History:   Procedure Laterality Date    CARDIAC SURG PROCEDURE UNLIST      HX HEENT      THROAT SURGERY X 4    HX ORTHOPAEDIC      back     HX ORTHOPAEDIC      ARM AND SHOULDER    HX OTHER SURGICAL      thyroid, lymphnode    HX RETINAL DETACHMENT REPAIR      left eye    IR INSERT NON TUNL CVC OVER 5 YRS  8/18/2020    IR INSERT TUNL CVC W/O PORT OVER 5 YR  8/26/2020    UPPER GI ENDOSCOPY,BALL DIL,30MM  7/17/2020         UPPER GI ENDOSCOPY,BIOPSY  7/17/2020         VASCULAR SURGERY PROCEDURE UNLIST      cardiac cath NEG.       Family History   Problem Relation Age of Onset    Diabetes Mother     Elevated Lipids Mother    Ama Fickle Bladder Disease Mother     Headache Mother    [facility] Migraines Mother     Heart Disease Mother     Hypertension Mother     Stroke Mother     Other Mother         ANEURSYM BRAIN    Bleeding Prob Father     Cancer Father         LEUKEMIA    Diabetes Father     Elevated Lipids Father     Mental Retardation Sister     Psychiatric Disorder Sister     Cancer Brother         LUNGS      History reviewed, no pertinent family history.   Social History     Tobacco Use    Smoking status: Former Smoker     Last attempt to quit: 1994     Years since quittin.0    Smokeless tobacco: Never Used   Substance Use Topics    Alcohol use: Yes     Comment: Occasionally     Allergies   Allergen Reactions    Anesthetics - Amide Type Shortness of Breath    Flexeril [Cyclobenzaprine] Hives    Tramadol Hives      Current Facility-Administered Medications   Medication Dose Route Frequency    guaiFENesin ER (MUCINEX) tablet 600 mg  600 mg Oral BID    chloroprocaine (NESACAINE-MPF) 30 mg/mL (3 %) injection   Peripheral Nerve Block Multiple    lansoprazole (PREVACID) oral suspension 30 mg  30 mg Oral ACB    metoclopramide HCl (REGLAN) tablet 5 mg  5 mg Oral Q6H PRN    epoetin martha-epbx (RETACRIT) 12,000 Units combo injection  12,000 Units SubCUTAneous Q MON, WED & FRI    amLODIPine (NORVASC) tablet 2.5 mg  2.5 mg Oral DAILY    morphine injection 1 mg  1 mg IntraVENous Q4H PRN    hydrALAZINE (APRESOLINE) 20 mg/mL injection 10 mg  10 mg IntraVENous Q6H PRN    heparin (porcine) 1,000 unit/mL injection 2,500 Units  2,500 Units Hemodialysis DIALYSIS PRN    albumin human 25% (BUMINATE) solution 12.5 g  12.5 g IntraVENous PRN    alcohol 62% (NOZIN) nasal  1 Ampule  1 Ampule Topical Q12H    HYDROcodone-acetaminophen (NORCO) 7.5-325 mg per tablet 1 Tab  1 Tab Oral Q4H PRN    polyethylene glycol (MIRALAX) packet 17 g  17 g Oral BID    melatonin tablet 3 mg  3 mg Oral QHS PRN    LORazepam (ATIVAN) tablet 1 mg  1 mg Oral Q6H PRN    levothyroxine (SYNTHROID) tablet 200 mcg  200 mcg Oral 6am    atorvastatin (LIPITOR) tablet 10 mg  10 mg Oral QHS    sodium chloride (NS) flush 5-40 mL  5-40 mL IntraVENous Q8H    sodium chloride (NS) flush 5-40 mL  5-40 mL IntraVENous PRN    acetaminophen (TYLENOL) tablet 650 mg  650 mg Oral Q6H PRN    Or    acetaminophen (TYLENOL) suppository 650 mg  650 mg Rectal Q6H PRN    promethazine (PHENERGAN) tablet 12.5 mg  12.5 mg Oral Q6H PRN    Or    ondansetron (ZOFRAN) injection 4 mg  4 mg IntraVENous Q6H PRN    sucralfate (CARAFATE) tablet 1 g  1 g Oral AC&HS    insulin lispro (HUMALOG) injection   SubCUTAneous AC&HS    glucose chewable tablet 16 g  4 Tab Oral PRN    dextrose (D50W) injection syrg 12.5-25 g  12.5-25 g IntraVENous PRN    glucagon (GLUCAGEN) injection 1 mg  1 mg IntraMUSCular PRN    albuterol (PROVENTIL HFA, VENTOLIN HFA, PROAIR HFA) inhaler 2 Puff  2 Puff Inhalation Q4H PRN          LAB AND IMAGING FINDINGS:     Lab Results   Component Value Date/Time    WBC 26.0 (H) 08/26/2020 02:47 AM    HGB 8.0 (L) 08/26/2020 02:47 AM    PLATELET 701 27/06/7691 02:47 AM     Lab Results   Component Value Date/Time    Sodium 135 (L) 08/26/2020 02:47 AM    Potassium 4.3 08/26/2020 02:47 AM    Chloride 98 08/26/2020 02:47 AM    CO2 34 (H) 08/26/2020 02:47 AM    BUN 19 08/26/2020 02:47 AM    Creatinine 2.54 (H) 08/26/2020 02:47 AM    Calcium 8.8 08/26/2020 02:47 AM    Magnesium 2.5 (H) 08/22/2020 05:27 AM    Phosphorus 2.1 (L) 08/26/2020 02:47 AM      Lab Results   Component Value Date/Time    Alk.  phosphatase 115 08/21/2020 03:05 AM    Protein, total 6.4 08/22/2020 05:27 AM    Albumin 2.4 (L) 08/26/2020 02:47 AM    Globulin 4.1 (H) 08/21/2020 03:05 AM     Lab Results   Component Value Date/Time    INR 0.9 01/20/2017 05:17 AM    Prothrombin time 9.4 01/20/2017 05:17 AM    aPTT 27.8 11/19/2015 04:12 AM      Lab Results Component Value Date/Time    Iron 32 (L) 08/20/2020 04:58 AM    TIBC 202 (L) 08/20/2020 04:58 AM    Iron % saturation 16 (L) 08/20/2020 04:58 AM    Ferritin 312 08/20/2020 04:58 AM      No results found for: PH, PCO2, PO2  No components found for: [place]   Lab Results   Component Value Date/Time    CK 29 (L) 09/21/2016 05:31 AM    CK - MB 1.1 09/21/2016 05:31 AM                Total time:   Counseling / coordination time, spent as noted above:   > 50% counseling / coordination?:     Prolonged service was provided for  []30 min   []75 min in face to face time in the presence of the patient, spent as noted above. Time Start:   Time End:   Note: this can only be billed with 90719 (initial) or 61328 (follow up). If multiple start / stop times, list each separately.

## 2020-08-26 NOTE — PROGRESS NOTES
HD TRANSFER - OUT REPORT:    Verbal report given to ESPERANZA REECE on Katy Rubio being transferred to [facility] for routine progression of care       Report consisted of patient's Situation, Background, Assessment and   Recommendations(SBAR). Information from the following report(s) SBAR, Procedure Summary, Intake/Output, MAR and Recent Results was reviewed with the receiving nurse. Method:  $$ Method: Hemodialysis (08/26/20 1333)    Fluid Removed  NET Fluid Removed (mL): 2700 ml (08/26/20 1635)     Patient response to treatment:  Stable    End Time  Hemodialysis End Time: 4560 (08/26/20 1635)  If not documented, dialysis nurse to update post-dialysis row in HD/Filtration flowsheet     Medications /Volume expansion agents or Fluid boluses administered during treatment? no    Post-dialysis medication administration due?  yes  Remind nurse to administer post-HD medication upon return to unit. Fistula hemostasis? no    Line heparinization? yes    Lines: New RIJ permcat    Opportunity for questions and clarification was provided.       Patient transported with: Tech, 4L O2

## 2020-08-26 NOTE — PROGRESS NOTES
TRANSFER - IN REPORT:    Verbal report received from Elsie Graham RN on Nik Elkins  being received from [facility] for ordered procedure      Report consisted of patients Situation, Background, Assessment and   Recommendations(SBAR). Information from the following report(s) SBAR, Kardex and Cardiac Rhythm NSR was reviewed with the receiving nurse. Opportunity for questions and clarification was provided. Assessment completed upon patients arrival to unit and care assumed.

## 2020-08-26 NOTE — PROGRESS NOTES
Bedside shift change report given to [address] [place] [address] Ne (oncoming nurse) by David Del Rosario (offgoing nurse).  Report included the following information SBAR, Kardex, ED Summary and [facility]

## 2020-08-26 NOTE — PROGRESS NOTES
Speech pathology  A MBS was planned for today for objective swallow assessment; however, patient was NPO for a permacath placement this am and now patient on his way to . Will follow up tomorrow to see if appropriate to complete pending patient's desire and willingness to complete in addition to pending d/c.      Maricruz Toledo M.S. CCC-SLP

## 2020-08-27 ENCOUNTER — HOME HEALTH ADMISSION (OUTPATIENT)
Dept: HOME HEALTH SERVICES | Facility: HOME HEALTH | Age: 60
End: 2020-08-27
Payer: MEDICARE

## 2020-08-27 VITALS
WEIGHT: 227.96 LBS | OXYGEN SATURATION: 93 % | HEART RATE: 77 BPM | RESPIRATION RATE: 18 BRPM | BODY MASS INDEX: 33.76 KG/M2 | SYSTOLIC BLOOD PRESSURE: 155 MMHG | DIASTOLIC BLOOD PRESSURE: 65 MMHG | HEIGHT: 69 IN | TEMPERATURE: 98.7 F

## 2020-08-27 LAB
ALBUMIN SERPL-MCNC: 2.1 G/DL (ref 3.5–5)
ANION GAP SERPL CALC-SCNC: 3 MMOL/L (ref 5–15)
BASOPHILS # BLD: 0.1 K/UL (ref 0–0.1)
BASOPHILS NFR BLD: 1 % (ref 0–1)
BUN SERPL-MCNC: 14 MG/DL (ref 6–20)
BUN/CREAT SERPL: 5 (ref 12–20)
CALCIUM SERPL-MCNC: 7.9 MG/DL (ref 8.5–10.1)
CHLORIDE SERPL-SCNC: 97 MMOL/L (ref 97–108)
CO2 SERPL-SCNC: 36 MMOL/L (ref 21–32)
CREAT SERPL-MCNC: 2.56 MG/DL (ref 0.7–1.3)
DIFFERENTIAL METHOD BLD: ABNORMAL
EOSINOPHIL # BLD: 0.6 K/UL (ref 0–0.4)
EOSINOPHIL NFR BLD: 4 % (ref 0–7)
ERYTHROCYTE [DISTWIDTH] IN BLOOD BY AUTOMATED COUNT: 13.7 % (ref 11.5–14.5)
GLUCOSE BLD STRIP.AUTO-MCNC: 152 MG/DL (ref 65–100)
GLUCOSE BLD STRIP.AUTO-MCNC: 168 MG/DL (ref 65–100)
GLUCOSE BLD STRIP.AUTO-MCNC: 199 MG/DL (ref 65–100)
GLUCOSE SERPL-MCNC: 156 MG/DL (ref 65–100)
HCT VFR BLD AUTO: 27.5 % (ref 36.6–50.3)
HGB BLD-MCNC: 8.5 G/DL (ref 12.1–17)
IMM GRANULOCYTES # BLD AUTO: 0.1 K/UL (ref 0–0.04)
IMM GRANULOCYTES NFR BLD AUTO: 1 % (ref 0–0.5)
LYMPHOCYTES # BLD: 1 K/UL (ref 0.8–3.5)
LYMPHOCYTES NFR BLD: 6 % (ref 12–49)
MCH RBC QN AUTO: 28.4 PG (ref 26–34)
MCHC RBC AUTO-ENTMCNC: 30.9 G/DL (ref 30–36.5)
MCV RBC AUTO: 92 FL (ref 80–99)
MONOCYTES # BLD: 1.2 K/UL (ref 0–1)
MONOCYTES NFR BLD: 7 % (ref 5–13)
NEUTS SEG # BLD: 12.7 K/UL (ref 1.8–8)
NEUTS SEG NFR BLD: 81 % (ref 32–75)
NRBC # BLD: 0 K/UL (ref 0–0.01)
NRBC BLD-RTO: 0 PER 100 WBC
PHOSPHATE SERPL-MCNC: 1.7 MG/DL (ref 2.6–4.7)
PLATELET # BLD AUTO: 252 K/UL (ref 150–400)
PMV BLD AUTO: 10.4 FL (ref 8.9–12.9)
POTASSIUM SERPL-SCNC: 3.7 MMOL/L (ref 3.5–5.1)
RBC # BLD AUTO: 2.99 M/UL (ref 4.1–5.7)
SERVICE CMNT-IMP: ABNORMAL
SODIUM SERPL-SCNC: 136 MMOL/L (ref 136–145)
WBC # BLD AUTO: 15.6 K/UL (ref 4.1–11.1)

## 2020-08-27 PROCEDURE — 74011250637 HC RX REV CODE- 250/637: Performed by: INTERNAL MEDICINE

## 2020-08-27 PROCEDURE — 94760 N-INVAS EAR/PLS OXIMETRY 1: CPT

## 2020-08-27 PROCEDURE — 74011250636 HC RX REV CODE- 250/636: Performed by: INTERNAL MEDICINE

## 2020-08-27 PROCEDURE — 77010033678 HC OXYGEN DAILY

## 2020-08-27 PROCEDURE — 99233 SBSQ HOSP IP/OBS HIGH 50: CPT | Performed by: INTERNAL MEDICINE

## 2020-08-27 PROCEDURE — 80069 RENAL FUNCTION PANEL: CPT

## 2020-08-27 PROCEDURE — 85025 COMPLETE CBC W/AUTO DIFF WBC: CPT

## 2020-08-27 PROCEDURE — 97116 GAIT TRAINING THERAPY: CPT

## 2020-08-27 PROCEDURE — 82962 GLUCOSE BLOOD TEST: CPT

## 2020-08-27 PROCEDURE — 97535 SELF CARE MNGMENT TRAINING: CPT | Performed by: OCCUPATIONAL THERAPIST

## 2020-08-27 PROCEDURE — 36415 COLL VENOUS BLD VENIPUNCTURE: CPT

## 2020-08-27 PROCEDURE — 74011250637 HC RX REV CODE- 250/637: Performed by: HOSPITALIST

## 2020-08-27 PROCEDURE — 92526 ORAL FUNCTION THERAPY: CPT

## 2020-08-27 RX ORDER — AMLODIPINE BESYLATE 2.5 MG/1
2.5 TABLET ORAL 2 TIMES DAILY
Qty: 60 TAB | Refills: 0 | Status: SHIPPED | OUTPATIENT
Start: 2020-08-27 | End: 2020-09-29 | Stop reason: DRUGHIGH

## 2020-08-27 RX ADMIN — MORPHINE SULFATE 1 MG: 2 INJECTION, SOLUTION INTRAMUSCULAR; INTRAVENOUS at 09:17

## 2020-08-27 RX ADMIN — METOCLOPRAMIDE 5 MG: 10 TABLET ORAL at 09:16

## 2020-08-27 RX ADMIN — SUCRALFATE 1 G: 1 TABLET ORAL at 09:16

## 2020-08-27 RX ADMIN — AMLODIPINE BESYLATE 2.5 MG: 2.5 TABLET ORAL at 09:17

## 2020-08-27 RX ADMIN — SUCRALFATE 1 G: 1 TABLET ORAL at 13:22

## 2020-08-27 RX ADMIN — SODIUM CHLORIDE 5 ML: 9 INJECTION, SOLUTION INTRAMUSCULAR; INTRAVENOUS; SUBCUTANEOUS at 06:00

## 2020-08-27 RX ADMIN — LANSOPRAZOLE 30 MG: KIT at 09:53

## 2020-08-27 RX ADMIN — LEVOTHYROXINE SODIUM 200 MCG: 0.15 TABLET ORAL at 06:00

## 2020-08-27 NOTE — PROGRESS NOTES
Problem: Mobility Impaired (Adult and Pediatric)  Goal: *Acute Goals and Plan of Care (Insert Text)  Description: FUNCTIONAL STATUS PRIOR TO ADMISSION: Patient was independent and active without use of DME.    HOME SUPPORT PRIOR TO ADMISSION: The patient lived with his brother but did not require assist.    Physical Therapy Goals  Initiated 8/22/2020  1. Patient will move from supine to sit and sit to supine , scoot up and down, and roll side to side in bed with modified independence within 7 day(s). 2.  Patient will transfer from bed to chair and chair to bed with modified independence using the least restrictive device within 7 day(s). 3.  Patient will perform sit to stand with modified independence within 7 day(s). 4.  Patient will ambulate with modified independence for 150 feet with the least restrictive device within 7 day(s). 5.  Patient will ascend/descend 3 stairs with best method with minimal assistance/contact guard assist within 7 day(s). Outcome: Progressing Towards Goal   PHYSICAL THERAPY TREATMENT  Patient: Stephenie Talbot (42 y.o. male)  Date: 8/27/2020  Diagnosis: Pneumonia [J18.9]  Suspected COVID-19 virus infection [Z20.828]  NERY (acute kidney injury) ([facility] [N17.9]  Anemia [D64.9]  Lung cancer ([facility] [C34.90]  Chest pain [R07.9]   <principal problem not specified>       Precautions: Fall, Aspiration, Skin(5LO2)  Chart, physical therapy assessment, plan of care and goals were reviewed. ASSESSMENT  Patient continues with skilled PT services and is progressing towards goals. Pt received seated in bedside chair, agreeable to PT session. Pt demonstrates all mobility at S to mod I. Pt now on 2L of O2 and O2 sats decreased to 85% briefly with ambulation, but quickly recovered to 96% with PLB technique. Pt progressing well and reports feeling stronger. HHPT remains appropriate at discharge.     Current Level of Function Impacting Discharge (mobility/balance): S to Mod I    Other factors to consider for discharge: supportive daughters will assist him         PLAN :  Patient continues to benefit from skilled intervention to address the above impairments. Continue treatment per established plan of care. to address goals. Recommendation for discharge: (in order for the patient to meet his/her long term goals)  Physical therapy at least 2 days/week in the home     This discharge recommendation:  Has been made in collaboration with the attending provider and/or case management    IF patient discharges home will need the following DME: rolling walker       SUBJECTIVE:   Patient stated I feel better.     OBJECTIVE DATA SUMMARY:   Critical Behavior:  Neurologic State: Alert  Orientation Level: Oriented X4  Cognition: Appropriate safety awareness, Appropriate for age attention/concentration, Appropriate decision making  Safety/Judgement: Fall prevention, Awareness of environment, Insight into deficits, Decreased insight into deficits(willing to learn)  Functional Mobility Training:  Bed Mobility:   Deferred, pt seated in bedside chair                 Transfers:  Sit to Stand: Supervision  Stand to Sit: Supervision                             Balance:  Sitting: Intact  Standing: Intact; With support  Standing - Static: Good; Unsupported  Standing - Dynamic : Constant support;Good  Ambulation/Gait Training:  Distance (ft): 50 Feet (ft)  Assistive Device: Gait belt;Walker, rolling  Ambulation - Level of Assistance: Modified independent;Supervision        Gait Abnormalities: Decreased step clearance              Speed/Maria De Jesus: Pace decreased (<100 feet/min)  Step Length: Left shortened;Right shortened          Pain Rating:  None reported    Activity Tolerance:   Good  Please refer to the flowsheet for vital signs taken during this treatment.     After treatment patient left in no apparent distress:   Sitting in chair and Call bell within reach    COMMUNICATION/COLLABORATION:   The patients plan of care was discussed with: Registered nurse.      Yuridia Ruano, PT   Time Calculation: 13 mins

## 2020-08-27 NOTE — PROGRESS NOTES
KIMMY plan:     -  Home health PT/OT/SN - accepted by [employer]  -  New OP HD setup - accepted to [facility] at 2:30 pm  -  OP f/u - PCP, Nephro, GI  -  DME - RW order sent to [employer] on 8/26  - Silvia Money to transport home - will need to be reminded to bring portable O2  -  BPCI-A Heart Failure letter - given 8/17  -  2nd IMM letter    Addendum 2:44 PM - Home O2 approved and being delivered to hospital around [address] [place] [address] by [employer]. RW delivered to room by [employer]. PCP appt scheduled for 9/1/20 and pt's first day of OP HD tomorrow, 8/28 (daughter will transport). D/C Summary and HD flow sheets faxed to [address] [place] [address]. Daughter at bedside and will transport home once discharge instructions reviewed. Plan reviewed with pt and daughter who both verbalized understanding of the plan. No further concerns indicated at this time. AVS updated. Patient is ready for discharge from a Care Management standpoint. RN informed. Medicare pt has received, reviewed, and signed 2nd  letter informing them of their right to appeal the discharge. Signed copy has been placed on pt bedside chart. Addendum 11:32 AM - Confirmation received that pt can start OP HD tomorrow, 8/28. Added to AVS. CM informed that pt does NOT have home O2 like previously stated in the former CM's note. RN will complete O2 challenge today to assess for home O2 needs. CM will arrange home O2 if eligible. Qualifying dx will be CHF. Original note 10:07 AM - Still no confirmation on start date of OP HD. CM called the clinic and [facility] but they do not have verification yet. Pt was accepted by [employer] [facility] for PT/OT/SN - added to chart. Met with pt who verbalized agreement with HH, OP HD, and home O2.     HEAVEN Giles  Care Manager  [phone]

## 2020-08-27 NOTE — CONSULTS
Palliative Medicine Consult  [place]: [phone] ([phone])    Patient Name: Marcial Bar  YOB: 1960    Date of Initial Consult: 20  Reason for Consult: care decisions  Requesting Provider: Ry Stanley MD   Primary Care Physician: Jose Manuel Car MD     SUMMARY:   Marcial Bar is a 64 y.o. Male with a past history of CKD stage 3, hx of  Metastatic thyroid cancer with mets to lungs ,  Acute on chronic anemia, hx of duodenitis EGD 2020, who was admitted on 8/15/2020 from home with a diagnosis of acute respiratory failure in asetting of acute on chronic diastolic CHF, EF 33-44% CAP and NERY . Hospital course is notable for progressive renal failure placed on dialysis and would need permanent dialysis . Per oncology he is not on systemic therapy for cancer      Current medical issues leading to Palliative Medicine involvement include: care decision, NO AMD.  Social history:  x 3 months, has 7 children, remote smoker , independent in function , moved recently with his daughter Marcelo Hutchinson, because patient was getting weaker . PALLIATIVE DIAGNOSES:   1. Goals of care   2. Advance directives counseling discussion   3. Weakness  4. Progressive Renal failure , placed on dialysis during this admission. 5. Grief          PLAN:   1. I reviewed chart prior to visit . 2. Met with patient who is, with Cecilia Rodriguez( [facility]), alert oriented , pleasant , has base line hoarse voice due to thyroidectomy. 3. Advance Directives : follow up on our conversation from yesterday , he completed MPOA , portion of Advance directives , wants to discuss the living will portion with her daughter Mile Holm .(refer to ACP note from Cecilia Rodriguez ). 4. Goal is to continue with dialysis . 5. Introduce Palliative care . 6.  Grief :He feels anxious due to new onset renal failure , trying to cope , wants to continue with dialysis . He is dealing grief after his wife  3 months ago .  Team  will follow for supportive counseling . 7. Initial consult note routed to primary continuity provider and/or primary health care team members  8. Communicated plan of care with: Palliative [facility] Team     GOALS OF CARE / TREATMENT PREFERENCES:     GOALS OF CARE:  Patient/Health Care Proxy Stated Goals: Prolong life    TREATMENT PREFERENCES:   Code Status: Full Code    Advance Care Planning:  [x] [facility] Interdisciplinary Team has updated the ACP Navigator with Health Care Decision Maker and Patient Capacity      Primary Decision Maker: Jennifer Holguin - [phone]  Advance Care Planning 8/15/2020   Patient's Healthcare Decision Maker is: -   Primary Decision Maker Name -   Primary Decision Maker Phone Number -   Primary Decision Maker Relationship to Patient -   Confirm Advance Directive Yes, not on file   Patient Would Like to Complete Advance Directive -   Does the patient have other document types -       Medical Interventions: Full interventions     Other Instructions: Other:    As far as possible, the palliative care team has discussed with patient / health care proxy about goals of care / treatment preferences for patient. HISTORY:     History obtained from: chart , patient and bed side RN     CHIEF COMPLAINT: weak and cough     HPI/SUBJECTIVE:    The patient is:   [] Verbal and participatory  Feels anxious , because of new onset of dialysis. He denies any pain , feels weak , has ongoing nausea . 8/26/20: c/o hiccups,  making very little urine .   Clinical Pain Assessment (nonverbal scale for severity on nonverbal patients):   Clinical Pain Assessment  Severity: 0     Activity (Movement): Lying quietly, normal position    Duration: for how long has pt been experiencing pain (e.g., 2 days, 1 month, years)  Frequency: how often pain is an issue (e.g., several times per day, once every few days, constant)     FUNCTIONAL ASSESSMENT:     Palliative Performance Scale (PPS):          PSYCHOSOCIAL/SPIRITUAL SCREENING:     Palliative IDT has assessed this patient for cultural preferences / practices and a referral made as appropriate to needs (Cultural Services, Patient Advocacy, Ethics, etc.)    Any spiritual / Holiness concerns:  [] Yes /  [x] No    Caregiver Burnout:  [] Yes /  [x] No /  [] No Caregiver Present      Anticipatory grief assessment:   [x] Normal  / [] Maladaptive       ESAS Anxiety: Anxiety: 2    ESAS Depression: Depression: 0        REVIEW OF SYSTEMS:     Positive and pertinent negative findings in ROS are noted above in HPI. The following systems were [x] reviewed / [] unable to be reviewed as noted in HPI  Other findings are noted below. Systems: constitutional, ears/nose/mouth/throat, respiratory, gastrointestinal, genitourinary, musculoskeletal, integumentary, neurologic, psychiatric, endocrine. Positive findings noted below. Modified ESAS Completed by: provider   Fatigue: 5 Drowsiness: 0   Depression: 0 Pain: 0   Anxiety: 2 Nausea: 0   Anorexia: 0 Dyspnea: 0     Constipation: No     Stool Occurrence(s): 1        PHYSICAL EXAM:     From RN flowsheet:  Wt Readings from Last 3 Encounters:   08/26/20 227 lb 15.3 oz (103.4 kg)   08/06/20 234 lb (106.1 kg)   07/28/20 233 lb (105.7 kg)     Blood pressure 193/83, pulse 77, temperature 98.4 °F (36.9 °C), resp. rate 18, height 5' 9\" (1.753 m), weight 227 lb 15.3 oz (103.4 kg), SpO2 93 %. Pain Scale 1: Numeric (0 - 10)  Pain Intensity 1: 8  Pain Onset 1: chronic  Pain Location 1: Back  Pain Orientation 1: Posterior  Pain Description 1: Sharp  Pain Intervention(s) 1: Medication (see MAR)  Last bowel movement, if known:     Constitutional: well built , not in any distress, alert , oriented , speech is hoarse.   Eyes: pupils equal, anicteric  ENMT: no nasal discharge, moist mucous membranes  Cardiovascular: regular rhythm, distal pulses intact  Respiratory: breathing not labored, symmetric, decreased air entry .  Gastrointestinal: soft non-tender, +bowel sounds  Musculoskeletal: no deformity, no tenderness to palpation  Skin: warm, dry  Neurologic: following commands, moving all extremities  Psychiatric: full affect, no hallucinations  Other:       HISTORY:     Active Problems:    NERY (acute kidney injury) ([address] (7/17/2020)      Lung cancer ([address] (8/15/2020)      Chest pain (8/15/2020)      Pneumonia (8/15/2020)      Anemia (8/15/2020)      Suspected COVID-19 virus infection (8/15/2020)      Hyperuricemia (8/19/2020)      Metastatic carcinoma ([address] (8/19/2020)      Past Medical History:   Diagnosis Date    Adverse effect of anesthesia     \" STOP BREATHING 1 TIME C ANESTH\"    Calculus of kidney     Cancer ([address] 2004    thyroid cancer    Chronic kidney disease     Chronic pain     BACK SHOULDER AND ARM    Depression     Diabetes ([address]     Encounter for long-term (current) use of NSAIDs     Hypercholesterolemia     Hypertension     Nausea & vomiting     Other ill-defined conditions(799.89)     cholesterol, thyroid    Sleep apnea     doesn't wear cpap    Thyroid cancer ([address]     TIA (transient ischemic attack) 2011    Vitamin D deficiency       Past Surgical History:   Procedure Laterality Date    CARDIAC SURG PROCEDURE UNLIST      HX HEENT      THROAT SURGERY X 4    HX ORTHOPAEDIC      back     HX ORTHOPAEDIC      ARM AND SHOULDER    HX OTHER SURGICAL      thyroid, lymphnode    HX RETINAL DETACHMENT REPAIR      left eye    IR INSERT NON TUNL CVC OVER 5 YRS  8/18/2020    IR INSERT TUNL CVC W/O PORT OVER 5 YR  8/26/2020    UPPER GI ENDOSCOPY,BALL DIL,30MM  7/17/2020         UPPER GI ENDOSCOPY,BIOPSY  7/17/2020         VASCULAR SURGERY PROCEDURE UNLIST      cardiac cath NEG.       Family History   Problem Relation Age of Onset    Diabetes Mother     Elevated Lipids Mother    Dominique Heart Bladder Disease Mother     Headache Mother    [facility] Migraines Mother     Heart Disease Mother     Hypertension Mother  Stroke Mother     Other Mother         ANEURSYM BRAIN    Bleeding Prob Father     Cancer Father         LEUKEMIA    Diabetes Father     Elevated Lipids Father     Mental Retardation Sister     Psychiatric Disorder Sister     Cancer Brother         LUNGS      History reviewed, no pertinent family history.   Social History     Tobacco Use    Smoking status: Former Smoker     Last attempt to quit: 1994     Years since quittin.0    Smokeless tobacco: Never Used   Substance Use Topics    Alcohol use: Yes     Comment: Occasionally     Allergies   Allergen Reactions    Anesthetics - Amide Type Shortness of Breath    Flexeril [Cyclobenzaprine] Hives    Tramadol Hives      Current Facility-Administered Medications   Medication Dose Route Frequency    guaiFENesin ER (MUCINEX) tablet 600 mg  600 mg Oral BID    chloroprocaine (NESACAINE-MPF) 30 mg/mL (3 %) injection   Peripheral Nerve Block Multiple    heparin (porcine) 1,000 unit/mL injection 3,800 Units  3,800 Units Hemodialysis DIALYSIS PRN    lansoprazole (PREVACID) oral suspension 30 mg  30 mg Oral ACB    metoclopramide HCl (REGLAN) tablet 5 mg  5 mg Oral Q6H PRN    epoetin martha-epbx (RETACRIT) 12,000 Units combo injection  12,000 Units SubCUTAneous Q MON, WED & FRI    amLODIPine (NORVASC) tablet 2.5 mg  2.5 mg Oral DAILY    morphine injection 1 mg  1 mg IntraVENous Q4H PRN    hydrALAZINE (APRESOLINE) 20 mg/mL injection 10 mg  10 mg IntraVENous Q6H PRN    albumin human 25% (BUMINATE) solution 12.5 g  12.5 g IntraVENous PRN    alcohol 62% (NOZIN) nasal  1 Ampule  1 Ampule Topical Q12H    HYDROcodone-acetaminophen (NORCO) 7.5-325 mg per tablet 1 Tab  1 Tab Oral Q4H PRN    polyethylene glycol (MIRALAX) packet 17 g  17 g Oral BID    melatonin tablet 3 mg  3 mg Oral QHS PRN    LORazepam (ATIVAN) tablet 1 mg  1 mg Oral Q6H PRN    levothyroxine (SYNTHROID) tablet 200 mcg  200 mcg Oral 6am    atorvastatin (LIPITOR) tablet 10 mg 10 mg Oral QHS    sodium chloride (NS) flush 5-40 mL  5-40 mL IntraVENous Q8H    sodium chloride (NS) flush 5-40 mL  5-40 mL IntraVENous PRN    acetaminophen (TYLENOL) tablet 650 mg  650 mg Oral Q6H PRN    Or    acetaminophen (TYLENOL) suppository 650 mg  650 mg Rectal Q6H PRN    promethazine (PHENERGAN) tablet 12.5 mg  12.5 mg Oral Q6H PRN    Or    ondansetron (ZOFRAN) injection 4 mg  4 mg IntraVENous Q6H PRN    sucralfate (CARAFATE) tablet 1 g  1 g Oral AC&HS    insulin lispro (HUMALOG) injection   SubCUTAneous AC&HS    glucose chewable tablet 16 g  4 Tab Oral PRN    dextrose (D50W) injection syrg 12.5-25 g  12.5-25 g IntraVENous PRN    glucagon (GLUCAGEN) injection 1 mg  1 mg IntraMUSCular PRN    albuterol (PROVENTIL HFA, VENTOLIN HFA, PROAIR HFA) inhaler 2 Puff  2 Puff Inhalation Q4H PRN          LAB AND IMAGING FINDINGS:     Lab Results   Component Value Date/Time    WBC 26.0 (H) 08/26/2020 02:47 AM    HGB 8.0 (L) 08/26/2020 02:47 AM    PLATELET 346 48/79/3977 02:47 AM     Lab Results   Component Value Date/Time    Sodium 136 08/27/2020 02:15 AM    Potassium 3.7 08/27/2020 02:15 AM    Chloride 97 08/27/2020 02:15 AM    CO2 36 (H) 08/27/2020 02:15 AM    BUN 14 08/27/2020 02:15 AM    Creatinine 2.56 (H) 08/27/2020 02:15 AM    Calcium 7.9 (L) 08/27/2020 02:15 AM    Magnesium 2.5 (H) 08/22/2020 05:27 AM    Phosphorus 1.7 (L) 08/27/2020 02:15 AM      Lab Results   Component Value Date/Time    Alk.  phosphatase 115 08/21/2020 03:05 AM    Protein, total 6.4 08/22/2020 05:27 AM    Albumin 2.1 (L) 08/27/2020 02:15 AM    Globulin 4.1 (H) 08/21/2020 03:05 AM     Lab Results   Component Value Date/Time    INR 0.9 01/20/2017 05:17 AM    Prothrombin time 9.4 01/20/2017 05:17 AM    aPTT 27.8 11/19/2015 04:12 AM      Lab Results   Component Value Date/Time    Iron 32 (L) 08/20/2020 04:58 AM    TIBC 202 (L) 08/20/2020 04:58 AM    Iron % saturation 16 (L) 08/20/2020 04:58 AM    Ferritin 312 08/20/2020 04:58 AM      No results found for: PH, PCO2, PO2  No components found for: [place]   Lab Results   Component Value Date/Time    CK 29 (L) 09/21/2016 05:31 AM    CK - MB 1.1 09/21/2016 05:31 AM                Total time:   Counseling / coordination time, spent as noted above:   > 50% counseling / coordination?:     Prolonged service was provided for  []30 min   []75 min in face to face time in the presence of the patient, spent as noted above. Time Start:   Time End:   Note: this can only be billed with 98001 (initial) or 78972 (follow up). If multiple start / stop times, list each separately.

## 2020-08-27 NOTE — ACP (ADVANCE CARE PLANNING)
Primary Decision Maker: Valeria Coto - Daughter - [phone]  Advance Care Planning 8/27/2020   Patient's [place] is: Named in scanned ACP document   Primary Decision Maker Name -   Primary Decision Maker Phone Number -   Primary Decision Maker Relationship to Patient -   Confirm Advance Directive Yes, on file   Patient Would Like to Complete Advance Directive -   Does the patient have other document types -     Palliative team of Namita Santiago MD and this writer met with patient to continue ongoing conversation of his wishes at end of life, this was begun with Dr Daquan Rosas yesterday. Patient is alert, awake, sitting up in his recliner, open to engage in conversation regarding his goals of care. Patient only completed the mPOA portion of AMD. He named his daughter named above, Abner Gonzalez, as his medical decision maker. (Patient has eight children, but he lives with Herman Dawson). He did not want to name anyone else on this. Patient also did not complete the Living Will portion of this document, he noted that he would like to discuss this with his family before he makes any decisions. That portion of AMD was \"X\"d out. Patient is not an organ donor. Patient also remains Full Code. He noted that he would like to have a discussion about this as well with family. Patient was given a blank AMD that he could discuss with daughter/family if he wants and explained that it would need two witness signatures, this new document would supercede the current document. Patient given two copies, a copy was placed in hard chart for scanning.

## 2020-08-27 NOTE — PROGRESS NOTES
Acute hypoxic respiratory failure, POA  Acute on chronic diastolic heart failure, POA  Will need long-term O 2 at home

## 2020-08-27 NOTE — PALLIATIVE CARE
Brief summary of visit , full note will be placed. Patient completed MPOA section of advance directives , wants to discuss with her daughter Francisco about living will /treatment preferences for end of life. He wants to continue with full code status , he wants to discuss with his daughter about forgoing CPR .

## 2020-08-27 NOTE — PROGRESS NOTES
Nephrology Progress Note  [address] [facility]  [facility] / [facility], Tiffanibrenda Ramirez, [address]  Phone - [phone]               Fax - [phone]  Patient: Vicki Moyer    YOB: 1960     Admit Date: 8/15/2020   Date- 8/27/2020     CC: Follow up for nery       IMPRESSION & PLAN:   NERY -unknown etiology at this time( likely ATN from sepsis from resp source vs hemodynamics)  - no hd today  - he will go to [facility] - MWF schedule  - avoid acei or arb     hypertension  - continue norvasc     Anemia of ckd  - s/p venofer   - continue epogen    Hypoxic resp failure/atypical pneumonia in CT chest w/ lung mets  CKD 3- bl. Cr. 2.1-2.3  H/o thyroid cancer  Adrenal adenoma  Hyperlipidemia  DM 2- uncontrolled    OKAY TO D/C HOME TODAY       Subjective: Interval History:   - s/p hd yesterday  Still Requiring oxygen      ROS:-No c/o sob,  No c/o chest pain,   No c/o nausea or vomiting, No c/o  fever. Objective:   Vitals:    08/26/20 2254 08/26/20 2256 08/27/20 0300 08/27/20 0753   BP: 136/54  132/58 193/83   Pulse: 62  60 77   Resp: 18  17 18   Temp: 98.2 °F (36.8 °C)  98.3 °F (36.8 °C) 98.4 °F (36.9 °C)   TempSrc:       SpO2: 96%  96% 93%   Weight:  103.4 kg (227 lb 15.3 oz)     Height:          08/26 0701 - 08/27 0700  In: -   Out: 2700   Last 3 Recorded Weights in this Encounter    08/23/20 2151 08/25/20 0149 08/26/20 2256   Weight: 106.8 kg (235 lb 7.2 oz) 108.7 kg (239 lb 10.2 oz) 103.4 kg (227 lb 15.3 oz)      Physical exam:   GEN: nad  Neck - supple, no mass  RESP: clear RRR  NEURO: Normal speech, non focal  Abdo- soft, non tender  EXT: trace Edema   PSYCH: Normal mood    Discussed with:- Nurse , patient,     Chart reviewed. Pertinent Notes reviewed.      Data Review :  Recent Labs     08/27/20  0215 08/26/20  0247 08/25/20  0150    135* 138   K 3.7 4.3 3.7   CL 97 98 103   CO2 36* 34* 33*   BUN 14 19 28*   CREA 2.56* 2.54* 2.69*   * 98 93   CA 7.9* 8.8 8.1*   PHOS 1.7* 2.1* 2.4*     Recent Labs     08/26/20  0247 08/25/20  0150   WBC 26.0* 8.9   HGB 8.0* 7.9*   HCT 25.8* 25.9*    214     No results for input(s): FE, TIBC, PSAT, FERR in the last 72 hours.    Medication list  reviewed  Current Facility-Administered Medications   Medication Dose Route Frequency    guaiFENesin ER (MUCINEX) tablet 600 mg  600 mg Oral BID    chloroprocaine (NESACAINE-MPF) 30 mg/mL (3 %) injection   Peripheral Nerve Block Multiple    heparin (porcine) 1,000 unit/mL injection 3,800 Units  3,800 Units Hemodialysis DIALYSIS PRN    lansoprazole (PREVACID) oral suspension 30 mg  30 mg Oral ACB    metoclopramide HCl (REGLAN) tablet 5 mg  5 mg Oral Q6H PRN    epoetin martha-epbx (RETACRIT) 12,000 Units combo injection  12,000 Units SubCUTAneous Q MON, WED & FRI    amLODIPine (NORVASC) tablet 2.5 mg  2.5 mg Oral DAILY    morphine injection 1 mg  1 mg IntraVENous Q4H PRN    hydrALAZINE (APRESOLINE) 20 mg/mL injection 10 mg  10 mg IntraVENous Q6H PRN    albumin human 25% (BUMINATE) solution 12.5 g  12.5 g IntraVENous PRN    alcohol 62% (NOZIN) nasal  1 Ampule  1 Ampule Topical Q12H    HYDROcodone-acetaminophen (NORCO) 7.5-325 mg per tablet 1 Tab  1 Tab Oral Q4H PRN    polyethylene glycol (MIRALAX) packet 17 g  17 g Oral BID    melatonin tablet 3 mg  3 mg Oral QHS PRN    LORazepam (ATIVAN) tablet 1 mg  1 mg Oral Q6H PRN    levothyroxine (SYNTHROID) tablet 200 mcg  200 mcg Oral 6am    atorvastatin (LIPITOR) tablet 10 mg  10 mg Oral QHS    sodium chloride (NS) flush 5-40 mL  5-40 mL IntraVENous Q8H    sodium chloride (NS) flush 5-40 mL  5-40 mL IntraVENous PRN    acetaminophen (TYLENOL) tablet 650 mg  650 mg Oral Q6H PRN    Or    acetaminophen (TYLENOL) suppository 650 mg  650 mg Rectal Q6H PRN    promethazine (PHENERGAN) tablet 12.5 mg  12.5 mg Oral Q6H PRN    Or    ondansetron (ZOFRAN) injection 4 mg  4 mg IntraVENous Q6H PRN    sucralfate (CARAFATE) tablet 1 g  1 g Oral AC&HS    insulin lispro (HUMALOG) injection   SubCUTAneous AC&HS    glucose chewable tablet 16 g  4 Tab Oral PRN    dextrose (D50W) injection syrg 12.5-25 g  12.5-25 g IntraVENous PRN    glucagon (GLUCAGEN) injection 1 mg  1 mg IntraMUSCular PRN    albuterol (PROVENTIL HFA, VENTOLIN HFA, PROAIR HFA) inhaler 2 Puff  2 Puff Inhalation Q4H PRN                          Terrie Hoang MD                   [facility]                   [email or web address]

## 2020-08-27 NOTE — PROGRESS NOTES
Problem: Self Care Deficits Care Plan (Adult)  Goal: *Acute Goals and Plan of Care (Insert Text)  Description:   FUNCTIONAL STATUS PRIOR TO ADMISSION: Patient was independent and active without use of DME.     HOME SUPPORT: The patient lived  with brother and needed no assist.    Occupational Therapy Goals  Initiated 8/23/2020  1. Patient will perform grooming with independence within 7 day(s). 2.  Patient will perform bathing with independence within 7 day(s). 3.  Patient will perform lower body dressing with independence within 7 day(s). 4.  Patient will perform toilet transfers with independence within 7 day(s). 5.  Patient will perform all aspects of toileting with independence within 7 day(s). 6.  Patient will participate in upper extremity therapeutic exercise/activities with independence for 10 minutes within 7 day(s). 7.  Patient will utilize energy conservation techniques during functional activities with verbal cues within 7 day(s). Outcome: Progressing Towards Goal    OCCUPATIONAL THERAPY TREATMENT  Patient: Helen Zamora (78 y.o. male)  Date: 8/27/2020  Diagnosis: Pneumonia [J18.9]  Suspected COVID-19 virus infection [Z20.828]  NERY (acute kidney injury) ([facility] [address] [N17.9]  Anemia [D64.9]  Lung cancer ([facility] [address] [C34.90]  Chest pain [R07.9]   <principal problem not specified>       Precautions: Fall, Aspiration, Skin(5LO2)  Chart, occupational therapy assessment, plan of care, and goals were reviewed. ASSESSMENT  Patient continues with skilled OT services and is progressing towards goals. Patient requires occasional cues throughout functional tasks for safety and for pursed lip breathing. His SpO2 dropped to 88% during activity on 2 liters O2, but quickly improved to 95% with cues for breathing techniques. Briefly discussed energy conservation, especially related to ADL. Discussed equipment for lower body dressing. Patient indicates that his daughter is getting him a sock aid and a reacher. Current Level of Function Impacting Discharge (ADLs): Min A to S    Other factors to consider for discharge: fatigues quickly; on 2 liters O2         PLAN :  Patient continues to benefit from skilled intervention to address the above impairments. Continue treatment per established plan of care. to address goals. Recommendation for discharge: (in order for the patient to meet his/her long term goals)  Occupational therapy at least 2 days/week in the home     This discharge recommendation:  Has been made in collaboration with the attending provider and/or case management    IF patient discharges home will need the following DME: AE: long handled dressing       SUBJECTIVE:   Patient stated I can do that.     OBJECTIVE DATA SUMMARY:   Cognitive/Behavioral Status:  Neurologic State: Alert  Orientation Level: Oriented X4  Cognition: Follows commands  Perception: Appears intact     Safety/Judgement: Home safety; Fall prevention    Functional Mobility and Transfers for ADLs:  Bed Mobility:       Transfers:  Sit to Stand: Supervision  Functional Transfers  Bathroom Mobility: Stand-by assistance  Toilet Transfer : Stand-by assistance  Cues: Verbal cues provided       Balance:  Sitting: Intact  Standing: Intact; With support  Standing - Static: Good; Unsupported  Standing - Dynamic : Constant support;Good    ADL Intervention:   Educated patient on energy conservation. 1. Deep breathing  2. Educated on pacing and making sure he/she takes short frequent breaks (e.g. In the shower wash the upper body, rest for 1 minute, then wash the lower body, etc)  5. Educated on re-arranging his/her routine to allow for rest breaks in the morning routine  8. Educated on looking at the consequences of his/her actions before deciding he/she needs to take on a task (e.g not getting down on one's hands and knees to wash floors because it will take all of one's energy for the day and result in exhaustion).   10. Educated on DME used to help conserve energy, such as dressing equipment, a shower seat, a stool or chair in the kitchen, and pushing or pulling items instead of carrying them    Grooming  Position Performed: Standing  Washing Face: Stand-by assistance  Washing Hands: Stand-by assistance         Lower Body Dressing Assistance  Dressing Assistance: Minimum assistance  Position Performed: Seated in chair  Cues: Verbal cues provided;Visual cues provided    Toileting  Bladder Hygiene: Independent  Clothing Management: Contact guard assistance  Cues: Verbal cues provided  Adaptive Equipment: Walker    Cognitive Retraining  Safety/Judgement: Home safety; Fall prevention    Pain:  No complaints    Activity Tolerance:   Fair  Please refer to the flowsheet for vital signs taken during this treatment. After treatment patient left in no apparent distress:   Sitting in chair and Call bell within reach    COMMUNICATION/COLLABORATION:   The patients plan of care was discussed with: Physical therapist and Registered nurse.      ISSAC Lehman/L  Time Calculation: 16 mins

## 2020-08-27 NOTE — PROGRESS NOTES
Discussed discharge instructions with patient, patient had time to ask questions regarding discharge plan, and assisted patient in gathering and packing personal items for discharge.

## 2020-08-27 NOTE — DISCHARGE SUMMARY
Hospitalist Discharge Summary     Patient ID:  Jarod Cervantes  [id number]  64 y.o.  1960  8/15/2020    PCP on record: Twila Mixon MD    Admit date: 8/15/2020  Discharge date and time: 8/27/2020    DISCHARGE DIAGNOSIS:    Acute hypoxic respiratory failure, POA  Acute on chronic diastolic heart failure, POA  Community-acquired pneumonia, likely bacterial, POA      Acute Metabolic encephalopathy    Acute kidney disease, worsening, POA  Hyperkalemia with junctional rhythm on EKG  Hypophosphatemia       Acute on chronic anemia,. History of duodenitis, POA       Bradycardia      CONSULTATIONS:  IP CONSULT TO NEPHROLOGY  IP CONSULT TO GASTROENTEROLOGY  IP CONSULT TO HEMATOLOGY  IP CONSULT TO PALLIATIVE CARE - PROVIDER    Excerpted HPI from H&P of Ronnell Pratt MD:    Fabian Box is a 64 y. o.   male who presents with past medical history of recurrent/metastatic STRATTON refractory carcinoma thyroid ,Mets to the lung, diabetes mellitus, hypertension, gastritis/deuodenitis currenntly on PPI  presented to ED with chief complaint of  Sob and was found to Hypoxic in 80s on RA  And 100% On 2L. Pt. c/o intermittent chest pain  For last few days . Pt. Was recently admitted to [facility] [place] 7/2020  Denies Flu like symptoms or sick Contact. Per pt. After Discharge from [facility] in 7/2020 he was seen by GI recently but they could not  Do the EGD.     ______________________________________________________________________  DISCHARGE SUMMARY/HOSPITAL COURSE:  for full details see H&P, daily progress notes, labs, consult notes.          Acute hypoxic respiratory failure, POA  Acute on chronic diastolic heart failure, POA  Community-acquired pneumonia, likely bacterial, POA  - COVID-19 test negative on admission  - completed antibiotics,  Last day 08/20  - on NC 4 L o2 Sat 90%  - transferred from ICU 08/22  - blood cultures NGT  -Leukocytosis worsening a little bit but likely reactive to recent new dialysis and permanent catheter placement. Better than yesterday  -ECHO this admission   · LV: Estimated LVEF is 65 - 70%. Visually measured ejection fraction. Normal cavity size and systolic function (ejection fraction normal). Moderate concentric hypertrophy. Wall motion: normal. Age-appropriate left ventricular diastolic function. · PA: Pulmonary arterial systolic pressure is 35 mmHg. · MV: Trace mitral valve regurgitation is present. · LA: Mildly dilated left atrium.     Acute Metabolic encephalopathy  -Avoid all sedating medications  -resolved, cont to be alert and awake  -2/2  Worsening kidney function .       Acute kidney disease, worsening, POA  Hyperkalemia with junctional rhythm on EKG  Hypophosphatemia  -CKD stage IV  - Kidney functions almost the same  - s/p hemodialysis started 08/19. He might need perminant HD. perminant  Cath placed August 26, 2020. Discussed with Nephrology. He will be discharged on hemodialysis 3 times a week for now  - Renal ultrasound with renal cortical thinning, no overt hydronephrosis  -uric acid level are elevated on 08/19 but trending down.  heme-onc's input is appreciated, completed rasburicase treatment     Acute on chronic anemia,. History of duodenitis, POA  -EGD July 2020 revealed Padmini-Metzger tear, duodenitis, duodenal polyp, gastric polyps, and dilation of the esophagus  -Hemoglobin on the lower side but has been stable,   -on KOFFI      Bradycardia  -Heart rate around 40s to 50s on 08/18  -it was Likely secondary to hyperkalemia  -We will hold carvedilol for now and to be evaluated as an outpatient with primary care  -EKG with no AV block  -resolved       _______________________________________________________________________  Patient seen and examined by me on discharge day. Pertinent Findings:  Gen:    Not in distress  Chest: Clear lungs  CVS:   Regular rhythm.   No edema  Abd:  Soft, not distended, not tender  Neuro: Alert  _______________________________________________________________________  DISCHARGE MEDICATIONS:   Current Discharge Medication List      CONTINUE these medications which have CHANGED    Details   amLODIPine (NORVASC) 2.5 mg tablet Take 1 Tab by mouth two (2) times a day. Qty: 60 Tab, Refills: 0         CONTINUE these medications which have NOT CHANGED    Details   Blood-Glucose Meter monitoring kit 1 preferred brand glucometer for checking home glucose, E11.22  Qty: 1 Kit, Refills: 0    Associated Diagnoses: Type 2 diabetes mellitus with stage 3 chronic kidney disease, with long-term current use of insulin ([address]      ! ! glucose blood VI test strips (blood glucose test) strip Pharmacist to choose preferred meter and strips. Dx:E11.65, Z79.4 Monitor 3 times daily  Qty: 300 Strip, Refills: 3    Associated Diagnoses: Type 2 diabetes mellitus with stage 3 chronic kidney disease, with long-term current use of insulin ([facility])      insulin NPH (HumuLIN N NPH U-100 Insulin) 100 unit/mL injection 2-10 units daily  Qty: 3 Vial, Refills: 3    Associated Diagnoses: Type 2 diabetes mellitus with stage 3 chronic kidney disease, with long-term current use of insulin ([facility])      Insulin Syringe-Needle U-100 ([employer]ocate Syringes) 0.3 mL 30 gauge x 5/16\" syrg 1 Each by Does Not Apply route daily. Qty: 90 Syringe, Refills: 3    Associated Diagnoses: Type 2 diabetes mellitus with stage 3 chronic kidney disease, with long-term current use of insulin ([facility])      Omeprazole delayed release (PRILOSEC D/R) 20 mg tablet Take 1 Tab by mouth daily.   Qty: 30 Tab, Refills: 1      testosterone 30 mg/actuation (1.5 mL) slpm Apply 1-2 pumps daily as directed on your visit  Qty: 90 mL, Refills: 3    Associated Diagnoses: Male hypogonadism      levothyroxine (SYNTHROID) 200 mcg tablet TAKE 1 TABLET BY MOUTH ONCE DAILY BEFORE BREAKFAST  Qty: 90 Tab, Refills: 1      !! glucose blood VI test strips (PHARMACIST CHOICE) strip One Touch  Check glucose 3-4 times daily. DX E11.22  Qty: 300 Strip, Refills: 3      Lancets misc Use preferred brand; Check glucose 3-4 times daily, Diagnosis E11.22  Qty: 2 Package, Refills: 3      simvastatin (ZOCOR) 20 mg tablet Take 1 Tab by mouth nightly. Qty: 90 Tab, Refills: 3    Associated Diagnoses: Dyslipidemia (high LDL; low HDL)      Ventolin HFA 90 mcg/actuation inhaler TAKE 1-2 PUFFS EVEERY 4-6 HOURS AS NEEDED FOR DYSPNEA AND WHEEZING  Qty: 1 Inhaler, Refills: 2       !! - Potential duplicate medications found. Please discuss with provider. STOP taking these medications       carvediloL (COREG) 12.5 mg tablet Comments:   Reason for Stopping:                 Patient Follow Up Instructions: Activity: Activity as tolerated  Diet: Renal Diet  Wound Care: None needed    Follow-up Information     Follow up With Specialties Details Why Contact Info    Marie Damon MD Internal Medicine Go on 9/1/2020 For hospital follow up appointment at 3:00PM  [address] [place], [place] [phone]      [facility]  Go on 8/28/2020 Your outpatient dialysis center. Your chair time will be on Monday Wednesday Friday at 2:30 PM. Please arrive 30 minutes early to your first treatment. [address], [address] [place] [address]  [phone]           [facility]  Your home health agency. A nurse will call you to schedule a home visit. [address] [place] [address] Call This is the company who will be supplying your oxygen. Please call the number listed on your way home so they know when to deliver your concentrator to the home.  [address] [facility] [address] [phone]        ________________________________________________________________    Risk of deterioration: Low    Condition at Discharge:  Stable  __________________________________________________________________    Disposition  Home with family, no needs    ____________________________________________________________________    Code Status: Full Code  ___________________________________________________________________      Total time in minutes spent coordinating this discharge (includes going over instructions, follow-up, prescriptions, and preparing report for sign off to her PCP) :  36 minutes    Signed:  Js Siddiqi MD

## 2020-08-27 NOTE — PROGRESS NOTES
Problem: Dysphagia (Adult)  Goal: *Acute Goals and Plan of Care (Insert Text)  Description: Speech pathology goals  Initiated 8/21/2020  1. Patient will tolerate puree/honey thick liquid diet with no overt s/s aspiration within 7 days  2. Patient will participate in [address] within 7 days  Outcome: Resolved/Not Met   SPEECH LANGUAGE PATHOLOGY DYSPHAGIA TREATMENT/DISCHARGE  Patient: Luann Rosario (48 y.o. male)  Date: 8/27/2020  Diagnosis: Pneumonia [J18.9]  Suspected COVID-19 virus infection [Z20.828]  NERY (acute kidney injury) ([facility] [address] [N17.9]  Anemia [D64.9]  Lung cancer ([facility] [address] [C34.90]  Chest pain [R07.9]   <principal problem not specified>       Precautions: Fall, Aspiration, Skin(5LO2)    ASSESSMENT:  Given patient's history of silent aspiration with thin and nectar thick liquids on MBS in 2016 and current admission with PNA, patient recommended to have MBS for objective assessment. Patient has been given thin liquids in the hospital upon his request and is adamant he would not drink thickened liquids at home even if recommended. He was noncompliant upon discharge in 2016 as well. Given patent has been educated on aspiration risks and has no interest in a modified diet, repeat MBS not indicated. MD informed of patient's decision. Observed patient with crackers and thin liquids. No overt s/s aspiration with either consistency; however, difficult to assess aspiration bedside given history of silent aspiration. PLAN:  Regular diet/ thin liquids with acceptance of possible continued aspiration of thins (as evidenced in 2016 mbs). Patient not interested in re-assessment with mbs. Discussed with MD  - he has been educated on aspiration risk and possible readmissions with PNA if he continues to aspiration liquids  - consider discussion regarding full code status    Patient will be discharged from acute skilled speech therapy at this time.     Rationale for discharge:  Patient not participating in therapy    Discharge Recommendations:  None     SUBJECTIVE:   Patient stated I wont drink that stuff. OBJECTIVE:   Cognitive and Communication Status:  Neurologic State: Alert  Orientation Level: Oriented X4  Cognition: Appropriate safety awareness, Appropriate for age attention/concentration, Appropriate decision making    Perception: Appears intact    Perseveration: No perseveration noted    Safety/Judgement: Fall prevention, Awareness of environment, Insight into deficits, Decreased insight into deficits(willing to learn)  Dysphagia Treatment:       P.O. Trials:  Patient Position: (up in bed)  Vocal quality prior to P.O.: Strain;Breathy  Consistency Presented: Thin liquid; Solid  How Presented: Self-fed/presented;Straw     Bolus Acceptance: No impairment  Bolus Formation/Control: Impaired  Type of Impairment: Delayed;Mastication  Propulsion: Delayed (# of seconds)  Oral Residue: None  Initiation of Swallow: Delayed (# of seconds)  Laryngeal Elevation: Decreased  Aspiration Signs/Symptoms: None              Oral Phase Severity: Mild  Pharyngeal Phase Severity : Mild-moderate                  NOMS:   The NOMS functional outcome measure was used to quantify this patient's level of swallowing impairment. Based on the NOMS, the patient was determined to be at level 4 for swallow function     NOMS Swallowing Levels:  Level 1 (CN): NPO  Level 2 (CM): NPO but takes consistency in therapy  Level 3 (CL): Takes less than 50% of nutrition p.o. and continues with nonoral feedings; and/or safe with mod cues; and/or max diet restriction  Level 4 (CK): Safe swallow but needs mod cues; and/or mod diet restriction; and/or still requires some nonoral feeding/supplements  Level 5 (CJ): Safe swallow with min diet restriction; and/or needs min cues  Level 6 (CI): Independent with p.o.; rare cues; usually self cues; may need to avoid some foods or needs extra time  Level 7 ([address] [place] [address]): Independent for all p.o.  RICH. (2003).  National Outcomes Measurement System (NOMS): Adult Speech-Language Pathology User's Guide.        Pain:  Pain Scale 1: Numeric (0 - 10)  Pain Intensity 1: 8  Pain Location 1: Back    After treatment:   Patient left in no apparent distress in bed, Call bell within reach, and Nursing notified    COMMUNICATION/EDUCATION:     The patient's plan of care including recommendations, planned interventions, and recommended diet changes were discussed with: Registered nurse, MD.     ELVIN Sanchez  Time Calculation: 14 mins

## 2020-08-27 NOTE — PROGRESS NOTES
Pulse oximetry assessment   84% at rest on room air (if 88% or less, skip next steps)  93% at rest on 2 LPM

## 2020-08-28 ENCOUNTER — PATIENT OUTREACH (OUTPATIENT)
Dept: CASE MANAGEMENT | Age: 60
End: 2020-08-28

## 2020-08-28 ENCOUNTER — HOME CARE VISIT (OUTPATIENT)
Dept: HOME HEALTH SERVICES | Facility: HOME HEALTH | Age: 60
End: 2020-08-28

## 2020-08-28 NOTE — PROGRESS NOTES
8/28 Care transitions nurse    Chula Sherwood RN, [facility], [facility]  Care transitions nurse [phone]  [facility] Coordination Team    Patient was admitted to [facility] on 8/15 and discharged on 8/27/20  for respiratory failure, pneumonia, HFpEF, DM T2, CKD- newly on dialysis. Patient was contacted within 1 business days of discharge. Top Discharge Challenges to be reviewed by the provider   Additional needs identified to be addressed with provider yes    Pt with recent loss of wife - 3 months ago - cervical cancer/ hospice. Pt newly on dialysis with central port placement - M/W/F Dialysis  Pt weak/ family asking about scooter - Pt has Medicare, Medicaid. Pt not sleeping - Melatonin in hospital - daughter requests therapy - message to pcp. Discussed COVID-19 related testing which was available at this time. Test results were negative. Patient informed of results, if available? yes       15/2020  9:16 PM - Murphy, Lab In [facility] [employer]     Component  Value  Flag  Ref Range  Units  Status    Specimen source  Nasopharyngeal       Final    SARS-CoV-2  Not detected   NOTD     Final    Comment:         The specimen is NEGATIVE for SARS-CoV2, the novel coronavirus associated with COVID-19. Method of communication with provider : staff message       Advance Care Planning:   Does patient have an Advance Directive:  reviewed and needs to be updated   Pt designated agent - Pt with palliative consultation and advanced directive discussion - pt declined to complete CPR/ portion - to talk with family   CTN discussed with daughter the importance of complete document. Palliative note - in hospital -  Patient only completed the mPOA portion of AMD. He named his daughter named above, Garland Turner, as his medical decision maker. (Patient has eight children, but he lives with Zbigniew Juarez). He did not want to name anyone else on this.  Patient also did not complete the Living Will portion of this document, he noted that he would like to discuss this with his family before he makes any decisions. That portion of AMD was \"X\"d out. Patient is not an organ donor.     Patient also remains Full Code. He noted that he would like to have a discussion about this as well with family. Inpatient Readmission Risk score: High  Was this a readmission? yes   Patient stated reason for the admission: short of breath, vomiting  Patients top risk factors for readmission: medical condition, depression, lack of knowledge re: disease process, care giver stress. Interventions to address risk factors: Discussion with daughter re: condition mgmt; monitoring at home - equipment and support services needs. Medication regimen. Care Transition Nurse (CTN) contacted the family by telephone to perform post hospital discharge assessment. Verified name and  with family as identifiers. Provided introduction to self, and explanation of the CTN role. CTN reviewed discharge instructions, medical action plan and red flags with family who verbalized understanding. Family given an opportunity to ask questions and does not have any further questions or concerns at this time. The family agrees to contact the PCP office for questions related to their healthcare. Medication reconciliation was performed with family, who verbalizes understanding of administration of home medications. Advised obtaining a 90-day supply of all daily and as-needed medications.    Referral to Pharm D needed: no     Home Health/Outpatient orders at discharge: PT, OT and [address]: [facility]  Date of initial visit: 20    Durable Medical Equipment ordered at discharge: Canes/Walkers/Crutches  [address]: [address] received: rollator walker    Covid Risk Education    Patient has following risk factors of: heart failure, pneumonia, acute respiratory failure, immunocompromised, diabetes and chronic kidney disease, metastatic cancer. Education provided regarding infection prevention, and signs and symptoms of COVID-19 and when to seek medical attention with family who verbalized understanding. Discussed exposure protocols and quarantine From CDC: Are you at higher risk for severe illness?  and given an opportunity for questions and concerns. The family agrees to contact the COVID-19 hotline [phone] or PCP office for questions related to COVID-19. For more information on steps you can take to protect yourself, see CDC's How to Protect Yourself     Patient/family/caregiver given information for [employer] and agrees to enroll yes  Patient's preferred e-mail: [email or web address]  Patient's preferred phone number: [phone] - Valeria/ Derrick/ daughter    Discussed follow-up appointments. If no appointment was previously scheduled, appointment scheduling offered: yes  [facility] follow up appointment(s):   Future Appointments   Date Time Provider Ness Hawkins   9/1/2020 To Be Determined Bao Alvarez   9/1/2020 To Be Determined Suzan Cottrell OT [facility]   9/1/2020  3:00 PM Preston Tracy MD [facility] BS AMB   9/23/2020 11:00 AM Shaniqua Webb MD [facility] BS AMB   11/24/2020 11:30 AM Rody Mccray MD R[place] [id number] BS AMB     [facility] follow up appointment(s): Dialysis M/W/F -     Plan for follow-up call in 5-7 days based on severity of symptoms and risk factors. CTN provided contact information for future needs. Goals Addressed                 This Visit's Progress     Attends follow-up appointments as directed. 8/28     Pt to see Dr. Rik Baker - [place] Beltre 9/1 at 3pm -  Will discuss scooter - for ambulation assistance. Pt needs scooter and ramp - daughter with concerns re: weakness with walking. Follow up oncology re: treatment realm and plan.   ttk       Identification of barriers to adherence to a plan of care such as inability to afford medications, lack of insurance, lack of transportation, etc.        8/28-    Pt lost his wife 3 months ago; pt having trouble sleeping -wife passed from cervical cancer/ hospice. Debility - weight 227 - weak per daughter - concerns re: fall potential  / for pt and family. Newly on dialysis - M/W/F    Multiple comorbids - CAncer with mets/ HFpEF, DM, CKD       Supportive resources in place to maintain patient in the community (ie. Home Health, DME equipment, refer to, medication assistant plan, etc.)        8/28/20     BS home health - nursing, PT/OT    Pt received rollator walker - [employer] - prior to discharge. Pt d/c on home oxygen - [employer] - 2.5 liters day/ 4 liters night - continuous. Pt has pulse ox.; Pt DOES NOT HAVE bp cuff. CTN talked with daughter to monitor oxygen and pulse -  HH to monitor bp and vital signs. CTN encourage daily temperature check - post admission/ post pneumonia/ new central line for dialysis - Covid watch. Pt has Medicare / Medicaid -   ttk          1    DISCHARGE DIAGNOSIS:     Acute hypoxic respiratory failure, POA  Acute on chronic diastolic heart failure, POA  Community-acquired pneumonia, likely bacterial, POA        Acute Metabolic encephalopathy     Acute kidney disease, worsening, POA  Hyperkalemia with junctional rhythm on EKG  Hypophosphatemia      Acute on chronic anemia,. History of duodenitis, POA      Bradycardia    IP CONSULT TO NEPHROLOGY  IP CONSULT TO GASTROENTEROLOGY  IP CONSULT TO HEMATOLOGY  IP CONSULT TO PALLIATIVE CARE - PROVIDER    Patient only completed the mPOA portion of AMD. He named his daughter named above, Ana Saini, as his medical decision maker. (Patient has eight children, but he lives with [facility]). He did not want to name anyone else on this. Patient also did not complete the Living Will portion of this document, he noted that he would like to discuss this with his family before he makes any decisions.  That portion of AMD was \"X\"d out. Patient is not an organ donor.     Patient also remains Full Code. He noted that he would like to have a discussion about this as well with family.      Bishop Issa

## 2020-08-29 ENCOUNTER — HOME CARE VISIT (OUTPATIENT)
Dept: SCHEDULING | Facility: HOME HEALTH | Age: 60
End: 2020-08-29
Payer: MEDICARE

## 2020-08-29 VITALS
OXYGEN SATURATION: 99 % | DIASTOLIC BLOOD PRESSURE: 90 MMHG | RESPIRATION RATE: 18 BRPM | HEART RATE: 67 BPM | WEIGHT: 234 LBS | SYSTOLIC BLOOD PRESSURE: 120 MMHG | TEMPERATURE: 98.3 F | HEIGHT: 69 IN | BODY MASS INDEX: 34.66 KG/M2

## 2020-08-29 PROCEDURE — 400013 HH SOC

## 2020-08-29 PROCEDURE — G0299 HHS/HOSPICE OF RN EA 15 MIN: HCPCS

## 2020-08-29 PROCEDURE — 3331090001 HH PPS REVENUE CREDIT

## 2020-08-29 PROCEDURE — 3331090002 HH PPS REVENUE DEBIT

## 2020-08-30 PROCEDURE — 3331090002 HH PPS REVENUE DEBIT

## 2020-08-30 PROCEDURE — 3331090001 HH PPS REVENUE CREDIT

## 2020-08-31 ENCOUNTER — HOME CARE VISIT (OUTPATIENT)
Dept: HOME HEALTH SERVICES | Facility: HOME HEALTH | Age: 60
End: 2020-08-31
Payer: MEDICARE

## 2020-08-31 PROCEDURE — 3331090001 HH PPS REVENUE CREDIT

## 2020-08-31 PROCEDURE — 3331090002 HH PPS REVENUE DEBIT

## 2020-09-01 ENCOUNTER — HOME CARE VISIT (OUTPATIENT)
Dept: SCHEDULING | Facility: HOME HEALTH | Age: 60
End: 2020-09-01
Payer: MEDICARE

## 2020-09-01 ENCOUNTER — HOME CARE VISIT (OUTPATIENT)
Dept: HOME HEALTH SERVICES | Facility: HOME HEALTH | Age: 60
End: 2020-09-01
Payer: MEDICARE

## 2020-09-01 ENCOUNTER — VIRTUAL VISIT (OUTPATIENT)
Dept: FAMILY MEDICINE CLINIC | Age: 60
End: 2020-09-01
Payer: MEDICARE

## 2020-09-01 ENCOUNTER — PATIENT OUTREACH (OUTPATIENT)
Dept: CASE MANAGEMENT | Age: 60
End: 2020-09-01

## 2020-09-01 VITALS
TEMPERATURE: 99.4 F | SYSTOLIC BLOOD PRESSURE: 168 MMHG | RESPIRATION RATE: 18 BRPM | DIASTOLIC BLOOD PRESSURE: 84 MMHG | OXYGEN SATURATION: 99 % | HEART RATE: 84 BPM

## 2020-09-01 DIAGNOSIS — R26.89 DECREASED FUNCTIONAL MOBILITY: ICD-10-CM

## 2020-09-01 DIAGNOSIS — C73 THYROID CANCER, MEDULLARY CARCINOMA (HCC): ICD-10-CM

## 2020-09-01 DIAGNOSIS — J96.01 ACUTE RESPIRATORY FAILURE WITH HYPOXIA (HCC): ICD-10-CM

## 2020-09-01 DIAGNOSIS — R53.1 DECREASED STRENGTH: ICD-10-CM

## 2020-09-01 DIAGNOSIS — N18.4 CKD (CHRONIC KIDNEY DISEASE) STAGE 4, GFR 15-29 ML/MIN (HCC): ICD-10-CM

## 2020-09-01 DIAGNOSIS — E11.21 CONTROLLED TYPE 2 DIABETES MELLITUS WITH DIABETIC NEPHROPATHY, WITHOUT LONG-TERM CURRENT USE OF INSULIN (HCC): Primary | ICD-10-CM

## 2020-09-01 DIAGNOSIS — R11.0 NAUSEA IN ADULT: ICD-10-CM

## 2020-09-01 DIAGNOSIS — K21.00 GASTROESOPHAGEAL REFLUX DISEASE WITH ESOPHAGITIS: ICD-10-CM

## 2020-09-01 DIAGNOSIS — Z74.09 DECREASED FUNCTIONAL MOBILITY AND ENDURANCE: ICD-10-CM
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
## 2020-09-01 PROBLEM — F41.9 ANXIETY: Status: ACTIVE | Noted: 2020-09-01

## 2020-09-01 PROBLEM — N19 RENAL FAILURE: Status: ACTIVE | Noted: 2020-09-01

## 2020-09-01 PROBLEM — R13.10 DYSPHAGIA: Status: ACTIVE | Noted: 2020-09-01

## 2020-09-01 PROBLEM — S49.92XA INJURY OF LEFT SHOULDER AND UPPER ARM: Status: ACTIVE | Noted: 2020-09-01

## 2020-09-01 PROBLEM — S60.519A ABRASION OF HAND: Status: ACTIVE | Noted: 2020-09-01

## 2020-09-01 PROBLEM — K52.9 GASTROENTERITIS: Status: ACTIVE | Noted: 2020-09-01

## 2020-09-01 PROBLEM — S00.31XA ABRASION OF NOSE: Status: ACTIVE | Noted: 2020-09-01

## 2020-09-01 PROBLEM — D21.9 FIBROIDS: Status: ACTIVE | Noted: 2020-09-01

## 2020-09-01 PROBLEM — R73.9 HYPERGLYCEMIA: Status: ACTIVE | Noted: 2020-09-01

## 2020-09-01 PROBLEM — S40.029A CONTUSION OF UPPER EXTREMITY: Status: ACTIVE | Noted: 2020-09-01

## 2020-09-01 PROBLEM — L02.414 ABSCESS OF LEFT ARM: Status: ACTIVE | Noted: 2020-09-01

## 2020-09-01 PROBLEM — J40 BRONCHITIS: Status: ACTIVE | Noted: 2020-09-01

## 2020-09-01 PROBLEM — F13.20 BENZODIAZEPINE DEPENDENCE (HCC): Status: ACTIVE | Noted: 2020-09-01

## 2020-09-01 PROCEDURE — 99214 OFFICE O/P EST MOD 30 MIN: CPT | Performed by: INTERNAL MEDICINE

## 2020-09-01 PROCEDURE — G0299 HHS/HOSPICE OF RN EA 15 MIN: HCPCS

## 2020-09-01 PROCEDURE — 3331090001 HH PPS REVENUE CREDIT

## 2020-09-01 PROCEDURE — G8427 DOCREV CUR MEDS BY ELIG CLIN: HCPCS | Performed by: INTERNAL MEDICINE

## 2020-09-01 PROCEDURE — 99496 TRANSJ CARE MGMT HIGH F2F 7D: CPT | Performed by: INTERNAL MEDICINE

## 2020-09-01 PROCEDURE — 3331090002 HH PPS REVENUE DEBIT

## 2020-09-01 PROCEDURE — G0463 HOSPITAL OUTPT CLINIC VISIT: HCPCS | Performed by: INTERNAL MEDICINE

## 2020-09-01 RX ORDER — METOCLOPRAMIDE 5 MG/1
TABLET ORAL
Qty: 30 TAB | Refills: 0 | Status: SHIPPED | OUTPATIENT
Start: 2020-09-01 | End: 2020-12-01

## 2020-09-01 RX ORDER — PHENOL/SODIUM PHENOLATE
20 AEROSOL, SPRAY (ML) MUCOUS MEMBRANE DAILY
Qty: 30 TAB | Refills: 1 | Status: SHIPPED | OUTPATIENT
Start: 2020-09-01 | End: 2020-09-29 | Stop reason: SDUPTHER

## 2020-09-01 NOTE — PROGRESS NOTES
Chief Complaint Patient presents with  Transitions Of Care  Request For New Medication  
  for nausea HPI: 
Juliocesar Byers is a 64 y.o. male who was seen by synchronous (real-time) audio-video technology on 9/1/2020 for Transitions Of Care and Request For New Medication (for nausea) Assessment & Plan:  
Diagnoses and all orders for this visit: 
 
Controlled type 2 diabetes mellitus with diabetic nephropathy, without long-term current use of insulin ([facility] CKD (chronic kidney disease) stage 4, GFR 15-29 ml/min ([facility]) Thyroid cancer, medullary carcinoma ([facility] Gastroesophageal reflux disease with esophagitis -     Omeprazole delayed release (PRILOSEC D/R) 20 mg tablet; Take 1 Tab by mouth daily. , Normal, Disp-30 Tab,R-1 Nausea in adult 
-     metoclopramide HCl (REGLAN) 5 mg tablet; Take 1 tab by mouth daily as needed, do not take more then 3 tabs a day  Indications: nausea and vomiting caused by cancer drugs, Normal, Disp-30 Tab,R-0 Decreased functional mobility -     AMB SUPPLY ORDER Decreased functional mobility and endurance -     AMB SUPPLY ORDER Decreased strength -     AMB SUPPLY ORDER Acute respiratory failure with hypoxia ([facility] I spent at least 25 minutes on this visit with this established patient. [address] Subjective:  
Juliocesar Byers is a 64 y.o.  male with h/o hypertension, diabetes type 2, hypercholesterolemia, severe hypothyroidism with thyroid cancer presents for transition of care. Patient was admitted 8/15/2020- 8/27/2020 with acute hypoxic respiratory failure, acute on chronic diastolic heart failure, Community-acquired pneumonia, Acute Metabolic encephalopathy, Acute on chronic kidney disease. Patient is doing well since discharged. He is attending dialysis since and was discharged oxygen. He is requesting needs medication for nausea, he usually takes Reglan. Patient is weak with decreased strength and endurance.  He may benefit from a Scooter to get around. Prior to Admission medications Medication Sig Start Date End Date Taking? Authorizing Provider OXYGEN-AIR DELIVERY SYSTEMS Take 2.5 L/min by inhalation continuous. 8/29/20  Yes Provider, Historical  
amLODIPine (NORVASC) 2.5 mg tablet Take 1 Tab by mouth two (2) times a day. 8/27/20  Yes Cornelia Green MD  
Blood-Glucose Meter monitoring kit 1 preferred brand glucometer for checking home glucose, E11.22 7/30/20  Yes Nelda Pineda MD  
glucose blood VI test strips (blood glucose test) strip Pharmacist to choose preferred meter and strips. Dx:E11.65, Z79.4 Monitor 3 times daily 7/30/20  Yes Nelda Pineda MD  
insulin NPH (HumuLIN N NPH U-100 Insulin) 100 unit/mL injection 2-10 units daily 7/30/20  Yes Nelda Pineda MD  
Insulin Syringe-Needle U-100 (Advocate Syringes) 0.3 mL 30 gauge x 5/16\" syrg 1 Each by Does Not Apply route daily. 7/30/20  Yes Nelda Pineda MD  
simvastatin (ZOCOR) 20 mg tablet Take 1 Tab by mouth nightly. 7/28/20  Yes Marie Damon MD  
Ventolin HFA 90 mcg/actuation inhaler TAKE 1-2 PUFFS EVEERY 4-6 HOURS AS NEEDED FOR DYSPNEA AND WHEEZING 7/28/20  Yes Marie Damon MD  
Omeprazole delayed release (PRILOSEC D/R) 20 mg tablet Take 1 Tab by mouth daily. 7/22/20  Yes Nupur Keenan MD  
levothyroxine (SYNTHROID) 200 mcg tablet TAKE 1 TABLET BY MOUTH ONCE DAILY BEFORE BREAKFAST 11/4/19  Yes Nate Lyle MD  
glucose blood VI test strips (PHARMACIST CHOICE) strip One Touch  Check glucose 3-4 times daily. DX E11.22 2/2/18  Yes Nate Lyle MD  
Lancets misc Use preferred brand; Check glucose 3-4 times daily, Diagnosis E11.22 2/2/18  Yes Nate Lyle MD  
testosterone 30 mg/actuation (1.5 mL) slpm Apply 1-2 pumps daily as directed on your visit 2/3/20 9/1/20  Nate Lyle MD  
 
Patient Active Problem List  
Diagnosis Code  Mixed hypercholesterolemia and hypertriglyceridemia E78.2  Diabetes type 2, uncontrolled ([address] E11.65  
  Hypovitaminosis D E55.9  Proteinuria R80.9  Microalbuminuria R80.9  Essential hypertension with goal blood pressure less than 130/80 I10  Acquired hypothyroidism E03.9  Chronic left shoulder pain M25.512, G89.29  
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  
 Severe obesity (BMI 35.0-39. 9) with comorbidity ([facility] E66.01  
 NERY (acute kidney injury) ([facility] N17.9  Nausea and vomiting R11.2  Lung cancer ([facility] C34.90  Chest pain R07.9  Pneumonia J18.9  Anemia D64.9  
 Suspected COVID-19 virus infection Z20.828  
 Hyperuricemia E79.0  Metastatic carcinoma ([facility] C79.9  
 Encounter for counseling regarding advance directives Z71.89  Dysphagia R13.10  Bronchitis J40  Benzodiazepine dependence (HCC) F13.20  Anxiety F41.9  Abscess of left arm L02.414  Abrasion of nose S00.31XA  Abrasion of hand S60.519A  Fibroids D21.9  Gastroenteritis K52.9  Hyperglycemia R73.9  Infected tick bite W57. Marciana Bentley  Contusion of upper extremity S40.029A  Injury of left shoulder and upper arm S49. 92XA  Malignant neoplasm of thyroid gland ([facility] C73  Renal failure N19 Current Outpatient Medications Medication Sig Dispense Refill  Omeprazole delayed release (PRILOSEC D/R) 20 mg tablet Take 1 Tab by mouth daily. 30 Tab 1  
 metoclopramide HCl (REGLAN) 5 mg tablet Take 1 tab by mouth daily as needed, do not take more then 3 tabs a day  Indications: nausea and vomiting caused by cancer drugs 30 Tab 0  
 OXYGEN-AIR DELIVERY SYSTEMS Take 2.5 L/min by inhalation continuous.  amLODIPine (NORVASC) 2.5 mg tablet Take 1 Tab by mouth two (2) times a day. 60 Tab 0  Blood-Glucose Meter monitoring kit 1 preferred brand glucometer for checking home glucose, E11.22 1 Kit 0  
 glucose blood VI test strips (blood glucose test) strip Pharmacist to choose preferred meter and strips. Dx:E11.65, Z79.4 Monitor 3 times daily 300 Strip 3  
 insulin NPH (HumuLIN N NPH U-100 Insulin) 100 unit/mL injection 2-10 units daily 3 Vial 3  
 Insulin Syringe-Needle U-100 (Advocate Syringes) 0.3 mL 30 gauge x 5/16\" syrg 1 Each by Does Not Apply route daily. 90 Syringe 3  
 simvastatin (ZOCOR) 20 mg tablet Take 1 Tab by mouth nightly. 90 Tab 3  Ventolin HFA 90 mcg/actuation inhaler TAKE 1-2 PUFFS EVEERY 4-6 HOURS AS NEEDED FOR DYSPNEA AND WHEEZING 1 Inhaler 2  
 levothyroxine (SYNTHROID) 200 mcg tablet TAKE 1 TABLET BY MOUTH ONCE DAILY BEFORE BREAKFAST 90 Tab 1  
 glucose blood VI test strips (PHARMACIST CHOICE) strip One Touch  Check glucose 3-4 times daily. DX E11.22 300 Strip 3  Lancets misc Use preferred brand; Check glucose 3-4 times daily, Diagnosis E11.22 2 Package 3 Allergies Allergen Reactions  Anesthetics - Amide Type Shortness of Breath  Flexeril [Cyclobenzaprine] Hives  Tramadol Hives Past Medical History:  
Diagnosis Date  Adverse effect of anesthesia \" STOP BREATHING 1 TIME C ANESTH\"  Calculus of kidney  Cancer [facility] 2004 thyroid cancer  Chronic kidney disease  Chronic pain BACK SHOULDER AND ARM  Depression  Diabetes ([address]  Encounter for long-term (current) use of NSAIDs  Hypercholesterolemia  Hypertension  Nausea & vomiting  Other ill-defined conditions(799.89) cholesterol, thyroid  Sleep apnea   
 doesn't wear cpap  Thyroid cancer ([address]  TIA (transient ischemic attack) 2011  Vitamin D deficiency Past Surgical History:  
Procedure Laterality Date  CARDIAC SURG PROCEDURE UNLIST  HX HEENT    
 THROAT SURGERY X 4  
 HX ORTHOPAEDIC    
 back  HX ORTHOPAEDIC    
 ARM AND SHOULDER  
 HX OTHER SURGICAL    
 thyroid, lymphnode  HX RETINAL DETACHMENT REPAIR    
 left eye  IR INSERT NON TUNL CVC OVER 5 YRS  8/18/2020  IR INSERT TUNL CVC W/O PORT OVER 5 YR  2020  UPPER GI ENDOSCOPY,BALL DIL,30MM  2020  UPPER GI ENDOSCOPY,BIOPSY  2020  VASCULAR SURGERY PROCEDURE UNLIST    
 cardiac cath NEG. Family History Problem Relation Age of Onset  Diabetes Mother  Elevated Lipids Mother Gilmer Bloodgood Bladder Disease Mother  Headache Mother  Migraines Mother  Heart Disease Mother  Hypertension Mother  Stroke Mother Gurdeep Shelley Other Mother ANEURSYM BRAIN  
 Bleeding Prob Father  Cancer Father LEUKEMIA  Diabetes Father  Elevated Lipids Father  Mental Retardation Sister  Psychiatric Disorder Sister  Cancer Brother LUNGS Social History Tobacco Use  Smoking status: Former Smoker Last attempt to quit: 1994 Years since quittin.0  Smokeless tobacco: Never Used Substance Use Topics  Alcohol use: Yes Comment: Occasionally ROS As per hpi 
 
Objective:  
 
Patient-Reported Vitals 2020 Patient-Reported Temperature 98.4 Patient-Reported SpO2 99 (2.5 liters) General: alert, cooperative, no distress Mental  status: normal mood, behavior, speech, dress, motor activity, and thought processes, able to follow commands HENT: NCAT Neck: no visualized mass Resp: no respiratory distress Neuro: no gross deficits Skin: no discoloration or lesions of concern on visible areas Additional exam findings: We discussed the expected course, resolution and complications of the diagnosis(es) in detail. Medication risks, benefits, costs, interactions, and alternatives were discussed as indicated. I advised him to contact the office if his condition worsens, changes or fails to improve as anticipated. He expressed understanding with the diagnosis(es) and plan.   
 
Nighat Stevens, who was evaluated through a patient-initiated, synchronous (real-time) audio-video encounter, and/or his healthcare decision maker, is aware that it is a billable service, with coverage as determined by his insurance carrier. He provided verbal consent to proceed: Yes, and patient identification was verified. It was conducted pursuant to the emergency declaration under the [address] [place] [address], [address] [place] [address] authority and the [employer] and [employer] General Act. A caregiver was present when appropriate. Ability to conduct physical exam was limited. I was in the office. The patient was at home.  
 
 
Mallory Saldaña MD

## 2020-09-02 PROBLEM — F13.20 BENZODIAZEPINE DEPENDENCE (HCC): Status: RESOLVED | Noted: 2020-09-01 | Resolved: 2020-09-02

## 2020-09-02 PROCEDURE — 3331090002 HH PPS REVENUE DEBIT

## 2020-09-02 PROCEDURE — 3331090001 HH PPS REVENUE CREDIT

## 2020-09-02 NOTE — PROGRESS NOTES
Social Work Note  9/2/2020      Date of referral: 9-1-20  Referral received from: JIMI Pro  Reason for referral: Ramp resources, individual counseling     Previous SW Referral: No    Support System: Children, grandchildren and brother    Community Providers: [facility] [address] MWF at 2:30pm, [facility], [facility] and [address]    Transportation: Medicaid transportation     Financial Concern: No concerns reported    Advance Care Plan:  Per CTN Note, Patient only completed the mPOA portion of AMD. He named his daughter named above, Yvette Wagner, as his medical decision maker. (Patient has eight children, but he lives with [facility]). He did not want to name anyone else on this. Patient also did not complete the Living Will portion of this document, he noted that he would like to discuss this with his family before he makes any decisions. That portion of AMD was \"X\"d out. Patient is not an organ donor. Summary:  SW contacted patient to discuss need for RAMP and instrest in individual counseling. Patient reported that his plan is to follow up with PCP during his upcoming appointment to initiate process for qualifying for a power wheelchair. He stated he did not need the ramp at this time but SW discuss possible agencies that could install ramp [employer] and [employer]. In regards to individual counseling, patient acknowledged the benefits of therapy but stated he was not interested in receiving service at this time. He stated he and his wife had been  for the past 4 years, however they maintain a close relationship. He feel supportive by his children, grandchildren and brother. He is living with his brother and friend. Patient has Medicaid but was unable to provide name of insurance provider.         Goals Addressed                 This Visit's Progress     Supportive resources:(ie [employer]amp and Counseling Services.)          9/2/2020   Follow up  SW received a referral from CTN requesting a follow up call with patient to discuss community resources for ramp installation and possible counseling services. SW contacted patient to discuss resources. Patient stated he is planning to follow up with PCP during his upcoming appointment to request a power wheelchair to assist with mobility. Patient reported experiencing muscle weakness,decreased strength and endurance at times due to medical condition and Dialysis treatments. Plan  SW advised patient to follow up with PCP to initiate process for receiving a power wheelchair. SW inquired about his interest in participating in individual counseling but stated he was not interested at this time. SHELTON provided patient with information for [employer].   Rosa Padilla, [facility] Team   [facility] Coordination Team  [phone]

## 2020-09-03 ENCOUNTER — HOME CARE VISIT (OUTPATIENT)
Dept: SCHEDULING | Facility: HOME HEALTH | Age: 60
End: 2020-09-03
Payer: MEDICARE

## 2020-09-03 VITALS
SYSTOLIC BLOOD PRESSURE: 126 MMHG | TEMPERATURE: 98.2 F | DIASTOLIC BLOOD PRESSURE: 60 MMHG | HEART RATE: 70 BPM | OXYGEN SATURATION: 98 %

## 2020-09-03 PROCEDURE — 3331090002 HH PPS REVENUE DEBIT

## 2020-09-03 PROCEDURE — G0152 HHCP-SERV OF OT,EA 15 MIN: HCPCS

## 2020-09-03 PROCEDURE — 3331090001 HH PPS REVENUE CREDIT

## 2020-09-03 PROCEDURE — G0300 HHS/HOSPICE OF LPN EA 15 MIN: HCPCS

## 2020-09-04 ENCOUNTER — HOME CARE VISIT (OUTPATIENT)
Dept: SCHEDULING | Facility: HOME HEALTH | Age: 60
End: 2020-09-04
Payer: MEDICARE

## 2020-09-04 VITALS
HEART RATE: 78 BPM | DIASTOLIC BLOOD PRESSURE: 88 MMHG | OXYGEN SATURATION: 98 % | SYSTOLIC BLOOD PRESSURE: 140 MMHG | TEMPERATURE: 98.2 F | RESPIRATION RATE: 20 BRPM

## 2020-09-04 PROCEDURE — 3331090002 HH PPS REVENUE DEBIT

## 2020-09-04 PROCEDURE — 3331090001 HH PPS REVENUE CREDIT

## 2020-09-04 NOTE — PROGRESS NOTES
PT eval rescheduled for next week as per daughters request.Per her patient will be late when back from the dialysis and wants to do the PT eval next week. PT eval scheduled for 9/8/2020.

## 2020-09-05 PROCEDURE — 3331090002 HH PPS REVENUE DEBIT

## 2020-09-05 PROCEDURE — 3331090001 HH PPS REVENUE CREDIT

## 2020-09-06 PROCEDURE — 3331090002 HH PPS REVENUE DEBIT

## 2020-09-06 PROCEDURE — 3331090001 HH PPS REVENUE CREDIT

## 2020-09-07 PROCEDURE — 3331090001 HH PPS REVENUE CREDIT

## 2020-09-07 PROCEDURE — 3331090002 HH PPS REVENUE DEBIT

## 2020-09-08 ENCOUNTER — HOME CARE VISIT (OUTPATIENT)
Dept: SCHEDULING | Facility: HOME HEALTH | Age: 60
End: 2020-09-08
Payer: MEDICARE

## 2020-09-08 PROCEDURE — G0300 HHS/HOSPICE OF LPN EA 15 MIN: HCPCS

## 2020-09-08 PROCEDURE — 3331090001 HH PPS REVENUE CREDIT

## 2020-09-08 PROCEDURE — 3331090002 HH PPS REVENUE DEBIT

## 2020-09-09 VITALS
RESPIRATION RATE: 18 BRPM | BODY MASS INDEX: 33.96 KG/M2 | HEART RATE: 82 BPM | TEMPERATURE: 98.7 F | SYSTOLIC BLOOD PRESSURE: 120 MMHG | DIASTOLIC BLOOD PRESSURE: 76 MMHG | OXYGEN SATURATION: 97 % | WEIGHT: 229.94 LBS

## 2020-09-09 PROCEDURE — 3331090001 HH PPS REVENUE CREDIT

## 2020-09-09 PROCEDURE — 3331090002 HH PPS REVENUE DEBIT

## 2020-09-10 ENCOUNTER — HOME CARE VISIT (OUTPATIENT)
Dept: SCHEDULING | Facility: HOME HEALTH | Age: 60
End: 2020-09-10
Payer: MEDICARE

## 2020-09-10 VITALS
TEMPERATURE: 98.1 F | DIASTOLIC BLOOD PRESSURE: 80 MMHG | BODY MASS INDEX: 33.24 KG/M2 | RESPIRATION RATE: 18 BRPM | SYSTOLIC BLOOD PRESSURE: 160 MMHG | HEART RATE: 83 BPM | WEIGHT: 225.09 LBS | OXYGEN SATURATION: 99 %

## 2020-09-10 PROCEDURE — G0152 HHCP-SERV OF OT,EA 15 MIN: HCPCS

## 2020-09-10 PROCEDURE — 3331090001 HH PPS REVENUE CREDIT

## 2020-09-10 PROCEDURE — G0300 HHS/HOSPICE OF LPN EA 15 MIN: HCPCS

## 2020-09-10 PROCEDURE — 3331090002 HH PPS REVENUE DEBIT

## 2020-09-11 ENCOUNTER — HOSPITAL ENCOUNTER (OUTPATIENT)
Dept: LAB | Age: 60
Discharge: HOME | End: 2020-09-11

## 2020-09-11 LAB
ALBUMIN SERPL-MCNC: 2.9 G/DL (ref 3.5–5)
ALBUMIN/GLOB SERPL: 0.8 {RATIO} (ref 1.1–2.2)
ALP SERPL-CCNC: 76 U/L (ref 45–117)
ALT SERPL-CCNC: 22 U/L (ref 12–78)
ANION GAP SERPL CALC-SCNC: 7 MMOL/L (ref 5–15)
AST SERPL-CCNC: 17 U/L (ref 15–37)
BILIRUB SERPL-MCNC: 0.3 MG/DL (ref 0.2–1)
BUN SERPL-MCNC: 29 MG/DL (ref 6–20)
BUN/CREAT SERPL: 10 (ref 12–20)
CALCIUM SERPL-MCNC: 8.3 MG/DL (ref 8.5–10.1)
CHLORIDE SERPL-SCNC: 101 MMOL/L (ref 97–108)
CO2 SERPL-SCNC: 30 MMOL/L (ref 21–32)
CREAT SERPL-MCNC: 2.98 MG/DL (ref 0.7–1.3)
GLOBULIN SER CALC-MCNC: 3.7 G/DL (ref 2–4)
GLUCOSE SERPL-MCNC: 95 MG/DL (ref 65–100)
HCT VFR BLD AUTO: 25.2 % (ref 36.6–50.3)
HGB BLD-MCNC: 7.8 G/DL (ref 12.1–17)
POTASSIUM SERPL-SCNC: 3.8 MMOL/L (ref 3.5–5.1)
PROT SERPL-MCNC: 6.6 G/DL (ref 6.4–8.2)
SODIUM SERPL-SCNC: 138 MMOL/L (ref 136–145)

## 2020-09-11 PROCEDURE — 3331090002 HH PPS REVENUE DEBIT

## 2020-09-11 PROCEDURE — 3331090001 HH PPS REVENUE CREDIT

## 2020-09-12 PROCEDURE — 3331090001 HH PPS REVENUE CREDIT

## 2020-09-12 PROCEDURE — 3331090002 HH PPS REVENUE DEBIT

## 2020-09-13 PROCEDURE — 3331090002 HH PPS REVENUE DEBIT

## 2020-09-13 PROCEDURE — 3331090001 HH PPS REVENUE CREDIT

## 2020-09-14 ENCOUNTER — HOME CARE VISIT (OUTPATIENT)
Dept: SCHEDULING | Facility: HOME HEALTH | Age: 60
End: 2020-09-14
Payer: MEDICARE

## 2020-09-14 PROCEDURE — 3331090002 HH PPS REVENUE DEBIT

## 2020-09-14 PROCEDURE — 3331090001 HH PPS REVENUE CREDIT

## 2020-09-14 PROCEDURE — G0299 HHS/HOSPICE OF RN EA 15 MIN: HCPCS

## 2020-09-15 ENCOUNTER — HOME CARE VISIT (OUTPATIENT)
Dept: HOME HEALTH SERVICES | Facility: HOME HEALTH | Age: 60
End: 2020-09-15
Payer: MEDICARE

## 2020-09-15 ENCOUNTER — HOME CARE VISIT (OUTPATIENT)
Dept: SCHEDULING | Facility: HOME HEALTH | Age: 60
End: 2020-09-15
Payer: MEDICARE

## 2020-09-15 VITALS
OXYGEN SATURATION: 99 % | SYSTOLIC BLOOD PRESSURE: 166 MMHG | RESPIRATION RATE: 18 BRPM | DIASTOLIC BLOOD PRESSURE: 78 MMHG | TEMPERATURE: 98.3 F | HEART RATE: 90 BPM

## 2020-09-15 PROCEDURE — G0151 HHCP-SERV OF PT,EA 15 MIN: HCPCS

## 2020-09-15 PROCEDURE — 3331090001 HH PPS REVENUE CREDIT

## 2020-09-15 PROCEDURE — 3331090002 HH PPS REVENUE DEBIT

## 2020-09-16 PROCEDURE — 3331090001 HH PPS REVENUE CREDIT

## 2020-09-16 PROCEDURE — 3331090002 HH PPS REVENUE DEBIT

## 2020-09-17 ENCOUNTER — HOME CARE VISIT (OUTPATIENT)
Dept: SCHEDULING | Facility: HOME HEALTH | Age: 60
End: 2020-09-17
Payer: MEDICARE

## 2020-09-17 ENCOUNTER — HOME CARE VISIT (OUTPATIENT)
Dept: HOME HEALTH SERVICES | Facility: HOME HEALTH | Age: 60
End: 2020-09-17
Payer: MEDICARE

## 2020-09-17 VITALS — OXYGEN SATURATION: 95 % | HEART RATE: 76 BPM | SYSTOLIC BLOOD PRESSURE: 140 MMHG | DIASTOLIC BLOOD PRESSURE: 89 MMHG

## 2020-09-17 PROCEDURE — 3331090002 HH PPS REVENUE DEBIT

## 2020-09-17 PROCEDURE — G0300 HHS/HOSPICE OF LPN EA 15 MIN: HCPCS

## 2020-09-17 PROCEDURE — G0152 HHCP-SERV OF OT,EA 15 MIN: HCPCS

## 2020-09-17 PROCEDURE — 3331090001 HH PPS REVENUE CREDIT

## 2020-09-18 VITALS
RESPIRATION RATE: 16 BRPM | SYSTOLIC BLOOD PRESSURE: 200 MMHG | OXYGEN SATURATION: 96 % | TEMPERATURE: 97.9 F | HEART RATE: 84 BPM | DIASTOLIC BLOOD PRESSURE: 76 MMHG

## 2020-09-18 PROCEDURE — 3331090001 HH PPS REVENUE CREDIT

## 2020-09-18 PROCEDURE — 3331090002 HH PPS REVENUE DEBIT

## 2020-09-19 PROCEDURE — 3331090001 HH PPS REVENUE CREDIT

## 2020-09-19 PROCEDURE — 3331090002 HH PPS REVENUE DEBIT

## 2020-09-20 PROCEDURE — 3331090002 HH PPS REVENUE DEBIT

## 2020-09-20 PROCEDURE — 3331090001 HH PPS REVENUE CREDIT

## 2020-09-21 PROCEDURE — 3331090001 HH PPS REVENUE CREDIT

## 2020-09-21 PROCEDURE — 3331090002 HH PPS REVENUE DEBIT

## 2020-09-22 ENCOUNTER — HOME CARE VISIT (OUTPATIENT)
Dept: SCHEDULING | Facility: HOME HEALTH | Age: 60
End: 2020-09-22
Payer: MEDICARE

## 2020-09-22 VITALS
DIASTOLIC BLOOD PRESSURE: 80 MMHG | HEART RATE: 92 BPM | RESPIRATION RATE: 20 BRPM | SYSTOLIC BLOOD PRESSURE: 140 MMHG | TEMPERATURE: 98.5 F | OXYGEN SATURATION: 95 %

## 2020-09-22 PROCEDURE — 3331090002 HH PPS REVENUE DEBIT

## 2020-09-22 PROCEDURE — 3331090001 HH PPS REVENUE CREDIT

## 2020-09-22 PROCEDURE — G0299 HHS/HOSPICE OF RN EA 15 MIN: HCPCS

## 2020-09-23 ENCOUNTER — HOME CARE VISIT (OUTPATIENT)
Dept: HOME HEALTH SERVICES | Facility: HOME HEALTH | Age: 60
End: 2020-09-23
Payer: MEDICARE

## 2020-09-23 ENCOUNTER — HOSPITAL ENCOUNTER (OUTPATIENT)
Dept: GENERAL RADIOLOGY | Age: 60
Discharge: HOME | End: 2020-09-23
Payer: MEDICARE

## 2020-09-23 ENCOUNTER — HOSPITAL ENCOUNTER (OUTPATIENT)
Dept: LAB | Age: 60
Discharge: HOME | End: 2020-09-23
Payer: MEDICARE

## 2020-09-23 ENCOUNTER — OFFICE VISIT (OUTPATIENT)
Dept: FAMILY MEDICINE CLINIC | Age: 60
End: 2020-09-23
Payer: MEDICARE

## 2020-09-23 ENCOUNTER — VIRTUAL VISIT (OUTPATIENT)
Dept: ONCOLOGY | Age: 60
End: 2020-09-23
Payer: MEDICARE

## 2020-09-23 VITALS
TEMPERATURE: 98.4 F | DIASTOLIC BLOOD PRESSURE: 77 MMHG | HEART RATE: 90 BPM | SYSTOLIC BLOOD PRESSURE: 171 MMHG | BODY MASS INDEX: 32.58 KG/M2 | RESPIRATION RATE: 18 BRPM | HEIGHT: 69 IN | WEIGHT: 220 LBS | OXYGEN SATURATION: 100 %

## 2020-09-23 DIAGNOSIS — E11.9 DIABETIC EYE EXAM (HCC): ICD-10-CM

## 2020-09-23 DIAGNOSIS — R06.02 SOB (SHORTNESS OF BREATH): ICD-10-CM

## 2020-09-23 DIAGNOSIS — Z00.00 ENCOUNTER FOR MEDICARE ANNUAL WELLNESS EXAM: Primary | ICD-10-CM

## 2020-09-23 DIAGNOSIS — R60.0 BILATERAL LEG EDEMA: ICD-10-CM

## 2020-09-23 DIAGNOSIS — E03.9 ACQUIRED HYPOTHYROIDISM: ICD-10-CM

## 2020-09-23 DIAGNOSIS — E78.5 DYSLIPIDEMIA (HIGH LDL; LOW HDL): ICD-10-CM

## 2020-09-23 DIAGNOSIS — C73 PRIMARY CANCER OF THYROID WITH METASTASIS TO OTHER SITE (HCC): Primary | ICD-10-CM

## 2020-09-23 DIAGNOSIS — E11.21 CONTROLLED TYPE 2 DIABETES MELLITUS WITH DIABETIC NEPHROPATHY, WITHOUT LONG-TERM CURRENT USE OF INSULIN (HCC): ICD-10-CM

## 2020-09-23 DIAGNOSIS — Z23 NEEDS FLU SHOT: ICD-10-CM

## 2020-09-23 DIAGNOSIS — Z23 ENCOUNTER FOR IMMUNIZATION: ICD-10-CM

## 2020-09-23 DIAGNOSIS — E11.9 COMPREHENSIVE DIABETIC FOOT EXAMINATION, TYPE 2 DM, ENCOUNTER FOR (HCC): ICD-10-CM

## 2020-09-23 DIAGNOSIS — E55.9 VITAMIN D DEFICIENCY: ICD-10-CM

## 2020-09-23 DIAGNOSIS — C73 THYROID CANCER, MEDULLARY CARCINOMA (HCC): ICD-10-CM

## 2020-09-23 DIAGNOSIS — N18.4 CKD (CHRONIC KIDNEY DISEASE) STAGE 4, GFR 15-29 ML/MIN (HCC): ICD-10-CM

## 2020-09-23 DIAGNOSIS — R35.0 FREQUENCY OF URINATION: ICD-10-CM

## 2020-09-23 DIAGNOSIS — I10 ESSENTIAL HYPERTENSION WITH GOAL BLOOD PRESSURE LESS THAN 130/80: ICD-10-CM

## 2020-09-23 DIAGNOSIS — Z01.00 DIABETIC EYE EXAM (HCC): ICD-10-CM

## 2020-09-23 DIAGNOSIS — D64.9 NORMOCHROMIC NORMOCYTIC ANEMIA: ICD-10-CM

## 2020-09-23 DIAGNOSIS — Z12.5 ENCOUNTER FOR PROSTATE CANCER SCREENING: ICD-10-CM

## 2020-09-23 PROBLEM — S39.012A BACK STRAIN: Status: ACTIVE | Noted: 2020-09-23

## 2020-09-23 PROBLEM — S51.019A SKIN TEAR OF ELBOW WITHOUT COMPLICATION: Status: ACTIVE | Noted: 2020-09-23

## 2020-09-23 PROBLEM — S13.4XXA WHIPLASH INJURY TO NECK: Status: ACTIVE | Noted: 2020-09-23

## 2020-09-23 PROBLEM — M54.30 SCIATICA: Status: ACTIVE | Noted: 2020-09-23

## 2020-09-23 PROBLEM — G89.29 CHRONIC BACK PAIN: Status: ACTIVE | Noted: 2020-09-23

## 2020-09-23 PROBLEM — M54.9 CHRONIC BACK PAIN: Status: ACTIVE | Noted: 2020-09-23

## 2020-09-23 PROBLEM — R10.9 ABDOMINAL PAIN: Status: ACTIVE | Noted: 2020-09-23

## 2020-09-23 PROCEDURE — 99213 OFFICE O/P EST LOW 20 MIN: CPT | Performed by: INTERNAL MEDICINE

## 2020-09-23 PROCEDURE — 85027 COMPLETE CBC AUTOMATED: CPT

## 2020-09-23 PROCEDURE — 1111F DSCHRG MED/CURRENT MED MERGE: CPT | Performed by: INTERNAL MEDICINE

## 2020-09-23 PROCEDURE — G8427 DOCREV CUR MEDS BY ELIG CLIN: HCPCS | Performed by: INTERNAL MEDICINE

## 2020-09-23 PROCEDURE — 82306 VITAMIN D 25 HYDROXY: CPT

## 2020-09-23 PROCEDURE — 3331090001 HH PPS REVENUE CREDIT

## 2020-09-23 PROCEDURE — G8753 SYS BP > OR = 140: HCPCS | Performed by: INTERNAL MEDICINE

## 2020-09-23 PROCEDURE — 3017F COLORECTAL CA SCREEN DOC REV: CPT | Performed by: INTERNAL MEDICINE

## 2020-09-23 PROCEDURE — G8754 DIAS BP LESS 90: HCPCS | Performed by: INTERNAL MEDICINE

## 2020-09-23 PROCEDURE — G8417 CALC BMI ABV UP PARAM F/U: HCPCS | Performed by: INTERNAL MEDICINE

## 2020-09-23 PROCEDURE — 84443 ASSAY THYROID STIM HORMONE: CPT

## 2020-09-23 PROCEDURE — 81001 URINALYSIS AUTO W/SCOPE: CPT

## 2020-09-23 PROCEDURE — G8510 SCR DEP NEG, NO PLAN REQD: HCPCS | Performed by: INTERNAL MEDICINE

## 2020-09-23 PROCEDURE — 90686 IIV4 VACC NO PRSV 0.5 ML IM: CPT

## 2020-09-23 PROCEDURE — 3051F HG A1C>EQUAL 7.0%<8.0%: CPT | Performed by: INTERNAL MEDICINE

## 2020-09-23 PROCEDURE — 71046 X-RAY EXAM CHEST 2 VIEWS: CPT

## 2020-09-23 PROCEDURE — 80053 COMPREHEN METABOLIC PANEL: CPT

## 2020-09-23 PROCEDURE — 36415 COLL VENOUS BLD VENIPUNCTURE: CPT

## 2020-09-23 PROCEDURE — 3331090002 HH PPS REVENUE DEBIT

## 2020-09-23 PROCEDURE — 80061 LIPID PANEL: CPT

## 2020-09-23 PROCEDURE — G0444 DEPRESSION SCREEN ANNUAL: HCPCS | Performed by: INTERNAL MEDICINE

## 2020-09-23 PROCEDURE — 84153 ASSAY OF PSA TOTAL: CPT

## 2020-09-23 PROCEDURE — 2022F DILAT RTA XM EVC RTNOPTHY: CPT | Performed by: INTERNAL MEDICINE

## 2020-09-23 PROCEDURE — G0439 PPPS, SUBSEQ VISIT: HCPCS | Performed by: INTERNAL MEDICINE

## 2020-09-23 RX ORDER — FUROSEMIDE 20 MG/1
20 TABLET ORAL DAILY
Qty: 30 TAB | Refills: 1 | Status: SHIPPED | OUTPATIENT
Start: 2020-09-23 | End: 2020-12-06

## 2020-09-23 RX ORDER — OLMESARTAN MEDOXOMIL 20 MG/1
20 TABLET ORAL DAILY
Qty: 30 TAB | Refills: 2 | Status: SHIPPED | OUTPATIENT
Start: 2020-09-23 | End: 2020-12-06

## 2020-09-23 NOTE — PATIENT INSTRUCTIONS
Olmesartan (Benicar) - (By mouth)   Why this medicine is used:   Treats high blood pressure. Contact a nurse or doctor right away if you have:  · Rapid weight gain, swelling in your hands, ankles, or feet  · Change in how much or how often you urinate, bloody or cloudy urine  · Diarrhea, weight loss  · Lightheadedness, dizziness, or fainting   © 2017 [facility] Information is for End User's use only and may not be sold, redistributed or otherwise used for commercial purposes.

## 2020-09-23 NOTE — PROGRESS NOTES
Chief Complaint   Patient presents with    Annual Wellness Visit    Cough     HPI:  Betsy Kaye is a 64 y.o. male with h/o hypertension, diabetes type 2, hypercholesterolemia, severe hypothyroidism, metastatic thyroid cancer presents for medicare wellness. Patient was recently admitted for CAP with acute hypoxic respiratory failure, acute on chronic diastolic heart failure, Acute on chronic kidney failure. He was discharged home on oxygen 2L which he is still on. Also, he was discharged home on dialysis. Patient says the procedure has been d/moe because renal function is back to normal. Blood pressure today is elevated on Amlodipine 2.5 mg. He is complaining of cough, non productive, associated with shortness of breath, no wheezing, no fever/chills. This is a Subsequent Medicare Annual Wellness Exam (AWV) (Performed 12 months after IPPE or effective date of Medicare Part B enrollment)  I have reviewed the patient's medical history in detail and updated the computerized patient record.      History     Past Medical History:   Diagnosis Date    Adverse effect of anesthesia     \" STOP BREATHING 1 TIME C ANESTH\"    Cancer ([address] 2004    thyroid cancer    Chronic kidney disease     Chronic pain     BACK SHOULDER AND ARM    Depression     Diabetes ([address]     Encounter for long-term (current) use of NSAIDs     Hypercholesterolemia     Hypertension     Nausea & vomiting     Other ill-defined conditions(799.89)     cholesterol, thyroid    Sleep apnea     doesn't wear cpap    Thyroid cancer ([address]     TIA (transient ischemic attack) 2011    Vitamin D deficiency       Past Surgical History:   Procedure Laterality Date    CARDIAC SURG PROCEDURE UNLIST      HX HEENT      THROAT SURGERY X 4    HX ORTHOPAEDIC      back     HX ORTHOPAEDIC      ARM AND SHOULDER    HX OTHER SURGICAL      thyroid, lymphnode    HX RETINAL DETACHMENT REPAIR      left eye    IR INSERT NON TUNL CVC OVER 5 YRS  8/18/2020    IR INSERT TUNL CVC W/O PORT OVER 5 YR  8/26/2020    UPPER GI ENDOSCOPY,BALL DIL,30MM  7/17/2020         UPPER GI ENDOSCOPY,BIOPSY  7/17/2020         VASCULAR SURGERY PROCEDURE UNLIST      cardiac cath NEG. Current Outpatient Medications   Medication Sig Dispense Refill    acetaminophen (TYLENOL) 325 mg tablet Take 325 mg by mouth as needed for Pain. Take tab as needed for pain      Omeprazole delayed release (PRILOSEC D/R) 20 mg tablet Take 1 Tab by mouth daily. 30 Tab 1    metoclopramide HCl (REGLAN) 5 mg tablet Take 1 tab by mouth daily as needed, do not take more then 3 tabs a day  Indications: nausea and vomiting caused by cancer drugs 30 Tab 0    OXYGEN-AIR DELIVERY SYSTEMS Take 2.5 L/min by inhalation continuous.  amLODIPine (NORVASC) 2.5 mg tablet Take 1 Tab by mouth two (2) times a day. 60 Tab 0    Blood-Glucose Meter monitoring kit 1 preferred brand glucometer for checking home glucose, E11.22 1 Kit 0    glucose blood VI test strips (blood glucose test) strip Pharmacist to choose preferred meter and strips. Dx:E11.65, Z79.4 Monitor 3 times daily 300 Strip 3    insulin NPH (HumuLIN N NPH U-100 Insulin) 100 unit/mL injection 2-10 units daily 3 Vial 3    Insulin Syringe-Needle U-100 (Advocate Syringes) 0.3 mL 30 gauge x 5/16\" syrg 1 Each by Does Not Apply route daily. 90 Syringe 3    simvastatin (ZOCOR) 20 mg tablet Take 1 Tab by mouth nightly. 90 Tab 3    Ventolin HFA 90 mcg/actuation inhaler TAKE 1-2 PUFFS EVEERY 4-6 HOURS AS NEEDED FOR DYSPNEA AND WHEEZING 1 Inhaler 2    levothyroxine (SYNTHROID) 200 mcg tablet TAKE 1 TABLET BY MOUTH ONCE DAILY BEFORE BREAKFAST 90 Tab 1    glucose blood VI test strips (PHARMACIST CHOICE) strip One Touch  Check glucose 3-4 times daily. DX E11.22 300 Strip 3    Lancets misc Use preferred brand;  Check glucose 3-4 times daily, Diagnosis E11.22 2 Package 3     Allergies   Allergen Reactions    Anesthetics - Amide Type Shortness of Breath    Flexeril [Cyclobenzaprine] Hives    Tramadol Hives     Family History   Problem Relation Age of Onset    Diabetes Mother     Elevated Lipids Mother    Lori Cambric Bladder Disease Mother     Headache Mother    [facility] Migraines Mother     Heart Disease Mother     Hypertension Mother     Stroke Mother     Other Mother         ANEURSYM BRAIN    Bleeding Prob Father     Cancer Father         LEUKEMIA    Diabetes Father     Elevated Lipids Father     Mental Retardation Sister     Psychiatric Disorder Sister     Cancer Brother         LUNGS     Social History     Tobacco Use    Smoking status: Former Smoker     Last attempt to quit: 1994     Years since quittin.1    Smokeless tobacco: Never Used   Substance Use Topics    Alcohol use: Yes     Comment: Occasionally     Patient Active Problem List   Diagnosis Code    Mixed hypercholesterolemia and hypertriglyceridemia E78.2    Diabetes type 2, uncontrolled ([facility] E11.65    Hypovitaminosis D E55.9    Proteinuria R80.9    Microalbuminuria R80.9    Essential hypertension with goal blood pressure less than 130/80 I10    Acquired hypothyroidism E03.9    Chronic left shoulder pain M25.512, G89.29    Sepsis ([facility] A41.9    Papillary thyroid carcinoma ([facility] C73    Type 2 diabetes mellitus with nephropathy ([facility] E11.21    Severe obesity (BMI 35.0-39. 9) with comorbidity ([facility] E66.01    NERY (acute kidney injury) ([facility] N17.9    Nausea and vomiting R11.2    Lung cancer (HCC) C34.90    Chest pain R07.9    Pneumonia J18.9    Anemia D64.9    Suspected COVID-19 virus infection Z20.828    Hyperuricemia E79.0    Metastatic carcinoma (HCC) C79.9    Encounter for counseling regarding advance directives Z71.89    Dysphagia R13.10    Bronchitis J40    Anxiety F41.9    Abscess of left arm L02.414    Abrasion of nose S00.31XA    Abrasion of hand S60.519A    Fibroids D21.9    Gastroenteritis K52.9    Hyperglycemia R73.9    Infected tick bite W57. [facility] Contusion of upper extremity S40.029A    Injury of left shoulder and upper arm S49. 92XA    Malignant neoplasm of thyroid gland ([facility] C73    Renal failure N19       Depression Risk Factor Screening:     3 most recent PHQ Screens 9/23/2020   Little interest or pleasure in doing things Not at all   Feeling down, depressed, irritable, or hopeless Not at all   Total Score PHQ 2 0     Alcohol Risk Factor Screening: You do not drink alcohol or very rarely. Functional Ability and Level of Safety:   Hearing Loss  Hearing is good. Activities of Daily Living  The home contains: no safety equipment. Patient needs help with:  transportation and laundry    Fall Risk  Fall Risk Assessment, last 12 mths 3/27/2019   Able to walk? Yes   Fall in past 12 months?  No     Abuse Screen  Patient is not abused    Cognitive Screening   Evaluation of Cognitive Function:  Has your family/caregiver stated any concerns about your memory: no  Normal    Patient Care Team   Patient Care Team:  Lux Flowers MD as PCP - General (Internal Medicine)  Lux Flowers MD as PCP - [facility] Empaneled Provider  Divina Espitia MD (Endocrinology)  Nathalie Calvillo MD (Hematology and Oncology)  Vianey Freire, RN as Care Transitions Nurse (Internal Medicine)  Wiley aLw LMSW as     Assessment/Plan   Education and counseling provided:  Are appropriate based on today's review and evaluation  Diagnoses and all orders for this visit:    Encounter for Medicare annual wellness exam  -     METABOLIC PANEL, COMPREHENSIVE  -     CBC W/O DIFF    Encounter for immunization    Needs flu shot  -     INFLUENZA VIRUS VAC QUAD,SPLIT,PRESV FREE SYRINGE IM    Controlled type 2 diabetes mellitus with diabetic nephropathy, without long-term current use of insulin (HCC)  -     METABOLIC PANEL, COMPREHENSIVE    CKD (chronic kidney disease) stage 4, GFR 15-29 ml/min ([facility])  -     METABOLIC PANEL, COMPREHENSIVE    Thyroid cancer, medullary carcinoma ([facility]  -     TSH 3RD GENERATION    Dyslipidemia (high LDL; low HDL)  -     LIPID PANEL    Encounter for prostate cancer screening  -     PSA SCREENING (SCREENING)    Normochromic normocytic anemia  -     CBC W/O DIFF    Vitamin D deficiency  -     VITAMIN D, 25 HYDROXY    Essential hypertension with goal blood pressure less than 915/93  -     METABOLIC PANEL, COMPREHENSIVE  -     olmesartan (BENICAR) 20 mg tablet; Take 1 Tab by mouth daily. , Normal, Disp-30 Tab,R-2    Acquired hypothyroidism  -     TSH 3RD GENERATION    Frequency of urination  -     URINALYSIS W/ RFLX MICROSCOPIC    Comprehensive diabetic foot examination, type 2 DM, encounter for ([facility]  -      DIABETES FOOT EXAM    Diabetic eye exam ([place] [address]  -     REFERRAL TO OPHTHALMOLOGY    SOB (shortness of breath)  -     XR CHEST PA LAT; Future  -     furosemide (LASIX) 20 mg tablet; Take 1 Tab by mouth daily. , Normal, Disp-30 Tab,R-1    Bilateral leg edema  -     furosemide (LASIX) 20 mg tablet; Take 1 Tab by mouth daily. , Normal, Disp-30 Tab,R-1        Patient Instructions      Olmesartan (Benicar) - (By mouth)   Why this medicine is used:   Treats high blood pressure. Contact a nurse or doctor right away if you have:  · Rapid weight gain, swelling in your hands, ankles, or feet  · Change in how much or how often you urinate, bloody or cloudy urine  · Diarrhea, weight loss  · Lightheadedness, dizziness, or fainting   © 2017 [facility] Information is for End User's use only and may not be sold, redistributed or otherwise used for commercial purposes. Follow-up and Dispositions    · Return in about 3 months (around 12/23/2020), or if symptoms worsen or fail to improve, for routine follow up.

## 2020-09-23 NOTE — PROGRESS NOTES
[address] 
at [place] [address], [facility], [address] 
[phone] Follow-Up Note Patient: Bianca Otto MRN: [id number]  SSN: [id number] YOB: 1960  Age: 64 y.o. Sex: male Reason for Visit:  
Bianca Otto is a 64 y.o. male who is seen by synchronous (real-time) audio-video technology for follow up of metastatic thyroid carcinoma. Subjective:  
  
Bianca Otto is a 64 y.o. 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 He was diagnosed with papillary thyroid carcinoma in 2002. He underwent a total thyroidectomy by Dr. Belén Goodwin. This was followed by STRATTON ablation. He has been on TSH suppression since that time. He is relatively asymptomatic. Last serum thyroglobulin obtained in Feb 2018 is within normal range. His voice has been hoarse for some time. A CT showed numerous small lung nodules which is increasing only slightly in size. He feels well today with no complaints. He denies bone pains, dyspnea, weight loss. Review of Systems: 
 
Constitutional: negative Eyes: negative Ears, Nose, Mouth, Throat, and Face: hoarseness Respiratory: negative Cardiovascular: negative Gastrointestinal: negative Genitourinary:negative Integument/Breast: negative Hematologic/Lymphatic: negative Musculoskeletal:negative Neurological: negative Past Medical History:  
Diagnosis Date  Adverse effect of anesthesia \" STOP BREATHING 1 TIME C ANESTH\"  Cancer [facility]) 2004 thyroid cancer  Chronic kidney disease  Chronic pain BACK SHOULDER AND ARM  Depression  Diabetes ([facility]  Encounter for long-term (current) use of NSAIDs  Hypercholesterolemia  Hypertension  Nausea & vomiting  Other ill-defined conditions(799.89) cholesterol, thyroid  Sleep apnea   
 doesn't wear cpap  Thyroid cancer ([facility]  TIA (transient ischemic attack)   Vitamin D deficiency Past Surgical History:  
Procedure Laterality Date  CARDIAC SURG PROCEDURE UNLIST  HX HEENT    
 THROAT SURGERY X 4  
 HX ORTHOPAEDIC    
 back  HX ORTHOPAEDIC    
 ARM AND SHOULDER  
 HX OTHER SURGICAL    
 thyroid, lymphnode  HX RETINAL DETACHMENT REPAIR    
 left eye  IR INSERT NON TUNL CVC OVER 5 YRS  2020  IR INSERT TUNL CVC W/O PORT OVER 5 YR  2020  UPPER GI ENDOSCOPY,BALL DIL,30MM  2020  UPPER GI ENDOSCOPY,BIOPSY  2020  VASCULAR SURGERY PROCEDURE UNLIST    
 cardiac cath NEG. Family History Problem Relation Age of Onset  Diabetes Mother  Elevated Lipids Mother Jazmyn Justo Bladder Disease Mother  Headache Mother  Migraines Mother  Heart Disease Mother  Hypertension Mother  Stroke Mother Ela Paulino Other Mother ANEURSYM BRAIN  
 Bleeding Prob Father  Cancer Father LEUKEMIA  Diabetes Father  Elevated Lipids Father  Mental Retardation Sister  Psychiatric Disorder Sister  Cancer Brother LUNGS Social History Tobacco Use  Smoking status: Former Smoker Last attempt to quit: 1994 Years since quittin.1  Smokeless tobacco: Never Used Substance Use Topics  Alcohol use: Yes Comment: Occasionally Prior to Admission medications Medication Sig Start Date End Date Taking? Authorizing Provider  
olmesartan (BENICAR) 20 mg tablet Take 1 Tab by mouth daily. 20  Yes Preston Tracy MD  
acetaminophen (TYLENOL) 325 mg tablet Take 325 mg by mouth as needed for Pain. Take tab as needed for pain 9/15/20  Yes Preston Tracy MD  
Omeprazole delayed release (PRILOSEC D/R) 20 mg tablet Take 1 Tab by mouth daily.  20  Yes Preston Tracy MD  
metoclopramide HCl (REGLAN) 5 mg tablet Take 1 tab by mouth daily as needed, do not take more then 3 tabs a day  Indications: nausea and vomiting caused by cancer drugs 9/1/20  Yes Yao Lyle MD  
OXYGEN-AIR DELIVERY SYSTEMS Take 2.5 L/min by inhalation continuous. 8/29/20  Yes Provider, Historical  
amLODIPine (NORVASC) 2.5 mg tablet Take 1 Tab by mouth two (2) times a day. 8/27/20  Yes Sebastien Tabares MD  
Blood-Glucose Meter monitoring kit 1 preferred brand glucometer for checking home glucose, E11.22 7/30/20  Yes Mariam Barraza MD  
glucose blood VI test strips (blood glucose test) strip Pharmacist to choose preferred meter and strips. Dx:E11.65, Z79.4 Monitor 3 times daily 7/30/20  Yes Mariam Barraza MD  
insulin NPH (HumuLIN N NPH U-100 Insulin) 100 unit/mL injection 2-10 units daily Patient taking differently: 2-10 units daily He is doing 10 units BID as of last few months 9/23/2020 7/30/20  Yes Mariam Barraza MD  
Insulin Syringe-Needle U-100 (Advocate Syringes) 0.3 mL 30 gauge x 5/16\" syrg 1 Each by Does Not Apply route daily. 7/30/20  Yes Mariam Barraza MD  
simvastatin (ZOCOR) 20 mg tablet Take 1 Tab by mouth nightly. 7/28/20  Yes Yao Lyle MD  
Ventolin HFA 90 mcg/actuation inhaler TAKE 1-2 PUFFS EVEERY 4-6 HOURS AS NEEDED FOR DYSPNEA AND WHEEZING 7/28/20  Yes Yao Lyle MD  
levothyroxine (SYNTHROID) 200 mcg tablet TAKE 1 TABLET BY MOUTH ONCE DAILY BEFORE BREAKFAST 11/4/19  Yes Héctor Gudino MD  
glucose blood VI test strips (PHARMACIST CHOICE) strip One Touch  Check glucose 3-4 times daily. DX E11.22 2/2/18  Yes Héctor Gudino MD  
Lancets misc Use preferred brand; Check glucose 3-4 times daily, Diagnosis E11.22 2/2/18  Yes Héctor Gudino MD  
furosemide (LASIX) 20 mg tablet Take 1 Tab by mouth daily. 9/23/20   Yao Lyle MD  
  
 
 
 
 
Allergies Allergen Reactions  Anesthetics - Amide Type Shortness of Breath  Flexeril [Cyclobenzaprine] Hives  Tramadol Hives Objective:  
 
General: alert, cooperative, no distress Mental  status: normal mood, behavior, speech, dress, motor activity, and thought processes, able to follow commands HENT: NCAT Neck: no visualized mass Resp: no respiratory distress Neuro: no gross deficits Skin: no discoloration or lesions of concern on visible areas Psychiatric: normal affect, consistent with stated mood, no evidence of hallucinations Due to this being a TeleHealth evaluation (During QJOJM-07 public health emergency), many elements of the physical examination are unable to be assessed. Evaluation of the following organ systems was limited: Vitals/Constitutional/EENT/Resp/CV/GI//MS/Neuro/Skin/Heme-Lymph-Imm. CT Results (most recent): 
Results from [facility] Encounter encounter on 08/15/20 CT CHEST WO CONT Narrative INDICATION: Shortness of breath COMPARISON: 6/24/2020 CONTRAST: None. TECHNIQUE:  5 mm axial images were obtained through the chest. Coronal and 
sagittal reformats were generated. CT dose reduction was achieved through use 
of a standardized protocol tailored for this examination and automatic exposure 
control for dose modulation. The absence of intravenous contrast reduces the sensitivity for evaluation of 
the mediastinum, ben, vasculature, and upper abdominal organs. FINDINGS: 
 
CHEST WALL: No mass or axillary lymphadenopathy. THYROID: No nodule. MEDIASTINUM: Mediastinal lymph nodes appear slightly larger. There is a 12 mm 
subcarinal lymph node which previously measured 5 mm. BEN: No mass or lymphadenopathy. THORACIC AORTA: No aneurysm. MAIN PULMONARY ARTERY: Normal in caliber. TRACHEA/BRONCHI: Patent. ESOPHAGUS: No wall thickening or dilatation. HEART: Normal in size. PLEURA: There are bilateral pleural effusions and slight basilar atelectasis LUNGS: There are multifocal areas of airspace disease.  These obscure the 
previously noted pulmonary nodules except for one in the right lower lobe which 
 measures 9 mm and is stable. There is also slight interlobular septal 
thickening. INCIDENTALLY IMAGED UPPER ABDOMEN: There is a 1.7 cm left adrenal nodule most 
consistent with an adenoma. BONES: No destructive bone lesion. Impression IMPRESSION: 
 
1. There are bilateral pleural effusions. 2. There are multifocal areas of airspace disease which may represent atypical 
infection. Pulmonary nodules are obscured by the multifocal airspace disease 
except right lower lobe nodule measuring 9 mm which is stable. In addition, there is slight interlobular septal thickening may represent 
element of interstitial edema. I personally reviewed the images. Slightly increased pulmonary nodules. Assessment: 1. 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  
            BRAF mutation - detected MSI - stable ECOG PS 0 Intent of treatment - palliative  
  
2002 Biopsy suggesting PTC 
                      S/p total thyroidectomy S/p STRATTON 
[phone]  Reports negative [facility] 
10/10/16  Repeat surgery, 2 right paratrachial LN's 
                      Surgical Path: poorly differentiated PTC Patient with hoarseness of voice, persistent On 200mcg LT4 
 
11/30/16 Tg 10.1, TgAb negative, TSH 1.93 
12/14/16 Neck Ultrasound: \"Thyroid bed is empty, no anterior cervical mass or significant adenopathy\" 01/18/17 Thyrogen stimulated WBS: \"No evidence of uptake in thyroid bed, No evidence of metastatic disease\" He has never been on any of the approved TKI  
CT shows numerous lung nodules. They are stable. He is asymptomatic from lung nodules Continue observation 
  
Symptom management form reviewed with patient. Plan:  
 
 
> TSH suppression with Levothyroxine  
> Observation 
> CT Chest in Dec 
> Follow-up in 6 months Signed by: Kim Sanders MD 
 September 23, 2020 
 
 
 
CC. Sharon Kruger MD 
CC. Aleta Babinski, MD 
 
 
I was in the office while conducting this encounter. The patient was at his home. Consent: 
He and/or his healthcare decision maker is aware that this patient-initiated Telehealth encounter is a billable service, with coverage as determined by his insurance carrier. He is aware that he may receive a bill and has provided verbal consent to proceed: Yes Pursuant to the emergency declaration under the [facility], [facility] waiver authority and the [employer] and [employer] Act, this Virtual  Visit was conducted, with patient's (and/or legal guardian's) consent, to reduce the patient's risk of exposure to COVID-19 and provide necessary medical care. Services were provided through a video synchronous discussion virtually to substitute for in-person visit.

## 2020-09-23 NOTE — PROGRESS NOTES
63 y/o [facility] male meeting via virtually on [email or web address] me Pt reports he is no longer on dialysis, he got his port taken out on Monday. He reports he had chest xray this morning for ongoing SOB and dyspnea. Pt hospitalized in July and also august for prolonged periods of time at [facility].

## 2020-09-24 LAB
25(OH)D3+25(OH)D2 SERPL-MCNC: 25.7 NG/ML (ref 30–100)
ALBUMIN SERPL-MCNC: 3.9 G/DL (ref 3.8–4.9)
ALBUMIN/GLOB SERPL: 1.1 {RATIO} (ref 1.2–2.2)
ALP SERPL-CCNC: 86 IU/L (ref 39–117)
ALT SERPL-CCNC: 19 IU/L (ref 0–44)
APPEARANCE UR: CLEAR
AST SERPL-CCNC: 18 IU/L (ref 0–40)
BACTERIA #/AREA URNS HPF: NORMAL /[HPF]
BILIRUB SERPL-MCNC: 0.3 MG/DL (ref 0–1.2)
BILIRUB UR QL STRIP: NEGATIVE
BUN SERPL-MCNC: 40 MG/DL (ref 6–24)
BUN/CREAT SERPL: 13 (ref 9–20)
CALCIUM SERPL-MCNC: 8.6 MG/DL (ref 8.7–10.2)
CASTS URNS QL MICRO: NORMAL /LPF
CHLORIDE SERPL-SCNC: 105 MMOL/L (ref 96–106)
CHOLEST SERPL-MCNC: 125 MG/DL (ref 100–199)
CO2 SERPL-SCNC: 23 MMOL/L (ref 20–29)
COLOR UR: YELLOW
CREAT SERPL-MCNC: 3.2 MG/DL (ref 0.76–1.27)
EPI CELLS #/AREA URNS HPF: NORMAL /HPF (ref 0–10)
ERYTHROCYTE [DISTWIDTH] IN BLOOD BY AUTOMATED COUNT: 14.9 % (ref 11.6–15.4)
GLOBULIN SER CALC-MCNC: 3.5 G/DL (ref 1.5–4.5)
GLUCOSE SERPL-MCNC: 145 MG/DL (ref 65–99)
GLUCOSE UR QL: ABNORMAL
HCT VFR BLD AUTO: 24.3 % (ref 37.5–51)
HDLC SERPL-MCNC: 45 MG/DL
HGB BLD-MCNC: 7.8 G/DL (ref 13–17.7)
HGB UR QL STRIP: ABNORMAL
INTERPRETATION: NORMAL
KETONES UR QL STRIP: NEGATIVE
LDLC SERPL CALC-MCNC: 62 MG/DL (ref 0–99)
LEUKOCYTE ESTERASE UR QL STRIP: NEGATIVE
Lab: NORMAL
MCH RBC QN AUTO: 29.2 PG (ref 26.6–33)
MCHC RBC AUTO-ENTMCNC: 32.1 G/DL (ref 31.5–35.7)
MCV RBC AUTO: 91 FL (ref 79–97)
MICRO URNS: ABNORMAL
NITRITE UR QL STRIP: NEGATIVE
PH UR STRIP: 6 [PH] (ref 5–7.5)
PLATELET # BLD AUTO: 316 X10E3/UL (ref 150–450)
POTASSIUM SERPL-SCNC: 5 MMOL/L (ref 3.5–5.2)
PROT SERPL-MCNC: 7.4 G/DL (ref 6–8.5)
PROT UR QL STRIP: ABNORMAL
PSA SERPL-MCNC: 1.4 NG/ML (ref 0–4)
RBC # BLD AUTO: 2.67 X10E6/UL (ref 4.14–5.8)
RBC #/AREA URNS HPF: NORMAL /HPF (ref 0–2)
SODIUM SERPL-SCNC: 141 MMOL/L (ref 134–144)
SP GR UR: 1.01 (ref 1–1.03)
TRIGL SERPL-MCNC: 98 MG/DL (ref 0–149)
TSH SERPL DL<=0.005 MIU/L-ACNC: 0.05 UIU/ML (ref 0.45–4.5)
UROBILINOGEN UR STRIP-MCNC: 0.2 MG/DL (ref 0.2–1)
VLDLC SERPL CALC-MCNC: 18 MG/DL (ref 5–40)
WBC # BLD AUTO: 9.8 X10E3/UL (ref 3.4–10.8)
WBC #/AREA URNS HPF: NORMAL /HPF (ref 0–5)

## 2020-09-24 PROCEDURE — 3331090001 HH PPS REVENUE CREDIT

## 2020-09-24 PROCEDURE — 3331090002 HH PPS REVENUE DEBIT

## 2020-09-25 ENCOUNTER — TELEPHONE (OUTPATIENT)
Dept: FAMILY MEDICINE CLINIC | Age: 60
End: 2020-09-25

## 2020-09-25 PROCEDURE — 3331090002 HH PPS REVENUE DEBIT

## 2020-09-25 PROCEDURE — 3331090001 HH PPS REVENUE CREDIT

## 2020-09-25 NOTE — TELEPHONE ENCOUNTER
Mirtha Gibbons dtr calling     States pt has been constipated for 2 days   She would like something called in to [address] [place] [address] number to reach her is [phone]

## 2020-09-26 PROCEDURE — 3331090002 HH PPS REVENUE DEBIT

## 2020-09-26 PROCEDURE — 3331090001 HH PPS REVENUE CREDIT

## 2020-09-27 DIAGNOSIS — K59.03 CONSTIPATION DUE TO OPIOID THERAPY: Primary | ICD-10-CM

## 2020-09-27 DIAGNOSIS — T40.2X5A CONSTIPATION DUE TO OPIOID THERAPY: Primary | ICD-10-CM

## 2020-09-27 PROCEDURE — 3331090001 HH PPS REVENUE CREDIT

## 2020-09-27 PROCEDURE — 3331090002 HH PPS REVENUE DEBIT

## 2020-09-27 RX ORDER — DOCUSATE SODIUM 250 MG
250 CAPSULE ORAL DAILY
Qty: 30 CAP | Refills: 2 | Status: SHIPPED | OUTPATIENT
Start: 2020-09-27 | End: 2020-01-01

## 2020-09-28 ENCOUNTER — HOME CARE VISIT (OUTPATIENT)
Dept: SCHEDULING | Facility: HOME HEALTH | Age: 60
End: 2020-09-28
Payer: MEDICARE

## 2020-09-28 DIAGNOSIS — I10 HYPERTENSION, WELL CONTROLLED: ICD-10-CM

## 2020-09-28 DIAGNOSIS — J96.01 ACUTE RESPIRATORY FAILURE WITH HYPOXIA (HCC): Primary | ICD-10-CM

## 2020-09-28 DIAGNOSIS — K21.00 GASTROESOPHAGEAL REFLUX DISEASE WITH ESOPHAGITIS: ICD-10-CM

## 2020-09-28 PROCEDURE — 3331090002 HH PPS REVENUE DEBIT

## 2020-09-28 PROCEDURE — G0300 HHS/HOSPICE OF LPN EA 15 MIN: HCPCS

## 2020-09-28 PROCEDURE — 3331090001 HH PPS REVENUE CREDIT

## 2020-09-28 PROCEDURE — 400013 HH SOC

## 2020-09-29 VITALS
HEART RATE: 88 BPM | SYSTOLIC BLOOD PRESSURE: 170 MMHG | BODY MASS INDEX: 31.16 KG/M2 | OXYGEN SATURATION: 98 % | TEMPERATURE: 98.7 F | RESPIRATION RATE: 16 BRPM | DIASTOLIC BLOOD PRESSURE: 80 MMHG | WEIGHT: 211 LBS

## 2020-09-29 PROCEDURE — 3331090001 HH PPS REVENUE CREDIT

## 2020-09-29 PROCEDURE — 3331090002 HH PPS REVENUE DEBIT

## 2020-09-29 RX ORDER — AMLODIPINE BESYLATE 5 MG/1
5 TABLET ORAL DAILY
Qty: 30 TAB | Refills: 3 | Status: SHIPPED | OUTPATIENT
Start: 2020-09-29 | End: 2021-01-01

## 2020-09-29 RX ORDER — PHENOL/SODIUM PHENOLATE
20 AEROSOL, SPRAY (ML) MUCOUS MEMBRANE DAILY
Qty: 30 TAB | Refills: 1 | Status: SHIPPED | OUTPATIENT
Start: 2020-09-29 | End: 2020-10-09 | Stop reason: CLARIF

## 2020-09-29 RX ORDER — AMLODIPINE BESYLATE 2.5 MG/1
2.5 TABLET ORAL 2 TIMES DAILY
Qty: 60 TAB | Refills: 0 | Status: CANCELLED | OUTPATIENT
Start: 2020-09-29

## 2020-09-30 PROCEDURE — 3331090001 HH PPS REVENUE CREDIT

## 2020-09-30 PROCEDURE — 3331090002 HH PPS REVENUE DEBIT

## 2020-10-01 PROCEDURE — 3331090001 HH PPS REVENUE CREDIT

## 2020-10-01 PROCEDURE — 3331090002 HH PPS REVENUE DEBIT

## 2020-10-02 PROCEDURE — 3331090001 HH PPS REVENUE CREDIT

## 2020-10-02 PROCEDURE — 3331090002 HH PPS REVENUE DEBIT

## 2020-10-03 PROCEDURE — 3331090002 HH PPS REVENUE DEBIT

## 2020-10-03 PROCEDURE — 3331090001 HH PPS REVENUE CREDIT

## 2020-10-04 PROCEDURE — 3331090002 HH PPS REVENUE DEBIT

## 2020-10-04 PROCEDURE — 3331090001 HH PPS REVENUE CREDIT

## 2020-10-05 PROCEDURE — 3331090002 HH PPS REVENUE DEBIT

## 2020-10-05 PROCEDURE — 3331090001 HH PPS REVENUE CREDIT

## 2020-10-06 ENCOUNTER — HOME CARE VISIT (OUTPATIENT)
Dept: SCHEDULING | Facility: HOME HEALTH | Age: 60
End: 2020-10-06
Payer: MEDICARE

## 2020-10-06 VITALS
DIASTOLIC BLOOD PRESSURE: 70 MMHG | BODY MASS INDEX: 31.16 KG/M2 | OXYGEN SATURATION: 98 % | HEART RATE: 91 BPM | WEIGHT: 211 LBS | SYSTOLIC BLOOD PRESSURE: 142 MMHG | RESPIRATION RATE: 18 BRPM | TEMPERATURE: 98.3 F

## 2020-10-06 PROCEDURE — G0299 HHS/HOSPICE OF RN EA 15 MIN: HCPCS

## 2020-10-06 PROCEDURE — 3331090001 HH PPS REVENUE CREDIT

## 2020-10-06 PROCEDURE — 3331090002 HH PPS REVENUE DEBIT

## 2020-10-07 ENCOUNTER — PATIENT OUTREACH (OUTPATIENT)
Dept: CASE MANAGEMENT | Age: 60
End: 2020-10-07

## 2020-10-07 PROCEDURE — 3331090001 HH PPS REVENUE CREDIT

## 2020-10-07 PROCEDURE — 3331090002 HH PPS REVENUE DEBIT

## 2020-10-07 NOTE — PROGRESS NOTES
Patient has graduated from the Care Transitions program on 10/7/20. Patient/family has the ability to self-manage at this time Care transitions goals have been completed. No further Care Transition Social Work follow up scheduled. 9/2/2020   Follow up  SW received a referral from [facility]N requesting a follow up call with patient to discuss community resources for ramp installation and possible counseling services. SW contacted patient to discuss resources. Patient stated he is planning to follow up with PCP during his upcoming appointment to request a power wheelchair to assist with mobility. Patient reported experiencing muscle weakness,decreased strength and endurance at times due to medical condition and Dialysis treatments. Plan  SW advised patient to follow up with PCP to initiate process for receiving a power wheelchair. SW inquired about his interest in participating in individual counseling but stated he was not interested at this time. SW provided patient with information for AM RAMP. Mike Rob, [address] [place] [address] Transitions Team   [address] [place] [address] Ambulatory Care Coordination Team  [phone]    Patient has Care Transitions  contact information for any further questions, concerns, or needs.   Patients upcoming visits:    Future Appointments   Date Time Provider Ness Hawkins   10/12/2020 To Be Determined [facility]   10/19/2020 To Be Determined [facility] [address]   10/19/2020  2:00 PM CHAIR [address] [place] [address] [address] REG   10/26/2020 To Be Determined [facility]   10/26/2020  2:00 PM CHAIR [address] [place] [address] REG   11/2/2020  2:00 PM CHAIR [address] [place] [address] REG   11/24/2020 11:30 AM Bryan Lynn MD [facility] [address] AMB   12/21/2020 11:15 AM Caroline Armando MD [facility] BS AMB

## 2020-10-08 PROCEDURE — 3331090002 HH PPS REVENUE DEBIT

## 2020-10-08 PROCEDURE — 3331090001 HH PPS REVENUE CREDIT

## 2020-10-09 PROCEDURE — 3331090002 HH PPS REVENUE DEBIT

## 2020-10-09 PROCEDURE — 3331090001 HH PPS REVENUE CREDIT

## 2020-10-09 RX ORDER — OMEPRAZOLE 20 MG/1
20 CAPSULE, DELAYED RELEASE ORAL DAILY
Qty: 30 CAP | Refills: 3 | Status: SHIPPED | OUTPATIENT
Start: 2020-10-09 | End: 2020-01-01 | Stop reason: SDUPTHER

## 2020-10-09 NOTE — TELEPHONE ENCOUNTER
Omeprazole medication tablet is not covered. Patient have been getting the capsule. can it be changed.

## 2020-10-10 PROCEDURE — 3331090001 HH PPS REVENUE CREDIT

## 2020-10-10 PROCEDURE — 3331090002 HH PPS REVENUE DEBIT

## 2020-10-11 PROCEDURE — 3331090001 HH PPS REVENUE CREDIT

## 2020-10-11 PROCEDURE — 3331090002 HH PPS REVENUE DEBIT

## 2020-10-12 ENCOUNTER — HOME CARE VISIT (OUTPATIENT)
Dept: SCHEDULING | Facility: HOME HEALTH | Age: 60
End: 2020-10-12
Payer: MEDICARE

## 2020-10-12 PROCEDURE — 3331090001 HH PPS REVENUE CREDIT

## 2020-10-12 PROCEDURE — G0300 HHS/HOSPICE OF LPN EA 15 MIN: HCPCS

## 2020-10-12 PROCEDURE — 3331090002 HH PPS REVENUE DEBIT

## 2020-10-13 PROCEDURE — 3331090002 HH PPS REVENUE DEBIT

## 2020-10-13 PROCEDURE — 3331090001 HH PPS REVENUE CREDIT

## 2020-10-14 PROCEDURE — 3331090001 HH PPS REVENUE CREDIT

## 2020-10-14 PROCEDURE — 3331090002 HH PPS REVENUE DEBIT

## 2020-10-15 PROCEDURE — 3331090001 HH PPS REVENUE CREDIT

## 2020-10-15 PROCEDURE — 3331090002 HH PPS REVENUE DEBIT

## 2020-10-16 PROCEDURE — 3331090001 HH PPS REVENUE CREDIT

## 2020-10-16 PROCEDURE — 3331090002 HH PPS REVENUE DEBIT

## 2020-10-17 PROCEDURE — 3331090001 HH PPS REVENUE CREDIT

## 2020-10-17 PROCEDURE — 3331090002 HH PPS REVENUE DEBIT

## 2020-10-18 PROCEDURE — 3331090002 HH PPS REVENUE DEBIT

## 2020-10-18 PROCEDURE — 3331090001 HH PPS REVENUE CREDIT

## 2020-10-19 ENCOUNTER — HOSPITAL ENCOUNTER (OUTPATIENT)
Dept: INFUSION THERAPY | Age: 60
Discharge: HOME | End: 2020-10-19
Payer: MEDICARE

## 2020-10-19 VITALS
BODY MASS INDEX: 33.03 KG/M2 | HEART RATE: 78 BPM | RESPIRATION RATE: 16 BRPM | WEIGHT: 223 LBS | HEIGHT: 69 IN | TEMPERATURE: 98.1 F | SYSTOLIC BLOOD PRESSURE: 131 MMHG | DIASTOLIC BLOOD PRESSURE: 68 MMHG

## 2020-10-19 LAB — HGB BLD-MCNC: 9.3 G/DL (ref 12.1–17)

## 2020-10-19 PROCEDURE — 3331090002 HH PPS REVENUE DEBIT

## 2020-10-19 PROCEDURE — 3331090001 HH PPS REVENUE CREDIT

## 2020-10-19 PROCEDURE — 85018 HEMOGLOBIN: CPT

## 2020-10-19 PROCEDURE — 96365 THER/PROPH/DIAG IV INF INIT: CPT

## 2020-10-19 PROCEDURE — 96372 THER/PROPH/DIAG INJ SC/IM: CPT

## 2020-10-19 PROCEDURE — 74011250636 HC RX REV CODE- 250/636: Performed by: INTERNAL MEDICINE

## 2020-10-19 RX ORDER — SODIUM CHLORIDE 0.9 % (FLUSH) 0.9 %
10 SYRINGE (ML) INJECTION AS NEEDED
Status: DISCONTINUED | OUTPATIENT
Start: 2020-10-19 | End: 2020-10-20 | Stop reason: HOSPADM

## 2020-10-19 RX ADMIN — SODIUM CHLORIDE 250 MG: 900 INJECTION, SOLUTION INTRAVENOUS at 14:25

## 2020-10-19 RX ADMIN — Medication 10 ML: at 13:55

## 2020-10-19 RX ADMIN — EPOETIN ALFA-EPBX 12000 UNITS: 10000 INJECTION, SOLUTION INTRAVENOUS; SUBCUTANEOUS at 14:19

## 2020-10-19 NOTE — DISCHARGE INSTRUCTIONS
OUTPATIENT INFUSION CENTER DISCHARGE INSTRUCTIONS FOR:    PROCRIT INJECTION (EPOGEN/EPOETIN DIXIE): It is important that your injections be given according to schedule. Your physician may want you to take iron supplements or follow a special diet. You must have lab work drawn regularly while on this medication. All medications can potentially cause side effects. If these side effects should develop, contact your physicians office as needed. Possible side effects of Procrit may include the following:               *    mild headache             *    body aches             *    mild nausea   *    diarrhea   *    increase in blood pressure   *    burning, itching or tenderness at injection site   *    feelings of anxiety or trouble sleeping. Serious side effects or allergic reactions are rare, but may require immediate medical attention. Signs and symptoms may include one or more of the following:       Chest pain or sudden swelling of the hands, ankles or feet. Skin redness, itching, swelling, blistering, weeping, crusting, rash, eruptions, or;  Wheezing, chest tightness, cough, irregular heartbeat or shortness of breath;   Swelling of the face, eyelids, lips, tongue, or throat;   Stuffy nose, runny nose, sneezing, sore throat;   Red (bloodshot), itchy, swollen, or watery eyes;  Stomach pain, nausea, vomiting, severe diarrhea, or bloody stools; Severe or sudden headache, problems with vision, speech or walking;  Numbness or weakness in your arm, leg or on one side of your body; Severe tiredness, lightheadedness, dizziness, fainting or seizures; Change in how much you urinate or painful urination. Please contact your physicians office with any questions or concerns regarding your treatment. I have received the Procrit Medication Guide in its entirety.   Angelica Solis  Patient/Representatives signature:  ___________________________    10/19/2020   Tri Myers RNOUTPATIENT INFUSION CENTER    DISCHARGE INSTRUCTIONS FOR:    IRON INFUSIONS - INCLUDING VENOFER, FERRLECIT, AND INFED    You should continue to take your usual home medications unless otherwise instructed by your physician. Drink plenty of fluids and eat your usual diet. All medications have the potential to cause side effects. Your physician can instruct you regarding any necessary treatment for side effects. Some possible side effects of iron infusions may include the following:     - Urinary changes;   - Mild muscle cramping;   - Mild nausea, stomach pain, diarrhea or constipation;   - Mild skin itching, mild pain at IV site. Signs/Symptoms of an allergic reaction may require immediate medical attention. These may include one or more of the following:       Skin redness, itching, swelling, blistering, weeping, crusting, rash or hives. Wheezing, chest tightness, cough, or shortness of breath;   Swelling of the face, eyelids, lips, tongue, or throat;  Severe headache, seizures or tremor;  Stuffy nose, runny nose, sneezing;   Red (bloodshot), itchy, swollen, or watery eyes;  Stomach  pain, nausea, vomiting, diarrhea or bloody diarrhea; Chest pain or tightness, increased heart rate, palpitations, changes in blood pressure which can cause dizziness, unusual feelings of weakness or fatigue;  Back ache or pain around waist;  Painful urination, increase or decrease in amount of urine, blood in urine. Contact your physicians office with any questions or concerns regarding your treatment.     Priscilla Kumar, Signature: _____________________________________ 10/19/2020  Jhonny Hendrix RN

## 2020-10-19 NOTE — PROGRESS NOTES
[facility] VISIT NOTE    1350  Pt arrived at [facility] ambulatory and in no distress for Venofer dose 1 and Retacrit dose 1. Assessment completed, pt c/o fatigue, dyspnea with exertion and lightheadedness at times. Blood pressure 128/64, pulse 86, temperature 98.1 °F (36.7 °C), resp. rate 16, height 5' 9\" (1.753 m), weight 101.2 kg (223 lb). PIV placed in right AC. Positive blood return noted and labs drawn. Lab results are within treatment parameters. Recent Results (from the past 12 hour(s))   POC HEMOGLOBIN    Collection Time: 10/19/20  2:10 PM   Result Value Ref Range    Hemoglobin (POC) 9.3 (L) 12.1 - 17.0 g/dL     Medications received:  Retacrit 97926 units SQ right upper arm  Venofer 250 mg IV over 90 minutes    Blood pressure 131/68, pulse 78, temperature 98.1 °F (36.7 °C), resp. rate 16, height 5' 9\" (1.753 m), weight 101.2 kg (223 lb). Tolerated treatment well, no adverse reaction noted. Patient stayed for 30 minutes after infusion. PIV flushed and removed. 1630  D/C'd from [facility] ambulatory and in no distress. Next appointment is 10/26/20 at 1400.

## 2020-10-20 PROCEDURE — 3331090001 HH PPS REVENUE CREDIT

## 2020-10-20 PROCEDURE — 3331090002 HH PPS REVENUE DEBIT

## 2020-10-21 ENCOUNTER — HOME CARE VISIT (OUTPATIENT)
Dept: SCHEDULING | Facility: HOME HEALTH | Age: 60
End: 2020-10-21
Payer: MEDICARE

## 2020-10-21 VITALS
HEART RATE: 84 BPM | SYSTOLIC BLOOD PRESSURE: 150 MMHG | OXYGEN SATURATION: 98 % | DIASTOLIC BLOOD PRESSURE: 76 MMHG | RESPIRATION RATE: 18 BRPM | TEMPERATURE: 98.3 F

## 2020-10-21 PROCEDURE — 3331090001 HH PPS REVENUE CREDIT

## 2020-10-21 PROCEDURE — G0299 HHS/HOSPICE OF RN EA 15 MIN: HCPCS

## 2020-10-21 PROCEDURE — 3331090002 HH PPS REVENUE DEBIT

## 2020-10-22 PROCEDURE — 3331090002 HH PPS REVENUE DEBIT

## 2020-10-22 PROCEDURE — 3331090001 HH PPS REVENUE CREDIT

## 2020-10-23 PROCEDURE — 3331090001 HH PPS REVENUE CREDIT

## 2020-10-23 PROCEDURE — 3331090002 HH PPS REVENUE DEBIT

## 2020-10-24 PROCEDURE — 3331090001 HH PPS REVENUE CREDIT

## 2020-10-24 PROCEDURE — 3331090002 HH PPS REVENUE DEBIT

## 2020-10-25 PROCEDURE — 3331090002 HH PPS REVENUE DEBIT

## 2020-10-25 PROCEDURE — 3331090001 HH PPS REVENUE CREDIT

## 2020-10-26 ENCOUNTER — HOSPITAL ENCOUNTER (OUTPATIENT)
Dept: INFUSION THERAPY | Age: 60
Discharge: HOME | End: 2020-10-26
Payer: MEDICARE

## 2020-10-26 VITALS
BODY MASS INDEX: 32.83 KG/M2 | WEIGHT: 222.3 LBS | TEMPERATURE: 98.3 F | HEART RATE: 76 BPM | SYSTOLIC BLOOD PRESSURE: 134 MMHG | OXYGEN SATURATION: 99 % | DIASTOLIC BLOOD PRESSURE: 68 MMHG | RESPIRATION RATE: 16 BRPM

## 2020-10-26 LAB — HGB BLD-MCNC: 9.7 G/DL (ref 12.1–17)

## 2020-10-26 PROCEDURE — 3331090001 HH PPS REVENUE CREDIT

## 2020-10-26 PROCEDURE — 36416 COLLJ CAPILLARY BLOOD SPEC: CPT

## 2020-10-26 PROCEDURE — 96365 THER/PROPH/DIAG IV INF INIT: CPT

## 2020-10-26 PROCEDURE — 85018 HEMOGLOBIN: CPT

## 2020-10-26 PROCEDURE — 96372 THER/PROPH/DIAG INJ SC/IM: CPT

## 2020-10-26 PROCEDURE — 3331090002 HH PPS REVENUE DEBIT

## 2020-10-26 PROCEDURE — 74011250636 HC RX REV CODE- 250/636: Performed by: INTERNAL MEDICINE

## 2020-10-26 RX ORDER — SODIUM CHLORIDE 0.9 % (FLUSH) 0.9 %
10-40 SYRINGE (ML) INJECTION AS NEEDED
Status: DISCONTINUED | OUTPATIENT
Start: 2020-10-26 | End: 2020-10-27 | Stop reason: HOSPADM

## 2020-10-26 RX ADMIN — EPOETIN ALFA-EPBX 12000 UNITS: 10000 INJECTION, SOLUTION INTRAVENOUS; SUBCUTANEOUS at 13:14

## 2020-10-26 RX ADMIN — Medication 10 ML: at 13:05

## 2020-10-26 RX ADMIN — Medication 10 ML: at 15:10

## 2020-10-26 RX ADMIN — SODIUM CHLORIDE 250 MG: 900 INJECTION, SOLUTION INTRAVENOUS at 13:10

## 2020-10-26 NOTE — PROGRESS NOTES
Outpatient Infusion Center Progress Note    1300- Pt admit to [facility] for Venofer Dose 2/Retacrit in stable condition. Assessment completed. 24G PIV established in right AC with positive blood return noted. POC hgb obtained per order. Patient Vitals for the past 12 hrs:   Temp Pulse Resp BP SpO2   10/26/20 1510 -- 76 16 134/68 --   10/26/20 1304 98.3 °F (36.8 °C) 79 16 (!) 143/67 99 %     Recent Results (from the past 12 hour(s))   POC HEMOGLOBIN    Collection Time: 10/26/20  1:07 PM   Result Value Ref Range    Hemoglobin (POC) 9.7 (L) 12.1 - 17.0 g/dL     The following medications administered:  Medications Administered     epoetin martha-epbx (RETACRIT) 12,000 Units combo injection     Admin Date  10/26/2020 Action  Given Dose  46045 Units Route  SubCUTAneous Administered By  Nnamdi Crocker RN          iron sucrose (VENOFER) 250 mg in 0.9% sodium chloride 250 mL IVPB     Admin Date  10/26/2020 Action  New Bag Dose  250 mg Rate  175 mL/hr Route  IntraVENous Administered By  Nnamdi Crocker RN          sodium chloride (NS) flush 10-40 mL     Admin Date  10/26/2020 Action  Given Dose  10 mL Route  IntraVENous Administered By  Nnamdi Crocker RN           Admin Date  10/26/2020 Action  Given Dose  10 mL Route  IntraVENous Administered By  Nnamdi Crocker RN              Pt monitored x 30 mins post infusion. Pt tolerated well, no s/s of adverse reaction noted. PIV flushed and discontinued. Site wrapped in gauze and coban. Pt provided with education on possible side effects of medication along with discharge instructions. Pt verbalized understanding. 1510- D/C home ambulatory in no distress.      Pt aware of next appointment scheduled for:     Future Appointments   Date Time Provider Ness Roblesi   10/27/2020 [facility] [address] [place] [address] [address]   11/2/2020  2:00 PM CHAIR 2 [address] REG   11/24/2020 11:30 AM Janice Barron MD RDE YULIANA [address] AMB   12/21/2020 11:15 AM Amaury Arellano MD [facility]/DWAYNE ISAAC AMB

## 2020-10-27 ENCOUNTER — HOME CARE VISIT (OUTPATIENT)
Dept: SCHEDULING | Facility: HOME HEALTH | Age: 60
End: 2020-10-27
Payer: MEDICARE

## 2020-10-27 PROCEDURE — G0299 HHS/HOSPICE OF RN EA 15 MIN: HCPCS

## 2020-10-27 PROCEDURE — 3331090002 HH PPS REVENUE DEBIT

## 2020-10-27 PROCEDURE — 3331090001 HH PPS REVENUE CREDIT

## 2020-10-28 VITALS
TEMPERATURE: 98.6 F | DIASTOLIC BLOOD PRESSURE: 72 MMHG | HEART RATE: 89 BPM | OXYGEN SATURATION: 99 % | SYSTOLIC BLOOD PRESSURE: 160 MMHG | RESPIRATION RATE: 18 BRPM

## 2020-11-01 VITALS
DIASTOLIC BLOOD PRESSURE: 74 MMHG | RESPIRATION RATE: 18 BRPM | OXYGEN SATURATION: 98 % | TEMPERATURE: 96.9 F | SYSTOLIC BLOOD PRESSURE: 142 MMHG | HEART RATE: 71 BPM

## 2020-11-02 ENCOUNTER — HOSPITAL ENCOUNTER (OUTPATIENT)
Dept: INFUSION THERAPY | Age: 60
Discharge: HOME | End: 2020-11-02
Payer: MEDICARE

## 2020-11-02 VITALS
HEART RATE: 73 BPM | DIASTOLIC BLOOD PRESSURE: 74 MMHG | SYSTOLIC BLOOD PRESSURE: 146 MMHG | WEIGHT: 221 LBS | OXYGEN SATURATION: 99 % | TEMPERATURE: 97.8 F | RESPIRATION RATE: 16 BRPM | BODY MASS INDEX: 32.64 KG/M2

## 2020-11-02 LAB — HGB BLD-MCNC: 10.4 G/DL (ref 12.1–17)

## 2020-11-02 PROCEDURE — 74011250636 HC RX REV CODE- 250/636: Performed by: INTERNAL MEDICINE

## 2020-11-02 PROCEDURE — 85018 HEMOGLOBIN: CPT

## 2020-11-02 PROCEDURE — 96365 THER/PROPH/DIAG IV INF INIT: CPT

## 2020-11-02 PROCEDURE — 96375 TX/PRO/DX INJ NEW DRUG ADDON: CPT

## 2020-11-02 RX ADMIN — EPOETIN ALFA-EPBX 12000 UNITS: 2000 INJECTION, SOLUTION INTRAVENOUS; SUBCUTANEOUS at 14:57

## 2020-11-02 RX ADMIN — SODIUM CHLORIDE 250 MG: 900 INJECTION, SOLUTION INTRAVENOUS at 15:00

## 2020-11-02 NOTE — PROGRESS NOTES
Pt arrived to [facility] ambulatory in no acute distress at [phone] for retacrit/venofer. Assessment unremarkable and no new complaints voiced. IV established in [facility] site WNL. No labs ordered. Patient Vitals for the past 12 hrs:   Temp Pulse Resp BP SpO2   11/02/20 1633 -- 73 16 (!) 146/74 99 %   11/02/20 1359 97.8 °F (36.6 °C) 80 16 (!) 165/75 98 %       Recent Results (from the past 12 hour(s))   POC HEMOGLOBIN    Collection Time: 11/02/20  2:03 PM   Result Value Ref Range    Hemoglobin (POC) 10.4 (L) 12.1 - 17.0 g/dL       The following medications administered:  Medications Administered     epoetin martha-epbx (RETACRIT) 12,000 Units combo injection     Admin Date  11/02/2020 Action  Given Dose  29040 Units Route  SubCUTAneous Administered By  Monika Dos Santos          iron sucrose (VENOFER) 250 mg in 0.9% sodium chloride 250 mL IVPB     Admin Date  11/02/2020 Action  New Bag Dose  250 mg Rate  175 mL/hr Route  IntraVENous Administered By  Monika Dos Santos                Pt tolerated treatment well. IV flushed per policy and removed, 2x2 and tape placed. Pt discharged ambulatory in no acute distress at 1640. No further appointments scheduled.

## 2020-11-18 LAB
PSA SERPL-MCNC: 1.6 NG/ML (ref 0–4)
TESTOST FREE SERPL-MCNC: 16 PG/ML (ref 7.2–24)
TESTOST SERPL-MCNC: 469 NG/DL (ref 264–916)
THYROGLOB AB SERPL-ACNC: <1 IU/ML (ref 0–0.9)
THYROGLOB SERPL-MCNC: 13.8 NG/ML (ref 1.4–29.2)

## 2020-11-24 ENCOUNTER — VIRTUAL VISIT (OUTPATIENT)
Dept: ENDOCRINOLOGY | Age: 60
End: 2020-11-24
Payer: MEDICARE

## 2020-11-24 DIAGNOSIS — Z79.4 TYPE 2 DIABETES MELLITUS WITH STAGE 3 CHRONIC KIDNEY DISEASE, WITH LONG-TERM CURRENT USE OF INSULIN, UNSPECIFIED WHETHER STAGE 3A OR 3B CKD (HCC): Primary | ICD-10-CM

## 2020-11-24 DIAGNOSIS — C73 PAPILLARY THYROID CARCINOMA (HCC): ICD-10-CM

## 2020-11-24 DIAGNOSIS — E89.0 POST-SURGICAL HYPOTHYROIDISM: ICD-10-CM

## 2020-11-24 DIAGNOSIS — N18.30 TYPE 2 DIABETES MELLITUS WITH STAGE 3 CHRONIC KIDNEY DISEASE, WITH LONG-TERM CURRENT USE OF INSULIN, UNSPECIFIED WHETHER STAGE 3A OR 3B CKD (HCC): Primary | ICD-10-CM

## 2020-11-24 DIAGNOSIS — E29.1 MALE HYPOGONADISM: ICD-10-CM

## 2020-11-24 DIAGNOSIS — I10 ESSENTIAL HYPERTENSION WITH GOAL BLOOD PRESSURE LESS THAN 130/80: ICD-10-CM

## 2020-11-24 DIAGNOSIS — E78.5 HYPERLIPIDEMIA, UNSPECIFIED HYPERLIPIDEMIA TYPE: ICD-10-CM

## 2020-11-24 DIAGNOSIS — E11.22 TYPE 2 DIABETES MELLITUS WITH STAGE 3 CHRONIC KIDNEY DISEASE, WITH LONG-TERM CURRENT USE OF INSULIN, UNSPECIFIED WHETHER STAGE 3A OR 3B CKD (HCC): Primary | ICD-10-CM

## 2020-11-24 DIAGNOSIS — C73 METASTASIS FROM THYROID CANCER (HCC): ICD-10-CM

## 2020-11-24 DIAGNOSIS — C79.9 METASTASIS FROM THYROID CANCER (HCC): ICD-10-CM

## 2020-11-24 PROCEDURE — G8756 NO BP MEASURE DOC: HCPCS | Performed by: INTERNAL MEDICINE

## 2020-11-24 PROCEDURE — G8427 DOCREV CUR MEDS BY ELIG CLIN: HCPCS | Performed by: INTERNAL MEDICINE

## 2020-11-24 PROCEDURE — 3051F HG A1C>EQUAL 7.0%<8.0%: CPT | Performed by: INTERNAL MEDICINE

## 2020-11-24 PROCEDURE — 2022F DILAT RTA XM EVC RTNOPTHY: CPT | Performed by: INTERNAL MEDICINE

## 2020-11-24 PROCEDURE — 99214 OFFICE O/P EST MOD 30 MIN: CPT | Performed by: INTERNAL MEDICINE

## 2020-11-24 PROCEDURE — 3017F COLORECTAL CA SCREEN DOC REV: CPT | Performed by: INTERNAL MEDICINE

## 2020-11-24 PROCEDURE — G8432 DEP SCR NOT DOC, RNG: HCPCS | Performed by: INTERNAL MEDICINE

## 2020-11-24 RX ORDER — SUCRALFATE 1 G/1
TABLET ORAL
Status: ON HOLD | COMMUNITY
Start: 2020-09-30 | End: 2021-01-01

## 2020-11-24 RX ORDER — ERGOCALCIFEROL 1.25 MG/1
CAPSULE ORAL
COMMUNITY
Start: 2020-09-29 | End: 2021-01-01 | Stop reason: SDUPTHER

## 2020-11-24 RX ORDER — BUMETANIDE 2 MG/1
TABLET ORAL
COMMUNITY
Start: 2020-09-24 | End: 2020-12-06

## 2020-11-24 NOTE — PROGRESS NOTES
**DUE TO PANDEMIC AND HEALTH CONCERNS IN THE COMMUNITY, THIS PATIENT WAS EITHER ILL OR FOUND TO BE HIGH RISK FOR IN-PERSON EVALUATION WITHIN THE CLINIC. THE FOLLOWING IS A VIRTUAL TELEMEDICINE VIDEO ENCOUNTER VIA [employer], TO WHICH THE PATIENT AGREED. THE PURPOSE IS TO LIMIT INTERRUPTIONS IN HEALTHCARE AND TO PROVIDE FOR ONGOING URGENT NEEDS UNDER THE CURRENT CONDITIONS. CHIEF COMPLAINT: f/u evaluation for uncontrolled type 2 diabetes and thyroid cancer s/p thyroidectomy and STRATTON 
 
HISTORY OF PRESENT ILLNESS:  
Krystyna Luque is a 64 y.o. male with a PMHx as noted below who presents to the endocrinology clinic for f/u evaluation of uncontrolled type 2 diabetes. Diabetes type 2: 
Currently taking the following meds: 
Was taken off glipizide and metformin in the hospital 
Renal function noted, GFR in the 20's Was sent home on NPH 2-10 units, I had him adjust to 5 units BID scheduled He increased this to 10 units twice daily (AM and Bedtime:  
Reported home blood sugars: AM:  Bedtimes: 120-184 No values <80 per him Review of most recent diabetes-related labs: 
Lab Results Component Value Date HBA1C 7.6 (H) 08/15/2020 HBA1C 6.3 (H) 07/18/2020 HBA1C 7.4 (H) 05/02/2019 LXX9NAVZ 8.0 01/31/2020 EAK4YSMH 9.2 07/05/2018 EHW0GKPR 8.8 02/02/2018 CHOL 125 09/23/2020 LDLC 62 09/23/2020 GFRAA 23 (L) 09/23/2020 GFRNA 20 (L) 09/23/2020 MCACR 910 (H) 07/29/2020 TSH 0.048 (L) 09/23/2020 VITD3 25.7 (L) 09/23/2020 959883 = IA-2 pancreatic islet cell autoantibody GADLT = CLAIRE-65 autoantibody MCACR = Urine Microalbumin 
 
-------------------------------------------------- Thyroid Cancer:  
 
2002 Biopsy suggesting PTC 
 S/p total thyroidectomy S/p STRATTON 
[id number]  Reports negative WBS 
10/10/16  Repeat surgery, 2 right paratrachial LN's 
 Surgical Path: poorly differentiated PTC Patient with hoarseness of voice, persistent  Continued on 200mcg LT4 
11/29/16  Initial visit with me for thyroid cancer and diabetes 11/30/16 Tg 10.1, TgAb negative, TSH 1.93 (patient notes this is the lowest Tg level compared with prior) 12/14/16 Neck Ultrasound: \"Thyroid bed is empty, no anterior cervical mass or significant adenopathy\" 01/18/17 Thyrogen stimulated WBS: \"No evidence of uptake in thyroid bed, No evidence of metastatic disease\" 11/08/17 Thyroid US, no residual thyroid tissue, no pathologic adenopathy. 01/31/18: TSH 2.2 on 200 mcg LT4,  Tg/TgAb was not collected 02/02/18: Tg 24.4 unstimulated, TgAb <1  (led to further eval below) 02/21/18: I-131 WBS: no uptake in thyroid, normal physiologic update otherwise noted. 03/22/18: CT Neck/chest/abdomen: Multiple pulm nodules, some larger some smaller. 03/23/18: Oncology consultation w/ Dr. Samuel Costa, agreed on monitoring asymptomatic disease 04/04/18: NM Bone Scan: No evidence of bony metastases. 09/14/18: CT Chest: Multiple bilateral pulmonary nodules, not significantly changed in size /number. 09/27/18: Tg 11.7 unstimulated, stable, TgAb <1.0, TSH 0.01, FT4 1.65 
11/17/20: Tg 13.8 unstimulated, TgAb <1.0 Denies symptoms of hyperthyroidism, Advised 200 mcg 6 days/week, and 1/2 tab once per week, He is taking a full tab 7 days per week, Recent TSH is again 0.02, purposeful TSH suppression in setting of metastatic thyroid cancer Has seen Oncology in March, conservative management / observation. He has not yet repeated his labs / Thyroglobulin levels, Review of most recent thyroid function: 
Lab Results Component Value Date TSH 0.048 (L) 09/23/2020 TSH 0.02 (L) 07/17/2020 TSH 0.03 (L) 05/17/2020 FT4 1.2 07/17/2020 FT4 1.55 01/31/2020 FT4 1.65 09/27/2018 FRET3 2.1 (L) 10/16/2016 THYG 13.8 11/17/2020 THYG 11.7 09/27/2018 THYG 13.6 07/10/2018 TGAB <1.0 11/17/2020 TGAB <1.0 09/27/2018 TGAB <1.0 07/10/2018 Thyroid Lab Key: 
TSILT = Thyroid stimulating antibodies TRALT = TSH Receptor Antibodies TMCLT = TPO antibodies T3LT = Total T3 levels 225126 = Direct FT4 
[id number] = Free T3 
 
------------ Male hypogonadism:  
Testosterone gel, 1 pump daily, Taking, No recent levels, PAST MEDICAL/SURGICAL HISTORY:  
Past Medical History:  
Diagnosis Date  Adverse effect of anesthesia \" STOP BREATHING 1 TIME C ANESTH\"  Cancer [facility]) 2004 thyroid cancer  Chronic kidney disease  Chronic pain BACK SHOULDER AND ARM  Depression  Diabetes ([facility] [address]  Encounter for long-term (current) use of NSAIDs  Hypercholesterolemia  Hypertension  Nausea & vomiting  Other ill-defined conditions(799.89) cholesterol, thyroid  Sleep apnea   
 doesn't wear cpap  Thyroid cancer ([facility] [address]  TIA (transient ischemic attack) 2011  Vitamin D deficiency Past Surgical History:  
Procedure Laterality Date  CARDIAC SURG PROCEDURE UNLIST  HX HEENT    
 THROAT SURGERY X 4  
 HX ORTHOPAEDIC    
 back  HX ORTHOPAEDIC    
 ARM AND SHOULDER  
 HX OTHER SURGICAL    
 thyroid, lymphnode  HX RETINAL DETACHMENT REPAIR    
 left eye  IR INSERT NON TUNL CVC OVER 5 YRS  8/18/2020  IR INSERT TUNL CVC W/O PORT OVER 5 YR  8/26/2020  UPPER GI ENDOSCOPY,BALL DIL,30MM  7/17/2020  UPPER GI ENDOSCOPY,BIOPSY  7/17/2020  VASCULAR SURGERY PROCEDURE UNLIST    
 cardiac cath NEG. ALLERGIES:  
Allergies Allergen Reactions  Anesthetics - Amide Type - Select Amino Amides Shortness of Breath  Flexeril [Cyclobenzaprine] Hives  Tramadol Hives MEDICATIONS ON ADMISSION:  
 
Current Outpatient Medications:  
  bumetanide (BUMEX) 2 mg tablet, TAKE 1 TABLET BY MOUTH TWICE DAILY, Disp: , Rfl:  
  Vitamin D2 1,250 mcg (50,000 unit) capsule, TAKE 1 CAPSULE BY MOUTH ONCE A WEEK, Disp: , Rfl:  
  sucralfate (CARAFATE) 1 gram tablet, , Disp: , Rfl:  
  levothyroxine (SYNTHROID) 200 mcg tablet, TAKE 1 TABLET BY MOUTH ONCE DAILY BEFORE BREAKFAST, Disp: 90 Tab, Rfl: 3 
  omeprazole (PRILOSEC) 20 mg capsule, Take 1 Cap by mouth daily. , Disp: 30 Cap, Rfl: 3 
  amLODIPine (NORVASC) 5 mg tablet, Take 1 Tab by mouth daily. , Disp: 30 Tab, Rfl: 3 
  olmesartan (BENICAR) 20 mg tablet, Take 1 Tab by mouth daily. , Disp: 30 Tab, Rfl: 2 
  furosemide (LASIX) 20 mg tablet, Take 1 Tab by mouth daily. , Disp: 30 Tab, Rfl: 1 
  metoclopramide HCl (REGLAN) 5 mg tablet, Take 1 tab by mouth daily as needed, do not take more then 3 tabs a day  Indications: nausea and vomiting caused by cancer drugs, Disp: 30 Tab, Rfl: 0 
  insulin NPH (HumuLIN N NPH U-100 Insulin) 100 unit/mL injection, 2-10 units daily (Patient taking differently: 2-10 units daily  He is doing 10 units BID as of last few months 9/23/2020), Disp: 3 Vial, Rfl: 3 
  simvastatin (ZOCOR) 20 mg tablet, Take 1 Tab by mouth nightly., Disp: 90 Tab, Rfl: 3 
  docusate sodium (DOK) 250 mg capsule, Take 1 Cap by mouth daily for 90 days. , Disp: 30 Cap, Rfl: 2 
  acetaminophen (TYLENOL) 325 mg tablet, Take 325 mg by mouth as needed for Pain. Take tab as needed for pain, Disp: , Rfl:  
  OXYGEN-AIR DELIVERY SYSTEMS, Take 2.5 L/min by inhalation continuous. , Disp: , Rfl:   Blood-Glucose Meter monitoring kit, 1 preferred brand glucometer for checking home glucose, E11.22, Disp: 1 Kit, Rfl: 0 
  glucose blood VI test strips (blood glucose test) strip, Pharmacist to choose preferred meter and strips. Dx:E11.65, Z79.4 Monitor 3 times daily, Disp: 300 Strip, Rfl: 3 
  Insulin Syringe-Needle U-100 ([employer]ocate Syringes) 0.3 mL 30 gauge x 5/16\" syrg, 1 Each by Does Not Apply route daily. , Disp: 90 Syringe, Rfl: 3   Ventolin HFA 90 mcg/actuation inhaler, TAKE 1-2 PUFFS EVEERY 4-6 HOURS AS NEEDED FOR DYSPNEA AND WHEEZING, Disp: 1 Inhaler, Rfl: 2 
  glucose blood VI test strips (PHARMACIST CHOICE) strip, One Touch  Check glucose 3-4 times daily.  DX E11.22, Disp: 300 Strip, Rfl: 3 
  Lancets misc, Use preferred brand; Check glucose 3-4 times daily, Diagnosis E11.22, Disp: 2 Package, Rfl: 3 SOCIAL HISTORY:  
Social History Socioeconomic History  Marital status: LEGALLY  Spouse name: Not on file  Number of children: Not on file  Years of education: Not on file  Highest education level: Not on file Occupational History  Not on file Social Needs  Financial resource strain: Not on file  Food insecurity Worry: Not on file Inability: Not on file  Transportation needs Medical: Not on file Non-medical: Not on file Tobacco Use  Smoking status: Former Smoker Last attempt to quit: 1994 Years since quittin.2  Smokeless tobacco: Never Used Substance and Sexual Activity  Alcohol use: Yes Comment: Occasionally  Drug use: No  
 Sexual activity: Never Lifestyle  Physical activity Days per week: Not on file Minutes per session: Not on file  Stress: Not on file Relationships  Social connections Talks on phone: Not on file Gets together: Not on file Attends Congregation service: Not on file Active member of club or organization: Not on file Attends meetings of clubs or organizations: Not on file Relationship status: Not on file  Intimate partner violence Fear of current or ex partner: Not on file Emotionally abused: Not on file Physically abused: Not on file Forced sexual activity: Not on file Other Topics Concern  Not on file Social History Narrative  Not on file FAMILY HISTORY: 
Family History Problem Relation Age of Onset  Diabetes Mother  Elevated Lipids Mother Rossy Chance Bladder Disease Mother  Headache Mother  Migraines Mother  Heart Disease Mother  Hypertension Mother  Stroke Mother [facility] Other Mother ANEURSYM BRAIN  
 Bleeding Prob Father  Cancer Father LEUKEMIA  Diabetes Father  Elevated Lipids Father  Mental Retardation Sister  Psychiatric Disorder Sister  Cancer Brother LUNGS REVIEW OF SYSTEMS: Complete ROS assessed and noted for that which is described above, all else are negative. Eyes: normal 
ENT: hoarseness of voice CVS: normal 
Resp: normal 
GI: normal 
: normal 
GYN: normal 
Endocrine: normal 
Integument: normal 
Musculoskeletal: chronic pain Neuro: normal 
Psych: normal 
 
 
PHYSICAL EXAMINATION: 
Telemedicine Visit GENERAL: NCAT, Appears well nourished EYES: EOMI, non-icteric, no proptosis Ear/Nose/Throat: NCAT, no visible inflammation or masses CARDIOVASCULAR: no cyanosis, no visible JVD RESPIRATORY: comfortable respirations observed, no cyanosis MUSCULOSKELETAL: Normal ROM of upper extremities observed SKIN: No edema, rash, or other significant changes observed NEUROLOGIC:  AAOx3 PSYCHIATRIC: Normal affect, Normal insight and judgement REVIEW OF LABORATORY AND RADIOLOGY DATA:  
Labs and documentation have been reviewed as described above. ASSESSMENT AND PLAN:  
Etta Ruiz is a 64 y.o. male with a PMHx as noted above who presents to the endocrinology clinic for f/u evaluation of uncontrolled type 2 diabetes and thyroid cancer. DM2 uncontrolled HTN 
HLD Thyroid cancer with active metastases Post surgical hypothyroidism Male Hypogonadism DM2: 
Metformin off due to renal function Glipizide off per hospital discharge Daytime highs with lower numbers in the AM, will adjust insulin as follows: NPH insulin: 12 units in the AM, 7 units in the evening HTN: telemedicine visit HLD: simvastatin 20mg Male Hypogonadism: Has not been taking Testosterone gel, 1 pump per day, levels/PSA stable, does not need it at this time. Total 469, Free T 16. Vitamin D deficiency: On D2 50,000 units weekly, advised him to follow up with nephrology about if ok to take a tab of tums once daily if calcium remains low. Thyroid Cancer / Hypothyroidism Was taking full tab 7 days per week, advised again on the following: 
Levothyroxine 200 mcg 6 days/week, and 1/2 tab only once per/wk (Sundays) TSH Suppression, approx . 05 per last level, asymptomatic Conservative treatment with surveillance and TSH suppression, Following with oncology, LABS: Lab panel ordered to be completed few days before next visit RTC April 12 at 9:10 AM 
 
20 minutes spent toward telemedicine visit today of which >50% of this time was spent in counseling and coordination of care. Kathy Yin. [address] [facility]

## 2020-11-27 DIAGNOSIS — R11.0 NAUSEA IN ADULT: ICD-10-CM

## 2020-11-30 ENCOUNTER — TELEPHONE (OUTPATIENT)
Dept: FAMILY MEDICINE CLINIC | Age: 60
End: 2020-11-30

## 2020-11-30 NOTE — TELEPHONE ENCOUNTER
Pls call Arianne Montalvo with [employer]     Checking on paper he needs Dr Jesus Montalvo to sign     PSR let him know the doctor has been out of the office     RE: order for power wheelchair     Pls call him at [phone]

## 2020-12-01 RX ORDER — METOCLOPRAMIDE 5 MG/1
TABLET ORAL
Qty: 30 TAB | Refills: 0 | Status: SHIPPED | OUTPATIENT
Start: 2020-12-01 | End: 2021-01-01 | Stop reason: SDUPTHER

## 2020-12-03 ENCOUNTER — APPOINTMENT (OUTPATIENT)
Dept: ULTRASOUND IMAGING | Age: 60
DRG: 683 | End: 2020-12-03
Attending: INTERNAL MEDICINE
Payer: MEDICARE

## 2020-12-03 ENCOUNTER — HOSPITAL ENCOUNTER (INPATIENT)
Age: 60
LOS: 3 days | Discharge: HOME | DRG: 683 | End: 2020-12-06
Attending: EMERGENCY MEDICINE | Admitting: INTERNAL MEDICINE
Payer: MEDICARE

## 2020-12-03 DIAGNOSIS — N17.9 AKI (ACUTE KIDNEY INJURY) (HCC): Primary | ICD-10-CM

## 2020-12-03 LAB
ALBUMIN SERPL-MCNC: 3.5 G/DL (ref 3.5–5)
ALBUMIN/GLOB SERPL: 0.8 {RATIO} (ref 1.1–2.2)
ALP SERPL-CCNC: 101 U/L (ref 45–117)
ALT SERPL-CCNC: 54 U/L (ref 12–78)
ANION GAP SERPL CALC-SCNC: 10 MMOL/L (ref 5–15)
APPEARANCE UR: CLEAR
AST SERPL-CCNC: 14 U/L (ref 15–37)
ATRIAL RATE: 84 BPM
BACTERIA URNS QL MICRO: NEGATIVE /HPF
BASOPHILS # BLD: 0.1 K/UL (ref 0–0.1)
BASOPHILS NFR BLD: 1 % (ref 0–1)
BILIRUB SERPL-MCNC: 0.2 MG/DL (ref 0.2–1)
BILIRUB UR QL: NEGATIVE
BUN SERPL-MCNC: 156 MG/DL (ref 6–20)
BUN/CREAT SERPL: 31 (ref 12–20)
CALCIUM SERPL-MCNC: 7.7 MG/DL (ref 8.5–10.1)
CALCULATED P AXIS, ECG09: 65 DEGREES
CALCULATED R AXIS, ECG10: -33 DEGREES
CALCULATED T AXIS, ECG11: 49 DEGREES
CHLORIDE SERPL-SCNC: 111 MMOL/L (ref 97–108)
CO2 SERPL-SCNC: 15 MMOL/L (ref 21–32)
COLOR UR: ABNORMAL
CREAT SERPL-MCNC: 5.03 MG/DL (ref 0.7–1.3)
CREAT UR-MCNC: 58.5 MG/DL
DIAGNOSIS, 93000: NORMAL
DIFFERENTIAL METHOD BLD: ABNORMAL
EOSINOPHIL # BLD: 0.4 K/UL (ref 0–0.4)
EOSINOPHIL NFR BLD: 5 % (ref 0–7)
EPITH CASTS URNS QL MICRO: ABNORMAL /LPF
ERYTHROCYTE [DISTWIDTH] IN BLOOD BY AUTOMATED COUNT: 14 % (ref 11.5–14.5)
GLOBULIN SER CALC-MCNC: 4.2 G/DL (ref 2–4)
GLUCOSE BLD STRIP.AUTO-MCNC: 160 MG/DL (ref 65–100)
GLUCOSE BLD STRIP.AUTO-MCNC: 167 MG/DL (ref 65–100)
GLUCOSE SERPL-MCNC: 157 MG/DL (ref 65–100)
GLUCOSE UR STRIP.AUTO-MCNC: NEGATIVE MG/DL
HCT VFR BLD AUTO: 32.8 % (ref 36.6–50.3)
HGB BLD-MCNC: 10.7 G/DL (ref 12.1–17)
HGB UR QL STRIP: NEGATIVE
HYALINE CASTS URNS QL MICRO: ABNORMAL /LPF (ref 0–5)
IMM GRANULOCYTES # BLD AUTO: 0 K/UL (ref 0–0.04)
IMM GRANULOCYTES NFR BLD AUTO: 0 % (ref 0–0.5)
KETONES UR QL STRIP.AUTO: NEGATIVE MG/DL
LEUKOCYTE ESTERASE UR QL STRIP.AUTO: NEGATIVE
LYMPHOCYTES # BLD: 1.4 K/UL (ref 0.8–3.5)
LYMPHOCYTES NFR BLD: 16 % (ref 12–49)
MCH RBC QN AUTO: 28.6 PG (ref 26–34)
MCHC RBC AUTO-ENTMCNC: 32.6 G/DL (ref 30–36.5)
MCV RBC AUTO: 87.7 FL (ref 80–99)
MONOCYTES # BLD: 0.6 K/UL (ref 0–1)
MONOCYTES NFR BLD: 8 % (ref 5–13)
NEUTS SEG # BLD: 6 K/UL (ref 1.8–8)
NEUTS SEG NFR BLD: 70 % (ref 32–75)
NITRITE UR QL STRIP.AUTO: NEGATIVE
NRBC # BLD: 0 K/UL (ref 0–0.01)
NRBC BLD-RTO: 0 PER 100 WBC
P-R INTERVAL, ECG05: 152 MS
PH UR STRIP: 5 [PH] (ref 5–8)
PLATELET # BLD AUTO: 254 K/UL (ref 150–400)
PMV BLD AUTO: 11 FL (ref 8.9–12.9)
POTASSIUM SERPL-SCNC: 4.7 MMOL/L (ref 3.5–5.1)
POTASSIUM UR-SCNC: 11 MMOL/L
PROT SERPL-MCNC: 7.7 G/DL (ref 6.4–8.2)
PROT UR STRIP-MCNC: 100 MG/DL
Q-T INTERVAL, ECG07: 374 MS
QRS DURATION, ECG06: 84 MS
QTC CALCULATION (BEZET), ECG08: 441 MS
RBC # BLD AUTO: 3.74 M/UL (ref 4.1–5.7)
RBC #/AREA URNS HPF: ABNORMAL /HPF (ref 0–5)
SERVICE CMNT-IMP: ABNORMAL
SERVICE CMNT-IMP: ABNORMAL
SODIUM SERPL-SCNC: 136 MMOL/L (ref 136–145)
SODIUM UR-SCNC: 50 MMOL/L
SP GR UR REFRACTOMETRY: 1.01 (ref 1–1.03)
UA: UC IF INDICATED,UAUC: ABNORMAL
UROBILINOGEN UR QL STRIP.AUTO: 0.2 EU/DL (ref 0.2–1)
UUN UR-MCNC: 580 MG/DL
VENTRICULAR RATE, ECG03: 84 BPM
WBC # BLD AUTO: 8.5 K/UL (ref 4.1–11.1)
WBC URNS QL MICRO: ABNORMAL /HPF (ref 0–4)

## 2020-12-03 PROCEDURE — 74011250637 HC RX REV CODE- 250/637: Performed by: INTERNAL MEDICINE

## 2020-12-03 PROCEDURE — 76770 US EXAM ABDO BACK WALL COMP: CPT

## 2020-12-03 PROCEDURE — 74011250636 HC RX REV CODE- 250/636: Performed by: INTERNAL MEDICINE

## 2020-12-03 PROCEDURE — 80053 COMPREHEN METABOLIC PANEL: CPT

## 2020-12-03 PROCEDURE — 99285 EMERGENCY DEPT VISIT HI MDM: CPT

## 2020-12-03 PROCEDURE — 84300 ASSAY OF URINE SODIUM: CPT

## 2020-12-03 PROCEDURE — 36415 COLL VENOUS BLD VENIPUNCTURE: CPT

## 2020-12-03 PROCEDURE — 84540 ASSAY OF URINE/UREA-N: CPT

## 2020-12-03 PROCEDURE — 82570 ASSAY OF URINE CREATININE: CPT

## 2020-12-03 PROCEDURE — 85025 COMPLETE CBC W/AUTO DIFF WBC: CPT

## 2020-12-03 PROCEDURE — 84133 ASSAY OF URINE POTASSIUM: CPT

## 2020-12-03 PROCEDURE — 82962 GLUCOSE BLOOD TEST: CPT

## 2020-12-03 PROCEDURE — 74011000250 HC RX REV CODE- 250: Performed by: INTERNAL MEDICINE

## 2020-12-03 PROCEDURE — 74011636637 HC RX REV CODE- 636/637: Performed by: INTERNAL MEDICINE

## 2020-12-03 PROCEDURE — 93005 ELECTROCARDIOGRAM TRACING: CPT

## 2020-12-03 PROCEDURE — 81001 URINALYSIS AUTO W/SCOPE: CPT

## 2020-12-03 PROCEDURE — 65660000000 HC RM CCU STEPDOWN

## 2020-12-03 RX ORDER — POLYETHYLENE GLYCOL 3350 17 G/17G
17 POWDER, FOR SOLUTION ORAL DAILY PRN
Status: DISCONTINUED | OUTPATIENT
Start: 2020-12-03 | End: 2020-12-06 | Stop reason: HOSPADM

## 2020-12-03 RX ORDER — INSULIN LISPRO 100 [IU]/ML
INJECTION, SOLUTION INTRAVENOUS; SUBCUTANEOUS
Status: DISCONTINUED | OUTPATIENT
Start: 2020-12-03 | End: 2020-12-06 | Stop reason: HOSPADM

## 2020-12-03 RX ORDER — DEXTROSE 50 % IN WATER (D50W) INTRAVENOUS SYRINGE
12.5-25 AS NEEDED
Status: DISCONTINUED | OUTPATIENT
Start: 2020-12-03 | End: 2020-12-06 | Stop reason: HOSPADM

## 2020-12-03 RX ORDER — SUCRALFATE 1 G/1
1 TABLET ORAL
Status: DISCONTINUED | OUTPATIENT
Start: 2020-12-03 | End: 2020-12-06 | Stop reason: HOSPADM

## 2020-12-03 RX ORDER — PANTOPRAZOLE SODIUM 40 MG/1
40 TABLET, DELAYED RELEASE ORAL
Status: DISCONTINUED | OUTPATIENT
Start: 2020-12-04 | End: 2020-12-06 | Stop reason: HOSPADM

## 2020-12-03 RX ORDER — ACETAMINOPHEN 325 MG/1
650 TABLET ORAL
Status: DISCONTINUED | OUTPATIENT
Start: 2020-12-03 | End: 2020-12-06 | Stop reason: HOSPADM

## 2020-12-03 RX ORDER — AMLODIPINE BESYLATE 5 MG/1
5 TABLET ORAL DAILY
Status: DISCONTINUED | OUTPATIENT
Start: 2020-12-04 | End: 2020-12-04

## 2020-12-03 RX ORDER — PROMETHAZINE HYDROCHLORIDE 25 MG/1
12.5 TABLET ORAL
Status: DISCONTINUED | OUTPATIENT
Start: 2020-12-03 | End: 2020-12-06 | Stop reason: HOSPADM

## 2020-12-03 RX ORDER — ATORVASTATIN CALCIUM 10 MG/1
10 TABLET, FILM COATED ORAL
Status: DISCONTINUED | OUTPATIENT
Start: 2020-12-03 | End: 2020-12-06 | Stop reason: HOSPADM

## 2020-12-03 RX ORDER — ACETAMINOPHEN 650 MG/1
650 SUPPOSITORY RECTAL
Status: DISCONTINUED | OUTPATIENT
Start: 2020-12-03 | End: 2020-12-06 | Stop reason: HOSPADM

## 2020-12-03 RX ORDER — ERGOCALCIFEROL 1.25 MG/1
50000 CAPSULE ORAL
Status: DISCONTINUED | OUTPATIENT
Start: 2020-12-04 | End: 2020-12-06 | Stop reason: HOSPADM

## 2020-12-03 RX ORDER — MAGNESIUM SULFATE 100 %
4 CRYSTALS MISCELLANEOUS AS NEEDED
Status: DISCONTINUED | OUTPATIENT
Start: 2020-12-03 | End: 2020-12-06 | Stop reason: HOSPADM

## 2020-12-03 RX ORDER — SODIUM CHLORIDE 0.9 % (FLUSH) 0.9 %
5-40 SYRINGE (ML) INJECTION EVERY 8 HOURS
Status: DISCONTINUED | OUTPATIENT
Start: 2020-12-03 | End: 2020-12-06 | Stop reason: HOSPADM

## 2020-12-03 RX ORDER — ONDANSETRON 2 MG/ML
4 INJECTION INTRAMUSCULAR; INTRAVENOUS
Status: DISCONTINUED | OUTPATIENT
Start: 2020-12-03 | End: 2020-12-06 | Stop reason: HOSPADM

## 2020-12-03 RX ORDER — SODIUM CHLORIDE 0.9 % (FLUSH) 0.9 %
5-40 SYRINGE (ML) INJECTION AS NEEDED
Status: DISCONTINUED | OUTPATIENT
Start: 2020-12-03 | End: 2020-12-06 | Stop reason: HOSPADM

## 2020-12-03 RX ORDER — LEVOTHYROXINE SODIUM 200 UG/1
200 TABLET ORAL
Status: DISCONTINUED | OUTPATIENT
Start: 2020-12-04 | End: 2020-12-06 | Stop reason: HOSPADM

## 2020-12-03 RX ADMIN — ONDANSETRON 4 MG: 2 INJECTION INTRAMUSCULAR; INTRAVENOUS at 21:51

## 2020-12-03 RX ADMIN — Medication 10 ML: at 16:50

## 2020-12-03 RX ADMIN — Medication 10 ML: at 21:22

## 2020-12-03 RX ADMIN — ATORVASTATIN CALCIUM 10 MG: 10 TABLET, FILM COATED ORAL at 21:22

## 2020-12-03 RX ADMIN — HUMAN INSULIN 5 UNITS: 100 INJECTION, SUSPENSION SUBCUTANEOUS at 17:56

## 2020-12-03 RX ADMIN — SUCRALFATE 1 G: 1 TABLET ORAL at 17:56

## 2020-12-03 RX ADMIN — WATER: 1000 INJECTION, SOLUTION INTRAVENOUS at 15:32

## 2020-12-03 NOTE — PROGRESS NOTES
TRANSFER - IN REPORT:    Verbal report received from Shannon Dee RN(name) on Mary Hall  being received from ED(unit) for routine progression of care      Report consisted of patients Situation, Background, Assessment and   Recommendations(SBAR). Information from the following report(s) SBAR, Kardex, ED Summary, Intake/Output, MAR, Recent Results and Cardiac Rhythm NSR was reviewed with the receiving nurse. Opportunity for questions and clarification was provided. Assessment completed upon patients arrival to unit and care assumed.

## 2020-12-03 NOTE — CONSULTS
[address] [facility]  SMR:[id number]   MJV:7/39/2404     Seen and examined in ER  NERY on CKD  NAGMA    H/o vomiting x 1 week while on daily loop diuretics  Most likely volume depletion  Admission for IV fluid resuscitation   Ordered bicarb drip for correction of metabolic acidosis as well  Monitor I/O's, UOP  Avoid nephrotoxins  NERY w/u    Full note to follow    Discussed w/ pt, daughter, Dr Meri Freed, [place] [address] [facility]  [address] [facility] Angel [place], [address] [facility]  Phone - [phone]         Fax - [phone] [facility] Office  [address] [address]  Phone - [phone]        Fax - [phone]     [email or web address]

## 2020-12-03 NOTE — PROGRESS NOTES
KIMMY Plan  · Disposition:Home vs Home w/ HH  ·  PT/OT consult recommended  · Patient daughter to transport  · 2nd IM letter needed before d/c  · Follow up appointments needed (PCP, Rika)    Reason for Admission: NERY                RUR Score:     34 %    PCP: First and Last name:  Dr. Zandra Pickett   Name of Practice:   [facility]    Are you a current patient: Yes/No: Yes   Approximate date of last visit:  20   Can you do a virtual visit with your PCP: WIth assistance of daughter              Resources/supports as identified by patient/family:                   Top Challenges facing patient (as identified by patient/family and CM): Finances/Medication cost?  Patient has Medicare and medicaid. Patient reports no barriers to retrieving medications. Patient's preferred pharmacy is [facility] on [address] Transportation? Patient's daughter able to transport               Support system or lack thereof? Patient's daughter is supportive is patient's primary healthcare decision maker. Living arrangements? Patient lives with his daughter and cousin in a one story home           Self-care/ADLs/Cognition? Patient usees a RW to ambulate. requires some assistance with ADLs. Patient has increased weakness in his legs. Current Advanced Directive/Advance Care Plan:  Patient is a full code. Patient's daughter Peri Dennison is patient's Primary Healthcare decision maker. Plan for utilizing home health:  Patient was opened to [facility] from 2020 until 2020. Patient will need PT/OT consults will need to be completed [facility]. Transition of Care Plan:                CM called to patient's room in the ED to discuss CM role as well as discuss discharge planning. Patient name, , and demographics all verified.  Patient lives in a one story home with his daughter and cousin. Patient DME include: RW(provided by [employer]), Oxygen 3 litters (provided by [address]) Patient reports that he was taken off of home oxygen by PCP about a month and a half ago. Patient still has equipment, but reports that oxygen tank needs a filter. Referral for patient to get an electric wheelchair was sent by PCP to [facility]. Patient has a PMH of CHF, CKD, and Thryroid CA. Patient has recently attended  [facility] for infusions. Patient was receiving OP HD at [facility] 44 M,W,F 2:30pm. Patient reports he was told that he did not need HD about a month and half ago. Care Management Interventions  PCP Verified by CM: Yes  Last Visit to PCP: 09/23/20  Palliative Care Criteria Met (RRAT>21 & CHF Dx)?: No  Palliative Consult Recommended?: Yes  Mode of Transport at Discharge: Other (see comment)(patient daughter to transport at discharge)  Transition of Care Consult (CM Consult): Discharge Planning  Physical Therapy Consult: No  Occupational Therapy Consult: No  Speech Therapy Consult: No  Current Support Network: Relative's Home(patient lives with cousin in one story home)  Confirm Follow Up Transport: Family(patient daughter to transport to follow up appointments)  The Plan for Transition of Care is Related to the Following Treatment Goals : Home vs Home with [address] Provided?: No  Discharge Location  Discharge Placement: Home    Unit CM will continue to follow for CM needs.     MARLIN VuongN, RN, [facility]

## 2020-12-03 NOTE — ED PROVIDER NOTES
EMERGENCY DEPARTMENT HISTORY AND PHYSICAL EXAM      Date: 12/3/2020  Patient Name: Donovan Cogan    History of Presenting Illness     Chief Complaint   Patient presents with   [address] [place] [address] Referral / Consult     Patient referred by nepherology for elevated K, and being in acute kidney failure. History Provided By: Patient    HPI: Donovan Cogan, 64 y.o. male with PMHx significant for CKD, hypertension, hyperlipidemia, prior head and neck cancer status post chemo and radiation about 7 years ago who presents for abnormal outpatient labs. Patient states that he was on dialysis through a temporary access until about 2 months ago as his numbers had improved. For about the last week has been feeling nauseous. His nephrologist ordered outpatient labs that he reports showed worsening creatinine and hyperkalemia. He was called and told to come to the emergency department for this reason. Patient notes nausea but has no other complaints on my evaluation. No chest pain, shortness of breath, abdominal pain, diarrhea. PCP: Nichelle Ramírez MD    There are no other complaints, changes, or physical findings at this time.     Current Facility-Administered Medications   Medication Dose Route Frequency Provider Last Rate Last Dose    sodium chloride (NS) flush 5-40 mL  5-40 mL IntraVENous Q8H Liugi Chan MD        sodium chloride (NS) flush 5-40 mL  5-40 mL IntraVENous PRN Efrain Bryant MD        acetaminophen (TYLENOL) tablet 650 mg  650 mg Oral Q6H PRN Efrain Bryant MD        Or    acetaminophen (TYLENOL) suppository 650 mg  650 mg Rectal Q6H PRN Efrain Bryant MD        polyethylene glycol (MIRALAX) packet 17 g  17 g Oral DAILY PRN Efrain Bryant MD        promethazine (PHENERGAN) tablet 12.5 mg  12.5 mg Oral Q6H PRN Efrain Bryant MD        Or    ondansetron [facility]) injection 4 mg  4 mg IntraVENous Q6H PRN Efrain Bryant MD        [START ON 12/4/2020] amLODIPine (NORVASC) tablet 5 mg  5 mg Oral DAILY Efrain Bryant MD       [address] [place] [address] ON 12/4/2020] levothyroxine (SYNTHROID) tablet 200 mcg  200 mcg Oral 6am Arnulfo Zuñiga MD        sucralfate (CARAFATE) tablet 1 g  1 g Oral ACB&D Arnulfo Zuñiga MD        atorvastatin (LIPITOR) tablet 10 mg  10 mg Oral QHS Arnulfo Zuñiga MD        [START ON 12/4/2020] pantoprazole (PROTONIX) tablet 40 mg  40 mg Oral ACB Arnulfo Zuñiga MD        ergocalciferol capsule 50,000 Units  50,000 Units Oral Q7D Arnulfo Zuñiga MD        insulin lispro (HUMALOG) injection   SubCUTAneous AC&HS Arnulfo Zuñiga MD        glucose chewable tablet 16 g  4 Tab Oral PRN Arnulfo Zuñiga MD        dextrose (D50W) injection syrg 12.5-25 g  12.5-25 g IntraVENous PRN Arnulfo Zuñiga MD        glucagon (GLUCAGEN) injection 1 mg  1 mg IntraMUSCular PRN Arnulfo Zuñiga MD        sodium bicarbonate (8.4%) 150 mEq in sterile water 1,000 mL infusion   IntraVENous CONTINUOUS Morena Biswas  mL/hr at 12/03/20 1532      insulin NPH (NOVOLIN N, HUMULIN N) injection 5 Units  5 Units SubCUTAneous ACB&D Arnulfo Zuñiga MD         Current Outpatient Medications   Medication Sig Dispense Refill    metoclopramide HCl (REGLAN) 5 mg tablet TAKE 1 TABLET BY MOUTH ONCE DAILY AS NEEDED. DO NOT TAKE MORE THAN 3 TABLETS A DAY. 30 Tab 0    bumetanide (BUMEX) 2 mg tablet TAKE 1 TABLET BY MOUTH TWICE DAILY      Vitamin D2 1,250 mcg (50,000 unit) capsule TAKE 1 CAPSULE BY MOUTH ONCE A WEEK      sucralfate (CARAFATE) 1 gram tablet       levothyroxine (SYNTHROID) 200 mcg tablet TAKE 1 TABLET BY MOUTH ONCE DAILY BEFORE BREAKFAST 90 Tab 3    omeprazole (PRILOSEC) 20 mg capsule Take 1 Cap by mouth daily. 30 Cap 3    amLODIPine (NORVASC) 5 mg tablet Take 1 Tab by mouth daily. 30 Tab 3    docusate sodium (DOK) 250 mg capsule Take 1 Cap by mouth daily for 90 days. 30 Cap 2    olmesartan (BENICAR) 20 mg tablet Take 1 Tab by mouth daily. 30 Tab 2    furosemide (LASIX) 20 mg tablet Take 1 Tab by mouth daily.  30 Tab 1    acetaminophen (TYLENOL) 325 mg tablet Take 325 mg by mouth as needed for Pain. Take tab as needed for pain      OXYGEN-AIR DELIVERY SYSTEMS Take 2.5 L/min by inhalation continuous.  Blood-Glucose Meter monitoring kit 1 preferred brand glucometer for checking home glucose, E11.22 1 Kit 0    glucose blood VI test strips (blood glucose test) strip Pharmacist to choose preferred meter and strips. Dx:E11.65, Z79.4 Monitor 3 times daily 300 Strip 3    insulin NPH (HumuLIN N NPH U-100 Insulin) 100 unit/mL injection 2-10 units daily (Patient taking differently: 2-10 units daily    He is doing 10 units BID as of last few months 9/23/2020) 3 Vial 3    Insulin Syringe-Needle U-100 ([employer] Syringes) 0.3 mL 30 gauge x 5/16\" syrg 1 Each by Does Not Apply route daily. 90 Syringe 3    simvastatin (ZOCOR) 20 mg tablet Take 1 Tab by mouth nightly. 90 Tab 3    Ventolin HFA 90 mcg/actuation inhaler TAKE 1-2 PUFFS EVEERY 4-6 HOURS AS NEEDED FOR DYSPNEA AND WHEEZING 1 Inhaler 2    glucose blood VI test strips (PHARMACIST CHOICE) strip One Touch  Check glucose 3-4 times daily. DX E11.22 300 Strip 3    Lancets misc Use preferred brand;  Check glucose 3-4 times daily, Diagnosis E11.22 2 Package 3     Past History     Past Medical History:  Past Medical History:   Diagnosis Date    Adverse effect of anesthesia     \" STOP BREATHING 1 TIME C ANESTH\"    Cancer ([address] 2004    thyroid cancer    Chronic kidney disease     Chronic pain     BACK SHOULDER AND ARM    Depression     Diabetes ([address]     Encounter for long-term (current) use of NSAIDs     Hypercholesterolemia     Hypertension     Nausea & vomiting     Other ill-defined conditions(799.89)     cholesterol, thyroid    Sleep apnea     doesn't wear cpap    Thyroid cancer ([address]     TIA (transient ischemic attack) 2011    Vitamin D deficiency      Past Surgical History:  Past Surgical History:   Procedure Laterality Date    CARDIAC SURG PROCEDURE UNLIST      HX HEENT      THROAT SURGERY X 4    HX ORTHOPAEDIC      back     HX ORTHOPAEDIC      ARM AND SHOULDER    HX OTHER SURGICAL      thyroid, lymphnode    HX RETINAL DETACHMENT REPAIR      left eye    IR INSERT NON TUNL CVC OVER 5 YRS  2020    IR INSERT TUNL CVC W/O PORT OVER 5 YR  2020    UPPER GI ENDOSCOPY,BALL DIL,30MM  2020         UPPER GI ENDOSCOPY,BIOPSY  2020         VASCULAR SURGERY PROCEDURE UNLIST      cardiac cath NEG. Family History:  Family History   Problem Relation Age of Onset    Diabetes Mother     Elevated Lipids Mother    Ester Campbell Bladder Disease Mother     Headache Mother    [facility] Migraines Mother     Heart Disease Mother     Hypertension Mother     Stroke Mother     Other Mother         ANEURSYM BRAIN    Bleeding Prob Father     Cancer Father         LEUKEMIA    Diabetes Father     Elevated Lipids Father     Mental Retardation Sister     Psychiatric Disorder Sister     Cancer Brother         LUNGS     Social History:  Social History     Tobacco Use    Smoking status: Former Smoker     Last attempt to quit: 1994     Years since quittin.3    Smokeless tobacco: Never Used   Substance Use Topics    Alcohol use: Yes     Comment: Occasionally    Drug use: No     Allergies: Allergies   Allergen Reactions    Anesthetics - Amide Type - Select Amino Amides Shortness of Breath    Flexeril [Cyclobenzaprine] Hives    Tramadol Hives     Review of Systems   Review of Systems   Constitutional: Negative for chills and fever. HENT: Negative for congestion, rhinorrhea and sore throat. Respiratory: Negative for cough and shortness of breath. Cardiovascular: Negative for chest pain. Gastrointestinal: Positive for nausea. Negative for abdominal pain and vomiting. Genitourinary: Negative for dysuria and urgency. Skin: Negative for rash. Neurological: Negative for dizziness, light-headedness and headaches. All other systems reviewed and are negative.     Physical Exam   Physical Exam  Vitals signs and nursing note reviewed. Constitutional:       General: He is not in acute distress. Appearance: He is well-developed. HENT:      Head: Normocephalic and atraumatic. Eyes:      Conjunctiva/sclera: Conjunctivae normal.      Pupils: Pupils are equal, round, and reactive to light. Neck:      Musculoskeletal: Normal range of motion. Cardiovascular:      Rate and Rhythm: Normal rate and regular rhythm. Pulmonary:      Effort: Pulmonary effort is normal. No respiratory distress. Breath sounds: Normal breath sounds. No stridor. Abdominal:      General: There is no distension. Palpations: Abdomen is soft. Tenderness: There is no abdominal tenderness. Musculoskeletal: Normal range of motion. Skin:     General: Skin is warm and dry. Neurological:      Mental Status: He is alert and oriented to person, place, and time.        Diagnostic Study Results   Labs -     Recent Results (from the past 12 hour(s))   URINALYSIS W/ REFLEX CULTURE    Collection Time: 12/03/20 10:58 AM    Specimen: Urine   Result Value Ref Range    Color YELLOW/STRAW      Appearance CLEAR CLEAR      Specific gravity 1.010 1.003 - 1.030      pH (UA) 5.0 5.0 - 8.0      Protein 100 (A) NEG mg/dL    Glucose Negative NEG mg/dL    Ketone Negative NEG mg/dL    Bilirubin Negative NEG      Blood Negative NEG      Urobilinogen 0.2 0.2 - 1.0 EU/dL    Nitrites Negative NEG      Leukocyte Esterase Negative NEG      WBC 0-4 0 - 4 /hpf    RBC 0-5 0 - 5 /hpf    Epithelial cells FEW FEW /lpf    Bacteria Negative NEG /hpf    UA:UC IF INDICATED CULTURE NOT INDICATED BY UA RESULT CNI      Hyaline cast 0-2 0 - 5 /lpf   CBC WITH AUTOMATED DIFF    Collection Time: 12/03/20 11:08 AM   Result Value Ref Range    WBC 8.5 4.1 - 11.1 K/uL    RBC 3.74 (L) 4.10 - 5.70 M/uL    HGB 10.7 (L) 12.1 - 17.0 g/dL    HCT 32.8 (L) 36.6 - 50.3 %    MCV 87.7 80.0 - 99.0 FL    MCH 28.6 26.0 - 34.0 PG    MCHC 32.6 30.0 - 36.5 g/dL    RDW 14.0 11.5 - 14.5 %    PLATELET 682 818 - 400 K/uL    MPV 11.0 8.9 - 12.9 FL    NRBC 0.0 0  WBC    ABSOLUTE NRBC 0.00 0.00 - 0.01 K/uL    NEUTROPHILS 70 32 - 75 %    LYMPHOCYTES 16 12 - 49 %    MONOCYTES 8 5 - 13 %    EOSINOPHILS 5 0 - 7 %    BASOPHILS 1 0 - 1 %    IMMATURE GRANULOCYTES 0 0.0 - 0.5 %    ABS. NEUTROPHILS 6.0 1.8 - 8.0 K/UL    ABS. LYMPHOCYTES 1.4 0.8 - 3.5 K/UL    ABS. MONOCYTES 0.6 0.0 - 1.0 K/UL    ABS. EOSINOPHILS 0.4 0.0 - 0.4 K/UL    ABS. BASOPHILS 0.1 0.0 - 0.1 K/UL    ABS. IMM. GRANS. 0.0 0.00 - 0.04 K/UL    DF AUTOMATED     METABOLIC PANEL, COMPREHENSIVE    Collection Time: 12/03/20 11:08 AM   Result Value Ref Range    Sodium 136 136 - 145 mmol/L    Potassium 4.7 3.5 - 5.1 mmol/L    Chloride 111 (H) 97 - 108 mmol/L    CO2 15 (LL) 21 - 32 mmol/L    Anion gap 10 5 - 15 mmol/L    Glucose 157 (H) 65 - 100 mg/dL     (H) 6 - 20 MG/DL    Creatinine 5.03 (H) 0.70 - 1.30 MG/DL    BUN/Creatinine ratio 31 (H) 12 - 20      GFR est AA 14 (L) >60 ml/min/1.73m2    GFR est non-AA 12 (L) >60 ml/min/1.73m2    Calcium 7.7 (L) 8.5 - 10.1 MG/DL    Bilirubin, total 0.2 0.2 - 1.0 MG/DL    ALT (SGPT) 54 12 - 78 U/L    AST (SGOT) 14 (L) 15 - 37 U/L    Alk.  phosphatase 101 45 - 117 U/L    Protein, total 7.7 6.4 - 8.2 g/dL    Albumin 3.5 3.5 - 5.0 g/dL    Globulin 4.2 (H) 2.0 - 4.0 g/dL    A-G Ratio 0.8 (L) 1.1 - 2.2     EKG, 12 LEAD, INITIAL    Collection Time: 12/03/20 11:31 AM   Result Value Ref Range    Ventricular Rate 84 BPM    Atrial Rate 84 BPM    P-R Interval 152 ms    QRS Duration 84 ms    Q-T Interval 374 ms    QTC Calculation (Bezet) 441 ms    Calculated P Axis 65 degrees    Calculated R Axis -33 degrees    Calculated T Axis 49 degrees    Diagnosis       Normal sinus rhythm  Left axis deviation  Poor R-wave Progression (consider lead placement or loss of anterior forces)  When compared with ECG of 17-JUL-2020 03:46,  No significant change was found  Confirmed by aJden Vazquez MD, Ilan Dalton ([id number]) on 12/3/2020 1:26:55 PM         Radiologic Studies -   US RETROPERITONEUM COMP   Final Result   IMPRESSION:    No hydronephrosis. Nonspecific acute on chronic medical renal disease. Us Retroperitoneum Comp    Result Date: 12/3/2020  IMPRESSION: No hydronephrosis. Nonspecific acute on chronic medical renal disease. Medical Decision Making   I am the first provider for this patient. I reviewed the vital signs, available nursing notes, past medical history, past surgical history, family history and social history. Vital Signs-Reviewed the patient's vital signs. Patient Vitals for the past 12 hrs:   Temp Pulse Resp BP SpO2   12/03/20 1140 -- -- -- (!) 156/62 --   12/03/20 1044 97.7 °F (36.5 °C) 91 20 (!) 163/60 99 %       Pulse Oximetry Analysis - 99% on RA    Cardiac Monitor:   Rate: 91 bpm  Rhythm: Normal Sinus Rhythm      ED EKG interpretation:  Rhythm: normal sinus rhythm; and regular . Rate (approx.): 84; Axis: left axis deviation; P wave: normal; QRS interval: normal ; ST/T wave: normal; Other findings: borderline ekg; no peaked T waves. This EKG was interpreted by SANG Pratt MD,ED Provider. Records Reviewed: Nursing Notes and Old Medical Records    Provider Notes (Medical Decision Making):   Patient presents to the emergency department due to concerns for abnormal outpatient labs reportedly notable for a worsening creatinine as well as hyperkalemia. EKG performed on arrival showed no peaked T waves so we will hold on treating hyperK empirically until repeat potassium results to ensure this was not an aberrant lab value. Patient himself has no complaints and is well-appearing. ED Course:   Initial assessment performed. The patients presenting problems have been discussed, and they are in agreement with the care plan formulated and outlined with them. I have encouraged them to ask questions as they arise throughout their visit.     Labs returned with a normal potassium, however creatinine worsened from a baseline of about 3 to 5. Will discuss with nephrology. ED Course as of Dec 03 1550   Thu Dec 03, 2020   1322 Patient has been seen by Dr. Jennifer Cherry from nephrology. Recommends admission to hospitalist service for NERY. [XE]   1427 HHIAR with Dr. Eva Patel, hospitalist, will see for admit    [MELISSA]      ED Course User Index  Kush Velazquez MD       Procedures:  Procedures    Critical Care:  none    Disposition:    Admission Note:  Patient is being admitted to the hospital by Dr. Eva Patel, Service: Hospitalist.  The results of their tests and reasons for their admission have been discussed with them and available family. They convey agreement and understanding for the need to be admitted and for their admission diagnosis. Diagnosis     Clinical Impression:   1. NERY (acute kidney injury) [facility])            Please note that this dictation was completed with [employer], the computer voice recognition software. Quite often unanticipated grammatical, syntax, homophones, and other interpretive errors are inadvertently transcribed by the computer software. Please disregard these errors.   Please excuse any errors that have escaped final proofreading

## 2020-12-03 NOTE — ED NOTES
TRANSFER - OUT REPORT:    Verbal report given to [facility] RN(name) on Lacho Gallo  being transferred to [facility](unit) for routine progression of care       Report consisted of patients Situation, Background, Assessment and   Recommendations(SBAR). Information from the following report(s) SBAR, ED Summary, [facility] and Recent Results was reviewed with the receiving nurse. Lines:   Peripheral IV 12/03/20 Left Antecubital (Active)   Site Assessment Clean, dry, & intact 12/03/20 1140   Phlebitis Assessment 0 12/03/20 1140   Infiltration Assessment 0 12/03/20 1140   Dressing Status Clean, dry, & intact 12/03/20 1140   Dressing Type Tape;Transparent 12/03/20 1140   Hub Color/Line Status Pink 12/03/20 1140        Opportunity for questions and clarification was provided.       Patient transported with:   [employer]

## 2020-12-03 NOTE — ED NOTES
Bedside shift change report given to Arabella  (oncoming nurse) by Char Costa  (offgoing nurse).  Report included the following information SBAR, Kardex, ED Summary, [facility] and Recent Results

## 2020-12-03 NOTE — H&P
Hospitalist Admission Note    NAME: Karlie Meyer   :  1960   MRN:  [id number]     Date/Time:  12/3/2020 2:12 PM    Patient PCP: Yamile James MD  ______________________________________________________________________  Given the patient's current clinical presentation, I have a high level of concern for decompensation if discharged from the emergency department. Complex decision making was performed, which includes reviewing the patient's available past medical records, laboratory results, and x-ray films. My assessment of this patient's clinical condition and my plan of care is as follows. Assessment / Plan:  NERY on CKD3  Anemia of CKD  Nausea and vomiting  NAGMA  Likely from volume depletion and lasix use. No new medications. He has poor po intake d/t n/v  Start IV bicarb gtt  Renal US ordered  Avoid nephrotoxic agents. Holding home bumexand acei  F/u with urine lytes  Repeat bmp    HTN/HLD  Chronic diastolic HF, not in exacerbation. Monitor vol status  Hold bumex and acei  Cont' norvasc and statin    T2DM  Start lower dose of NPH, titrate prn  Start SSI    Hypothyroidism, cont' synthroid  Hx of thyroid cancer, follows with Dr Parish Franco  Adrenal adenoma    Code Status: Full   Surrogate Decision Maker: pt's daughter Renetta Ford   DVT Prophylaxis: heparin  GI Prophylaxis: not indicated    Baseline: from home      Subjective:   CHIEF COMPLAINT: abnormal cr and elevated K, referred to the ER for further evaluation. HISTORY OF PRESENT ILLNESS:     Yimi Hawkins is a 64 y.o.  male who presents with c/o nausea and vomiting associated with hyperkalemia and renal failure. Pt was told by his nephrologist to come to the ER for evaluation of abnormal cr and elevated K level. Pt states he has been having nausea and vomiting for several days. He remains on all his home meds to include bumex and acei. He denies associated fever, chills, cp, ,sob ,palpitations or diarrhea.   No new medications recently. We were asked to admit for work up and evaluation of the above problems. Past Medical History:   Diagnosis Date    Adverse effect of anesthesia     \" STOP BREATHING 1 TIME C ANESTH\"    Cancer ([facility] [address]     thyroid cancer    Chronic kidney disease     Chronic pain     BACK SHOULDER AND ARM    Depression     Diabetes ([facility] [address]     Encounter for long-term (current) use of NSAIDs     Hypercholesterolemia     Hypertension     Nausea & vomiting     Other ill-defined conditions(799.89)     cholesterol, thyroid    Sleep apnea     doesn't wear cpap    Thyroid cancer ([facility] [address]     TIA (transient ischemic attack)     Vitamin D deficiency         Past Surgical History:   Procedure Laterality Date    CARDIAC SURG PROCEDURE UNLIST      HX HEENT      THROAT SURGERY X 4    HX ORTHOPAEDIC      back     HX ORTHOPAEDIC      ARM AND SHOULDER    HX OTHER SURGICAL      thyroid, lymphnode    HX RETINAL DETACHMENT REPAIR      left eye    IR INSERT NON TUNL CVC OVER 5 YRS  2020    IR INSERT TUNL CVC W/O PORT OVER 5 YR  2020    UPPER GI ENDOSCOPY,BALL DIL,30MM  2020         UPPER GI ENDOSCOPY,BIOPSY  2020         VASCULAR SURGERY PROCEDURE UNLIST      cardiac cath NEG.        Social History     Tobacco Use    Smoking status: Former Smoker     Last attempt to quit: 1994     Years since quittin.3    Smokeless tobacco: Never Used   Substance Use Topics    Alcohol use: Yes     Comment: Occasionally        Family History   Problem Relation Age of Onset    Diabetes Mother     Elevated Lipids Mother    Yeison Marrow Bladder Disease Mother     Headache Mother    [facility] Migraines Mother     Heart Disease Mother     Hypertension Mother     Stroke Mother     Other Mother         ANEURSYM BRAIN    Bleeding Prob Father     Cancer Father         LEUKEMIA    Diabetes Father     Elevated Lipids Father     Mental Retardation Sister     Psychiatric Disorder Sister    [facility] Cancer Brother         LUNGS     Allergies   Allergen Reactions    Anesthetics - Amide Type - Select Amino Amides Shortness of Breath    Flexeril [Cyclobenzaprine] Hives    Tramadol Hives        Prior to Admission medications    Medication Sig Start Date End Date Taking? Authorizing Provider   metoclopramide HCl (REGLAN) 5 mg tablet TAKE 1 TABLET BY MOUTH ONCE DAILY AS NEEDED. DO NOT TAKE MORE THAN 3 TABLETS A DAY. 12/1/20   Junito Prado MD   bumetanide (BUMEX) 2 mg tablet TAKE 1 TABLET BY MOUTH TWICE DAILY 9/24/20   Provider, Historical   Vitamin D2 1,250 mcg (50,000 unit) capsule TAKE 1 CAPSULE BY MOUTH ONCE A WEEK 9/29/20   Provider, Historical   sucralfate (CARAFATE) 1 gram tablet  9/30/20   Provider, Historical   levothyroxine (SYNTHROID) 200 mcg tablet TAKE 1 TABLET BY MOUTH ONCE DAILY BEFORE BREAKFAST 11/18/20   Nereida Ko MD   omeprazole (PRILOSEC) 20 mg capsule Take 1 Cap by mouth daily. 10/9/20   Junito Prado MD   amLODIPine (NORVASC) 5 mg tablet Take 1 Tab by mouth daily. 9/29/20   Junito Prado MD   docusate sodium (DOK) 250 mg capsule Take 1 Cap by mouth daily for 90 days. 9/27/20 12/26/20  Junito Prado MD   olmesartan (BENICAR) 20 mg tablet Take 1 Tab by mouth daily. 9/23/20   Junito Prado MD   furosemide (LASIX) 20 mg tablet Take 1 Tab by mouth daily. 9/23/20   Junito Prado MD   acetaminophen (TYLENOL) 325 mg tablet Take 325 mg by mouth as needed for Pain. Take tab as needed for pain 9/15/20   Junito Prado MD   OXYGEN-AIR DELIVERY SYSTEMS Take 2.5 L/min by inhalation continuous. 8/29/20   Provider, Historical   Blood-Glucose Meter monitoring kit 1 preferred brand glucometer for checking home glucose, E11.22 7/30/20   Marie Alcazar MD   glucose blood VI test strips (blood glucose test) strip Pharmacist to choose preferred meter and strips.   Dx:E11.65, Z79.4 Monitor 3 times daily 7/30/20   Marie Alcazar MD   insulin NPH (HumuLIN N NPH U-100 Insulin) 100 unit/mL injection 2-10 units daily  Patient taking differently: 2-10 units daily    He is doing 10 units BID as of last few months 9/23/2020 7/30/20   Marilee Galindo MD   Insulin Syringe-Needle U-100 (Advocate Syringes) 0.3 mL 30 gauge x 5/16\" syrg 1 Each by Does Not Apply route daily. 7/30/20   Marilee Galindo MD   simvastatin (ZOCOR) 20 mg tablet Take 1 Tab by mouth nightly. 7/28/20   Triston Prado MD   Ventolin HFA 90 mcg/actuation inhaler TAKE 1-2 PUFFS EVEERY 4-6 HOURS AS NEEDED FOR DYSPNEA AND WHEEZING 7/28/20   Triston Prado MD   glucose blood VI test strips (PHARMACIST CHOICE) strip One Touch  Check glucose 3-4 times daily. DX E11.22 2/2/18   Destiny Au MD   Lancets misc Use preferred brand; Check glucose 3-4 times daily, Diagnosis E11.22 2/2/18   Destiny Au MD       REVIEW OF SYSTEMS:     I am not able to complete the review of systems because:    The patient is intubated and sedated    The patient has altered mental status due to his acute medical problems    The patient has baseline aphasia from prior stroke(s)    The patient has baseline dementia and is not reliable historian    The patient is in acute medical distress and unable to provide information           Total of 12 systems reviewed as follows:       POSITIVE= BOLD text  Negative = text not underlined  General:  fever, chills, sweats, generalized weakness, weight loss/gain,      loss of appetite   Eyes:    blurred vision, eye pain, loss of vision, double vision  ENT:    rhinorrhea, pharyngitis   Respiratory:   cough, sputum production, SOB, NUÑEZ, wheezing, pleuritic pain   Cardiology:   chest pain, palpitations, orthopnea, PND, edema, syncope   Gastrointestinal:  abdominal pain , N/V, diarrhea, dysphagia, constipation, bleeding   Genitourinary:  frequency, urgency, dysuria, hematuria, incontinence   Muskuloskeletal :  arthralgia, myalgia, back pain  Hematology:  easy bruising, nose or gum bleeding, lymphadenopathy   Dermatological: rash, ulceration, pruritis, color change / jaundice  Endocrine:   hot flashes or polydipsia   Neurological:  headache, dizziness, confusion, focal weakness, paresthesia,     Speech difficulties, memory loss, gait difficulty  Psychological: Feelings of anxiety, depression, agitation    Objective:   VITALS:    Visit Vitals  BP (!) 156/62   Pulse 91   Temp 97.7 °F (36.5 °C)   Resp 20   Ht 5' 9\" (1.753 m)   Wt 97.6 kg (215 lb 2.7 oz)   SpO2 99%   BMI 31.77 kg/m²       PHYSICAL EXAM:    General:    Alert, cooperative, no distress, appears stated age. HEENT: Atraumatic, anicteric sclerae, pink conjunctivae     No oral ulcers, dry oral mucosa  Neck:  Supple, symmetrical,  thyroid: non tender  Lungs:   CTA b/l. No Wheezing or Rhonchi. No rales. Chest wall:  No tenderness  No Accessory muscle use. Heart:   Regular  rhythm,  No  murmur   No edema  Abdomen:   Soft, ND, NT,  BS+  Extremities: No cyanosis. No clubbing    Skin turgor normal, Capillary refill normal, Radial dial pulse 2+  Skin:     Not pale. Not Jaundiced  No rashes   Psych:  Not depressed. Not anxious or agitated. Neurologic: EOMs intact. No facial asymmetry. No aphasia or slurred speech. Symmetrical strength, Sensation grossly intact.  Alert and oriented X 4.     _______________________________________________________________________  Care Plan discussed with:    Comments   Patient x    Family  x daughter   RN x    Care Manager                    Consultant:      _______________________________________________________________________  Expected  Disposition:   Home with Family x   HH/PT/OT/RN    SNF/LTC    MARION    ________________________________________________________________________  TOTAL TIME: 72 Minutes    Critical Care Provided     Minutes non procedure based      Comments     Reviewed previous records   >50% of visit spent in counseling and coordination of care  Discussion with patient and/or family and questions answered ________________________________________________________________________  Signed: Nathalia Mistry MD    Procedures: see electronic medical records for all procedures/Xrays and details which were not copied into this note but were reviewed prior to creation of Plan. LAB DATA REVIEWED:    Recent Results (from the past 24 hour(s))   URINALYSIS W/ REFLEX CULTURE    Collection Time: 12/03/20 10:58 AM    Specimen: Urine   Result Value Ref Range    Color YELLOW/STRAW      Appearance CLEAR CLEAR      Specific gravity 1.010 1.003 - 1.030      pH (UA) 5.0 5.0 - 8.0      Protein 100 (A) NEG mg/dL    Glucose Negative NEG mg/dL    Ketone Negative NEG mg/dL    Bilirubin Negative NEG      Blood Negative NEG      Urobilinogen 0.2 0.2 - 1.0 EU/dL    Nitrites Negative NEG      Leukocyte Esterase Negative NEG      WBC 0-4 0 - 4 /hpf    RBC 0-5 0 - 5 /hpf    Epithelial cells FEW FEW /lpf    Bacteria Negative NEG /hpf    UA:UC IF INDICATED CULTURE NOT INDICATED BY UA RESULT CNI      Hyaline cast 0-2 0 - 5 /lpf   CBC WITH AUTOMATED DIFF    Collection Time: 12/03/20 11:08 AM   Result Value Ref Range    WBC 8.5 4.1 - 11.1 K/uL    RBC 3.74 (L) 4.10 - 5.70 M/uL    HGB 10.7 (L) 12.1 - 17.0 g/dL    HCT 32.8 (L) 36.6 - 50.3 %    MCV 87.7 80.0 - 99.0 FL    MCH 28.6 26.0 - 34.0 PG    MCHC 32.6 30.0 - 36.5 g/dL    RDW 14.0 11.5 - 14.5 %    PLATELET 105 056 - 846 K/uL    MPV 11.0 8.9 - 12.9 FL    NRBC 0.0 0  WBC    ABSOLUTE NRBC 0.00 0.00 - 0.01 K/uL    NEUTROPHILS 70 32 - 75 %    LYMPHOCYTES 16 12 - 49 %    MONOCYTES 8 5 - 13 %    EOSINOPHILS 5 0 - 7 %    BASOPHILS 1 0 - 1 %    IMMATURE GRANULOCYTES 0 0.0 - 0.5 %    ABS. NEUTROPHILS 6.0 1.8 - 8.0 K/UL    ABS. LYMPHOCYTES 1.4 0.8 - 3.5 K/UL    ABS. MONOCYTES 0.6 0.0 - 1.0 K/UL    ABS. EOSINOPHILS 0.4 0.0 - 0.4 K/UL    ABS. BASOPHILS 0.1 0.0 - 0.1 K/UL    ABS. IMM.  GRANS. 0.0 0.00 - 0.04 K/UL    DF AUTOMATED     METABOLIC PANEL, COMPREHENSIVE    Collection Time: 12/03/20 11:08 AM   Result Value Ref Range    Sodium 136 136 - 145 mmol/L    Potassium 4.7 3.5 - 5.1 mmol/L    Chloride 111 (H) 97 - 108 mmol/L    CO2 15 (LL) 21 - 32 mmol/L    Anion gap 10 5 - 15 mmol/L    Glucose 157 (H) 65 - 100 mg/dL     (H) 6 - 20 MG/DL    Creatinine 5.03 (H) 0.70 - 1.30 MG/DL    BUN/Creatinine ratio 31 (H) 12 - 20      GFR est AA 14 (L) >60 ml/min/1.73m2    GFR est non-AA 12 (L) >60 ml/min/1.73m2    Calcium 7.7 (L) 8.5 - 10.1 MG/DL    Bilirubin, total 0.2 0.2 - 1.0 MG/DL    ALT (SGPT) 54 12 - 78 U/L    AST (SGOT) 14 (L) 15 - 37 U/L    Alk.  phosphatase 101 45 - 117 U/L    Protein, total 7.7 6.4 - 8.2 g/dL    Albumin 3.5 3.5 - 5.0 g/dL    Globulin 4.2 (H) 2.0 - 4.0 g/dL    A-G Ratio 0.8 (L) 1.1 - 2.2     EKG, 12 LEAD, INITIAL    Collection Time: 12/03/20 11:31 AM   Result Value Ref Range    Ventricular Rate 84 BPM    Atrial Rate 84 BPM    P-R Interval 152 ms    QRS Duration 84 ms    Q-T Interval 374 ms    QTC Calculation (Bezet) 441 ms    Calculated P Axis 65 degrees    Calculated R Axis -33 degrees    Calculated T Axis 49 degrees    Diagnosis       Normal sinus rhythm  Left axis deviation  Poor R-wave Progression (consider lead placement or loss of anterior forces)  When compared with ECG of 17-JUL-2020 03:46,  No significant change was found  Confirmed by Svitlana Mccarty MD, [employer] ([id number]) on 12/3/2020 1:26:55 PM

## 2020-12-03 NOTE — ED NOTES
at bedside evaluating pt     1316 Pt ambulate to the bathroom /o difficulty     Mena at bedside evaluating pt

## 2020-12-04 LAB
ALBUMIN SERPL-MCNC: 3.4 G/DL (ref 3.5–5)
ANION GAP SERPL CALC-SCNC: 10 MMOL/L (ref 5–15)
BASOPHILS # BLD: 0.1 K/UL (ref 0–0.1)
BASOPHILS NFR BLD: 1 % (ref 0–1)
BUN SERPL-MCNC: 138 MG/DL (ref 6–20)
BUN/CREAT SERPL: 33 (ref 12–20)
CALCIUM SERPL-MCNC: 8.3 MG/DL (ref 8.5–10.1)
CHLORIDE SERPL-SCNC: 113 MMOL/L (ref 97–108)
CO2 SERPL-SCNC: 17 MMOL/L (ref 21–32)
CREAT SERPL-MCNC: 4.17 MG/DL (ref 0.7–1.3)
DIFFERENTIAL METHOD BLD: ABNORMAL
EOSINOPHIL # BLD: 0.3 K/UL (ref 0–0.4)
EOSINOPHIL NFR BLD: 3 % (ref 0–7)
ERYTHROCYTE [DISTWIDTH] IN BLOOD BY AUTOMATED COUNT: 13.9 % (ref 11.5–14.5)
GLUCOSE BLD STRIP.AUTO-MCNC: 127 MG/DL (ref 65–100)
GLUCOSE BLD STRIP.AUTO-MCNC: 139 MG/DL (ref 65–100)
GLUCOSE BLD STRIP.AUTO-MCNC: 140 MG/DL (ref 65–100)
GLUCOSE BLD STRIP.AUTO-MCNC: 163 MG/DL (ref 65–100)
GLUCOSE SERPL-MCNC: 102 MG/DL (ref 65–100)
HCT VFR BLD AUTO: 32 % (ref 36.6–50.3)
HGB BLD-MCNC: 10.7 G/DL (ref 12.1–17)
IMM GRANULOCYTES # BLD AUTO: 0 K/UL (ref 0–0.04)
IMM GRANULOCYTES NFR BLD AUTO: 0 % (ref 0–0.5)
LYMPHOCYTES # BLD: 1 K/UL (ref 0.8–3.5)
LYMPHOCYTES NFR BLD: 12 % (ref 12–49)
MCH RBC QN AUTO: 28.7 PG (ref 26–34)
MCHC RBC AUTO-ENTMCNC: 33.4 G/DL (ref 30–36.5)
MCV RBC AUTO: 85.8 FL (ref 80–99)
MONOCYTES # BLD: 0.6 K/UL (ref 0–1)
MONOCYTES NFR BLD: 7 % (ref 5–13)
NEUTS SEG # BLD: 6.6 K/UL (ref 1.8–8)
NEUTS SEG NFR BLD: 77 % (ref 32–75)
NRBC # BLD: 0 K/UL (ref 0–0.01)
NRBC BLD-RTO: 0 PER 100 WBC
PHOSPHATE SERPL-MCNC: 6.7 MG/DL (ref 2.6–4.7)
PLATELET # BLD AUTO: 241 K/UL (ref 150–400)
PMV BLD AUTO: 11.2 FL (ref 8.9–12.9)
POTASSIUM SERPL-SCNC: 4.6 MMOL/L (ref 3.5–5.1)
RBC # BLD AUTO: 3.73 M/UL (ref 4.1–5.7)
SERVICE CMNT-IMP: ABNORMAL
SODIUM SERPL-SCNC: 140 MMOL/L (ref 136–145)
WBC # BLD AUTO: 8.6 K/UL (ref 4.1–11.1)

## 2020-12-04 PROCEDURE — 80069 RENAL FUNCTION PANEL: CPT

## 2020-12-04 PROCEDURE — 82962 GLUCOSE BLOOD TEST: CPT

## 2020-12-04 PROCEDURE — 74011636637 HC RX REV CODE- 636/637: Performed by: INTERNAL MEDICINE

## 2020-12-04 PROCEDURE — 85025 COMPLETE CBC W/AUTO DIFF WBC: CPT

## 2020-12-04 PROCEDURE — 65660000000 HC RM CCU STEPDOWN

## 2020-12-04 PROCEDURE — 74011000250 HC RX REV CODE- 250: Performed by: INTERNAL MEDICINE

## 2020-12-04 PROCEDURE — 36415 COLL VENOUS BLD VENIPUNCTURE: CPT

## 2020-12-04 PROCEDURE — 74011250637 HC RX REV CODE- 250/637: Performed by: INTERNAL MEDICINE

## 2020-12-04 RX ORDER — SEVELAMER CARBONATE 800 MG/1
800 TABLET, FILM COATED ORAL
Status: DISCONTINUED | OUTPATIENT
Start: 2020-12-04 | End: 2020-12-06 | Stop reason: HOSPADM

## 2020-12-04 RX ORDER — HYDRALAZINE HYDROCHLORIDE 20 MG/ML
10 INJECTION INTRAMUSCULAR; INTRAVENOUS
Status: DISCONTINUED | OUTPATIENT
Start: 2020-12-04 | End: 2020-12-06 | Stop reason: HOSPADM

## 2020-12-04 RX ORDER — SEVELAMER HYDROCHLORIDE 800 MG/1
800 TABLET, FILM COATED ORAL
Status: DISCONTINUED | OUTPATIENT
Start: 2020-12-04 | End: 2020-12-04 | Stop reason: SDUPTHER

## 2020-12-04 RX ORDER — AMLODIPINE BESYLATE 5 MG/1
10 TABLET ORAL DAILY
Status: DISCONTINUED | OUTPATIENT
Start: 2020-12-05 | End: 2020-12-06 | Stop reason: HOSPADM

## 2020-12-04 RX ADMIN — SUCRALFATE 1 G: 1 TABLET ORAL at 17:29

## 2020-12-04 RX ADMIN — HUMAN INSULIN 5 UNITS: 100 INJECTION, SUSPENSION SUBCUTANEOUS at 08:50

## 2020-12-04 RX ADMIN — PANTOPRAZOLE SODIUM 40 MG: 40 TABLET, DELAYED RELEASE ORAL at 08:50

## 2020-12-04 RX ADMIN — Medication 5 ML: at 08:18

## 2020-12-04 RX ADMIN — ERGOCALCIFEROL 50000 UNITS: 1.25 CAPSULE ORAL at 08:50

## 2020-12-04 RX ADMIN — AMLODIPINE BESYLATE 5 MG: 5 TABLET ORAL at 08:50

## 2020-12-04 RX ADMIN — WATER: 1000 INJECTION, SOLUTION INTRAVENOUS at 06:55

## 2020-12-04 RX ADMIN — SEVELAMER CARBONATE 800 MG: 800 TABLET, FILM COATED ORAL at 19:35

## 2020-12-04 RX ADMIN — SUCRALFATE 1 G: 1 TABLET ORAL at 08:50

## 2020-12-04 RX ADMIN — HUMAN INSULIN 5 UNITS: 100 INJECTION, SUSPENSION SUBCUTANEOUS at 17:29

## 2020-12-04 RX ADMIN — PROMETHAZINE HYDROCHLORIDE 12.5 MG: 25 TABLET ORAL at 05:11

## 2020-12-04 RX ADMIN — NITROGLYCERIN 1 INCH: 20 OINTMENT TOPICAL at 17:29

## 2020-12-04 RX ADMIN — Medication 10 ML: at 21:03

## 2020-12-04 RX ADMIN — NITROGLYCERIN 1 INCH: 20 OINTMENT TOPICAL at 21:02

## 2020-12-04 RX ADMIN — WATER: 1000 INJECTION, SOLUTION INTRAVENOUS at 19:23

## 2020-12-04 RX ADMIN — ATORVASTATIN CALCIUM 10 MG: 10 TABLET, FILM COATED ORAL at 21:02

## 2020-12-04 RX ADMIN — LEVOTHYROXINE SODIUM 200 MCG: 200 TABLET ORAL at 08:17

## 2020-12-04 NOTE — PROGRESS NOTES
Problem: Falls - Risk of  Goal: *Absence of Falls  Description: Document Vivien Lee Fall Risk and appropriate interventions in the flowsheet. Outcome: Progressing Towards Goal  Note: Fall Risk Interventions:            Medication Interventions: Teach patient to arise slowly         History of Falls Interventions:  Investigate reason for fall, Room close to nurse's station

## 2020-12-04 NOTE — ACP (ADVANCE CARE PLANNING)
Advance Care Planning Note    Name: Sue Daigle  YOB: 1960  MRN: [id number]  Admission Date: 12/3/2020 11:11 AM    Date of discussion: 12/3/2020    Active Diagnoses:    Hospital Problems  Date Reviewed: 11/24/2020          Codes Class Noted POA    Renal failure ICD-10-CM: N19  ICD-9-CM: 126  9/1/2020 Unknown          Thyroid CA      These active diagnoses are of sufficient risk that focused discussion on advance care planning is indicated in order to allow the patient to thoughtfully consider personal goals of care, and if situations arise that prevent the ability to personally give input, to ensure appropriate representation of their personal desires for different levels and aggressiveness of care. Persons present and participating in discussion: patient Juvenal Singh who is AAOx4 and his daughter Brandy Miller     Discussion: Code status addressed due to above mentioned medical problems and also his chronic comorbidity /advance age. Patient wants  to be a full code. Patient  would like to assign His daughter Caron Shields  as the surrogate decision maker. Time Spent:   Total time spent face-to-face in education and discussion:16 minutes.      Geo Faustin MD  12/3/2020  11:06 PM

## 2020-12-04 NOTE — PROGRESS NOTES
Bedside and Verbal shift change report given to Cecily Shepherd RN (oncoming nurse) by Maricruz Coker RN (offgoing nurse). Report included the following information SBAR, Kardex, MAR and Recent Results.

## 2020-12-04 NOTE — CONSULTS
Nephrology Consult Note     José Miguel Butt     [email or web address]              Phone - [phone]   Patient: Evaristo Trejo   YOB: 1960    Date- 12/4/2020  MRN: [id number]             REASON FOR CONSULTATION: NERY on CKD4  CONSULTING PHYSICIAN: Dr Mariama Sims MD     IMPRESSION/PLAN   NERY on CKD4( baseline Cr: 2.9-3.2 Sep/2020)  NAGMA  GERD  HTN  DM2  hypothyroidism  HFpEF       H/o vomiting x 1 week while on daily loop diuretics  Most likely volume depletion  Admission for IV fluid resuscitation   Ordered bicarb drip for correction of metabolic acidosis as well  Monitor I/O's, UOP  Avoid nephrotoxins  NERY w/u ordered              · Active Problems:  ·   Renal failure (9/1/2020)  ·   ·     [x] High complexity decision making was performed  [x] Patient is at high-risk of decompensation with multiple organ involvement    Subjective:   HPI: Evaristo Trejo is a 64 y.o.  male who presents with c/o nausea and vomiting x1 week. He has remained on all his home meds to include bumex and acei. He denies associated fever, chills, cp, sob ,palpitations or diarrhea. No new medications recently. No NSAIDs use. He reports h/o GERD and frequent nausea, now worse. Review of Systems:       A 11 point review of system was performed today. Pertinent positives and negatives are mentioned in the HPI. The reminder of the ROS is negative and noncontributory.     Past Medical History:   Diagnosis Date    Adverse effect of anesthesia     \" STOP BREATHING 1 TIME C ANESTH\"    Cancer ([facility] [address] 2004    thyroid cancer    Chronic kidney disease     Chronic pain     BACK SHOULDER AND ARM    Depression     Diabetes ([facility] [address]     Encounter for long-term (current) use of NSAIDs     Hypercholesterolemia     Hypertension     Nausea & vomiting     Other ill-defined conditions(310.62)     cholesterol, thyroid    Sleep apnea     doesn't wear cpap  Thyroid cancer ([facility] [address]     TIA (transient ischemic attack) 2011    Vitamin D deficiency       Past Surgical History:   Procedure Laterality Date    CARDIAC SURG PROCEDURE UNLIST      HX HEENT      THROAT SURGERY X 4    HX ORTHOPAEDIC      back     HX ORTHOPAEDIC      ARM AND SHOULDER    HX OTHER SURGICAL      thyroid, lymphnode    HX RETINAL DETACHMENT REPAIR      left eye    IR INSERT NON TUNL CVC OVER 5 YRS  8/18/2020    IR INSERT TUNL CVC W/O PORT OVER 5 YR  8/26/2020    UPPER GI ENDOSCOPY,BALL DIL,30MM  7/17/2020         UPPER GI ENDOSCOPY,BIOPSY  7/17/2020         VASCULAR SURGERY PROCEDURE UNLIST      cardiac cath NEG. Prior to Admission medications    Medication Sig Start Date End Date Taking? Authorizing Provider   metoclopramide HCl (REGLAN) 5 mg tablet TAKE 1 TABLET BY MOUTH ONCE DAILY AS NEEDED. DO NOT TAKE MORE THAN 3 TABLETS A DAY. 12/1/20   Eun Prado MD   bumetanide (BUMEX) 2 mg tablet TAKE 1 TABLET BY MOUTH TWICE DAILY 9/24/20   Provider, Historical   Vitamin D2 1,250 mcg (50,000 unit) capsule TAKE 1 CAPSULE BY MOUTH ONCE A WEEK 9/29/20   Provider, Historical   sucralfate (CARAFATE) 1 gram tablet  9/30/20   Provider, Historical   levothyroxine (SYNTHROID) 200 mcg tablet TAKE 1 TABLET BY MOUTH ONCE DAILY BEFORE BREAKFAST 11/18/20   Juliana Nazario MD   omeprazole (PRILOSEC) 20 mg capsule Take 1 Cap by mouth daily. 10/9/20   Eun Prado MD   amLODIPine (NORVASC) 5 mg tablet Take 1 Tab by mouth daily. 9/29/20   Eun Prado MD   docusate sodium (DOK) 250 mg capsule Take 1 Cap by mouth daily for 90 days. 9/27/20 12/26/20  Eun Prado MD   olmesartan (BENICAR) 20 mg tablet Take 1 Tab by mouth daily. 9/23/20   Eun Prado MD   furosemide (LASIX) 20 mg tablet Take 1 Tab by mouth daily. 9/23/20   Eun Prado MD   acetaminophen (TYLENOL) 325 mg tablet Take 325 mg by mouth as needed for Pain.  Take tab as needed for pain 9/15/20   Courtney Roger MD   OXYGEN-AIR DELIVERY SYSTEMS Take 2.5 L/min by inhalation continuous. 20   Provider, Historical   Blood-Glucose Meter monitoring kit 1 preferred brand glucometer for checking home glucose, E11.22 20   Nery Tineo MD   glucose blood VI test strips (blood glucose test) strip Pharmacist to choose preferred meter and strips. Dx:E11.65, Z79.4 Monitor 3 times daily 20   Nery Tineo MD   insulin NPH (HumuLIN N NPH U-100 Insulin) 100 unit/mL injection 2-10 units daily  Patient taking differently: 2-10 units daily    He is doing 10 units BID as of last few months 2020   Nery Tineo MD   Insulin Syringe-Needle U-100 (Advocate Syringes) 0.3 mL 30 gauge x 5/16\" syrg 1 Each by Does Not Apply route daily. 20   Nery Tineo MD   simvastatin (ZOCOR) 20 mg tablet Take 1 Tab by mouth nightly. 20   Alan Prado MD   Ventolin HFA 90 mcg/actuation inhaler TAKE 1-2 PUFFS EVEERY 4-6 HOURS AS NEEDED FOR DYSPNEA AND WHEEZING 20   Alan Prado MD   glucose blood VI test strips (PHARMACIST CHOICE) strip One Touch  Check glucose 3-4 times daily. DX E11.18   Natan Mckeon MD   Lancets misc Use preferred brand;  Check glucose 3-4 times daily, Diagnosis E11.18   Natan Mckeon MD     Allergies   Allergen Reactions    Anesthetics - Amide Type - Select Amino Amides Shortness of Breath    Flexeril [Cyclobenzaprine] Hives    Tramadol Hives      Social History     Tobacco Use    Smoking status: Former Smoker     Last attempt to quit: 1994     Years since quittin.3    Smokeless tobacco: Never Used   Substance Use Topics    Alcohol use: Yes     Comment: Occasionally      Family History   Problem Relation Age of Onset    Diabetes Mother     Elevated Lipids Mother    Maier Cyphers Bladder Disease Mother     Headache Mother    Radha Langton Migraines Mother     Heart Disease Mother     Hypertension Mother     Stroke Mother     Other Mother         ANEURSYM BRAIN    Bleeding Prob Father     Cancer Father LEUKEMIA    Diabetes Father     Elevated Lipids Father     Mental Retardation Sister     Psychiatric Disorder Sister     Cancer Brother         LUNGS        Objective:      Patient Vitals for the past 24 hrs:   Temp Pulse Resp BP SpO2   12/04/20 1544 97.8 °F (36.6 °C) 83 17 (!) 163/66 99 %   12/04/20 1135 97.9 °F (36.6 °C) 85 17 (!) 182/68 96 %   12/04/20 0748 98.4 °F (36.9 °C) 90 17 (!) 177/66 99 %   12/04/20 0312 97.7 °F (36.5 °C) 80 17 (!) 152/66 100 %   12/03/20 2258 97.6 °F (36.4 °C) 87 17 (!) 162/68 98 %   12/03/20 1939 98 °F (36.7 °C) 84 18 (!) 142/64 97 %     12/04 0701 - 12/04 1900  In: 3521.7 [P.O.:750;  I.V.:2771.7]  Out: 1000 [Urine:1000]  Physical Exam:  General:AAOx3, No distress  Eyes:No scleral icterus, No conjunctival pallor  Neck:Supple,no JVD  Lungs:normal respiratory effort  CVS:RRR  Abdomen:Soft, Non tender\  Extremities: no LE edema  Skin:No rash or lesions, Warm and DRY   Psych: appropriate affect    NEURO: Non focal     CODE STATUS:  full  Care Plan discussed with:  Pt, daughter  Chart reviewed.    y Reviewed previous records   y Discussion with patient and/or family and questions answered         Lab Data Personally Reviewed: (see below)    Lab Results   Component Value Date/Time    Color YELLOW/STRAW 12/03/2020 10:58 AM    Appearance CLEAR 12/03/2020 10:58 AM    Specific gravity 1.010 12/03/2020 10:58 AM    pH (UA) 5.0 12/03/2020 10:58 AM    Protein 100 (A) 12/03/2020 10:58 AM    Glucose Negative 12/03/2020 10:58 AM    Ketone Negative 12/03/2020 10:58 AM    Bilirubin Negative 12/03/2020 10:58 AM    Urobilinogen 0.2 12/03/2020 10:58 AM    Nitrites Negative 12/03/2020 10:58 AM    Leukocyte Esterase Negative 12/03/2020 10:58 AM    Epithelial cells FEW 12/03/2020 10:58 AM    Bacteria Negative 12/03/2020 10:58 AM    WBC 0-4 12/03/2020 10:58 AM    RBC 0-5 12/03/2020 10:58 AM                    Imaging:    Medications list Personally Reviewed   [x]      Yes     []               No    Thank you for allowing us to participate in the care this patient. We will follow patient with you. Signed By: [facility]  [facility] SYSTM JESUS [facility] [address], [address] [place], [address]  Phone - [phone]         Fax - [phone] [facility] Office  [address] [place] [address]  Phone - [phone]        Fax - [phone]     [email or web address]

## 2020-12-04 NOTE — PROGRESS NOTES
Bedside and Verbal shift change report given to [address] [place] [address] (oncoming nurse) by Sukhdeep Rai RN (offgoing nurse). Report included the following information SBAR, Kardex, Intake/Output, MAR and Cardiac Rhythm NSR.

## 2020-12-04 NOTE — PROGRESS NOTES
End of Shift Note    Bedside shift change report given to Jayjay Sandoval RN (oncoming nurse) by Shavonne Douglass RN (offgoing nurse). Report included the following information SBAR, Kardex, Intake/Output, MAR, Recent Results and Cardiac Rhythm NSR    Shift worked:  [id number]     Shift summary and any significant changes:    Patient complained of some right flank pain overnight but did not want tylenol and said pain was currently tolerable. No complaints of pain during morning reassessment. Complained of nausea a couple of times last night - treated with IV zofran. Concerns for physician to address: Catheter placement if patient is getting started back on dialysis. Zone phone for oncoming shift:   [phone]       Activity:  Activity Level: Up ad kaden  Number times ambulated in hallways past shift: 0  Number of times OOB to chair past shift: 0 (ambulated multiple times from bed to bathroom)    Cardiac:   Cardiac Monitoring: Yes      Cardiac Rhythm: Normal sinus rhythm    Access:   Current line(s): PIV (22 G - Left wrist)    Genitourinary:   Urinary status: voiding    Respiratory:   O2 Device: Room air  Chronic home O2 use?: NO  Incentive spirometer at bedside: NO     GI:  Last Bowel Movement Date: 12/03/20  Current diet:  DIET DIABETIC CONSISTENT CARB Regular  Passing flatus: YES  Tolerating current diet: YES       Pain Management:   Patient states pain is manageable on current regimen: N/A    Skin:  Ge Score: 21  Interventions: float heels and increase time out of bed    Patient Safety:  Fall Score:  Total Score: 2  Interventions: gripper socks       Length of Stay:  Expected LOS: - - -  Actual LOS: [address] Aleg[facility] Final

## 2020-12-04 NOTE — PROGRESS NOTES
Hospitalist Progress Note    NAME: Geoff Valdes   :  1960   MRN:  [id number]       Assessment / Plan:  NERY on CKD3, recent off HD  Anemia of CKD  Nausea and vomiting, resolved  NAGMA  Likely from volume depletion and lasix use. No new medications. He has poor po intake d/t n/v  No further n/v.  Renal US with no hydronephrosis. Cr improving with IVF. Will cont' bicarb gtt  Renal US ordered  Avoid nephrotoxic agents. Holding home bumexand acei  F/u with urine lytes  Repeat bmp     HTN/HLD  Chronic diastolic HF, not in exacerbation. Monitor vol status  Hold bumex and acei  Cont' norvasc and statin  Will add nitrobid and IV hydralazine for better BP control    T2DM  Cont' lower dose of NPH, titrate prn  Cont' SSI     Hypothyroidism, cont' synthroid  Hx of thyroid cancer, follows with Dr Camron Espitia  Adrenal adenoma     Code Status: Full   Surrogate Decision Maker: pt's daughter Padmaja Sommers   DVT Prophylaxis: heparin  GI Prophylaxis: not indicated     Baseline: from home         Subjective:     Chief Complaint / Reason for Physician Visit  Pt awake inbed, NAD. No further n/v.  Daughter a bedside. Discussed with RN events overnight. Review of Systems:  Symptom Y/N Comments  Symptom Y/N Comments   Fever/Chills    Chest Pain     Poor Appetite    Edema     Cough    Abdominal Pain     Sputum    Joint Pain     SOB/NUÑEZ    Pruritis/Rash     Nausea/vomit    Tolerating PT/OT     Diarrhea    Tolerating Diet     Constipation    Other       Could NOT obtain due to:      Objective:     VITALS:   Last 24hrs VS reviewed since prior progress note.  Most recent are:  Patient Vitals for the past 24 hrs:   Temp Pulse Resp BP SpO2   20 1135 97.9 °F (36.6 °C) 85 17 (!) 182/68 96 %   20 0748 98.4 °F (36.9 °C) 90 17 (!) 177/66 99 %   20 0312 97.7 °F (36.5 °C) 80 17 (!) 152/66 100 %   20 2258 97.6 °F (36.4 °C) 87 17 (!) 162/68 98 %   20 1939 98 °F (36.7 °C) 84 18 (!) 142/64 97 %   20 1648 98.1 °F (36.7 °C) 81 20 (!) 157/62 99 %   12/03/20 1430 -- 88 19 (!) 145/64 99 %       Intake/Output Summary (Last 24 hours) at 12/4/2020 1422  Last data filed at 12/4/2020 0715  Gross per 24 hour   Intake 1400 ml   Output --   Net 1400 ml        I had a face to face encounter and independently examined this patient on 12/4/2020, as outlined below:  PHYSICAL EXAM:  General: WD, WN. Alert, cooperative, no acute distress    EENT:  EOMI. Anicteric sclerae. MMM  Resp:  CTA bilaterally, no wheezing or rales. No accessory muscle use  CV:  Regular  rhythm,  No edema  GI:  Soft, Non distended, Non tender. +BA  Neurologic:  Alert and oriented X 3, normal speech  Psych:   Good insight. Not anxious nor agitated  Skin:  No rashes. No jaundice    Reviewed most current lab test results and cultures  YES  Reviewed most current radiology test results   YES  Review and summation of old records today    NO  Reviewed patient's current orders and MAR    YES  PMH/SH reviewed - no change compared to H&P  ________________________________________________________________________  Care Plan discussed with:    Comments   Patient x    Family      RN x    Care Manager     Consultant                        Multidiciplinary team rounds were held today with , nursing, pharmacist and clinical coordinator. Patient's plan of care was discussed; medications were reviewed and discharge planning was addressed. ________________________________________________________________________  Total NON critical care TIME:  35   Minutes    Total CRITICAL CARE TIME Spent:   Minutes non procedure based      Comments   >50% of visit spent in counseling and coordination of care     ________________________________________________________________________  Beatriz White MD     Procedures: see electronic medical records for all procedures/Xrays and details which were not copied into this note but were reviewed prior to creation of Plan.       LABS:  I reviewed today's most current labs and imaging studies.   Pertinent labs include:  Recent Labs     12/04/20  0654 12/03/20  1108   WBC 8.6 8.5   HGB 10.7* 10.7*   HCT 32.0* 32.8*    254     Recent Labs     12/04/20  0654 12/03/20  1108    136   K 4.6 4.7   * 111*   CO2 17* 15*   * 157*   * 156*   CREA 4.17* 5.03*   CA 8.3* 7.7*   PHOS 6.7*  --    ALB 3.4* 3.5   TBILI  --  0.2   ALT  --  54       Signed: Luis Eduardo Lees MD

## 2020-12-05 LAB
25(OH)D3 SERPL-MCNC: 44.1 NG/ML (ref 30–100)
ALBUMIN SERPL-MCNC: 3.2 G/DL (ref 3.5–5)
ANION GAP SERPL CALC-SCNC: 7 MMOL/L (ref 5–15)
BASOPHILS # BLD: 0.1 K/UL (ref 0–0.1)
BASOPHILS NFR BLD: 1 % (ref 0–1)
BUN SERPL-MCNC: 124 MG/DL (ref 6–20)
BUN/CREAT SERPL: 34 (ref 12–20)
CALCIUM SERPL-MCNC: 7.5 MG/DL (ref 8.5–10.1)
CALCIUM SERPL-MCNC: 8 MG/DL (ref 8.5–10.1)
CHLORIDE SERPL-SCNC: 107 MMOL/L (ref 97–108)
CO2 SERPL-SCNC: 25 MMOL/L (ref 21–32)
COMMENT, HOLDF: NORMAL
CREAT SERPL-MCNC: 3.7 MG/DL (ref 0.7–1.3)
DIFFERENTIAL METHOD BLD: ABNORMAL
EOSINOPHIL # BLD: 0.3 K/UL (ref 0–0.4)
EOSINOPHIL NFR BLD: 4 % (ref 0–7)
ERYTHROCYTE [DISTWIDTH] IN BLOOD BY AUTOMATED COUNT: 13.7 % (ref 11.5–14.5)
FERRITIN SERPL-MCNC: 440 NG/ML (ref 26–388)
GLUCOSE BLD STRIP.AUTO-MCNC: 111 MG/DL (ref 65–100)
GLUCOSE BLD STRIP.AUTO-MCNC: 123 MG/DL (ref 65–100)
GLUCOSE BLD STRIP.AUTO-MCNC: 127 MG/DL (ref 65–100)
GLUCOSE BLD STRIP.AUTO-MCNC: 171 MG/DL (ref 65–100)
GLUCOSE SERPL-MCNC: 118 MG/DL (ref 65–100)
HCT VFR BLD AUTO: 28.8 % (ref 36.6–50.3)
HGB BLD-MCNC: 9.7 G/DL (ref 12.1–17)
IMM GRANULOCYTES # BLD AUTO: 0 K/UL (ref 0–0.04)
IMM GRANULOCYTES NFR BLD AUTO: 0 % (ref 0–0.5)
IRON SATN MFR SERPL: 52 % (ref 20–50)
IRON SERPL-MCNC: 96 UG/DL (ref 35–150)
LYMPHOCYTES # BLD: 1.5 K/UL (ref 0.8–3.5)
LYMPHOCYTES NFR BLD: 19 % (ref 12–49)
MCH RBC QN AUTO: 28.8 PG (ref 26–34)
MCHC RBC AUTO-ENTMCNC: 33.7 G/DL (ref 30–36.5)
MCV RBC AUTO: 85.5 FL (ref 80–99)
MONOCYTES # BLD: 0.7 K/UL (ref 0–1)
MONOCYTES NFR BLD: 9 % (ref 5–13)
NEUTS SEG # BLD: 5.5 K/UL (ref 1.8–8)
NEUTS SEG NFR BLD: 67 % (ref 32–75)
NRBC # BLD: 0 K/UL (ref 0–0.01)
NRBC BLD-RTO: 0 PER 100 WBC
PHOSPHATE SERPL-MCNC: 5 MG/DL (ref 2.6–4.7)
PLATELET # BLD AUTO: 211 K/UL (ref 150–400)
PMV BLD AUTO: 10.9 FL (ref 8.9–12.9)
POTASSIUM SERPL-SCNC: 4.3 MMOL/L (ref 3.5–5.1)
PTH-INTACT SERPL-MCNC: 404.1 PG/ML (ref 18.4–88)
RBC # BLD AUTO: 3.37 M/UL (ref 4.1–5.7)
SAMPLES BEING HELD,HOLD: NORMAL
SERVICE CMNT-IMP: ABNORMAL
SODIUM SERPL-SCNC: 139 MMOL/L (ref 136–145)
TIBC SERPL-MCNC: 184 UG/DL (ref 250–450)
WBC # BLD AUTO: 8.1 K/UL (ref 4.1–11.1)

## 2020-12-05 PROCEDURE — 80069 RENAL FUNCTION PANEL: CPT

## 2020-12-05 PROCEDURE — 36415 COLL VENOUS BLD VENIPUNCTURE: CPT

## 2020-12-05 PROCEDURE — 82962 GLUCOSE BLOOD TEST: CPT

## 2020-12-05 PROCEDURE — 74011250637 HC RX REV CODE- 250/637: Performed by: INTERNAL MEDICINE

## 2020-12-05 PROCEDURE — 85025 COMPLETE CBC W/AUTO DIFF WBC: CPT

## 2020-12-05 PROCEDURE — 82306 VITAMIN D 25 HYDROXY: CPT

## 2020-12-05 PROCEDURE — 83970 ASSAY OF PARATHORMONE: CPT

## 2020-12-05 PROCEDURE — 74011250636 HC RX REV CODE- 250/636: Performed by: INTERNAL MEDICINE

## 2020-12-05 PROCEDURE — 82728 ASSAY OF FERRITIN: CPT

## 2020-12-05 PROCEDURE — 65660000000 HC RM CCU STEPDOWN

## 2020-12-05 PROCEDURE — 74011636637 HC RX REV CODE- 636/637: Performed by: INTERNAL MEDICINE

## 2020-12-05 PROCEDURE — 83540 ASSAY OF IRON: CPT

## 2020-12-05 RX ORDER — CARVEDILOL 6.25 MG/1
6.25 TABLET ORAL 2 TIMES DAILY WITH MEALS
Status: DISCONTINUED | OUTPATIENT
Start: 2020-12-05 | End: 2020-12-06 | Stop reason: HOSPADM

## 2020-12-05 RX ORDER — DIPHENHYDRAMINE HCL 25 MG
25 CAPSULE ORAL
Status: DISCONTINUED | OUTPATIENT
Start: 2020-12-05 | End: 2020-12-06 | Stop reason: HOSPADM

## 2020-12-05 RX ADMIN — CARVEDILOL 6.25 MG: 6.25 TABLET, FILM COATED ORAL at 12:00

## 2020-12-05 RX ADMIN — DIPHENHYDRAMINE HYDROCHLORIDE 25 MG: 25 CAPSULE ORAL at 21:30

## 2020-12-05 RX ADMIN — Medication 10 ML: at 21:23

## 2020-12-05 RX ADMIN — ONDANSETRON 4 MG: 2 INJECTION INTRAMUSCULAR; INTRAVENOUS at 11:54

## 2020-12-05 RX ADMIN — HUMAN INSULIN 5 UNITS: 100 INJECTION, SUSPENSION SUBCUTANEOUS at 09:18

## 2020-12-05 RX ADMIN — SUCRALFATE 1 G: 1 TABLET ORAL at 17:47

## 2020-12-05 RX ADMIN — AMLODIPINE BESYLATE 10 MG: 5 TABLET ORAL at 09:18

## 2020-12-05 RX ADMIN — SUCRALFATE 1 G: 1 TABLET ORAL at 09:18

## 2020-12-05 RX ADMIN — CARVEDILOL 6.25 MG: 6.25 TABLET, FILM COATED ORAL at 17:47

## 2020-12-05 RX ADMIN — PANTOPRAZOLE SODIUM 40 MG: 40 TABLET, DELAYED RELEASE ORAL at 09:18

## 2020-12-05 RX ADMIN — ONDANSETRON 4 MG: 2 INJECTION INTRAMUSCULAR; INTRAVENOUS at 05:27

## 2020-12-05 RX ADMIN — NITROGLYCERIN 1 INCH: 20 OINTMENT TOPICAL at 09:18

## 2020-12-05 RX ADMIN — LEVOTHYROXINE SODIUM 200 MCG: 200 TABLET ORAL at 05:23

## 2020-12-05 RX ADMIN — HUMAN INSULIN 5 UNITS: 100 INJECTION, SUSPENSION SUBCUTANEOUS at 17:48

## 2020-12-05 RX ADMIN — SEVELAMER CARBONATE 800 MG: 800 TABLET, FILM COATED ORAL at 12:00

## 2020-12-05 RX ADMIN — Medication 10 ML: at 05:23

## 2020-12-05 RX ADMIN — ONDANSETRON 4 MG: 2 INJECTION INTRAMUSCULAR; INTRAVENOUS at 21:23

## 2020-12-05 RX ADMIN — NITROGLYCERIN 0.5 INCH: 20 OINTMENT TOPICAL at 21:23

## 2020-12-05 RX ADMIN — SEVELAMER CARBONATE 800 MG: 800 TABLET, FILM COATED ORAL at 17:47

## 2020-12-05 RX ADMIN — NITROGLYCERIN 0.5 INCH: 20 OINTMENT TOPICAL at 17:49

## 2020-12-05 RX ADMIN — ATORVASTATIN CALCIUM 10 MG: 10 TABLET, FILM COATED ORAL at 21:23

## 2020-12-05 RX ADMIN — SEVELAMER CARBONATE 800 MG: 800 TABLET, FILM COATED ORAL at 09:17

## 2020-12-05 NOTE — PROGRESS NOTES
Hospitalist Progress Note    NAME: Carolin Ragsdale   :  1960   MRN:  [id number]       Assessment / Plan:  NERY on CKD3, recent off HD  Anemia of CKD  Nausea and vomiting, resolved  NAGMA  Likely from volume depletion and lasix use. No new medications. He has poor po intake d/t n/v.  N/v resolved  Renal US with no hydronephrosis. Cr improving with IVF. Bicarb gtt as per nephro, appreciate their recs  Renal US with no hydronephrosis. Avoid nephrotoxic agents. Holding home bumexand acei  Possible discharge tomorrow     HTN/HLD  Chronic diastolic HF, not in exacerbation. Monitor vol status  Hold bumex and acei  Cont' norvasc and statin  Coreg added, nitro decreased    T2DM  Cont' lower dose of NPH, titrate prn  Cont' SSI     Hypothyroidism, cont' synthroid  Hx of thyroid cancer, follows with Dr Rex Horan  Adrenal adenoma     Code Status: Full   Surrogate Decision Maker: pt's daughter Patience Luo   DVT Prophylaxis: heparin  GI Prophylaxis: not indicated     Baseline: from home         Subjective:     Chief Complaint / Reason for Physician Visit  Pt awake inbed, NAD. Discussed with RN events overnight. Review of Systems:  Symptom Y/N Comments  Symptom Y/N Comments   Fever/Chills    Chest Pain     Poor Appetite    Edema     Cough    Abdominal Pain     Sputum    Joint Pain     SOB/NUÑEZ    Pruritis/Rash     Nausea/vomit    Tolerating PT/OT     Diarrhea    Tolerating Diet     Constipation    Other       Could NOT obtain due to:      Objective:     VITALS:   Last 24hrs VS reviewed since prior progress note.  Most recent are:  Patient Vitals for the past 24 hrs:   Temp Pulse Resp BP SpO2   20 1115 98.3 °F (36.8 °C) 85 20 (!) 154/60 96 %   20 0833 98.3 °F (36.8 °C) 91 20 (!) 171/75 98 %   20 0327 97.8 °F (36.6 °C) 83 20 (!) 164/78 99 %   20 2227 97.8 °F (36.6 °C) 89 20 137/66 97 %   20 1919 97.5 °F (36.4 °C) 87 18 139/67 99 %   20 1544 97.8 °F (36.6 °C) 83 17 (!) 163/66 99 %       Intake/Output Summary (Last 24 hours) at 12/5/2020 1249  Last data filed at 12/5/2020 1123  Gross per 24 hour   Intake 950 ml   Output 2875 ml   Net -1925 ml        I had a face to face encounter and independently examined this patient on 12/5/2020, as outlined below:  PHYSICAL EXAM:  General: WD, WN. Alert, cooperative, no acute distress    EENT:  EOMI. Anicteric sclerae. MMM  Resp:  CTA bilaterally, no wheezing or rales. No accessory muscle use  CV:  Regular  rhythm,  No edema  GI:  Soft, Non distended, Non tender. +BA  Neurologic:  Alert and oriented X 3, normal speech  Psych:   Good insight. Not anxious nor agitated  Skin:  No rashes. No jaundice    Reviewed most current lab test results and cultures  YES  Reviewed most current radiology test results   YES  Review and summation of old records today    NO  Reviewed patient's current orders and MAR    YES  PMH/ reviewed - no change compared to H&P  ________________________________________________________________________  Care Plan discussed with:    Comments   Patient x    Family      RN x    Care Manager     Consultant                        Multidiciplinary team rounds were held today with , nursing, pharmacist and clinical coordinator. Patient's plan of care was discussed; medications were reviewed and discharge planning was addressed. ________________________________________________________________________  Total NON critical care TIME:  35   Minutes    Total CRITICAL CARE TIME Spent:   Minutes non procedure based      Comments   >50% of visit spent in counseling and coordination of care     ________________________________________________________________________  Houston Peers, MD     Procedures: see electronic medical records for all procedures/Xrays and details which were not copied into this note but were reviewed prior to creation of Plan. LABS:  I reviewed today's most current labs and imaging studies.   Pertinent labs include:  Recent Labs     12/05/20 0417 12/04/20  0654 12/03/20  1108   WBC 8.1 8.6 8.5   HGB 9.7* 10.7* 10.7*   HCT 28.8* 32.0* 32.8*    241 254     Recent Labs     12/05/20 0417 12/04/20  0654 12/03/20  1108    140 136   K 4.3 4.6 4.7    113* 111*   CO2 25 17* 15*   * 102* 157*   * 138* 156*   CREA 3.70* 4.17* 5.03*   CA 7.5*  8.0* 8.3* 7.7*   PHOS 5.0* 6.7*  --    ALB 3.2* 3.4* 3.5   TBILI  --   --  0.2   ALT  --   --  54       Signed: Uma Jolly MD

## 2020-12-05 NOTE — PROGRESS NOTES
Bedside shift change report given to Blu Araya RN (oncoming nurse) by Pamella Schaumann, RN (offgoing nurse). Report included the following information SBAR, Kardex, ED Summary, Intake/Output, MAR and Recent Results.

## 2020-12-05 NOTE — PROGRESS NOTES
Bedside shift change report given to Coleman Rosas (oncoming nurse) by Susannah Mantilla RN (offgoing nurse). Report included the following information SBAR, Kardex, Procedure Summary, Intake/Output, MAR and Recent Results.

## 2020-12-05 NOTE — PROGRESS NOTES
Bedside and Verbal shift change report given to Juany Nick (oncoming nurse) by Corrie Zhang (offgoing nurse). Report included the following information SBAR, Kardex, MAR, Recent Results and Cardiac Rhythm NSR.

## 2020-12-05 NOTE — PROGRESS NOTES
Progress Note    Assessment:   Active Problems:    Renal failure (9/1/2020)        NERY on CKD4( baseline Cr: 2.9-3.2 Sep/2020)  NAGMA  Hyperphosphatemia  Anemia of CKD  GERD  HTN  DM2  hypothyroidism  HFpEF     Creat better  tCO2 ok on iv NaHCO3  TSAT ok  Good Uo    Given HA I :  - decrease ntg paste (tells me on it for bp not angina)  - added coreg - may need more  - could dc ntg tomorrow if bp ok    Check PTH, 25OH VitD       Back monday            Plan:        Time spent with patient during dialysis no      Subjective:       Complaint:  Ha due to NTG.  no NV or sob. freq bm continue      Current Facility-Administered Medications   Medication Dose Route Frequency    carvediloL (COREG) tablet 6.25 mg  6.25 mg Oral BID WITH MEALS    nitroglycerin (NITROBID) 2 % ointment 0.5 Inch  0.5 Inch Topical TID    amLODIPine (NORVASC) tablet 10 mg  10 mg Oral DAILY    hydrALAZINE (APRESOLINE) 20 mg/mL injection 10 mg  10 mg IntraVENous Q6H PRN    sevelamer carbonate (RENVELA) tab 800 mg  800 mg Oral TID WITH MEALS    sodium chloride (NS) flush 5-40 mL  5-40 mL IntraVENous Q8H    sodium chloride (NS) flush 5-40 mL  5-40 mL IntraVENous PRN    acetaminophen (TYLENOL) tablet 650 mg  650 mg Oral Q6H PRN    Or    acetaminophen (TYLENOL) suppository 650 mg  650 mg Rectal Q6H PRN    polyethylene glycol (MIRALAX) packet 17 g  17 g Oral DAILY PRN    promethazine (PHENERGAN) tablet 12.5 mg  12.5 mg Oral Q6H PRN    Or    ondansetron (ZOFRAN) injection 4 mg  4 mg IntraVENous Q6H PRN    levothyroxine (SYNTHROID) tablet 200 mcg  200 mcg Oral 6am    sucralfate (CARAFATE) tablet 1 g  1 g Oral ACB&D    atorvastatin (LIPITOR) tablet 10 mg  10 mg Oral QHS    pantoprazole (PROTONIX) tablet 40 mg  40 mg Oral ACB    ergocalciferol capsule 50,000 Units  50,000 Units Oral Q7D    insulin lispro (HUMALOG) injection   SubCUTAneous AC&HS    glucose chewable tablet 16 g  4 Tab Oral PRN    dextrose (D50W) injection syrg 12.5-25 g 12.5-25 g IntraVENous PRN    glucagon (GLUCAGEN) injection 1 mg  1 mg IntraMUSCular PRN    sodium bicarbonate (8.4%) 150 mEq in sterile water 1,000 mL infusion   IntraVENous CONTINUOUS    insulin NPH (NOVOLIN N, HUMULIN N) injection 5 Units  5 Units SubCUTAneous ACB&D       Review of Systems  Pertinent items are noted in HPI.     Objective:     Visit Vitals  BP (!) 154/60 (BP 1 Location: Right arm, BP Patient Position: At rest) Comment: checked twice   Pulse 85   Temp 98.3 °F (36.8 °C)   Resp 20   Ht 5' 9\" (1.753 m)   Wt 97.4 kg (214 lb 11.7 oz)   SpO2 96%   BMI 31.71 kg/m²     Temp (24hrs), Av.9 °F (36.6 °C), Min:97.5 °F (36.4 °C), Max:98.3 °F (36.8 °C)      701 -  1900  In: -   Out: 347 [Urine:675]    Physical Exam:    General [    ] healthy     [x] acutely ill [ x   ] chronically ill   [    ] critical      Skin  [x] Nl color/turgor [    ]   Eyes  [x] EOMI [    ]    ENT  [x] clear/moist [    ]     Neck  [x] supple  [    ]    Pulm  [x] breathing not labored[    ]    CV  [] RRR  [    ]   ABD  [] Soft  [    ]      [] Day  [    ]    MS  [no LE] Edema  [    ]     Neuro             [x] nonfocal  [    ]    Psyche           [x] Nl   [    ]       Data Review:     LABS:   Recent Results (from the past 24 hour(s))   GLUCOSE, POC    Collection Time: 20  4:25 PM   Result Value Ref Range    Glucose (POC) 139 (H) 65 - 100 mg/dL    Performed by Daylin Banegas PCT    GLUCOSE, POC    Collection Time: 20  8:47 PM   Result Value Ref Range    Glucose (POC) 163 (H) 65 - 100 mg/dL    Performed by Leonel Chang PCT    RENAL FUNCTION PANEL    Collection Time: 20  4:17 AM   Result Value Ref Range    Sodium 139 136 - 145 mmol/L    Potassium 4.3 3.5 - 5.1 mmol/L    Chloride 107 97 - 108 mmol/L    CO2 25 21 - 32 mmol/L    Anion gap 7 5 - 15 mmol/L    Glucose 118 (H) 65 - 100 mg/dL     (H) 6 - 20 MG/DL    Creatinine 3.70 (H) 0.70 - 1.30 MG/DL    BUN/Creatinine ratio 34 (H) 12 - 20      GFR est AA 21 (L) >60 ml/min/1.73m2    GFR est non-AA 17 (L) >60 ml/min/1.73m2    Calcium 8.0 (L) 8.5 - 10.1 MG/DL    Phosphorus 5.0 (H) 2.6 - 4.7 MG/DL    Albumin 3.2 (L) 3.5 - 5.0 g/dL   CBC WITH AUTOMATED DIFF    Collection Time: 12/05/20  4:17 AM   Result Value Ref Range    WBC 8.1 4.1 - 11.1 K/uL    RBC 3.37 (L) 4.10 - 5.70 M/uL    HGB 9.7 (L) 12.1 - 17.0 g/dL    HCT 28.8 (L) 36.6 - 50.3 %    MCV 85.5 80.0 - 99.0 FL    MCH 28.8 26.0 - 34.0 PG    MCHC 33.7 30.0 - 36.5 g/dL    RDW 13.7 11.5 - 14.5 %    PLATELET 387 576 - 009 K/uL    MPV 10.9 8.9 - 12.9 FL    NRBC 0.0 0  WBC    ABSOLUTE NRBC 0.00 0.00 - 0.01 K/uL    NEUTROPHILS 67 32 - 75 %    LYMPHOCYTES 19 12 - 49 %    MONOCYTES 9 5 - 13 %    EOSINOPHILS 4 0 - 7 %    BASOPHILS 1 0 - 1 %    IMMATURE GRANULOCYTES 0 0.0 - 0.5 %    ABS. NEUTROPHILS 5.5 1.8 - 8.0 K/UL    ABS. LYMPHOCYTES 1.5 0.8 - 3.5 K/UL    ABS. MONOCYTES 0.7 0.0 - 1.0 K/UL    ABS. EOSINOPHILS 0.3 0.0 - 0.4 K/UL    ABS. BASOPHILS 0.1 0.0 - 0.1 K/UL    ABS. IMM. GRANS. 0.0 0.00 - 0.04 K/UL    DF AUTOMATED     PTH INTACT    Collection Time: 12/05/20  4:17 AM   Result Value Ref Range    Calcium 7.5 (L) 8.5 - 10.1 MG/DL    PTH, Intact 404.1 (H) 18.4 - 88.0 pg/mL   IRON PROFILE    Collection Time: 12/05/20  4:17 AM   Result Value Ref Range    Iron 96 35 - 150 ug/dL    TIBC 184 (L) 250 - 450 ug/dL    Iron % saturation 52 (H) 20 - 50 %   FERRITIN    Collection Time: 12/05/20  4:17 AM   Result Value Ref Range    Ferritin 440 (H) 26 - 388 NG/ML   VITAMIN D, 25 HYDROXY    Collection Time: 12/05/20  4:17 AM   Result Value Ref Range    Vitamin D 25-Hydroxy 44.1 30 - 100 ng/mL   SAMPLES BEING HELD    Collection Time: 12/05/20  4:17 AM   Result Value Ref Range    SAMPLES BEING HELD  PST     COMMENT        Add-on orders for these samples will be processed based on acceptable specimen integrity and analyte stability, which may vary by analyte.    GLUCOSE, POC    Collection Time: 12/05/20  8:36 AM   Result Value Ref Range    Glucose (POC) 127 (H) 65 - 100 mg/dL    Performed by Vinh Car (CON)    GLUCOSE, POC    Collection Time: 12/05/20 11:21 AM   Result Value Ref Range    Glucose (POC) 111 (H) 65 - 100 mg/dL    Performed by [address] [place] [address] (CON) PCT                  Signed By: Matthew Anderson MD, LAURENCE                      December 5, 2020                      [email or web address]

## 2020-12-05 NOTE — PROGRESS NOTES
Nephrology Progress Note     José Miguel Butt     www. [facility] e[facility]              Phone - [phone]   Patient: Diane Chavez   YOB: 1960    Date- 12/4/2020  MRN: [id number]             F/u NERY on CKD4  ADMIT DATE:12/3/2020 PATIENT PCP:Alec Prado MD     IMPRESSION/PLAN   NERY on CKD4( baseline Cr: 2.9-3.2 Sep/2020)  NAGMA  Hyperphosphatemia  Anemia of CKD  GERD  HTN  DM2  hypothyroidism  HFpEF    NERY and acidosis Improving with IV bicarb drip, continue  High phosph, start Sevelamer  Check PTH, 25OH VitD  Renal US no hydro  Monitor UOP  No need for EPO  Check iron profile    Will follow                Active Problems:    Renal failure (9/1/2020)        [x] High complexity decision making was performed  [x] Patient is at high-risk of decompensation with multiple organ involvement    Subjective:   Better from N/V  Urinating well      Review of Systems:       A 11 point review of system was performed today. Pertinent positives and negatives are mentioned in the HPI. The reminder of the ROS is negative and noncontributory.     Past Medical History:   Diagnosis Date    Adverse effect of anesthesia     \" STOP BREATHING 1 TIME C ANESTH\"    Cancer ([address] 2004    thyroid cancer    Chronic kidney disease     Chronic pain     BACK SHOULDER AND ARM    Depression     Diabetes ([address]     Encounter for long-term (current) use of NSAIDs     Hypercholesterolemia     Hypertension     Nausea & vomiting     Other ill-defined conditions(799.89)     cholesterol, thyroid    Sleep apnea     doesn't wear cpap    Thyroid cancer ([address]     TIA (transient ischemic attack) 2011    Vitamin D deficiency       Past Surgical History:   Procedure Laterality Date    CARDIAC SURG PROCEDURE UNLIST      HX HEENT      THROAT SURGERY X 4    HX ORTHOPAEDIC      back     HX ORTHOPAEDIC      ARM AND SHOULDER    HX OTHER SURGICAL      thyroid, lymphnode    HX RETINAL DETACHMENT REPAIR      left eye  IR INSERT NON TUNL CVC OVER 5 YRS  8/18/2020    IR INSERT TUNL CVC W/O PORT OVER 5 YR  8/26/2020    UPPER GI ENDOSCOPY,BALL DIL,30MM  7/17/2020         UPPER GI ENDOSCOPY,BIOPSY  7/17/2020         VASCULAR SURGERY PROCEDURE UNLIST      cardiac cath NEG. Prior to Admission medications    Medication Sig Start Date End Date Taking? Authorizing Provider   metoclopramide HCl (REGLAN) 5 mg tablet TAKE 1 TABLET BY MOUTH ONCE DAILY AS NEEDED. DO NOT TAKE MORE THAN 3 TABLETS A DAY. 12/1/20   Esthela Prado MD   bumetanide (BUMEX) 2 mg tablet TAKE 1 TABLET BY MOUTH TWICE DAILY 9/24/20   Provider, Historical   Vitamin D2 1,250 mcg (50,000 unit) capsule TAKE 1 CAPSULE BY MOUTH ONCE A WEEK 9/29/20   Provider, Historical   sucralfate (CARAFATE) 1 gram tablet  9/30/20   Provider, Historical   levothyroxine (SYNTHROID) 200 mcg tablet TAKE 1 TABLET BY MOUTH ONCE DAILY BEFORE BREAKFAST 11/18/20   Arnulfo Herrera MD   omeprazole (PRILOSEC) 20 mg capsule Take 1 Cap by mouth daily. 10/9/20   Esthela Prado MD   amLODIPine (NORVASC) 5 mg tablet Take 1 Tab by mouth daily. 9/29/20   Esthela Prado MD   docusate sodium (DOK) 250 mg capsule Take 1 Cap by mouth daily for 90 days. 9/27/20 12/26/20  Esthela Prado MD   olmesartan (BENICAR) 20 mg tablet Take 1 Tab by mouth daily. 9/23/20   Esthela Prado MD   furosemide (LASIX) 20 mg tablet Take 1 Tab by mouth daily. 9/23/20   Esthela Prado MD   acetaminophen (TYLENOL) 325 mg tablet Take 325 mg by mouth as needed for Pain. Take tab as needed for pain 9/15/20   Esthela Prado MD   OXYGEN-AIR DELIVERY SYSTEMS Take 2.5 L/min by inhalation continuous. 8/29/20   Provider, Historical   Blood-Glucose Meter monitoring kit 1 preferred brand glucometer for checking home glucose, E11.22 7/30/20   Pito Redmond MD   glucose blood VI test strips (blood glucose test) strip Pharmacist to choose preferred meter and strips.   Dx:E11.65, Z79.4 Monitor 3 times daily 7/30/20   Pito Redmond MD   insulin NPH (HumuLIN N NPH U-100 Insulin) 100 unit/mL injection 2-10 units daily  Patient taking differently: 2-10 units daily    He is doing 10 units BID as of last few months 2020   Bridget Lomax MD   Insulin Syringe-Needle U-100 (Advocate Syringes) 0.3 mL 30 gauge x 5/16\" syrg 1 Each by Does Not Apply route daily. 20   Bridget Lomax MD   simvastatin (ZOCOR) 20 mg tablet Take 1 Tab by mouth nightly. 20   Jay Prado MD   Ventolin HFA 90 mcg/actuation inhaler TAKE 1-2 PUFFS EVEERY 4-6 HOURS AS NEEDED FOR DYSPNEA AND WHEEZING 20   Jay Prado MD   glucose blood VI test strips (PHARMACIST CHOICE) strip One Touch  Check glucose 3-4 times daily. DX E11.22 18   Anibal Claire MD   Lancets misc Use preferred brand;  Check glucose 3-4 times daily, Diagnosis E11.22 18   Anibal Claire MD     Allergies   Allergen Reactions    Anesthetics - Amide Type - Select Amino Amides Shortness of Breath    Flexeril [Cyclobenzaprine] Hives    Tramadol Hives      Social History     Tobacco Use    Smoking status: Former Smoker     Last attempt to quit: 1994     Years since quittin.3    Smokeless tobacco: Never Used   Substance Use Topics    Alcohol use: Yes     Comment: Occasionally      Family History   Problem Relation Age of Onset    Diabetes Mother     Elevated Lipids Mother    Odgonzález Lota Bladder Disease Mother     Headache Mother    [facility] Migraines Mother     Heart Disease Mother     Hypertension Mother     Stroke Mother     Other Mother         ANEURSYM BRAIN    Bleeding Prob Father     Cancer Father         LEUKEMIA    Diabetes Father     Elevated Lipids Father     Mental Retardation Sister     Psychiatric Disorder Sister     Cancer Brother         LUNGS        Objective:      Patient Vitals for the past 24 hrs:   Temp Pulse Resp BP SpO2   20 1919 97.5 °F (36.4 °C) 87 18 139/67 99 %   20 1544 97.8 °F (36.6 °C) 83 17 (!) 163/66 99 %   20 1135 97.9 °F (36.6 °C) 85 17 (!) 182/68 96 %   12/04/20 0748 98.4 °F (36.9 °C) 90 17 (!) 177/66 99 %   12/04/20 0312 97.7 °F (36.5 °C) 80 17 (!) 152/66 100 %   12/03/20 2258 97.6 °F (36.4 °C) 87 17 (!) 162/68 98 %     12/04 1901 - 12/05 0700  In: -   Out: 300 [Urine:300]  Physical Exam:  General:AAOx3, No distress  Eyes:No scleral icterus, No conjunctival pallor  Neck:Supple,no JVD  Lungs:normal respiratory effort  CVS:RRR  Abdomen:Soft, Non tender\  Extremities: no LE edema  Skin:No rash or lesions, Warm and DRY   Psych: appropriate affect    NEURO: Non focal     CODE STATUS:  full  Care Plan discussed with:  Pt, daughter  Chart reviewed.    y Reviewed previous records   y Discussion with patient and/or family and questions answered         Lab Data Personally Reviewed: (see below)    Lab Results   Component Value Date/Time    Color YELLOW/STRAW 12/03/2020 10:58 AM    Appearance CLEAR 12/03/2020 10:58 AM    Specific gravity 1.010 12/03/2020 10:58 AM    pH (UA) 5.0 12/03/2020 10:58 AM    Protein 100 (A) 12/03/2020 10:58 AM    Glucose Negative 12/03/2020 10:58 AM    Ketone Negative 12/03/2020 10:58 AM    Bilirubin Negative 12/03/2020 10:58 AM    Urobilinogen 0.2 12/03/2020 10:58 AM    Nitrites Negative 12/03/2020 10:58 AM    Leukocyte Esterase Negative 12/03/2020 10:58 AM    Epithelial cells FEW 12/03/2020 10:58 AM    Bacteria Negative 12/03/2020 10:58 AM    WBC 0-4 12/03/2020 10:58 AM    RBC 0-5 12/03/2020 10:58 AM                    Imaging:    Medications list Personally Reviewed   [x]      Yes     []               No    Thank you for allowing us to participate in the care this patient. We will follow patient with you.   Signed By: Brant Euceda [facility]  [facility] [address], [address]  [place], [address]  Phone - [phone]         Fax - [phone] [facility] Office  [address] [address]  Phone - [phone]        Fax - [phone] [email or web address]

## 2020-12-06 VITALS
HEIGHT: 69 IN | BODY MASS INDEX: 31.54 KG/M2 | WEIGHT: 212.96 LBS | TEMPERATURE: 97.8 F | SYSTOLIC BLOOD PRESSURE: 177 MMHG | RESPIRATION RATE: 18 BRPM | HEART RATE: 72 BPM | OXYGEN SATURATION: 98 % | DIASTOLIC BLOOD PRESSURE: 76 MMHG

## 2020-12-06 LAB
ALBUMIN SERPL-MCNC: 3.1 G/DL (ref 3.5–5)
ANION GAP SERPL CALC-SCNC: 5 MMOL/L (ref 5–15)
BASOPHILS # BLD: 0.1 K/UL (ref 0–0.1)
BASOPHILS NFR BLD: 1 % (ref 0–1)
BUN SERPL-MCNC: 101 MG/DL (ref 6–20)
BUN/CREAT SERPL: 27 (ref 12–20)
CALCIUM SERPL-MCNC: 8.2 MG/DL (ref 8.5–10.1)
CHLORIDE SERPL-SCNC: 108 MMOL/L (ref 97–108)
CO2 SERPL-SCNC: 26 MMOL/L (ref 21–32)
CREAT SERPL-MCNC: 3.69 MG/DL (ref 0.7–1.3)
DIFFERENTIAL METHOD BLD: ABNORMAL
EOSINOPHIL # BLD: 0.3 K/UL (ref 0–0.4)
EOSINOPHIL NFR BLD: 4 % (ref 0–7)
ERYTHROCYTE [DISTWIDTH] IN BLOOD BY AUTOMATED COUNT: 13.6 % (ref 11.5–14.5)
GLUCOSE BLD STRIP.AUTO-MCNC: 95 MG/DL (ref 65–100)
GLUCOSE SERPL-MCNC: 108 MG/DL (ref 65–100)
HCT VFR BLD AUTO: 28.6 % (ref 36.6–50.3)
HGB BLD-MCNC: 9.5 G/DL (ref 12.1–17)
IMM GRANULOCYTES # BLD AUTO: 0 K/UL (ref 0–0.04)
IMM GRANULOCYTES NFR BLD AUTO: 0 % (ref 0–0.5)
LYMPHOCYTES # BLD: 1.6 K/UL (ref 0.8–3.5)
LYMPHOCYTES NFR BLD: 20 % (ref 12–49)
MCH RBC QN AUTO: 29 PG (ref 26–34)
MCHC RBC AUTO-ENTMCNC: 33.2 G/DL (ref 30–36.5)
MCV RBC AUTO: 87.2 FL (ref 80–99)
MONOCYTES # BLD: 0.8 K/UL (ref 0–1)
MONOCYTES NFR BLD: 10 % (ref 5–13)
NEUTS SEG # BLD: 5.4 K/UL (ref 1.8–8)
NEUTS SEG NFR BLD: 65 % (ref 32–75)
NRBC # BLD: 0 K/UL (ref 0–0.01)
NRBC BLD-RTO: 0 PER 100 WBC
PHOSPHATE SERPL-MCNC: 5 MG/DL (ref 2.6–4.7)
PLATELET # BLD AUTO: 185 K/UL (ref 150–400)
PMV BLD AUTO: 10.6 FL (ref 8.9–12.9)
POTASSIUM SERPL-SCNC: 4.8 MMOL/L (ref 3.5–5.1)
PROT SERPL-MCNC: 6.7 G/DL (ref 6.4–8.2)
RBC # BLD AUTO: 3.28 M/UL (ref 4.1–5.7)
SERVICE CMNT-IMP: NORMAL
SODIUM SERPL-SCNC: 139 MMOL/L (ref 136–145)
WBC # BLD AUTO: 8.1 K/UL (ref 4.1–11.1)

## 2020-12-06 PROCEDURE — 85025 COMPLETE CBC W/AUTO DIFF WBC: CPT

## 2020-12-06 PROCEDURE — 84100 ASSAY OF PHOSPHORUS: CPT

## 2020-12-06 PROCEDURE — 74011250637 HC RX REV CODE- 250/637: Performed by: INTERNAL MEDICINE

## 2020-12-06 PROCEDURE — 82962 GLUCOSE BLOOD TEST: CPT

## 2020-12-06 PROCEDURE — 82040 ASSAY OF SERUM ALBUMIN: CPT

## 2020-12-06 PROCEDURE — 80048 BASIC METABOLIC PNL TOTAL CA: CPT

## 2020-12-06 PROCEDURE — 74011636637 HC RX REV CODE- 636/637: Performed by: INTERNAL MEDICINE

## 2020-12-06 PROCEDURE — 36415 COLL VENOUS BLD VENIPUNCTURE: CPT

## 2020-12-06 PROCEDURE — 84155 ASSAY OF PROTEIN SERUM: CPT

## 2020-12-06 RX ORDER — SEVELAMER CARBONATE 800 MG/1
800 TABLET, FILM COATED ORAL
Qty: 90 TAB | Refills: 0 | Status: SHIPPED | OUTPATIENT
Start: 2020-12-06 | End: 2021-01-01 | Stop reason: SDUPTHER

## 2020-12-06 RX ORDER — CARVEDILOL 6.25 MG/1
6.25 TABLET ORAL 2 TIMES DAILY WITH MEALS
Qty: 60 TAB | Refills: 0 | Status: SHIPPED | OUTPATIENT
Start: 2020-12-06 | End: 2021-01-01 | Stop reason: SDUPTHER

## 2020-12-06 RX ADMIN — PANTOPRAZOLE SODIUM 40 MG: 40 TABLET, DELAYED RELEASE ORAL at 09:38

## 2020-12-06 RX ADMIN — SUCRALFATE 1 G: 1 TABLET ORAL at 09:37

## 2020-12-06 RX ADMIN — AMLODIPINE BESYLATE 10 MG: 5 TABLET ORAL at 09:39

## 2020-12-06 RX ADMIN — Medication 10 ML: at 05:15

## 2020-12-06 RX ADMIN — LEVOTHYROXINE SODIUM 200 MCG: 200 TABLET ORAL at 05:16

## 2020-12-06 RX ADMIN — NITROGLYCERIN 0.5 INCH: 20 OINTMENT TOPICAL at 09:38

## 2020-12-06 RX ADMIN — HUMAN INSULIN 5 UNITS: 100 INJECTION, SUSPENSION SUBCUTANEOUS at 09:37

## 2020-12-06 RX ADMIN — CARVEDILOL 6.25 MG: 6.25 TABLET, FILM COATED ORAL at 09:39

## 2020-12-06 RX ADMIN — SEVELAMER CARBONATE 800 MG: 800 TABLET, FILM COATED ORAL at 09:37

## 2020-12-06 NOTE — DISCHARGE SUMMARY
Discharge Summary      Name: Patt Jorgensen  [id number]  YOB: 1960 (Age: 64 y.o.)   Date of Admission: 12/3/2020  Date of Discharge: 12/6/2020  Attending Physician: Jose Santana MD    Discharge Diagnosis:   NERY on CKD3, recent off HD  Anemia of CKD  Nausea and vomiting  NAGMA  HTN/HLD  Chronic diastolic HF  G7EL  Hypothyroidism  Hx of thyroid cancer  Adrenal adenoma    Consultations:  IP CONSULT TO NEPHROLOGY    Brief Admission History/Reason for Admission Per Jeffrey Ann MD:   Chelsea Lockhart is a 64 y.o.  male who presents with c/o nausea and vomiting associated with hyperkalemia and renal failure. Pt was told by his nephrologist to come to the ER for evaluation of abnormal cr and elevated K level. Pt states he has been having nausea and vomiting for several days. He remains on all his home meds to include bumex and acei. He denies associated fever, chills, cp, ,sob ,palpitations or diarrhea. No new medications recently.       We were asked to admit for work up and evaluation of the above problems. Brief Hospital Course by Main Problems:   NERY on CKD3, recent off HD  Anemia of CKD  Nausea and vomiting, resolved  NAGMA  Likely from volume depletion and lasix use.  No new medications.  He has poor po intake d/t n/v.  N/v resolved  Renal US with no hydronephrosis. Cr improved with IV hydration/IV bicarb  Renal US with no hydronephrosis. Avoid nephrotoxic agents.  Holding home bumexand acei. This can be resumed outpt once he has repeat bmp at outpt f/u appnt.     HTN/HLD  Chronic diastolic HF, not in exacerbation.  Monitor vol status  Hold bumex and acei  Cont' coreg, norvasc and statin     T2DM  SSI     Hypothyroidism, cont' synthroid  Hx of thyroid cancer, follows with Dr Maier Prost  Adrenal adenoma    Discharge Exam:  Patient seen and examined by me on discharge day.   Pertinent Findings:  Visit Vitals  BP (!) 177/76 (BP 1 Location: Left arm, BP Patient Position: At rest)   Pulse 72   Temp 97.8 °F (36.6 °C)   Resp 18   Ht 5' 9\" (1.753 m)   Wt 96.6 kg (212 lb 15.4 oz)   SpO2 98%   BMI 31.45 kg/m²     Gen:    Not in distress  Chest: Clear lungs  CVS:   Regular rhythm. No edema  Abd:  Soft, not distended, not tender    Discharge/Recent Laboratory Results:  Recent Labs     12/06/20  0256      K 4.8      CO2 26   *   *   CA 8.2*   PHOS 5.0*     Recent Labs     12/06/20  0256   HGB 9.5*   HCT 28.6*   WBC 8.1          Discharge Medications:  Current Discharge Medication List      START taking these medications    Details   carvediloL (COREG) 6.25 mg tablet Take 1 Tab by mouth two (2) times daily (with meals). Qty: 60 Tab, Refills: 0      sevelamer carbonate (RENVELA) 800 mg tab tab Take 1 Tab by mouth three (3) times daily (with meals). Qty: 90 Tab, Refills: 0         CONTINUE these medications which have NOT CHANGED    Details   metoclopramide HCl (REGLAN) 5 mg tablet TAKE 1 TABLET BY MOUTH ONCE DAILY AS NEEDED. DO NOT TAKE MORE THAN 3 TABLETS A DAY. Qty: 30 Tab, Refills: 0    Associated Diagnoses: Nausea in adult      Vitamin D2 1,250 mcg (50,000 unit) capsule TAKE 1 CAPSULE BY MOUTH ONCE A WEEK      sucralfate (CARAFATE) 1 gram tablet       levothyroxine (SYNTHROID) 200 mcg tablet TAKE 1 TABLET BY MOUTH ONCE DAILY BEFORE BREAKFAST  Qty: 90 Tab, Refills: 3      omeprazole (PRILOSEC) 20 mg capsule Take 1 Cap by mouth daily. Qty: 30 Cap, Refills: 3      amLODIPine (NORVASC) 5 mg tablet Take 1 Tab by mouth daily. Qty: 30 Tab, Refills: 3    Associated Diagnoses: Hypertension, well controlled      docusate sodium (DOK) 250 mg capsule Take 1 Cap by mouth daily for 90 days. Qty: 30 Cap, Refills: 2    Associated Diagnoses: Constipation due to opioid therapy      acetaminophen (TYLENOL) 325 mg tablet Take 325 mg by mouth as needed for Pain.  Take tab as needed for pain      OXYGEN-AIR DELIVERY SYSTEMS Take 2.5 L/min by inhalation continuous. Blood-Glucose Meter monitoring kit 1 preferred brand glucometer for checking home glucose, E11.22  Qty: 1 Kit, Refills: 0    Associated Diagnoses: Type 2 diabetes mellitus with stage 3 chronic kidney disease, with long-term current use of insulin ([facility]      ! ! glucose blood VI test strips (blood glucose test) strip Pharmacist to choose preferred meter and strips. Dx:E11.65, Z79.4 Monitor 3 times daily  Qty: 300 Strip, Refills: 3    Associated Diagnoses: Type 2 diabetes mellitus with stage 3 chronic kidney disease, with long-term current use of insulin ([facility])      insulin NPH (HumuLIN N NPH U-100 Insulin) 100 unit/mL injection 2-10 units daily  Qty: 3 Vial, Refills: 3    Associated Diagnoses: Type 2 diabetes mellitus with stage 3 chronic kidney disease, with long-term current use of insulin ([facility])      Insulin Syringe-Needle U-100 (Advocate Syringes) 0.3 mL 30 gauge x 5/16\" syrg 1 Each by Does Not Apply route daily. Qty: 90 Syringe, Refills: 3    Associated Diagnoses: Type 2 diabetes mellitus with stage 3 chronic kidney disease, with long-term current use of insulin ([facility])      simvastatin (ZOCOR) 20 mg tablet Take 1 Tab by mouth nightly. Qty: 90 Tab, Refills: 3    Associated Diagnoses: Dyslipidemia (high LDL; low HDL)      Ventolin HFA 90 mcg/actuation inhaler TAKE 1-2 PUFFS EVEERY 4-6 HOURS AS NEEDED FOR DYSPNEA AND WHEEZING  Qty: 1 Inhaler, Refills: 2      !! glucose blood VI test strips (PHARMACIST CHOICE) strip One Touch  Check glucose 3-4 times daily. DX E11.22  Qty: 300 Strip, Refills: 3      Lancets misc Use preferred brand; Check glucose 3-4 times daily, Diagnosis E11.22  Qty: 2 Package, Refills: 3       !! - Potential duplicate medications found. Please discuss with provider.       STOP taking these medications       bumetanide (BUMEX) 2 mg tablet Comments:   Reason for Stopping:         olmesartan (BENICAR) 20 mg tablet Comments:   Reason for Stopping:         furosemide (LASIX) 20 mg tablet Comments:   Reason for Stopping:               Patient Follow Up Instructions: Activity: Activity as tolerated  Diet: Diabetic Diet  Wound Care: None needed  Code: Full    DISPOSITION:    Home with Family: x   Home with HH/PT/OT/RN:    SNF/LTC:    MARION:    OTHER:          Follow up with:   PCP : Roxie Rojas MD  Follow-up Information     Follow up With Specialties Details Why Contact Info    Roxie Rojas MD Internal Medicine In 3 days  [address]  300 [facility], [address] [place] [address], MD Nephrology In 2 weeks  [address] [place] [address]   Unit   [address] [address]  [phone]              Total time in minutes spent coordinating this discharge (includes going over instructions, follow-up, prescriptions, and preparing report for sign off to her PCP) : 35  minutes

## 2020-12-06 NOTE — PROGRESS NOTES
Patient discharged home with family copy of discharge instructions, prescription send to pharmacy) and copy of discharge instructions given to patient prior discharge. IV removed. Patient send to discharge lobby via wheelchair. All cabinets checked for patient belongings.

## 2020-12-06 NOTE — PROGRESS NOTES
KIMMY  Discharge   Follow-Up Appointments   2nd IM     CM acknowledge discharge. Daughter at bedside and will provide transportation. Medicare pt has received, reviewed, and signed 2nd IM letter informing them of their right to appeal the discharge. Signed copied has been placed on pt bedside chart. No further CM needs identified.  CM notified pt's nurse of d/c.    Jony Mcghee, MSN, RN  Care Manager

## 2020-12-06 NOTE — PROGRESS NOTES
Bedside shift change report given to ESPERANZA Stanford (oncoming nurse) by Jett Chappell RN (offgoing nurse). Report included the following information SBAR, Kardex, ED Summary, Intake/Output, MAR and Recent Results.

## 2020-12-07 ENCOUNTER — PATIENT OUTREACH (OUTPATIENT)
Dept: CASE MANAGEMENT | Age: 60
End: 2020-12-07

## 2020-12-11 NOTE — PROGRESS NOTES
Physician Progress Note [facility] 
CSN #:                  [id number] :                       1960 ADMIT DATE:       12/3/2020 11:11 AM 
Fernando Booth DATE:        2020 12:15 PM 
RESPONDING 
PROVIDER #:        Batool Buckley MD 
 
 
 
 
QUERY TEXT: 
 
Dr. Sapna Mantilla: 
 
Patient admitted with Abnormal Cr and Elevated  K. Noted documentation of NERY on CKD3 in H&P by IM dated 12/3. Please document in progress notes and discharge summary: The medical record reflects the following: 
Risk Factors: Hx: CKD / HTN / HLD / DM/ 
Clinical Indicators: Bun/Cr: 156/5.03 ^ 138/4.17 ^ 124/3.70 ^ 101/3.69? ? Noted per Nephro: NERY on CKD4( baseline Cr: 2.9-3.2 Sep/2020). ...... Treatment: Start IV bicarb gtt; Renal US ordered; Avoid nephrotoxic agents. Holding home bumexand acei; F/u with urine lytes; Repeat bmp RIFLE  (BSV Approved) RISK:  Increased SCr x 1.5 or GFR decrease > 25% (within 7 days) INJURY:  Increased SCr x 2.0 or GFR decreased > 50% FAILURE:  Increased SCr x 3.0 or GFR decrease > 75% or SCr >4.0 mg/dL or acute increase >0.5 mg/dL LOSS:  Persistent acute renal failure = complete loss of kidney function > 4 weeks END STAGE:  End stage of kidney disease > 3 months AKIN 
 
STAGE  1:  Increase in SCr >/= 0.3 mg/dL or >/= 150% to 200% (1.5 to 2-fold) from baseline (within 48 hours) STAGE  2:  Increase in SCr to more than 200% to 300% (>2-3 fold) from baseline STAGE  3:  Increase in SCr to more than 300% (>3-fold) from baseline or SCr >/= 4.0 mg/dL with an acute increase of at least 0.5 mg/dL or initiation of renal replacement therapy Defined by Kidney Disease Improving Global Outcomes (KDIGO) clinical practice guideline for acute kidney injury: 
-Increase in SCr by greater than or equal to 0.3 mg/dl within 48 hours; or 
-Increase in SCr to greater than or equal to  1.5 times baseline, which is known or presumed to have occurred within the prior 7 days; or 
 -Urine volume < 0.5ml/kg/h for 6 hours Thank you, [facility] 
 Options provided: 
-- Acute kidney injury evidenced by 
-- Currently resolved acute kidney injury was evidenced by 
-- Acute kidney injury ruled out after study -- Other - I will add my own diagnosis -- Disagree - Not applicable / Not valid -- Disagree - Clinically unable to determine / Unknown 
-- Refer to Clinical Documentation Reviewer PROVIDER RESPONSE TEXT: 
 
See my note Query created by: Tammi Ramos on 12/11/2020 11:40 AM 
 
 
Electronically signed by:  Austen Yao MD 12/11/2020 11:45 AM

## 2020-12-21 NOTE — PROGRESS NOTES
Pt could not get his phone activated for virtual visit today. We will order a CT of the chest to be done and follow up in the office in January. Jarred Davison rescheduling pt. HERNANDEZ FROM DR Pineda Morris CT CHEST WO CONTRAST.

## 2020-12-28 NOTE — PROGRESS NOTES
Chief Complaint   Patient presents with    Transitions Of Care     hospital stay 12/3-12/6       1. Have you been to the ER, urgent care clinic since your last visit? Hospitalized since your last visit? Yes [facility] [id number] renal [facility]     2. Have you seen or consulted any other health care providers outside of the [address] [place] [address] since your last visit? Include any pap smears or colon screening.  No

## 2020-12-28 NOTE — PROGRESS NOTES
Chief Complaint   Patient presents with    Transitions Of Care     hospital stay 12/3-12/6     HPI:  Eugenie Melendez is a 64 y.o.  male with h/o hypertension, diabetes type 2, hypercholesterolemia, severe hypothyroidism, metastatic thyroid cancer presents for Transitions Of Care. Patient was admitted 12/3-12/6 with nausea and vomiting associated with hyperkalemia and renal failure. Patient was on hemodialysis but was stopped. He has NERY on CKD3. Advised to follow his nephrologist, Dr. Rose Marie Patten in 2 weeks. Review of Systems  As per hpi    Past Medical History:   Diagnosis Date    Adverse effect of anesthesia     \" STOP BREATHING 1 TIME C ANESTH\"    Cancer ([address] 2004    thyroid cancer    Chronic kidney disease     Chronic pain     BACK SHOULDER AND ARM    Depression     Diabetes ([address]     Encounter for long-term (current) use of NSAIDs     Hypercholesterolemia     Hypertension     Nausea & vomiting     Other ill-defined conditions(799.89)     cholesterol, thyroid    Sleep apnea     doesn't wear cpap    Thyroid cancer ([address]     TIA (transient ischemic attack) 2011    Vitamin D deficiency      Past Surgical History:   Procedure Laterality Date    CARDIAC SURG PROCEDURE UNLIST      HX HEENT      THROAT SURGERY X 4    HX ORTHOPAEDIC      back     HX ORTHOPAEDIC      ARM AND SHOULDER    HX OTHER SURGICAL      thyroid, lymphnode    HX RETINAL DETACHMENT REPAIR      left eye    IR INSERT NON TUNL CVC OVER 5 YRS  8/18/2020    IR INSERT TUNL CVC W/O PORT OVER 5 YR  8/26/2020    UPPER GI ENDOSCOPY,BALL DIL,30MM  7/17/2020         UPPER GI ENDOSCOPY,BIOPSY  7/17/2020         VASCULAR SURGERY PROCEDURE UNLIST      cardiac cath NEG.      Social History     Socioeconomic History    Marital status:      Spouse name: Not on file    Number of children: Not on file    Years of education: Not on file    Highest education level: Not on file   Tobacco Use    Smoking status: Former Smoker     Quit date: 1994     Years since quittin.3    Smokeless tobacco: Never Used   Substance and Sexual Activity    Alcohol use: Yes     Comment: Occasionally    Drug use: No    Sexual activity: Never     Family History   Problem Relation Age of Onset    Diabetes Mother     Elevated Lipids Mother    Linwood Daleopoldo Bladder Disease Mother     Headache Mother    [place] Migraines Mother     Heart Disease Mother     Hypertension Mother     Stroke Mother     Other Mother         ANEURSYM BRAIN    Bleeding Prob Father     Cancer Father         LEUKEMIA    Diabetes Father     Elevated Lipids Father     Mental Retardation Sister     Psychiatric Disorder Sister     Cancer Brother         LUNGS     Current Outpatient Medications   Medication Sig Dispense Refill    omeprazole (PRILOSEC) 20 mg capsule Take 1 Cap by mouth daily. 90 Cap 1    olmesartan (BENICAR) 20 mg tablet Take 1 tablet by mouth once daily 30 Tab 4    carvediloL (COREG) 6.25 mg tablet Take 1 Tab by mouth two (2) times daily (with meals). 60 Tab 0    sevelamer carbonate (RENVELA) 800 mg tab tab Take 1 Tab by mouth three (3) times daily (with meals). 90 Tab 0    metoclopramide HCl (REGLAN) 5 mg tablet TAKE 1 TABLET BY MOUTH ONCE DAILY AS NEEDED. DO NOT TAKE MORE THAN 3 TABLETS A DAY. 30 Tab 0    Vitamin D2 1,250 mcg (50,000 unit) capsule TAKE 1 CAPSULE BY MOUTH ONCE A WEEK      sucralfate (CARAFATE) 1 gram tablet       levothyroxine (SYNTHROID) 200 mcg tablet TAKE 1 TABLET BY MOUTH ONCE DAILY BEFORE BREAKFAST 90 Tab 3    amLODIPine (NORVASC) 5 mg tablet Take 1 Tab by mouth daily. 30 Tab 3    acetaminophen (TYLENOL) 325 mg tablet Take 325 mg by mouth as needed for Pain. Take tab as needed for pain      OXYGEN-AIR DELIVERY SYSTEMS Take 2.5 L/min by inhalation continuous.       Blood-Glucose Meter monitoring kit 1 preferred brand glucometer for checking home glucose, E11.22 1 Kit 0    glucose blood VI test strips (blood glucose test) strip Pharmacist to choose preferred meter and strips. Dx:E11.65, Z79.4 Monitor 3 times daily 300 Strip 3    insulin NPH (HumuLIN N NPH U-100 Insulin) 100 unit/mL injection 2-10 units daily (Patient taking differently: 2-10 units daily    He is doing 10 units BID as of last few months 9/23/2020) 3 Vial 3    Insulin Syringe-Needle U-100 (Advocate Syringes) 0.3 mL 30 gauge x 5/16\" syrg 1 Each by Does Not Apply route daily. 90 Syringe 3    simvastatin (ZOCOR) 20 mg tablet Take 1 Tab by mouth nightly. 90 Tab 3    Ventolin HFA 90 mcg/actuation inhaler TAKE 1-2 PUFFS EVEERY 4-6 HOURS AS NEEDED FOR DYSPNEA AND WHEEZING 1 Inhaler 2    glucose blood VI test strips (PHARMACIST CHOICE) strip One Touch  Check glucose 3-4 times daily. DX E11.22 300 Strip 3    Lancets misc Use preferred brand;  Check glucose 3-4 times daily, Diagnosis E11.22 2 Package 3     Allergies   Allergen Reactions    Anesthetics - Amide Type - Select Amino Amides Shortness of Breath    Flexeril [Cyclobenzaprine] Hives    Tramadol Hives       Objective:  Visit Vitals  /75   Pulse 64   Temp 98 °F (36.7 °C) (Temporal)   Resp 20   Ht 5' 9\" (1.753 m)   Wt 220 lb 12.8 oz (100.2 kg)   SpO2 96%   BMI 32.61 kg/m²     Physical Exam:   General appearance - alert, well appearing in no distress  Mental status - alert, oriented to person, place, and time  EYE-PERRL, EOMI  Neck - supple, no significant adenopathy   Chest - clear to auscultation, no wheezes, rales or rhonchi  Heart - normal rate, regular rhythm, no murmurs  Abdomen - soft, nontender, nondistended, no organomegaly  Ext-peripheral pulses normal, no pedal edema  Neuro -no focal findings    Assessment/Plan:  Diagnoses and all orders for this visit:    CKD (chronic kidney disease) stage 4, GFR 15-29 ml/min ([facility])  -     METABOLIC PANEL, COMPREHENSIVE  -     REFERRAL TO NEPHROLOGY    Controlled type 2 diabetes mellitus with diabetic nephropathy, without long-term current use of insulin ([facility])  - METABOLIC PANEL, COMPREHENSIVE    Normochromic normocytic anemia  -     CBC W/O DIFF    Colon cancer screening  -     COLOGUARD TEST (FECAL DNA COLORECTAL CANCER SCREENING)    Acquired hypothyroidism  -     TSH 3RD GENERATION    Hypertension, well controlled  -     METABOLIC PANEL, COMPREHENSIVE    Other orders  -     CKD REPORT  -     DIABETES PATIENT EDUCATION      Patient Instructions          Bowel Preparation: Before Your Child's Procedure  What is bowel preparation? Bowel preparation (or bowel prep) empties and cleans out your child's large intestine (colon). Bowel prep is done before tests that look inside the colon, such as a colonoscopy. These tests look for small growths (called polyps) or other problems like bleeding. The colon has to be empty and clean so the doctor can see problems. For many people, the bowel prep is worse than the test. The liquid your child may have to drink can taste bad. Mixing it with a sports drink like Gatorade can help make it taste better. And your child may feel hungry on the clear liquid diet. Your child may also spend a lot of time in the bathroom. But it's very important for your child to do the prep exactly as your doctor directs. If your child doesn't, the doctor can't see the entire lining of the colon. This may mean that your child has to repeat the test and will have to do the prep again. Follow-up care is a key part of your child's treatment and safety. Be sure to make and go to all appointments, and call your doctor if your child is having problems. It's also a good idea to know your child's test results and keep a list of the medicines your child takes. What happens before the procedure? Procedures can be stressful for both your child and you. This information will help you understand what you can expect. And it will help you safely prepare for your child's procedure.   Preparing for the procedure    · Understand exactly what procedure is planned, along with the risks, benefits, and other options.     · Talk to your child about the procedure. Tell your child that it will help the doctor see inside his or her large intestine (colon). Hospitals know how to take care of children. The staff will do all they can to make it easier for your child.     · Plan for your child's recovery time. He or she may need more of your time right after the procedure, both for care and for comfort. The day before the procedure    · A nurse may call you (or you may need to call the hospital). This is to confirm the time and date of your child's procedure and answer any questions.     · The doctor will give you detailed instructions about getting your child's bowel ready for the procedure.     · The doctor will ask you to change what your child eats in the 2 days before the test. When clear fluids are recommended, your child can have water, clear juices, clear broths, flavored ice pops (such as Popsicles), and gelatin (such as Jell-O). ? Don't give your child milk or juice with pulp, such as orange juice. These are not clear fluids. ? Don't let your child drink anything red or purple, such as grape juice or fruit punch. ? Don't give your child red or purple foods, such as grape ice pops or cherry gelatin. ? Make sure that your child drinks plenty of clear fluids to stay hydrated.     · The night before your child's test, he or she may have to take a laxative tablet or liquid medicine. The medicine can taste bad and may make your child feel sick to his or her stomach. But it's important for your child to drink all of it. Otherwise, the doctor may not be able to do the test and your child will have to do the prep and test again. Here are some tips:  ? Mix the medicine with a sports drink like Gatorade. This can help it taste better. ?  Your child may find it easier to drink the medicine if you chill it in the refrigerator first.     · Keep your child at home the evening before the test. He or she will need to use the bathroom often.     · Don't let your child eat any solid foods after you start the bowel prep.     · Your doctor will tell you when to have your child stop drinking clear liquids before the procedure.     · Remember to follow your doctor's instructions about your child taking or stopping medicines before the procedure. This includes over-the-counter medicines. What happens on the day of the procedure? · Follow the instructions exactly about when your child should stop eating and drinking. If you don't, the procedure may be canceled. If your doctor told you to have your child take his or her medicines on the day of the procedure, have your child take them with only a sip of water.     · Your child may brush his or her teeth. But tell your child not to swallow any toothpaste or water. When should you call your doctor? · You have questions or concerns.     · You don't understand how to prepare your child for the procedure.     · Your child becomes ill before the procedure (such as fever, flu, or a cold).     · You need to reschedule or have changed your mind about your child having the procedure. Where can you learn more? Go to http://www.gray.com/  Enter B330 in the search box to learn more about \"Bowel Preparation: Before Your Child's Procedure. \"  Current as of: April 15, 2020               Content Version: 12.6  © 7848-7195 [employer]. Care instructions adapted under license by [employer] (which disclaims liability or warranty for this information). If you have questions about a medical condition or this instruction, always ask your healthcare professional. [place] [address] any warranty or liability for your use of this information. Follow-up and Dispositions    · Return in about 3 months (around 3/28/2021), or if symptoms worsen or fail to improve, for routine follow up.

## 2021-01-01 ENCOUNTER — APPOINTMENT (OUTPATIENT)
Dept: INTERVENTIONAL RADIOLOGY/VASCULAR | Age: 61
DRG: 054 | End: 2021-01-01
Attending: PHYSICIAN ASSISTANT
Payer: MEDICARE

## 2021-01-01 ENCOUNTER — PATIENT OUTREACH (OUTPATIENT)
Dept: CASE MANAGEMENT | Age: 61
End: 2021-01-01

## 2021-01-01 ENCOUNTER — APPOINTMENT (OUTPATIENT)
Dept: GENERAL RADIOLOGY | Age: 61
End: 2021-01-01
Attending: EMERGENCY MEDICINE
Payer: MEDICARE

## 2021-01-01 ENCOUNTER — ANESTHESIA EVENT (OUTPATIENT)
Dept: SURGERY | Age: 61
End: 2021-01-01
Payer: MEDICARE

## 2021-01-01 ENCOUNTER — HOSPITAL ENCOUNTER (INPATIENT)
Age: 61
LOS: 7 days | Discharge: NURSING, REHAB OR LONG-TERM CARE | DRG: 054 | End: 2021-10-22
Attending: EMERGENCY MEDICINE | Admitting: HOSPITALIST
Payer: MEDICARE

## 2021-01-01 ENCOUNTER — HOSPITAL ENCOUNTER (OUTPATIENT)
Dept: INFUSION THERAPY | Age: 61
Discharge: HOME | End: 2021-07-07
Payer: MEDICARE

## 2021-01-01 ENCOUNTER — APPOINTMENT (OUTPATIENT)
Dept: VASCULAR SURGERY | Age: 61
DRG: 054 | End: 2021-01-01
Attending: INTERNAL MEDICINE
Payer: MEDICARE

## 2021-01-01 ENCOUNTER — HOSPITAL ENCOUNTER (OUTPATIENT)
Dept: INFUSION THERAPY | Age: 61
Discharge: HOME | End: 2021-02-11

## 2021-01-01 ENCOUNTER — APPOINTMENT (OUTPATIENT)
Dept: CT IMAGING | Age: 61
End: 2021-01-01
Attending: EMERGENCY MEDICINE
Payer: MEDICARE

## 2021-01-01 ENCOUNTER — APPOINTMENT (OUTPATIENT)
Dept: GENERAL RADIOLOGY | Age: 61
DRG: 689 | End: 2021-01-01
Attending: EMERGENCY MEDICINE
Payer: MEDICARE

## 2021-01-01 ENCOUNTER — HOSPITAL ENCOUNTER (EMERGENCY)
Age: 61
Discharge: LEFT BEFORE DISCHARGE | End: 2021-09-18
Attending: EMERGENCY MEDICINE
Payer: MEDICARE

## 2021-01-01 ENCOUNTER — APPOINTMENT (OUTPATIENT)
Dept: GENERAL RADIOLOGY | Age: 61
DRG: 291 | End: 2021-01-01
Attending: EMERGENCY MEDICINE
Payer: MEDICARE

## 2021-01-01 ENCOUNTER — OFFICE VISIT (OUTPATIENT)
Dept: ONCOLOGY | Age: 61
End: 2021-01-01
Payer: MEDICARE

## 2021-01-01 ENCOUNTER — HOME CARE VISIT (OUTPATIENT)
Dept: HOSPICE | Facility: HOSPICE | Age: 61
End: 2021-01-01
Payer: MEDICARE

## 2021-01-01 ENCOUNTER — HOSPITAL ENCOUNTER (INPATIENT)
Age: 61
LOS: 2 days | Discharge: HOME HEALTH | DRG: 871 | End: 2021-11-09
Attending: EMERGENCY MEDICINE | Admitting: STUDENT IN AN ORGANIZED HEALTH CARE EDUCATION/TRAINING PROGRAM
Payer: MEDICARE

## 2021-01-01 ENCOUNTER — APPOINTMENT (OUTPATIENT)
Dept: NON INVASIVE DIAGNOSTICS | Age: 61
DRG: 054 | End: 2021-01-01
Attending: INTERNAL MEDICINE
Payer: MEDICARE

## 2021-01-01 ENCOUNTER — APPOINTMENT (OUTPATIENT)
Dept: MRI IMAGING | Age: 61
DRG: 054 | End: 2021-01-01
Attending: PSYCHIATRY & NEUROLOGY
Payer: MEDICARE

## 2021-01-01 ENCOUNTER — HOSPITAL ENCOUNTER (OUTPATIENT)
Dept: INFUSION THERAPY | Age: 61
Discharge: HOME | End: 2021-07-21
Payer: MEDICARE

## 2021-01-01 ENCOUNTER — HOSPITAL ENCOUNTER (OUTPATIENT)
Dept: INFUSION THERAPY | Age: 61
Discharge: HOME | End: 2021-06-09
Payer: MEDICARE

## 2021-01-01 ENCOUNTER — HOSPITAL ENCOUNTER (EMERGENCY)
Age: 61
Discharge: HOME | End: 2021-11-28
Attending: EMERGENCY MEDICINE
Payer: MEDICARE

## 2021-01-01 ENCOUNTER — HOME CARE VISIT (OUTPATIENT)
Dept: SCHEDULING | Facility: HOME HEALTH | Age: 61
End: 2021-01-01
Payer: MEDICARE

## 2021-01-01 ENCOUNTER — TELEPHONE (OUTPATIENT)
Dept: FAMILY MEDICINE CLINIC | Age: 61
End: 2021-01-01

## 2021-01-01 ENCOUNTER — HOSPITAL ENCOUNTER (OUTPATIENT)
Dept: INFUSION THERAPY | Age: 61
Discharge: HOME | End: 2021-05-26
Payer: MEDICARE

## 2021-01-01 ENCOUNTER — APPOINTMENT (OUTPATIENT)
Dept: INFUSION THERAPY | Age: 61
End: 2021-01-01
Payer: MEDICARE

## 2021-01-01 ENCOUNTER — APPOINTMENT (OUTPATIENT)
Dept: GENERAL RADIOLOGY | Age: 61
End: 2021-01-01
Attending: STUDENT IN AN ORGANIZED HEALTH CARE EDUCATION/TRAINING PROGRAM
Payer: MEDICARE

## 2021-01-01 ENCOUNTER — HOSPITAL ENCOUNTER (OUTPATIENT)
Dept: CT IMAGING | Age: 61
Discharge: HOME | End: 2021-01-08
Attending: INTERNAL MEDICINE
Payer: MEDICARE

## 2021-01-01 ENCOUNTER — APPOINTMENT (OUTPATIENT)
Dept: GENERAL RADIOLOGY | Age: 61
DRG: 871 | End: 2021-01-01
Attending: STUDENT IN AN ORGANIZED HEALTH CARE EDUCATION/TRAINING PROGRAM
Payer: MEDICARE

## 2021-01-01 ENCOUNTER — APPOINTMENT (OUTPATIENT)
Dept: INTERVENTIONAL RADIOLOGY/VASCULAR | Age: 61
DRG: 291 | End: 2021-01-01
Attending: GENERAL ACUTE CARE HOSPITAL
Payer: MEDICARE

## 2021-01-01 ENCOUNTER — APPOINTMENT (OUTPATIENT)
Dept: GENERAL RADIOLOGY | Age: 61
DRG: 054 | End: 2021-01-01
Attending: EMERGENCY MEDICINE
Payer: MEDICARE

## 2021-01-01 ENCOUNTER — OFFICE VISIT (OUTPATIENT)
Dept: FAMILY MEDICINE CLINIC | Age: 61
End: 2021-01-01
Payer: MEDICAID

## 2021-01-01 ENCOUNTER — HOSPITAL ENCOUNTER (EMERGENCY)
Age: 61
Discharge: HOME | End: 2021-09-06
Attending: EMERGENCY MEDICINE
Payer: MEDICARE

## 2021-01-01 ENCOUNTER — HOSPITAL ENCOUNTER (INPATIENT)
Age: 61
LOS: 7 days | Discharge: NURSING, REHAB OR LONG-TERM CARE | DRG: 371 | End: 2021-11-26
Attending: STUDENT IN AN ORGANIZED HEALTH CARE EDUCATION/TRAINING PROGRAM | Admitting: SURGERY
Payer: MEDICARE

## 2021-01-01 ENCOUNTER — TELEPHONE (OUTPATIENT)
Dept: ONCOLOGY | Age: 61
End: 2021-01-01

## 2021-01-01 ENCOUNTER — HOSPITAL ENCOUNTER (OUTPATIENT)
Dept: INFUSION THERAPY | Age: 61
Discharge: HOME | End: 2021-03-23
Payer: MEDICARE

## 2021-01-01 ENCOUNTER — HOSPITAL ENCOUNTER (OUTPATIENT)
Age: 61
Setting detail: OUTPATIENT SURGERY
Discharge: HOME | End: 2021-08-30
Attending: SURGERY | Admitting: SURGERY
Payer: MEDICARE

## 2021-01-01 ENCOUNTER — HOSPITAL ENCOUNTER (OUTPATIENT)
Dept: INFUSION THERAPY | Age: 61
Discharge: HOME | End: 2021-02-23
Payer: MEDICARE

## 2021-01-01 ENCOUNTER — VIRTUAL VISIT (OUTPATIENT)
Dept: ENDOCRINOLOGY | Age: 61
End: 2021-01-01
Payer: MEDICARE

## 2021-01-01 ENCOUNTER — HOSPITAL ENCOUNTER (OUTPATIENT)
Dept: INFUSION THERAPY | Age: 61
Discharge: HOME | End: 2021-03-09
Payer: MEDICARE

## 2021-01-01 ENCOUNTER — APPOINTMENT (OUTPATIENT)
Dept: MRI IMAGING | Age: 61
DRG: 054 | End: 2021-01-01
Attending: NURSE PRACTITIONER
Payer: MEDICARE

## 2021-01-01 ENCOUNTER — HOME HEALTH ADMISSION (OUTPATIENT)
Dept: HOME HEALTH SERVICES | Facility: HOME HEALTH | Age: 61
End: 2021-01-01

## 2021-01-01 ENCOUNTER — APPOINTMENT (OUTPATIENT)
Dept: ULTRASOUND IMAGING | Age: 61
DRG: 871 | End: 2021-01-01
Attending: STUDENT IN AN ORGANIZED HEALTH CARE EDUCATION/TRAINING PROGRAM
Payer: MEDICARE

## 2021-01-01 ENCOUNTER — APPOINTMENT (OUTPATIENT)
Dept: MRI IMAGING | Age: 61
DRG: 054 | End: 2021-01-01
Attending: HOSPITALIST
Payer: MEDICARE

## 2021-01-01 ENCOUNTER — APPOINTMENT (OUTPATIENT)
Dept: ULTRASOUND IMAGING | Age: 61
End: 2021-01-01
Attending: EMERGENCY MEDICINE
Payer: MEDICARE

## 2021-01-01 ENCOUNTER — HOSPITAL ENCOUNTER (INPATIENT)
Age: 61
LOS: 6 days | Discharge: HOME | DRG: 291 | End: 2021-07-28
Attending: EMERGENCY MEDICINE | Admitting: GENERAL ACUTE CARE HOSPITAL
Payer: MEDICARE

## 2021-01-01 ENCOUNTER — APPOINTMENT (OUTPATIENT)
Dept: NUCLEAR MEDICINE | Age: 61
End: 2021-01-01
Attending: INTERNAL MEDICINE
Payer: MEDICARE

## 2021-01-01 ENCOUNTER — HOSPITAL ENCOUNTER (OUTPATIENT)
Dept: NUCLEAR MEDICINE | Age: 61
End: 2021-01-01
Attending: INTERNAL MEDICINE
Payer: MEDICARE

## 2021-01-01 ENCOUNTER — HOSPITAL ENCOUNTER (EMERGENCY)
Age: 61
Discharge: HOME | End: 2021-04-05
Attending: EMERGENCY MEDICINE
Payer: MEDICARE

## 2021-01-01 ENCOUNTER — APPOINTMENT (OUTPATIENT)
Dept: CT IMAGING | Age: 61
DRG: 054 | End: 2021-01-01
Attending: HOSPITALIST
Payer: MEDICARE

## 2021-01-01 ENCOUNTER — HOSPITAL ENCOUNTER (OUTPATIENT)
Dept: INFUSION THERAPY | Age: 61
End: 2021-01-01

## 2021-01-01 ENCOUNTER — DOCUMENTATION ONLY (OUTPATIENT)
Dept: ONCOLOGY | Age: 61
End: 2021-01-01

## 2021-01-01 ENCOUNTER — HOSPITAL ENCOUNTER (OUTPATIENT)
Dept: CT IMAGING | Age: 61
Discharge: HOME | End: 2021-11-10
Attending: INTERNAL MEDICINE
Payer: MEDICARE

## 2021-01-01 ENCOUNTER — APPOINTMENT (OUTPATIENT)
Dept: INTERVENTIONAL RADIOLOGY/VASCULAR | Age: 61
DRG: 291 | End: 2021-01-01
Attending: INTERNAL MEDICINE
Payer: MEDICARE

## 2021-01-01 ENCOUNTER — APPOINTMENT (OUTPATIENT)
Dept: CT IMAGING | Age: 61
DRG: 054 | End: 2021-01-01
Attending: NURSE PRACTITIONER
Payer: MEDICARE

## 2021-01-01 ENCOUNTER — HOSPITAL ENCOUNTER (INPATIENT)
Age: 61
LOS: 1 days | Discharge: HOME | DRG: 689 | End: 2021-11-17
Attending: EMERGENCY MEDICINE | Admitting: STUDENT IN AN ORGANIZED HEALTH CARE EDUCATION/TRAINING PROGRAM
Payer: MEDICARE

## 2021-01-01 ENCOUNTER — ANESTHESIA (OUTPATIENT)
Dept: SURGERY | Age: 61
End: 2021-01-01
Payer: MEDICARE

## 2021-01-01 ENCOUNTER — HOSPITAL ENCOUNTER (OUTPATIENT)
Dept: INFUSION THERAPY | Age: 61
Discharge: HOME | End: 2021-02-01

## 2021-01-01 ENCOUNTER — APPOINTMENT (OUTPATIENT)
Dept: GENERAL RADIOLOGY | Age: 61
DRG: 371 | End: 2021-01-01
Attending: INTERNAL MEDICINE
Payer: MEDICARE

## 2021-01-01 ENCOUNTER — OFFICE VISIT (OUTPATIENT)
Dept: FAMILY MEDICINE CLINIC | Age: 61
DRG: 871 | End: 2021-01-01
Payer: MEDICARE

## 2021-01-01 ENCOUNTER — OFFICE VISIT (OUTPATIENT)
Dept: FAMILY MEDICINE CLINIC | Age: 61
End: 2021-01-01
Payer: MEDICARE

## 2021-01-01 ENCOUNTER — APPOINTMENT (OUTPATIENT)
Dept: CT IMAGING | Age: 61
DRG: 054 | End: 2021-01-01
Attending: INTERNAL MEDICINE
Payer: MEDICARE

## 2021-01-01 ENCOUNTER — HOSPITAL ENCOUNTER (OUTPATIENT)
Dept: INFUSION THERAPY | Age: 61
Discharge: HOME | End: 2021-06-23
Payer: MEDICARE

## 2021-01-01 ENCOUNTER — APPOINTMENT (OUTPATIENT)
Dept: CT IMAGING | Age: 61
DRG: 371 | End: 2021-01-01
Attending: STUDENT IN AN ORGANIZED HEALTH CARE EDUCATION/TRAINING PROGRAM
Payer: MEDICARE

## 2021-01-01 ENCOUNTER — HOSPICE ADMISSION (OUTPATIENT)
Dept: HOSPICE | Facility: HOSPICE | Age: 61
End: 2021-01-01
Payer: MEDICARE

## 2021-01-01 ENCOUNTER — HOSPITAL ENCOUNTER (EMERGENCY)
Age: 61
Discharge: HOME | End: 2021-10-05
Attending: STUDENT IN AN ORGANIZED HEALTH CARE EDUCATION/TRAINING PROGRAM
Payer: MEDICARE

## 2021-01-01 VITALS
DIASTOLIC BLOOD PRESSURE: 76 MMHG | SYSTOLIC BLOOD PRESSURE: 102 MMHG | RESPIRATION RATE: 18 BRPM | TEMPERATURE: 98 F | HEART RATE: 88 BPM

## 2021-01-01 VITALS
SYSTOLIC BLOOD PRESSURE: 148 MMHG | RESPIRATION RATE: 16 BRPM | HEART RATE: 71 BPM | OXYGEN SATURATION: 92 % | TEMPERATURE: 98.9 F | DIASTOLIC BLOOD PRESSURE: 53 MMHG

## 2021-01-01 VITALS
HEART RATE: 73 BPM | DIASTOLIC BLOOD PRESSURE: 41 MMHG | HEIGHT: 69 IN | BODY MASS INDEX: 31.7 KG/M2 | WEIGHT: 214 LBS | OXYGEN SATURATION: 98 % | RESPIRATION RATE: 16 BRPM | TEMPERATURE: 98.4 F | SYSTOLIC BLOOD PRESSURE: 140 MMHG

## 2021-01-01 VITALS
RESPIRATION RATE: 16 BRPM | WEIGHT: 230.9 LBS | BODY MASS INDEX: 33.61 KG/M2 | OXYGEN SATURATION: 95 % | HEART RATE: 67 BPM | SYSTOLIC BLOOD PRESSURE: 175 MMHG | TEMPERATURE: 98.8 F | DIASTOLIC BLOOD PRESSURE: 72 MMHG

## 2021-01-01 VITALS
WEIGHT: 225.6 LBS | OXYGEN SATURATION: 96 % | DIASTOLIC BLOOD PRESSURE: 70 MMHG | HEART RATE: 67 BPM | TEMPERATURE: 98.6 F | BODY MASS INDEX: 32.3 KG/M2 | HEIGHT: 70 IN | SYSTOLIC BLOOD PRESSURE: 155 MMHG

## 2021-01-01 VITALS
HEART RATE: 78 BPM | OXYGEN SATURATION: 98 % | DIASTOLIC BLOOD PRESSURE: 74 MMHG | RESPIRATION RATE: 20 BRPM | SYSTOLIC BLOOD PRESSURE: 199 MMHG

## 2021-01-01 VITALS
TEMPERATURE: 97.5 F | DIASTOLIC BLOOD PRESSURE: 75 MMHG | RESPIRATION RATE: 16 BRPM | WEIGHT: 236.8 LBS | OXYGEN SATURATION: 96 % | HEART RATE: 66 BPM | SYSTOLIC BLOOD PRESSURE: 182 MMHG | BODY MASS INDEX: 34.97 KG/M2

## 2021-01-01 VITALS
DIASTOLIC BLOOD PRESSURE: 60 MMHG | OXYGEN SATURATION: 96 % | SYSTOLIC BLOOD PRESSURE: 156 MMHG | BODY MASS INDEX: 31.31 KG/M2 | TEMPERATURE: 98.5 F | HEIGHT: 70 IN | RESPIRATION RATE: 16 BRPM | WEIGHT: 218.7 LBS | HEART RATE: 66 BPM

## 2021-01-01 VITALS
BODY MASS INDEX: 33.54 KG/M2 | SYSTOLIC BLOOD PRESSURE: 148 MMHG | HEART RATE: 71 BPM | WEIGHT: 230.4 LBS | TEMPERATURE: 99.1 F | RESPIRATION RATE: 18 BRPM | OXYGEN SATURATION: 95 % | DIASTOLIC BLOOD PRESSURE: 66 MMHG

## 2021-01-01 VITALS
TEMPERATURE: 97.6 F | SYSTOLIC BLOOD PRESSURE: 148 MMHG | DIASTOLIC BLOOD PRESSURE: 50 MMHG | RESPIRATION RATE: 18 BRPM | HEART RATE: 75 BPM

## 2021-01-01 VITALS
HEART RATE: 81 BPM | SYSTOLIC BLOOD PRESSURE: 130 MMHG | OXYGEN SATURATION: 95 % | RESPIRATION RATE: 16 BRPM | DIASTOLIC BLOOD PRESSURE: 50 MMHG

## 2021-01-01 VITALS
RESPIRATION RATE: 18 BRPM | TEMPERATURE: 97.5 F | HEART RATE: 62 BPM | OXYGEN SATURATION: 96 % | WEIGHT: 231.8 LBS | SYSTOLIC BLOOD PRESSURE: 167 MMHG | DIASTOLIC BLOOD PRESSURE: 72 MMHG | BODY MASS INDEX: 34.23 KG/M2

## 2021-01-01 VITALS
OXYGEN SATURATION: 96 % | TEMPERATURE: 98.7 F | WEIGHT: 223.11 LBS | SYSTOLIC BLOOD PRESSURE: 125 MMHG | BODY MASS INDEX: 33.04 KG/M2 | HEIGHT: 69 IN | DIASTOLIC BLOOD PRESSURE: 52 MMHG | RESPIRATION RATE: 16 BRPM | HEART RATE: 73 BPM

## 2021-01-01 VITALS
OXYGEN SATURATION: 96 % | WEIGHT: 232.2 LBS | DIASTOLIC BLOOD PRESSURE: 67 MMHG | RESPIRATION RATE: 18 BRPM | BODY MASS INDEX: 34.29 KG/M2 | SYSTOLIC BLOOD PRESSURE: 146 MMHG | TEMPERATURE: 97.6 F | HEART RATE: 73 BPM

## 2021-01-01 VITALS
HEART RATE: 60 BPM | HEIGHT: 69 IN | OXYGEN SATURATION: 98 % | RESPIRATION RATE: 17 BRPM | WEIGHT: 206.7 LBS | DIASTOLIC BLOOD PRESSURE: 51 MMHG | BODY MASS INDEX: 30.62 KG/M2 | SYSTOLIC BLOOD PRESSURE: 146 MMHG | TEMPERATURE: 97.8 F

## 2021-01-01 VITALS
WEIGHT: 221.7 LBS | OXYGEN SATURATION: 92 % | HEIGHT: 69 IN | TEMPERATURE: 98.1 F | DIASTOLIC BLOOD PRESSURE: 64 MMHG | RESPIRATION RATE: 18 BRPM | HEART RATE: 69 BPM | BODY MASS INDEX: 32.84 KG/M2 | SYSTOLIC BLOOD PRESSURE: 148 MMHG

## 2021-01-01 VITALS
WEIGHT: 224.6 LBS | HEIGHT: 69 IN | TEMPERATURE: 98.8 F | OXYGEN SATURATION: 96 % | BODY MASS INDEX: 33.27 KG/M2 | HEART RATE: 63 BPM | SYSTOLIC BLOOD PRESSURE: 176 MMHG | DIASTOLIC BLOOD PRESSURE: 72 MMHG

## 2021-01-01 VITALS
TEMPERATURE: 97.7 F | BODY MASS INDEX: 31.4 KG/M2 | DIASTOLIC BLOOD PRESSURE: 81 MMHG | RESPIRATION RATE: 69 BRPM | SYSTOLIC BLOOD PRESSURE: 178 MMHG | WEIGHT: 212 LBS | HEART RATE: 69 BPM | OXYGEN SATURATION: 96 % | HEIGHT: 69 IN

## 2021-01-01 VITALS
BODY MASS INDEX: 31.64 KG/M2 | HEART RATE: 66 BPM | RESPIRATION RATE: 16 BRPM | WEIGHT: 213.63 LBS | HEIGHT: 69 IN | SYSTOLIC BLOOD PRESSURE: 135 MMHG | OXYGEN SATURATION: 97 % | TEMPERATURE: 97.7 F | DIASTOLIC BLOOD PRESSURE: 58 MMHG

## 2021-01-01 VITALS
BODY MASS INDEX: 30.33 KG/M2 | WEIGHT: 211.86 LBS | RESPIRATION RATE: 16 BRPM | OXYGEN SATURATION: 99 % | HEART RATE: 70 BPM | TEMPERATURE: 98.3 F | HEIGHT: 70 IN | SYSTOLIC BLOOD PRESSURE: 153 MMHG | DIASTOLIC BLOOD PRESSURE: 62 MMHG

## 2021-01-01 VITALS
OXYGEN SATURATION: 96 % | BODY MASS INDEX: 31.4 KG/M2 | SYSTOLIC BLOOD PRESSURE: 142 MMHG | HEART RATE: 62 BPM | HEIGHT: 69 IN | DIASTOLIC BLOOD PRESSURE: 65 MMHG | WEIGHT: 212 LBS | TEMPERATURE: 97.7 F

## 2021-01-01 VITALS — RESPIRATION RATE: 19 BRPM | HEART RATE: 73 BPM | DIASTOLIC BLOOD PRESSURE: 68 MMHG | SYSTOLIC BLOOD PRESSURE: 140 MMHG

## 2021-01-01 VITALS
TEMPERATURE: 98 F | BODY MASS INDEX: 30.21 KG/M2 | SYSTOLIC BLOOD PRESSURE: 151 MMHG | HEART RATE: 81 BPM | OXYGEN SATURATION: 92 % | RESPIRATION RATE: 17 BRPM | HEIGHT: 69 IN | WEIGHT: 204 LBS | DIASTOLIC BLOOD PRESSURE: 48 MMHG

## 2021-01-01 VITALS
OXYGEN SATURATION: 95 % | DIASTOLIC BLOOD PRESSURE: 79 MMHG | SYSTOLIC BLOOD PRESSURE: 151 MMHG | WEIGHT: 224 LBS | BODY MASS INDEX: 32.07 KG/M2 | RESPIRATION RATE: 20 BRPM | HEIGHT: 70 IN | HEART RATE: 72 BPM | TEMPERATURE: 97.8 F

## 2021-01-01 VITALS
OXYGEN SATURATION: 96 % | HEART RATE: 86 BPM | RESPIRATION RATE: 18 BRPM | SYSTOLIC BLOOD PRESSURE: 96 MMHG | DIASTOLIC BLOOD PRESSURE: 62 MMHG

## 2021-01-01 VITALS
BODY MASS INDEX: 31.58 KG/M2 | RESPIRATION RATE: 20 BRPM | HEIGHT: 70 IN | WEIGHT: 220.6 LBS | SYSTOLIC BLOOD PRESSURE: 152 MMHG | TEMPERATURE: 97.7 F | OXYGEN SATURATION: 97 % | HEART RATE: 64 BPM | DIASTOLIC BLOOD PRESSURE: 70 MMHG

## 2021-01-01 VITALS
HEART RATE: 73 BPM | DIASTOLIC BLOOD PRESSURE: 69 MMHG | TEMPERATURE: 98 F | BODY MASS INDEX: 32.95 KG/M2 | SYSTOLIC BLOOD PRESSURE: 168 MMHG | HEIGHT: 69 IN | RESPIRATION RATE: 20 BRPM | WEIGHT: 222.44 LBS | OXYGEN SATURATION: 100 %

## 2021-01-01 VITALS
DIASTOLIC BLOOD PRESSURE: 57 MMHG | TEMPERATURE: 99.3 F | TEMPERATURE: 97.9 F | SYSTOLIC BLOOD PRESSURE: 164 MMHG | SYSTOLIC BLOOD PRESSURE: 151 MMHG | BODY MASS INDEX: 31.9 KG/M2 | OXYGEN SATURATION: 97 % | WEIGHT: 215.39 LBS | HEIGHT: 69 IN | DIASTOLIC BLOOD PRESSURE: 73 MMHG | RESPIRATION RATE: 16 BRPM | HEART RATE: 68 BPM | HEART RATE: 72 BPM | RESPIRATION RATE: 16 BRPM

## 2021-01-01 DIAGNOSIS — N39.0 URINARY TRACT INFECTION WITH HEMATURIA, SITE UNSPECIFIED: Primary | ICD-10-CM

## 2021-01-01 DIAGNOSIS — E11.22 TYPE 2 DIABETES MELLITUS WITH STAGE 3 CHRONIC KIDNEY DISEASE, WITH LONG-TERM CURRENT USE OF INSULIN (HCC): ICD-10-CM

## 2021-01-01 DIAGNOSIS — C79.9 METASTATIC CARCINOMA (HCC): ICD-10-CM

## 2021-01-01 DIAGNOSIS — Z79.4 TYPE 2 DIABETES MELLITUS WITH STAGE 3 CHRONIC KIDNEY DISEASE, WITH LONG-TERM CURRENT USE OF INSULIN (HCC): ICD-10-CM

## 2021-01-01 DIAGNOSIS — E87.5 ACUTE HYPERKALEMIA: ICD-10-CM

## 2021-01-01 DIAGNOSIS — E03.9 ACQUIRED HYPOTHYROIDISM: ICD-10-CM

## 2021-01-01 DIAGNOSIS — E87.79 OTHER HYPERVOLEMIA: ICD-10-CM

## 2021-01-01 DIAGNOSIS — G89.3 NEOPLASM RELATED PAIN (ACUTE) (CHRONIC): ICD-10-CM

## 2021-01-01 DIAGNOSIS — I10 ESSENTIAL HYPERTENSION WITH GOAL BLOOD PRESSURE LESS THAN 130/80: ICD-10-CM

## 2021-01-01 DIAGNOSIS — N18.6 END STAGE RENAL DISEASE ON DIALYSIS (HCC): ICD-10-CM

## 2021-01-01 DIAGNOSIS — N39.0 URINARY TRACT INFECTION WITHOUT HEMATURIA, SITE UNSPECIFIED: ICD-10-CM

## 2021-01-01 DIAGNOSIS — E78.5 DYSLIPIDEMIA (HIGH LDL; LOW HDL): ICD-10-CM

## 2021-01-01 DIAGNOSIS — N18.6 ESRD (END STAGE RENAL DISEASE) (HCC): ICD-10-CM

## 2021-01-01 DIAGNOSIS — R26.2 INABILITY TO AMBULATE DUE TO LEFT HIP: ICD-10-CM

## 2021-01-01 DIAGNOSIS — Z91.89 AT RISK FOR IMPAIRED MOBILITY DUE TO PAIN: ICD-10-CM

## 2021-01-01 DIAGNOSIS — N18.6 ESRD (END STAGE RENAL DISEASE) ON DIALYSIS (HCC): ICD-10-CM

## 2021-01-01 DIAGNOSIS — C79.52 SECONDARY MALIGNANT NEOPLASM OF BONE AND BONE MARROW (HCC): Primary | ICD-10-CM

## 2021-01-01 DIAGNOSIS — M79.604 ACUTE LEG PAIN, RIGHT: ICD-10-CM

## 2021-01-01 DIAGNOSIS — M79.604 RIGHT LEG PAIN: Primary | ICD-10-CM

## 2021-01-01 DIAGNOSIS — M54.50 ACUTE MIDLINE LOW BACK PAIN WITHOUT SCIATICA: Primary | ICD-10-CM

## 2021-01-01 DIAGNOSIS — Z99.2 END STAGE RENAL DISEASE ON DIALYSIS (HCC): ICD-10-CM

## 2021-01-01 DIAGNOSIS — E78.00 HYPERCHOLESTEROLEMIA: ICD-10-CM

## 2021-01-01 DIAGNOSIS — K21.00 GASTROESOPHAGEAL REFLUX DISEASE WITH ESOPHAGITIS WITHOUT HEMORRHAGE: ICD-10-CM

## 2021-01-01 DIAGNOSIS — C79.31 BRAIN METASTASES (HCC): ICD-10-CM

## 2021-01-01 DIAGNOSIS — N18.30 TYPE 2 DIABETES MELLITUS WITH STAGE 3 CHRONIC KIDNEY DISEASE, WITH LONG-TERM CURRENT USE OF INSULIN (HCC): ICD-10-CM

## 2021-01-01 DIAGNOSIS — R53.81 PHYSICAL DEBILITY: ICD-10-CM

## 2021-01-01 DIAGNOSIS — Z99.2 ESRD (END STAGE RENAL DISEASE) ON DIALYSIS (HCC): ICD-10-CM

## 2021-01-01 DIAGNOSIS — C73 PAPILLARY THYROID CARCINOMA (HCC): Primary | ICD-10-CM

## 2021-01-01 DIAGNOSIS — Z99.2 ESRD ON DIALYSIS (HCC): ICD-10-CM

## 2021-01-01 DIAGNOSIS — D64.9 NORMOCHROMIC NORMOCYTIC ANEMIA: ICD-10-CM

## 2021-01-01 DIAGNOSIS — M25.561 ARTHRALGIA OF RIGHT LOWER LEG: ICD-10-CM

## 2021-01-01 DIAGNOSIS — R06.02 SOB (SHORTNESS OF BREATH): ICD-10-CM

## 2021-01-01 DIAGNOSIS — C73 PAPILLARY THYROID CARCINOMA (HCC): ICD-10-CM

## 2021-01-01 DIAGNOSIS — A41.9 SEPSIS WITHOUT ACUTE ORGAN DYSFUNCTION, DUE TO UNSPECIFIED ORGANISM (HCC): Primary | ICD-10-CM

## 2021-01-01 DIAGNOSIS — Z79.4 TYPE 2 DIABETES MELLITUS WITH STAGE 3 CHRONIC KIDNEY DISEASE, WITH LONG-TERM CURRENT USE OF INSULIN, UNSPECIFIED WHETHER STAGE 3A OR 3B CKD (HCC): ICD-10-CM

## 2021-01-01 DIAGNOSIS — I10 ESSENTIAL HYPERTENSION WITH GOAL BLOOD PRESSURE LESS THAN 130/80: Primary | ICD-10-CM

## 2021-01-01 DIAGNOSIS — K63.1 BOWEL PERFORATION (HCC): Primary | ICD-10-CM

## 2021-01-01 DIAGNOSIS — I10 HYPERTENSION, WELL CONTROLLED: ICD-10-CM

## 2021-01-01 DIAGNOSIS — R11.0 NAUSEA IN ADULT: ICD-10-CM

## 2021-01-01 DIAGNOSIS — E11.21 CONTROLLED TYPE 2 DIABETES MELLITUS WITH DIABETIC NEPHROPATHY, WITHOUT LONG-TERM CURRENT USE OF INSULIN (HCC): ICD-10-CM

## 2021-01-01 DIAGNOSIS — J96.01 ACUTE RESPIRATORY FAILURE WITH HYPOXIA (HCC): ICD-10-CM

## 2021-01-01 DIAGNOSIS — E87.5 HYPERKALEMIA: ICD-10-CM

## 2021-01-01 DIAGNOSIS — J81.0 ACUTE PULMONARY EDEMA (HCC): ICD-10-CM

## 2021-01-01 DIAGNOSIS — Z99.2 ESRD NEEDING DIALYSIS (HCC): Primary | ICD-10-CM

## 2021-01-01 DIAGNOSIS — N18.6 ESRD NEEDING DIALYSIS (HCC): Primary | ICD-10-CM

## 2021-01-01 DIAGNOSIS — M25.561 ARTHRALGIA OF RIGHT LOWER LEG: Primary | ICD-10-CM

## 2021-01-01 DIAGNOSIS — I10 ACCELERATED HYPERTENSION: ICD-10-CM

## 2021-01-01 DIAGNOSIS — C73 PRIMARY CANCER OF THYROID WITH METASTASIS TO OTHER SITE (HCC): Primary | ICD-10-CM

## 2021-01-01 DIAGNOSIS — C79.51 PAIN DUE TO MALIGNANT NEOPLASM METASTATIC TO BONE (HCC): ICD-10-CM
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
## 2021-01-01 DIAGNOSIS — C79.9 METASTASIS FROM THYROID CANCER (HCC): ICD-10-CM

## 2021-01-01 DIAGNOSIS — N18.9 CHRONIC KIDNEY DISEASE, UNSPECIFIED CKD STAGE: ICD-10-CM

## 2021-01-01 DIAGNOSIS — J81.0 ACUTE PULMONARY EDEMA (HCC): Primary | ICD-10-CM

## 2021-01-01 DIAGNOSIS — C79.51 SECONDARY MALIGNANT NEOPLASM OF BONE AND BONE MARROW (HCC): Primary | ICD-10-CM

## 2021-01-01 DIAGNOSIS — E78.5 HYPERLIPIDEMIA, UNSPECIFIED HYPERLIPIDEMIA TYPE: ICD-10-CM

## 2021-01-01 DIAGNOSIS — E29.1 MALE HYPOGONADISM: ICD-10-CM

## 2021-01-01 DIAGNOSIS — N18.6 ESRD (END STAGE RENAL DISEASE) (HCC): Primary | ICD-10-CM

## 2021-01-01 DIAGNOSIS — G89.3 PAIN DUE TO MALIGNANT NEOPLASM METASTATIC TO BONE (HCC): ICD-10-CM

## 2021-01-01 DIAGNOSIS — C73 PRIMARY CANCER OF THYROID WITH METASTASIS TO OTHER SITE (HCC): ICD-10-CM

## 2021-01-01 DIAGNOSIS — E66.01 SEVERE OBESITY (BMI 35.0-39.9) WITH COMORBIDITY (HCC): ICD-10-CM

## 2021-01-01 DIAGNOSIS — C73 MALIGNANT NEOPLASM OF THYROID GLAND (HCC): ICD-10-CM

## 2021-01-01 DIAGNOSIS — E11.22 TYPE 2 DIABETES MELLITUS WITH STAGE 3 CHRONIC KIDNEY DISEASE, WITH LONG-TERM CURRENT USE OF INSULIN, UNSPECIFIED WHETHER STAGE 3A OR 3B CKD (HCC): ICD-10-CM

## 2021-01-01 DIAGNOSIS — I10 HYPERTENSION GOAL BP (BLOOD PRESSURE) < 130/80: ICD-10-CM

## 2021-01-01 DIAGNOSIS — R91.8 LUNG NODULES: ICD-10-CM

## 2021-01-01 DIAGNOSIS — E11.65 TYPE 2 DIABETES MELLITUS WITH HYPERGLYCEMIA, UNSPECIFIED WHETHER LONG TERM INSULIN USE (HCC): ICD-10-CM

## 2021-01-01 DIAGNOSIS — R31.9 URINARY TRACT INFECTION WITH HEMATURIA, SITE UNSPECIFIED: Primary | ICD-10-CM

## 2021-01-01 DIAGNOSIS — Z76.89 ENCOUNTER FOR SUPPORT AND COORDINATION OF TRANSITION OF CARE: Primary | ICD-10-CM

## 2021-01-01 DIAGNOSIS — E55.9 VITAMIN D DEFICIENCY: ICD-10-CM

## 2021-01-01 DIAGNOSIS — N18.6 ESRD ON DIALYSIS (HCC): ICD-10-CM

## 2021-01-01 DIAGNOSIS — N18.4 CKD (CHRONIC KIDNEY DISEASE) STAGE 4, GFR 15-29 ML/MIN (HCC): ICD-10-CM

## 2021-01-01 DIAGNOSIS — C73 METASTASIS FROM THYROID CANCER (HCC): ICD-10-CM

## 2021-01-01 DIAGNOSIS — N18.30 TYPE 2 DIABETES MELLITUS WITH STAGE 3 CHRONIC KIDNEY DISEASE, WITH LONG-TERM CURRENT USE OF INSULIN, UNSPECIFIED WHETHER STAGE 3A OR 3B CKD (HCC): ICD-10-CM

## 2021-01-01 DIAGNOSIS — R10.9 FLANK PAIN: Primary | ICD-10-CM

## 2021-01-01 LAB
ABO + RH BLD: NORMAL
ALBUMIN SERPL-MCNC: 1.5 G/DL (ref 3.5–5)
ALBUMIN SERPL-MCNC: 1.5 G/DL (ref 3.5–5)
ALBUMIN SERPL-MCNC: 1.7 G/DL (ref 3.5–5)
ALBUMIN SERPL-MCNC: 1.8 G/DL (ref 3.5–5)
ALBUMIN SERPL-MCNC: 1.9 G/DL (ref 3.5–5)
ALBUMIN SERPL-MCNC: 2 G/DL (ref 3.5–5)
ALBUMIN SERPL-MCNC: 2.4 G/DL (ref 3.5–5)
ALBUMIN SERPL-MCNC: 2.6 G/DL (ref 3.5–5)
ALBUMIN SERPL-MCNC: 2.9 G/DL (ref 3.5–5)
ALBUMIN SERPL-MCNC: 3 G/DL (ref 3.5–5)
ALBUMIN SERPL-MCNC: 3 G/DL (ref 3.5–5)
ALBUMIN SERPL-MCNC: 3.1 G/DL (ref 3.5–5)
ALBUMIN SERPL-MCNC: 3.3 G/DL (ref 3.5–5)
ALBUMIN/GLOB SERPL: 0.4 {RATIO} (ref 1.1–2.2)
ALBUMIN/GLOB SERPL: 0.5 {RATIO} (ref 1.1–2.2)
ALBUMIN/GLOB SERPL: 0.5 {RATIO} (ref 1.1–2.2)
ALBUMIN/GLOB SERPL: 0.6 {RATIO} (ref 1.1–2.2)
ALBUMIN/GLOB SERPL: 0.6 {RATIO} (ref 1.1–2.2)
ALBUMIN/GLOB SERPL: 0.7 {RATIO} (ref 1.1–2.2)
ALBUMIN/GLOB SERPL: 0.8 {RATIO} (ref 1.1–2.2)
ALBUMIN/GLOB SERPL: 0.9 {RATIO} (ref 1.1–2.2)
ALP SERPL-CCNC: 113 U/L (ref 45–117)
ALP SERPL-CCNC: 118 U/L (ref 45–117)
ALP SERPL-CCNC: 126 U/L (ref 45–117)
ALP SERPL-CCNC: 141 U/L (ref 45–117)
ALP SERPL-CCNC: 144 U/L (ref 45–117)
ALP SERPL-CCNC: 222 U/L (ref 45–117)
ALP SERPL-CCNC: 87 U/L (ref 45–117)
ALP SERPL-CCNC: 94 U/L (ref 45–117)
ALT SERPL-CCNC: 19 U/L (ref 12–78)
ALT SERPL-CCNC: 19 U/L (ref 12–78)
ALT SERPL-CCNC: 27 U/L (ref 12–78)
ALT SERPL-CCNC: 27 U/L (ref 12–78)
ALT SERPL-CCNC: 29 U/L (ref 12–78)
ALT SERPL-CCNC: 30 U/L (ref 12–78)
ALT SERPL-CCNC: 31 U/L (ref 12–78)
ALT SERPL-CCNC: 60 U/L (ref 12–78)
ANION GAP BLD CALC-SCNC: 13 MMOL/L (ref 10–20)
ANION GAP SERPL CALC-SCNC: 10 MMOL/L (ref 5–15)
ANION GAP SERPL CALC-SCNC: 12 MMOL/L (ref 5–15)
ANION GAP SERPL CALC-SCNC: 2 MMOL/L (ref 5–15)
ANION GAP SERPL CALC-SCNC: 3 MMOL/L (ref 5–15)
ANION GAP SERPL CALC-SCNC: 4 MMOL/L (ref 5–15)
ANION GAP SERPL CALC-SCNC: 6 MMOL/L (ref 5–15)
ANION GAP SERPL CALC-SCNC: 7 MMOL/L (ref 5–15)
ANION GAP SERPL CALC-SCNC: 8 MMOL/L (ref 5–15)
ANION GAP SERPL CALC-SCNC: 9 MMOL/L (ref 5–15)
APPEARANCE UR: ABNORMAL
APPEARANCE UR: CLEAR
AST SERPL-CCNC: 10 U/L (ref 15–37)
AST SERPL-CCNC: 13 U/L (ref 15–37)
AST SERPL-CCNC: 13 U/L (ref 15–37)
AST SERPL-CCNC: 15 U/L (ref 15–37)
AST SERPL-CCNC: 24 U/L (ref 15–37)
AST SERPL-CCNC: 4 U/L (ref 15–37)
AST SERPL-CCNC: 6 U/L (ref 15–37)
AST SERPL-CCNC: 8 U/L (ref 15–37)
ATRIAL RATE: 67 BPM
ATRIAL RATE: 70 BPM
ATRIAL RATE: 70 BPM
ATRIAL RATE: 71 BPM
ATRIAL RATE: 74 BPM
ATRIAL RATE: 81 BPM
ATRIAL RATE: 82 BPM
BACTERIA SPEC CULT: ABNORMAL
BACTERIA SPEC CULT: NORMAL
BACTERIA URNS QL MICRO: ABNORMAL /HPF
BACTERIA URNS QL MICRO: ABNORMAL /HPF
BACTERIA URNS QL MICRO: NEGATIVE /HPF
BASE EXCESS BLD CALC-SCNC: 2.6 MMOL/L
BASO+EOS+MONOS # BLD AUTO: 1.2 K/UL (ref 0.2–1.2)
BASO+EOS+MONOS # BLD AUTO: 1.5 K/UL (ref 0.2–1.2)
BASO+EOS+MONOS NFR BLD AUTO: 10 % (ref 3.2–16.9)
BASO+EOS+MONOS NFR BLD AUTO: 12 % (ref 3.2–16.9)
BASOPHILS # BLD: 0 K/UL (ref 0–0.1)
BASOPHILS # BLD: 0.1 K/UL (ref 0–0.1)
BASOPHILS # BLD: 0.2 K/UL (ref 0–0.1)
BASOPHILS # BLD: 0.2 K/UL (ref 0–0.1)
BASOPHILS NFR BLD: 0 % (ref 0–1)
BASOPHILS NFR BLD: 1 % (ref 0–1)
BILIRUB SERPL-MCNC: 0.3 MG/DL (ref 0.2–1)
BILIRUB SERPL-MCNC: 0.4 MG/DL (ref 0.2–1)
BILIRUB SERPL-MCNC: 0.4 MG/DL (ref 0.2–1)
BILIRUB SERPL-MCNC: 0.5 MG/DL (ref 0.2–1)
BILIRUB SERPL-MCNC: 0.5 MG/DL (ref 0.2–1)
BILIRUB SERPL-MCNC: 0.6 MG/DL (ref 0.2–1)
BILIRUB UR QL: NEGATIVE
BLOOD GROUP ANTIBODIES SERPL: NORMAL
BNP SERPL-MCNC: 4468 PG/ML
BNP SERPL-MCNC: 8729 PG/ML
BUN SERPL-MCNC: 106 MG/DL (ref 6–20)
BUN SERPL-MCNC: 111 MG/DL (ref 6–20)
BUN SERPL-MCNC: 129 MG/DL (ref 6–20)
BUN SERPL-MCNC: 31 MG/DL (ref 6–20)
BUN SERPL-MCNC: 40 MG/DL (ref 6–20)
BUN SERPL-MCNC: 45 MG/DL (ref 6–20)
BUN SERPL-MCNC: 48 MG/DL (ref 6–20)
BUN SERPL-MCNC: 51 MG/DL (ref 6–20)
BUN SERPL-MCNC: 53 MG/DL (ref 6–20)
BUN SERPL-MCNC: 53 MG/DL (ref 6–20)
BUN SERPL-MCNC: 55 MG/DL (ref 6–20)
BUN SERPL-MCNC: 57 MG/DL (ref 6–20)
BUN SERPL-MCNC: 58 MG/DL (ref 6–20)
BUN SERPL-MCNC: 59 MG/DL (ref 6–20)
BUN SERPL-MCNC: 59 MG/DL (ref 6–20)
BUN SERPL-MCNC: 61 MG/DL (ref 6–20)
BUN SERPL-MCNC: 70 MG/DL (ref 6–20)
BUN SERPL-MCNC: 71 MG/DL (ref 6–20)
BUN SERPL-MCNC: 71 MG/DL (ref 6–20)
BUN SERPL-MCNC: 73 MG/DL (ref 6–20)
BUN SERPL-MCNC: 75 MG/DL (ref 6–20)
BUN SERPL-MCNC: 75 MG/DL (ref 6–20)
BUN SERPL-MCNC: 81 MG/DL (ref 6–20)
BUN SERPL-MCNC: 84 MG/DL (ref 6–20)
BUN SERPL-MCNC: 86 MG/DL (ref 6–20)
BUN SERPL-MCNC: 91 MG/DL (ref 6–20)
BUN SERPL-MCNC: 96 MG/DL (ref 6–20)
BUN/CREAT SERPL: 11 (ref 12–20)
BUN/CREAT SERPL: 11 (ref 12–20)
BUN/CREAT SERPL: 13 (ref 12–20)
BUN/CREAT SERPL: 14 (ref 12–20)
BUN/CREAT SERPL: 14 (ref 12–20)
BUN/CREAT SERPL: 15 (ref 12–20)
BUN/CREAT SERPL: 16 (ref 12–20)
BUN/CREAT SERPL: 17 (ref 12–20)
BUN/CREAT SERPL: 18 (ref 12–20)
BUN/CREAT SERPL: 21 (ref 12–20)
BUN/CREAT SERPL: 22 (ref 12–20)
BUN/CREAT SERPL: 23 (ref 12–20)
BUN/CREAT SERPL: 25 (ref 12–20)
BUN/CREAT SERPL: 28 (ref 12–20)
BUN/CREAT SERPL: 29 (ref 12–20)
CA-I BLD-MCNC: 1.26 MMOL/L (ref 1.12–1.32)
CA-I BLD-MCNC: 1.33 MMOL/L (ref 1.12–1.32)
CALCIUM SERPL-MCNC: 10.1 MG/DL (ref 8.5–10.1)
CALCIUM SERPL-MCNC: 10.2 MG/DL (ref 8.5–10.1)
CALCIUM SERPL-MCNC: 10.2 MG/DL (ref 8.5–10.1)
CALCIUM SERPL-MCNC: 7.4 MG/DL (ref 8.5–10.1)
CALCIUM SERPL-MCNC: 7.9 MG/DL (ref 8.5–10.1)
CALCIUM SERPL-MCNC: 8.1 MG/DL (ref 8.5–10.1)
CALCIUM SERPL-MCNC: 8.6 MG/DL (ref 8.5–10.1)
CALCIUM SERPL-MCNC: 8.8 MG/DL (ref 8.5–10.1)
CALCIUM SERPL-MCNC: 8.8 MG/DL (ref 8.5–10.1)
CALCIUM SERPL-MCNC: 8.9 MG/DL (ref 8.5–10.1)
CALCIUM SERPL-MCNC: 9 MG/DL (ref 8.5–10.1)
CALCIUM SERPL-MCNC: 9 MG/DL (ref 8.5–10.1)
CALCIUM SERPL-MCNC: 9.1 MG/DL (ref 8.5–10.1)
CALCIUM SERPL-MCNC: 9.1 MG/DL (ref 8.5–10.1)
CALCIUM SERPL-MCNC: 9.3 MG/DL (ref 8.5–10.1)
CALCIUM SERPL-MCNC: 9.4 MG/DL (ref 8.5–10.1)
CALCIUM SERPL-MCNC: 9.4 MG/DL (ref 8.5–10.1)
CALCIUM SERPL-MCNC: 9.5 MG/DL (ref 8.5–10.1)
CALCIUM SERPL-MCNC: 9.6 MG/DL (ref 8.5–10.1)
CALCIUM SERPL-MCNC: 9.7 MG/DL (ref 8.5–10.1)
CALCIUM SERPL-MCNC: 9.7 MG/DL (ref 8.5–10.1)
CALCIUM SERPL-MCNC: 9.8 MG/DL (ref 8.5–10.1)
CALCIUM SERPL-MCNC: 9.9 MG/DL (ref 8.5–10.1)
CALCULATED P AXIS, ECG09: 0 DEGREES
CALCULATED P AXIS, ECG09: 31 DEGREES
CALCULATED P AXIS, ECG09: 31 DEGREES
CALCULATED P AXIS, ECG09: 33 DEGREES
CALCULATED P AXIS, ECG09: 37 DEGREES
CALCULATED P AXIS, ECG09: 48 DEGREES
CALCULATED P AXIS, ECG09: 57 DEGREES
CALCULATED R AXIS, ECG10: -20 DEGREES
CALCULATED R AXIS, ECG10: -21 DEGREES
CALCULATED R AXIS, ECG10: -25 DEGREES
CALCULATED R AXIS, ECG10: -33 DEGREES
CALCULATED R AXIS, ECG10: -35 DEGREES
CALCULATED R AXIS, ECG10: -37 DEGREES
CALCULATED R AXIS, ECG10: -45 DEGREES
CALCULATED T AXIS, ECG11: 28 DEGREES
CALCULATED T AXIS, ECG11: 31 DEGREES
CALCULATED T AXIS, ECG11: 34 DEGREES
CALCULATED T AXIS, ECG11: 35 DEGREES
CALCULATED T AXIS, ECG11: 37 DEGREES
CALCULATED T AXIS, ECG11: 39 DEGREES
CALCULATED T AXIS, ECG11: 44 DEGREES
CC UR VC: ABNORMAL
CHLORIDE BLD-SCNC: 96 MMOL/L (ref 100–108)
CHLORIDE BLD-SCNC: 99 MMOL/L (ref 98–107)
CHLORIDE SERPL-SCNC: 100 MMOL/L (ref 97–108)
CHLORIDE SERPL-SCNC: 102 MMOL/L (ref 97–108)
CHLORIDE SERPL-SCNC: 102 MMOL/L (ref 97–108)
CHLORIDE SERPL-SCNC: 106 MMOL/L (ref 97–108)
CHLORIDE SERPL-SCNC: 107 MMOL/L (ref 97–108)
CHLORIDE SERPL-SCNC: 108 MMOL/L (ref 97–108)
CHLORIDE SERPL-SCNC: 111 MMOL/L (ref 97–108)
CHLORIDE SERPL-SCNC: 111 MMOL/L (ref 97–108)
CHLORIDE SERPL-SCNC: 112 MMOL/L (ref 97–108)
CHLORIDE SERPL-SCNC: 93 MMOL/L (ref 97–108)
CHLORIDE SERPL-SCNC: 94 MMOL/L (ref 97–108)
CHLORIDE SERPL-SCNC: 94 MMOL/L (ref 97–108)
CHLORIDE SERPL-SCNC: 95 MMOL/L (ref 97–108)
CHLORIDE SERPL-SCNC: 96 MMOL/L (ref 97–108)
CHLORIDE SERPL-SCNC: 97 MMOL/L (ref 97–108)
CHLORIDE SERPL-SCNC: 98 MMOL/L (ref 97–108)
CHOLEST SERPL-MCNC: 120 MG/DL
CK SERPL-CCNC: 22 U/L (ref 39–308)
CK SERPL-CCNC: 34 U/L (ref 39–308)
CK SERPL-CCNC: 71 U/L (ref 39–308)
CO2 BLD-SCNC: 27 MMOL/L (ref 19–24)
CO2 BLD-SCNC: 29.2 MMOL/L (ref 21–32)
CO2 SERPL-SCNC: 19 MMOL/L (ref 21–32)
CO2 SERPL-SCNC: 20 MMOL/L (ref 21–32)
CO2 SERPL-SCNC: 23 MMOL/L (ref 21–32)
CO2 SERPL-SCNC: 24 MMOL/L (ref 21–32)
CO2 SERPL-SCNC: 24 MMOL/L (ref 21–32)
CO2 SERPL-SCNC: 25 MMOL/L (ref 21–32)
CO2 SERPL-SCNC: 26 MMOL/L (ref 21–32)
CO2 SERPL-SCNC: 26 MMOL/L (ref 21–32)
CO2 SERPL-SCNC: 27 MMOL/L (ref 21–32)
CO2 SERPL-SCNC: 27 MMOL/L (ref 21–32)
CO2 SERPL-SCNC: 28 MMOL/L (ref 21–32)
CO2 SERPL-SCNC: 29 MMOL/L (ref 21–32)
CO2 SERPL-SCNC: 30 MMOL/L (ref 21–32)
CO2 SERPL-SCNC: 30 MMOL/L (ref 21–32)
CO2 SERPL-SCNC: 31 MMOL/L (ref 21–32)
CO2 SERPL-SCNC: 31 MMOL/L (ref 21–32)
CO2 SERPL-SCNC: 32 MMOL/L (ref 21–32)
COLOR UR: ABNORMAL
COMMENT, HOLDF: NORMAL
COMMENT, HOLDF: NORMAL
COVID-19 RAPID TEST, COVR: NOT DETECTED
CREAT BLD-MCNC: 4.82 MG/DL (ref 0.6–1.3)
CREAT SERPL-MCNC: 1.82 MG/DL (ref 0.7–1.3)
CREAT SERPL-MCNC: 2.32 MG/DL (ref 0.7–1.3)
CREAT SERPL-MCNC: 2.57 MG/DL (ref 0.7–1.3)
CREAT SERPL-MCNC: 2.6 MG/DL (ref 0.7–1.3)
CREAT SERPL-MCNC: 2.65 MG/DL (ref 0.7–1.3)
CREAT SERPL-MCNC: 2.7 MG/DL (ref 0.7–1.3)
CREAT SERPL-MCNC: 2.76 MG/DL (ref 0.7–1.3)
CREAT SERPL-MCNC: 2.8 MG/DL (ref 0.7–1.3)
CREAT SERPL-MCNC: 2.92 MG/DL (ref 0.7–1.3)
CREAT SERPL-MCNC: 3.16 MG/DL (ref 0.7–1.3)
CREAT SERPL-MCNC: 3.24 MG/DL (ref 0.7–1.3)
CREAT SERPL-MCNC: 3.3 MG/DL (ref 0.7–1.3)
CREAT SERPL-MCNC: 3.44 MG/DL (ref 0.7–1.3)
CREAT SERPL-MCNC: 3.66 MG/DL (ref 0.7–1.3)
CREAT SERPL-MCNC: 3.76 MG/DL (ref 0.7–1.3)
CREAT SERPL-MCNC: 4.08 MG/DL (ref 0.7–1.3)
CREAT SERPL-MCNC: 4.51 MG/DL (ref 0.7–1.3)
CREAT SERPL-MCNC: 4.73 MG/DL (ref 0.7–1.3)
CREAT SERPL-MCNC: 4.92 MG/DL (ref 0.7–1.3)
CREAT SERPL-MCNC: 4.98 MG/DL (ref 0.7–1.3)
CREAT SERPL-MCNC: 5.14 MG/DL (ref 0.7–1.3)
CREAT SERPL-MCNC: 5.17 MG/DL (ref 0.7–1.3)
CREAT SERPL-MCNC: 5.2 MG/DL (ref 0.7–1.3)
CREAT SERPL-MCNC: 5.23 MG/DL (ref 0.7–1.3)
CREAT SERPL-MCNC: 5.4 MG/DL (ref 0.7–1.3)
CREAT SERPL-MCNC: 5.53 MG/DL (ref 0.7–1.3)
CREAT SERPL-MCNC: 5.55 MG/DL (ref 0.7–1.3)
CREAT UR-MCNC: 4.1 MG/DL (ref 0.6–1.3)
CREAT UR-MCNC: 44.1 MG/DL
CREAT UR-MCNC: 81.2 MG/DL
CRP SERPL-MCNC: 1.14 MG/DL (ref 0–0.6)
DIAGNOSIS, 93000: NORMAL
DIFFERENTIAL METHOD BLD: ABNORMAL
ECHO AO ROOT DIAM: 3.33 CM
ECHO AV AREA PEAK VELOCITY: 1.77 CM2
ECHO AV AREA/BSA PEAK VELOCITY: 0.8 CM2/M2
ECHO AV CUSP MM: 1.25 CM
ECHO AV PEAK GRADIENT: 9.94 MMHG
ECHO AV PEAK VELOCITY: 157.62 CM/S
ECHO LA AREA 4C: 21.01 CM2
ECHO LA MAJOR AXIS: 4.55 CM
ECHO LA MINOR AXIS: 2.09 CM
ECHO LA TO AORTIC ROOT RATIO: 1.41
ECHO LA TO AORTIC ROOT RATIO: 1.41
ECHO LA VOL 2C: 45.34 ML (ref 18–58)
ECHO LA VOL 4C: 54.11 ML (ref 18–58)
ECHO LA VOL BP: 59.02 ML (ref 18–58)
ECHO LA VOL/BSA BIPLANE: 27.07 ML/M2 (ref 16–28)
ECHO LA VOLUME INDEX A2C: 20.8 ML/M2 (ref 16–28)
ECHO LA VOLUME INDEX A4C: 24.82 ML/M2 (ref 16–28)
ECHO LV E' LATERAL VELOCITY: 6.48 CM/S
ECHO LV E' SEPTAL VELOCITY: 8.01 CM/S
ECHO LV INTERNAL DIMENSION DIASTOLIC: 4.3 CM (ref 4.2–5.9)
ECHO LV INTERNAL DIMENSION SYSTOLIC: 2.88 CM
ECHO LV IVSD: 1.92 CM (ref 0.6–1)
ECHO LV IVSS: 2.41 CM
ECHO LV MASS 2D: 332.3 G (ref 88–224)
ECHO LV MASS INDEX 2D: 152.4 G/M2 (ref 49–115)
ECHO LV POSTERIOR WALL DIASTOLIC: 1.6 CM (ref 0.6–1)
ECHO LV POSTERIOR WALL SYSTOLIC: 1.97 CM
ECHO LVOT DIAM: 2.14 CM
ECHO LVOT PEAK GRADIENT: 2.42 MMHG
ECHO LVOT PEAK VELOCITY: 77.8 CM/S
ECHO MV A VELOCITY: 80.61 CM/S
ECHO MV E DECELERATION TIME (DT): 285.76 MS
ECHO MV E VELOCITY: 66.46 CM/S
ECHO MV E/A RATIO: 0.82
ECHO MV E/E' LATERAL: 10.26
ECHO MV E/E' RATIO (AVERAGED): 9.28
ECHO MV E/E' SEPTAL: 8.3
ECHO PV MAX VELOCITY: 142.57 CM/S
ECHO PV PEAK INSTANTANEOUS GRADIENT SYSTOLIC: 8.13 MMHG
ECHO RV INTERNAL DIMENSION: 4.39 CM
ECHO TV REGURGITANT MAX VELOCITY: 216.91 CM/S
ECHO TV REGURGITANT PEAK GRADIENT: 18.82 MMHG
EOSINOPHIL # BLD: 0 K/UL (ref 0–0.4)
EOSINOPHIL # BLD: 0.1 K/UL (ref 0–0.4)
EOSINOPHIL # BLD: 0.2 K/UL (ref 0–0.4)
EOSINOPHIL # BLD: 0.4 K/UL (ref 0–0.4)
EOSINOPHIL # BLD: 1.8 K/UL (ref 0–0.4)
EOSINOPHIL # BLD: 2.1 K/UL (ref 0–0.4)
EOSINOPHIL # BLD: 2.2 K/UL (ref 0–0.4)
EOSINOPHIL # BLD: 2.3 K/UL (ref 0–0.4)
EOSINOPHIL NFR BLD: 0 % (ref 0–7)
EOSINOPHIL NFR BLD: 1 % (ref 0–7)
EOSINOPHIL NFR BLD: 1 % (ref 0–7)
EOSINOPHIL NFR BLD: 11 % (ref 0–7)
EOSINOPHIL NFR BLD: 12 % (ref 0–7)
EOSINOPHIL NFR BLD: 16 % (ref 0–7)
EOSINOPHIL NFR BLD: 18 % (ref 0–7)
EOSINOPHIL NFR BLD: 6 % (ref 0–7)
EPITH CASTS URNS QL MICRO: ABNORMAL /LPF
ERYTHROCYTE [DISTWIDTH] IN BLOOD BY AUTOMATED COUNT: 13.7 % (ref 11.5–14.5)
ERYTHROCYTE [DISTWIDTH] IN BLOOD BY AUTOMATED COUNT: 13.8 % (ref 11.5–14.5)
ERYTHROCYTE [DISTWIDTH] IN BLOOD BY AUTOMATED COUNT: 13.8 % (ref 11.5–14.5)
ERYTHROCYTE [DISTWIDTH] IN BLOOD BY AUTOMATED COUNT: 14.3 % (ref 11.5–14.5)
ERYTHROCYTE [DISTWIDTH] IN BLOOD BY AUTOMATED COUNT: 14.4 % (ref 11.5–14.5)
ERYTHROCYTE [DISTWIDTH] IN BLOOD BY AUTOMATED COUNT: 14.5 % (ref 11.5–14.5)
ERYTHROCYTE [DISTWIDTH] IN BLOOD BY AUTOMATED COUNT: 14.6 % (ref 11.5–14.5)
ERYTHROCYTE [DISTWIDTH] IN BLOOD BY AUTOMATED COUNT: 14.7 % (ref 11.5–14.5)
ERYTHROCYTE [DISTWIDTH] IN BLOOD BY AUTOMATED COUNT: 14.7 % (ref 11.5–14.5)
ERYTHROCYTE [DISTWIDTH] IN BLOOD BY AUTOMATED COUNT: 14.8 % (ref 11.5–14.5)
ERYTHROCYTE [DISTWIDTH] IN BLOOD BY AUTOMATED COUNT: 14.9 % (ref 11.5–14.5)
ERYTHROCYTE [DISTWIDTH] IN BLOOD BY AUTOMATED COUNT: 15 % (ref 11.5–14.5)
ERYTHROCYTE [DISTWIDTH] IN BLOOD BY AUTOMATED COUNT: 15.2 % (ref 11.5–14.5)
ERYTHROCYTE [DISTWIDTH] IN BLOOD BY AUTOMATED COUNT: 15.3 % (ref 11.5–14.5)
ERYTHROCYTE [DISTWIDTH] IN BLOOD BY AUTOMATED COUNT: 15.4 % (ref 11.5–14.5)
ERYTHROCYTE [DISTWIDTH] IN BLOOD BY AUTOMATED COUNT: 15.7 % (ref 11.5–14.5)
ERYTHROCYTE [DISTWIDTH] IN BLOOD BY AUTOMATED COUNT: 15.9 % (ref 11.5–14.5)
ERYTHROCYTE [DISTWIDTH] IN BLOOD BY AUTOMATED COUNT: 16 % (ref 11.5–14.5)
ERYTHROCYTE [DISTWIDTH] IN BLOOD BY AUTOMATED COUNT: 16.4 % (ref 11.5–14.5)
ERYTHROCYTE [DISTWIDTH] IN BLOOD BY AUTOMATED COUNT: 16.6 % (ref 11.5–14.5)
ERYTHROCYTE [DISTWIDTH] IN BLOOD BY AUTOMATED COUNT: 16.7 % (ref 11.8–15.8)
ERYTHROCYTE [DISTWIDTH] IN BLOOD BY AUTOMATED COUNT: 16.9 % (ref 11.8–15.8)
ERYTHROCYTE [SEDIMENTATION RATE] IN BLOOD: 56 MM/HR (ref 0–20)
EST. AVERAGE GLUCOSE BLD GHB EST-MCNC: 131 MG/DL
FERRITIN SERPL-MCNC: 372 NG/ML (ref 26–388)
GLOBULIN SER CALC-MCNC: 3 G/DL (ref 2–4)
GLOBULIN SER CALC-MCNC: 3.3 G/DL (ref 2–4)
GLOBULIN SER CALC-MCNC: 3.5 G/DL (ref 2–4)
GLOBULIN SER CALC-MCNC: 3.7 G/DL (ref 2–4)
GLOBULIN SER CALC-MCNC: 3.8 G/DL (ref 2–4)
GLOBULIN SER CALC-MCNC: 3.8 G/DL (ref 2–4)
GLOBULIN SER CALC-MCNC: 4.1 G/DL (ref 2–4)
GLOBULIN SER CALC-MCNC: 4.5 G/DL (ref 2–4)
GLUCOSE BLD STRIP.AUTO-MCNC: 100 MG/DL (ref 65–117)
GLUCOSE BLD STRIP.AUTO-MCNC: 101 MG/DL (ref 65–117)
GLUCOSE BLD STRIP.AUTO-MCNC: 101 MG/DL (ref 65–117)
GLUCOSE BLD STRIP.AUTO-MCNC: 104 MG/DL (ref 65–117)
GLUCOSE BLD STRIP.AUTO-MCNC: 106 MG/DL (ref 65–117)
GLUCOSE BLD STRIP.AUTO-MCNC: 109 MG/DL (ref 65–117)
GLUCOSE BLD STRIP.AUTO-MCNC: 112 MG/DL (ref 65–117)
GLUCOSE BLD STRIP.AUTO-MCNC: 113 MG/DL (ref 65–117)
GLUCOSE BLD STRIP.AUTO-MCNC: 115 MG/DL (ref 65–117)
GLUCOSE BLD STRIP.AUTO-MCNC: 115 MG/DL (ref 65–117)
GLUCOSE BLD STRIP.AUTO-MCNC: 118 MG/DL (ref 65–117)
GLUCOSE BLD STRIP.AUTO-MCNC: 119 MG/DL (ref 65–117)
GLUCOSE BLD STRIP.AUTO-MCNC: 120 MG/DL (ref 65–117)
GLUCOSE BLD STRIP.AUTO-MCNC: 121 MG/DL (ref 65–117)
GLUCOSE BLD STRIP.AUTO-MCNC: 125 MG/DL (ref 65–117)
GLUCOSE BLD STRIP.AUTO-MCNC: 126 MG/DL (ref 65–117)
GLUCOSE BLD STRIP.AUTO-MCNC: 127 MG/DL (ref 65–117)
GLUCOSE BLD STRIP.AUTO-MCNC: 129 MG/DL (ref 65–117)
GLUCOSE BLD STRIP.AUTO-MCNC: 132 MG/DL (ref 65–117)
GLUCOSE BLD STRIP.AUTO-MCNC: 135 MG/DL (ref 65–117)
GLUCOSE BLD STRIP.AUTO-MCNC: 137 MG/DL (ref 65–117)
GLUCOSE BLD STRIP.AUTO-MCNC: 140 MG/DL (ref 65–117)
GLUCOSE BLD STRIP.AUTO-MCNC: 143 MG/DL (ref 65–117)
GLUCOSE BLD STRIP.AUTO-MCNC: 149 MG/DL (ref 65–117)
GLUCOSE BLD STRIP.AUTO-MCNC: 159 MG/DL (ref 65–117)
GLUCOSE BLD STRIP.AUTO-MCNC: 165 MG/DL (ref 65–117)
GLUCOSE BLD STRIP.AUTO-MCNC: 166 MG/DL (ref 65–117)
GLUCOSE BLD STRIP.AUTO-MCNC: 172 MG/DL (ref 65–117)
GLUCOSE BLD STRIP.AUTO-MCNC: 173 MG/DL (ref 65–117)
GLUCOSE BLD STRIP.AUTO-MCNC: 174 MG/DL (ref 65–117)
GLUCOSE BLD STRIP.AUTO-MCNC: 180 MG/DL (ref 65–117)
GLUCOSE BLD STRIP.AUTO-MCNC: 184 MG/DL (ref 65–117)
GLUCOSE BLD STRIP.AUTO-MCNC: 187 MG/DL (ref 65–117)
GLUCOSE BLD STRIP.AUTO-MCNC: 188 MG/DL (ref 65–117)
GLUCOSE BLD STRIP.AUTO-MCNC: 198 MG/DL (ref 65–117)
GLUCOSE BLD STRIP.AUTO-MCNC: 198 MG/DL (ref 65–117)
GLUCOSE BLD STRIP.AUTO-MCNC: 199 MG/DL (ref 65–117)
GLUCOSE BLD STRIP.AUTO-MCNC: 201 MG/DL (ref 65–117)
GLUCOSE BLD STRIP.AUTO-MCNC: 203 MG/DL (ref 65–117)
GLUCOSE BLD STRIP.AUTO-MCNC: 215 MG/DL (ref 65–117)
GLUCOSE BLD STRIP.AUTO-MCNC: 219 MG/DL (ref 65–117)
GLUCOSE BLD STRIP.AUTO-MCNC: 222 MG/DL (ref 65–117)
GLUCOSE BLD STRIP.AUTO-MCNC: 225 MG/DL (ref 65–117)
GLUCOSE BLD STRIP.AUTO-MCNC: 231 MG/DL (ref 65–117)
GLUCOSE BLD STRIP.AUTO-MCNC: 232 MG/DL (ref 65–117)
GLUCOSE BLD STRIP.AUTO-MCNC: 235 MG/DL (ref 65–117)
GLUCOSE BLD STRIP.AUTO-MCNC: 236 MG/DL (ref 65–117)
GLUCOSE BLD STRIP.AUTO-MCNC: 238 MG/DL (ref 65–117)
GLUCOSE BLD STRIP.AUTO-MCNC: 238 MG/DL (ref 65–117)
GLUCOSE BLD STRIP.AUTO-MCNC: 242 MG/DL (ref 65–117)
GLUCOSE BLD STRIP.AUTO-MCNC: 247 MG/DL (ref 65–117)
GLUCOSE BLD STRIP.AUTO-MCNC: 248 MG/DL (ref 65–117)
GLUCOSE BLD STRIP.AUTO-MCNC: 250 MG/DL (ref 65–117)
GLUCOSE BLD STRIP.AUTO-MCNC: 253 MG/DL (ref 65–117)
GLUCOSE BLD STRIP.AUTO-MCNC: 255 MG/DL (ref 65–117)
GLUCOSE BLD STRIP.AUTO-MCNC: 257 MG/DL (ref 65–117)
GLUCOSE BLD STRIP.AUTO-MCNC: 257 MG/DL (ref 65–117)
GLUCOSE BLD STRIP.AUTO-MCNC: 269 MG/DL (ref 65–117)
GLUCOSE BLD STRIP.AUTO-MCNC: 269 MG/DL (ref 65–117)
GLUCOSE BLD STRIP.AUTO-MCNC: 270 MG/DL (ref 65–117)
GLUCOSE BLD STRIP.AUTO-MCNC: 270 MG/DL (ref 65–117)
GLUCOSE BLD STRIP.AUTO-MCNC: 276 MG/DL (ref 65–117)
GLUCOSE BLD STRIP.AUTO-MCNC: 281 MG/DL (ref 65–117)
GLUCOSE BLD STRIP.AUTO-MCNC: 288 MG/DL (ref 65–117)
GLUCOSE BLD STRIP.AUTO-MCNC: 290 MG/DL (ref 65–117)
GLUCOSE BLD STRIP.AUTO-MCNC: 291 MG/DL (ref 65–117)
GLUCOSE BLD STRIP.AUTO-MCNC: 301 MG/DL (ref 65–117)
GLUCOSE BLD STRIP.AUTO-MCNC: 302 MG/DL (ref 65–117)
GLUCOSE BLD STRIP.AUTO-MCNC: 304 MG/DL (ref 65–117)
GLUCOSE BLD STRIP.AUTO-MCNC: 309 MG/DL (ref 65–117)
GLUCOSE BLD STRIP.AUTO-MCNC: 310 MG/DL (ref 65–117)
GLUCOSE BLD STRIP.AUTO-MCNC: 310 MG/DL (ref 74–106)
GLUCOSE BLD STRIP.AUTO-MCNC: 334 MG/DL (ref 65–117)
GLUCOSE BLD STRIP.AUTO-MCNC: 336 MG/DL (ref 65–117)
GLUCOSE BLD STRIP.AUTO-MCNC: 339 MG/DL (ref 65–117)
GLUCOSE BLD STRIP.AUTO-MCNC: 352 MG/DL (ref 65–117)
GLUCOSE BLD STRIP.AUTO-MCNC: 364 MG/DL (ref 65–117)
GLUCOSE BLD STRIP.AUTO-MCNC: 365 MG/DL (ref 65–117)
GLUCOSE BLD STRIP.AUTO-MCNC: 373 MG/DL (ref 65–117)
GLUCOSE BLD STRIP.AUTO-MCNC: 376 MG/DL (ref 65–117)
GLUCOSE BLD STRIP.AUTO-MCNC: 380 MG/DL (ref 65–117)
GLUCOSE BLD STRIP.AUTO-MCNC: 383 MG/DL (ref 65–117)
GLUCOSE BLD STRIP.AUTO-MCNC: 403 MG/DL (ref 65–117)
GLUCOSE BLD STRIP.AUTO-MCNC: 478 MG/DL (ref 65–117)
GLUCOSE BLD STRIP.AUTO-MCNC: 487 MG/DL (ref 65–117)
GLUCOSE BLD STRIP.AUTO-MCNC: 66 MG/DL (ref 65–117)
GLUCOSE BLD STRIP.AUTO-MCNC: 69 MG/DL (ref 65–117)
GLUCOSE BLD STRIP.AUTO-MCNC: 81 MG/DL (ref 65–117)
GLUCOSE BLD STRIP.AUTO-MCNC: 81 MG/DL (ref 65–117)
GLUCOSE BLD STRIP.AUTO-MCNC: 82 MG/DL (ref 65–117)
GLUCOSE BLD STRIP.AUTO-MCNC: 89 MG/DL (ref 65–117)
GLUCOSE BLD STRIP.AUTO-MCNC: 92 MG/DL (ref 65–117)
GLUCOSE BLD STRIP.AUTO-MCNC: 93 MG/DL (ref 65–117)
GLUCOSE BLD STRIP.AUTO-MCNC: 94 MG/DL (ref 65–117)
GLUCOSE BLD STRIP.AUTO-MCNC: 94 MG/DL (ref 65–117)
GLUCOSE BLD STRIP.AUTO-MCNC: 96 MG/DL (ref 65–117)
GLUCOSE BLD-MCNC: 231 MG/DL (ref 65–100)
GLUCOSE POC: NORMAL MG/DL
GLUCOSE SERPL-MCNC: 105 MG/DL (ref 65–100)
GLUCOSE SERPL-MCNC: 106 MG/DL (ref 65–100)
GLUCOSE SERPL-MCNC: 113 MG/DL (ref 65–100)
GLUCOSE SERPL-MCNC: 113 MG/DL (ref 65–100)
GLUCOSE SERPL-MCNC: 117 MG/DL (ref 65–100)
GLUCOSE SERPL-MCNC: 121 MG/DL (ref 65–100)
GLUCOSE SERPL-MCNC: 128 MG/DL (ref 65–100)
GLUCOSE SERPL-MCNC: 129 MG/DL (ref 65–100)
GLUCOSE SERPL-MCNC: 133 MG/DL (ref 65–100)
GLUCOSE SERPL-MCNC: 136 MG/DL (ref 65–100)
GLUCOSE SERPL-MCNC: 153 MG/DL (ref 65–100)
GLUCOSE SERPL-MCNC: 180 MG/DL (ref 65–100)
GLUCOSE SERPL-MCNC: 208 MG/DL (ref 65–100)
GLUCOSE SERPL-MCNC: 260 MG/DL (ref 65–100)
GLUCOSE SERPL-MCNC: 277 MG/DL (ref 65–100)
GLUCOSE SERPL-MCNC: 277 MG/DL (ref 65–100)
GLUCOSE SERPL-MCNC: 285 MG/DL (ref 65–100)
GLUCOSE SERPL-MCNC: 296 MG/DL (ref 65–100)
GLUCOSE SERPL-MCNC: 299 MG/DL (ref 65–100)
GLUCOSE SERPL-MCNC: 309 MG/DL (ref 65–100)
GLUCOSE SERPL-MCNC: 343 MG/DL (ref 65–100)
GLUCOSE SERPL-MCNC: 365 MG/DL (ref 65–100)
GLUCOSE SERPL-MCNC: 368 MG/DL (ref 65–100)
GLUCOSE SERPL-MCNC: 487 MG/DL (ref 65–100)
GLUCOSE SERPL-MCNC: 71 MG/DL (ref 65–100)
GLUCOSE SERPL-MCNC: 84 MG/DL (ref 65–100)
GLUCOSE SERPL-MCNC: 94 MG/DL (ref 65–100)
GLUCOSE UR STRIP.AUTO-MCNC: 100 MG/DL
GLUCOSE UR STRIP.AUTO-MCNC: 250 MG/DL
GLUCOSE UR STRIP.AUTO-MCNC: >1000 MG/DL
GLUCOSE UR STRIP.AUTO-MCNC: NEGATIVE MG/DL
HBA1C MFR BLD HPLC: 8.6 %
HBA1C MFR BLD: 6.2 % (ref 4–5.6)
HBV CORE IGM SER QL: NONREACTIVE
HBV SURFACE AB SER QL: NONREACTIVE
HBV SURFACE AB SER-ACNC: <3.1 MIU/ML
HBV SURFACE AG SER QL: <0.1 INDEX
HBV SURFACE AG SER QL: NEGATIVE
HCO3 BLDA-SCNC: 27 MMOL/L
HCT VFR BLD AUTO: 23.2 % (ref 36.6–50.3)
HCT VFR BLD AUTO: 27 % (ref 36.6–50.3)
HCT VFR BLD AUTO: 27.3 % (ref 36.6–50.3)
HCT VFR BLD AUTO: 28.1 % (ref 36.6–50.3)
HCT VFR BLD AUTO: 28.4 % (ref 36.6–50.3)
HCT VFR BLD AUTO: 28.7 % (ref 36.6–50.3)
HCT VFR BLD AUTO: 28.7 % (ref 36.6–50.3)
HCT VFR BLD AUTO: 28.8 % (ref 36.6–50.3)
HCT VFR BLD AUTO: 29.1 % (ref 36.6–50.3)
HCT VFR BLD AUTO: 29.4 % (ref 36.6–50.3)
HCT VFR BLD AUTO: 29.9 % (ref 36.6–50.3)
HCT VFR BLD AUTO: 29.9 % (ref 36.6–50.3)
HCT VFR BLD AUTO: 30.3 % (ref 36.6–50.3)
HCT VFR BLD AUTO: 30.4 % (ref 36.6–50.3)
HCT VFR BLD AUTO: 30.8 % (ref 36.6–50.3)
HCT VFR BLD AUTO: 30.9 % (ref 36.6–50.3)
HCT VFR BLD AUTO: 30.9 % (ref 36.6–50.3)
HCT VFR BLD AUTO: 31.2 % (ref 36.6–50.3)
HCT VFR BLD AUTO: 31.3 % (ref 36.6–50.3)
HCT VFR BLD AUTO: 31.8 % (ref 36.6–50.3)
HCT VFR BLD AUTO: 32.4 % (ref 36.6–50.3)
HCT VFR BLD AUTO: 33 % (ref 36.6–50.3)
HCT VFR BLD AUTO: 33.1 % (ref 36.6–50.3)
HCT VFR BLD AUTO: 33.3 % (ref 36.6–50.3)
HCT VFR BLD AUTO: 33.4 % (ref 36.6–50.3)
HCT VFR BLD AUTO: 36.1 % (ref 36.6–50.3)
HCT VFR BLD AUTO: 36.2 % (ref 36.6–50.3)
HDLC SERPL-MCNC: 45 MG/DL
HDLC SERPL: 2.7 {RATIO} (ref 0–5)
HGB BLD-MCNC: 10 G/DL (ref 12.1–17)
HGB BLD-MCNC: 10 G/DL (ref 12.1–17)
HGB BLD-MCNC: 10.2 G/DL (ref 12.1–17)
HGB BLD-MCNC: 10.3 G/DL (ref 12.1–17)
HGB BLD-MCNC: 10.4 G/DL (ref 12.1–17)
HGB BLD-MCNC: 10.5 G/DL (ref 12.1–17)
HGB BLD-MCNC: 10.7 G/DL (ref 12.1–17)
HGB BLD-MCNC: 11 G/DL (ref 12.1–17)
HGB BLD-MCNC: 11 G/DL (ref 12.1–17)
HGB BLD-MCNC: 11.1 G/DL (ref 12.1–17)
HGB BLD-MCNC: 11.9 G/DL (ref 12.1–17)
HGB BLD-MCNC: 7.3 G/DL (ref 12.1–17)
HGB BLD-MCNC: 7.4 G/DL (ref 12.1–17)
HGB BLD-MCNC: 8.4 G/DL (ref 12.1–17)
HGB BLD-MCNC: 8.5 G/DL (ref 12.1–17)
HGB BLD-MCNC: 8.7 G/DL (ref 12.1–17)
HGB BLD-MCNC: 9 G/DL (ref 12.1–17)
HGB BLD-MCNC: 9.2 G/DL (ref 12.1–17)
HGB BLD-MCNC: 9.3 G/DL (ref 12.1–17)
HGB BLD-MCNC: 9.4 G/DL (ref 12.1–17)
HGB BLD-MCNC: 9.4 G/DL (ref 12.1–17)
HGB BLD-MCNC: 9.5 G/DL (ref 12.1–17)
HGB BLD-MCNC: 9.6 G/DL (ref 12.1–17)
HGB BLD-MCNC: 9.6 G/DL (ref 12.1–17)
HGB BLD-MCNC: 9.8 G/DL (ref 12.1–17)
HGB BLD-MCNC: 9.9 G/DL (ref 12.1–17)
HGB BLD-MCNC: 9.9 G/DL (ref 12.1–17)
HGB UR QL STRIP: ABNORMAL
HGB UR QL STRIP: NEGATIVE
HGB UR QL STRIP: NEGATIVE
HYALINE CASTS URNS QL MICRO: ABNORMAL /LPF (ref 0–5)
IMM GRANULOCYTES # BLD AUTO: 0 K/UL (ref 0–0.04)
IMM GRANULOCYTES # BLD AUTO: 0.1 K/UL (ref 0–0.04)
IMM GRANULOCYTES # BLD AUTO: 0.2 K/UL (ref 0–0.04)
IMM GRANULOCYTES NFR BLD AUTO: 0 % (ref 0–0.5)
IMM GRANULOCYTES NFR BLD AUTO: 1 % (ref 0–0.5)
IMM GRANULOCYTES NFR BLD AUTO: 2 % (ref 0–0.5)
INR PPP: 1 (ref 0.9–1.1)
IRON SATN MFR SERPL: 20 % (ref 20–50)
IRON SATN MFR SERPL: 24 % (ref 20–50)
IRON SATN MFR SERPL: 26 % (ref 20–50)
IRON SERPL-MCNC: 42 UG/DL (ref 35–150)
IRON SERPL-MCNC: 55 UG/DL (ref 35–150)
IRON SERPL-MCNC: 56 UG/DL (ref 35–150)
KETONES UR QL STRIP.AUTO: NEGATIVE MG/DL
LACTATE BLD-SCNC: 1.04 MMOL/L (ref 0.4–2)
LACTATE BLD-SCNC: 1.04 MMOL/L (ref 0.4–2)
LACTATE BLD-SCNC: 1.13 MMOL/L (ref 0.4–2)
LACTATE SERPL-SCNC: 0.7 MMOL/L (ref 0.4–2)
LACTATE SERPL-SCNC: 1.1 MMOL/L (ref 0.4–2)
LDLC SERPL CALC-MCNC: 51.8 MG/DL (ref 0–100)
LEFT CCA DIST DIAS: 0 CM/S
LEFT CCA DIST SYS: 117.6 CM/S
LEFT CCA PROX DIAS: 16.7 CM/S
LEFT CCA PROX SYS: 154.9 CM/S
LEFT ECA DIAS: 0 CM/S
LEFT ECA SYS: 203.6 CM/S
LEFT ICA DIST DIAS: 16.7 CM/S
LEFT ICA DIST SYS: 104.5 CM/S
LEFT ICA MID DIAS: 18.9 CM/S
LEFT ICA MID SYS: 100.1 CM/S
LEFT ICA PROX DIAS: 16.7 CM/S
LEFT ICA PROX SYS: 106.7 CM/S
LEFT ICA/CCA SYS: 0.9
LEFT SUBCLAVIAN DIAS: 88.7 CM/S
LEFT SUBCLAVIAN SYS: 368.7 CM/S
LEFT VERTEBRAL DIAS: 14.5 CM/S
LEFT VERTEBRAL SYS: 89.6 CM/S
LEUKOCYTE ESTERASE UR QL STRIP.AUTO: ABNORMAL
LEUKOCYTE ESTERASE UR QL STRIP.AUTO: ABNORMAL
LEUKOCYTE ESTERASE UR QL STRIP.AUTO: NEGATIVE
LIPASE SERPL-CCNC: 36 U/L (ref 73–393)
LYMPHOCYTES # BLD: 0.2 K/UL (ref 0.8–3.5)
LYMPHOCYTES # BLD: 0.3 K/UL (ref 0.8–3.5)
LYMPHOCYTES # BLD: 0.4 K/UL (ref 0.8–3.5)
LYMPHOCYTES # BLD: 0.4 K/UL (ref 0.8–3.5)
LYMPHOCYTES # BLD: 0.6 K/UL (ref 0.8–3.5)
LYMPHOCYTES # BLD: 0.6 K/UL (ref 0.8–3.5)
LYMPHOCYTES # BLD: 0.8 K/UL (ref 0.8–3.5)
LYMPHOCYTES # BLD: 0.9 K/UL (ref 0.8–3.5)
LYMPHOCYTES # BLD: 0.9 K/UL (ref 0.8–3.5)
LYMPHOCYTES # BLD: 1 K/UL (ref 0.8–3.5)
LYMPHOCYTES # BLD: 1 K/UL (ref 0.8–3.5)
LYMPHOCYTES NFR BLD: 1 % (ref 12–49)
LYMPHOCYTES NFR BLD: 1 % (ref 12–49)
LYMPHOCYTES NFR BLD: 2 % (ref 12–49)
LYMPHOCYTES NFR BLD: 2 % (ref 12–49)
LYMPHOCYTES NFR BLD: 3 % (ref 12–49)
LYMPHOCYTES NFR BLD: 4 % (ref 12–49)
LYMPHOCYTES NFR BLD: 6 % (ref 12–49)
LYMPHOCYTES NFR BLD: 6 % (ref 12–49)
LYMPHOCYTES NFR BLD: 7 % (ref 12–49)
MAGNESIUM SERPL-MCNC: 1.8 MG/DL (ref 1.6–2.4)
MAGNESIUM SERPL-MCNC: 1.9 MG/DL (ref 1.6–2.4)
MAGNESIUM SERPL-MCNC: 2.3 MG/DL (ref 1.6–2.4)
MCH RBC QN AUTO: 29.5 PG (ref 26–34)
MCH RBC QN AUTO: 29.8 PG (ref 26–34)
MCH RBC QN AUTO: 29.9 PG (ref 26–34)
MCH RBC QN AUTO: 30 PG (ref 26–34)
MCH RBC QN AUTO: 30.1 PG (ref 26–34)
MCH RBC QN AUTO: 30.2 PG (ref 26–34)
MCH RBC QN AUTO: 30.4 PG (ref 26–34)
MCH RBC QN AUTO: 30.5 PG (ref 26–34)
MCH RBC QN AUTO: 30.6 PG (ref 26–34)
MCH RBC QN AUTO: 30.7 PG (ref 26–34)
MCH RBC QN AUTO: 30.8 PG (ref 26–34)
MCH RBC QN AUTO: 30.9 PG (ref 26–34)
MCH RBC QN AUTO: 31 PG (ref 26–34)
MCH RBC QN AUTO: 31.2 PG (ref 26–34)
MCH RBC QN AUTO: 31.4 PG (ref 26–34)
MCHC RBC AUTO-ENTMCNC: 30.1 G/DL (ref 30–36.5)
MCHC RBC AUTO-ENTMCNC: 30.4 G/DL (ref 30–36.5)
MCHC RBC AUTO-ENTMCNC: 30.5 G/DL (ref 30–36.5)
MCHC RBC AUTO-ENTMCNC: 31.4 G/DL (ref 30–36.5)
MCHC RBC AUTO-ENTMCNC: 31.4 G/DL (ref 30–36.5)
MCHC RBC AUTO-ENTMCNC: 31.5 G/DL (ref 30–36.5)
MCHC RBC AUTO-ENTMCNC: 31.5 G/DL (ref 30–36.5)
MCHC RBC AUTO-ENTMCNC: 31.6 G/DL (ref 30–36.5)
MCHC RBC AUTO-ENTMCNC: 31.9 G/DL (ref 30–36.5)
MCHC RBC AUTO-ENTMCNC: 32.1 G/DL (ref 30–36.5)
MCHC RBC AUTO-ENTMCNC: 32.2 G/DL (ref 30–36.5)
MCHC RBC AUTO-ENTMCNC: 32.4 G/DL (ref 30–36.5)
MCHC RBC AUTO-ENTMCNC: 32.4 G/DL (ref 30–36.5)
MCHC RBC AUTO-ENTMCNC: 32.7 G/DL (ref 30–36.5)
MCHC RBC AUTO-ENTMCNC: 32.8 G/DL (ref 30–36.5)
MCHC RBC AUTO-ENTMCNC: 32.9 G/DL (ref 30–36.5)
MCHC RBC AUTO-ENTMCNC: 32.9 G/DL (ref 30–36.5)
MCHC RBC AUTO-ENTMCNC: 33 G/DL (ref 30–36.5)
MCHC RBC AUTO-ENTMCNC: 33 G/DL (ref 30–36.5)
MCHC RBC AUTO-ENTMCNC: 33.1 G/DL (ref 30–36.5)
MCHC RBC AUTO-ENTMCNC: 33.2 G/DL (ref 30–36.5)
MCHC RBC AUTO-ENTMCNC: 33.2 G/DL (ref 30–36.5)
MCHC RBC AUTO-ENTMCNC: 33.4 G/DL (ref 30–36.5)
MCHC RBC AUTO-ENTMCNC: 33.4 G/DL (ref 30–36.5)
MCHC RBC AUTO-ENTMCNC: 33.5 G/DL (ref 30–36.5)
MCHC RBC AUTO-ENTMCNC: 33.5 G/DL (ref 30–36.5)
MCHC RBC AUTO-ENTMCNC: 34 G/DL (ref 30–36.5)
MCV RBC AUTO: 100 FL (ref 80–99)
MCV RBC AUTO: 100.6 FL (ref 80–99)
MCV RBC AUTO: 101 FL (ref 80–99)
MCV RBC AUTO: 89.4 FL (ref 80–99)
MCV RBC AUTO: 91.6 FL (ref 80–99)
MCV RBC AUTO: 92.2 FL (ref 80–99)
MCV RBC AUTO: 92.2 FL (ref 80–99)
MCV RBC AUTO: 92.9 FL (ref 80–99)
MCV RBC AUTO: 93 FL (ref 80–99)
MCV RBC AUTO: 93.1 FL (ref 80–99)
MCV RBC AUTO: 93.3 FL (ref 80–99)
MCV RBC AUTO: 93.3 FL (ref 80–99)
MCV RBC AUTO: 93.4 FL (ref 80–99)
MCV RBC AUTO: 93.5 FL (ref 80–99)
MCV RBC AUTO: 93.8 FL (ref 80–99)
MCV RBC AUTO: 93.8 FL (ref 80–99)
MCV RBC AUTO: 93.9 FL (ref 80–99)
MCV RBC AUTO: 94.2 FL (ref 80–99)
MCV RBC AUTO: 94.3 FL (ref 80–99)
MCV RBC AUTO: 94.5 FL (ref 80–99)
MCV RBC AUTO: 94.7 FL (ref 80–99)
MCV RBC AUTO: 95 FL (ref 80–99)
MCV RBC AUTO: 95 FL (ref 80–99)
MCV RBC AUTO: 95.2 FL (ref 80–99)
MCV RBC AUTO: 96.8 FL (ref 80–99)
MCV RBC AUTO: 97.2 FL (ref 80–99)
MCV RBC AUTO: 97.7 FL (ref 80–99)
METAMYELOCYTES NFR BLD MANUAL: 1 %
MONOCYTES # BLD: 0.4 K/UL (ref 0–1)
MONOCYTES # BLD: 0.5 K/UL (ref 0–1)
MONOCYTES # BLD: 0.8 K/UL (ref 0–1)
MONOCYTES # BLD: 1 K/UL (ref 0–1)
MONOCYTES # BLD: 1.2 K/UL (ref 0–1)
MONOCYTES # BLD: 1.2 K/UL (ref 0–1)
MONOCYTES # BLD: 1.4 K/UL (ref 0–1)
MONOCYTES NFR BLD: 11 % (ref 5–13)
MONOCYTES NFR BLD: 2 % (ref 5–13)
MONOCYTES NFR BLD: 3 % (ref 5–13)
MONOCYTES NFR BLD: 3 % (ref 5–13)
MONOCYTES NFR BLD: 4 % (ref 5–13)
MONOCYTES NFR BLD: 4 % (ref 5–13)
MONOCYTES NFR BLD: 5 % (ref 5–13)
MONOCYTES NFR BLD: 7 % (ref 5–13)
MONOCYTES NFR BLD: 8 % (ref 5–13)
MONOCYTES NFR BLD: 9 % (ref 5–13)
MONOCYTES NFR BLD: 9 % (ref 5–13)
MYELOCYTES NFR BLD MANUAL: 2 %
NEUTS BAND NFR BLD MANUAL: 1 %
NEUTS BAND NFR BLD MANUAL: 8 %
NEUTS SEG # BLD: 10.3 K/UL (ref 1.8–8)
NEUTS SEG # BLD: 10.5 K/UL (ref 1.8–8)
NEUTS SEG # BLD: 11.4 K/UL (ref 1.8–8)
NEUTS SEG # BLD: 11.8 K/UL (ref 1.8–8)
NEUTS SEG # BLD: 12.5 K/UL (ref 1.8–8)
NEUTS SEG # BLD: 14.9 K/UL (ref 1.8–8)
NEUTS SEG # BLD: 16.9 K/UL (ref 1.8–8)
NEUTS SEG # BLD: 19.1 K/UL (ref 1.8–8)
NEUTS SEG # BLD: 25.7 K/UL (ref 1.8–8)
NEUTS SEG # BLD: 5.7 K/UL (ref 1.8–8)
NEUTS SEG # BLD: 8.2 K/UL (ref 1.8–8)
NEUTS SEG # BLD: 9.1 K/UL (ref 1.8–8)
NEUTS SEG # BLD: 9.4 K/UL (ref 1.8–8)
NEUTS SEG NFR BLD: 65 % (ref 32–75)
NEUTS SEG NFR BLD: 66 % (ref 32–75)
NEUTS SEG NFR BLD: 73 % (ref 32–75)
NEUTS SEG NFR BLD: 74 % (ref 32–75)
NEUTS SEG NFR BLD: 79 % (ref 32–75)
NEUTS SEG NFR BLD: 80 % (ref 32–75)
NEUTS SEG NFR BLD: 84 % (ref 32–75)
NEUTS SEG NFR BLD: 84 % (ref 32–75)
NEUTS SEG NFR BLD: 91 % (ref 32–75)
NEUTS SEG NFR BLD: 91 % (ref 32–75)
NEUTS SEG NFR BLD: 92 % (ref 32–75)
NEUTS SEG NFR BLD: 95 % (ref 32–75)
NEUTS SEG NFR BLD: 95 % (ref 32–75)
NITRITE UR QL STRIP.AUTO: NEGATIVE
NRBC # BLD: 0 K/UL (ref 0–0.01)
NRBC # BLD: 0.02 K/UL (ref 0–0.01)
NRBC # BLD: 0.02 K/UL (ref 0–0.01)
NRBC # BLD: 0.03 K/UL (ref 0–0.01)
NRBC # BLD: 0.06 K/UL (ref 0–0.01)
NRBC # BLD: 0.08 K/UL (ref 0–0.01)
NRBC # BLD: 0.09 K/UL (ref 0–0.01)
NRBC BLD-RTO: 0 PER 100 WBC
NRBC BLD-RTO: 0.1 PER 100 WBC
NRBC BLD-RTO: 0.4 PER 100 WBC
NRBC BLD-RTO: 0.5 PER 100 WBC
NRBC BLD-RTO: 0.6 PER 100 WBC
P-R INTERVAL, ECG05: 144 MS
P-R INTERVAL, ECG05: 150 MS
P-R INTERVAL, ECG05: 152 MS
P-R INTERVAL, ECG05: 164 MS
P-R INTERVAL, ECG05: 170 MS
P-R INTERVAL, ECG05: 174 MS
P-R INTERVAL, ECG05: 180 MS
PCO2 BLDV: 39.5 MMHG (ref 41–51)
PH BLDV: 7.44 [PH] (ref 7.32–7.42)
PH UR STRIP: 5 [PH] (ref 5–8)
PH UR STRIP: 5.5 [PH] (ref 5–8)
PH UR STRIP: 6.5 [PH] (ref 5–8)
PH UR STRIP: 7 [PH] (ref 5–8)
PHOSPHATE SERPL-MCNC: 4.3 MG/DL (ref 2.6–4.7)
PHOSPHATE SERPL-MCNC: 5.4 MG/DL (ref 2.6–4.7)
PHOSPHATE SERPL-MCNC: 5.5 MG/DL (ref 2.6–4.7)
PHOSPHATE SERPL-MCNC: 5.7 MG/DL (ref 2.6–4.7)
PHOSPHATE SERPL-MCNC: 5.8 MG/DL (ref 2.6–4.7)
PHOSPHATE SERPL-MCNC: 6.2 MG/DL (ref 2.6–4.7)
PHOSPHATE SERPL-MCNC: 6.5 MG/DL (ref 2.6–4.7)
PHOSPHATE SERPL-MCNC: 7 MG/DL (ref 2.6–4.7)
PLATELET # BLD AUTO: 100 K/UL (ref 150–400)
PLATELET # BLD AUTO: 106 K/UL (ref 150–400)
PLATELET # BLD AUTO: 111 K/UL (ref 150–400)
PLATELET # BLD AUTO: 112 K/UL (ref 150–400)
PLATELET # BLD AUTO: 113 K/UL (ref 150–400)
PLATELET # BLD AUTO: 118 K/UL (ref 150–400)
PLATELET # BLD AUTO: 124 K/UL (ref 150–400)
PLATELET # BLD AUTO: 141 K/UL (ref 150–400)
PLATELET # BLD AUTO: 149 K/UL (ref 150–400)
PLATELET # BLD AUTO: 156 K/UL (ref 150–400)
PLATELET # BLD AUTO: 159 K/UL (ref 150–400)
PLATELET # BLD AUTO: 187 K/UL (ref 150–400)
PLATELET # BLD AUTO: 204 K/UL (ref 150–400)
PLATELET # BLD AUTO: 213 K/UL (ref 150–400)
PLATELET # BLD AUTO: 220 K/UL (ref 150–400)
PLATELET # BLD AUTO: 220 K/UL (ref 150–400)
PLATELET # BLD AUTO: 221 K/UL (ref 150–400)
PLATELET # BLD AUTO: 222 K/UL (ref 150–400)
PLATELET # BLD AUTO: 229 K/UL (ref 150–400)
PLATELET # BLD AUTO: 240 K/UL (ref 150–400)
PLATELET # BLD AUTO: 241 K/UL (ref 150–400)
PLATELET # BLD AUTO: 242 K/UL (ref 150–400)
PLATELET # BLD AUTO: 253 K/UL (ref 150–400)
PLATELET # BLD AUTO: 253 K/UL (ref 150–400)
PLATELET # BLD AUTO: 254 K/UL (ref 150–400)
PLATELET # BLD AUTO: 269 K/UL (ref 150–400)
PLATELET # BLD AUTO: 290 K/UL (ref 150–400)
PLATELET # BLD AUTO: 74 K/UL (ref 150–400)
PLATELET # BLD AUTO: 90 K/UL (ref 150–400)
PLATELET COMMENTS,PCOM: ABNORMAL
PMV BLD AUTO: 10 FL (ref 8.9–12.9)
PMV BLD AUTO: 10 FL (ref 8.9–12.9)
PMV BLD AUTO: 10.1 FL (ref 8.9–12.9)
PMV BLD AUTO: 10.1 FL (ref 8.9–12.9)
PMV BLD AUTO: 10.3 FL (ref 8.9–12.9)
PMV BLD AUTO: 10.3 FL (ref 8.9–12.9)
PMV BLD AUTO: 10.4 FL (ref 8.9–12.9)
PMV BLD AUTO: 10.5 FL (ref 8.9–12.9)
PMV BLD AUTO: 10.6 FL (ref 8.9–12.9)
PMV BLD AUTO: 10.6 FL (ref 8.9–12.9)
PMV BLD AUTO: 10.7 FL (ref 8.9–12.9)
PMV BLD AUTO: 10.9 FL (ref 8.9–12.9)
PMV BLD AUTO: 11 FL (ref 8.9–12.9)
PMV BLD AUTO: 11.4 FL (ref 8.9–12.9)
PMV BLD AUTO: 11.6 FL (ref 8.9–12.9)
PMV BLD AUTO: 11.6 FL (ref 8.9–12.9)
PMV BLD AUTO: 9.5 FL (ref 8.9–12.9)
PMV BLD AUTO: 9.6 FL (ref 8.9–12.9)
PMV BLD AUTO: 9.7 FL (ref 8.9–12.9)
PMV BLD AUTO: 9.7 FL (ref 8.9–12.9)
PMV BLD AUTO: 9.8 FL (ref 8.9–12.9)
PMV BLD AUTO: 9.8 FL (ref 8.9–12.9)
PO2 BLDV: 37 MMHG (ref 25–40)
POTASSIUM BLD-SCNC: 3.6 MMOL/L (ref 3.5–5.1)
POTASSIUM BLD-SCNC: 5 MMOL/L (ref 3.5–5.5)
POTASSIUM SERPL-SCNC: 4 MMOL/L (ref 3.5–5.1)
POTASSIUM SERPL-SCNC: 4.4 MMOL/L (ref 3.5–5.1)
POTASSIUM SERPL-SCNC: 4.7 MMOL/L (ref 3.5–5.1)
POTASSIUM SERPL-SCNC: 4.8 MMOL/L (ref 3.5–5.1)
POTASSIUM SERPL-SCNC: 4.9 MMOL/L (ref 3.5–5.1)
POTASSIUM SERPL-SCNC: 5.1 MMOL/L (ref 3.5–5.1)
POTASSIUM SERPL-SCNC: 5.2 MMOL/L (ref 3.5–5.1)
POTASSIUM SERPL-SCNC: 5.3 MMOL/L (ref 3.5–5.1)
POTASSIUM SERPL-SCNC: 5.4 MMOL/L (ref 3.5–5.1)
POTASSIUM SERPL-SCNC: 5.4 MMOL/L (ref 3.5–5.1)
POTASSIUM SERPL-SCNC: 5.5 MMOL/L (ref 3.5–5.1)
POTASSIUM SERPL-SCNC: 5.6 MMOL/L (ref 3.5–5.1)
POTASSIUM SERPL-SCNC: 5.8 MMOL/L (ref 3.5–5.1)
POTASSIUM SERPL-SCNC: 5.8 MMOL/L (ref 3.5–5.1)
POTASSIUM SERPL-SCNC: 6 MMOL/L (ref 3.5–5.1)
POTASSIUM SERPL-SCNC: 6.1 MMOL/L (ref 3.5–5.1)
POTASSIUM SERPL-SCNC: 6.2 MMOL/L (ref 3.5–5.1)
PROCALCITONIN SERPL-MCNC: 0.14 NG/ML
PROCALCITONIN SERPL-MCNC: 0.5 NG/ML
PROT SERPL-MCNC: 4.8 G/DL (ref 6.4–8.2)
PROT SERPL-MCNC: 5 G/DL (ref 6.4–8.2)
PROT SERPL-MCNC: 5.2 G/DL (ref 6.4–8.2)
PROT SERPL-MCNC: 5.5 G/DL (ref 6.4–8.2)
PROT SERPL-MCNC: 5.7 G/DL (ref 6.4–8.2)
PROT SERPL-MCNC: 7.1 G/DL (ref 6.4–8.2)
PROT SERPL-MCNC: 7.2 G/DL (ref 6.4–8.2)
PROT SERPL-MCNC: 7.6 G/DL (ref 6.4–8.2)
PROT UR STRIP-MCNC: 100 MG/DL
PROT UR STRIP-MCNC: 100 MG/DL
PROT UR STRIP-MCNC: 30 MG/DL
PROT UR STRIP-MCNC: 300 MG/DL
PROT UR-MCNC: 251 MG/DL (ref 0–11.9)
PROT UR-MCNC: 353 MG/DL (ref 0–11.9)
PROT/CREAT UR-RTO: 4.3
PROT/CREAT UR-RTO: 5.7
PROTHROMBIN TIME: 10.3 SEC (ref 9–11.1)
PTH-INTACT SERPL-MCNC: 239.4 PG/ML (ref 18.4–88)
PTH-INTACT SERPL-MCNC: 492.4 PG/ML (ref 18.4–88)
Q-T INTERVAL, ECG07: 334 MS
Q-T INTERVAL, ECG07: 356 MS
Q-T INTERVAL, ECG07: 370 MS
Q-T INTERVAL, ECG07: 386 MS
Q-T INTERVAL, ECG07: 402 MS
Q-T INTERVAL, ECG07: 420 MS
Q-T INTERVAL, ECG07: 432 MS
QRS DURATION, ECG06: 90 MS
QRS DURATION, ECG06: 92 MS
QRS DURATION, ECG06: 94 MS
QTC CALCULATION (BEZET), ECG08: 386 MS
QTC CALCULATION (BEZET), ECG08: 387 MS
QTC CALCULATION (BEZET), ECG08: 428 MS
QTC CALCULATION (BEZET), ECG08: 432 MS
QTC CALCULATION (BEZET), ECG08: 434 MS
QTC CALCULATION (BEZET), ECG08: 443 MS
QTC CALCULATION (BEZET), ECG08: 466 MS
RBC # BLD AUTO: 2.47 M/UL (ref 4.1–5.7)
RBC # BLD AUTO: 2.79 M/UL (ref 4.1–5.7)
RBC # BLD AUTO: 2.81 M/UL (ref 4.1–5.7)
RBC # BLD AUTO: 2.96 M/UL (ref 4.1–5.7)
RBC # BLD AUTO: 3.01 M/UL (ref 4.1–5.7)
RBC # BLD AUTO: 3.01 M/UL (ref 4.1–5.7)
RBC # BLD AUTO: 3.03 M/UL (ref 4.1–5.7)
RBC # BLD AUTO: 3.06 M/UL (ref 4.1–5.7)
RBC # BLD AUTO: 3.09 M/UL (ref 4.1–5.7)
RBC # BLD AUTO: 3.1 M/UL (ref 4.1–5.7)
RBC # BLD AUTO: 3.1 M/UL (ref 4.1–5.7)
RBC # BLD AUTO: 3.12 M/UL (ref 4.1–5.7)
RBC # BLD AUTO: 3.19 M/UL (ref 4.1–5.7)
RBC # BLD AUTO: 3.2 M/UL (ref 4.1–5.7)
RBC # BLD AUTO: 3.21 M/UL (ref 4.1–5.7)
RBC # BLD AUTO: 3.24 M/UL (ref 4.1–5.7)
RBC # BLD AUTO: 3.28 M/UL (ref 4.1–5.7)
RBC # BLD AUTO: 3.32 M/UL (ref 4.1–5.7)
RBC # BLD AUTO: 3.34 M/UL (ref 4.1–5.7)
RBC # BLD AUTO: 3.34 M/UL (ref 4.1–5.7)
RBC # BLD AUTO: 3.35 M/UL (ref 4.1–5.7)
RBC # BLD AUTO: 3.39 M/UL (ref 4.1–5.7)
RBC # BLD AUTO: 3.43 M/UL (ref 4.1–5.7)
RBC # BLD AUTO: 3.53 M/UL (ref 4.1–5.7)
RBC # BLD AUTO: 3.56 M/UL (ref 4.1–5.7)
RBC # BLD AUTO: 3.56 M/UL (ref 4.1–5.7)
RBC # BLD AUTO: 3.58 M/UL (ref 4.1–5.7)
RBC # BLD AUTO: 3.61 M/UL (ref 4.1–5.7)
RBC # BLD AUTO: 3.87 M/UL (ref 4.1–5.7)
RBC #/AREA URNS HPF: ABNORMAL /HPF (ref 0–5)
RBC MORPH BLD: ABNORMAL
RIGHT CCA DIST DIAS: 15.3 CM/S
RIGHT CCA DIST SYS: 103.4 CM/S
RIGHT CCA PROX DIAS: 0 CM/S
RIGHT CCA PROX SYS: 116.3 CM/S
RIGHT ECA DIAS: 0 CM/S
RIGHT ECA SYS: 226.9 CM/S
RIGHT ICA DIST DIAS: 16.4 CM/S
RIGHT ICA DIST SYS: 102 CM/S
RIGHT ICA MID DIAS: 15.3 CM/S
RIGHT ICA MID SYS: 85.2 CM/S
RIGHT ICA PROX DIAS: 17.9 CM/S
RIGHT ICA PROX SYS: 113.7 CM/S
RIGHT ICA/CCA SYS: 1.1
RIGHT SUBCLAVIAN DIAS: 0 CM/S
RIGHT SUBCLAVIAN SYS: 235.9 CM/S
RIGHT VERTEBRAL DIAS: 24.7 CM/S
RIGHT VERTEBRAL SYS: 81.9 CM/S
SAMPLES BEING HELD,HOLD: NORMAL
SAMPLES BEING HELD,HOLD: NORMAL
SARS-COV-2, COV2: NORMAL
SARS-COV-2, XPLCVT: DETECTED
SERVICE CMNT-IMP: ABNORMAL
SERVICE CMNT-IMP: NORMAL
SODIUM BLD-SCNC: 131 MMOL/L (ref 136–145)
SODIUM BLD-SCNC: 140 MMOL/L (ref 136–145)
SODIUM SERPL-SCNC: 128 MMOL/L (ref 136–145)
SODIUM SERPL-SCNC: 129 MMOL/L (ref 136–145)
SODIUM SERPL-SCNC: 130 MMOL/L (ref 136–145)
SODIUM SERPL-SCNC: 131 MMOL/L (ref 136–145)
SODIUM SERPL-SCNC: 132 MMOL/L (ref 136–145)
SODIUM SERPL-SCNC: 133 MMOL/L (ref 136–145)
SODIUM SERPL-SCNC: 134 MMOL/L (ref 136–145)
SODIUM SERPL-SCNC: 135 MMOL/L (ref 136–145)
SODIUM SERPL-SCNC: 135 MMOL/L (ref 136–145)
SODIUM SERPL-SCNC: 136 MMOL/L (ref 136–145)
SODIUM SERPL-SCNC: 136 MMOL/L (ref 136–145)
SODIUM SERPL-SCNC: 138 MMOL/L (ref 136–145)
SODIUM SERPL-SCNC: 139 MMOL/L (ref 136–145)
SODIUM SERPL-SCNC: 141 MMOL/L (ref 136–145)
SOURCE, COVRS: ABNORMAL
SOURCE, COVRS: NORMAL
SP GR UR REFRACTOMETRY: 1.01 (ref 1–1.03)
SP GR UR REFRACTOMETRY: 1.02 (ref 1–1.03)
SPECIMEN EXP DATE BLD: NORMAL
SPECIMEN SITE: ABNORMAL
T4 FREE SERPL-MCNC: 0.9 NG/DL (ref 0.8–1.5)
TIBC SERPL-MCNC: 213 UG/DL (ref 250–450)
TIBC SERPL-MCNC: 218 UG/DL (ref 250–450)
TIBC SERPL-MCNC: 230 UG/DL (ref 250–450)
TRIGL SERPL-MCNC: 116 MG/DL (ref ?–150)
TROPONIN I SERPL-MCNC: <0.05 NG/ML
TROPONIN-HIGH SENSITIVITY: 15 NG/L (ref 0–76)
TROPONIN-HIGH SENSITIVITY: 16 NG/L (ref 0–76)
TROPONIN-HIGH SENSITIVITY: 36 NG/L (ref 0–76)
TROPONIN-HIGH SENSITIVITY: 36 NG/L (ref 0–76)
TSH SERPL DL<=0.05 MIU/L-ACNC: 0.12 UIU/ML (ref 0.36–3.74)
UA: UC IF INDICATED,UAUC: ABNORMAL
URATE SERPL-MCNC: 6.8 MG/DL (ref 3.5–7.2)
UROBILINOGEN UR QL STRIP.AUTO: 0.2 EU/DL (ref 0.2–1)
UROBILINOGEN UR QL STRIP.AUTO: 1 EU/DL (ref 0.2–1)
VENTRICULAR RATE, ECG03: 67 BPM
VENTRICULAR RATE, ECG03: 70 BPM
VENTRICULAR RATE, ECG03: 70 BPM
VENTRICULAR RATE, ECG03: 71 BPM
VENTRICULAR RATE, ECG03: 74 BPM
VENTRICULAR RATE, ECG03: 81 BPM
VENTRICULAR RATE, ECG03: 82 BPM
VLDLC SERPL CALC-MCNC: 23.2 MG/DL
WBC # BLD AUTO: 11.5 K/UL (ref 4.1–11.1)
WBC # BLD AUTO: 11.7 K/UL (ref 4.1–11.1)
WBC # BLD AUTO: 11.8 K/UL (ref 4.1–11.1)
WBC # BLD AUTO: 12.3 K/UL (ref 4.1–11.1)
WBC # BLD AUTO: 12.5 K/UL (ref 4.1–11.1)
WBC # BLD AUTO: 12.6 K/UL (ref 4.1–11.1)
WBC # BLD AUTO: 13.2 K/UL (ref 4.1–11.1)
WBC # BLD AUTO: 13.7 K/UL (ref 4.1–11.1)
WBC # BLD AUTO: 14.3 K/UL (ref 4.1–11.1)
WBC # BLD AUTO: 14.9 K/UL (ref 4.1–11.1)
WBC # BLD AUTO: 15.4 K/UL (ref 4.1–11.1)
WBC # BLD AUTO: 15.6 K/UL (ref 4.1–11.1)
WBC # BLD AUTO: 16 K/UL (ref 4.1–11.1)
WBC # BLD AUTO: 16.1 K/UL (ref 4.1–11.1)
WBC # BLD AUTO: 16.3 K/UL (ref 4.1–11.1)
WBC # BLD AUTO: 16.8 K/UL (ref 4.1–11.1)
WBC # BLD AUTO: 17.2 K/UL (ref 4.1–11.1)
WBC # BLD AUTO: 17.3 K/UL (ref 4.1–11.1)
WBC # BLD AUTO: 17.8 K/UL (ref 4.1–11.1)
WBC # BLD AUTO: 17.8 K/UL (ref 4.1–11.1)
WBC # BLD AUTO: 18.4 K/UL (ref 4.1–11.1)
WBC # BLD AUTO: 18.7 K/UL (ref 4.1–11.1)
WBC # BLD AUTO: 19.9 K/UL (ref 4.1–11.1)
WBC # BLD AUTO: 20.5 K/UL (ref 4.1–11.1)
WBC # BLD AUTO: 21.2 K/UL (ref 4.1–11.1)
WBC # BLD AUTO: 25 K/UL (ref 4.1–11.1)
WBC # BLD AUTO: 27.9 K/UL (ref 4.1–11.1)
WBC # BLD AUTO: 7.2 K/UL (ref 4.1–11.1)
WBC # BLD AUTO: 9.5 K/UL (ref 4.1–11.1)
WBC MORPH BLD: ABNORMAL
WBC URNS QL MICRO: >100 /HPF (ref 0–4)
WBC URNS QL MICRO: ABNORMAL /HPF (ref 0–4)

## 2021-01-01 PROCEDURE — 77010033678 HC OXYGEN DAILY

## 2021-01-01 PROCEDURE — 87635 SARS-COV-2 COVID-19 AMP PRB: CPT

## 2021-01-01 PROCEDURE — 85027 COMPLETE CBC AUTOMATED: CPT

## 2021-01-01 PROCEDURE — 36415 COLL VENOUS BLD VENIPUNCTURE: CPT

## 2021-01-01 PROCEDURE — 74011250637 HC RX REV CODE- 250/637: Performed by: STUDENT IN AN ORGANIZED HEALTH CARE EDUCATION/TRAINING PROGRAM

## 2021-01-01 PROCEDURE — 65270000029 HC RM PRIVATE

## 2021-01-01 PROCEDURE — 80048 BASIC METABOLIC PNL TOTAL CA: CPT

## 2021-01-01 PROCEDURE — 76010000131 HC OR TIME 2 TO 2.5 HR: Performed by: SURGERY

## 2021-01-01 PROCEDURE — 73552 X-RAY EXAM OF FEMUR 2/>: CPT

## 2021-01-01 PROCEDURE — 74011250636 HC RX REV CODE- 250/636: Performed by: SURGERY

## 2021-01-01 PROCEDURE — 94760 N-INVAS EAR/PLS OXIMETRY 1: CPT

## 2021-01-01 PROCEDURE — 85025 COMPLETE CBC W/AUTO DIFF WBC: CPT

## 2021-01-01 PROCEDURE — 74011250637 HC RX REV CODE- 250/637: Performed by: SURGERY

## 2021-01-01 PROCEDURE — 74011250637 HC RX REV CODE- 250/637: Performed by: INTERNAL MEDICINE

## 2021-01-01 PROCEDURE — 90935 HEMODIALYSIS ONE EVALUATION: CPT

## 2021-01-01 PROCEDURE — 99231 SBSQ HOSP IP/OBS SF/LOW 25: CPT | Performed by: NURSE PRACTITIONER

## 2021-01-01 PROCEDURE — 99231 SBSQ HOSP IP/OBS SF/LOW 25: CPT | Performed by: SURGERY

## 2021-01-01 PROCEDURE — 70553 MRI BRAIN STEM W/O & W/DYE: CPT

## 2021-01-01 PROCEDURE — G8753 SYS BP > OR = 140: HCPCS | Performed by: INTERNAL MEDICINE

## 2021-01-01 PROCEDURE — 5A1D70Z PERFORMANCE OF URINARY FILTRATION, INTERMITTENT, LESS THAN 6 HOURS PER DAY: ICD-10-PCS | Performed by: INTERNAL MEDICINE

## 2021-01-01 PROCEDURE — 94762 N-INVAS EAR/PLS OXIMTRY CONT: CPT

## 2021-01-01 PROCEDURE — 85018 HEMOGLOBIN: CPT

## 2021-01-01 PROCEDURE — 82962 GLUCOSE BLOOD TEST: CPT

## 2021-01-01 PROCEDURE — 80069 RENAL FUNCTION PANEL: CPT

## 2021-01-01 PROCEDURE — 74011250636 HC RX REV CODE- 250/636: Performed by: NURSE ANESTHETIST, CERTIFIED REGISTERED

## 2021-01-01 PROCEDURE — 74011636637 HC RX REV CODE- 636/637: Performed by: STUDENT IN AN ORGANIZED HEALTH CARE EDUCATION/TRAINING PROGRAM

## 2021-01-01 PROCEDURE — 74011000258 HC RX REV CODE- 258: Performed by: INTERNAL MEDICINE

## 2021-01-01 PROCEDURE — 74011000258 HC RX REV CODE- 258: Performed by: STUDENT IN AN ORGANIZED HEALTH CARE EDUCATION/TRAINING PROGRAM

## 2021-01-01 PROCEDURE — G0156 HHCP-SVS OF AIDE,EA 15 MIN: HCPCS

## 2021-01-01 PROCEDURE — 87340 HEPATITIS B SURFACE AG IA: CPT

## 2021-01-01 PROCEDURE — 99285 EMERGENCY DEPT VISIT HI MDM: CPT

## 2021-01-01 PROCEDURE — 82550 ASSAY OF CK (CPK): CPT

## 2021-01-01 PROCEDURE — 0651 HSPC ROUTINE HOME CARE

## 2021-01-01 PROCEDURE — 99495 TRANSJ CARE MGMT MOD F2F 14D: CPT | Performed by: INTERNAL MEDICINE

## 2021-01-01 PROCEDURE — B5131ZA FLUOROSCOPY OF RIGHT JUGULAR VEINS USING LOW OSMOLAR CONTRAST, GUIDANCE: ICD-10-PCS | Performed by: STUDENT IN AN ORGANIZED HEALTH CARE EDUCATION/TRAINING PROGRAM

## 2021-01-01 PROCEDURE — 99496 TRANSJ CARE MGMT HIGH F2F 7D: CPT | Performed by: INTERNAL MEDICINE

## 2021-01-01 PROCEDURE — 94761 N-INVAS EAR/PLS OXIMETRY MLT: CPT

## 2021-01-01 PROCEDURE — C1769 GUIDE WIRE: HCPCS | Performed by: SURGERY

## 2021-01-01 PROCEDURE — 74011250636 HC RX REV CODE- 250/636: Performed by: INTERNAL MEDICINE

## 2021-01-01 PROCEDURE — HOSPICE MEDICATION HC HH HOSPICE MEDICATION

## 2021-01-01 PROCEDURE — 96374 THER/PROPH/DIAG INJ IV PUSH: CPT

## 2021-01-01 PROCEDURE — 99233 SBSQ HOSP IP/OBS HIGH 50: CPT | Performed by: CLINICAL NURSE SPECIALIST

## 2021-01-01 PROCEDURE — 99284 EMERGENCY DEPT VISIT MOD MDM: CPT

## 2021-01-01 PROCEDURE — 77030002986 HC SUT PROL J&J -A: Performed by: SURGERY

## 2021-01-01 PROCEDURE — G8510 SCR DEP NEG, NO PLAN REQD: HCPCS | Performed by: INTERNAL MEDICINE

## 2021-01-01 PROCEDURE — 74011250637 HC RX REV CODE- 250/637: Performed by: NURSE PRACTITIONER

## 2021-01-01 PROCEDURE — 81001 URINALYSIS AUTO W/SCOPE: CPT

## 2021-01-01 PROCEDURE — G0299 HHS/HOSPICE OF RN EA 15 MIN: HCPCS

## 2021-01-01 PROCEDURE — 74011250636 HC RX REV CODE- 250/636: Performed by: STUDENT IN AN ORGANIZED HEALTH CARE EDUCATION/TRAINING PROGRAM

## 2021-01-01 PROCEDURE — 74011250636 HC RX REV CODE- 250/636: Performed by: HOSPITALIST

## 2021-01-01 PROCEDURE — 77030031139 HC SUT VCRL2 J&J -A: Performed by: SURGERY

## 2021-01-01 PROCEDURE — 96372 THER/PROPH/DIAG INJ SC/IM: CPT

## 2021-01-01 PROCEDURE — 97530 THERAPEUTIC ACTIVITIES: CPT | Performed by: PHYSICAL THERAPIST

## 2021-01-01 PROCEDURE — 74011000250 HC RX REV CODE- 250: Performed by: INTERNAL MEDICINE

## 2021-01-01 PROCEDURE — B5181ZA FLUOROSCOPY OF SUPERIOR VENA CAVA USING LOW OSMOLAR CONTRAST, GUIDANCE: ICD-10-PCS | Performed by: STUDENT IN AN ORGANIZED HEALTH CARE EDUCATION/TRAINING PROGRAM

## 2021-01-01 PROCEDURE — 71046 X-RAY EXAM CHEST 2 VIEWS: CPT

## 2021-01-01 PROCEDURE — 93005 ELECTROCARDIOGRAM TRACING: CPT

## 2021-01-01 PROCEDURE — 74011000258 HC RX REV CODE- 258: Performed by: SURGERY

## 2021-01-01 PROCEDURE — 65660000000 HC RM CCU STEPDOWN

## 2021-01-01 PROCEDURE — 74011250636 HC RX REV CODE- 250/636: Performed by: PSYCHIATRY & NEUROLOGY

## 2021-01-01 PROCEDURE — 74011250636 HC RX REV CODE- 250/636: Performed by: EMERGENCY MEDICINE

## 2021-01-01 PROCEDURE — 74011250636 HC RX REV CODE- 250/636: Performed by: GENERAL ACUTE CARE HOSPITAL

## 2021-01-01 PROCEDURE — 84132 ASSAY OF SERUM POTASSIUM: CPT

## 2021-01-01 PROCEDURE — G0155 HHCP-SVS OF CSW,EA 15 MIN: HCPCS

## 2021-01-01 PROCEDURE — 74011636637 HC RX REV CODE- 636/637: Performed by: SURGERY

## 2021-01-01 PROCEDURE — C1769 GUIDE WIRE: HCPCS

## 2021-01-01 PROCEDURE — 1111F DSCHRG MED/CURRENT MED MERGE: CPT | Performed by: INTERNAL MEDICINE

## 2021-01-01 PROCEDURE — 72193 CT PELVIS W/DYE: CPT

## 2021-01-01 PROCEDURE — 83690 ASSAY OF LIPASE: CPT

## 2021-01-01 PROCEDURE — G0463 HOSPITAL OUTPT CLINIC VISIT: HCPCS | Performed by: INTERNAL MEDICINE

## 2021-01-01 PROCEDURE — 76210000017 HC OR PH I REC 1.5 TO 2 HR: Performed by: SURGERY

## 2021-01-01 PROCEDURE — G8432 DEP SCR NOT DOC, RNG: HCPCS | Performed by: INTERNAL MEDICINE

## 2021-01-01 PROCEDURE — G8427 DOCREV CUR MEDS BY ELIG CLIN: HCPCS | Performed by: INTERNAL MEDICINE

## 2021-01-01 PROCEDURE — 74011000636 HC RX REV CODE- 636: Performed by: INTERNAL MEDICINE

## 2021-01-01 PROCEDURE — 85610 PROTHROMBIN TIME: CPT

## 2021-01-01 PROCEDURE — 75810000275 HC EMERGENCY DEPT VISIT NO LEVEL OF CARE

## 2021-01-01 PROCEDURE — 74011636637 HC RX REV CODE- 636/637: Performed by: HOSPITALIST

## 2021-01-01 PROCEDURE — 3017F COLORECTAL CA SCREEN DOC REV: CPT | Performed by: INTERNAL MEDICINE

## 2021-01-01 PROCEDURE — 74011636637 HC RX REV CODE- 636/637: Performed by: INTERNAL MEDICINE

## 2021-01-01 PROCEDURE — 2709999900 HC NON-CHARGEABLE SUPPLY

## 2021-01-01 PROCEDURE — 83540 ASSAY OF IRON: CPT

## 2021-01-01 PROCEDURE — 74011250637 HC RX REV CODE- 250/637: Performed by: HOSPITALIST

## 2021-01-01 PROCEDURE — 97162 PT EVAL MOD COMPLEX 30 MIN: CPT

## 2021-01-01 PROCEDURE — G8417 CALC BMI ABV UP PARAM F/U: HCPCS | Performed by: INTERNAL MEDICINE

## 2021-01-01 PROCEDURE — 74011000250 HC RX REV CODE- 250: Performed by: STUDENT IN AN ORGANIZED HEALTH CARE EDUCATION/TRAINING PROGRAM

## 2021-01-01 PROCEDURE — 84439 ASSAY OF FREE THYROXINE: CPT

## 2021-01-01 PROCEDURE — 97530 THERAPEUTIC ACTIVITIES: CPT

## 2021-01-01 PROCEDURE — 74011636637 HC RX REV CODE- 636/637: Performed by: EMERGENCY MEDICINE

## 2021-01-01 PROCEDURE — 80047 BASIC METABLC PNL IONIZED CA: CPT

## 2021-01-01 PROCEDURE — 74170 CT ABD WO CNTRST FLWD CNTRST: CPT

## 2021-01-01 PROCEDURE — C1751 CATH, INF, PER/CENT/MIDLINE: HCPCS

## 2021-01-01 PROCEDURE — 3336500001 HSPC ELECTION

## 2021-01-01 PROCEDURE — 86706 HEP B SURFACE ANTIBODY: CPT

## 2021-01-01 PROCEDURE — 84484 ASSAY OF TROPONIN QUANT: CPT

## 2021-01-01 PROCEDURE — 93971 EXTREMITY STUDY: CPT

## 2021-01-01 PROCEDURE — 82962 GLUCOSE BLOOD TEST: CPT | Performed by: INTERNAL MEDICINE

## 2021-01-01 PROCEDURE — 74011000258 HC RX REV CODE- 258: Performed by: EMERGENCY MEDICINE

## 2021-01-01 PROCEDURE — 87186 SC STD MICRODIL/AGAR DIL: CPT

## 2021-01-01 PROCEDURE — 73590 X-RAY EXAM OF LOWER LEG: CPT

## 2021-01-01 PROCEDURE — 73502 X-RAY EXAM HIP UNI 2-3 VIEWS: CPT

## 2021-01-01 PROCEDURE — 74011250637 HC RX REV CODE- 250/637: Performed by: GENERAL ACUTE CARE HOSPITAL

## 2021-01-01 PROCEDURE — 82947 ASSAY GLUCOSE BLOOD QUANT: CPT

## 2021-01-01 PROCEDURE — 74176 CT ABD & PELVIS W/O CONTRAST: CPT

## 2021-01-01 PROCEDURE — 84156 ASSAY OF PROTEIN URINE: CPT

## 2021-01-01 PROCEDURE — 76937 US GUIDE VASCULAR ACCESS: CPT

## 2021-01-01 PROCEDURE — 83036 HEMOGLOBIN GLYCOSYLATED A1C: CPT | Performed by: INTERNAL MEDICINE

## 2021-01-01 PROCEDURE — 3331090004 HSPC SERVICE INTENSITY ADD-ON

## 2021-01-01 PROCEDURE — 87040 BLOOD CULTURE FOR BACTERIA: CPT

## 2021-01-01 PROCEDURE — P9047 ALBUMIN (HUMAN), 25%, 50ML: HCPCS | Performed by: INTERNAL MEDICINE

## 2021-01-01 PROCEDURE — 74011250636 HC RX REV CODE- 250/636: Performed by: ANESTHESIOLOGY

## 2021-01-01 PROCEDURE — 05HM33Z INSERTION OF INFUSION DEVICE INTO RIGHT INTERNAL JUGULAR VEIN, PERCUTANEOUS APPROACH: ICD-10-PCS | Performed by: STUDENT IN AN ORGANIZED HEALTH CARE EDUCATION/TRAINING PROGRAM

## 2021-01-01 PROCEDURE — 97165 OT EVAL LOW COMPLEX 30 MIN: CPT

## 2021-01-01 PROCEDURE — 71045 X-RAY EXAM CHEST 1 VIEW: CPT

## 2021-01-01 PROCEDURE — 80061 LIPID PANEL: CPT

## 2021-01-01 PROCEDURE — 74011250637 HC RX REV CODE- 250/637: Performed by: EMERGENCY MEDICINE

## 2021-01-01 PROCEDURE — 74011250636 HC RX REV CODE- 250/636

## 2021-01-01 PROCEDURE — 74011000250 HC RX REV CODE- 250: Performed by: SURGERY

## 2021-01-01 PROCEDURE — 82728 ASSAY OF FERRITIN: CPT

## 2021-01-01 PROCEDURE — 73030 X-RAY EXAM OF SHOULDER: CPT

## 2021-01-01 PROCEDURE — 74018 RADEX ABDOMEN 1 VIEW: CPT

## 2021-01-01 PROCEDURE — 65270000015 HC RM PRIVATE ONCOLOGY

## 2021-01-01 PROCEDURE — 74011000258 HC RX REV CODE- 258: Performed by: GENERAL ACUTE CARE HOSPITAL

## 2021-01-01 PROCEDURE — 73562 X-RAY EXAM OF KNEE 3: CPT

## 2021-01-01 PROCEDURE — 96375 TX/PRO/DX INJ NEW DRUG ADDON: CPT

## 2021-01-01 PROCEDURE — 97535 SELF CARE MNGMENT TRAINING: CPT | Performed by: OCCUPATIONAL THERAPIST

## 2021-01-01 PROCEDURE — 72158 MRI LUMBAR SPINE W/O & W/DYE: CPT

## 2021-01-01 PROCEDURE — 97116 GAIT TRAINING THERAPY: CPT

## 2021-01-01 PROCEDURE — 86705 HEP B CORE ANTIBODY IGM: CPT

## 2021-01-01 PROCEDURE — G0300 HHS/HOSPICE OF LPN EA 15 MIN: HCPCS

## 2021-01-01 PROCEDURE — 99223 1ST HOSP IP/OBS HIGH 75: CPT | Performed by: SURGERY

## 2021-01-01 PROCEDURE — 80053 COMPREHEN METABOLIC PANEL: CPT

## 2021-01-01 PROCEDURE — 94640 AIRWAY INHALATION TREATMENT: CPT

## 2021-01-01 PROCEDURE — 87086 URINE CULTURE/COLONY COUNT: CPT

## 2021-01-01 PROCEDURE — 99233 SBSQ HOSP IP/OBS HIGH 50: CPT | Performed by: PSYCHIATRY & NEUROLOGY

## 2021-01-01 PROCEDURE — 93306 TTE W/DOPPLER COMPLETE: CPT

## 2021-01-01 PROCEDURE — 97535 SELF CARE MNGMENT TRAINING: CPT

## 2021-01-01 PROCEDURE — 76060000035 HC ANESTHESIA 2 TO 2.5 HR: Performed by: SURGERY

## 2021-01-01 PROCEDURE — 83735 ASSAY OF MAGNESIUM: CPT

## 2021-01-01 PROCEDURE — 36416 COLLJ CAPILLARY BLOOD SPEC: CPT

## 2021-01-01 PROCEDURE — 77030002996 HC SUT SLK J&J -A: Performed by: SURGERY

## 2021-01-01 PROCEDURE — 74011000636 HC RX REV CODE- 636: Performed by: RADIOLOGY

## 2021-01-01 PROCEDURE — 2022F DILAT RTA XM EVC RTNOPTHY: CPT | Performed by: INTERNAL MEDICINE

## 2021-01-01 PROCEDURE — 64483 NJX AA&/STRD TFRM EPI L/S 1: CPT

## 2021-01-01 PROCEDURE — 95819 EEG AWAKE AND ASLEEP: CPT | Performed by: PSYCHIATRY & NEUROLOGY

## 2021-01-01 PROCEDURE — 77030018786 HC NDL GD F/USND BARD -B

## 2021-01-01 PROCEDURE — 77030010507 HC ADH SKN DERMBND J&J -B

## 2021-01-01 PROCEDURE — 71260 CT THORAX DX C+: CPT

## 2021-01-01 PROCEDURE — 99213 OFFICE O/P EST LOW 20 MIN: CPT | Performed by: INTERNAL MEDICINE

## 2021-01-01 PROCEDURE — 86901 BLOOD TYPING SEROLOGIC RH(D): CPT

## 2021-01-01 PROCEDURE — 77030014008 HC SPNG HEMSTAT J&J -C: Performed by: SURGERY

## 2021-01-01 PROCEDURE — 77030002916 HC SUT ETHLN J&J -A: Performed by: SURGERY

## 2021-01-01 PROCEDURE — 99232 SBSQ HOSP IP/OBS MODERATE 35: CPT | Performed by: PSYCHIATRY & NEUROLOGY

## 2021-01-01 PROCEDURE — G8754 DIAS BP LESS 90: HCPCS | Performed by: INTERNAL MEDICINE

## 2021-01-01 PROCEDURE — 74011636637 HC RX REV CODE- 636/637: Performed by: NURSE PRACTITIONER

## 2021-01-01 PROCEDURE — 74011000250 HC RX REV CODE- 250: Performed by: GENERAL ACUTE CARE HOSPITAL

## 2021-01-01 PROCEDURE — 97161 PT EVAL LOW COMPLEX 20 MIN: CPT

## 2021-01-01 PROCEDURE — C1757 CATH, THROMBECTOMY/EMBOLECT: HCPCS | Performed by: SURGERY

## 2021-01-01 PROCEDURE — A9576 INJ PROHANCE MULTIPACK: HCPCS | Performed by: INTERNAL MEDICINE

## 2021-01-01 PROCEDURE — 97530 THERAPEUTIC ACTIVITIES: CPT | Performed by: OCCUPATIONAL THERAPIST

## 2021-01-01 PROCEDURE — 74011000250 HC RX REV CODE- 250: Performed by: ANESTHESIOLOGY

## 2021-01-01 PROCEDURE — 97163 PT EVAL HIGH COMPLEX 45 MIN: CPT

## 2021-01-01 PROCEDURE — 99214 OFFICE O/P EST MOD 30 MIN: CPT | Performed by: INTERNAL MEDICINE

## 2021-01-01 PROCEDURE — 99232 SBSQ HOSP IP/OBS MODERATE 35: CPT | Performed by: CLINICAL NURSE SPECIALIST

## 2021-01-01 PROCEDURE — C1752 CATH,HEMODIALYSIS,SHORT-TERM: HCPCS

## 2021-01-01 PROCEDURE — 84145 PROCALCITONIN (PCT): CPT

## 2021-01-01 PROCEDURE — 83605 ASSAY OF LACTIC ACID: CPT

## 2021-01-01 PROCEDURE — 02HV33Z INSERTION OF INFUSION DEVICE INTO SUPERIOR VENA CAVA, PERCUTANEOUS APPROACH: ICD-10-PCS | Performed by: STUDENT IN AN ORGANIZED HEALTH CARE EDUCATION/TRAINING PROGRAM

## 2021-01-01 PROCEDURE — 74011250636 HC RX REV CODE- 250/636: Performed by: NURSE PRACTITIONER

## 2021-01-01 PROCEDURE — C1750 CATH, HEMODIALYSIS,LONG-TERM: HCPCS

## 2021-01-01 PROCEDURE — 95816 EEG AWAKE AND DROWSY: CPT | Performed by: PSYCHIATRY & NEUROLOGY

## 2021-01-01 PROCEDURE — 97161 PT EVAL LOW COMPLEX 20 MIN: CPT | Performed by: PHYSICAL THERAPIST

## 2021-01-01 PROCEDURE — 74011250636 HC RX REV CODE- 250/636: Performed by: RADIOLOGY

## 2021-01-01 PROCEDURE — 83970 ASSAY OF PARATHORMONE: CPT

## 2021-01-01 PROCEDURE — G0257 UNSCHED DIALYSIS ESRD PT HOS: HCPCS

## 2021-01-01 PROCEDURE — 77030008463 HC STPLR SKN PROX J&J -B: Performed by: SURGERY

## 2021-01-01 PROCEDURE — U0005 INFEC AGEN DETEC AMPLI PROBE: HCPCS

## 2021-01-01 PROCEDURE — 83036 HEMOGLOBIN GLYCOSYLATED A1C: CPT

## 2021-01-01 PROCEDURE — 97166 OT EVAL MOD COMPLEX 45 MIN: CPT | Performed by: OCCUPATIONAL THERAPIST

## 2021-01-01 PROCEDURE — 99223 1ST HOSP IP/OBS HIGH 75: CPT | Performed by: NURSE PRACTITIONER

## 2021-01-01 PROCEDURE — 77030003666 HC NDL SPINAL BD -A

## 2021-01-01 PROCEDURE — 3046F HEMOGLOBIN A1C LEVEL >9.0%: CPT | Performed by: INTERNAL MEDICINE

## 2021-01-01 PROCEDURE — 87077 CULTURE AEROBIC IDENTIFY: CPT

## 2021-01-01 PROCEDURE — 72100 X-RAY EXAM L-S SPINE 2/3 VWS: CPT

## 2021-01-01 PROCEDURE — C1892 INTRO/SHEATH,FIXED,PEEL-AWAY: HCPCS

## 2021-01-01 PROCEDURE — 74011000250 HC RX REV CODE- 250: Performed by: EMERGENCY MEDICINE

## 2021-01-01 PROCEDURE — 71250 CT THORAX DX C-: CPT

## 2021-01-01 PROCEDURE — 02HV33Z INSERTION OF INFUSION DEVICE INTO SUPERIOR VENA CAVA, PERCUTANEOUS APPROACH: ICD-10-PCS | Performed by: INTERNAL MEDICINE

## 2021-01-01 PROCEDURE — 99152 MOD SED SAME PHYS/QHP 5/>YRS: CPT

## 2021-01-01 PROCEDURE — 77030010507 HC ADH SKN DERMBND J&J -B: Performed by: SURGERY

## 2021-01-01 PROCEDURE — 85652 RBC SED RATE AUTOMATED: CPT

## 2021-01-01 PROCEDURE — 99223 1ST HOSP IP/OBS HIGH 75: CPT | Performed by: PSYCHIATRY & NEUROLOGY

## 2021-01-01 PROCEDURE — 93880 EXTRACRANIAL BILAT STUDY: CPT

## 2021-01-01 PROCEDURE — 84550 ASSAY OF BLOOD/URIC ACID: CPT

## 2021-01-01 PROCEDURE — 83880 ASSAY OF NATRIURETIC PEPTIDE: CPT

## 2021-01-01 PROCEDURE — G8756 NO BP MEASURE DOC: HCPCS | Performed by: INTERNAL MEDICINE

## 2021-01-01 PROCEDURE — 99215 OFFICE O/P EST HI 40 MIN: CPT | Performed by: INTERNAL MEDICINE

## 2021-01-01 PROCEDURE — 77030013837 HC NERV BLK KT BBMI -B: Performed by: ANESTHESIOLOGY

## 2021-01-01 PROCEDURE — 93880 EXTRACRANIAL BILAT STUDY: CPT | Performed by: PSYCHIATRY & NEUROLOGY

## 2021-01-01 PROCEDURE — 2709999900 HC NON-CHARGEABLE SUPPLY: Performed by: SURGERY

## 2021-01-01 PROCEDURE — 77030002987 HC SUT PROL J&J -B: Performed by: SURGERY

## 2021-01-01 PROCEDURE — G9231 DOC ESRD DIA TRANS PREG: HCPCS | Performed by: INTERNAL MEDICINE

## 2021-01-01 PROCEDURE — 84100 ASSAY OF PHOSPHORUS: CPT

## 2021-01-01 PROCEDURE — 70450 CT HEAD/BRAIN W/O DYE: CPT

## 2021-01-01 PROCEDURE — 84443 ASSAY THYROID STIM HORMONE: CPT

## 2021-01-01 PROCEDURE — 86140 C-REACTIVE PROTEIN: CPT

## 2021-01-01 RX ORDER — GABAPENTIN 300 MG/1
300 CAPSULE ORAL ONCE
Status: COMPLETED | OUTPATIENT
Start: 2021-01-01 | End: 2021-01-01

## 2021-01-01 RX ORDER — HYDRALAZINE HYDROCHLORIDE 50 MG/1
50 TABLET, FILM COATED ORAL 2 TIMES DAILY
Status: DISCONTINUED | OUTPATIENT
Start: 2021-01-01 | End: 2021-01-01 | Stop reason: HOSPADM

## 2021-01-01 RX ORDER — HEPARIN SODIUM 1000 [USP'U]/ML
1900 INJECTION, SOLUTION INTRAVENOUS; SUBCUTANEOUS ONCE
Status: COMPLETED | OUTPATIENT
Start: 2021-01-01 | End: 2021-01-01

## 2021-01-01 RX ORDER — PREGABALIN 25 MG/1
50 CAPSULE ORAL 3 TIMES DAILY
Status: DISCONTINUED | OUTPATIENT
Start: 2021-01-01 | End: 2021-01-01

## 2021-01-01 RX ORDER — SODIUM CHLORIDE 0.9 % (FLUSH) 0.9 %
5-40 SYRINGE (ML) INJECTION AS NEEDED
Status: DISCONTINUED | OUTPATIENT
Start: 2021-01-01 | End: 2021-01-01 | Stop reason: HOSPADM

## 2021-01-01 RX ORDER — ONDANSETRON 4 MG/1
4 TABLET, ORALLY DISINTEGRATING ORAL
Status: DISCONTINUED | OUTPATIENT
Start: 2021-01-01 | End: 2021-01-01 | Stop reason: HOSPADM

## 2021-01-01 RX ORDER — BUMETANIDE 0.25 MG/ML
2 INJECTION INTRAMUSCULAR; INTRAVENOUS 3 TIMES DAILY
Status: DISCONTINUED | OUTPATIENT
Start: 2021-01-01 | End: 2021-01-01

## 2021-01-01 RX ORDER — GUAIFENESIN 100 MG/5ML
81 LIQUID (ML) ORAL DAILY
Qty: 30 TABLET | Refills: 0 | Status: SHIPPED | OUTPATIENT
Start: 2021-01-01 | End: 2021-01-01 | Stop reason: SINTOL

## 2021-01-01 RX ORDER — HYDRALAZINE HYDROCHLORIDE 50 MG/1
TABLET, FILM COATED ORAL
Qty: 60 TABLET | Refills: 4 | Status: SHIPPED | OUTPATIENT
Start: 2021-01-01 | End: 2021-01-01 | Stop reason: SDUPTHER

## 2021-01-01 RX ORDER — CALCITRIOL 0.25 UG/1
CAPSULE ORAL
COMMUNITY
Start: 2021-01-01 | End: 2021-01-01 | Stop reason: DRUGHIGH

## 2021-01-01 RX ORDER — DEXAMETHASONE 4 MG/1
4 TABLET ORAL EVERY 8 HOURS
Status: DISCONTINUED | OUTPATIENT
Start: 2021-01-01 | End: 2021-01-01 | Stop reason: HOSPADM

## 2021-01-01 RX ORDER — SODIUM CHLORIDE 0.9 % (FLUSH) 0.9 %
5-40 SYRINGE (ML) INJECTION EVERY 8 HOURS
Status: DISCONTINUED | OUTPATIENT
Start: 2021-01-01 | End: 2021-01-01 | Stop reason: HOSPADM

## 2021-01-01 RX ORDER — LIDOCAINE HYDROCHLORIDE 20 MG/ML
20 INJECTION, SOLUTION INFILTRATION; PERINEURAL ONCE
Status: COMPLETED | OUTPATIENT
Start: 2021-01-01 | End: 2021-01-01

## 2021-01-01 RX ORDER — FENTANYL CITRATE 50 UG/ML
100 INJECTION, SOLUTION INTRAMUSCULAR; INTRAVENOUS
Status: DISCONTINUED | OUTPATIENT
Start: 2021-01-01 | End: 2021-01-01

## 2021-01-01 RX ORDER — SODIUM POLYSTYRENE SULFONATE 15 G/60ML
15 SUSPENSION ORAL; RECTAL
Status: COMPLETED | OUTPATIENT
Start: 2021-01-01 | End: 2021-01-01

## 2021-01-01 RX ORDER — LIDOCAINE HYDROCHLORIDE 10 MG/ML
2 INJECTION, SOLUTION EPIDURAL; INFILTRATION; INTRACAUDAL; PERINEURAL ONCE
Status: DISCONTINUED | OUTPATIENT
Start: 2021-01-01 | End: 2021-01-01

## 2021-01-01 RX ORDER — FACIAL-BODY WIPES
10 EACH TOPICAL DAILY
Status: DISCONTINUED | OUTPATIENT
Start: 2021-01-01 | End: 2021-01-01 | Stop reason: HOSPADM

## 2021-01-01 RX ORDER — DIAZEPAM 10 MG/2ML
10 INJECTION INTRAMUSCULAR ONCE
Status: ACTIVE | OUTPATIENT
Start: 2021-01-01 | End: 2021-01-01

## 2021-01-01 RX ORDER — MAGNESIUM SULFATE 100 %
4 CRYSTALS MISCELLANEOUS AS NEEDED
Status: DISCONTINUED | OUTPATIENT
Start: 2021-01-01 | End: 2021-01-01 | Stop reason: HOSPADM

## 2021-01-01 RX ORDER — POLYETHYLENE GLYCOL 3350 17 G/17G
17 POWDER, FOR SOLUTION ORAL DAILY PRN
Status: DISCONTINUED | OUTPATIENT
Start: 2021-01-01 | End: 2021-01-01

## 2021-01-01 RX ORDER — SODIUM CHLORIDE 0.9 % (FLUSH) 0.9 %
5-10 SYRINGE (ML) INJECTION AS NEEDED
Status: DISCONTINUED | OUTPATIENT
Start: 2021-01-01 | End: 2021-01-01 | Stop reason: HOSPADM

## 2021-01-01 RX ORDER — MIDAZOLAM HYDROCHLORIDE 1 MG/ML
INJECTION, SOLUTION INTRAMUSCULAR; INTRAVENOUS AS NEEDED
Status: DISCONTINUED | OUTPATIENT
Start: 2021-01-01 | End: 2021-01-01 | Stop reason: HOSPADM

## 2021-01-01 RX ORDER — SODIUM CHLORIDE 9 MG/ML
50 INJECTION, SOLUTION INTRAVENOUS CONTINUOUS
Status: DISCONTINUED | OUTPATIENT
Start: 2021-01-01 | End: 2021-01-01

## 2021-01-01 RX ORDER — MORPHINE SULFATE 2 MG/ML
2 INJECTION, SOLUTION INTRAMUSCULAR; INTRAVENOUS
Status: DISCONTINUED | OUTPATIENT
Start: 2021-01-01 | End: 2021-01-01 | Stop reason: HOSPADM

## 2021-01-01 RX ORDER — INSULIN LISPRO 100 [IU]/ML
INJECTION, SOLUTION INTRAVENOUS; SUBCUTANEOUS EVERY 6 HOURS
Status: DISCONTINUED | OUTPATIENT
Start: 2021-01-01 | End: 2021-01-01 | Stop reason: HOSPADM

## 2021-01-01 RX ORDER — HEPARIN SODIUM 1000 [USP'U]/ML
3800 INJECTION, SOLUTION INTRAVENOUS; SUBCUTANEOUS
Status: DISCONTINUED | OUTPATIENT
Start: 2021-01-01 | End: 2021-01-01 | Stop reason: HOSPADM

## 2021-01-01 RX ORDER — FENTANYL CITRATE 50 UG/ML
25-100 INJECTION, SOLUTION INTRAMUSCULAR; INTRAVENOUS
Status: DISCONTINUED | OUTPATIENT
Start: 2021-01-01 | End: 2021-01-01

## 2021-01-01 RX ORDER — HYDROCODONE BITARTRATE AND ACETAMINOPHEN 7.5; 325 MG/1; MG/1
1 TABLET ORAL
Qty: 1 TABLET | Refills: 0 | Status: SHIPPED | OUTPATIENT
Start: 2021-01-01 | End: 2021-01-01

## 2021-01-01 RX ORDER — CARVEDILOL 6.25 MG/1
6.25 TABLET ORAL 2 TIMES DAILY WITH MEALS
Qty: 60 TABLET | Refills: 3 | Status: SHIPPED | OUTPATIENT
Start: 2021-01-01

## 2021-01-01 RX ORDER — HYDRALAZINE HYDROCHLORIDE 50 MG/1
50 TABLET, FILM COATED ORAL 2 TIMES DAILY
Qty: 60 TAB | Refills: 3 | Status: SHIPPED | OUTPATIENT
Start: 2021-01-01 | End: 2021-01-01

## 2021-01-01 RX ORDER — INSULIN ASPART 100 [IU]/ML
10 INJECTION, SUSPENSION SUBCUTANEOUS
Status: DISCONTINUED | OUTPATIENT
Start: 2021-01-01 | End: 2021-01-01

## 2021-01-01 RX ORDER — ROPIVACAINE HYDROCHLORIDE 5 MG/ML
INJECTION, SOLUTION EPIDURAL; INFILTRATION; PERINEURAL AS NEEDED
Status: DISCONTINUED | OUTPATIENT
Start: 2021-01-01 | End: 2021-01-01 | Stop reason: HOSPADM

## 2021-01-01 RX ORDER — AMLODIPINE BESYLATE 5 MG/1
TABLET ORAL
Qty: 30 TAB | Refills: 0 | Status: SHIPPED | OUTPATIENT
Start: 2021-01-01 | End: 2021-01-01 | Stop reason: DRUGHIGH

## 2021-01-01 RX ORDER — HYDROMORPHONE HYDROCHLORIDE 1 MG/ML
.5-1 INJECTION, SOLUTION INTRAMUSCULAR; INTRAVENOUS; SUBCUTANEOUS
Status: DISCONTINUED | OUTPATIENT
Start: 2021-01-01 | End: 2021-01-01 | Stop reason: HOSPADM

## 2021-01-01 RX ORDER — POLYETHYLENE GLYCOL 3350 17 G/17G
17 POWDER, FOR SOLUTION ORAL DAILY PRN
Status: DISCONTINUED | OUTPATIENT
Start: 2021-01-01 | End: 2021-01-01 | Stop reason: HOSPADM

## 2021-01-01 RX ORDER — ALBUTEROL SULFATE 0.83 MG/ML
5 SOLUTION RESPIRATORY (INHALATION)
Status: COMPLETED | OUTPATIENT
Start: 2021-01-01 | End: 2021-01-01

## 2021-01-01 RX ORDER — HEPARIN SODIUM 5000 [USP'U]/ML
INJECTION, SOLUTION INTRAVENOUS; SUBCUTANEOUS AS NEEDED
Status: DISCONTINUED | OUTPATIENT
Start: 2021-01-01 | End: 2021-01-01 | Stop reason: HOSPADM

## 2021-01-01 RX ORDER — AMOXICILLIN 250 MG
1 CAPSULE ORAL 2 TIMES DAILY
Status: DISCONTINUED | OUTPATIENT
Start: 2021-01-01 | End: 2021-01-01

## 2021-01-01 RX ORDER — BUMETANIDE 0.25 MG/ML
2 INJECTION INTRAMUSCULAR; INTRAVENOUS 2 TIMES DAILY
Status: DISPENSED | OUTPATIENT
Start: 2021-01-01 | End: 2021-01-01

## 2021-01-01 RX ORDER — CARVEDILOL 6.25 MG/1
6.25 TABLET ORAL 2 TIMES DAILY WITH MEALS
Qty: 60 TAB | Refills: 3 | Status: SHIPPED | OUTPATIENT
Start: 2021-01-01 | End: 2021-01-01

## 2021-01-01 RX ORDER — ONDANSETRON 2 MG/ML
4 INJECTION INTRAMUSCULAR; INTRAVENOUS
Status: DISCONTINUED | OUTPATIENT
Start: 2021-01-01 | End: 2021-01-01 | Stop reason: HOSPADM

## 2021-01-01 RX ORDER — CEFAZOLIN SODIUM 1 G/3ML
2 INJECTION, POWDER, FOR SOLUTION INTRAMUSCULAR; INTRAVENOUS ONCE
Status: CANCELLED | OUTPATIENT
Start: 2021-01-01 | End: 2021-01-01

## 2021-01-01 RX ORDER — PROTAMINE SULFATE 10 MG/ML
INJECTION, SOLUTION INTRAVENOUS AS NEEDED
Status: DISCONTINUED | OUTPATIENT
Start: 2021-01-01 | End: 2021-01-01 | Stop reason: HOSPADM

## 2021-01-01 RX ORDER — LEVOFLOXACIN 750 MG/1
750 TABLET ORAL DAILY
Qty: 4 TABLET | Refills: 0 | Status: SHIPPED | OUTPATIENT
Start: 2021-01-01 | End: 2021-01-01

## 2021-01-01 RX ORDER — LABETALOL HYDROCHLORIDE 5 MG/ML
10 INJECTION, SOLUTION INTRAVENOUS
Status: DISCONTINUED | OUTPATIENT
Start: 2021-01-01 | End: 2021-01-01 | Stop reason: HOSPADM

## 2021-01-01 RX ORDER — OLMESARTAN MEDOXOMIL 20 MG/1
TABLET ORAL
Qty: 30 TAB | Refills: 4 | Status: CANCELLED | OUTPATIENT
Start: 2021-01-01

## 2021-01-01 RX ORDER — AMLODIPINE BESYLATE 5 MG/1
10 TABLET ORAL DAILY
Status: DISCONTINUED | OUTPATIENT
Start: 2021-01-01 | End: 2021-01-01 | Stop reason: HOSPADM

## 2021-01-01 RX ORDER — PREGABALIN 25 MG/1
25 CAPSULE ORAL 3 TIMES DAILY
Qty: 3 CAPSULE | Refills: 0 | Status: SHIPPED | OUTPATIENT
Start: 2021-01-01 | End: 2021-01-01

## 2021-01-01 RX ORDER — HEPARIN 100 UNIT/ML
300 SYRINGE INTRAVENOUS ONCE
Status: COMPLETED | OUTPATIENT
Start: 2021-01-01 | End: 2021-01-01

## 2021-01-01 RX ORDER — AMOXICILLIN 250 MG
1 CAPSULE ORAL 2 TIMES DAILY
Qty: 60 TABLET | Refills: 0 | Status: SHIPPED | OUTPATIENT
Start: 2021-01-01 | End: 2021-12-26

## 2021-01-01 RX ORDER — ACETAMINOPHEN 325 MG/1
325 TABLET ORAL AS NEEDED
Qty: 90 TAB | Refills: 1 | Status: SHIPPED | OUTPATIENT
Start: 2021-01-01 | End: 2021-01-01 | Stop reason: ALTCHOICE

## 2021-01-01 RX ORDER — ONDANSETRON 2 MG/ML
4 INJECTION INTRAMUSCULAR; INTRAVENOUS
Status: COMPLETED | OUTPATIENT
Start: 2021-01-01 | End: 2021-01-01

## 2021-01-01 RX ORDER — ACETAMINOPHEN 325 MG/1
650 TABLET ORAL
Status: DISCONTINUED | OUTPATIENT
Start: 2021-01-01 | End: 2021-01-01 | Stop reason: HOSPADM

## 2021-01-01 RX ORDER — CHLOROPROCAINE HYDROCHLORIDE 30 MG/ML
INJECTION, SOLUTION EPIDURAL; INFILTRATION; INTRACAUDAL; PERINEURAL ONCE
Status: COMPLETED | OUTPATIENT
Start: 2021-01-01 | End: 2021-01-01

## 2021-01-01 RX ORDER — LANCETS
EACH MISCELLANEOUS
Qty: 200 LANCET | Refills: 3 | Status: SHIPPED | OUTPATIENT
Start: 2021-01-01 | End: 2021-01-01

## 2021-01-01 RX ORDER — DEXAMETHASONE SODIUM PHOSPHATE 10 MG/ML
10 INJECTION INTRAMUSCULAR; INTRAVENOUS ONCE
Status: COMPLETED | OUTPATIENT
Start: 2021-01-01 | End: 2021-01-01

## 2021-01-01 RX ORDER — SEVELAMER CARBONATE 800 MG/1
800 TABLET, FILM COATED ORAL
Status: DISCONTINUED | OUTPATIENT
Start: 2021-01-01 | End: 2021-01-01 | Stop reason: HOSPADM

## 2021-01-01 RX ORDER — SODIUM ZIRCONIUM CYCLOSILICATE 5 G/5G
5 POWDER, FOR SUSPENSION ORAL 2 TIMES WEEKLY
COMMUNITY
End: 2021-01-01

## 2021-01-01 RX ORDER — HYDROCODONE BITARTRATE AND ACETAMINOPHEN 5; 325 MG/1; MG/1
TABLET ORAL
COMMUNITY
Start: 2021-01-01 | End: 2021-01-01 | Stop reason: SDUPTHER

## 2021-01-01 RX ORDER — SIMVASTATIN 20 MG/1
20 TABLET, FILM COATED ORAL
Qty: 90 TAB | Refills: 3 | Status: SHIPPED | OUTPATIENT
Start: 2021-01-01

## 2021-01-01 RX ORDER — HEPARIN SODIUM 1000 [USP'U]/ML
5000 INJECTION, SOLUTION INTRAVENOUS; SUBCUTANEOUS ONCE
Status: COMPLETED | OUTPATIENT
Start: 2021-01-01 | End: 2021-01-01

## 2021-01-01 RX ORDER — LEVOTHYROXINE SODIUM 175 UG/1
TABLET ORAL
Qty: 90 TAB | Refills: 3 | Status: SHIPPED | OUTPATIENT
Start: 2021-01-01 | End: 2021-01-01 | Stop reason: SDUPTHER

## 2021-01-01 RX ORDER — SODIUM BICARBONATE 650 MG/1
1300 TABLET ORAL 2 TIMES DAILY
Status: DISCONTINUED | OUTPATIENT
Start: 2021-01-01 | End: 2021-01-01 | Stop reason: HOSPADM

## 2021-01-01 RX ORDER — DEXTROSE 50 % IN WATER (D50W) INTRAVENOUS SYRINGE
12.5-25 AS NEEDED
Status: DISCONTINUED | OUTPATIENT
Start: 2021-01-01 | End: 2021-01-01 | Stop reason: HOSPADM

## 2021-01-01 RX ORDER — PROPOFOL 10 MG/ML
INJECTION, EMULSION INTRAVENOUS AS NEEDED
Status: DISCONTINUED | OUTPATIENT
Start: 2021-01-01 | End: 2021-01-01 | Stop reason: HOSPADM

## 2021-01-01 RX ORDER — MIDAZOLAM HYDROCHLORIDE 1 MG/ML
1-5 INJECTION, SOLUTION INTRAMUSCULAR; INTRAVENOUS
Status: DISCONTINUED | OUTPATIENT
Start: 2021-01-01 | End: 2021-01-01

## 2021-01-01 RX ORDER — LORAZEPAM 2 MG/ML
1 INJECTION INTRAMUSCULAR
Status: DISCONTINUED | OUTPATIENT
Start: 2021-01-01 | End: 2021-01-01 | Stop reason: HOSPADM

## 2021-01-01 RX ORDER — HYDRALAZINE HYDROCHLORIDE 50 MG/1
50 TABLET, FILM COATED ORAL 2 TIMES DAILY
Qty: 60 TABLET | Refills: 4 | Status: SHIPPED | OUTPATIENT
Start: 2021-01-01

## 2021-01-01 RX ORDER — AMLODIPINE BESYLATE 5 MG/1
TABLET ORAL
Qty: 30 TAB | Refills: 0 | Status: SHIPPED | OUTPATIENT
Start: 2021-01-01 | End: 2021-01-01

## 2021-01-01 RX ORDER — HUMAN INSULIN 100 [IU]/ML
INJECTION, SUSPENSION SUBCUTANEOUS
Qty: 30 ML | Refills: 0 | Status: SHIPPED | OUTPATIENT
Start: 2021-01-01 | End: 2021-01-01

## 2021-01-01 RX ORDER — HYDROCODONE BITARTRATE AND ACETAMINOPHEN 7.5; 325 MG/1; MG/1
1 TABLET ORAL
Status: DISCONTINUED | OUTPATIENT
Start: 2021-01-01 | End: 2021-01-01 | Stop reason: HOSPADM

## 2021-01-01 RX ORDER — BUMETANIDE 2 MG/1
2 TABLET ORAL DAILY
Qty: 30 TABLET | Refills: 0 | Status: SHIPPED | OUTPATIENT
Start: 2021-01-01

## 2021-01-01 RX ORDER — HYDROCODONE BITARTRATE AND ACETAMINOPHEN 7.5; 325 MG/1; MG/1
1 TABLET ORAL
Status: DISCONTINUED | OUTPATIENT
Start: 2021-01-01 | End: 2021-01-01

## 2021-01-01 RX ORDER — IPRATROPIUM BROMIDE AND ALBUTEROL SULFATE 2.5; .5 MG/3ML; MG/3ML
3 SOLUTION RESPIRATORY (INHALATION)
Status: DISCONTINUED | OUTPATIENT
Start: 2021-01-01 | End: 2021-01-01 | Stop reason: HOSPADM

## 2021-01-01 RX ORDER — INSULIN LISPRO 100 [IU]/ML
INJECTION, SOLUTION INTRAVENOUS; SUBCUTANEOUS
Qty: 1 EACH | Refills: 1 | Status: SHIPPED
Start: 2021-01-01

## 2021-01-01 RX ORDER — HEPARIN SODIUM 5000 [USP'U]/ML
5000 INJECTION, SOLUTION INTRAVENOUS; SUBCUTANEOUS EVERY 8 HOURS
Status: DISCONTINUED | OUTPATIENT
Start: 2021-01-01 | End: 2021-01-01 | Stop reason: HOSPADM

## 2021-01-01 RX ORDER — HYDROCODONE BITARTRATE AND ACETAMINOPHEN 10; 325 MG/1; MG/1
1 TABLET ORAL
Status: DISCONTINUED | OUTPATIENT
Start: 2021-01-01 | End: 2021-01-01 | Stop reason: HOSPADM

## 2021-01-01 RX ORDER — DICLOFENAC SODIUM 10 MG/G
GEL TOPICAL 4 TIMES DAILY
Qty: 2 EACH | Refills: 0 | Status: SHIPPED | OUTPATIENT
Start: 2021-01-01 | End: 2021-01-01

## 2021-01-01 RX ORDER — MIDAZOLAM HYDROCHLORIDE 1 MG/ML
1 INJECTION, SOLUTION INTRAMUSCULAR; INTRAVENOUS AS NEEDED
Status: DISCONTINUED | OUTPATIENT
Start: 2021-01-01 | End: 2021-01-01 | Stop reason: HOSPADM

## 2021-01-01 RX ORDER — INSULIN GLARGINE 100 [IU]/ML
11 INJECTION, SOLUTION SUBCUTANEOUS DAILY
Status: DISCONTINUED | OUTPATIENT
Start: 2021-01-01 | End: 2021-01-01 | Stop reason: HOSPADM

## 2021-01-01 RX ORDER — FENTANYL CITRATE 50 UG/ML
50 INJECTION, SOLUTION INTRAMUSCULAR; INTRAVENOUS
Status: COMPLETED | OUTPATIENT
Start: 2021-01-01 | End: 2021-01-01

## 2021-01-01 RX ORDER — HYDRALAZINE HYDROCHLORIDE 20 MG/ML
10-20 INJECTION INTRAMUSCULAR; INTRAVENOUS
Status: DISCONTINUED | OUTPATIENT
Start: 2021-01-01 | End: 2021-01-01

## 2021-01-01 RX ORDER — AMLODIPINE BESYLATE 10 MG/1
10 TABLET ORAL DAILY
Qty: 30 TAB | Refills: 5 | Status: SHIPPED | OUTPATIENT
Start: 2021-01-01 | End: 2021-01-01 | Stop reason: SDUPTHER

## 2021-01-01 RX ORDER — METHOCARBAMOL 500 MG/1
500 TABLET, FILM COATED ORAL
Status: DISCONTINUED | OUTPATIENT
Start: 2021-01-01 | End: 2021-01-01 | Stop reason: HOSPADM

## 2021-01-01 RX ORDER — SODIUM BICARBONATE 1 MEQ/ML
50 SYRINGE (ML) INTRAVENOUS ONCE
Status: COMPLETED | OUTPATIENT
Start: 2021-01-01 | End: 2021-01-01

## 2021-01-01 RX ORDER — INSULIN LISPRO 100 [IU]/ML
4 INJECTION, SOLUTION INTRAVENOUS; SUBCUTANEOUS ONCE
Status: COMPLETED | OUTPATIENT
Start: 2021-01-01 | End: 2021-01-01

## 2021-01-01 RX ORDER — ACETAMINOPHEN 325 MG/1
325 TABLET ORAL
Status: DISCONTINUED | OUTPATIENT
Start: 2021-01-01 | End: 2021-01-01 | Stop reason: HOSPADM

## 2021-01-01 RX ORDER — OXYCODONE HYDROCHLORIDE 5 MG/1
5 TABLET ORAL
Status: DISCONTINUED | OUTPATIENT
Start: 2021-01-01 | End: 2021-01-01 | Stop reason: HOSPADM

## 2021-01-01 RX ORDER — INSULIN LISPRO 100 [IU]/ML
6 INJECTION, SOLUTION INTRAVENOUS; SUBCUTANEOUS ONCE
Status: DISCONTINUED | OUTPATIENT
Start: 2021-01-01 | End: 2021-01-01

## 2021-01-01 RX ORDER — METOCLOPRAMIDE 5 MG/1
TABLET ORAL
Qty: 30 TABLET | Refills: 0 | Status: SHIPPED | OUTPATIENT
Start: 2021-01-01 | End: 2021-01-01

## 2021-01-01 RX ORDER — SODIUM BICARBONATE 1 MEQ/ML
50 SYRINGE (ML) INTRAVENOUS
Status: COMPLETED | OUTPATIENT
Start: 2021-01-01 | End: 2021-01-01

## 2021-01-01 RX ORDER — CARVEDILOL 6.25 MG/1
6.25 TABLET ORAL 2 TIMES DAILY WITH MEALS
Qty: 60 TAB | Refills: 3 | Status: SHIPPED | OUTPATIENT
Start: 2021-01-01 | End: 2021-01-01 | Stop reason: SDUPTHER

## 2021-01-01 RX ORDER — FENTANYL CITRATE 50 UG/ML
50 INJECTION, SOLUTION INTRAMUSCULAR; INTRAVENOUS
Status: DISCONTINUED | OUTPATIENT
Start: 2021-01-01 | End: 2021-01-01 | Stop reason: HOSPADM

## 2021-01-01 RX ORDER — ATROPA BELLADONNA AND OPIUM 16.2; 3 MG/1; MG/1
1 SUPPOSITORY RECTAL ONCE
Status: COMPLETED | OUTPATIENT
Start: 2021-01-01 | End: 2021-01-01

## 2021-01-01 RX ORDER — HYDRALAZINE HYDROCHLORIDE 25 MG/1
25 TABLET, FILM COATED ORAL 3 TIMES DAILY
COMMUNITY
End: 2021-01-01

## 2021-01-01 RX ORDER — POLYETHYLENE GLYCOL 3350 17 G/17G
17 POWDER, FOR SOLUTION ORAL DAILY
Status: DISCONTINUED | OUTPATIENT
Start: 2021-01-01 | End: 2021-01-01 | Stop reason: HOSPADM

## 2021-01-01 RX ORDER — BUMETANIDE 1 MG/1
2 TABLET ORAL DAILY
Status: DISCONTINUED | OUTPATIENT
Start: 2021-01-01 | End: 2021-01-01 | Stop reason: HOSPADM

## 2021-01-01 RX ORDER — ACETAMINOPHEN 650 MG/1
650 SUPPOSITORY RECTAL
Status: DISCONTINUED | OUTPATIENT
Start: 2021-01-01 | End: 2021-01-01 | Stop reason: HOSPADM

## 2021-01-01 RX ORDER — INSULIN LISPRO 100 [IU]/ML
INJECTION, SOLUTION INTRAVENOUS; SUBCUTANEOUS
Status: DISCONTINUED | OUTPATIENT
Start: 2021-01-01 | End: 2021-01-01 | Stop reason: HOSPADM

## 2021-01-01 RX ORDER — WATER FOR INJECTION,STERILE
VIAL (ML) INJECTION
Status: DISCONTINUED
Start: 2021-01-01 | End: 2021-01-01 | Stop reason: HOSPADM

## 2021-01-01 RX ORDER — INSULIN GLARGINE 100 [IU]/ML
30 INJECTION, SOLUTION SUBCUTANEOUS
Status: DISCONTINUED | OUTPATIENT
Start: 2021-01-01 | End: 2021-01-01 | Stop reason: HOSPADM

## 2021-01-01 RX ORDER — ATORVASTATIN CALCIUM 10 MG/1
10 TABLET, FILM COATED ORAL DAILY
Status: DISCONTINUED | OUTPATIENT
Start: 2021-01-01 | End: 2021-01-01 | Stop reason: HOSPADM

## 2021-01-01 RX ORDER — SODIUM CHLORIDE 9 MG/ML
25 INJECTION, SOLUTION INTRAVENOUS CONTINUOUS
Status: DISCONTINUED | OUTPATIENT
Start: 2021-01-01 | End: 2021-01-01

## 2021-01-01 RX ORDER — HUMAN INSULIN 100 [IU]/ML
INJECTION, SUSPENSION SUBCUTANEOUS
Qty: 30 ML | Refills: 0 | Status: SHIPPED | OUTPATIENT
Start: 2021-01-01 | End: 2021-01-01 | Stop reason: SDUPTHER

## 2021-01-01 RX ORDER — HEPARIN SODIUM 200 [USP'U]/100ML
400 INJECTION, SOLUTION INTRAVENOUS ONCE
Status: DISPENSED | OUTPATIENT
Start: 2021-01-01 | End: 2021-01-01

## 2021-01-01 RX ORDER — FENTANYL CITRATE 50 UG/ML
50 INJECTION, SOLUTION INTRAMUSCULAR; INTRAVENOUS AS NEEDED
Status: DISCONTINUED | OUTPATIENT
Start: 2021-01-01 | End: 2021-01-01 | Stop reason: HOSPADM

## 2021-01-01 RX ORDER — CARVEDILOL 6.25 MG/1
6.25 TABLET ORAL 2 TIMES DAILY WITH MEALS
Status: DISCONTINUED | OUTPATIENT
Start: 2021-01-01 | End: 2021-01-01 | Stop reason: HOSPADM

## 2021-01-01 RX ORDER — SORAFENIB 200 MG/1
200 TABLET, FILM COATED ORAL DAILY
Qty: 30 TABLET | Refills: 11 | Status: SHIPPED | OUTPATIENT
Start: 2021-01-01 | End: 2021-01-01

## 2021-01-01 RX ORDER — METOCLOPRAMIDE 10 MG/1
10 TABLET ORAL
Status: DISCONTINUED | OUTPATIENT
Start: 2021-01-01 | End: 2021-01-01 | Stop reason: DRUGHIGH

## 2021-01-01 RX ORDER — ATORVASTATIN CALCIUM 10 MG/1
10 TABLET, FILM COATED ORAL
Status: DISCONTINUED | OUTPATIENT
Start: 2021-01-01 | End: 2021-01-01

## 2021-01-01 RX ORDER — FACIAL-BODY WIPES
10 EACH TOPICAL DAILY PRN
Status: DISCONTINUED | OUTPATIENT
Start: 2021-01-01 | End: 2021-01-01

## 2021-01-01 RX ORDER — DEXAMETHASONE 4 MG/1
4 TABLET ORAL EVERY 6 HOURS
Status: DISCONTINUED | OUTPATIENT
Start: 2021-01-01 | End: 2021-01-01 | Stop reason: HOSPADM

## 2021-01-01 RX ORDER — INSULIN LISPRO 100 [IU]/ML
3 INJECTION, SOLUTION INTRAVENOUS; SUBCUTANEOUS
Status: DISCONTINUED | OUTPATIENT
Start: 2021-01-01 | End: 2021-01-01 | Stop reason: HOSPADM

## 2021-01-01 RX ORDER — OXYCODONE AND ACETAMINOPHEN 5; 325 MG/1; MG/1
1 TABLET ORAL
Status: DISCONTINUED | OUTPATIENT
Start: 2021-01-01 | End: 2021-01-01 | Stop reason: HOSPADM

## 2021-01-01 RX ORDER — ALBUTEROL SULFATE 0.83 MG/ML
2.5 SOLUTION RESPIRATORY (INHALATION)
Status: DISCONTINUED | OUTPATIENT
Start: 2021-01-01 | End: 2021-01-01 | Stop reason: HOSPADM

## 2021-01-01 RX ORDER — METOCLOPRAMIDE 5 MG/1
TABLET ORAL
Qty: 30 TAB | Refills: 2 | Status: SHIPPED | OUTPATIENT
Start: 2021-01-01 | End: 2021-01-01

## 2021-01-01 RX ORDER — PROPOFOL 10 MG/ML
INJECTION, EMULSION INTRAVENOUS
Status: DISCONTINUED | OUTPATIENT
Start: 2021-01-01 | End: 2021-01-01 | Stop reason: HOSPADM

## 2021-01-01 RX ORDER — HEPARIN SODIUM 200 [USP'U]/100ML
400 INJECTION, SOLUTION INTRAVENOUS ONCE
Status: COMPLETED | OUTPATIENT
Start: 2021-01-01 | End: 2021-01-01

## 2021-01-01 RX ORDER — SEVELAMER CARBONATE 800 MG/1
800 TABLET, FILM COATED ORAL
Qty: 90 TAB | Refills: 3 | Status: SHIPPED | OUTPATIENT
Start: 2021-01-01 | End: 2021-01-01 | Stop reason: SDUPTHER

## 2021-01-01 RX ORDER — INSULIN LISPRO 100 [IU]/ML
13 INJECTION, SOLUTION INTRAVENOUS; SUBCUTANEOUS ONCE
Status: COMPLETED | OUTPATIENT
Start: 2021-01-01 | End: 2021-01-01

## 2021-01-01 RX ORDER — INSULIN LISPRO 100 [IU]/ML
2 INJECTION, SOLUTION INTRAVENOUS; SUBCUTANEOUS
Status: DISCONTINUED | OUTPATIENT
Start: 2021-01-01 | End: 2021-01-01 | Stop reason: HOSPADM

## 2021-01-01 RX ORDER — AMLODIPINE BESYLATE 10 MG/1
10 TABLET ORAL DAILY
Qty: 30 TABLET | Refills: 5 | Status: SHIPPED | OUTPATIENT
Start: 2021-01-01

## 2021-01-01 RX ORDER — MIDAZOLAM HYDROCHLORIDE 1 MG/ML
5 INJECTION, SOLUTION INTRAMUSCULAR; INTRAVENOUS
Status: DISCONTINUED | OUTPATIENT
Start: 2021-01-01 | End: 2021-01-01

## 2021-01-01 RX ORDER — DEXTROMETHORPHAN POLISTIREX 30 MG/5 ML
SUSPENSION, EXTENDED RELEASE 12 HR ORAL AS NEEDED
Status: DISCONTINUED | OUTPATIENT
Start: 2021-01-01 | End: 2021-01-01 | Stop reason: HOSPADM

## 2021-01-01 RX ORDER — DEXAMETHASONE SODIUM PHOSPHATE 4 MG/ML
4 INJECTION, SOLUTION INTRA-ARTICULAR; INTRALESIONAL; INTRAMUSCULAR; INTRAVENOUS; SOFT TISSUE EVERY 8 HOURS
Status: DISCONTINUED | OUTPATIENT
Start: 2021-01-01 | End: 2021-01-01

## 2021-01-01 RX ORDER — GABAPENTIN 100 MG/1
100 CAPSULE ORAL 3 TIMES DAILY
Qty: 15 CAPSULE | Refills: 0 | Status: SHIPPED | OUTPATIENT
Start: 2021-01-01 | End: 2021-01-01

## 2021-01-01 RX ORDER — AMOXICILLIN AND CLAVULANATE POTASSIUM 875; 125 MG/1; MG/1
1 TABLET, FILM COATED ORAL 2 TIMES DAILY
Qty: 3 TABLET | Refills: 0 | Status: SHIPPED | OUTPATIENT
Start: 2021-01-01 | End: 2021-01-01

## 2021-01-01 RX ORDER — HYDROCODONE BITARTRATE AND ACETAMINOPHEN 5; 325 MG/1; MG/1
1 TABLET ORAL
Status: DISCONTINUED | OUTPATIENT
Start: 2021-01-01 | End: 2021-01-01

## 2021-01-01 RX ORDER — HYDRALAZINE HYDROCHLORIDE 20 MG/ML
10 INJECTION INTRAMUSCULAR; INTRAVENOUS
Status: DISCONTINUED | OUTPATIENT
Start: 2021-01-01 | End: 2021-01-01 | Stop reason: HOSPADM

## 2021-01-01 RX ORDER — OXYCODONE HYDROCHLORIDE 5 MG/1
5 TABLET ORAL
Status: COMPLETED | OUTPATIENT
Start: 2021-01-01 | End: 2021-01-01

## 2021-01-01 RX ORDER — HEPARIN SODIUM 5000 [USP'U]/ML
5000 INJECTION, SOLUTION INTRAVENOUS; SUBCUTANEOUS EVERY 8 HOURS
Status: DISCONTINUED | OUTPATIENT
Start: 2021-01-01 | End: 2021-01-01

## 2021-01-01 RX ORDER — POLYETHYLENE GLYCOL 3350 17 G/17G
17 POWDER, FOR SOLUTION ORAL DAILY
Qty: 30 PACKET | Refills: 0 | Status: SHIPPED | OUTPATIENT
Start: 2021-01-01 | End: 2021-12-27

## 2021-01-01 RX ORDER — INSULIN GLARGINE 100 [IU]/ML
35 INJECTION, SOLUTION SUBCUTANEOUS
Status: DISCONTINUED | OUTPATIENT
Start: 2021-01-01 | End: 2021-01-01

## 2021-01-01 RX ORDER — SEVELAMER CARBONATE 800 MG/1
800 TABLET, FILM COATED ORAL
Qty: 90 TAB | Refills: 3 | Status: CANCELLED | OUTPATIENT
Start: 2021-01-01

## 2021-01-01 RX ORDER — OLMESARTAN MEDOXOMIL 20 MG/1
TABLET ORAL
Qty: 30 TAB | Refills: 5 | Status: SHIPPED | OUTPATIENT
Start: 2021-01-01 | End: 2021-01-01

## 2021-01-01 RX ORDER — SODIUM CHLORIDE, SODIUM LACTATE, POTASSIUM CHLORIDE, CALCIUM CHLORIDE 600; 310; 30; 20 MG/100ML; MG/100ML; MG/100ML; MG/100ML
25 INJECTION, SOLUTION INTRAVENOUS CONTINUOUS
Status: DISCONTINUED | OUTPATIENT
Start: 2021-01-01 | End: 2021-01-01 | Stop reason: HOSPADM

## 2021-01-01 RX ORDER — ACETAMINOPHEN 650 MG/1
650 SUPPOSITORY RECTAL
Status: DISCONTINUED | OUTPATIENT
Start: 2021-01-01 | End: 2021-01-01

## 2021-01-01 RX ORDER — LIDOCAINE HYDROCHLORIDE 20 MG/ML
18 INJECTION, SOLUTION INFILTRATION; PERINEURAL ONCE
Status: CANCELLED | OUTPATIENT
Start: 2021-01-01 | End: 2021-01-01

## 2021-01-01 RX ORDER — SODIUM BICARBONATE 650 MG/1
1300 TABLET ORAL 2 TIMES DAILY
Status: DISCONTINUED | OUTPATIENT
Start: 2021-01-01 | End: 2021-01-01

## 2021-01-01 RX ORDER — ALBUMIN HUMAN 250 G/1000ML
12.5 SOLUTION INTRAVENOUS
Status: DISCONTINUED | OUTPATIENT
Start: 2021-01-01 | End: 2021-01-01 | Stop reason: HOSPADM

## 2021-01-01 RX ORDER — LEVOTHYROXINE SODIUM 175 UG/1
TABLET ORAL
Qty: 90 TABLET | Refills: 3 | Status: SHIPPED | OUTPATIENT
Start: 2021-01-01

## 2021-01-01 RX ORDER — ERGOCALCIFEROL 1.25 MG/1
50000 CAPSULE ORAL
Status: DISCONTINUED | OUTPATIENT
Start: 2021-01-01 | End: 2021-01-01 | Stop reason: HOSPADM

## 2021-01-01 RX ORDER — INSULIN GLARGINE 100 [IU]/ML
30 INJECTION, SOLUTION SUBCUTANEOUS
Qty: 1 ML | Refills: 0 | Status: SHIPPED | OUTPATIENT
Start: 2021-01-01

## 2021-01-01 RX ORDER — FUROSEMIDE 10 MG/ML
40 INJECTION INTRAMUSCULAR; INTRAVENOUS
Status: COMPLETED | OUTPATIENT
Start: 2021-01-01 | End: 2021-01-01

## 2021-01-01 RX ORDER — CEFAZOLIN SODIUM 1 G/3ML
INJECTION, POWDER, FOR SOLUTION INTRAMUSCULAR; INTRAVENOUS
Status: COMPLETED
Start: 2021-01-01 | End: 2021-01-01

## 2021-01-01 RX ORDER — PANTOPRAZOLE SODIUM 40 MG/1
40 TABLET, DELAYED RELEASE ORAL
Status: DISCONTINUED | OUTPATIENT
Start: 2021-01-01 | End: 2021-01-01 | Stop reason: HOSPADM

## 2021-01-01 RX ORDER — DEXAMETHASONE 4 MG/1
4 TABLET ORAL EVERY 8 HOURS
Qty: 90 TABLET | Refills: 0 | Status: SHIPPED | OUTPATIENT
Start: 2021-01-01

## 2021-01-01 RX ORDER — LIDOCAINE HYDROCHLORIDE AND EPINEPHRINE 20; 5 MG/ML; UG/ML
INJECTION, SOLUTION EPIDURAL; INFILTRATION; INTRACAUDAL; PERINEURAL AS NEEDED
Status: DISCONTINUED | OUTPATIENT
Start: 2021-01-01 | End: 2021-01-01 | Stop reason: HOSPADM

## 2021-01-01 RX ORDER — MORPHINE SULFATE 2 MG/ML
4 INJECTION, SOLUTION INTRAMUSCULAR; INTRAVENOUS ONCE
Status: COMPLETED | OUTPATIENT
Start: 2021-01-01 | End: 2021-01-01

## 2021-01-01 RX ORDER — CALCIUM GLUCONATE 20 MG/ML
1 INJECTION, SOLUTION INTRAVENOUS ONCE
Status: COMPLETED | OUTPATIENT
Start: 2021-01-01 | End: 2021-01-01

## 2021-01-01 RX ORDER — PREGABALIN 75 MG/1
75 CAPSULE ORAL 2 TIMES DAILY
Status: DISCONTINUED | OUTPATIENT
Start: 2021-01-01 | End: 2021-01-01 | Stop reason: HOSPADM

## 2021-01-01 RX ORDER — HYDROCODONE BITARTRATE AND ACETAMINOPHEN 5; 325 MG/1; MG/1
1 TABLET ORAL
Qty: 20 TABLET | Refills: 0 | Status: SHIPPED | OUTPATIENT
Start: 2021-01-01 | End: 2021-01-01

## 2021-01-01 RX ORDER — HEPARIN SODIUM 1000 [USP'U]/ML
3800 INJECTION, SOLUTION INTRAVENOUS; SUBCUTANEOUS ONCE
Status: COMPLETED | OUTPATIENT
Start: 2021-01-01 | End: 2021-01-01

## 2021-01-01 RX ORDER — PREGABALIN 25 MG/1
25 CAPSULE ORAL 3 TIMES DAILY
Qty: 3 CAPSULE | Refills: 0 | Status: CANCELLED | OUTPATIENT
Start: 2021-01-01

## 2021-01-01 RX ORDER — CALCITRIOL 0.25 UG/1
CAPSULE ORAL
Status: CANCELLED | OUTPATIENT
Start: 2021-01-01

## 2021-01-01 RX ORDER — PREGABALIN 25 MG/1
25 CAPSULE ORAL 3 TIMES DAILY
Qty: 12 CAPSULE | Refills: 0 | Status: SHIPPED | OUTPATIENT
Start: 2021-01-01 | End: 2021-01-01 | Stop reason: SDUPTHER

## 2021-01-01 RX ORDER — METOCLOPRAMIDE 10 MG/1
5 TABLET ORAL
Status: DISCONTINUED | OUTPATIENT
Start: 2021-01-01 | End: 2021-01-01 | Stop reason: HOSPADM

## 2021-01-01 RX ORDER — HEPARIN SODIUM 5000 [USP'U]/ML
5000 INJECTION, SOLUTION INTRAVENOUS; SUBCUTANEOUS EVERY 12 HOURS
Status: DISCONTINUED | OUTPATIENT
Start: 2021-01-01 | End: 2021-01-01 | Stop reason: HOSPADM

## 2021-01-01 RX ORDER — HYDROCODONE BITARTRATE AND ACETAMINOPHEN 7.5; 325 MG/1; MG/1
1 TABLET ORAL
Qty: 9 TABLET | Refills: 0 | Status: SHIPPED | OUTPATIENT
Start: 2021-01-01 | End: 2021-01-01

## 2021-01-01 RX ORDER — HYDRALAZINE HYDROCHLORIDE 50 MG/1
50 TABLET, FILM COATED ORAL 3 TIMES DAILY
Qty: 30 TAB | Refills: 2 | Status: CANCELLED | OUTPATIENT
Start: 2021-01-01

## 2021-01-01 RX ORDER — HYDRALAZINE HYDROCHLORIDE 50 MG/1
TABLET, FILM COATED ORAL
COMMUNITY
Start: 2021-01-01 | End: 2021-01-01

## 2021-01-01 RX ORDER — HYDRALAZINE HYDROCHLORIDE 50 MG/1
50 TABLET, FILM COATED ORAL 2 TIMES DAILY
Status: DISCONTINUED | OUTPATIENT
Start: 2021-01-01 | End: 2021-01-01

## 2021-01-01 RX ORDER — OXYCODONE HYDROCHLORIDE 5 MG/1
5 TABLET ORAL
Qty: 12 TAB | Refills: 0 | Status: SHIPPED | OUTPATIENT
Start: 2021-01-01 | End: 2021-01-01

## 2021-01-01 RX ORDER — SODIUM CHLORIDE 0.9 % (FLUSH) 0.9 %
5-40 SYRINGE (ML) INJECTION EVERY 8 HOURS
Status: DISCONTINUED | OUTPATIENT
Start: 2021-01-01 | End: 2021-01-01 | Stop reason: SDUPTHER

## 2021-01-01 RX ORDER — DOCUSATE SODIUM 100 MG/1
100 CAPSULE, LIQUID FILLED ORAL 2 TIMES DAILY
Qty: 60 CAPSULE | Refills: 2 | Status: SHIPPED | OUTPATIENT
Start: 2021-01-01 | End: 2022-02-15

## 2021-01-01 RX ORDER — AMOXICILLIN 250 MG
1 CAPSULE ORAL 2 TIMES DAILY
Status: DISCONTINUED | OUTPATIENT
Start: 2021-01-01 | End: 2021-01-01 | Stop reason: HOSPADM

## 2021-01-01 RX ORDER — DIPHENHYDRAMINE HYDROCHLORIDE 50 MG/ML
12.5 INJECTION, SOLUTION INTRAMUSCULAR; INTRAVENOUS
Status: ACTIVE | OUTPATIENT
Start: 2021-01-01 | End: 2021-01-01

## 2021-01-01 RX ORDER — ONDANSETRON 2 MG/ML
4 INJECTION INTRAMUSCULAR; INTRAVENOUS
Status: DISCONTINUED | OUTPATIENT
Start: 2021-01-01 | End: 2021-01-01

## 2021-01-01 RX ORDER — SODIUM CHLORIDE 0.9 % (FLUSH) 0.9 %
5-40 SYRINGE (ML) INJECTION AS NEEDED
Status: DISCONTINUED | OUTPATIENT
Start: 2021-01-01 | End: 2021-01-01 | Stop reason: SDUPTHER

## 2021-01-01 RX ORDER — OXYCODONE HYDROCHLORIDE 5 MG/1
10 TABLET ORAL
Status: DISCONTINUED | OUTPATIENT
Start: 2021-01-01 | End: 2021-01-01 | Stop reason: HOSPADM

## 2021-01-01 RX ORDER — VANCOMYCIN/0.9 % SOD CHLORIDE 1.5G/250ML
1500 PLASTIC BAG, INJECTION (ML) INTRAVENOUS ONCE
Status: COMPLETED | OUTPATIENT
Start: 2021-01-01 | End: 2021-01-01

## 2021-01-01 RX ORDER — HEPARIN SODIUM 1000 [USP'U]/ML
10000 INJECTION, SOLUTION INTRAVENOUS; SUBCUTANEOUS ONCE
Status: COMPLETED | OUTPATIENT
Start: 2021-01-01 | End: 2021-01-01

## 2021-01-01 RX ORDER — HEPARIN SODIUM 1000 [USP'U]/ML
1500 INJECTION, SOLUTION INTRAVENOUS; SUBCUTANEOUS
Status: DISCONTINUED | OUTPATIENT
Start: 2021-01-01 | End: 2021-01-01 | Stop reason: HOSPADM

## 2021-01-01 RX ORDER — SODIUM BICARBONATE 650 MG/1
TABLET ORAL
COMMUNITY
Start: 2021-01-01 | End: 2021-01-01 | Stop reason: SDUPTHER

## 2021-01-01 RX ORDER — ONDANSETRON 2 MG/ML
4 INJECTION INTRAMUSCULAR; INTRAVENOUS AS NEEDED
Status: DISCONTINUED | OUTPATIENT
Start: 2021-01-01 | End: 2021-01-01 | Stop reason: HOSPADM

## 2021-01-01 RX ORDER — OXYCODONE AND ACETAMINOPHEN 5; 325 MG/1; MG/1
2 TABLET ORAL
Status: COMPLETED | OUTPATIENT
Start: 2021-01-01 | End: 2021-01-01

## 2021-01-01 RX ORDER — AMLODIPINE BESYLATE 5 MG/1
TABLET ORAL
Qty: 30 TAB | Refills: 0 | Status: CANCELLED | OUTPATIENT
Start: 2021-01-01

## 2021-01-01 RX ORDER — LIDOCAINE HYDROCHLORIDE 20 MG/ML
15 INJECTION, SOLUTION INFILTRATION; PERINEURAL ONCE
Status: DISCONTINUED | OUTPATIENT
Start: 2021-01-01 | End: 2021-01-01

## 2021-01-01 RX ORDER — FENTANYL CITRATE 50 UG/ML
100 INJECTION, SOLUTION INTRAMUSCULAR; INTRAVENOUS
Status: DISCONTINUED | OUTPATIENT
Start: 2021-01-01 | End: 2021-01-01 | Stop reason: HOSPADM

## 2021-01-01 RX ORDER — FENTANYL CITRATE 50 UG/ML
INJECTION, SOLUTION INTRAMUSCULAR; INTRAVENOUS AS NEEDED
Status: DISCONTINUED | OUTPATIENT
Start: 2021-01-01 | End: 2021-01-01 | Stop reason: HOSPADM

## 2021-01-01 RX ORDER — ATORVASTATIN CALCIUM 40 MG/1
40 TABLET, FILM COATED ORAL
Status: DISCONTINUED | OUTPATIENT
Start: 2021-01-01 | End: 2021-01-01 | Stop reason: HOSPADM

## 2021-01-01 RX ORDER — DEXAMETHASONE 4 MG/1
4 TABLET ORAL EVERY 12 HOURS
Status: DISCONTINUED | OUTPATIENT
Start: 2021-01-01 | End: 2021-01-01

## 2021-01-01 RX ORDER — SODIUM CHLORIDE 9 MG/ML
25 INJECTION, SOLUTION INTRAVENOUS CONTINUOUS
Status: DISCONTINUED | OUTPATIENT
Start: 2021-01-01 | End: 2021-01-01 | Stop reason: HOSPADM

## 2021-01-01 RX ORDER — OMEPRAZOLE 20 MG/1
20 CAPSULE, DELAYED RELEASE ORAL DAILY
Qty: 90 CAP | Refills: 1 | Status: SHIPPED | OUTPATIENT
Start: 2021-01-01 | End: 2021-01-01

## 2021-01-01 RX ORDER — METRONIDAZOLE 500 MG/100ML
500 INJECTION, SOLUTION INTRAVENOUS EVERY 12 HOURS
Status: DISCONTINUED | OUTPATIENT
Start: 2021-01-01 | End: 2021-01-01 | Stop reason: HOSPADM

## 2021-01-01 RX ORDER — SEVELAMER CARBONATE 800 MG/1
800 TABLET, FILM COATED ORAL
Qty: 90 TAB | Refills: 3 | Status: SHIPPED | OUTPATIENT
Start: 2021-01-01 | End: 2021-01-01

## 2021-01-01 RX ORDER — CALCIUM GLUCONATE 94 MG/ML
1 INJECTION, SOLUTION INTRAVENOUS ONCE
Status: COMPLETED | OUTPATIENT
Start: 2021-01-01 | End: 2021-01-01

## 2021-01-01 RX ORDER — GUAIFENESIN 100 MG/5ML
81 LIQUID (ML) ORAL DAILY
Status: DISCONTINUED | OUTPATIENT
Start: 2021-01-01 | End: 2021-01-01 | Stop reason: HOSPADM

## 2021-01-01 RX ORDER — NALOXONE HYDROCHLORIDE 0.4 MG/ML
0.4 INJECTION, SOLUTION INTRAMUSCULAR; INTRAVENOUS; SUBCUTANEOUS AS NEEDED
Status: DISCONTINUED | OUTPATIENT
Start: 2021-01-01 | End: 2021-01-01 | Stop reason: HOSPADM

## 2021-01-01 RX ORDER — MIDAZOLAM HYDROCHLORIDE 1 MG/ML
0-10 INJECTION, SOLUTION INTRAMUSCULAR; INTRAVENOUS
Status: DISCONTINUED | OUTPATIENT
Start: 2021-01-01 | End: 2021-01-01

## 2021-01-01 RX ORDER — HYDROMORPHONE HYDROCHLORIDE 1 MG/ML
1 INJECTION, SOLUTION INTRAMUSCULAR; INTRAVENOUS; SUBCUTANEOUS ONCE
Status: DISCONTINUED | OUTPATIENT
Start: 2021-01-01 | End: 2021-01-01

## 2021-01-01 RX ORDER — SORAFENIB 200 MG/1
400 TABLET, FILM COATED ORAL DAILY
Qty: 120 TABLET | Refills: 11 | Status: SHIPPED | OUTPATIENT
Start: 2021-01-01 | End: 2021-01-01

## 2021-01-01 RX ORDER — PREGABALIN 25 MG/1
25 CAPSULE ORAL 3 TIMES DAILY
Status: DISCONTINUED | OUTPATIENT
Start: 2021-01-01 | End: 2021-01-01 | Stop reason: HOSPADM

## 2021-01-01 RX ORDER — ERGOCALCIFEROL 1.25 MG/1
CAPSULE ORAL
Qty: 4 CAP | Refills: 5 | Status: SHIPPED | OUTPATIENT
Start: 2021-01-01

## 2021-01-01 RX ORDER — HEPARIN SODIUM 1000 [USP'U]/ML
1000 INJECTION, SOLUTION INTRAVENOUS; SUBCUTANEOUS
Status: DISCONTINUED | OUTPATIENT
Start: 2021-01-01 | End: 2021-01-01 | Stop reason: HOSPADM

## 2021-01-01 RX ORDER — DEXTROSE 50 % IN WATER (D50W) INTRAVENOUS SYRINGE
25
Status: COMPLETED | OUTPATIENT
Start: 2021-01-01 | End: 2021-01-01

## 2021-01-01 RX ORDER — ACETAMINOPHEN 325 MG/1
650 TABLET ORAL
Status: DISCONTINUED | OUTPATIENT
Start: 2021-01-01 | End: 2021-01-01

## 2021-01-01 RX ORDER — FENTANYL CITRATE 50 UG/ML
25 INJECTION, SOLUTION INTRAMUSCULAR; INTRAVENOUS
Status: DISCONTINUED | OUTPATIENT
Start: 2021-01-01 | End: 2021-01-01 | Stop reason: HOSPADM

## 2021-01-01 RX ORDER — CARVEDILOL 6.25 MG/1
TABLET ORAL
Qty: 60 TAB | Refills: 3 | Status: SHIPPED | OUTPATIENT
Start: 2021-01-01 | End: 2021-01-01

## 2021-01-01 RX ORDER — INSULIN LISPRO 100 [IU]/ML
3 INJECTION, SOLUTION INTRAVENOUS; SUBCUTANEOUS ONCE
Status: COMPLETED | OUTPATIENT
Start: 2021-01-01 | End: 2021-01-01

## 2021-01-01 RX ORDER — HYDROCODONE BITARTRATE AND ACETAMINOPHEN 5; 325 MG/1; MG/1
1 TABLET ORAL
Qty: 12 TABLET | Refills: 0 | Status: SHIPPED | OUTPATIENT
Start: 2021-01-01 | End: 2021-01-01

## 2021-01-01 RX ORDER — CALCIUM GLUCONATE 94 MG/ML
1 INJECTION, SOLUTION INTRAVENOUS
Status: DISCONTINUED | OUTPATIENT
Start: 2021-01-01 | End: 2021-01-01

## 2021-01-01 RX ORDER — ATORVASTATIN CALCIUM 10 MG/1
10 TABLET, FILM COATED ORAL
Status: DISCONTINUED | OUTPATIENT
Start: 2021-01-01 | End: 2021-01-01 | Stop reason: HOSPADM

## 2021-01-01 RX ORDER — CALCITRIOL 0.25 UG/1
CAPSULE ORAL
COMMUNITY
Start: 2021-01-01 | End: 2021-01-01 | Stop reason: CLARIF

## 2021-01-01 RX ADMIN — INSULIN LISPRO 2 UNITS: 100 INJECTION, SOLUTION INTRAVENOUS; SUBCUTANEOUS at 17:08

## 2021-01-01 RX ADMIN — SEVELAMER CARBONATE 800 MG: 800 TABLET, FILM COATED ORAL at 11:47

## 2021-01-01 RX ADMIN — HUMAN INSULIN 10 UNITS: 100 INJECTION, SOLUTION SUBCUTANEOUS at 02:51

## 2021-01-01 RX ADMIN — AMLODIPINE BESYLATE 10 MG: 5 TABLET ORAL at 15:29

## 2021-01-01 RX ADMIN — OXYCODONE 10 MG: 5 TABLET ORAL at 04:42

## 2021-01-01 RX ADMIN — Medication 10 ML: at 14:00

## 2021-01-01 RX ADMIN — INSULIN LISPRO 2 UNITS: 100 INJECTION, SOLUTION INTRAVENOUS; SUBCUTANEOUS at 12:54

## 2021-01-01 RX ADMIN — BUMETANIDE 2 MG: 1 TABLET ORAL at 12:28

## 2021-01-01 RX ADMIN — HYDROMORPHONE HYDROCHLORIDE 0.5 MG: 1 INJECTION, SOLUTION INTRAMUSCULAR; INTRAVENOUS; SUBCUTANEOUS at 17:26

## 2021-01-01 RX ADMIN — SEVELAMER CARBONATE 800 MG: 800 TABLET, FILM COATED ORAL at 17:08

## 2021-01-01 RX ADMIN — DEXAMETHASONE 4 MG: 4 TABLET ORAL at 05:25

## 2021-01-01 RX ADMIN — DEXAMETHASONE 4 MG: 4 TABLET ORAL at 17:36

## 2021-01-01 RX ADMIN — INSULIN LISPRO 3 UNITS: 100 INJECTION, SOLUTION INTRAVENOUS; SUBCUTANEOUS at 12:55

## 2021-01-01 RX ADMIN — CEFEPIME HYDROCHLORIDE 1 G: 1 INJECTION, POWDER, FOR SOLUTION INTRAMUSCULAR; INTRAVENOUS at 12:31

## 2021-01-01 RX ADMIN — DEXAMETHASONE SODIUM PHOSPHATE 4 MG: 4 INJECTION, SOLUTION INTRAMUSCULAR; INTRAVENOUS at 17:33

## 2021-01-01 RX ADMIN — DEXAMETHASONE 4 MG: 4 TABLET ORAL at 15:27

## 2021-01-01 RX ADMIN — Medication 10 ML: at 05:26

## 2021-01-01 RX ADMIN — CARVEDILOL 6.25 MG: 6.25 TABLET, FILM COATED ORAL at 09:08

## 2021-01-01 RX ADMIN — OXYCODONE 5 MG: 5 TABLET ORAL at 11:33

## 2021-01-01 RX ADMIN — PROPOFOL 30 MCG/KG/MIN: 10 INJECTION, EMULSION INTRAVENOUS at 07:49

## 2021-01-01 RX ADMIN — SODIUM BICARBONATE 50 MEQ: 84 INJECTION, SOLUTION INTRAVENOUS at 02:33

## 2021-01-01 RX ADMIN — DEXAMETHASONE 4 MG: 4 TABLET ORAL at 18:00

## 2021-01-01 RX ADMIN — SEVELAMER CARBONATE 800 MG: 800 TABLET, FILM COATED ORAL at 17:24

## 2021-01-01 RX ADMIN — DEXAMETHASONE SODIUM PHOSPHATE 4 MG: 4 INJECTION, SOLUTION INTRAMUSCULAR; INTRAVENOUS at 21:11

## 2021-01-01 RX ADMIN — HYDRALAZINE HYDROCHLORIDE 10 MG: 20 INJECTION INTRAMUSCULAR; INTRAVENOUS at 04:32

## 2021-01-01 RX ADMIN — DEXAMETHASONE 4 MG: 4 TABLET ORAL at 21:04

## 2021-01-01 RX ADMIN — Medication 10 ML: at 12:40

## 2021-01-01 RX ADMIN — CARVEDILOL 6.25 MG: 6.25 TABLET, FILM COATED ORAL at 17:08

## 2021-01-01 RX ADMIN — HEPARIN SODIUM 1900 UNITS: 1000 INJECTION INTRAVENOUS; SUBCUTANEOUS at 10:03

## 2021-01-01 RX ADMIN — HYDROMORPHONE HYDROCHLORIDE 1 MG: 1 INJECTION, SOLUTION INTRAMUSCULAR; INTRAVENOUS; SUBCUTANEOUS at 15:14

## 2021-01-01 RX ADMIN — EPOETIN ALFA-EPBX 40000 UNITS: 40000 INJECTION, SOLUTION INTRAVENOUS; SUBCUTANEOUS at 15:39

## 2021-01-01 RX ADMIN — HYDROCODONE BITARTRATE AND ACETAMINOPHEN 1 TABLET: 10; 325 TABLET ORAL at 20:13

## 2021-01-01 RX ADMIN — Medication 10 ML: at 10:35

## 2021-01-01 RX ADMIN — Medication 10 ML: at 07:46

## 2021-01-01 RX ADMIN — OXYCODONE HYDROCHLORIDE AND ACETAMINOPHEN 1 TABLET: 5; 325 TABLET ORAL at 17:31

## 2021-01-01 RX ADMIN — INSULIN LISPRO 2 UNITS: 100 INJECTION, SOLUTION INTRAVENOUS; SUBCUTANEOUS at 09:35

## 2021-01-01 RX ADMIN — HYDROCODONE BITARTRATE AND ACETAMINOPHEN 1 TABLET: 7.5; 325 TABLET ORAL at 01:02

## 2021-01-01 RX ADMIN — ATORVASTATIN CALCIUM 10 MG: 10 TABLET, FILM COATED ORAL at 21:45

## 2021-01-01 RX ADMIN — AMLODIPINE BESYLATE 10 MG: 5 TABLET ORAL at 08:45

## 2021-01-01 RX ADMIN — SEVELAMER CARBONATE 800 MG: 800 TABLET, FILM COATED ORAL at 08:34

## 2021-01-01 RX ADMIN — FENTANYL CITRATE 50 MCG: 50 INJECTION, SOLUTION INTRAMUSCULAR; INTRAVENOUS at 08:05

## 2021-01-01 RX ADMIN — BUMETANIDE 2 MG: 0.25 INJECTION INTRAMUSCULAR; INTRAVENOUS at 08:24

## 2021-01-01 RX ADMIN — SEVELAMER CARBONATE 800 MG: 800 TABLET, FILM COATED ORAL at 11:32

## 2021-01-01 RX ADMIN — FENTANYL CITRATE 25 MCG: 50 INJECTION, SOLUTION INTRAMUSCULAR; INTRAVENOUS at 07:56

## 2021-01-01 RX ADMIN — AMLODIPINE BESYLATE 10 MG: 5 TABLET ORAL at 08:18

## 2021-01-01 RX ADMIN — ASPIRIN 81 MG: 81 TABLET, CHEWABLE ORAL at 08:47

## 2021-01-01 RX ADMIN — HEPARIN SODIUM 5000 UNITS: 5000 INJECTION INTRAVENOUS; SUBCUTANEOUS at 05:44

## 2021-01-01 RX ADMIN — INSULIN LISPRO 2 UNITS: 100 INJECTION, SOLUTION INTRAVENOUS; SUBCUTANEOUS at 16:45

## 2021-01-01 RX ADMIN — HEPARIN SODIUM 5000 UNITS: 5000 INJECTION INTRAVENOUS; SUBCUTANEOUS at 06:46

## 2021-01-01 RX ADMIN — BUMETANIDE 2 MG: 1 TABLET ORAL at 08:47

## 2021-01-01 RX ADMIN — SEVELAMER CARBONATE 800 MG: 800 TABLET, FILM COATED ORAL at 08:47

## 2021-01-01 RX ADMIN — DEXAMETHASONE 4 MG: 4 TABLET ORAL at 17:04

## 2021-01-01 RX ADMIN — INSULIN LISPRO 15 UNITS: 100 INJECTION, SOLUTION INTRAVENOUS; SUBCUTANEOUS at 08:47

## 2021-01-01 RX ADMIN — DEXAMETHASONE 4 MG: 4 TABLET ORAL at 00:15

## 2021-01-01 RX ADMIN — HEPARIN SODIUM 1900 UNITS: 1000 INJECTION INTRAVENOUS; SUBCUTANEOUS at 11:11

## 2021-01-01 RX ADMIN — HYDROMORPHONE HYDROCHLORIDE 0.5 MG: 1 INJECTION, SOLUTION INTRAMUSCULAR; INTRAVENOUS; SUBCUTANEOUS at 14:01

## 2021-01-01 RX ADMIN — SEVELAMER CARBONATE 800 MG: 800 TABLET, FILM COATED ORAL at 17:51

## 2021-01-01 RX ADMIN — PREGABALIN 75 MG: 75 CAPSULE ORAL at 17:09

## 2021-01-01 RX ADMIN — HYDRALAZINE HYDROCHLORIDE 10 MG: 20 INJECTION INTRAMUSCULAR; INTRAVENOUS at 04:21

## 2021-01-01 RX ADMIN — Medication 10 ML: at 22:57

## 2021-01-01 RX ADMIN — INSULIN LISPRO 2 UNITS: 100 INJECTION, SOLUTION INTRAVENOUS; SUBCUTANEOUS at 17:23

## 2021-01-01 RX ADMIN — HYDROCODONE BITARTRATE AND ACETAMINOPHEN 1 TABLET: 10; 325 TABLET ORAL at 17:42

## 2021-01-01 RX ADMIN — INSULIN LISPRO 2 UNITS: 100 INJECTION, SOLUTION INTRAVENOUS; SUBCUTANEOUS at 18:00

## 2021-01-01 RX ADMIN — HEPARIN SODIUM 5000 UNITS: 5000 INJECTION INTRAVENOUS; SUBCUTANEOUS at 14:28

## 2021-01-01 RX ADMIN — LEVOTHYROXINE SODIUM 175 MCG: 0.15 TABLET ORAL at 05:38

## 2021-01-01 RX ADMIN — SEVELAMER CARBONATE 800 MG: 800 TABLET, FILM COATED ORAL at 17:05

## 2021-01-01 RX ADMIN — DEXAMETHASONE 4 MG: 4 TABLET ORAL at 23:58

## 2021-01-01 RX ADMIN — Medication 10 ML: at 21:56

## 2021-01-01 RX ADMIN — SODIUM CHLORIDE 25 ML/HR: 9 INJECTION, SOLUTION INTRAVENOUS at 07:06

## 2021-01-01 RX ADMIN — MEROPENEM 500 MG: 500 INJECTION, POWDER, FOR SOLUTION INTRAVENOUS at 14:01

## 2021-01-01 RX ADMIN — DEXAMETHASONE SODIUM PHOSPHATE 4 MG: 4 INJECTION, SOLUTION INTRAMUSCULAR; INTRAVENOUS at 13:23

## 2021-01-01 RX ADMIN — AMLODIPINE BESYLATE 10 MG: 5 TABLET ORAL at 12:28

## 2021-01-01 RX ADMIN — AMLODIPINE BESYLATE 10 MG: 5 TABLET ORAL at 13:07

## 2021-01-01 RX ADMIN — ATORVASTATIN CALCIUM 10 MG: 10 TABLET, FILM COATED ORAL at 08:17

## 2021-01-01 RX ADMIN — DEXAMETHASONE 4 MG: 4 TABLET ORAL at 13:47

## 2021-01-01 RX ADMIN — PIPERACILLIN AND TAZOBACTAM 3.38 G: 3; .375 INJECTION, POWDER, LYOPHILIZED, FOR SOLUTION INTRAVENOUS at 17:32

## 2021-01-01 RX ADMIN — CARVEDILOL 6.25 MG: 6.25 TABLET, FILM COATED ORAL at 08:18

## 2021-01-01 RX ADMIN — SEVELAMER CARBONATE 800 MG: 800 TABLET, FILM COATED ORAL at 18:00

## 2021-01-01 RX ADMIN — METOCLOPRAMIDE 10 MG: 10 TABLET ORAL at 22:48

## 2021-01-01 RX ADMIN — METRONIDAZOLE 500 MG: 500 INJECTION, SOLUTION INTRAVENOUS at 12:37

## 2021-01-01 RX ADMIN — MIDAZOLAM HYDROCHLORIDE 2 MG: 1 INJECTION, SOLUTION INTRAMUSCULAR; INTRAVENOUS at 08:05

## 2021-01-01 RX ADMIN — Medication 10 ML: at 23:01

## 2021-01-01 RX ADMIN — DEXAMETHASONE 4 MG: 4 TABLET ORAL at 02:50

## 2021-01-01 RX ADMIN — SEVELAMER CARBONATE 800 MG: 800 TABLET, FILM COATED ORAL at 07:30

## 2021-01-01 RX ADMIN — ASPIRIN 81 MG: 81 TABLET, CHEWABLE ORAL at 09:35

## 2021-01-01 RX ADMIN — DEXAMETHASONE 4 MG: 4 TABLET ORAL at 05:54

## 2021-01-01 RX ADMIN — BUMETANIDE 2 MG: 0.25 INJECTION INTRAMUSCULAR; INTRAVENOUS at 17:16

## 2021-01-01 RX ADMIN — PANTOPRAZOLE SODIUM 40 MG: 40 TABLET, DELAYED RELEASE ORAL at 06:53

## 2021-01-01 RX ADMIN — BUMETANIDE 2 MG: 1 TABLET ORAL at 08:59

## 2021-01-01 RX ADMIN — SODIUM BICARBONATE 1300 MG: 650 TABLET ORAL at 09:07

## 2021-01-01 RX ADMIN — SEVELAMER CARBONATE 800 MG: 800 TABLET, FILM COATED ORAL at 08:18

## 2021-01-01 RX ADMIN — SODIUM POLYSTYRENE SULFONATE 15 G: 15 SUSPENSION ORAL; RECTAL at 02:32

## 2021-01-01 RX ADMIN — INSULIN LISPRO 3 UNITS: 100 INJECTION, SOLUTION INTRAVENOUS; SUBCUTANEOUS at 09:24

## 2021-01-01 RX ADMIN — BUMETANIDE 2 MG: 1 TABLET ORAL at 11:33

## 2021-01-01 RX ADMIN — PANTOPRAZOLE SODIUM 40 MG: 40 TABLET, DELAYED RELEASE ORAL at 08:50

## 2021-01-01 RX ADMIN — CARVEDILOL 6.25 MG: 6.25 TABLET, FILM COATED ORAL at 16:43

## 2021-01-01 RX ADMIN — Medication 10 ML: at 21:34

## 2021-01-01 RX ADMIN — SEVELAMER CARBONATE 800 MG: 800 TABLET, FILM COATED ORAL at 18:08

## 2021-01-01 RX ADMIN — LEVOTHYROXINE SODIUM 175 MCG: 0.12 TABLET ORAL at 05:54

## 2021-01-01 RX ADMIN — INSULIN LISPRO 3 UNITS: 100 INJECTION, SOLUTION INTRAVENOUS; SUBCUTANEOUS at 13:06

## 2021-01-01 RX ADMIN — LEVOTHYROXINE SODIUM 175 MCG: 0.12 TABLET ORAL at 07:46

## 2021-01-01 RX ADMIN — ASPIRIN 81 MG: 81 TABLET, CHEWABLE ORAL at 13:28

## 2021-01-01 RX ADMIN — SEVELAMER CARBONATE 800 MG: 800 TABLET, FILM COATED ORAL at 12:28

## 2021-01-01 RX ADMIN — INSULIN LISPRO 3 UNITS: 100 INJECTION, SOLUTION INTRAVENOUS; SUBCUTANEOUS at 06:51

## 2021-01-01 RX ADMIN — HEPARIN SODIUM 5000 UNITS: 5000 INJECTION INTRAVENOUS; SUBCUTANEOUS at 05:22

## 2021-01-01 RX ADMIN — CARVEDILOL 6.25 MG: 6.25 TABLET, FILM COATED ORAL at 19:30

## 2021-01-01 RX ADMIN — DEXAMETHASONE 4 MG: 4 TABLET ORAL at 06:37

## 2021-01-01 RX ADMIN — METRONIDAZOLE 500 MG: 500 INJECTION, SOLUTION INTRAVENOUS at 14:12

## 2021-01-01 RX ADMIN — HYDROCODONE BITARTRATE AND ACETAMINOPHEN 1 TABLET: 5; 325 TABLET ORAL at 16:10

## 2021-01-01 RX ADMIN — OXYCODONE HYDROCHLORIDE AND ACETAMINOPHEN 1 TABLET: 5; 325 TABLET ORAL at 08:45

## 2021-01-01 RX ADMIN — AMLODIPINE BESYLATE 10 MG: 5 TABLET ORAL at 08:25

## 2021-01-01 RX ADMIN — CARVEDILOL 6.25 MG: 6.25 TABLET, FILM COATED ORAL at 18:00

## 2021-01-01 RX ADMIN — EPOETIN ALFA-EPBX 20000 UNITS: 20000 INJECTION, SOLUTION INTRAVENOUS; SUBCUTANEOUS at 22:48

## 2021-01-01 RX ADMIN — PANTOPRAZOLE SODIUM 40 MG: 40 TABLET, DELAYED RELEASE ORAL at 08:48

## 2021-01-01 RX ADMIN — HEPARIN SODIUM 3800 UNITS: 1000 INJECTION INTRAVENOUS; SUBCUTANEOUS at 10:16

## 2021-01-01 RX ADMIN — CARVEDILOL 6.25 MG: 6.25 TABLET, FILM COATED ORAL at 09:35

## 2021-01-01 RX ADMIN — ASPIRIN 81 MG: 81 TABLET, CHEWABLE ORAL at 12:28

## 2021-01-01 RX ADMIN — Medication 10 ML: at 22:01

## 2021-01-01 RX ADMIN — DEXAMETHASONE SODIUM PHOSPHATE 4 MG: 4 INJECTION, SOLUTION INTRAMUSCULAR; INTRAVENOUS at 05:47

## 2021-01-01 RX ADMIN — IOPAMIDOL 100 ML: 755 INJECTION, SOLUTION INTRAVENOUS at 14:22

## 2021-01-01 RX ADMIN — HEPARIN SODIUM 5000 UNITS: 5000 INJECTION INTRAVENOUS; SUBCUTANEOUS at 22:54

## 2021-01-01 RX ADMIN — HYDROCODONE BITARTRATE AND ACETAMINOPHEN 1 TABLET: 10; 325 TABLET ORAL at 08:21

## 2021-01-01 RX ADMIN — DEXAMETHASONE SODIUM PHOSPHATE 4 MG: 4 INJECTION, SOLUTION INTRAMUSCULAR; INTRAVENOUS at 21:20

## 2021-01-01 RX ADMIN — INSULIN LISPRO 2 UNITS: 100 INJECTION, SOLUTION INTRAVENOUS; SUBCUTANEOUS at 12:39

## 2021-01-01 RX ADMIN — IOPAMIDOL 100 ML: 755 INJECTION, SOLUTION INTRAVENOUS at 10:48

## 2021-01-01 RX ADMIN — DEXAMETHASONE 4 MG: 4 TABLET ORAL at 14:00

## 2021-01-01 RX ADMIN — METOCLOPRAMIDE 10 MG: 10 TABLET ORAL at 23:12

## 2021-01-01 RX ADMIN — Medication 10 ML: at 05:59

## 2021-01-01 RX ADMIN — DEXAMETHASONE SODIUM PHOSPHATE 4 MG: 4 INJECTION, SOLUTION INTRAMUSCULAR; INTRAVENOUS at 21:59

## 2021-01-01 RX ADMIN — HYDROMORPHONE HYDROCHLORIDE 1 MG: 1 INJECTION, SOLUTION INTRAMUSCULAR; INTRAVENOUS; SUBCUTANEOUS at 04:32

## 2021-01-01 RX ADMIN — DEXAMETHASONE 4 MG: 4 TABLET ORAL at 06:58

## 2021-01-01 RX ADMIN — AMLODIPINE BESYLATE 10 MG: 5 TABLET ORAL at 08:59

## 2021-01-01 RX ADMIN — Medication 10 ML: at 05:32

## 2021-01-01 RX ADMIN — DEXAMETHASONE 4 MG: 4 TABLET ORAL at 21:11

## 2021-01-01 RX ADMIN — DEXAMETHASONE 4 MG: 4 TABLET ORAL at 11:32

## 2021-01-01 RX ADMIN — HEPARIN SODIUM 5000 UNITS: 5000 INJECTION INTRAVENOUS; SUBCUTANEOUS at 21:24

## 2021-01-01 RX ADMIN — Medication 10 ML: at 23:13

## 2021-01-01 RX ADMIN — PIPERACILLIN AND TAZOBACTAM 3.38 G: 3; .375 INJECTION, POWDER, LYOPHILIZED, FOR SOLUTION INTRAVENOUS at 01:40

## 2021-01-01 RX ADMIN — INSULIN GLARGINE 35 UNITS: 100 INJECTION, SOLUTION SUBCUTANEOUS at 21:02

## 2021-01-01 RX ADMIN — METOCLOPRAMIDE 10 MG: 10 TABLET ORAL at 17:11

## 2021-01-01 RX ADMIN — AMLODIPINE BESYLATE 10 MG: 5 TABLET ORAL at 08:37

## 2021-01-01 RX ADMIN — GABAPENTIN 300 MG: 300 CAPSULE ORAL at 17:40

## 2021-01-01 RX ADMIN — HYDRALAZINE HYDROCHLORIDE 50 MG: 50 TABLET, FILM COATED ORAL at 09:36

## 2021-01-01 RX ADMIN — HYDRALAZINE HYDROCHLORIDE 50 MG: 50 TABLET, FILM COATED ORAL at 08:18

## 2021-01-01 RX ADMIN — BUMETANIDE 2 MG: 1 TABLET ORAL at 08:49

## 2021-01-01 RX ADMIN — HEPARIN SODIUM 5000 UNITS: 5000 INJECTION INTRAVENOUS; SUBCUTANEOUS at 12:28

## 2021-01-01 RX ADMIN — SODIUM BICARBONATE 1300 MG: 650 TABLET ORAL at 08:48

## 2021-01-01 RX ADMIN — Medication 10 ML: at 14:08

## 2021-01-01 RX ADMIN — Medication 10 ML: at 05:48

## 2021-01-01 RX ADMIN — Medication 5 ML: at 22:00

## 2021-01-01 RX ADMIN — INSULIN LISPRO 3 UNITS: 100 INJECTION, SOLUTION INTRAVENOUS; SUBCUTANEOUS at 13:00

## 2021-01-01 RX ADMIN — SEVELAMER CARBONATE 800 MG: 800 TABLET, FILM COATED ORAL at 17:11

## 2021-01-01 RX ADMIN — DEXAMETHASONE 4 MG: 4 TABLET ORAL at 11:40

## 2021-01-01 RX ADMIN — CARVEDILOL 6.25 MG: 6.25 TABLET, FILM COATED ORAL at 08:06

## 2021-01-01 RX ADMIN — CARVEDILOL 6.25 MG: 6.25 TABLET, FILM COATED ORAL at 17:05

## 2021-01-01 RX ADMIN — LEVOTHYROXINE SODIUM 175 MCG: 0.12 TABLET ORAL at 06:53

## 2021-01-01 RX ADMIN — SODIUM BICARBONATE 1300 MG: 650 TABLET ORAL at 08:59

## 2021-01-01 RX ADMIN — INSULIN LISPRO 2 UNITS: 100 INJECTION, SOLUTION INTRAVENOUS; SUBCUTANEOUS at 17:05

## 2021-01-01 RX ADMIN — OXYCODONE HYDROCHLORIDE AND ACETAMINOPHEN 1 TABLET: 5; 325 TABLET ORAL at 23:01

## 2021-01-01 RX ADMIN — DEXAMETHASONE 4 MG: 4 TABLET ORAL at 05:33

## 2021-01-01 RX ADMIN — EPOETIN ALFA-EPBX 40000 UNITS: 40000 INJECTION, SOLUTION INTRAVENOUS; SUBCUTANEOUS at 14:03

## 2021-01-01 RX ADMIN — EPOETIN ALFA-EPBX 40000 UNITS: 40000 INJECTION, SOLUTION INTRAVENOUS; SUBCUTANEOUS at 15:18

## 2021-01-01 RX ADMIN — SODIUM BICARBONATE 50 MEQ: 84 INJECTION, SOLUTION INTRAVENOUS at 16:01

## 2021-01-01 RX ADMIN — ASPIRIN 81 MG: 81 TABLET, CHEWABLE ORAL at 08:07

## 2021-01-01 RX ADMIN — HYDROCODONE BITARTRATE AND ACETAMINOPHEN 1 TABLET: 7.5; 325 TABLET ORAL at 22:54

## 2021-01-01 RX ADMIN — Medication 10 ML: at 21:45

## 2021-01-01 RX ADMIN — LEVOTHYROXINE SODIUM 175 MCG: 0.12 TABLET ORAL at 05:26

## 2021-01-01 RX ADMIN — PREGABALIN 75 MG: 75 CAPSULE ORAL at 09:08

## 2021-01-01 RX ADMIN — HYDRALAZINE HYDROCHLORIDE 50 MG: 50 TABLET ORAL at 18:57

## 2021-01-01 RX ADMIN — DEXAMETHASONE SODIUM PHOSPHATE 4 MG: 4 INJECTION, SOLUTION INTRAMUSCULAR; INTRAVENOUS at 07:10

## 2021-01-01 RX ADMIN — LEVOTHYROXINE SODIUM 175 MCG: 0.12 TABLET ORAL at 06:37

## 2021-01-01 RX ADMIN — SODIUM CHLORIDE 1000 ML: 9 INJECTION, SOLUTION INTRAVENOUS at 01:28

## 2021-01-01 RX ADMIN — DEXAMETHASONE 4 MG: 4 TABLET ORAL at 05:44

## 2021-01-01 RX ADMIN — HYDRALAZINE HYDROCHLORIDE 50 MG: 50 TABLET, FILM COATED ORAL at 12:28

## 2021-01-01 RX ADMIN — METOCLOPRAMIDE 10 MG: 10 TABLET ORAL at 21:34

## 2021-01-01 RX ADMIN — INSULIN LISPRO 3 UNITS: 100 INJECTION, SOLUTION INTRAVENOUS; SUBCUTANEOUS at 17:03

## 2021-01-01 RX ADMIN — VANCOMYCIN HYDROCHLORIDE 1500 MG: 10 INJECTION, POWDER, LYOPHILIZED, FOR SOLUTION INTRAVENOUS at 07:08

## 2021-01-01 RX ADMIN — AMLODIPINE BESYLATE 10 MG: 5 TABLET ORAL at 09:08

## 2021-01-01 RX ADMIN — Medication 10 ML: at 15:29

## 2021-01-01 RX ADMIN — SODIUM BICARBONATE 1300 MG: 650 TABLET ORAL at 08:36

## 2021-01-01 RX ADMIN — DEXAMETHASONE 4 MG: 4 TABLET ORAL at 06:43

## 2021-01-01 RX ADMIN — EPOETIN ALFA-EPBX 40000 UNITS: 40000 INJECTION, SOLUTION INTRAVENOUS; SUBCUTANEOUS at 15:16

## 2021-01-01 RX ADMIN — ATORVASTATIN CALCIUM 10 MG: 10 TABLET, FILM COATED ORAL at 22:57

## 2021-01-01 RX ADMIN — Medication 10 ML: at 21:54

## 2021-01-01 RX ADMIN — INSULIN LISPRO 2 UNITS: 100 INJECTION, SOLUTION INTRAVENOUS; SUBCUTANEOUS at 21:54

## 2021-01-01 RX ADMIN — BUMETANIDE 2 MG: 1 TABLET ORAL at 08:41

## 2021-01-01 RX ADMIN — CARVEDILOL 6.25 MG: 6.25 TABLET, FILM COATED ORAL at 08:34

## 2021-01-01 RX ADMIN — INSULIN GLARGINE 30 UNITS: 100 INJECTION, SOLUTION SUBCUTANEOUS at 21:04

## 2021-01-01 RX ADMIN — Medication 10 ML: at 00:41

## 2021-01-01 RX ADMIN — CARVEDILOL 6.25 MG: 6.25 TABLET, FILM COATED ORAL at 18:58

## 2021-01-01 RX ADMIN — DEXAMETHASONE 4 MG: 4 TABLET ORAL at 13:19

## 2021-01-01 RX ADMIN — PROTAMINE SULFATE 40 MG: 10 INJECTION, SOLUTION INTRAVENOUS at 09:20

## 2021-01-01 RX ADMIN — INSULIN LISPRO 5 UNITS: 100 INJECTION, SOLUTION INTRAVENOUS; SUBCUTANEOUS at 07:02

## 2021-01-01 RX ADMIN — AMLODIPINE BESYLATE 10 MG: 5 TABLET ORAL at 13:27

## 2021-01-01 RX ADMIN — CEFTRIAXONE 1 G: 1 INJECTION, POWDER, FOR SOLUTION INTRAMUSCULAR; INTRAVENOUS at 06:50

## 2021-01-01 RX ADMIN — FUROSEMIDE 40 MG: 10 INJECTION, SOLUTION INTRAMUSCULAR; INTRAVENOUS at 14:58

## 2021-01-01 RX ADMIN — AMLODIPINE BESYLATE 10 MG: 5 TABLET ORAL at 09:25

## 2021-01-01 RX ADMIN — INSULIN LISPRO 2 UNITS: 100 INJECTION, SOLUTION INTRAVENOUS; SUBCUTANEOUS at 08:32

## 2021-01-01 RX ADMIN — SEVELAMER CARBONATE 800 MG: 800 TABLET, FILM COATED ORAL at 11:40

## 2021-01-01 RX ADMIN — SEVELAMER CARBONATE 800 MG: 800 TABLET, FILM COATED ORAL at 08:05

## 2021-01-01 RX ADMIN — FENTANYL CITRATE 50 MCG: 50 INJECTION INTRAMUSCULAR; INTRAVENOUS at 21:17

## 2021-01-01 RX ADMIN — IRON SUCROSE 100 MG: 20 INJECTION, SOLUTION INTRAVENOUS at 11:07

## 2021-01-01 RX ADMIN — HYDROCODONE BITARTRATE AND ACETAMINOPHEN 1 TABLET: 10; 325 TABLET ORAL at 12:03

## 2021-01-01 RX ADMIN — PREGABALIN 75 MG: 75 CAPSULE ORAL at 08:47

## 2021-01-01 RX ADMIN — BUMETANIDE 2 MG: 1 TABLET ORAL at 13:28

## 2021-01-01 RX ADMIN — INSULIN LISPRO 3 UNITS: 100 INJECTION, SOLUTION INTRAVENOUS; SUBCUTANEOUS at 22:29

## 2021-01-01 RX ADMIN — PREGABALIN 75 MG: 75 CAPSULE ORAL at 17:24

## 2021-01-01 RX ADMIN — PANTOPRAZOLE SODIUM 40 MG: 40 TABLET, DELAYED RELEASE ORAL at 07:31

## 2021-01-01 RX ADMIN — BUMETANIDE 2 MG: 0.25 INJECTION INTRAMUSCULAR; INTRAVENOUS at 22:10

## 2021-01-01 RX ADMIN — ALBUMIN (HUMAN) 12.5 G: 0.25 INJECTION, SOLUTION INTRAVENOUS at 11:00

## 2021-01-01 RX ADMIN — SODIUM BICARBONATE 1300 MG: 650 TABLET ORAL at 17:04

## 2021-01-01 RX ADMIN — EPOETIN ALFA-EPBX 12000 UNITS: 10000 INJECTION, SOLUTION INTRAVENOUS; SUBCUTANEOUS at 22:20

## 2021-01-01 RX ADMIN — DEXAMETHASONE 4 MG: 4 TABLET ORAL at 12:28

## 2021-01-01 RX ADMIN — MEROPENEM 500 MG: 500 INJECTION, POWDER, FOR SOLUTION INTRAVENOUS at 13:00

## 2021-01-01 RX ADMIN — INSULIN LISPRO 3 UNITS: 100 INJECTION, SOLUTION INTRAVENOUS; SUBCUTANEOUS at 23:49

## 2021-01-01 RX ADMIN — HYDROMORPHONE HYDROCHLORIDE 1 MG: 1 INJECTION, SOLUTION INTRAMUSCULAR; INTRAVENOUS; SUBCUTANEOUS at 14:45

## 2021-01-01 RX ADMIN — SODIUM BICARBONATE 1300 MG: 650 TABLET ORAL at 17:51

## 2021-01-01 RX ADMIN — DEXAMETHASONE 4 MG: 4 TABLET ORAL at 01:01

## 2021-01-01 RX ADMIN — Medication 3 AMPULE: at 07:07

## 2021-01-01 RX ADMIN — HYDROMORPHONE HYDROCHLORIDE 1 MG: 1 INJECTION, SOLUTION INTRAMUSCULAR; INTRAVENOUS; SUBCUTANEOUS at 13:00

## 2021-01-01 RX ADMIN — MIDAZOLAM HYDROCHLORIDE 1 MG: 1 INJECTION, SOLUTION INTRAMUSCULAR; INTRAVENOUS at 14:20

## 2021-01-01 RX ADMIN — INSULIN LISPRO 2 UNITS: 100 INJECTION, SOLUTION INTRAVENOUS; SUBCUTANEOUS at 00:15

## 2021-01-01 RX ADMIN — PANTOPRAZOLE SODIUM 40 MG: 40 TABLET, DELAYED RELEASE ORAL at 09:08

## 2021-01-01 RX ADMIN — INSULIN LISPRO 2 UNITS: 100 INJECTION, SOLUTION INTRAVENOUS; SUBCUTANEOUS at 13:05

## 2021-01-01 RX ADMIN — HEPARIN SODIUM 5000 UNITS: 5000 INJECTION INTRAVENOUS; SUBCUTANEOUS at 17:27

## 2021-01-01 RX ADMIN — HYDRALAZINE HYDROCHLORIDE 10 MG: 20 INJECTION INTRAMUSCULAR; INTRAVENOUS at 03:08

## 2021-01-01 RX ADMIN — ATORVASTATIN CALCIUM 10 MG: 10 TABLET, FILM COATED ORAL at 08:59

## 2021-01-01 RX ADMIN — HYDROCODONE BITARTRATE AND ACETAMINOPHEN 1 TABLET: 7.5; 325 TABLET ORAL at 09:45

## 2021-01-01 RX ADMIN — MORPHINE SULFATE 2 MG: 2 INJECTION, SOLUTION INTRAMUSCULAR; INTRAVENOUS at 09:43

## 2021-01-01 RX ADMIN — HYDRALAZINE HYDROCHLORIDE 50 MG: 50 TABLET, FILM COATED ORAL at 17:04

## 2021-01-01 RX ADMIN — SODIUM BICARBONATE 1300 MG: 650 TABLET ORAL at 09:36

## 2021-01-01 RX ADMIN — SODIUM CHLORIDE 500 ML: 9 INJECTION, SOLUTION INTRAVENOUS at 00:42

## 2021-01-01 RX ADMIN — HYDRALAZINE HYDROCHLORIDE 50 MG: 50 TABLET, FILM COATED ORAL at 17:05

## 2021-01-01 RX ADMIN — IOPAMIDOL 1 ML: 408 INJECTION, SOLUTION INTRATHECAL at 15:56

## 2021-01-01 RX ADMIN — PREGABALIN 25 MG: 25 CAPSULE ORAL at 08:34

## 2021-01-01 RX ADMIN — BUMETANIDE 2 MG: 1 TABLET ORAL at 08:33

## 2021-01-01 RX ADMIN — HEPARIN SODIUM 5000 UNITS: 1000 INJECTION INTRAVENOUS; SUBCUTANEOUS at 11:49

## 2021-01-01 RX ADMIN — EPOETIN ALFA-EPBX 12000 UNITS: 10000 INJECTION, SOLUTION INTRAVENOUS; SUBCUTANEOUS at 22:58

## 2021-01-01 RX ADMIN — ALBUTEROL SULFATE 5 MG: 2.5 SOLUTION RESPIRATORY (INHALATION) at 02:26

## 2021-01-01 RX ADMIN — DEXAMETHASONE 4 MG: 4 TABLET ORAL at 21:03

## 2021-01-01 RX ADMIN — GADOTERIDOL 20 ML: 279.3 INJECTION, SOLUTION INTRAVENOUS at 09:00

## 2021-01-01 RX ADMIN — HYDROMORPHONE HYDROCHLORIDE 1 MG: 1 INJECTION, SOLUTION INTRAMUSCULAR; INTRAVENOUS; SUBCUTANEOUS at 21:08

## 2021-01-01 RX ADMIN — AMLODIPINE BESYLATE 10 MG: 5 TABLET ORAL at 12:44

## 2021-01-01 RX ADMIN — PREGABALIN 25 MG: 25 CAPSULE ORAL at 17:22

## 2021-01-01 RX ADMIN — HYDRALAZINE HYDROCHLORIDE 50 MG: 50 TABLET, FILM COATED ORAL at 08:59

## 2021-01-01 RX ADMIN — SODIUM BICARBONATE 1300 MG: 650 TABLET ORAL at 12:43

## 2021-01-01 RX ADMIN — DEXAMETHASONE 4 MG: 4 TABLET ORAL at 13:05

## 2021-01-01 RX ADMIN — SEVELAMER CARBONATE 800 MG: 800 TABLET, FILM COATED ORAL at 08:48

## 2021-01-01 RX ADMIN — HYDRALAZINE HYDROCHLORIDE 50 MG: 50 TABLET ORAL at 17:45

## 2021-01-01 RX ADMIN — SEVELAMER CARBONATE 800 MG: 800 TABLET, FILM COATED ORAL at 12:54

## 2021-01-01 RX ADMIN — LEVOTHYROXINE SODIUM 175 MCG: 0.12 TABLET ORAL at 05:36

## 2021-01-01 RX ADMIN — AMLODIPINE BESYLATE 10 MG: 5 TABLET ORAL at 08:07

## 2021-01-01 RX ADMIN — INSULIN LISPRO 10 UNITS: 100 INJECTION, SOLUTION INTRAVENOUS; SUBCUTANEOUS at 08:28

## 2021-01-01 RX ADMIN — AMLODIPINE BESYLATE 10 MG: 5 TABLET ORAL at 08:29

## 2021-01-01 RX ADMIN — DOCUSATE SODIUM AND SENNOSIDES 1 TABLET: 8.6; 5 TABLET, FILM COATED ORAL at 17:03

## 2021-01-01 RX ADMIN — LEVOTHYROXINE SODIUM 175 MCG: 0.12 TABLET ORAL at 05:22

## 2021-01-01 RX ADMIN — SEVELAMER CARBONATE 800 MG: 800 TABLET, FILM COATED ORAL at 17:04

## 2021-01-01 RX ADMIN — POLYETHYLENE GLYCOL 3350 17 G: 17 POWDER, FOR SOLUTION ORAL at 08:29

## 2021-01-01 RX ADMIN — PREGABALIN 75 MG: 75 CAPSULE ORAL at 08:07

## 2021-01-01 RX ADMIN — SEVELAMER CARBONATE 800 MG: 800 TABLET, FILM COATED ORAL at 17:22

## 2021-01-01 RX ADMIN — HYDROCODONE BITARTRATE AND ACETAMINOPHEN 1 TABLET: 7.5; 325 TABLET ORAL at 21:54

## 2021-01-01 RX ADMIN — INSULIN LISPRO 13 UNITS: 100 INJECTION, SOLUTION INTRAVENOUS; SUBCUTANEOUS at 19:36

## 2021-01-01 RX ADMIN — CEFEPIME HYDROCHLORIDE 1 G: 1 INJECTION, POWDER, FOR SOLUTION INTRAMUSCULAR; INTRAVENOUS at 13:02

## 2021-01-01 RX ADMIN — PREGABALIN 75 MG: 75 CAPSULE ORAL at 12:28

## 2021-01-01 RX ADMIN — INSULIN LISPRO 3 UNITS: 100 INJECTION, SOLUTION INTRAVENOUS; SUBCUTANEOUS at 17:45

## 2021-01-01 RX ADMIN — LEVOTHYROXINE SODIUM 175 MCG: 0.12 TABLET ORAL at 06:08

## 2021-01-01 RX ADMIN — FERUMOXYTOL 510 MG: 510 INJECTION INTRAVENOUS at 10:20

## 2021-01-01 RX ADMIN — HYDROCODONE BITARTRATE AND ACETAMINOPHEN 1 TABLET: 7.5; 325 TABLET ORAL at 06:01

## 2021-01-01 RX ADMIN — CARVEDILOL 6.25 MG: 6.25 TABLET, FILM COATED ORAL at 18:08

## 2021-01-01 RX ADMIN — HYDROCODONE BITARTRATE AND ACETAMINOPHEN 1 TABLET: 10; 325 TABLET ORAL at 09:02

## 2021-01-01 RX ADMIN — HYDRALAZINE HYDROCHLORIDE 50 MG: 50 TABLET ORAL at 08:29

## 2021-01-01 RX ADMIN — Medication 10 ML: at 21:47

## 2021-01-01 RX ADMIN — HYDRALAZINE HYDROCHLORIDE 50 MG: 50 TABLET, FILM COATED ORAL at 18:00

## 2021-01-01 RX ADMIN — DEXAMETHASONE 4 MG: 4 TABLET ORAL at 05:27

## 2021-01-01 RX ADMIN — BUMETANIDE 2 MG: 1 TABLET ORAL at 08:36

## 2021-01-01 RX ADMIN — INSULIN GLARGINE 11 UNITS: 100 INJECTION, SOLUTION SUBCUTANEOUS at 08:33

## 2021-01-01 RX ADMIN — PREGABALIN 75 MG: 75 CAPSULE ORAL at 17:51

## 2021-01-01 RX ADMIN — DEXAMETHASONE 4 MG: 4 TABLET ORAL at 05:28

## 2021-01-01 RX ADMIN — WATER 2 G: 1 INJECTION INTRAMUSCULAR; INTRAVENOUS; SUBCUTANEOUS at 12:35

## 2021-01-01 RX ADMIN — SODIUM BICARBONATE 1300 MG: 650 TABLET ORAL at 08:50

## 2021-01-01 RX ADMIN — INSULIN LISPRO 2 UNITS: 100 INJECTION, SOLUTION INTRAVENOUS; SUBCUTANEOUS at 08:40

## 2021-01-01 RX ADMIN — LIDOCAINE HYDROCHLORIDE,EPINEPHRINE BITARTRATE 20 ML: 20; .005 INJECTION, SOLUTION EPIDURAL; INFILTRATION; INTRACAUDAL; PERINEURAL at 06:58

## 2021-01-01 RX ADMIN — HEPARIN SODIUM 5000 UNITS: 5000 INJECTION, SOLUTION INTRAVENOUS; SUBCUTANEOUS at 08:33

## 2021-01-01 RX ADMIN — HEPARIN SODIUM 3800 UNITS: 1000 INJECTION INTRAVENOUS; SUBCUTANEOUS at 15:03

## 2021-01-01 RX ADMIN — HEPARIN SODIUM 5000 UNITS: 5000 INJECTION INTRAVENOUS; SUBCUTANEOUS at 05:54

## 2021-01-01 RX ADMIN — OXYCODONE 10 MG: 5 TABLET ORAL at 21:45

## 2021-01-01 RX ADMIN — SODIUM CHLORIDE 125 ML/HR: 9 INJECTION, SOLUTION INTRAVENOUS at 01:34

## 2021-01-01 RX ADMIN — Medication 10 ML: at 14:24

## 2021-01-01 RX ADMIN — METOCLOPRAMIDE 10 MG: 10 TABLET ORAL at 11:18

## 2021-01-01 RX ADMIN — INSULIN LISPRO 15 UNITS: 100 INJECTION, SOLUTION INTRAVENOUS; SUBCUTANEOUS at 11:22

## 2021-01-01 RX ADMIN — HYDROMORPHONE HYDROCHLORIDE 1 MG: 1 INJECTION, SOLUTION INTRAMUSCULAR; INTRAVENOUS; SUBCUTANEOUS at 01:34

## 2021-01-01 RX ADMIN — OXYCODONE 10 MG: 5 TABLET ORAL at 05:27

## 2021-01-01 RX ADMIN — CARVEDILOL 6.25 MG: 3.12 TABLET, FILM COATED ORAL at 08:17

## 2021-01-01 RX ADMIN — Medication 10 ML: at 09:55

## 2021-01-01 RX ADMIN — HEPARIN SODIUM 1900 UNITS: 1000 INJECTION INTRAVENOUS; SUBCUTANEOUS at 16:35

## 2021-01-01 RX ADMIN — LEVOTHYROXINE SODIUM 175 MCG: 0.15 TABLET ORAL at 05:47

## 2021-01-01 RX ADMIN — INSULIN LISPRO 5 UNITS: 100 INJECTION, SOLUTION INTRAVENOUS; SUBCUTANEOUS at 17:21

## 2021-01-01 RX ADMIN — BUMETANIDE 2 MG: 1 TABLET ORAL at 09:35

## 2021-01-01 RX ADMIN — ONDANSETRON 4 MG: 2 INJECTION INTRAMUSCULAR; INTRAVENOUS at 13:44

## 2021-01-01 RX ADMIN — HEPARIN SODIUM 5000 UNITS: 5000 INJECTION INTRAVENOUS; SUBCUTANEOUS at 06:05

## 2021-01-01 RX ADMIN — METRONIDAZOLE 500 MG: 500 INJECTION, SOLUTION INTRAVENOUS at 00:31

## 2021-01-01 RX ADMIN — INSULIN LISPRO 7 UNITS: 100 INJECTION, SOLUTION INTRAVENOUS; SUBCUTANEOUS at 11:46

## 2021-01-01 RX ADMIN — AMLODIPINE BESYLATE 10 MG: 5 TABLET ORAL at 12:09

## 2021-01-01 RX ADMIN — LEVOTHYROXINE SODIUM 175 MCG: 0.12 TABLET ORAL at 06:05

## 2021-01-01 RX ADMIN — OXYCODONE HYDROCHLORIDE AND ACETAMINOPHEN 1 TABLET: 5; 325 TABLET ORAL at 12:35

## 2021-01-01 RX ADMIN — HYDROCODONE BITARTRATE AND ACETAMINOPHEN 1 TABLET: 10; 325 TABLET ORAL at 08:11

## 2021-01-01 RX ADMIN — Medication 10 ML: at 06:30

## 2021-01-01 RX ADMIN — DEXAMETHASONE 4 MG: 4 TABLET ORAL at 06:05

## 2021-01-01 RX ADMIN — Medication 10 ML: at 05:55

## 2021-01-01 RX ADMIN — BUMETANIDE 2 MG: 0.25 INJECTION INTRAMUSCULAR; INTRAVENOUS at 17:07

## 2021-01-01 RX ADMIN — LEVOTHYROXINE SODIUM 175 MCG: 0.12 TABLET ORAL at 06:57

## 2021-01-01 RX ADMIN — CARVEDILOL 6.25 MG: 6.25 TABLET, FILM COATED ORAL at 17:28

## 2021-01-01 RX ADMIN — SODIUM BICARBONATE 1300 MG: 650 TABLET ORAL at 17:09

## 2021-01-01 RX ADMIN — MORPHINE SULFATE 2 MG: 2 INJECTION, SOLUTION INTRAMUSCULAR; INTRAVENOUS at 05:20

## 2021-01-01 RX ADMIN — Medication 10 ML: at 13:11

## 2021-01-01 RX ADMIN — INSULIN LISPRO 7 UNITS: 100 INJECTION, SOLUTION INTRAVENOUS; SUBCUTANEOUS at 23:51

## 2021-01-01 RX ADMIN — EPOETIN ALFA-EPBX 40000 UNITS: 40000 INJECTION, SOLUTION INTRAVENOUS; SUBCUTANEOUS at 15:05

## 2021-01-01 RX ADMIN — PREGABALIN 75 MG: 75 CAPSULE ORAL at 18:00

## 2021-01-01 RX ADMIN — INSULIN LISPRO 5 UNITS: 100 INJECTION, SOLUTION INTRAVENOUS; SUBCUTANEOUS at 12:30

## 2021-01-01 RX ADMIN — INSULIN LISPRO 4 UNITS: 100 INJECTION, SOLUTION INTRAVENOUS; SUBCUTANEOUS at 21:49

## 2021-01-01 RX ADMIN — OXYCODONE HYDROCHLORIDE AND ACETAMINOPHEN 1 TABLET: 5; 325 TABLET ORAL at 19:20

## 2021-01-01 RX ADMIN — INSULIN LISPRO 3 UNITS: 100 INJECTION, SOLUTION INTRAVENOUS; SUBCUTANEOUS at 06:50

## 2021-01-01 RX ADMIN — INSULIN LISPRO 3 UNITS: 100 INJECTION, SOLUTION INTRAVENOUS; SUBCUTANEOUS at 11:40

## 2021-01-01 RX ADMIN — CEFTRIAXONE 1 G: 1 INJECTION, POWDER, FOR SOLUTION INTRAMUSCULAR; INTRAVENOUS at 02:35

## 2021-01-01 RX ADMIN — SEVELAMER CARBONATE 800 MG: 800 TABLET, FILM COATED ORAL at 12:30

## 2021-01-01 RX ADMIN — DEXAMETHASONE 4 MG: 4 TABLET ORAL at 13:02

## 2021-01-01 RX ADMIN — BUMETANIDE 2 MG: 1 TABLET ORAL at 08:07

## 2021-01-01 RX ADMIN — PANTOPRAZOLE SODIUM 40 MG: 40 TABLET, DELAYED RELEASE ORAL at 08:17

## 2021-01-01 RX ADMIN — SEVELAMER CARBONATE 800 MG: 800 TABLET, FILM COATED ORAL at 09:35

## 2021-01-01 RX ADMIN — HEPARIN SODIUM 3800 UNITS: 1000 INJECTION INTRAVENOUS; SUBCUTANEOUS at 13:37

## 2021-01-01 RX ADMIN — CARVEDILOL 6.25 MG: 6.25 TABLET, FILM COATED ORAL at 09:25

## 2021-01-01 RX ADMIN — HYDRALAZINE HYDROCHLORIDE 50 MG: 50 TABLET, FILM COATED ORAL at 17:22

## 2021-01-01 RX ADMIN — SODIUM ZIRCONIUM CYCLOSILICATE 5 G: 5 POWDER, FOR SUSPENSION ORAL at 17:35

## 2021-01-01 RX ADMIN — PROPOFOL 30 MG: 10 INJECTION, EMULSION INTRAVENOUS at 08:33

## 2021-01-01 RX ADMIN — HYDRALAZINE HYDROCHLORIDE 50 MG: 50 TABLET, FILM COATED ORAL at 17:51

## 2021-01-01 RX ADMIN — INSULIN LISPRO 3 UNITS: 100 INJECTION, SOLUTION INTRAVENOUS; SUBCUTANEOUS at 17:53

## 2021-01-01 RX ADMIN — HEPARIN SODIUM 5000 UNITS: 5000 INJECTION INTRAVENOUS; SUBCUTANEOUS at 15:03

## 2021-01-01 RX ADMIN — HYDROMORPHONE HYDROCHLORIDE 1 MG: 1 INJECTION, SOLUTION INTRAMUSCULAR; INTRAVENOUS; SUBCUTANEOUS at 18:04

## 2021-01-01 RX ADMIN — DEXAMETHASONE 4 MG: 4 TABLET ORAL at 00:41

## 2021-01-01 RX ADMIN — INSULIN LISPRO 2 UNITS: 100 INJECTION, SOLUTION INTRAVENOUS; SUBCUTANEOUS at 21:53

## 2021-01-01 RX ADMIN — Medication 10 ML: at 06:37

## 2021-01-01 RX ADMIN — Medication 10 ML: at 13:06

## 2021-01-01 RX ADMIN — PIPERACILLIN AND TAZOBACTAM 3.38 G: 3; .375 INJECTION, POWDER, LYOPHILIZED, FOR SOLUTION INTRAVENOUS at 13:20

## 2021-01-01 RX ADMIN — LIDOCAINE HYDROCHLORIDE 6 ML: 20 INJECTION, SOLUTION INFILTRATION; PERINEURAL at 14:29

## 2021-01-01 RX ADMIN — SODIUM POLYSTYRENE SULFONATE 15 G: 15 SUSPENSION ORAL; RECTAL at 04:29

## 2021-01-01 RX ADMIN — MIDAZOLAM HYDROCHLORIDE 2 MG: 1 INJECTION, SOLUTION INTRAMUSCULAR; INTRAVENOUS at 06:54

## 2021-01-01 RX ADMIN — HEPARIN SODIUM 5000 UNITS: 5000 INJECTION INTRAVENOUS; SUBCUTANEOUS at 13:28

## 2021-01-01 RX ADMIN — WATER 2 G: 1 INJECTION INTRAMUSCULAR; INTRAVENOUS; SUBCUTANEOUS at 07:57

## 2021-01-01 RX ADMIN — Medication 10 ML: at 12:38

## 2021-01-01 RX ADMIN — INSULIN LISPRO 5 UNITS: 100 INJECTION, SOLUTION INTRAVENOUS; SUBCUTANEOUS at 08:58

## 2021-01-01 RX ADMIN — INSULIN LISPRO 4 UNITS: 100 INJECTION, SOLUTION INTRAVENOUS; SUBCUTANEOUS at 07:59

## 2021-01-01 RX ADMIN — HYDROMORPHONE HYDROCHLORIDE 1 MG: 1 INJECTION, SOLUTION INTRAMUSCULAR; INTRAVENOUS; SUBCUTANEOUS at 23:52

## 2021-01-01 RX ADMIN — Medication 10 ML: at 21:51

## 2021-01-01 RX ADMIN — HEPARIN SODIUM 5000 UNITS: 5000 INJECTION INTRAVENOUS; SUBCUTANEOUS at 05:27

## 2021-01-01 RX ADMIN — HYDROMORPHONE HYDROCHLORIDE 1 MG: 1 INJECTION, SOLUTION INTRAMUSCULAR; INTRAVENOUS; SUBCUTANEOUS at 05:59

## 2021-01-01 RX ADMIN — HYDRALAZINE HYDROCHLORIDE 50 MG: 50 TABLET, FILM COATED ORAL at 12:43

## 2021-01-01 RX ADMIN — INSULIN LISPRO 2 UNITS: 100 INJECTION, SOLUTION INTRAVENOUS; SUBCUTANEOUS at 21:59

## 2021-01-01 RX ADMIN — Medication 10 ML: at 14:29

## 2021-01-01 RX ADMIN — INSULIN LISPRO 3 UNITS: 100 INJECTION, SOLUTION INTRAVENOUS; SUBCUTANEOUS at 23:55

## 2021-01-01 RX ADMIN — INSULIN LISPRO 2 UNITS: 100 INJECTION, SOLUTION INTRAVENOUS; SUBCUTANEOUS at 22:22

## 2021-01-01 RX ADMIN — AMLODIPINE BESYLATE 10 MG: 5 TABLET ORAL at 11:33

## 2021-01-01 RX ADMIN — BUMETANIDE 2 MG: 0.25 INJECTION INTRAMUSCULAR; INTRAVENOUS at 16:00

## 2021-01-01 RX ADMIN — HYDRALAZINE HYDROCHLORIDE 50 MG: 50 TABLET ORAL at 17:39

## 2021-01-01 RX ADMIN — HYDROMORPHONE HYDROCHLORIDE 1 MG: 1 INJECTION, SOLUTION INTRAMUSCULAR; INTRAVENOUS; SUBCUTANEOUS at 15:45

## 2021-01-01 RX ADMIN — Medication 5 ML: at 06:00

## 2021-01-01 RX ADMIN — CALCIUM GLUCONATE 1 G: 98 INJECTION, SOLUTION INTRAVENOUS at 16:01

## 2021-01-01 RX ADMIN — PREGABALIN 25 MG: 25 CAPSULE ORAL at 21:44

## 2021-01-01 RX ADMIN — BUMETANIDE 2 MG: 1 TABLET ORAL at 12:43

## 2021-01-01 RX ADMIN — INSULIN LISPRO 2 UNITS: 100 INJECTION, SOLUTION INTRAVENOUS; SUBCUTANEOUS at 17:35

## 2021-01-01 RX ADMIN — INSULIN LISPRO 3 UNITS: 100 INJECTION, SOLUTION INTRAVENOUS; SUBCUTANEOUS at 12:40

## 2021-01-01 RX ADMIN — HEPARIN SODIUM 3600 UNITS: 1000 INJECTION, SOLUTION INTRAVENOUS; SUBCUTANEOUS at 14:29

## 2021-01-01 RX ADMIN — SODIUM POLYSTYRENE SULFONATE 15 G: 15 SUSPENSION ORAL; RECTAL at 08:04

## 2021-01-01 RX ADMIN — DEXTROSE MONOHYDRATE 12.5 G: 25 INJECTION, SOLUTION INTRAVENOUS at 02:21

## 2021-01-01 RX ADMIN — DEXAMETHASONE 4 MG: 4 TABLET ORAL at 02:43

## 2021-01-01 RX ADMIN — DEXAMETHASONE 4 MG: 4 TABLET ORAL at 15:10

## 2021-01-01 RX ADMIN — FENTANYL CITRATE 25 MCG: 50 INJECTION, SOLUTION INTRAMUSCULAR; INTRAVENOUS at 14:20

## 2021-01-01 RX ADMIN — LEVOTHYROXINE SODIUM 175 MCG: 0.12 TABLET ORAL at 05:44

## 2021-01-01 RX ADMIN — MEROPENEM 500 MG: 500 INJECTION, POWDER, FOR SOLUTION INTRAVENOUS at 12:39

## 2021-01-01 RX ADMIN — SEVELAMER CARBONATE 800 MG: 800 TABLET, FILM COATED ORAL at 22:10

## 2021-01-01 RX ADMIN — HYDROMORPHONE HYDROCHLORIDE 1 MG: 1 INJECTION, SOLUTION INTRAMUSCULAR; INTRAVENOUS; SUBCUTANEOUS at 10:31

## 2021-01-01 RX ADMIN — FENTANYL CITRATE 50 MCG: 50 INJECTION, SOLUTION INTRAMUSCULAR; INTRAVENOUS at 17:40

## 2021-01-01 RX ADMIN — HYDROCODONE BITARTRATE AND ACETAMINOPHEN 1 TABLET: 7.5; 325 TABLET ORAL at 05:25

## 2021-01-01 RX ADMIN — Medication 10 ML: at 06:54

## 2021-01-01 RX ADMIN — HYDROCODONE BITARTRATE AND ACETAMINOPHEN 1 TABLET: 10; 325 TABLET ORAL at 05:27

## 2021-01-01 RX ADMIN — HYDRALAZINE HYDROCHLORIDE 50 MG: 50 TABLET ORAL at 13:07

## 2021-01-01 RX ADMIN — OXYCODONE 5 MG: 5 TABLET ORAL at 06:56

## 2021-01-01 RX ADMIN — CARVEDILOL 6.25 MG: 6.25 TABLET, FILM COATED ORAL at 17:11

## 2021-01-01 RX ADMIN — INSULIN LISPRO 12 UNITS: 100 INJECTION, SOLUTION INTRAVENOUS; SUBCUTANEOUS at 11:32

## 2021-01-01 RX ADMIN — Medication 10 ML: at 13:28

## 2021-01-01 RX ADMIN — SEVELAMER CARBONATE 800 MG: 800 TABLET, FILM COATED ORAL at 08:38

## 2021-01-01 RX ADMIN — ATROPA BELLADONNA AND OPIUM 1 SUPPOSITORY: 16.2; 3 SUPPOSITORY RECTAL at 16:42

## 2021-01-01 RX ADMIN — HYDROCODONE BITARTRATE AND ACETAMINOPHEN 1 TABLET: 10; 325 TABLET ORAL at 03:50

## 2021-01-01 RX ADMIN — HYDRALAZINE HYDROCHLORIDE 50 MG: 50 TABLET ORAL at 09:25

## 2021-01-01 RX ADMIN — METOCLOPRAMIDE 10 MG: 10 TABLET ORAL at 17:07

## 2021-01-01 RX ADMIN — INSULIN LISPRO 2 UNITS: 100 INJECTION, SOLUTION INTRAVENOUS; SUBCUTANEOUS at 12:31

## 2021-01-01 RX ADMIN — CARVEDILOL 6.25 MG: 6.25 TABLET, FILM COATED ORAL at 17:14

## 2021-01-01 RX ADMIN — CARVEDILOL 6.25 MG: 6.25 TABLET, FILM COATED ORAL at 17:09

## 2021-01-01 RX ADMIN — DEXAMETHASONE 4 MG: 4 TABLET ORAL at 13:28

## 2021-01-01 RX ADMIN — OXYCODONE HYDROCHLORIDE AND ACETAMINOPHEN 1 TABLET: 5; 325 TABLET ORAL at 22:47

## 2021-01-01 RX ADMIN — CEFTRIAXONE 1 G: 1 INJECTION, POWDER, FOR SOLUTION INTRAMUSCULAR; INTRAVENOUS at 01:28

## 2021-01-01 RX ADMIN — OXYCODONE HYDROCHLORIDE AND ACETAMINOPHEN 1 TABLET: 5; 325 TABLET ORAL at 17:07

## 2021-01-01 RX ADMIN — DEXAMETHASONE 4 MG: 4 TABLET ORAL at 06:57

## 2021-01-01 RX ADMIN — METOCLOPRAMIDE 5 MG: 10 TABLET ORAL at 02:34

## 2021-01-01 RX ADMIN — PROPOFOL 20 MG: 10 INJECTION, EMULSION INTRAVENOUS at 09:04

## 2021-01-01 RX ADMIN — HYDRALAZINE HYDROCHLORIDE 50 MG: 50 TABLET, FILM COATED ORAL at 08:47

## 2021-01-01 RX ADMIN — HUMAN INSULIN 5 UNITS: 100 INJECTION, SUSPENSION SUBCUTANEOUS at 04:28

## 2021-01-01 RX ADMIN — LEVOTHYROXINE SODIUM 175 MCG: 0.15 TABLET ORAL at 05:59

## 2021-01-01 RX ADMIN — EPOETIN ALFA-EPBX 40000 UNITS: 40000 INJECTION, SOLUTION INTRAVENOUS; SUBCUTANEOUS at 14:25

## 2021-01-01 RX ADMIN — BUMETANIDE 2 MG: 1 TABLET ORAL at 09:25

## 2021-01-01 RX ADMIN — Medication 10 ML: at 22:55

## 2021-01-01 RX ADMIN — INSULIN LISPRO 3 UNITS: 100 INJECTION, SOLUTION INTRAVENOUS; SUBCUTANEOUS at 08:21

## 2021-01-01 RX ADMIN — CARVEDILOL 6.25 MG: 3.12 TABLET, FILM COATED ORAL at 17:33

## 2021-01-01 RX ADMIN — INSULIN LISPRO 8 UNITS: 100 INJECTION, SOLUTION INTRAVENOUS; SUBCUTANEOUS at 12:06

## 2021-01-01 RX ADMIN — HUMAN INSULIN 5 UNITS: 100 INJECTION, SOLUTION SUBCUTANEOUS at 01:31

## 2021-01-01 RX ADMIN — INSULIN LISPRO 3 UNITS: 100 INJECTION, SOLUTION INTRAVENOUS; SUBCUTANEOUS at 09:34

## 2021-01-01 RX ADMIN — CALCIUM GLUCONATE 1000 MG: 20 INJECTION, SOLUTION INTRAVENOUS at 02:05

## 2021-01-01 RX ADMIN — EPOETIN ALFA-EPBX 12000 UNITS: 10000 INJECTION, SOLUTION INTRAVENOUS; SUBCUTANEOUS at 21:48

## 2021-01-01 RX ADMIN — MINERAL OIL: 100 ENEMA RECTAL at 04:10

## 2021-01-01 RX ADMIN — HEPARIN SODIUM 5000 UNITS: 5000 INJECTION INTRAVENOUS; SUBCUTANEOUS at 22:28

## 2021-01-01 RX ADMIN — PIPERACILLIN AND TAZOBACTAM 3.38 G: 3; .375 INJECTION, POWDER, LYOPHILIZED, FOR SOLUTION INTRAVENOUS at 01:32

## 2021-01-01 RX ADMIN — Medication 10 ML: at 21:24

## 2021-01-01 RX ADMIN — HYDROMORPHONE HYDROCHLORIDE 1 MG: 1 INJECTION, SOLUTION INTRAMUSCULAR; INTRAVENOUS; SUBCUTANEOUS at 06:52

## 2021-01-01 RX ADMIN — INSULIN LISPRO 2 UNITS: 100 INJECTION, SOLUTION INTRAVENOUS; SUBCUTANEOUS at 17:24

## 2021-01-01 RX ADMIN — ALBUMIN (HUMAN) 12.5 G: 0.25 INJECTION, SOLUTION INTRAVENOUS at 11:52

## 2021-01-01 RX ADMIN — FENTANYL CITRATE 25 MCG: 50 INJECTION, SOLUTION INTRAMUSCULAR; INTRAVENOUS at 09:22

## 2021-01-01 RX ADMIN — METOCLOPRAMIDE 10 MG: 10 TABLET ORAL at 17:16

## 2021-01-01 RX ADMIN — HYDROMORPHONE HYDROCHLORIDE 1 MG: 1 INJECTION, SOLUTION INTRAMUSCULAR; INTRAVENOUS; SUBCUTANEOUS at 21:11

## 2021-01-01 RX ADMIN — PIPERACILLIN AND TAZOBACTAM 3.38 G: 3; .375 INJECTION, POWDER, LYOPHILIZED, FOR SOLUTION INTRAVENOUS at 13:07

## 2021-01-01 RX ADMIN — BUMETANIDE 2 MG: 1 TABLET ORAL at 13:07

## 2021-01-01 RX ADMIN — HEPARIN SODIUM 5000 UNITS: 5000 INJECTION INTRAVENOUS; SUBCUTANEOUS at 05:36

## 2021-01-01 RX ADMIN — HYDRALAZINE HYDROCHLORIDE 50 MG: 50 TABLET ORAL at 19:30

## 2021-01-01 RX ADMIN — CARVEDILOL 6.25 MG: 6.25 TABLET, FILM COATED ORAL at 17:00

## 2021-01-01 RX ADMIN — CARVEDILOL 6.25 MG: 6.25 TABLET, FILM COATED ORAL at 17:53

## 2021-01-01 RX ADMIN — OXYCODONE 5 MG: 5 TABLET ORAL at 13:49

## 2021-01-01 RX ADMIN — HEPARIN SODIUM 1500 UNITS: 1000 INJECTION INTRAVENOUS; SUBCUTANEOUS at 10:02

## 2021-01-01 RX ADMIN — INSULIN GLARGINE 35 UNITS: 100 INJECTION, SOLUTION SUBCUTANEOUS at 22:17

## 2021-01-01 RX ADMIN — HYDROCODONE BITARTRATE AND ACETAMINOPHEN 1 TABLET: 10; 325 TABLET ORAL at 22:22

## 2021-01-01 RX ADMIN — PREGABALIN 75 MG: 75 CAPSULE ORAL at 17:03

## 2021-01-01 RX ADMIN — HEPARIN SODIUM IN SODIUM CHLORIDE 20 ML: 200 INJECTION INTRAVENOUS at 14:29

## 2021-01-01 RX ADMIN — HEPARIN SODIUM 5000 UNITS: 5000 INJECTION INTRAVENOUS; SUBCUTANEOUS at 21:49

## 2021-01-01 RX ADMIN — INSULIN LISPRO 3 UNITS: 100 INJECTION, SOLUTION INTRAVENOUS; SUBCUTANEOUS at 15:47

## 2021-01-01 RX ADMIN — PIPERACILLIN AND TAZOBACTAM 3.38 G: 3; .375 INJECTION, POWDER, LYOPHILIZED, FOR SOLUTION INTRAVENOUS at 02:25

## 2021-01-01 RX ADMIN — HYDROMORPHONE HYDROCHLORIDE 1 MG: 1 INJECTION, SOLUTION INTRAMUSCULAR; INTRAVENOUS; SUBCUTANEOUS at 17:53

## 2021-01-01 RX ADMIN — DEXAMETHASONE 4 MG: 4 TABLET ORAL at 05:21

## 2021-01-01 RX ADMIN — INSULIN LISPRO 2 UNITS: 100 INJECTION, SOLUTION INTRAVENOUS; SUBCUTANEOUS at 23:51

## 2021-01-01 RX ADMIN — ERGOCALCIFEROL 50000 UNITS: 1.25 CAPSULE ORAL at 09:07

## 2021-01-01 RX ADMIN — ATORVASTATIN CALCIUM 40 MG: 40 TABLET, FILM COATED ORAL at 22:24

## 2021-01-01 RX ADMIN — DEXAMETHASONE 4 MG: 4 TABLET ORAL at 21:33

## 2021-01-01 RX ADMIN — PREGABALIN 25 MG: 25 CAPSULE ORAL at 12:44

## 2021-01-01 RX ADMIN — HYDRALAZINE HYDROCHLORIDE 10 MG: 20 INJECTION INTRAMUSCULAR; INTRAVENOUS at 21:28

## 2021-01-01 RX ADMIN — INSULIN LISPRO 4 UNITS: 100 INJECTION, SOLUTION INTRAVENOUS; SUBCUTANEOUS at 23:34

## 2021-01-01 RX ADMIN — INSULIN GLARGINE 11 UNITS: 100 INJECTION, SOLUTION SUBCUTANEOUS at 08:38

## 2021-01-01 RX ADMIN — IPRATROPIUM BROMIDE AND ALBUTEROL SULFATE 3 ML: .5; 3 SOLUTION RESPIRATORY (INHALATION) at 22:16

## 2021-01-01 RX ADMIN — SEVELAMER CARBONATE 800 MG: 800 TABLET, FILM COATED ORAL at 17:17

## 2021-01-01 RX ADMIN — INSULIN LISPRO 3 UNITS: 100 INJECTION, SOLUTION INTRAVENOUS; SUBCUTANEOUS at 17:44

## 2021-01-01 RX ADMIN — SEVELAMER CARBONATE 800 MG: 800 TABLET, FILM COATED ORAL at 11:18

## 2021-01-01 RX ADMIN — INSULIN GLARGINE 11 UNITS: 100 INJECTION, SOLUTION SUBCUTANEOUS at 12:55

## 2021-01-01 RX ADMIN — PREGABALIN 25 MG: 25 CAPSULE ORAL at 08:37

## 2021-01-01 RX ADMIN — PIPERACILLIN AND TAZOBACTAM 3.38 G: 3; .375 INJECTION, POWDER, LYOPHILIZED, FOR SOLUTION INTRAVENOUS at 02:07

## 2021-01-01 RX ADMIN — POLYETHYLENE GLYCOL 3350 17 G: 17 POWDER, FOR SOLUTION ORAL at 17:10

## 2021-01-01 RX ADMIN — DEXAMETHASONE 4 MG: 4 TABLET ORAL at 17:05

## 2021-01-01 RX ADMIN — PANTOPRAZOLE SODIUM 40 MG: 40 TABLET, DELAYED RELEASE ORAL at 08:06

## 2021-01-01 RX ADMIN — MEROPENEM 500 MG: 500 INJECTION, POWDER, FOR SOLUTION INTRAVENOUS at 12:14

## 2021-01-01 RX ADMIN — Medication 10 ML: at 05:13

## 2021-01-01 RX ADMIN — HEPARIN SODIUM 5000 UNITS: 5000 INJECTION INTRAVENOUS; SUBCUTANEOUS at 07:46

## 2021-01-01 RX ADMIN — SODIUM BICARBONATE 1300 MG: 650 TABLET ORAL at 17:36

## 2021-01-01 RX ADMIN — DEXAMETHASONE 4 MG: 4 TABLET ORAL at 13:49

## 2021-01-01 RX ADMIN — CARVEDILOL 6.25 MG: 6.25 TABLET, FILM COATED ORAL at 17:18

## 2021-01-01 RX ADMIN — HYDRALAZINE HYDROCHLORIDE 50 MG: 50 TABLET ORAL at 17:02

## 2021-01-01 RX ADMIN — HYDROCODONE BITARTRATE AND ACETAMINOPHEN 1 TABLET: 7.5; 325 TABLET ORAL at 07:59

## 2021-01-01 RX ADMIN — DEXAMETHASONE SODIUM PHOSPHATE 10 MG: 10 INJECTION, SOLUTION INTRAMUSCULAR; INTRAVENOUS at 15:56

## 2021-01-01 RX ADMIN — LEVOTHYROXINE SODIUM 87.5 MCG: 0.07 TABLET ORAL at 05:26

## 2021-01-01 RX ADMIN — OXYCODONE HYDROCHLORIDE AND ACETAMINOPHEN 1 TABLET: 5; 325 TABLET ORAL at 05:11

## 2021-01-01 RX ADMIN — Medication 10 ML: at 06:10

## 2021-01-01 RX ADMIN — HYDRALAZINE HYDROCHLORIDE 10 MG: 20 INJECTION INTRAMUSCULAR; INTRAVENOUS at 00:03

## 2021-01-01 RX ADMIN — CARVEDILOL 6.25 MG: 6.25 TABLET, FILM COATED ORAL at 17:45

## 2021-01-01 RX ADMIN — PANTOPRAZOLE SODIUM 40 MG: 40 TABLET, DELAYED RELEASE ORAL at 08:37

## 2021-01-01 RX ADMIN — ROPIVACAINE HYDROCHLORIDE 20 ML: 5 INJECTION, SOLUTION EPIDURAL; INFILTRATION; PERINEURAL at 06:58

## 2021-01-01 RX ADMIN — DEXAMETHASONE 4 MG: 4 TABLET ORAL at 17:51

## 2021-01-01 RX ADMIN — CARVEDILOL 6.25 MG: 6.25 TABLET, FILM COATED ORAL at 08:24

## 2021-01-01 RX ADMIN — INSULIN LISPRO 2 UNITS: 100 INJECTION, SOLUTION INTRAVENOUS; SUBCUTANEOUS at 11:34

## 2021-01-01 RX ADMIN — SEVELAMER CARBONATE 800 MG: 800 TABLET, FILM COATED ORAL at 17:03

## 2021-01-01 RX ADMIN — BUMETANIDE 2 MG: 1 TABLET ORAL at 09:07

## 2021-01-01 RX ADMIN — PROPOFOL 20 MG: 10 INJECTION, EMULSION INTRAVENOUS at 09:09

## 2021-01-01 RX ADMIN — HEPARIN SODIUM 3800 UNITS: 1000 INJECTION INTRAVENOUS; SUBCUTANEOUS at 15:32

## 2021-01-01 RX ADMIN — BUMETANIDE 2 MG: 0.25 INJECTION INTRAMUSCULAR; INTRAVENOUS at 08:18

## 2021-01-01 RX ADMIN — SEVELAMER CARBONATE 800 MG: 800 TABLET, FILM COATED ORAL at 12:35

## 2021-01-01 RX ADMIN — ATORVASTATIN CALCIUM 40 MG: 40 TABLET, FILM COATED ORAL at 21:53

## 2021-01-01 RX ADMIN — SEVELAMER CARBONATE 800 MG: 800 TABLET, FILM COATED ORAL at 08:59

## 2021-01-01 RX ADMIN — DEXAMETHASONE 4 MG: 4 TABLET ORAL at 23:19

## 2021-01-01 RX ADMIN — LIDOCAINE HYDROCHLORIDE 200 MG: 20 INJECTION, SOLUTION INFILTRATION; PERINEURAL at 09:00

## 2021-01-01 RX ADMIN — POLYETHYLENE GLYCOL 3350 17 G: 17 POWDER, FOR SOLUTION ORAL at 02:43

## 2021-01-01 RX ADMIN — SEVELAMER CARBONATE 800 MG: 800 TABLET, FILM COATED ORAL at 08:00

## 2021-01-01 RX ADMIN — DEXAMETHASONE SODIUM PHOSPHATE 4 MG: 4 INJECTION, SOLUTION INTRAMUSCULAR; INTRAVENOUS at 07:01

## 2021-01-01 RX ADMIN — CHLOROPROCAINE HYDROCHLORIDE 2 ML: 30 INJECTION, SOLUTION EPIDURAL; INFILTRATION; INTRACAUDAL; PERINEURAL at 13:00

## 2021-01-01 RX ADMIN — ATORVASTATIN CALCIUM 40 MG: 40 TABLET, FILM COATED ORAL at 21:57

## 2021-01-01 RX ADMIN — DOCUSATE SODIUM AND SENNOSIDES 1 TABLET: 8.6; 5 TABLET, FILM COATED ORAL at 04:10

## 2021-01-01 RX ADMIN — CARVEDILOL 6.25 MG: 6.25 TABLET, FILM COATED ORAL at 08:45

## 2021-01-01 RX ADMIN — BISACODYL 10 MG: 10 SUPPOSITORY RECTAL at 09:25

## 2021-01-01 RX ADMIN — SEVELAMER CARBONATE 800 MG: 800 TABLET, FILM COATED ORAL at 11:33

## 2021-01-01 RX ADMIN — CARVEDILOL 6.25 MG: 6.25 TABLET, FILM COATED ORAL at 08:47

## 2021-01-01 RX ADMIN — SODIUM BICARBONATE 1300 MG: 650 TABLET ORAL at 08:33

## 2021-01-01 RX ADMIN — SEVELAMER CARBONATE 800 MG: 800 TABLET, FILM COATED ORAL at 13:02

## 2021-01-01 RX ADMIN — OXYCODONE AND ACETAMINOPHEN 2 TABLET: 5; 325 TABLET ORAL at 03:16

## 2021-01-01 RX ADMIN — HYDROCODONE BITARTRATE AND ACETAMINOPHEN 1 TABLET: 10; 325 TABLET ORAL at 12:14

## 2021-01-01 RX ADMIN — OXYCODONE HYDROCHLORIDE AND ACETAMINOPHEN 1 TABLET: 5; 325 TABLET ORAL at 20:02

## 2021-01-01 RX ADMIN — DOCUSATE SODIUM AND SENNOSIDES 1 TABLET: 8.6; 5 TABLET, FILM COATED ORAL at 08:29

## 2021-01-01 RX ADMIN — HYDRALAZINE HYDROCHLORIDE 50 MG: 50 TABLET, FILM COATED ORAL at 18:17

## 2021-01-01 RX ADMIN — INSULIN LISPRO 4 UNITS: 100 INJECTION, SOLUTION INTRAVENOUS; SUBCUTANEOUS at 17:51

## 2021-01-01 RX ADMIN — OXYCODONE HYDROCHLORIDE AND ACETAMINOPHEN 1 TABLET: 5; 325 TABLET ORAL at 10:31

## 2021-01-01 RX ADMIN — MORPHINE SULFATE 4 MG: 2 INJECTION, SOLUTION INTRAMUSCULAR; INTRAVENOUS at 23:30

## 2021-01-01 RX ADMIN — INSULIN LISPRO 3 UNITS: 100 INJECTION, SOLUTION INTRAVENOUS; SUBCUTANEOUS at 09:07

## 2021-01-01 RX ADMIN — OXYCODONE 10 MG: 5 TABLET ORAL at 21:51

## 2021-01-01 RX ADMIN — CARVEDILOL 6.25 MG: 6.25 TABLET, FILM COATED ORAL at 17:51

## 2021-01-01 RX ADMIN — AMLODIPINE BESYLATE 10 MG: 5 TABLET ORAL at 09:36

## 2021-01-01 RX ADMIN — DEXAMETHASONE 4 MG: 4 TABLET ORAL at 22:17

## 2021-01-01 RX ADMIN — SODIUM ZIRCONIUM CYCLOSILICATE 10 G: 10 POWDER, FOR SUSPENSION ORAL at 16:00

## 2021-01-01 RX ADMIN — HYDROCODONE BITARTRATE AND ACETAMINOPHEN 1 TABLET: 10; 325 TABLET ORAL at 03:02

## 2021-01-01 RX ADMIN — OXYCODONE HYDROCHLORIDE AND ACETAMINOPHEN 1 TABLET: 5; 325 TABLET ORAL at 08:24

## 2021-01-01 RX ADMIN — VANCOMYCIN HYDROCHLORIDE 2500 MG: 10 INJECTION, POWDER, LYOPHILIZED, FOR SOLUTION INTRAVENOUS at 14:23

## 2021-01-01 RX ADMIN — HYDRALAZINE HYDROCHLORIDE 50 MG: 50 TABLET, FILM COATED ORAL at 17:23

## 2021-01-01 RX ADMIN — HEPARIN SODIUM 5000 UNITS: 5000 INJECTION INTRAVENOUS; SUBCUTANEOUS at 06:53

## 2021-01-01 RX ADMIN — EPOETIN ALFA-EPBX 40000 UNITS: 40000 INJECTION, SOLUTION INTRAVENOUS; SUBCUTANEOUS at 10:24

## 2021-01-01 RX ADMIN — BUMETANIDE 2 MG: 1 TABLET ORAL at 08:29

## 2021-01-01 RX ADMIN — CEFTRIAXONE 1 G: 1 INJECTION, POWDER, FOR SOLUTION INTRAMUSCULAR; INTRAVENOUS at 02:39

## 2021-01-01 RX ADMIN — EPOETIN ALFA-EPBX 20000 UNITS: 20000 INJECTION, SOLUTION INTRAVENOUS; SUBCUTANEOUS at 21:44

## 2021-01-01 RX ADMIN — CARVEDILOL 6.25 MG: 3.12 TABLET, FILM COATED ORAL at 08:59

## 2021-01-01 RX ADMIN — HYDROMORPHONE HYDROCHLORIDE 1 MG: 1 INJECTION, SOLUTION INTRAMUSCULAR; INTRAVENOUS; SUBCUTANEOUS at 20:10

## 2021-01-01 RX ADMIN — BISACODYL 10 MG: 10 SUPPOSITORY RECTAL at 08:29

## 2021-01-01 RX ADMIN — HEPARIN SODIUM 5000 UNITS: 5000 INJECTION INTRAVENOUS; SUBCUTANEOUS at 06:35

## 2021-01-01 RX ADMIN — METOCLOPRAMIDE 5 MG: 10 TABLET ORAL at 08:43

## 2021-01-01 RX ADMIN — HYDRALAZINE HYDROCHLORIDE 50 MG: 50 TABLET, FILM COATED ORAL at 17:09

## 2021-01-01 RX ADMIN — Medication 10 ML: at 05:47

## 2021-01-01 RX ADMIN — ASPIRIN 81 MG: 81 TABLET, CHEWABLE ORAL at 13:11

## 2021-01-01 RX ADMIN — CEFAZOLIN SODIUM 2000 MG: 1 INJECTION, POWDER, FOR SOLUTION INTRAMUSCULAR; INTRAVENOUS at 14:18

## 2021-01-01 RX ADMIN — ASPIRIN 81 MG: 81 TABLET, CHEWABLE ORAL at 09:07

## 2021-01-01 RX ADMIN — INSULIN LISPRO 3 UNITS: 100 INJECTION, SOLUTION INTRAVENOUS; SUBCUTANEOUS at 15:48

## 2021-01-01 RX ADMIN — ATORVASTATIN CALCIUM 10 MG: 10 TABLET, FILM COATED ORAL at 21:55

## 2021-01-01 RX ADMIN — INSULIN LISPRO 5 UNITS: 100 INJECTION, SOLUTION INTRAVENOUS; SUBCUTANEOUS at 08:40

## 2021-01-01 RX ADMIN — POLYETHYLENE GLYCOL 3350 17 G: 17 POWDER, FOR SOLUTION ORAL at 09:25

## 2021-01-01 RX ADMIN — DEXAMETHASONE 4 MG: 4 TABLET ORAL at 14:01

## 2021-01-01 RX ADMIN — HYDROMORPHONE HYDROCHLORIDE 1 MG: 1 INJECTION, SOLUTION INTRAMUSCULAR; INTRAVENOUS; SUBCUTANEOUS at 23:30

## 2021-01-01 RX ADMIN — PIPERACILLIN AND TAZOBACTAM 3.38 G: 3; .375 INJECTION, POWDER, LYOPHILIZED, FOR SOLUTION INTRAVENOUS at 01:51

## 2021-01-01 RX ADMIN — CARVEDILOL 6.25 MG: 6.25 TABLET, FILM COATED ORAL at 17:39

## 2021-01-01 RX ADMIN — Medication 10 ML: at 22:10

## 2021-01-01 RX ADMIN — HEPARIN SODIUM 5000 UNITS: 5000 INJECTION INTRAVENOUS; SUBCUTANEOUS at 13:11

## 2021-01-01 RX ADMIN — DEXAMETHASONE SODIUM PHOSPHATE 4 MG: 4 INJECTION, SOLUTION INTRAMUSCULAR; INTRAVENOUS at 13:08

## 2021-01-01 RX ADMIN — INSULIN LISPRO 5 UNITS: 100 INJECTION, SOLUTION INTRAVENOUS; SUBCUTANEOUS at 08:17

## 2021-01-01 RX ADMIN — HYDRALAZINE HYDROCHLORIDE 20 MG: 20 INJECTION INTRAMUSCULAR; INTRAVENOUS at 08:28

## 2021-01-01 RX ADMIN — DEXAMETHASONE SODIUM PHOSPHATE 4 MG: 4 INJECTION, SOLUTION INTRAMUSCULAR; INTRAVENOUS at 06:05

## 2021-01-01 RX ADMIN — HYDROCODONE BITARTRATE AND ACETAMINOPHEN 1 TABLET: 7.5; 325 TABLET ORAL at 10:58

## 2021-01-01 RX ADMIN — ATORVASTATIN CALCIUM 40 MG: 40 TABLET, FILM COATED ORAL at 21:23

## 2021-01-01 RX ADMIN — IOHEXOL 50 ML: 240 INJECTION, SOLUTION INTRATHECAL; INTRAVASCULAR; INTRAVENOUS; ORAL at 14:23

## 2021-01-01 RX ADMIN — BUMETANIDE 2 MG: 0.25 INJECTION INTRAMUSCULAR; INTRAVENOUS at 17:11

## 2021-01-01 RX ADMIN — METOCLOPRAMIDE 10 MG: 10 TABLET ORAL at 08:25

## 2021-01-01 RX ADMIN — ATORVASTATIN CALCIUM 40 MG: 40 TABLET, FILM COATED ORAL at 22:28

## 2021-01-01 RX ADMIN — HYDRALAZINE HYDROCHLORIDE 50 MG: 50 TABLET, FILM COATED ORAL at 08:07

## 2021-01-01 RX ADMIN — CARVEDILOL 6.25 MG: 6.25 TABLET, FILM COATED ORAL at 17:07

## 2021-01-01 RX ADMIN — LEVOTHYROXINE SODIUM 175 MCG: 0.15 TABLET ORAL at 05:11

## 2021-01-01 RX ADMIN — OXYCODONE 5 MG: 5 TABLET ORAL at 18:53

## 2021-01-01 RX ADMIN — BUMETANIDE 2 MG: 1 TABLET ORAL at 12:09

## 2021-01-01 RX ADMIN — AMLODIPINE BESYLATE 10 MG: 5 TABLET ORAL at 08:47

## 2021-01-01 RX ADMIN — DEXAMETHASONE 4 MG: 4 TABLET ORAL at 21:44

## 2021-01-01 RX ADMIN — Medication 10 ML: at 05:23

## 2021-01-01 RX ADMIN — SEVELAMER CARBONATE 800 MG: 800 TABLET, FILM COATED ORAL at 17:07

## 2021-01-01 RX ADMIN — HEPARIN SODIUM 1000 UNITS/HR: 1000 INJECTION INTRAVENOUS; SUBCUTANEOUS at 10:02

## 2021-01-01 RX ADMIN — OXYCODONE HYDROCHLORIDE AND ACETAMINOPHEN 1 TABLET: 5; 325 TABLET ORAL at 02:24

## 2021-01-01 RX ADMIN — BUMETANIDE 2 MG: 1 TABLET ORAL at 08:48

## 2021-01-01 RX ADMIN — HYDROCODONE BITARTRATE AND ACETAMINOPHEN 1 TABLET: 7.5; 325 TABLET ORAL at 23:22

## 2021-01-01 RX ADMIN — HYDRALAZINE HYDROCHLORIDE 50 MG: 50 TABLET, FILM COATED ORAL at 08:45

## 2021-01-01 RX ADMIN — HYDRALAZINE HYDROCHLORIDE 50 MG: 50 TABLET ORAL at 18:00

## 2021-01-01 RX ADMIN — DEXAMETHASONE 4 MG: 4 TABLET ORAL at 07:01

## 2021-01-01 RX ADMIN — AMLODIPINE BESYLATE 10 MG: 5 TABLET ORAL at 08:40

## 2021-01-01 RX ADMIN — HEPARIN SODIUM 5000 UNITS: 5000 INJECTION INTRAVENOUS; SUBCUTANEOUS at 21:58

## 2021-01-01 RX ADMIN — CARVEDILOL 6.25 MG: 6.25 TABLET, FILM COATED ORAL at 08:38

## 2021-01-01 RX ADMIN — LEVOTHYROXINE SODIUM 175 MCG: 0.12 TABLET ORAL at 08:45

## 2021-01-01 RX ADMIN — SODIUM CHLORIDE, PRESERVATIVE FREE 300 UNITS: 5 INJECTION INTRAVENOUS at 09:00

## 2021-01-01 RX ADMIN — ATORVASTATIN CALCIUM 40 MG: 40 TABLET, FILM COATED ORAL at 21:44

## 2021-01-01 RX ADMIN — CARVEDILOL 6.25 MG: 6.25 TABLET, FILM COATED ORAL at 17:04

## 2021-01-01 RX ADMIN — POLYETHYLENE GLYCOL 3350 17 G: 17 POWDER, FOR SOLUTION ORAL at 14:01

## 2021-01-01 RX ADMIN — CARVEDILOL 6.25 MG: 6.25 TABLET, FILM COATED ORAL at 17:24

## 2021-01-01 RX ADMIN — HYDRALAZINE HYDROCHLORIDE 50 MG: 50 TABLET ORAL at 08:41

## 2021-01-01 RX ADMIN — HYDROCODONE BITARTRATE AND ACETAMINOPHEN 1 TABLET: 7.5; 325 TABLET ORAL at 19:43

## 2021-01-01 RX ADMIN — HEPARIN SODIUM 3800 UNITS: 1000 INJECTION INTRAVENOUS; SUBCUTANEOUS at 11:35

## 2021-01-01 RX ADMIN — CARVEDILOL 6.25 MG: 6.25 TABLET, FILM COATED ORAL at 11:33

## 2021-01-01 RX ADMIN — Medication 10 ML: at 05:28

## 2021-01-01 RX ADMIN — PREGABALIN 75 MG: 75 CAPSULE ORAL at 17:05

## 2021-01-01 RX ADMIN — SEVELAMER CARBONATE 800 MG: 800 TABLET, FILM COATED ORAL at 17:36

## 2021-01-01 RX ADMIN — HEPARIN SODIUM 5000 UNITS: 5000 INJECTION INTRAVENOUS; SUBCUTANEOUS at 17:09

## 2021-01-01 RX ADMIN — Medication 10 ML: at 21:35

## 2021-01-01 RX ADMIN — SEVELAMER CARBONATE 800 MG: 800 TABLET, FILM COATED ORAL at 09:07

## 2021-01-01 RX ADMIN — PREGABALIN 25 MG: 25 CAPSULE ORAL at 21:50

## 2021-01-01 RX ADMIN — HEPARIN SODIUM 5000 UNITS: 5000 INJECTION INTRAVENOUS; SUBCUTANEOUS at 21:54

## 2021-01-01 RX ADMIN — DEXAMETHASONE 4 MG: 4 TABLET ORAL at 05:36

## 2021-01-01 RX ADMIN — SEVELAMER CARBONATE 800 MG: 800 TABLET, FILM COATED ORAL at 17:09

## 2021-01-01 RX ADMIN — CARVEDILOL 6.25 MG: 6.25 TABLET, FILM COATED ORAL at 18:18

## 2021-01-01 RX ADMIN — HEPARIN SODIUM 5000 UNITS: 5000 INJECTION INTRAVENOUS; SUBCUTANEOUS at 05:26

## 2021-01-01 RX ADMIN — HYDRALAZINE HYDROCHLORIDE 50 MG: 50 TABLET, FILM COATED ORAL at 08:38

## 2021-01-01 RX ADMIN — PANTOPRAZOLE SODIUM 40 MG: 40 TABLET, DELAYED RELEASE ORAL at 08:34

## 2021-01-01 RX ADMIN — IOPAMIDOL 50 ML: 755 INJECTION, SOLUTION INTRAVENOUS at 10:49

## 2021-01-01 RX ADMIN — SEVELAMER CARBONATE 800 MG: 800 TABLET, FILM COATED ORAL at 17:28

## 2021-01-01 RX ADMIN — PIPERACILLIN AND TAZOBACTAM 3.38 G: 3; .375 INJECTION, POWDER, LYOPHILIZED, FOR SOLUTION INTRAVENOUS at 13:49

## 2021-01-01 RX ADMIN — INSULIN LISPRO 7 UNITS: 100 INJECTION, SOLUTION INTRAVENOUS; SUBCUTANEOUS at 13:19

## 2021-01-01 RX ADMIN — DEXAMETHASONE 4 MG: 4 TABLET ORAL at 21:52

## 2021-01-01 RX ADMIN — EPOETIN ALFA-EPBX 40000 UNITS: 40000 INJECTION, SOLUTION INTRAVENOUS; SUBCUTANEOUS at 14:20

## 2021-01-01 RX ADMIN — HEPARIN SODIUM 5000 UNITS: 5000 INJECTION INTRAVENOUS; SUBCUTANEOUS at 22:24

## 2021-01-01 RX ADMIN — CARVEDILOL 6.25 MG: 6.25 TABLET, FILM COATED ORAL at 13:07

## 2021-01-01 RX ADMIN — LEVOTHYROXINE SODIUM 175 MCG: 0.15 TABLET ORAL at 05:26

## 2021-01-01 RX ADMIN — HYDROMORPHONE HYDROCHLORIDE 1 MG: 1 INJECTION, SOLUTION INTRAMUSCULAR; INTRAVENOUS; SUBCUTANEOUS at 06:12

## 2021-01-01 RX ADMIN — AMLODIPINE BESYLATE 10 MG: 5 TABLET ORAL at 08:17

## 2021-01-01 RX ADMIN — PANTOPRAZOLE SODIUM 40 MG: 40 TABLET, DELAYED RELEASE ORAL at 09:36

## 2021-01-01 RX ADMIN — PREGABALIN 75 MG: 75 CAPSULE ORAL at 09:36

## 2021-01-01 RX ADMIN — OXYCODONE HYDROCHLORIDE AND ACETAMINOPHEN 1 TABLET: 5; 325 TABLET ORAL at 17:16

## 2021-01-01 RX ADMIN — SODIUM BICARBONATE 1300 MG: 650 TABLET ORAL at 17:22

## 2021-01-01 RX ADMIN — CARVEDILOL 6.25 MG: 6.25 TABLET, FILM COATED ORAL at 08:29

## 2021-01-01 RX ADMIN — METOCLOPRAMIDE 10 MG: 10 TABLET ORAL at 08:18

## 2021-01-01 RX ADMIN — FENTANYL CITRATE 25 MCG: 50 INJECTION, SOLUTION INTRAMUSCULAR; INTRAVENOUS at 09:27

## 2021-01-01 RX ADMIN — DEXAMETHASONE 4 MG: 4 TABLET ORAL at 10:38

## 2021-01-01 RX ADMIN — PREGABALIN 25 MG: 25 CAPSULE ORAL at 15:27

## 2021-01-01 RX ADMIN — BUMETANIDE 2 MG: 0.25 INJECTION INTRAMUSCULAR; INTRAVENOUS at 17:28

## 2021-01-01 RX ADMIN — INSULIN GLARGINE 30 UNITS: 100 INJECTION, SOLUTION SUBCUTANEOUS at 21:33

## 2021-01-01 RX ADMIN — INSULIN LISPRO 3 UNITS: 100 INJECTION, SOLUTION INTRAVENOUS; SUBCUTANEOUS at 06:06

## 2021-01-01 RX ADMIN — HYDRALAZINE HYDROCHLORIDE 50 MG: 50 TABLET ORAL at 17:53

## 2021-01-01 RX ADMIN — CARVEDILOL 6.25 MG: 6.25 TABLET, FILM COATED ORAL at 08:41

## 2021-01-01 RX ADMIN — HYDRALAZINE HYDROCHLORIDE 50 MG: 50 TABLET, FILM COATED ORAL at 09:07

## 2021-01-01 RX ADMIN — INSULIN LISPRO 2 UNITS: 100 INJECTION, SOLUTION INTRAVENOUS; SUBCUTANEOUS at 13:12

## 2021-01-01 RX ADMIN — DEXAMETHASONE 4 MG: 4 TABLET ORAL at 17:24

## 2021-01-01 RX ADMIN — Medication 10 ML: at 17:08

## 2021-01-01 RX ADMIN — SEVELAMER CARBONATE 800 MG: 800 TABLET, FILM COATED ORAL at 12:09

## 2021-01-01 RX ADMIN — INSULIN LISPRO 2 UNITS: 100 INJECTION, SOLUTION INTRAVENOUS; SUBCUTANEOUS at 00:16

## 2021-01-06 NOTE — TELEPHONE ENCOUNTER
Last visit:12/28/20  Next visit: 4/26/21  Previous refill 12/6/20(30+4R)     (90+0R)    Requested Prescriptions     Pending Prescriptions Disp Refills    sevelamer carbonate (RENVELA) 800 mg tab tab 90 Tab 0     Sig: Take 1 Tab by mouth three (3) times daily (with meals).  carvediloL (COREG) 6.25 mg tablet 60 Tab 0     Sig: Take 1 Tab by mouth two (2) times daily (with meals).

## 2021-01-06 NOTE — TELEPHONE ENCOUNTER
----- Message from [address] Mccloud sent at 1/6/2021  9:18 AM EST -----  Regarding: Dr. Yanelis Hernandez  Medication Refill    Caller (if not patient): N/A      Relationship of caller (if not patient): N/A      Best contact number(s): [phone]      Name of medication and dosage if known: sevelamer carbonate (RENVELA) 800 mg tab      Is patient out of this medication (yes/no): Yes      Pharmacy name: [facility] listed in chart? (yes/no): Yes  Pharmacy phone number:      Details to clarify the request: Requesting refill for Renvela 800 mg rx be sent to [facility] DR BRETT CUEVAS on file.        Gerhardt Levels Done

## 2021-01-26 NOTE — PROGRESS NOTES
Cassidy Kirkland is a 64 y.o. male here for 3 month follow up for recurrent met thyroid carcinoma. 1. Have you been to the ER, urgent care clinic since your last visit? Hospitalized since your last visit? 12/3/20 - 12/6/20 N/V (hyperkalemia/renal failure) States he was in hospital 2 more time for same issues. He was in a coma for 4 days. 2. Have you seen or consulted any other health care providers outside of the [address] [place] [address] since your last visit? Include any pap smears or colon screening. No     Pt has had multiple visits to ER. See above. Starting new treatment on Monday at [facility] for chronic kidney disease.

## 2021-01-31 NOTE — PROGRESS NOTES
[address] [facility] [address] [facility], [address] [facility]  [phone]        Follow-Up Note        Patient: Concha Goldsmith MRN: [id number]  SSN: [id number]    YOB: 1960  Age: 64 y.o. Sex: male      Subjective:      Concha Goldsmith is a 64 y.o. 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 He was diagnosed with papillary thyroid carcinoma in 2002. He underwent a total thyroidectomy by Dr. Melissa Laguna. This was followed by STRATTON ablation. He has been on TSH suppression since that time. He is relatively asymptomatic. Last serum thyroglobulin obtained in Feb 2018 is within normal range. His voice has been hoarse for some time. A CT showed numerous small lung nodules which is increasing only slightly in size. He feels well today with no complaints. He denies bone pains, dyspnea, weight loss.         Review of Systems:    Constitutional: negative  Eyes: negative  Ears, Nose, Mouth, Throat, and Face: hoarseness  Respiratory: negative  Cardiovascular: negative  Gastrointestinal: negative  Genitourinary:negative  Integument/Breast: negative  Hematologic/Lymphatic: negative  Musculoskeletal:negative  Neurological: negative        Past Medical History:   Diagnosis Date    Adverse effect of anesthesia     \" STOP BREATHING 1 TIME C ANESTH\"    Cancer ([address] 2004    thyroid cancer    Chronic kidney disease     Chronic pain     BACK SHOULDER AND ARM    Depression     Diabetes ([address]     Encounter for long-term (current) use of NSAIDs     Hypercholesterolemia     Hypertension     Nausea & vomiting     Other ill-defined conditions(799.89)     cholesterol, thyroid    Sleep apnea     doesn't wear cpap    Thyroid cancer ([address]     TIA (transient ischemic attack) 2011    Vitamin D deficiency      Past Surgical History:   Procedure Laterality Date    HX HEENT      THROAT SURGERY X 4  HX ORTHOPAEDIC      back     HX ORTHOPAEDIC      ARM AND SHOULDER    HX OTHER SURGICAL      thyroid, lymphnode    HX RETINAL DETACHMENT REPAIR      left eye    IR INSERT NON TUNL CVC OVER 5 YRS  2020    IR INSERT TUNL CVC W/O PORT OVER 5 YR  2020    [place] CARDIAC SURG PROCEDURE UNLIST      UPPER GI ENDOSCOPY,BALL DIL,30MM  2020         UPPER GI ENDOSCOPY,BIOPSY  2020         VASCULAR SURGERY PROCEDURE UNLIST      cardiac cath NEG. Family History   Problem Relation Age of Onset    Diabetes Mother     Elevated Lipids Mother    Angie Lopez Bladder Disease Mother     Headache Mother    [facility] Migraines Mother     Heart Disease Mother     Hypertension Mother     Stroke Mother     Other Mother         ANEURSYM BRAIN    Bleeding Prob Father     Cancer Father         LEUKEMIA    Diabetes Father     Elevated Lipids Father     Mental Retardation Sister     Psychiatric Disorder Sister     Cancer Brother         LUNGS     Social History     Tobacco Use    Smoking status: Former Smoker     Quit date: 1994     Years since quittin.4    Smokeless tobacco: Never Used   Substance Use Topics    Alcohol use: Yes     Comment: Occasionally      Prior to Admission medications    Medication Sig Start Date End Date Taking? Authorizing Provider   hydrALAZINE (APRESOLINE) 25 mg tablet Take 25 mg by mouth three (3) times daily. Yes Provider, Historical   sodium zirconium cyclosilicate (Lokelma) 5 gram powder packet Take  by mouth. Yes Provider, Historical   sevelamer carbonate (RENVELA) 800 mg tab tab Take 1 Tab by mouth three (3) times daily (with meals). 21  Yes Declan Nye MD   carvediloL (COREG) 6.25 mg tablet Take 1 Tab by mouth two (2) times daily (with meals). 21  Yes Declan Nye MD   omeprazole (PRILOSEC) 20 mg capsule Take 1 Cap by mouth daily.  20  Yes Declan yNe MD   olmesartan (BENICAR) 20 mg tablet Take 1 tablet by mouth once daily 12/15/20  Yes Usha Frederick MD FLORES   metoclopramide HCl (REGLAN) 5 mg tablet TAKE 1 TABLET BY MOUTH ONCE DAILY AS NEEDED. DO NOT TAKE MORE THAN 3 TABLETS A DAY. 12/1/20  Yes Jennifer Tee MD   Vitamin D2 1,250 mcg (50,000 unit) capsule TAKE 1 CAPSULE BY MOUTH ONCE A WEEK 9/29/20  Yes Tal, Alisia   levothyroxine (SYNTHROID) 200 mcg tablet TAKE 1 TABLET BY MOUTH ONCE DAILY BEFORE BREAKFAST 11/18/20  Yes Katey Dill MD   amLODIPine (NORVASC) 5 mg tablet Take 1 Tab by mouth daily. 9/29/20  Yes Jennifer Tee MD   acetaminophen (TYLENOL) 325 mg tablet Take 325 mg by mouth as needed for Pain. Take tab as needed for pain 9/15/20  Yes Jennifer Tee MD   Blood-Glucose Meter monitoring kit 1 preferred brand glucometer for checking home glucose, E11.22 7/30/20  Yes Bimal Salazar MD   glucose blood VI test strips (blood glucose test) strip Pharmacist to choose preferred meter and strips. Dx:E11.65, Z79.4 Monitor 3 times daily 7/30/20  Yes Bimal Salazar MD   insulin NPH (HumuLIN N NPH U-100 Insulin) 100 unit/mL injection 2-10 units daily  Patient taking differently: 2-10 units daily    He is doing 10 units BID as of last few months 9/23/2020 7/30/20  Yes Bimal Salazar MD   Insulin Syringe-Needle U-100 (Advocate Syringes) 0.3 mL 30 gauge x 5/16\" syrg 1 Each by Does Not Apply route daily. 7/30/20  Yes Bimal Salazar MD   simvastatin (ZOCOR) 20 mg tablet Take 1 Tab by mouth nightly. 7/28/20  Yes Jennifer Tee MD   Ventolin HFA 90 mcg/actuation inhaler TAKE 1-2 PUFFS EVEERY 4-6 HOURS AS NEEDED FOR DYSPNEA AND WHEEZING 7/28/20  Yes Jennifer Tee MD   glucose blood VI test strips (PHARMACIST CHOICE) strip One Touch  Check glucose 3-4 times daily. DX E11.22 2/2/18  Yes Katey Dill MD   Lancets misc Use preferred brand; Check glucose 3-4 times daily, Diagnosis E11.22 2/2/18  Yes Katey Dill MD   sucralfate (CARAFATE) 1 gram tablet  9/30/20   Provider, Historical   OXYGEN-AIR DELIVERY SYSTEMS Take 2.5 L/min by inhalation continuous.  8/29/20 Provider, Historical              Allergies   Allergen Reactions    Anesthetics - Amide Type - Select Amino Amides Shortness of Breath    Flexeril [Cyclobenzaprine] Hives    Tramadol Hives           Objective:     Visit Vitals  BP (!) 176/72 (BP 1 Location: Left arm, BP Patient Position: Sitting)   Pulse 63   Temp 98.8 °F (37.1 °C) (Temporal)   Ht 5' 9\" (1.753 m)   Wt 224 lb 9.6 oz (101.9 kg)   SpO2 96%   BMI 33.17 kg/m²       Pain Scale: 0 - No pain/10  Pain Location:       Physical Exam:    GENERAL: alert, cooperative, no distress, appears stated age  EYE: negative  LYMPHATIC: Cervical, supraclavicular, and axillary nodes normal.   THROAT & NECK: normal and no erythema or exudates noted. Scar from thyroidectomy. LUNG: clear to auscultation bilaterally  HEART: regular rate and rhythm  ABDOMEN: soft, non-tender  EXTREMITIES:  no edema  SKIN: Normal.  NEUROLOGIC: negative      Physical exam and ROS has been modified from a prior visit to make it relevant and current      CT Results (most recent):  Results from Hospital Encounter encounter on 01/08/21   CT CHEST WO CONT    Narrative INDICATION: Thyroid carcinoma, lung metastases. COMPARISON: August 16, 2020    CONTRAST: None. TECHNIQUE:  5 mm axial images were obtained through the . Coronal and sagittal  reformats were generated. CT dose reduction was achieved through use of a  standardized protocol tailored for this examination and automatic exposure  control for dose modulation. The absence of intravenous contrast reduces the sensitivity for evaluation of  the mediastinum, ben, vasculature, and upper abdominal organs. FINDINGS:    CHEST WALL: No mass or axillary lymphadenopathy. THYROID: No nodule. MEDIASTINUM: No mass or lymphadenopathy. BEN: No mass or lymphadenopathy. THORACIC AORTA: No aneurysm. MAIN PULMONARY ARTERY: Normal in caliber. TRACHEA/BRONCHI: Patent. ESOPHAGUS: No wall thickening or dilatation.   HEART: Normal in size.  PLEURA: No effusion or pneumothorax. LUNGS: Comparison is difficult. The multiple bilateral infiltrates have  resolved. The pleural effusion have also resolved. There are multiple bilateral  lung nodules likely related to metastases. Right lung base nodule measures 17  mm, Seri 4 image 66. Left lung base nodule measures 14 mm, Seri 4 image 55  INCIDENTALLY IMAGED UPPER ABDOMEN: No significant abnormality in the  incidentally imaged upper abdomen. BONES: No destructive bone lesion. Impression IMPRESSION:    1. Resolution of pleural effusion and multiple bilateral infiltrate. 2. Multiple bilateral pulmonary nodules. Comparison impossible due to the  presence on the prior examination with multiple bilateral acute infiltrate as  well as pleural effusions. I personally reviewed the images. Essentially stable pulmonary nodules. Assessment:     1. 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   Radio-iodine refractory               BRAF mutation - detected              MSI - stable       ECOG PS 0  Intent of treatment - palliative      2002 Biopsy suggesting PTC                        S/p total thyroidectomy                        S/p STRATTON  [id number]  Reports negative WBS  10/10/16  Repeat surgery, 2 right paratrachial LN's                        Surgical Path: poorly differentiated PTC                        Patient with hoarseness of voice, persistent                          On 200mcg LT4    11/30/16 Tg 10.1, TgAb negative, TSH 1.93  12/14/16 Neck Ultrasound: \"Thyroid bed is empty, no anterior cervical mass or significant adenopathy\"  01/18/17 Thyrogen stimulated WBS: \"No evidence of uptake in thyroid bed, No evidence of metastatic disease\"    He has never been on any of the approved TKI   CT shows numerous lung nodules. He is asymptomatic from lung nodules  Continue observation     Symptom management form reviewed with patient.       Plan:       > TSH suppression with Levothyroxine > Observation  > CT Chest prior to next office visit  > Follow-up in 6 months  > NGS - Foundation one liquid CDx - looking for RET alteration      Signed by: Senia Rawls MD                     January 31, 2021        CC. Velinda Leyden, MD  CC.  Jackson Leyva MD

## 2021-02-23 NOTE — PROGRESS NOTES
[facility] [place] Consult Note:  Arrived - 1500    Patient denied having any symptoms of COVID-19, i.e. SOB, coughing, fever, or generally not feeling well. Also denies having been exposed to COVID-19 recently or having had any recent contact with family/friend that has a pending COVID test.    Visit Vitals  BP (!) 146/67 (BP 1 Location: Left upper arm, BP Patient Position: Sitting)   Pulse 73   Temp 97.6 °F (36.4 °C)   Resp 18   Wt 105.3 kg (232 lb 3.2 oz)   SpO2 96%   BMI 34.29 kg/m²     8 week labs drawn per orders- CBC w/o diff, Renal Panel, Ferritin, Iron Profile, and Uric Acid.      Recent Results (from the past 12 hour(s))   CBC W/O DIFF    Collection Time: 02/23/21  3:00 PM   Result Value Ref Range    WBC 9.5 4.1 - 11.1 K/uL    RBC 2.47 (L) 4.10 - 5.70 M/uL    HGB 7.4 (L) 12.1 - 17.0 g/dL    HCT 23.2 (L) 36.6 - 50.3 %    MCV 93.9 80.0 - 99.0 FL    MCH 30.0 26.0 - 34.0 PG    MCHC 31.9 30.0 - 36.5 g/dL    RDW 14.4 11.5 - 14.5 %    PLATELET 837 093 - 496 K/uL    MPV 10.0 8.9 - 12.9 FL    NRBC 0.0 0  WBC    ABSOLUTE NRBC 0.00 0.00 - 0.01 K/uL   RENAL FUNCTION PANEL    Collection Time: 02/23/21  3:00 PM   Result Value Ref Range    Sodium 138 136 - 145 mmol/L    Potassium 4.9 3.5 - 5.1 mmol/L    Chloride 108 97 - 108 mmol/L    CO2 20 (L) 21 - 32 mmol/L    Anion gap 10 5 - 15 mmol/L    Glucose 94 65 - 100 mg/dL    BUN 59 (H) 6 - 20 MG/DL    Creatinine 4.51 (H) 0.70 - 1.30 MG/DL    BUN/Creatinine ratio 13 12 - 20      GFR est AA 16 (L) >60 ml/min/1.73m2    GFR est non-AA 13 (L) >60 ml/min/1.73m2    Calcium 7.4 (L) 8.5 - 10.1 MG/DL    Phosphorus 6.5 (H) 2.6 - 4.7 MG/DL    Albumin 3.1 (L) 3.5 - 5.0 g/dL   URIC ACID    Collection Time: 02/23/21  3:00 PM   Result Value Ref Range    Uric acid 6.8 3.5 - 7.2 MG/DL   SAMPLES BEING HELD    Collection Time: 02/23/21  3:00 PM   Result Value Ref Range    SAMPLES BEING HELD 1 EXTRA PST     COMMENT        Add-on orders for these samples will be processed based on acceptable specimen integrity and analyte stability, which may vary by analyte. Iron Profile and Ferritin pending at time of note. See [facility] for results. Assessment unremarkable except dyspnea with exertion, weakness, and fatigue. Hgb 7.4    Retacrit 40,000 units SQ slowly in left arm. 1540 - Tolerated well. Pt denies any acute problems/changes. Discharged from [facility] ambulatory. No distress. Next appt: 3/23/21 @ 1500.

## 2021-03-09 NOTE — PROGRESS NOTES
945- Pt arrived to [facility] in no acute distress for Retacrit + Feraheme Dose 1/1. Assessment unremarkable. IV established in left AC without issue and positive blood return noted. Patient denied having any symptoms of COVID-19, i.e. SOB, coughing, fever, or generally not feeling well. Also denies having been exposed to COVID-19 recently or having had any recent contact with family/friend that has a pending COVID test.    Labs obtained - Hemocue  Recent Results (from the past 12 hour(s))   POC HEMOGLOBIN    Collection Time: 03/09/21  9:55 AM   Result Value Ref Range    Hemoglobin (POC) 8.7 (L) 12.1 - 17.0 g/dL       The following medications administered:  Feraheme 510 mg IV over 15 mins  Retacrit 40,000 units SQ to right arm    Patient Vitals for the past 12 hrs:   Temp Pulse Resp BP SpO2   03/09/21 1058 -- 62 18 (!) 167/72 --   03/09/21 0947 97.5 °F (36.4 °C) 68 18 (!) 169/73 96 %       1100- Pt tolerated treatment well. Patient monitored for 30 mins post infusion and no adverse reactions noted. IV flushed per policy and removed, 2x2 and coban placed. Pt discharged ambulatory in no acute distress, accompanied by self. Next appointment 3/23/21.

## 2021-03-23 NOTE — PROGRESS NOTES
[facility] VISIT NOTE    6602  Pt arrived at [facility] ambulatory and in no distress for Retacrit. Assessment completed, pt denies any complaints today. Blood pressure (!) 182/75, pulse 66, temperature 97.5 °F (36.4 °C), resp. rate 16, weight 107.4 kg (236 lb 12.8 oz), SpO2 96 %. Recent Results (from the past 12 hour(s))   POC HEMOGLOBIN    Collection Time: 03/23/21  2:59 PM   Result Value Ref Range    Hemoglobin (POC) 7.3 (L) 12.1 - 17.0 g/dL     Lab results are within treatment parameters. Medications received:  Retacrit SQ left upper arm    Tolerated treatment well, no adverse reaction noted. 1505  D/C'd from [facility] ambulatory and in no distress. Next appointment is 4/6/21 at 1500. Patient denied having any symptoms of COVID-19, i.e. SOB, coughing, fever, or generally not feeling well.   Also denies having been exposed to COVID-19 recently or having had any recent contact with family/friend that has a pending COVID test.

## 2021-04-05 NOTE — DISCHARGE INSTRUCTIONS
IMPRESSION     1. Bibasilar nodular infiltrate with increased size of pulmonary nodules in both  lung bases since prior study and small new pleural effusions. 2. Cholelithiasis without biliary ductal dilatation. 3. Diverticulosis without diverticulitis. 4. Other incidental findings.

## 2021-04-05 NOTE — ED PROVIDER NOTES
EMERGENCY DEPARTMENT HISTORY AND PHYSICAL EXAM      Date: 4/5/2021  Patient Name: Lorraine Garvin    History of Presenting Illness     Chief Complaint   Patient presents with    Flank Pain     bilateral flank pain that began last night, states that this feels like pain he's had before related to his renal failure. Stopped dialysis in October 2020. Also dizzy       History Provided By: Patient    HPI: Lorraine Garvin, 63 y.o. male presents to the ED with cc of flank pain. 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 Patient reports kidney pain which began last night. Started at rest. Pain is bilateral, located in back, no radiation. Pain dull pain. Tylenol without relief. Still makes urine, no hematuria. Associated with dry heaves. No chest pain or shortness of breath. Reports feeling \"cold. \"     Patient reports this morning developed headache. Patient described as \"tension headache\" that is bifrontal and gradually worsening. There are no other complaints, changes, or physical findings at this time. PCP: George Rubi MD    No current facility-administered medications on file prior to encounter. Current Outpatient Medications on File Prior to Encounter   Medication Sig Dispense Refill    amLODIPine (NORVASC) 5 mg tablet Take 1 tablet by mouth once daily 30 Tab 0    hydrALAZINE (APRESOLINE) 25 mg tablet Take 25 mg by mouth three (3) times daily.  sodium zirconium cyclosilicate (Lokelma) 5 gram powder packet Take  by mouth.  sevelamer carbonate (RENVELA) 800 mg tab tab Take 1 Tab by mouth three (3) times daily (with meals). 90 Tab 3    carvediloL (COREG) 6.25 mg tablet Take 1 Tab by mouth two (2) times daily (with meals). 60 Tab 3    omeprazole (PRILOSEC) 20 mg capsule Take 1 Cap by mouth daily.  90 Cap 1    olmesartan (BENICAR) 20 mg tablet Take 1 tablet by mouth once daily 30 Tab 4    metoclopramide HCl (REGLAN) 5 mg tablet TAKE 1 TABLET BY MOUTH ONCE DAILY AS NEEDED. DO NOT TAKE MORE THAN 3 TABLETS A DAY. 30 Tab 0    Vitamin D2 1,250 mcg (50,000 unit) capsule TAKE 1 CAPSULE BY MOUTH ONCE A WEEK      sucralfate (CARAFATE) 1 gram tablet       levothyroxine (SYNTHROID) 200 mcg tablet TAKE 1 TABLET BY MOUTH ONCE DAILY BEFORE BREAKFAST 90 Tab 3    acetaminophen (TYLENOL) 325 mg tablet Take 325 mg by mouth as needed for Pain. Take tab as needed for pain      OXYGEN-AIR DELIVERY SYSTEMS Take 2.5 L/min by inhalation continuous.  Blood-Glucose Meter monitoring kit 1 preferred brand glucometer for checking home glucose, E11.22 1 Kit 0    glucose blood VI test strips (blood glucose test) strip Pharmacist to choose preferred meter and strips. Dx:E11.65, Z79.4 Monitor 3 times daily 300 Strip 3    insulin NPH (HumuLIN N NPH U-100 Insulin) 100 unit/mL injection 2-10 units daily (Patient taking differently: 2-10 units daily    He is doing 10 units BID as of last few months 9/23/2020) 3 Vial 3    Insulin Syringe-Needle U-100 (Advocate Syringes) 0.3 mL 30 gauge x 5/16\" syrg 1 Each by Does Not Apply route daily. 90 Syringe 3    simvastatin (ZOCOR) 20 mg tablet Take 1 Tab by mouth nightly. 90 Tab 3    Ventolin HFA 90 mcg/actuation inhaler TAKE 1-2 PUFFS EVEERY 4-6 HOURS AS NEEDED FOR DYSPNEA AND WHEEZING 1 Inhaler 2    glucose blood VI test strips (PHARMACIST CHOICE) strip One Touch  Check glucose 3-4 times daily. DX E11.22 300 Strip 3    Lancets misc Use preferred brand;  Check glucose 3-4 times daily, Diagnosis E11.22 2 Package 3       Past History     Past Medical History:  Past Medical History:   Diagnosis Date    Adverse effect of anesthesia     \" STOP BREATHING 1 TIME C ANESTH\"    Cancer ([facility] [address] 2004    thyroid cancer    Chronic kidney disease     Chronic pain     BACK SHOULDER AND ARM    Depression     Diabetes ([address]     Encounter for long-term (current) use of NSAIDs     Hypercholesterolemia     Hypertension     Nausea & vomiting     Other ill-defined conditions(799.89)     cholesterol, thyroid    Sleep apnea     doesn't wear cpap    Thyroid cancer ([address]     TIA (transient ischemic attack)     Vitamin D deficiency        Past Surgical History:  Past Surgical History:   Procedure Laterality Date    HX HEENT      THROAT SURGERY X 4    HX ORTHOPAEDIC      back     HX ORTHOPAEDIC      ARM AND SHOULDER    HX OTHER SURGICAL      thyroid, lymphnode    HX RETINAL DETACHMENT REPAIR      left eye    IR INSERT NON TUNL CVC OVER 5 YRS  2020    IR INSERT TUNL CVC W/O PORT OVER 5 YR  2020    IN CARDIAC SURG PROCEDURE UNLIST      UPPER GI ENDOSCOPY,BALL DIL,30MM  2020         UPPER GI ENDOSCOPY,BIOPSY  2020         VASCULAR SURGERY PROCEDURE UNLIST      cardiac cath NEG. Family History:  Family History   Problem Relation Age of Onset    Diabetes Mother     Elevated Lipids Mother    Maebelle Cowboy Bladder Disease Mother     Headache Mother     Migraines Mother     Heart Disease Mother     Hypertension Mother     Stroke Mother     Other Mother         ANEURSYM BRAIN    Bleeding Prob Father     Cancer Father         LEUKEMIA    Diabetes Father     Elevated Lipids Father     Mental Retardation Sister     Psychiatric Disorder Sister     Cancer Brother         LUNGS       Social History:  Social History     Tobacco Use    Smoking status: Former Smoker     Quit date: 1994     Years since quittin.6    Smokeless tobacco: Never Used   Substance Use Topics    Alcohol use: Yes     Comment: Occasionally    Drug use: No       Allergies: Allergies   Allergen Reactions    Anesthetics - Amide Type - Select Amino Amides Shortness of Breath    Flexeril [Cyclobenzaprine] Hives    Tramadol Hives         Review of Systems   Review of Systems   Constitutional: Positive for chills. Negative for fever.    HENT: Negative for voice change. Eyes: Positive for pain. Negative for redness. Respiratory: Negative for cough and chest tightness. Cardiovascular: Negative for chest pain and leg swelling. Gastrointestinal: Negative for abdominal pain, diarrhea, nausea and vomiting. Genitourinary: Positive for flank pain. Negative for hematuria. Musculoskeletal: Negative for gait problem. Skin: Negative for color change, pallor and rash. Neurological: Positive for headaches. Negative for facial asymmetry and weakness. Hematological: Does not bruise/bleed easily. Psychiatric/Behavioral: Negative for behavioral problems. All other systems reviewed and are negative. Physical Exam   Physical Exam  Vitals signs and nursing note reviewed. Constitutional:       Comments: 21-year-old male, resting in bed, no acute distress   HENT:      Head: Normocephalic and atraumatic. Nose: Nose normal.      Mouth/Throat:      Mouth: Mucous membranes are moist.   Eyes:      Pupils: Pupils are equal, round, and reactive to light. Neck:      Musculoskeletal: Normal range of motion. No neck rigidity. Cardiovascular:      Rate and Rhythm: Normal rate and regular rhythm. Pulses: Normal pulses. Heart sounds: No murmur. No friction rub. No gallop. Pulmonary:      Effort: Pulmonary effort is normal.      Breath sounds: Normal breath sounds. No wheezing, rhonchi or rales. Comments: Saturating 92% on room air  Abdominal:      General: Abdomen is flat. There is no distension. Palpations: Abdomen is soft. Tenderness: There is no abdominal tenderness. There is right CVA tenderness. There is no left CVA tenderness. Musculoskeletal: Normal range of motion. Right lower leg: No edema. Left lower leg: No edema. Comments: Tenderness below the left kidney, left paraspinal musculature   Skin:     General: Skin is warm and dry. Capillary Refill: Capillary refill takes less than 2 seconds. Neurological:      General: No focal deficit present. Mental Status: He is alert. Sensory: No sensory deficit. Psychiatric:         Mood and Affect: Mood normal.         Diagnostic Study Results     Labs -     Recent Results (from the past 12 hour(s))   URINALYSIS W/ REFLEX CULTURE    Collection Time: 04/05/21 12:56 PM    Specimen: Urine   Result Value Ref Range    Color YELLOW/STRAW      Appearance CLEAR CLEAR      Specific gravity 1.013 1.003 - 1.030      pH (UA) 6.5 5.0 - 8.0      Protein 300 (A) NEG mg/dL    Glucose Negative NEG mg/dL    Ketone Negative NEG mg/dL    Bilirubin Negative NEG      Blood SMALL (A) NEG      Urobilinogen 0.2 0.2 - 1.0 EU/dL    Nitrites Negative NEG      Leukocyte Esterase Negative NEG      WBC 0-4 0 - 4 /hpf    RBC 10-20 0 - 5 /hpf    Epithelial cells FEW FEW /lpf    Bacteria Negative NEG /hpf    UA:UC IF INDICATED CULTURE NOT INDICATED BY UA RESULT CNI      Hyaline cast 0-2 0 - 5 /lpf   CBC WITH AUTOMATED DIFF    Collection Time: 04/05/21 12:56 PM   Result Value Ref Range    WBC 7.2 4.1 - 11.1 K/uL    RBC 3.19 (L) 4.10 - 5.70 M/uL    HGB 9.5 (L) 12.1 - 17.0 g/dL    HCT 30.3 (L) 36.6 - 50.3 %    MCV 95.0 80.0 - 99.0 FL    MCH 29.8 26.0 - 34.0 PG    MCHC 31.4 30.0 - 36.5 g/dL    RDW 14.8 (H) 11.5 - 14.5 %    PLATELET 598 284 - 398 K/uL    MPV 11.0 8.9 - 12.9 FL    NRBC 0.0 0  WBC    ABSOLUTE NRBC 0.00 0.00 - 0.01 K/uL    NEUTROPHILS 79 (H) 32 - 75 %    LYMPHOCYTES 3 (L) 12 - 49 %    MONOCYTES 11 5 - 13 %    EOSINOPHILS 6 0 - 7 %    BASOPHILS 1 0 - 1 %    IMMATURE GRANULOCYTES 0 0.0 - 0.5 %    ABS. NEUTROPHILS 5.7 1.8 - 8.0 K/UL    ABS. LYMPHOCYTES 0.2 (L) 0.8 - 3.5 K/UL    ABS. MONOCYTES 0.8 0.0 - 1.0 K/UL    ABS. EOSINOPHILS 0.4 0.0 - 0.4 K/UL    ABS. BASOPHILS 0.1 0.0 - 0.1 K/UL    ABS. IMM.  GRANS. 0.0 0.00 - 0.04 K/UL    DF SMEAR SCANNED      RBC COMMENTS NORMOCYTIC, NORMOCHROMIC     METABOLIC PANEL, COMPREHENSIVE    Collection Time: 04/05/21 12:56 PM   Result Value Ref Range    Sodium 135 (L) 136 - 145 mmol/L    Potassium 5.1 3.5 - 5.1 mmol/L    Chloride 108 97 - 108 mmol/L    CO2 19 (L) 21 - 32 mmol/L    Anion gap 8 5 - 15 mmol/L    Glucose 71 65 - 100 mg/dL    BUN 81 (H) 6 - 20 MG/DL    Creatinine 4.98 (H) 0.70 - 1.30 MG/DL    BUN/Creatinine ratio 16 12 - 20      GFR est AA 15 (L) >60 ml/min/1.73m2    GFR est non-AA 12 (L) >60 ml/min/1.73m2    Calcium 8.6 8.5 - 10.1 MG/DL    Bilirubin, total 0.4 0.2 - 1.0 MG/DL    ALT (SGPT) 29 12 - 78 U/L    AST (SGOT) 13 (L) 15 - 37 U/L    Alk. phosphatase 94 45 - 117 U/L    Protein, total 7.1 6.4 - 8.2 g/dL    Albumin 3.3 (L) 3.5 - 5.0 g/dL    Globulin 3.8 2.0 - 4.0 g/dL    A-G Ratio 0.9 (L) 1.1 - 2.2     SARS-COV-2    Collection Time: 04/05/21  4:03 PM   Result Value Ref Range    SARS-CoV-2 Please find results under separate order         Radiologic Studies -   CT ABD PELV WO CONT   Final Result      1. Bibasilar nodular infiltrate with increased size of pulmonary nodules in both   lung bases since prior study and small new pleural effusions. 2. Cholelithiasis without biliary ductal dilatation. 3. Diverticulosis without diverticulitis. 4. Other incidental findings. CT Results  (Last 48 hours)               04/05/21 1408  CT ABD PELV WO CONT Final result    Impression:      1. Bibasilar nodular infiltrate with increased size of pulmonary nodules in both   lung bases since prior study and small new pleural effusions. 2. Cholelithiasis without biliary ductal dilatation. 3. Diverticulosis without diverticulitis. 4. Other incidental findings. Narrative:  EXAM: CT ABD PELV WO CONT       INDICATION: Bilateral flank pain, r/o nephrolithiasis. Thyroid cancer. end-stage   renal disease, stopped dialysis October 2020. COMPARISON: 7/17/2020       CONTRAST:  None. TECHNIQUE:    Thin axial images were obtained through the abdomen and pelvis. Coronal and   sagittal reformats were generated.  Oral contrast was not administered. CT dose   reduction was achieved through use of a standardized protocol tailored for this   examination and automatic exposure control for dose modulation. The absence of intravenous contrast material reduces the sensitivity for   evaluation of the vasculature and solid organs. FINDINGS:    LOWER THORAX: Bibasilar atelectasis and interstitial infiltrate with nodules   which have increased slightly in size since the prior study. The largest in the   right lower lobe measures 1.8 cm, compared to a similar measurement of 1.6 cm on   the prior study. Small pleural [id number] are new bilaterally. A left lingular   nodule measures 1.7 cm, compared to a similar measurement of 0.9 cm on the prior   study. LIVER: No mass. BILIARY TREE: The gallbladder is normally distended with high density material   layering dependently, consistent with calculi. CBD is not dilated. SPLEEN: within normal limits. PANCREAS: No focal abnormality. ADRENALS: Stable cyst or adenoma in the left adrenal.   KIDNEYS/URETERS: No calculus or hydronephrosis. Heavy vascular calcification. Mild diffuse renal cortical thinning. STOMACH: Unremarkable. SMALL BOWEL: No dilatation or wall thickening. COLON: No dilatation or wall thickening. Numerous diverticula without associated   inflammatory change. APPENDIX: Normal.   PERITONEUM: No ascites or pneumoperitoneum. RETROPERITONEUM: No lymphadenopathy or aortic aneurysm. Heavy arterial   calcification. REPRODUCTIVE ORGANS: Unremarkable for age. URINARY BLADDER: No mass or calculus. BONES: No destructive bone lesion. Old healed right rib fractures. ABDOMINAL WALL: No mass or hernia. ADDITIONAL COMMENTS: N/A               CXR Results  (Last 48 hours)    None          Medical Decision Making   I am the first provider for this patient.     I reviewed the vital signs, available nursing notes, past medical history, past surgical history, family history and social history. Vital Signs-Reviewed the patient's vital signs. Patient Vitals for the past 12 hrs:   Pulse Resp BP SpO2   04/05/21 1254 -- -- -- 98 %   04/05/21 1235 78 20 (!) 199/74 98 %     Records Reviewed: Nursing Notes and Old Medical Records    Provider Notes (Medical Decision Making):     45-year-old male with history as above presents emergency department with bilateral flank pain. Vitals notable for oxygen saturation of 92%. Patient denies any associated symptoms of shortness of breath, chest pain or cough, he states his oxygen level normally runs 93 to 94%. He does have a history of metastatic thyroid cancer with lung nodules. Offered work-up for this, patient declined as he is in no distress, patient declined additional work-up. Regarding patient's pain, left greater than right. Will check basic labs as differential includes nephrolithiasis, pyelonephritis, NERY, musculoskeletal causes. Doubt cauda equina. Considered referred flank pain for PE, but given I am able to reproduce patient's pain, believe this is less likely. Given location of patient's pain, doubt atypical ACS. Headache is not concerning based on history for infection, subarachnoid hemorrhage or mass lesion. Will medicated with oxycodone for pain. ED Course:   Initial assessment performed. The patients presenting problems have been discussed, and they are in agreement with the care plan formulated and outlined with them. I have encouraged them to ask questions as they arise throughout their visit. ED Course as of Apr 05 2040   Mon Apr 05, 2021   1540 CT shows lung nodules likely secondary to patient's thyroid cancer. Otherwise negative. [MB]   5111 Patient reassessed, resting comfortably in bed, although his pulse ox remains 92%, I suspect this may be secondary to his metastatic thyroid cancer. He is comfortable discharge, as he has no risk for symptoms.   Will discharge with Covid given myalgias and feeling \"hot and cold. \"  Strict return precautions provided. [MB]   1550 Patient formed of cholelithiasis, no right upper quadrant tenderness, doubt cholecystitis    [MB]      ED Course User Index  [MB] MD Amy Ding MD      Disposition:    Discharged    DISCHARGE PLAN:  1. Discharge Medication List as of 4/5/2021  3:50 PM      START taking these medications    Details   oxyCODONE IR (Roxicodone) 5 mg immediate release tablet Take 1 Tab by mouth every six (6) hours as needed for Pain for up to 3 days. Max Daily Amount: 20 mg., Normal, Disp-12 Tab, R-0         CONTINUE these medications which have NOT CHANGED    Details   amLODIPine (NORVASC) 5 mg tablet Take 1 tablet by mouth once daily, Normal, Disp-30 Tab, R-0      hydrALAZINE (APRESOLINE) 25 mg tablet Take 25 mg by mouth three (3) times daily. , Historical Med      sodium zirconium cyclosilicate (Lokelma) 5 gram powder packet Take  by mouth., Historical Med      sevelamer carbonate (RENVELA) 800 mg tab tab Take 1 Tab by mouth three (3) times daily (with meals). , Normal, Disp-90 Tab, R-3      carvediloL (COREG) 6.25 mg tablet Take 1 Tab by mouth two (2) times daily (with meals). , Normal, Disp-60 Tab, R-3      omeprazole (PRILOSEC) 20 mg capsule Take 1 Cap by mouth daily. , Normal, Disp-90 Cap, R-1      olmesartan (BENICAR) 20 mg tablet Take 1 tablet by mouth once daily, Normal, Disp-30 Tab, R-4      metoclopramide HCl (REGLAN) 5 mg tablet TAKE 1 TABLET BY MOUTH ONCE DAILY AS NEEDED. DO NOT TAKE MORE THAN 3 TABLETS A DAY., Normal, Disp-30 Tab,R-0      Vitamin D2 1,250 mcg (50,000 unit) capsule TAKE 1 CAPSULE BY MOUTH ONCE A WEEK, Historical Med, LISA      sucralfate (CARAFATE) 1 gram tablet Historical Med      levothyroxine (SYNTHROID) 200 mcg tablet TAKE 1 TABLET BY MOUTH ONCE DAILY BEFORE BREAKFAST, Normal, Disp-90 Tab,R-3      acetaminophen (TYLENOL) 325 mg tablet Take 325 mg by mouth as needed for Pain.  Take tab as needed for pain, Historical Med      OXYGEN-AIR DELIVERY SYSTEMS Take 2.5 L/min by inhalation continuous. , Historical Med      Blood-Glucose Meter monitoring kit 1 preferred brand glucometer for checking home glucose, E11.22, Normal, Disp-1 Kit,R-0      !! glucose blood VI test strips (blood glucose test) strip Pharmacist to choose preferred meter and strips. Dx:E11.65, Z79.4 Monitor 3 times daily, Normal, Disp-300 Strip,R-3      insulin NPH (HumuLIN N NPH U-100 Insulin) 100 unit/mL injection 2-10 units daily, Normal, Disp-3 Vial,R-3      Insulin Syringe-Needle U-100 (Advocate Syringes) 0.3 mL 30 gauge x 5/16\" syrg 1 Each by Does Not Apply route daily. , Normal, Disp-90 Syringe,R-3      simvastatin (ZOCOR) 20 mg tablet Take 1 Tab by mouth nightly., Normal, Disp-90 Tab,R-3      Ventolin HFA 90 mcg/actuation inhaler TAKE 1-2 PUFFS EVEERY 4-6 HOURS AS NEEDED FOR DYSPNEA AND WHEEZING, Normal, Disp-1 Inhaler,R-2,LISA      !! glucose blood VI test strips (PHARMACIST CHOICE) strip One Touch  Check glucose 3-4 times daily. DX E11.22, Normal, Disp-300 Strip, R-3      Lancets misc Use preferred brand; Check glucose 3-4 times daily, Diagnosis E11.22, Normal, Disp-2 Package, R-3       !! - Potential duplicate medications found. Please discuss with provider. 2.   Follow-up Information     Follow up With Specialties Details Why Contact Info    George Rubi MD Internal Medicine In 3 days  [address]  [address] [facility]  [phone]          3. Return to ED if worse     Diagnosis     Clinical Impression:   1. Flank pain    2. Chronic kidney disease, unspecified CKD stage    3. Lung nodules        Attestations:    Bettina Walter MD    Please note that this dictation was completed with [employer] the [employer] voice recognition software. Quite often unanticipated grammatical, syntax, homophones, and other interpretive errors are inadvertently transcribed by the computer software. Please disregard these errors. Please excuse any errors that have escaped final proofreading. Thank you.

## 2021-04-06 NOTE — PROGRESS NOTES
Patient contacted regarding Ranjeet Blanco. [facility] ED 21 dx-flank pain, CKD, lung nodule (flank pain)    Discussed COVID-19 related testing which was pending at this time. Test results were pending. Patient informed of results, if available? Pending     pt stated he is nauseated. He is taking the pain med and will call Dr Thang Ennis and Dr Je Lira. Ambulatory Care Manager contacted the patient by telephone to perform post discharge assessment. Call within 2 business days of discharge: Yes Verified name and  with patient as identifiers. Provided introduction to self, and explanation of the CTN/ACM role, and reason for call due to risk factors for infection and/or exposure to COVID-19. Symptoms reviewed with patient who verbalized the following symptoms: nausea, no new symptoms and no worsening symptoms      Due to no new or worsening symptoms encounter was not routed to provider for escalation. Discussed follow-up appointments. If no appointment was previously scheduled, appointment scheduling offered:  yes   [facility] follow up appointment(s):   Future Appointments   Date Time Provider Ness Roblesi   2021  3:00 PM [facility] CHAIR 2 [address] REG   2021  9:10 AM Evelia Wu MD RDE YUL[address]NA [address] [facility]   2021 11:10 AM Juan Hoffman MD [facility]   2021  1:00 PM Lori Giron MD ONC BS [facility]     Non-[facility] follow up appointment(s): n/a     Advance Care Planning:   Does patient have an Advance Directive:  reviewed and current. Patient has following risk factors of: immunocompromised, diabetes, chronic kidney disease and obesity, HTN. ACM reviewed discharge instructions, medical action plan and red flags such as increased shortness of breath, increasing fever and signs of decompensation with patient who verbalized understanding. Discussed exposure protocols and quarantine with CDC Guidelines What to do if you are sick with coronavirus disease .  Patient was given an opportunity for questions and concerns. The patient agrees to contact the [facility] exposure line [phone], local health department n/a and PCP office for questions related to their healthcare. ACM provided contact information for future needs. Reviewed and educated patient on any new and changed medications related to discharge diagnosis     Was patient discharged with a pulse oximeter? no Discussed and confirmed pulse oximeter discharge instructions and when to notify provider or seek emergency care. Patient/family/caregiver given information for [facility] and agrees to enroll yes  Patient's preferred e-mail: text   Patient's preferred phone number: [phone]  Based on Loop alert triggers, patient will be contacted by nurse care manager for worsening symptoms. Pt will be further monitored by COVID Loop Team based on severity of symptoms and risk factors.

## 2021-04-06 NOTE — PROGRESS NOTES
The patient was called for notification of a POSITIVE test result for COVID-19. The following information was given to the patient:       The COVID-19 test result was positive   Mild and stable symptoms are managed at home     Treatment of coronavirus does not require an antibiotic   Remain isolated for 10 days since symptoms first appeared AND at least 3 days have passed after recovery     Recovery is defined as resolution of fever without the use of fever-reducing medications with progressive improvement or resolution of other symptoms     Wash hands often with soap and water for at least 20 seconds or alternatively use hand  with at least 60% alcohol content   Cover coughs and sneezes   Wear a mask when around others if possible   Clean all high-touch surfaces every day, such as doorknobs and cellphones   Continually monitor symptoms.  Contact your medical provider if symptoms are worsening, such as difficulty breathing

## 2021-04-12 NOTE — PROGRESS NOTES
**DUE TO PANDEMIC AND HEALTH CONCERNS IN THE COMMUNITY, THIS PATIENT WAS EITHER ILL OR FOUND TO BE HIGH RISK FOR IN-PERSON EVALUATION WITHIN THE CLINIC. THE FOLLOWING IS A VIRTUAL TELEMEDICINE VIDEO ENCOUNTER VIA [employer], TO WHICH THE PATIENT AGREED. THE PURPOSE IS TO LIMIT INTERRUPTIONS IN HEALTHCARE AND TO PROVIDE FOR ONGOING URGENT NEEDS UNDER THE CURRENT CONDITIONS. CHIEF COMPLAINT: f/u evaluation for uncontrolled type 2 diabetes and thyroid cancer s/p thyroidectomy and STRATTON 
 
HISTORY OF PRESENT ILLNESS:  
Olivia Mg is a 63 y.o. male with a PMHx as noted below who presents to the endocrinology clinic for f/u evaluation of uncontrolled type 2 diabetes. Diabetes type 2: 
Advanced kidney disease, Was taken off glipizide and metformin in the hospital 
Was sent home on NPH: we adjusted to 12 units with breakfast and 7-8 units with dinner Reported home blood sugars: 
AM: , not dropping <75 Dinner: 127 average reported, Review of most recent diabetes-related labs: 
Lab Results Component Value Date HBA1C 7.6 (H) 08/15/2020 HBA1C 6.3 (H) 07/18/2020 HBA1C 7.4 (H) 05/02/2019 [id number] 8.0 01/31/2020 RHF4HSOF 9.2 07/05/2018 [id number] 8.8 02/02/2018 CHOL 125 09/23/2020 LDLC 62 09/23/2020 GFRAA 15 (L) 04/05/2021 GFRNA 12 (L) 04/05/2021 MCACR 910 (H) 07/29/2020 TSH 0.042 (L) 12/28/2020 VITD3 44.1 12/05/2020 549111 = IA-2 pancreatic islet cell autoantibody GADLT = CLAIRE-65 autoantibody MCACR = Urine Microalbumin 
 
-------------------------------------------------- Thyroid Cancer:  
 
2002 Biopsy suggesting PTC 
 S/p total thyroidectomy S/p STRATTON 
[id number]  Reports negative WBS 
10/10/16  Repeat surgery, 2 right paratrachial LN's 
 Surgical Path: poorly differentiated PTC Patient with hoarseness of voice, persistent Continued on 200mcg LT4 
11/29/16  Initial visit with me for thyroid cancer and diabetes 11/30/16 Tg 10.1, TgAb negative, TSH 1.93 (patient notes this is the lowest Tg level compared with prior) 12/14/16 Neck Ultrasound: \"Thyroid bed is empty, no anterior cervical mass or significant adenopathy\" 01/18/17 Thyrogen stimulated WBS: \"No evidence of uptake in thyroid bed, No evidence of metastatic disease\" 11/08/17 Thyroid US, no residual thyroid tissue, no pathologic adenopathy. 01/31/18: TSH 2.2 on 200 mcg LT4,  Tg/TgAb was not collected 02/02/18: Tg 24.4 unstimulated, TgAb <1  (led to further eval below) 02/21/18: I-131 WBS: no uptake in thyroid, normal physiologic update otherwise noted. 03/22/18: CT Neck/chest/abdomen: Multiple pulm nodules, some larger some smaller. 03/23/18: Oncology consultation w/ Dr. Alvarez 48, agreed on monitoring asymptomatic disease 04/04/18: NM Bone Scan: No evidence of bony metastases. 09/14/18: CT Chest: Multiple bilateral pulmonary nodules, not significantly changed in size /number. 09/27/18: Tg 11.7 unstimulated, stable, TgAb <1.0, TSH 0.01, FT4 1.65 
11/17/20: Tg 13.8 unstimulated, TgAb <1.0 Denies symptoms of hyperthyroidism, feels palpitations at time, Advised 200 mcg 6 days/week, and 1/2 tab once per week, 
Prev he was taking a full tab 7 days per week, and we reminded him to reduce it. Purposeful TSH suppression in setting of metastatic thyroid cancer Has seen Oncology 1/27/21 conservative management / observation with Dr. Alvarez 48, No recent thyroglobulin level for review, Review of most recent thyroid function: 
Lab Results Component Value Date TSH 0.042 (L) 12/28/2020 TSH 0.048 (L) 09/23/2020 TSH 0.02 (L) 07/17/2020 FT4 1.2 07/17/2020 FT4 1.55 01/31/2020 FT4 1.65 09/27/2018 FRET3 2.1 (L) 10/16/2016 THYG 13.8 11/17/2020 THYG 11.7 09/27/2018 THYG 13.6 07/10/2018 TGAB <1.0 11/17/2020 TGAB <1.0 09/27/2018 TGAB <1.0 07/10/2018 Thyroid Lab Key: 
TSILT = Thyroid stimulating antibodies TRALT = TSH Receptor Antibodies TMCLT = TPO antibodies T3LT = Total T3 levels [id number] = Direct FT4 
[id number] = Free T3 
 
------------ Male hypogonadism:  
Testosterone gel, 1 pump daily, Taking, No recent levels, PAST MEDICAL/SURGICAL HISTORY:  
Past Medical History:  
Diagnosis Date  Adverse effect of anesthesia \" STOP BREATHING 1 TIME C ANESTH\"  Cancer [facility]) 2004 thyroid cancer  Chronic kidney disease  Chronic pain BACK SHOULDER AND ARM  Depression  Diabetes ([facility] [address]  Encounter for long-term (current) use of NSAIDs  Hypercholesterolemia  Hypertension  Nausea & vomiting  Other ill-defined conditions(799.89) cholesterol, thyroid  Sleep apnea   
 doesn't wear cpap  Thyroid cancer ([facility] [address]  TIA (transient ischemic attack) 2011  Vitamin D deficiency Past Surgical History:  
Procedure Laterality Date  HX HEENT    
 THROAT SURGERY X 4  
 HX ORTHOPAEDIC    
 back  HX ORTHOPAEDIC    
 ARM AND SHOULDER  
 HX OTHER SURGICAL    
 thyroid, lymphnode  HX RETINAL DETACHMENT REPAIR    
 left eye  IR INSERT NON TUNL CVC OVER 5 YRS  8/18/2020  IR INSERT TUNL CVC W/O PORT OVER 5 YR  8/26/2020  NC CARDIAC SURG PROCEDURE UNLIST  UPPER GI ENDOSCOPY,BALL DIL,30MM  7/17/2020  UPPER GI ENDOSCOPY,BIOPSY  7/17/2020  VASCULAR SURGERY PROCEDURE UNLIST    
 cardiac cath NEG. ALLERGIES:  
Allergies Allergen Reactions  Anesthetics - Amide Type - Select Amino Amides Shortness of Breath  Flexeril [Cyclobenzaprine] Hives  Tramadol Hives MEDICATIONS ON ADMISSION:  
 
Current Outpatient Medications:  
  calcitRIOL (ROCALTROL) 0.25 mcg capsule, TAKE 1 CAPSULE BY MOUTH ONCE DAILY IN THE MORNING, Disp: , Rfl:  
  hydrALAZINE (APRESOLINE) 50 mg tablet, TAKE 1 TABLET BY MOUTH THREE TIMES DAILY, Disp: , Rfl:  
  amLODIPine (NORVASC) 5 mg tablet, Take 1 tablet by mouth once daily, Disp: 30 Tab, Rfl: 0 
  sodium zirconium cyclosilicate (Lokelma) 5 gram powder packet, Take  by mouth., Disp: , Rfl:  
  sevelamer carbonate (RENVELA) 800 mg tab tab, Take 1 Tab by mouth three (3) times daily (with meals). , Disp: 90 Tab, Rfl: 3 
  carvediloL (COREG) 6.25 mg tablet, Take 1 Tab by mouth two (2) times daily (with meals). , Disp: 60 Tab, Rfl: 3 
  omeprazole (PRILOSEC) 20 mg capsule, Take 1 Cap by mouth daily. , Disp: 90 Cap, Rfl: 1 
  olmesartan (BENICAR) 20 mg tablet, Take 1 tablet by mouth once daily, Disp: 30 Tab, Rfl: 4 
  metoclopramide HCl (REGLAN) 5 mg tablet, TAKE 1 TABLET BY MOUTH ONCE DAILY AS NEEDED. DO NOT TAKE MORE THAN 3 TABLETS A DAY., Disp: 30 Tab, Rfl: 0 
  Vitamin D2 1,250 mcg (50,000 unit) capsule, TAKE 1 CAPSULE BY MOUTH ONCE A WEEK, Disp: , Rfl:  
  sucralfate (CARAFATE) 1 gram tablet, , Disp: , Rfl:  
  levothyroxine (SYNTHROID) 200 mcg tablet, TAKE 1 TABLET BY MOUTH ONCE DAILY BEFORE BREAKFAST, Disp: 90 Tab, Rfl: 3 
  insulin NPH (HumuLIN N NPH U-100 Insulin) 100 unit/mL injection, 2-10 units daily (Patient taking differently: 2-10 units daily  He is doing 10 units BID as of last few months 9/23/2020), Disp: 3 Vial, Rfl: 3 
  simvastatin (ZOCOR) 20 mg tablet, Take 1 Tab by mouth nightly., Disp: 90 Tab, Rfl: 3 
  hydrALAZINE (APRESOLINE) 25 mg tablet, Take 25 mg by mouth three (3) times daily. , Disp: , Rfl:  
  acetaminophen (TYLENOL) 325 mg tablet, Take 325 mg by mouth as needed for Pain. Take tab as needed for pain, Disp: , Rfl:  
  OXYGEN-AIR DELIVERY SYSTEMS, Take 2.5 L/min by inhalation continuous. , Disp: , Rfl:   Blood-Glucose Meter monitoring kit, 1 preferred brand glucometer for checking home glucose, E11.22, Disp: 1 Kit, Rfl: 0 
  glucose blood VI test strips (blood glucose test) strip, Pharmacist to choose preferred meter and strips. Dx:E11.65, Z79.4 Monitor 3 times daily, Disp: 300 Strip, Rfl: 3 
  Insulin Syringe-Needle U-100 ([employer]ocate Syringes) 0.3 mL 30 gauge x 5/16\" syrg, 1 Each by Does Not Apply route daily. , Disp: 90 Syringe, Rfl: 3   Ventolin HFA 90 mcg/actuation inhaler, TAKE 1-2 PUFFS EVEERY 4-6 HOURS AS NEEDED FOR DYSPNEA AND WHEEZING, Disp: 1 Inhaler, Rfl: 2 
  glucose blood VI test strips (PHARMACIST CHOICE) strip, One Touch  Check glucose 3-4 times daily. DX E11.22, Disp: 300 Strip, Rfl: 3 
  Lancets misc, Use preferred brand; Check glucose 3-4 times daily, Diagnosis E11.22, Disp: 2 Package, Rfl: 3 SOCIAL HISTORY:  
Social History Socioeconomic History  Marital status:  Spouse name: Not on file  Number of children: Not on file  Years of education: Not on file  Highest education level: Not on file Occupational History  Not on file Social Needs  Financial resource strain: Not on file  Food insecurity Worry: Not on file Inability: Not on file  Transportation needs Medical: Not on file Non-medical: Not on file Tobacco Use  Smoking status: Former Smoker Quit date: 1994 Years since quittin.6  Smokeless tobacco: Never Used Substance and Sexual Activity  Alcohol use: Yes Comment: Occasionally  Drug use: No  
 Sexual activity: Never Lifestyle  Physical activity Days per week: Not on file Minutes per session: Not on file  Stress: Not on file Relationships  Social connections Talks on phone: Not on file Gets together: Not on file Attends Caodaism service: Not on file Active member of club or organization: Not on file Attends meetings of clubs or organizations: Not on file Relationship status: Not on file  Intimate partner violence Fear of current or ex partner: Not on file Emotionally abused: Not on file Physically abused: Not on file Forced sexual activity: Not on file Other Topics Concern  Not on file Social History Narrative  Not on file FAMILY HISTORY: 
Family History Problem Relation Age of Onset  Diabetes Mother  Elevated Lipids Mother Nina Bird Bladder Disease Mother  Headache Mother  Migraines Mother  Heart Disease Mother  Hypertension Mother  Stroke Mother Mercy Baig Other Mother ANEURSYM BRAIN  
 Bleeding Prob Father  Cancer Father LEUKEMIA  Diabetes Father  Elevated Lipids Father  Mental Retardation Sister  Psychiatric Disorder Sister  Cancer Brother LUNGS REVIEW OF SYSTEMS: Complete ROS assessed and noted for that which is described above, all else are negative. Eyes: normal 
ENT: hoarseness of voice CVS: normal 
Resp: normal 
GI: normal 
: normal 
GYN: normal 
Endocrine: normal 
Integument: normal 
Musculoskeletal: chronic pain Neuro: normal 
Psych: normal 
 
PHYSICAL EXAMINATION: 
Telemedicine Visit GENERAL: NCAT, Appears well nourished, laying in bed EYES: EOMI, non-icteric, no proptosis Ear/Nose/Throat: NCAT, no visible inflammation or masses, hoarseness of voice CARDIOVASCULAR: no cyanosis, no visible JVD RESPIRATORY: comfortable respirations observed, no cyanosis MUSCULOSKELETAL: Normal ROM of upper extremities observed SKIN: No edema, rash, or other significant changes observed NEUROLOGIC:  AAOx3 PSYCHIATRIC: Normal affect, Normal insight and judgement REVIEW OF LABORATORY AND RADIOLOGY DATA:  
Labs and documentation have been reviewed as described above. ASSESSMENT AND PLAN:  
Gurjit Pascal is a 63 y.o. male with a PMHx as noted above who presents to the endocrinology clinic for f/u evaluation of uncontrolled type 2 diabetes and thyroid cancer. DM2 uncontrolled HTN 
HLD Thyroid cancer with active metastases Post surgical hypothyroidism Male Hypogonadism DM2: 
Metformin off due to renal function Glipizide off per hospital discharge NPH insulin: 12 units in the AM, 7 units in the evening This dose is keeping sugars stable without hypoglycemia Will update diabetes panel, he will obtain after recovers from covid HTN: telemedicine visit HLD: simvastatin 20mg Male Hypogonadism: Off testosterone, levels/PSA stable without, does not need it at this time. Total 469, Free T 16. Vitamin D deficiency: On D2 50,000 units weekly Secondary hyperparathyroidism as per 12/5/20 labs; PTH elevated with low calcium, setting of advanced kidney disease. Advised to inquire about reasonable calcium replacement with nephrology. He is following with Dr. Basilia Avila Thyroid Cancer / Hypothyroidism Though we desire TSH suppression, TSH remains lower than needed and is symptomatic, Reduce Levothyroxine to 175 mcg 6 days/week, and 1/2 tab only once per/wk (Sundays), Conservative treatment with surveillance and TSH suppression, Following with oncology, LABS: Thyroid panel ordered. Diabetes panel ordered. At his convenience when recovers from covid 20 minutes spent toward telemedicine visit today of which >50% of this time was spent in counseling and coordination of care. Trinh Pruitt. [id number] [facility]

## 2021-05-10 NOTE — PROGRESS NOTES
Chief Complaint   Patient presents with    Follow-up     HPI:  Gloria Joseph is a 63 y.o. male with h/o hypertension, diabetes type 2, hypercholesterolemia, severe hypothyroidism, metastatic thyroid cancer presents for follow up. Recently recovered from COVID infection. He is doing better. He has no complaints. Review of Systems  As per hpi    Past Medical History:   Diagnosis Date    Adverse effect of anesthesia     \" STOP BREATHING 1 TIME C ANESTH\"    Cancer ([facility]     thyroid cancer    Chronic kidney disease     Chronic pain     BACK SHOULDER AND ARM    Depression     Diabetes ([address]     Encounter for long-term (current) use of NSAIDs     Hypercholesterolemia     Hypertension     Nausea & vomiting     Other ill-defined conditions(799.89)     cholesterol, thyroid    Sleep apnea     doesn't wear cpap    Thyroid cancer ([facility] [address] [facility]     TIA (transient ischemic attack)     Vitamin D deficiency      Past Surgical History:   Procedure Laterality Date    HX HEENT      THROAT SURGERY X 4    HX ORTHOPAEDIC      back     HX ORTHOPAEDIC      ARM AND SHOULDER    HX OTHER SURGICAL      thyroid, lymphnode    HX RETINAL DETACHMENT REPAIR      left eye    IR INSERT NON TUNL CVC OVER 5 YRS  2020    IR INSERT TUNL CVC W/O PORT OVER 5 YR  2020    DE CARDIAC SURG PROCEDURE UNLIST      UPPER GI ENDOSCOPY,BALL DIL,30MM  2020         UPPER GI ENDOSCOPY,BIOPSY  2020         VASCULAR SURGERY PROCEDURE UNLIST      cardiac cath NEG.      Social History     Socioeconomic History    Marital status:      Spouse name: Not on file    Number of children: Not on file    Years of education: Not on file    Highest education level: Not on file   Tobacco Use    Smoking status: Former Smoker     Quit date: 1994     Years since quittin.7    Smokeless tobacco: Never Used   Substance and Sexual Activity    Alcohol use: Yes     Comment: Occasionally    Drug use: No    Sexual activity: Never     Family History   Problem Relation Age of Onset    Diabetes Mother     Elevated Lipids Mother    Lionel Franco Bladder Disease Mother     Headache Mother    Dauphin Bars Migraines Mother     Heart Disease Mother     Hypertension Mother     Stroke Mother     Other Mother         ANEURSYM BRAIN    Bleeding Prob Father     Cancer Father         LEUKEMIA    Diabetes Father     Elevated Lipids Father     Mental Retardation Sister     Psychiatric Disorder Sister     Cancer Brother         LUNGS     Current Outpatient Medications   Medication Sig Dispense Refill    amLODIPine (NORVASC) 5 mg tablet Take 1 tablet by mouth once daily 30 Tab 0    calcitRIOL (ROCALTROL) 0.25 mcg capsule TAKE 1 CAPSULE BY MOUTH ONCE DAILY IN THE MORNING      levothyroxine (SYNTHROID) 175 mcg tablet 1 full tab Mon-Sat but only 1/2 tab on sundays 90 Tab 3    sodium zirconium cyclosilicate (Lokelma) 5 gram powder packet Take  by mouth.  sevelamer carbonate (RENVELA) 800 mg tab tab Take 1 Tab by mouth three (3) times daily (with meals). 90 Tab 3    carvediloL (COREG) 6.25 mg tablet Take 1 Tab by mouth two (2) times daily (with meals). 60 Tab 3    omeprazole (PRILOSEC) 20 mg capsule Take 1 Cap by mouth daily. 90 Cap 1    olmesartan (BENICAR) 20 mg tablet Take 1 tablet by mouth once daily 30 Tab 4    metoclopramide HCl (REGLAN) 5 mg tablet TAKE 1 TABLET BY MOUTH ONCE DAILY AS NEEDED. DO NOT TAKE MORE THAN 3 TABLETS A DAY. 30 Tab 0    Vitamin D2 1,250 mcg (50,000 unit) capsule TAKE 1 CAPSULE BY MOUTH ONCE A WEEK      sucralfate (CARAFATE) 1 gram tablet       acetaminophen (TYLENOL) 325 mg tablet Take 325 mg by mouth as needed for Pain. Take tab as needed for pain      OXYGEN-AIR DELIVERY SYSTEMS Take 2.5 L/min by inhalation continuous.       Blood-Glucose Meter monitoring kit 1 preferred brand glucometer for checking home glucose, E11.22 1 Kit 0    glucose blood VI test strips (blood glucose test) strip Pharmacist to choose preferred meter and strips. Dx:E11.65, Z79.4 Monitor 3 times daily 300 Strip 3    insulin NPH (HumuLIN N NPH U-100 Insulin) 100 unit/mL injection 2-10 units daily (Patient taking differently: 2-10 units daily    He is doing 10 units BID as of last few months 9/23/2020) 3 Vial 3    Insulin Syringe-Needle U-100 ([employer]ocate Syringes) 0.3 mL 30 gauge x 5/16\" syrg 1 Each by Does Not Apply route daily. 90 Syringe 3    simvastatin (ZOCOR) 20 mg tablet Take 1 Tab by mouth nightly. 90 Tab 3    Ventolin HFA 90 mcg/actuation inhaler TAKE 1-2 PUFFS EVEERY 4-6 HOURS AS NEEDED FOR DYSPNEA AND WHEEZING 1 Inhaler 2    glucose blood VI test strips (PHARMACIST CHOICE) strip One Touch  Check glucose 3-4 times daily. DX E11.22 300 Strip 3    Lancets misc Use preferred brand;  Check glucose 3-4 times daily, Diagnosis E11.22 2 Package 3     Allergies   Allergen Reactions    Anesthetics - Amide Type - Select Amino Amides Shortness of Breath    Flexeril [Cyclobenzaprine] Hives    Tramadol Hives     Objective:  Visit Vitals  BP (!) 151/79   Pulse 72   Temp 97.8 °F (36.6 °C) (Oral)   Resp 20   Ht 5' 9.5\" (1.765 m)   Wt 224 lb (101.6 kg)   SpO2 95%   BMI 32.60 kg/m²     Physical Exam:   General appearance - alert, well appearing in no distress  Mental status - alert, oriented to person, place, and time  Neck - supple, no significant adenopathy   Chest - clear to auscultation, no wheezes, rales or rhonchi  Heart - normal rate, regular rhythm, no murmurs  Abdomen - soft, nontender, nondistended, no organomegaly  Ext-peripheral pulses normal, no pedal edema  Neuro -no focal findings  Back-full range of motion, no tenderness, palpable spasm or pain on motion     Results for orders placed or performed during the hospital encounter of 04/05/21   URINALYSIS W/ REFLEX CULTURE    Specimen: Urine   Result Value Ref Range    Color YELLOW/STRAW      Appearance CLEAR CLEAR      Specific gravity 1.013 1.003 - 1.030      pH (UA) 6.5 5.0 - 8.0 Protein 300 (A) NEG mg/dL    Glucose Negative NEG mg/dL    Ketone Negative NEG mg/dL    Bilirubin Negative NEG      Blood SMALL (A) NEG      Urobilinogen 0.2 0.2 - 1.0 EU/dL    Nitrites Negative NEG      Leukocyte Esterase Negative NEG      WBC 0-4 0 - 4 /hpf    RBC 10-20 0 - 5 /hpf    Epithelial cells FEW FEW /lpf    Bacteria Negative NEG /hpf    UA:UC IF INDICATED CULTURE NOT INDICATED BY UA RESULT CNI      Hyaline cast 0-2 0 - 5 /lpf   CBC WITH AUTOMATED DIFF   Result Value Ref Range    WBC 7.2 4.1 - 11.1 K/uL    RBC 3.19 (L) 4.10 - 5.70 M/uL    HGB 9.5 (L) 12.1 - 17.0 g/dL    HCT 30.3 (L) 36.6 - 50.3 %    MCV 95.0 80.0 - 99.0 FL    MCH 29.8 26.0 - 34.0 PG    MCHC 31.4 30.0 - 36.5 g/dL    RDW 14.8 (H) 11.5 - 14.5 %    PLATELET 271 494 - 247 K/uL    MPV 11.0 8.9 - 12.9 FL    NRBC 0.0 0  WBC    ABSOLUTE NRBC 0.00 0.00 - 0.01 K/uL    NEUTROPHILS 79 (H) 32 - 75 %    LYMPHOCYTES 3 (L) 12 - 49 %    MONOCYTES 11 5 - 13 %    EOSINOPHILS 6 0 - 7 %    BASOPHILS 1 0 - 1 %    IMMATURE GRANULOCYTES 0 0.0 - 0.5 %    ABS. NEUTROPHILS 5.7 1.8 - 8.0 K/UL    ABS. LYMPHOCYTES 0.2 (L) 0.8 - 3.5 K/UL    ABS. MONOCYTES 0.8 0.0 - 1.0 K/UL    ABS. EOSINOPHILS 0.4 0.0 - 0.4 K/UL    ABS. BASOPHILS 0.1 0.0 - 0.1 K/UL    ABS. IMM. GRANS. 0.0 0.00 - 0.04 K/UL    DF SMEAR SCANNED      RBC COMMENTS NORMOCYTIC, NORMOCHROMIC     METABOLIC PANEL, COMPREHENSIVE   Result Value Ref Range    Sodium 135 (L) 136 - 145 mmol/L    Potassium 5.1 3.5 - 5.1 mmol/L    Chloride 108 97 - 108 mmol/L    CO2 19 (L) 21 - 32 mmol/L    Anion gap 8 5 - 15 mmol/L    Glucose 71 65 - 100 mg/dL    BUN 81 (H) 6 - 20 MG/DL    Creatinine 4.98 (H) 0.70 - 1.30 MG/DL    BUN/Creatinine ratio 16 12 - 20      GFR est AA 15 (L) >60 ml/min/1.73m2    GFR est non-AA 12 (L) >60 ml/min/1.73m2    Calcium 8.6 8.5 - 10.1 MG/DL    Bilirubin, total 0.4 0.2 - 1.0 MG/DL    ALT (SGPT) 29 12 - 78 U/L    AST (SGOT) 13 (L) 15 - 37 U/L    Alk.  phosphatase 94 45 - 117 U/L    Protein, total 7.1 6.4 - 8.2 g/dL    Albumin 3.3 (L) 3.5 - 5.0 g/dL    Globulin 3.8 2.0 - 4.0 g/dL    A-G Ratio 0.9 (L) 1.1 - 2.2     SARS-COV-2   Result Value Ref Range    SARS-CoV-2 Please find results under separate order     SARS-COV-2   Result Value Ref Range    Specimen source Nasopharyngeal      SARS-CoV-2 Detected (AA) NOTD       Assessment/Plan:  Diagnoses and all orders for this visit:    Essential hypertension with goal blood pressure less than 130/80  -     carvediloL (COREG) 6.25 mg tablet; Take 1 Tab by mouth two (2) times daily (with meals). , Normal, Disp-60 Tab, R-3  -     hydrALAZINE (APRESOLINE) 50 mg tablet; Take 1 Tab by mouth two (2) times a day., Normal, Disp-60 Tab, R-3  -     amLODIPine (NORVASC) 10 mg tablet; Take 1 Tab by mouth daily. , Normal, Disp-30 Tab, R-5  -     olmesartan (BENICAR) 20 mg tablet; Take 1 tablet by mouth once daily, Normal, Disp-30 Tab, R-5  -     METABOLIC PANEL, COMPREHENSIVE; Future  -     CBC W/O DIFF; Future    Nausea in adult  -     metoclopramide HCl (REGLAN) 5 mg tablet; TAKE 1 TABLET BY MOUTH ONCE DAILY AS NEEDED. DO NOT TAKE MORE THAN 3 TABLETS A DAY., Normal, Disp-30 Tab, R-2    Dyslipidemia (high LDL; low HDL)  -     simvastatin (ZOCOR) 20 mg tablet; Take 1 Tab by mouth nightly., Normal, Disp-90 Tab, R-3  -     LIPID PANEL; Future    CKD (chronic kidney disease) stage 4, GFR 15-29 ml/min ([facility])  -     METABOLIC PANEL, COMPREHENSIVE; Future    Severe obesity (BMI 35.0-39. 9) with comorbidity ([facility])    Vitamin D deficiency  -     Vitamin D2 1,250 mcg (50,000 unit) capsule; TAKE 1 CAPSULE BY MOUTH ONCE A WEEK, Normal, Disp-4 Cap, R-5, LISA    Gastroesophageal reflux disease with esophagitis without hemorrhage  -     omeprazole (PRILOSEC) 20 mg capsule; Take 1 Cap by mouth daily. , Normal, Disp-90 Cap, R-1    Normochromic normocytic anemia  -     CBC W/O DIFF;  Future    Controlled type 2 diabetes mellitus with diabetic nephropathy, without long-term current use of insulin ([facility]  -     METABOLIC PANEL, COMPREHENSIVE; Future  -     HEMOGLOBIN A1C WITH EAG; Future    Patient Instructions        DASH Diet: Care Instructions  Your Care Instructions     The DASH diet is an eating plan that can help lower your blood pressure. DASH stands for Dietary Approaches to Stop Hypertension. Hypertension is high blood pressure. The DASH diet focuses on eating foods that are high in calcium, potassium, and magnesium. These nutrients can lower blood pressure. The foods that are highest in these nutrients are fruits, vegetables, low-fat dairy products, nuts, seeds, and legumes. But taking calcium, potassium, and magnesium supplements instead of eating foods that are high in those nutrients does not have the same effect. The DASH diet also includes whole grains, fish, and poultry. The DASH diet is one of several lifestyle changes your doctor may recommend to lower your high blood pressure. Your doctor may also want you to decrease the amount of sodium in your diet. Lowering sodium while following the DASH diet can lower blood pressure even further than just the DASH diet alone. Follow-up care is a key part of your treatment and safety. Be sure to make and go to all appointments, and call your doctor if you are having problems. It's also a good idea to know your test results and keep a list of the medicines you take. How can you care for yourself at home? Following the DASH diet  · Eat 4 to 5 servings of fruit each day. A serving is 1 medium-sized piece of fruit, ½ cup chopped or canned fruit, 1/4 cup dried fruit, or 4 ounces (½ cup) of fruit juice. Choose fruit more often than fruit juice. · Eat 4 to 5 servings of vegetables each day. A serving is 1 cup of lettuce or raw leafy vegetables, ½ cup of chopped or cooked vegetables, or 4 ounces (½ cup) of vegetable juice. Choose vegetables more often than vegetable juice. · Get 2 to 3 servings of low-fat and fat-free dairy each day.  A serving is 8 ounces of milk, 1 cup of yogurt, or 1 ½ ounces of cheese. · Eat 6 to 8 servings of grains each day. A serving is 1 slice of bread, 1 ounce of dry cereal, or ½ cup of cooked rice, pasta, or cooked cereal. Try to choose whole-grain products as much as possible. · Limit lean meat, poultry, and fish to 2 servings each day. A serving is 3 ounces, about the size of a deck of cards. · Eat 4 to 5 servings of nuts, seeds, and legumes (cooked dried beans, lentils, and split peas) each week. A serving is 1/3 cup of nuts, 2 tablespoons of seeds, or ½ cup of cooked beans or peas. · Limit fats and oils to 2 to 3 servings each day. A serving is 1 teaspoon of vegetable oil or 2 tablespoons of salad dressing. · Limit sweets and added sugars to 5 servings or less a week. A serving is 1 tablespoon jelly or jam, ½ cup sorbet, or 1 cup of lemonade. · Eat less than 2,300 milligrams (mg) of sodium a day. If you limit your sodium to 1,500 mg a day, you can lower your blood pressure even more. · Be aware that all of these are the suggested number of servings for people who eat 1,800 to 2,000 calories a day. Your recommended number of servings may be different if you need more or fewer calories. Tips for success  · Start small. Do not try to make dramatic changes to your diet all at once. You might feel that you are missing out on your favorite foods and then be more likely to not follow the plan. Make small changes, and stick with them. Once those changes become habit, add a few more changes. · Try some of the following:  ? Make it a goal to eat a fruit or vegetable at every meal and at snacks. This will make it easy to get the recommended amount of fruits and vegetables each day. ? Try yogurt topped with fruit and nuts for a snack or healthy dessert. ? Add lettuce, tomato, cucumber, and onion to sandwiches. ? Combine a ready-made pizza crust with low-fat mozzarella cheese and lots of vegetable toppings.  Try using tomatoes, squash, spinach, broccoli, carrots, cauliflower, and onions. ? Have a variety of cut-up vegetables with a low-fat dip as an appetizer instead of chips and dip. ? Sprinkle sunflower seeds or chopped almonds over salads. Or try adding chopped walnuts or almonds to cooked vegetables. ? Try some vegetarian meals using beans and peas. Add garbanzo or kidney beans to salads. Make burritos and tacos with mashed woody beans or black beans. Where can you learn more? Go to http://www.victor.com/  Enter H967 in the search box to learn more about \"DASH Diet: Care Instructions. \"  Current as of: August 31, 2020               Content Version: 12.8  © 7852-2281 [employer]. Care instructions adapted under license by [employer] (which disclaims liability or warranty for this information). If you have questions about a medical condition or this instruction, always ask your healthcare professional. [place] [address] any warranty or liability for your use of this information. Follow-up and Dispositions    · Return in about 3 months (around 8/10/2021), or if symptoms worsen or fail to improve, for routine follow up.

## 2021-05-10 NOTE — PATIENT INSTRUCTIONS
DASH Diet: Care Instructions  Your Care Instructions     The DASH diet is an eating plan that can help lower your blood pressure. DASH stands for Dietary Approaches to Stop Hypertension. Hypertension is high blood pressure. The DASH diet focuses on eating foods that are high in calcium, potassium, and magnesium. These nutrients can lower blood pressure. The foods that are highest in these nutrients are fruits, vegetables, low-fat dairy products, nuts, seeds, and legumes. But taking calcium, potassium, and magnesium supplements instead of eating foods that are high in those nutrients does not have the same effect. The DASH diet also includes whole grains, fish, and poultry. The DASH diet is one of several lifestyle changes your doctor may recommend to lower your high blood pressure. Your doctor may also want you to decrease the amount of sodium in your diet. Lowering sodium while following the DASH diet can lower blood pressure even further than just the DASH diet alone. Follow-up care is a key part of your treatment and safety. Be sure to make and go to all appointments, and call your doctor if you are having problems. It's also a good idea to know your test results and keep a list of the medicines you take. How can you care for yourself at home? Following the DASH diet  · Eat 4 to 5 servings of fruit each day. A serving is 1 medium-sized piece of fruit, ½ cup chopped or canned fruit, 1/4 cup dried fruit, or 4 ounces (½ cup) of fruit juice. Choose fruit more often than fruit juice. · Eat 4 to 5 servings of vegetables each day. A serving is 1 cup of lettuce or raw leafy vegetables, ½ cup of chopped or cooked vegetables, or 4 ounces (½ cup) of vegetable juice. Choose vegetables more often than vegetable juice. · Get 2 to 3 servings of low-fat and fat-free dairy each day. A serving is 8 ounces of milk, 1 cup of yogurt, or 1 ½ ounces of cheese. · Eat 6 to 8 servings of grains each day.  A serving is 1 slice of bread, 1 ounce of dry cereal, or ½ cup of cooked rice, pasta, or cooked cereal. Try to choose whole-grain products as much as possible. · Limit lean meat, poultry, and fish to 2 servings each day. A serving is 3 ounces, about the size of a deck of cards. · Eat 4 to 5 servings of nuts, seeds, and legumes (cooked dried beans, lentils, and split peas) each week. A serving is 1/3 cup of nuts, 2 tablespoons of seeds, or ½ cup of cooked beans or peas. · Limit fats and oils to 2 to 3 servings each day. A serving is 1 teaspoon of vegetable oil or 2 tablespoons of salad dressing. · Limit sweets and added sugars to 5 servings or less a week. A serving is 1 tablespoon jelly or jam, ½ cup sorbet, or 1 cup of lemonade. · Eat less than 2,300 milligrams (mg) of sodium a day. If you limit your sodium to 1,500 mg a day, you can lower your blood pressure even more. · Be aware that all of these are the suggested number of servings for people who eat 1,800 to 2,000 calories a day. Your recommended number of servings may be different if you need more or fewer calories. Tips for success  · Start small. Do not try to make dramatic changes to your diet all at once. You might feel that you are missing out on your favorite foods and then be more likely to not follow the plan. Make small changes, and stick with them. Once those changes become habit, add a few more changes. · Try some of the following:  ? Make it a goal to eat a fruit or vegetable at every meal and at snacks. This will make it easy to get the recommended amount of fruits and vegetables each day. ? Try yogurt topped with fruit and nuts for a snack or healthy dessert. ? Add lettuce, tomato, cucumber, and onion to sandwiches. ? Combine a ready-made pizza crust with low-fat mozzarella cheese and lots of vegetable toppings. Try using tomatoes, squash, spinach, broccoli, carrots, cauliflower, and onions. ?  Have a variety of cut-up vegetables with a low-fat dip as an appetizer instead of chips and dip. ? Sprinkle sunflower seeds or chopped almonds over salads. Or try adding chopped walnuts or almonds to cooked vegetables. ? Try some vegetarian meals using beans and peas. Add garbanzo or kidney beans to salads. Make burritos and tacos with mashed woody beans or black beans. Where can you learn more? Go to http://www.victor.com/  Enter H967 in the search box to learn more about \"DASH Diet: Care Instructions. \"  Current as of: August 31, 2020               Content Version: 12.8  © 8400-9700 [employer]. Care instructions adapted under license by [employer] (which disclaims liability or warranty for this information). If you have questions about a medical condition or this instruction, always ask your healthcare professional. [address] any warranty or liability for your use of this information.

## 2021-05-26 NOTE — PROGRESS NOTES
Outpatient Infusion Center Short Visit Progress Note    1410 Patient admitted to [facility] for Retacrit and lab ambulatory in stable condition. Assessment completed. No new concerns voiced. Covid Screening      1. Do you have any symptoms of COVID-19? SOB, coughing, fever, or generally not feeling well ? NO  2. Have you been exposed to COVID-19 recently? NO  3. Have you had any recent contact with family/friend that has a pending COVID test? NO    Vital Signs:  Patient Vitals for the past 12 hrs:   Temp Pulse Resp BP SpO2   05/26/21 1414 98.9 °F (37.2 °C) 71 16 (!) 148/53 92 %         Fingerstick to left hand; labs drawn and hemocue processed. Lab Results:  Recent Results (from the past 12 hour(s))   POC HEMOGLOBIN    Collection Time: 05/26/21  2:18 PM   Result Value Ref Range    Hemoglobin (POC) 8.7 (L) 12.1 - 17.0 g/dL     Patient labs within parameters for treatment. Medications:  Medications Administered     epoetin martha-epbx (RETACRIT) injection 40,000 Units     Admin Date  05/26/2021 Action  Given Dose  40,000 Units Route  SubCUTAneous Administered By  Rodney Nardennis LANDEROS              Patient tolerated treatment well. Patient discharged from [place] [address] ambulatory in no distress at 1420. Patient aware of next appointment.     Future Appointments   Date Time Provider Ness Hawkins   6/9/2021  2:00 PM CHAIR 2 [address] REG   6/25/2021  1:00 PM Gisele Urrutia MD [facility] AMB

## 2021-06-09 NOTE — PROGRESS NOTES
Outpatient Infusion Center Progress Note    9993  Pt arrived in stable condition and in no distress for retacrit (given in L arm). Assessment completed; pt c/o some neck soreness. Patient denied having any symptoms of COVID-19, i.e. SOB, coughing, fever, or generally not feeling well. Also denies having been exposed to COVID-19 recently or having had any recent contact with family/friend that has a pending COVID test.    Labs obtained per order and sent for processing. Patient Vitals for the past 12 hrs:   Temp Pulse Resp BP   06/09/21 1349 97.9 °F (36.6 °C) 72 16 (!) 151/73        Recent Results (from the past 12 hour(s))   POC HEMOGLOBIN    Collection Time: 06/09/21  1:58 PM   Result Value Ref Range    Hemoglobin (POC) 9.5 (L) 12.1 - 17.0 g/dL        The following medications administered:  Medications Administered     epoetin martha-epbx (RETACRIT) injection 40,000 Units     Admin Date  06/09/2021 Action  Given Dose  40,000 Units Route  SubCUTAneous Administered By  ESPERANZA Fields                Pt tolerated treatment well. No s/s of adverse reaction noted. Pt provided with education on possible side effects of medication along with discharge instructions. Pt verbalized understanding. 1405  Pt discharged in no acute distress.  Next appointment:    Future Appointments   Date Time Provider Ness Hawkins   6/23/2021  2:00 PM CHAIR 2 [address] REG   7/1/2021  1:30 PM Adalberto Lou MD [facility] AMB

## 2021-06-23 NOTE — PROGRESS NOTES
[facility] Progress Note    1400  Pt arrived in stable condition and in no distress for Retacrit    Assessment completed, patient has no new complaints. Patient denied having any symptoms of COVID-19, i.e. SOB, coughing, fever, or generally not feeling well. Also denies having been exposed to COVID-19 recently or having had any recent contact with family/friend that has a pending COVID test.    Labs obtained per order and sent for processing. Patient Vitals for the past 12 hrs:   Temp Pulse Resp BP   06/23/21 1401 97.6 °F (36.4 °C) 75 18 (!) 148/50        Recent Results (from the past 12 hour(s))   CBC WITH 3 PART DIFF    Collection Time: 06/23/21  2:04 PM   Result Value Ref Range    WBC 12.3 (H) 4.1 - 11.1 K/uL    RBC 3.28 (L) 4.10 - 5.70 M/uL    HGB 10.0 (L) 12.1 - 17.0 g/dL    HCT 30.9 (L) 36.6 - 50.3 %    MCV 94.2 80.0 - 99.0 FL    MCH 30.5 26.0 - 34.0 PG    MCHC 32.4 30.0 - 36.5 g/dL    RDW 16.9 (H) 11.8 - 15.8 %    PLATELET 935 087 - 108 K/uL    NEUTROPHILS 84 (H) 32 - 75 %    MIXED CELLS 10 3.2 - 16.9 %    LYMPHOCYTES 6 (L) 12 - 49 %    ABS. NEUTROPHILS 10.3 (H) 1.8 - 8.0 K/UL    ABS. MIXED CELLS 1.2 0.2 - 1.2 K/uL    ABS.  LYMPHOCYTES 0.8 0.8 - 3.5 K/UL    DF AUTOMATED     URINALYSIS W/ RFLX MICROSCOPIC    Collection Time: 06/23/21  2:04 PM   Result Value Ref Range    Color YELLOW/STRAW      Appearance CLEAR CLEAR      Specific gravity 1.015 1.003 - 1.030      pH (UA) 6.5 5.0 - 8.0      Protein 300 (A) NEG mg/dL    Glucose 100 (A) NEG mg/dL    Ketone Negative NEG mg/dL    Bilirubin Negative NEG      Blood Negative NEG      Urobilinogen 0.2 0.2 - 1.0 EU/dL    Nitrites Negative NEG      Leukocyte Esterase Negative NEG      WBC 0-4 0 - 4 /hpf    RBC 0-5 0 - 5 /hpf    Epithelial cells FEW FEW /lpf    Bacteria Negative NEG /hpf    Hyaline cast 0-2 0 - 5 /lpf   PROTEIN/CREATININE RATIO, URINE    Collection Time: 06/23/21  2:04 PM   Result Value Ref Range    Protein, urine random 353 (H) 0.0 - 11.9 mg/dL    Creatinine, urine 81.20 mg/dL    Protein/Creat. urine Ratio 4.3     RENAL FUNCTION PANEL    Collection Time: 06/23/21  2:04 PM   Result Value Ref Range    Sodium 138 136 - 145 mmol/L    Potassium 6.1 (H) 3.5 - 5.1 mmol/L    Chloride 111 (H) 97 - 108 mmol/L    CO2 24 21 - 32 mmol/L    Anion gap 3 (L) 5 - 15 mmol/L    Glucose 153 (H) 65 - 100 mg/dL    BUN 70 (H) 6 - 20 MG/DL    Creatinine 5.55 (H) 0.70 - 1.30 MG/DL    BUN/Creatinine ratio 13 12 - 20      GFR est AA 13 (L) >60 ml/min/1.73m2    GFR est non-AA 11 (L) >60 ml/min/1.73m2    Calcium 9.0 8.5 - 10.1 MG/DL    Phosphorus 4.3 2.6 - 4.7 MG/DL    Albumin 3.0 (L) 3.5 - 5.0 g/dL        The following medications administered:  Medications Administered     epoetin martha-epbx (RETACRIT) injection 40,000 Units     Admin Date  06/23/2021 Action  Given Dose  40,000 Units Route  SubCUTAneous Administered By  Jona Beltre RN                Pt tolerated treatment well. No s/s of adverse reaction noted. Pt provided with education on possible side effects of medication along with discharge instructions. Pt verbalized understanding. 1430  Pt discharged in no acute distress.  Next appointment:    Future Appointments   Date Time Provider Ness Hawkins   7/1/2021  1:30 PM Leonardo Murphy MD [facility]/DWAYNE HIDALGO   7/7/2021  3:00 PM [facility] [id number] [place] [address]

## 2021-06-29 NOTE — PROGRESS NOTES
Dale Lew is a 63 y.o. male here for 6 month follow up for recurrent met thyroid carcinoma. 1. Have you been to the ER, urgent care clinic since your last visit? Hospitalized since your last visit? 4/5/21 for flank pain (Kidney failure)    2. Have you seen or consulted any other health care providers outside of the [address] [place] [address] since your last visit? Include any pap smears or colon screening. no    He will be restarting dialysis in the near future. No other complaints.

## 2021-07-01 NOTE — LETTER
7/5/2021 Patient: Damaris Escobar YOB: 1960 Date of Visit: 7/1/2021 Tommy Chappell MD 
[address] [address] [address] [facility] [address] Dear Tommy Chappell MD, Thank you for referring Mr. Kiran Mcdonald to [address] [place] [address] for evaluation. My notes for this consultation are attached. If you have questions, please do not hesitate to call me. I look forward to following your patient along with you.  
 
 
Sincerely, 
 
Kitty Leon MD

## 2021-07-04 NOTE — PROGRESS NOTES
[employer]  at [address] [place] [address] [address] [place], [address] [facility]  [phone]        Follow-Up Note        Patient: Camilo Kitchen MRN: [id number]  SSN: [id number]    YOB: 1960  Age: 63 y.o. Sex: male      Subjective:      Camilo Kitchen is a 63 y.o. 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 He was diagnosed with papillary thyroid carcinoma in 2002. He underwent a total thyroidectomy by Dr. Regina Guadalupe. This was followed by STRATTON ablation. He has been on TSH suppression since that time. He is relatively asymptomatic. Last serum thyroglobulin obtained in Feb 2018 is within normal range. His voice has been hoarse for some time. A CT showed numerous small lung nodules which is increasing only slightly in size. He feels well today with no complaints. He denies bone pains, dyspnea, weight loss. Due to CKD, he is set to start hemodialysis.        Review of Systems:    Constitutional: negative  Eyes: negative  Ears, Nose, Mouth, Throat, and Face: hoarseness  Respiratory: negative  Cardiovascular: negative  Gastrointestinal: negative  Genitourinary:negative  Integument/Breast: negative  Hematologic/Lymphatic: negative  Musculoskeletal:negative  Neurological: negative        Past Medical History:   Diagnosis Date    Adverse effect of anesthesia     \" STOP BREATHING 1 TIME C ANESTH\"    Cancer ([address] 2004    thyroid cancer    Chronic kidney disease     Chronic pain     BACK SHOULDER AND ARM    Depression     Diabetes ([address]     Encounter for long-term (current) use of NSAIDs     Hypercholesterolemia     Hypertension     Nausea & vomiting     Other ill-defined conditions(799.89)     cholesterol, thyroid    Sleep apnea     doesn't wear cpap    Thyroid cancer ([address]     TIA (transient ischemic attack) 2011    Vitamin D deficiency      Past Surgical History:   Procedure Laterality Date    HX HEENT      THROAT SURGERY X 4    HX ORTHOPAEDIC      back     HX ORTHOPAEDIC      ARM AND SHOULDER    HX OTHER SURGICAL      thyroid, lymphnode    HX RETINAL DETACHMENT REPAIR      left eye    IR INSERT NON TUNL CVC OVER 5 YRS  2020    IR INSERT TUNL CVC W/O PORT OVER 5 YR  2020    SD CARDIAC SURG PROCEDURE UNLIST      UPPER GI ENDOSCOPY,BALL DIL,30MM  2020         UPPER GI ENDOSCOPY,BIOPSY  2020         VASCULAR SURGERY PROCEDURE UNLIST      cardiac cath NEG. Family History   Problem Relation Age of Onset    Diabetes Mother     Elevated Lipids Mother    Newberry Ham Bladder Disease Mother     Headache Mother    Daryl Leslie Migraines Mother     Heart Disease Mother     Hypertension Mother     Stroke Mother     Other Mother         ANEURSYM BRAIN    Bleeding Prob Father     Cancer Father         LEUKEMIA    Diabetes Father     Elevated Lipids Father     Mental Retardation Sister     Psychiatric Disorder Sister     Cancer Brother         LUNGS     Social History     Tobacco Use    Smoking status: Former Smoker     Quit date: 1994     Years since quittin.8    Smokeless tobacco: Never Used   Substance Use Topics    Alcohol use: Yes     Comment: Occasionally      Prior to Admission medications    Medication Sig Start Date End Date Taking? Authorizing Provider   omeprazole (PRILOSEC) 20 mg capsule Take 1 capsule by mouth once daily 21  Yes Bell Gagnon MD   carvediloL (COREG) 6.25 mg tablet TAKE 1 TABLET BY MOUTH TWICE DAILY WITH MEALS 21  Yes Bell Gagnon MD   acetaminophen (TYLENOL) 325 mg tablet Take 1 Tab by mouth as needed for Pain. Take tab as needed for pain 5/10/21  Yes Bell Gagnon MD   metoclopramide HCl (REGLAN) 5 mg tablet TAKE 1 TABLET BY MOUTH ONCE DAILY AS NEEDED. DO NOT TAKE MORE THAN 3 TABLETS A DAY. 5/10/21  Yes Bell Gagnon MD   simvastatin (ZOCOR) 20 mg tablet Take 1 Tab by mouth nightly.  5/10/21  Yes Bell Gagnon MD hydrALAZINE (APRESOLINE) 50 mg tablet Take 1 Tab by mouth two (2) times a day. 5/10/21  Yes Dorinda Agrawal MD   amLODIPine (NORVASC) 10 mg tablet Take 1 Tab by mouth daily. 5/10/21  Yes Dorinda Agrawal MD   olmesartan (BENICAR) 20 mg tablet Take 1 tablet by mouth once daily 5/10/21  Yes Dorinda Agrawal MD   Vitamin D2 1,250 mcg (50,000 unit) capsule TAKE 1 CAPSULE BY MOUTH ONCE A WEEK 5/10/21  Yes Dorinda Agrawal MD   levothyroxine (SYNTHROID) 175 mcg tablet 1 full tab Mon-Sat but only 1/2 tab on sundays 4/12/21  Yes Killian Bailey MD   sodium zirconium cyclosilicate (Lokelma) 5 gram powder packet Take  by mouth. Yes Provider, Historical   sevelamer carbonate (RENVELA) 800 mg tab tab Take 1 Tab by mouth three (3) times daily (with meals). 1/7/21  Yes Dorinda Agrawal MD   sucralfate (CARAFATE) 1 gram tablet  9/30/20  Yes Provider, Historical   Blood-Glucose Meter monitoring kit 1 preferred brand glucometer for checking home glucose, E11.22 7/30/20  Yes Norma German MD   glucose blood VI test strips (blood glucose test) strip Pharmacist to choose preferred meter and strips. Dx:E11.65, Z79.4 Monitor 3 times daily 7/30/20  Yes Norma German MD   insulin NPH (HumuLIN N NPH U-100 Insulin) 100 unit/mL injection 2-10 units daily  Patient taking differently: 2-10 units daily    He is doing 10 units BID as of last few months 9/23/2020 7/30/20  Yes Norma German MD   Insulin Syringe-Needle U-100 (Advocate Syringes) 0.3 mL 30 gauge x 5/16\" syrg 1 Each by Does Not Apply route daily. 7/30/20  Yes Norma German MD   Ventolin HFA 90 mcg/actuation inhaler TAKE 1-2 PUFFS EVEERY 4-6 HOURS AS NEEDED FOR DYSPNEA AND WHEEZING 7/28/20  Yes Dorinda Agrawal MD   glucose blood VI test strips (PHARMACIST CHOICE) strip One Touch  Check glucose 3-4 times daily. DX E11.22 2/2/18  Yes Killian Bailey MD   Lancets misc Use preferred brand;  Check glucose 3-4 times daily, Diagnosis E11.22 2/2/18  Yes Killian Bailey MD   OXYGEN-AIR DELIVERY SYSTEMS Take 2.5 L/min by inhalation continuous. Patient not taking: Reported on 7/1/2021 8/29/20   Provider, Historical              Allergies   Allergen Reactions    Anesthetics - Amide Type - Select Amino Amides Shortness of Breath    Flexeril [Cyclobenzaprine] Hives    Tramadol Hives           Objective:     Visit Vitals  BP (!) 155/70 (BP 1 Location: Left upper arm, BP Patient Position: Sitting)   Pulse 67   Temp 98.6 °F (37 °C) (Temporal)   Ht 5' 9.5\" (1.765 m)   Wt 225 lb 9.6 oz (102.3 kg)   SpO2 96%   BMI 32.84 kg/m²       Pain Scale: 0 - No pain/10  Pain Location:       Physical Exam:    GENERAL: alert, cooperative, no distress, appears stated age  EYE: negative  LYMPHATIC: Cervical, supraclavicular, and axillary nodes normal.   THROAT & NECK: normal and no erythema or exudates noted. Scar from thyroidectomy. LUNG: clear to auscultation bilaterally  HEART: regular rate and rhythm  ABDOMEN: soft, non-tender  EXTREMITIES:  no edema  SKIN: Normal.  NEUROLOGIC: negative      Physical exam and ROS has been modified from a prior visit to make it relevant and current      CT Results (most recent):  Results from Hospital Encounter encounter on 04/05/21    CT ABD PELV WO CONT    Narrative  EXAM: CT ABD PELV WO CONT    INDICATION: Bilateral flank pain, r/o nephrolithiasis. Thyroid cancer. end-stage  renal disease, stopped dialysis October 2020. COMPARISON: 7/17/2020    CONTRAST:  None. TECHNIQUE:  Thin axial images were obtained through the abdomen and pelvis. Coronal and  sagittal reformats were generated. Oral contrast was not administered. CT dose  reduction was achieved through use of a standardized protocol tailored for this  examination and automatic exposure control for dose modulation. The absence of intravenous contrast material reduces the sensitivity for  evaluation of the vasculature and solid organs.     FINDINGS:  LOWER THORAX: Bibasilar atelectasis and interstitial infiltrate with nodules  which have increased slightly in size since the prior study. The largest in the  right lower lobe measures 1.8 cm, compared to a similar measurement of 1.6 cm on  the prior study. Small pleural [id number] are new bilaterally. A left lingular  nodule measures 1.7 cm, compared to a similar measurement of 0.9 cm on the prior  study. LIVER: No mass. BILIARY TREE: The gallbladder is normally distended with high density material  layering dependently, consistent with calculi. CBD is not dilated. SPLEEN: within normal limits. PANCREAS: No focal abnormality. ADRENALS: Stable cyst or adenoma in the left adrenal.  KIDNEYS/URETERS: No calculus or hydronephrosis. Heavy vascular calcification. Mild diffuse renal cortical thinning. STOMACH: Unremarkable. SMALL BOWEL: No dilatation or wall thickening. COLON: No dilatation or wall thickening. Numerous diverticula without associated  inflammatory change. APPENDIX: Normal.  PERITONEUM: No ascites or pneumoperitoneum. RETROPERITONEUM: No lymphadenopathy or aortic aneurysm. Heavy arterial  calcification. REPRODUCTIVE ORGANS: Unremarkable for age. URINARY BLADDER: No mass or calculus. BONES: No destructive bone lesion. Old healed right rib fractures. ABDOMINAL WALL: No mass or hernia. ADDITIONAL COMMENTS: N/A    Impression  1. Bibasilar nodular infiltrate with increased size of pulmonary nodules in both  lung bases since prior study and small new pleural effusions. 2. Cholelithiasis without biliary ductal dilatation. 3. Diverticulosis without diverticulitis. 4. Other incidental findings. I personally reviewed the images. Essentially stable pulmonary nodules. Assessment:     1.  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 He is asymptomatic from lung nodules  Continue observation     Symptom management form reviewed with patient. Plan:       > TSH suppression with Levothyroxine   > Observation  > CT Chest prior to next office visit  > Follow-up in 6 months      Signed by: Mason Rhodes MD                     July 4, 2021        CC. Alpa Gibson MD  CC.  Cannon Kussmaul, MD

## 2021-07-07 NOTE — PROGRESS NOTES
[facility] [place]  [place] - 1500  Labs obtained: CBCap, PTH, Iron Profile, Renal Panel, UA, Prot/Creat Urine    Patient denied having any symptoms of COVID-19, i.e. SOB, coughing, fever, or generally not feeling well. Also denies having been exposed to COVID-19 recently or having had any recent contact with family/friend that has a pending COVID test.    Visit Vitals  BP (!) 148/66 (BP 1 Location: Left upper arm, BP Patient Position: Sitting)   Pulse 71   Temp 99.1 °F (37.3 °C)   Resp 18   Wt 104.5 kg (230 lb 6.4 oz)   SpO2 95%   BMI 33.54 kg/m²     Recent Results (from the past 12 hour(s))   CBC WITH 3 PART DIFF    Collection Time: 07/07/21  3:04 PM   Result Value Ref Range    WBC 11.8 (H) 4.1 - 11.1 K/uL    RBC 3.06 (L) 4.10 - 5.70 M/uL    HGB 9.6 (L) 12.1 - 17.0 g/dL    HCT 28.7 (L) 36.6 - 50.3 %    MCV 93.8 80.0 - 99.0 FL    MCH 31.4 26.0 - 34.0 PG    MCHC 33.4 30.0 - 36.5 g/dL    RDW 16.7 (H) 11.8 - 15.8 %    PLATELET 929 679 - 994 K/uL    NEUTROPHILS 80 (H) 32 - 75 %    MIXED CELLS 12 3.2 - 16.9 %    LYMPHOCYTES 7 (L) 12 - 49 %    ABS. NEUTROPHILS 9.4 (H) 1.8 - 8.0 K/UL    ABS. MIXED CELLS 1.5 (H) 0.2 - 1.2 K/uL    ABS.  LYMPHOCYTES 0.9 0.8 - 3.5 K/UL    DF AUTOMATED     URINALYSIS W/ REFLEX CULTURE    Collection Time: 07/07/21  3:04 PM    Specimen: Urine   Result Value Ref Range    Color YELLOW/STRAW      Appearance CLEAR CLEAR      Specific gravity 1.012 1.003 - 1.030      pH (UA) 7.0 5.0 - 8.0      Protein 300 (A) NEG mg/dL    Glucose 100 (A) NEG mg/dL    Ketone Negative NEG mg/dL    Bilirubin Negative NEG      Blood TRACE (A) NEG      Urobilinogen 1.0 0.2 - 1.0 EU/dL    Nitrites Negative NEG      Leukocyte Esterase Negative NEG      WBC 0-4 0 - 4 /hpf    RBC 0-5 0 - 5 /hpf    Epithelial cells FEW FEW /lpf    Bacteria Negative NEG /hpf    UA:UC IF INDICATED CULTURE NOT INDICATED BY UA RESULT CNI      Hyaline cast 0-2 0 - 5 /lpf   PROTEIN/CREATININE RATIO, URINE Collection Time: 07/07/21  3:04 PM   Result Value Ref Range    Protein, urine random 251 (H) 0.0 - 11.9 mg/dL    Creatinine, urine 44.10 mg/dL    Protein/Creat. urine Ratio 5.7         Assessment - unchanged. Retacrit 40,000 units SQ slowly in L arm.    1525 - Tolerated well. Pt denies any acute problems/changes. Discharged from [facility] ambulatory. No distress.  Next appt: 7/21/21 at 1500

## 2021-07-21 NOTE — PROGRESS NOTES
[facility] Short Consult Note:  Arrived - 1510    Patient denied having any symptoms of COVID-19, i.e. SOB, coughing, fever, or generally not feeling well. Also denies having been exposed to COVID-19 recently or having had any recent contact with family/friend that has a pending COVID test.    Hemocue obtained   Recent Results (from the past 12 hour(s))   POC HEMOGLOBIN    Collection Time: 07/21/21  3:14 PM   Result Value Ref Range    Hemoglobin (POC) 8.4 (L) 12.1 - 17.0 g/dL         Visit Vitals  BP (!) 175/72   Pulse 67   Temp 98.8 °F (37.1 °C)   Resp 16   Wt 104.7 kg (230 lb 14.4 oz)   SpO2 95%   BMI 33.61 kg/m²       Assessment - unchanged except dyspnea with exertion. Hgb - 8.4    Retacrit 40,000 units SQ slowly in left arm.    1520 - Tolerated well. Pt denies any acute problems/changes. Discharged from [facility] ambulatory. No distress.  Next appt: 8/4/21

## 2021-07-22 PROBLEM — R06.02 SOB (SHORTNESS OF BREATH): Status: ACTIVE | Noted: 2021-01-01

## 2021-07-22 NOTE — H&P
Hospitalist Admission Note    NAME: Libra Mckinnon   :  1960   MRN:  [id number]     Date/Time:  2021 4:41 PM    Patient PCP: Helio Durham MD  ______________________________________________________________________  Given the patient's current clinical presentation, I have a high level of concern for decompensation if discharged from the emergency department. Complex decision making was performed, which includes reviewing the patient's available past medical records, laboratory results, and x-ray films. My assessment of this patient's clinical condition and my plan of care is as follows. Assessment / Plan:  Acute hypoxic respiratory failure, secondary to fluid overload in the setting of ESRD  Hyperkalemia due to above  Admit to Nephrology Floor  Tele monitoring  Continue O2 supplementation - on 2L NC - wean as tolerated  Nephrology eval appreciated - plan is to restart pt's HD  Access to be placed, orders already in by Nephrology  HD planned for tomorrow  HyperK treatment including Lily Handy given in the ER    CXR: IMPRESSION  1. Small pleural effusions and mild pulmonary edema  2. Possible pulmonary nodules as described above. 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 Og Ellis     Code Status: Full  Surrogate Decision Maker: Daughter  DVT Prophylaxis: Heparin  GI Prophylaxis: not indicated  Baseline: Independent      Subjective:   CHIEF COMPLAINT: SOB    HISTORY OF PRESENT ILLNESS:     Chelsie Myles is a 63 y.o.  male who presents with CC listed above. Pt states that he was previously on HD last year but was taken off in November. He was still making urine at that time. Unfortunately, over the past few months, the pt's urine output has been decreasing. He has been following with Nephrology. He comes in today complaining of progressive SOB and worsening blood work. Pt denies any fever, chills, CP or syncope. We were asked to admit for work up and evaluation of the above problems. Past Medical History:   Diagnosis Date    Adverse effect of anesthesia     \" STOP BREATHING 1 TIME C ANESTH\"    Cancer ([facility] [address]     thyroid cancer    Chronic kidney disease     Chronic pain     BACK SHOULDER AND ARM    Depression     Diabetes ([facility] [address]     Encounter for long-term (current) use of NSAIDs     Hypercholesterolemia     Hypertension     Nausea & vomiting     Other ill-defined conditions(799.89)     cholesterol, thyroid    Sleep apnea     doesn't wear cpap    Thyroid cancer ([facility] [address]     TIA (transient ischemic attack)     Vitamin D deficiency         Past Surgical History:   Procedure Laterality Date    HX HEENT      THROAT SURGERY X 4    HX ORTHOPAEDIC      back     HX ORTHOPAEDIC      ARM AND SHOULDER    HX OTHER SURGICAL      thyroid, lymphnode    HX RETINAL DETACHMENT REPAIR      left eye    IR INSERT NON TUNL CVC OVER 5 YRS  2020    IR INSERT TUNL CVC W/O PORT OVER 5 YR  2020    CT CARDIAC SURG PROCEDURE UNLIST      UPPER GI ENDOSCOPY,BALL DIL,30MM  2020         UPPER GI ENDOSCOPY,BIOPSY  2020         VASCULAR SURGERY PROCEDURE UNLIST      cardiac cath NEG.        Social History     Tobacco Use    Smoking status: Former Smoker     Quit date: 1994     Years since quittin.9    Smokeless tobacco: Never Used   Substance Use Topics    Alcohol use: Yes     Comment: Occasionally        Family History   Problem Relation Age of Onset    Diabetes Mother     Elevated Lipids Mother    [place] Grandchild Bladder Disease Mother     Headache Mother     Migraines Mother     Heart Disease Mother     Hypertension Mother     Stroke Mother     Other Mother         ANEURSYM BRAIN    Bleeding Prob Father     Cancer Father         LEUKEMIA    Diabetes Father     Elevated Lipids Father     Mental Retardation Sister     Psychiatric Disorder Sister     Cancer Brother         LUNGS     Allergies   Allergen Reactions    Anesthetics - Amide Type - Select Amino Amides Shortness of Breath    Flexeril [Cyclobenzaprine] Hives    Tramadol Hives        Prior to Admission medications    Medication Sig Start Date End Date Taking? Authorizing Provider   omeprazole (PRILOSEC) 20 mg capsule Take 1 capsule by mouth once daily 6/11/21   Hans Prado MD   carvediloL (COREG) 6.25 mg tablet TAKE 1 TABLET BY MOUTH TWICE DAILY WITH MEALS 5/13/21   Emerita Prado MD   acetaminophen (TYLENOL) 325 mg tablet Take 1 Tab by mouth as needed for Pain. Take tab as needed for pain 5/10/21   Claribel Prado MD   metoclopramide HCl (REGLAN) 5 mg tablet TAKE 1 TABLET BY MOUTH ONCE DAILY AS NEEDED. DO NOT TAKE MORE THAN 3 TABLETS A DAY. 5/10/21   Hans Prado MD   simvastatin (ZOCOR) 20 mg tablet Take 1 Tab by mouth nightly. 5/10/21   Emerita Prado MD   hydrALAZINE (APRESOLINE) 50 mg tablet Take 1 Tab by mouth two (2) times a day. 5/10/21   Emerita Prado MD   amLODIPine (NORVASC) 10 mg tablet Take 1 Tab by mouth daily. 5/10/21   Emerita Prado MD   olmesartan (BENICAR) 20 mg tablet Take 1 tablet by mouth once daily 5/10/21   Emerita Prado MD   Vitamin D2 1,250 mcg (50,000 unit) capsule TAKE 1 CAPSULE BY MOUTH ONCE A WEEK 5/10/21   Emerita Prado MD   levothyroxine (SYNTHROID) 175 mcg tablet 1 full tab Mon-Sat but only 1/2 tab on sundays 4/12/21   Antonia Cobos MD   sodium zirconium cyclosilicate (Lokelma) 5 gram powder packet Take  by mouth. Provider, Historical   sevelamer carbonate (RENVELA) 800 mg tab tab Take 1 Tab by mouth three (3) times daily (with meals). 1/7/21   Maurilio Levy MD   sucralfate (CARAFATE) 1 gram tablet  9/30/20   Provider, Historical   OXYGEN-AIR DELIVERY SYSTEMS Take 2.5 L/min by inhalation continuous.   Patient not taking: Reported on 7/1/2021 8/29/20   Provider, Historical   Blood-Glucose Meter monitoring kit 1 preferred brand glucometer for checking home glucose, E11.22 7/30/20   Jazmine Bailey MD   glucose blood VI test strips (blood glucose test) strip Pharmacist to choose preferred meter and strips. Dx:E11.65, Z79.4 Monitor 3 times daily 7/30/20   Jazmine Bailey MD   insulin NPH (HumuLIN N NPH U-100 Insulin) 100 unit/mL injection 2-10 units daily  Patient taking differently: 2-10 units daily    He is doing 10 units BID as of last few months 9/23/2020 7/30/20   Jazmine Bailey MD   Insulin Syringe-Needle U-100 ([employer] Syringes) 0.3 mL 30 gauge x 5/16\" syrg 1 Each by Does Not Apply route daily. 7/30/20   Jazmine Bailey MD   Ventolin HFA 90 mcg/actuation inhaler TAKE 1-2 PUFFS EVEERY 4-6 HOURS AS NEEDED FOR DYSPNEA AND WHEEZING 7/28/20   Miguelina Prado MD   glucose blood VI test strips (PHARMACIST CHOICE) strip One Touch  Check glucose 3-4 times daily. DX E11.22 2/2/18   Dulce Quiñones MD   Lancets misc Use preferred brand; Check glucose 3-4 times daily, Diagnosis E11.22 2/2/18   Dulce Quiñones MD       REVIEW OF SYSTEMS:     I am not able to complete the review of systems because:    The patient is intubated and sedated    The patient has altered mental status due to his acute medical problems    The patient has baseline aphasia from prior stroke(s)    The patient has baseline dementia and is not reliable historian    The patient is in acute medical distress and unable to provide information           Total of 12 systems reviewed as follows:       POSITIVE= underlined text  Negative = text not underlined  General:  fever, chills, sweats, generalized weakness, weight loss/gain,      loss of appetite   Eyes:    blurred vision, eye pain, loss of vision, double vision  ENT:    rhinorrhea, pharyngitis   Respiratory:   cough, sputum production, SOB, NUÑEZ, wheezing, pleuritic pain   Cardiology:   chest pain, palpitations, orthopnea, PND, edema, syncope   Gastrointestinal:  abdominal pain , N/V, diarrhea, dysphagia, constipation, bleeding   Genitourinary:  frequency, urgency, dysuria, hematuria, incontinence   Muskuloskeletal :  arthralgia, myalgia, back pain  Hematology:  easy bruising, nose or gum bleeding, lymphadenopathy   Dermatological: rash, ulceration, pruritis, color change / jaundice  Endocrine:   hot flashes or polydipsia   Neurological:  headache, dizziness, confusion, focal weakness, paresthesia,     Speech difficulties, memory loss, gait difficulty  Psychological: Feelings of anxiety, depression, agitation    Objective:   VITALS:    Visit Vitals  BP (!) 177/72   Pulse 68   Temp 97.7 °F (36.5 °C)   Resp 18   Ht 5' 9.5\" (1.765 m)   Wt 104.6 kg (230 lb 9.6 oz)   SpO2 91%   BMI 33.57 kg/m²       PHYSICAL EXAM:    General:    Alert, cooperative, no distress, appears stated age. HEENT: Atraumatic, anicteric sclerae, pink conjunctivae  Neck:  Supple, symmetrical  Lungs: On 2L NC, bibasilar crackles  Chest wall:  No tenderness  No Accessory muscle use. Heart:   Regular  rhythm,  No  murmur   No edema  Abdomen:   Soft, non-tender. Not distended. Bowel sounds normal  Extremities: No cyanosis. No clubbing,      Skin turgor normal, Capillary refill normal, Radial dial pulse 2+  Skin:     Not pale. Not Jaundiced  No rashes   Psych:  Good insight. Not depressed. Not anxious or agitated. Neurologic: EOMs intact. No facial asymmetry. No aphasia or slurred speech. Symmetrical strength, Sensation grossly intact.  Alert and oriented X 4.     _______________________________________________________________________  Care Plan discussed with:    Comments   Patient x    Family  x    RN x    Care Manager                    Consultant:      _______________________________________________________________________  Expected  Disposition:   Home with Family    HH/PT/OT/RN x   SNF/LTC    [facility]    ________________________________________________________________________  TOTAL TIME:  54 Minutes    Critical Care Provided     Minutes non procedure based      Comments     Reviewed previous records   >50% of visit spent in counseling and coordination of care  Discussion with patient and/or family and questions answered       ________________________________________________________________________  Signed: Saba Roy MD    Procedures: see electronic medical records for all procedures/Xrays and details which were not copied into this note but were reviewed prior to creation of Plan. LAB DATA REVIEWED:    Recent Results (from the past 24 hour(s))   EKG, 12 LEAD, INITIAL    Collection Time: 07/22/21 12:54 PM   Result Value Ref Range    Ventricular Rate 67 BPM    Atrial Rate 67 BPM    P-R Interval 170 ms    QRS Duration 90 ms    Q-T Interval 420 ms    QTC Calculation (Bezet) 443 ms    Calculated P Axis 0 degrees    Calculated R Axis -21 degrees    Calculated T Axis 34 degrees    Diagnosis       Normal sinus rhythm  Normal ECG  When compared with ECG of 03-DEC-2020 11:31,  No significant change was found     CBC WITH AUTOMATED DIFF    Collection Time: 07/22/21  1:11 PM   Result Value Ref Range    WBC 12.6 (H) 4.1 - 11.1 K/uL    RBC 2.81 (L) 4.10 - 5.70 M/uL    HGB 8.7 (L) 12.1 - 17.0 g/dL    HCT 27.3 (L) 36.6 - 50.3 %    MCV 97.2 80.0 - 99.0 FL    MCH 31.0 26.0 - 34.0 PG    MCHC 31.9 30.0 - 36.5 g/dL    RDW 15.9 (H) 11.5 - 14.5 %    PLATELET 438 072 - 902 K/uL    MPV 10.1 8.9 - 12.9 FL    NRBC 0.0 0  WBC    ABSOLUTE NRBC 0.00 0.00 - 0.01 K/uL    NEUTROPHILS 65 32 - 75 %    LYMPHOCYTES 7 (L) 12 - 49 %    MONOCYTES 8 5 - 13 %    EOSINOPHILS 18 (H) 0 - 7 %    BASOPHILS 1 0 - 1 %    IMMATURE GRANULOCYTES 1 (H) 0.0 - 0.5 %    ABS. NEUTROPHILS 8.2 (H) 1.8 - 8.0 K/UL    ABS. LYMPHOCYTES 0.9 0.8 - 3.5 K/UL    ABS. MONOCYTES 1.0 0.0 - 1.0 K/UL    ABS. EOSINOPHILS 2.3 (H) 0.0 - 0.4 K/UL    ABS. BASOPHILS 0.1 0.0 - 0.1 K/UL    ABS. IMM.  GRANS. 0.1 (H) 0.00 - 0.04 K/UL    DF SMEAR SCANNED      RBC COMMENTS ANISOCYTOSIS  1+       METABOLIC PANEL, COMPREHENSIVE    Collection Time: 07/22/21  1:11 PM   Result Value Ref Range    Sodium 141 136 - 145 mmol/L    Potassium 5.8 (H) 3.5 - 5.1 mmol/L    Chloride 112 (H) 97 - 108 mmol/L    CO2 20 (L) 21 - 32 mmol/L    Anion gap 9 5 - 15 mmol/L    Glucose 105 (H) 65 - 100 mg/dL    BUN 75 (H) 6 - 20 MG/DL    Creatinine 5.40 (H) 0.70 - 1.30 MG/DL    BUN/Creatinine ratio 14 12 - 20      GFR est AA 13 (L) >60 ml/min/1.73m2    GFR est non-AA 11 (L) >60 ml/min/1.73m2    Calcium 8.8 8.5 - 10.1 MG/DL    Bilirubin, total 0.6 0.2 - 1.0 MG/DL    ALT (SGPT) 27 12 - 78 U/L    AST (SGOT) 10 (L) 15 - 37 U/L    Alk.  phosphatase 87 45 - 117 U/L    Protein, total 7.2 6.4 - 8.2 g/dL    Albumin 3.1 (L) 3.5 - 5.0 g/dL    Globulin 4.1 (H) 2.0 - 4.0 g/dL    A-G Ratio 0.8 (L) 1.1 - 2.2     CK W/ REFLX CKMB    Collection Time: 07/22/21  1:11 PM   Result Value Ref Range    CK 71 39 - 308 U/L   TROPONIN I    Collection Time: 07/22/21  1:11 PM   Result Value Ref Range    Troponin-I, Qt. <0.05 <0.05 ng/mL   NT-PRO BNP    Collection Time: 07/22/21  1:11 PM   Result Value Ref Range    NT pro-BNP 8,729 (H) <125 PG/ML

## 2021-07-22 NOTE — ED PROVIDER NOTES
EMERGENCY DEPARTMENT HISTORY AND PHYSICAL EXAM      Date: 7/22/2021  Patient Name: Dinorah Coley    History of Presenting Illness     Chief Complaint   Patient presents with    Shortness of Breath     x 1 day. Pt reports he believes it is his kidneys again. Pt was stopped on dialysis in Nov and is concerned he needs to go back on. Pt SPO2 95% in triage and  non labored breathing. Pt denies CP or NV       History Provided By: Patient    HPI: Dinorah Coley, 63 y.o. male with PMHx significant for prior thyroid cancer status post radiation, diabetes, sleep apnea, chronic kidney disease previously on dialysis who presents with a chief complaint of shortness of breath. Patient reports worsening shortness of breath since last night and tells me that on his home pulse ox his O2 was 83%. He is concerned that he may need to go back on dialysis. Tells me he was on dialysis late last year when he had pneumonia but he improved enough to cough dialysis. He is still making urine. Denies any chest pain, productive cough, fever, abdominal pain, nausea, vomiting. Denies any significant weight gain. Spoke with the nephrology office this morning and was referred to the ED for evaluation. PCP: Gali Cueto MD    There are no other complaints, changes, or physical findings at this time.     Current Facility-Administered Medications   Medication Dose Route Frequency Provider Last Rate Last Admin    bumetanide (BUMEX) injection 2 mg  2 mg IntraVENous TID Jessy Diamond MD   2 mg at 07/22/21 1600    [START ON 7/23/2021] amLODIPine (NORVASC) tablet 10 mg  10 mg Oral DAILY Jessy Diamond MD        carvediloL (COREG) tablet 6.25 mg  6.25 mg Oral BID WITH MEALS Jessy Diamond MD        hydrALAZINE (APRESOLINE) tablet 50 mg  50 mg Oral BID MD Vinicio Em ON 7/23/2021] levothyroxine (SYNTHROID) tablet 175 mcg  175 mcg Oral ACB Jessy Diamond MD        sevelamer carbonate (RENVELA) tab 800 mg  800 mg Oral TID WITH MEALS Rah Arambula MD        sodium chloride (NS) flush 5-40 mL  5-40 mL IntraVENous Q8H Rah Arambula MD        sodium chloride (NS) flush 5-40 mL  5-40 mL IntraVENous PRN Rah Arambula MD        acetaminophen (TYLENOL) tablet 650 mg  650 mg Oral Q6H PRN Rah Arambula MD        Or   Mari acetaminophen (TYLENOL) suppository 650 mg  650 mg Rectal Q6H PRN Rah Arambula MD        polyethylene glycol (MIRALAX) packet 17 g  17 g Oral DAILY PRN Rah Arambula MD        ondansetron (ZOFRAN ODT) tablet 4 mg  4 mg Oral Q8H PRN Rah Arambula MD        Or    ondansetron [facility]) injection 4 mg  4 mg IntraVENous Q6H PRN Rah Arambula MD        insulin lispro (HUMALOG) injection   SubCUTAneous AC&HS Rah Arambula MD        glucose chewable tablet 16 g  4 Tablet Oral PRN Rah Arambula MD        dextrose (D50W) injection syrg 12.5-25 g  12.5-25 g IntraVENous PRN Rah Arambula MD        glucagon (GLUCAGEN) injection 1 mg  1 mg IntraMUSCular PRN Rah Arambula MD        oxyCODONE-acetaminophen (PERCOCET) 5-325 mg per tablet 1 Tablet  1 Tablet Oral Q4H PRN Rah Arambula MD   1 Tablet at 07/22/21 1716     Current Outpatient Medications   Medication Sig Dispense Refill    omeprazole (PRILOSEC) 20 mg capsule Take 1 capsule by mouth once daily 90 Capsule 1    carvediloL (COREG) 6.25 mg tablet TAKE 1 TABLET BY MOUTH TWICE DAILY WITH MEALS 60 Tab 3    acetaminophen (TYLENOL) 325 mg tablet Take 1 Tab by mouth as needed for Pain. Take tab as needed for pain 90 Tab 1    metoclopramide HCl (REGLAN) 5 mg tablet TAKE 1 TABLET BY MOUTH ONCE DAILY AS NEEDED. DO NOT TAKE MORE THAN 3 TABLETS A DAY. 30 Tab 2    simvastatin (ZOCOR) 20 mg tablet Take 1 Tab by mouth nightly. 90 Tab 3    hydrALAZINE (APRESOLINE) 50 mg tablet Take 1 Tab by mouth two (2) times a day. 60 Tab 3    amLODIPine (NORVASC) 10 mg tablet Take 1 Tab by mouth daily.  30 Tab 5    olmesartan (BENICAR) 20 mg tablet Take 1 tablet by mouth once daily 30 Tab 5    Vitamin D2 1,250 mcg (50,000 unit) capsule TAKE 1 CAPSULE BY MOUTH ONCE A WEEK 4 Cap 5    levothyroxine (SYNTHROID) 175 mcg tablet 1 full tab Mon-Sat but only 1/2 tab on sundays 90 Tab 3    sodium zirconium cyclosilicate (Lokelma) 5 gram powder packet Take  by mouth.  sevelamer carbonate (RENVELA) 800 mg tab tab Take 1 Tab by mouth three (3) times daily (with meals). 90 Tab 3    sucralfate (CARAFATE) 1 gram tablet       OXYGEN-AIR DELIVERY SYSTEMS Take 2.5 L/min by inhalation continuous. (Patient not taking: Reported on 7/1/2021)      Blood-Glucose Meter monitoring kit 1 preferred brand glucometer for checking home glucose, E11.22 1 Kit 0    glucose blood VI test strips (blood glucose test) strip Pharmacist to choose preferred meter and strips. Dx:E11.65, Z79.4 Monitor 3 times daily 300 Strip 3    insulin NPH (HumuLIN N NPH U-100 Insulin) 100 unit/mL injection 2-10 units daily (Patient taking differently: 2-10 units daily    He is doing 10 units BID as of last few months 9/23/2020) 3 Vial 3    Insulin Syringe-Needle U-100 ([employer]ocate Syringes) 0.3 mL 30 gauge x 5/16\" syrg 1 Each by Does Not Apply route daily. 90 Syringe 3    Ventolin HFA 90 mcg/actuation inhaler TAKE 1-2 PUFFS EVEERY 4-6 HOURS AS NEEDED FOR DYSPNEA AND WHEEZING 1 Inhaler 2    glucose blood VI test strips (PHARMACIST CHOICE) strip One Touch  Check glucose 3-4 times daily. DX E11.22 300 Strip 3    Lancets misc Use preferred brand;  Check glucose 3-4 times daily, Diagnosis E11.22 2 Package 3     Past History     Past Medical History:  Past Medical History:   Diagnosis Date    Adverse effect of anesthesia     \" STOP BREATHING 1 TIME C ANESTH\"    Cancer ([address] 2004    thyroid cancer    Chronic kidney disease     Chronic pain     BACK SHOULDER AND ARM    Depression     Diabetes [address] [address]     Encounter for long-term (current) use of NSAIDs     Hypercholesterolemia     Hypertension     Nausea & vomiting     Other ill-defined conditions(799.89)     cholesterol, thyroid    Sleep apnea     doesn't wear cpap    Thyroid cancer ([address]     TIA (transient ischemic attack)     Vitamin D deficiency      Past Surgical History:  Past Surgical History:   Procedure Laterality Date    HX HEENT      THROAT SURGERY X 4    HX ORTHOPAEDIC      back     HX ORTHOPAEDIC      ARM AND SHOULDER    HX OTHER SURGICAL      thyroid, lymphnode    HX RETINAL DETACHMENT REPAIR      left eye    IR INSERT NON TUNL CVC OVER 5 YRS  2020    IR INSERT TUNL CVC W/O PORT OVER 5 YR  2020    [facility] CARDIAC SURG PROCEDURE UNLIST      UPPER GI ENDOSCOPY,BALL DIL,30MM  2020         UPPER GI ENDOSCOPY,BIOPSY  2020         VASCULAR SURGERY PROCEDURE UNLIST      cardiac cath NEG. Family History:  Family History   Problem Relation Age of Onset    Diabetes Mother     Elevated Lipids Mother    [place] Bladder Disease Mother     Headache Mother     Migraines Mother     Heart Disease Mother     Hypertension Mother     Stroke Mother     Other Mother         ANEURSYM BRAIN    Bleeding Prob Father     Cancer Father         LEUKEMIA    Diabetes Father     Elevated Lipids Father     Mental Retardation Sister     Psychiatric Disorder Sister     Cancer Brother         LUNGS     Social History:  Social History     Tobacco Use    Smoking status: Former Smoker     Quit date: 1994     Years since quittin.9    Smokeless tobacco: Never Used   Vaping Use    Vaping Use: Never used   Substance Use Topics    Alcohol use: Yes     Comment: Occasionally    Drug use: No     Allergies: Allergies   Allergen Reactions    Anesthetics - Amide Type - Select Amino Amides Shortness of Breath    Flexeril [Cyclobenzaprine] Hives    Tramadol Hives     Review of Systems   Review of Systems   Constitutional: Negative for chills and fever. HENT: Negative for congestion, rhinorrhea and sore throat.     Respiratory: Positive for shortness of breath. Negative for cough. Cardiovascular: Negative for chest pain. Gastrointestinal: Negative for abdominal pain, nausea and vomiting. Genitourinary: Negative for dysuria and urgency. Skin: Negative for rash. Neurological: Negative for dizziness, light-headedness and headaches. All other systems reviewed and are negative. Physical Exam   Physical Exam  Vitals and nursing note reviewed. Constitutional:       General: He is not in acute distress. Appearance: He is well-developed. HENT:      Head: Normocephalic and atraumatic. Eyes:      Conjunctiva/sclera: Conjunctivae normal.      Pupils: Pupils are equal, round, and reactive to light. Cardiovascular:      Rate and Rhythm: Normal rate and regular rhythm. Pulmonary:      Effort: Pulmonary effort is normal. No respiratory distress. Breath sounds: No stridor. Examination of the right-lower field reveals rales. Examination of the left-lower field reveals rales. Rales present. Abdominal:      General: There is no distension. Palpations: Abdomen is soft. Tenderness: There is no abdominal tenderness. Musculoskeletal:         General: Normal range of motion. Cervical back: Normal range of motion. Skin:     General: Skin is warm and dry. Neurological:      Mental Status: He is alert and oriented to person, place, and time.        Diagnostic Study Results   Labs -     Recent Results (from the past 12 hour(s))   EKG, 12 LEAD, INITIAL    Collection Time: 07/22/21 12:54 PM   Result Value Ref Range    Ventricular Rate 67 BPM    Atrial Rate 67 BPM    P-R Interval 170 ms    QRS Duration 90 ms    Q-T Interval 420 ms    QTC Calculation (Bezet) 443 ms    Calculated P Axis 0 degrees    Calculated R Axis -21 degrees    Calculated T Axis 34 degrees    Diagnosis       Normal sinus rhythm  Normal ECG  When compared with ECG of 03-DEC-2020 11:31,  No significant change was found     CBC WITH AUTOMATED DIFF    Collection Time: 07/22/21  1:11 PM   Result Value Ref Range    WBC 12.6 (H) 4.1 - 11.1 K/uL    RBC 2.81 (L) 4.10 - 5.70 M/uL    HGB 8.7 (L) 12.1 - 17.0 g/dL    HCT 27.3 (L) 36.6 - 50.3 %    MCV 97.2 80.0 - 99.0 FL    MCH 31.0 26.0 - 34.0 PG    MCHC 31.9 30.0 - 36.5 g/dL    RDW 15.9 (H) 11.5 - 14.5 %    PLATELET 988 441 - 617 K/uL    MPV 10.1 8.9 - 12.9 FL    NRBC 0.0 0  WBC    ABSOLUTE NRBC 0.00 0.00 - 0.01 K/uL    NEUTROPHILS 65 32 - 75 %    LYMPHOCYTES 7 (L) 12 - 49 %    MONOCYTES 8 5 - 13 %    EOSINOPHILS 18 (H) 0 - 7 %    BASOPHILS 1 0 - 1 %    IMMATURE GRANULOCYTES 1 (H) 0.0 - 0.5 %    ABS. NEUTROPHILS 8.2 (H) 1.8 - 8.0 K/UL    ABS. LYMPHOCYTES 0.9 0.8 - 3.5 K/UL    ABS. MONOCYTES 1.0 0.0 - 1.0 K/UL    ABS. EOSINOPHILS 2.3 (H) 0.0 - 0.4 K/UL    ABS. BASOPHILS 0.1 0.0 - 0.1 K/UL    ABS. IMM. GRANS. 0.1 (H) 0.00 - 0.04 K/UL    DF SMEAR SCANNED      RBC COMMENTS ANISOCYTOSIS  1+       METABOLIC PANEL, COMPREHENSIVE    Collection Time: 07/22/21  1:11 PM   Result Value Ref Range    Sodium 141 136 - 145 mmol/L    Potassium 5.8 (H) 3.5 - 5.1 mmol/L    Chloride 112 (H) 97 - 108 mmol/L    CO2 20 (L) 21 - 32 mmol/L    Anion gap 9 5 - 15 mmol/L    Glucose 105 (H) 65 - 100 mg/dL    BUN 75 (H) 6 - 20 MG/DL    Creatinine 5.40 (H) 0.70 - 1.30 MG/DL    BUN/Creatinine ratio 14 12 - 20      GFR est AA 13 (L) >60 ml/min/1.73m2    GFR est non-AA 11 (L) >60 ml/min/1.73m2    Calcium 8.8 8.5 - 10.1 MG/DL    Bilirubin, total 0.6 0.2 - 1.0 MG/DL    ALT (SGPT) 27 12 - 78 U/L    AST (SGOT) 10 (L) 15 - 37 U/L    Alk.  phosphatase 87 45 - 117 U/L    Protein, total 7.2 6.4 - 8.2 g/dL    Albumin 3.1 (L) 3.5 - 5.0 g/dL    Globulin 4.1 (H) 2.0 - 4.0 g/dL    A-G Ratio 0.8 (L) 1.1 - 2.2     CK W/ REFLX CKMB    Collection Time: 07/22/21  1:11 PM   Result Value Ref Range    CK 71 39 - 308 U/L   TROPONIN I    Collection Time: 07/22/21  1:11 PM   Result Value Ref Range    Troponin-I, Qt. <0.05 <0.05 ng/mL   NT-PRO BNP    Collection Time: 07/22/21  1:11 PM Result Value Ref Range    NT pro-BNP 8,729 (H) <125 PG/ML       Radiologic Studies -   XR CHEST PA LAT   Final Result   1. Small pleural effusions and mild pulmonary edema   2. Possible pulmonary nodules as described above. IR INSERT TUNL CVC W/O PORT OVER 5 YR    (Results Pending)     XR CHEST PA LAT    Result Date: 7/22/2021  1. Small pleural effusions and mild pulmonary edema 2. Possible pulmonary nodules as described above. Medical Decision Making   I am the first provider for this patient. I reviewed the vital signs, available nursing notes, past medical history, past surgical history, family history and social history. Vital Signs-Reviewed the patient's vital signs. Patient Vitals for the past 12 hrs:   Temp Pulse Resp BP SpO2   07/22/21 1800 97.5 °F (36.4 °C) 71 20 (!) 157/55 95 %   07/22/21 1730 -- 69 -- (!) 165/70 97 %   07/22/21 1700 -- 67 -- (!) 160/61 96 %   07/22/21 1630 -- 69 -- (!) 174/71 96 %   07/22/21 1600 -- 68 -- (!) 177/72 91 %   07/22/21 1500 -- 70 -- (!) 155/66 90 %   07/22/21 1458 -- 68 -- (!) 172/66 --   07/22/21 1430 -- 69 -- (!) 172/66 93 %   07/22/21 1410 -- 67 -- -- 95 %   07/22/21 1400 -- -- -- Esvin Dalton 172/70 --   07/22/21 1249 97.7 °F (36.5 °C) 65 18 (!) 159/64 96 %       Pulse Oximetry Analysis - 90% on ra    Cardiac Monitor:   Rate: 67 bpm  Rhythm: Normal Sinus Rhythm        Records Reviewed: Nursing Notes and Old Medical Records    Provider Notes (Medical Decision Making):   Patient presents the chief complaint of worsening shortness of breath. On my evaluation he has bibasilar rales and satting approximately 90%. Chest x-ray with pleural effusions and mild pulmonary edema. Will check basic lab work, discuss with nephrology. ED Course:   Initial assessment performed. The patients presenting problems have been discussed, and they are in agreement with the care plan formulated and outlined with them.   I have encouraged them to ask questions as they arise throughout their visit. Lab work notable for mild hyperkalemia at 5.8 with a creatinine of 5.4. This is not actually significantly changed from patient's prior labs. Will discuss with nephrology. ED Course as of Jul 22 1814   Thu Jul 22, 2021   1511 Spoke with Dr. Barbara Mcnamara nephro, will eval the patient    [MELISSA]   6321 Patient has been evaluated by Dr. Barbara Mcnamara, plan for admission and initiation of dialysis    [MELISSA]   200 Spoke with Dr. Tony Barron, hospitalist, who will see patient for admission    [MELISSA]      ED Course User Index  Opal Montes MD       Procedures:  Procedures    Critical Care:  none    Disposition:    Admission Note:  Patient is being admitted to the hospital by Dr. Tony Barron, Service: Hospitalist.  The results of their tests and reasons for their admission have been discussed with them and available family. They convey agreement and understanding for the need to be admitted and for their admission diagnosis. Diagnosis     Clinical Impression:   1. Acute pulmonary edema (HCC)    2. Chronic kidney disease, unspecified CKD stage            Please note that this dictation was completed with [employer], the computer voice recognition software. Quite often unanticipated grammatical, syntax, homophones, and other interpretive errors are inadvertently transcribed by the computer software. Please disregard these errors.   Please excuse any errors that have escaped final proofreading

## 2021-07-22 NOTE — ED NOTES
Patient is being transferred to \[facility]\"" 3 Orthopedics, Room # [phone]. Report given to Melanie Greene on Francisca Devine for routine progression of care. Report consisted of the following information SBAR, ED Summary, Intake/Output, Med Rec Status and Cardiac Rhythm NSR. Patient transferred to receiving unit by: transporter (RN or tech name). Outstanding consults needed: No     Next labs due: Yes    The following personal items will be sent with the patient during transfer to the floor:     All valuables:    Cardiac monitoring ordered: No     The following CURRENT information was reported to the receiving RN:    Code status: Full Code at time of transfer    Last set of vital signs:  Vital Signs  Level of Consciousness: Alert (0) (07/22/21 1800)  Temp: 97.5 °F (36.4 °C) (07/22/21 1800)  Temp Source: Oral (07/22/21 1800)  Pulse (Heart Rate): 71 (07/22/21 1800)  Heart Rate Source: Monitor (07/22/21 1800)  Cardiac Rhythm: Sinus Rhythm (07/22/21 1800)  Resp Rate: 20 (07/22/21 1800)  BP: (!) 157/55 (07/22/21 1800)  MAP (Monitor): 96 (07/22/21 1730)  MAP (Calculated): 89 (07/22/21 1800)  BP 1 Location: Right upper arm (07/22/21 1800)  BP 1 Method: Automatic (07/22/21 1800)  BP Patient Position: Lying left side (07/22/21 1800)  MEWS Score: 1 (07/22/21 1800)         Oxygen Therapy  O2 Sat (%): 95 % (07/22/21 1800)  Pulse via Oximetry: 73 beats per minute (07/22/21 1800)  O2 Device: Nasal cannula (07/22/21 1800)  O2 Flow Rate (L/min): 2 l/min (07/22/21 1800)      Last pain assessment:  Pain 1  Pain Scale 1: Numeric (0 - 10)  Pain Intensity 1: 9  Patient Stated Pain Goal: 0  Pain Reassessment 1: Yes  Pain Onset 1: chronic  Pain Location 1: Shoulder  Pain Orientation 1: Right  Pain Description 1: Aching, Constant  Pain Intervention(s) 1: Medication (see MAR)      Wounds: No     Urinary catheter: voiding  Is there a estrada order: No     LDAs:       Peripheral IV 07/22/21 Left Antecubital (Active)         Opportunity for questions and clarification was provided.     Kem Perrin RN

## 2021-07-22 NOTE — PROGRESS NOTES
TRANSFER - IN REPORT:    Verbal report received from ESPERANZA Cho(name) on Dinorah Coley  being received from ED(unit) for routine progression of care      Report consisted of patients Situation, Background, Assessment and   Recommendations(SBAR). Information from the following report(s) SBAR, Kardex, Intake/Output, MAR and Recent Results was reviewed with the receiving nurse. Opportunity for questions and clarification was provided. 1900-Report given to night shift RN for continued care.

## 2021-07-22 NOTE — CONSULTS
NEPHROLOGY CONSULT NOTE           Patient: Dale Lew MRN: [id number]  PCP: Kwame Richardson MD   :     1960  Age:   63 y.o. Sex:  male      Referring physician: Gianna Vaca MD      REASON FOR CONSULT:  CKD 5 with progression to ESRD    HPI:  Dale Lew is a 63 y.o. 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 Caitlin Ferguson, history of hoarseness of voice secondary to carcinoma ,hypertension, sleep apnea, morbid obesity, morbid obesity presented to the ED with complaints of shortness of breath PND orthopnea. He has been feeling short of breath for last couple of days but since yesterday he has been getting more more short of breath and unable to lay on it on his bed. He has been taking his diuretics but but his urine output has been declined. He called my office today with above complaints and he was asked to come to the ED for urgent evaluation for shortness of breath. Patient baseline creatinine is around 4.9-5.0 range, he has been taking Lokelma at home to prevent hyperkalemia. Initial evaluation in the ED showed ongoing pulmonary edema with acute respiratory failure. His initial potassium was high at around 5.8 with ongoing acidosis. Patient mentions that he has not been eating well at home and has been complaining of mild nausea and poor appetite. Renal consult was requested for evaluation of NERY on CKD.     Review of Systems  All systems reviewed and were negative except for above    Past Medical History:   Diagnosis Date    Adverse effect of anesthesia     \" STOP BREATHING 1 TIME C ANESTH\"    Cancer ([facility] [address] 2004    thyroid cancer    Chronic kidney disease     Chronic pain     BACK SHOULDER AND ARM    Depression     Diabetes ([facility] [address]     Encounter for long-term (current) use of NSAIDs     Hypercholesterolemia     Hypertension     Nausea & vomiting     Other ill-defined conditions(799.89)     cholesterol, thyroid    Sleep apnea     doesn't wear cpap    Thyroid cancer ([facility] [address]     TIA (transient ischemic attack) 2011    Vitamin D deficiency        Past Surgical History:   Procedure Laterality Date    HX HEENT      THROAT SURGERY X 4    HX ORTHOPAEDIC      back     HX ORTHOPAEDIC      ARM AND SHOULDER    HX OTHER SURGICAL      thyroid, lymphnode    HX RETINAL DETACHMENT REPAIR      left eye    IR INSERT NON TUNL CVC OVER 5 YRS  8/18/2020    IR INSERT TUNL CVC W/O PORT OVER 5 YR  8/26/2020    ND CARDIAC SURG PROCEDURE UNLIST      UPPER GI ENDOSCOPY,BALL DIL,30MM  7/17/2020         UPPER GI ENDOSCOPY,BIOPSY  7/17/2020         VASCULAR SURGERY PROCEDURE UNLIST      cardiac cath NEG. Allergies   Allergen Reactions    Anesthetics - Amide Type - Select Amino Amides Shortness of Breath    Flexeril [Cyclobenzaprine] Hives    Tramadol Hives       Current Facility-Administered Medications   Medication Dose Route Frequency    calcium gluconate injection 1 g  1 g IntraVENous ONCE    sodium bicarbonate 8.4 % (1 mEq/mL) injection 50 mEq  50 mEq IntraVENous ONCE    sodium zirconium cyclosilicate (LOKELMA) powder packet 10 g  10 g Oral NOW    bumetanide (BUMEX) injection 2 mg  2 mg IntraVENous TID     Current Outpatient Medications   Medication Sig    omeprazole (PRILOSEC) 20 mg capsule Take 1 capsule by mouth once daily    carvediloL (COREG) 6.25 mg tablet TAKE 1 TABLET BY MOUTH TWICE DAILY WITH MEALS    acetaminophen (TYLENOL) 325 mg tablet Take 1 Tab by mouth as needed for Pain. Take tab as needed for pain    metoclopramide HCl (REGLAN) 5 mg tablet TAKE 1 TABLET BY MOUTH ONCE DAILY AS NEEDED. DO NOT TAKE MORE THAN 3 TABLETS A DAY.     simvastatin (ZOCOR) 20 mg tablet Take 1 Tab by mouth nightly.  hydrALAZINE (APRESOLINE) 50 mg tablet Take 1 Tab by mouth two (2) times a day.  amLODIPine (NORVASC) 10 mg tablet Take 1 Tab by mouth daily.  olmesartan (BENICAR) 20 mg tablet Take 1 tablet by mouth once daily    Vitamin D2 1,250 mcg (50,000 unit) capsule TAKE 1 CAPSULE BY MOUTH ONCE A WEEK    levothyroxine (SYNTHROID) 175 mcg tablet 1 full tab Mon-Sat but only 1/2 tab on sundays    sodium zirconium cyclosilicate (Lokelma) 5 gram powder packet Take  by mouth.  sevelamer carbonate (RENVELA) 800 mg tab tab Take 1 Tab by mouth three (3) times daily (with meals).  sucralfate (CARAFATE) 1 gram tablet     OXYGEN-AIR DELIVERY SYSTEMS Take 2.5 L/min by inhalation continuous. (Patient not taking: Reported on 7/1/2021)    Blood-Glucose Meter monitoring kit 1 preferred brand glucometer for checking home glucose, E11.22    glucose blood VI test strips (blood glucose test) strip Pharmacist to choose preferred meter and strips. Dx:E11.65, Z79.4 Monitor 3 times daily    insulin NPH (HumuLIN N NPH U-100 Insulin) 100 unit/mL injection 2-10 units daily (Patient taking differently: 2-10 units daily    He is doing 10 units BID as of last few months 9/23/2020)    Insulin Syringe-Needle U-100 ([employer]ocate Syringes) 0.3 mL 30 gauge x 5/16\" syrg 1 Each by Does Not Apply route daily.  Ventolin HFA 90 mcg/actuation inhaler TAKE 1-2 PUFFS EVEERY 4-6 HOURS AS NEEDED FOR DYSPNEA AND WHEEZING    glucose blood VI test strips (PHARMACIST CHOICE) strip One Touch  Check glucose 3-4 times daily. DX E11.22    Lancets misc Use preferred brand;  Check glucose 3-4 times daily, Diagnosis E11.22       Family History   Problem Relation Age of Onset    Diabetes Mother     Elevated Lipids Mother    Seth [place] Bladder Disease Mother     Headache Mother    [employer] Migraines Mother     Heart Disease Mother     Hypertension Mother     Stroke Mother     Other Mother         ANEURSYM BRAIN    Bleeding Prob Father     Cancer Father         LEUKEMIA    Diabetes Father    [facility] Elevated Lipids Father     Mental Retardation Sister     Psychiatric Disorder Sister     Cancer Brother         LUNGS       Social History     Socioeconomic History    Marital status:      Spouse name: Not on file    Number of children: Not on file    Years of education: Not on file    Highest education level: Not on file   Occupational History    Not on file   Tobacco Use    Smoking status: Former Smoker     Quit date: 1994     Years since quittin.9    Smokeless tobacco: Never Used   Vaping Use    Vaping Use: Never used   Substance and Sexual Activity    Alcohol use: Yes     Comment: Occasionally    Drug use: No    Sexual activity: Never   Other Topics Concern    Not on file   Social History Narrative    Not on file     Social Determinants of Health     Financial Resource Strain:     Difficulty of Paying Living Expenses:    Food Insecurity:     Worried About Running Out of Food in the Last Year:     Ran Out of Food in the Last Year:    Transportation Needs:     Lack of Transportation (Medical):      Lack of Transportation (Non-Medical):    Physical Activity:     Days of Exercise per Week:     Minutes of Exercise per Session:    Stress:     Feeling of Stress :    Social Connections:     Frequency of Communication with Friends and Family:     Frequency of Social Gatherings with Friends and Family:     Attends [employer] Services:     Active Member of Clubs or Organizations:     Attends Club or Organization Meetings:     Marital Status:    Intimate Partner Violence:     Fear of Current or Ex-Partner:     Emotionally Abused:     Physically Abused:     Sexually Abused:        Vitals:    21 1410 21 1430 21 1458 21 1500   BP:  (!) 172/66 (!) 172/66 (!) 155/66   Pulse: 67 69 68 70   Resp:       Temp:       SpO2: 95% 93%  90%   Weight:       Height:             Intake/Output Summary (Last  hours) at 7/22/2021 1538  Last data filed at 7/22/2021 1523  Gross per 24 hour   Intake --   Output 300 ml   Net -300 ml       PHYSICAL EXAM:  GENERAL : sitting with resp distress  HEENT: AT NC PEERLA   NECK: Supple no JVP  CVS: Tachycardia  RS: B/l rales  ABDOMEN: soft NT ND positive BS  EXTREMITY: No edema clubbing or cyanosis, pedal pulse +  NEUROLOGY: AAA X3, no focal deficit or asterixis  VASCULAR ACCESS: N/A      LABS/STUDIES:  BMP:   Lab Results   Component Value Date/Time     07/22/2021 01:11 PM    K 5.8 (H) 07/22/2021 01:11 PM     (H) 07/22/2021 01:11 PM    CO2 20 (L) 07/22/2021 01:11 PM    AGAP 9 07/22/2021 01:11 PM     (H) 07/22/2021 01:11 PM    BUN 75 (H) 07/22/2021 01:11 PM    CREA 5.40 (H) 07/22/2021 01:11 PM    GFRAA 13 (L) 07/22/2021 01:11 PM    GFRNA 11 (L) 07/22/2021 01:11 PM        CBC:    Lab Results   Component Value Date/Time    WBC 12.6 (H) 07/22/2021 01:11 PM    HGB 8.7 (L) 07/22/2021 01:11 PM    HCT 27.3 (L) 07/22/2021 01:11 PM     07/22/2021 01:11 PM       Lab Results   Component Value Date/Time    Microalb/Creat ratio (ug/mg creat.) 910 (H) 07/29/2020 12:09 PM       Lab Results   Component Value Date/Time    Color YELLOW/STRAW 07/07/2021 03:04 PM    Appearance CLEAR 07/07/2021 03:04 PM    Specific gravity 1.012 07/07/2021 03:04 PM    pH (UA) 7.0 07/07/2021 03:04 PM    Protein 300 (A) 07/07/2021 03:04 PM    Glucose 100 (A) 07/07/2021 03:04 PM    Ketone Negative 07/07/2021 03:04 PM    Bilirubin Negative 07/07/2021 03:04 PM    Urobilinogen 1.0 07/07/2021 03:04 PM    Nitrites Negative 07/07/2021 03:04 PM    Leukocyte Esterase Negative 07/07/2021 03:04 PM    Epithelial cells FEW 07/07/2021 03:04 PM    Bacteria Negative 07/07/2021 03:04 PM    WBC 0-4 07/07/2021 03:04 PM    RBC 0-5 07/07/2021 03:04 PM         ASSESSMENT/PLAN:  No new Assessment & Plan notes have been filed under this hospital service since the last note was generated.   Service: Nephrology    CKD stage V with progression to ESRD  Hypervolemia  Acute respiratory failure  Pulmonary edema  Hyperkalemia  Uncontrolled high blood pressure  Uremia    Plan:  -Suspect patient has been exhibiting signs of progression of CKD stage V to ESRD. -His renal function has declined he is developing ongoing pulmonary edema along with hyperkalemia and acidosis. -I had a detailed discussion with the patient and the family and and recommended the patient need to be started on renal replacement therapy primarily with hemodialysis. -They are agreeable and have consented for hemodialysis. -I I have put in a consult for IR for permacath placement for tomorrow we will start the hemodialysis from tomorrow.  -I will give 1 amp of sodium bicarb, 10 g of Lokelma and calcium gluconate 1 g IV for hyperkalemia.  -We will also start the patient on Bumex 2 mg IV 3 times daily to get some volume of to relieve pulmonary edema.  -If patient declines overnight he might need to get a Rubio catheter and might need to start on hemodialysis overnight. But I suspect we should be able to get him dialyzed  tomorrow without any complication . Case was discussed with the ER physician thank you for letting us participate the care of this patient      Randall Haque MD  7/22/2021    [facility]:  [email or web address]  Alexandr Devine office:  [address]  [place], [address] [place] [address]  Phone: [phone]  Fax :     [phone]     Te office:  [address] [facility]  [place]  Phone - [phone]  Fax - [phone]

## 2021-07-23 NOTE — PROGRESS NOTES
Nutrition Note    Consult received for diet education on ESRD and obesity. Chart reviewed. RD visited pt at bedside. Pt reports a good appetite, but declined nutrition education at this time. Will f/u as needed.      Electronically signed by Carmencita Garcia RD on 7/23/2021 at 3:28 PM    Contact: [phone]

## 2021-07-23 NOTE — ROUTINE PROCESS
TRANSFER - IN REPORT:    Verbal report received from Maynor Sparks(name) on Reather Arnt  being received from Dialysis(unit) for routine progression of care      Report consisted of patients Situation, Background, Assessment and   Recommendations(SBAR). Information from the following report(s) Intake/Output was reviewed with the receiving nurse. Opportunity for questions and clarification was provided.

## 2021-07-23 NOTE — H&P
Interventional and Vascular Radiology History and Physical    Patient: Ashleigh Brambila 63 y.o. male       Chief Complaint: volume overload and dyspnea due to ESRD    History of Present Illness: 63year old male with DM2, CKD, and papillary thyroid cancer presents for temporary dialysis catheter placement.     History:    Past Medical History:   Diagnosis Date    Adverse effect of anesthesia     \" STOP BREATHING 1 TIME C ANESTH\"    Cancer ([facility] [address]     thyroid cancer    Chronic kidney disease     Chronic pain     BACK SHOULDER AND ARM    Depression     Diabetes ([facility] [address]     Encounter for long-term (current) use of NSAIDs     Hypercholesterolemia     Hypertension     Nausea & vomiting     Other ill-defined conditions(799.89)     cholesterol, thyroid    Sleep apnea     doesn't wear cpap    Thyroid cancer ([facility] [address]     TIA (transient ischemic attack)     Vitamin D deficiency      Family History   Problem Relation Age of Onset    Diabetes Mother     Elevated Lipids Mother    Leni Hoguet Bladder Disease Mother     Headache Mother     Migraines Mother     Heart Disease Mother     Hypertension Mother     Stroke Mother     Other Mother         ANEURSYM BRAIN    Bleeding Prob Father     Cancer Father         LEUKEMIA    Diabetes Father     Elevated Lipids Father     Mental Retardation Sister     Psychiatric Disorder Sister     Cancer Brother         LUNGS     Social History     Socioeconomic History    Marital status:      Spouse name: Not on file    Number of children: Not on file    Years of education: Not on file    Highest education level: Not on file   Occupational History    Not on file   Tobacco Use    Smoking status: Former Smoker     Quit date: 1994     Years since quittin.9    Smokeless tobacco: Never Used   Vaping Use    Vaping Use: Never used   Substance and Sexual Activity    Alcohol use: Yes     Comment: Occasionally    Drug use: No    Sexual activity: Never Other Topics Concern    Not on file   Social History Narrative    Not on file     Social Determinants of Health     Financial Resource Strain:     Difficulty of Paying Living Expenses:    Food Insecurity:     Worried About Running Out of Food in the Last Year:     [address] in the Last Year:    Transportation Needs:     Lack of Transportation (Medical):  Lack of Transportation (Non-Medical):    Physical Activity:     Days of Exercise per Week:     Minutes of Exercise per Session:    Stress:     Feeling of Stress :    Social Connections:     Frequency of Communication with Friends and Family:     Frequency of Social Gatherings with Friends and Family:     Attends Worship Services:     Active Member of Clubs or Organizations:     Attends Club or Organization Meetings:     Marital Status:    Intimate Partner Violence:     Fear of Current or Ex-Partner:     Emotionally Abused:     Physically Abused:     Sexually Abused: Allergies:    Allergies   Allergen Reactions    Anesthetics - Amide Type - Select Amino Amides Shortness of Breath    Flexeril [Cyclobenzaprine] Hives    Tramadol Hives       Current Medications:  Current Facility-Administered Medications   Medication Dose Route Frequency    0.9% sodium chloride infusion  25 mL/hr IntraVENous CONTINUOUS    midazolam (VERSED) injection 5 mg  5 mg IntraVENous Multiple    fentaNYL citrate (PF) injection 100 mcg  100 mcg IntraVENous Multiple    midazolam (VERSED) injection 1-5 mg  1-5 mg IntraVENous Multiple    ceFAZolin (ANCEF) 2 g in sterile water (preservative free) 20 mL IV syringe  2 g IntraVENous ONCE    heparin (porcine) pf 300 Units  300 Units InterCATHeter ONCE    heparinized saline 2 units/mL infusion 800 Units  400 mL Irrigation ONCE    lidocaine (XYLOCAINE) 20 mg/mL (2 %) injection 400 mg  20 mL SubCUTAneous ONCE    bumetanide (BUMEX) injection 2 mg  2 mg IntraVENous TID    amLODIPine (NORVASC) tablet 10 mg  10 mg Oral DAILY    carvediloL (COREG) tablet 6.25 mg  6.25 mg Oral BID WITH MEALS    hydrALAZINE (APRESOLINE) tablet 50 mg  50 mg Oral BID    levothyroxine (SYNTHROID) tablet 175 mcg  175 mcg Oral ACB    sevelamer carbonate (RENVELA) tab 800 mg  800 mg Oral TID WITH MEALS    sodium chloride (NS) flush 5-40 mL  5-40 mL IntraVENous Q8H    sodium chloride (NS) flush 5-40 mL  5-40 mL IntraVENous PRN    acetaminophen (TYLENOL) tablet 650 mg  650 mg Oral Q6H PRN    Or    acetaminophen (TYLENOL) suppository 650 mg  650 mg Rectal Q6H PRN    polyethylene glycol (MIRALAX) packet 17 g  17 g Oral DAILY PRN    ondansetron (ZOFRAN ODT) tablet 4 mg  4 mg Oral Q8H PRN    Or    ondansetron (ZOFRAN) injection 4 mg  4 mg IntraVENous Q6H PRN    insulin lispro (HUMALOG) injection   SubCUTAneous AC&HS    glucose chewable tablet 16 g  4 Tablet Oral PRN    dextrose (D50W) injection syrg 12.5-25 g  12.5-25 g IntraVENous PRN    glucagon (GLUCAGEN) injection 1 mg  1 mg IntraMUSCular PRN    oxyCODONE-acetaminophen (PERCOCET) 5-325 mg per tablet 1 Tablet  1 Tablet Oral Q4H PRN    hydrALAZINE (APRESOLINE) 20 mg/mL injection 10 mg  10 mg IntraVENous Q6H PRN        Physical Exam:  Blood pressure (!) 159/59, pulse 68, temperature 98.2 °F (36.8 °C), resp. rate 18, height 5' 9.5\" (1.765 m), weight 104.6 kg (230 lb 9.6 oz), SpO2 92 %. NAD  94% on 2LNC, nonlabored respiration  Regular rate and rhythm  Right neck site good      Alerts:    Hospital Problems  Date Reviewed: 5/10/2021        Codes Class Noted POA    SOB (shortness of breath) ICD-10-CM: R06.02  ICD-9-CM: 786.05  7/22/2021 Unknown              Laboratory:      Recent Labs     07/23/21  0423   HGB 8.5*   HCT 27.0*   WBC 13.7*      BUN 73*   CREA 5.53*   K 5.4*         Plan of Care/Planned Procedure:  Rubio placement with Local anesthesia only    Risks, benefits, and alternatives reviewed with patient and he agrees to proceed with the procedure.        Rose Lang, MD

## 2021-07-23 NOTE — PROCEDURES
[facility] Dialysis Team [facility]  [phone]    Vitals   Pre   Post   Assessment   Pre   Post     Temp  Temp: 98.6 °F (37 °C) (07/23/21 0936)  98.3 LOC  A&Ox4 A&Ox4   HR   Pulse (Heart Rate): 71 (07/23/21 0936) 64 Lungs   Diminished Diminished   B/P   BP: (!) 166/73 (07/23/21 0936) 129/55 Cardiac   RRR  RRR   Resp   Resp Rate: 18 (07/23/21 0936) 18 Skin   CDI CDI   Pain level  Pain Intensity 1: 0 (07/23/21 0850) 0 Edema  Generalized     Generalized   Orders:    Duration:   Start:   8147 End:   1090 Total:   2 hours   Dialyzer:   Dialyzer/Set Up Inspection: An Khan (07/23/21 0936)   Hui Shannon Bath:   Dialysate K (mEq/L): 2 (07/23/21 0936)   Ca Bath:   Dialysate CA (mEq/L): 2.5 (07/23/21 0936)   Na/Bicarb:   Dialysate NA (mEq/L): 138 (07/23/21 0936)   Target Fluid Removal:   Goal/Amount of Fluid to Remove (mL): 1500 mL (07/23/21 0936)   Access     Type & Location:   RIJ CVC: Dressing CDI. No s/s of infection. Both lumens aspirate & flush well. Running well at . SBAR received from Primary RN. Pt arrived to HD suite A&Ox4. Consent signed & on file. Each catheter limb disinfected per p&p, caps removed, hubs disinfected per p&p. Each lumen aspirated for blood return and flushed with Normal Saline per policy. VSS. Dialysis Tx initiated.     Labs     Obtained/Reviewed   Critical Results Called   Date when labs were drawn-  Hgb-    HGB   Date Value Ref Range Status   07/23/2021 8.5 (L) 12.1 - 17.0 g/dL Final     K-    Potassium   Date Value Ref Range Status   07/23/2021 5.4 (H) 3.5 - 5.1 mmol/L Final     Ca-   Calcium   Date Value Ref Range Status   07/23/2021 8.8 8.5 - 10.1 MG/DL Final     Bun-   BUN   Date Value Ref Range Status   07/23/2021 73 (H) 6 - 20 MG/DL Final     Creat-   Creatinine   Date Value Ref Range Status   07/23/2021 5.53 (H) 0.70 - 1.30 MG/DL Final        Medications/ Blood Products Given     Name   Dose   Route and Time     Venofer 100mg [email or web address]             Blood Volume Processed (BVP): 28.6 Net Fluid   Removed:  1500mL   Comments   All dialysis related medications have been reviewed. Assessment performed by RN. Procedure and documentation observed and reviewed by Ekaterina James RN  Time Out Done: 8118  Primary Nurse Rpt Pre:  Jenny Trinidad RN  Primary Nurse Rpt Post:  Jenny Trinidad RN  Pt Education:  procedural  Care Plan: on going  Tx Summary:  8734:  RIJ CVC: Dressing CDI. No s/s of infection. Both lumens aspirate & flush well. Running well at . SBAR received from Primary RN. Pt arrived to HD suite A&Ox4. Consent signed & on file. Each catheter limb disinfected per p&p, caps removed, hubs disinfected per p&p. Each lumen aspirated for blood return and flushed with Normal Saline per policy. VSS. Dialysis Tx initiated. 0945:  Pt resting  1000:  Pt resting  1015:  Pt resting  1023:  Dr Adiel Bui changed pt orders  1030:  Pt resting  1045:  Pt resting  1100:  Pt resting  1107:  Venofer given  1115:  Pt resting  1130:  Pt resting  1137: Tx ended. VSS. Each dialysis catheter limb disinfected per p&p, all possible blood returned per p&p, and each dialysis hub disinfected per p&p. Each lumen flushed, post dialysis catheter Heparin dwell instilled per order, and caps applied. Bed locked and in the lowest position, call bell and belongings in reach. SBAR given to Primary, RN. Patient is stable at time of their/ my departure. Admiting Diagnosis:  SOB  Pt's previous clinic- N/A  Consent signed - Informed Consent Verified: Yes (07/23/21 0936)  Robi Consent - signed and on file  Hepatitis Status- neg/susc 7/22/21  Machine #- Machine Number: [id number] (07/23/21 4772)  Telemetry status-  Pre-dialysis wt. -

## 2021-07-23 NOTE — PROGRESS NOTES
Hospitalist Progress Note    NAME: Gibson Serra   :  1960   MRN:  [id number]       Assessment / Plan:  Acute hypoxemic respiratory failure secondary to flash pulmonary edema secondary to end-stage renal disease. Patient is status post dialysis patient is being weaned off oxygen. Hyperkalemia due to above patient is improved potassium is currently 5.4 we will monitor  improved because patient had dialysis. Hypothyroidism we will continue levothyroxine. Pulmonary edema secondary to end-stage renal disease    Hypertension we will continue current blood pressure regimen. Hyperlipidemia continue current regimen statin    Diabetes mellitus with diabetic nephropathy monitor Chemstrips POC sliding scale. Depression    Anemia of Chronic Kidney disease on Epoetin as per Nephrology. Will continue to monitor. Severe obesity dietary modifications. Plan discussed with case management discussed with patient looking for dialysis chair once we get dialysis chair patient should be able to be discharge. Patient has an access now. Will need outpatient dialysis for chronic kidney disease end-stage renal disease. Nephrology on board. Appreciate their input and recommendations.  / Body mass index is 33.57 kg/m². Estimated discharge date: 2021  Barriers: Availability of dialysis chair    Code status: Full code  Prophylaxis: Heparin subcu  Recommended Disposition: Home     Subjective:     Chief Complaint / Reason for Physician Visit    Discussed with RN events overnight. Patient seen and examined at bedside comfortable patient is status post dialysis status post access placement.     Review of Systems:  Symptom Y/N Comments  Symptom Y/N Comments   Fever/Chills n   Chest Pain n    Poor Appetite n   Edema     Cough n   Abdominal Pain     Sputum n   Joint Pain     SOB/NUÑEZ n   Pruritis/Rash     Nausea/vomit n   Tolerating PT/OT     Diarrhea n   Tolerating Diet     Constipation n   Other Could NOT obtain due to:      Objective:     VITALS:   Last 24hrs VS reviewed since prior progress note. Most recent are:  Patient Vitals for the past 24 hrs:   Temp Pulse Resp BP SpO2   07/23/21 1157 97.9 °F (36.6 °C) 69 -- (!) 182/58 94 %   07/23/21 1137 98.3 °F (36.8 °C) 64 18 (!) 129/55 --   07/23/21 1130 -- 63 18 (!) 110/44 --   07/23/21 1115 -- 63 18 (!) 112/49 --   07/23/21 1100 -- 63 18 (!) 118/43 --   07/23/21 1045 -- 66 18 (!) 122/57 --   07/23/21 1030 -- 66 18 135/65 --   07/23/21 1015 -- 66 18 (!) 125/52 --   07/23/21 1000 -- 65 18 132/60 --   07/23/21 0945 -- 68 18 (!) 144/67 --   07/23/21 0936 98.6 °F (37 °C) 71 18 (!) 166/73 --   07/23/21 0900 -- 72 21 (!) 171/66 --   07/23/21 0850 -- 71 22 (!) 169/65 97 %   07/23/21 0723 98.2 °F (36.8 °C) 68 18 (!) 159/59 92 %   07/23/21 0300 98.1 °F (36.7 °C) 69 18 (!) 155/67 92 %   07/22/21 1954 97.5 °F (36.4 °C) 60 20 (!) 143/78 94 %   07/22/21 1834 97.5 °F (36.4 °C) 70 20 (!) 187/83 95 %   07/22/21 1800 97.5 °F (36.4 °C) 71 20 (!) 157/55 95 %   07/22/21 1730 -- 69 -- (!) 165/70 97 %   07/22/21 1700 -- 67 -- (!) 160/61 96 %   07/22/21 1630 -- 69 -- (!) 174/71 96 %   07/22/21 1600 -- 68 -- (!) 177/72 91 %   07/22/21 1500 -- 70 -- (!) 155/66 90 %   07/22/21 1458 -- 68 -- (!) 172/66 --   07/22/21 1430 -- 69 -- (!) 172/66 93 %   07/22/21 1410 -- 67 -- -- 95 %   07/22/21 1400 -- -- -- Ekaterina Busing 172/70 --       Intake/Output Summary (Last 24 hours) at 7/23/2021 1308  Last data filed at 7/23/2021 1137  Gross per 24 hour   Intake --   Output 4250 ml   Net -4250 ml        I had a face to face encounter and independently examined this patient on 7/23/2021, as outlined below:  PHYSICAL EXAM:  General: WD, WN. Alert, cooperative, no acute distress    EENT:  EOMI. Anicteric sclerae. MMM  Resp:  CTA bilaterally, no wheezing or rales. No accessory muscle use  CV:  Regular  rhythm,  No edema  GI:  Soft, Non distended, Non tender.   +Bowel sounds  Neurologic:  Alert and oriented X 3, normal speech,   Psych:   Good insight. Not anxious nor agitated  Skin:  No rashes. No jaundice    Reviewed most current lab test results and cultures  YES  Reviewed most current radiology test results   YES  Review and summation of old records today    NO  Reviewed patient's current orders and MAR    YES  PMH/SH reviewed - no change compared to H&P  ________________________________________________________________________  Care Plan discussed with:    Comments   Patient x    Family      RN     Care Manager     Consultant                        Multidiciplinary team rounds were held today with , nursing, pharmacist and clinical coordinator. Patient's plan of care was discussed; medications were reviewed and discharge planning was addressed. ________________________________________________________________________  Total NON critical care TIME:     Total CRITICAL CARE TIME Spent:   Minutes non procedure based      Comments   >50% of visit spent in counseling and coordination of care     ________________________________________________________________________  Reynaldo Raygoza MD     Procedures: see electronic medical records for all procedures/Xrays and details which were not copied into this note but were reviewed prior to creation of Plan. LABS:  I reviewed today's most current labs and imaging studies.   Pertinent labs include:  Recent Labs     07/23/21 0423 07/22/21  1311   WBC 13.7* 12.6*   HGB 8.5* 8.7*   HCT 27.0* 27.3*    220     Recent Labs     07/23/21  0423 07/22/21  1311    141   K 5.4* 5.8*   * 112*   CO2 20* 20*   GLU 84 105*   BUN 73* 75*   CREA 5.53* 5.40*   CA 8.8 8.8   ALB  --  3.1*   TBILI  --  0.6   ALT  --  27       Signed: Reynaldo Raygoza MD

## 2021-07-23 NOTE — PROGRESS NOTES
Patient is on HD now. Per protocol, will adjust metoclopramide to 5mg PO BID PRN. Will Dc the Scheduled 10mg QID order.     It appears his home dose is max of 15mg/day (3x5mg tabs)    Noa Jennings [facility]

## 2021-07-23 NOTE — PROGRESS NOTES
Nephrology Progress Note  José Miguel Butt     [facility]. [facility] [employer]  Phone - [phone]   Patient: Camilo Kitchen    YOB: 1960        Date- 7/23/2021   Admit Date: 7/22/2021  CC: Follow up for [facility]:     NEW ONSET ESRD   CKD 5   ANEMIA OF CKD   Hyperkalemia   resp failure, chf   DM    PLAN-   Seen on hd today   Set up hd at [address] [place] [address] [address] today and tomorrow   Adjust bp meds   epogen for anemia   Continue chiragla   DSaraw patient     Subjective: Interval History:   -  k improved to 5.4  Seen on hd  bp much better  Sob improving    Objective:   Vitals:    07/23/21 0936 07/23/21 0945 07/23/21 1000 07/23/21 1015   BP: (!) 166/73 (!) 144/67 132/60    Pulse: 71 68 65    Resp: 18 18 18 18   Temp: 98.6 °F (37 °C)      TempSrc: Oral      SpO2:       Weight:       Height:          07/22 0701 - 07/23 0700  In: -   Out: 2750 [Urine:2750]  Last 3 Recorded Weights in this Encounter    07/22/21 1249   Weight: 104.6 kg (230 lb 9.6 oz)      Physical exam:   GEN: NAD  NECK- Supple, no mass  PERMCATH +  RESP: No wheezing, Clear b/l  CVS: S1,S2  RRR  NEURO: Normal speech, Non focal  EXT: + Edema   PSYCH: Normal Mood    Chart reviewed. Pertinent Notes reviewed. Data Review :     Results for La Nena Barton (MRN [id number]) as of 7/23/2021 10:17   Ref. Range 7/18/2020 01:26 7/29/2020 12:09 8/18/2020 17:38 8/25/2020 01:50 9/23/2020 09:22 11/17/2020 11:16 7/22/2021 15:58   Hepatitis A, IgM Latest Ref Range: NR   NONREACTIVE         Hepatitis B surface Ag Latest Units: Index <0.10  0.41    <0.10   Hep B surface Ag Interp. Latest Ref Range: NEG   Negative  Negative    Negative   Hepatitis B surface Ab Latest Units: mIU/mL    <3.10   <3.10   Hep B surface Ab Interp.  Latest Ref Range: NR      NONREACTIVE   NONREACTIVE   Hepatitis B core, IgM Latest Ref Range: NR   NONREACTIVE      NONREACTIVE   Hep C  virus Ab comment Latest Units:   Method used is [facility]         Prostate Specific Ag Latest Ref Range: 0.0 - 4.0 ng/mL  1.0   1.4 1.6      Recent Labs     07/23/21  0423 07/22/21  1311    141   K 5.4* 5.8*   * 112*   CO2 20* 20*   BUN 73* 75*   CREA 5.53* 5.40*   GLU 84 105*   CA 8.8 8.8     Recent Labs     07/23/21  0423 07/22/21  1311   WBC 13.7* 12.6*   HGB 8.5* 8.7*   HCT 27.0* 27.3*    220     No results for input(s): FE, TIBC, PSAT, FERR in the last 72 hours.    Medication list  reviewed  Current Facility-Administered Medications   Medication Dose Route Frequency    0.9% sodium chloride infusion  25 mL/hr IntraVENous CONTINUOUS    midazolam (VERSED) injection 5 mg  5 mg IntraVENous Multiple    fentaNYL citrate (PF) injection 100 mcg  100 mcg IntraVENous Multiple    midazolam (VERSED) injection 1-5 mg  1-5 mg IntraVENous Multiple    ceFAZolin (ANCEF) 2 g in sterile water (preservative free) 20 mL IV syringe  2 g IntraVENous ONCE    heparinized saline 2 units/mL infusion 800 Units  400 mL Irrigation ONCE    sodium chloride (NS) flush 5-40 mL  5-40 mL IntraVENous Q8H    sodium chloride (NS) flush 5-40 mL  5-40 mL IntraVENous PRN    bumetanide (BUMEX) injection 2 mg  2 mg IntraVENous TID    amLODIPine (NORVASC) tablet 10 mg  10 mg Oral DAILY    carvediloL (COREG) tablet 6.25 mg  6.25 mg Oral BID WITH MEALS    hydrALAZINE (APRESOLINE) tablet 50 mg  50 mg Oral BID    levothyroxine (SYNTHROID) tablet 175 mcg  175 mcg Oral ACB    sevelamer carbonate (RENVELA) tab 800 mg  800 mg Oral TID WITH MEALS    sodium chloride (NS) flush 5-40 mL  5-40 mL IntraVENous Q8H    sodium chloride (NS) flush 5-40 mL  5-40 mL IntraVENous PRN    acetaminophen (TYLENOL) tablet 650 mg  650 mg Oral Q6H PRN    Or    acetaminophen (TYLENOL) suppository 650 mg  650 mg Rectal Q6H PRN    polyethylene glycol (MIRALAX) packet 17 g  17 g Oral DAILY PRN    ondansetron (ZOFRAN ODT) tablet 4 mg  4 mg Oral Q8H PRN    Or    ondansetron (ZOFRAN) injection 4 mg  4 mg IntraVENous Q6H PRN    insulin lispro (HUMALOG) injection   SubCUTAneous AC&HS    glucose chewable tablet 16 g  4 Tablet Oral PRN    dextrose (D50W) injection syrg 12.5-25 g  12.5-25 g IntraVENous PRN    glucagon (GLUCAGEN) injection 1 mg  1 mg IntraMUSCular PRN    oxyCODONE-acetaminophen (PERCOCET) 5-325 mg per tablet 1 Tablet  1 Tablet Oral Q4H PRN    hydrALAZINE (APRESOLINE) 20 mg/mL injection 10 mg  10 mg IntraVENous Q6H PRN          Caro Frederick MD              [facility]  [facility] [address], [address], [address]  Phone - [phone]               Fax - [phone]

## 2021-07-23 NOTE — PROGRESS NOTES
End of Shift Note    Bedside shift change report given to Jani Crawford RN (oncoming nurse) by Bhakti Kirkland (offgoing nurse). Report included the following information SBAR, Kardex, Intake/Output, MAR, Accordion, Recent Results and Med Rec Status    Shift worked:  night     Shift summary and any significant changes:     patient NPO since midnight     Concerns for physician to address:  HD access     Zone phone for oncoming shift:   [phone]       Activity:  Activity Level: Up ad kaden  Number times ambulated in hallways past shift: 0  Number of times OOB to chair past shift: 0    Cardiac:   Cardiac Monitoring: Yes      Cardiac Rhythm: Sinus Rhythm    Access:   Current line(s): PIV     Genitourinary:   Urinary status: voiding    Respiratory:   O2 Device: Nasal cannula  Chronic home O2 use?: NO  Incentive spirometer at bedside: NO     GI:     Current diet:  DIET NPO Sips of Water with Meds  Passing flatus: YES  Tolerating current diet: YES       Pain Management:   Patient states pain is manageable on current regimen: YES    Skin:  Ge Score: 19  Interventions: float heels and increase time out of bed    Patient Safety:  Fall Score:  Total Score: 2  Interventions: assistive device (walker, cane, etc) and gripper socks       Length of Stay:  Expected LOS: - - -  Actual LOS: 1      Bhakti Kirkland

## 2021-07-23 NOTE — PROGRESS NOTES
Problem: Falls - Risk of  Goal: *Absence of Falls  Description: Document Sowmya Gomezas Fall Risk and appropriate interventions in the flowsheet.   Outcome: Progressing Towards Goal  Note: Fall Risk Interventions:            Medication Interventions: Patient to call before getting OOB    Elimination Interventions: Call light in reach              Problem: Patient Education: Go to Patient Education Activity  Goal: Patient/Family Education  Outcome: Progressing Towards Goal

## 2021-07-23 NOTE — ROUTINE PROCESS
Procedure reviewed with patient by Karlos Agarwal. Opportunity to verbalize questions and concerns. Consent obtained.

## 2021-07-23 NOTE — ROUTINE PROCESS
Bedside shift change report given to Olesya Santos (oncoming nurse) by Lucio GONZALEZ RN (offgoing nurse). Report included the following information SBAR, Kardex and MAR.
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
## 2021-07-24 NOTE — PROGRESS NOTES
End of Shift Note    Bedside shift change report given to Oral Gonzales RN (oncoming nurse) by Nerissa Sorensen (offgoing nurse). Report included the following information SBAR, Kardex, Intake/Output and MAR    Shift worked:  Night     Shift summary and any significant changes:     Reglan given for nausea. Pain controlled with percocet      Concerns for physician to address:       Zone phone for oncoming shift:   [phone]       Activity:  Activity Level: Up ad kaden  Number times ambulated in hallways past shift: 0  Number of times OOB to chair past shift: 0    Cardiac:   Cardiac Monitoring: Yes      Cardiac Rhythm: Sinus Rhythm    Access:   Current line(s): PIV and HD access     Genitourinary:   Urinary status: voiding    Respiratory:   O2 Device: Nasal cannula  Chronic home O2 use?: NO  Incentive spirometer at bedside: NO     GI:     Current diet:  ADULT DIET Regular  Passing flatus: YES  Tolerating current diet: YES       Pain Management:   Patient states pain is manageable on current regimen: YES    Skin:  Ge Score: 20  Interventions: increase time out of bed    Patient Safety:  Fall Score:  Total Score: 2  Interventions: gripper socks and pt to call before getting OOB       Length of Stay:  Expected LOS: - - -  Actual LOS: [address]

## 2021-07-24 NOTE — ROUTINE PROCESS
Bedside shift change report given to Maynor Magana (oncoming nurse) by Jani GONZALEZ RN (offgoing nurse). Report included the following information SBAR, Kardex and MAR.

## 2021-07-24 NOTE — PROGRESS NOTES
Hospitalist Progress Note    NAME: Francisca Devine   :  1960   MRN:  [id number]       Assessment / Plan:  Acute hypoxemic respiratory failure POA- resolved now s/p UF/HD  secondary to flash pulmonary edema POA  secondary to end-stage renal disease. POA- restarted on HD this admission    Hyperkalemia POA due to above- resolved, down to 4.8 now    Taper oxygen to off in 24 hrs  Cont HD again today per nephrology  OP HD set up at [facility] per Nephrology  Likely DC once chair time confirmed-- ? monday    Hypothyroidism   we will continue levothyroxine. Hypertension   we will continue current blood pressure regimen. Hyperlipidemia   continue current regimen statin    Diabetes mellitus with diabetic nephropathy   Cont SSI lispro here    Depression  Will continue to monitor. Anemia of Chronic Kidney disease   on Epoetin as per Nephrology. Severe obesity dietary modifications. Obesity POA/ Body mass index is 33.57 kg/m². Weightloss recommended    Estimated discharge date: 2021  Barriers: Availability of dialysis chair    Code status: Full code  Prophylaxis: Heparin subcu  Recommended Disposition: Home once OP HD chair time set up      Subjective:     Chief Complaint / Reason for Physician Visit: F/u ESRD needing HD set up, Acute resp failure/Pulm edema, DM    Discussed with RN events overnight. Patient seen and examined at bedside comfortable patient is status post dialysis status post access placement. Review of Systems:  Symptom Y/N Comments  Symptom Y/N Comments   Fever/Chills n   Chest Pain n    Poor Appetite n   Edema     Cough n   Abdominal Pain     Sputum n   Joint Pain     SOB/NUÑEZ n   Pruritis/Rash     Nausea/vomit n   Tolerating PT/OT y    Diarrhea n   Tolerating Diet y    Constipation n   Other       Could NOT obtain due to:      Objective:     VITALS:   Last 24hrs VS reviewed since prior progress note.  Most recent are:  Patient Vitals for the past 24 hrs: Temp Pulse Resp BP SpO2   07/24/21 1000 -- (!) 55 20 125/62 --   07/24/21 0945 -- (!) 57 20 119/62 --   07/24/21 0930 -- 62 20 121/64 --   07/24/21 0915 -- (!) 59 20 (!) 114/56 --   07/24/21 0900 98.2 °F (36.8 °C) (!) 59 20 (!) 181/81 --   07/24/21 0743 97.9 °F (36.6 °C) 67 18 137/60 96 %   07/24/21 0455 -- 60 -- (!) 144/49 --   07/24/21 0324 98.2 °F (36.8 °C) 63 18 (!) 170/74 93 %   07/23/21 2301 98.3 °F (36.8 °C) (!) 59 20 (!) 150/67 91 %   07/23/21 1927 98.5 °F (36.9 °C) 64 20 (!) 160/67 95 %   07/23/21 1512 97.9 °F (36.6 °C) 64 20 (!) 155/56 94 %   07/23/21 1157 97.9 °F (36.6 °C) 69 18 (!) 182/58 94 %   07/23/21 1137 98.3 °F (36.8 °C) 64 18 (!) 129/55 --   07/23/21 1130 -- 63 18 (!) 110/44 --   07/23/21 1115 -- 63 18 (!) 112/49 --   07/23/21 1100 -- 63 18 (!) 118/43 --   07/23/21 1045 -- 66 18 (!) 122/57 --   07/23/21 1030 -- 66 18 135/65 --       Intake/Output Summary (Last 24 hours) at 7/24/2021 1024  Last data filed at 7/24/2021 7517  Gross per 24 hour   Intake --   Output 2625 ml   Net -2625 ml        I had a face to face encounter and independently examined this patient on 7/24/2021, as outlined below:  PHYSICAL EXAM:  General: WD, WN. Alert, cooperative, no acute distress    EENT:  EOMI. Anicteric sclerae. MMM  Resp:  CTA bilaterally, no wheezing or rales. No accessory muscle use  CV:  Regular  rhythm,  No edema  GI:  Soft, Non distended, Non tender. +Bowel sounds  Neurologic:  Alert and oriented X 3, normal speech,   Psych:   Good insight. Not anxious nor agitated  Skin:  No rashes.   No jaundice    Reviewed most current lab test results and cultures  YES  Reviewed most current radiology test results   YES  Review and summation of old records today    NO  Reviewed patient's current orders and MAR    YES  PMH/SH reviewed - no change compared to H&P  ________________________________________________________________________  Care Plan discussed with:    Comments   Patient x    Family      RN x    Care Manager     Consultant                        Multidiciplinary team rounds were held today with , nursing, pharmacist and clinical coordinator. Patient's plan of care was discussed; medications were reviewed and discharge planning was addressed. ________________________________________________________________________  Total NON critical care TIME: 26 mins    Total CRITICAL CARE TIME Spent:   Minutes non procedure based      Comments   >50% of visit spent in counseling and coordination of care x    ________________________________________________________________________  Buzz Downey MD     Procedures: see electronic medical records for all procedures/Xrays and details which were not copied into this note but were reviewed prior to creation of Plan. LABS:  I reviewed today's most current labs and imaging studies.   Pertinent labs include:  Recent Labs     07/24/21  0230 07/23/21  0423 07/22/21  1311   WBC 14.3* 13.7* 12.6*   HGB 9.3* 8.5* 8.7*   HCT 29.4* 27.0* 27.3*    222 220     Recent Labs     07/24/21  0230 07/23/21  0423 07/22/21  1311    139 141   K 4.8 5.4* 5.8*    111* 112*   CO2 24 20* 20*   * 84 105*   BUN 53* 73* 75*   CREA 4.73* 5.53* 5.40*   CA 9.1 8.8 8.8   ALB  --   --  3.1*   TBILI  --   --  0.6   ALT  --   --  27       Signed: Buzz Downey MD

## 2021-07-24 NOTE — PROCEDURES
[facility] Dialysis Team [facility]  [phone]    Vitals   Pre   Post   Assessment   Pre   Post     Temp  Temp: 98.2 °F (36.8 °C) (07/24/21 0900) 97.9 LOC  AxOx4 AxOx4   HR   Pulse (Heart Rate): (!) 59 (07/24/21 0900) 57 Lungs   Clear anterior, crackles posterior and diminished bases Even and unlabored   B/P   BP: (!) 181/81 (07/24/21 0900) 166/76 Cardiac   RRR RRR   Resp   Resp Rate: 20 (07/24/21 0900) 20 Skin   intact intact   Pain level  Pain Intensity 1: 9 (07/24/21 0845) 0 Edema  No edema   No edema   Orders:    Duration:   Start:   6931 End:   1136 Total:   2.5 hrs   Dialyzer:   Dialyzer/Set Up Inspection: Revaclear (07/24/21 0900)   K Bath:   Dialysate K (mEq/L): 2 (07/24/21 0900)   Ca Bath:   Dialysate CA (mEq/L): 2.5 (07/24/21 0900)   Na/Bicarb:   Dialysate NA (mEq/L): 138 (07/24/21 0900)   Target Fluid Removal:   Goal/Amount of Fluid to Remove (mL): 1500 mL (07/24/21 0900)   Access     Type & Location:   Right non-tunneled CVC, upon arrival CVC dressing noted to be lifted up with insertion site exposed. Old dressing removed and new dressing applied, dated 07/24/21. Each catheter limb disinfected for 60 seconds per limb with alcohol swabs.  Caps removed, dialysis CVC hub scrubbed with Prevantics for 15 seconds, followed by a 5 second dry time per Hospital P&P.   +aspirated/+flushed   Labs     Obtained/Reviewed   Critical Results Called   Date when labs were drawn-  Hgb-    HGB   Date Value Ref Range Status   07/24/2021 9.3 (L) 12.1 - 17.0 g/dL Final     K-    Potassium   Date Value Ref Range Status   07/24/2021 4.8 3.5 - 5.1 mmol/L Final     Ca-   Calcium   Date Value Ref Range Status   07/24/2021 9.1 8.5 - 10.1 MG/DL Final     Bun-   BUN   Date Value Ref Range Status   07/24/2021 53 (H) 6 - 20 MG/DL Final     Creat-   Creatinine   Date Value Ref Range Status   07/24/2021 4.73 (H) 0.70 - 1.30 MG/DL Final        Medications/ Blood Products Given     Name   Dose   Route and Time     none Blood Volume Processed (BVP):    35.4 L Net Fluid   Removed:  500 mL   Comments   Time Out Done: 0983  Primary Nurse Rpt Pre: Dorothy Villasenor RN  Primary Nurse Rpt Post: Dorothy Villasenor RN  Pt Education: procedural / infection control  Care Plan: continue current HD plan of care  Tx Summary:  8228 HD treatment initiated per physicians order. 0915 /56. Significant drop in BP - patient states feels fine. 8390 Patient actively vomiting, UF paused,  mL bolus given. 0080 Unable to get BP until after patient finished vomiting. /69 post bolus. Goal reduced to 1kg. 1015 /57. Patient c/o feeling hot/nausea. UF paused. Goal reduced 0.5kg. 1136 HD treatment completed per physician order. All possible blood returned to patient. Each catheter limb disinfected for 60 seconds per limb with alcohol swabs. Dialysis CVC hub scrubbed with Prevantics for 5 seconds, followed by a 5 second dry time per Hospital P&P.   +flushed/+heplock/+capped. Admiting Diagnosis: SOB  Pt's previous clinic- TBD  Consent signed - Informed Consent Verified: Yes (07/24/21 0900)  Robi Consent - verified  Hepatitis Status- 7/22/21 neg/susc (cc)  Machine #- Machine Number: [id number] (07/24/21 0900)  Telemetry status- remote  Pre-dialysis wt. -

## 2021-07-25 NOTE — ROUTINE PROCESS
TRANSFER - OUT REPORT:    Verbal report given to harinder Lopez(name) on [facility]  being transferred to   [facility](unit) for routine progression of care       Report consisted of patients Situation, Background, Assessment and   Recommendations(SBAR). Information from the following report(s) SBAR, Kardex, Intake/Output, MAR and Accordion was reviewed with the receiving nurse. Lines:   Peripheral IV 07/25/21 Right Antecubital (Active)   Site Assessment Clean, dry, & intact 07/25/21 0717   Phlebitis Assessment 0 07/25/21 0717   Infiltration Assessment 0 07/25/21 0717   Dressing Status Clean, dry, & intact 07/25/21 0717   Dressing Type Tape;Transparent 07/25/21 0717   Hub Color/Line Status Pink;Flushed;Capped 07/25/21 0717   Alcohol Cap Used Yes 07/25/21 0552        Opportunity for questions and clarification was provided.       Patient transported with:   Monitor

## 2021-07-25 NOTE — PROGRESS NOTES
End of Shift Note    Bedside shift change report given to Ramin Harris RN (oncoming nurse) by Edgardo Ku (offgoing nurse). Report included the following information SBAR, Kardex, Intake/Output, MAR, Recent Results and Cardiac Rhythm NSR    Shift worked:  1900-0730     Shift summary and any significant changes:     Patient has no new changes. Patient expected to go to dialysis Monday morning. Patient on telemetry monitoring and NSR. Concerns for physician to address:  DC once chair time confirmed for hemodialysis on Monday     Zone phone for oncoming shift:   [phone]       Activity:  Activity Level: Up ad kaden  Number times ambulated in hallways past shift: 0  Number of times OOB to chair past shift: 0    Cardiac:   Cardiac Monitoring: Yes      Cardiac Rhythm: Sinus Rhythm    Access:   Current line(s): PIV and HD access     Genitourinary:   Urinary status: voiding    Respiratory:   O2 Device: Nasal cannula  Chronic home O2 use?: NO  Incentive spirometer at bedside: YES     GI:  Last Bowel Movement Date: 07/21/21  Current diet:  ADULT DIET Regular  Passing flatus: NO  Tolerating current diet: YES       Pain Management:   Patient states pain is manageable on current regimen: YES    Skin:  Ge Score: 20  Interventions: increase time out of bed and PT/OT consult    Patient Safety:  Fall Score:  Total Score: 2  Interventions: gripper socks and pt to call before getting OOB       Length of Stay:  Expected LOS: - - -  Actual LOS: [address] VO[address]IC

## 2021-07-25 NOTE — PROGRESS NOTES
End of Shift Note    Bedside shift change report given to Raciel Pearson (oncoming nurse) by Yelitza Maxwell (offgoing nurse). Report included the following information SBAR, Kardex, Procedure Summary, Intake/Output, MAR and Recent Results    Shift worked:  7-7pm     Shift summary and any significant changes:     No significant changes.  Currently on room air     Concerns for physician to address:  Discussed on roundings     Zone phone for oncoming shift:                Yelitza Maxwell

## 2021-07-25 NOTE — PROGRESS NOTES
Hospitalist Progress Note    NAME: Oralia Suazo   :  1960   MRN:  [id number]       Assessment / Plan:  Acute hypoxemic respiratory failure POA- resolved now s/p UF/HD  secondary to flash pulmonary edema POA  secondary to end-stage renal disease. POA- restarted on HD this admission    Hyperkalemia POA due to above- resolved, down to 4.8 now    Taper oxygen to off in 24 hrs as able  S/p HD yesterday per nephrology- no HD today  OP HD set up at [facility] per Nephrology  Likely DC once chair time confirmed-- ? monday    Hypothyroidism   we will continue levothyroxine. Hypertension   we will continue current blood pressure regimen. Hyperlipidemia   continue current regimen statin    Diabetes mellitus with diabetic nephropathy   Cont SSI lispro here    Depression  Will continue to monitor. Anemia of Chronic Kidney disease   on Epoetin as per Nephrology. Severe obesity dietary modifications. Obesity POA/ Body mass index is 33.57 kg/m². Weightloss recommended    Estimated discharge date: 2021  Barriers: Availability of dialysis chair    Code status: Full code  Prophylaxis: Heparin subcu  Recommended Disposition: Home once OP HD chair time set up      Subjective:     Chief Complaint / Reason for Physician Visit: F/u ESRD needing HD set up, Acute resp failure/Pulm edema, DM    Discussed with RN events overnight. Patient seen and examined at bedside comfortable patient is status post dialysis status post access placement. Review of Systems:  Symptom Y/N Comments  Symptom Y/N Comments   Fever/Chills n   Chest Pain n    Poor Appetite n   Edema     Cough n   Abdominal Pain     Sputum n   Joint Pain     SOB/NUÑEZ n   Pruritis/Rash     Nausea/vomit n   Tolerating PT/OT y    Diarrhea n   Tolerating Diet y    Constipation n   Other       Could NOT obtain due to:      Objective:     VITALS:   Last 24hrs VS reviewed since prior progress note.  Most recent are:  Patient Vitals for the past 24 hrs:   Temp Pulse Resp BP SpO2   07/25/21 0717 98 °F (36.7 °C) 60 18 (!) 136/58 92 %   07/25/21 0348 98.1 °F (36.7 °C) 62 18 (!) 153/69 92 %   07/25/21 0000 -- 65 -- -- --   07/24/21 2333 98.1 °F (36.7 °C) 64 18 (!) 157/53 92 %   07/24/21 2000 -- 69 -- -- --   07/24/21 1919 98.5 °F (36.9 °C) 70 18 (!) 151/49 92 %   07/24/21 1530 98.1 °F (36.7 °C) 64 20 (!) 158/69 93 %   07/24/21 1241 97.8 °F (36.6 °C) 60 20 (!) 171/75 93 %   07/24/21 1136 97.9 °F (36.6 °C) (!) 57 20 (!) 166/76 --   07/24/21 1130 -- (!) 55 20 (!) 152/71 --   07/24/21 1115 -- (!) 58 20 (!) 155/76 --   07/24/21 1100 -- (!) 56 20 (!) 144/65 --   07/24/21 1045 -- (!) 58 20 129/63 --   07/24/21 1030 -- (!) 59 20 123/60 --   07/24/21 1015 -- (!) 55 20 (!) 104/57 --   07/24/21 1000 -- (!) 55 20 125/62 --   07/24/21 0945 -- (!) 57 20 119/62 --   07/24/21 0930 -- 62 20 121/64 --   07/24/21 0915 -- (!) 59 20 (!) 114/56 --   07/24/21 0900 98.2 °F (36.8 °C) (!) 59 20 (!) 181/81 --       Intake/Output Summary (Last 24 hours) at 7/25/2021 0857  Last data filed at 7/25/2021 0454  Gross per 24 hour   Intake --   Output 700 ml   Net -700 ml        I had a face to face encounter and independently examined this patient on 7/25/2021, as outlined below:  PHYSICAL EXAM:  General: WD, WN. Alert, cooperative, no acute distress    EENT:  EOMI. Anicteric sclerae. MMM  Resp:  CTA bilaterally, no wheezing or rales. No accessory muscle use  CV:  Regular  rhythm,  No edema  GI:  Soft, Non distended, Non tender. +Bowel sounds  Neurologic:  Alert and oriented X 3, normal speech,   Psych:   Good insight. Not anxious nor agitated  Skin:  No rashes.   No jaundice    Reviewed most current lab test results and cultures  YES  Reviewed most current radiology test results   YES  Review and summation of old records today    NO  Reviewed patient's current orders and MAR    YES  PMH/SH reviewed - no change compared to H&P  ________________________________________________________________________  Care Plan discussed with:    Comments   Patient x    Family      RN x    Care Manager     Consultant                        Multidiciplinary team rounds were held today with , nursing, pharmacist and clinical coordinator. Patient's plan of care was discussed; medications were reviewed and discharge planning was addressed. ________________________________________________________________________  Total NON critical care TIME:16 mins    Total CRITICAL CARE TIME Spent:   Minutes non procedure based      Comments   >50% of visit spent in counseling and coordination of care x    ________________________________________________________________________  Ari Rivas MD     Procedures: see electronic medical records for all procedures/Xrays and details which were not copied into this note but were reviewed prior to creation of Plan. LABS:  I reviewed today's most current labs and imaging studies.   Pertinent labs include:  Recent Labs     07/24/21 0230 07/23/21  0423 07/22/21  1311   WBC 14.3* 13.7* 12.6*   HGB 9.3* 8.5* 8.7*   HCT 29.4* 27.0* 27.3*    222 220     Recent Labs     07/24/21 0230 07/23/21  0423 07/22/21  1311    139 141   K 4.8 5.4* 5.8*    111* 112*   CO2 24 20* 20*   * 84 105*   BUN 53* 73* 75*   CREA 4.73* 5.53* 5.40*   CA 9.1 8.8 8.8   ALB  --   --  3.1*   TBILI  --   --  0.6   ALT  --   --  27       Signed: Ari Rivas MD

## 2021-07-26 NOTE — PROGRESS NOTES
Hospitalist Progress Note    NAME: Liat Phillips   :  1960   MRN:  [id number]       Assessment / Plan:  Acute hypoxemic respiratory failure POA- resolved now s/p UF/HD  secondary to flash pulmonary edema POA  secondary to end-stage renal disease. POA- restarted on HD this admission    Hyperkalemia POA due to above- resolved, down to 4.8 now    Taper oxygen to off in 24 hrs as able-- was off oxygen briefly yesterday- back on it today AM after Chest pain episode (CODE Chest pain today AM)- improved with oxygen and BP control, EKG normal  S/p HD yesterday per nephrology- HD/UF today per nephrology to help with Accelerated BP  IV hydralazien 20 mg x 1 today AM  OP HD set up (TTS 6:30 am slot) at [facility] per CM- confirmed  NPO p MN for Permacath on -- DC home post HD here after new catheter per Nephrology    Hypothyroidism   we will continue levothyroxine. Hypertension   we will continue current blood pressure regimen. Adjust up as needed per nephrology    Hyperlipidemia   continue current regimen statin    Diabetes mellitus with diabetic nephropathy   Cont SSI lispro here    Depression  Will continue to monitor. Anemia of Chronic Kidney disease   on Epoetin as per Nephrology. Severe obesity dietary modifications. Obesity POA/ Body mass index is 31.61 kg/m². Weightloss recommended    Estimated discharge date: 2021  Barriers: none    Code status: Full code  Prophylaxis: Heparin subcu  Recommended Disposition: Home once Permacath placed Tuesday and HD done     Subjective:     Chief Complaint / Reason for Physician Visit: F/u ESRD needing HD set up, Acute resp failure/Pulm edema, DM    Discussed with RN events overnight. Patient seen and examined at bedside comfortable patient is status post dialysis status post access placement.     Review of Systems:  Symptom Y/N Comments  Symptom Y/N Comments   Fever/Chills n   Chest Pain n    Poor Appetite n   Edema Cough n   Abdominal Pain     Sputum n   Joint Pain     SOB/NUÑEZ n   Pruritis/Rash     Nausea/vomit n   Tolerating PT/OT y    Diarrhea n   Tolerating Diet y    Constipation n   Other       Could NOT obtain due to:      Objective:     VITALS:   Last 24hrs VS reviewed since prior progress note. Most recent are:  Patient Vitals for the past 24 hrs:   Temp Pulse Resp BP SpO2   07/26/21 1135 -- 61 -- (!) 85/38 --   07/26/21 1131 -- 61 -- (!) 97/48 --   07/26/21 1130 -- 62 16 (!) 107/42 --   07/26/21 1115 -- 66 16 (!) 144/73 --   07/26/21 1107 -- 69 18 (!) 159/76 --   07/26/21 1100 98.1 °F (36.7 °C) 70 -- (!) 156/76 --   07/26/21 1018 98.1 °F (36.7 °C) 71 16 (!) 160/77 98 %   07/26/21 0846 -- -- -- (!) 192/78 --   07/26/21 0837 -- -- -- (!) 193/81 96 %   07/26/21 0824 -- 72 20 (!) 216/91 (!) 87 %   07/26/21 0820 -- -- -- (!) 236/98 --   07/26/21 0818 -- -- -- (!) 206/97 --   07/26/21 0803 97.9 °F (36.6 °C) 67 16 (!) 182/80 93 %   07/26/21 0155 98 °F (36.7 °C) 65 16 (!) 155/56 90 %   07/25/21 2302 97.8 °F (36.6 °C) 76 16 137/65 94 %   07/25/21 1923 98.1 °F (36.7 °C) 72 19 (!) 148/66 93 %   07/25/21 1706 98 °F (36.7 °C) 66 18 (!) 175/75 93 %   07/25/21 1457 98.5 °F (36.9 °C) 66 18 (!) 101/53 92 %     No intake or output data in the 24 hours ending 07/26/21 1140     I had a face to face encounter and independently examined this patient on 7/26/2021, as outlined below:  PHYSICAL EXAM:  General: WD, WN. Alert, cooperative, no acute distress    EENT:  EOMI. Anicteric sclerae. MMM  Resp:  CTA bilaterally, no wheezing or rales. No accessory muscle use  CV:  Regular  rhythm,  No edema  GI:  Soft, Non distended, Non tender. +Bowel sounds  Neurologic:  Alert and oriented X 3, normal speech,   Psych:   Good insight. Not anxious nor agitated  Skin:  No rashes.   No jaundice    Reviewed most current lab test results and cultures  YES  Reviewed most current radiology test results   YES  Review and summation of old records today NO  Reviewed patient's current orders and MAR    YES  PMH/SH reviewed - no change compared to H&P  ________________________________________________________________________  Care Plan discussed with:    Comments   Patient x    Family      RN x    Care Manager x    Consultant  x Dr Naomy Dumas (nephrology)                    x Multidiciplinary team rounds were held today with , nursing, pharmacist and clinical coordinator. Patient's plan of care was discussed; medications were reviewed and discharge planning was addressed. ________________________________________________________________________  Total NON critical care TIME:36 mins    Total CRITICAL CARE TIME Spent:   Minutes non procedure based      Comments   >50% of visit spent in counseling and coordination of care x    ________________________________________________________________________  Ari Rivas MD     Procedures: see electronic medical records for all procedures/Xrays and details which were not copied into this note but were reviewed prior to creation of Plan. LABS:  I reviewed today's most current labs and imaging studies.   Pertinent labs include:  Recent Labs     07/26/21  0246 07/24/21  0230   WBC 15.4* 14.3*   HGB 9.0* 9.3*   HCT 29.9* 29.4*    253     Recent Labs     07/24/21  0230      K 4.8      CO2 24   *   BUN 53*   CREA 4.73*   CA 9.1       Signed: Ari Rivas MD

## 2021-07-26 NOTE — PROGRESS NOTES
End of Shift Note    Bedside shift change report given to [address] [place] [address] (oncoming nurse) by Darrell Combs, RN (offgoing nurse). Report included the following information SBAR, Kardex, Intake/Output and MAR    Shift worked:  7a - 7p     Shift summary and any significant changes:     Code Chest pain called this AM. Diaylsis done today. Hypertensive this AM; hypotensive during diaylisis. Pressure stable now.  2L NC. NPO midnight tonight for perma cath placement in AM     Concerns for physician to address:  Professor Sepulveda [place] [address]     Zone phone for oncoming shift:   [phone] Mirza Gale, RN

## 2021-07-26 NOTE — PROGRESS NOTES
End of Shift Note    Bedside shift change report given to [facility] (oncoming nurse) by Howie Baer (offgoing nurse). Report included the following information SBAR, Kardex, Intake/Output, MAR and Recent Results    Shift worked:  6632-5582     Shift summary and any significant changes:     No significant changes. Pt slept well overnight. Concerns for physician to address:  none     Zone phone for oncoming shift:          Activity:  Activity Level: Up ad kaden  Number times ambulated in hallways past shift: 0  Number of times OOB to chair past shift: 0    Cardiac:   Cardiac Monitoring: Yes      Cardiac Rhythm: Sinus Rhythm    Access:   Current line(s): PIV and central line     Genitourinary:   Urinary status: voiding    Respiratory:   O2 Device: None (Room air)  Chronic home O2 use?: NO  Incentive spirometer at bedside: YES     GI:  Last Bowel Movement Date: 07/24/21  Current diet:  ADULT DIET Regular  Passing flatus: YES  Tolerating current diet: YES       Pain Management:   Patient states pain is manageable on current regimen: YES    Skin:  Ge Score: 20  Interventions: increase time out of bed    Patient Safety:  Fall Score:  Total Score: 1  Interventions: gripper socks       Length of Stay:  Expected LOS: - - -  Actual LOS: 4      Howie Baer

## 2021-07-26 NOTE — PROCEDURES
Robi Dialysis Team [facility]  [phone]    Vitals   Pre   Post   Assessment   Pre   Post     Temp  Temp: 98.1 °F (36.7 °C) (07/26/21 1100)  98.0 LOC  A&OX4 A&OX4   HR   Pulse (Heart Rate): 69 (07/26/21 1107) 58 Lungs   Wheezing  CTA   B/P   BP: (!) 159/76 (07/26/21 1107) 136/63 Cardiac   HRR  HRR   Resp   Resp Rate: 18 (07/26/21 1107) 16 Skin   W/D  W/D   Pain level  Pain Intensity 1: 8 (07/26/21 0818) 0 Edema  +1 BLES     +1 BLE   Orders:    Duration:   Start:   11:07 End:   14:37 Total:   3.5   Dialyzer:   Dialyzer/Set Up Inspection: Revaclear (07/26/21 1107)   K Bath:   Dialysate K (mEq/L): 2 (07/26/21 1107)   Ca Bath:   Dialysate CA (mEq/L): 2.5 (07/26/21 1107)   Na/Bicarb:   Dialysate NA (mEq/L): 138 (07/26/21 1107)   Target Fluid Removal:   Goal/Amount of Fluid to Remove (mL): 4000 mL (07/26/21 1107)   Access     Type & Location:   [facility]   Labs     Obtained/Reviewed   Critical Results Called   Date when labs were drawn-  Hgb-    HGB   Date Value Ref Range Status   07/26/2021 9.0 (L) 12.1 - 17.0 g/dL Final     K-    Potassium   Date Value Ref Range Status   07/24/2021 4.8 3.5 - 5.1 mmol/L Final     Ca-   Calcium   Date Value Ref Range Status   07/24/2021 9.1 8.5 - 10.1 MG/DL Final     Bun-   BUN   Date Value Ref Range Status   07/24/2021 53 (H) 6 - 20 MG/DL Final     Creat-   Creatinine   Date Value Ref Range Status   07/24/2021 4.73 (H) 0.70 - 1.30 MG/DL Final        Medications/ Blood Products Given     Name   Dose   Route and Time     Heparin 1500 units /1000 units IV 11:50/IV 1500 units/ IV 12:30 1000 units/ IV13:30 units   Albumin 50 mls IV 12:00        Blood Volume Processed (BVP):    71.0 Net Fluid   Removed:  700   Comments   Time Out Done: yes 11:05   Primary Nurse Rpt Pre: Cathy Paget RN  Primary Nurse Rpt Post: Zoë Paget RN  Pt Education: procedural/access care  Care Plan: perm cath to be placed tomorrow and then HD again and then discharge per MD orders.    Tx Summary: Pt had rapid response this am for chest pain. Checked out to be non cardiac. Will dialyze pt in room instead of suite. Report received from primary RN. Time out and assessment performed and documented. Each catheter limb disinfected per p&p, caps removed, hubs disinfected per p&p. Treatment started 11:07  11:30 BP dropped to 97/48 - pt is asymptomatic. Called Dr. Adilia Camilo and he lowered UF goal to 2000 mls. Pts BP was low through out treatment. Albumin given - still not able to pull much fluids. Treatment ended at  14:37  All possible blood returned to patient Each dialysis catheter limb disinfected per p&p, blood returned per p&p, each dialysis hub disinfected per p&p, post dialysis catheter dwell instilled per order, and caps applied. Admiting Diagnosis: SOB  Pt's previous clinic-none yet  Consent signed - Informed Consent Verified: Yes (07/26/21 1107)  Hepatitis Status- negative/susceptible7/22/21  Machine #- Machine Number: [id number] (07/26/21 1107)  Telemetry status-remote/NSR  Pre-dialysis wt. -

## 2021-07-26 NOTE — PROGRESS NOTES
Nephrology Progress Note  José Miguel Butt     [facility]. [facility]  Phone - [phone]   Patient: Edis Bonner    YOB: 1960        Date- 7/26/2021   Admit Date: 7/22/2021  CC: Follow up for  New onset esrd          IMPRESSION & PLAN:     NEW ONSET ESRD   HYPERTENSION   ANEMIA OF CKD   Hyperkalemia   resp failure, chf   DM    PLAN-   DIALYSIS today   [facility]   permacath placement    REMOVE 2-3 kg   epogen for anemia   Continue revela   HOPEFULLY WE CAN D/C HIM TOMORROW AFTER PERMCATH PLACEMENT     Subjective: Interval History:   - BP HIGH TODAY  HE Had epidose of chest pain this am  Sob improving    Objective:   Vitals:    07/26/21 0824 07/26/21 0837 07/26/21 0846 07/26/21 1018   BP: (!) 216/91 (!) 193/81 (!) 192/78 (!) 160/77   Pulse: 72   71   Resp: 20   16   Temp:    98.1 °F (36.7 °C)   TempSrc:       SpO2: (!) 87% 96%  98%   Weight:       Height:          No intake/output data recorded. Last 3 Recorded Weights in this Encounter    07/22/21 1249 07/26/21 0635   Weight: 104.6 kg (230 lb 9.6 oz) 98.5 kg (217 lb 3.2 oz)      Physical exam:   GEN: NAD  NECK- Supple, no mass  RESP: No wheezing, decreased BS  b/l  CVS: S1,S2  RRR  NEURO: Normal speech, non focal  EXT: + Edema   PSYCH: normal mood,     Chart reviewed. Pertinent Notes reviewed. Data Review :     Results for Ulises Lopez (MRN [id number]) as of 7/23/2021 10:17   Ref. Range 7/18/2020 01:26 7/29/2020 12:09 8/18/2020 17:38 8/25/2020 01:50 9/23/2020 09:22 11/17/2020 11:16 7/22/2021 15:58   Hepatitis A, IgM Latest Ref Range: NR   NONREACTIVE         Hepatitis B surface Ag Latest Units: Index <0.10  0.41    <0.10   Hep B surface Ag Interp. Latest Ref Range: NEG   Negative  Negative    Negative   Hepatitis B surface Ab Latest Units: mIU/mL    <3.10   <3.10   Hep B surface Ab Interp.  Latest Ref Range: NR      NONREACTIVE   NONREACTIVE   Hepatitis B core, IgM Latest Ref Range: NR   NONREACTIVE      NONREACTIVE   Hep C  virus Ab comment Latest Units:   Method used is Siemens Advia Centaur         Prostate Specific Ag Latest Ref Range: 0.0 - 4.0 ng/mL  1.0   1.4 1.6      Recent Labs     07/24/21  0230      K 4.8      CO2 24   BUN 53*   CREA 4.73*   *   CA 9.1     Recent Labs     07/26/21  0246 07/24/21  0230   WBC 15.4* 14.3*   HGB 9.0* 9.3*   HCT 29.9* 29.4*    253     No results for input(s): FE, TIBC, PSAT, FERR in the last 72 hours.    Medication list  reviewed  Current Facility-Administered Medications   Medication Dose Route Frequency    [START ON 7/27/2021] bumetanide (BUMEX) tablet 2 mg  2 mg Oral DAILY    metoclopramide HCl (REGLAN) tablet 10 mg  10 mg Oral AC&HS    midazolam (VERSED) injection 5 mg  5 mg IntraVENous Multiple    fentaNYL citrate (PF) injection 100 mcg  100 mcg IntraVENous Multiple    midazolam (VERSED) injection 1-5 mg  1-5 mg IntraVENous Multiple    sodium chloride (NS) flush 5-40 mL  5-40 mL IntraVENous Q8H    sodium chloride (NS) flush 5-40 mL  5-40 mL IntraVENous PRN    bumetanide (BUMEX) injection 2 mg  2 mg IntraVENous BID    epoetin martha-epbx (RETACRIT) injection 20,000 Units  20,000 Units SubCUTAneous Q MON, WED & FRI    metoclopramide HCl (REGLAN) tablet 5 mg  5 mg Oral BID PRN    amLODIPine (NORVASC) tablet 10 mg  10 mg Oral DAILY    carvediloL (COREG) tablet 6.25 mg  6.25 mg Oral BID WITH MEALS    levothyroxine (SYNTHROID) tablet 175 mcg  175 mcg Oral ACB    sevelamer carbonate (RENVELA) tab 800 mg  800 mg Oral TID WITH MEALS    sodium chloride (NS) flush 5-40 mL  5-40 mL IntraVENous Q8H    sodium chloride (NS) flush 5-40 mL  5-40 mL IntraVENous PRN    acetaminophen (TYLENOL) tablet 650 mg  650 mg Oral Q6H PRN    Or    acetaminophen (TYLENOL) suppository 650 mg  650 mg Rectal Q6H PRN    polyethylene glycol (MIRALAX) packet 17 g  17 g Oral DAILY PRN    ondansetron (ZOFRAN ODT) tablet 4 mg  4 mg Oral Q8H PRN    Or    ondansetron (ZOFRAN) injection 4 mg  4 mg IntraVENous Q6H PRN    insulin lispro (HUMALOG) injection   SubCUTAneous AC&HS    glucose chewable tablet 16 g  4 Tablet Oral PRN    dextrose (D50W) injection syrg 12.5-25 g  12.5-25 g IntraVENous PRN    glucagon (GLUCAGEN) injection 1 mg  1 mg IntraMUSCular PRN    oxyCODONE-acetaminophen (PERCOCET) 5-325 mg per tablet 1 Tablet  1 Tablet Oral Q4H PRN    hydrALAZINE (APRESOLINE) 20 mg/mL injection 10 mg  10 mg IntraVENous Q6H PRN          Shanika Clark MD              [facility]  [facility] / [address] [place] [address] Drive [address], [address], [address]  Phone - [phone]               Fax - [phone]

## 2021-07-26 NOTE — PROGRESS NOTES
Transition of Care Plan:    RUR: 343  Disposition: home with new outpatient HD set up   OP HD: [facility] at 6:30 AM  Follow up appointments: PCP, nephrology, oncology  DME needed: monitor for home O2 needs- pt does not have home O2  Transportation at Discharge: jamal Kaye or means to access home: yes  IM Medicare Letter: to be provided prior to d/c  BCPI-A Bundle Letter: n/a  Caregiver Contact: jamal Torrez [phone]  Discharge Caregiver contacted prior to discharge? yes         CM received call from [facility] at [facility] confirming pt's new op HD chair time. Pt's chair time is T,TH,S at 6:30 AM. CM updated MD. CM will continue to follow for discharge planning while patient is admitted on current unit. Please contact this CM with questions or issues related to discharge.        HEAVEN Webster  Care Manager [facility]  [phone]

## 2021-07-27 PROBLEM — R06.02 SOB (SHORTNESS OF BREATH): Status: RESOLVED | Noted: 2021-01-01 | Resolved: 2021-01-01

## 2021-07-27 NOTE — PROGRESS NOTES
Home Oxygen Test  Date of test: 7/27  Time of test: 1530    Sa02 95 % on room air AT REST. Sa02 87 % on room air DURING AMBULATION. Sa02 93 % on 2 Liters DURING AMBULATION. Sa02 94 % on 2 Liters AT REST/AFTER AMBULATION.

## 2021-07-27 NOTE — PROCEDURES
Robi Dialysis Team [facility]  [phone]    Vitals   Pre   Post   Assessment   Pre   Post     Temp  Temp: 98.4 °F (36.9 °C) (07/27/21 0800)  98.4 ax LOC  A & O x 3 No change   HR   Pulse (Heart Rate): 62 (07/27/21 0815) 63 Lungs   Rales RL LL  no change   B/P   BP: (!) 168/72 (07/27/21 0815) 142/72 Cardiac   S1S2  NO change   Resp   Resp Rate: 18 (07/27/21 0815) 18 Skin   Warm,dry & intact  no change   Pain level  Pain Intensity 1: 8 (07/26/21 0818) 7 Edema  None noted   No change   Orders:    Duration:   Start:  0800 Procedure Start Time: 4284 End:  1000 Procedure End Time: 0900 Total:   2.0   Dialyzer:   Dialyzer/Set Up Inspection: Revaclear (07/27/21 0800)   K Bath:   Dialysate K (mEq/L): 2 (07/26/21 1107)   Ca Bath:   Dialysate CA (mEq/L): 2.5 (07/26/21 1107)   Na/Bicarb:   Dialysate NA (mEq/L): 138 (07/26/21 1107)   Target Fluid Removal:   Goal/Amount of Fluid to Remove (mL): 3000 mL (07/27/21 0800)   Access  CVC   Type & Location:   [facility]   Labs     Obtained/Reviewed   Critical Results Called   Date when labs were drawn-  Hgb-    HGB   Date Value Ref Range Status   07/26/2021 9.0 (L) 12.1 - 17.0 g/dL Final     K-    Potassium   Date Value Ref Range Status   07/24/2021 4.8 3.5 - 5.1 mmol/L Final     Ca-   Calcium   Date Value Ref Range Status   07/24/2021 9.1 8.5 - 10.1 MG/DL Final     Bun-   BUN   Date Value Ref Range Status   07/24/2021 53 (H) 6 - 20 MG/DL Final     Creat-   Creatinine   Date Value Ref Range Status   07/24/2021 4.73 (H) 0.70 - 1.30 MG/DL Final        Medications/ Blood Products Given     Name   Dose   Route and Time     none                Blood Volume Processed (BVP):   0 Net Fluid   Removed:  2000ml   Comments RN reviewed LPN assessment and completed RN assessment. RN completed patient assessment. RN reviewed technicians vital signs and procedure note. Tx completed. Reviewed by ESPERANZA Almonte  Time Out Done: 0630  Primary Nurse Rpt Gene Cornelius RN Primary Nurse Rpt Barber Atkins, RN  Pt Education:access care  Care Plan:continue HD  Tx Summary:RIJ CVC: Dressing CDI. No s/s of infection. Both lumens aspirate & flush well. Running well at . SBAR received from Primary RN. i arrived to pt's room A&Ox3. Consent signed & on file. 0800: Each catheter limb disinfected per p&p, caps removed, hubs disinfected per p&p. Each lumen aspirated for blood return and flushed with Normal Saline per policy. Labs drawn per request/ order. VSS. Dialysis Tx initiated. 0815: Pt. Resting well. Lines secure  0830: pt. Stable,lines secure and visible  0845: pt. Resting well. Lines secure. Dr. Delaney Hays rounding  0900: bp trending down. uf goal decreased. 0915: pt. Stable lines secure  0930: pt. Stable  0945: pt. stable  1000: Tx ended. VSS. Each dialysis catheter limb disinfected per p&p, all possible blood returned per p&p, and each dialysis hub disinfected per p&p. Each lumen flushed, post dialysis catheter Heparin dwell instilled per order, and caps applied. Bed locked and in the lowest position, call bell and belongings in reach. SBAR given to Primary, RN. Patient is stable at time of their/ my departure. All Dialysis related medications have been reviewed. Admiting Diagnosis:SOB  Pt's previous clinic- acute  Consent signed - Informed Consent Verified: Yes (07/26/21 1107)  Robi Consent - yes  Hepatitis Status- negative/susceptible7/22/21  Machine #- Machine Number: [id number] (07/26/21 1107)  Telemetry status- yes  Pre-dialysis wt. -

## 2021-07-27 NOTE — DISCHARGE SUMMARY
Hospitalist Discharge Summary     Patient ID:  Jeffrey Gilbert  [id number]  63 y.o.  1960 7/22/2021    PCP on record: Joi Skaggs MD    Admit date: 7/22/2021  Discharge date and time: 7/27/2021    DISCHARGE DIAGNOSIS:    Acute hypoxemic respiratory failure POA- resolved now s/p UF/HD  secondary to flash pulmonary edema POA  secondary to end-stage renal disease. POA- restarted on HD this admission    Hyperkalemia POA due to above- resolved, down to 4.8 now        Hypothyroidism      Hypertension      Hyperlipidemia      Diabetes mellitus with diabetic nephropathy      Depression     Anemia of Chronic Kidney disease    CONSULTATIONS:  IP CONSULT TO NEPHROLOGY    Excerpted HPI from H&P of Tatyana Farr MD:    Hernandez Spain is a 63 y.o.  male who presents with CC listed above. Pt states that he was previously on HD last year but was taken off in November. He was still making urine at that time. Unfortunately, over the past few months, the pt's urine output has been decreasing. He has been following with Nephrology. He comes in today complaining of progressive SOB and worsening blood work. Pt denies any fever, chills, CP or syncope.  ______________________________________________________________________  DISCHARGE SUMMARY/HOSPITAL COURSE:  for full details see H&P, daily progress notes, labs, consult notes. Acute hypoxemic respiratory failure POA- resolved now s/p UF/HD  secondary to flash pulmonary edema POA  secondary to end-stage renal disease. POA- restarted on HD this admission    Hyperkalemia POA due to above- resolved, down to 4.8 now     Taper oxygen to off in 24 hrs as able-- was off oxygen briefly yesterday- back on it today.  Try to wean off before discahrge  Chest pain episode (CODE Chest pain 07/26 AM)- improved with oxygen and BP control, EKG normal  S/p HD access cath today       Hypothyroidism   we will continue levothyroxine.     Hypertension   we will continue current blood pressure regimen. Adjust up as needed per nephrology     Hyperlipidemia   continue current regimen statin     Diabetes mellitus with diabetic nephropathy   Cont home meds     Depression  Off meds, no suicide or homicide ideation      Anemia of Chronic Kidney disease   on Epoetin as per Nephrology.               _______________________________________________________________________  Patient seen and examined by me on discharge day. Pertinent Findings:  Gen:    Not in distress  Chest: Clear lungs  CVS:   Regular rhythm. No edema  Abd:  Soft, not distended, not tender  Neuro:  Alert,   _______________________________________________________________________  DISCHARGE MEDICATIONS:   Current Discharge Medication List      START taking these medications    Details   bumetanide (BUMEX) 2 mg tablet Take 1 Tablet by mouth daily. Qty: 30 Tablet, Refills: 0  Start date: 7/28/2021         CONTINUE these medications which have NOT CHANGED    Details   omeprazole (PRILOSEC) 20 mg capsule Take 1 capsule by mouth once daily  Qty: 90 Capsule, Refills: 1    Associated Diagnoses: Gastroesophageal reflux disease with esophagitis without hemorrhage      carvediloL (COREG) 6.25 mg tablet TAKE 1 TABLET BY MOUTH TWICE DAILY WITH MEALS  Qty: 60 Tab, Refills: 3    Associated Diagnoses: Essential hypertension with goal blood pressure less than 130/80      acetaminophen (TYLENOL) 325 mg tablet Take 1 Tab by mouth as needed for Pain. Take tab as needed for pain  Qty: 90 Tab, Refills: 1      metoclopramide HCl (REGLAN) 5 mg tablet TAKE 1 TABLET BY MOUTH ONCE DAILY AS NEEDED. DO NOT TAKE MORE THAN 3 TABLETS A DAY. Qty: 30 Tab, Refills: 2    Associated Diagnoses: Nausea in adult      simvastatin (ZOCOR) 20 mg tablet Take 1 Tab by mouth nightly. Qty: 90 Tab, Refills: 3    Associated Diagnoses: Dyslipidemia (high LDL; low HDL)      hydrALAZINE (APRESOLINE) 50 mg tablet Take 1 Tab by mouth two (2) times a day.   Qty: 60 Tab, Refills: 3    Associated Diagnoses: Essential hypertension with goal blood pressure less than 130/80      amLODIPine (NORVASC) 10 mg tablet Take 1 Tab by mouth daily. Qty: 30 Tab, Refills: 5    Associated Diagnoses: Essential hypertension with goal blood pressure less than 130/80      Vitamin D2 1,250 mcg (50,000 unit) capsule TAKE 1 CAPSULE BY MOUTH ONCE A WEEK  Qty: 4 Cap, Refills: 5    Associated Diagnoses: Vitamin D deficiency      levothyroxine (SYNTHROID) 175 mcg tablet 1 full tab Mon-Sat but only 1/2 tab on sundays  Qty: 90 Tab, Refills: 3      sodium zirconium cyclosilicate (Lokelma) 5 gram powder packet Take  by mouth.      sevelamer carbonate (RENVELA) 800 mg tab tab Take 1 Tab by mouth three (3) times daily (with meals). Qty: 90 Tab, Refills: 3      sucralfate (CARAFATE) 1 gram tablet       OXYGEN-AIR DELIVERY SYSTEMS Take 2.5 L/min by inhalation continuous. Blood-Glucose Meter monitoring kit 1 preferred brand glucometer for checking home glucose, E11.22  Qty: 1 Kit, Refills: 0    Associated Diagnoses: Type 2 diabetes mellitus with stage 3 chronic kidney disease, with long-term current use of insulin ([facility] [address]      ! ! glucose blood VI test strips (blood glucose test) strip Pharmacist to choose preferred meter and strips. Dx:E11.65, Z79.4 Monitor 3 times daily  Qty: 300 Strip, Refills: 3    Associated Diagnoses: Type 2 diabetes mellitus with stage 3 chronic kidney disease, with long-term current use of insulin ([facility])      insulin NPH (HumuLIN N NPH U-100 Insulin) 100 unit/mL injection 2-10 units daily  Qty: 3 Vial, Refills: 3    Associated Diagnoses: Type 2 diabetes mellitus with stage 3 chronic kidney disease, with long-term current use of insulin ([facility])      Insulin Syringe-Needle U-100 ([employer] Syringes) 0.3 mL 30 gauge x 5/16\" syrg 1 Each by Does Not Apply route daily.   Qty: 80 Syringe, Refills: 3    Associated Diagnoses: Type 2 diabetes mellitus with stage 3 chronic kidney disease, with long-term current use of insulin (HCC)      Ventolin HFA 90 mcg/actuation inhaler TAKE 1-2 PUFFS EVEERY 4-6 HOURS AS NEEDED FOR DYSPNEA AND WHEEZING  Qty: 1 Inhaler, Refills: 2      !! glucose blood VI test strips (PHARMACIST CHOICE) strip One Touch  Check glucose 3-4 times daily. DX E11.22  Qty: 300 Strip, Refills: 3      Lancets misc Use preferred brand; Check glucose 3-4 times daily, Diagnosis E11.22  Qty: 2 Package, Refills: 3       !! - Potential duplicate medications found. Please discuss with provider. STOP taking these medications       olmesartan (BENICAR) 20 mg tablet Comments:   Reason for Stopping:                 Patient Follow Up Instructions:    Activity: Activity as tolerated  Diet: Resume previous diet      Follow-up Information    None       ________________________________________________________________    Risk of deterioration: Low    Condition at Discharge:  Stable  __________________________________________________________________    Disposition  Home with family, no needs    ____________________________________________________________________    Code Status: Full Code  ___________________________________________________________________      Total time in minutes spent coordinating this discharge (includes going over instructions, follow-up, prescriptions, and preparing report for sign off to her PCP) :  >30 minutes    Signed:  Anupama Ruiz MD

## 2021-07-27 NOTE — PROGRESS NOTES
Transition of Care Plan:     RUR: 29%  Disposition: home with new outpatient HD set up   OP HD: [facility] at 6:30 AM  Follow up appointments: PCP, nephrology, oncology  DME needed: monitor for home O2 needs- pt does not have home O2  Transportation at [place] [address] or means to access home: yes  IM Medicare Letter: to be provided prior to d/c  BCPI-A Bundle Letter: n/a  Caregiver Contact: daughter Jessica Wiseman [phone]  Discharge Caregiver contacted prior to discharge? yes         CM reviewed pt's chart. Pt needs home 02 set up. Pt reports he has had home 02 in the past. CM sent referral via [employer].     HEAVEN Malone  Care Manager [facility]  [phone]

## 2021-07-27 NOTE — PROGRESS NOTES
Pt tolerated Q-P well. Report called to [facility] RN on renal unit, all questions answered.     Start time 1420  Fentanyl 25mcg  Versed 1 mg  End time 1430

## 2021-07-27 NOTE — PROGRESS NOTES
Bedside shift change report given to ESPERANZA Andrew (oncoming nurse) by Ariana Giordano RN (offgoing nurse). Report included the following information SBAR, Kardex, Intake/Output, MAR and Recent Results.

## 2021-07-27 NOTE — PROGRESS NOTES
TRANSFER - OUT REPORT:    Verbal report given to Ibrahima Amato RN transferred to [facility](unit) for ordered procedure       Report consisted of patients Situation, Background, Assessment and   Recommendations(SBAR). Information from the following report(s) Kardex was reviewed with the receiving nurse. Opportunity for questions and clarification was provided.       Patient transported with:   Norma Trevino at bedside

## 2021-07-27 NOTE — PROGRESS NOTES
Patient is on Hemodialysis.   Per protocol, will adjust metoclopramide to 5mg PO BID PRN.     Will Dc the Scheduled 10mg QID order.     It appears his home dose is max of 15mg/day (3x5mg tabs)     ANIKA Ortez [facility]

## 2021-07-27 NOTE — PROGRESS NOTES
Nephrology Progress Note  José Miguel Butt     www. [facility]  Phone - [phone]   Patient: Jeffrey Gilbert    YOB: 1960        Date- 7/27/2021   Admit Date: 7/22/2021  CC: Follow up for  New onset ESRD        IMPRESSION & PLAN:     NEW ONSET ESRD   hypertension   ANEMIA OF CKD   Hyperkalemia   resp failure, chf   DM    PLAN-   Seen on DIALYSIS today   Out pt hd at [facility]   permacath placement today   REMOVE 2-3 kg   epogen for anemia   Restart benicar    Continue revela   HOPEFULLY WE CAN D/C HIM AFTER PERMCATH PLACEMENT     Subjective: Interval History:   - bp improving  Sob improved  Seen on hd  He is getting UF today    Objective:   Vitals:    07/27/21 0830 07/27/21 0845 07/27/21 0900 07/27/21 0915   BP: 125/66 (!) 119/58 (!) 142/64 (!) 150/75   Pulse: (!) 58 61 62 (!) 58   Resp: 18 18 18 18   Temp:       TempSrc:       SpO2:       Weight:       Height:          07/26 0701 - 07/27 0700  In: -   Out: 700   Last 3 Recorded Weights in this Encounter    07/22/21 1249 07/26/21 0635   Weight: 104.6 kg (230 lb 9.6 oz) 98.5 kg (217 lb 3.2 oz)      Physical exam:   GEN: NAD  NECK- Supple, no mass  RESP: No wheezing, decreased BS  b/l  CVS: s1,s2,rrr  NEURO: Normal speech, non focal  EXT: + Edema   PSYCH: normal mood,   sultana cath     Chart reviewed. Pertinent Notes reviewed. Data Review :     Results for Noamy Stout (MRN [id number]) as of 7/23/2021 10:17   Ref. Range 7/18/2020 01:26 7/29/2020 12:09 8/18/2020 17:38 8/25/2020 01:50 9/23/2020 09:22 11/17/2020 11:16 7/22/2021 15:58   Hepatitis A, IgM Latest Ref Range: NR   NONREACTIVE         Hepatitis B surface Ag Latest Units: Index <0.10  0.41    <0.10   Hep B surface Ag Interp. Latest Ref Range: NEG   Negative  Negative    Negative   Hepatitis B surface Ab Latest Units: mIU/mL    <3.10   <3.10   Hep B surface Ab Interp.  Latest Ref Range: NR      NONREACTIVE   NONREACTIVE Hepatitis B core, IgM Latest Ref Range: NR   NONREACTIVE      NONREACTIVE   Hep C  virus Ab comment Latest Units:   Method used is Siemens Advia Centaur         Prostate Specific Ag Latest Ref Range: 0.0 - 4.0 ng/mL  1.0   1.4 1.6      No results for input(s): NA, K, CL, CO2, BUN, CREA, GLU, CA, MG, PHOS, URICA in the last 72 hours. Recent Labs     07/26/21  0246   WBC 15.4*   HGB 9.0*   HCT 29.9*        No results for input(s): FE, TIBC, PSAT, FERR in the last 72 hours.    Medication list  reviewed  Current Facility-Administered Medications   Medication Dose Route Frequency    bumetanide (BUMEX) tablet 2 mg  2 mg Oral DAILY    albumin human 25% (BUMINATE) solution 12.5 g  12.5 g IntraVENous DIALYSIS PRN    midazolam (VERSED) injection 5 mg  5 mg IntraVENous Multiple    fentaNYL citrate (PF) injection 100 mcg  100 mcg IntraVENous Multiple    midazolam (VERSED) injection 1-5 mg  1-5 mg IntraVENous Multiple    sodium chloride (NS) flush 5-40 mL  5-40 mL IntraVENous Q8H    sodium chloride (NS) flush 5-40 mL  5-40 mL IntraVENous PRN    epoetin martha-epbx (RETACRIT) injection 20,000 Units  20,000 Units SubCUTAneous Q MON, WED & FRI    metoclopramide HCl (REGLAN) tablet 5 mg  5 mg Oral BID PRN    amLODIPine (NORVASC) tablet 10 mg  10 mg Oral DAILY    carvediloL (COREG) tablet 6.25 mg  6.25 mg Oral BID WITH MEALS    levothyroxine (SYNTHROID) tablet 175 mcg  175 mcg Oral ACB    sevelamer carbonate (RENVELA) tab 800 mg  800 mg Oral TID WITH MEALS    sodium chloride (NS) flush 5-40 mL  5-40 mL IntraVENous Q8H    sodium chloride (NS) flush 5-40 mL  5-40 mL IntraVENous PRN    acetaminophen (TYLENOL) tablet 650 mg  650 mg Oral Q6H PRN    Or    acetaminophen (TYLENOL) suppository 650 mg  650 mg Rectal Q6H PRN    polyethylene glycol (MIRALAX) packet 17 g  17 g Oral DAILY PRN    ondansetron (ZOFRAN ODT) tablet 4 mg  4 mg Oral Q8H PRN    Or    ondansetron (ZOFRAN) injection 4 mg  4 mg IntraVENous Q6H PRN  insulin lispro (HUMALOG) injection   SubCUTAneous AC&HS    glucose chewable tablet 16 g  4 Tablet Oral PRN    dextrose (D50W) injection syrg 12.5-25 g  12.5-25 g IntraVENous PRN    glucagon (GLUCAGEN) injection 1 mg  1 mg IntraMUSCular PRN    oxyCODONE-acetaminophen (PERCOCET) 5-325 mg per tablet 1 Tablet  1 Tablet Oral Q4H PRN    hydrALAZINE (APRESOLINE) 20 mg/mL injection 10 mg  10 mg IntraVENous Q6H PRN          Michael Kirk MD              [facility]  [facility] / [facility] [address], [address], [address]  Phone - [phone]               Fax - [phone]

## 2021-07-27 NOTE — PROGRESS NOTES
TRANSFER - IN REPORT:    Verbal report received from Tennille Mitchell RN  on Darwyn Aquas  being received from [facility](unit) for ordered procedure      Report consisted of patients Situation, Background, Assessment and   Recommendations(SBAR). Information from the following report(s) Kardex was reviewed with the receiving nurse. Opportunity for questions and clarification was provided. Assessment completed upon patients arrival to unit and care assumed.

## 2021-07-27 NOTE — PROGRESS NOTES
End of Shift Note    Bedside shift change report given to Vega Wilder (oncoming nurse) by Norma Zuñiga RN (offgoing nurse). Report included the following information SBAR, Kardex, Intake/Output and MAR    Shift worked:  3052-3104     Shift summary and any significant changes:    Dialysis done this AM and Perma cath placed today. O2 challenge done.  Postponed discharge due to home oxygen requirements     Concerns for physician to address:  n/a     Zone phone for oncoming shift:   [phone] Mirza Gale, RN

## 2021-07-27 NOTE — PROGRESS NOTES
RAPID RESPONSE TEAM - follow up    Rounded on patient due to recent RRT. Discussed with primary RN. No acute concerns, VSS, MEWS 1. Visit Vitals  /64 (BP 1 Location: Left upper arm, BP Patient Position: At rest)   Pulse 61   Temp 97.4 °F (36.3 °C)   Resp 18   Ht 5' 9.5\" (1.765 m)   Wt 98.5 kg (217 lb 3.2 oz)   SpO2 97%   BMI 31.61 kg/m²       No RRT interventions indicated at this time. Please call with any questions or concerns.      Raisa Valdovinos

## 2021-07-28 NOTE — PROGRESS NOTES
Transition of Care Plan:     RUR: 29%  Disposition: home with new outpatient HD set up   [facility] at [facility]  Follow up appointments: PCP, nephrology, oncology  DME needed: Home 02 through [address] at [address] or means to access home: yes  IM Medicare Letter: Received on 7/28/21  BCPI-A Bundle Letter: n/a  Caregiver Contact: daughter Gene Trammell [phone]  Discharge Caregiver contacted prior to discharge? yes         CM acknowledged d/c orders. Pt will go home with family assistance. Pt's home 02 has been ordered through [address] and will arrive around 12 pm. Pt's OP HD chair time has been confirmed. Pt's family member will transport him home at d/c. Medicare pt has received, reviewed, and signed 2nd  letter informing them of their right to appeal the discharge. Signed copy has been placed on pt bedside chart. No further needs identified at this time. Patient is ready to d/c on a CM standpoint. RN aware. Care Management Interventions  PCP Verified by CM: Yes  Last Visit to PCP: 05/10/21  Mode of Transport at Discharge:  Other (see comment)  Transition of Care Consult (CM Consult): Discharge Planning  Physical Therapy Consult: No  Occupational Therapy Consult: No  Speech Therapy Consult: No  Current Support Network: Lives Alone  Confirm Follow Up Transport: Family  Discharge Location  Discharge Placement: Home with family assistance    HEAVEN Viera  Care Manager [facility]  [phone]

## 2021-07-28 NOTE — PROGRESS NOTES
End of Shift Note    Bedside shift change report given to Mary Kate Teresa (oncoming nurse) by Howie Baer (offgoing nurse). Report included the following information SBAR, Kardex, Intake/Output, MAR and Recent Results    Shift worked:  [id number]     Shift summary and any significant changes:     No significant changes, pt c/o pain x1, PRN oxycodone given with relief noted. Concerns for physician to address:  none     Zone phone for oncoming shift:          Activity:  Activity Level: Up ad kaden  Number times ambulated in hallways past shift: 0  Number of times OOB to chair past shift: 0    Cardiac:   Cardiac Monitoring: Yes      Cardiac Rhythm: Sinus Rhythm    Access:   Current line(s): PIV     Genitourinary:   Urinary status: voiding    Respiratory:   O2 Device: Nasal cannula  Chronic home O2 use?: NO  Incentive spirometer at bedside: NO     GI:  Last Bowel Movement Date: 07/24/21  Current diet:  ADULT DIET Regular; 3 carb choices (45 gm/meal)  Passing flatus: YES  Tolerating current diet: YES       Pain Management:   Patient states pain is manageable on current regimen: YES    Skin:  Ge Score: 20  Interventions: increase time out of bed    Patient Safety:  Fall Score:  Total Score: 1  Interventions: gripper socks       Length of Stay:  Expected LOS: 4d 7h  Actual LOS: 6      Howie Baer

## 2021-07-28 NOTE — PROCEDURES
[facility] Dialysis Team [facility]  [phone]    Vitals   Pre   Post   Assessment   Pre   Post     Temp  Temp: 98.1 °F (36.7 °C) (07/28/21 1020)  97.8 LOC  A/O A/O   HR   Pulse (Heart Rate): 82 (07/28/21 1145) 60 Lungs   Diminished Diminished   B/P   BP: (!) 159/102 (07/28/21 1145) 150/64 Cardiac   Regular rate Regular rate   Resp   Resp Rate: 16 (07/28/21 1145) 18 Skin   Fragile Fragile   Pain level  Pain Intensity 1: 2 (07/28/21 0007) 0 Edema  None noted     None noted   Orders:    Duration:   Start:  1020 Procedure Start Time: 2066 End:  1250 Procedure End Time: 1430 Total: 2.5     Dialyzer:   Dialyzer/Set Up Inspection: Revaclear (07/28/21 1020)   K Bath:   Dialysate K (mEq/L): 2 (07/28/21 1020)   Ca Bath:   Dialysate CA (mEq/L): 2.5 (07/28/21 1020)   Na/Bicarb:   Dialysate NA (mEq/L): 138 (07/28/21 1020)   Target Fluid Removal:   Goal/Amount of Fluid to Remove (mL): 3000 mL (07/28/21 1020)   Access     Type & Location:   R chest CVC: Dressing CDI. No s/s of infection. Both lumens aspirate & flush well. Each catheter limb disinfected per p&p, caps removed, hubs disinfected per p&p.    Labs     Obtained/Reviewed   Critical Results Called   Date when labs were drawn-  Hgb-    HGB   Date Value Ref Range Status   07/28/2021 11.0 (L) 12.1 - 17.0 g/dL Final     K-    Potassium   Date Value Ref Range Status   07/28/2021 4.8 3.5 - 5.1 mmol/L Final     Ca-   Calcium   Date Value Ref Range Status   07/28/2021 8.9 8.5 - 10.1 MG/DL Final     Bun-   BUN   Date Value Ref Range Status   07/28/2021 55 (H) 6 - 20 MG/DL Final     Creat-   Creatinine   Date Value Ref Range Status   07/28/2021 4.92 (H) 0.70 - 1.30 MG/DL Final        Medications/ Blood Products Given     Name   Dose   Route and Time     Albumin 12.5g  Intra HD hypotension   Heparin 1mL/1000u Pre/Intra/Post        Blood Volume Processed (BVP):    54.1 Net Fluid   Removed:  +600   Comments   Time Out Done: 1018  Primary Nurse SBAR Rpt Pre: Manju Lerma, RN  Primary Nurse SBAR Rpt Post: Ese Kirby RN  Pt Education: Procedural  Care Plan: MD/HD plan of care  Tx Summary:  1020: Treatment initiated  1250: Tx ended. VSS. Each dialysis catheter limb disinfected per p&p, all possible blood returned per p&p, and each dialysis hub disinfected per p&p. Each lumen flushed, post dialysis catheter Heparin dwell instilled per order, and caps applied. Bed locked and in the lowest position, call bell and belongings in reach. SBAR given to Primary, RN. Patient is stable at time of their departure. Admiting Diagnosis: SOB  Consent signed - Informed Consent Verified: Yes (07/28/21 1020)  Hepatitis B Status- 7/22/21 Negative/Susceptible  Machine #- Machine Number: [id number] (07/28/21 1020)  Telemetry status- Remote  Pre-dialysis wt. -  na

## 2021-07-28 NOTE — PROGRESS NOTES
TRANSFER - IN REPORT:    Verbal report received from ESPERANZA Henry on Apryl Fear  being received from [employer] for ordered procedure      Report consisted of patients Situation, Background, Assessment and   Recommendations(SBAR). Information from the following report(s) SBAR and Kardex was reviewed with the receiving nurse. Opportunity for questions and clarification was provided. Assessment completed upon patients arrival to unit and care assumed.

## 2021-07-28 NOTE — PROGRESS NOTES
Nephrology Progress Note  José Miguel Butt     www. [facility]  Phone - [phone]   Patient: Oralia Suazo    YOB: 1960        Date- 7/28/2021   Admit Date: 7/22/2021  CC: Follow up for  New onset ESRD          IMPRESSION & PLAN:     NEW ONSET ESRD   HYPERTENSION   ANEMIA OF CKD   Hyperkalemia   resp failure, chf   DM    PLAN-   SEEN ON HD today   bp low---  We won't remove any fluid today. He had hd on Monday and UF Tuesday.  [facility] hd at [facility] S/p permacath placement    epogen for anemia   Continue cheri   D/w  - he should be discharged today. If we can't get him out today. He will be here till Saturday. [facility]'t [facility] on Saturday as out pt.  He will go to [facility] tomorrow 6.30 am     Subjective: Interval History:   - seen on hd  bp stable    Objective:   Vitals:    07/28/21 0620 07/28/21 0845 07/28/21 1020 07/28/21 1030   BP:  (!) 152/66 (!) 142/69 (!) 145/66   Pulse:  (!) 59 (!) 57 62   Resp:  18 16 16   Temp:  98.1 °F (36.7 °C) 98.1 °F (36.7 °C)    TempSrc:       SpO2:  95%     Weight: 96.1 kg (211 lb 13.8 oz)      Height:          07/27 0701 - 07/28 0700  In: -   Out: 2000   Last 3 Recorded Weights in this Encounter    07/22/21 1249 07/26/21 0635 07/28/21 0620   Weight: 104.6 kg (230 lb 9.6 oz) 98.5 kg (217 lb 3.2 oz) 96.1 kg (211 lb 13.8 oz)      Physical exam:   GEN: NAD  NECK- Supple, no mass  RESP: No wheezing, clear BS  b/l  CVS: s1,s2,rrr  NEURO: non focal  EXT: + Edema   PSYCH: normal mood,   permacath +  Chart reviewed. Pertinent Notes reviewed. Data Review :     Results for Pb Clifton (MRN [id number]) as of 7/23/2021 10:17   Ref.  Range 7/18/2020 01:26 7/29/2020 12:09 8/18/2020 17:38 8/25/2020 01:50 9/23/2020 09:22 11/17/2020 11:16 7/22/2021 15:58   Hepatitis A, IgM Latest Ref Range: NR   NONREACTIVE         Hepatitis B surface Ag Latest Units: Index <0.10  0.41    <0.10 Hep B surface Ag Interp. Latest Ref Range: NEG   Negative  Negative    Negative   Hepatitis B surface Ab Latest Units: mIU/mL    <3.10   <3.10   Hep B surface Ab Interp. Latest Ref Range: NR      NONREACTIVE   NONREACTIVE   Hepatitis B core, IgM Latest Ref Range: NR   NONREACTIVE      NONREACTIVE   Hep C  virus Ab comment Latest Units:   Method used is Siemens Advia Centaur         Prostate Specific Ag Latest Ref Range: 0.0 - 4.0 ng/mL  1.0   1.4 1.6      No results for input(s): NA, K, CL, CO2, BUN, CREA, GLU, CA, MG, PHOS, URICA in the last 72 hours. Recent Labs     07/28/21  0409 07/26/21  0246   WBC 14.9* 15.4*   HGB 11.0* 9.0*   HCT 36.1* 29.9*    254     No results for input(s): FE, TIBC, PSAT, FERR in the last 72 hours.    Medication list  reviewed  Current Facility-Administered Medications   Medication Dose Route Frequency    heparin (porcine) 1,000 unit/mL injection 1,500 Units  1,500 Units Hemodialysis DIALYSIS PRN    heparin (porcine) 1,000 unit/mL injection  1,000 Units/hr Hemodialysis DIALYSIS PRN    heparin (porcine) 1,000 unit/mL injection 1,900 Units  1,900 Units InterCATHeter DIALYSIS PRN    And    heparin (porcine) 1,000 unit/mL injection 1,900 Units  1,900 Units InterCATHeter DIALYSIS PRN    sterile water (preservative free) injection        bumetanide (BUMEX) tablet 2 mg  2 mg Oral DAILY    albumin human 25% (BUMINATE) solution 12.5 g  12.5 g IntraVENous DIALYSIS PRN    fentaNYL citrate (PF) injection 100 mcg  100 mcg IntraVENous Multiple    sodium chloride (NS) flush 5-40 mL  5-40 mL IntraVENous Q8H    sodium chloride (NS) flush 5-40 mL  5-40 mL IntraVENous PRN    epoetin martha-epbx (RETACRIT) injection 20,000 Units  20,000 Units SubCUTAneous Q MON, WED & FRI    metoclopramide HCl (REGLAN) tablet 5 mg  5 mg Oral BID PRN    amLODIPine (NORVASC) tablet 10 mg  10 mg Oral DAILY    carvediloL (COREG) tablet 6.25 mg  6.25 mg Oral BID WITH MEALS    levothyroxine (SYNTHROID) tablet 175 mcg  175 mcg Oral ACB    sevelamer carbonate (RENVELA) tab 800 mg  800 mg Oral TID WITH MEALS    sodium chloride (NS) flush 5-40 mL  5-40 mL IntraVENous Q8H    sodium chloride (NS) flush 5-40 mL  5-40 mL IntraVENous PRN    acetaminophen (TYLENOL) tablet 650 mg  650 mg Oral Q6H PRN    Or    acetaminophen (TYLENOL) suppository 650 mg  650 mg Rectal Q6H PRN    polyethylene glycol (MIRALAX) packet 17 g  17 g Oral DAILY PRN    ondansetron (ZOFRAN ODT) tablet 4 mg  4 mg Oral Q8H PRN    Or    ondansetron (ZOFRAN) injection 4 mg  4 mg IntraVENous Q6H PRN    insulin lispro (HUMALOG) injection   SubCUTAneous AC&HS    glucose chewable tablet 16 g  4 Tablet Oral PRN    dextrose (D50W) injection syrg 12.5-25 g  12.5-25 g IntraVENous PRN    glucagon (GLUCAGEN) injection 1 mg  1 mg IntraMUSCular PRN    oxyCODONE-acetaminophen (PERCOCET) 5-325 mg per tablet 1 Tablet  1 Tablet Oral Q4H PRN    hydrALAZINE (APRESOLINE) 20 mg/mL injection 10 mg  10 mg IntraVENous Q6H PRN          Juan Luis Braxton MD              [facility]  [facility] / [facility]  [facility] [address], [address], [address]  Phone - [phone]               Fax - [phone]

## 2021-07-28 NOTE — PROGRESS NOTES
Pt and nurse reviewed discharge, both signed. Pt teach back performed. No questions or concerns, PIV and telemetry discontinued. Pt with portable oxygen, set up to be done at home. Pt left unit with all personal belongings.

## 2021-07-29 NOTE — ACP (ADVANCE CARE PLANNING)
Patient's daughter, Shawn Ramirez ((on [place] signed 9-), states patient's ACP document dated 8- is a current ACP document.
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
## 2021-08-03 NOTE — PATIENT INSTRUCTIONS
DASH Diet: Care Instructions  Your Care Instructions     The DASH diet is an eating plan that can help lower your blood pressure. DASH stands for Dietary Approaches to Stop Hypertension. Hypertension is high blood pressure. The DASH diet focuses on eating foods that are high in calcium, potassium, and magnesium. These nutrients can lower blood pressure. The foods that are highest in these nutrients are fruits, vegetables, low-fat dairy products, nuts, seeds, and legumes. But taking calcium, potassium, and magnesium supplements instead of eating foods that are high in those nutrients does not have the same effect. The DASH diet also includes whole grains, fish, and poultry. The DASH diet is one of several lifestyle changes your doctor may recommend to lower your high blood pressure. Your doctor may also want you to decrease the amount of sodium in your diet. Lowering sodium while following the DASH diet can lower blood pressure even further than just the DASH diet alone. Follow-up care is a key part of your treatment and safety. Be sure to make and go to all appointments, and call your doctor if you are having problems. It's also a good idea to know your test results and keep a list of the medicines you take. How can you care for yourself at home? Following the DASH diet  · Eat 4 to 5 servings of fruit each day. A serving is 1 medium-sized piece of fruit, ½ cup chopped or canned fruit, 1/4 cup dried fruit, or 4 ounces (½ cup) of fruit juice. Choose fruit more often than fruit juice. · Eat 4 to 5 servings of vegetables each day. A serving is 1 cup of lettuce or raw leafy vegetables, ½ cup of chopped or cooked vegetables, or 4 ounces (½ cup) of vegetable juice. Choose vegetables more often than vegetable juice. · Get 2 to 3 servings of low-fat and fat-free dairy each day. A serving is 8 ounces of milk, 1 cup of yogurt, or 1 ½ ounces of cheese. · Eat 6 to 8 servings of grains each day.  A serving is 1 slice of bread, 1 ounce of dry cereal, or ½ cup of cooked rice, pasta, or cooked cereal. Try to choose whole-grain products as much as possible. · Limit lean meat, poultry, and fish to 2 servings each day. A serving is 3 ounces, about the size of a deck of cards. · Eat 4 to 5 servings of nuts, seeds, and legumes (cooked dried beans, lentils, and split peas) each week. A serving is 1/3 cup of nuts, 2 tablespoons of seeds, or ½ cup of cooked beans or peas. · Limit fats and oils to 2 to 3 servings each day. A serving is 1 teaspoon of vegetable oil or 2 tablespoons of salad dressing. · Limit sweets and added sugars to 5 servings or less a week. A serving is 1 tablespoon jelly or jam, ½ cup sorbet, or 1 cup of lemonade. · Eat less than 2,300 milligrams (mg) of sodium a day. If you limit your sodium to 1,500 mg a day, you can lower your blood pressure even more. · Be aware that all of these are the suggested number of servings for people who eat 1,800 to 2,000 calories a day. Your recommended number of servings may be different if you need more or fewer calories. Tips for success  · Start small. Do not try to make dramatic changes to your diet all at once. You might feel that you are missing out on your favorite foods and then be more likely to not follow the plan. Make small changes, and stick with them. Once those changes become habit, add a few more changes. · Try some of the following:  ? Make it a goal to eat a fruit or vegetable at every meal and at snacks. This will make it easy to get the recommended amount of fruits and vegetables each day. ? Try yogurt topped with fruit and nuts for a snack or healthy dessert. ? Add lettuce, tomato, cucumber, and onion to sandwiches. ? Combine a ready-made pizza crust with low-fat mozzarella cheese and lots of vegetable toppings. Try using tomatoes, squash, spinach, broccoli, carrots, cauliflower, and onions. ?  Have a variety of cut-up vegetables with a low-fat dip as an appetizer instead of chips and dip. ? Sprinkle sunflower seeds or chopped almonds over salads. Or try adding chopped walnuts or almonds to cooked vegetables. ? Try some vegetarian meals using beans and peas. Add garbanzo or kidney beans to salads. Make burritos and tacos with mashed woody beans or black beans. Where can you learn more? Go to http://www.victor.com/  Enter H967 in the search box to learn more about \"DASH Diet: Care Instructions. \"  Current as of: August 31, 2020               Content Version: 12.8  © 1448-3905 [employer]. Care instructions adapted under license by [employer] (which disclaims liability or warranty for this information). If you have questions about a medical condition or this instruction, always ask your healthcare professional. [address] any warranty or liability for your use of this information.

## 2021-08-03 NOTE — PROGRESS NOTES
Chief Complaint   Patient presents with   [facility] Follow Up     ED [facility] admission 722-7/27     HPI:  Bob Woodson is a 63 y.o.  male with h/o hypertension, diabetes type 2, hypercholesterolemia, severe hypothyroidism, metastatic thyroid cancer, chronic kidney disease presents for Hospital Follow Up. Patient was admitted at ED [facility] 7/22/21-7/27/21 with Acute hypoxemic respiratory failure, flash pulmonary edema secondary to ESRD. Patient was restarted on dialysis.     He was discharged on oxygen, 2.5 L/min. Saturation to is 97%. He has no complaints. Review of Systems  As per hpi    Past Medical History:   Diagnosis Date    Adverse effect of anesthesia     \" STOP BREATHING 1 TIME C ANESTH\"    Cancer ([address] 2004    thyroid cancer    Chronic kidney disease     Chronic pain     BACK SHOULDER AND ARM    Depression     Diabetes ([address]     Encounter for long-term (current) use of NSAIDs     Hypercholesterolemia     Hypertension     Nausea & vomiting     Other ill-defined conditions(799.89)     cholesterol, thyroid    Sleep apnea     doesn't wear cpap    Thyroid cancer ([address]     TIA (transient ischemic attack) 2011    Vitamin D deficiency      Past Surgical History:   Procedure Laterality Date    HX HEENT      THROAT SURGERY X 4    HX ORTHOPAEDIC      back     HX ORTHOPAEDIC      ARM AND SHOULDER    HX OTHER SURGICAL      thyroid, lymphnode    HX RETINAL DETACHMENT REPAIR      left eye    IR INSERT NON TUNL CVC OVER 5 YRS  8/18/2020    IR INSERT NON TUNL CVC OVER 5 YRS  7/23/2021    IR INSERT TUNL CVC W/O PORT OVER 5 YR  8/26/2020    IR INSERT TUNL CVC W/O PORT OVER 5 YR  7/27/2021    SC CARDIAC SURG PROCEDURE UNLIST      UPPER GI ENDOSCOPY,BALL DIL,30MM  7/17/2020         UPPER GI ENDOSCOPY,BIOPSY  7/17/2020         VASCULAR SURGERY PROCEDURE UNLIST      cardiac cath NEG.      Social History     Socioeconomic History    Marital status:      Spouse name: Not on file    Number of children: Not on file    Years of education: Not on file    Highest education level: Not on file   Tobacco Use    Smoking status: Former Smoker     Quit date: 1994     Years since quittin.9    Smokeless tobacco: Never Used   Vaping Use    Vaping Use: Never used   Substance and Sexual Activity    Alcohol use: Yes     Comment: Occasionally    Drug use: No    Sexual activity: Never     Social Determinants of Health     Financial Resource Strain:     Difficulty of Paying Living Expenses:    Food Insecurity:     Worried About Running Out of Food in the Last Year:     [address] in the Last Year:    Transportation Needs:     Lack of Transportation (Medical):  Lack of Transportation (Non-Medical):    Physical Activity:     Days of Exercise per Week:     Minutes of Exercise per Session:    Stress:     Feeling of Stress :    Social Connections:     Frequency of Communication with Friends and Family:     Frequency of Social Gatherings with Friends and Family:     Attends Yazidi Services:     Active Member of Clubs or Organizations:     Attends Club or Organization Meetings:     Marital Status:      Family History   Problem Relation Age of Onset    Diabetes Mother     Elevated Lipids Mother    Stone Agrawal Bladder Disease Mother     Headache Mother    Renee Ward Migraines Mother     Heart Disease Mother     Hypertension Mother     Stroke Mother     Other Mother         ANEURSYM BRAIN    Bleeding Prob Father     Cancer Father         LEUKEMIA    Diabetes Father     Elevated Lipids Father     Mental Retardation Sister     Psychiatric Disorder Sister     Cancer Brother         LUNGS     Current Outpatient Medications   Medication Sig Dispense Refill    bumetanide (BUMEX) 2 mg tablet Take 1 Tablet by mouth daily.  30 Tablet 0    omeprazole (PRILOSEC) 20 mg capsule Take 1 capsule by mouth once daily 90 Capsule 1    carvediloL (COREG) 6.25 mg tablet TAKE 1 TABLET BY MOUTH TWICE DAILY WITH MEALS 60 Tab 3    acetaminophen (TYLENOL) 325 mg tablet Take 1 Tab by mouth as needed for Pain. Take tab as needed for pain 90 Tab 1    metoclopramide HCl (REGLAN) 5 mg tablet TAKE 1 TABLET BY MOUTH ONCE DAILY AS NEEDED. DO NOT TAKE MORE THAN 3 TABLETS A DAY. 30 Tab 2    simvastatin (ZOCOR) 20 mg tablet Take 1 Tab by mouth nightly. 90 Tab 3    hydrALAZINE (APRESOLINE) 50 mg tablet Take 1 Tab by mouth two (2) times a day. 60 Tab 3    amLODIPine (NORVASC) 10 mg tablet Take 1 Tab by mouth daily. 30 Tab 5    Vitamin D2 1,250 mcg (50,000 unit) capsule TAKE 1 CAPSULE BY MOUTH ONCE A WEEK 4 Cap 5    levothyroxine (SYNTHROID) 175 mcg tablet 1 full tab Mon-Sat but only 1/2 tab on sundays 90 Tab 3    sevelamer carbonate (RENVELA) 800 mg tab tab Take 1 Tab by mouth three (3) times daily (with meals). 90 Tab 3    insulin NPH (HumuLIN N NPH U-100 Insulin) 100 unit/mL injection 2-10 units daily (Patient taking differently: 2-10 units daily    He is doing 10 units BID as of last few months 9/23/2020) 3 Vial 3    Ventolin HFA 90 mcg/actuation inhaler TAKE 1-2 PUFFS EVEERY 4-6 HOURS AS NEEDED FOR DYSPNEA AND WHEEZING 1 Inhaler 2    Lancets misc Use preferred brand; Check glucose 3-4 times daily, Diagnosis E11.22 2 Package 3    calcitRIOL (ROCALTROL) 0.25 mcg capsule       sodium bicarbonate 650 mg tablet TAKE 2 TABLETS BY MOUTH TWICE DAILY      sodium zirconium cyclosilicate (Lokelma) 5 gram powder packet Take  by mouth.        Allergies   Allergen Reactions    Anesthetics - Amide Type - Select Amino Amides Shortness of Breath    Flexeril [Cyclobenzaprine] Hives    Tramadol Hives       Objective:  Visit Vitals  BP (!) 152/70   Pulse 64   Temp 97.7 °F (36.5 °C) (Oral)   Resp 20   Ht 5' 9.5\" (1.765 m)   Wt 220 lb 9.6 oz (100.1 kg)   SpO2 97%   BMI 32.11 kg/m²     Physical Exam:   General appearance - alert, well appearing in no distress  Mental status - alert, oriented to person, place, and time  Neck - supple, no significant adenopathy   Chest - clear to auscultation, no wheezes, rales or rhonchi  Heart - normal rate, regular rhythm, no murmurs  Abdomen - soft, nontender, nondistended, no organomegaly  Ext-peripheral pulses normal, no pedal edema  Neuro -no focal findings     Assessment/Plan:  Diagnoses and all orders for this visit:    Encounter for support and coordination of transition of care  -     METABOLIC PANEL, COMPREHENSIVE; Future  -     LIPID PANEL; Future  -     HEMOGLOBIN A1C WITH EAG; Future  -     TSH 3RD GENERATION; Future  -     CBC W/O DIFF; Future    Essential hypertension with goal blood pressure less than 757/17  -     METABOLIC PANEL, COMPREHENSIVE; Future    Dyslipidemia (high LDL; low HDL)  -     METABOLIC PANEL, COMPREHENSIVE; Future    Normochromic normocytic anemia  -     CBC W/O DIFF; Future    Controlled type 2 diabetes mellitus with diabetic nephropathy, without long-term current use of insulin (HCC)  -     METABOLIC PANEL, COMPREHENSIVE; Future  -     HEMOGLOBIN A1C WITH EAG; Future    Acquired hypothyroidism  -     TSH 3RD GENERATION; Future    Hypercholesterolemia  -     LIPID PANEL; Future    Acute respiratory failure with hypoxia (HCC)  -     METABOLIC PANEL, COMPREHENSIVE; Future    Acute pulmonary edema (HCC)  -     METABOLIC PANEL, COMPREHENSIVE; Future    Hyperkalemia  -     METABOLIC PANEL, COMPREHENSIVE; Future    ESRD (end stage renal disease) on dialysis [facility])        Patient Instructions        DASH Diet: Care Instructions  Your Care Instructions     The DASH diet is an eating plan that can help lower your blood pressure. DASH stands for Dietary Approaches to Stop Hypertension. Hypertension is high blood pressure. The DASH diet focuses on eating foods that are high in calcium, potassium, and magnesium. These nutrients can lower blood pressure. The foods that are highest in these nutrients are fruits, vegetables, low-fat dairy products, nuts, seeds, and legumes.  But taking calcium, potassium, and magnesium supplements instead of eating foods that are high in those nutrients does not have the same effect. The DASH diet also includes whole grains, fish, and poultry. The DASH diet is one of several lifestyle changes your doctor may recommend to lower your high blood pressure. Your doctor may also want you to decrease the amount of sodium in your diet. Lowering sodium while following the DASH diet can lower blood pressure even further than just the DASH diet alone. Follow-up care is a key part of your treatment and safety. Be sure to make and go to all appointments, and call your doctor if you are having problems. It's also a good idea to know your test results and keep a list of the medicines you take. How can you care for yourself at home? Following the DASH diet  · Eat 4 to 5 servings of fruit each day. A serving is 1 medium-sized piece of fruit, ½ cup chopped or canned fruit, 1/4 cup dried fruit, or 4 ounces (½ cup) of fruit juice. Choose fruit more often than fruit juice. · Eat 4 to 5 servings of vegetables each day. A serving is 1 cup of lettuce or raw leafy vegetables, ½ cup of chopped or cooked vegetables, or 4 ounces (½ cup) of vegetable juice. Choose vegetables more often than vegetable juice. · Get 2 to 3 servings of low-fat and fat-free dairy each day. A serving is 8 ounces of milk, 1 cup of yogurt, or 1 ½ ounces of cheese. · Eat 6 to 8 servings of grains each day. A serving is 1 slice of bread, 1 ounce of dry cereal, or ½ cup of cooked rice, pasta, or cooked cereal. Try to choose whole-grain products as much as possible. · Limit lean meat, poultry, and fish to 2 servings each day. A serving is 3 ounces, about the size of a deck of cards. · Eat 4 to 5 servings of nuts, seeds, and legumes (cooked dried beans, lentils, and split peas) each week. A serving is 1/3 cup of nuts, 2 tablespoons of seeds, or ½ cup of cooked beans or peas.   · Limit fats and oils to 2 to 3 servings each day. A serving is 1 teaspoon of vegetable oil or 2 tablespoons of salad dressing. · Limit sweets and added sugars to 5 servings or less a week. A serving is 1 tablespoon jelly or jam, ½ cup sorbet, or 1 cup of lemonade. · Eat less than 2,300 milligrams (mg) of sodium a day. If you limit your sodium to 1,500 mg a day, you can lower your blood pressure even more. · Be aware that all of these are the suggested number of servings for people who eat 1,800 to 2,000 calories a day. Your recommended number of servings may be different if you need more or fewer calories. Tips for success  · Start small. Do not try to make dramatic changes to your diet all at once. You might feel that you are missing out on your favorite foods and then be more likely to not follow the plan. Make small changes, and stick with them. Once those changes become habit, add a few more changes. · Try some of the following:  ? Make it a goal to eat a fruit or vegetable at every meal and at snacks. This will make it easy to get the recommended amount of fruits and vegetables each day. ? Try yogurt topped with fruit and nuts for a snack or healthy dessert. ? Add lettuce, tomato, cucumber, and onion to sandwiches. ? Combine a ready-made pizza crust with low-fat mozzarella cheese and lots of vegetable toppings. Try using tomatoes, squash, spinach, broccoli, carrots, cauliflower, and onions. ? Have a variety of cut-up vegetables with a low-fat dip as an appetizer instead of chips and dip. ? Sprinkle sunflower seeds or chopped almonds over salads. Or try adding chopped walnuts or almonds to cooked vegetables. ? Try some vegetarian meals using beans and peas. Add garbanzo or kidney beans to salads. Make burritos and tacos with mashed woody beans or black beans. Where can you learn more? Go to http://www.victor.com/  Enter H967 in the search box to learn more about \"DASH Diet: Care Instructions. \"  Current as of: August 31, 2020               Content Version: 12.8  © 5621-9619 [employer], [employer]. Care instructions adapted under license by [employer] (which disclaims liability or warranty for this information). If you have questions about a medical condition or this instruction, always ask your healthcare professional. [address] any warranty or liability for your use of this information. Follow-up and Dispositions    · Return in about 3 months (around 11/3/2021), or if symptoms worsen or fail to improve, for routine follow up.

## 2021-08-23 NOTE — PERIOP NOTES
[facility]  Preoperative Instructions        Surgery Date 8/30/21          Time of Arrival 0545    1. On the day of your surgery, please report to the Surgical Services Registration Desk and sign in at your designated time. The Surgery Center is located to the right of the Emergency Room. 2. You must have someone with you to drive you home. You should not drive a car for 24 hours following surgery. Please make arrangements for a friend or family member to stay with you for the first 24 hours after your surgery. 3. Do not have anything to eat or drink (including water, gum, mints, coffee, juice) after midnight ?8/29/21? Lorraine Kid ? This may not apply to medications prescribed by your physician. ?(Please note below the special instructions with medications to take the morning of your procedure.)    4. We recommend you do not drink any alcoholic beverages for 24 hours before and after your surgery. 5. Contact your surgeons office for instructions on the following medications: non-steroidal anti-inflammatory drugs (i.e. Advil, Aleve), vitamins, and supplements. (Some surgeons will want you to stop these medications prior to surgery and others may allow you to take them)  **If you are currently taking Plavix, Coumadin, Aspirin and/or other blood-thinning agents, contact your surgeon for instructions. ** Your surgeon will partner with the physician prescribing these medications to determine if it is safe to stop or if you need to continue taking. Please do not stop taking these medications without instructions from your surgeon    6. Wear comfortable clothes. Wear glasses instead of contacts. Do not bring any money or jewelry. Please bring picture ID, insurance card, and any prearranged co-payment or hospital payment. Do not wear make-up, particularly mascara the morning of your surgery. Do not wear nail polish, particularly if you are having foot /hand surgery.   Wear your hair loose or down, no ponytails, buns, dmitry pins or clips. All body piercings must be removed. Please shower with antibacterial soap for three consecutive days before and on the morning of surgery, but do not apply any lotions, powders or deodorants after the shower on the day of surgery. Please use a fresh towels after each shower. Please sleep in clean clothes and change bed linens the night before surgery. Please do not shave for 48 hours prior to surgery. Shaving of the face is acceptable. 7. You should understand that if you do not follow these instructions your surgery may be cancelled. If your physical condition changes (I.e. fever, cold or flu) please contact your surgeon as soon as possible. 8. It is important that you be on time. If a situation occurs where you may be late, please call [phone] (OR Holding Area). 9. If you have any questions and or problems, please call [phone] (Pre-admission Testing). 10. Your surgery time may be subject to change. You will receive a phone call the evening prior if your time changes. 11.  If having outpatient surgery, you must have someone to drive you here, stay with you during the duration of your stay, and to drive you home at time of discharge. Special Instructions:       TAKE ALL MEDICATIONS DAY OF SURGERY EXCEPT:  Sevelamer, sodium barcabonate, Lokela powder    I understand a pre-operative phone call will be made to verify my surgery time. In the event that I am not available, I give permission for a message to be left on my answering service and/or with another person?   Yes [phone]          ___________________      __________   _________    (Signature of Patient)             (Witness)                (Date and Time)

## 2021-08-30 NOTE — DISCHARGE INSTRUCTIONS
Patient Discharge Instructions    Anay Thomas / [id number] : 1960    Admitted 2021 Discharged: 2021     Take Home Medications     · It is important that you take the medication exactly as they are prescribed. · Keep your medication in the bottles provided by the pharmacist and keep a list of the medication names, dosages, and times to be taken in your wallet. · Do not take other medications without consulting your doctor. What to do at [address] [place] [address] [place]: None needed. OK to wash area gently starting tomorrow but do not submerge in water. Recommended diet: Renal    Recommended activity: As Tolerated. No Strenuous activity or heavy lifting with left arm. If you experience any of the following symptoms severe pain, weakness, numbness, or discoloration in left hand, please follow up with Dr Nellie Call immediately. Follow-up with Dr Nellie Call in 2-3 weeks at the [address] [place] [address] from Nurse    PATIENT INSTRUCTIONS:    After general anesthesia or intravenous sedation, for 24 hours or while taking prescription Narcotics:  · Limit your activities  · Do not drive and operate hazardous machinery  · Do not make important personal or business decisions  · Do  not drink alcoholic beverages  · If you have not urinated within 8 hours after discharge, please contact your surgeon on call. Report the following to your surgeon:  · Excessive pain, swelling, redness or odor of or around the surgical area  · Temperature over 100.5  · Nausea and vomiting lasting longer than 4 hours or if unable to take medications  · Any signs of decreased circulation or nerve impairment to extremity: change in color, persistent  numbness, tingling, coldness or increase pain  · Any questions    The discharge information has been reviewed with the patient and daughter. The patient anddaughter verbalized understanding.   Discharge medications reviewed with the patient and daughter and appropriate educational materials and side effects teaching were provided. Patient Education        For some surgeries, a doctor may do a peripheral nerve block to numb the part of the body involved, often an arm or a leg. Peripheral nerves lead from the spinal cord to other parts of the body, carrying signals for movement and feeling. The nerve block is sometimes used with medicine that makes you sleep during the surgery. Or the nerve block may be all that's needed, and you can stay awake without feeling any pain. The nerve block may also help keep your pain level lower after the surgery. What can you expect after a peripheral nerve block? The nerve block will leave the area that was numbed, like your arm or leg, partly or totally numb for a while. Your doctor will tell you for how long. Follow your doctor's instructions closely. If you'll be going home right after the surgery, you'll need someone to drive you. As the block wears off, you'll start to feel some pain from the surgery. Be sure to take your pain medicines before the pain gets bad. Patient Education      Hydrocodone/Acetaminophen (Vicodin, Norco, Lortab) - (By mouth)   Why this medicine is used:   Treats pain. Contact a nurse or doctor right away if you have:  · Blistering, peeling, red skin rash  · Fast or slow heartbeat, shallow breathing, blue lips, fingernails, or skin  · Anxiety, restlessness, muscle spasms, twitching, seeing or hearing things that are not there  · Dark urine or pale stools, yellow skin or eyes  · Extreme weakness, sweating, seizures, cold or clammy skin  · Lightheadedness, dizziness, fainting, fever, sweating     Common side effects:  · Constipation, nausea, vomiting, loss of appetite, stomach pain  · Tiredness or sleepiness  © 2017 [facility] Information is for End User's use only and may not be sold, redistributed or otherwise used for commercial purposes.

## 2021-08-30 NOTE — H&P
History and Physical    Subjective:     Maria Esther Santiago is a 63 y.o. male who needs AV access. Past Medical History:   Diagnosis Date    Adverse effect of anesthesia     \" STOP BREATHING 1 TIME C ANESTH\"    Cancer ([facility] [address] 2004    thyroid cancer    Chronic kidney disease     [facility]    Chronic pain     BACK SHOULDER AND ARM    COVID-19 04/2021    Depression     Diabetes ([facility] [address]     GERD (gastroesophageal reflux disease)     Heart failure (HCC)     stage 1    Hypercholesterolemia     Hypertension     Nausea & vomiting     PUD (peptic ulcer disease)     Sleep apnea     doesn't wear cpap    Thyroid cancer ([facility] [address]     TIA (transient ischemic attack) 2011    Vitamin D deficiency       Past Surgical History:   Procedure Laterality Date    HX HEART CATHETERIZATION      HX HEENT      THROAT SURGERY X 4    HX ORTHOPAEDIC      back     HX ORTHOPAEDIC      ARM AND SHOULDER    HX OTHER SURGICAL      thyroid, lymphnode    HX RETINAL DETACHMENT REPAIR      left eye    HX VASCULAR ACCESS      HD cather in chest    IR INSERT NON TUNL CVC OVER 5 YRS  8/18/2020    IR INSERT NON TUNL CVC OVER 5 YRS  7/23/2021    IR INSERT TUNL CVC W/O PORT OVER 5 YR  8/26/2020    IR INSERT TUNL CVC W/O PORT OVER 5 YR  7/27/2021    UPPER GI ENDOSCOPY,BALL DIL,30MM  7/17/2020         UPPER GI ENDOSCOPY,BIOPSY  7/17/2020         VASCULAR SURGERY PROCEDURE UNLIST      cardiac cath NEG.      Family History   Problem Relation Age of Onset    Diabetes Mother     Elevated Lipids Mother    Stone Agrawal Bladder Disease Mother     Headache Mother    [facility] Migraines Mother     Heart Disease Mother     Hypertension Mother     Stroke Mother     Other Mother         ANEURSYM BRAIN    Bleeding Prob Father     Cancer Father         LEUKEMIA    Diabetes Father     Elevated Lipids Father     Mental Retardation Sister     Psychiatric Disorder Sister     Cancer Brother         LUNGS      Social History     Tobacco Use    Smoking status: Former Smoker     Quit date: 1994     Years since quittin.0    Smokeless tobacco: Never Used   Substance Use Topics    Alcohol use: Not Currently     Comment: Occasionally       Prior to Admission medications    Medication Sig Start Date End Date Taking? Authorizing Provider   sodium bicarbonate 650 mg tablet TAKE 2 TABLETS BY MOUTH TWICE DAILY 21  Yes Provider, Alisia   bumetanide (BUMEX) 2 mg tablet Take 1 Tablet by mouth daily. 21  Yes Rody Banerjee MD   omeprazole (PRILOSEC) 20 mg capsule Take 1 capsule by mouth once daily 21  Yes Shreyas Ibarra MD   carvediloL (COREG) 6.25 mg tablet TAKE 1 TABLET BY MOUTH TWICE DAILY WITH MEALS 21  Yes Shreyas Ibarra MD   acetaminophen (TYLENOL) 325 mg tablet Take 1 Tab by mouth as needed for Pain. Take tab as needed for pain 5/10/21  Yes Shreyas Ibarra MD   simvastatin (ZOCOR) 20 mg tablet Take 1 Tab by mouth nightly. 5/10/21  Yes Shreyas Ibarra MD   hydrALAZINE (APRESOLINE) 50 mg tablet Take 1 Tab by mouth two (2) times a day. 5/10/21  Yes Shreyas Ibarra MD   amLODIPine (NORVASC) 10 mg tablet Take 1 Tab by mouth daily. 5/10/21  Yes Shreyas Ibarra MD   Vitamin D2 1,250 mcg (50,000 unit) capsule TAKE 1 CAPSULE BY MOUTH ONCE A WEEK 5/10/21  Yes Shreyas Ibarra MD   levothyroxine (SYNTHROID) 175 mcg tablet 1 full tab Mon-Sat but only 1/2 tab on sundays 4/12/21  Yes Shayy Tapia MD   sevelamer carbonate (RENVELA) 800 mg tab tab Take 1 Tab by mouth three (3) times daily (with meals). 21  Yes Shreyas Ibarra MD   Lancets misc Use preferred brand; Check glucose 3-4 times daily, Diagnosis E11.22 18  Yes Shayy Tapia MD   metoclopramide HCl (REGLAN) 5 mg tablet TAKE 1 TABLET BY MOUTH ONCE DAILY AS NEEDED. DO NOT TAKE MORE THAN 3 TABLETS A DAY. 5/10/21   Hans Prado MD   sodium zirconium cyclosilicate (Lokelma) 5 gram powder packet Take  by mouth.     Provider, Historical   Ventolin HFA 90 mcg/actuation inhaler TAKE 1-2 PUFFS EVEERY 4-6 HOURS AS NEEDED FOR DYSPNEA AND WHEEZING 7/28/20   Felicitas Prado MD     Allergies   Allergen Reactions    Anesthetics - Amide Type - Select Amino Amides Shortness of Breath    Flexeril [Cyclobenzaprine] Hives    Tramadol Hives        Review of Systems:  Denies CP/SOB    Objective:     Physical Exam:   Visit Vitals  BP (!) 191/74 (BP 1 Location: Right upper arm, BP Patient Position: At rest;Lying)   Pulse 75   Temp 98.1 °F (36.7 °C)   Resp 20   Ht 5' 9.5\" (1.765 m)   Wt 99.2 kg (218 lb 11.1 oz)   SpO2 100%   BMI 31.83 kg/m²     General:  Alert, cooperative, no distress, appears stated age. Head:  Normocephalic, without obvious abnormality, atraumatic. Neck: Supple, symmetrical, trachea midline, no adenopathy, thyroid: no enlargement/tenderness/nodules, no carotid bruit and no JVD. Lungs:   Clear to auscultation bilaterally. Chest wall:  No tenderness or deformity. Heart:  Regular rate and rhythm, S1, S2 normal, no murmur, click, rub or gallop. Abdomen:   Soft, non-tender. Bowel sounds normal. No masses,  No organomegaly. Extremities: Extremities normal, atraumatic, no cyanosis or edema. Pulses: 2+ and symmetric uppere extremities. Neurologic: Normal strength, sensation throughout.        Assessment:     ESRD     Plan:     RAJINDER AVF/AVG    Signed By: Vilma Wall MD     August 30, 2021

## 2021-08-30 NOTE — PERIOP NOTES
TRANSFER - IN REPORT:    Verbal report received from Bruce Kelly RN(name) on [facility]  being received from OR(unit) for routine post - op      Report consisted of patients Situation, Background, Assessment and   Recommendations(SBAR). Information from the following report(s) OR Summary was reviewed with the receiving nurse. Opportunity for questions and clarification was provided. Assessment completed upon patients arrival to unit and care assumed.

## 2021-08-30 NOTE — BRIEF OP NOTE
Brief Postoperative Note    Patient: Lissett Cisse  YOB: 1960  MRN: [id number]    Date of Procedure: 8/30/2021     Pre-Op Diagnosis: END STAGE RENAL    Post-Op Diagnosis: Same as preoperative diagnosis. Procedure(s):  LEFT UPPER ARM ARTERIOVENOUS FISTULA    Surgeon(s):  Timi Arias MD    Surgical Assistant: Surg Asst-1: Star Case    Anesthesia: MAC     Estimated Blood Loss (mL): Minimal    Complications: None    Specimens: * No specimens in log *     Implants: * No implants in log *    Drains: * No LDAs found *    Findings: Brachiocephalic AVF. Good artery and vein. Good thrill on completion. + radial pulse.     Electronically Signed by Neftaly Latham MD on 8/30/2021 at 9:38 AM

## 2021-08-30 NOTE — PERIOP NOTES
Discharge Info discussed and questions answered with Connie Fend, patients daughter. Signature Sheet signed and placed in chart.

## 2021-08-30 NOTE — ANESTHESIA PREPROCEDURE EVALUATION
Anesthetic History     PONV          Review of Systems / Medical History  Patient summary reviewed, nursing notes reviewed and pertinent labs reviewed    Pulmonary        Sleep apnea           Neuro/Psych       CVA  TIA and psychiatric history     Cardiovascular    Hypertension: well controlled          Hyperlipidemia    Exercise tolerance: >4 METS  Comments: LVEF is 65 - 70%   GI/Hepatic/Renal     GERD    Renal disease: stones, dialysis and ESRD  PUD     Endo/Other    Diabetes: type 2, using insulin  Hypothyroidism  Obesity     Other Findings   Comments:   Chronic pain  Hoarseness sp thryroidectomy           Physical Exam    Airway  Mallampati: II  TM Distance: 4 - 6 cm  Neck ROM: normal range of motion   Mouth opening: Normal     Cardiovascular  Regular rate and rhythm,  S1 and S2 normal,  no murmur, click, rub, or gallop             Dental    Dentition: Poor dentition     Pulmonary  Breath sounds clear to auscultation              Comments: Hoarse voice Abdominal  GI exam deferred       Other Findings            Anesthetic Plan    ASA: 4  Anesthesia type: regional          Induction: Intravenous  Anesthetic plan and risks discussed with: Patient

## 2021-08-30 NOTE — PERIOP NOTES
Angel.Avelina - PT FULLY VACCINATED FOR COVID - DENIES S/S OF COVID - NO FEVER, COLD, COUGH, SOB, N/V, DIARRHEA. ... PRE-OP TCHING DONE - PT VERBALIZES UNDERSTANDING. STRETCHER IN LOWEST POSITION, CB IN PLACE AND SR UP X2.    0654 - TIMEOUT DONE - DR. CARTER AT BEDSIDE TO PLACE LEFT ARM BLOCK. PT LAMONT WELL.  VSS.

## 2021-08-30 NOTE — ANESTHESIA PROCEDURE NOTES
Peripheral Block    Start time: 2021 6:54 AM  End time: 2021 7:02 AM  Performed by: Gweneth Apgar, MD  Authorized by: Gweneth Apgar, MD       Pre-procedure: Indications: at surgeon's request and post-op pain management    Preanesthetic Checklist: patient identified, risks and benefits discussed, site marked, timeout performed, anesthesia consent given and patient being monitored      Block Type:   Block Type:  Supraclavicular  Laterality:  Left  Monitoring:  Standard ASA monitoring, frequent vital sign checks, responsive to questions and oxygen  Injection Technique:  Single shot  Procedures: ultrasound guided    Prep: chlorhexidine    Location:  Supraclavicular  Needle Type:  Stimuplex  Needle Gauge:  22 G  Needle Localization:  Ultrasound guidance    Assessment:  Number of attempts:  1  Injection Assessment:  Incremental injection every 5 mL, local visualized surrounding nerve on ultrasound, negative aspiration for blood, ultrasound image on chart, no intravascular symptoms and no paresthesia  Patient tolerance:  Patient tolerated the procedure well with no immediate complications  Standard monitors applied, 3 L NC O2, and sedation given as recorded by RN so as to achieve patient comfort and anxiolysis, but maintain meaningful verbal contact. Sterile prep followed by 1-2 mL local at insertion site. A 40 mm, 22G insulated stimiplex needle was inserted in the interscalene groove, approximately one centimeter from the mid-clavicle. The nerve bundle was identified under [address] [facility] [facility] guidance. Local anesthetic injected without resistance and with gentle aspiration in 3-5 ml increments. Patient tolerated procedure well. No pain, paresthesia, or blood noted. VSS throughout. No complications noted.

## 2021-08-30 NOTE — OP NOTES
[address]  OPERATIVE REPORT    Name:  Stewart Lerma  MR#:  [id number]  :  1960  ACCOUNT #:  [de-identified]  DATE OF SERVICE:  2021    PREOPERATIVE DIAGNOSIS:  End-stage renal disease. POSTOPERATIVE DIAGNOSIS:  End-stage renal disease. PROCEDURE PERFORMED:  Creation of left brachiocephalic arteriovenous fistula. SURGEON:  Tameka Nunn MD    ASSISTANT:  None. ANESTHESIA:  Block. COMPLICATIONS:  None. SPECIMENS REMOVED:  None. IMPLANTS:  None. ESTIMATED BLOOD LOSS:  Minimal.    DRAINS:  None. INDICATIONS:  The patient is a 61-year-old male with end-stage renal disease who requires permanent dialysis access. He presents for left upper extremity fistula or graft creation. PROCEDURE:  After informed consent was obtained, the patient was given intravenous antibiotics within 1 hour of the incision. He was taken to the operating room with a satisfactory left upper extremity block in place. After establishing adequate sedation, the left arm was prepped and draped as a sterile field. The superficial veins of the left upper extremity were examined with real-time ultrasound and the median cubital vein and upper arm cephalic vein were found to be good conduit for fistula creation. A transverse incision was made just distal to the antecubital crease and the median cubital vein and perforating vein were dissected free. Care was taken to avoid injury to the lateral antebrachial cutaneous nerve. The brachial artery was also dissected free and was a good vessel with a good pulse. The patient was given 5000 units of intravenous heparin. The vein was transected at the distal extent of dissection and ligated distally. The perforating vein was divided between 2-0 silk ties. The vein was flushed with heparinized saline and was a good conduit.   An end-to-side anastomosis was created between the brachial artery and the cephalic vein using running 5-0 Prolene suture. Upon completion, there was an excellent thrill in the fistula and a palpable radial pulse. There was good Doppler flow in the radial artery when interrogated with continuous wave Doppler and this augmented a little bit with compression of the vein. The anastomosis and the wound were hemostatic after application of fibrillar and administration of 40 mg protamine. The incision was closed with 2 layers of absorbable suture and Dermabond was applied as a dressing. The patient was transferred to the PACU in stable condition. All counts were correct.       MD RAYNE [id number]  D:  08/30/2021 9:43  T:  08/30/2021 13:16  JOB #:  [id number]

## 2021-09-06 NOTE — ED NOTES
Wears 2L O2 PRN - placed on 2L as room O2 sats dropped to 85% when dozing in bed.  Sats up to 98% on 2L

## 2021-09-06 NOTE — ED NOTES
Ambulatory to room with cc of R leg pain over the past month. Denies trauma, redness, swelling.  States pain begins in upper leg and radiates down Reports hx back operations

## 2021-09-06 NOTE — DISCHARGE INSTRUCTIONS
It was a pleasure taking care of you in our Emergency Department today. We know that when you come to [facility], you are entrusting us with your health, comfort, and safety. Our physicians and nurses honor that trust, and truly appreciate the opportunity to care for you and your loved ones. We also value your feedback. If you receive a survey about your Emergency Department experience today, please fill it out. We care about our patients' feedback, and we listen to what you have to say. Thank you! Dr. Nikki Yates MD.      _________________________________________________________________________  I have included a copy of all your lab results and/or radiologic studies so you can have them easily available at your follow-up visit. We hope you feel better and please do not hesitate to contact the ED if you have any questions at all! Vitals:    09/06/21 1745 09/06/21 1815 09/06/21 1845 09/06/21 1900   BP: (!) 163/67 (!) 159/66 (!) 153/72 (!) 168/69   BP 1 Location:       BP Patient Position:       Pulse:       Temp:       Resp:       Height:       Weight:       SpO2: 100% 99% 100% 100%       No results found for this or any previous visit (from the past 12 hour(s)). XR SPINE LUMB 2 OR 3 V   Final Result   1. No visible fracture. XR KNEE RT 3 V   Final Result   No acute abnormality. XR HIP RT W OR WO PELV 2-3 VWS   Final Result   No acute abnormality.       DUPLEX LOWER EXT VENOUS RIGHT    (Results Pending)     CT Results  (Last 48 hours)      None

## 2021-09-07 NOTE — ED PROVIDER NOTES
EMERGENCY DEPARTMENT HISTORY AND PHYSICAL EXAM      Please note that this dictation was completed with the assistance of \"Dragon\", the computer voice recognition software. Quite often unanticipated grammatical, syntax, homophones, and other interpretive errors are inadvertently transcribed by the computer software. Please disregard these errors and any errors that have escaped final proofreading. Thank you. Patient Name: Eh Zepeda  : 1960  MRN: [id number]  History of Presenting Illness     Chief Complaint   Patient presents with    Leg Pain     Pt c/o R leg pain for about 1 month. Denies fall, injury, fever or swelling. History Provided By: Patient    HPI: Eh Zepeda, 63 y.o. male with past medical history as documented below presents to the ED with c/o of one month hx of mild to moderate RLE pain. Denies trauma. States pain is worse with movement. Pt is on ESRD and reports compliance. Pt does wear chronic O2 PRN, 2L PRN. Pt denies any other exacerbating or ameliorating factors. Additionally, pt specifically denies any recent fever, chills, headache, nausea, vomiting, abdominal pain, CP, SOB, lightheadedness, dizziness, numbness, weakness, lower extremity swelling, heart palpitations, urinary sxs, diarrhea, constipation, melena, hematochezia, cough, or congestion. There are no other complaints, changes or physical findings pertinent to the HPI at this time.     PCP: Qian Almaguer MD    Past History   Past Medical History:  Past Medical History:   Diagnosis Date    Adverse effect of anesthesia     \" STOP BREATHING 1 TIME C ANESTH\"    Cancer ([facility] [address] 2004    thyroid cancer    [facility]    Chronic pain     BACK SHOULDER AND ARM    COVID-19 2021    Depression     Diabetes ([facility] [address]     GERD (gastroesophageal reflux disease)     Heart failure (HCC)     stage 1    Hypercholesterolemia     Hypertension     Nausea & vomiting     PUD (peptic ulcer disease)     Sleep apnea     doesn't wear cpap    Thyroid cancer ([facility] [address]     TIA (transient ischemic attack)     Vitamin D deficiency        Past Surgical History:  Past Surgical History:   Procedure Laterality Date    HX HEART CATHETERIZATION      HX HEENT      THROAT SURGERY X 4    HX ORTHOPAEDIC      back     HX ORTHOPAEDIC      ARM AND SHOULDER    HX OTHER SURGICAL      thyroid, lymphnode    HX RETINAL DETACHMENT REPAIR      left eye    HX VASCULAR ACCESS      HD cather in chest    IR INSERT NON TUNL CVC OVER 5 YRS  2020    IR INSERT NON TUNL CVC OVER 5 YRS  2021    IR INSERT TUNL CVC W/O PORT OVER 5 YR  2020    IR INSERT TUNL CVC W/O PORT OVER 5 YR  2021    UPPER GI ENDOSCOPY,BALL DIL,30MM  2020         UPPER GI ENDOSCOPY,BIOPSY  2020         VASCULAR SURGERY PROCEDURE UNLIST      cardiac cath NEG. Family History:  Family history reviewed and non-contributory  Family History   Problem Relation Age of Onset    Diabetes Mother     Elevated Lipids Mother    Malva Safer Bladder Disease Mother     Headache Mother     Migraines Mother     Heart Disease Mother     Hypertension Mother     Stroke Mother     Other Mother         ANEURSYM BRAIN    Bleeding Prob Father     Cancer Father         LEUKEMIA    Diabetes Father     Elevated Lipids Father     Mental Retardation Sister     Psychiatric Disorder Sister     Cancer Brother         LUNGS         Social History:  Social History     Tobacco Use    Smoking status: Former Smoker     Quit date: 1994     Years since quittin.0    Smokeless tobacco: Never Used   Vaping Use    Vaping Use: Never used   Substance Use Topics    Alcohol use: Not Currently     Comment: Occasionally    Drug use: No       Allergies:   Allergies   Allergen Reactions    Anesthetics - Amide Type - Select Amino Amides Shortness of Breath    Flexeril [Cyclobenzaprine] Hives    Tramadol Hives       Current Medications:  No current facility-administered medications on file prior to encounter. Current Outpatient Medications on File Prior to Encounter   Medication Sig Dispense Refill    sodium bicarbonate 650 mg tablet TAKE 2 TABLETS BY MOUTH TWICE DAILY      bumetanide (BUMEX) 2 mg tablet Take 1 Tablet by mouth daily. 30 Tablet 0    omeprazole (PRILOSEC) 20 mg capsule Take 1 capsule by mouth once daily 90 Capsule 1    carvediloL (COREG) 6.25 mg tablet TAKE 1 TABLET BY MOUTH TWICE DAILY WITH MEALS 60 Tab 3    acetaminophen (TYLENOL) 325 mg tablet Take 1 Tab by mouth as needed for Pain. Take tab as needed for pain 90 Tab 1    metoclopramide HCl (REGLAN) 5 mg tablet TAKE 1 TABLET BY MOUTH ONCE DAILY AS NEEDED. DO NOT TAKE MORE THAN 3 TABLETS A DAY. 30 Tab 2    simvastatin (ZOCOR) 20 mg tablet Take 1 Tab by mouth nightly. 90 Tab 3    hydrALAZINE (APRESOLINE) 50 mg tablet Take 1 Tab by mouth two (2) times a day. 60 Tab 3    amLODIPine (NORVASC) 10 mg tablet Take 1 Tab by mouth daily. 30 Tab 5    Vitamin D2 1,250 mcg (50,000 unit) capsule TAKE 1 CAPSULE BY MOUTH ONCE A WEEK 4 Cap 5    levothyroxine (SYNTHROID) 175 mcg tablet 1 full tab Mon-Sat but only 1/2 tab on sundays 90 Tab 3    sodium zirconium cyclosilicate (Lokelma) 5 gram powder packet Take  by mouth.  sevelamer carbonate (RENVELA) 800 mg tab tab Take 1 Tab by mouth three (3) times daily (with meals). 90 Tab 3    Ventolin HFA 90 mcg/actuation inhaler TAKE 1-2 PUFFS EVEERY 4-6 HOURS AS NEEDED FOR DYSPNEA AND WHEEZING 1 Inhaler 2    Lancets misc Use preferred brand; Check glucose 3-4 times daily, Diagnosis E11.22 2 Package 3     Review of Systems   A complete ROS was reviewed by me today and all other systems negative, unless otherwise specified below:  Review of Systems   Constitutional: Negative. Negative for chills and fever. HENT: Negative. Negative for congestion and sore throat. Eyes: Negative. Respiratory: Negative.   Negative for cough, chest tightness, shortness of breath and wheezing. Cardiovascular: Negative. Negative for chest pain, palpitations and leg swelling. Gastrointestinal: Negative. Negative for abdominal distention, abdominal pain, blood in stool, constipation, diarrhea, nausea and vomiting. Endocrine: Negative. Genitourinary: Negative. Negative for dysuria, flank pain, frequency, hematuria and urgency. Musculoskeletal: Positive for arthralgias and back pain. Negative for myalgias. Skin: Negative. Negative for color change and rash. Neurological: Negative. Negative for dizziness, syncope, speech difficulty, weakness, light-headedness, numbness and headaches. Hematological: Negative. Psychiatric/Behavioral: Negative. Negative for confusion and self-injury. The patient is not nervous/anxious. All other systems reviewed and are negative. Physical Exam   Physical Exam  Vitals and nursing note reviewed. Constitutional:       Appearance: He is well-developed. He is not toxic-appearing. HENT:      Head: Normocephalic and atraumatic. Mouth/Throat:      Pharynx: No posterior oropharyngeal erythema. Eyes:      Conjunctiva/sclera: Conjunctivae normal.   Cardiovascular:      Rate and Rhythm: Normal rate and regular rhythm. Heart sounds: Normal heart sounds. No murmur heard. No friction rub. No gallop. Pulmonary:      Effort: Pulmonary effort is normal. No respiratory distress. Breath sounds: Normal breath sounds. No wheezing or rales. Chest:      Chest wall: No tenderness. Abdominal:      General: Bowel sounds are normal. There is no distension. Palpations: Abdomen is soft. There is no mass. Tenderness: There is no abdominal tenderness. There is no guarding or rebound. Musculoskeletal:         General: Normal range of motion. Cervical back: Normal range of motion. Skin:     General: Skin is warm. Neurological:      General: No focal deficit present.       Mental Status: He is alert and oriented to person, place, and time. Motor: No abnormal muscle tone. Psychiatric:         Behavior: Behavior is cooperative. Diagnostic Study Results     Labs -   I have personally reviewed and interpreted all available laboratory results. No results found for this or any previous visit (from the past 24 hour(s)). Radiologic Studies -   I have personally reviewed and interpreted all available imaging studies and agree with radiology interpretation and report. DUPLEX LOWER EXT VENOUS RIGHT   Final Result   :     No deep venous thrombosis identified. XR SPINE LUMB 2 OR 3 V   Final Result   1. No visible fracture. XR KNEE RT 3 V   Final Result   No acute abnormality. XR HIP RT W OR WO PELV 2-3 VWS   Final Result   No acute abnormality. CT Results  (Last 48 hours)    None        CXR Results  (Last 48 hours)    None          Medical Decision Making   I reviewed the vital signs, available nursing notes, past medical history, past surgical history, family history and social history. Vital Signs-Reviewed the patient's vital signs. Patient Vitals for the past 24 hrs:   Temp Pulse Resp BP SpO2   09/06/21 1900 -- -- -- (!) 168/69 100 %   09/06/21 1845 -- -- -- (!) 153/72 100 %   09/06/21 1815 -- -- -- (!) 159/66 99 %   09/06/21 1745 -- -- -- (!) 163/67 100 %   09/06/21 1730 -- -- -- (!) 166/67 100 %   09/06/21 1700 -- -- -- (!) 151/63 100 %   09/06/21 1645 -- -- -- (!) 157/62 (!) 86 %   09/06/21 1619 98 °F (36.7 °C) 73 20 (!) 153/60 92 %         Records Reviewed: Nursing Notes, Old Medical Records, Previous electrocardiograms, Previous Radiology Studies and Previous Laboratory Studies    Provider Notes (Medical Decision Making):   Pt presents with acute right leg pain and swelling; stable vitals; PMS intact in affected limb. DDx: lymphedema, muscle strain vs sprain. The pt is unlikely to have DVT.  There is no recent travel, h/o PE/DVT, neg kimberly, no hormone use, non-smoker. Will rule out with duplex U/S. There is no trauma, deformity to warrant x-ray. The leg is not cold to touch, there is strong peripheral pulse, no paralysis or parasthesia, no pallor to suggest compartment syndrome. There are no rashes, excessive warmth, fever, induration of skin to suggest cellulitis/osteo. The pt is motor and sensory intact. Will provide symptomatic treatment and reassess. Anticipate discharge home with close PCP follow-up. ED Course:   I am the first physician for this patient's ED visit today. Initial assessment performed. I discussed presenting problems, concerns and my formulated plan for today's visit with the patient and any available family members at bedside. I encouraged them to ask questions as they arise throughout the visit. Social History     Tobacco Use    Smoking status: Former Smoker     Quit date: 1994     Years since quittin.0    Smokeless tobacco: Never Used   Vaping Use    Vaping Use: Never used   Substance Use Topics    Alcohol use: Not Currently     Comment: Occasionally    Drug use: No       I reviewed our electronic medical record system for any past medical records that were available that may contribute to the patient's current condition, the nursing notes and vital signs from today's visit. ED Orders Placed :  Orders Placed This Encounter    XR SPINE LUMB 2 OR 3 V    XR HIP RT W OR WO PELV 2-3 VWS    XR KNEE RT 3 V    fentaNYL citrate (PF) injection 50 mcg    gabapentin (NEURONTIN) capsule 300 mg    gabapentin (NEURONTIN) 100 mg capsule    diclofenac (Voltaren) 1 % gel       ED Medications Administered:  Medications   fentaNYL citrate (PF) injection 50 mcg (50 mcg IntraVENous Given 21)   gabapentin (NEURONTIN) capsule 300 mg (300 mg Oral Given 21)        Progress Note:  Patient has been reassessed and reports feeling better and symptoms have improved significantly after ED treatment.  [facility] Isa[facility]'s final labs and imaging have been reviewed with him and available family and/or caregiver. They have been counseled regarding his diagnosis. He verbally conveys understanding and agreement of the signs, symptoms, diagnosis, treatment and prognosis and additionally agrees to follow up as recommended with Dr. Quique Nixon MD and/or specialist in 24 - 48 hours. He also agrees with the care-plan we created together and conveys that all of his questions have been answered. I have also put together a packet of discharge instructions for him that include: 1) educational information regarding their diagnosis, 2) how to care for their diagnosis at home, as well a 3) list of reasons why they would want to return to the ED prior to their follow-up appointment should the patient's condition change or symptoms worsen. I have answered all questions to the patient's satisfaction. Strict return precautions given. He both understood and agreed with plan as discussed. Vital signs stable for discharge. Disposition:   DISCHARGE  The pt is ready for discharge. The pt's signs, symptoms, diagnosis, and discharge instructions have been discussed and pt has conveyed their understanding. The pt is to follow up as recommended or return to ER should their symptoms worsen. Plan has been discussed and pt is in agreement. Plan:  1. Return precautions as discussed with patient and available family and/or caregiver. 2.   Discharge Medication List as of 9/6/2021  7:20 PM      START taking these medications    Details   gabapentin (NEURONTIN) 100 mg capsule Take 1 Capsule by mouth three (3) times daily. Max Daily Amount: 300 mg., Normal, Disp-15 Capsule, R-0      diclofenac (Voltaren) 1 % gel Apply  to affected area four (4) times daily. , Normal, Disp-2 Each, R-0         CONTINUE these medications which have NOT CHANGED    Details   sodium bicarbonate 650 mg tablet TAKE 2 TABLETS BY MOUTH TWICE DAILY, Historical Med bumetanide (BUMEX) 2 mg tablet Take 1 Tablet by mouth daily. , Normal, Disp-30 Tablet, R-0      omeprazole (PRILOSEC) 20 mg capsule Take 1 capsule by mouth once daily, Normal, Disp-90 Capsule, R-1      carvediloL (COREG) 6.25 mg tablet TAKE 1 TABLET BY MOUTH TWICE DAILY WITH MEALS, Normal, Disp-60 Tab, R-3      acetaminophen (TYLENOL) 325 mg tablet Take 1 Tab by mouth as needed for Pain. Take tab as needed for pain, Normal, Disp-90 Tab, R-1      metoclopramide HCl (REGLAN) 5 mg tablet TAKE 1 TABLET BY MOUTH ONCE DAILY AS NEEDED. DO NOT TAKE MORE THAN 3 TABLETS A DAY., Normal, Disp-30 Tab, R-2      simvastatin (ZOCOR) 20 mg tablet Take 1 Tab by mouth nightly., Normal, Disp-90 Tab, R-3      hydrALAZINE (APRESOLINE) 50 mg tablet Take 1 Tab by mouth two (2) times a day., Normal, Disp-60 Tab, R-3      amLODIPine (NORVASC) 10 mg tablet Take 1 Tab by mouth daily. , Normal, Disp-30 Tab, R-5      Vitamin D2 1,250 mcg (50,000 unit) capsule TAKE 1 CAPSULE BY MOUTH ONCE A WEEK, Normal, Disp-4 Cap, R-5, LISA      levothyroxine (SYNTHROID) 175 mcg tablet 1 full tab Mon-Sat but only 1/2 tab on sundays, Normal, Disp-90 Tab, R-3      sodium zirconium cyclosilicate (Lokelma) 5 gram powder packet Take  by mouth., Historical Med      sevelamer carbonate (RENVELA) 800 mg tab tab Take 1 Tab by mouth three (3) times daily (with meals). , Normal, Disp-90 Tab, R-3      Ventolin HFA 90 mcg/actuation inhaler TAKE 1-2 PUFFS EVEERY 4-6 HOURS AS NEEDED FOR DYSPNEA AND WHEEZING, Normal, Disp-1 Inhaler,R-2,LISA      Lancets misc Use preferred brand; Check glucose 3-4 times daily, Diagnosis E11.22, Normal, Disp-2 Package, R-3           3.    Follow-up Information     Follow up With Specialties Details Why Contact Info    [facility] EMERGENCY DEPT Emergency Medicine  As needed, If symptoms worsen [address] [facility] [address]    Jenny Styles MD Internal Medicine  As needed, If symptoms worsen [address]  [address]  [phone]            Instructed to return to ED if worse  Diagnosis   Clinical Impression:  1. Acute midline low back pain without sciatica    2. Acute leg pain, right    3. ESRD on dialysis ([address]    4. Accelerated hypertension        Attestation:  Cait Blackwood MD, am the attending of record for this patient. I personally performed the services described in this documentation on this date, 9/6/2021 for patient, Darshan Grossman. I have reviewed the chart and verified that the record is accurate and complete.

## 2021-10-05 NOTE — ED PROVIDER NOTES
EMERGENCY DEPARTMENT HISTORY AND PHYSICAL EXAM      Date: 10/5/2021  Patient Name: Leia Subramanian    History of Presenting Illness     Chief Complaint   Patient presents with    Wheezing     pt is a TTS dialysis pt, due today. pt states he has been wheezing for one week, not responding to home inhalers    Leg Pain     rt leg pain x 2.5 months, seen here multiple times for the same. HPI: Leia Subramanian, 63 y.o. male presents to the ED with cc of right leg pain. This has been going on for the past 3-1/2 months. He denies any change in it today. He describes as a sharp throbbing pain that starts in the hip/buttock area and goes down to his foot. It seems to be worse after he has been sitting for long period of time. He denies any redness or swelling, no trauma, no weakness or numbness, just pain. No midline back pain, no bowel or bladder symptoms. He also states that for the past week, the last 2 sessions he went to dialysis he was told that he was wheezing. He denies feeling short of breath, he denies feeling like he is wheezing, he denies any chest pain, fevers, coughing or any other complaints. He goes to dialysis Tuesday, Thursday and Saturday, he last got it on Saturday, then came here instead of going to dialysis today. Otherwise no missed sessions. There are no other complaints, changes, or physical findings at this time. PCP: Luz Cole MD    No current facility-administered medications on file prior to encounter. Current Outpatient Medications on File Prior to Encounter   Medication Sig Dispense Refill    hydrALAZINE (APRESOLINE) 50 mg tablet Take 1 tablet by mouth twice daily 60 Tablet 4    gabapentin (NEURONTIN) 100 mg capsule Take 1 Capsule by mouth three (3) times daily. Max Daily Amount: 300 mg. 15 Capsule 0    diclofenac (Voltaren) 1 % gel Apply  to affected area four (4) times daily.  2 Each 0    sodium bicarbonate 650 mg tablet TAKE 2 TABLETS BY MOUTH TWICE DAILY  bumetanide (BUMEX) 2 mg tablet Take 1 Tablet by mouth daily. 30 Tablet 0    omeprazole (PRILOSEC) 20 mg capsule Take 1 capsule by mouth once daily 90 Capsule 1    carvediloL (COREG) 6.25 mg tablet TAKE 1 TABLET BY MOUTH TWICE DAILY WITH MEALS 60 Tab 3    acetaminophen (TYLENOL) 325 mg tablet Take 1 Tab by mouth as needed for Pain. Take tab as needed for pain 90 Tab 1    metoclopramide HCl (REGLAN) 5 mg tablet TAKE 1 TABLET BY MOUTH ONCE DAILY AS NEEDED. DO NOT TAKE MORE THAN 3 TABLETS A DAY. 30 Tab 2    simvastatin (ZOCOR) 20 mg tablet Take 1 Tab by mouth nightly. 90 Tab 3    amLODIPine (NORVASC) 10 mg tablet Take 1 Tab by mouth daily. 30 Tab 5    Vitamin D2 1,250 mcg (50,000 unit) capsule TAKE 1 CAPSULE BY MOUTH ONCE A WEEK 4 Cap 5    levothyroxine (SYNTHROID) 175 mcg tablet 1 full tab Mon-Sat but only 1/2 tab on sundays 90 Tab 3    sodium zirconium cyclosilicate (Lokelma) 5 gram powder packet Take  by mouth.  sevelamer carbonate (RENVELA) 800 mg tab tab Take 1 Tab by mouth three (3) times daily (with meals). 90 Tab 3    Ventolin HFA 90 mcg/actuation inhaler TAKE 1-2 PUFFS EVEERY 4-6 HOURS AS NEEDED FOR DYSPNEA AND WHEEZING 1 Inhaler 2    Lancets misc Use preferred brand;  Check glucose 3-4 times daily, Diagnosis E11.22 2 Package 3       Past History     Past Medical History:  Past Medical History:   Diagnosis Date    Adverse effect of anesthesia     \" STOP BREATHING 1 TIME C ANESTH\"    Cancer ([facility] [address] 2004    thyroid cancer    Chronic kidney disease     [facility]    Chronic pain     BACK SHOULDER AND ARM    COVID-19 04/2021    Depression     Diabetes ([address]     GERD (gastroesophageal reflux disease)     Heart failure (HCC)     stage 1    Hypercholesterolemia     Hypertension     Nausea & vomiting     PUD (peptic ulcer disease)     Sleep apnea     doesn't wear cpap    Thyroid cancer ([address]     TIA (transient ischemic attack) 2011    Vitamin D deficiency        Past Surgical History:  Past Surgical History:   Procedure Laterality Date    HX HEART CATHETERIZATION      HX HEENT      THROAT SURGERY X 4    HX ORTHOPAEDIC      back     HX ORTHOPAEDIC      ARM AND SHOULDER    HX OTHER SURGICAL      thyroid, lymphnode    HX RETINAL DETACHMENT REPAIR      left eye    HX VASCULAR ACCESS      HD cather in chest    IR INSERT NON TUNL CVC OVER 5 YRS  2020    IR INSERT NON TUNL CVC OVER 5 YRS  2021    IR INSERT TUNL CVC W/O PORT OVER 5 YR  2020    IR INSERT TUNL CVC W/O PORT OVER 5 YR  2021    UPPER GI ENDOSCOPY,BALL DIL,30MM  2020         UPPER GI ENDOSCOPY,BIOPSY  2020         VASCULAR SURGERY PROCEDURE UNLIST      cardiac cath NEG. Family History:  Family History   Problem Relation Age of Onset    Diabetes Mother     Elevated Lipids Mother    Aurelia Citlali Bladder Disease Mother     Headache Mother     Migraines Mother     Heart Disease Mother     Hypertension Mother     Stroke Mother     Other Mother         ANEURSYM BRAIN    Bleeding Prob Father     Cancer Father         LEUKEMIA    Diabetes Father     Elevated Lipids Father     Mental Retardation Sister     Psychiatric Disorder Sister     Cancer Brother         LUNGS       Social History:  Social History     Tobacco Use    Smoking status: Former Smoker     Quit date: 1994     Years since quittin.1    Smokeless tobacco: Never Used   Vaping Use    Vaping Use: Never used   Substance Use Topics    Alcohol use: Not Currently     Comment: Occasionally    Drug use: No       Allergies:   Allergies   Allergen Reactions    Anesthetics - Amide Type - Select Amino Amides Shortness of Breath    Flexeril [Cyclobenzaprine] Hives    Tramadol Hives         Review of Systems   no fever  No ear pain  No eye pain  no shortness of breath  no chest pain  no abdominal pain  no dysuria  Reports chronic right leg pain  No rash  No lymphadenopathy  No weight loss    Physical Exam   Physical Exam  Constitutional:       General: He is not in acute distress. Appearance: He is not toxic-appearing. HENT:      Head: Normocephalic. Eyes:      Extraocular Movements: Extraocular movements intact. Cardiovascular:      Rate and Rhythm: Normal rate and regular rhythm. Pulmonary:      Effort: Pulmonary effort is normal.      Breath sounds: Normal breath sounds. Abdominal:      Palpations: Abdomen is soft. Tenderness: There is no abdominal tenderness. Musculoskeletal:         General: No swelling or deformity. Cervical back: Neck supple. Comments: Slight tenderness over the right lateral hip/thigh region. No erythema, swelling, no midline spinal tenderness, extremities are warm and well-perfused   Skin:     General: Skin is warm and dry. Neurological:      General: No focal deficit present. Mental Status: He is alert and oriented to person, place, and time. Comments: Sensation to light touch intact over lower extremities bilaterally, 5 out of 5 strength with ankle flexion and extension bilaterally, full range of motion at hip, ankle and knee of right leg   Psychiatric:         Mood and Affect: Mood normal.         Diagnostic Study Results     Labs -   No results found for this or any previous visit (from the past 24 hour(s)). Radiologic Studies -   XR CHEST PORT   Final Result   Congestion with interstitial pulmonary edema. Bilateral pulmonary   nodules, with indolently progressive left lower lobe nodule appearing possibly   enlarged  over the last 13 months and for which CT follow-up is recommended. CT Results  (Last 48 hours)    None        CXR Results  (Last 48 hours)               10/05/21 1223  XR CHEST PORT Final result    Impression:  Congestion with interstitial pulmonary edema. Bilateral pulmonary   nodules, with indolently progressive left lower lobe nodule appearing possibly   enlarged  over the last 13 months and for which CT follow-up is recommended. Narrative:  EXAM: XR CHEST PORT       INDICATION: wheezing x 1 week. TTS dialysis pt, due today. ?pt states he has   been wheezing for one week, not responding to home inhalers       COMPARISON: Chest x-ray 7/22/2021. CT 1/8/2021 and more remote prior CT exams   dating to 9/14/2016. FINDINGS: A portable AP radiograph of the chest was obtained at 12:10 hours. The   patient is on a cardiac monitor. Right-sided hemodialysis catheter projects in   expected course with the tips in the region of the right atrium. There is   pulmonary vascular congestion and diffuse interstitial edema. Bilateral   pulmonary nodules are redemonstrated, appearing enlarged in the left lower lobe,   which has shown enlargement across CT exam comparison exam. The cardiac and   mediastinal contours and pulmonary vascularity are normal.  The bones and soft   tissues are grossly within normal limits. Medical Decision Making   I am the first provider for this patient. I reviewed the vital signs, available nursing notes, past medical history, past surgical history, family history and social history. Vital Signs-Reviewed the patient's vital signs. Patient Vitals for the past 24 hrs:   Temp Pulse Resp BP SpO2   10/05/21 1330 -- 68 -- (!) 164/57 97 %   10/05/21 1315 -- 66 -- (!) 149/42 95 %   10/05/21 1300 -- 68 -- (!) 149/46 94 %   10/05/21 1245 -- 67 -- (!) 153/44 96 %   10/05/21 1230 -- 67 -- (!) 152/46 98 %   10/05/21 1215 -- 66 -- (!) 165/61 95 %   10/05/21 1132 99.3 °F (37.4 °C) 66 16 (!) 165/64 95 %         Provider Notes (Medical Decision Making):   29-year-old male presenting with right leg pain. This has been going on for over 3 months. No signs of infection on exam today, he is neurovascularly intact, no trauma or bony tenderness, not concerning for acute osseous injury. Differential includes sciatica, strain or sprain, muscle spasm, bursitis, tendinitis.  He also states that his dialysis center has been telling him that he is wheezing, however he is saturating 97% on room air here with clear lungs, he denies any cardiopulmonary complaints. He was due for dialysis today, otherwise no missed dialysis sessions, no signs of volume overload on exam, given his good compliance with dialysis I'm less concerned for any significant electrode abnormalities, however will obtain EKG to assess for any peaked T waves or signs of hyperkalemia. ED Course:     Initial assessment performed. The patients presenting problems have been discussed, and they are in agreement with the care plan formulated and outlined with them. I have encouraged them to ask questions as they arise throughout their visit. EKG is performed at 11: 37, shows sinus rhythm at a rate of 78, , QRS 94, QTc 466, axis left deviated, no ST segment elevation or depression concerning for ACS, T waves are not peaked, stable from prior. This is interpreted as sinus rhythm with left axis deviation. Chest x-ray shows some pulmonary edema, however as above, saturating 97% on room air without any respiratory complaints. I explained that he should probably go to dialysis tomorrow given that he missed today. He drove here today so I cannot give him any sedatives or other narcotics for his leg pain, I explained that I can give him a short course of Lyrica given potential neuropathic nature of the pain, and he needs to follow closely with his primary care doctor. Patient is counseled on supportive care and return precautions. Will return to the ED for any worsening pain, weakness, numbness, shortness of breath, or any new or worrisome symptoms. Will followup with primary care doctor within 3 days. Critical Care Time:         Disposition:  Home    PLAN:  1. Discharge Medication List as of 10/5/2021  1:14 PM      START taking these medications    Details   pregabalin (LYRICA) 25 mg capsule Take 1 Capsule by mouth three (3) times daily.  Max Daily Amount: 75 mg., Normal, Disp-12 Capsule, R-0         CONTINUE these medications which have NOT CHANGED    Details   hydrALAZINE (APRESOLINE) 50 mg tablet Take 1 tablet by mouth twice daily, Normal, Disp-60 Tablet, R-4      gabapentin (NEURONTIN) 100 mg capsule Take 1 Capsule by mouth three (3) times daily. Max Daily Amount: 300 mg., Normal, Disp-15 Capsule, R-0      diclofenac (Voltaren) 1 % gel Apply  to affected area four (4) times daily. , Normal, Disp-2 Each, R-0      sodium bicarbonate 650 mg tablet TAKE 2 TABLETS BY MOUTH TWICE DAILY, Historical Med      bumetanide (BUMEX) 2 mg tablet Take 1 Tablet by mouth daily. , Normal, Disp-30 Tablet, R-0      omeprazole (PRILOSEC) 20 mg capsule Take 1 capsule by mouth once daily, Normal, Disp-90 Capsule, R-1      carvediloL (COREG) 6.25 mg tablet TAKE 1 TABLET BY MOUTH TWICE DAILY WITH MEALS, Normal, Disp-60 Tab, R-3      acetaminophen (TYLENOL) 325 mg tablet Take 1 Tab by mouth as needed for Pain. Take tab as needed for pain, Normal, Disp-90 Tab, R-1      metoclopramide HCl (REGLAN) 5 mg tablet TAKE 1 TABLET BY MOUTH ONCE DAILY AS NEEDED. DO NOT TAKE MORE THAN 3 TABLETS A DAY., Normal, Disp-30 Tab, R-2      simvastatin (ZOCOR) 20 mg tablet Take 1 Tab by mouth nightly., Normal, Disp-90 Tab, R-3      amLODIPine (NORVASC) 10 mg tablet Take 1 Tab by mouth daily. , Normal, Disp-30 Tab, R-5      Vitamin D2 1,250 mcg (50,000 unit) capsule TAKE 1 CAPSULE BY MOUTH ONCE A WEEK, Normal, Disp-4 Cap, R-5, LISA      levothyroxine (SYNTHROID) 175 mcg tablet 1 full tab Mon-Sat but only 1/2 tab on sundays, Normal, Disp-90 Tab, R-3      sodium zirconium cyclosilicate (Lokelma) 5 gram powder packet Take  by mouth., Historical Med      sevelamer carbonate (RENVELA) 800 mg tab tab Take 1 Tab by mouth three (3) times daily (with meals). , Normal, Disp-90 Tab, R-3      Ventolin HFA 90 mcg/actuation inhaler TAKE 1-2 PUFFS EVEERY 4-6 HOURS AS NEEDED FOR DYSPNEA AND WHEEZING, Normal, Disp-1 Inhaler,R-2,LISA Lancets misc Use preferred brand; Check glucose 3-4 times daily, Diagnosis E11.22, Normal, Disp-2 Package, R-3           2.    Follow-up Information     Follow up With Specialties Details Why Contact Info    Tiffanie Fitch MD Internal Medicine Call in 2 days  [address] [facility]  [address] [facility]  [phone]      [facility]  Schedule an appointment as soon as possible for a visit in 3 days  [address]  [address] [facility],[address]  [address] [facility]  [phone]    Your dialysis center  Go in 1 day      [facility] EMERGENCY DEPT Emergency Medicine  As needed, If symptoms worsen [address] [place] [address]  [phone]        Return to ED if worse     Diagnosis     Clinical Impression: Acute on chronic right leg pain

## 2021-10-05 NOTE — ED NOTES
Eva Cook RN reviewed discharge instructions with the patient. The patient verbalized understanding. All questions and concerns were addressed. The patient is discharged via wheelchair in the care of family members with instructions and prescriptions in hand. Pt is alert and oriented x 4. Respirations are clear and unlabored.

## 2021-10-06 NOTE — PROGRESS NOTES
Pt's dtr, Ashley Bocanegra, called wtr to express how concerned she is about the RLE pain her father is experiencing. Pt skipped HD tx yesterday to be seen in the ED for the pain. He then went to HD today, but had to leave an hour early d/t the severity of the pain. Now it is to the point he won't get up to walk. Wtr reviewed this pt's chart earlier in the day to offer case mgmt services. He has been experiencing RLE for almost 3mos now for which he has made (3) ED visits w/o much success. Recent imaging (rt hip, lumbar spine and rt knee xrays) is neg, as well as a RLE doppler study. Immediately, Leonides Gates was transferred to Dr Jose Angel Issa office to arrange an earlier appt for an orthopedic referral.  After waiting 15mins the call was ended then [facility] was called. The call was answered promptly and a home visit was arranged for tomorrow morning (no appt's available today) to be evaluated, and hopefully obtain an orthpedic referral at that time. Leonides Gates agreed to f/u with wtr tomorrow after the home visit. Patient has Ambulatory Care Manager's contact number for for any questions or concerns.

## 2021-10-07 NOTE — PROGRESS NOTES
Ambulatory Care Management Note    Date/Time:  10/7/2021 2:36 PM    This patient was received as a referral from [facility]. Ambulatory Care Manager outreached to patient today to offer care management services. Introduction to self and role of care manager provided. Patient accepted care management services at this time. Follow up call scheduled at this time. Patient has Ambulatory Care Manager's contact number for for any questions or concerns. Per pt, [employer] [facility] did come out to see this pt today and did provide a referral to [facility].

## 2021-10-08 NOTE — PROGRESS NOTES
Wtr spoke w/pt's dtr, Bishop Allred, to obtain her address to send SARAH form to be completed.   Sts her father was seen by [facility] yesterday and they prescribed a new med for pt's rt hip/leg pain that has alleviated his pain today and he's able to walk now w/o much discomfort.  Jewel Lefort

## 2021-10-12 NOTE — PROGRESS NOTES
Pt's dtr Nate Rodríguez called to ask about a motorized scooter for pt (he already has a motorized w/c). Pt rented a scooter at the fair recently and preferred it over his w/c. Nate Rodríguez was instructed to call Dr Denisse Tillman to request an order.   Valeria verbalized understanding.  Natasha Graham

## 2021-10-12 NOTE — PROGRESS NOTES
Ambulatory Care Management Note    Date/Time:  10/07/2021 2:46 PM    This Ambulatory Care Manager (ACM) reviewed and updated the following screenings during this call; general assessment, disease specific assessment , self management assessment, SDOH assessments, ACP assessment and note and medication reconciliation     Patient's challenges to self management identified:   functional physical ability, lack of knowledge about disease, level of motivation, medical condition and utilization of services      Medication Management:  good adherence and good understanding    Advance Care Planning:   Does patient have an Advance Directive:  yes; reviewed and current     [employer], Referrals, and Durable Medical Equipment: Glucometer, BP cuff, home O2 provided by [employer] (currently at 3lpm continuously), motorized w/c, walker    Health Maintenance Due   Topic Date Due    Eye Exam Retinal or Dilated  Never done    Shingrix Vaccine Age 50> (1 of 2) Never done    COVID-19 Vaccine (3 - Pfizer risk 3-dose series) 06/17/2021    MICROALBUMIN Q1  07/29/2021    Flu Vaccine (1) 09/01/2021    Foot Exam Q1  09/23/2021    Medicare Yearly Exam  09/24/2021     Health Maintenance reviewed - n/a. Patient was asked to consider health care goals that they would like to focus on with this ACM. ACM will follow up with patient to discuss goals and establish care plan in the next 7-14 days.        PCP/Specialist follow up:   Future Appointments   Date Time Provider Ness Hawkins   1/6/2022  1:00 PM Lasha Mejia MD [facility] AMB

## 2021-10-15 PROBLEM — M79.604 RIGHT LEG PAIN: Status: ACTIVE | Noted: 2021-01-01

## 2021-10-15 PROBLEM — N18.6 ESRD (END STAGE RENAL DISEASE) ON DIALYSIS (HCC): Status: ACTIVE | Noted: 2021-01-01

## 2021-10-15 PROBLEM — D72.829 LEUKOCYTOSIS: Status: ACTIVE | Noted: 2021-01-01

## 2021-10-15 PROBLEM — R26.2 DIFFICULTY WALKING: Status: ACTIVE | Noted: 2021-01-01

## 2021-10-15 PROBLEM — E87.5 HYPERKALEMIA: Status: ACTIVE | Noted: 2021-01-01

## 2021-10-15 PROBLEM — Z99.2 ESRD (END STAGE RENAL DISEASE) ON DIALYSIS (HCC): Status: ACTIVE | Noted: 2021-01-01

## 2021-10-15 NOTE — PROGRESS NOTES
.End of Shift Note    Bedside shift change report given to Mayda(oncoming nurse) by Leigha Kingston RN (offgoing nurse). Report included the following information SBAR, Intake/Output, MAR and Recent Results    Shift worked:  7 a - 7 p     Shift summary and any significant changes:     Patient was sent from ED and then shortly after coming to unit, patient was transported to Dialysis. Pt was very sleepy so I held the valuim. I explained to him he was going to MRI and he understood. Then once he arrived he refused to have MRI without sedation. Radiology sent him back and said they would attempt again tomorrow. I expalained all this to the night nurse during report. Concerns for physician to address:  none     Zone phone for oncoming shift:   [phone]       Activity:     Number times ambulated in hallways past shift: 0  Number of times OOB to chair past shift: 0    Cardiac:   Cardiac Monitoring: No           Access:   Current line(s): PIV     Genitourinary:   Urinary status: voiding    Respiratory:   O2 Device: Nasal cannula  Chronic home O2 use?: YES  Incentive spirometer at bedside: NO     GI:  Last Bowel Movement Date: 10/15/21  Current diet:  DIET NPO  Passing flatus: YES  Tolerating current diet: YES       Pain Management:   Patient states pain is manageable on current regimen: YES    Skin:  Ge Score: 16  Interventions: float heels    Patient Safety:  Fall Score:  Total Score: 2  Interventions: assistive device (walker, cane, etc) and gripper socks       Length of Stay:  Expected LOS: 4d 7h  Actual LOS: Jacoby Greenwood RN

## 2021-10-15 NOTE — DISCHARGE INSTRUCTIONS
Patient Education        Malignant Brain Tumor (Secondary): Care Instructions  Overview  A secondary malignant brain tumor is cancer that has spread to the brain from another part of the body. This type of tumor is different from a brain tumor that began in the brain. Cancer that spreads to the brain is called by the name of the initial (primary) cancer. For example, lung cancer that spreads to the brain is called metastatic lung cancer. These tumors usually grow quickly and can spread throughout the brain. As malignant brain tumors grow, they can harm important brain functions. Brain cancer can be deadly. There are many types of malignant brain tumors. Treatment depends on where the cancer started, the type of tumor, and where it is in the brain. Treatment may include radiation, surgery, medicines (such as chemotherapy and immunotherapy), or a combination of these treatments. Follow-up care is a key part of your treatment and safety. Be sure to make and go to all appointments, and call your doctor if you are having problems. It's also a good idea to know your test results and keep a list of the medicines you take. How can you care for yourself at home? · Take your medicines exactly as prescribed. Call your doctor if you have any problems with your medicine. · Follow your doctor's instructions to relieve pain. Pain from cancer and surgery can almost always be controlled. Use pain medicine when you first notice pain, before it becomes severe. · Eat healthy food. If you do not feel like eating, try to eat food that contains protein and extra calories to keep up your strength and prevent weight loss. · Get some physical activity every day, but do not get too tired. · Get enough sleep, and take time to do things you enjoy. This can help reduce stress. · Think about joining a support group. Or discuss your concerns with your doctor or a counselor.   · If you are vomiting or have diarrhea:  ? Drink plenty of fluids to prevent dehydration. Choose water and other clear liquids. If you have kidney, heart, or liver disease and have to limit fluids, talk with your doctor before you increase the amount of fluids you drink. ? When you are able to eat, try clear soups, mild foods, and liquids until all symptoms are gone for 12 to 48 hours. Other good choices include dry toast, crackers, cooked cereal, and gelatin dessert, such as Jell-O.  · If you have not already done so, prepare a list of advance directives. Advance directives are instructions to your doctor and family members about what kind of care you want if you become unable to speak or express yourself. When should you call for help? Call 911 anytime you think you may need emergency care. For example, call if:    · You have a seizure (convulsions).     · You passed out (lost consciousness). Call your doctor now or seek immediate medical care if:    · You have new or worse nausea or vomiting.     · You have new or worse headaches.     · You have new symptoms of brain problems, such as weakness, numbness, or speech or vision changes. Watch closely for changes in your health, and be sure to contact your doctor if:    · You do not get better as expected. Where can you learn more? Go to http://www.gray.com/  Enter Q373 in the search box to learn more about \"Malignant Brain Tumor (Secondary): Care Instructions. \"  Current as of: 2020               Content Version: 13.0  © 1400-7284 [employer]. Care instructions adapted under license by [employer] (which disclaims liability or warranty for this information). If you have questions about a medical condition or this instruction, always ask your healthcare professional. [place] [address] any warranty or liability for your use of this information.                HOSPITALIST DISCHARGE INSTRUCTIONS    NAME: Corby Baer   :  1960 MRN:  [id number]     Date/Time:  10/21/2021 2:01 PM    ADMIT DATE: 10/14/2021   DISCHARGE DATE: 10/21/2021     Attending Physician: Selma Alaniz NP    DISCHARGE DIAGNOSIS:    Low back pain  Right hip pain foot  Radiculopathy to RLE   Debility due to pain  Left Hemiparesis r/t Brain Mets   Ruled out CVA/TIA   History of Papillary Carcinoma of the Thyroid in 2002 S/p Resection   New Lung and Brain Mets   Mild Leukocytosis   ESRD on HD T/TH/Sa  Missed dialysis x 2 sessions PTA d/t pain as noted above  Hyperkalemia   Hypertension   History of Flash Pulmonary Edema    Hyperlipidemia   Type II Diabetes  SHOLA with Chronic Respiratory Failure   Chronic anemia      Medications: Per above medication reconciliation. Pain Management: per above medications    Recommended diet: Diabetic Diet    Recommended activity: PT/OT per eval     Wound care: None    Indwelling devices:  None    Supplemental Oxygen: None    Required Lab work: Per Oncology Team     Glucose management:  Per SNF routine with SSI     Code status: Full        Outside physician follow up: Follow-up Information     Follow up With Specialties Details Why Contact Info    Declan Nye MD Internal Medicine Go on 10/26/2021 11:30AM [address]  [address]  [address] [facility] [address] Call in 1 day this is your home health provider. [address] [place] [address] , Zane Hughes MD Neurosurgery Schedule an appointment as soon as possible for a visit left  with office to request an appointment. [address] [facility]  [address] [facility] [address]  [phone]      Magan Teran MD Hematology and Oncology, Internal Medicine, Hematology, Oncology  The office will call you for an appt next week  [address] [place] [address] 3 [address]  [address] [phone]                [facility] to avoid frequent ED visits.   [facility] can treat; pains, sprains, cuts, wounds, high fevers, upper respiratory infections and much more. There medical team is equipped with all the tools necessary to provide advanced medical care in the comfort of you home, workplace, or location of need. The medical team consists of doctors, nurse practitioners, and EMTs. [facility] is available 7 days per week 9 am to 9 pm.   Request care by calling [phone] or by going online at [employer]    Information obtained by :  I understand that if any problems occur once I am at home I am to contact my physician. I understand and acknowledge receipt of the instructions indicated above.                                                                                                                                            Physician's or R.N.'s Signature                                                                  Date/Time                                                                                                                                              Patient or Repres

## 2021-10-15 NOTE — ED PROVIDER NOTES
EMERGENCY DEPARTMENT HISTORY AND PHYSICAL EXAM     ----------------------------------------------------------------------------  Please note that this dictation was completed with [employer], the computer voice recognition software. Quite often unanticipated grammatical, syntax, homophones, and other interpretive errors are inadvertently transcribed by the computer software. Please disregard these errors. Please excuse any errors that have escaped final proofreading  ----------------------------------------------------------------------------      Date: 10/14/2021  Patient Name: David Horowitz    History of Presenting Illness     Chief Complaint   Patient presents with    Lethargy       History Provided By:  Patient    HPI: David Horowitz is a 63 y.o. male, with significant pmhx of end-stage renal disease on dialysis T/TH/SA, TIA, cholesterol, diabetes, hypertension, previous thyroid cancer, CHF, reflux, COVID-19 in April 2021, who presents via EMS to the ED with c/o generalized weakness for the last week. Notes that he has not gone to dialysis for the last 2 occurrences due to the fact that he felt unsteady ambulating and could not get to his appointment. Patient reports that he is supposed wear oxygen at baseline but was not wearing it when EMS arrived there today. Notes that he is to wear 3 L/min. Patient denies associated chest pain, nausea, vomiting. Notes that he still makes urine. No other recent reported falls. Patient also specifically denies any associated fevers, chills, CP, nausea, vomiting, diarrhea, abd pain, changes in BM, urinary sxs, or headache. Social Hx: denies tobacco  denies EtOH , denies recreational/Illicit Drugs    There are no other complaints, changes, or physical findings at this time.      PCP: Sarai Bennett MD    Allergies   Allergen Reactions    Anesthetics - Amide Type - Select Amino Amides Shortness of Breath    Flexeril [Cyclobenzaprine] Hives    Tramadol Hives Current Facility-Administered Medications   Medication Dose Route Frequency Provider Last Rate Last Admin    acetaminophen (TYLENOL) tablet 650 mg  650 mg Oral Q6H PRN Loly Burnette MD        ondansetron [facility]) injection 4 mg  4 mg IntraVENous Q4H PRN Loly Burnette MD         Current Outpatient Medications   Medication Sig Dispense Refill    pregabalin (LYRICA) 25 mg capsule Take 1 Capsule by mouth three (3) times daily. Max Daily Amount: 75 mg. 12 Capsule 0    hydrALAZINE (APRESOLINE) 50 mg tablet Take 1 tablet by mouth twice daily 60 Tablet 4    gabapentin (NEURONTIN) 100 mg capsule Take 1 Capsule by mouth three (3) times daily. Max Daily Amount: 300 mg. (Patient not taking: Reported on 10/7/2021) 15 Capsule 0    diclofenac (Voltaren) 1 % gel Apply  to affected area four (4) times daily. (Patient not taking: Reported on 10/7/2021) 2 Each 0    sodium bicarbonate 650 mg tablet TAKE 2 TABLETS BY MOUTH TWICE DAILY      bumetanide (BUMEX) 2 mg tablet Take 1 Tablet by mouth daily. 30 Tablet 0    omeprazole (PRILOSEC) 20 mg capsule Take 1 capsule by mouth once daily 90 Capsule 1    carvediloL (COREG) 6.25 mg tablet TAKE 1 TABLET BY MOUTH TWICE DAILY WITH MEALS 60 Tab 3    acetaminophen (TYLENOL) 325 mg tablet Take 1 Tab by mouth as needed for Pain. Take tab as needed for pain 90 Tab 1    metoclopramide HCl (REGLAN) 5 mg tablet TAKE 1 TABLET BY MOUTH ONCE DAILY AS NEEDED. DO NOT TAKE MORE THAN 3 TABLETS A DAY. 30 Tab 2    simvastatin (ZOCOR) 20 mg tablet Take 1 Tab by mouth nightly. 90 Tab 3    amLODIPine (NORVASC) 10 mg tablet Take 1 Tab by mouth daily. 30 Tab 5    Vitamin D2 1,250 mcg (50,000 unit) capsule TAKE 1 CAPSULE BY MOUTH ONCE A WEEK 4 Cap 5    levothyroxine (SYNTHROID) 175 mcg tablet 1 full tab Mon-Sat but only 1/2 tab on sundays 90 Tab 3    sodium zirconium cyclosilicate (Lokelma) 5 gram powder packet Take  by mouth.       sevelamer carbonate (RENVELA) 800 mg tab tab Take 1 Tab by mouth three (3) times daily (with meals). 90 Tab 3    Ventolin HFA 90 mcg/actuation inhaler TAKE 1-2 PUFFS EVEERY 4-6 HOURS AS NEEDED FOR DYSPNEA AND WHEEZING 1 Inhaler 2    Lancets misc Use preferred brand; Check glucose 3-4 times daily, Diagnosis E11.22 2 Package 3       Past History     Past Medical History:  Past Medical History:   Diagnosis Date    Adverse effect of anesthesia     \" STOP BREATHING 1 TIME C ANESTH\"    Cancer ([facility] 2004    thyroid cancer    Chronic kidney disease     [facility] T-TH-S    Chronic pain     BACK SHOULDER AND ARM    COVID-19 04/2021    Depression     Diabetes ([facility]     GERD (gastroesophageal reflux disease)     Heart failure (HCC)     stage 1    Hypercholesterolemia     Hypertension     Nausea & vomiting     PUD (peptic ulcer disease)     Sleep apnea     doesn't wear cpap    Thyroid cancer ([facility]     TIA (transient ischemic attack) 2011    Vitamin D deficiency        Past Surgical History:  Past Surgical History:   Procedure Laterality Date    HX HEART CATHETERIZATION      HX HEENT      THROAT SURGERY X 4    HX ORTHOPAEDIC      back     HX ORTHOPAEDIC      ARM AND SHOULDER    HX OTHER SURGICAL      thyroid, lymphnode    HX RETINAL DETACHMENT REPAIR      left eye    HX VASCULAR ACCESS      HD cather in chest    IR INSERT NON TUNL CVC OVER 5 YRS  8/18/2020    IR INSERT NON TUNL CVC OVER 5 YRS  7/23/2021    IR INSERT TUNL CVC W/O PORT OVER 5 YR  8/26/2020    IR INSERT TUNL CVC W/O PORT OVER 5 YR  7/27/2021    UPPER GI ENDOSCOPY,BALL DIL,30MM  7/17/2020         UPPER GI ENDOSCOPY,BIOPSY  7/17/2020         VASCULAR SURGERY PROCEDURE UNLIST      cardiac cath NEG.        Family History:  Family History   Problem Relation Age of Onset    Diabetes Mother     Elevated Lipids Mother    Jay Butt Bladder Disease Mother     Headache Mother    Dossie Linda Migraines Mother     Heart Disease Mother     Hypertension Mother     Stroke Mother    Dossie Linda Other Mother         ANEURSYM BRAIN    Bleeding Prob Father     Cancer Father         LEUKEMIA    Diabetes Father     Elevated Lipids Father     Mental Retardation Sister     Psychiatric Disorder Sister     Cancer Brother         LUNGS       Social History:  Social History     Tobacco Use    Smoking status: Former Smoker     Quit date: 1994     Years since quittin.1    Smokeless tobacco: Never Used   Vaping Use    Vaping Use: Never used   Substance Use Topics    Alcohol use: Not Currently    Drug use: No       Allergies: Allergies   Allergen Reactions    Anesthetics - Amide Type - Select Amino Amides Shortness of Breath    Flexeril [Cyclobenzaprine] Hives    Tramadol Hives         Review of Systems   Review of Systems   Constitutional: Positive for fatigue. Negative for chills and fever. HENT: Negative. Eyes: Negative. Respiratory: Positive for shortness of breath. Negative for cough and chest tightness. Cardiovascular: Negative for chest pain and leg swelling. Gastrointestinal: Negative for abdominal pain, diarrhea, nausea and vomiting. Endocrine: Negative. Genitourinary: Negative for difficulty urinating and dysuria. Musculoskeletal: Negative for myalgias. Skin: Negative. Neurological: Positive for weakness. Psychiatric/Behavioral: Negative. All other systems reviewed and are negative. Physical Exam   Physical Exam  Vitals and nursing note reviewed. Constitutional:       General: He is not in acute distress. Appearance: He is well-developed. He is obese. He is not diaphoretic. HENT:      Head: Normocephalic and atraumatic. Nose: Nose normal.      Mouth/Throat:      Pharynx: No oropharyngeal exudate. Eyes:      Conjunctiva/sclera: Conjunctivae normal.      Pupils: Pupils are equal, round, and reactive to light. Neck:      Vascular: No JVD. Cardiovascular:      Rate and Rhythm: Normal rate and regular rhythm. Heart sounds: Normal heart sounds. No murmur heard. No friction rub. Pulmonary:      Effort: Pulmonary effort is normal. No respiratory distress. Breath sounds: Normal breath sounds. No stridor. No wheezing or rales. Abdominal:      General: Bowel sounds are normal. There is no distension. Palpations: Abdomen is soft. Tenderness: There is no abdominal tenderness. There is no rebound. Musculoskeletal:         General: No tenderness. Normal range of motion. Cervical back: Normal range of motion and neck supple. Skin:     General: Skin is warm and dry. Findings: No rash. Neurological:      Mental Status: He is alert and oriented to person, place, and time. Cranial Nerves: No cranial nerve deficit. Psychiatric:         Speech: Speech normal.         Behavior: Behavior normal.         Thought Content:  Thought content normal.         Judgment: Judgment normal.           Diagnostic Study Results     Labs -     Recent Results (from the past 12 hour(s))   EKG, 12 LEAD, INITIAL    Collection Time: 10/15/21 12:01 AM   Result Value Ref Range    Ventricular Rate 74 BPM    Atrial Rate 74 BPM    P-R Interval 164 ms    QRS Duration 92 ms    Q-T Interval 386 ms    QTC Calculation (Bezet) 428 ms    Calculated P Axis 31 degrees    Calculated R Axis -20 degrees    Calculated T Axis 37 degrees    Diagnosis       Normal sinus rhythm  Normal ECG  When compared with ECG of 05-OCT-2021 11:37,  No significant change was found     CBC WITH AUTOMATED DIFF    Collection Time: 10/15/21 12:17 AM   Result Value Ref Range    WBC 17.2 (H) 4.1 - 11.1 K/uL    RBC 3.24 (L) 4.10 - 5.70 M/uL    HGB 9.8 (L) 12.1 - 17.0 g/dL    HCT 30.4 (L) 36.6 - 50.3 %    MCV 93.8 80.0 - 99.0 FL    MCH 30.2 26.0 - 34.0 PG    MCHC 32.2 30.0 - 36.5 g/dL    RDW 15.3 (H) 11.5 - 14.5 %    PLATELET 985 254 - 756 K/uL    MPV 9.7 8.9 - 12.9 FL    NRBC 0.0 0  WBC    ABSOLUTE NRBC 0.00 0.00 - 0.01 K/uL    NEUTROPHILS 73 32 - 75 %    LYMPHOCYTES 6 (L) 12 - 49 %    MONOCYTES 7 5 - 13 %    EOSINOPHILS 12 (H) 0 - 7 %    BASOPHILS 1 0 - 1 %    IMMATURE GRANULOCYTES 1 (H) 0.0 - 0.5 %    ABS. NEUTROPHILS 12.5 (H) 1.8 - 8.0 K/UL    ABS. LYMPHOCYTES 1.0 0.8 - 3.5 K/UL    ABS. MONOCYTES 1.2 (H) 0.0 - 1.0 K/UL    ABS. EOSINOPHILS 2.1 (H) 0.0 - 0.4 K/UL    ABS. BASOPHILS 0.2 (H) 0.0 - 0.1 K/UL    ABS. IMM. GRANS. 0.2 (H) 0.00 - 0.04 K/UL    DF SMEAR SCANNED      RBC COMMENTS ANISOCYTOSIS  1+        RBC COMMENTS HYPOCHROMIA  1+       METABOLIC PANEL, COMPREHENSIVE    Collection Time: 10/15/21 12:17 AM   Result Value Ref Range    Sodium 136 136 - 145 mmol/L    Potassium 5.3 (H) 3.5 - 5.1 mmol/L    Chloride 106 97 - 108 mmol/L    CO2 27 21 - 32 mmol/L    Anion gap 3 (L) 5 - 15 mmol/L    Glucose 133 (H) 65 - 100 mg/dL     (H) 6 - 20 MG/DL    Creatinine 5.23 (H) 0.70 - 1.30 MG/DL    BUN/Creatinine ratio 21 (H) 12 - 20      GFR est AA 14 (L) >60 ml/min/1.73m2    GFR est non-AA 11 (L) >60 ml/min/1.73m2    Calcium 9.7 8.5 - 10.1 MG/DL    Bilirubin, total 0.5 0.2 - 1.0 MG/DL    ALT (SGPT) 30 12 - 78 U/L    AST (SGOT) 8 (L) 15 - 37 U/L    Alk.  phosphatase 141 (H) 45 - 117 U/L    Protein, total 7.6 6.4 - 8.2 g/dL    Albumin 3.1 (L) 3.5 - 5.0 g/dL    Globulin 4.5 (H) 2.0 - 4.0 g/dL    A-G Ratio 0.7 (L) 1.1 - 2.2     URINALYSIS W/ REFLEX CULTURE    Collection Time: 10/15/21 12:17 AM    Specimen: Urine   Result Value Ref Range    Color YELLOW/STRAW      Appearance CLOUDY (A) CLEAR      Specific gravity 1.013 1.003 - 1.030      pH (UA) 5.5 5.0 - 8.0      Protein 100 (A) NEG mg/dL    Glucose 100 (A) NEG mg/dL    Ketone Negative NEG mg/dL    Bilirubin Negative NEG      Blood Negative NEG      Urobilinogen 0.2 0.2 - 1.0 EU/dL    Nitrites Negative NEG      Leukocyte Esterase Negative NEG      WBC 0-4 0 - 4 /hpf    RBC 0-5 0 - 5 /hpf    Epithelial cells FEW FEW /lpf    Bacteria Negative NEG /hpf    UA:UC IF INDICATED CULTURE NOT INDICATED BY UA RESULT CNI      Hyaline cast 0-2 0 - 5 /lpf   TROPONIN-HIGH SENSITIVITY Collection Time: 10/15/21 12:17 AM   Result Value Ref Range    Troponin-High Sensitivity 15 0 - 76 ng/L   TROPONIN-HIGH SENSITIVITY    Collection Time: 10/15/21  5:29 AM   Result Value Ref Range    Troponin-High Sensitivity 16 0 - 76 ng/L   METABOLIC PANEL, BASIC    Collection Time: 10/15/21  5:29 AM   Result Value Ref Range    Sodium 139 136 - 145 mmol/L    Potassium 5.1 3.5 - 5.1 mmol/L    Chloride 107 97 - 108 mmol/L    CO2 29 21 - 32 mmol/L    Anion gap 3 (L) 5 - 15 mmol/L    Glucose 113 (H) 65 - 100 mg/dL     (H) 6 - 20 MG/DL    Creatinine 5.14 (H) 0.70 - 1.30 MG/DL    BUN/Creatinine ratio 21 (H) 12 - 20      GFR est AA 14 (L) >60 ml/min/1.73m2    GFR est non-AA 12 (L) >60 ml/min/1.73m2    Calcium 9.4 8.5 - 10.1 MG/DL       Radiologic Studies -   XR HIP RT W OR WO PELV 2-3 VWS   Final Result   No acute abnormality. XR CHEST PORT   Final Result   No acute process identified. Left lung nodule. CT Results  (Last 48 hours)    None        CXR Results  (Last 48 hours)               10/15/21 0006  XR CHEST PORT Final result    Impression:  No acute process identified. Left lung nodule. Narrative:  Clinical history: Weakness   INDICATION:   Weakness   COMPARISON: 10/5/2021       FINDINGS:   AP portable upright view of the chest demonstrates a stable enlarged   cardiopericardial silhouette. There is no pleural effusion. .There is no focal   consolidation. .Dialysis catheter on the right unchanged. . Patient is on a   cardiac monitor. Nodular density on the left is not significantly changed                   Medical Decision Making   I am the first provider for this patient. I reviewed the vital signs, available nursing notes, past medical history, past surgical history, family history and social history. Vital Signs-Reviewed the patient's vital signs.   Patient Vitals for the past 12 hrs:   Temp Pulse Resp BP SpO2   10/15/21 0445 -- 72 15 (!) 166/64 99 %   10/15/21 0430 -- 76 15 (!) 174/66 98 %   10/15/21 0415 -- 74 19 (!) 164/69 100 %   10/15/21 0400 -- 73 18 (!) 172/58 100 %   10/15/21 0345 -- 72 15 (!) 162/58 100 %   10/15/21 0330 -- 79 16 (!) 172/66 100 %   10/15/21 0315 -- 71 17 (!) 174/64 100 %   10/15/21 0300 -- 78 17 (!) 173/68 100 %   10/15/21 0248 -- 71 20 131/85 --   10/15/21 0245 -- 75 22 (!) 170/72 95 %   10/15/21 0015 -- 72 17 (!) 162/63 94 %   10/14/21 2345 -- 76 17 (!) 143/53 100 %   10/14/21 2315 -- 75 16 (!) 177/72 97 %   10/14/21 2300 -- 73 18 (!) 173/66 100 %   10/14/21 2217 98.5 °F (36.9 °C) 72 18 (!) 162/63 100 %       Pulse Oximetry Analysis - 99% on RA, normal  Rate: 72 bpm  Rhythm: nsr      Provider Notes (Medical Decision Making):     DDX:  Electrolyte derangement, hyperkalemia, EKG changes, UTI, pneumonia, hip fracture, chronic debility    Plan:  Labs, chest x-ray, UA, analgesic, hip x-ray, hyperkalemic treatment, EKG    Impression:  End-stage renal disease, noncompliance, hyperkalemia, generalized ability    ED Course:   Initial assessment performed. The patients presenting problems have been discussed, and they are in agreement with the care plan formulated and outlined with them. I have encouraged them to ask questions as they arise throughout their visit. I reviewed the nursing notes and and vital signs from today's visit, as well as the electronic medical record system for any past medical records that were available that may contribute to the patients current condition, including noting previous multiple ER visits for leg pain    Nursing notes will be reviewed as they become available in realtime while the pt has been in the ED. Florin Ames MD  I personally reviewed/interpreted pt's imaging. Agree with official read by radiology as noted above. Florin Ames MD    PROGRESS NOTE  Pt continues to complain of severe pain and inability to ambulate. Patient treated for hyperkalemia with plan for dialysis later today.   As patient cannot ambulate and get to dialysis, will discuss  hospitalist for admission. Elinor Butler MD      CONSULT NOTE:   6:25 Yue Rodriguez MD spoke with Dr. Annalisa Munguia,   Specialty: Celestine Munguia due to hyperkalemia, end-stage renal disease, generalized debility and inability to ambulate. Discussed pt's HPI and available diagnostic results thus far. Expressed concerns for needed admission. Consultant will evaluate for admission. Elinor Butler MD    ADMISSION NOTE:  6:25 AM  Patient is being admitted to the hospital by Dr. Annalisa Munguia. The results of their tests and reasons for their admission have been discussed with them and/or available family. They convey agreement and understanding for the need to be admitted and for their admission diagnosis. Elinor Butler MD      CONSULT NOTE:   6:25 Yue Rodriguez MD spoke with Dr. Traci Joseph,   Specialty: Neurology  Consulted Dr. Traci Joseph due to need for dialysis today. Discussed pt's HPI and available diagnostic results thus far. Consultant notes that they will get the patient scheduled for dialysis today  Elinor Butler MD           Critical Care Time:     none      Diagnosis     Clinical Impression:   1. End stage renal disease on dialysis ([address]    2. Acute hyperkalemia    3. Physical debility    4. Inability to ambulate due to left hip        PLAN:  1.  Admit to hospitalist

## 2021-10-15 NOTE — PROGRESS NOTES
Nephrology Progress Note  José Miguel Butt     [email or web address]. [email or web address]  Phone - [phone]   Patient: Kayla Viveros    YOB: 1960        Date- 10/15/2021   Admit Date: 10/14/2021  CC: Follow up for  ESRD         IMPRESSION & PLAN:    New ESRD goes to [address] on TTS shift under Dr. Alvaro Jeter.  Leukocytosis   Ongoing back pain   Anemia of CKD   Ambulatory dysfunction   Hypertensive urgency   Type 2 diabetes    PLAN-   Plan is for urgent dialysis today to help with the metabolic abnormalities. We will do a 2-1/2-hour session today.  Plan to dialyze again tomorrow to bring him back to his schedule.  Had a detailed discussion with the orthopedic team plan is for MRI today without contrast.   Please order blood cultures x2 as he has presenting with a septic picture. He has a permacath and that might need to be taken out if his blood cultures came back positive.  We will add EPO for anemia   Thank you for the consult follow with you     Subjective: Interval History:   -Patient was seen and examined in the ER. History of end-stage renal disease and has been going to dialysis on TTS schedule under the care of Dr. Alvaro Jeter at the [facility] unit in [place]. His last dialysis was exactly 1 week ago last Saturday and he missed dialysis Tuesday and Thursday. Patient tells me that he is just on been feeling well feels weak fatigue tired and has been having this ongoing back pain. His back pain has been ongoing for last 1-1/2 to 2 weeks. It started getting any better and he is now with feelings of weakness. He came to the ER for further evaluation. Initial evaluation showed his white count of elevated at 17,000. He has a permacath for HD access. Ortho has been seeing him plan is for possibly MRI without contrast today to rule out any epidural abscess. Renal consult requested for evaluation of hemodialysis needs.     Objective:   Vitals: 10/15/21 0700 10/15/21 0845 10/15/21 0900 10/15/21 0942   BP: (!) 149/58 (!) 170/65 (!) 177/68 (!) 150/51   Pulse: 74 75 84 78   Resp: 19  16 16   Temp: 97.9 °F (36.6 °C)   98 °F (36.7 °C)   SpO2: 97%  98% 93%   Weight:       Height:          10/14 0701 - 10/15 0700  In: 50 [I.V.:50]  Out: 1000 [Urine:1000]  Last 3 Recorded Weights in this Encounter    10/14/21 2217   Weight: 103 kg (227 lb 1.2 oz)      Physical exam:   GEN: NAD  NECK- Supple, no mass  RESP: No wheezing, Clear b/l  CVS: S1,S2  RRR  NEURO: Normal speech, Non focal  EXT: No Edema   PSYCH: Normal Mood    Chart reviewed. Pertinent Notes reviewed. Data Review :  Recent Labs     10/15/21  0529 10/15/21  0017    136   K 5.1 5.3*    106   CO2 29 27   * 111*   CREA 5.14* 5.23*   * 133*   CA 9.4 9.7     Recent Labs     10/15/21  0017   WBC 17.2*   HGB 9.8*   HCT 30.4*        No results for input(s): FE, TIBC, PSAT, FERR in the last 72 hours.    Medication list  reviewed  Current Facility-Administered Medications   Medication Dose Route Frequency    fentaNYL citrate (PF) injection 50 mcg  50 mcg IntraVENous Q4H PRN    naloxone (NARCAN) injection 0.4 mg  0.4 mg IntraVENous PRN    amLODIPine (NORVASC) tablet 10 mg  10 mg Oral DAILY    bumetanide (BUMEX) tablet 2 mg  2 mg Oral DAILY    carvediloL (COREG) tablet 6.25 mg  6.25 mg Oral BID WITH MEALS    hydrALAZINE (APRESOLINE) tablet 50 mg  50 mg Oral BID    metoclopramide HCl (REGLAN) tablet 5 mg  5 mg Oral TID PRN    pantoprazole (PROTONIX) tablet 40 mg  40 mg Oral ACB    sevelamer carbonate (RENVELA) tab 800 mg  800 mg Oral TID WITH MEALS    sodium bicarbonate tablet 1,300 mg  1,300 mg Oral BID    sodium zirconium cyclosilicate (LOKELMA) powder packet 5 g  5 g Oral 2 TIMES WEEKLY    [START ON 10/18/2021] ergocalciferol capsule 50,000 Units  50,000 Units Oral every Monday    albuterol (PROVENTIL VENTOLIN) nebulizer solution 2.5 mg  2.5 mg Nebulization Q6H PRN    insulin lispro (HUMALOG) injection   SubCUTAneous AC&HS    glucose chewable tablet 16 g  4 Tablet Oral PRN    dextrose (D50W) injection syrg 12.5-25 g  12.5-25 g IntraVENous PRN    glucagon (GLUCAGEN) injection 1 mg  1 mg IntraMUSCular PRN    pregabalin (LYRICA) capsule 75 mg  75 mg Oral BID    acetaminophen (TYLENOL) tablet 325 mg  325 mg Oral Q6H PRN    HYDROcodone-acetaminophen (NORCO) 7.5-325 mg per tablet 1 Tablet  1 Tablet Oral Q6H PRN    levothyroxine (SYNTHROID) tablet 175 mcg  175 mcg Oral Once per day on Mon Tue Wed Thu Fri Sat    [START ON 10/17/2021] levothyroxine (SYNTHROID) tablet 87.5 mcg  87.5 mcg Oral every Sunday    sodium chloride (NS) flush 5-40 mL  5-40 mL IntraVENous Q8H    sodium chloride (NS) flush 5-40 mL  5-40 mL IntraVENous PRN    polyethylene glycol (MIRALAX) packet 17 g  17 g Oral DAILY PRN    ondansetron (ZOFRAN ODT) tablet 4 mg  4 mg Oral Q8H PRN    Or    ondansetron (ZOFRAN) injection 4 mg  4 mg IntraVENous Q6H PRN    [START ON 10/16/2021] heparin (porcine) injection 5,000 Units  5,000 Units SubCUTAneous Q8H    labetaloL (NORMODYNE;TRANDATE) injection 10 mg  10 mg IntraVENous Q4H PRN          Tayla Capone MD              [facility]  [facility] / [facility] SHOB[facility] Hospitalbrenda SteeleCone Health Moses [facility] Hospitals[facility], [address], [address]  Phone - [phone]               Fax - [phone]

## 2021-10-15 NOTE — PROGRESS NOTES
MRI Pending:    Need completed online Kardex MRI screening form. Sign and fax to [phone]. Call [phone] once faxed.

## 2021-10-15 NOTE — ED TRIAGE NOTES
Patient c/o generalized weakness and SOB. Patient has not been to dialysis since Saturday. Patient hasn't taken his bedtime medications. Patient alert and oriented x4. Patient c/o bilateral leg weakness.

## 2021-10-15 NOTE — ED NOTES
Bedside shift change report given to Dionicio Fu (oncoming nurse) by Lisseth Andersen RN (offgoing nurse). Report included the following information SBAR, ED Summary, MAR and Recent Results.

## 2021-10-15 NOTE — PROGRESS NOTES
10/15/21:    Rec'd VM from pt's dtr, Angelito Dear, notifying ACM that her father went to the ED last night. He has missed HD all this wk s/t rt hip & rt LE pain. She did not call this ACM to report return of rt hip/rt LE pain and that it had caused an issue. However, she did inform ACM that her father had missed HD one day last wk b/c he had a head cold. At that time Debby Bernard was advised several times to not miss HD appt's as the pt can't afford, medically, to miss any sessions d/t his conditions and Debby Bernard agreed. Per EMR, pt arrived in the ED last night with c/o generalized weakness and SOB. Reporting he has not been to dialysis since Saturday (5d, missing 2 appt's) and hasn't been taking his bedtime medications. It was also noted in the EMR that the pt was seen by [address] [place] [address] on October 7 had x-ray of the lumbar spine showing degenerative changes and was diagnosed with lumbar radiculitis and lumbar degenerative disease. He was prescribed Medrol Dosepak, Lyrica 75 mg twice a day for 14 days, Norco 5/325 every 6 hours as needed (total of 12 tablets prescribed) and referred to outpatient physical therapy. Has follow up on 10/21. He reports compliance with these medications but pain continues to worsen    Currently, pt was admitted this morning at [facility] for hyperkalemia, leukocytosis, end-stage renal disease w/non-compliance, generalized debility w/inability to ambulate. Pt's dtr, Debby Bernard, apprised of this status today via TC and was encouraged to seek out the inpt CM for her father while he is admitted. Valeria verbalized understanding.     ACM will follow-up w/this pt upon d/c.   /dla

## 2021-10-15 NOTE — ED NOTES
TRANSFER - IN REPORT:    Verbal report received from Guinea-Gela (name) on [facility]. Report consisted of patients Situation, Background, Assessment and   Recommendations(SBAR). Information from the following report(s) SBAR and ED Summary was reviewed with the receiving nurse. Opportunity for questions and clarification was provided. Hospitalist at bedside      0830 Ortho surgery consult called, left VM     1990 Per hospitalist will provide pt w/ MRI screening form - pt may require conscious sedation for imaging     0920 Patient is being transferred to [facility], Room # [phone]. Report given to RN on [facility] for routine progression of care. Report consisted of the following information SBAR, Kardex, MAR and Recent Results. Patient transferred to receiving unit by: transport    Outstanding consults needed: nephrology     Next labs due: 0400 am and lactic pending result     The following personal items will be sent with the patient during transfer to the floor:     All valuables:    Cardiac monitoring ordered: Yes    The following CURRENT information was reported to the receiving RN:    Code status: Full Code at time of transfer    Last set of vital signs:  Vital Signs  Level of Consciousness: Responds to Voice (1) (10/15/21 0248)  Temp: 97.9 °F (36.6 °C) (10/15/21 0700)  Temp Source: Oral (10/15/21 0700)  Pulse (Heart Rate): 84 (10/15/21 0900)  Heart Rate Source: Monitor (10/15/21 0248)  Resp Rate: 16 (10/15/21 0900)  BP: (!) 177/68 (10/15/21 0900)  MAP (Monitor): 97 (10/15/21 0900)  MAP (Calculated): 104 (10/15/21 0900)  BP 1 Location: Right upper arm (10/15/21 0248)  BP 1 Method: Automatic (10/15/21 0248)  BP Patient Position: Supine (10/15/21 0248)  MEWS Score: 1 (10/14/21 2217)    Additional Blood Pressure/Pulse Data  Pulse 2: 66 (10/15/21 0248)  BP 2: 129/77 (10/15/21 0248)  BP 2 Location: Right arm (10/15/21 0248)  BP Method 2: Automatic (10/15/21 0248)  Patient Position 2: Sitting (10/15/21 0248)  Pulse 3: 78 (10/15/21 0248)  BP 3: 107/81 (10/15/21 0248)  BP 3 Location: Right arm (10/15/21 0248)  BP Method 3: Automatic (10/15/21 0248)  Patient Position 3: Standing (10/15/21 0248)    Oxygen Therapy  O2 Sat (%): 98 % (10/15/21 0900)  Pulse via Oximetry: 90 beats per minute (10/15/21 0900)  O2 Device: Nasal cannula (10/15/21 0800)  O2 Flow Rate (L/min): 3 l/min (10/15/21 0800)      Last pain assessment:  Pain 1  Pain Scale 1: Numeric (0 - 10)  Pain Intensity 1: 9  Patient Stated Pain Goal: 0  Pain Reassessment 1: Yes  Pain Location 1: Leg  Pain Orientation 1: Left, Right  Pain Description 1: Aching  Pain Intervention(s) 1: Medication (see MAR), Position, Rest      Wounds: No     Urinary catheter: voiding  Is there a estrada order: No     LDAs:       Peripheral IV 10/15/21 Right Antecubital (Active)   Site Assessment Clean, dry, & intact 10/15/21 0024   Phlebitis Assessment 0 10/15/21 0024   Infiltration Assessment 0 10/15/21 0024   Dressing Status Clean, dry, & intact 10/15/21 0024   Dressing Type Tape;Transparent 10/15/21 0024   Hub Color/Line Status Pink;Patent 10/15/21 0024   Action Taken Blood drawn 10/15/21 0024         Opportunity for questions and clarification was provided.     Carol Loaiza RN

## 2021-10-15 NOTE — PROCEDURES
Hemodialysis / [phone]    Vitals Pre Post Assessment Pre Post   BP BP: (!) 155/68 (10/15/21 1215) 153/62 LOC A & O x 3 No change   HR Pulse (Heart Rate): 69 (10/15/21 1215) 65 Lungs Coarse rales/wheezing No change   Resp Resp Rate: 18 (10/15/21 1215) 18 Cardiac S1S2 No change   Temp Temp: 99.4 °F (37.4 °C) (10/15/21 1200) 98.0 oral Skin Warm dry & intact No change   Weight  n/a N/a  Edema Generalized  No change   Tele status yes yes Pain Pain Intensity 1: 9 (10/15/21 0700) No change     Orders   Duration: Start: 1200 End: 1530 Total: 3.5   Dialyzer: Dialyzer/Set Up Inspection: Revaclear (10/15/21 1200)   K Bath: Dialysate K (mEq/L): 3 (10/15/21 1200)   Ca Bath: Dialysate CA (mEq/L): 2.5 (10/15/21 1200)   Na: Dialysate NA (mEq/L): 138 (10/15/21 1200)   Bicarb: Dialysate HCO3 (mEq/L): 35 (10/15/21 1200)   Target Fluid Removal: Goal/Amount of Fluid to Remove (mL): 3000 mL (10/15/21 1200)     Access CVC   Type & Location: Right sub clav pc   Comments:       RN reviewed LPN assessment and completed RN assessment. RN completed patient assessment. RN reviewed technicians vital signs and procedure note. Tx completed.  Reviewed by ESPERANZA White                                 Labs   HBsAg (Antigen) / date:      Ag       Neg 9/16/21                                  HBsAb (Antibody) / date:       Susc 9/16/21   Source: Epic   Obtained/Reviewed  Critical Results Called HGB   Date Value Ref Range Status   10/15/2021 9.8 (L) 12.1 - 17.0 g/dL Final     Potassium   Date Value Ref Range Status   10/15/2021 5.1 3.5 - 5.1 mmol/L Final     Calcium   Date Value Ref Range Status   10/15/2021 9.4 8.5 - 10.1 MG/DL Final     BUN   Date Value Ref Range Status   10/15/2021 106 (H) 6 - 20 MG/DL Final     Creatinine   Date Value Ref Range Status   10/15/2021 5.14 (H) 0.70 - 1.30 MG/DL Final        Meds Given   Name Dose Route   Heparin  3800u  IV               Adequacy / Fluid    Total Liters Process: 75.0   Net Fluid Removed: 3000ml Comments   Time Out Done:   (Time) 1130   Admitting Diagnosis: ESRD   Consent obtained/signed: Informed Consent Verified: Yes (10/15/21 1200)   Machine / RO # Machine Number: [id number] (10/15/21 1200)   Primary Nurse Rpt Pre: Shilpa Colon   Primary Nurse Rpt Post: Shilpa Colon   Pt Education: Access care   Care Plan: Continue HD   Pts outpatient clinic: [facility] Summary RSub clav CVC: Dressing CDI. No s/s of infection. Both lumens aspirate & flush well. Running well at . Hep labs drawn pre HD. SBAR received from Primary RN. Pt arrived to HD suite A&Ox3. Consent signed & on file. 1200: Each catheter limb disinfected per p&p, caps removed, hubs disinfected per p&p. Each lumen aspirated for blood return and flushed with Normal Saline per policy. Labs drawn per request/ order. VSS. Dialysis Tx initiated. 1230: pt. Stable, lines secure  1300: pt. Stable,lines secure and visible  1330: pt. Resting well. Lines secure and visible  1400: pt. Resting well. Lines secure and visible  1430: pt. Stable, lines secure  1500: pt. Resting well. Lines secure    1530: Tx ended. VSS. Each dialysis catheter limb disinfected per p&p, all possible blood returned per p&p, and each dialysis hub disinfected per p&p. Each lumen flushed, post dialysis catheter Heparin dwell instilled per order, and caps applied. Bed locked and in the lowest position, call bell and belongings in reach. SBAR given to Primary, RN. Patient is stable at time of their/ my departure. All Dialysis related medications have been reviewed.      Comments:

## 2021-10-15 NOTE — PROGRESS NOTES
Ashleigh Pearblossom TRANSFER - OUT REPORT:    Verbal report given to Jero(name) on Cherise Bains  being transferred to Dialysis(unit) for routine progression of care       Report consisted of patients Situation, Background, Assessment and   Recommendations(SBAR). Information from the following report(s) SBAR, Intake/Output and MAR was reviewed with the receiving nurse. Lines:   Peripheral IV 10/15/21 Right Antecubital (Active)   Site Assessment Clean, dry, & intact 10/15/21 0024   Phlebitis Assessment 0 10/15/21 0024   Infiltration Assessment 0 10/15/21 0024   Dressing Status Clean, dry, & intact 10/15/21 0024   Dressing Type Tape;Transparent 10/15/21 0024   Hub Color/Line Status Pink;Patent 10/15/21 0024   Action Taken Blood drawn 10/15/21 0024        Opportunity for questions and clarification was provided.       Patient transported with:   Registered Nurse

## 2021-10-15 NOTE — PROGRESS NOTES
Pt brought down for MRI. Pt refusing MRI at this time want conscious sedation. Will not attempt MRI with anything else. Sedation will not be done until Monday.

## 2021-10-15 NOTE — H&P
Hospitalist Admission Note    NAME: Valentin Greene   :  1960   MRN:  [id number]     Date/Time:  10/15/2021 7:17 AM    Patient PCP: Fany White MD    Please note that this dictation was completed with [employer], the computer voice recognition software. Quite often unanticipated grammatical, syntax, homophones, and other interpretive errors are inadvertently transcribed by the computer software. Please disregard these errors. Please excuse any errors that have escaped final proofreading  ______________________________________________________________________   Assessment & Plan:  Low back pain, POA  Right hip pain foot, POA  Unable to walk due to pain  --consult orthopedic  --increase norco to 7.5mg/325mg q4h; add fentanyl 50mcg q4h prn.  --consult pt/ot  --given leukocytosis WBC 17K, heart murmur, get blood cultures, ESR, CRP, procalcitonin since he has permcath and at risk for bacteremia and potentially osteomyelitis. --check CK. Hold zocor pending lab result  --consider MRI lumbar spine (says he would need conscious sedation due to severe claustrophobia), CT right hip if pain not improved and depending on lab results    ESRD on HD TTS  Missed dialysis x 2 sessions due to pain as noted above  Hyperkalemia 5.3  --last HD 10/9  --nephrology consult for HD. Plan for dialysis today and tomorrow per Dr. Mark Sandoval  --CXR clear, no increased O2 requirement from baseline 3L. However, occasional audible wheezing  --continue sodium bicarb, lokelma, renvela    HTN, BP elevated 160-170s mostly  Volume overload  with flash pulmonary edema. EF 65-70%   --continue coreg, hydralazine, amlodipine, bumex  --reassess if need to increase coreg for BP control after dialysis    DM not on medication  -accucheks and SSI 4 times. Can d/c if BS stable    PUD/GERD  --continue PPI    SHOLA with chronic respiratory failure on 3L NC, not on cpap  --continue 3L O2. He is 98% on 3L.   CXR with stable left lung nodule    Left lung nodule, POA  CT chest 1/2021 showed:  Multiple bilateral pulmonary nodules. Comparison impossible due to thepresence on the prior examination with multiple bilateral acute infiltrate as well as pleural effusions. CXR 10/5/21:  Bilateral pulmonary  nodules, with indolently progressive left lower lobe nodule appearing possibly  enlarged  over the last 13 months and for which CT follow-up is recommended. --given these readings, hx smoking, get non-urgent CT chest with contrast tomorrow AM before dialysis    Hx covid-19 infection 4/21  --vaccinated in April and May  --check covid-19 rapid test given myalgia/arthralgia    Hx thyroid cancer 5 years ago s/p resection  --chronic voice hoarseness since treatment  --continue levothyroxine 175mcg    Chronic anemia hgb stable 9-10    Obesity  Body mass index is 33.53 kg/m². Code: discussed, full code  DVT prophylaxis: heparin SQ  Surrogate decision maker:  Daughter Jody Kumar [phone]          Subjective:   CHIEF COMPLAINT:  Right hip and right leg pain, low back pain; difficulty walking, missed dialysis x 2    HISTORY OF PRESENT ILLNESS:     Kayla Viveros is a 63 y.o. male with PMH end-stage renal disease on dialysis T/TH/SA, TIA, cholesterol, diabetes, hypertension, previous thyroid cancer, CHF, GERD/PUD, COVID-19 in April 2021, who presents via EMS to the ED with c/o weakness and SOB. Patient has had dialysis on October 9, missed his last 2 dialysis session due to pain in his right hip radiating down to the right foot causing difficulty walking. He reports pain started about 4.5 months ago, constant, sharp, stabbing, now 10 out of 10. Pain is worse with walking or turning in bed. No relieving factors. Also has pain in his lower back. Denies fevers, chills, loss of appetite, significant change in weight. He has chronic shortness of breath with exertion has been unchanged. Denies coughing, wheezing.   No abdominal pain, nausea, vomiting. For the past 2 days has diarrhea twice a day. Denies any bleeding. Denies any incontinence of stool or urine. Some dysuria. Denies any trauma to his back or leg. On chart review, he has been seen in the ER for complaints of right leg pain on September 6 and October 5.  9/6 had negative x-ray of the right hip, lumbar spine, right knee and Doppler ultrasound of the right leg; he was prescribed voltaren gel and neurontin. On 10/5, seen in the ER for right leg pain and wheezing. He was discharged on Lyrica 25 mg 3 times daily. He then went to [address] [place] [address] on October 7 had x-ray of the lumbar spine showing degenerative changes and was diagnosed with lumbar radiculitis and lumbar degenerative disease. He was prescribed Medrol Dosepak, Lyrica 75 mg twice a day for 14 days, Norco 5/325 every 6 hours as needed (total of 12 tablets prescribed) and referred to outpatient physical therapy. Has follow up on 10/21. He reports compliance with these medications but pain continues to worsen, unable to get up, walk to go to dialysis. We were asked to admit for work up and evaluation of the above problems.      Past Medical History:   Diagnosis Date    Adverse effect of anesthesia     \" STOP BREATHING 1 TIME C ANESTH\"    Cancer ([address] 2004    thyroid cancer    Chronic kidney disease     [facility] T-TH-S    Chronic pain     BACK SHOULDER AND ARM    COVID-19 04/2021    Depression     Diabetes ([address]     GERD (gastroesophageal reflux disease)     Heart failure (HCC)     stage 1    Hypercholesterolemia     Hypertension     Nausea & vomiting     PUD (peptic ulcer disease)     Sleep apnea     doesn't wear cpap    Thyroid cancer ([address]     TIA (transient ischemic attack) 2011    Vitamin D deficiency       Past Surgical History:   Procedure Laterality Date    HX HEART CATHETERIZATION      HX HEENT      THROAT SURGERY X 4    HX ORTHOPAEDIC      back     HX ORTHOPAEDIC      ARM AND SHOULDER    HX OTHER SURGICAL      thyroid, lymphnode    HX RETINAL DETACHMENT REPAIR      left eye    HX VASCULAR ACCESS      HD cather in chest    IR INSERT NON TUNL CVC OVER 5 YRS  2020    IR INSERT NON TUNL CVC OVER 5 YRS  2021    IR INSERT TUNL CVC W/O PORT OVER 5 YR  2020    IR INSERT TUNL CVC W/O PORT OVER 5 YR  2021    UPPER GI ENDOSCOPY,BALL DIL,30MM  2020         UPPER GI ENDOSCOPY,BIOPSY  2020         VASCULAR SURGERY PROCEDURE UNLIST      cardiac cath NEG. Social History     Tobacco Use    Smoking status: Former Smoker     Quit date: 1994     Years since quittin.1    Smokeless tobacco: Never Used   Substance Use Topics    Alcohol use: Not Currently      Drug use:  denies      Family History   Problem Relation Age of Onset    Diabetes Mother     Elevated Lipids Mother    Jay Butt Bladder Disease Mother     Headache Mother    [de-identified] Migraines Mother     Heart Disease Mother     Hypertension Mother     Stroke Mother     Other Mother         ANEURSYM BRAIN    Bleeding Prob Father     Cancer Father         LEUKEMIA    Diabetes Father     Elevated Lipids Father     Mental Retardation Sister     Psychiatric Disorder Sister     Cancer Brother         LUNGS     Allergies   Allergen Reactions    Anesthetics - Amide Type - Select Amino Amides Shortness of Breath    Flexeril [Cyclobenzaprine] Hives    Tramadol Hives        Prior to Admission medications    Medication Sig Start Date End Date Taking? Authorizing Provider   pregabalin (LYRICA) 25 mg capsule Take 1 Capsule by mouth three (3) times daily. Max Daily Amount: 75 mg. 10/5/21   Isael Moreno MD   hydrALAZINE (APRESOLINE) 50 mg tablet Take 1 tablet by mouth twice daily 21   Mukul Prado MD   gabapentin (NEURONTIN) 100 mg capsule Take 1 Capsule by mouth three (3) times daily. Max Daily Amount: 300 mg.   Patient not taking: Reported on 10/7/2021 9/6/21   Pan Awad MD   diclofenac (Voltaren) 1 % gel Apply  to affected area four (4) times daily. Patient not taking: Reported on 10/7/2021 9/6/21   Babs Menjivar MD   sodium bicarbonate 650 mg tablet TAKE 2 TABLETS BY MOUTH TWICE DAILY 7/17/21   Provider, Historical   bumetanide (BUMEX) 2 mg tablet Take 1 Tablet by mouth daily. 7/28/21   Jamie Dsouza MD   omeprazole (PRILOSEC) 20 mg capsule Take 1 capsule by mouth once daily 6/11/21   Hans Prado MD   carvediloL (COREG) 6.25 mg tablet TAKE 1 TABLET BY MOUTH TWICE DAILY WITH MEALS 5/13/21   Herb Prado MD   acetaminophen (TYLENOL) 325 mg tablet Take 1 Tab by mouth as needed for Pain. Take tab as needed for pain 5/10/21   Flaquita Prado MD   metoclopramide HCl (REGLAN) 5 mg tablet TAKE 1 TABLET BY MOUTH ONCE DAILY AS NEEDED. DO NOT TAKE MORE THAN 3 TABLETS A DAY. 5/10/21   Hans Prado MD   simvastatin (ZOCOR) 20 mg tablet Take 1 Tab by mouth nightly. 5/10/21   Herb Prado MD   amLODIPine (NORVASC) 10 mg tablet Take 1 Tab by mouth daily. 5/10/21   Herb Prado MD   Vitamin D2 1,250 mcg (50,000 unit) capsule TAKE 1 CAPSULE BY MOUTH ONCE A WEEK 5/10/21   Herb Prado MD   levothyroxine (SYNTHROID) 175 mcg tablet 1 full tab Mon-Sat but only 1/2 tab on sundays 4/12/21   Katey Dill MD   sodium zirconium cyclosilicate (Lokelma) 5 gram powder packet Take  by mouth. Provider, Historical   sevelamer carbonate (RENVELA) 800 mg tab tab Take 1 Tab by mouth three (3) times daily (with meals). 1/7/21   Herb Prado MD   Ventolin HFA 90 mcg/actuation inhaler TAKE 1-2 PUFFS EVEERY 4-6 HOURS AS NEEDED FOR DYSPNEA AND WHEEZING 7/28/20   Jennifer Tee MD   Lancets misc Use preferred brand; Check glucose 3-4 times daily, Diagnosis E11.22 2/2/18   Katey Dill MD     REVIEW OF SYSTEMS:  POSITIVE= Bold.   Negative = normal text  General:  fever, chills, sweats, generalized weakness, weight loss/gain, loss of appetite  Eyes:  blurred vision, eye pain, loss of vision, diplopia  Ear Nose and Throat: rhinorrhea, pharyngitis  Respiratory:   cough, sputum production, SOB, wheezing, NUÑEZ, pleuritic pain  Cardiology:  chest pain, palpitations, orthopnea, PND, edema, syncope   Gastrointestinal:  abdominal pain, N/V, dysphagia, diarrhea, constipation, bleeding  Genitourinary:  frequency, urgency, dysuria, hematuria, incontinence  Muskuloskeletal :  arthralgia, myalgia  Hematology:  easy bruising, bleeding, lymphadenopathy  Dermatological:  rash, ulceration, pruritis  Endocrine:  hot flashes or polydipsia  Neurological:  headache, dizziness, confusion, focal weakness, paresthesia, memory loss, gait disturbance  Psychological: anxiety, depression, agitation      Objective:   VITALS:    Visit Vitals  BP (!) 166/64   Pulse 72   Temp 98.5 °F (36.9 °C)   Resp 15   Ht 5' 9\" (1.753 m)   Wt 103 kg (227 lb 1.2 oz)   SpO2 99%   BMI 33.53 kg/m²     Temp (24hrs), Av.5 °F (36.9 °C), Min:98.5 °F (36.9 °C), Max:98.5 °F (36.9 °C)    Body mass index is 33.53 kg/m². PHYSICAL EXAM:    General:    Alert, obese male, hoarse squeaky voice, laying flat in bed, cooperative, appears stated age. HEENT: Atraumatic, anicteric sclerae, pink conjunctivae     No oral ulcers, mucosa dry, throat clear. Hearing intact. Neck:  Supple, symmetrical,  thyroid: non tender  Lungs:   Clear to auscultation bilaterally. No Wheezing on lung exam but has periods of audible wheezing when he is talking. No Rhonchi. No rales. Chest wall:  Permcath in right upper chest.  No tenderness  No Accessory muscle use. Heart:   Regular  rhythm,  2/6 systolic  murmur  In LUSB,  No gallop. No edema. Abdomen:   Soft, non-tender. Not distended. Bowel sounds normal. No masses  Extremities: No cyanosis. No clubbing. No swelling on redness on right hip, knee, ankle. Tenderness to palpation in lumbar spine L2-L3. Surgical scar in low lumbar spine well healed. Skin:     Not pale Not Jaundiced  No rashes   Psych:  Good insight. Not depressed.   Not anxious or agitated. Neurologic: EOMs intact. No facial asymmetry. No aphasia or slurred speech. Symmetrical strength in arms 5/5, left leg 4/5, right leg 3/5, Alert and oriented X 3. Peripheral pulse: Bilateral, DP, 2+    IMAGING RESULTS:   []       I have personally reviewed the actual   []     CXR  []     CT scan  CXR:  CT :  EKG:   ________________________________________________________________________  Care Plan discussed with:    Comments   Patient y    [facility]:      ________________________________________________________________________  Prophylaxis:  GI PPI   DVT heparin   ________________________________________________________________________  Recommended Disposition:   Home with Family y   HH/PT/OT/RN y   SNF/LTC    [facility]    ________________________________________________________________________  Code Status:  Full Code y   DNR/DNI    ________________________________________________________________________  TOTAL TIME:  60 minutes      Comments    y Reviewed previous records   >50% of visit spent in counseling and coordination of care  Discussion with patient and/or family and questions answered         ______________________________________________________________________  Angelica Bain MD      Procedures: see electronic medical records for all procedures/Xrays and details which were not copied into this note but were reviewed prior to creation of Plan.     LAB DATA REVIEWED:    Recent Results (from the past 24 hour(s))   EKG, 12 LEAD, INITIAL    Collection Time: 10/15/21 12:01 AM   Result Value Ref Range    Ventricular Rate 74 BPM    Atrial Rate 74 BPM    P-R Interval 164 ms    QRS Duration 92 ms    Q-T Interval 386 ms    QTC Calculation (Bezet) 428 ms    Calculated P Axis 31 degrees    Calculated R Axis -20 degrees    Calculated T Axis 37 degrees    Diagnosis       Normal sinus rhythm  Normal ECG  When compared with ECG of 05-OCT-2021 11:37,  No significant change was found     CBC WITH AUTOMATED DIFF    Collection Time: 10/15/21 12:17 AM   Result Value Ref Range    WBC 17.2 (H) 4.1 - 11.1 K/uL    RBC 3.24 (L) 4.10 - 5.70 M/uL    HGB 9.8 (L) 12.1 - 17.0 g/dL    HCT 30.4 (L) 36.6 - 50.3 %    MCV 93.8 80.0 - 99.0 FL    MCH 30.2 26.0 - 34.0 PG    MCHC 32.2 30.0 - 36.5 g/dL    RDW 15.3 (H) 11.5 - 14.5 %    PLATELET 714 811 - 791 K/uL    MPV 9.7 8.9 - 12.9 FL    NRBC 0.0 0  WBC    ABSOLUTE NRBC 0.00 0.00 - 0.01 K/uL    NEUTROPHILS 73 32 - 75 %    LYMPHOCYTES 6 (L) 12 - 49 %    MONOCYTES 7 5 - 13 %    EOSINOPHILS 12 (H) 0 - 7 %    BASOPHILS 1 0 - 1 %    IMMATURE GRANULOCYTES 1 (H) 0.0 - 0.5 %    ABS. NEUTROPHILS 12.5 (H) 1.8 - 8.0 K/UL    ABS. LYMPHOCYTES 1.0 0.8 - 3.5 K/UL    ABS. MONOCYTES 1.2 (H) 0.0 - 1.0 K/UL    ABS. EOSINOPHILS 2.1 (H) 0.0 - 0.4 K/UL    ABS. BASOPHILS 0.2 (H) 0.0 - 0.1 K/UL    ABS. IMM. GRANS. 0.2 (H) 0.00 - 0.04 K/UL    DF SMEAR SCANNED      RBC COMMENTS ANISOCYTOSIS  1+        RBC COMMENTS HYPOCHROMIA  1+       METABOLIC PANEL, COMPREHENSIVE    Collection Time: 10/15/21 12:17 AM   Result Value Ref Range    Sodium 136 136 - 145 mmol/L    Potassium 5.3 (H) 3.5 - 5.1 mmol/L    Chloride 106 97 - 108 mmol/L    CO2 27 21 - 32 mmol/L    Anion gap 3 (L) 5 - 15 mmol/L    Glucose 133 (H) 65 - 100 mg/dL     (H) 6 - 20 MG/DL    Creatinine 5.23 (H) 0.70 - 1.30 MG/DL    BUN/Creatinine ratio 21 (H) 12 - 20      GFR est AA 14 (L) >60 ml/min/1.73m2    GFR est non-AA 11 (L) >60 ml/min/1.73m2    Calcium 9.7 8.5 - 10.1 MG/DL    Bilirubin, total 0.5 0.2 - 1.0 MG/DL    ALT (SGPT) 30 12 - 78 U/L    AST (SGOT) 8 (L) 15 - 37 U/L    Alk.  phosphatase 141 (H) 45 - 117 U/L    Protein, total 7.6 6.4 - 8.2 g/dL    Albumin 3.1 (L) 3.5 - 5.0 g/dL    Globulin 4.5 (H) 2.0 - 4.0 g/dL    A-G Ratio 0.7 (L) 1.1 - 2.2     URINALYSIS W/ REFLEX CULTURE    Collection Time: 10/15/21 12:17 AM    Specimen: Urine   Result Value Ref Range    Color YELLOW/STRAW Appearance CLOUDY (A) CLEAR      Specific gravity 1.013 1.003 - 1.030      pH (UA) 5.5 5.0 - 8.0      Protein 100 (A) NEG mg/dL    Glucose 100 (A) NEG mg/dL    Ketone Negative NEG mg/dL    Bilirubin Negative NEG      Blood Negative NEG      Urobilinogen 0.2 0.2 - 1.0 EU/dL    Nitrites Negative NEG      Leukocyte Esterase Negative NEG      WBC 0-4 0 - 4 /hpf    RBC 0-5 0 - 5 /hpf    Epithelial cells FEW FEW /lpf    Bacteria Negative NEG /hpf    UA:UC IF INDICATED CULTURE NOT INDICATED BY UA RESULT CNI      Hyaline cast 0-2 0 - 5 /lpf   TROPONIN-HIGH SENSITIVITY    Collection Time: 10/15/21 12:17 AM   Result Value Ref Range    Troponin-High Sensitivity 15 0 - 76 ng/L   TROPONIN-HIGH SENSITIVITY    Collection Time: 10/15/21  5:29 AM   Result Value Ref Range    Troponin-High Sensitivity 16 0 - 76 ng/L   METABOLIC PANEL, BASIC    Collection Time: 10/15/21  5:29 AM   Result Value Ref Range    Sodium 139 136 - 145 mmol/L    Potassium 5.1 3.5 - 5.1 mmol/L    Chloride 107 97 - 108 mmol/L    CO2 29 21 - 32 mmol/L    Anion gap 3 (L) 5 - 15 mmol/L    Glucose 113 (H) 65 - 100 mg/dL     (H) 6 - 20 MG/DL    Creatinine 5.14 (H) 0.70 - 1.30 MG/DL    BUN/Creatinine ratio 21 (H) 12 - 20      GFR est AA 14 (L) >60 ml/min/1.73m2    GFR est non-AA 12 (L) >60 ml/min/1.73m2    Calcium 9.4 8.5 - 10.1 MG/DL

## 2021-10-15 NOTE — CONSULTS
Ortho:    LBP, Right buttock and RLE radicular pain(right buttock and thigh)  POC lying supine  Pt states he is unable to stand and walk on the RLE due to pain  Denies new LE numbness or paraesthesia  Denies changes in bowl or bladder function    Was seen at [facility] on 10/7, referred to PT, started on PO steroids, lyrcia and norco.  Provided temporary relief.    Pt requested a prescription for electric scooter from PCP on 10/13     Past Medical History:   Diagnosis Date    Adverse effect of anesthesia     \" STOP BREATHING 1 TIME C ANESTH\"    Cancer ([facility] 2004    thyroid cancer    Chronic kidney disease     [facility] T-TH-S    Chronic pain     BACK SHOULDER AND ARM    COVID-19 04/2021    Depression     Diabetes ([facility]     GERD (gastroesophageal reflux disease)     Heart failure (HCC)     stage 1    Hypercholesterolemia     Hypertension     Nausea & vomiting     PUD (peptic ulcer disease)     Sleep apnea     doesn't wear cpap    Thyroid cancer ([facility]     TIA (transient ischemic attack) 2011    Vitamin D deficiency      Recent Results (from the past 24 hour(s))   EKG, 12 LEAD, INITIAL    Collection Time: 10/15/21 12:01 AM   Result Value Ref Range    Ventricular Rate 74 BPM    Atrial Rate 74 BPM    P-R Interval 164 ms    QRS Duration 92 ms    Q-T Interval 386 ms    QTC Calculation (Bezet) 428 ms    Calculated P Axis 31 degrees    Calculated R Axis -20 degrees    Calculated T Axis 37 degrees    Diagnosis       Normal sinus rhythm  Normal ECG  When compared with ECG of 05-OCT-2021 11:37,  No significant change was found  Confirmed by Fabian Acharya ([id number]) on 10/15/2021 8:59:46 AM     CBC WITH AUTOMATED DIFF    Collection Time: 10/15/21 12:17 AM   Result Value Ref Range    WBC 17.2 (H) 4.1 - 11.1 K/uL    RBC 3.24 (L) 4.10 - 5.70 M/uL    HGB 9.8 (L) 12.1 - 17.0 g/dL    HCT 30.4 (L) 36.6 - 50.3 %    MCV 93.8 80.0 - 99.0 FL    MCH 30.2 26.0 - 34.0 PG    MCHC 32.2 30.0 - 36.5 g/dL    RDW 15.3 (H) 11.5 - 14.5 % PLATELET 864 456 - 247 K/uL    MPV 9.7 8.9 - 12.9 FL    NRBC 0.0 0  WBC    ABSOLUTE NRBC 0.00 0.00 - 0.01 K/uL    NEUTROPHILS 73 32 - 75 %    LYMPHOCYTES 6 (L) 12 - 49 %    MONOCYTES 7 5 - 13 %    EOSINOPHILS 12 (H) 0 - 7 %    BASOPHILS 1 0 - 1 %    IMMATURE GRANULOCYTES 1 (H) 0.0 - 0.5 %    ABS. NEUTROPHILS 12.5 (H) 1.8 - 8.0 K/UL    ABS. LYMPHOCYTES 1.0 0.8 - 3.5 K/UL    ABS. MONOCYTES 1.2 (H) 0.0 - 1.0 K/UL    ABS. EOSINOPHILS 2.1 (H) 0.0 - 0.4 K/UL    ABS. BASOPHILS 0.2 (H) 0.0 - 0.1 K/UL    ABS. IMM. GRANS. 0.2 (H) 0.00 - 0.04 K/UL    DF SMEAR SCANNED      RBC COMMENTS ANISOCYTOSIS  1+        RBC COMMENTS HYPOCHROMIA  1+       METABOLIC PANEL, COMPREHENSIVE    Collection Time: 10/15/21 12:17 AM   Result Value Ref Range    Sodium 136 136 - 145 mmol/L    Potassium 5.3 (H) 3.5 - 5.1 mmol/L    Chloride 106 97 - 108 mmol/L    CO2 27 21 - 32 mmol/L    Anion gap 3 (L) 5 - 15 mmol/L    Glucose 133 (H) 65 - 100 mg/dL     (H) 6 - 20 MG/DL    Creatinine 5.23 (H) 0.70 - 1.30 MG/DL    BUN/Creatinine ratio 21 (H) 12 - 20      GFR est AA 14 (L) >60 ml/min/1.73m2    GFR est non-AA 11 (L) >60 ml/min/1.73m2    Calcium 9.7 8.5 - 10.1 MG/DL    Bilirubin, total 0.5 0.2 - 1.0 MG/DL    ALT (SGPT) 30 12 - 78 U/L    AST (SGOT) 8 (L) 15 - 37 U/L    Alk.  phosphatase 141 (H) 45 - 117 U/L    Protein, total 7.6 6.4 - 8.2 g/dL    Albumin 3.1 (L) 3.5 - 5.0 g/dL    Globulin 4.5 (H) 2.0 - 4.0 g/dL    A-G Ratio 0.7 (L) 1.1 - 2.2     URINALYSIS W/ REFLEX CULTURE    Collection Time: 10/15/21 12:17 AM    Specimen: Urine   Result Value Ref Range    Color YELLOW/STRAW      Appearance CLOUDY (A) CLEAR      Specific gravity 1.013 1.003 - 1.030      pH (UA) 5.5 5.0 - 8.0      Protein 100 (A) NEG mg/dL    Glucose 100 (A) NEG mg/dL    Ketone Negative NEG mg/dL    Bilirubin Negative NEG      Blood Negative NEG      Urobilinogen 0.2 0.2 - 1.0 EU/dL    Nitrites Negative NEG      Leukocyte Esterase Negative NEG      WBC 0-4 0 - 4 /hpf    RBC 0-5 0 - 5 /hpf    Epithelial cells FEW FEW /lpf    Bacteria Negative NEG /hpf    UA:UC IF INDICATED CULTURE NOT INDICATED BY UA RESULT CNI      Hyaline cast 0-2 0 - 5 /lpf   TROPONIN-HIGH SENSITIVITY    Collection Time: 10/15/21 12:17 AM   Result Value Ref Range    Troponin-High Sensitivity 15 0 - 76 ng/L   TROPONIN-HIGH SENSITIVITY    Collection Time: 10/15/21  5:29 AM   Result Value Ref Range    Troponin-High Sensitivity 16 0 - 76 ng/L   METABOLIC PANEL, BASIC    Collection Time: 10/15/21  5:29 AM   Result Value Ref Range    Sodium 139 136 - 145 mmol/L    Potassium 5.1 3.5 - 5.1 mmol/L    Chloride 107 97 - 108 mmol/L    CO2 29 21 - 32 mmol/L    Anion gap 3 (L) 5 - 15 mmol/L    Glucose 113 (H) 65 - 100 mg/dL     (H) 6 - 20 MG/DL    Creatinine 5.14 (H) 0.70 - 1.30 MG/DL    BUN/Creatinine ratio 21 (H) 12 - 20      GFR est AA 14 (L) >60 ml/min/1.73m2    GFR est non-AA 12 (L) >60 ml/min/1.73m2    Calcium 9.4 8.5 - 10.1 MG/DL   COVID-19 RAPID TEST    Collection Time: 10/15/21  7:25 AM   Result Value Ref Range    Specimen source Nasopharyngeal      COVID-19 rapid test Not detected NOTD     PROCALCITONIN    Collection Time: 10/15/21  8:14 AM   Result Value Ref Range    Procalcitonin 0.14 ng/mL   LACTIC ACID    Collection Time: 10/15/21  8:14 AM   Result Value Ref Range    Lactic acid 0.7 0.4 - 2.0 MMOL/L   SED RATE (ESR)    Collection Time: 10/15/21  8:14 AM   Result Value Ref Range    Sed rate, automated 56 (H) 0 - 20 mm/hr   C REACTIVE PROTEIN, QT    Collection Time: 10/15/21  8:14 AM   Result Value Ref Range    C-Reactive protein 1.14 (H) 0.00 - 0.60 mg/dL   CK    Collection Time: 10/15/21  8:14 AM   Result Value Ref Range    CK 34 (L) 39 - 308 U/L   GLUCOSE, POC    Collection Time: 10/15/21  8:32 AM   Result Value Ref Range    Glucose (POC) 125 (H) 65 - 117 mg/dL    Performed by Mari Appiah PCT    GLUCOSE, POC    Collection Time: 10/15/21 11:15 AM   Result Value Ref Range    Glucose (POC) 121 (H) 65 - 117 mg/dL Performed by Donna Licona          Obese  NAD  Appears to have poor functional/exercise capacity at baseline  LE NV exam intact  No sign of LE VTE  5/5 MMT of the EHL, Gastroc, Ant tib. 4/5 and pain with quad/hipflexer/hip abd/add  No knee effusion  Minimal discomfort with log roll(DJD noted on xray)    Afebrile  CBC elevated --- due to recent steroids? ??  Pt does note appear septic or acutely ill    MRI l-spine ordered-  Pt states he can tolerate the MRI due to claustrophobia  IV sedation cant be done until Monday  Will try to get scan completed -  Will premedicate with valium    Additional recommendations pending MRI  Plan per Dr Micheal Allen, PA-C

## 2021-10-16 NOTE — PROCEDURES
Hemodialysis / [phone]    Vitals Pre Post Assessment Pre Post   BP BP: (!) 163/68 (10/16/21 0800) 142/57 LOC A & O x 3 No change   HR Pulse (Heart Rate): 75 (10/16/21 0800) 67 Lungs Dim/coarse LL No change   Resp Resp Rate: 18 (10/16/21 0800) 18 Cardiac S1S2 No change   Temp Temp: 98.1 °F (36.7 °C) (10/16/21 0745) 97.4 Skin Warm,dry & intact No change   Weight n/a n/a Edema generalized No change   Tele status yes yes Pain Pain Intensity 1: 0 (10/15/21 2045) No change     Orders   Duration: Start: 0745 End: 1115 Total: 3.5   Dialyzer: Dialyzer/Set Up Inspection: [place] (10/16/21 0745)   K Bath: Dialysate K (mEq/L): 2 (10/16/21 0745)   Ca Bath: Dialysate CA (mEq/L): 2.5 (10/16/21 0745)   Na: Dialysate NA (mEq/L): 138 (10/16/21 0745)   Bicarb: Dialysate HCO3 (mEq/L): 35 (10/16/21 0745)   Target Fluid Removal: Goal/Amount of Fluid to Remove (mL): 3000 mL (10/16/21 0745)     Access :  cvc   Type & Location: Right sub clav pc   Comments:    RN reviewed LPN assessment and completed RN assessment. RN completed patient assessment. RN reviewed technicians vital signs and procedure note. Tx completed.  Reviewed by ESPERANZA Cotter   HBsAg (Antigen) / date:  Neg Ag 9/16/21                                             HBsAb (Antibody) / date: susc Ab 9/16/21   Source:    Obtained/Reviewed  Critical Results Called HGB   Date Value Ref Range Status   10/16/2021 9.5 (L) 12.1 - 17.0 g/dL Final     Potassium   Date Value Ref Range Status   10/16/2021 5.5 (H) 3.5 - 5.1 mmol/L Final     Calcium   Date Value Ref Range Status   10/16/2021 9.1 8.5 - 10.1 MG/DL Final     BUN   Date Value Ref Range Status   10/16/2021 51 (H) 6 - 20 MG/DL Final     Comment:     INVESTIGATED PER DELTA CHECK PROTOCOL     Creatinine   Date Value Ref Range Status   10/16/2021 3.30 (H) 0.70 - 1.30 MG/DL Final     Comment:     INVESTIGATED PER DELTA CHECK PROTOCOL        Meds Given   Name Dose Route   Heparin  3800U IV               Adequacy / Fluid    Total Liters Process: 88.0   Net Fluid Removed: 76.7      Comments  RN reviewed LPN assessment and completed RN assessment. RN completed patient assessment. RN reviewed technicians vital signs and procedure note. Tx completed. Reviewed by ESPERANZA Fajardo   Time Out Done:   (Time) 0715   Admitting Diagnosis: ESRD   Consent obtained/signed: Informed Consent Verified: Yes (10/16/21 0745)   Machine / RO # Machine Number: [id number] (10/16/21 0745)   Primary Nurse Rpt Pre: Marimar Lopez   Primary Nurse Rpt Post: Marimar Lopez   Pt Education: Access care/ compliance HD appt   Care Plan: Continue HD   Pts outpatient [facility]: [facility]     Tx Summary RIJ CVC: Dressing CDI. No s/s of infection. Both lumens aspirate & flush well. Running well at . SBAR received from Primary RN. Pt arrived to HD suite A&Ox3. Consent signed & on file. 0745: Each catheter limb disinfected per p&p, caps removed, hubs disinfected per p&p. Each lumen aspirated for blood return and flushed with Normal Saline per policy. Labs drawn per request/ order. VSS. Dialysis Tx initiated. 0845: Pt. Stable, lines secure  0915; pt. Resting well. 0945: pt. Hina. Hd well. Lines secure  1015: pt. Stable, lines secure  1045: pt. Stable,lines secure  1100: c/o cramping. Decreased uf goal.  1115: Tx ended. VSS. Each dialysis catheter limb disinfected per p&p, all possible blood returned per p&p, and each dialysis hub disinfected per p&p. Each lumen flushed, post dialysis catheter Heparin dwell instilled per order, and caps applied. Bed locked and in the lowest position, call bell and belongings in reach. SBAR given to Primary, RN. Patient is stable at time of their/ my departure. All Dialysis related medications have been reviewed.      Comments:

## 2021-10-16 NOTE — PROGRESS NOTES
Attempted to see pt for OT services. Pt is currently off the floor for dialysis. Will defer but continue to follow.

## 2021-10-16 NOTE — PROGRESS NOTES
End of Shift Note    Bedside shift change report given to Bredna (oncoming nurse) by Beatriz Skaggs (offgoing nurse). Report included the following information SBAR, Kardex and MAR    Shift worked:  Patient refused MRI on 10/15/21. NP was notified with respect to establishing a diet order. Order was received. Patient stated that, Estanislao March will only have the MRI done if he is sedated. \"  Humalog was held due to the patient's blood glucose level, see MAR. Scheduled medications were given, see MAR. IV has been flushed and is patent. Patient does uses the urinal at bedside. Patient teaching and routine rounding has been done. Shift summary and any significant changes:     Patient stated that, \"for the past two days his left hand has been experiencing spontaneous tremors. \"     Concerns for physician to address:       Zone phone for oncoming shift:          Activity:  Activity Level: Up with Assistance  Number times ambulated in hallways past shift: 0  Number of times OOB to chair past shift: 0    Cardiac:   Cardiac Monitoring: No           Access:   Current line(s): PIV     Genitourinary:   Urinary status: voiding    Respiratory:   O2 Device: Nasal cannula  Chronic home O2 use?: YES  Incentive spirometer at bedside: NO     GI:  Last Bowel Movement Date: 10/15/21  Current diet:  ADULT DIET Regular; 4 carb choices (60 gm/meal); No Concentrated Sweets  Passing flatus: YES  Tolerating current diet: YES       Pain Management:   Patient states pain is manageable on current regimen: YES    Skin:  Ge Score: 17  Interventions: increase time out of bed    Patient Safety:  Fall Score:  Total Score: 3  Interventions: bed/chair alarm, gripper socks and pt to call before getting OOB  High Fall Risk: Yes    Length of Stay:  Expected LOS: 4d 7h  Actual LOS: 1      Karn Odor

## 2021-10-16 NOTE — PROGRESS NOTES
Bedside shift change report given to Jorge Mitchell (oncoming nurse) by james (offgoing nurse). Report included the following information SBAR, Kardex, ED Summary, Intake/Output, MAR and Recent Results. Linen changed. daughter at bedside, education provided. Patient refusing mri without sedation.

## 2021-10-16 NOTE — PROGRESS NOTES
Hospitalist Progress Note    NAME: Flower Davenport   :  1960   MRN:  [id number]       Assessment / Plan:  Low back pain, POA  Right hip pain foot, POA  Unable to walk due to pain  --increase norco to 7.5mg/325mg q4h; add fentanyl 50mcg q4h prn.  --consult pt/ot  --given leukocytosis WBC 16K, heart murmur, get blood cultures, ESR, CRP, procalcitonin since he has permcath and at risk for bacteremia and potentially osteomyelitis. Check ECHO. Bcx NGTD  --CK 71  --MRI lumbar spine (says he would need conscious sedation due to severe claustrophobia), CT right hip if pain not improved and depending on lab results  -Ortho following     ESRD on HD TTS  Missed dialysis x 2 sessions due to pain as noted above  Hyperkalemia 5.3  --last HD 10/9  --nephrology consult for HD. C/w HD  --CXR clear, no increased O2 requirement from baseline 3L. However, occasional audible wheezing  --continue sodium bicarb, lokelma, renvela     HTN, BP elevated 160-170s mostly  Volume overload  with flash pulmonary edema. EF 65-70%   --continue coreg, hydralazine, amlodipine, bumex     DM not on medication  -accucheks and SSI 4 times. Can d/c if BS stable     PUD/GERD  --continue PPI     SHOLA with chronic respiratory failure on 3L NC, not on cpap  --continue 3L O2. He is 98% on 3L. CXR with stable left lung nodule     Left lung nodule, POA  CT chest 2021 showed:  Multiple bilateral pulmonary nodules. Comparison impossible due to thepresence on the prior examination with multiple bilateral acute infiltrate as well as pleural effusions. CXR 10/5/21:  Bilateral pulmonary  nodules, with indolently progressive left lower lobe nodule appearing possibly  enlarged  over the last 13 months and for which CT follow-up is recommended.   --given these readings, hx smoking, get non-urgent CT chest with contrast tomorrow before dialysis     Hx covid-19 infection   --vaccinated in April and May  --Covid negative     Hx thyroid cancer 5 years ago s/p resection  --chronic voice hoarseness since treatment  --continue levothyroxine 175mcg     Chronic anemia hgb stable 9-10  Monitor    CVA/TIA  LUE weakness  Symptoms started 4 PM yesterday, out of window  Less likely a cervical issue as patient states that this occurred suddenly  Check a stat CT scan of the head  Patient refuses MRI, he states that he will need to be put to sleep I explained that this not possible at this time  Check carotid Dopplers  Check lipid panel  Check hemoglobin A1c, though patient is on dialysis  Consult neurology  Aspirin and high intensity statin  Check ECHO  PT/OT    Obesity  Body mass index is 33.53 kg/m².     Code: discussed, full code  DVT prophylaxis: heparin SQ  Surrogate decision maker:  Daughter Jody Kumar [phone]      Estimated discharge date: October 19  Barriers: MRI, CVA work up       Subjective:     Chief Complaint / Reason for Physician Visit  Patient seen and examined at dialysis. Patient states that his left upper extremity is very weak and he cannot lift it like he usually can. He states that this started around 4 PM yesterday. He states that he told the nurse and the doctors about it yesterday. He also has some numbness and tingling in the left arm. He also continues to have some right hip pain. .  Discussed with RN events overnight. Review of Systems:  Symptom Y/N Comments  Symptom Y/N Comments   Fever/Chills    Chest Pain     Poor Appetite    Edema     Cough    Abdominal Pain     Sputum    Joint Pain     SOB/NUÑEZ    Pruritis/Rash     Nausea/vomit    Tolerating PT/OT     Diarrhea    Tolerating Diet     Constipation    Other       Could NOT obtain due to:      Objective:     VITALS:   Last 24hrs VS reviewed since prior progress note.  Most recent are:  Patient Vitals for the past 24 hrs:   Temp Pulse Resp BP SpO2   10/16/21 1115 98.4 °F (36.9 °C) 62 18 (!) 106/55 --   10/16/21 1100 -- 67 18 (!) 120/55 --   10/16/21 1045 -- 64 18 137/64 -- 10/16/21 1030 -- 64 18 (!) 144/66 --   10/16/21 1015 -- 63 18 (!) 140/66 --   10/16/21 1006 -- 66 18 138/64 --   10/16/21 0945 -- 67 18 (!) 166/72 --   10/16/21 0930 -- 66 16 (!) 158/68 --   10/16/21 0915 -- 66 16 (!) 153/64 --   10/16/21 0900 -- 64 18 (!) 146/65 --   10/16/21 0830 -- 66 18 126/70 --   10/16/21 0815 -- 67 18 (!) 162/67 --   10/16/21 0800 -- 75 18 (!) 163/68 --   10/16/21 0745 98.1 °F (36.7 °C) 75 18 (!) 165/63 94 %   10/15/21 2359 (!) 96 °F (35.6 °C) 76 20 (!) 150/54 99 %   10/15/21 1945 97.8 °F (36.6 °C) 72 20 (!) 159/59 100 %   10/15/21 1657 97.7 °F (36.5 °C) 66 18 (!) 157/61 --   10/15/21 1530 98 °F (36.7 °C) 65 20 (!) 153/62 --   10/15/21 1515 -- 69 18 (!) 155/69 --   10/15/21 1500 -- 70 16 (!) 135/55 --   10/15/21 1445 -- 69 16 (!) 126/55 --   10/15/21 1430 -- 66 18 (!) 136/53 --   10/15/21 1415 -- 76 18 (!) 152/64 --   10/15/21 1400 -- 65 18 (!) 144/62 --   10/15/21 1345 -- 74 18 (!) 174/78 --   10/15/21 1330 -- 68 18 (!) 154/69 --   10/15/21 1315 -- 68 20 (!) 155/67 --   10/15/21 1300 -- 69 20 (!) 149/69 --   10/15/21 1245 -- 70 20 (!) 144/60 --   10/15/21 1230 -- 67 20 (!) 148/63 --       Intake/Output Summary (Last 24 hours) at 10/16/2021 1221  Last data filed at 10/16/2021 1115  Gross per 24 hour   Intake --   Output 5830 ml   Net -5830 ml        I had a face to face encounter and independently examined this patient on 10/16/2021, as outlined below:  PHYSICAL EXAM:  General: WD, WN. Alert, cooperative, no acute distress    EENT:  EOMI. Anicteric sclerae. MMM, edentulous   Resp:  CTA bilaterally, no wheezing or rales. No accessory muscle use  CV:  Regular  rhythm,  No edema  GI:  Soft, Non distended, Non tender. +Bowel sounds  Neurologic:  Alert and oriented X 3, normal speech, Raspy voice. LUE 3/5 strength. No facial droop  Psych:   Good insight. Not anxious nor agitated  Skin:  No rashes.   No jaundice, old tattoos     Reviewed most current lab test results and cultures YES  Reviewed most current radiology test results   YES  Review and summation of old records today    NO  Reviewed patient's current orders and MAR    YES  PMH/SH reviewed - no change compared to H&P  ________________________________________________________________________  Care Plan discussed with:    Comments   Patient     Family      RN     Care Manager     Consultant                        Multidiciplinary team rounds were held today with , nursing, pharmacist and clinical coordinator. Patient's plan of care was discussed; medications were reviewed and discharge planning was addressed. ________________________________________________________________________  Total NON critical care TIME:  34   Minutes    Total CRITICAL CARE TIME Spent:   Minutes non procedure based      Comments   >50% of visit spent in counseling and coordination of care     ________________________________________________________________________  Kayleen Cote MD     Procedures: see electronic medical records for all procedures/Xrays and details which were not copied into this note but were reviewed prior to creation of Plan. LABS:  I reviewed today's most current labs and imaging studies.   Pertinent labs include:  Recent Labs     10/16/21  0514 10/15/21  0017   WBC 16.0* 17.2*   HGB 9.5* 9.8*   HCT 31.2* 30.4*    290     Recent Labs     10/16/21  0514 10/15/21  0529 10/15/21  0017    139 136   K 5.5* 5.1 5.3*    107 106   CO2 29 29 27   * 113* 133*   BUN 51* 106* 111*   CREA 3.30* 5.14* 5.23*   CA 9.1 9.4 9.7   MG 2.3  --   --    ALB  --   --  3.1*   TBILI  --   --  0.5   ALT  --   --  30       Signed: Kayleen Cote MD

## 2021-10-16 NOTE — PROGRESS NOTES
Problem: Falls - Risk of  Goal: *Absence of Falls  Description: Document Nicholson Flow Fall Risk and appropriate interventions in the flowsheet.   Outcome: Progressing Towards Goal  Note: Fall Risk Interventions:  Mobility Interventions: Bed/chair exit alarm         Medication Interventions: Bed/chair exit alarm    Elimination Interventions: Bed/chair exit alarm    History of Falls Interventions: Door open when patient unattended

## 2021-10-16 NOTE — PROGRESS NOTES
Received notification from bedside RN about patient with regards to: patient refuses MRI unless sedated, will not consider anything less, will probably be done Monday.   Requesting diet as no planned procedure till MRI is resulted  VS: /59, HR 72, RR 20, O2 sat 100% on NC 3 L    Intervention given: Adult diet ordered

## 2021-10-16 NOTE — PROGRESS NOTES
TRANSFER - OUT REPORT:    Verbal report given to [facility] on Valentin Feast  being transferred to Oncology(unit) for ordered procedure       Report consisted of patients Situation, Background, Assessment and   Recommendations(SBAR). Information from the following report(s) Kardex was reviewed with the receiving nurse. Opportunity for questions and clarification was provided.       Patient transported with:   [employer]

## 2021-10-16 NOTE — PROGRESS NOTES
Ortho:    Pt refused to attempt MRI with IV valum prior to scan  Afebrile  Pt not septic or unstable    Additional recommendations pending MRI L-Spine     Rayo Davenport PA-C

## 2021-10-16 NOTE — PROGRESS NOTES
TRANSFER - IN REPORT:    Verbal report received from [facility] on Marifer Hilt  being received from Oncology(unit) for ordered procedure      Report consisted of patients Situation, Background, Assessment and   Recommendations(SBAR). Information from the following report(s) Kardex was reviewed with the receiving nurse. Opportunity for questions and clarification was provided. Assessment completed upon patients arrival to unit and care assumed.

## 2021-10-16 NOTE — PROGRESS NOTES
Physical Therapy:    Orders received and chart reviewed. Attempted to see pt for PT services. Pt is currently off the floor for dialysis. Will defer but continue to follow.
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
## 2021-10-17 PROBLEM — C79.31 BRAIN METASTASES: Status: ACTIVE | Noted: 2021-01-01

## 2021-10-17 PROBLEM — R56.9 CONVULSIONS (HCC): Status: ACTIVE | Noted: 2021-01-01

## 2021-10-17 PROBLEM — E78.5 HYPERLIPEMIA: Status: ACTIVE | Noted: 2021-01-01

## 2021-10-17 PROBLEM — I65.23 BILATERAL CAROTID ARTERY STENOSIS: Status: ACTIVE | Noted: 2021-01-01

## 2021-10-17 PROBLEM — I67.89 CEREBRAL MICROVASCULAR DISEASE: Status: ACTIVE | Noted: 2021-01-01

## 2021-10-17 PROBLEM — R41.82 ACUTE ALTERATION IN MENTAL STATUS: Status: ACTIVE | Noted: 2021-01-01

## 2021-10-17 PROBLEM — G81.94 LEFT HEMIPLEGIA (HCC): Status: ACTIVE | Noted: 2021-01-01

## 2021-10-17 NOTE — PROGRESS NOTES
Bedside and Verbal shift change report given to Sujey RN (oncoming nurse) by Aftab Ford RN (offgoing nurse). Report included the following information SBAR, Kardex, Procedure Summary, Intake/Output, MAR and Recent Results. Incontinent of urine X1 and pain medication given when requested. Patient rested well this night sleeping at long intervals.

## 2021-10-17 NOTE — CONSULTS
Consult  REFERRED BY:  Avila Garcia MD    CHIEF COMPLAINT: Left-sided weakness      Subjective:     Genevieve Andersen is a 63 y.o. right-handed  male who developed left-sided weakness 2 days ago, and an MRI scan was attempted but he could not tolerate the MRI scan without conscious sedation and refused IV Ativan, and today he said he will try it but the MRI tech said he absolutely refused it when he got down there. The patient is being evaluated for possible cancer, and has multiple pulmonary nodules even though is not a smoker, and has severe back pain and right hip pain and is on for an MRI of the lumbar spine to rule out spinal mets also. He has end-stage renal disease and is on hemodialysis, and if this a diabetic not on medications. He also has obstructive sleep apnea, essential hypertension, history of previous Covid infection April 21 and history of thyroid cancer 5 years ago status post resection and chronic anemia obesity and possibly some mild COPD. Patient does not complain of headache, and had noticed the weakness before. He does not notice any right-sided weakness. He was placed on Decadron yesterday and a CT scan came back showing a probable metastases to the brain in the right frontal region, and he said his strength is somewhat better on the left side. We talked the MRI may plan to arrange dialysis after his MRI with contrast tomorrow so we can get the brain in the lumbar spine with contrast tomorrow. He will also be done with conscious sedation then also. In the meantime this is never had any seizure type problem we will not load with anticonvulsants yet. We will get an EEG just to make sure tomorrow and await the MRI scan. Continue the Decadron for now continue his tumor work-up. Patient agrees with plans as above.     Past Medical History:   Diagnosis Date    Adverse effect of anesthesia     \" STOP BREATHING 1 TIME C ANESTH\"    Cancer ([address] 2004    thyroid cancer    Chronic kidney disease     [facility]    Chronic pain     BACK SHOULDER AND ARM    COVID-19 2021    Depression     Diabetes (HCC)     GERD (gastroesophageal reflux disease)     Heart failure (HCC)     stage 1    Hypercholesterolemia     Hypertension     Nausea & vomiting     PUD (peptic ulcer disease)     Sleep apnea     doesn't wear cpap    Thyroid cancer ([address]     TIA (transient ischemic attack)     Vitamin D deficiency       Past Surgical History:   Procedure Laterality Date    HX HEART CATHETERIZATION      HX HEENT      THROAT SURGERY X 4    HX ORTHOPAEDIC      back     HX ORTHOPAEDIC      ARM AND SHOULDER    HX OTHER SURGICAL      thyroid, lymphnode    HX RETINAL DETACHMENT REPAIR      left eye    HX VASCULAR ACCESS      HD cather in chest    IR INSERT NON TUNL CVC OVER 5 YRS  2020    IR INSERT NON TUNL CVC OVER 5 YRS  2021    IR INSERT TUNL CVC W/O PORT OVER 5 YR  2020    IR INSERT TUNL CVC W/O PORT OVER 5 YR  2021    UPPER GI ENDOSCOPY,BALL DIL,30MM  2020         UPPER GI ENDOSCOPY,BIOPSY  2020         VASCULAR SURGERY PROCEDURE UNLIST      cardiac cath NEG.      Family History   Problem Relation Age of Onset    Diabetes Mother     Elevated Lipids Mother    Columba Mckinnon Bladder Disease Mother     Headache Mother     Migraines Mother     Heart Disease Mother     Hypertension Mother     Stroke Mother     Other Mother         ANEURSYM BRAIN    Bleeding Prob Father     Cancer Father         LEUKEMIA    Diabetes Father     Elevated Lipids Father     Mental Retardation Sister     Psychiatric Disorder Sister     Cancer Brother         LUNGS      Social History     Tobacco Use    Smoking status: Former Smoker     Quit date: 1994     Years since quittin.1    Smokeless tobacco: Never Used   Substance Use Topics    Alcohol use: Not Currently         Current Facility-Administered Medications:     heparin (porcine) 1,000 unit/mL injection 3,800 Units, 3,800 Units, Hemodialysis, DIALYSIS PRN, Ventura Can MD    atorvastatin (LIPITOR) tablet 40 mg, 40 mg, Oral, QHS, Jeffrey Mar MD, 40 mg at 10/16/21 2123    aspirin chewable tablet 81 mg, 81 mg, Oral, DAILY, Jeffrey Mar MD, 81 mg at 10/17/21 0935    dexAMETHasone (DECADRON) tablet 4 mg, 4 mg, Oral, Q6H, Priya Mendes MD, 4 mg at 10/17/21 0525    fentaNYL citrate (PF) injection 50 mcg, 50 mcg, IntraVENous, Q4H PRN, Ania Bridges MD    naloxone [facility]) injection 0.4 mg, 0.4 mg, IntraVENous, PRN, Anai Bridges MD    amLODIPine (NORVASC) tablet 10 mg, 10 mg, Oral, DAILY, Ania Bridges MD, 10 mg at 10/17/21 4322    bumetanide (BUMEX) tablet 2 mg, 2 mg, Oral, DAILY, Ania Bridges MD, 2 mg at 10/17/21 0935    carvediloL (COREG) tablet 6.25 mg, 6.25 mg, Oral, BID WITH MEALS, Ania Bridges MD, 6.25 mg at 10/17/21 0935    hydrALAZINE (APRESOLINE) tablet 50 mg, 50 mg, Oral, BID, Ania Bridges MD, 50 mg at 10/17/21 0367    metoclopramide HCl (REGLAN) tablet 5 mg, 5 mg, Oral, TID PRN, Ania Bridges MD    pantoprazole (PROTONIX) tablet 40 mg, 40 mg, Oral, ACB, Ania Bridges MD, 40 mg at 10/17/21 0936    sevelamer carbonate (RENVELA) tab 800 mg, 800 mg, Oral, TID WITH MEALS, Ania Bridgse MD, 800 mg at 10/17/21 0935    sodium bicarbonate tablet 1,300 mg, 1,300 mg, Oral, BID, Ania Bridges MD, 1,300 mg at 10/17/21 0936    sodium zirconium cyclosilicate (LOKELMA) powder packet 5 g, 5 g, Oral, 2 TIMES WEEKLY, Ania Bridges MD    [START ON 10/18/2021] ergocalciferol capsule 50,000 Units, 50,000 Units, Oral, every Monday, Ania Bridges MD    albuterol (PROVENTIL VENTOLIN) nebulizer solution 2.5 mg, 2.5 mg, Nebulization, Q6H PRN, Ania Bridges MD    insulin lispro (HUMALOG) injection, , SubCUTAneous, AC&HS, Ania Bridges MD, 2 Units at 10/17/21 0935    glucose chewable tablet 16 g, 4 Tablet, Oral, PRN, Ania Bridges MD    dextrose (D50W) injection syrg 12.5-25 g, 12.5-25 g, IntraVENous, PRN, Curly Friedman MD    glucagon [facility]) injection 1 mg, 1 mg, IntraMUSCular, PRN, Curly Friedman MD    pregabalin (LYRICA) capsule 75 mg, 75 mg, Oral, BID, Curly Friedman MD, 75 mg at 10/17/21 9123    acetaminophen (TYLENOL) tablet 325 mg, 325 mg, Oral, Q6H PRN, Curly Friedman MD    HYDROcodone-acetaminophen [facility]) 7.5-325 mg per tablet 1 Tablet, 1 Tablet, Oral, Q6H PRN, Curly Friedman MD, 1 Tablet at 10/17/21 1751    levothyroxine (SYNTHROID) tablet 175 mcg, 175 mcg, Oral, Once per day on Mon Tue Wed Thu Fri Sat, Curly Friedman MD, 175 mcg at 10/16/21 0608    levothyroxine (SYNTHROID) tablet 87.5 mcg, 87.5 mcg, Oral, every Sunday, Curly Friedman MD, 87.5 mcg at 10/17/21 0526    sodium chloride (NS) flush 5-40 mL, 5-40 mL, IntraVENous, Q8H, Curly Friedman MD, 10 mL at 10/17/21 0532    sodium chloride (NS) flush 5-40 mL, 5-40 mL, IntraVENous, PRN, Curly Friedman MD    polyethylene glycol (MIRALAX) packet 17 g, 17 g, Oral, DAILY PRN, Curly Friedman MD    ondansetron (ZOFRAN ODT) tablet 4 mg, 4 mg, Oral, Q8H PRN **OR** ondansetron (ZOFRAN) injection 4 mg, 4 mg, IntraVENous, Q6H PRN, Curly Friedman MD    heparin (porcine) injection 5,000 Units, 5,000 Units, SubCUTAneous, Q8H, Curly Friedman MD, 5,000 Units at 10/17/21 0526    labetaloL (NORMODYNE;TRANDATE) injection 10 mg, 10 mg, IntraVENous, Q4H PRN, Curly Friedman MD    epoetin martha-epbx (RETACRIT) 12,000 Units combo injection, 12,000 Units, SubCUTAneous, Q MON, WED & Erlin Monson MD, 12,000 Units at 10/15/21 4086        Allergies   Allergen Reactions    Anesthetics - Amide Type - Select Amino Amides Shortness of Breath    Flexeril [Cyclobenzaprine] Hives    Tramadol Hives      MRI Results (most recent):  No results found for this or any previous visit. No results found for this or any previous visit.     Review of Systems:  A comprehensive review of systems was negative except for: Constitutional: positive for fatigue and malaise  Respiratory: positive for pleurisy/chest pain, wheezing, dyspnea on exertion, emphysema or chronic bronchitis  Musculoskeletal: positive for myalgias, arthralgias, stiff joints, back pain, muscle weakness and bone pain  Neurological: positive for paresthesia, coordination problems, gait problems and weakness  Behvioral/Psych: positive for anxiety and depression   Vitals:    10/16/21 1545 10/16/21 2008 10/16/21 2353 10/17/21 0736   BP: (!) 157/60 (!) 140/53 (!) 124/45 (!) 144/58   Pulse: 73 73 72 67   Resp: 18 20 18 18   Temp: 98.1 °F (36.7 °C) 98.3 °F (36.8 °C) 98.7 °F (37.1 °C) 97.7 °F (36.5 °C)   TempSrc:       SpO2: 98% 97% 97% 97%   Weight:       Height:         Objective:     I      NEUROLOGICAL EXAM:    Appearance: The patient is well developed, well nourished, provides a coherent history and is in no acute distress. Mental Status: Oriented to time, place and person, and the president, cognitive function is slow but probably normal and speech is fluent and no aphasia but has mild dysarthria. Mood and affect appropriate. Cranial Nerves:   Intact visual fields. Fundi are benign, disc are flat, no lesions seen on funduscopy. SCOOBY, EOM's full, no nystagmus, no ptosis. Facial sensation is normal. Corneal reflexes are not tested. Facial movement is showing central facial type weakness on the left. Hearing is abnormal bilaterally. Palate is midline with normal sternocleidomastoid and trapezius muscles are normal. Tongue is midline. Neck without meningismus or bruits  Temporal arteries are not tender or enlarged  TMJ areas are not tender on palpation   Motor:  4/5 strength in upper and lower proximal and distal muscles on the right, and about 3/5 on the left with arm drift on the left. Normal bulk and tone. No fasciculations.   Rapid alternating movement is slightly decreased on the left and normal on the right   Reflexes:   Deep tendon reflexes 1+/4 and symmetrical.  No babinski or clonus present   Sensory:   Abnormal to touch, pinprick and vibration and temperature in both feet to ankle level. DSS is intact   Gait:  Not tested   Tremor:   No tremor noted. Cerebellar:  Not tested abnormal cerebellar signs present on Romberg and tandem testing and finger-nose-finger exam.   Neurovascular:  Normal heart sounds and regular rhythm, peripheral pulses decreased, and no carotid bruits. Assessment:       ICD-10-CM ICD-9-CM    1. End stage renal disease on dialysis (HCC)  N18.6 585.6     Z99.2 V45.11    2. Acute hyperkalemia  E87.5 276.7    3. Physical debility  R53.81 799.3    4. Inability to ambulate due to left hip  R26.2 719.7      Active Problems:    Hyperkalemia (10/15/2021)      Leukocytosis (10/15/2021)      Difficulty walking (10/15/2021)      Right leg pain (10/15/2021)      ESRD (end stage renal disease) on dialysis ([facility] (10/15/2021)      Brain metastases ([address] (10/17/2021)      Left hemiplegia ([address] (10/17/2021)      Bilateral carotid artery stenosis (10/17/2021)      Cerebral microvascular disease (10/17/2021)        Plan:     Patient was possible metastatic lesion to the right frontal lobe causing his left hemiparesis better on steroids. Patient and work-up to establish his diagnosis as far as primary cancer. Patient also needs MRI of the lumbar spine with and without contrast to evaluate for metastatic lesions there. Patient end-stage renal disease and needs conscious sedation for his MRI scans so MRI is arranging this to be done and then followed by dialysis tomorrow. We will hold on anticonvulsants for now but get an EEG and watch closely for seizures. Continue Decadron for now. Difficult case, will follow carefully. Continue excellent medical work-up as you are.     Signed By: Shiela Jennings MD     October 17, 2021       CC: Della Ahumada MD  FAX: [phone]

## 2021-10-17 NOTE — PROGRESS NOTES
End of Shift Note    Bedside shift change report given to River Pantoja RN  (oncoming nurse) by Samy Howard RN (offgoing nurse). Report included the following information SBAR, Kardex, Intake/Output and MAR    Shift worked:  1011-1475     Shift summary and any significant changes:    Patient stable through shift, has been able to move and lift his left arm more throughout the shift. No complaints of pain, all scheduled meds and routine rounding provided. Concerns for physician to address:       Zone phone for oncoming shift:          Activity:  Activity Level: Up with Assistance  Number times ambulated in hallways past shift: 0  Number of times OOB to chair past shift: 0    Cardiac:   Cardiac Monitoring: No           Access:   Current line(s): PIV and HD access     Genitourinary:   Urinary status: voiding    Respiratory:   O2 Device: Nasal cannula  Chronic home O2 use?: NO  Incentive spirometer at bedside: NO     GI:  Last Bowel Movement Date: 10/15/21  Current diet:  ADULT DIET Regular; 4 carb choices (60 gm/meal); No Concentrated Sweets  Passing flatus: YES  Tolerating current diet: YES       Pain Management:   Patient states pain is manageable on current regimen: YES    Skin:  Ge Score: 18  Interventions: speciality bed, increase time out of bed and PT/OT consult    Patient Safety:  Fall Score:  Total Score: 4  Interventions: assistive device (walker, cane, etc), gripper socks and pt to call before getting OOB  High Fall Risk: Yes    Length of Stay:  Expected LOS: 4d 7h  Actual LOS: 2      Samy Howard RN

## 2021-10-17 NOTE — PROGRESS NOTES
Hospitalist Progress Note    NAME: Meño Torres   :  1960   MRN:  [id number]       Assessment / Plan:  Low back pain, POA  Right hip pain foot, POA  Unable to walk due to pain  --increase norco to 7.5mg/325mg q4h; add fentanyl 50mcg q4h prn.  --consult pt/ot  --given leukocytosis WBC 16K, heart murmur, get blood cultures, ESR, CRP, procalcitonin since he has permcath and at risk for bacteremia and potentially osteomyelitis. Check ECHO. Bcx NGTD  --CK 71  --MRI lumbar spine (says he would need conscious sedation due to severe claustrophobia), CT right hip if pain not improved and depending on lab results  -Ortho following     ESRD on HD TTS  Missed dialysis x 2 sessions due to pain as noted above  Hyperkalemia 5.3  --last HD 10/9  --nephrology consult for HD. C/w HD  --CXR clear, no increased O2 requirement from baseline 3L. However, occasional audible wheezing  --continue sodium bicarb, lokelma, renvela     HTN, BP elevated 160-170s mostly  Volume overload  with flash pulmonary edema. EF 65-70%   --continue coreg, hydralazine, amlodipine, bumex     DM not on medication  -accucheks and SSI 4 times.     PUD/GERD  --continue PPI     SHOLA with chronic respiratory failure on 3L NC, not on cpap  --continue 3L O2. He is 98% on 3L. CXR with stable left lung nodule     Left lung nodule, POA  CT chest 2021 showed:  Multiple bilateral pulmonary nodules. Comparison impossible due to thepresence on the prior examination with multiple bilateral acute infiltrate as well as pleural effusions. CXR 10/5/21:  Bilateral pulmonary  nodules, with indolently progressive left lower lobe nodule appearing possibly  enlarged  over the last 13 months and for which CT follow-up is recommended.   --given these readings, hx smoking, get non-urgent CT chest with contrast before dialysis     Hx covid-19 infection   --vaccinated in April and May  --Covid negative     Hx thyroid cancer 5 years ago s/p resection  --chronic voice hoarseness since treatment  --continue levothyroxine 175mcg     Chronic anemia hgb stable 9-10  Monitor    CVA/TIA  LUE weakness  CT scan head showed Superior right frontal vasogenic edema with small subgyral focus of increased  attenuation which could represent calcification or hemorrhagic material.  Findings are consistent with metastasis. Further evaluation with or  contrast-enhanced MRI is recommended. Agree with decadron for swelling  Consult Neurosurgery and oncology  Symptoms started 4 PM 10/15, out of window  Less likely a cervical issue as patient states that this occurred suddenly    Patient refuses MRI, he states that he will need to be put to sleep I explained that this not possible at this time, will need to discuss with anesthesia  Check carotid Dopplers  lipid panel LDL < 70  hemoglobin A1c 6.2, though patient is on dialysis  Consult neurology  Aspirin and high intensity statin for now  Check ECHO  PT/OT    Obesity  Body mass index is 33.53 kg/m².     Code: discussed, full code  DVT prophylaxis: heparin SQ  Surrogate decision maker:  Daughter Tiburcio Tracy [phone]      Estimated discharge date: October 19  Barriers: MRI, CVA work up       Subjective:     Chief Complaint / Reason for Physician Visit  Patient seen and examined at the bedside. He states that his left upper extremity weakness is improved, but is still present. Still with right-sided hip pain. I did discuss the CT scan findings with the patient. He is adamant that he is unable to undergo an MRI without being completely sedated. Discussed with RN events overnight. I was able to discuss care with patient's daughter over the telephone and all questions answered.     Review of Systems:  Symptom Y/N Comments  Symptom Y/N Comments   Fever/Chills    Chest Pain     Poor Appetite    Edema     Cough    Abdominal Pain     Sputum    Joint Pain     SOB/NUÑEZ    Pruritis/Rash     Nausea/vomit    Tolerating PT/OT Diarrhea    Tolerating Diet     Constipation    Other       Could NOT obtain due to:      Objective:     VITALS:   Last 24hrs VS reviewed since prior progress note. Most recent are:  Patient Vitals for the past 24 hrs:   Temp Pulse Resp BP SpO2   10/17/21 0736 97.7 °F (36.5 °C) 67 18 (!) 144/58 97 %   10/16/21 2353 98.7 °F (37.1 °C) 72 18 (!) 124/45 97 %   10/16/21 2008 98.3 °F (36.8 °C) 73 20 (!) 140/53 97 %   10/16/21 1545 98.1 °F (36.7 °C) 73 18 (!) 157/60 98 %     No intake or output data in the 24 hours ending 10/17/21 1118     I had a face to face encounter and independently examined this patient on 10/17/2021, as outlined below:  PHYSICAL EXAM:  General: WD, WN. Alert, cooperative, no acute distress    EENT:  EOMI. Anicteric sclerae. MMM, edentulous   Resp:  CTA bilaterally, no wheezing or rales. No accessory muscle use  CV:  Regular  rhythm,  No edema  GI:  Soft, Non distended, Non tender. +Bowel sounds  Neurologic:  Alert and oriented X 3, normal speech, Raspy voice. LUE 3/5 strength. No facial droop  Psych:   Good insight. Not anxious nor agitated  Skin:  No rashes. No jaundice, old tattoos     Reviewed most current lab test results and cultures  YES  Reviewed most current radiology test results   YES  Review and summation of old records today    NO  Reviewed patient's current orders and MAR    YES  PMH/ reviewed - no change compared to H&P  ________________________________________________________________________  Care Plan discussed with:    Comments   Patient     Family      RN     Care Manager     Consultant                        Multidiciplinary team rounds were held today with , nursing, pharmacist and clinical coordinator. Patient's plan of care was discussed; medications were reviewed and discharge planning was addressed.      ________________________________________________________________________  Total NON critical care TIME:  34   Minutes    Total CRITICAL CARE TIME Spent: Minutes non procedure based      Comments   >50% of visit spent in counseling and coordination of care     ________________________________________________________________________  Alec Parry MD     Procedures: see electronic medical records for all procedures/Xrays and details which were not copied into this note but were reviewed prior to creation of Plan. LABS:  I reviewed today's most current labs and imaging studies.   Pertinent labs include:  Recent Labs     10/16/21  0514 10/15/21  0017   WBC 16.0* 17.2*   HGB 9.5* 9.8*   HCT 31.2* 30.4*    290     Recent Labs     10/16/21  0514 10/15/21  0529 10/15/21  0017    139 136   K 5.5* 5.1 5.3*    107 106   CO2 29 29 27   * 113* 133*   BUN 51* 106* 111*   CREA 3.30* 5.14* 5.23*   CA 9.1 9.4 9.7   MG 2.3  --   --    ALB  --   --  3.1*   TBILI  --   --  0.5   ALT  --   --  30       Signed: Alec Parry MD

## 2021-10-18 NOTE — PROGRESS NOTES
End of Shift Note    Bedside shift change report given to Brenda (oncoming nurse) by Donita To (offgoing nurse). Report included the following information SBAR, Kardex and MAR    Shift worked:  7p-7a     Shift summary and any significant changes:     Patient did complain of pain, Norco was given once, see PRN MAR. Scheduled medication shave been given, see MAR. Patient does uses the urinal at bedside. IV has been flushed and is patent. Patient teaching and routine rounding has been done. Concerns for physician to address:       Zone phone for oncoming shift:          Activity:  Activity Level: Bed Rest  Number times ambulated in hallways past shift: 0  Number of times OOB to chair past shift: 0    Cardiac:   Cardiac Monitoring: No           Access:   Current line(s): PIV     Genitourinary:   Urinary status: voiding    Respiratory:   O2 Device: Nasal cannula  Chronic home O2 use?: YES  Incentive spirometer at bedside: NO     GI:  Last Bowel Movement Date: 10/15/21  Current diet:  ADULT DIET Regular; 4 carb choices (60 gm/meal); No Concentrated Sweets  Passing flatus: YES  Tolerating current diet: YES       Pain Management:   Patient states pain is manageable on current regimen: YES    Skin:  Ge Score: 17  Interventions: turn team    Patient Safety:  Fall Score:  Total Score: 4  Interventions: bed/chair alarm, assistive device (walker, cane, etc), gripper socks and pt to call before getting OOB  High Fall Risk: Yes    Length of Stay:  Expected LOS: 4d 7h  Actual LOS: 3      Donita To

## 2021-10-18 NOTE — PROGRESS NOTES
Consult  REFERRED BY:  Tyesha Lenz MD    CHIEF COMPLAINT: Left-sided weakness      Subjective:     Natan Mehta is a 63 y.o. right-handed  male who developed left-sided weakness 2 days ago, and an MRI scan was attempted but he could not tolerate the MRI scan without conscious sedation and refused IV Ativan, and today he said he will try it but the MRI tech said he absolutely refused it when he got down there. The patient is being evaluated for possible cancer, and has multiple pulmonary nodules even though is not a smoker, and has severe back pain and right hip pain and is on for an MRI of the lumbar spine to rule out spinal mets also. He has end-stage renal disease and is on hemodialysis, and if this a diabetic not on medications. He also has obstructive sleep apnea, essential hypertension, history of previous Covid infection April 21 and history of thyroid cancer 5 years ago status post resection and chronic anemia obesity and possibly some mild COPD. Patient does not complain of headache, and had noticed the weakness before. He does not notice any right-sided weakness. He was placed on Decadron yesterday and a CT scan came back showing a probable metastases to the brain in the right frontal region, and he said his strength is somewhat better on the left side. We talked the MRI may plan to arrange dialysis after his MRI with contrast tomorrow so we can get the brain in the lumbar spine with contrast tomorrow. He will also be done with conscious sedation then also. In the meantime this is never had any seizure type problem we will not load with anticonvulsants yet. We will get an EEG just to make sure tomorrow and await the MRI scan. Continue the Decadron for now continue his tumor work-up. Patient agrees with plans as above. Patient had a carotid Doppler study today that just shows mild disease only, and his EEG shows no seizure activity, is mild generalized slowing.   Patient says the strength in his left hand is getting slowly better. Still has a mild hemiparesis. Patient has no complaints of headache or other new neurological symptoms. We will get an MRI today with conscious sedation of his lumbar spine and brain and get dialysis after that. .  We discussed with his family and the patient in detail he agrees with plans as above. We will hold on anticonvulsants since his EEG did not show any seizure activity. Past Medical History:   Diagnosis Date    Adverse effect of anesthesia     \" STOP BREATHING 1 TIME C ANESTH\"    Cancer ([facility] 2004    thyroid cancer    [facility]    Chronic pain     BACK SHOULDER AND ARM    COVID-19 04/2021    Depression     Diabetes ([facility]     GERD (gastroesophageal reflux disease)     Heart failure (HCC)     stage 1    Hypercholesterolemia     Hypertension     Nausea & vomiting     PUD (peptic ulcer disease)     Sleep apnea     doesn't wear cpap    Thyroid cancer ([facility]     TIA (transient ischemic attack) 2011    Vitamin D deficiency       Past Surgical History:   Procedure Laterality Date    HX HEART CATHETERIZATION      HX HEENT      THROAT SURGERY X 4    HX ORTHOPAEDIC      back     HX ORTHOPAEDIC      ARM AND SHOULDER    HX OTHER SURGICAL      thyroid, lymphnode    HX RETINAL DETACHMENT REPAIR      left eye    HX VASCULAR ACCESS      HD cather in chest    IR INSERT NON TUNL CVC OVER 5 YRS  8/18/2020    IR INSERT NON TUNL CVC OVER 5 YRS  7/23/2021    IR INSERT TUNL CVC W/O PORT OVER 5 YR  8/26/2020    IR INSERT TUNL CVC W/O PORT OVER 5 YR  7/27/2021    UPPER GI ENDOSCOPY,BALL DIL,30MM  7/17/2020         UPPER GI ENDOSCOPY,BIOPSY  7/17/2020         VASCULAR SURGERY PROCEDURE UNLIST      cardiac cath NEG.      Family History   Problem Relation Age of Onset    Diabetes Mother     Elevated Lipids Mother    Arbon Lamer Bladder Disease Mother     Headache Mother     Migraines Mother     Heart Disease Mother  Hypertension Mother     Stroke Mother     Other Mother         ANEURSYM BRAIN    Bleeding Prob Father     Cancer Father         LEUKEMIA    Diabetes Father     Elevated Lipids Father     Mental Retardation Sister     Psychiatric Disorder Sister     Cancer Brother         LUNGS      Social History     Tobacco Use    Smoking status: Former Smoker     Quit date: 1994     Years since quittin.1    Smokeless tobacco: Never Used   Substance Use Topics    Alcohol use: Not Currently         Current Facility-Administered Medications:     heparin (porcine) 1,000 unit/mL injection 3,800 Units, 3,800 Units, Hemodialysis, DIALYSIS PRN, Deep García MD    atorvastatin (LIPITOR) tablet 40 mg, 40 mg, Oral, QHS, Jihan Duncan MD, 40 mg at 10/17/21 2153    aspirin chewable tablet 81 mg, 81 mg, Oral, DAILY, cT Mcdonnell MD, 81 mg at 10/18/21 09    dexAMETHasone (DECADRON) tablet 4 mg, 4 mg, Oral, Q6H, Adamaris Trinidad MD, 4 mg at 10/18/21 1140    fentaNYL citrate (PF) injection 50 mcg, 50 mcg, IntraVENous, Q4H PRN, Wilver Norwood MD    naloxone [facility]) injection 0.4 mg, 0.4 mg, IntraVENous, PRN, Wilver Norwood MD    amLODIPine (NORVASC) tablet 10 mg, 10 mg, Oral, DAILY, Wilver Norwood MD, 10 mg at 10/18/21 0908    bumetanide (BUMEX) tablet 2 mg, 2 mg, Oral, DAILY, Wilver Norwood MD, 2 mg at 10/18/21 0907    carvediloL (COREG) tablet 6.25 mg, 6.25 mg, Oral, BID WITH MEALS, Wilver Norwood MD, 6.25 mg at 10/18/21 0908    hydrALAZINE (APRESOLINE) tablet 50 mg, 50 mg, Oral, BID, Wilver Norwood MD, 50 mg at 10/18/21 0907    metoclopramide HCl (REGLAN) tablet 5 mg, 5 mg, Oral, TID PRN, Wilver Norwood MD    pantoprazole (PROTONIX) tablet 40 mg, 40 mg, Oral, ACB, Wilver Norwood MD, 40 mg at 10/18/21 09    sevelamer carbonate (RENVELA) tab 800 mg, 800 mg, Oral, TID WITH MEALS, Wilver Norwood MD, 800 mg at 10/18/21 1140    ergocalciferol capsule 50,000 Units, 50,000 Units, Oral, every Monday, Leonardo Gilbert MD, 50,000 Units at 10/18/21 1424    albuterol (PROVENTIL VENTOLIN) nebulizer solution 2.5 mg, 2.5 mg, Nebulization, Q6H PRN, Leonardo Gilbert MD    insulin lispro (HUMALOG) injection, , SubCUTAneous, AC&HS, Leonardo Gilbert MD, 3 Units at 10/18/21 1140    glucose chewable tablet 16 g, 4 Tablet, Oral, PRN, Leonardo Gilbert MD    dextrose (D50W) injection syrg 12.5-25 g, 12.5-25 g, IntraVENous, PRN, Leonardo Gilbert MD    glucagon [facility]) injection 1 mg, 1 mg, IntraMUSCular, PRN, Leonardo Gilbert MD    pregabalin (LYRICA) capsule 75 mg, 75 mg, Oral, BID, Leonardo Gilbert MD, 75 mg at 10/18/21 0908    acetaminophen (TYLENOL) tablet 325 mg, 325 mg, Oral, Q6H PRN, Leonardo Gilbert MD    HYDROcodone-acetaminophen [facility]) 7.5-325 mg per tablet 1 Tablet, 1 Tablet, Oral, Q6H PRN, Leonardo Gilbert MD, 1 Tablet at 10/17/21 1943    levothyroxine (SYNTHROID) tablet 175 mcg, 175 mcg, Oral, Once per day on Mon Tue Wed Thu Fri Sat, Leonardo Gilbert MD, 175 mcg at 10/18/21 0522    levothyroxine (SYNTHROID) tablet 87.5 mcg, 87.5 mcg, Oral, every Sunday, Leonardo Gilbert MD, 87.5 mcg at 10/17/21 0526    sodium chloride (NS) flush 5-40 mL, 5-40 mL, IntraVENous, Q8H, Leonardo Giblert MD, 10 mL at 10/18/21 1429    sodium chloride (NS) flush 5-40 mL, 5-40 mL, IntraVENous, PRN, Leonardo Gilbert MD    polyethylene glycol (MIRALAX) packet 17 g, 17 g, Oral, DAILY PRN, Leonardo Giblert MD    ondansetron (ZOFRAN ODT) tablet 4 mg, 4 mg, Oral, Q8H PRN **OR** ondansetron (ZOFRAN) injection 4 mg, 4 mg, IntraVENous, Q6H PRN, Leonardo Gilbert MD    heparin (porcine) injection 5,000 Units, 5,000 Units, SubCUTAneous, Q8H, Leonardo Gilbert MD, 5,000 Units at 10/18/21 1428    labetaloL (NORMODYNE;TRANDATE) injection 10 mg, 10 mg, IntraVENous, Q4H PRN, Leonardo Gilbert MD    epoetin martha-epbx (RETACRIT) 12,000 Units combo injection, 12,000 Units, SubCUTAneous, Q MON, WED & Matthieu Eid MD, 12,000 Units at 10/15/21 8997        Allergies   Allergen Reactions    Anesthetics - Amide Type - Select Amino Amides Shortness of Breath    Flexeril [Cyclobenzaprine] Hives    Tramadol Hives      MRI Results (most recent):  No results found for this or any previous visit. No results found for this or any previous visit. Review of Systems:  A comprehensive review of systems was negative except for: Constitutional: positive for fatigue and malaise  Respiratory: positive for pleurisy/chest pain, wheezing, dyspnea on exertion, emphysema or chronic bronchitis  Musculoskeletal: positive for myalgias, arthralgias, stiff joints, back pain, muscle weakness and bone pain  Neurological: positive for paresthesia, coordination problems, gait problems and weakness  Behvioral/Psych: positive for anxiety and depression   Vitals:    10/17/21 1954 10/17/21 2248 10/18/21 0745 10/18/21 1511   BP: (!) 105/42 (!) 135/46 (!) 169/61 (!) 147/59   Pulse: 70 64 76 67   Resp: 18 16 20 20   Temp: 98.2 °F (36.8 °C) 97.8 °F (36.6 °C) 98 °F (36.7 °C) 97.9 °F (36.6 °C)   TempSrc:       SpO2: 96% 96% 93% 95%   Weight:       Height:         Objective:     I      NEUROLOGICAL EXAM:    Appearance: The patient is well developed, well nourished, provides a coherent history and is in no acute distress. Mental Status: Oriented to time, place and person, and the president, cognitive function is slow but probably normal and speech is fluent and no aphasia but has mild dysarthria. Mood and affect appropriate. Cranial Nerves:   Intact visual fields. Fundi are benign, disc are flat, no lesions seen on funduscopy. SCOOBY, EOM's full, no nystagmus, no ptosis. Facial sensation is normal. Corneal reflexes are not tested. Facial movement is showing central facial type weakness on the left. Hearing is abnormal bilaterally. Palate is midline with normal sternocleidomastoid and trapezius muscles are normal. Tongue is midline.   Neck without meningismus or bruits  Temporal arteries are not tender or enlarged  TMJ areas are not tender on palpation   Motor:  4/5 strength in upper and lower proximal and distal muscles on the right, and about 3/5 on the left with arm drift on the left. Normal bulk and tone. No fasciculations. Rapid alternating movement is slightly decreased on the left and normal on the right   Reflexes:   Deep tendon reflexes 1+/4 and symmetrical.  No babinski or clonus present   Sensory:   Abnormal to touch, pinprick and vibration and temperature in both feet to ankle level. DSS is intact   Gait:  Not tested   Tremor:   No tremor noted. Cerebellar:  Not tested abnormal cerebellar signs present on Romberg and tandem testing and finger-nose-finger exam.   Neurovascular:  Normal heart sounds and regular rhythm, peripheral pulses decreased, and no carotid bruits. Assessment:       ICD-10-CM ICD-9-CM    1. End stage renal disease on dialysis (HCC)  N18.6 585.6     Z99.2 V45.11    2. Acute hyperkalemia  E87.5 276.7    3. Physical debility  R53.81 799.3    4. Inability to ambulate due to left hip  R26.2 719.7      Active Problems:    Hyperkalemia (10/15/2021)      Leukocytosis (10/15/2021)      Difficulty walking (10/15/2021)      Right leg pain (10/15/2021)      ESRD (end stage renal disease) on dialysis ([address] (10/15/2021)      Brain metastases ([address] (10/17/2021)      Left hemiplegia ([address] (10/17/2021)      Bilateral carotid artery stenosis (10/17/2021)      Cerebral microvascular disease (10/17/2021)      Acute alteration in mental status (10/17/2021)      Convulsions ([address] (10/17/2021)      Hyperlipemia (10/17/2021)        Plan:     Patient was possible metastatic lesion to the right frontal lobe causing his left hemiparesis better on steroids. Patient and work-up to establish his diagnosis as far as primary cancer. Patient also needs MRI of the lumbar spine with and without contrast to evaluate for metastatic lesions there.   Patient end-stage renal disease and needs conscious sedation for his MRI scans so MRI is arranging this to be done and then followed by dialysis tomorrow. We will hold on anticonvulsants for now but get an EEG and watch closely for seizures. Continue Decadron for now. Difficult case, will follow carefully. Patient had a carotid Doppler study today that just shows mild disease only, and his EEG shows no seizure activity, is mild generalized slowing. Patient says the strength in his left hand is getting slowly better. Still has a mild hemiparesis. Patient has no complaints of headache or other new neurological symptoms. We will get an MRI today with conscious sedation of his lumbar spine and brain and get dialysis after that. .  We discussed with his family and the patient in detail he agrees with plans as above. We will hold on anticonvulsants since his EEG did not show any seizure activity. Continue excellent medical work-up as you are.     Signed By: Lj Garza MD     October 18, 2021       CC: Luis Eduardo Lara MD  FAX: [phone]

## 2021-10-18 NOTE — PROGRESS NOTES
[address] [facility] [address], [address] [facility], [address] [place] [address]  [phone]    Oncology Inpatient Consult Note      Patient: David Horowitz MRN: [id number]  SSN: [id number]    YOB: 1960  Age: 63 y.o. Sex: male        Subjective:      Metastatic cancer- possible brain metastases    Subjective:      David Horowitz is a 63 y.o. male who we were asked to see by Dr. 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 He was diagnosed with papillary thyroid carcinoma in 2002. He underwent a total thyroidectomy by Dr. Alec Harris. This was followed by STRATTON ablation. He has been on TSH suppression since that time. A CT showed numerous small lung nodules which are increasing only slightly in size. His voice has been hoarse for some time. He is on hemodialysis. He presented to the ED with complaints of weakness and difficulty walking causing him to miss dialysis. He reports continued right hip pain and left sided weakness. Weakness has improved since admission. CT head is concerning for metastases with MRI recommended. Patient is requiring sedation for MRI which will be done tomorrow. He denies headaches.        Review of Systems:    Constitutional: fatigue  Eyes: negative  Ears, Nose, Mouth, Throat, and Face: hoarseness  Respiratory: negative  Cardiovascular: negative  Gastrointestinal: negative  Genitourinary:negative  Integument/Breast: negative  Hematologic/Lymphatic: negative  Musculoskeletal: right hip pain  Neurological: left sided weakness        Past Medical History:   Diagnosis Date    Adverse effect of anesthesia     \" STOP BREATHING 1 TIME C ANESTH\"    Cancer ([facility] [address] 2004    thyroid cancer    Chronic kidney disease     [facility]    Chronic pain     BACK SHOULDER AND ARM    COVID-19 04/2021    Depression     Diabetes ([address]     GERD (gastroesophageal reflux disease)  Heart failure (HCC)     stage 1    Hypercholesterolemia     Hypertension     Nausea & vomiting     PUD (peptic ulcer disease)     Sleep apnea     doesn't wear cpap    Thyroid cancer ([facility]     TIA (transient ischemic attack)     Vitamin D deficiency      Past Surgical History:   Procedure Laterality Date    HX HEART CATHETERIZATION      HX HEENT      THROAT SURGERY X 4    HX ORTHOPAEDIC      back     HX ORTHOPAEDIC      ARM AND SHOULDER    HX OTHER SURGICAL      thyroid, lymphnode    HX RETINAL DETACHMENT REPAIR      left eye    HX VASCULAR ACCESS      HD cather in chest    IR INSERT NON TUNL CVC OVER 5 YRS  2020    IR INSERT NON TUNL CVC OVER 5 YRS  2021    IR INSERT TUNL CVC W/O PORT OVER 5 YR  2020    IR INSERT TUNL CVC W/O PORT OVER 5 YR  2021    UPPER GI ENDOSCOPY,BALL DIL,30MM  2020         UPPER GI ENDOSCOPY,BIOPSY  2020         VASCULAR SURGERY PROCEDURE UNLIST      cardiac cath NEG. Family History   Problem Relation Age of Onset    Diabetes Mother     Elevated Lipids Mother    La Jose Lamer Bladder Disease Mother     Headache Mother    Mari Migraines Mother     Heart Disease Mother     Hypertension Mother     Stroke Mother     Other Mother         ANEURSYM BRAIN    Bleeding Prob Father     Cancer Father         LEUKEMIA    Diabetes Father     Elevated Lipids Father     Mental Retardation Sister     Psychiatric Disorder Sister     Cancer Brother         LUNGS     Social History     Tobacco Use    Smoking status: Former Smoker     Quit date: 1994     Years since quittin.1    Smokeless tobacco: Never Used   Substance Use Topics    Alcohol use: Not Currently      Prior to Admission medications    Medication Sig Start Date End Date Taking? Authorizing Provider   pregabalin (LYRICA) 25 mg capsule Take 1 Capsule by mouth three (3) times daily.  Max Daily Amount: 75 mg. 10/5/21  Yes Min-Lesa Rivera MD   hydrALAZINE (APRESOLINE) 50 mg tablet Take 1 tablet by mouth twice daily 9/22/21  Yes Nia Beard MD   sodium bicarbonate 650 mg tablet TAKE 2 TABLETS BY MOUTH TWICE DAILY 7/17/21  Yes Provider, Historical   bumetanide (BUMEX) 2 mg tablet Take 1 Tablet by mouth daily. 7/28/21  Yes Silverio Harris MD   omeprazole (PRILOSEC) 20 mg capsule Take 1 capsule by mouth once daily 6/11/21  Yes Nia Beard MD   carvediloL (COREG) 6.25 mg tablet TAKE 1 TABLET BY MOUTH TWICE DAILY WITH MEALS 5/13/21  Yes Nia Beard MD   acetaminophen (TYLENOL) 325 mg tablet Take 1 Tab by mouth as needed for Pain. Take tab as needed for pain 5/10/21  Yes Nia Beard MD   metoclopramide HCl (REGLAN) 5 mg tablet TAKE 1 TABLET BY MOUTH ONCE DAILY AS NEEDED. DO NOT TAKE MORE THAN 3 TABLETS A DAY. 5/10/21  Yes Nia Beard MD   simvastatin (ZOCOR) 20 mg tablet Take 1 Tab by mouth nightly. 5/10/21  Yes Nia Beard MD   amLODIPine (NORVASC) 10 mg tablet Take 1 Tab by mouth daily. 5/10/21  Yes Nia Beard MD   Vitamin D2 1,250 mcg (50,000 unit) capsule TAKE 1 CAPSULE BY MOUTH ONCE A WEEK  Patient taking differently: Take 50,000 Units by mouth every Monday. TAKE 1 CAPSULE BY MOUTH ONCE A WEEK 5/10/21  Yes Nia Beard MD   levothyroxine (SYNTHROID) 175 mcg tablet 1 full tab Mon-Sat but only 1/2 tab on sundays 4/12/21  Yes Petros Garcia MD   sodium zirconium cyclosilicate (Lokelma) 5 gram powder packet Take 5 g by mouth two (2) times a week. On Fridays and Sundays   Yes Provider, Historical   sevelamer carbonate (RENVELA) 800 mg tab tab Take 1 Tab by mouth three (3) times daily (with meals). 1/7/21  Yes Nia Beard MD   Ventolin HFA 90 mcg/actuation inhaler TAKE 1-2 PUFFS EVEERY 4-6 HOURS AS NEEDED FOR DYSPNEA AND WHEEZING 7/28/20  Yes Hans Prado MD   gabapentin (NEURONTIN) 100 mg capsule Take 1 Capsule by mouth three (3) times daily. Max Daily Amount: 300 mg.   Patient not taking: Reported on 10/7/2021 9/6/21   Josph Meigs, MD   diclofenac (Voltaren) 1 % gel Apply  to affected area four (4) times daily. Patient not taking: Reported on 10/15/2021 9/6/21   Dino Lamas MD   Lancets misc Use preferred brand; Check glucose 3-4 times daily, Diagnosis E11.22 2/2/18   Juma Morrow MD              Allergies   Allergen Reactions    Anesthetics - Amide Type - Select Amino Amides Shortness of Breath    Flexeril [Cyclobenzaprine] Hives    Tramadol Hives           Objective:     Visit Vitals  BP (!) 169/61 (BP 1 Location: Right arm, BP Patient Position: At rest)   Pulse 76   Temp 98 °F (36.7 °C)   Resp 20   Ht 5' 9\" (1.753 m)   Wt 227 lb 1.2 oz (103 kg)   SpO2 93%   BMI 33.53 kg/m²       Physical Exam:    GENERAL: alert, cooperative, no distress, appears stated age  EYE: negative  LYMPHATIC: Cervical, supraclavicular, and axillary nodes normal.   THROAT & NECK: normal and no erythema or exudates noted. Scar from thyroidectomy. LUNG: clear to auscultation bilaterally  HEART: regular rate and rhythm  ABDOMEN: soft, non-tender  EXTREMITIES:  no edema  SKIN: Normal.  NEUROLOGIC: negative      CT Results (most recent):  Results from Hospital Encounter encounter on 10/14/21    CT HEAD WO CONT    Narrative  EXAM: CT HEAD WO CONT    INDICATION: LUE weakness    COMPARISON: CT 9/28/2016. CONTRAST: None. TECHNIQUE: Unenhanced CT of the head was performed using 5 mm images. Brain and  bone windows were generated. Coronal and sagittal reformats. CT dose reduction  was achieved through use of a standardized protocol tailored for this  examination and automatic exposure control for dose modulation. FINDINGS:  There is interval demonstration of vasogenic edema in the superior right frontal  lobe with a small subgyral focus of increased attenuation which could be  calcification or hemorrhagic material. In the area of nonspecific subcortical  diminished attenuation which is much smaller is also noted on anteriorly in the  left superior frontal region.     There is mild compression of the body of the right lateral ventricle. There is  no demonstration of subfalcine or transtentorial herniation. Chronic small  vessel ischemic disease of the periventricular white matter is again noted which  appears mild. No extra-axial collection or mass is shown. No osseous abnormality  is demonstrated. A moderate sized mucosal retention cyst of the left maxillary  sinus is shown with otherwise normal paranasal sinuses and mastoid air cell  aeration. Impression  Superior right frontal vasogenic edema with small subgyral focus of increased  attenuation which could represent calcification or hemorrhagic material.  Findings are consistent with metastasis. Further evaluation with or  contrast-enhanced MRI is recommended. Assessment:     1. Possible brain metastases    CT head (10/16/21) - Superior right frontal vasogenic edema with small subgyral focus of increased attenuation which could represent calcification or hemorrhagic material. Findings are consistent with metastasis. Further evaluation with or contrast-enhanced MRI is recommended. MRI Brain tomorrow - patient requiring sedation  CT Chest today  Neurosurgery consulted    2. 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   He is asymptomatic from lung nodules      Plan:     > MRI Brain   > Neurosurgery consulted  > TSH suppression with Levothyroxine   > CT chest today      Signed by: Wander Jones NP                     October 18, 2021

## 2021-10-18 NOTE — PROGRESS NOTES
Physical Therapy     Order's acknowledged, chart reviewed in prep for skilled PT treatment; however, pt reports resting 9/10 RLE pain, as well as, desires to hold evaluation until MRI completed. Per RN, pt will have MRI completed tomorrow under sedation; therefore, OT will hold evaluation and follow up later as able and appropriate.     Alonza Cushing, PT

## 2021-10-18 NOTE — PROGRESS NOTES
[address] [facility]  LEZ:[id number]   :1960     Patient is off the floor  Last hd on 10/16     esrd goes to [facility] on TTS shift under Dr. Chuy Dimas.  Hypertension   Anemia of ckd   Type 2 diabetes   Back pain   hyperkalemia    PLAN-   No hd today   Plan for hd on Tuesday                        Aubree Tillman MD  [facility]  [facility]  [address], [address]  [place], [address] [facility]  Phone - [phone]         Fax - [phone] [facility] Office  [address] [place] [address]  Phone - [phone]        Fax - [phone]     [email or web address]

## 2021-10-18 NOTE — PROGRESS NOTES
I reviewed pertinent labs and imaging, and discussed /agreed on the plan of care with Dr. Demetrius Habermann. Hospitalist Progress Note    NAME: Cherelle Luong   :  1960   MRN:  [id number]       Assessment / Plan:   Low back pain, POA  Right hip pain foot, POA  Unable to walk due to pain  - still c/o hip pain   - scheduled for MRI w/ sedation  tomorrow then will get dialyzed   - CT L Hip  And CT chest done to r/o mets  Results pending   - manage pain with Norco increased to 7.5/ 325  Fentanyl added for breakthorugh   CT Chest   IMPRESSION  1. Increase in size of pulmonary nodules when compared to 2021.  2. New small soft tissue nodule in the region of the right inferior thyroid bed,  suspicious for recurrent disease. 3. Incompletely demonstrated and poorly characterized liver lesions. CT of the  abdomen is recommended for further evaluation if not already performed. CT Pelvis  Showed   No masses or bone destruction. No significant abnormalities in the pelvis. Marked vascular calcification. ESRD on HD TTS  Missed dialysis x 2 sessions due to pain as noted above  Hyperkalemia 5.3  - scheduled for dialysis tomorrow   - appreciate Nephrology input   - cont Na bicarb , Alexey Sitter   - CXR clear no evidence of fluid overload     HTN, BP elevated 160-170s mostly  Volume overload  with flash pulmonary edema.  EF 65-70%   - continue coreg , hydralazine , amlodipine     DM not on medication  -accucheks and SSI 4 times    SHOLA with chronic respiratory failure on 3L NC, not on cpap  Left lung nodule, POA  Hx thyroid cancer 5 years ago s/p resection  --continue 3L O2.  He is 98% on 3L.  CXR with stable left lung nodule  - CT chest 2021 showed:  Multiple bilateral pulmonary nodules.  Comparison impossible due to thepresence on the prior examination with multiple bilateral acute infiltrate as well as pleural effusions  - 10/15 CXR  Showed Bilateral pulmonary  nodules, with indolently progressive left lower lobe nodule appearing possibly  enlarged  over the last 13 months and for which CT follow-up is recommended. --given these readings, hx smoking, CT chest done today results pending   - chronic voice hoarseness since treatment  --continue levothyroxine 175mcg  - 10/18 CT chest showed new small nodule  Chronic anemia hgb stable 9-10  - monitor     CVA/TIA  LUE weakness  - CT head showed Superior right frontal vasogenic edema with small subgyral focus of increased attenuation which could represent calcification or hemorrhagic material.  Findings are consistent with metastasis. Further evaluation with or contrast-enhanced MRI is recommended. And scheduled for tomorrow with dialysis to follow     - LDL < 70    - Hgb A1c 6.2 - pt ESRD   - ASA , Statin   - Echo -EF 55- 60 %   - appreciate Neurology input - agree with MRI with conscious sedation   - states weakness improved   -cont decadron        Mild Leukocytosis   - likely related to decadron   - pt afebrile     Body mass index is 33.53 kg/m². Estimated discharge date: October 21  Barriers: Test results     Code status: Full  Prophylaxis: Hep SQ  Recommended Disposition: Home w/Family     Subjective:     Chief Complaint / Reason for Physician Visit  \"I am still hurting \". Discussed with RN events overnight. Discussed plan of care with pt. Review of Systems:  Symptom Y/N Comments  Symptom Y/N Comments   Fever/Chills n   Chest Pain n    Poor Appetite n   Edema n    Cough n   Abdominal Pain     Sputum    Joint Pain     SOB/NUÑEZ nn   Pruritis/Rash     Nausea/vomit    Tolerating PT/OT     Diarrhea    Tolerating Diet y    Constipation    Other       Could NOT obtain due to:      Objective:     VITALS:   Last 24hrs VS reviewed since prior progress note.  Most recent are:  Patient Vitals for the past 24 hrs:   Temp Pulse Resp BP SpO2   10/18/21 0745 98 °F (36.7 °C) 76 20 (!) 169/61 93 %   10/17/21 2248 97.8 °F (36.6 °C) 64 16 (!) 135/46 96 %   10/17/21 1954 98.2 °F (36.8 °C) 70 18 (!) 105/42 96 %   10/17/21 1624 98 °F (36.7 °C) 76 18 (!) 136/55 97 %   10/17/21 1422 -- -- -- (!) 144/58 --       Intake/Output Summary (Last 24 hours) at 10/18/2021 1147  Last data filed at 10/17/2021 1802  Gross per 24 hour   Intake --   Output 300 ml   Net -300 ml        I had a face to face encounter and independently examined this patient on 10/18/2021, as outlined below:  PHYSICAL EXAM:  General: Obese  Alert, cooperative, no acute distress    EENT:  EOMI. Anicteric sclerae. MMM  Resp:  , no wheezing or rales. No accessory muscle use RA  CV:  S1S2  No edema  GI:  Soft, Non distended, Non tender. +Bowel sounds  Neurologic:  Alert and oriented X 3, normal speech,   Psych:   . Not anxious nor agitated  Skin:  No rashes. No jaundice    Reviewed most current lab test results and cultures  YES  Reviewed most current radiology test results   YES  Review and summation of old records today    NO  Reviewed patient's current orders and MAR    YES  PMH/ reviewed - no change compared to H&P  ________________________________________________________________________  Care Plan discussed with:    Comments   Patient x    Family      RN x    Care Manager     Consultant                        Multidiciplinary team rounds were held today with , nursing, pharmacist and clinical coordinator. Patient's plan of care was discussed; medications were reviewed and discharge planning was addressed. ________________________________________________________________________  Total NON critical care TIME: 30  Minutes    Total CRITICAL CARE TIME Spent:   Minutes non procedure based      Comments   >50% of visit spent in counseling and coordination of care     ________________________________________________________________________  Ita Gonzalez.  Kwame Vazquez NP     Procedures: see electronic medical records for all procedures/Xrays and details which were not copied into this note but were reviewed prior to creation of Plan. LABS:  I reviewed today's most current labs and imaging studies. Pertinent labs include:  Recent Labs     10/16/21  0514   WBC 16.0*   HGB 9.5*   HCT 31.2*        Recent Labs     10/16/21  0514      K 5.5*      CO2 29   *   BUN 51*   CREA 3.30*   CA 9.1   MG 2.3       Signed: Meggan Sheridan, NP

## 2021-10-18 NOTE — PROGRESS NOTES
End of Shift Note    Bedside shift change report given to Mayda (oncoming nurse) by Alex Estrada RN (offgoing nurse). Report included the following information SBAR    Shift worked:  [phone]     Shift summary and any significant changes:    Received patient at 1500 from a nurse who was floated to another unit. Followed POC as ordered. Patient ID'd with two identifiers. Prioritized safety at all times. Bed alarm plugged in and activated. Pt. oriented to call bell. Practiced infection prevention and hand hygiene. Managed pain as ordered. Clustered care while rounding hourly. Assessed pt., monitored vital signs, and administered medications. Encouraged patient to turn and/or shift weight. Assisted with ADL's, mobility, toileting, and hygiene. Encouraged CHG infection prevention treatment. Provided pt. education and employed therapeutic communication. Collaborated as part of the health care team. Advocated for pt. Monitored I/O's and tracked calorie consumption. Monitored lab values. Preserved IV access. Fall and skin precautions. Performed Accu-Checks. No acute events, vital signs stable. Pt. denied cardiac discomfort, nausea, and SOB. Pt denied pain. A&OX4. Pt reports full sensation. No sensory deficits. Positive pulses. Active bowel sounds. Lungs clear to diminished bilaterally. Voided in urinal.   Concerns for physician to address:       Zone phone for oncoming shift:   [phone]       Activity:  Activity Level: Bed Rest, Up with Assistance  Number times ambulated in hallways past shift: 0  Number of times OOB to chair past shift: 0    Cardiac:   Cardiac Monitoring: No           Access:   Current line(s): PIV     Genitourinary:   Urinary status: voiding    Respiratory:   O2 Device: Nasal airway  Chronic home O2 use?: NO  Incentive spirometer at bedside: YES     GI:  Last Bowel Movement Date: 10/15/21  Current diet:  ADULT DIET Regular; 4 carb choices (60 gm/meal);  No Concentrated Sweets  DIET NPO  Passing flatus: YES  Tolerating current diet: YES       Pain Management:   Patient states pain is manageable on current regimen: YES    Skin:  Ge Score: 17  Interventions: increase time out of bed, foam dressing, PT/OT consult and limit briefs    Patient Safety:  Fall Score:  Total Score: 4  Interventions: bed/chair alarm, assistive device (walker, cane, etc), gripper socks, pt to call before getting OOB and stay with me (per policy)  High Fall Risk: Yes    Length of Stay:  Expected LOS: 3d 19h  Actual LOS: 3      Laura Calvillo RN

## 2021-10-18 NOTE — PROGRESS NOTES
Occupational Therapy    Order's acknowledged, chart reviewed in prep for skilled OT treatment; however, pt reports resting 9/10 RLE pain, as well as, desires to hold evaluation until MRI completed. Per RN, pt will have MRI completed tomorrow under sedation; therefore, OT will hold evaluation and follow up later as able and appropriate.     Thank you,  General Khan OT

## 2021-10-19 NOTE — PROGRESS NOTES
Name of procedure: MRI with Sedation    Sedation medications given: 2 mg Versed, 50 mcg Fentanyl    Sedation tolerated: well    Total Sedation time: 30 minutes    Vital Signs: stable    Any complications related to procedure: none    Post Procedure Care Needed/order sets placed in connect care: See orders    Pt tolerated well. VSS. No C/O pain. Pt resting comfortably on bed. NAD noted. Pt taken to HD. Report given to Floor RN and HD nurse who will assume care of pt.

## 2021-10-19 NOTE — PROGRESS NOTES
Transition of Care Plan:    RUR: 44%  Disposition: home w/ family vs SNF pending progress   Follow up appointments: PCP, specialists   DME needed: TBD  Transportation at Discharge: family   [address] or means to access home: yes        IM Medicare Letter: to be reviewed prior to d/c   Is patient a BCPI-A Bundle: No     If yes, was Bundle Letter given?:     Caregiver Contact: Ray Vargas (daughter)  Discharge Caregiver contacted prior to discharge? To be contacted prior to d/c    Chart reviewed. CM continuing to follow for discharge planning. Pt is not yet medically stable for discharge. Will continue to follow should disposition needs arise.     Sherry Moyer MSW   Care Manager, [facility]  [phone]

## 2021-10-19 NOTE — PROGRESS NOTES
I reviewed pertinent labs and imaging, and discussed /agreed on the plan of care with Dr. Yovani Barton. Hospitalist Progress Note    NAME: Genevieve Andersen   :  1960   MRN:  [id number]       Assessment / Plan:   Low back pain, POA  Right hip pain foot, POA  Unable to walk due to pain  - still c/o hip pain   - S/p  MRI w/ sedation  Today   -appreciate Ortho input - recc Epidural steroid injection for RLE radiculopathy  IR to be consulted   10/19 MRI Brain   Intra-axial single enhancing lesion right frontal convexity with surrounding  edema compatible with brain metastases. 2. Moderate white matter disease, nonspecific. MRI R LE ext pending     - CT L Hip  And CT chest done to r/o mets  - manage pain with Norco increased to 7.5/ 325  Fentanyl added for breakthorugh     CT Chest   IMPRESSION  1. Increase in size of pulmonary nodules when compared to 2021.  2. New small soft tissue nodule in the region of the right inferior thyroid bed,  suspicious for recurrent disease. 3. Incompletely demonstrated and poorly characterized liver lesions. CT of the  abdomen is recommended for further evaluation if not already performed. CT Pelvis  Showed   No masses or bone destruction. No significant abnormalities in the pelvis. Marked vascular calcification. ESRD on HD TTS  Missed dialysis x 2 sessions due to pain as noted above  Hyperkalemia 5.3  - ss/p dialysis today   - appreciate Nephrology input   - cont Na bicarb , Venida Brittany   - CXR clear no evidence of fluid overload     HTN, BP elevated 160-170s mostly  Volume overload  with flash pulmonary edema.  EF 65-70%   - continue coreg , hydralazine , amlodipine   - Sbp 120-140s     DM not on medication  -accucheks and SSI 4 times    SHOLA with chronic respiratory failure on 3L NC, not on cpap  Left lung nodule, POA  Hx thyroid cancer 5 years ago s/p resection  --continue 3L O2.  He is 98% on 3L.   CXR with stable left lung nodule  - CT chest 2021 showed:  Multiple bilateral pulmonary nodules. Comparison impossible due to thepresence on the prior examination with multiple bilateral acute infiltrate as well as pleural effusions  - 10/15 CXR  Showed Bilateral pulmonary  nodules, with indolently progressive left lower lobe nodule appearing possibly  enlarged  over the last 13 months and for which CT follow-up is recommended. --given these readings, hx smoking, CT chest done today results pending   - chronic voice hoarseness since treatment  --continue levothyroxine 175mcg  - 10/18 CT chest showed new small nodule  Chronic anemia hgb stable 9-10  - monitor     CVA/TIA  LUE weakness  - CT head showed Superior right frontal vasogenic edema with small subgyral focus of increased attenuation which could represent calcification or hemorrhagic material.  Findings are consistent with metastasis. Further evaluation with or contrast-enhanced MRI is recommended. And scheduled for tomorrow with dialysis to follow     - LDL < 70    - Hgb A1c 6.2 - pt ESRD   - ASA , Statin   - Echo -EF 55- 60 %   - appreciate Neurology input - EEG showed no seizure activity , mild generalized slowing    - states weakness improved   -cont decadron      Mild Leukocytosis   - likely related to decadron   - pt afebrile     Body mass index is 31.51 kg/m². Estimated discharge date: October 21  Barriers: Test results     Code status: Full  Prophylaxis: Hep SQ  Recommended Disposition: Home w/Family     Subjective:     Chief Complaint / Reason for Physician Visit  \"I am doing ok  \". Discussed with RN events overnight. Discussed plan of care with pt. And daughter , Discussed findings on MRI brain but defer specifics to Neurosurgery .     Review of Systems:  Symptom Y/N Comments  Symptom Y/N Comments   Fever/Chills n   Chest Pain n    Poor Appetite n   Edema n    Cough n   Abdominal Pain     Sputum    Joint Pain     SOB/NUÑEZ nn   Pruritis/Rash     Nausea/vomit    Tolerating PT/OT     Diarrhea Tolerating Diet y    Constipation    Other       Could NOT obtain due to:      Objective:     VITALS:   Last 24hrs VS reviewed since prior progress note. Most recent are:  Patient Vitals for the past 24 hrs:   Temp Pulse Resp BP SpO2   10/19/21 1600 98 °F (36.7 °C) 67 18 (!) 124/46 97 %   10/19/21 1251 98 °F (36.7 °C) 70 16 (!) 164/70 --   10/19/21 1245 -- 69 16 (!) 147/63 --   10/19/21 1230 -- 67 16 (!) 144/60 --   10/19/21 1215 -- 68 16 (!) 147/64 --   10/19/21 1200 -- 68 16 (!) 141/63 --   10/19/21 1145 -- 66 16 (!) 149/60 --   10/19/21 1130 -- 67 16 (!) 144/67 --   10/19/21 1115 -- 65 16 126/65 --   10/19/21 1100 -- 66 16 (!) 137/59 --   10/19/21 1045 -- 67 16 133/62 --   10/19/21 1030 -- 67 16 (!) 136/58 --   10/19/21 1015 -- 67 16 (!) 134/58 --   10/19/21 1000 -- 64 16 (!) 124/58 --   10/19/21 0945 -- 65 16 (!) 128/57 --   10/19/21 0930 -- 69 16 (!) 133/50 --   10/19/21 0920 97.6 °F (36.4 °C) 71 16 (!) 144/60 --   10/19/21 0900 -- 68 16 (!) 154/65 94 %   10/19/21 0845 -- 74 16 (!) 160/65 94 %   10/19/21 0835 -- 71 16 (!) 144/60 94 %   10/19/21 0830 -- 70 16 (!) 148/68 94 %   10/19/21 0825 -- 70 16 (!) 144/60 94 %   10/19/21 0820 -- 69 16 (!) 156/65 94 %   10/19/21 0815 -- 74 16 (!) 168/63 94 %   10/19/21 0810 -- 84 16 (!) 166/71 94 %   10/19/21 0805 -- 79 18 (!) 168/71 94 %   10/19/21 0740 98 °F (36.7 °C) 75 18 (!) 148/69 94 %   10/18/21 2327 98 °F (36.7 °C) 70 20 (!) 148/66 94 %   10/18/21 1943 98.1 °F (36.7 °C) 72 19 (!) 149/62 93 %       Intake/Output Summary (Last 24 hours) at 10/19/2021 1614  Last data filed at 10/19/2021 1251  Gross per 24 hour   Intake --   Output 1900 ml   Net -1900 ml        I had a face to face encounter and independently examined this patient on 10/19/2021, as outlined below:  PHYSICAL EXAM:  General: Obese  Alert, cooperative, no acute distress    EENT:  EOMI. Anicteric sclerae. MMM, Hoarse voice  Resp:  , no wheezing or rales.   No accessory muscle use RA  CV:  S1S2  No edema  GI:  Soft, Non distended, Non tender. +Bowel sounds  Neurologic:  Alert and oriented X 3, normal speech,   Psych:   . Not anxious nor agitated  Skin:  No rashes. No jaundice    Reviewed most current lab test results and cultures  YES  Reviewed most current radiology test results   YES  Review and summation of old records today    NO  Reviewed patient's current orders and MAR    YES  PMH/SH reviewed - no change compared to H&P  ________________________________________________________________________  Care Plan discussed with:    Comments   Patient x    Family      RN x    Care Manager     Consultant                        Multidiciplinary team rounds were held today with , nursing, pharmacist and clinical coordinator. Patient's plan of care was discussed; medications were reviewed and discharge planning was addressed. ________________________________________________________________________  Total NON critical care TIME: 30  Minutes    Total CRITICAL CARE TIME Spent:   Minutes non procedure based      Comments   >50% of visit spent in counseling and coordination of care     ________________________________________________________________________  Veronica Lesser. Oumar Weeks NP     Procedures: see electronic medical records for all procedures/Xrays and details which were not copied into this note but were reviewed prior to creation of Plan. LABS:  I reviewed today's most current labs and imaging studies. Pertinent labs include:  Recent Labs     10/19/21  0923 10/18/21  1646   WBC 17.3* 18.4*   HGB 9.2* 9.5*   HCT 28.8* 28.4*    229     Recent Labs     10/19/21  0923   *   K 6.2*   CL 94*   CO2 25   *   *   CREA 5.17*   CA 9.3   PHOS 7.0*   ALB 2.4*       Signed: Meggan Weeks, CRISSY

## 2021-10-19 NOTE — PROGRESS NOTES
Nephrology Progress Note  Jos[facility] Butt     www. [employer]  Phone - [phone]   Patient: Abbi Gonzales    YOB: 1960        Date- 10/19/2021   Admit Date: 10/14/2021  CC: Follow up for esrd          IMPRESSION & PLAN:    ESRD- [facility] on TTS shift - Dr. Isabelle Singh. 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  bp high  No sob  Labs pending    Objective:   Vitals:    10/19/21 0830 10/19/21 0835 10/19/21 0845 10/19/21 0900   BP: (!) 148/68 (!) 144/60 (!) 160/65 (!) 154/65   Pulse: 70 71 74 68   Resp: 16 16 16 16   Temp:       TempSrc:       SpO2: 94% 94% 94% 94%   Weight:       Height:          10/18 0701 - 10/19 0700  In: -   Out: 175 [Urine:175]  Last 3 Recorded Weights in this Encounter    10/14/21 2217 10/17/21 1422 10/18/21 2337   Weight: 103 kg (227 lb 1.2 oz) 103 kg (227 lb 1.2 oz) 96.8 kg (213 lb 6.4 oz)      Physical exam:   GEN: NAD  NECK- Supple, no mass  RESP: No wheezing, Clear b/l  CVS: S1,S2  RRR  NEURO: Normal speech, Non focal  EXT: No Edema   PSYCH: Normal Mood  permacath    Chart reviewed. Pertinent Notes reviewed. Data Review :  No results for input(s): NA, K, CL, CO2, BUN, CREA, GLU, CA, MG, PHOS, URICA in the last 72 hours. Recent Labs     10/19/21  0923 10/18/21  1646   WBC 17.3* 18.4*   HGB 9.2* 9.5*   HCT 28.8* 28.4*    229     No results for input(s): FE, TIBC, PSAT, FERR in the last 72 hours.    Medication list  reviewed  Current Facility-Administered Medications   Medication Dose Route Frequency    heparin (porcine) 1,000 unit/mL injection 3,800 Units  3,800 Units Hemodialysis DIALYSIS PRN    atorvastatin (LIPITOR) tablet 40 mg  40 mg Oral QHS    aspirin chewable tablet 81 mg  81 mg Oral DAILY    dexAMETHasone (DECADRON) tablet 4 mg  4 mg Oral Q6H    fentaNYL citrate (PF) injection 50 mcg  50 mcg IntraVENous Q4H PRN    naloxone (NARCAN) injection 0.4 mg  0.4 mg IntraVENous PRN    amLODIPine (NORVASC) tablet 10 mg  10 mg Oral DAILY    bumetanide (BUMEX) tablet 2 mg  2 mg Oral DAILY    carvediloL (COREG) tablet 6.25 mg  6.25 mg Oral BID WITH MEALS    hydrALAZINE (APRESOLINE) tablet 50 mg  50 mg Oral BID    metoclopramide HCl (REGLAN) tablet 5 mg  5 mg Oral TID PRN    pantoprazole (PROTONIX) tablet 40 mg  40 mg Oral ACB    sevelamer carbonate (RENVELA) tab 800 mg  800 mg Oral TID WITH MEALS    ergocalciferol capsule 50,000 Units  50,000 Units Oral every Monday    albuterol (PROVENTIL VENTOLIN) nebulizer solution 2.5 mg  2.5 mg Nebulization Q6H PRN    insulin lispro (HUMALOG) injection   SubCUTAneous AC&HS    glucose chewable tablet 16 g  4 Tablet Oral PRN    dextrose (D50W) injection syrg 12.5-25 g  12.5-25 g IntraVENous PRN    glucagon (GLUCAGEN) injection 1 mg  1 mg IntraMUSCular PRN    pregabalin (LYRICA) capsule 75 mg  75 mg Oral BID    acetaminophen (TYLENOL) tablet 325 mg  325 mg Oral Q6H PRN    HYDROcodone-acetaminophen (NORCO) 7.5-325 mg per tablet 1 Tablet  1 Tablet Oral Q6H PRN    levothyroxine (SYNTHROID) tablet 175 mcg  175 mcg Oral Once per day on Mon Tue Wed Thu Fri Sat    levothyroxine (SYNTHROID) tablet 87.5 mcg  87.5 mcg Oral every Sunday    sodium chloride (NS) flush 5-40 mL  5-40 mL IntraVENous Q8H    sodium chloride (NS) flush 5-40 mL  5-40 mL IntraVENous PRN    polyethylene glycol (MIRALAX) packet 17 g  17 g Oral DAILY PRN    ondansetron (ZOFRAN ODT) tablet 4 mg  4 mg Oral Q8H PRN    Or    ondansetron (ZOFRAN) injection 4 mg  4 mg IntraVENous Q6H PRN    heparin (porcine) injection 5,000 Units  5,000 Units SubCUTAneous Q8H    labetaloL (NORMODYNE;TRANDATE) injection 10 mg  10 mg IntraVENous Q4H PRN    epoetin martha-epbx (RETACRIT) 12,000 Units combo injection  12,000 Units SubCUTAneous Q MON, WED & FRI          Griselda Ronquillo MD              [facility]  [facility] / [facility] [address]  [place], [address] [facility]  Phone - [phone]               Fax - [phone]

## 2021-10-19 NOTE — PROGRESS NOTES
Physical Therapy    Chart reviewed in prep for skilled PT treatment, with chart indicating MRI with sedation completed earlier this date and pt now on HD. Will hold evaluation and follow up later as able and appropriate.     Alonza Cushing, PT

## 2021-10-19 NOTE — PROGRESS NOTES
Report given to [facility] (incoming nurse) from [facility] (outgoing nurse). Patient took medications as scheduled without any problems see MAR. Rounded on patient frequently,npo after midnight due to MRI with contrast today. Patient required one assist with incontinent care. Medicated x1 for pain with good effects no further complaints of pain during shift. Patient remained on O2 @2LPM tolerated well.

## 2021-10-19 NOTE — PROGRESS NOTES
Ortho:    RLE radiculopathy   MRI reviewed  NO surgical intervention    Recommend ABHISHEK----will consult IR    Plan per Dr Nirali Kuhn PA-C

## 2021-10-19 NOTE — PROGRESS NOTES
End of Shift Note    Bedside shift change report given to  (oncoming nurse) by Kj Charles RN (offgoing nurse). Report included the following information SBAR    Shift worked:  7a-7p     Shift summary and any significant changes:    Pt went for MRI today and pt went to dialysis afterwards, pt tolerating medications, managed incontinence care PRN, assisted in ADLs PRN, pt active in care   Concerns for physician to address:    Zone phone for oncoming shift:  [phone]     Activity:  Activity Level: Bed Rest  Number times ambulated in hallways past shift: 0  Number of times OOB to chair past shift: 0    Cardiac:   Cardiac Monitoring: No           Access:   Current line(s): PICC and HD access     Genitourinary:   Urinary status: estrada    Respiratory:   O2 Device: Nasal cannula  Chronic home O2 use?: YES  Incentive spirometer at bedside: NO     GI:  Last Bowel Movement Date: 10/15/21  Current diet:  ADULT DIET Regular; Low Potassium (Less than 3000 mg/day)  Passing flatus: YES  Tolerating current diet: YES       Pain Management:   Patient states pain is manageable on current regimen: YES    Skin:  Ge Score: 16  Interventions: turn team and increase time out of bed    Patient Safety:  Fall Score:  Total Score: 3  Interventions: bed/chair alarm, gripper socks and pt to call before getting OOB  High Fall Risk: Yes    Length of Stay:  Expected LOS: 3d 19h  Actual LOS: Burton Marin RN

## 2021-10-19 NOTE — PROCEDURES
[address]  EEG    Name:  Tracy Mccullough  MR#:  [id number]  :  1960  ACCOUNT #:  [de-identified]  DATE OF SERVICE:  10/18/2021    CLINICAL INDICATION:  The patient is a 16-year-old male with a history of possible brain metastases. EEG to rule out seizures, rule-out cortical abnormality, rule out epilepsy. EEG CLASSIFICATION:  On this patient is dysrhythmia grade II, generalized. DESCRIPTION OF THE RECORD:  This is a 16-channel EEG recording on the patient to rule out seizures. This study showed a posteriorly located occipital alpha rhythm of 8-9 Hz in the posterior head region that did attenuate some with eye opening in the awake state. Throughout the recording, there was a moderate increase in some generalized 2-6 Hz activity seen, but no clear areas of focal slowing, no spike or spike-and-wave discharges and no recorded electrographic or dysrhythmic spells were seen. The patient did enter frequent states of drowsiness and states of sleep with K complexes and sleep spindles seen in the central head regions. Hyperventilation was not performed. Photic stimulation produced a minimal driving response in the posterior head regions. INTERPRETATION:  This is a moderately abnormal electroencephalogram due to the widespread generalized slow-wave activity seen most consistent with diffuse encephalopathy of toxic, metabolic or degenerative type. No clear focal process or epileptiform activity seen. Clinical correlation recommended.         Dayanara Lal MD      [id number]  D:  10/18/2021 20:50  T:  10/19/2021 2:30  JOB #:  [id number]  CC:  Bethany Pearce MD

## 2021-10-19 NOTE — PROCEDURES
[facility] / [phone]    Vitals Pre Post Assessment Pre Post   BP BP: (!) 144/60 (10/19/21 0920) 164/70 LOC A&Ox4 A&Ox4   HR Pulse (Heart Rate): 71 (10/19/21 0920) 70 Lungs Dim bases, wheeze clear   Resp Resp Rate: 16 (10/19/21 0920) 16 Cardiac Reg S1 S2 Reg S1 S2   Temp Temp: 97.6 °F (36.4 °C) (10/19/21 0920) 98.0, oral Skin CDI CDI   Weight  n/a n/a Edema Trace BLE Trace BLE   Tele status none none Pain 5/10 L leg, hip 0 reportedly     Orders   Duration: Start: 5029 End: 5963 Total: 3.5hr   Dialyzer: Dialyzer/Set Up Inspection: Carmen Jha (10/19/21 0920)   K Bath: Dialysate K (mEq/L): 3 (10/19/21 0920)   Ca Bath: Dialysate CA (mEq/L): 2.5 (10/19/21 0920)   Na: Dialysate NA (mEq/L): 140 (10/19/21 0920)   Bicarb: Dialysate HCO3 (mEq/L): 40 (10/19/21 0920)   Target Fluid Removal: Goal/Amount of Fluid to Remove (mL): 1000 mL (10/19/21 0920)     Access   Type & Location: [facility]   Comments:  Dressing CDI, dated 10/15/21. No s/s of infection. Both lumens aspirate & flush well. Running well at .                                        Labs   HBsAg (Antigen) / date:  Neg AG 10/16/21                                             HBsAb (Antibody) / date: <10 AB 10/16/21   Source: EPIC   Obtained/Reviewed  Critical Results Called HGB   Date Value Ref Range Status   10/19/2021 9.2 (L) 12.1 - 17.0 g/dL Final     Potassium   Date Value Ref Range Status   10/19/2021 6.2 (H) 3.5 - 5.1 mmol/L Final     Calcium   Date Value Ref Range Status   10/19/2021 9.3 8.5 - 10.1 MG/DL Final     BUN   Date Value Ref Range Status   10/19/2021 129 (H) 6 - 20 MG/DL Final     Creatinine   Date Value Ref Range Status   10/19/2021 5.17 (H) 0.70 - 1.30 MG/DL Final     Comment:     INVESTIGATED PER DELTA CHECK PROTOCOL        Meds Given   Name Dose Route   Heparin 1:1000 1.9ml & 1.9ml Dwell in CVC after HD               Adequacy / Fluid    Total Liters Process: 76.6L   Net Fluid Removed: 1500ml      Comments   Time Out Done:   (Time) 0637 Admitting Diagnosis: L leg pain   Consent obtained/signed: Informed Consent Verified: Yes (10/19/21 0920)   Machine / RO # Machine Number: [id number] (10/19/21 0920)   Primary Nurse Rpt Pre: Kj Charles RN   Primary Nurse Rpt Post: Kj Charles, RN   Pt Education: No BP cuff allowed on Access arm   Care Plan: ongoing   Pts outpatient clinic: [facility] Summary   Comments:     SBAR received from Primary RN. Pt arrived to HD suite A&Ox4. Consent signed & on file. 0920: Each catheter limb disinfected per p&p, caps removed, hubs disinfected per p&p. Each lumen aspirated for blood return and flushed with Normal Saline per policy. Labs drawn per request/ order. VSS. Dialysis Tx initiated. * no issues with treatment*  1251: Tx ended. VSS. Each dialysis catheter limb disinfected per p&p, all possible blood returned per p&p, and each dialysis hub disinfected per p&p. Each lumen flushed, post dialysis catheter Heparin dwell instilled per order, and caps applied. Bed locked and in the lowest position, call bell and belongings in reach. SBAR given to Primary, RN. Patient is stable at time of their departure. All Dialysis related medications have been reviewed.

## 2021-10-19 NOTE — PROGRESS NOTES
Consult  REFERRED BY:  Ernestina Rees MD    CHIEF COMPLAINT: Left-sided weakness      Subjective:     Ge Mcclendon is a 63 y.o. right-handed  male who developed left-sided weakness 2 days ago, and an MRI scan was attempted but he could not tolerate the MRI scan without conscious sedation and refused IV Ativan, and today he said he will try it but the MRI tech said he absolutely refused it when he got down there. The patient is being evaluated for possible cancer, and has multiple pulmonary nodules even though is not a smoker, and has severe back pain and right hip pain and is on for an MRI of the lumbar spine to rule out spinal mets also. He has end-stage renal disease and is on hemodialysis, and if this a diabetic not on medications. He also has obstructive sleep apnea, essential hypertension, history of previous Covid infection April 21 and history of thyroid cancer 5 years ago status post resection and chronic anemia obesity and possibly some mild COPD. Patient does not complain of headache, and had noticed the weakness before. He does not notice any right-sided weakness. He was placed on Decadron yesterday and a CT scan came back showing a probable metastases to the brain in the right frontal region, and he said his strength is somewhat better on the left side. We talked the MRI may plan to arrange dialysis after his MRI with contrast tomorrow so we can get the brain in the lumbar spine with contrast tomorrow. He will also be done with conscious sedation then also. In the meantime this is never had any seizure type problem we will not load with anticonvulsants yet. Continue the Decadron for now continue his tumor work-up. Patient agrees with plans as above. Patient had a carotid Doppler study today that just shows mild disease only, and his EEG shows no seizure activity, is mild generalized slowing. Patient says the strength in his left hand is getting slowly better.   Still has a mild hemiparesis. Patient has no complaints of headache or other new neurological symptoms. We discussed with his family and the patient in detail he agrees with plans as above. We will hold on anticonvulsants since his EEG did not show any seizure activity. Patient MRI of the brain just shows a solitary met in the right frontal region, and neurosurgery has seen the patient and feel he is not a surgical candidate due to his extensive history of cancer, and his MRI of the lumbar spine amazingly does not show mets. Oncology is following. Patient got dialysis today. Neurosurgery recommends stereotactic radiation or gamma knife for treatment of his brain mets.     Past Medical History:   Diagnosis Date    Adverse effect of anesthesia     \" STOP BREATHING 1 TIME C ANESTH\"    Cancer ([facility] [address] 2004    thyroid cancer    Chronic kidney disease     [facility] T-TH-S    Chronic pain     BACK SHOULDER AND ARM    COVID-19 04/2021    Depression     Diabetes ([address]     GERD (gastroesophageal reflux disease)     Heart failure (HCC)     stage 1    Hypercholesterolemia     Hypertension     Nausea & vomiting     PUD (peptic ulcer disease)     Sleep apnea     doesn't wear cpap    Thyroid cancer ([address]     TIA (transient ischemic attack) 2011    Vitamin D deficiency       Past Surgical History:   Procedure Laterality Date    HX HEART CATHETERIZATION      HX HEENT      THROAT SURGERY X 4    HX ORTHOPAEDIC      back     HX ORTHOPAEDIC      ARM AND SHOULDER    HX OTHER SURGICAL      thyroid, lymphnode    HX RETINAL DETACHMENT REPAIR      left eye    HX VASCULAR ACCESS      HD cather in chest    IR INSERT NON TUNL CVC OVER 5 YRS  8/18/2020    IR INSERT NON TUNL CVC OVER 5 YRS  7/23/2021    IR INSERT TUNL CVC W/O PORT OVER 5 YR  8/26/2020    IR INSERT TUNL CVC W/O PORT OVER 5 YR  7/27/2021    UPPER GI ENDOSCOPY,BALL DIL,30MM  7/17/2020         UPPER GI ENDOSCOPY,BIOPSY  7/17/2020         VASCULAR SURGERY PROCEDURE UNLIST      cardiac cath NEG.      Family History   Problem Relation Age of Onset    Diabetes Mother     Elevated Lipids Mother    Tatiana Daigle Bladder Disease Mother     Headache Mother     Migraines Mother     Heart Disease Mother     Hypertension Mother     Stroke Mother     Other Mother         ANEURSYM BRAIN    Bleeding Prob Father     Cancer Father         LEUKEMIA    Diabetes Father     Elevated Lipids Father     Mental Retardation Sister     Psychiatric Disorder Sister     Cancer Brother         LUNGS      Social History     Tobacco Use    Smoking status: Former Smoker     Quit date: 1994     Years since quittin.1    Smokeless tobacco: Never Used   Substance Use Topics    Alcohol use: Not Currently         Current Facility-Administered Medications:     heparin (porcine) 1,000 unit/mL injection 3,800 Units, 3,800 Units, Hemodialysis, DIALYSIS PRN, Virgen García MD    atorvastatin (LIPITOR) tablet 40 mg, 40 mg, Oral, QHS, Stephanie Duncan MD, 40 mg at 10/18/21 2224    aspirin chewable tablet 81 mg, 81 mg, Oral, DAILY, Gentry De La Rosa MD, 81 mg at 10/19/21 1328    dexAMETHasone (DECADRON) tablet 4 mg, 4 mg, Oral, Q6H, Emely Braga MD, 4 mg at 10/19/21 1704    fentaNYL citrate (PF) injection 50 mcg, 50 mcg, IntraVENous, Q4H PRN, Drew Luque MD    naloxone [facility]) injection 0.4 mg, 0.4 mg, IntraVENous, PRN, Drew Luque MD    amLODIPine (NORVASC) tablet 10 mg, 10 mg, Oral, DAILY, Drew Luque MD, 10 mg at 10/19/21 1327    bumetanide (BUMEX) tablet 2 mg, 2 mg, Oral, DAILY, Drew Luque MD, 2 mg at 10/19/21 1328    carvediloL (COREG) tablet 6.25 mg, 6.25 mg, Oral, BID WITH MEALS, Drew Luque MD, 6.25 mg at 10/18/21 1724    hydrALAZINE (APRESOLINE) tablet 50 mg, 50 mg, Oral, BID, Drew Luque MD, 50 mg at 10/18/21 1723    metoclopramide HCl (REGLAN) tablet 5 mg, 5 mg, Oral, TID PRN, Drew Luque MD    pantoprazole (PROTONIX) tablet 40 mg, 40 mg, Oral, ACB, Curly Friedman MD, 40 mg at 10/18/21 0908    sevelamer carbonate (RENVELA) tab 800 mg, 800 mg, Oral, TID WITH MEALS, Curly Friedman MD, 800 mg at 10/19/21 1703    ergocalciferol capsule 50,000 Units, 50,000 Units, Oral, every Monday, Curly Friedman MD, 50,000 Units at 10/18/21 0907    albuterol (PROVENTIL VENTOLIN) nebulizer solution 2.5 mg, 2.5 mg, Nebulization, Q6H PRN, Curly Friedman MD    insulin lispro (HUMALOG) injection, , SubCUTAneous, AC&HS, Curly Friedman MD, 3 Units at 10/19/21 1703    glucose chewable tablet 16 g, 4 Tablet, Oral, PRN, Curly Friedman MD    dextrose (D50W) injection syrg 12.5-25 g, 12.5-25 g, IntraVENous, PRN, Curly Friedman MD    glucagon [facility]) injection 1 mg, 1 mg, IntraMUSCular, PRN, Curly Friedman MD    pregabalin (LYRICA) capsule 75 mg, 75 mg, Oral, BID, Curly Friedman MD, 75 mg at 10/19/21 1703    acetaminophen (TYLENOL) tablet 325 mg, 325 mg, Oral, Q6H PRN, Curly Friedman MD    HYDROcodone-acetaminophen [facility]) 7.5-325 mg per tablet 1 Tablet, 1 Tablet, Oral, Q6H PRN, Curly Friedman MD, 1 Tablet at 10/19/21 0945    levothyroxine (SYNTHROID) tablet 175 mcg, 175 mcg, Oral, Once per day on Mon Tue Wed Thu Fri Sat, Curly Friedman MD, 175 mcg at 10/19/21 0544    levothyroxine (SYNTHROID) tablet 87.5 mcg, 87.5 mcg, Oral, every Sunday, Curly Friedman MD, 87.5 mcg at 10/17/21 0526    sodium chloride (NS) flush 5-40 mL, 5-40 mL, IntraVENous, Q8H, Curly Friedman MD, 10 mL at 10/19/21 1328    sodium chloride (NS) flush 5-40 mL, 5-40 mL, IntraVENous, PRN, Curly Friedman MD    polyethylene glycol (MIRALAX) packet 17 g, 17 g, Oral, DAILY PRN, Curly Friedman MD    ondansetron (ZOFRAN ODT) tablet 4 mg, 4 mg, Oral, Q8H PRN **OR** ondansetron (ZOFRAN) injection 4 mg, 4 mg, IntraVENous, Q6H PRN, Curly Friedman MD    heparin (porcine) injection 5,000 Units, 5,000 Units, SubCUTAneous, Q8H, Curly Friedman MD, 5,000 Units at 10/19/21 1328    labetaloL (NORMODYNE;TRANDATE) injection 10 mg, 10 mg, IntraVENous, Q4H PRN, Ginger Smyth MD    epoetin martha-epbx (RETACRIT) 12,000 Units combo injection, 12,000 Units, SubCUTAneous, Q MON, WED & FRI, Bhaskar Oviedo MD, 12,000 Units at 10/18/21 2220        Allergies   Allergen Reactions    Anesthetics - Amide Type - Select Amino Amides Shortness of Breath    Flexeril [Cyclobenzaprine] Hives    Tramadol Hives      MRI Results (most recent):  Results from Hospital Encounter encounter on 10/14/21    MRI BRAIN W WO CONT    Narrative  EXAM:  MRI BRAIN W WO CONT    INDICATION:    Brain METS, pt needs conscious sedation because of claustrophobia    COMPARISON:  None. CONTRAST: 20 ml IV ProHance. TECHNIQUE:  Multiplanar multisequence acquisition without and with contrast of the brain. FINDINGS:  There is an 11 x 12 mm ring-enhancing lesion with some surrounding edema in the  a right frontal convexity in view of the patient history this is likely a  metastases. X line there is no extra-axial fluid collection hemorrhage or shift. No additional enhancing lesion seen. Ventricles size is normal for age. Major vessels appear patent. There is moderate nonspecific white matter changes. Incidental mucosal thickening left maxillary sinus. Impression  1. Intra-axial single enhancing lesion right frontal convexity with surrounding  edema compatible with brain metastases. 2. Moderate white matter disease, nonspecific. Results from [place] encounter on 10/14/21    MRI BRAIN W WO CONT    Narrative  EXAM:  MRI BRAIN W WO CONT    INDICATION:    Brain METS, pt needs conscious sedation because of claustrophobia    COMPARISON:  None. CONTRAST: 20 ml IV ProHance. TECHNIQUE:  Multiplanar multisequence acquisition without and with contrast of the brain.     FINDINGS:  There is an 11 x 12 mm ring-enhancing lesion with some surrounding edema in the  a right frontal convexity in view of the patient history this is likely a  metastases. X line there is no extra-axial fluid collection hemorrhage or shift. No additional enhancing lesion seen. Ventricles size is normal for age. Major vessels appear patent. There is moderate nonspecific white matter changes. Incidental mucosal thickening left maxillary sinus. Impression  1. Intra-axial single enhancing lesion right frontal convexity with surrounding  edema compatible with brain metastases. 2. Moderate white matter disease, nonspecific. Review of Systems:  A comprehensive review of systems was negative except for: Constitutional: positive for fatigue and malaise  Respiratory: positive for pleurisy/chest pain, wheezing, dyspnea on exertion, emphysema or chronic bronchitis  Musculoskeletal: positive for myalgias, arthralgias, stiff joints, back pain, muscle weakness and bone pain  Neurological: positive for paresthesia, coordination problems, gait problems and weakness  Behvioral/Psych: positive for anxiety and depression   Vitals:    10/19/21 1230 10/19/21 1245 10/19/21 1251 10/19/21 1600   BP: (!) 144/60 (!) 147/63 (!) 164/70 (!) 124/46   Pulse: 67 69 70 67   Resp: 16 16 16 18   Temp:   98 °F (36.7 °C) 98 °F (36.7 °C)   TempSrc:   Oral    SpO2:    97%   Weight:       Height:         Objective:     I      NEUROLOGICAL EXAM:    Appearance: The patient is well developed, well nourished, provides a coherent history and is in no acute distress. Mental Status: Oriented to time, place and person, and the president, cognitive function is slow but probably normal and speech is fluent and no aphasia but has mild dysarthria. Mood and affect appropriate. Cranial Nerves:   Intact visual fields. Fundi are benign, disc are flat, no lesions seen on funduscopy. SCOOBY, EOM's full, no nystagmus, no ptosis. Facial sensation is normal. Corneal reflexes are not tested. Facial movement is showing central facial type weakness on the left. Hearing is abnormal bilaterally.  Palate is midline with normal sternocleidomastoid and trapezius muscles are normal. Tongue is midline. Neck without meningismus or bruits  Temporal arteries are not tender or enlarged  TMJ areas are not tender on palpation   Motor:  4/5 strength in upper and lower proximal and distal muscles on the right, and about 3/5 on the left with arm drift on the left. Normal bulk and tone. No fasciculations. Rapid alternating movement is slightly decreased on the left and normal on the right   Reflexes:   Deep tendon reflexes 1+/4 and symmetrical.  No babinski or clonus present   Sensory:   Abnormal to touch, pinprick and vibration and temperature in both feet to ankle level. DSS is intact   Gait:  Not tested   Tremor:   No tremor noted. Cerebellar:  Not tested abnormal cerebellar signs present on Romberg and tandem testing and finger-nose-finger exam.   Neurovascular:  Normal heart sounds and regular rhythm, peripheral pulses decreased, and no carotid bruits. Assessment:       ICD-10-CM ICD-9-CM    1. End stage renal disease on dialysis (HCC)  N18.6 585.6     Z99.2 V45.11    2. Acute hyperkalemia  E87.5 276.7    3. Physical debility  R53.81 799.3    4. Inability to ambulate due to left hip  R26.2 719.7      Active Problems:    Hyperkalemia (10/15/2021)      Leukocytosis (10/15/2021)      Difficulty walking (10/15/2021)      Right leg pain (10/15/2021)      ESRD (end stage renal disease) on dialysis ([address] (10/15/2021)      Brain metastases ([address] (10/17/2021)      Left hemiplegia ([address] (10/17/2021)      Bilateral carotid artery stenosis (10/17/2021)      Cerebral microvascular disease (10/17/2021)      Acute alteration in mental status (10/17/2021)      Convulsions ([address] (10/17/2021)      Hyperlipemia (10/17/2021)        Plan:     Patient was possible metastatic lesion to the right frontal lobe causing his left hemiparesis better on steroids.   Patient and work-up to establish his diagnosis as far as primary cancer. Patient also needs MRI of the lumbar spine with and without contrast to evaluate for metastatic lesions there. Patient end-stage renal disease and needs conscious sedation for his MRI scans so MRI is arranging this to be done and then followed by dialysis tomorrow. Continue Decadron for now. Difficult case, will follow carefully. Patient had a carotid Doppler study today that just shows mild disease only, and his EEG shows no seizure activity, is mild generalized slowing. Patient says the strength in his left hand is getting slowly better. Still has a mild hemiparesis. Patient has no complaints of headache or other new neurological symptoms. We discussed with his family and the patient in detail he agrees with plans as above. We will hold on anticonvulsants since his EEG did not show any seizure activity. Patient MRI of the brain just shows a solitary met in the right frontal region, and neurosurgery has seen the patient and feel he is not a surgical candidate due to his extensive history of cancer, and his MRI of the lumbar spine amazingly does not show mets. Oncology is following. Patient got dialysis today. Neurosurgery recommends stereotactic radiation or gamma knife for treatment of his brain mets. Continue excellent medical work-up as you are.     Signed By: My Willis MD     October 19, 2021       CC: Abdifatah Perry MD  FAX: [phone]

## 2021-10-19 NOTE — PROGRESS NOTES
MRI done. Looks like a single met to the right frontal lobe. Given his extensive history of cancer, would opt to do gamma knife or stereotactic radiosurgery to this. It is just in front of the motor strip and has a high potential for injury to his motor function.     Discussed with oncology    Will follow

## 2021-10-19 NOTE — PROGRESS NOTES
Occupational Therapy    Chart reviewed in prep for skilled OT treatment, with chart indicating MRI with sedation completed earlier this date and pt now on HD. Will hold evaluation and follow up later as able and appropriate.     Thank you,  Radha Gay, OT

## 2021-10-20 NOTE — PROGRESS NOTES
Dr Janell Moreno at bedside to obtain consent for [facility]. Procedure explained with opportunity to ask questions. Patient states understanding of procedure prior to signing consent.

## 2021-10-20 NOTE — PROGRESS NOTES
Hemodialysis / [phone]    Vitals Pre Post Assessment Pre Post   /67 144/88 LOC A & O x 4 A & O x4   HR 65 90 Lungs Clear farooq Clear farooq   Resp 18 16 Cardiac WNL WNL   Temp 98.2 98.9 Skin Dry and intact Dry and intact   Weight    Edema none none   Tele status   Pain Pain Intensity 1: 3 (10/20/21 0745)      Orders   Duration: Start: 0089 End: 8592 Total: 3 hours   Dialyzer: Dialyzer/Set Up Inspection: Genia Klinefelter (10/19/21 0920)   K Bath: Dialysate K (mEq/L): 3 (10/19/21 0920)   Ca Bath: Dialysate CA (mEq/L): 2.5 (10/19/21 0920)   Na: Dialysate NA (mEq/L): 140 (10/19/21 0920)   Bicarb: Dialysate HCO3 (mEq/L): 40 (10/19/21 0920)   Target Fluid Removal: Goal/Amount of Fluid to Remove (mL): 1000 mL (10/19/21 0920)     Access   Type & Location:   RIJ CVC: Dressing CDI. No s/s of infection. Both lumens aspirate & flush well. Running well at .     Comments:                                        Labs   HBsAg (Antigen) / date:                        Negative 10/16/21                       HBsAb (Antibody) / date:  susceptible 10/16/21   Source: [facility]   Obtained/Reviewed  Critical Results Called HGB   Date Value Ref Range Status   10/19/2021 9.2 (L) 12.1 - 17.0 g/dL Final     Potassium   Date Value Ref Range Status   10/19/2021 6.2 (H) 3.5 - 5.1 mmol/L Final     Calcium   Date Value Ref Range Status   10/19/2021 9.3 8.5 - 10.1 MG/DL Final     BUN   Date Value Ref Range Status   10/19/2021 129 (H) 6 - 20 MG/DL Final     Creatinine   Date Value Ref Range Status   10/19/2021 5.17 (H) 0.70 - 1.30 MG/DL Final     Comment:     INVESTIGATED PER DELTA CHECK PROTOCOL        Meds Given   Name Dose Route                    Adequacy / Fluid    Total Liters Process: 70.2L   Net Fluid Removed: 2000ml      Comments   Time Out Done:   (Time) 1138   Admitting Diagnosis: ESRD   Consent obtained/signed: Informed Consent Verified: Yes (10/19/21 0920)   Machine / Fernando Aguirre # Machine Number: [id number] (10/19/21 0920)   Primary Nurse Rpt Pre: Katina Brady   Primary Nurse Rpt Post: Katina Brady   Pt Education: procedure   Care Plan: Continue HD   Pts outpatient clinic:      Tx Summary   Comments:               SBAR received from Primary RN. Pt arrived to HD suite A&Ox4. Consent signed & on file. 1145: Each catheter limb disinfected per p&p, caps removed, hubs disinfected per p&p. Each lumen aspirated for blood return and flushed with Normal Saline per policy. Labs drawn per request/ order. VSS. Dialysis Tx initiated. 1445: Tx ended. VSS. Each dialysis catheter limb disinfected per p&p, all possible blood returned per p&p, and each dialysis hub disinfected per p&p. Each lumen flushed, post dialysis catheter Heparin dwell instilled per order, and caps applied. Bed locked and in the lowest position, call bell and belongings in reach. SBAR given to Primary, RN. Patient is stable at time of their/ my departure. All Dialysis related medications have been reviewed.

## 2021-10-20 NOTE — PROGRESS NOTES
End of shift report given to ESPERANZA Dumont (outgoing RN) from Kenia (outgoing RN). Report included the following information SBAR, MAR, Kardex, and recent results).

## 2021-10-20 NOTE — PROGRESS NOTES
HD TRANSFER - OUT REPORT:    Verbal report given to CARLOS De La Cruz rn on Debi Beasley being transferred to [facility]  (Unit) for routine progression of care       Report consisted of patient's Situation, Background, Assessment and   Recommendations(SBAR). Information from the following report(s) SBAR was reviewed with the receiving nurse. Method:  $$ Method: Hemodialysis (10/20/21 1445)    Fluid Removed  NET Fluid Removed (mL): 1500 ml (10/19/21 1251)     Patient response to treatment:  Unchanged     End Time  Hemodialysis End Time: 4588 (10/20/21 1445)  If not documented, dialysis nurse to update post-dialysis row in HD/Filtration flowsheet     Medications /Volume expansion agents or Fluid boluses administered during treatment? yes    Post-dialysis medication administration due?  no  Remind nurse to administer post-HD medication upon return to unit. Fistula hemostasis? no    Line heparinization? yes    Lines:     Opportunity for questions and clarification was provided.       Patient transported with: [employer]
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
## 2021-10-20 NOTE — PROGRESS NOTES
TRANSFER - IN REPORT:    Verbal report received from CARLOS Lopez(name) on Alex Mena  being received from [facility](unit) for routine progression of care      Report consisted of patients Situation, Background, Assessment and   Recommendations(SBAR). Information from the following report(s) SBAR was reviewed with the receiving nurse. Opportunity for questions and clarification was provided. Assessment completed upon patients arrival to unit and care assumed.

## 2021-10-20 NOTE — PROGRESS NOTES
Consult  REFERRED BY:  Pricilla Soto MD    CHIEF COMPLAINT: Left-sided weakness      Subjective:     Jesse More is a 63 y.o. right-handed  male who developed left-sided weakness 2 days ago, and an MRI scan was attempted but he could not tolerate the MRI scan without conscious sedation and refused IV Ativan, and today he said he will try it but the MRI tech said he absolutely refused it when he got down there. The patient is being evaluated for possible cancer, and has multiple pulmonary nodules even though is not a smoker, and has severe back pain and right hip pain and is on for an MRI of the lumbar spine to rule out spinal mets also. He has end-stage renal disease and is on hemodialysis, and if this a diabetic not on medications. He also has obstructive sleep apnea, essential hypertension, history of previous Covid infection April 21 and history of thyroid cancer 5 years ago status post resection and chronic anemia obesity and possibly some mild COPD. Patient does not complain of headache, and had noticed the weakness before. He does not notice any right-sided weakness. He was placed on Decadron yesterday and a CT scan came back showing a probable metastases to the brain in the right frontal region, and he said his strength is somewhat better on the left side. We talked the MRI may plan to arrange dialysis after his MRI with contrast tomorrow so we can get the brain in the lumbar spine with contrast tomorrow. He will also be done with conscious sedation then also. In the meantime this is never had any seizure type problem we will not load with anticonvulsants yet. Continue the Decadron for now continue his tumor work-up. Patient agrees with plans as above. Patient had a carotid Doppler study today that just shows mild disease only, and his EEG shows no seizure activity, is mild generalized slowing. Patient says the strength in his left hand is getting slowly better.   Still has a mild hemiparesis. Patient has no complaints of headache or other new neurological symptoms. We discussed with his family and the patient in detail he agrees with plans as above. We will hold on anticonvulsants since his EEG did not show any seizure activity. Patient MRI of the brain just shows a solitary met in the right frontal region, and neurosurgery has seen the patient and feel he is not a surgical candidate due to his extensive history of cancer, and his MRI of the lumbar spine amazingly does not show mets. Oncology is following. Patient got dialysis today. Neurosurgery recommends stereotactic radiation or gamma knife for treatment of his brain mets.   Patient has no headache, and the strength in his left side continues to improve on the steroids, and oncology will make a decision on what to do with his brain met, it is not surgical according to Dr. Myrna Stewart    Past Medical History:   Diagnosis Date    Adverse effect of anesthesia     \" STOP BREATHING 1 TIME C ANESTH\"    Cancer ([facility] [address] 2004    thyroid cancer    Chronic kidney disease     [facility]    Chronic pain     BACK SHOULDER AND ARM    COVID-19 04/2021    Depression     Diabetes ([address]     GERD (gastroesophageal reflux disease)     Heart failure (HCC)     stage 1    Hypercholesterolemia     Hypertension     Nausea & vomiting     PUD (peptic ulcer disease)     Sleep apnea     doesn't wear cpap    Thyroid cancer ([address]     TIA (transient ischemic attack) 2011    Vitamin D deficiency       Past Surgical History:   Procedure Laterality Date    HX HEART CATHETERIZATION      HX HEENT      THROAT SURGERY X 4    HX ORTHOPAEDIC      back     HX ORTHOPAEDIC      ARM AND SHOULDER    HX OTHER SURGICAL      thyroid, lymphnode    HX RETINAL DETACHMENT REPAIR      left eye    HX VASCULAR ACCESS      HD cather in chest    IR INJ FORAMIN EPID LUMB ANES/STER SNGL  10/20/2021    IR INSERT NON TUNL CVC OVER 5 YRS  8/18/2020    IR INSERT NON TUNL CVC OVER 5 YRS  2021    IR INSERT TUNL CVC W/O PORT OVER 5 YR  2020    IR INSERT TUNL CVC W/O PORT OVER 5 YR  2021    UPPER GI ENDOSCOPY,BALL DIL,30MM  2020         UPPER GI ENDOSCOPY,BIOPSY  2020         VASCULAR SURGERY PROCEDURE UNLIST      cardiac cath NEG.      Family History   Problem Relation Age of Onset    Diabetes Mother     Elevated Lipids Mother    Ifrah Lamer Bladder Disease Mother     Headache Mother     Migraines Mother     Heart Disease Mother     Hypertension Mother     Stroke Mother     Other Mother         ANEURSYM BRAIN    Bleeding Prob Father     Cancer Father         LEUKEMIA    Diabetes Father     Elevated Lipids Father     Mental Retardation Sister     Psychiatric Disorder Sister     Cancer Brother         LUNGS      Social History     Tobacco Use    Smoking status: Former Smoker     Quit date: 1994     Years since quittin.1    Smokeless tobacco: Never Used   Substance Use Topics    Alcohol use: Not Currently         Current Facility-Administered Medications:     heparin (porcine) 1,000 unit/mL injection 3,800 Units, 3,800 Units, Hemodialysis, DIALYSIS PRN, Katharine Chester MD, 3,800 Units at 10/20/21 1503    atorvastatin (LIPITOR) tablet 40 mg, 40 mg, Oral, QHS, Duncan, Benson Schilling MD, 40 mg at 10/19/21 2228    aspirin chewable tablet 81 mg, 81 mg, Oral, DAILY, Blanca Montes De Oca MD, 81 mg at 10/20/21 0847    dexAMETHasone (DECADRON) tablet 4 mg, 4 mg, Oral, Q6H, Daniel Roque MD, 4 mg at 10/20/21 1705    fentaNYL citrate (PF) injection 50 mcg, 50 mcg, IntraVENous, Q4H PRN, Nusrat Richard MD    naloxone [facility]) injection 0.4 mg, 0.4 mg, IntraVENous, PRN, Nusrat Richard MD    amLODIPine (NORVASC) tablet 10 mg, 10 mg, Oral, DAILY, Nusrat Richard MD, 10 mg at 10/20/21 0847    bumetanide (BUMEX) tablet 2 mg, 2 mg, Oral, DAILY, Nusrat Richard MD, 2 mg at 10/20/21 0847    carvediloL (COREG) tablet 6.25 mg, 6.25 mg, Oral, BID WITH Deana Hurt MD, 6.25 mg at 10/20/21 1705    hydrALAZINE (APRESOLINE) tablet 50 mg, 50 mg, Oral, BID, Ginger Smyth MD, 50 mg at 10/20/21 1705    metoclopramide HCl (REGLAN) tablet 5 mg, 5 mg, Oral, TID PRN, Ginger Smyth MD    pantoprazole (PROTONIX) tablet 40 mg, 40 mg, Oral, ACB, Ginger Smyth MD, 40 mg at 10/20/21 0848    sevelamer carbonate (RENVELA) tab 800 mg, 800 mg, Oral, TID WITH MEALS, Ginger Smyth MD, 800 mg at 10/20/21 1705    ergocalciferol capsule 50,000 Units, 50,000 Units, Oral, every Monday, Ginger Smyth MD, 50,000 Units at 10/18/21 0907    albuterol (PROVENTIL VENTOLIN) nebulizer solution 2.5 mg, 2.5 mg, Nebulization, Q6H PRN, Ginger Smyth MD    insulin lispro (HUMALOG) injection, , SubCUTAneous, AC&HS, Ginger Smyth MD, 2 Units at 10/20/21 1705    glucose chewable tablet 16 g, 4 Tablet, Oral, PRN, Ginger Smyth MD    dextrose (D50W) injection syrg 12.5-25 g, 12.5-25 g, IntraVENous, PRN, Ginger Smyth MD    glucagon [facility]) injection 1 mg, 1 mg, IntraMUSCular, PRN, Ginger Smyth MD    pregabalin (LYRICA) capsule 75 mg, 75 mg, Oral, BID, Ginger Smyth MD, 75 mg at 10/20/21 1705    acetaminophen (TYLENOL) tablet 325 mg, 325 mg, Oral, Q6H PRN, Ginger Smyth MD    HYDROcodone-acetaminophen [facility]) 7.5-325 mg per tablet 1 Tablet, 1 Tablet, Oral, Q6H PRN, Ginger Smyth MD, 1 Tablet at 10/20/21 1058    levothyroxine (SYNTHROID) tablet 175 mcg, 175 mcg, Oral, Once per day on Mon Tue Wed Thu Fri Sat, Ginger Smyth MD, 175 mcg at 10/20/21 7937    levothyroxine (SYNTHROID) tablet 87.5 mcg, 87.5 mcg, Oral, every Sunday, Ginger Smyth MD, 87.5 mcg at 10/17/21 0526    sodium chloride (NS) flush 5-40 mL, 5-40 mL, IntraVENous, Q8H, Ginger Smyth MD, 10 mL at 10/20/21 0746    sodium chloride (NS) flush 5-40 mL, 5-40 mL, IntraVENous, PRN, Ginger Smyth MD    polyethylene glycol [facility]) packet 17 g, 17 g, Oral, DAILY PRN, MD Justice Randle ondansetron (ZOFRAN ODT) tablet 4 mg, 4 mg, Oral, Q8H PRN **OR** ondansetron (ZOFRAN) injection 4 mg, 4 mg, IntraVENous, Q6H PRN, Jeannie Vidales MD    heparin (porcine) injection 5,000 Units, 5,000 Units, SubCUTAneous, Q8H, Jeannie Vidales MD, 5,000 Units at 10/20/21 0746    labetaloL (NORMODYNE;TRANDATE) injection 10 mg, 10 mg, IntraVENous, Q4H PRN, Jeannie Vidales MD    epoetin martha-epbx (RETACRIT) 12,000 Units combo injection, 12,000 Units, SubCUTAneous, Q MON, WED & Timo Valera MD, 12,000 Units at 10/18/21 2220        Allergies   Allergen Reactions    Anesthetics - Amide Type - Select Amino Amides Shortness of Breath    Flexeril [Cyclobenzaprine] Hives    Tramadol Hives      MRI Results (most recent):  Results from Hospital Encounter encounter on 10/14/21    MRI BRAIN W WO CONT    Narrative  EXAM:  MRI BRAIN W WO CONT    INDICATION:    Brain METS, pt needs conscious sedation because of claustrophobia    COMPARISON:  None. CONTRAST: 20 ml IV ProHance. TECHNIQUE:  Multiplanar multisequence acquisition without and with contrast of the brain. FINDINGS:  There is an 11 x 12 mm ring-enhancing lesion with some surrounding edema in the  a right frontal convexity in view of the patient history this is likely a  metastases. X line there is no extra-axial fluid collection hemorrhage or shift. No additional enhancing lesion seen. Ventricles size is normal for age. Major vessels appear patent. There is moderate nonspecific white matter changes. Incidental mucosal thickening left maxillary sinus. Impression  1. Intra-axial single enhancing lesion right frontal convexity with surrounding  edema compatible with brain metastases. 2. Moderate white matter disease, nonspecific.       Results from [place] encounter on 10/14/21    MRI BRAIN W WO CONT    Narrative  EXAM:  MRI BRAIN W WO CONT    INDICATION:    Brain METS, pt needs conscious sedation because of claustrophobia    COMPARISON:  None. CONTRAST: 20 ml IV ProHance. TECHNIQUE:  Multiplanar multisequence acquisition without and with contrast of the brain. FINDINGS:  There is an 11 x 12 mm ring-enhancing lesion with some surrounding edema in the  a right frontal convexity in view of the patient history this is likely a  metastases. X line there is no extra-axial fluid collection hemorrhage or shift. No additional enhancing lesion seen. Ventricles size is normal for age. Major vessels appear patent. There is moderate nonspecific white matter changes. Incidental mucosal thickening left maxillary sinus. Impression  1. Intra-axial single enhancing lesion right frontal convexity with surrounding  edema compatible with brain metastases. 2. Moderate white matter disease, nonspecific. Review of Systems:  A comprehensive review of systems was negative except for: Constitutional: positive for fatigue and malaise  Respiratory: positive for pleurisy/chest pain, wheezing, dyspnea on exertion, emphysema or chronic bronchitis  Musculoskeletal: positive for myalgias, arthralgias, stiff joints, back pain, muscle weakness and bone pain  Neurological: positive for paresthesia, coordination problems, gait problems and weakness  Behvioral/Psych: positive for anxiety and depression   Vitals:    10/20/21 1430 10/20/21 1445 10/20/21 1500 10/20/21 1558   BP: (!) 147/62 (!) 141/71 (!) 167/62 (!) 159/56   Pulse: 64 63 70 69   Resp:  16 18 18   Temp:  97.8 °F (36.6 °C)  97 °F (36.1 °C)   TempSrc:       SpO2:   99% 100%   Weight:       Height:         Objective:     I      NEUROLOGICAL EXAM:    Appearance: The patient is well developed, well nourished, provides a coherent history and is in no acute distress. Mental Status: Oriented to time, place and person, and the president, cognitive function is slow but probably normal and speech is fluent and no aphasia but has mild dysarthria. Mood and affect appropriate. Cranial Nerves:   Intact visual fields. Fundi are benign, disc are flat, no lesions seen on funduscopy. SCOOBY, EOM's full, no nystagmus, no ptosis. Facial sensation is normal. Corneal reflexes are not tested. Facial movement is showing central facial type weakness on the left. Hearing is abnormal bilaterally. Palate is midline with normal sternocleidomastoid and trapezius muscles are normal. Tongue is midline. Neck without meningismus or bruits  Temporal arteries are not tender or enlarged  TMJ areas are not tender on palpation   Motor:  4/5 strength in upper and lower proximal and distal muscles on the right, and about 3/5 on the left with arm drift on the left. Normal bulk and tone. No fasciculations. Rapid alternating movement is slightly decreased on the left and normal on the right   Reflexes:   Deep tendon reflexes 1+/4 and symmetrical.  No babinski or clonus present   Sensory:   Abnormal to touch, pinprick and vibration and temperature in both feet to ankle level. DSS is intact   Gait:  Not tested   Tremor:   No tremor noted. Cerebellar:  Not tested abnormal cerebellar signs present on Romberg and tandem testing and finger-nose-finger exam.   Neurovascular:  Normal heart sounds and regular rhythm, peripheral pulses decreased, and no carotid bruits. Assessment:       ICD-10-CM ICD-9-CM    1. End stage renal disease on dialysis (HCC)  N18.6 585.6     Z99.2 V45.11    2. Acute hyperkalemia  E87.5 276.7    3. Physical debility  R53.81 799.3    4.  Inability to ambulate due to left hip  R26.2 719.7      Active Problems:    Hyperkalemia (10/15/2021)      Leukocytosis (10/15/2021)      Difficulty walking (10/15/2021)      Right leg pain (10/15/2021)      ESRD (end stage renal disease) on dialysis ([address] (10/15/2021)      Brain metastases ([facility] [address] (10/17/2021)      Left hemiplegia ([facility] [address] (10/17/2021)      Bilateral carotid artery stenosis (10/17/2021)      Cerebral microvascular disease (10/17/2021) Acute alteration in mental status (10/17/2021)      Convulsions ([address] (10/17/2021)      Hyperlipemia (10/17/2021)        Plan:     Patient was possible metastatic lesion to the right frontal lobe causing his left hemiparesis better on steroids. Patient and work-up to establish his diagnosis as far as primary cancer. Patient also needs MRI of the lumbar spine with and without contrast to evaluate for metastatic lesions there. Patient end-stage renal disease and needs conscious sedation for his MRI scans so MRI is arranging this to be done and then followed by dialysis tomorrow. Continue Decadron for now. Difficult case, will follow carefully. Patient had a carotid Doppler study today that just shows mild disease only, and his EEG shows no seizure activity, is mild generalized slowing. Patient says the strength in his left hand is getting slowly better. Still has a mild hemiparesis. Patient has no complaints of headache or other new neurological symptoms. We discussed with his family and the patient in detail he agrees with plans as above. We will hold on anticonvulsants since his EEG did not show any seizure activity. Patient MRI of the brain just shows a solitary met in the right frontal region, and neurosurgery has seen the patient and feel he is not a surgical candidate due to his extensive history of cancer, and his MRI of the lumbar spine amazingly does not show mets. Oncology is following. Patient got dialysis today. Neurosurgery recommends stereotactic radiation or gamma knife for treatment of his brain mets. Patient has no headache, and the strength in his left side continues to improve on the steroids, and oncology will make a decision on what to do with his brain met, it is not surgical according to . Freddie Bryant  Discussed all this with patient and he agrees, and I will follow as needed for now, he is stable from his brain met.   Call if we can help  Continue excellent medical work-up as you are.    Signed By: Donell Wilson MD     October 20, 2021       CC: Namita Gipson MD  FAX: [phone]

## 2021-10-20 NOTE — PROGRESS NOTES
Nephrology Progress Note  José Miguel Butt     www. [facility]  Phone - [phone]   Patient: David Nielson    YOB: 1960        Date- 10/20/2021   Admit Date: 10/14/2021  CC: Follow up for ESRD          IMPRESSION & PLAN:    ESRD---Robi Higginbotham on TTS shift - Dr. Cheron Snellen.  Hypertension   Hyperkalemia   hyponatremia   Anemia of ckd   Type 2 diabetes   Back pain   single met to the right frontal lobe. 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    lung nodules    PLAN-   Hd  Today- he is getting daily hd for recent MRI   Check bmp with dialysis today   Continue renvela   Continue epogen   continue norvasc, hydralazine, coreg     Subjective: Interval History:   -  His k was high yesterday- pre hd- k 6.2  No labs done this am  bp high  No sob    Objective:   Vitals:    10/20/21 0048 10/20/21 0215 10/20/21 0650 10/20/21 0745   BP: (!) 179/59 (!) 157/59  (!) 170/50   Pulse:  65  67   Resp:    20   Temp:    97.7 °F (36.5 °C)   TempSrc:       SpO2:    97%   Weight:   93.8 kg (206 lb 11.2 oz)    Height:          10/19 0701 - 10/20 0700  In: 100 [P.O.:100]  Out: 2200 [Urine:700]  Last 3 Recorded Weights in this Encounter    10/17/21 1422 10/18/21 2337 10/20/21 0650   Weight: 103 kg (227 lb 1.2 oz) 96.8 kg (213 lb 6.4 oz) 93.8 kg (206 lb 11.2 oz)      Physical exam:   GEN: nad  NECK- Supple, no mass  RESP: No wheezing, clear b/l  CVS: S1,S2  RRR  NEURO: Normal speech, non focal  EXT: no edema  PSYCH: Normal Mood  permacath +    Chart reviewed. Pertinent Notes reviewed. Data Review :  Recent Labs     10/19/21  0923   *   K 6.2*   CL 94*   CO2 25   *   CREA 5.17*   *   CA 9.3   PHOS 7.0*     Recent Labs     10/19/21  0923 10/18/21  1646   WBC 17.3* 18.4*   HGB 9.2* 9.5*   HCT 28.8* 28.4*    229     No results for input(s): FE, TIBC, PSAT, FERR in the last 72 hours. Medication list  reviewed  Current Facility-Administered Medications   Medication Dose Route Frequency    heparin (porcine) 1,000 unit/mL injection 3,800 Units  3,800 Units Hemodialysis DIALYSIS PRN    atorvastatin (LIPITOR) tablet 40 mg  40 mg Oral QHS    aspirin chewable tablet 81 mg  81 mg Oral DAILY    dexAMETHasone (DECADRON) tablet 4 mg  4 mg Oral Q6H    fentaNYL citrate (PF) injection 50 mcg  50 mcg IntraVENous Q4H PRN    naloxone (NARCAN) injection 0.4 mg  0.4 mg IntraVENous PRN    amLODIPine (NORVASC) tablet 10 mg  10 mg Oral DAILY    bumetanide (BUMEX) tablet 2 mg  2 mg Oral DAILY    carvediloL (COREG) tablet 6.25 mg  6.25 mg Oral BID WITH MEALS    hydrALAZINE (APRESOLINE) tablet 50 mg  50 mg Oral BID    metoclopramide HCl (REGLAN) tablet 5 mg  5 mg Oral TID PRN    pantoprazole (PROTONIX) tablet 40 mg  40 mg Oral ACB    sevelamer carbonate (RENVELA) tab 800 mg  800 mg Oral TID WITH MEALS    ergocalciferol capsule 50,000 Units  50,000 Units Oral every Monday    albuterol (PROVENTIL VENTOLIN) nebulizer solution 2.5 mg  2.5 mg Nebulization Q6H PRN    insulin lispro (HUMALOG) injection   SubCUTAneous AC&HS    glucose chewable tablet 16 g  4 Tablet Oral PRN    dextrose (D50W) injection syrg 12.5-25 g  12.5-25 g IntraVENous PRN    glucagon (GLUCAGEN) injection 1 mg  1 mg IntraMUSCular PRN    pregabalin (LYRICA) capsule 75 mg  75 mg Oral BID    acetaminophen (TYLENOL) tablet 325 mg  325 mg Oral Q6H PRN    HYDROcodone-acetaminophen (NORCO) 7.5-325 mg per tablet 1 Tablet  1 Tablet Oral Q6H PRN    levothyroxine (SYNTHROID) tablet 175 mcg  175 mcg Oral Once per day on Mon Tue Wed Thu Fri Sat    levothyroxine (SYNTHROID) tablet 87.5 mcg  87.5 mcg Oral every Sunday    sodium chloride (NS) flush 5-40 mL  5-40 mL IntraVENous Q8H    sodium chloride (NS) flush 5-40 mL  5-40 mL IntraVENous PRN    polyethylene glycol (MIRALAX) packet 17 g  17 g Oral DAILY PRN    ondansetron (ZOFRAN ODT) tablet 4 mg  4 mg Oral Q8H PRN    Or    ondansetron (ZOFRAN) injection 4 mg  4 mg IntraVENous Q6H PRN    heparin (porcine) injection 5,000 Units  5,000 Units SubCUTAneous Q8H    labetaloL (NORMODYNE;TRANDATE) injection 10 mg  10 mg IntraVENous Q4H PRN    epoetin martha-epbx (RETACRIT) 12,000 Units combo injection  12,000 Units SubCUTAneous Q MON, WED & FRI          Angela Torres MD              [facility]  [facility] / [facility] [address], [address] [place]  Phone - [phone]               Fax - [phone]

## 2021-10-20 NOTE — PROGRESS NOTES
Received message from patient's nurse stating:    Pt.          Discussion / orders:     Entered order for lispro 3 units subcutaneous injection x1           Please note that this note was dictated using Dragon computer voice recognition software. Quite often unanticipated grammatical, syntax, homophones, and other interpretive errors are inadvertently transcribed by the computer software. Please disregard these errors. Please excuse any errors that have escaped final proofreading.

## 2021-10-20 NOTE — PROGRESS NOTES
Hospitalist Progress Note    NAME: Ovi Mcclain   :  1960   MRN:  [id number]     I reviewed pertinent labs and imaging, and discussed /agreed on the plan of care with Dr. Tenzin Crowder.     Assessment / Plan:  Low back pain  Right hip pain foot  Radiculopathy to RLE   Debility due to pain  Left Hemiparesis r/t Brain Mets   Ruled out CVA/TIA   -Increasing debility r/t pain   -CT abd/pelvis - No acute abnormality   -S/p MRI with sedation -        There is no evidence of osseous metastatic disease in the lumbar spine. No definite marrow signal abnormality. Minimal multilevel degenerative change. Moderate right foraminal stenosis at L5-S1.  -Appreciate Ortho input - Recommend epidural steroid injection for RLE radiculopathy   -Consult to IR - for injection today  -Hemiparesis has improved, continue steroids   -Appreciate PT/OT recommendations -       History of Papillary Carcinoma of the Thyroid in  S/p Resection   New Lung and Brain Mets   -CT Head - Superior right frontal vasogenic edema with small subgyral focus of increased attenuation which could represent calcification or hemorrhagic material.  Findings are consistent with metastasis. Further evaluation with or  contrast-enhanced MRI is recommended. -CT Chest -        1. Increase in size of pulmonary nodules when compared to 2021.       2. New small soft tissue nodule in the region of the right inferior thyroid bed,  suspicious for recurrent disease. 3. Incompletely demonstrated and poorly characterized liver lesions. CT of the  abdomen is recommended for further evaluation if not already performed.  -No mets seen on abd/pelvis CT   -MRI Brain        1. Intra-axial single enhancing lesion right frontal convexity with surrounding  edema compatible with brain metastases. 2. Moderate white matter disease, nonspecific.   -MRI Lumbar Spine - see results above, no osseous metastatic disease noted   -Appreciate Oncology input - continue thyroid suppression   -Appreciate Neurosurgery input - recommend gamma knife or stereostatic radiosurgery   -Appreciate Neurology input   -Continue decadron, hemiparesis is improving     Mild Leukocytosis   -Related to decadron  -No symptoms of infection, afebrile   -Continue to monitor    ESRD on HD T/TH/Sa  Missed dialysis x 2 sessions PTA d/t pain as noted above  Hyperkalemia - resolved   -Appreciate Neurology input - for HD today   -CXR clear no evidence of fluid overload   -Continue renvela, epogen      Hypertension   History of Flash Pulmonary Edema    Hyperlipidemia   -ECHO 10/2021 - EF 55-60%. Mild concentric hypertrophy.   -Continue coreg, hydralazine, amlodipine, atorvastatin, bumex     -Follow BP      Type II Diabetes Diet controlled PTA  -SSI   -Glucose has been elevated with PO decadron   -Monitor      SHOLA with Chronic Respiratory Failure on chronic 2-3 LPM NC, not on CPAP at home   Chronic anemia Stable     30.0 - 39.9 Obese / Body mass index is 30.52 kg/m². Estimated discharge date: October 21  Barriers:    Code status: Full  Prophylaxis: Hep SQ  Recommended Disposition: Home w/Family and HH PT, OT, RN     Subjective:     Chief Complaint / Reason for Physician Visit  Patient seen at bedside. Reports he is still having weakness to BLE. Poor historian. No complaints. Discussed plan of care, patient verbalized understanding/agremeent. Discussed with RN events overnight. Review of Systems:  Symptom Y/N Comments  Symptom Y/N Comments   Fever/Chills n   Chest Pain n    Poor Appetite n   Edema n    Cough n   Abdominal Pain n    Sputum n   Joint Pain n    SOB/NUÑEZ n   Pruritis/Rash n    Nausea/vomit n   Tolerating PT/OT y    Diarrhea n   Tolerating Diet y    Constipation n   Other       Could NOT obtain due to:      Objective:     VITALS:   Last 24hrs VS reviewed since prior progress note.  Most recent are:  Patient Vitals for the past 24 hrs:   Temp Pulse Resp BP SpO2   10/20/21 1245 -- 62 -- 134/63 --   10/20/21 1230 -- 60 -- (!) 130/50 --   10/20/21 1215 -- 60 -- (!) 133/58 --   10/20/21 1200 -- 60 -- (!) 144/64 --   10/20/21 1145 -- 70 -- (!) 157/78 --   10/20/21 1138 98.2 °F (36.8 °C) 65 18 (!) 170/67 --   10/20/21 0745 97.7 °F (36.5 °C) 67 20 (!) 170/50 97 %   10/20/21 0215 -- 65 -- (!) 157/59 --   10/20/21 0048 -- -- -- (!) 179/59 --   10/19/21 2305 98.5 °F (36.9 °C) 64 18 (!) 178/69 98 %   10/19/21 2019 97.3 °F (36.3 °C) 71 18 (!) 156/66 98 %   10/19/21 1600 98 °F (36.7 °C) 67 18 (!) 124/46 97 %       Intake/Output Summary (Last 24 hours) at 10/20/2021 1304  Last data filed at 10/20/2021 1845  Gross per 24 hour   Intake 100 ml   Output 300 ml   Net -200 ml        I had a face to face encounter and independently examined this patient on 10/20/2021, as outlined below:  PHYSICAL EXAM:  General: WD, WN. Alert, cooperative, no acute distress    EENT:  EOMI. Anicteric sclerae. MMM  Resp:  CTA bilaterally, no wheezing or rales. No accessory muscle use  CV:  Regular  rhythm,  No edema  GI:  Soft, obese, Non tender. +Bowel sounds  Neurologic:  Alert and oriented X 3, normal speech,   Psych:   Good insight. Not anxious nor agitated  Skin:  No rashes. No jaundice    Reviewed most current lab test results and cultures  YES  Reviewed most current radiology test results   YES  Review and summation of old records today    NO  Reviewed patient's current orders and MAR    YES  PMH/SH reviewed - no change compared to H&P  ________________________________________________________________________  Care Plan discussed with:    Comments   Patient x    Family      RN x    Care Manager x    Consultant  x                      Multidiciplinary team rounds were held today with , nursing, pharmacist and clinical coordinator. Patient's plan of care was discussed; medications were reviewed and discharge planning was addressed.      ________________________________________________________________________  Total NON critical care TIME:  25   Minutes    Total CRITICAL CARE TIME Spent:   Minutes non procedure based      Comments   >50% of visit spent in counseling and coordination of care x    ________________________________________________________________________  Murali Hughes NP     Procedures: see electronic medical records for all procedures/Xrays and details which were not copied into this note but were reviewed prior to creation of Plan. LABS:  I reviewed today's most current labs and imaging studies.   Pertinent labs include:  Recent Labs     10/20/21  1149 10/19/21  0923 10/18/21  1646   WBC 16.1* 17.3* 18.4*   HGB 10.0* 9.2* 9.5*   HCT 31.8* 28.8* 28.4*    220 229     Recent Labs     10/20/21  1149 10/19/21  0923    131*   K 5.1 6.2*   CL 97 94*   CO2 31 25   * 277*   BUN 86* 129*   CREA 3.66* 5.17*   CA 10.2* 9.3   PHOS 5.8* 7.0*   ALB 2.6* 2.4*       Signed: Murali Hughes NP

## 2021-10-20 NOTE — PROGRESS NOTES
Spiritual Care Assessment/Progress Note  [facility]      NAME: Jun Vazquez      MRN: [id number]  AGE: 63 y.o.  SEX: male  Baptist Affiliation: Protestant   Language: English     10/20/2021     Total Time (in minutes): 9     Spiritual Assessment begun in [facility] through conversation with:         [x]Patient        [] Family    [] Friend(s)        Reason for Consult: Initial/Spiritual assessment, patient floor     Spiritual beliefs: (Please include comment if needed)     [] Identifies with a roselia tradition:         [] Supported by a roselia community:            [] Claims no spiritual orientation:           [] Seeking spiritual identity:                [] Adheres to an individual form of spirituality:           [x] Not able to assess:                           Identified resources for coping:      [] Prayer                               [] Music                  [] Guided Imagery     [] Family/friends                 [] Pet visits     [] Devotional reading                         [x] Unknown     [] Other:                                               Interventions offered during this visit: (See comments for more details)    Patient Interventions: Initial visit, Initial/Spiritual assessment, patient floor, Affirmation of emotions/emotional suffering           Plan of Care:     [] Support spiritual and/or cultural needs    [] Support AMD and/or advance care planning process      [] Support grieving process   [] Coordinate Rites and/or Rituals    [] Coordination with community clergy   [] No spiritual needs identified at this time   [] Detailed Plan of Care below (See Comments)  [] Make referral to Music Therapy  [] Make referral to Pet Therapy     [] Make referral to Addiction services  [] Make referral to [facility]  [] Make referral to Spiritual Care Partner  [] No future visits requested        [x] Follow up upon further referrals     Comments:     Initial Spiritual Care Assessment visit for Mr. Box in 1115. Reviewed the chart before visit. Patient was alert, having a breakfast.  explored his current concerns or needs, patient denied both. Advised of  availability.       [id number] [facility] Ann Rees,Nora, Vik[address] Provider   Paging Service [phone])

## 2021-10-20 NOTE — PROGRESS NOTES
TRANSFER - OUT REPORT:    Verbal report given to Avila Paulson RN (name) on Jesse More  being transferred to Med Onc (unit) for routine progression of care       Report consisted of patients Situation, Background, Assessment and   Recommendations(SBAR). Information from the following report(s) SBAR and Procedure Summary was reviewed with the receiving nurse. Opportunity for questions and clarification was provided.       Patient transported with:   O2 @ 2 liters   Labels and consent
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
## 2021-10-20 NOTE — PROGRESS NOTES
End of Shift Note    Bedside shift change report given to [facility] (oncoming nurse) by Javier Coffey RN (offgoing nurse). Report included the following information SBAR, Kardex, ED Summary, Intake/Output, MAR and Recent Results    Shift worked:  [id number]     Shift summary and any significant changes:    Patient c/o pain to right hip. Medicate with Norco with good relief. Patient Hypertensive. Stable after pain med given. Patient slept well after pain med. Voiding QS. Tolerating PO. Concerns for physician to address:  none     Zone phone for oncoming shift:  [phone]     Activity:  Activity Level: Up with Assistance  Number times ambulated in hallways past shift: 0  Number of times OOB to chair past shift: 0    Cardiac:   Cardiac Monitoring: No           Access:   Current line(s): PIV, Jennifer and HD access     Genitourinary:   Urinary status: voiding    Respiratory:   O2 Device: Nasal cannula  Chronic home O2 use?: NO  Incentive spirometer at bedside: NO     GI:  Last Bowel Movement Date: 10/19/21  Current diet:  ADULT DIET Regular; Low Potassium (Less than 3000 mg/day)  Passing flatus: YES  Tolerating current diet: YES       Pain Management:   Patient states pain is manageable on current regimen: YES    Skin:  Ge Score: 18  Interventions: increase time out of bed and PT/OT consult    Patient Safety:  Fall Score:  Total Score: 3  Interventions: bed/chair alarm, assistive device (walker, cane, etc), gripper socks and pt to call before getting OOB  High Fall Risk: Yes    Length of Stay:  Expected LOS: 3d 19h  Actual LOS: 5      Julia Porter RN

## 2021-10-20 NOTE — PROGRESS NOTES
Problem: Falls - Risk of  Goal: *Absence of Falls  Description: Document Nila Nj Fall Risk and appropriate interventions in the flowsheet. Outcome: Progressing Towards Goal  Note: Fall Risk Interventions:  Mobility Interventions: Assess mobility with egress test, Bed/chair exit alarm         Medication Interventions: Assess postural VS orthostatic hypotension, Bed/chair exit alarm    Elimination Interventions: Bed/chair exit alarm, Call light in reach    History of Falls Interventions: Bed/chair exit alarm, Consult care management for discharge planning         Problem: Patient Education: Go to Patient Education Activity  Goal: Patient/Family Education  Outcome: Progressing Towards Goal     Problem: Pressure Injury - Risk of  Goal: *Prevention of pressure injury  Description: Document Ge Scale and appropriate interventions in the flowsheet.   Outcome: Progressing Towards Goal  Note: Pressure Injury Interventions:  Sensory Interventions: Assess changes in LOC, Assess need for specialty bed    Moisture Interventions: Absorbent underpads, Apply protective barrier, creams and emollients    Activity Interventions: Assess need for specialty bed, Chair cushion    Mobility Interventions: Assess need for specialty bed, Chair cushion    Nutrition Interventions: Document food/fluid/supplement intake, Discuss nutritional consult with provider    Friction and Shear Interventions: Apply protective barrier, creams and emollients, Feet elevated on foot rest                Problem: Pain  Goal: *Control of Pain  Outcome: Progressing Towards Goal  Goal: *PALLIATIVE CARE:  Alleviation of Pain  Outcome: Progressing Towards Goal

## 2021-10-20 NOTE — PROGRESS NOTES
End of Shift Note    Bedside shift change report given to  (oncoming nurse) by Ava Cisneros RN (offgoing nurse). Report included the following information SBAR    Shift worked:  7a-7p     Shift summary and any significant changes:    Pt went for ABHISHEK today, pt went to dialysis today, pt worked with PT/OT today, pt hyperglycemic in AM assessment--administered medications per orders, pt otherwise resting comfortably in room   Concerns for physician to address:    Zone phone for oncoming shift:  [phone]     Activity:  Activity Level: Up with Assistance  Number times ambulated in hallways past shift: 0  Number of times OOB to chair past shift: 1    Cardiac:   Cardiac Monitoring: No           Access:   Current line(s): PIV and HD access     Genitourinary:   Urinary status: voiding and incontinent    Respiratory:   O2 Device: None (Room air)  Chronic home O2 use?: YES  Incentive spirometer at bedside: NO     GI:  Last Bowel Movement Date: 10/19/21  Current diet:  ADULT DIET Regular; 4 carb choices (60 gm/meal); Low Potassium (Less than 3000 mg/day); Low Phosphorus (Less than 1000 mg)  Passing flatus: YES  Tolerating current diet: YES       Pain Management:   Patient states pain is manageable on current regimen: YES    Skin:  Ge Score: 18  Interventions: float heels and increase time out of bed    Patient Safety:  Fall Score:  Total Score: 3  Interventions: bed/chair alarm, gripper socks and pt to call before getting OOB  High Fall Risk: Yes    Length of Stay:  Expected LOS: 3d 19h  Actual LOS: Ambrosio Larson RN

## 2021-10-21 NOTE — PROGRESS NOTES
Bedside and Verbal shift change report given to [address] [place] [address] (oncoming nurse) by Harika Noland RN (offgoing nurse). Report included the following information SBAR, Kardex, Procedure Summary, MAR and Recent Results. Patient rested fairly this night sleeping at short intervals.  No complaints voiced
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
## 2021-10-21 NOTE — PROGRESS NOTES
Bedside and Verbal shift change report given to Alex (oncoming nurse) by Jenae Flores (offgoing nurse). Report included the following information SBAR, Kardex, Intake/Output, MAR, Accordion, Recent Results and Quality Measures.

## 2021-10-21 NOTE — PROGRESS NOTES
Rakesh Gonzalez, pt's dtr, called to express her concern and frustration that her father is being dc'd to home this evening w/HH (PT/OT/RN). She sts she was told he would go to inpt rehab at one point during this admission which is feasible given the pt is weak, greatly deconditioned and lives in a hotel room w/5 other family members.   Goldy Devine was provided w/the main phone # for [facility] and instructed to ask for the Nurse Pt Advocate of this facility to obtain help from them on this current  disposition.  Felicia Talley

## 2021-10-21 NOTE — PROGRESS NOTES
Nephrology Progress Note  José Miguel Butt     [email or web address]  Phone - [phone]   Patient: Marifer Mckeon    YOB: 1960        Date- 10/21/2021   Admit Date: 10/14/2021  CC: Follow up for ESRD          IMPRESSION & PLAN:    ESRD---Robi Higginbotham on TTS shift - Dr. Theresa Thakkar.  Hypertension   Hyperkalemia   hyponatremia   Anemia of ckd   Type 2 diabetes   Back pain   single met to the right frontal lobe. 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    lung nodules    PLAN-   Plan for Hd  Today, next HD will be on saturday   Continue renvela   Continue epogen   continue norvasc, hydralazine, coreg   Discussed with internal medicine team plan for gamma knife radiation treatment as an outpatient for papillary carcinoma of thyroid with mets     Subjective: Interval History:   -Seen on dialysis today  -No issues overnight    Objective:   Vitals:    10/21/21 0915 10/21/21 0930 10/21/21 0945 10/21/21 1000   BP: 121/61 (!) 116/59 126/63 121/63   Pulse: (!) 56 (!) 55 (!) 57 (!) 55   Resp: 18 18 18 18   Temp:       TempSrc:       SpO2:       Weight:       Height:          10/20 0701 - 10/21 0700  In: -   Out: 250 [Urine:250]  Last 3 Recorded Weights in this Encounter    10/17/21 1422 10/18/21 2337 10/20/21 0650   Weight: 103 kg (227 lb 1.2 oz) 96.8 kg (213 lb 6.4 oz) 93.8 kg (206 lb 11.2 oz)      Physical exam:   GEN: nad  NECK- Supple, no mass  RESP: No wheezing, clear b/l  CVS: S1,S2  RRR  NEURO: Normal speech, non focal  EXT: no edema  PSYCH: Normal Mood  permacath +    Chart reviewed. Pertinent Notes reviewed.      Data Review :  Recent Labs     10/21/21  0438 10/20/21  1149 10/19/21  0923   * 136 131*   K 5.1 5.1 6.2*   CL 96* 97 94*   CO2 32 31 25   BUN 61* 86* 129*   CREA 2.92* 3.66* 5.17*   * 309* 277*   CA 10.1 10.2* 9.3   PHOS  --  5.8* 7.0*     Recent Labs     10/21/21  0430 10/20/21  1149 10/19/21  0923   WBC 18.7* 16.1* 17.3*   HGB 10.4* 10.0* 9.2*   HCT 33.0* 31.8* 28.8*    242 220     No results for input(s): FE, TIBC, PSAT, FERR in the last 72 hours.    Medication list  reviewed  Current Facility-Administered Medications   Medication Dose Route Frequency    heparin (porcine) 1,000 unit/mL injection 3,800 Units  3,800 Units Hemodialysis DIALYSIS PRN    atorvastatin (LIPITOR) tablet 40 mg  40 mg Oral QHS    aspirin chewable tablet 81 mg  81 mg Oral DAILY    dexAMETHasone (DECADRON) tablet 4 mg  4 mg Oral Q6H    fentaNYL citrate (PF) injection 50 mcg  50 mcg IntraVENous Q4H PRN    naloxone (NARCAN) injection 0.4 mg  0.4 mg IntraVENous PRN    amLODIPine (NORVASC) tablet 10 mg  10 mg Oral DAILY    bumetanide (BUMEX) tablet 2 mg  2 mg Oral DAILY    carvediloL (COREG) tablet 6.25 mg  6.25 mg Oral BID WITH MEALS    hydrALAZINE (APRESOLINE) tablet 50 mg  50 mg Oral BID    metoclopramide HCl (REGLAN) tablet 5 mg  5 mg Oral TID PRN    pantoprazole (PROTONIX) tablet 40 mg  40 mg Oral ACB    sevelamer carbonate (RENVELA) tab 800 mg  800 mg Oral TID WITH MEALS    ergocalciferol capsule 50,000 Units  50,000 Units Oral every Monday    albuterol (PROVENTIL VENTOLIN) nebulizer solution 2.5 mg  2.5 mg Nebulization Q6H PRN    insulin lispro (HUMALOG) injection   SubCUTAneous AC&HS    glucose chewable tablet 16 g  4 Tablet Oral PRN    dextrose (D50W) injection syrg 12.5-25 g  12.5-25 g IntraVENous PRN    glucagon (GLUCAGEN) injection 1 mg  1 mg IntraMUSCular PRN    pregabalin (LYRICA) capsule 75 mg  75 mg Oral BID    acetaminophen (TYLENOL) tablet 325 mg  325 mg Oral Q6H PRN    HYDROcodone-acetaminophen (NORCO) 7.5-325 mg per tablet 1 Tablet  1 Tablet Oral Q6H PRN    levothyroxine (SYNTHROID) tablet 175 mcg  175 mcg Oral Once per day on Mon Tue Wed Thu Fri Sat    levothyroxine (SYNTHROID) tablet 87.5 mcg  87.5 mcg Oral every Sunday    sodium chloride (NS) flush 5-40 mL 5-40 mL IntraVENous Q8H    sodium chloride (NS) flush 5-40 mL  5-40 mL IntraVENous PRN    polyethylene glycol (MIRALAX) packet 17 g  17 g Oral DAILY PRN    ondansetron (ZOFRAN ODT) tablet 4 mg  4 mg Oral Q8H PRN    Or    ondansetron (ZOFRAN) injection 4 mg  4 mg IntraVENous Q6H PRN    heparin (porcine) injection 5,000 Units  5,000 Units SubCUTAneous Q8H    labetaloL (NORMODYNE;TRANDATE) injection 10 mg  10 mg IntraVENous Q4H PRN    epoetin martha-epbx (RETACRIT) 12,000 Units combo injection  12,000 Units SubCUTAneous Q MON, WED & FRI          Farhana Cook MD              [facility]  [facility] / [facility], [address]  [place], [address]  Phone - [phone]               Fax - [phone]

## 2021-10-21 NOTE — PROGRESS NOTES
Comprehensive Nutrition Assessment    Type and Reason for Visit: Initial, RD nutrition re-screen/LOS    Nutrition Recommendations/Plan:   Continue CCD, low potassium, low phos  Add Nepro shakes BID  Please document % meals and supplements consumed in flowsheet I/O's under intake     Nutrition Assessment:      Chart reviewed for LOS. Pt noted for radiculopathy to RLE, debility d/t pain, hx of papillary carcinoma of thyroid, new lung and brain mets, left hemiparesis d/t brain mets, ESRD on HD, DM2. Pt reports a good appetite today and PTA. No meal intake documented since admission. Per EMR, pt has lost 16lb (7%) in <2 months time, confirmed by pt. Likely d/t malignancy as he does not report eating any less. He presents with mild muscle and fat wasting. Pt agreeable to add Nepro shakes to his diet order. Will continue monitoring, anticipate d/c soon. Wt Readings from Last 10 Encounters:   10/20/21 93.8 kg (206 lb 11.2 oz)   10/05/21 97.7 kg (215 lb 6.2 oz)   09/06/21 100.9 kg (222 lb 7.1 oz)   08/30/21 99.2 kg (218 lb 11.1 oz)   08/03/21 100.1 kg (220 lb 9.6 oz)   07/28/21 96.1 kg (211 lb 13.8 oz)   07/21/21 104.7 kg (230 lb 14.4 oz)   07/07/21 104.5 kg (230 lb 6.4 oz)   07/01/21 102.3 kg (225 lb 9.6 oz)   05/10/21 101.6 kg (224 lb)   ]    Malnutrition Assessment:  Malnutrition Status: Moderate malnutrition    Context:  Chronic illness     Findings of the 6 clinical characteristics of malnutrition:   Energy Intake:  No significant decrease in energy intake  Weight Loss:  7 - Greater than 5% over 1 month     Body Fat Loss:  1 - Mild body fat loss, Triceps   Muscle Mass Loss:  1 - Mild muscle mass loss, Hand (interosseous)  Fluid Accumulation:  No significant fluid accumulation,     Strength:  Not performed         Estimated Daily Nutrient Needs:  Energy (kcal): 2266 kcals (BMR x 1. 3AF); Weight Used for Energy Requirements: Current  Protein (g): 75-94g (0.8-1.0g/kg);  Weight Used for Protein Requirements: Current  Fluid (ml/day): 2000mL or per MD; Method Used for Fluid Requirements: 1 ml/kcal      Nutrition Related Findings:  Labs: -304, A1c 6.2, K 5.1, phos 5.8 (10/20). Meds: bumex, decadron, synthroid, renvela, norco, humalog. BM 10/19. Wounds:    None       Current Nutrition Therapies:  ADULT DIET Regular; 4 carb choices (60 gm/meal); Low Potassium (Less than 3000 mg/day); Low Phosphorus (Less than 1000 mg)    Anthropometric Measures:  · Height:  5' 9\" (175.3 cm)  · Current Body Wt:  93.8 kg (206 lb 12.7 oz)    · Ideal Body Wt:  160 lbs:  129.2 %   · BMI Category:  Obese class 1 (BMI 30.0-34. 9)       Nutrition Diagnosis:   · Unintended weight loss related to catabolic illness (papillary carcinoma of thyroid with lung and brain mets) as evidenced by weight loss 7.5% in 3 months      Nutrition Interventions:   Food and/or Nutrient Delivery: Continue current diet, Start oral nutrition supplement  Nutrition Education and Counseling: No recommendations at this time  Coordination of Nutrition Care: Continue to monitor while inpatient    Goals:  PO intake >70% meals and supplements next 3-5 days       Nutrition Monitoring and Evaluation:   Behavioral-Environmental Outcomes: None identified  Food/Nutrient Intake Outcomes: Food and nutrient intake, Supplement intake  Physical Signs/Symptoms Outcomes: Biochemical data, GI status, Weight, Nutrition focused physical findings    Discharge Planning:    Continue oral nutrition supplement, Continue current diet     Electronically signed by Luan Haley RD on 10/21/2021 at 3:23 PM    Contact: [phone]

## 2021-10-21 NOTE — PROGRESS NOTES
Problem: Falls - Risk of  Goal: *Absence of Falls  Description: Document Gisele Lopez Fall Risk and appropriate interventions in the flowsheet. Outcome: Progressing Towards Goal  Note: Fall Risk Interventions:  Mobility Interventions: Communicate number of staff needed for ambulation/transfer, Patient to call before getting OOB, PT Consult for mobility concerns         Medication Interventions: Patient to call before getting OOB    Elimination Interventions: Bed/chair exit alarm, Call light in reach, Patient to call for help with toileting needs    History of Falls Interventions: Door open when patient unattended, Room close to nurse's station         Problem: Patient Education: Go to Patient Education Activity  Goal: Patient/Family Education  Outcome: Progressing Towards Goal     Problem: Pressure Injury - Risk of  Goal: *Prevention of pressure injury  Description: Document Ge Scale and appropriate interventions in the flowsheet.   Outcome: Progressing Towards Goal  Note: Pressure Injury Interventions:  Sensory Interventions: Assess changes in LOC, Check visual cues for pain, Maintain/enhance activity level, Pressure redistribution bed/mattress (bed type)    Moisture Interventions: Absorbent underpads, Check for incontinence Q2 hours and as needed, Maintain skin hydration (lotion/cream), Moisture barrier    Activity Interventions: Pressure redistribution bed/mattress(bed type), PT/OT evaluation    Mobility Interventions: Pressure redistribution bed/mattress (bed type), PT/OT evaluation    Nutrition Interventions: Document food/fluid/supplement intake    Friction and Shear Interventions: Apply protective barrier, creams and emollients, Feet elevated on foot rest                Problem: Patient Education: Go to Patient Education Activity  Goal: Patient/Family Education  Outcome: Progressing Towards Goal     Problem: Pain  Goal: *Control of Pain  Outcome: Progressing Towards Goal     Problem: Patient Education: Go to Patient Education Activity  Goal: Patient/Family Education  Outcome: Progressing Towards Goal
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
## 2021-10-21 NOTE — PROGRESS NOTES
Received message from patient's nurse stating:    BS this night is 380         Discussion / orders:     Entered order for lispro 4 units subcutaneous injection x1             Please note that this note was dictated using Dragon computer voice recognition software. Quite often unanticipated grammatical, syntax, homophones, and other interpretive errors are inadvertently transcribed by the computer software. Please disregard these errors. Please excuse any errors that have escaped final proofreading.

## 2021-10-21 NOTE — DISCHARGE SUMMARY
Hospitalist Discharge Summary     Patient ID:  Jesse More  [id number]  63 y.o.  1960  10/14/2021    PCP on record: Pricilla Soto MD    Admit date: 10/14/2021  Discharge date and time: 10/21/2021    DISCHARGE DIAGNOSIS:    Low back pain  Right hip pain foot  Radiculopathy to RLE   Debility due to pain  Left Hemiparesis r/t Brain Mets   Ruled out CVA/TIA   History of Papillary Carcinoma of the Thyroid in 2002 S/p Resection   New Lung and Brain Mets   Mild Leukocytosis   ESRD on HD T/TH/Sa  Missed dialysis x 2 sessions PTA d/t pain as noted above  Hyperkalemia   Hypertension   History of Flash Pulmonary Edema    Hyperlipidemia   Type II Diabetes  SHOLA with Chronic Respiratory Failure   Chronic anemia      CONSULTATIONS:  IP CONSULT TO HOSPITALIST  IP CONSULT TO NEPHROLOGY  IP CONSULT TO NEUROLOGY  IP CONSULT TO ONCOLOGY  IP CONSULT TO INTERVENTIONAL RADIOLOGY  IP CONSULT TO ORTHOPEDIC SURGERY  IP CONSULT TO NEUROSURGERY    Excerpted HPI from H&P of Mary Lou Crocker MD:  Jesse More is a 63 y.o. male with PMH end-stage renal disease on dialysis T/TH/SA, TIA, cholesterol, diabetes, hypertension, previous thyroid cancer, CHF, GERD/PUD, COVID-19 in April 2021, who presents via EMS to the ED with c/o weakness and SOB.     Patient has had dialysis on October 9, missed his last 2 dialysis session due to pain in his right hip radiating down to the right foot causing difficulty walking. He reports pain started about 4.5 months ago, constant, sharp, stabbing, now 10 out of 10. Pain is worse with walking or turning in bed. No relieving factors. Also has pain in his lower back. Denies fevers, chills, loss of appetite, significant change in weight. He has chronic shortness of breath with exertion has been unchanged. Denies coughing, wheezing. No abdominal pain, nausea, vomiting. For the past 2 days has diarrhea twice a day. Denies any bleeding. Denies any incontinence of stool or urine. Some dysuria. Denies any trauma to his back or leg.     On chart review, he has been seen in the ER for complaints of right leg pain on September 6 and October 5.  9/6 had negative x-ray of the right hip, lumbar spine, right knee and Doppler ultrasound of the right leg; he was prescribed voltaren gel and neurontin. On 10/5, seen in the ER for right leg pain and wheezing. He was discharged on Lyrica 25 mg 3 times daily. He then went to [address] [place] [address] on October 7 had x-ray of the lumbar spine showing degenerative changes and was diagnosed with lumbar radiculitis and lumbar degenerative disease. He was prescribed Medrol Dosepak, Lyrica 75 mg twice a day for 14 days, Norco 5/325 every 6 hours as needed (total of 12 tablets prescribed) and referred to outpatient physical therapy. Has follow up on 10/21. He reports compliance with these medications but pain continues to worsen, unable to get up, walk to go to dialysis.     We were asked to admit for work up and evaluation of the above problems. ______________________________________________________________________  DISCHARGE SUMMARY/HOSPITAL COURSE:  for full details see H&P, daily progress notes, labs, consult notes. Low back pain  Right hip pain foot  Radiculopathy to RLE   Debility due to pain  Left Hemiparesis r/t Brain Mets   Ruled out CVA/TIA   -Increasing debility r/t pain   -CT abd/pelvis - No acute abnormality   -S/p MRI with sedation -        There is no evidence of osseous metastatic disease in the lumbar spine. No definite marrow signal abnormality. Minimal multilevel degenerative change. Moderate right foraminal stenosis at L5-S1.  -Appreciate Ortho input - Recommend epidural steroid injection for RLE radiculopathy   -Consult to IR - s/p for injection 10/20  -Hemiparesis has improved, continue steroids   -Appreciate PT/OT recommendations - initially recommended HH, reassessed yesterday and recommended SNF.  DC was cancelled at that time until SNF placement.   -Appreciate CM - Accepted to [facility]   History of Papillary Carcinoma of the Thyroid in 2002 S/p Resection   New Lung and Brain Mets   -CT Head - Superior right frontal vasogenic edema with small subgyral focus of increased attenuation which could represent calcification or hemorrhagic material.  Findings are consistent with metastasis. Further evaluation with or  contrast-enhanced MRI is recommended. -CT Chest -        1. Increase in size of pulmonary nodules when compared to 1/8/2021.       2. New small soft tissue nodule in the region of the right inferior thyroid bed,  suspicious for recurrent disease. 3. Incompletely demonstrated and poorly characterized liver lesions. CT of the  abdomen is recommended for further evaluation if not already performed.  -No mets seen on abd/pelvis CT   -MRI Brain        1. Intra-axial single enhancing lesion right frontal convexity with surrounding  edema compatible with brain metastases. 2. Moderate white matter disease, nonspecific. -MRI Lumbar Spine - see results above, no osseous metastatic disease noted   -Appreciate Oncology input - continue thyroid suppression, recommended to continue decadron 4 mg TID until follow up with Neurosurgery   -Appreciate Neurosurgery input - recommend gamma knife or stereostatic radiosurgery   -Appreciate Neurology input   -Continue decadron, hemiparesis is improving - continue decadron until follow up with Neurosurgery   Mild Leukocytosis   -Related to decadron  -No symptoms of infection, afebrile   ESRD on HD T/TH/Sa  Missed dialysis x 2 sessions PTA d/t pain as noted above  Hyperkalemia - resolved   -Appreciate Neurology input - S/p HD today   -CXR clear no evidence of fluid overload   -Continue renvela, epogen   Hypertension   History of Flash Pulmonary Edema    Hyperlipidemia   -ECHO 10/2021 - EF 55-60%.  Mild concentric hypertrophy.   -Continue coreg, hydralazine, amlodipine, atorvastatin, bumex     Type II Diabetes Diet controlled PTA  -Glucose has been elevated with PO decadron   SHOLA with Chronic Respiratory Failure on chronic 2-3 LPM NC, not on CPAP at home   Chronic anemia Stable     Patient seen at bedside. Also, spoke with daughter Whitney Ramirez via phone. Patient's plan of care has been reviewed with them. Patient and/or family have verbally conveyed their understanding and agreement of the patient's signs, symptoms, diagnosis, treatment and prognosis and additionally agree to follow up as recommended or return to [facility] should their condition change prior to follow-up. Discharge instructions have also been provided to the patient with some educational information regarding their diagnosis as well a list of reasons why they would want to return to the office prior to their follow-up appointment should their condition change.    _______________________________________________________________________  Patient seen and examined by me on discharge day. Pertinent Findings:  Gen:    Not in distress  Chest: Clear lungs  CVS:   Regular rhythm. No edema  Abd:  Soft, obrdr, not tender  Neuro:  Alert and oriented x3  _______________________________________________________________________  DISCHARGE MEDICATIONS:   Current Discharge Medication List      START taking these medications    Details   aspirin 81 mg chewable tablet Take 1 Tablet by mouth daily. Qty: 30 Tablet, Refills: 0  Start date: 10/22/2021      dexAMETHasone (DECADRON) 4 mg tablet Take 4 mg by mouth every eight (8) hours. Qty: 90 Tablet, Refills: 0  Start date: 10/21/2021      HYDROcodone-acetaminophen (NORCO) 7.5-325 mg per tablet Take 1 Tablet by mouth every eight (8) hours as needed for Pain for up to 3 days. Max Daily Amount: 3 Tablets. Qty: 9 Tablet, Refills: 0  Start date: 10/21/2021, End date: 10/24/2021    Associated Diagnoses: Brain metastases ([facility] [address]  Right leg pain         CONTINUE these medications which have NOT CHANGED    Details   pregabalin (LYRICA) 25 mg capsule Take 1 Capsule by mouth three (3) times daily. Max Daily Amount: 75 mg. Qty: 12 Capsule, Refills: 0    Associated Diagnoses: Arthralgia of right lower leg      hydrALAZINE (APRESOLINE) 50 mg tablet Take 1 tablet by mouth twice daily  Qty: 60 Tablet, Refills: 4    Associated Diagnoses: Essential hypertension with goal blood pressure less than 130/80      sodium bicarbonate 650 mg tablet TAKE 2 TABLETS BY MOUTH TWICE DAILY    Associated Diagnoses: ESRD (end stage renal disease) on dialysis ([facility])      bumetanide (BUMEX) 2 mg tablet Take 1 Tablet by mouth daily. Qty: 30 Tablet, Refills: 0      omeprazole (PRILOSEC) 20 mg capsule Take 1 capsule by mouth once daily  Qty: 90 Capsule, Refills: 1    Associated Diagnoses: Gastroesophageal reflux disease with esophagitis without hemorrhage      carvediloL (COREG) 6.25 mg tablet TAKE 1 TABLET BY MOUTH TWICE DAILY WITH MEALS  Qty: 60 Tab, Refills: 3    Associated Diagnoses: Essential hypertension with goal blood pressure less than 130/80      acetaminophen (TYLENOL) 325 mg tablet Take 1 Tab by mouth as needed for Pain. Take tab as needed for pain  Qty: 90 Tab, Refills: 1      metoclopramide HCl (REGLAN) 5 mg tablet TAKE 1 TABLET BY MOUTH ONCE DAILY AS NEEDED. DO NOT TAKE MORE THAN 3 TABLETS A DAY. Qty: 30 Tab, Refills: 2    Associated Diagnoses: Nausea in adult      simvastatin (ZOCOR) 20 mg tablet Take 1 Tab by mouth nightly. Qty: 90 Tab, Refills: 3    Associated Diagnoses: Dyslipidemia (high LDL; low HDL)      amLODIPine (NORVASC) 10 mg tablet Take 1 Tab by mouth daily.   Qty: 30 Tab, Refills: 5    Associated Diagnoses: Essential hypertension with goal blood pressure less than 130/80      Vitamin D2 1,250 mcg (50,000 unit) capsule TAKE 1 CAPSULE BY MOUTH ONCE A WEEK  Qty: 4 Cap, Refills: 5    Associated Diagnoses: Vitamin D deficiency      levothyroxine (SYNTHROID) 175 mcg tablet 1 full tab Mon-Sat but only 1/2 tab on sundays  Qty: 90 Tab, Refills: 3      sodium zirconium cyclosilicate (Lokelma) 5 gram powder packet Take 5 g by mouth two (2) times a week. On Fridays and Sundays      sevelamer carbonate (RENVELA) 800 mg tab tab Take 1 Tab by mouth three (3) times daily (with meals). Qty: 90 Tab, Refills: 3      Ventolin HFA 90 mcg/actuation inhaler TAKE 1-2 PUFFS EVEERY 4-6 HOURS AS NEEDED FOR DYSPNEA AND WHEEZING  Qty: 1 Inhaler, Refills: 2      Lancets misc Use preferred brand; Check glucose 3-4 times daily, Diagnosis E11.22  Qty: 2 Package, Refills: 3         STOP taking these medications       gabapentin (NEURONTIN) 100 mg capsule Comments:   Reason for Stopping:         diclofenac (Voltaren) 1 % gel Comments:   Reason for Stopping:                 Patient Follow Up Instructions: Activity: PT/OT per Home Health  Diet: Diabetic Diet  Wound Care: None needed  Follow-up Information     Follow up With Specialties Details Why Contact Info    Namita Gipson MD Internal Medicine Go on 10/26/2021 11:30AM [id number] [facility]  [address]  [address] Call in 1 day this is your home health provider. [address] [place] [address] , Dasia Maier MD Neurosurgery Schedule an appointment as soon as possible for a visit left  with office to request an appointment.   [address] [place] [address] [address]kw[address]  [phone]      Mable Greene MD Hematology and Oncology, Internal Medicine, Hematology, Oncology  The office will call you for an appt next week  [facility] [address]  [facility]  [phone]          ________________________________________________________________    Risk of deterioration: Low    Condition at Discharge:  Stable  __________________________________________________________________    Disposition  Home with family and home health services    ____________________________________________________________________    Code Status: Full Code  ___________________________________________________________________      Total time in minutes spent coordinating this discharge (includes going over instructions, follow-up, prescriptions, and preparing report for sign off to her PCP) :  >30 minutes    Signed:  Angeles Theodore NP

## 2021-10-21 NOTE — PROCEDURES
[facility] [place] [facility] / [phone]    Vitals Pre Post Assessment Pre Post   BP BP: (!) 162/69 (10/21/21 0815) 131/62 LOC A&Ox4 A&Ox4   HR Pulse (Heart Rate): 62 (10/21/21 0815) 56 Lungs Dim bases clear   Resp Resp Rate: 18 (10/21/21 0815) 18 Cardiac Tele, NSR Tele, NSR   Temp Temp: 97.7 °F (36.5 °C) (10/21/21 0815) 97.5, oral Skin CDI CDI   Weight  n/a n/a Edema none none   Tele status remote remote Pain 0 0     Orders   Duration: Start: 0815 End: 1630 Total: 3.25hr   Dialyzer: Dialyzer/Set Up Inspection: Maxx Cadet (10/21/21 0815)   K Bath: Dialysate K (mEq/L): 2 (10/21/21 0815)   Ca Bath: Dialysate CA (mEq/L): 2.5 (10/21/21 0815)   Na: Dialysate NA (mEq/L): 138 (10/21/21 0815)   Bicarb: Dialysate HCO3 (mEq/L): 35 (10/21/21 0815)   Target Fluid Removal: Goal/Amount of Fluid to Remove (mL): 1500 mL (10/21/21 0815)     Access   Type & Location: [facility]   Comments:    Dressing CDI. Dressing changed and dated for today. No s/s of infection. Both lumens aspirate & flush well. Running well at .                                      Labs   HBsAg (Antigen) / date: Neg Ag 10/16/2021                                       HBsAb (Antibody) / date: <10 Ab 10/16/2021   Source: EPIC   Obtained/Reviewed  Critical Results Called HGB   Date Value Ref Range Status   10/21/2021 10.4 (L) 12.1 - 17.0 g/dL Final     Potassium   Date Value Ref Range Status   10/21/2021 5.1 3.5 - 5.1 mmol/L Final     Calcium   Date Value Ref Range Status   10/21/2021 10.1 8.5 - 10.1 MG/DL Final     BUN   Date Value Ref Range Status   10/21/2021 61 (H) 6 - 20 MG/DL Final     Creatinine   Date Value Ref Range Status   10/21/2021 2.92 (H) 0.70 - 1.30 MG/DL Final        Meds Given   Name Dose Route   Heparin 1:1000 1.9ml & 1.9ml Dwell in CVC after HD               Adequacy / Fluid    Total Liters Process: 73.0L   Net Fluid Removed: 1500ml      Comments   Time Out Done:   (Time) 0813   Admitting Diagnosis: L leg pain   Consent obtained/signed: Informed Consent Verified: Yes (10/21/21 0815)   Machine / RO # Machine Number: [id number] (10/21/21 0815)   Primary Nurse Rpt Pre: Denice Vasquez RN   Primary Nurse Rpt Post: Elver Lemus RN   Pt Education: procedural   Care Plan: D/c after HD   Pts outpatient clinic: [facility] Summary   Comments:       SBAR received from Primary RN. Pt arrived to HD suite A&Ox4. Consent signed & on file. 0815: Each catheter limb disinfected per p&p, caps removed, hubs disinfected per p&p. Each lumen aspirated for blood return and flushed with Normal Saline per policy. VSS. Dialysis Tx initiated. * no issues during treatment*  1131: Tx ended. VSS. Each dialysis catheter limb disinfected per p&p, all possible blood returned per p&p, and each dialysis hub disinfected per p&p. Each lumen flushed, post dialysis catheter Heparin dwell instilled per order, and caps applied. Bed locked and in the lowest position, call bell and belongings in reach. SBAR given to Primary, RN. Patient is stable at time of their departure. All Dialysis related medications have been reviewed.

## 2021-10-21 NOTE — PROGRESS NOTES
Transition of Care Plan:     RUR: 26%  Disposition: SNF placement    Follow up appointments: PCP, specialists   DME needed: TBD  Transportation at Discharge: medical transport   [address] or means to access home: yes        IM Medicare Letter: reviewed on 10/21/21  Is patient a BCPI-A Bundle: No                If yes, was Bundle Letter given?:     Caregiver Contact: Christa Wilson (daughter)  Discharge Caregiver contacted prior to discharge? yes    Chart reviewed. Aware of discharge order. CM spoke with daughter this AM to notify of d/c today. As of yesterday, PT/OT recommending HHC and a RW at discharge. Confirmed that pt already had a RW and an electric wheelchair at home. Daughter states that pt has used [facility] in the past and agreeable to using same agency again at discharge. Referral sent today to [facility] via [facility]. [facility] accepted referral. CM faxed d/c summary and flow sheets to [facility] and notified [facility] of pt discharge. Family to transport home. Pt states that he will require transportation assistance after discharge as he cannot currently drive. Note pt has Medicaid and can utilize insurance benefit for transportation to and from HD and appointments. Also informed that  at [facility] can also assist pt with transportation arrangements after discharge. Pt/family expressed concerns of discharging home. Daughter requesting rehab. Medicare pt has received, reviewed, and signed 2nd  letter informing them of their right to appeal the discharge. Signed copy has been placed on pt bedside chart. Pt was provided with copy of letter to keep. Hospitalist NP and CM requested PT to re-evaluate pt prior to d/c. PT re-evaluated patient this afternoon and now recommending SNF placement. Pt deferred SNF choice to daughter. CM left VM for daughter, Shraddha Even and awaiting return call. Delay entered. Will continue to follow.     Pepe Knox MSW   Care Manager, [id number] [facility]  [phone]

## 2021-10-21 NOTE — PROGRESS NOTES
Occupational Therapy  Chart reviewed; currently off unit; will retry later as able; note plan for discharge today after dialysis.  Juliane Hill OTR/L

## 2021-10-22 NOTE — PROGRESS NOTES
.I have reviewed discharge instructions with the patient. The patient verbalized understanding. Patient discharge paperwork reviewed. Report given to Win Nelson and [facility]. IV removed and personal belongings with patient.

## 2021-10-22 NOTE — PROGRESS NOTES
Date/Time:  10/22/2021 12:59 PM     Pt's dtr, Yadira Matta, called [facility] to inquire about transportation for her father from [facility] to [place] [address] HD facility in [place]. She was informed by CM at [facility] that pt's Medicaid does not cover transportation, so the family will be responsible for getting him to HD appt's. [facility] called Keena Wylie at [address]. She sts she is the one who handles pt appt's and transportation at this facility. Keena Wylie took [facility]'s contact info and will c/b with what she finds. [facility] also informed Keena Wylie that pt has Medicare A&B.  Cinthia Walker    Date/Time:  10/22/2021 4:41 PM     [facility] [address]   She sts the pt does not have any coverage for transportation so she will be escalating this issue to the DON and contact pt's dtr, nilesh Shaw/the current status.  Cinthia Walker

## 2021-10-22 NOTE — PROGRESS NOTES
Bedside and Verbal shift change report given to Malina RN (oncoming nurse) by Suzan Neves RN (offgoing nurse). Report included the following information SBAR, Kardex, Intake/Output, MAR and Recent Results. Patient rested fairly this night sleeping at short intervals pain medication given when requested.

## 2021-10-22 NOTE — PROGRESS NOTES
Transition of Care Plan:     RUR: 26%  Disposition: SNF placement    Follow up appointments: PCP, specialists   DME needed: TBD  Transportation at Discharge: medical transport   Xochitl Brody or means to access home: yes         Medicare Letter: reviewed on 10/21/21  Is patient a BCPI-A Bundle: No                If yes, was Bundle Letter given?:     Caregiver Contact: Valeria Corcoran (daughter)  Discharge Caregiver contacted prior to discharge? yes    Update 11:38 AM  CM has arranged for BLS transport via [facility], ETA is 12PM. CM completed PCS form to include copy of H&P and facesheet which was placed into chart. Nurse to call report to [phone]. 2nd IMM letter previously delivered on 10/21. PCP appointment changed from 10/26 to 11/15. See AVS for details. CM attempted to contact [address] [place] [address] to set up Medicaid transportation for dialysis once pt gets to SNF. CM informed that pt does not have transportation benefits- has [place] Medicaid only. CM relayed this information to pt's daughter, Whitney Ramirez, and provided her resources for wheelchair Vallorie Rosas transportation if needed. [facility] to Home Transportation). Whitney Ramirez reports that family will transport pt to and from HD after discharge. Relayed this to Efrain Cantu in admissions at [facility]. No further CM needs identified at this time. Ready for d/c from CM standpoint. Update 9:41 AM  [address] [facility] has accepted pt for SNF placement and bed is available for pt today. CM has requested [facility] transport. Awaiting confirmation of  time. Pt will need a rapid covid test prior to d/c. Will follow. Chart reviewed. Obtained SNF choice from daughter, Whitney Ramirez. Pt prefers to remain in [facility] and first choice facility is [facility], second choice is [address] [place] [address],[address]. Referrals sent this AM via CC link. Awaiting response. Anticipate d/c later today. Will continue to follow. Care Management Interventions  PCP Verified by CM:  Yes  Palliative Care Criteria Met (RRAT>21 & CHF Dx)?: No  Mode of Transport at Discharge: BLS ([facility] )  Transition of Care Consult (CM Consult): Discharge Planning  MyChart Signup: No  Discharge Durable Medical Equipment: No  Physical Therapy Consult: Yes  Occupational Therapy Consult: Yes  Speech Therapy Consult: No  Support Systems: Child(puneet), Other Family Member(s), Friend/Neighbor  Confirm Follow Up Transport: Family  The Plan for Transition of Care is Related to the Following Treatment Goals : SNF   The Patient and/or Patient Representative was Provided with a Choice of Provider and Agrees with the Discharge Plan?: Yes  Name of the Patient Representative Who was Provided with a Choice of Provider and Agrees with the Discharge Plan: Mary Rodriguez (daughter)  Freedom of Choice List was Provided with Basic Dialogue that Supports the Patient's Individualized Plan of Care/Goals, Treatment Preferences and Shares the Quality Data Associated with the Providers?: No  [place] Resource Information Provided?: No  Discharge Location  Discharge Placement: Skilled nursing facility ([address])    Vani Jacob, [address], ED [facility]  [phone]

## 2021-10-22 NOTE — PROGRESS NOTES
Transition of Care Plan to SNF/Rehab    Communication to Patient/Family:  Met with patient and family and they are agreeable to the transition plan. The Plan for Transition of Care is related to the following treatment goals: SNF/rehab placement     The Patient and/or patient representative was provided with a choice of provider and agrees  with the discharge plan. Yes [x] No []    A Freedom of choice list was provided with basic dialogue that supports the patient's individualized plan of care/goals and shares the quality data associated with the providers. Yes [x] No []    SNF/Rehab Transition:  Patient has been accepted to [address] [place] [address] and meets criteria for admission. Patient will transported by [facility] and expected to leave at 12PM.    Communication to SNF/Rehab:  Bedside RN, Tracy Tavares, has been notified to update the transition plan to the facility and call report ([phone]). Discharge information has been updated on the AVS. And communicated to facility via CC link. Discharge instructions to be fax'd to facility at (f) [phone]    Nursing Please include all hard scripts for controlled substances, med rec and dc summary, and AVS in packet. Reviewed and confirmed with facility, Madai Hazel can manage the patient care needs for the following:     Ana Lockett with (X) only those applicable:  Medication:  [x]Medications are available at the facility  []IV Antibiotics    [x]Controlled Substance - hard copies available sent.   [x]Weekly Labs    Equipment:  []CPAP/BiPAP  []Wound Vacuum  []Day or Urinary Device  []PICC/Central Line  []Nebulizer  []Ventilator    Treatment:  []Isolation (for MRSA, VRE, etc.)  []Surgical Drain Management  []Tracheostomy Care  []Dressing Changes  [x]Dialysis with transportation  []PEG Care  [x]Oxygen  []Daily Weights for Heart Failure    Dietary:  []Any diet limitations  []Tube Feedings   []Total Parenteral Management (TPN)    Financial Resources:  []Medicaid Application Completed    []UAI Completed and copy given to pt/family  and copy given to pt/family  [x]A screening has previously been completed. Has [place] Medicaid in place     []Level II Completed    [] Private pay individual who will not become   financially eligible for Medicaid within 6 months from admission to a [address] [place] [address] facility. [] Individual refused to have screening conducted. []Medicaid Application Completed    []The screening denied because it was determined individual did not need/did not qualify for nursing facility level of care. [] Out of state residents seeking direct admission to a [address] facility. [] Individuals who are inpatients of an out of state hospital, or in state or out of state veterans/ hospital and seek direct admission to a [address] facility  [] Individuals who are pateints or residents of a state owned/operated facility that is licensed by Department of [employer] (DBS) and seek direct admission to [address] [place] [address]  [] A screening not required for enrollment in [address] [address] [address] services as set out in [address] [place] [address] 59-  [] [facility]) staff shall perform screenings of the [facility] clients. Advanced Care Plan:  []Surrogate Decision Maker of Care  [x]POA  [x]Communicated Code Status and copy sent.     Other:  [facility] [place] - INPATIENT accepted referral prior to d/c to SNF - notified them of change in disposition        Kaleigh Houston, [address], ED [facility]  [phone]

## 2021-10-25 NOTE — PROGRESS NOTES
Transition of care outreach postponed for 14 days due to patient's discharge to SNF. Per EMR patient discharged to [facility]. Will continue to monitor patient progress.

## 2021-10-25 NOTE — Clinical Note
Good morning Shmuel Rush,   I will be following Mr. Smyth Setting while he is in [employer] and will let you know once he is discharged. Per notes this is a very complex situation.    Have a good day,   [place]

## 2021-10-28 NOTE — PROGRESS NOTES
Debi Beasley is a 63 y.o. male here for follow up for recurrent met thyroid carcinoma. 1. Have you been to the ER, urgent care clinic since your last visit? Hospitalized since your last visit? 10/14/21 - 10/21/21 weakness/SOB    2. Have you seen or consulted any other health care providers outside of the [address] [place] [address] since your last visit? Include any pap smears or colon screening. no    Pt states he has gained a little strength back . He is out of the wheelchair now but using rollator. Pt states his pain meds are not helping very much. He is having a lot of pain in his right leg.

## 2021-10-29 NOTE — PROGRESS NOTES
Post Acute Facility Update     *The information contained in this note was received during a weekly care coordination call with the post acute facility*    Current Facility: [address]  (SNF)  Anticipated Discharge Date: 3 weeks, date TBD    Facility Nursing Update: no current updates  Facility Social Work Update: no current updates  Bundle Program Status: none  Upper Extremity Assistance: Stand by Assist - Presence and Cueing  Lower Extremity Assistance: Contact Guard Assist - hands on patient for balance   Bed Mobility: Minimal Assistance   Transfers: Moderate Assistance   Ambulation: Contact Guard Assist - hands on patient for balance   How far (in feet) is the patient ambulating?  124  Device Used: walker  Barriers to Discharge: TBD    Mikayla GUZMAN, RN  [employer]

## 2021-10-29 NOTE — PATIENT INSTRUCTIONS
After gamma knife is done, please call [phone] to get your scans done. Once these are scheduled please call our office [phone] to get on our schedule. We are going to send over an oral drug nexavar to a specialty pharmacy, this will have to run through your insurance and then get mailed to you. Please let me know when you have received this drug, do not start taking it until we see you in the office.

## 2021-10-29 NOTE — PROGRESS NOTES
[address] [facility] [address], [address] [facility], [address] [place] [address]  [phone]        Follow-Up Note        Patient: Osito Benson MRN: [id number]  SSN: [id number]    YOB: 1960  Age: 63 y.o. Sex: male        Diagnosis:     1. 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 He was diagnosed with papillary thyroid carcinoma in 2002. He underwent a total thyroidectomy by Dr. Heidy Damico. This was followed by STRATTON ablation. He has been on TSH suppression since that time. CT shows disease progression in the chest. Latest MRI brain shows right frontal metastasis. He is on steroids. He is now on HD and is on nasal O2.        Review of Systems:    Constitutional: negative  Eyes: negative  Ears, Nose, Mouth, Throat, and Face: hoarseness  Respiratory: dyspnea  Cardiovascular: negative  Gastrointestinal: negative  Genitourinary:negative  Integument/Breast: negative  Hematologic/Lymphatic: negative  Musculoskeletal:negative  Neurological: negative        Past Medical History:   Diagnosis Date    Adverse effect of anesthesia     \" STOP BREATHING 1 TIME C ANESTH\"    Cancer ([address] 2004    thyroid cancer    Chronic kidney disease     [facility] T-TH-S    Chronic pain     BACK SHOULDER AND ARM    COVID-19 04/2021    Depression     Diabetes ([address]     GERD (gastroesophageal reflux disease)     Heart failure (HCC)     stage 1    Hypercholesterolemia     Hypertension     Nausea & vomiting     PUD (peptic ulcer disease)     Sleep apnea     doesn't wear cpap    Thyroid cancer ([address]     TIA (transient ischemic attack) 2011    Vitamin D deficiency      Past Surgical History:   Procedure Laterality Date    HX HEART CATHETERIZATION      HX HEENT      THROAT SURGERY X 4    HX ORTHOPAEDIC      back     HX ORTHOPAEDIC      ARM AND SHOULDER    HX OTHER SURGICAL      thyroid, lymphnode    HX RETINAL DETACHMENT REPAIR      left eye    HX VASCULAR ACCESS      HD cather in chest    IR INJ FORAMIN EPID LUMB ANES/STER SNGL  10/20/2021    IR INSERT NON TUNL CVC OVER 5 YRS  2020    IR INSERT NON TUNL CVC OVER 5 YRS  2021    IR INSERT TUNL CVC W/O PORT OVER 5 YR  2020    IR INSERT TUNL CVC W/O PORT OVER 5 YR  2021    UPPER GI ENDOSCOPY,BALL DIL,30MM  2020         UPPER GI ENDOSCOPY,BIOPSY  2020         VASCULAR SURGERY PROCEDURE UNLIST      cardiac cath NEG. Family History   Problem Relation Age of Onset    Diabetes Mother     Elevated Lipids Mother    Red Gasmen Bladder Disease Mother     Headache Mother    Danita Stabs Migraines Mother     Heart Disease Mother     Hypertension Mother     Stroke Mother     Other Mother         ANEURSYM BRAIN    Bleeding Prob Father     Cancer Father         LEUKEMIA    Diabetes Father     Elevated Lipids Father     Mental Retardation Sister     Psychiatric Disorder Sister     Cancer Brother         LUNGS     Social History     Tobacco Use    Smoking status: Former Smoker     Quit date: 1994     Years since quittin.2    Smokeless tobacco: Never Used   Substance Use Topics    Alcohol use: Not Currently      Prior to Admission medications    Medication Sig Start Date End Date Taking? Authorizing Provider   SORAfenib (NEXAVAR) 200 mg tablet Take 1 Tablet by mouth daily for 30 days. 10/29/21 11/28/21 Yes Primitivo Corrigan MD   HYDROcodone-acetaminophen (NORCO) 5-325 mg per tablet TAKE 1 TABLET BY MOUTH EVERY 6 HOURS AS NEEDED FOR MODERATE OR SEVERE PAIN FOR UP TO 3 DAYS 10/7/21   Provider, Historical   pregabalin (LYRICA) 25 mg capsule Take 1 Capsule by mouth three (3) times daily.  Max Daily Amount: 75 mg. 10/22/21 Derian Galvez NP   aspirin 81 mg chewable tablet Take 1 Tablet by mouth daily. 10/22/21   Derian Galvez NP   dexAMETHasone (DECADRON) 4 mg tablet Take 4 mg by mouth every eight (8) hours. 10/21/21   Derian Galvez NP   hydrALAZINE (APRESOLINE) 50 mg tablet Take 1 tablet by mouth twice daily 9/22/21   Crissy Prado MD   sodium bicarbonate 650 mg tablet TAKE 2 TABLETS BY MOUTH TWICE DAILY 7/17/21   Provider, Historical   bumetanide (BUMEX) 2 mg tablet Take 1 Tablet by mouth daily. 7/28/21   Silverio Harris MD   omeprazole (PRILOSEC) 20 mg capsule Take 1 capsule by mouth once daily 6/11/21   Hans Prado MD   carvediloL (COREG) 6.25 mg tablet TAKE 1 TABLET BY MOUTH TWICE DAILY WITH MEALS 5/13/21   Shelbie Prado MD   acetaminophen (TYLENOL) 325 mg tablet Take 1 Tab by mouth as needed for Pain. Take tab as needed for pain 5/10/21   Crissy Prado MD   metoclopramide HCl (REGLAN) 5 mg tablet TAKE 1 TABLET BY MOUTH ONCE DAILY AS NEEDED. DO NOT TAKE MORE THAN 3 TABLETS A DAY. 5/10/21   Hans Prado MD   simvastatin (ZOCOR) 20 mg tablet Take 1 Tab by mouth nightly. 5/10/21   Shelbie Prado MD   amLODIPine (NORVASC) 10 mg tablet Take 1 Tab by mouth daily. 5/10/21   Shelbie Prado MD   Vitamin D2 1,250 mcg (50,000 unit) capsule TAKE 1 CAPSULE BY MOUTH ONCE A WEEK  Patient taking differently: Take 50,000 Units by mouth every Monday. TAKE 1 CAPSULE BY MOUTH ONCE A WEEK 5/10/21   Shelbie Prado MD   levothyroxine (SYNTHROID) 175 mcg tablet 1 full tab Mon-Sat but only 1/2 tab on sundays 4/12/21   Petros Garcia MD   sodium zirconium cyclosilicate (Lokelma) 5 gram powder packet Take 5 g by mouth two (2) times a week. On Fridays and Sundays    Provider, Historical   sevelamer carbonate (RENVELA) 800 mg tab tab Take 1 Tab by mouth three (3) times daily (with meals).  1/7/21   Shelbie Prado MD   Ventolin HFA 90 mcg/actuation inhaler TAKE 1-2 PUFFS EVEERY 4-6 HOURS AS NEEDED FOR DYSPNEA AND WHEEZING 7/28/20   Nia Beard MD Lancets misc Use preferred brand; Check glucose 3-4 times daily, Diagnosis E11.22 2/2/18   Bobbi Reich MD              Allergies   Allergen Reactions    Anesthetics - Amide Type - Select Amino Amides Shortness of Breath    Flexeril [Cyclobenzaprine] Hives    Tramadol Hives           Objective:     Visit Vitals  BP (!) 142/65 (BP 1 Location: Right upper arm, BP Patient Position: Sitting)   Pulse 62   Temp 97.7 °F (36.5 °C) (Temporal)   Ht 5' 9\" (1.753 m)   Wt 212 lb (96.2 kg)   SpO2 96%   BMI 31.31 kg/m²       Pain Scale: 8/10  Pain Location:       Physical Exam:    GENERAL: alert, cooperative, no distress, appears stated age  EYE: negative  LYMPHATIC: Cervical, supraclavicular, and axillary nodes normal.   THROAT & NECK: normal and no erythema or exudates noted. Scar from thyroidectomy. LUNG: clear to auscultation bilaterally  HEART: regular rate and rhythm  ABDOMEN: soft, non-tender  EXTREMITIES:  no edema  SKIN: Normal.  NEUROLOGIC: negative      Physical exam and ROS has been modified from a prior visit to make it relevant and current      MRI Results (most recent):  Results from Hospital Encounter encounter on 10/14/21    MRI BRAIN W WO CONT    Narrative  EXAM:  MRI BRAIN W WO CONT    INDICATION:    Brain METS, pt needs conscious sedation because of claustrophobia    COMPARISON:  None. CONTRAST: 20 ml IV ProHance. TECHNIQUE:  Multiplanar multisequence acquisition without and with contrast of the brain. FINDINGS:  There is an 11 x 12 mm ring-enhancing lesion with some surrounding edema in the  a right frontal convexity in view of the patient history this is likely a  metastases. X line there is no extra-axial fluid collection hemorrhage or shift. No additional enhancing lesion seen. Ventricles size is normal for age. Major vessels appear patent. There is moderate nonspecific white matter changes. Incidental mucosal thickening left maxillary sinus. Impression  1.  Intra-axial single enhancing lesion right frontal convexity with surrounding  edema compatible with brain metastases. 2. Moderate white matter disease, nonspecific. CT Results (most recent):  Results from Hospital Encounter encounter on 10/14/21    CT PELV W CONT    Narrative  EXAM: CT PELV W CONT    INDICATION: l hip pain    COMPARISON: None. CONTRAST: 50 mL of Isovue-370. TECHNIQUE:  Multislice helical CT of the pelvis was performed from the iliac crest to the  symphysis pubis during uneventful rapid bolus intravenous contrast  administration. Oral contrast was not administered. Coronal and sagittal  reformations were generated. CT dose reduction was achieved through use of a  standardized protocol tailored for this examination and automatic exposure  control for dose modulation. The study was performed following chest CT which is  reported separately. FINDINGS:  Bowel: Incompletely imaged. Mild colonic diverticulosis. Limited visualization  of the nondistended appendix. No small bowel dilatation. Reproductive organs: Within normal limits. Soft tissues: No pelvic mass or lymphadenopathy. Fluid: No ascites or drainable fluid collection    Bones: No fracture or aggressive bone lesion. No significant arthritic change. .    Miscellaneous: Limited gas in the left lower quadrant anterior abdominal wall  consistent with recent subcutaneous injection. Prominent vascular calcification. Impression  No masses or bone destruction. No significant abnormalities in the pelvis. Marked vascular calcification. I personally reviewed the images. Essentially stable pulmonary nodules. Assessment:     1.  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   Radio-iodine refractory               BRAF V600E mutation - detected              MSI - stable       ECOG PS 0  Intent of treatment - palliative      2002 Biopsy suggesting PTC                        S/p total thyroidectomy                        S/p STRATTON  [id number]  Reports negative [facility]  10/10/16  Repeat surgery, 2 right paratrachial LN's                        Surgical Path: poorly differentiated PTC                        Patient with hoarseness of voice, persistent                          On 200mcg LT4    Lung nodules are progressing  MRI brain - right frontal metastasis    Complete staging with bone scan and CT abd  Due to significant progression of disease in the lungs and brain, I recommend he start systemic therapy. Due to CKD, Lenvatinib cannot be used. I recommend he start dose reduced Sorafenib 200 mg daily. I reviewed and educated him about the side effects and ways to manage it. He is agreeable to start the medicine. He would also undergo gamma knife. Plan:       > TSH suppression with Levothyroxine   > Gamma knife - Dr. Eliud Reed  > Plan to start Sorafenib  > CT abd and bone scan      Signed by: Uma Dorsey MD                     October 29, 2021        CC. Estelle Aguilera MD  CC.  Ron Alfredo MD

## 2021-10-29 NOTE — LETTER
10/29/2021    Patient: Sandy Phelps   YOB: 1960   Date of Visit: 10/29/2021     Gisele Byers MD  [address] [facility]  [address]  [address] [address]  Via In Basket    Dear Gisele Byers MD,      Thank you for referring Mr. Wanda Nelson to [address] [place] [address] for evaluation. My notes for this consultation are attached. If you have questions, please do not hesitate to call me. I look forward to following your patient along with you.       Sincerely,    Maldonado Branham MD

## 2021-10-29 NOTE — PROGRESS NOTES
Requested Prescriptions     Signed Prescriptions Disp Refills    SORAfenib (NEXAVAR) 200 mg tablet 120 Tablet 11     Sig: Take 2 Tablets by mouth daily for 30 days. Approved per VORB from [facility]. PER VORB FROM DR Gomez Snell NM BONE SCAN [facility] BODY as a one time order. PER VORB FROM DR Gomez Snell CT ABD W CONTRAST as a one time order. Chemo consent obtained for nexavar. He will call our office when this is received in the mail.

## 2021-11-04 NOTE — PROGRESS NOTES
Called pt to inquire if he has gotten his medications yet but was told by a male subject who answered his phone that pt is at the neuro doctor with his Daughter and pt will be back soon.

## 2021-11-05 NOTE — PROGRESS NOTES
Chief Complaint   Patient presents with   [facility] Follow Up     still having  pain in right leg     HPI:  David Horowitz is a 63 y.o. AA male with h/o hypertension, diabetes type 2, hypercholesterolemia, severe hypothyroidism, metastatic thyroid cancer, with bone metastasis, ESRD on dialysis, on home oxygen presents for transition of care accompanied by daughter. 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 Patient presents to clinic in a wheelchair due to left hemiparesis and inability to walk. Also, he has c/o pain in right leg making it difficult to walk. He is not therefore able to walk due to the effect of cancer on the brain. Patient will benefit from use of motor scooter to help him get around at home and to his various clinic visits.      Review of Systems  As per hpi    Past Medical History:   Diagnosis Date    Adverse effect of anesthesia     \" STOP BREATHING 1 TIME C ANESTH\"    Cancer ([address] 2004    thyroid cancer    Chronic kidney disease     [facility]    Chronic pain     BACK SHOULDER AND ARM    COVID-19 04/2021    Depression     Diabetes ([address]     GERD (gastroesophageal reflux disease)     Heart failure (HCC)     stage 1    Hypercholesterolemia     Hypertension     Nausea & vomiting     PUD (peptic ulcer disease)     Sleep apnea     doesn't wear cpap    Thyroid cancer ([address]     TIA (transient ischemic attack) 2011    Vitamin D deficiency      Past Surgical History:   Procedure Laterality Date    HX HEART CATHETERIZATION      HX HEENT      THROAT SURGERY X 4    HX ORTHOPAEDIC      back     HX ORTHOPAEDIC      ARM AND SHOULDER    HX OTHER SURGICAL      thyroid, lymphnode    HX RETINAL DETACHMENT REPAIR      left eye    HX VASCULAR ACCESS      HD cather in chest    IR INJ FORAMIN EPID LUMB ANES/STER SNGL  10/20/2021    IR INSERT NON TUNL CVC OVER 5 YRS  2020    IR INSERT NON TUNL CVC OVER 5 YRS  2021    IR INSERT TUNL CVC W/O PORT OVER 5 YR  2020    IR INSERT TUNL CVC W/O PORT OVER 5 YR  2021    UPPER GI ENDOSCOPY,BALL DIL,30MM  2020         UPPER GI ENDOSCOPY,BIOPSY  2020         VASCULAR SURGERY PROCEDURE UNLIST      cardiac cath NEG. Social History     Socioeconomic History    Marital status:    Tobacco Use    Smoking status: Former Smoker     Quit date: 1994     Years since quittin.2    Smokeless tobacco: Never Used   Vaping Use    Vaping Use: Never used   Substance and Sexual Activity    Alcohol use: Not Currently    Drug use: No    Sexual activity: Never     Social Determinants of Health     Financial Resource Strain: High Risk    Difficulty of Paying Living Expenses: Hard   Food Insecurity: No Food Insecurity    Worried About Running Out of Food in the Last Year: Never true    Ran Out of Food in the Last Year: Never true   Transportation Needs: No Transportation Needs    Lack of Transportation (Medical): No    Lack of Transportation (Non-Medical):  No   Physical Activity: Inactive    Days of Exercise per Week: 0 days    Minutes of Exercise per Session: 0 min   Stress: No Stress Concern Present    Feeling of Stress : Not at all   Housing Stability: Low Risk     Unable to Pay for Housing in the Last Year: No    Number of Places Lived in the Last Year: 1    Unstable Housing in the Last Year: No     Family History   Problem Relation Age of Onset    Diabetes Mother    Mari Elevated Lipids Mother    [place] Favors Bladder Disease Mother     Headache Mother    Mari Migraines Mother     Heart Disease Mother     Hypertension Mother     Stroke Mother     Other Mother         ANEURSYM BRAIN    Bleeding Prob Father     Cancer Father         LEUKEMIA    Diabetes Father     Elevated Lipids Father     Mental Retardation Sister     Psychiatric Disorder Sister     Cancer Brother LUNGS     Current Outpatient Medications   Medication Sig Dispense Refill    NovoLIN N NPH U-100 Insulin 100 unit/mL injection INJECT 2 TO 10 UNITS SUBCUTANEOUSLY ONCE DAILY 30 mL 0    sevelamer carbonate (RENVELA) 800 mg tab tab TAKE 1 TABLET BY MOUTH THREE TIMES DAILY WITH MEALS 90 Tablet 3    metoclopramide HCl (REGLAN) 5 mg tablet TAKE 1 TABLET BY MOUTH ONCE DAILY AS NEEDED. *DO NOT TAKE MORE THAN 3 TABLETS A DAY 30 Tablet 0    carvediloL (COREG) 6.25 mg tablet TAKE 1 TABLET BY MOUTH TWICE DAILY WITH MEALS 60 Tablet 3    HYDROcodone-acetaminophen (NORCO) 5-325 mg per tablet TAKE 1 TABLET BY MOUTH EVERY 6 HOURS AS NEEDED FOR MODERATE OR SEVERE PAIN FOR UP TO 3 DAYS      SORAfenib (NEXAVAR) 200 mg tablet Take 1 Tablet by mouth daily for 30 days. 30 Tablet 11    pregabalin (LYRICA) 25 mg capsule Take 1 Capsule by mouth three (3) times daily. Max Daily Amount: 75 mg. 3 Capsule 0    dexAMETHasone (DECADRON) 4 mg tablet Take 4 mg by mouth every eight (8) hours. 90 Tablet 0    hydrALAZINE (APRESOLINE) 50 mg tablet Take 1 tablet by mouth twice daily 60 Tablet 4    sodium bicarbonate 650 mg tablet TAKE 2 TABLETS BY MOUTH TWICE DAILY      bumetanide (BUMEX) 2 mg tablet Take 1 Tablet by mouth daily. 30 Tablet 0    omeprazole (PRILOSEC) 20 mg capsule Take 1 capsule by mouth once daily 90 Capsule 1    acetaminophen (TYLENOL) 325 mg tablet Take 1 Tab by mouth as needed for Pain. Take tab as needed for pain 90 Tab 1    simvastatin (ZOCOR) 20 mg tablet Take 1 Tab by mouth nightly. 90 Tab 3    amLODIPine (NORVASC) 10 mg tablet Take 1 Tab by mouth daily. 30 Tab 5    Vitamin D2 1,250 mcg (50,000 unit) capsule TAKE 1 CAPSULE BY MOUTH ONCE A WEEK (Patient taking differently: Take 50,000 Units by mouth every Monday.  TAKE 1 CAPSULE BY MOUTH ONCE A WEEK) 4 Cap 5    levothyroxine (SYNTHROID) 175 mcg tablet 1 full tab Mon-Sat but only 1/2 tab on sundays 90 Tab 3    sodium zirconium cyclosilicate (Lokelma) 5 gram powder packet Take 5 g by mouth two (2) times a week. On Fridays and Sundays      Ventolin HFA 90 mcg/actuation inhaler TAKE 1-2 PUFFS EVEERY 4-6 HOURS AS NEEDED FOR DYSPNEA AND WHEEZING 1 Inhaler 2    Lancets misc Use preferred brand; Check glucose 3-4 times daily, Diagnosis E11.22 2 Package 3     Allergies   Allergen Reactions    Anesthetics - Amide Type - Select Amino Amides Shortness of Breath    Flexeril [Cyclobenzaprine] Hives    Tramadol Hives       Objective:  Visit Vitals  BP (!) 178/81   Pulse 69   Temp 97.7 °F (36.5 °C) (Temporal)   Resp (!) 69   Ht 5' 9\" (1.753 m)   Wt 212 lb (96.2 kg)   SpO2 96%   BMI 31.31 kg/m²     Physical Exam:   General appearance - alert, in moderate distress  Mental status - alert, oriented to person, place, and time  Neck - supple, no significant adenopathy   Chest - clear to auscultation, no wheezes, rales or rhonchi  Heart - normal rate, regular rhythm, no murmurs  Abdomen - soft, nontender, nondistended, no organomegaly  Ext-peripheral pulses normal, no pedal edema  Neuro -alert, oriented, normal speech, left sided weakness  Back-pain with motion noted during exam, tenderness noted over lumbar and thoracic region     Assessment/Plan:  Diagnoses and all orders for this visit:    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 *DO NOT TAKE MORE THAN 3 TABLETS A DAY, Normal, Disp-30 Tablet, R-0    Type 2 diabetes mellitus with stage 3 chronic kidney disease, with long-term current use of insulin (HCC)  -     Discontinue: NovoLIN N NPH U-100 Insulin 100 unit/mL injection; INJECT 2 TO 10 UNITS SUBCUTANEOUSLY ONCE DAILY, Normal, Disp-30 mL, R-0, LISA  -     Discontinue: lancets misc; Use preferred brand;  Check glucose 3-4 times daily, Diagnosis E11.22, Normal, Disp-200 Lancet, R-3  -     AMB POC HEMOGLOBIN A1C  -     AMB POC GLUCOSE BLOOD, BY GLUCOSE MONITORING DEVICE    Arthralgia of right lower leg    Hypertension goal BP (blood pressure) < 130/80  -     carvediloL (COREG) 6.25 mg tablet; Take 1 Tablet by mouth two (2) times daily (with meals). , Normal, Disp-60 Tablet, R-3  -     hydrALAZINE (APRESOLINE) 50 mg tablet; Take 1 Tablet by mouth two (2) times a day., Normal, Disp-60 Tablet, R-4  -     amLODIPine (NORVASC) 10 mg tablet; Take 1 Tablet by mouth daily. , Normal, Disp-30 Tablet, R-5    Essential hypertension with goal blood pressure less than 130/80  -     carvediloL (COREG) 6.25 mg tablet; Take 1 Tablet by mouth two (2) times daily (with meals). , Normal, Disp-60 Tablet, R-3  -     hydrALAZINE (APRESOLINE) 50 mg tablet; Take 1 Tablet by mouth two (2) times a day., Normal, Disp-60 Tablet, R-4  -     amLODIPine (NORVASC) 10 mg tablet; Take 1 Tablet by mouth daily. , Normal, Disp-30 Tablet, R-5    Dyslipidemia (high LDL; low HDL)    ESRD (end stage renal disease) on dialysis (HCC)    Normochromic normocytic anemia    Acquired hypothyroidism  -     levothyroxine (SYNTHROID) 175 mcg tablet; 1 full tab Mon-Sat but only 1/2 tab on sundays, Normal, Disp-90 Tablet, R-3    At risk for impaired mobility due to pain  -     AMB SUPPLY ORDER  -     HYDROcodone-acetaminophen (NORCO) 5-325 mg per tablet; Take 1 Tablet by mouth every six (6) hours as needed for Pain for up to 7 days. Max Daily Amount: 4 Tablets., Normal, Disp-20 Tablet, R-0    Pain due to malignant neoplasm metastatic to bone (HCC)  -     AMB SUPPLY ORDER  -     HYDROcodone-acetaminophen (NORCO) 5-325 mg per tablet; Take 1 Tablet by mouth every six (6) hours as needed for Pain for up to 7 days.  Max Daily Amount: 4 Tablets., Normal, Disp-20 Tablet, R-0    Neoplasm related pain (acute) (chronic)  -     AMB SUPPLY ORDER        Patient Instructions          Anemia: Care Instructions  Your Care Instructions     Anemia is a low level of red blood cells, which carry oxygen throughout your body. Many things can cause anemia. Lack of iron is one of the most common causes. Your body needs iron to make hemoglobin, a substance in red blood cells that carries oxygen from the lungs to your body's cells. Without enough iron, the body produces fewer and smaller red blood cells. As a result, your body's cells do not get enough oxygen, and you feel tired and weak. And you may have trouble concentrating. Bleeding is the most common cause of a lack of iron. You may have heavy menstrual bleeding or bleeding caused by conditions such as ulcers, hemorrhoids, or cancer. Regular use of aspirin or other anti-inflammatory medicines (such as ibuprofen) also can cause bleeding in some people. A lack of iron in your diet also can cause anemia, especially at times when the body needs more iron, such as during pregnancy, infancy, and the teen years. Your doctor may have prescribed iron pills. It may take several months of treatment for your iron levels to return to normal. Your doctor also may suggest that you eat foods that are rich in iron, such as meat and beans. There are many other causes of anemia. It is not always due to a lack of iron. Finding the specific cause of your anemia will help your doctor find the right treatment for you. Follow-up care is a key part of your treatment and safety. Be sure to make and go to all appointments, and call your doctor if you are having problems. It's also a good idea to know your test results and keep a list of the medicines you take. How can you care for yourself at home? · Take your medicines exactly as prescribed. Call your doctor if you think you are having a problem with your medicine. · If your doctor recommends iron pills, take them as directed:  ? Try to take the pills on an empty stomach about 1 hour before or 2 hours after meals. But you may need to take iron with food to avoid an upset stomach.   ? Do not take antacids or drink milk or caffeine drinks (such as coffee, tea, or cola) at the same time or within 2 hours of the time that you take your iron. They can make it hard for your body to absorb the iron. ? Vitamin C (from food or supplements) helps your body absorb iron. Try taking iron pills with a glass of orange juice or some other food that is high in vitamin C, such as citrus fruits. ? Iron pills may cause stomach problems, such as heartburn, nausea, diarrhea, constipation, and cramps. Be sure to drink plenty of fluids, and include fruits, vegetables, and fiber in your diet each day. Iron pills often make your bowel movements dark or green. ? If you forget to take an iron pill, do not take a double dose of iron the next time you take a pill. ? Keep iron pills out of the reach of small children. An overdose of iron can be very dangerous. · Follow your doctor's advice about eating iron-rich foods. These include red meat, shellfish, poultry, eggs, beans, raisins, whole-grain bread, and leafy green vegetables. · Steam vegetables to help them keep their iron content. When should you call for help? Call 911 anytime you think you may need emergency care. For example, call if:    · You have symptoms of a heart attack. These may include:  ? Chest pain or pressure, or a strange feeling in the chest.  ? Sweating. ? Shortness of breath. ? Nausea or vomiting. ? Pain, pressure, or a strange feeling in the back, neck, jaw, or upper belly or in one or both shoulders or arms. ? Lightheadedness or sudden weakness. ? A fast or irregular heartbeat. After you call 911, the  may tell you to chew 1 adult-strength or 2 to 4 low-dose aspirin. Wait for an ambulance. Do not try to drive yourself.     · You passed out (lost consciousness).    Call your doctor now or seek immediate medical care if:    · You have new or increased shortness of breath.     · You are dizzy or lightheaded, or you feel like you may faint.     · Your fatigue and weakness continue or get worse.     · You have any abnormal bleeding, such as:  ? Nosebleeds. ? Vaginal bleeding that is different (heavier, more frequent, at a different time of the month) than what you are used to.  ? Bloody or black stools, or rectal bleeding. ? Bloody or pink urine. Watch closely for changes in your health, and be sure to contact your doctor if:    · You do not get better as expected. Where can you learn more? Go to http://www.victor.com/  Enter R301 in the search box to learn more about \"Anemia: Care Instructions. \"  Current as of: April 29, 2021               Content Version: 13.0  © 3219-1465 [employer]. Care instructions adapted under license by [employer] (which disclaims liability or warranty for this information). If you have questions about a medical condition or this instruction, always ask your healthcare professional. [place] [address] any warranty or liability for your use of this information. Follow-up and Dispositions    · Return in about 4 weeks (around 12/3/2021) for routine follow up.

## 2021-11-05 NOTE — PATIENT INSTRUCTIONS
Anemia: Care Instructions  Your Care Instructions     Anemia is a low level of red blood cells, which carry oxygen throughout your body. Many things can cause anemia. Lack of iron is one of the most common causes. Your body needs iron to make hemoglobin, a substance in red blood cells that carries oxygen from the lungs to your body's cells. Without enough iron, the body produces fewer and smaller red blood cells. As a result, your body's cells do not get enough oxygen, and you feel tired and weak. And you may have trouble concentrating. Bleeding is the most common cause of a lack of iron. You may have heavy menstrual bleeding or bleeding caused by conditions such as ulcers, hemorrhoids, or cancer. Regular use of aspirin or other anti-inflammatory medicines (such as ibuprofen) also can cause bleeding in some people. A lack of iron in your diet also can cause anemia, especially at times when the body needs more iron, such as during pregnancy, infancy, and the teen years. Your doctor may have prescribed iron pills. It may take several months of treatment for your iron levels to return to normal. Your doctor also may suggest that you eat foods that are rich in iron, such as meat and beans. There are many other causes of anemia. It is not always due to a lack of iron. Finding the specific cause of your anemia will help your doctor find the right treatment for you. Follow-up care is a key part of your treatment and safety. Be sure to make and go to all appointments, and call your doctor if you are having problems. It's also a good idea to know your test results and keep a list of the medicines you take. How can you care for yourself at home? · Take your medicines exactly as prescribed. Call your doctor if you think you are having a problem with your medicine. · If your doctor recommends iron pills, take them as directed:  ? Try to take the pills on an empty stomach about 1 hour before or 2 hours after meals. But you may need to take iron with food to avoid an upset stomach. ? Do not take antacids or drink milk or caffeine drinks (such as coffee, tea, or cola) at the same time or within 2 hours of the time that you take your iron. They can make it hard for your body to absorb the iron. ? Vitamin C (from food or supplements) helps your body absorb iron. Try taking iron pills with a glass of orange juice or some other food that is high in vitamin C, such as citrus fruits. ? Iron pills may cause stomach problems, such as heartburn, nausea, diarrhea, constipation, and cramps. Be sure to drink plenty of fluids, and include fruits, vegetables, and fiber in your diet each day. Iron pills often make your bowel movements dark or green. ? If you forget to take an iron pill, do not take a double dose of iron the next time you take a pill. ? Keep iron pills out of the reach of small children. An overdose of iron can be very dangerous. · Follow your doctor's advice about eating iron-rich foods. These include red meat, shellfish, poultry, eggs, beans, raisins, whole-grain bread, and leafy green vegetables. · Steam vegetables to help them keep their iron content. When should you call for help? Call 911 anytime you think you may need emergency care. For example, call if:    · You have symptoms of a heart attack. These may include:  ? Chest pain or pressure, or a strange feeling in the chest.  ? Sweating. ? Shortness of breath. ? Nausea or vomiting. ? Pain, pressure, or a strange feeling in the back, neck, jaw, or upper belly or in one or both shoulders or arms. ? Lightheadedness or sudden weakness. ? A fast or irregular heartbeat. After you call 911, the  may tell you to chew 1 adult-strength or 2 to 4 low-dose aspirin. Wait for an ambulance. Do not try to drive yourself.     · You passed out (lost consciousness).    Call your doctor now or seek immediate medical care if:    · You have new or increased shortness of breath.     · You are dizzy or lightheaded, or you feel like you may faint.     · Your fatigue and weakness continue or get worse.     · You have any abnormal bleeding, such as:  ? Nosebleeds. ? Vaginal bleeding that is different (heavier, more frequent, at a different time of the month) than what you are used to.  ? Bloody or black stools, or rectal bleeding. ? Bloody or pink urine. Watch closely for changes in your health, and be sure to contact your doctor if:    · You do not get better as expected. Where can you learn more? Go to http://www.victor.com/  Enter R301 in the search box to learn more about \"Anemia: Care Instructions. \"  Current as of: April 29, 2021               Content Version: 13.0  © 5808-9042 [employer]. Care instructions adapted under license by [employer] (which disclaims liability or warranty for this information). If you have questions about a medical condition or this instruction, always ask your healthcare professional. [place] [address] any warranty or liability for your use of this information.

## 2021-11-07 PROBLEM — N39.0 UTI (URINARY TRACT INFECTION): Status: ACTIVE | Noted: 2021-01-01

## 2021-11-07 PROBLEM — E87.1 HYPONATREMIA: Status: ACTIVE | Noted: 2021-01-01

## 2021-11-07 NOTE — DIALYSIS
TRANSFER - IN REPORT:    Verbal report received from Char MATA on Pat German  being received from [facility] for ordered procedure      Report consisted of patients Situation, Background, Assessment and   Recommendations(SBAR). Information from the following report(s) SBAR was reviewed with the receiving nurse. Opportunity for questions and clarification was provided. Assessment completed upon patients arrival to unit and care assumed.

## 2021-11-07 NOTE — PROGRESS NOTES
Anticoagulation Dosing    Indication:  DVT ppx    Estimated Creatinine Clearance: 25.2 mL/min (A) (based on SCr of 3.76 mg/dL (H)). Estimated Creatinine Clearance (using IBW):21.2 mL/min    Recent Labs     11/06/21  2339   CREA 3.76*   ALB 1.9*   HGB 11.1*   HCT 33.4*   *       Wt Readings from Last 1 Encounters:   11/06/21 104.5 kg (230 lb 6.1 oz)     Ht Readings from Last 1 Encounters:   11/06/21 175.3 cm (69\")     Body mass index is 34.02 kg/m². Plan:  Heparin 5000 units SQ q8h adjusted to 5000 units SQ q12h due to patient renal function / HD.      Thank you,  Princess Canas, [facility]    Dosing of VTE prophylaxis in patients without a history of heparin induced thrombocytopenia     Renal Function based on Creatinine Clearance (Ideal Body Weight)    CrCl ? 30 ml/min CrCl 20-29 ml/min CrCl <20 or HD   Low weight patients  < 60 kg   Heparin 5000 units subcutaneously every 12 hours   Normal weight patients Enoxaparin 40 mg subcutaneously every 24 hours     or    Enoxaparin 30 mg subcutaneously every 12 hours*    or    Heparin 5000 units subcutaneously every 8 hours Enoxaparin 30 mg subcutaneously every 24 hours    or    Heparin 5000 units subcutaneously every 8 hours Enoxaparin contraindicated           Heparin 5000 units subcutaneously every 8 hours**   Obese patients  BMI >40 Enoxaparin 40 mg subcutaneously every 12 hours    or    Heparin 7500 units subcutaneously every 8 hours Enoxaparin 40 mg subcutaneously every 24 hours    or    Heparin 7500 units subcutaneously every 8 hours Enoxaparin contraindicated           Heparin 7500 units subcutaneously every 8 hours**   * Dosing regimen for knee/hip replacement surgery  ** Every 12 hour dosing may be considered if requested by nephrology

## 2021-11-07 NOTE — ED NOTES
IV abx prepared and scanned for administration. ED physician to bedside, notes he order blood cultures. abx held until blood cultures were obtained.

## 2021-11-07 NOTE — DIALYSIS
Hemodialysis / [phone]    Vitals Pre Post Assessment Pre Post   BP BP: (!) 147/67 (11/07/21 1245)  LOC AOX3 AOX3   HR Pulse (Heart Rate): 72 (11/07/21 1245)  Lungs WHEEZING BILAT UP LOBES WHEEZING BILAT UPPER LOBES   Resp Resp Rate: 20 (11/07/21 1245)  Cardiac REGULAR REGULAR   Temp Temp: 98.6 °F (37 °C) (11/07/21 1146)  Skin WARM DRY INTACT WARM DRY INTACT   Weight    Edema NONE NONE   Tele status   Pain Pain Intensity 1: (P) 3 (11/07/21 0728)      Orders   Duration: Start: Hemodialysis Start Time: 3379 (11/07/21 1146) End: 1446 Total: 3hr   Dialyzer: Dialyzer/Set Up Inspection: Jaswinder Lewis (11/07/21 1146)   K Bath: Dialysate K (mEq/L): 3 (11/07/21 1146)   Ca Bath: Dialysate CA (mEq/L): 2.5 (11/07/21 1146)   Na: Dialysate NA (mEq/L): 138 (11/07/21 1146)   Bicarb: Dialysate HCO3 (mEq/L): 35 (11/07/21 1146)   Target Fluid Removal: Goal/Amount of Fluid to Remove (mL): 2000 mL (11/07/21 1146)     Access   Type & Location: RIJ TUNNELED HD CVC- gauze dressing intact-change per hospital p/p, no s/s of infection, +blood return, flushes with ease. Each catheter limb disinfected per p&p, caps removed, hubs disinfected per p&p. Each dialysis catheter limb disinfected per p&p, blood returned per p&p, each dialysis hub disinfected per p&p, post dialysis catheter dwell instilled per order, and caps applied. Comments:                                        Labs   HBsAg (Antigen) / date: Hepatitis B surface Ag   Date Value Ref Range Status   10/16/2021 <0.10 Index Final                                       HBsAb (Antibody) / date: Hep B surface Ab Interp. Date Value Ref Range Status   10/16/2021 NONREACTIVE NR   Final     Comment:     (NOTE)  The [employer] Anti-HBs2 assay is traceable to the [address]) Hepatitis B Immunoglobulin 1st International   Reference Preparation (7117).  Samples with a calculated value of 10   mIU/mL or greater are considered reactive (protective) in accordance with the CDC guidelines. The accepted criteria for immunity to HBV is   anti-HBs activity greater than or equal to 10 mIU/mL, as defined by   the [facility] International Reference Preparation. Assay performance has not been established in pregnant women,   patients who are immunosuppressed or immunocompromised, nor have   performance characteristics been established in conjunction with   other 's assays for specific HBV serologic markers. This   assay does not differentiate between vaccine induced immune response   and a response due to infection with HBV.  Passively acquired anti-HBs   may be identified following patient transfusion, receipt of   immunoglobulin products, etc.        Source:    Obtained/Reviewed  Critical Results Called HGB   Date Value Ref Range Status   11/06/2021 11.1 (L) 12.1 - 17.0 g/dL Final     Potassium   Date Value Ref Range Status   11/06/2021 4.9 3.5 - 5.1 mmol/L Final     Calcium   Date Value Ref Range Status   11/06/2021 9.4 8.5 - 10.1 MG/DL Final     BUN   Date Value Ref Range Status   11/06/2021 84 (H) 6 - 20 MG/DL Final     Creatinine   Date Value Ref Range Status   11/06/2021 3.76 (H) 0.70 - 1.30 MG/DL Final        Meds Given   Name Dose Route   none                 Adequacy / Fluid    Total Liters Process: 65   Net Fluid Removed: 2200ml      Comments   Time Out Done:   (Time) 1130   Admitting Diagnosis: esrd   Consent obtained/signed: Informed Consent Verified: Yes (11/07/21 1146)   Machine / RO # Machine Number: Kaleb Alves (11/07/21 1146)   Primary Nurse Rpt Pre: RUPERT MATA    Primary Nurse Rpt Post: RUPERT MATA   Pt Education: [address]: CONT CURRENT HD POC    Pts outpatient clinic:      Tx Summary   Comments:

## 2021-11-07 NOTE — PROGRESS NOTES
End of Shift Note    Bedside shift change report given to Aakash MATA (oncoming nurse) by Rosalba Campo (offgoing nurse). Report included the following information SBAR, Kardex, Procedure Summary, Intake/Output, MAR and Recent Results    Shift worked:  7-7pm   Shift summary and any significant changes:       No significant changes during the shift.         Concerns for physician to address:  discussed on roundings   Zone phone for oncoming shift:              Rosalba Campo

## 2021-11-07 NOTE — H&P
Hospitalist Admission Note    NAME: Roger Brantley   :  1960   MRN:  [id number]     Date/Time:  2021 1:09 AM    Patient PCP: Real Riggins MD  ______________________________________________________________________  Given the patient's current clinical presentation, I have a high level of concern for decompensation if discharged from the emergency department. Complex decision making was performed, which includes reviewing the patient's available past medical records, laboratory results, and x-ray films. My assessment of this patient's clinical condition and my plan of care is as follows. 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  -UA suggestive of UTI so started on Rocephin.  -Blood culture and urine culture pending.  -Lactic acid pending.  -Chest x-ray-No evidence for pulmonary edema. Persistent left mid pulmonary  nodule, size not well assessed by portable technique.  -Patient was discharged to [facility] rehab but self checked out on 10/29 and since then at home has multiple falls. -PT and OT consulted.  -Case management consulted.  -Likely need rehab again.  -Continue the steroids for the brain metastasis. -Patient has extensive work-up done regarding the brain metastases the previous admission and neurosurgery recommended gamma knife/stereotactic radiosurgery. Left lower extremity swelling  -Doppler venous ultrasound negative for DVT. -We will do ABIs, likely dependent edema. End-stage renal disease on hemodialysis  -Consulted nephrology for dialysis, missed dialysis today because of the swelling of the pain. Hyponatremia  -Fluid restrictions, probably improved with the dialysis.     Diabetes type 2 with hyperglycemia  -Hyperglycemia likely secondary to the steroids.  -Sliding scale insulin, started on NovoLog 70/30-10 units twice a day. -Gave IV insulin in the ER. Hypertensive urgency  -Continue the home medications.  -Thorazine as needed. Code Status: Full code  Surrogate Decision Maker: Daughter    DVT Prophylaxis: Heparin  GI Prophylaxis: not indicated    Baseline: Left-sided deficits secondary to hemiparesis, ambulation with assistance      Subjective:   CHIEF COMPLAINT: Fall at home, weakness    HISTORY OF PRESENT ILLNESS:     Darrell Martin is a 63 y.o.  male who presents with fall at home along with weakness and left lower extremity swelling. 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  1 day, not painful, no discoloration no swelling. Patient also complains of weakness and fell down today at home. Family member present at bedside who states the patient is having hard time moving around. Patient was discharged on 10/22 to [address] [place] [address] and rehab but self checkout on 10/29. No fever, no chills, no chest pain, no shortness of breath, no dizziness, no passing out episodes, no abdominal pain, no other complaints. We were asked to admit for work up and evaluation of the above problems.      Past Medical History:   Diagnosis Date    Adverse effect of anesthesia     \" STOP BREATHING 1 TIME C ANESTH\"    Cancer ([address] 2004    thyroid cancer    Chronic kidney disease     [facility]    Chronic pain     BACK SHOULDER AND ARM    COVID-19 04/2021    Depression     Diabetes ([address]     GERD (gastroesophageal reflux disease)     Heart failure (HCC)     stage 1    Hypercholesterolemia     Hypertension     Nausea & vomiting     PUD (peptic ulcer disease)     Sleep apnea     doesn't wear cpap    Thyroid cancer ([address]     TIA (transient ischemic attack)     Vitamin D deficiency         Past Surgical History:   Procedure Laterality Date    HX HEART CATHETERIZATION      HX HEENT      THROAT SURGERY X 4    HX ORTHOPAEDIC      back     HX ORTHOPAEDIC      ARM AND SHOULDER    HX OTHER SURGICAL      thyroid, lymphnode    HX RETINAL DETACHMENT REPAIR      left eye    HX VASCULAR ACCESS      HD cather in chest    IR INJ FORAMIN EPID LUMB ANES/STER SNGL  10/20/2021    IR INSERT NON TUNL CVC OVER 5 YRS  2020    IR INSERT NON TUNL CVC OVER 5 YRS  2021    IR INSERT TUNL CVC W/O PORT OVER 5 YR  2020    IR INSERT TUNL CVC W/O PORT OVER 5 YR  2021    UPPER GI ENDOSCOPY,BALL DIL,30MM  2020         UPPER GI ENDOSCOPY,BIOPSY  2020         VASCULAR SURGERY PROCEDURE UNLIST      cardiac cath NEG. Social History     Tobacco Use    Smoking status: Former Smoker     Quit date: 1994     Years since quittin.2    Smokeless tobacco: Never Used   Substance Use Topics    Alcohol use: Not Currently        Family History   Problem Relation Age of Onset    Diabetes Mother     Elevated Lipids Mother    Raisa Royalty Bladder Disease Mother     Headache Mother    Harini Orts Migraines Mother     Heart Disease Mother     Hypertension Mother     Stroke Mother     Other Mother         ANEURSYM BRAIN    Bleeding Prob Father     Cancer Father         LEUKEMIA    Diabetes Father     Elevated Lipids Father     Mental Retardation Sister     Psychiatric Disorder Sister     Cancer Brother         LUNGS     Allergies   Allergen Reactions    Anesthetics - Amide Type - Select Amino Amides Shortness of Breath    Flexeril [Cyclobenzaprine] Hives    Tramadol Hives        Prior to Admission medications    Medication Sig Start Date End Date Taking?  Authorizing Provider   levothyroxine (SYNTHROID) 175 mcg tablet 1 full tab Mon-Sat but only 1/2 tab on sundays 11/5/21   Ron Prado MD   metoclopramide HCl (REGLAN) 5 mg tablet TAKE 1 TABLET BY MOUTH ONCE DAILY AS NEEDED. *DO NOT TAKE MORE THAN 3 TABLETS A DAY 11/5/21   Mackenzie Prado MD   NovoLIN N NPH U-100 Insulin 100 unit/mL injection INJECT 2 TO 10 UNITS SUBCUTANEOUSLY ONCE DAILY 11/5/21   Arnulfo Hoffman MD   lancets misc Use preferred brand; Check glucose 3-4 times daily, Diagnosis E11.22 11/5/21   Mackenzie Prado MD   carvediloL (COREG) 6.25 mg tablet Take 1 Tablet by mouth two (2) times daily (with meals). 11/5/21   Mackenzie Prado MD   hydrALAZINE (APRESOLINE) 50 mg tablet Take 1 Tablet by mouth two (2) times a day. 11/5/21   Mackenzie Prado MD   amLODIPine (NORVASC) 10 mg tablet Take 1 Tablet by mouth daily. 11/5/21   Mackenzie Prado MD   HYDROcodone-acetaminophen (NORCO) 5-325 mg per tablet Take 1 Tablet by mouth every six (6) hours as needed for Pain for up to 7 days. Max Daily Amount: 4 Tablets. 11/5/21 11/12/21  Mackenzie Prado MD   sevelamer carbonate (RENVELA) 800 mg tab tab TAKE 1 TABLET BY MOUTH THREE TIMES DAILY WITH MEALS 11/3/21   Mackenzie Prado MD   SORAfenib (NEXAVAR) 200 mg tablet Take 1 Tablet by mouth daily for 30 days. 10/29/21 11/28/21  Katherine Kaufman MD   pregabalin (LYRICA) 25 mg capsule Take 1 Capsule by mouth three (3) times daily. Max Daily Amount: 75 mg. 10/22/21   Donita Love NP   dexAMETHasone (DECADRON) 4 mg tablet Take 4 mg by mouth every eight (8) hours. 10/21/21   Donita Love NP   sodium bicarbonate 650 mg tablet TAKE 2 TABLETS BY MOUTH TWICE DAILY 7/17/21   Provider, Historical   bumetanide (BUMEX) 2 mg tablet Take 1 Tablet by mouth daily. 7/28/21   Serene Brady MD   omeprazole (PRILOSEC) 20 mg capsule Take 1 capsule by mouth once daily 6/11/21   Mackenzie Prado MD   simvastatin (ZOCOR) 20 mg tablet Take 1 Tab by mouth nightly. 5/10/21   Mackenzie Prado MD   Vitamin D2 1,250 mcg (50,000 unit) capsule TAKE 1 CAPSULE BY MOUTH ONCE A WEEK  Patient taking differently: Take 50,000 Units by mouth every Monday.  TAKE 1 CAPSULE BY MOUTH ONCE A WEEK 5/10/21   Jeannine Prado MD   sodium zirconium cyclosilicate (Lokelma) 5 gram powder packet Take 5 g by mouth two (2) times a week. On Fridays and Sundays    Provider, Alisia   Ventolin HFA 90 mcg/actuation inhaler TAKE 1-2 PUFFS EVEERY 4-6 HOURS AS NEEDED FOR DYSPNEA AND WHEEZING 7/28/20   Jeannine Prado MD       REVIEW OF SYSTEMS:     I am not able to complete the review of systems because:    The patient is intubated and sedated    The patient has altered mental status due to his acute medical problems    The patient has baseline aphasia from prior stroke(s)    The patient has baseline dementia and is not reliable historian    The patient is in acute medical distress and unable to provide information           Total of 12 systems reviewed as follows:       POSITIVE= underlined text  Negative = text not underlined  General:  fever, chills, sweats, generalized weakness, weight loss/gain,      loss of appetite   Eyes:    blurred vision, eye pain, loss of vision, double vision  ENT:    rhinorrhea, pharyngitis   Respiratory:   cough, sputum production, SOB, NUÑEZ, wheezing, pleuritic pain   Cardiology:   chest pain, palpitations, orthopnea, PND, edema, syncope   Gastrointestinal:  abdominal pain , N/V, diarrhea, dysphagia, constipation, bleeding   Genitourinary:  frequency, urgency, dysuria, hematuria, incontinence   Muskuloskeletal :  arthralgia, myalgia, back pain, left lower extremity swelling  Hematology:  easy bruising, nose or gum bleeding, lymphadenopathy   Dermatological: rash, ulceration, pruritis, color change / jaundice  Endocrine:   hot flashes or polydipsia   Neurological:  headache, dizziness, confusion, focal weakness, paresthesia,     Speech difficulties, memory loss, gait difficulty  Psychological: Feelings of anxiety, depression, agitation    Objective:   VITALS:    Visit Vitals  BP (!) 190/72 (BP 1 Location: Right upper arm, BP Patient Position: At rest)   Pulse 91   Temp 99.5 °F (37.5 °C)   Resp 15 Ht 5' 9\" (1.753 m)   Wt 104.5 kg (230 lb 6.1 oz)   SpO2 97%   BMI 34.02 kg/m²       PHYSICAL EXAM:    General:    Alert, cooperative, no distress, appears stated age. HEENT: Atraumatic, anicteric sclerae, pink conjunctivae     No oral ulcers, mucosa moist, throat clear, dentition fair  Neck:  Supple, symmetrical,  thyroid: non tender  Lungs:   Clear to auscultation bilaterally. No Wheezing or Rhonchi. No rales. Chest wall:  No tenderness  No Accessory muscle use. Heart:   Regular  rhythm,  No  murmur 2+ edema left lower extremity  Abdomen:   Soft, non-tender. Not distended. Bowel sounds normal  Extremities: No cyanosis. No clubbing,      Skin turgor normal, Capillary refill normal, Radial dial pulse 2+  Skin:     Not pale. Not Jaundiced  No rashes   Psych:  Good insight. Not depressed. Not anxious or agitated. Neurologic: EOMs intact. No facial asymmetry. No aphasia or slurred speech. Symmetrical strength, Sensation grossly intact. Alert and oriented X 4.     _______________________________________________________________________  Care Plan discussed with:    Comments   Patient x    Family  x    RN x    Care Manager                    Consultant:  x    _______________________________________________________________________  Expected  Disposition:   Home with Family    HH/PT/OT/RN    SNF/LTC x   MAR[facility]    ________________________________________________________________________  TOTAL TIME:  61 Minutes    Critical Care Provided     Minutes non procedure based      Comments     Reviewed previous records   >50% of visit spent in counseling and coordination of care  Discussion with patient and/or family and questions answered       ________________________________________________________________________  Signed: Flor August MD    Procedures: see electronic medical records for all procedures/Xrays and details which were not copied into this note but were reviewed prior to creation of Plan.     LAB DATA REVIEWED:    Recent Results (from the past 24 hour(s))   CBC WITH AUTOMATED DIFF    Collection Time: 11/06/21 11:39 PM   Result Value Ref Range    WBC 16.3 (H) 4.1 - 11.1 K/uL    RBC 3.58 (L) 4.10 - 5.70 M/uL    HGB 11.1 (L) 12.1 - 17.0 g/dL    HCT 33.4 (L) 36.6 - 50.3 %    MCV 93.3 80.0 - 99.0 FL    MCH 31.0 26.0 - 34.0 PG    MCHC 33.2 30.0 - 36.5 g/dL    RDW 15.9 (H) 11.5 - 14.5 %    PLATELET 047 (L) 188 - 400 K/uL    MPV 11.6 8.9 - 12.9 FL    NRBC 0.0 0  WBC    ABSOLUTE NRBC 0.00 0.00 - 0.01 K/uL    NEUTROPHILS 92 (H) 32 - 75 %    LYMPHOCYTES 1 (L) 12 - 49 %    MONOCYTES 5 5 - 13 %    EOSINOPHILS 1 0 - 7 %    BASOPHILS 0 0 - 1 %    IMMATURE GRANULOCYTES 1 (H) 0.0 - 0.5 %    ABS. NEUTROPHILS 14.9 (H) 1.8 - 8.0 K/UL    ABS. LYMPHOCYTES 0.2 (L) 0.8 - 3.5 K/UL    ABS. MONOCYTES 0.8 0.0 - 1.0 K/UL    ABS. EOSINOPHILS 0.2 0.0 - 0.4 K/UL    ABS. BASOPHILS 0.0 0.0 - 0.1 K/UL    ABS. IMM. GRANS. 0.2 (H) 0.00 - 0.04 K/UL    DF SMEAR SCANNED      RBC COMMENTS ANISOCYTOSIS  1+       METABOLIC PANEL, COMPREHENSIVE    Collection Time: 11/06/21 11:39 PM   Result Value Ref Range    Sodium 128 (L) 136 - 145 mmol/L    Potassium 4.9 3.5 - 5.1 mmol/L    Chloride 93 (L) 97 - 108 mmol/L    CO2 25 21 - 32 mmol/L    Anion gap 10 5 - 15 mmol/L    Glucose 487 (H) 65 - 100 mg/dL    BUN 84 (H) 6 - 20 MG/DL    Creatinine 3.76 (H) 0.70 - 1.30 MG/DL    BUN/Creatinine ratio 22 (H) 12 - 20      GFR est AA 20 (L) >60 ml/min/1.73m2    GFR est non-AA 17 (L) >60 ml/min/1.73m2    Calcium 9.4 8.5 - 10.1 MG/DL    Bilirubin, total 0.3 0.2 - 1.0 MG/DL    ALT (SGPT) 27 12 - 78 U/L    AST (SGOT) 15 15 - 37 U/L    Alk.  phosphatase 222 (H) 45 - 117 U/L    Protein, total 5.7 (L) 6.4 - 8.2 g/dL    Albumin 1.9 (L) 3.5 - 5.0 g/dL    Globulin 3.8 2.0 - 4.0 g/dL    A-G Ratio 0.5 (L) 1.1 - 2.2     TROPONIN-HIGH SENSITIVITY    Collection Time: 11/06/21 11:39 PM   Result Value Ref Range    Troponin-High Sensitivity 36 0 - 76 ng/L   MAGNESIUM    Collection Time: 11/06/21 11:39 PM   Result Value Ref Range    Magnesium 1.8 1.6 - 2.4 mg/dL   TSH 3RD GENERATION    Collection Time: 11/06/21 11:39 PM   Result Value Ref Range    TSH 0.12 (L) 0.36 - 3.74 uIU/mL   URINALYSIS W/ RFLX MICROSCOPIC    Collection Time: 11/06/21 11:40 PM   Result Value Ref Range    Color YELLOW/STRAW      Appearance CLOUDY (A) CLEAR      Specific gravity 1.016 1.003 - 1.030      pH (UA) 5.0 5.0 - 8.0      Protein 100 (A) NEG mg/dL    Glucose >1,000 (A) NEG mg/dL    Ketone Negative NEG mg/dL    Bilirubin Negative NEG      Blood MODERATE (A) NEG      Urobilinogen 0.2 0.2 - 1.0 EU/dL    Nitrites Negative NEG      Leukocyte Esterase MODERATE (A) NEG      WBC >100 (H) 0 - 4 /hpf    RBC 0-5 0 - 5 /hpf    Epithelial cells FEW FEW /lpf    Bacteria 4+ (A) NEG /hpf

## 2021-11-07 NOTE — CONSULTS
[facility]   [address] [facility], [address] [place] [address], [facility]  Phone: [phone] NOTE     Patient: Chucho Hammond MRN: [id number]  PCP: Curly Aranda MD   :     1960  Age:   63 y.o. Sex:  male      Referring physician: Hal Washington MD  Reason for consultation: 63 y.o. 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 6 Hyponatremia  - from fluid overload     Care Plan discussed with:  Pt        Thank you for consulting [facility] in the care of your patient. Subjective:   HPI: Chucho Hammond is a 63 y.o.  male who has been admitted to the hospital for Decreased mentation and UTI >nep consulted for ESRD. Pt has   History of end-stage renal disease and has been going to dialysis on TTS schedule under the care of Dr. Milagros Liriano at the [facility] unit in [place]. Missed HD yesterday . He has been falling at home   Plan HD today and did fine .  No new issues     Past Medical Hx:   Past Medical History:   Diagnosis Date    Adverse effect of anesthesia     \" STOP BREATHING 1 TIME C ANESTH\"    Cancer ([facility] [facility]     thyroid cancer    Chronic kidney disease     [facility] T-TH-S    Chronic pain     BACK SHOULDER AND ARM    COVID-19 2021    Depression     Diabetes ([address]     GERD (gastroesophageal reflux disease)     Heart failure (HCC)     stage 1    Hypercholesterolemia     Hypertension     Nausea & vomiting     PUD (peptic ulcer disease)     Sleep apnea     doesn't wear cpap    Thyroid cancer ([facility]     TIA (transient ischemic attack) 2011    Vitamin D deficiency         Past Surgical Hx:     Past Surgical History:   Procedure Laterality Date    HX HEART CATHETERIZATION      HX HEENT      THROAT SURGERY X 4    HX ORTHOPAEDIC      back     HX ORTHOPAEDIC      ARM AND SHOULDER    HX OTHER SURGICAL      thyroid, lymphnode    HX RETINAL DETACHMENT REPAIR      left eye    HX VASCULAR ACCESS      HD cather in chest    IR INJ FORAMIN EPID LUMB ANES/STER SNGL  10/20/2021    IR INSERT NON TUNL CVC OVER 5 YRS  8/18/2020    IR INSERT NON TUNL CVC OVER 5 YRS  7/23/2021    IR INSERT TUNL CVC W/O PORT OVER 5 YR  8/26/2020    IR INSERT TUNL CVC W/O PORT OVER 5 YR  7/27/2021    UPPER GI ENDOSCOPY,BALL DIL,30MM  7/17/2020         UPPER GI ENDOSCOPY,BIOPSY  7/17/2020         VASCULAR SURGERY PROCEDURE UNLIST      cardiac cath NEG. Allergies   Allergen Reactions    Anesthetics - Amide Type - Select Amino Amides Shortness of Breath    Flexeril [Cyclobenzaprine] Hives    Tramadol Hives       Social Hx:  reports that he quit smoking about 27 years ago. He has never used smokeless tobacco. He reports previous alcohol use. He reports that he does not use drugs. Family History   Problem Relation Age of Onset    Diabetes Mother     Elevated Lipids Mother    Logan Simone Bladder Disease Mother     Headache Mother    Mari Migraines Mother     Heart Disease Mother     Hypertension Mother     Stroke Mother     Other Mother         ANEURSYM BRAIN    Bleeding Prob Father     Cancer Father         LEUKEMIA    Diabetes Father     Elevated Lipids Father     Mental Retardation Sister     Psychiatric Disorder Sister     Cancer Brother         LUNGS       Review of Systems:  A thorough twelve point review of system was performed today. Pertinent positives and negatives are mentioned in the HPI. The reminder of the ROS is negative and noncontributory. Objective:    Vitals:    Vitals:    11/07/21 1415 11/07/21 1430 11/07/21 1446 11/07/21 1506   BP: 134/72 126/68 (!) 144/75 (!) (P) 182/85   Pulse: 72 80 89 (P) 94   Resp: 16 16 16 (P) 18   Temp:   98.6 °F (37 °C)    SpO2:       Weight:       Height:         I&O's:  No intake/output data recorded. Visit Vitals  BP (!) (P) 182/85 (BP 1 Location: Right upper arm, BP Patient Position: At rest)   Pulse (P) 94   Temp 98.6 °F (37 °C)   Resp (P) 18   Ht 5' 9\" (1.753 m)   Wt 104.5 kg (230 lb 6.1 oz)   SpO2 92%   BMI 34.02 kg/m²       Physical Exam:  General:  No apparent Distress  HEENT: PERRL,  No Pallor , No Icterus  Neck: Supple,no mass palpable  Lungs : CTA  CVS: RRR, S1 S2 normal, No murmur   Abdomen: Soft, NT, BS +  Extremities: Edema  Skin: No rash or lesions.   MS: No joint swelling, erythema, warmth  Neurologic: non focal, AAO x 3  Psych: normal affect    Laboratory Results:    Recent Labs     11/06/21  2339   *   K 4.9   CL 93*   CO2 25   *   BUN 84*   CREA 3.76*   CA 9.4   MG 1.8   ALB 1.9*   ALT 27     Recent Labs     11/06/21  2339   WBC 16.3*   HGB 11.1*   HCT 33.4*   *     Lab Results   Component Value Date/Time    Specimen Description: BLOOD 11/09/2010 09:40 AM     Lab Results   Component Value Date/Time    Culture result: NO GROWTH AFTER 5 HOURS 11/07/2021 12:39 AM    Culture result: NO GROWTH 5 DAYS 10/15/2021 07:25 AM    Culture result: NO GROWTH 5 DAYS 08/19/2020 05:03 PM     Recent Results (from the past 24 hour(s))   T4, FREE    Collection Time: 11/06/21 10:39 PM   Result Value Ref Range    T4, Free 0.9 0.8 - 1.5 NG/DL   EKG, 12 LEAD, INITIAL    Collection Time: 11/06/21 11:13 PM   Result Value Ref Range    Ventricular Rate 81 BPM    Atrial Rate 81 BPM    P-R Interval 144 ms    QRS Duration 92 ms    Q-T Interval 334 ms    QTC Calculation (Bezet) 387 ms    Calculated P Axis 31 degrees    Calculated R Axis -25 degrees    Calculated T Axis 39 degrees    Diagnosis       Normal sinus rhythm  Incomplete right bundle branch block  When compared with ECG of 15-OCT-2021 00:01,  Incomplete right bundle branch block is now present  Confirmed by Karlos Dunne ([id number]) on 11/7/2021 1:36:14 PM     CBC WITH AUTOMATED DIFF    Collection Time: 11/06/21 11:39 PM   Result Value Ref Range    WBC 16.3 (H) 4.1 - 11.1 K/uL    RBC 3.58 (L) 4.10 - 5.70 M/uL    HGB 11.1 (L) 12.1 - 17.0 g/dL    HCT 33.4 (L) 36.6 - 50.3 %    MCV 93.3 80.0 - 99.0 FL    MCH 31.0 26.0 - 34.0 PG    MCHC 33.2 30.0 - 36.5 g/dL    RDW 15.9 (H) 11.5 - 14.5 %    PLATELET 564 (L) 526 - 400 K/uL    MPV 11.6 8.9 - 12.9 FL    NRBC 0.0 0  WBC    ABSOLUTE NRBC 0.00 0.00 - 0.01 K/uL    NEUTROPHILS 92 (H) 32 - 75 %    LYMPHOCYTES 1 (L) 12 - 49 %    MONOCYTES 5 5 - 13 %    EOSINOPHILS 1 0 - 7 %    BASOPHILS 0 0 - 1 %    IMMATURE GRANULOCYTES 1 (H) 0.0 - 0.5 %    ABS. NEUTROPHILS 14.9 (H) 1.8 - 8.0 K/UL    ABS. LYMPHOCYTES 0.2 (L) 0.8 - 3.5 K/UL    ABS. MONOCYTES 0.8 0.0 - 1.0 K/UL    ABS. EOSINOPHILS 0.2 0.0 - 0.4 K/UL    ABS. BASOPHILS 0.0 0.0 - 0.1 K/UL    ABS. IMM. GRANS. 0.2 (H) 0.00 - 0.04 K/UL    DF SMEAR SCANNED      RBC COMMENTS ANISOCYTOSIS  1+       METABOLIC PANEL, COMPREHENSIVE    Collection Time: 11/06/21 11:39 PM   Result Value Ref Range    Sodium 128 (L) 136 - 145 mmol/L    Potassium 4.9 3.5 - 5.1 mmol/L    Chloride 93 (L) 97 - 108 mmol/L    CO2 25 21 - 32 mmol/L    Anion gap 10 5 - 15 mmol/L    Glucose 487 (H) 65 - 100 mg/dL    BUN 84 (H) 6 - 20 MG/DL    Creatinine 3.76 (H) 0.70 - 1.30 MG/DL    BUN/Creatinine ratio 22 (H) 12 - 20      GFR est AA 20 (L) >60 ml/min/1.73m2    GFR est non-AA 17 (L) >60 ml/min/1.73m2    Calcium 9.4 8.5 - 10.1 MG/DL    Bilirubin, total 0.3 0.2 - 1.0 MG/DL    ALT (SGPT) 27 12 - 78 U/L    AST (SGOT) 15 15 - 37 U/L    Alk.  phosphatase 222 (H) 45 - 117 U/L    Protein, total 5.7 (L) 6.4 - 8.2 g/dL    Albumin 1.9 (L) 3.5 - 5.0 g/dL    Globulin 3.8 2.0 - 4.0 g/dL    A-G Ratio 0.5 (L) 1.1 - 2.2 TROPONIN-HIGH SENSITIVITY    Collection Time: 11/06/21 11:39 PM   Result Value Ref Range    Troponin-High Sensitivity 36 0 - 76 ng/L   MAGNESIUM    Collection Time: 11/06/21 11:39 PM   Result Value Ref Range    Magnesium 1.8 1.6 - 2.4 mg/dL   TSH 3RD GENERATION    Collection Time: 11/06/21 11:39 PM   Result Value Ref Range    TSH 0.12 (L) 0.36 - 3.74 uIU/mL   URINALYSIS W/ RFLX MICROSCOPIC    Collection Time: 11/06/21 11:40 PM   Result Value Ref Range    Color YELLOW/STRAW      Appearance CLOUDY (A) CLEAR      Specific gravity 1.016 1.003 - 1.030      pH (UA) 5.0 5.0 - 8.0      Protein 100 (A) NEG mg/dL    Glucose >1,000 (A) NEG mg/dL    Ketone Negative NEG mg/dL    Bilirubin Negative NEG      Blood MODERATE (A) NEG      Urobilinogen 0.2 0.2 - 1.0 EU/dL    Nitrites Negative NEG      Leukocyte Esterase MODERATE (A) NEG      WBC >100 (H) 0 - 4 /hpf    RBC 0-5 0 - 5 /hpf    Epithelial cells FEW FEW /lpf    Bacteria 4+ (A) NEG /hpf   CULTURE, BLOOD, PAIRED    Collection Time: 11/07/21 12:39 AM    Specimen: Blood   Result Value Ref Range    Special Requests: NO SPECIAL REQUESTS      Culture result: NO GROWTH AFTER 5 HOURS     GLUCOSE, POC    Collection Time: 11/07/21  3:18 AM   Result Value Ref Range    Glucose (POC) 238 (H) 65 - 117 mg/dL    Performed by Sabas Garzon RN    GLUCOSE, POC    Collection Time: 11/07/21  7:38 AM   Result Value Ref Range    Glucose (POC) 199 (H) 65 - 117 mg/dL    Performed by Kareem Norris  PCT    GLUCOSE, POC    Collection Time: 11/07/21  3:10 PM   Result Value Ref Range    Glucose (POC) 143 (H) 65 - 117 mg/dL    Performed by Kareem Norris  PCT          Urine dipstick:   Lab Results   Component Value Date/Time    Color YELLOW/STRAW 11/06/2021 11:40 PM    Appearance CLOUDY (A) 11/06/2021 11:40 PM    Specific gravity 1.016 11/06/2021 11:40 PM    pH (UA) 5.0 11/06/2021 11:40 PM    Protein 100 (A) 11/06/2021 11:40 PM    Glucose >1,000 (A) 11/06/2021 11:40 PM    Ketone Negative 11/06/2021 11:40 PM    Bilirubin Negative 11/06/2021 11:40 PM    Urobilinogen 0.2 11/06/2021 11:40 PM    Nitrites Negative 11/06/2021 11:40 PM    Leukocyte Esterase MODERATE (A) 11/06/2021 11:40 PM    Epithelial cells FEW 11/06/2021 11:40 PM    Bacteria 4+ (A) 11/06/2021 11:40 PM    WBC >100 (H) 11/06/2021 11:40 PM    RBC 0-5 11/06/2021 11:40 PM       I have reviewed the following: All pertinent labs, microbiology data, radiology imaging for my assessment     Medications list Personally Reviewed   [x]      Yes     []               No       Medications:  Prior to Admission medications    Medication Sig Start Date End Date Taking? Authorizing Provider   levothyroxine (SYNTHROID) 175 mcg tablet 1 full tab Mon-Sat but only 1/2 tab on sundays 11/5/21  Yes Alis Evans MD   metoclopramide HCl (REGLAN) 5 mg tablet TAKE 1 TABLET BY MOUTH ONCE DAILY AS NEEDED. *DO NOT TAKE MORE THAN 3 TABLETS A DAY 11/5/21  Yes Alis Evans MD   NovoLIN N NPH U-100 Insulin 100 unit/mL injection INJECT 2 TO 10 UNITS SUBCUTANEOUSLY ONCE DAILY 11/5/21  Yes Alis Evans MD   lancets misc Use preferred brand; Check glucose 3-4 times daily, Diagnosis E11.22 11/5/21  Yes Ron Prado MD   carvediloL (COREG) 6.25 mg tablet Take 1 Tablet by mouth two (2) times daily (with meals). 11/5/21  Yes Alis Evans MD   hydrALAZINE (APRESOLINE) 50 mg tablet Take 1 Tablet by mouth two (2) times a day. 11/5/21  Yes Alis Evans MD   amLODIPine (NORVASC) 10 mg tablet Take 1 Tablet by mouth daily. 11/5/21  Yes Alis Evans MD   HYDROcodone-acetaminophen (NORCO) 5-325 mg per tablet Take 1 Tablet by mouth every six (6) hours as needed for Pain for up to 7 days. Max Daily Amount: 4 Tablets. 11/5/21 11/12/21 Yes Alis Evans MD   sevelamer carbonate (RENVELA) 800 mg tab tab TAKE 1 TABLET BY MOUTH THREE TIMES DAILY WITH MEALS 11/3/21  Yes Alis Evans MD   SORAfenib (NEXAVAR) 200 mg tablet Take 1 Tablet by mouth daily for 30 days.  10/29/21 11/28/21 Yes Matthew Workman MD   pregabalin (LYRICA) 25 mg capsule Take 1 Capsule by mouth three (3) times daily. Max Daily Amount: 75 mg. 10/22/21  Yes Robin Wynn NP   dexAMETHasone (DECADRON) 4 mg tablet Take 4 mg by mouth every eight (8) hours. 10/21/21  Yes Vicki Wynn NP   sodium bicarbonate 650 mg tablet TAKE 2 TABLETS BY MOUTH TWICE DAILY 7/17/21  Yes Provider, Historical   bumetanide (BUMEX) 2 mg tablet Take 1 Tablet by mouth daily. 7/28/21  Yes Barney Kumar MD   omeprazole (PRILOSEC) 20 mg capsule Take 1 capsule by mouth once daily 6/11/21  Yes Lulu Perales MD   simvastatin (ZOCOR) 20 mg tablet Take 1 Tab by mouth nightly. 5/10/21  Yes Lulu Perales MD   Vitamin D2 1,250 mcg (50,000 unit) capsule TAKE 1 CAPSULE BY MOUTH ONCE A WEEK  Patient taking differently: Take 50,000 Units by mouth every Monday. TAKE 1 CAPSULE BY MOUTH ONCE A WEEK 5/10/21  Yes Lulu Perales MD   sodium zirconium cyclosilicate (Lokelma) 5 gram powder packet Take 5 g by mouth two (2) times a week. On Fridays and Sundays   Yes Provider, Historical   Ventolin HFA 90 mcg/actuation inhaler TAKE 1-2 PUFFS EVEERY 4-6 HOURS AS NEEDED FOR DYSPNEA AND WHEEZING 7/28/20  Yes Lulu Perales MD        Thank you for allowing us to participate in the care of this patient. We will follow patient. Please dont hesitate to call with any questions    Orion Maddox MD  [facility]   [address] [facility] [address] [place] [address]  Phone - [phone]   Fax - [phone]  [email or web address]
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
## 2021-11-08 NOTE — PROGRESS NOTES
Spiritual Care Partner Volunteer visited patient at [address] in [facility] MED TELE on 11/8/2021     Documented by:  Rajesh Al M.Div,., [address] Provider   Paging Service [phone])

## 2021-11-08 NOTE — PROGRESS NOTES
Ambulatory Care Management Note     Date/Time:  11/8/2021 10:38 AM  Reviewed pt's EMR. Pt left [facility] AMA on 10/28/21. Readmitted to hospital 11/06/21 for generalized weakness, missed HD x 1.   ACM will con't to follow.  Shweta Borrero

## 2021-11-08 NOTE — PROGRESS NOTES
Nephrology Progress Note  Jos[facility]t     [facility]  Phone - [phone]   Patient: Dale Lew    YOB: 1960        Date- 11/8/2021   Admit Date: 11/6/2021  CC: Follow up for  ESRD         IMPRESSION & PLAN:   · ESRD---Robi Higginbotham on TTS shift - Dr. Malathi Culver. · Hypertension  · Hyperkalemia  · hyponatremia  · Anemia of ckd  · Type 2 diabetes  · Back pain  · Mets to the right frontal lobe. 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  ·  lung nodules    PLAN-   No acute need for hemodialysis today from volume laboratory standpoint   We will plan to keep you on a TTS schedule and dialyze tomorrow.  EPO for anemia   Will follow with you     Subjective: Interval History:   -Seen and examined today  -Got HD yesterday  -No issues overnight    Objective:   Vitals:    11/07/21 2315 11/08/21 0444 11/08/21 0656 11/08/21 0832   BP: (!) 172/78 (!) 173/75  (!) 157/66   Pulse: 68 76  77   Resp: 16 16  16   Temp: 97.9 °F (36.6 °C) 97.9 °F (36.6 °C)  (!) 96.3 °F (35.7 °C)   SpO2: 96% 95%  95%   Weight:   100.6 kg (221 lb 11.2 oz)    Height:          11/07 0701 - 11/08 0700  In: 800 [P.O.:800]  Out: 2700 [Urine:500]  Last 3 Recorded Weights in this Encounter    11/06/21 2310 11/08/21 0656   Weight: 104.5 kg (230 lb 6.1 oz) 100.6 kg (221 lb 11.2 oz)      Physical exam:   GEN: NAD  NECK- Supple, no mass  RESP: No wheezing, Clear b/l  CVS: S1,S2  RRR  NEURO: Normal speech, Non focal  EXT: No Edema   PSYCH: Normal Mood    Chart reviewed. Pertinent Notes reviewed.      Data Review :  Recent Labs     11/08/21  0225 11/06/21  2339   * 128*   K 5.1 4.9   CL 98 93*   CO2 28 25   BUN 58* 84*   CREA 2.60* 3.76*   * 487*   CA 9.6 9.4   MG 1.9 1.8   PHOS 5.5*  --      Recent Labs     11/08/21 0225 11/06/21  2339   WBC 17.8* 16.3*   HGB 10.5* 11.1*   HCT 32.4* 33.4*   PLT 74* 106*     No results for input(s): FE, TIBC, PSAT, FERR in the last 72 hours. Medication list  reviewed  Current Facility-Administered Medications   Medication Dose Route Frequency    vancomycin (VANCOCIN) 2500 mg in  mL infusion  2,500 mg IntraVENous ONCE    cefepime (MAXIPIME) 1 g in 0.9% sodium chloride (MBP/ADV) 50 mL MBP  1 g IntraVENous Q24H    metroNIDAZOLE (FLAGYL) IVPB premix 500 mg  500 mg IntraVENous Q12H    amLODIPine (NORVASC) tablet 10 mg  10 mg Oral DAILY    bumetanide (BUMEX) tablet 2 mg  2 mg Oral DAILY    carvediloL (COREG) tablet 6.25 mg  6.25 mg Oral BID WITH MEALS    dexAMETHasone (DECADRON) tablet 4 mg  4 mg Oral Q8H    hydrALAZINE (APRESOLINE) tablet 50 mg  50 mg Oral BID    levothyroxine (SYNTHROID) tablet 175 mcg  175 mcg Oral 6am    pantoprazole (PROTONIX) tablet 40 mg  40 mg Oral ACB    pregabalin (LYRICA) capsule 25 mg  25 mg Oral TID    sevelamer carbonate (RENVELA) tab 800 mg  800 mg Oral TID WITH MEALS    atorvastatin (LIPITOR) tablet 10 mg  10 mg Oral QHS    . PHARMACY TO SUBSTITUTE PER PROTOCOL (Reordered from: SORAfenib (NEXAVAR) 200 mg tablet)    Per Protocol    sodium zirconium cyclosilicate (LOKELMA) powder packet 5 g  5 g Oral 2 TIMES WEEKLY    sodium bicarbonate tablet 1,300 mg  1,300 mg Oral BID    insulin lispro (HUMALOG) injection   SubCUTAneous AC&HS    glucose chewable tablet 16 g  4 Tablet Oral PRN    dextrose (D50W) injection syrg 12.5-25 g  12.5-25 g IntraVENous PRN    glucagon (GLUCAGEN) injection 1 mg  1 mg IntraMUSCular PRN    sodium chloride (NS) flush 5-40 mL  5-40 mL IntraVENous Q8H    sodium chloride (NS) flush 5-40 mL  5-40 mL IntraVENous PRN    acetaminophen (TYLENOL) tablet 650 mg  650 mg Oral Q6H PRN    Or    acetaminophen (TYLENOL) suppository 650 mg  650 mg Rectal Q6H PRN    polyethylene glycol (MIRALAX) packet 17 g  17 g Oral DAILY PRN    ondansetron (ZOFRAN ODT) tablet 4 mg  4 mg Oral Q8H PRN    Or    ondansetron (ZOFRAN) injection 4 mg  4 mg IntraVENous Q6H PRN    insulin glargine (LANTUS) injection 11 Units  11 Units SubCUTAneous DAILY    And    insulin lispro (HUMALOG) injection 2 Units  2 Units SubCUTAneous TIDAC    heparin (porcine) injection 5,000 Units  5,000 Units SubCUTAneous Q12H    oxyCODONE IR (ROXICODONE) tablet 5 mg  5 mg Oral Q4H PRN    oxyCODONE IR (ROXICODONE) tablet 10 mg  10 mg Oral Q4H PRN    hydrALAZINE (APRESOLINE) 20 mg/mL injection 10 mg  10 mg IntraVENous Q6H PRN          Danna Farmer MD              [facility]  [facility] [address]  [place], [address]  Phone - [phone]               Fax - [phone]
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
## 2021-11-08 NOTE — PROGRESS NOTES
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  -UA suggestive of UTI so started on Rocephin.  -Blood culture and urine culture pending.  -Lactic acid pending.  -Chest x-ray-No evidence for pulmonary edema. Persistent left mid pulmonary  nodule, size not well assessed by portable technique.  -Patient was discharged to [facility] rehab but self checked out on 10/29 and since then at home has multiple falls. -PT and OT consulted.  -Case management consulted.  -Likely need rehab again.  -Continue the steroids for the brain metastasis. -Patient has extensive work-up done regarding the brain metastases the previous admission and neurosurgery recommended gamma knife/stereotactic radiosurgery. :  Gram positive bacteremia  Follow up final BCx  Start empiric Vancomcyin  PT/OT evals     Left lower extremity swelling  -Doppler venous ultrasound negative for DVT. -We will do ABIs, likely dependent edema.     End-stage renal disease on hemodialysis  -Consulted nephrology for dialysis, missed dialysis today because of the swelling of the pain. :  HD as per Nephrology     Hyponatremia  -Fluid restrictions, probably improved with the dialysis.     Diabetes type 2 with hyperglycemia  -Hyperglycemia likely secondary to the steroids.  -Sliding scale insulin, started on NovoLog 70/30-10 units twice a day. -Gave IV insulin in the ER.     Hypertensive urgency  -Continue the home medications.  -Thorazine as needed. 30.0 - 39.9 Obese / Body mass index is 32.74 kg/m².     Estimated discharge date:   Barriers:    Code status: Full  Prophylaxis: Hep SQ  Recommended Disposition: TBD     Subjective:     Chief Complaint / Reason for Physician Visit  Feeling better. Still wary about his ambulation. Discussed with RN events overnight. Review of Systems:  Symptom Y/N Comments  Symptom Y/N Comments   Fever/Chills    Chest Pain     Poor Appetite    Edema     Cough    Abdominal Pain     Sputum    Joint Pain     SOB/NUÑEZ    Pruritis/Rash     Nausea/vomit    Tolerating PT/OT     Diarrhea    Tolerating Diet     Constipation    Other       Could NOT obtain due to:      Objective:     VITALS:   Last 24hrs VS reviewed since prior progress note. Most recent are:  Patient Vitals for the past 24 hrs:   Temp Pulse Resp BP SpO2   11/08/21 0832 (!) 96.3 °F (35.7 °C) 77 16 (!) 157/66 95 %   11/08/21 0444 97.9 °F (36.6 °C) 76 16 (!) 173/75 95 %   11/07/21 2315 97.9 °F (36.6 °C) 68 16 (!) 172/78 96 %   11/07/21 1936 97.8 °F (36.6 °C) 73 18 (!) 159/69 99 %   11/07/21 1506 -- 94 18 (!) 182/85 --   11/07/21 1446 98.6 °F (37 °C) 89 16 (!) 144/75 --   11/07/21 1430 -- 80 16 126/68 --   11/07/21 1415 -- 72 16 134/72 --   11/07/21 1400 -- 72 16 136/71 --   11/07/21 1345 -- 80 18 (!) 169/80 --   11/07/21 1330 -- 74 20 139/64 --   11/07/21 1315 -- 72 20 (!) 159/79 --   11/07/21 1300 -- 71 20 (!) 159/75 --   11/07/21 1245 -- 72 20 (!) 147/67 --   11/07/21 1230 -- 71 20 (!) 147/70 --   11/07/21 1217 -- 71 20 (!) 140/67 --   11/07/21 1202 -- 71 20 (!) 149/76 --   11/07/21 1146 98.6 °F (37 °C) 73 20 (!) 174/81 --       Intake/Output Summary (Last 24 hours) at 11/8/2021 1117  Last data filed at 11/8/2021 2846  Gross per 24 hour   Intake 800 ml   Output 2700 ml   Net -1900 ml        I had a face to face encounter and independently examined this patient on 11/8/2021, as outlined below:  PHYSICAL EXAM:  General: Alert, cooperative, no acute distress    EENT:  EOMI. Anicteric sclerae. MMM  Resp:  CTA bilaterally, no wheezing or rales. No accessory muscle use  CV:  Regular  rhythm,  No edema  GI:  Soft, Non distended, Non tender.   +Bowel sounds  Neurologic:  Alert and oriented X 3, normal speech,   Psych:   Good insight. Not anxious nor agitated  Skin:  No rashes. No jaundice    Reviewed most current lab test results and cultures  YES  Reviewed most current radiology test results   YES  Review and summation of old records today    NO  Reviewed patient's current orders and MAR    YES  PMH/SH reviewed - no change compared to H&P  ________________________________________________________________________  Care Plan discussed with:    Comments   Patient x    Family      RN x    Care Manager     Consultant                        Multidiciplinary team rounds were held today with , nursing, pharmacist and clinical coordinator. Patient's plan of care was discussed; medications were reviewed and discharge planning was addressed. ________________________________________________________________________  Total NON critical care TIME:  35   Minutes    Total CRITICAL CARE TIME Spent:   Minutes non procedure based      Comments   >50% of visit spent in counseling and coordination of care     ________________________________________________________________________  Jayson Contreras MD     Procedures: see electronic medical records for all procedures/Xrays and details which were not copied into this note but were reviewed prior to creation of Plan. LABS:  I reviewed today's most current labs and imaging studies.   Pertinent labs include:  Recent Labs     11/08/21 0225 11/06/21 2339   WBC 17.8* 16.3*   HGB 10.5* 11.1*   HCT 32.4* 33.4*   PLT 74* 106*     Recent Labs     11/08/21 0225 11/06/21 2339   * 128*   K 5.1 4.9   CL 98 93*   CO2 28 25   * 487*   BUN 58* 84*   CREA 2.60* 3.76*   CA 9.6 9.4   MG 1.9 1.8   PHOS 5.5*  --    ALB 1.5* 1.9*   TBILI 0.3 0.3   ALT 19 27       Signed: Jayson Contreras MD

## 2021-11-08 NOTE — PROGRESS NOTES
Pharmacy Antimicrobial Kinetic Dosing    Indication for Antimicrobials: Bacteremia/UTI    Current Regimen of Each Antimicrobial:  Vancomycin 2500 mg x 1, then pharmacy to dose (Start Date ; Day # 1)  Cefepime 1 g IV Q24H  (Start Date ; Day # 1)  Metronidazole 500 mg IV Q12H  (Start Date ; Day # 1)    Previous Antimicrobial Therapy:      Goal Level: AUC: 400-600 mg/hr/Liter/day    Date Dose & Interval Measured (mcg/mL) Predicted AUC/CALLIE                       Date & time of next level:     Dosing calculator used: "[employer] calculator    Significant Positive Cultures:    Blood - GPC / - Prelim    Conditions for Dosing Consideration: Hemodialysis    Labs:  Recent Labs     21  2339   CREA 2.60* 3.76*   BUN 58* 84*     Recent Labs     21  2339   WBC 17.8* 16.3*     Temp (24hrs), Av.9 °F (36.6 °C), Min:96.3 °F (35.7 °C), Max:98.6 °F (37 °C)        Creatinine Clearance (mL/min):   CrCl (Ideal Body Weight): 30.6   If actual weight < IBW: CrCl (Actual Body Weight) 43.5    Impression/Plan:   HD Patient. Vancomycin loading dose ordered. Subsequent doses of 1000 mg after each HD. Cefepime and Flagyl for empiric coverage of UTI/Bacteremia. Called microbiology lab to see if urine could be reflexed from UA. Unfortunately sample is too old. We will have to recollect. Paired Blood cx to be repeated. Antimicrobial stop date TBD     Pharmacy will follow daily and adjust medications as appropriate for renal function and/or serum levels.     Thank you,  Sheryle Lehmann, PHARMD    Vancomycin Dosing Document    Documents located on pharmacy Teams site: Clinical Practice -> Antimicrobial Stewardship -> Antibiotics_Vancomycin     Aminoglycoside Dosing Document    Documents located on pharmacy Teams site: Clinical Practice -> Antimicrobial Stewardship -> Antibiotics_Aminoglycosides

## 2021-11-08 NOTE — PROGRESS NOTES
Received message from patient's nurse stating:    Pt reporting 9/10 pain in RLE         Discussion / orders:     Ordered Oxycodone 5 mg by mouth every 4 hours as needed for moderate pain and 10 mg by mouth every 4 hours as needed for severe pain           Please note that this note was dictated using Dragon computer voice recognition software. Quite often unanticipated grammatical, syntax, homophones, and other interpretive errors are inadvertently transcribed by the computer software. Please disregard these errors. Please excuse any errors that have escaped final proofreading.

## 2021-11-08 NOTE — PROGRESS NOTES
Problem: Mobility Impaired (Adult and Pediatric)  Goal: *Acute Goals and Plan of Care (Insert Text)  Description: FUNCTIONAL STATUS PRIOR TO ADMISSION: Patient was modified independent using a rollator for short distance ambulation and power chair when he leaves his home. HOME SUPPORT PRIOR TO ADMISSION: The patient lives with family in [address] [place] [address]. Physical Therapy Goals  Initiated 11/8/2021  1. Patient will move from supine to sit and sit to supine  in bed with independence within 7 day(s). 2.  Patient will transfer from bed to chair and chair to bed with supervision/set-up using the least restrictive device within 7 day(s). 3.  Patient will perform sit to stand with supervision/set-up within 7 day(s). 4.  Patient will ambulate with supervision/set-up for 50 feet with the least restrictive device within 7 day(s). Outcome: Progressing Towards Goal   PHYSICAL THERAPY EVALUATION  Patient: Mayelin Ogden (64 y.o. male)  Date: 11/8/2021  Primary Diagnosis: Sepsis ([facility] [address] [A41.9]  UTI (urinary tract infection) [N39.0]  ESRD (end stage renal disease) on dialysis ([facility] [address] [N18.6, Z99.2]  Hyponatremia [E87.1]        Precautions: Fall       ASSESSMENT  Based on the objective data described below, the patient presents with decreased independence with functional mobility S/P admission for sepsis/UTI/ESRD and hyponatremia. He is received supine in bed. Patient demonstrates the ability to transition supine to sit with use of one bed rail. He demonstrates good sitting balance and is able to don the L seated EOB. He performs sit<>stand with CGA and use of RW. Patient is able to ambulate short additional distance after a seated rest break. Current Level of Function Impacting Discharge (mobility/balance): CGA short distance gait with RW     Functional Outcome Measure: The patient scored 20/28 on the Tinetti outcome measure.       Other factors to consider for discharge: medical stability     Patient will benefit from skilled therapy intervention to address the above noted impairments. PLAN :  Recommendations and Planned Interventions: bed mobility training, transfer training, gait training, therapeutic exercises, neuromuscular re-education, edema management/control, patient and family training/education, and therapeutic activities      Frequency/Duration: Patient will be followed by physical therapy:  5 times a week to address goals. Recommendation for discharge: (in order for the patient to meet his/her long term goals)  Physical therapy at least 2 days/week in the home AND ensure assist and/or supervision for safety with functional mobility and transfers     This discharge recommendation:  Has not yet been discussed the attending provider and/or case management    IF patient discharges home will need the following DME: Recommend patient uses RW v. His rollator. He states he may have one in storage         SUBJECTIVE:   Patient stated I feel better today.     OBJECTIVE DATA SUMMARY:   HISTORY:    Past Medical History:   Diagnosis Date    Adverse effect of anesthesia     \" STOP BREATHING 1 TIME C ANESTH\"    Cancer ([facility] [address] 2004    thyroid cancer    [facility] kidney [facility]    Chronic pain     BACK SHOULDER AND ARM    COVID-19 04/2021    Depression     Diabetes (HCC)     GERD (gastroesophageal reflux disease)     Heart failure (HCC)     stage 1    Hypercholesterolemia     Hypertension     Nausea & vomiting     PUD (peptic ulcer disease)     Sleep apnea     doesn't wear cpap    Thyroid cancer ([facility] [address]     TIA (transient ischemic attack) 2011    Vitamin D deficiency      Past Surgical History:   Procedure Laterality Date    HX HEART CATHETERIZATION      HX HEENT      THROAT SURGERY X 4    HX ORTHOPAEDIC      back     HX ORTHOPAEDIC      ARM AND SHOULDER    HX OTHER SURGICAL      thyroid, lymphnode    HX RETINAL DETACHMENT REPAIR      left eye    HX VASCULAR ACCESS      HD cather in chest    IR INJ FORAMIN EPID LUMB ANES/STER SNGL  10/20/2021    IR INSERT NON TUNL CVC OVER 5 YRS  8/18/2020    IR INSERT NON TUNL CVC OVER 5 YRS  7/23/2021    IR INSERT TUNL CVC W/O PORT OVER 5 YR  8/26/2020    IR INSERT TUNL CVC W/O PORT OVER 5 YR  7/27/2021    UPPER GI ENDOSCOPY,BALL DIL,30MM  7/17/2020         UPPER GI ENDOSCOPY,BIOPSY  7/17/2020         VASCULAR SURGERY PROCEDURE UNLIST      cardiac cath NEG. Personal factors and/or comorbidities impacting plan of care:     Home Situation  Home Environment:  (motel )  # Steps to Enter: 0  One/Two Story Residence: One story  Living Alone: No  Support Systems:  (daughter, brother and friend at [employer])  Patient Expects to be Discharged to[de-identified]  (home)  Current DME Used/Available at [facility]: Wheelchair, power, Wheelchair, Walker, rollator, Commode, bedside, Shower chair  Tub or Shower Type: Shower    EXAMINATION/PRESENTATION/DECISION MAKING:   Critical Behavior:  Neurologic State: Alert  Orientation Level: Oriented X4  Cognition: Follows commands     Hearing: Auditory  Auditory Impairment: None  Skin:    Edema:   Range Of Motion:  AROM: Generally decreased, functional           PROM: Generally decreased, functional           Strength:    Strength: Generally decreased, functional                    Tone & Sensation:   Tone: Normal                              Coordination:  Coordination: Within functional limits  Vision:      Functional Mobility:  Bed Mobility:     Supine to Sit: Contact guard assistance     Scooting: Stand-by assistance  Transfers:  Sit to Stand: Contact guard assistance  Stand to Sit: Contact guard assistance        Bed to Chair: Contact guard assistance              Balance:   Sitting: Intact; Without support  Standing: Impaired;  Without support  Standing - Static: Good; Constant support  Standing - Dynamic : Fair; Constant support  Ambulation/Gait Training:  Distance (ft): 25 Feet (ft)  Assistive Device: Gait belt; Walker, rolling  Ambulation - Level of Assistance: Contact guard assistance        Gait Abnormalities: Decreased step clearance              Speed/Maria De Jesus: Slow; Shuffled  Step Length: Right shortened; Left shortened                     Stairs: Therapeutic Exercises:       Functional Measure:  Tinetti test:    Sitting Balance: 1  Arises: 1  Attempts to Rise: 2  Immediate Standing Balance: 1  Standing Balance: 1  Nudged: 1  Eyes Closed: 1  Turn 360 Degrees - Continuous/Discontinuous: 1  Turn 360 Degrees - Steady/Unsteady: 1  Sitting Down: 1  Balance Score: 11 Balance total score  Indication of Gait: 1  R Step Length/Height: 1  L Step Length/Height: 1  R Foot Clearance: 1  L Foot Clearance: 1  Step Symmetry: 1  Step Continuity: 1  Path: 1  Trunk: 0  Walking Time: 1  Gait Score: 9 Gait total score  Total Score: 20/28 Overall total score         Tinetti Tool Score Risk of Falls  <19 = High Fall Risk  19-24 = Moderate Fall Risk  25-28 = Low Fall Risk  Tinetti ME. Performance-Oriented Assessment of Mobility Problems in Elderly Patients. Whyte 66; [id number].  (Scoring Description: PT Bulletin Feb. 10, 1993)    Older adults: Vahid Hunter et al, 2009; n = 1000 [facility] elderly evaluated with ABC, BUD, ADL, and IADL)  · Mean BUD score for males aged 69-68 years = 26.21(3.40)  · Mean BUD score for females age 69-68 years = 25.16(4.30)  · Mean BUD score for males over 80 years = 23.29(6.02)  · Mean BUD score for females over 80 years = 17.20(8.32)            Physical Therapy Evaluation Charge Determination   History Examination Presentation Decision-Making   HIGH Complexity :3+ comorbidities / personal factors will impact the outcome/ POC  LOW Complexity : 1-2 Standardized tests and measures addressing body structure, function, activity limitation and / or participation in recreation  MEDIUM Complexity : Evolving with changing characteristics  Other outcome measures Barthel  MEDIUM      Based on the above components, the patient evaluation is determined to be of the following complexity level: MEDIUM    Pain Rating:      Activity Tolerance:   Fair    After treatment patient left in no apparent distress:   Sitting in chair and Call bell within reach    COMMUNICATION/EDUCATION:   The patients plan of care was discussed with: Registered nurse. Fall prevention education was provided and the patient/caregiver indicated understanding., Patient/family have participated as able in goal setting and plan of care. , and Patient/family agree to work toward stated goals and plan of care.     Thank you for this referral.  Riya Guzman, PT   Time Calculation: 46 mins

## 2021-11-09 NOTE — PROGRESS NOTES
Physical Therapy      Chart reviewed. Pt is currently ABDIFATAH for HD. Will defer PT session at this time, but continue to follow.

## 2021-11-09 NOTE — PROGRESS NOTES
Bedside and Verbal shift change report given to Montse Canales (oncoming nurse) by Yamilka Lemus (offgoing nurse). Report included the following information SBAR, Kardex, Intake/Output, MAR and Recent Results.

## 2021-11-09 NOTE — PROCEDURES
Hemodialysis / [phone]    Vitals Pre Post Assessment Pre Post   BP BP: (!) 155/63 (11/09/21 0813) 119/63 LOC A&Ox4 A&Ox4   HR Pulse (Heart Rate): 64 (11/09/21 0813) 66 Lungs Dim bases; Upper Wheezing Upper wheeze   Resp Resp Rate: 16 (11/09/21 0813) 16 Cardiac Reg S1 S2 Reg S1 S2   Temp Temp: 97.8 °F (36.6 °C) (11/09/21 0813) 97.7, oral Skin CDI CDI   Weight  n/a n/a Edema Trace BLE none   Tele status none none Pain 0 0     Orders   Duration: Start: 0813 End: 1145 Total: 3.5hr   Dialyzer: Dialyzer/Set Up Inspection: [place] (11/09/21 0813)   K Bath: Dialysate K (mEq/L): 3 (11/09/21 0813); changed to 2K r/t labs drawn just prior to start of treatment   Ca Bath: Dialysate CA (mEq/L): 2.5 (11/09/21 0813)   Na: Dialysate NA (mEq/L): 140 (11/09/21 0813)   Bicarb: Dialysate HCO3 (mEq/L): 40 (11/09/21 0813)   Target Fluid Removal: Goal/Amount of Fluid to Remove (mL): 2000 mL (11/09/21 0813)     Access   Type & Location: [facility]   Comments:  Dressing CDI, dated 11/7/21. No s/s of infection. Both lumens aspirate & flush well. Running well at .                                     Labs   HBsAg (Antigen) / date: Neg Ag 10/16/2021                                            HBsAb (Antibody) / date: <10 Ab 10/16/2021   Source: EPIC    Obtained/Reviewed  Critical Results Called HGB   Date Value Ref Range Status   11/09/2021 11.0 (L) 12.1 - 17.0 g/dL Final     Potassium   Date Value Ref Range Status   11/09/2021 5.8 (H) 3.5 - 5.1 mmol/L Final     Calcium   Date Value Ref Range Status   11/09/2021 9.8 8.5 - 10.1 MG/DL Final     BUN   Date Value Ref Range Status   11/09/2021 91 (H) 6 - 20 MG/DL Final     Comment:     INVESTIGATED PER DELTA CHECK PROTOCOL     Creatinine   Date Value Ref Range Status   11/09/2021 3.16 (H) 0.70 - 1.30 MG/DL Final        Meds Given   Name Dose Route   None ordered                 Adequacy / Fluid    Total Liters Process: 77.2L   Net Fluid Removed: 2000ml      Comments   Time Out Done: (Time) 6733   Admitting Diagnosis: Hyponatremia/ UTI   Consent obtained/signed: Informed Consent Verified: Yes (11/09/21 0813)   Machine / RO # Machine Number: [id number] (11/09/21 0813)   Primary Nurse Rpt Pre: Blaire Romo RN   Primary Nurse Rpt Post: Gretchen King RN   Pt Education: procedural   Care Plan: ongoing   Pts outpatient clinic: [facility] Summary   Comments:       SBAR received from Primary RN. Pt arrived to HD suite A&Ox4. Consent signed & on file. 5896: Each catheter limb disinfected per p&p, caps removed, hubs disinfected per p&p. Each lumen aspirated for blood return and flushed with Normal Saline per policy. Labs drawn per request/ order. VSS. Dialysis Tx initiated. * Lab resulted K 5.8. Bath order changed to 2K per MD order. * no issues with HD treatment*  1145: Tx ended. VSS. Each dialysis catheter limb disinfected per p&p, all possible blood returned per p&p, and each dialysis hub disinfected per p&p. Each lumen flushed, post dialysis catheter Heparin dwell instilled per order, and caps applied. Bed locked and in the lowest position, call bell and belongings in reach. SBAR given to Primary, RN. Patient is stable at time of their departure. All Dialysis related medications have been reviewed.

## 2021-11-09 NOTE — INTERDISCIPLINARY ROUNDS
Interdisciplinary Rounds were completed on 11/09/21 for this patient. Rounds included nursing, clinical care leader, pharmacy, and case management. Plan of care discussed. See clinical pathway and/or care plan for interventions and desired outcomes.

## 2021-11-09 NOTE — PROGRESS NOTES
Patient is ready for d/c from CM standpoint    Transition of Care Plan:    RUR:30%  Disposition:Home(Hotel) w/[facility] HH-PT,OT&SN  Follow up appointments:on the AVS  DME needed:none-owns a rollator & electric w/c  Transportation at [id number] [place] [address] or means to access home:        IM Medicare Letter:2nd IM provided  Is patient a BCPI-A Bundle: no          If yes, was Bundle Letter given?:     Caregiver Contact:  Discharge Caregiver contacted prior to discharge? Patient is d/c home w/HH. Patient currently living at the [facility] in [facility], w/family. PCP f/u is on the AVS.  No other needs or concerns identified. Care Management Interventions  PCP Verified by CM: Yes  Mode of Transport at Discharge:  Other (see comment) (daughter)  Transition of Care Consult (CM Consult): [address]: No  Reason Outside [place]: Unable to staff case  Discharge Durable Medical Equipment: No  Physical Therapy Consult: Yes  Occupational Therapy Consult: Yes  Speech Therapy Consult: No  Support Systems:  (daughter, brother and friend at [facility])  Confirm Follow Up Transport: Family  The Patient and/or Patient Representative was Provided with a Choice of Provider and Agrees with the Discharge Plan?: Yes  Name of the Patient Representative Who was Provided with a Choice of Provider and Agrees with the Discharge Plan: Fabian Armando of Choice List was Provided with Basic Dialogue that Supports the Patient's Individualized Plan of Care/Goals, Treatment Preferences and Shares the Quality Data Associated with the Providers?: Yes  Discharge Location  Discharge Placement: Home with home health [facility])      [facility]

## 2021-11-09 NOTE — PROGRESS NOTES
Attempted to see pt for OT services. Pt is currently off the floor for dialysis. Pt was seen yesterday for eval and home health with recommended. Will defer but continue to follow.

## 2021-11-09 NOTE — PROGRESS NOTES
TRANSFER - IN REPORT:    Verbal report received from Ness Hawkins RN on Edis Bonner  being received from [facility] for ordered procedure      Report consisted of patients Situation, Background, Assessment and   Recommendations(SBAR). Information from the following report(s) SBAR and Kardex was reviewed with the receiving nurse. Opportunity for questions and clarification was provided. Assessment completed upon patients arrival to unit and care assumed.

## 2021-11-09 NOTE — ROUTINE PROCESS
Dischargel AVS reviewed with pt. All medications reviewed individually with patient. Opportunities for questions and concerns provided. Patient discharged via car with his daughter .

## 2021-11-09 NOTE — DISCHARGE SUMMARY
Hospitalist Discharge Summary     Patient ID:  Chucho Hammond  [id number]  63 y.o.  1960 11/6/2021    PCP on record: Curly Aranda MD    Admit date: 11/6/2021  Discharge date and time: 11/9/2021    DISCHARGE DIAGNOSIS:  See below    CONSULTATIONS:  IP CONSULT TO NEPHROLOGY    Excerpted HPI from H&P of Marie Randall MD:  Pepe Villa is a 63 y.o.  male who presents with fall at home along with weakness and left lower extremity swelling. 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  1 day, not painful, no discoloration no swelling. Patient also complains of weakness and fell down today at home. Family member present at bedside who states the patient is having hard time moving around. Patient was discharged on 10/22 to [address] [place] [address] and rehab but self checkout on 10/29. No fever, no chills, no chest pain, no shortness of breath, no dizziness, no passing out episodes, no abdominal pain, no other complaints.  ______________________________________________________________________  DISCHARGE SUMMARY/HOSPITAL COURSE:  for full details see H&P, daily progress notes, labs, consult notes. 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  -UA suggestive of UTI so started on Rocephin.  -Blood culture and urine culture pending.  -Lactic acid pending.  -Chest x-ray-No evidence for pulmonary edema.  Persistent left mid pulmonary  nodule, size not well assessed by portable technique.  -Patient was discharged to [facility] but self checked out on 10/29 and since then at home has multiple falls. -PT and OT consulted.  -Case management consulted.  -Likely need rehab again.  -Continue the steroids for the brain metastasis. -Patient has extensive work-up done regarding the brain metastases the previous admission and neurosurgery recommended gamma knife/stereotactic radiosurgery.     11/8:  Gram positive bacteremia - contaminant, coagulase negative  Follow up final BCx  Start empiric Vancomcyin  PT/OT evals    11/9:  BCx results growing coag negative staph - likely a contaminant  Therefore pt will not require Vancomycin  PT/OT evals appreciated   Pt stable for discharge home today with [facility]     Left lower extremity swelling  -Doppler venous ultrasound negative for DVT. -We will do ABIs, likely dependent edema.     End-stage renal disease on hemodialysis  Hyponatremia on HD  TTS HD     Diabetes type 2 with hyperglycemia  Continue home meds     Hypertensive urgency - resolved  Continue home meds      _______________________________________________________________________  Patient seen and examined by me on discharge day. Pertinent Findings:  Gen:    Not in distress  Chest: Clear lungs  CVS:   Regular rhythm. No edema  Abd:  Soft, not distended, not tender  Neuro:  Alert,   _______________________________________________________________________  DISCHARGE MEDICATIONS:   Current Discharge Medication List      START taking these medications    Details   levoFLOXacin (LEVAQUIN) 750 mg tablet Take 1 Tablet by mouth daily for 4 days. Qty: 4 Tablet, Refills: 0  Start date: 11/9/2021, End date: 11/13/2021         CONTINUE these medications which have NOT CHANGED    Details   levothyroxine (SYNTHROID) 175 mcg tablet 1 full tab Mon-Sat but only 1/2 tab on sundays  Qty: 90 Tablet, Refills: 3    Associated Diagnoses: Acquired hypothyroidism      metoclopramide HCl (REGLAN) 5 mg tablet TAKE 1 TABLET BY MOUTH ONCE DAILY AS NEEDED.  *DO NOT TAKE MORE THAN 3 TABLETS A DAY  Qty: 30 Tablet, Refills: 0 Associated Diagnoses: Nausea in adult      NovoLIN N NPH U-100 Insulin 100 unit/mL injection INJECT 2 TO 10 UNITS SUBCUTANEOUSLY ONCE DAILY  Qty: 30 mL, Refills: 0    Associated Diagnoses: Type 2 diabetes mellitus with stage 3 chronic kidney disease, with long-term current use of insulin (HCC)      lancets misc Use preferred brand; Check glucose 3-4 times daily, Diagnosis E11.22  Qty: 200 Lancet, Refills: 3    Associated Diagnoses: Type 2 diabetes mellitus with stage 3 chronic kidney disease, with long-term current use of insulin (HCC)      carvediloL (COREG) 6.25 mg tablet Take 1 Tablet by mouth two (2) times daily (with meals). Qty: 60 Tablet, Refills: 3    Associated Diagnoses: Hypertension goal BP (blood pressure) < 130/80; Essential hypertension with goal blood pressure less than 130/80      hydrALAZINE (APRESOLINE) 50 mg tablet Take 1 Tablet by mouth two (2) times a day. Qty: 60 Tablet, Refills: 4    Associated Diagnoses: Hypertension goal BP (blood pressure) < 130/80; Essential hypertension with goal blood pressure less than 130/80      amLODIPine (NORVASC) 10 mg tablet Take 1 Tablet by mouth daily. Qty: 30 Tablet, Refills: 5    Associated Diagnoses: Hypertension goal BP (blood pressure) < 130/80; Essential hypertension with goal blood pressure less than 130/80      HYDROcodone-acetaminophen (NORCO) 5-325 mg per tablet Take 1 Tablet by mouth every six (6) hours as needed for Pain for up to 7 days. Max Daily Amount: 4 Tablets. Qty: 20 Tablet, Refills: 0    Associated Diagnoses: At risk for impaired mobility due to pain; Pain due to malignant neoplasm metastatic to bone (HCC)      sevelamer carbonate (RENVELA) 800 mg tab tab TAKE 1 TABLET BY MOUTH THREE TIMES DAILY WITH MEALS  Qty: 90 Tablet, Refills: 3      SORAfenib (NEXAVAR) 200 mg tablet Take 1 Tablet by mouth daily for 30 days. Qty: 30 Tablet, Refills: 11    Associated Diagnoses: Primary cancer of thyroid with metastasis to other site [facility]);  Brain metastases (HCC)      pregabalin (LYRICA) 25 mg capsule Take 1 Capsule by mouth three (3) times daily. Max Daily Amount: 75 mg. Qty: 3 Capsule, Refills: 0    Associated Diagnoses: Arthralgia of right lower leg      dexAMETHasone (DECADRON) 4 mg tablet Take 4 mg by mouth every eight (8) hours. Qty: 90 Tablet, Refills: 0      sodium bicarbonate 650 mg tablet TAKE 2 TABLETS BY MOUTH TWICE DAILY    Associated Diagnoses: ESRD (end stage renal disease) on dialysis (HCC)      bumetanide (BUMEX) 2 mg tablet Take 1 Tablet by mouth daily. Qty: 30 Tablet, Refills: 0      omeprazole (PRILOSEC) 20 mg capsule Take 1 capsule by mouth once daily  Qty: 90 Capsule, Refills: 1    Associated Diagnoses: Gastroesophageal reflux disease with esophagitis without hemorrhage      simvastatin (ZOCOR) 20 mg tablet Take 1 Tab by mouth nightly. Qty: 90 Tab, Refills: 3    Associated Diagnoses: Dyslipidemia (high LDL; low HDL)      Vitamin D2 1,250 mcg (50,000 unit) capsule TAKE 1 CAPSULE BY MOUTH ONCE A WEEK  Qty: 4 Cap, Refills: 5    Associated Diagnoses: Vitamin D deficiency      sodium zirconium cyclosilicate (Lokelma) 5 gram powder packet Take 5 g by mouth two (2) times a week. On Fridays and Sundays      Ventolin HFA 90 mcg/actuation inhaler TAKE 1-2 PUFFS EVEERY 4-6 HOURS AS NEEDED FOR DYSPNEA AND WHEEZING  Qty: 1 Inhaler, Refills: 2               Patient Follow Up Instructions:    Activity: Activity as tolerated  Diet: Resume previous diet  Wound Care: None needed      Follow-up Information     Follow up With Specialties Details Why Contact Info    Germania Ortiz MD Internal Medicine   [address]  [phone]          ________________________________________________________________    Risk of deterioration: High    Condition at Discharge:  Stable  __________________________________________________________________    Disposition  Home with family and home health services    ____________________________________________________________________    Code Status: Full Code  ___________________________________________________________________      Total time in minutes spent coordinating this discharge (includes going over instructions, follow-up, prescriptions, and preparing report for sign off to her PCP) :  >30 minutes    Signed:  Wilmar Phillips MD

## 2021-11-09 NOTE — PROGRESS NOTES
HD TRANSFER - OUT REPORT:    Verbal report given to Steve Andrade RN on Pat German being transferred to [facility] for routine progression of care       Report consisted of patient's Situation, Background, Assessment and   Recommendations(SBAR). Information from the following report(s) SBAR, Procedure Summary, Intake/Output and Recent Results was reviewed with the receiving nurse. Method:  $$ Method: Hemodialysis (11/09/21 0813)    Fluid Removed  NET Fluid Removed (mL): 2000 ml (11/09/21 1145)     Patient response to treatment:  Stable    End Time  Hemodialysis End Time: 6279 (11/09/21 1145)  If not documented, dialysis nurse to update post-dialysis row in HD/Filtration flowsheet     Medications /Volume expansion agents or Fluid boluses administered during treatment? no    Post-dialysis medication administration due?  yes  Remind nurse to administer post-HD medication upon return to unit. Fistula hemostasis? no    Line heparinization? no    Lines: [facility]    Opportunity for questions and clarification was provided.       Patient transported with: [facility]

## 2021-11-09 NOTE — PROGRESS NOTES
Nephrology Progress Note  José Miguel Butt     [facility]. [email or web address]  Phone - [phone]   Patient: William Dalton    YOB: 1960        Date- 11/9/2021   Admit Date: 11/6/2021  CC: Follow up for  ESRD         IMPRESSION & PLAN:   · ESRD---Robi Higginbotham on TTS shift - Dr. Duaine Duane. · Hypertension  · Hyperkalemia  · hyponatremia  · Anemia of ckd  · Type 2 diabetes  · Back pain  · Mets to the right frontal lobe. 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  EPO for anemia   Will follow with you     Subjective: Interval History:   -Seen and examined today on HD  -No issues overnight    Objective:   Vitals:    11/09/21 1030 11/09/21 1045 11/09/21 1100 11/09/21 1115   BP: (!) 114/59 131/62 122/61 120/60   Pulse: 66 69 66 65   Resp: 16 16 16 16   Temp:       TempSrc:       SpO2:       Weight:       Height:          11/08 0701 - 11/09 0700  In: 520 [P.O.:420; I.V.:100]  Out: 1750 [Urine:1750]  Last 3 Recorded Weights in this Encounter    11/06/21 2310 11/08/21 0656   Weight: 104.5 kg (230 lb 6.1 oz) 100.6 kg (221 lb 11.2 oz)      Physical exam:   GEN: NAD  NECK- Supple, no mass  RESP: No wheezing, Clear b/l  CVS: S1,S2  RRR  NEURO: Normal speech, Non focal  EXT: No Edema   PSYCH: Normal Mood    Chart reviewed. Pertinent Notes reviewed. Data Review :  Recent Labs     11/09/21  0812 11/08/21  0225 11/06/21  2339   * 133* 128*   K 5.8* 5.1 4.9   CL 94* 98 93*   CO2 28 28 25   BUN 91* 58* 84*   CREA 3.16* 2.60* 3.76*   * 260* 487*   CA 9.8 9.6 9.4   MG  --  1.9 1.8   PHOS 6.2* 5.5*  --      Recent Labs     11/09/21  0812 11/08/21  0225 11/06/21  2339   WBC 16.8* 17.8* 16.3*   HGB 11.0* 10.5* 11.1*   HCT 33.1* 32.4* 33.4*   PLT 90* 74* 106*     No results for input(s): FE, TIBC, PSAT, FERR in the last 72 hours. Medication list  reviewed  Current Facility-Administered Medications   Medication Dose Route Frequency    cefepime (MAXIPIME) 1 g in 0.9% sodium chloride (MBP/ADV) 50 mL MBP  1 g IntraVENous Q24H    metroNIDAZOLE (FLAGYL) IVPB premix 500 mg  500 mg IntraVENous Q12H    VANCOMYCIN INFORMATION NOTE   Other Rx Dosing/Monitoring    amLODIPine (NORVASC) tablet 10 mg  10 mg Oral DAILY    bumetanide (BUMEX) tablet 2 mg  2 mg Oral DAILY    carvediloL (COREG) tablet 6.25 mg  6.25 mg Oral BID WITH MEALS    dexAMETHasone (DECADRON) tablet 4 mg  4 mg Oral Q8H    hydrALAZINE (APRESOLINE) tablet 50 mg  50 mg Oral BID    levothyroxine (SYNTHROID) tablet 175 mcg  175 mcg Oral 6am    pantoprazole (PROTONIX) tablet 40 mg  40 mg Oral ACB    pregabalin (LYRICA) capsule 25 mg  25 mg Oral TID    sevelamer carbonate (RENVELA) tab 800 mg  800 mg Oral TID WITH MEALS    atorvastatin (LIPITOR) tablet 10 mg  10 mg Oral QHS    . PHARMACY TO SUBSTITUTE PER PROTOCOL (Reordered from: SORAfenib (NEXAVAR) 200 mg tablet)    Per Protocol    sodium zirconium cyclosilicate (LOKELMA) powder packet 5 g  5 g Oral 2 TIMES WEEKLY    sodium bicarbonate tablet 1,300 mg  1,300 mg Oral BID    insulin lispro (HUMALOG) injection   SubCUTAneous AC&HS    glucose chewable tablet 16 g  4 Tablet Oral PRN    dextrose (D50W) injection syrg 12.5-25 g  12.5-25 g IntraVENous PRN    glucagon (GLUCAGEN) injection 1 mg  1 mg IntraMUSCular PRN    sodium chloride (NS) flush 5-40 mL  5-40 mL IntraVENous Q8H    sodium chloride (NS) flush 5-40 mL  5-40 mL IntraVENous PRN    acetaminophen (TYLENOL) tablet 650 mg  650 mg Oral Q6H PRN    Or    acetaminophen (TYLENOL) suppository 650 mg  650 mg Rectal Q6H PRN    polyethylene glycol (MIRALAX) packet 17 g  17 g Oral DAILY PRN    ondansetron (ZOFRAN ODT) tablet 4 mg  4 mg Oral Q8H PRN    Or    ondansetron (ZOFRAN) injection 4 mg  4 mg IntraVENous Q6H PRN    insulin glargine (LANTUS) injection 11 Units  11 Units SubCUTAneous DAILY    And    insulin lispro (HUMALOG) injection 2 Units  2 Units SubCUTAneous TIDAC    heparin (porcine) injection 5,000 Units  5,000 Units SubCUTAneous Q12H    oxyCODONE IR (ROXICODONE) tablet 5 mg  5 mg Oral Q4H PRN    oxyCODONE IR (ROXICODONE) tablet 10 mg  10 mg Oral Q4H PRN    hydrALAZINE (APRESOLINE) 20 mg/mL injection 10 mg  10 mg IntraVENous Q6H PRN          Sunday Coles MD              [address] [facility]  [facility] / [facility], [address] [place], [address]  Phone - [phone]               Fax - [phone]

## 2021-11-09 NOTE — DISCHARGE INSTRUCTIONS
Patient Education        Urinary Tract Infections (UTI) in Men: Care Instructions  Overview     A urinary tract infection, or UTI, is a term for an infection anywhere between the kidneys and the urethra. (The urethra is the tube that carries urine from the bladder to outside the body.) Most UTIs are bladder infections. They often cause pain or burning when you urinate. UTIs are caused by bacteria. This means they can be cured with antibiotics. Be sure to complete your treatment so that the infection does not get worse. Follow-up care is a key part of your treatment and safety. Be sure to make and go to all appointments, and call your doctor if you are having problems. It's also a good idea to know your test results and keep a list of the medicines you take. How can you care for yourself at home? · Take your antibiotics as prescribed. Do not stop taking them just because you feel better. You need to take the full course of antibiotics. · Take your medicines exactly as prescribed. Your doctor may have prescribed a medicine, such as phenazopyridine (Pyridium), to help relieve pain when you urinate. This turns your urine orange. You may stop taking it when your symptoms get better. But be sure to take all of your antibiotics, which treat the infection. · Drink extra water for the next day or two. This will help make the urine less concentrated and help wash out the bacteria causing the infection. (If you have kidney, heart, or liver disease and have to limit your fluids, talk with your doctor before you increase your fluid intake.)  · Avoid drinks that are carbonated or have caffeine. They can irritate the bladder. · Urinate often. Try to empty your bladder each time. · To relieve pain, take a hot bath or lay a heating pad (set on low) over your lower belly or genital area. Never go to sleep with a heating pad in place. To help prevent UTIs  · Drink plenty of fluids.  If you have kidney, heart, or liver disease and have to limit fluids, talk with your doctor before you increase the amount of fluids you drink. · Urinate when you have the urge. Do not hold your urine for a long time. Urinate before you go to sleep. · Keep your penis clean. Catheter care  If you have a drainage tube (catheter) in place, the following steps will help you care for it. · Always wash your hands before and after touching your catheter. · Check the area around the urethra for inflammation or signs of infection. Signs of infection include irritated, swollen, red, or tender skin, or pus around the catheter. · Clean the area around the catheter with soap and water two times a day. Dry with a clean towel afterward. · Do not apply powder or lotion to the skin around the catheter. To empty the urine collection bag   · Wash your hands with soap and water. · Without touching the drain spout, remove the spout from its sleeve at the bottom of the collection bag. Open the valve on the spout. · Let the urine flow out of the bag and into the toilet or a container. Do not let the tubing or drain spout touch anything. · After you empty the bag, clean the end of the drain spout with tissue and water. Close the valve and put the drain spout back into its sleeve at the bottom of the collection bag. · Wash your hands with soap and water. When should you call for help? Call your doctor now or seek immediate medical care if:    · Symptoms such as a fever, chills, nausea, or vomiting get worse or happen for the first time.     · You have new pain in your back just below your rib cage. This is called flank pain.     · There is new blood or pus in your urine.     · You are not able to take or keep down your antibiotics. Watch closely for changes in your health, and be sure to contact your doctor if:    · You are not getting better after taking an antibiotic for 2 days.     · Your symptoms go away but then come back. Where can you learn more?   Go to http://www.gray.com/  Enter J858 in the search box to learn more about \"Urinary Tract Infections (UTI) in Men: Care Instructions. \"  Current as of: February 10, 2021               Content Version: 13.0  © 5604-1002 [employer], [employer]. Care instructions adapted under license by [employer] (which disclaims liability or warranty for this information). If you have questions about a medical condition or this instruction, always ask your healthcare professional. [place] [address] any warranty or liability for your use of this information.
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
## 2021-11-12 NOTE — TELEPHONE ENCOUNTER
Spoke with pt daughter. Pt will start Monday. We will see approx. 2 weeks after. Pt has seen Alonzo Dominguez recently.

## 2021-11-12 NOTE — TELEPHONE ENCOUNTER
Patient's daughter [employer] [facility] called about her father's prescription. When is he supposed to start taking?

## 2021-11-16 PROBLEM — S51.019A SKIN TEAR OF ELBOW WITHOUT COMPLICATION: Status: RESOLVED | Noted: 2020-09-23 | Resolved: 2021-01-01

## 2021-11-16 PROBLEM — N19 RENAL FAILURE: Status: RESOLVED | Noted: 2020-09-01 | Resolved: 2021-01-01

## 2021-11-16 PROBLEM — E87.1 HYPONATREMIA: Status: RESOLVED | Noted: 2021-01-01 | Resolved: 2021-01-01

## 2021-11-16 PROBLEM — S00.31XA ABRASION OF NOSE: Status: RESOLVED | Noted: 2020-09-01 | Resolved: 2021-01-01

## 2021-11-16 PROBLEM — R06.02 SHORTNESS OF BREATH: Status: RESOLVED | Noted: 2020-09-23 | Resolved: 2021-01-01

## 2021-11-16 PROBLEM — R07.9 CHEST PAIN: Status: RESOLVED | Noted: 2020-08-15 | Resolved: 2021-01-01

## 2021-11-16 PROBLEM — R73.9 HYPERGLYCEMIA: Status: RESOLVED | Noted: 2020-09-01 | Resolved: 2021-01-01

## 2021-11-16 PROBLEM — R10.9 ABDOMINAL PAIN: Status: RESOLVED | Noted: 2020-09-23 | Resolved: 2021-01-01

## 2021-11-16 PROBLEM — L02.414 ABSCESS OF LEFT ARM: Status: RESOLVED | Noted: 2020-09-01 | Resolved: 2021-01-01

## 2021-11-16 PROBLEM — M79.604 RIGHT LEG PAIN: Status: RESOLVED | Noted: 2021-01-01 | Resolved: 2021-01-01
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
## 2021-11-16 PROBLEM — Z20.822 SUSPECTED COVID-19 VIRUS INFECTION: Status: RESOLVED | Noted: 2020-08-15 | Resolved: 2021-01-01

## 2021-11-16 PROBLEM — N18.4 CKD (CHRONIC KIDNEY DISEASE) STAGE 4, GFR 15-29 ML/MIN (HCC): Status: RESOLVED | Noted: 2020-09-23 | Resolved: 2021-01-01

## 2021-11-16 PROBLEM — J40 BRONCHITIS: Status: RESOLVED | Noted: 2020-09-01 | Resolved: 2021-01-01

## 2021-11-16 PROBLEM — S39.012A BACK STRAIN: Status: RESOLVED | Noted: 2020-09-23 | Resolved: 2021-01-01

## 2021-11-16 PROBLEM — K52.9 GASTROENTERITIS: Status: RESOLVED | Noted: 2020-09-01 | Resolved: 2021-01-01

## 2021-11-16 PROBLEM — J18.9 PNEUMONIA: Status: RESOLVED | Noted: 2020-08-15 | Resolved: 2021-01-01

## 2021-11-16 PROBLEM — S60.519A ABRASION OF HAND: Status: RESOLVED | Noted: 2020-09-01 | Resolved: 2021-01-01

## 2021-11-16 PROBLEM — D72.829 LEUKOCYTOSIS: Status: RESOLVED | Noted: 2021-01-01 | Resolved: 2021-01-01

## 2021-11-16 NOTE — PROGRESS NOTES
Pharmacy Medication Reconciliation     The patient was interviewed regarding current PTA medication list, use and drug allergies;  patient present in room and obtained permission from patient to discuss drug regimen with visitor(s) present. The patient was questioned regarding use of any other inhalers, topical products, over the counter medications, herbal medications, vitamin products or ophthalmic/nasal/otic medication use. Allergy Update: Anesthetics - amide type - select amino amides, Flexeril [cyclobenzaprine], and Tramadol    Recommendations/Findings: The following amendments were made to the patient's active medication list on file at [facility]:   1) Additions: none  2) Deletions:   -pregabalin --patient needs a refill for this if continued  3) Changes: none  Pertinent Findings: none    Clarified PTA med list with rx query, patient interview, 11/9/21 discharge med rec. PTA medication list was corrected to the following:     Prior to Admission Medications   Prescriptions Last Dose Informant Taking? NovoLIN N NPH U-100 Insulin 100 unit/mL injection   Yes   Sig: INJECT 2 TO 10 UNITS SUBCUTANEOUSLY ONCE DAILY   SORAfenib (NEXAVAR) 200 mg tablet   Yes   Sig: Take 1 Tablet by mouth daily for 30 days. Ventolin HFA 90 mcg/actuation inhaler   Yes   Sig: TAKE 1-2 PUFFS EVEERY 4-6 HOURS AS NEEDED FOR DYSPNEA AND WHEEZING   Vitamin D2 1,250 mcg (50,000 unit) capsule 11/15/2021 at Unknown time  Yes   Sig: TAKE 1 CAPSULE BY MOUTH ONCE A WEEK   Patient taking differently: Take 50,000 Units by mouth every Monday. TAKE 1 CAPSULE BY MOUTH ONCE A WEEK   amLODIPine (NORVASC) 10 mg tablet   Yes   Sig: Take 1 Tablet by mouth daily. bumetanide (BUMEX) 2 mg tablet   Yes   Sig: Take 1 Tablet by mouth daily. carvediloL (COREG) 6.25 mg tablet   Yes   Sig: Take 1 Tablet by mouth two (2) times daily (with meals). dexAMETHasone (DECADRON) 4 mg tablet   Yes   Sig: Take 4 mg by mouth every eight (8) hours.    hydrALAZINE (APRESOLINE) 50 mg tablet   Yes   Sig: Take 1 Tablet by mouth two (2) times a day. lancets misc   Yes   Sig: Use preferred brand; Check glucose 3-4 times daily, Diagnosis E11.22   levothyroxine (SYNTHROID) 175 mcg tablet   Yes   Si full tab Mon-Sat but only 1/2 tab on sundays   metoclopramide HCl (REGLAN) 5 mg tablet   Yes   Sig: TAKE 1 TABLET BY MOUTH ONCE DAILY AS NEEDED. *DO NOT TAKE MORE THAN 3 TABLETS A DAY   omeprazole (PRILOSEC) 20 mg capsule   Yes   Sig: Take 1 capsule by mouth once daily   sevelamer carbonate (RENVELA) 800 mg tab tab   Yes   Sig: TAKE 1 TABLET BY MOUTH THREE TIMES DAILY WITH MEALS   simvastatin (ZOCOR) 20 mg tablet   Yes   Sig: Take 1 Tab by mouth nightly.   sodium bicarbonate 650 mg tablet   Yes   Sig: Take 2 Tablets by mouth two (2) times a day. sodium zirconium cyclosilicate (Lokelma) 5 gram powder packet   Yes   Sig: Take 5 g by mouth two (2) times a week.  On  and Sundays      Facility-Administered Medications: None        Thank you,  DENISSE Springer

## 2021-11-16 NOTE — H&P
Hospitalist Admission Note    NAME: Perez Kebede   :  1960   MRN:  [id number]     Date/Time:  2021 2:57 AM    Patient PCP: Sofia Miugel MD  ______________________________________________________________________  Given the patient's current clinical presentation, I have a high level of concern for decompensation if discharged from the emergency department. Complex decision making was performed, which includes reviewing the patient's available past medical records, laboratory results, and x-ray films. My assessment of this patient's clinical condition and my plan of care is as follows. Assessment / Plan:    Ground-level fall, recurrent  Right leg pain secondary to above  -Patient fell while trying to transfer from wheelchair to bed. No LOC. -x-ray of right shoulder/knee/femur/tibia/fibula shows no acute fracture  -Patient was admitted for fall beginning of this month   -PT/OT consult    Urinary tract infection  -Foul-smelling urine  -UA with 4+ bacteria, pyuria, and small leukocyte esterase  -Started on ceftriaxone empirically. Follow urine and blood cultures. ESRD on HD TTS  Hyperkalemia  Hyponatremia  Hypoalbuminemia  -Nephrology consult for intermittent HD  -We will give a dose of Kayexalate    Type II DM, with hyperglycemia  -We will give 5 unit NPH  -Start sliding scale insulin.       History of thyroid cancer with metastasis to brain and lung  -Chest x-ray shows unchanged left lung lung mass  -States that he is scheduled to have gamma knife surgery for his brain tumor this coming Saturday    CHF  Hypertension  Hyperlipidemia  GERD/PUD  History of TIA  -Continue home medications    Obesity: BMI 31  SHOLA: Not on CPAP    Code Status: Full code  Surrogate Decision Maker: His daughter Laney Finney    DVT Prophylaxis: Heparin  GI Prophylaxis: not indicated    Baseline: Lives with his daughter, uses wheelchair      Subjective:   CHIEF COMPLAINT: Fall, leg pain    HISTORY OF PRESENT ILLNESS:     Mary Coleman is a 63 y.o.  male with past medical history significant for ESRD on HD, thyroid cancer with metastasis to lung and brain, type II DM and others as listed below who presents with fall at home. Patient was trying to transfer from his wheelchair to his bed when he slipped out of bed and landed on his right leg. He complains of right leg pain and right shoulder pain. He denies any chest pain, palpitation, shortness of breath, or dizziness preceding the fall. No loss of consciousness. patient was admitted for similar from 11/611/19/22 and was discharged home. Patient states he has a home health nurse that comes once a week. He lives with his daughter. He states that he does not trust rehab places. Patient also endorses dysuria and frequency for the last couple of days but denies any fever or chills. Of note patient is scheduled to have gamma knife surgery this Saturday. We were asked to admit for work up and evaluation of the above problems.      Past Medical History:   Diagnosis Date    Adverse effect of anesthesia     \" STOP BREATHING 1 TIME C ANESTH\"    Cancer ([address] 2004    thyroid cancer    Chronic kidney disease     [facility] T-TH-S    Chronic pain     BACK SHOULDER AND ARM    COVID-19 04/2021    Depression     Diabetes ([address]     GERD (gastroesophageal reflux disease)     Heart failure (HCC)     stage 1    Hypercholesterolemia     Hypertension     Nausea & vomiting     PUD (peptic ulcer disease)     Sleep apnea     doesn't wear cpap    Thyroid cancer ([address]     TIA (transient ischemic attack) 2011    Vitamin D deficiency         Past Surgical History:   Procedure Laterality Date    HX HEART CATHETERIZATION      HX HEENT      THROAT SURGERY X 4    HX ORTHOPAEDIC      back     HX ORTHOPAEDIC      ARM AND SHOULDER    HX OTHER SURGICAL      thyroid, lymphnode    HX RETINAL DETACHMENT REPAIR      left eye    HX VASCULAR ACCESS      HD cather in chest    IR INJ FORAMIN EPID LUMB ANES/STER SNGL  10/20/2021    IR INSERT NON TUNL CVC OVER 5 YRS  2020    IR INSERT NON TUNL CVC OVER 5 YRS  2021    IR INSERT TUNL CVC W/O PORT OVER 5 YR  2020    IR INSERT TUNL CVC W/O PORT OVER 5 YR  2021    UPPER GI ENDOSCOPY,BALL DIL,30MM  2020         UPPER GI ENDOSCOPY,BIOPSY  2020         VASCULAR SURGERY PROCEDURE UNLIST      cardiac cath NEG. Social History     Tobacco Use    Smoking status: Former Smoker     Quit date: 1994     Years since quittin.2    Smokeless tobacco: Never Used   Substance Use Topics    Alcohol use: Not Currently        Family History   Problem Relation Age of Onset    Diabetes Mother     Elevated Lipids Mother    Claudia Emanuel Bladder Disease Mother     Headache Mother    [facility] Migraines Mother     Heart Disease Mother     Hypertension Mother     Stroke Mother     Other Mother         ANEURSYM BRAIN    Bleeding Prob Father     Cancer Father         LEUKEMIA    Diabetes Father     Elevated Lipids Father     Mental Retardation Sister     Psychiatric Disorder Sister     Cancer Brother         LUNGS     Allergies   Allergen Reactions    Anesthetics - Amide Type - Select Amino Amides Shortness of Breath    Flexeril [Cyclobenzaprine] Hives    Tramadol Hives        Prior to Admission medications    Medication Sig Start Date End Date Taking? Authorizing Provider   sodium bicarbonate 650 mg tablet Take 2 Tablets by mouth two (2) times a day. 21   Real Riggins MD   levothyroxine (SYNTHROID) 175 mcg tablet 1 full tab Mon-Sat but only 1/2 tab on sundays 11/5/21   Lanie Prado MD   metoclopramide HCl (REGLAN) 5 mg tablet TAKE 1 TABLET BY MOUTH ONCE DAILY AS NEEDED.  *DO NOT TAKE MORE THAN 3 TABLETS A DAY 21   Lanie Prado MD   NovoLIN N NPH U-100 Insulin 100 unit/mL injection INJECT 2 TO 10 UNITS SUBCUTANEOUSLY ONCE DAILY 21   Real Riggins MD   lancets misc Use preferred brand; Check glucose 3-4 times daily, Diagnosis E11.22 11/5/21   Yaritza Prado MD   carvediloL (COREG) 6.25 mg tablet Take 1 Tablet by mouth two (2) times daily (with meals). 11/5/21   Yaritza Prado MD   hydrALAZINE (APRESOLINE) 50 mg tablet Take 1 Tablet by mouth two (2) times a day. 11/5/21   Yaritza Prado MD   amLODIPine (NORVASC) 10 mg tablet Take 1 Tablet by mouth daily. 11/5/21   Yaritza Prado MD   sevelamer carbonate (RENVELA) 800 mg tab tab TAKE 1 TABLET BY MOUTH THREE TIMES DAILY WITH MEALS 11/3/21   Yaritza Prado MD   SORAfenib (NEXAVAR) 200 mg tablet Take 1 Tablet by mouth daily for 30 days. 10/29/21 11/28/21  Mary Santos MD   pregabalin (LYRICA) 25 mg capsule Take 1 Capsule by mouth three (3) times daily. Max Daily Amount: 75 mg. Patient not taking: Reported on 11/11/2021 10/22/21   Juan Gross NP   dexAMETHasone (DECADRON) 4 mg tablet Take 4 mg by mouth every eight (8) hours. 10/21/21   Juan Gross NP   bumetanide (BUMEX) 2 mg tablet Take 1 Tablet by mouth daily. 7/28/21   Richard Guadalupe MD   omeprazole (PRILOSEC) 20 mg capsule Take 1 capsule by mouth once daily 6/11/21   Yaritza Prado MD   simvastatin (ZOCOR) 20 mg tablet Take 1 Tab by mouth nightly. 5/10/21   Yaritza Prado MD   Vitamin D2 1,250 mcg (50,000 unit) capsule TAKE 1 CAPSULE BY MOUTH ONCE A WEEK  Patient taking differently: Take 50,000 Units by mouth every Monday. TAKE 1 CAPSULE BY MOUTH ONCE A WEEK 5/10/21   Yaritza Prado MD   sodium zirconium cyclosilicate (Lokelma) 5 gram powder packet Take 5 g by mouth two (2) times a week. On Fridays and Sundays    Provider, Historical   Ventolin HFA 90 mcg/actuation inhaler TAKE 1-2 PUFFS EVEERY 4-6 HOURS AS NEEDED FOR DYSPNEA AND WHEEZING 7/28/20   Yaritza Prado MD       REVIEW OF SYSTEMS:     I am not able to complete the review of systems because:    The patient is intubated and sedated    The patient has altered mental status due to his acute medical problems    The patient has baseline aphasia from prior stroke(s)    The patient has baseline dementia and is not reliable historian    The patient is in acute medical distress and unable to provide information           Total of 12 systems reviewed as follows:       POSITIVE= underlined text  Negative = text not underlined  General:  fever, chills, sweats, generalized weakness, weight loss/gain,      loss of appetite   Eyes:    blurred vision, eye pain, loss of vision, double vision  ENT:    rhinorrhea, pharyngitis   Respiratory:   cough, sputum production, SOB, NUÑEZ, wheezing, pleuritic pain   Cardiology:   chest pain, palpitations, orthopnea, PND, edema, syncope   Gastrointestinal:  abdominal pain , N/V, diarrhea, dysphagia, constipation, bleeding   Genitourinary:  frequency, urgency, dysuria, hematuria, incontinence   Muskuloskeletal :  arthralgia, myalgia, back pain, right leg pain  Hematology:  easy bruising, nose or gum bleeding, lymphadenopathy   Dermatological: rash, ulceration, pruritis, color change / jaundice  Endocrine:   hot flashes or polydipsia   Neurological:  headache, dizziness, confusion, focal weakness, paresthesia,     Speech difficulties, memory loss, gait difficulty  Psychological: Feelings of anxiety, depression, agitation    Objective:   VITALS:    Visit Vitals  BP (!) 139/53 (BP 1 Location: Left arm, BP Patient Position: Sitting)   Pulse 83   Temp 97.6 °F (36.4 °C)   Resp 18   Ht 5' 9\" (1.753 m)   Wt 97.1 kg (214 lb)   SpO2 95%   BMI 31.60 kg/m²       PHYSICAL EXAM:    General:    Alert, cooperative, no distress, appears stated age. HEENT: Atraumatic, anicteric sclerae, pink conjunctivae     No oral ulcers, mucosa moist, throat clear, dentition fair  Neck:  Supple, symmetrical,  thyroid: non tender  Lungs:   Clear to auscultation bilaterally. No Wheezing or Rhonchi. No rales. Chest wall:  No tenderness  No Accessory muscle use.   Heart:   Regular  rhythm,  No  murmur   No edema  Abdomen:   Soft, non-tender. Not distended. Bowel sounds normal  Extremities: No cyanosis. No clubbing,      Skin turgor normal, Capillary refill normal, Radial dial pulse 2+  Skin:     Not pale. Not Jaundiced  No rashes   Psych:  Good insight. Not depressed. Not anxious or agitated. Neurologic: EOMs intact. No facial asymmetry. No aphasia or slurred speech. Alert and oriented X 4.     _______________________________________________________________________  Care Plan discussed with:    Comments   Patient x    Family      RN     Care Manager                    Consultant:      _______________________________________________________________________  Expected  Disposition:   Home with Family    HH/PT/OT/RN x   SNF/LTC    MARION    ________________________________________________________________________  TOTAL TIME: 48 Minutes    Critical Care Provided     Minutes non procedure based      Comments    x Reviewed previous records   >50% of visit spent in counseling and coordination of care x Discussion with patient and/or family and questions answered       ________________________________________________________________________  Signed: Marie Horton MD    Procedures: see electronic medical records for all procedures/Xrays and details which were not copied into this note but were reviewed prior to creation of Plan.     LAB DATA REVIEWED:    Recent Results (from the past 24 hour(s))   CBC WITH AUTOMATED DIFF    Collection Time: 11/16/21 12:20 AM   Result Value Ref Range    WBC 19.9 (H) 4.1 - 11.1 K/uL    RBC 3.35 (L) 4.10 - 5.70 M/uL    HGB 10.2 (L) 12.1 - 17.0 g/dL    HCT 30.9 (L) 36.6 - 50.3 %    MCV 92.2 80.0 - 99.0 FL    MCH 30.4 26.0 - 34.0 PG    MCHC 33.0 30.0 - 36.5 g/dL    RDW 15.0 (H) 11.5 - 14.5 %    PLATELET 612 097 - 289 K/uL    NRBC 0.0 0  WBC    ABSOLUTE NRBC 0.00 0.00 - 0.01 K/uL    NEUTROPHILS 95 (H) 32 - 75 %    BAND NEUTROPHILS 1 %    LYMPHOCYTES 2 (L) 12 - 49 %    MONOCYTES 2 (L) 5 - 13 % EOSINOPHILS 0 0 - 7 %    BASOPHILS 0 0 - 1 %    IMMATURE GRANULOCYTES 0 0.0 - 0.5 %    ABS. NEUTROPHILS 19.1 (H) 1.8 - 8.0 K/UL    ABS. LYMPHOCYTES 0.4 (L) 0.8 - 3.5 K/UL    ABS. MONOCYTES 0.4 0.0 - 1.0 K/UL    ABS. EOSINOPHILS 0.0 0.0 - 0.4 K/UL    ABS. BASOPHILS 0.0 0.0 - 0.1 K/UL    ABS. IMM. GRANS. 0.0 0.00 - 0.04 K/UL    DF MANUAL      PLATELET COMMENTS Large Platelets      RBC COMMENTS TEARDROP CELLS  PRESENT        WBC COMMENTS SMUDGE CELLS SEEN     METABOLIC PANEL, COMPREHENSIVE    Collection Time: 11/16/21 12:20 AM   Result Value Ref Range    Sodium 130 (L) 136 - 145 mmol/L    Potassium 5.6 (H) 3.5 - 5.1 mmol/L    Chloride 96 (L) 97 - 108 mmol/L    CO2 27 21 - 32 mmol/L    Anion gap 7 5 - 15 mmol/L    Glucose 296 (H) 65 - 100 mg/dL    BUN 75 (H) 6 - 20 MG/DL    Creatinine 4.08 (H) 0.70 - 1.30 MG/DL    BUN/Creatinine ratio 18 12 - 20      GFR est AA 18 (L) >60 ml/min/1.73m2    GFR est non-AA 15 (L) >60 ml/min/1.73m2    Calcium 9.3 8.5 - 10.1 MG/DL    Bilirubin, total 0.5 0.2 - 1.0 MG/DL    ALT (SGPT) 31 12 - 78 U/L    AST (SGOT) 24 15 - 37 U/L    Alk.  phosphatase 144 (H) 45 - 117 U/L    Protein, total 5.5 (L) 6.4 - 8.2 g/dL    Albumin 2.0 (L) 3.5 - 5.0 g/dL    Globulin 3.5 2.0 - 4.0 g/dL    A-G Ratio 0.6 (L) 1.1 - 2.2     URINALYSIS W/ REFLEX CULTURE    Collection Time: 11/16/21 12:20 AM    Specimen: Urine   Result Value Ref Range    Color YELLOW/STRAW      Appearance CLOUDY (A) CLEAR      Specific gravity 1.010 1.003 - 1.030      pH (UA) 6.5 5.0 - 8.0      Protein 30 (A) NEG mg/dL    Glucose 250 (A) NEG mg/dL    Ketone Negative NEG mg/dL    Bilirubin Negative NEG      Blood SMALL (A) NEG      Urobilinogen 0.2 0.2 - 1.0 EU/dL    Nitrites Negative NEG      Leukocyte Esterase SMALL (A) NEG      WBC  0 - 4 /hpf    RBC 5-10 0 - 5 /hpf    Epithelial cells FEW FEW /lpf    Bacteria 4+ (A) NEG /hpf    UA:UC IF INDICATED URINE CULTURE ORDERED (A) CNI      Hyaline cast 0-2 0 - 5 /lpf   BLOOD GAS,CHEM8,LACTIC ACID POC    Collection Time: 11/16/21  2:44 AM   Result Value Ref Range    Calcium, ionized (POC) 1.33 (H) 1.12 - 1.32 mmol/L    BICARBONATE 27 mmol/L    Base excess (POC) 2.6 mmol/L    Sample source VENOUS BLOOD      CO2, POC 27 (H) 19 - 24 MMOL/L    Sodium,  (L) 136 - 145 MMOL/L    Potassium, POC 5.0 3.5 - 5.5 MMOL/L    Chloride, POC 96 (L) 100 - 108 MMOL/L    Glucose,  (H) 74 - 106 MG/DL    Creatinine, POC 4.1 (H) 0.6 - 1.3 MG/DL    Lactic Acid (POC) 1.13 0.40 - 2.00 mmol/L    pH, venous (POC) 7.44 (H) 7.32 - 7.42      pCO2, venous (POC) 39.5 (L) 41 - 51 MMHG    pO2, venous (POC) 37 25 - 40 mmHg

## 2021-11-16 NOTE — PROGRESS NOTES
End of Shift Note    Bedside shift change report given to [facility] (oncoming nurse) by Lia Jameson (offgoing nurse). Report included the following information SBAR, Kardex and MAR    Shift worked:  11am to 7pm     Shift summary and any significant changes:    Patient admitted from the ED. As soon as patient was admitted to the floor the patient was assessed and vitals checked and he was transferred to dialysis. Patient turned Q2 and hourly rounding completed. Medications given and education provided regarding all meds. Scheduled hydralazine held per nephrology MD on call.      Concerns for physician to address:       Zone phone for oncoming shift:              Lia Jameson
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
## 2021-11-16 NOTE — CONSULTS
[address] [facility]  SZY:[id number]   :1960                       Admitted with c/o fall    esrd- reidanh nicola--tts    Hd today        Alexia Posadas MD  [facility]  [facility]  [address], [address]  [facility], [address]  Phone - [phone]         Fax - [phone] [facility] Office  [address] [place] [address], [address] [place] [address]  Phone - [phone]        Fax - [phone]     [email or web address]

## 2021-11-16 NOTE — ED NOTES
TRANSFER - OUT REPORT:    Verbal report given to Amber Palma RN(name) on Julieta Boone  being transferred to [facility] (unit) for routine progression of care       Report consisted of patients Situation, Background, Assessment and   Recommendations(SBAR). Information from the following report(s) SBAR, ED Summary, [facility] and Recent Results was reviewed with the receiving nurse. Lines:   Peripheral IV 11/16/21 Right Antecubital (Active)        Opportunity for questions and clarification was provided.       Patient transported with:   O2 @ 2 liters

## 2021-11-16 NOTE — ED TRIAGE NOTES
Pt presents to the ED via EMS from home. EMS stated pt had lost balance and fell on right side. Pt complaining of right shoulder and right leg pain. Pt was using walker when he fell. Pt not on blood thinners. Pt diagnosed with brain cancer 2 weeks ago.

## 2021-11-16 NOTE — ED PROVIDER NOTES
EMERGENCY DEPARTMENT HISTORY AND PHYSICAL EXAM      Date: 11/15/2021  Patient Name: Chantal Frye    History of Presenting Illness     Chief Complaint   Patient presents with    Fall     Pt brought in by EMS from home for a GLF. Pt complains of right shoulder and right leg pain. Pt not on bloodthinners. Denies LOC or hitting head. History Provided By: Patient    HPI: Chantal Frye, 63 y.o. male with PMHx significant for end-stage renal disease on hemodialysis Tuesday, Thursday, Saturday at [facility], hypertension, arthritis, sciatica, type 2 diabetes, thyroid cancer with mets to brain, GERD presents to the ED with chief complaint of right leg pain after a fall. Patient reports he is mostly wheelchair-bound but was trying to get into bed tonight and slipped out of bed and landed on his right leg with his leg underneath of him. Also complains of mild pain in his right shoulder. Denies any nausea, vomiting, fever, chills, chest pain, shortness of breath, dysuria, hematuria. Still makes urine. Denies loss of consciousness. Denies any head injury. Not currently on any anticoagulation. PCP: Lulu Perales MD    No current facility-administered medications on file prior to encounter. Current Outpatient Medications on File Prior to Encounter   Medication Sig Dispense Refill    sodium bicarbonate 650 mg tablet Take 2 Tablets by mouth two (2) times a day. 120 Tablet 2    levothyroxine (SYNTHROID) 175 mcg tablet 1 full tab Mon-Sat but only 1/2 tab on sundays 90 Tablet 3    metoclopramide HCl (REGLAN) 5 mg tablet TAKE 1 TABLET BY MOUTH ONCE DAILY AS NEEDED. *DO NOT TAKE MORE THAN 3 TABLETS A DAY 30 Tablet 0    NovoLIN N NPH U-100 Insulin 100 unit/mL injection INJECT 2 TO 10 UNITS SUBCUTANEOUSLY ONCE DAILY 30 mL 0    lancets misc Use preferred brand;  Check glucose 3-4 times daily, Diagnosis E11.22 200 Lancet 3    carvediloL (COREG) 6.25 mg tablet Take 1 Tablet by mouth two (2) times daily (with meals). 60 Tablet 3    hydrALAZINE (APRESOLINE) 50 mg tablet Take 1 Tablet by mouth two (2) times a day. 60 Tablet 4    amLODIPine (NORVASC) 10 mg tablet Take 1 Tablet by mouth daily. 30 Tablet 5    sevelamer carbonate (RENVELA) 800 mg tab tab TAKE 1 TABLET BY MOUTH THREE TIMES DAILY WITH MEALS 90 Tablet 3    SORAfenib (NEXAVAR) 200 mg tablet Take 1 Tablet by mouth daily for 30 days. 30 Tablet 11    pregabalin (LYRICA) 25 mg capsule Take 1 Capsule by mouth three (3) times daily. Max Daily Amount: 75 mg. (Patient not taking: Reported on 11/11/2021) 3 Capsule 0    dexAMETHasone (DECADRON) 4 mg tablet Take 4 mg by mouth every eight (8) hours. 90 Tablet 0    bumetanide (BUMEX) 2 mg tablet Take 1 Tablet by mouth daily. 30 Tablet 0    omeprazole (PRILOSEC) 20 mg capsule Take 1 capsule by mouth once daily 90 Capsule 1    simvastatin (ZOCOR) 20 mg tablet Take 1 Tab by mouth nightly. 90 Tab 3    Vitamin D2 1,250 mcg (50,000 unit) capsule TAKE 1 CAPSULE BY MOUTH ONCE A WEEK (Patient taking differently: Take 50,000 Units by mouth every Monday. TAKE 1 CAPSULE BY MOUTH ONCE A WEEK) 4 Cap 5    sodium zirconium cyclosilicate (Lokelma) 5 gram powder packet Take 5 g by mouth two (2) times a week.  On Fridays and Sundays      Ventolin HFA 90 mcg/actuation inhaler TAKE 1-2 PUFFS EVEERY 4-6 HOURS AS NEEDED FOR DYSPNEA AND WHEEZING 1 Inhaler 2       Past History     Past Medical History:  Past Medical History:   Diagnosis Date    Adverse effect of anesthesia     \" STOP BREATHING 1 TIME C ANESTH\"    Cancer ([facility] [address] 2004    thyroid cancer    Chronic kidney disease     [facility]    Chronic pain     BACK SHOULDER AND ARM    COVID-19 04/2021    Depression     Diabetes ([address]     GERD (gastroesophageal reflux disease)     Heart failure (HCC)     stage 1    Hypercholesterolemia     Hypertension     Nausea & vomiting     PUD (peptic ulcer disease)     Sleep apnea     doesn't wear cpap    Thyroid cancer ([facility] [address]     TIA (transient ischemic attack) 2011    Vitamin D deficiency        Past Surgical History:  Past Surgical History:   Procedure Laterality Date    HX HEART CATHETERIZATION      HX HEENT      THROAT SURGERY X 4    HX ORTHOPAEDIC      back     HX ORTHOPAEDIC      ARM AND SHOULDER    HX OTHER SURGICAL      thyroid, lymphnode    HX RETINAL DETACHMENT REPAIR      left eye    HX VASCULAR ACCESS      HD cather in chest    IR INJ FORAMIN EPID LUMB ANES/STER SNGL  10/20/2021    IR INSERT NON TUNL CVC OVER 5 YRS  2020    IR INSERT NON TUNL CVC OVER 5 YRS  2021    IR INSERT TUNL CVC W/O PORT OVER 5 YR  2020    IR INSERT TUNL CVC W/O PORT OVER 5 YR  2021    UPPER GI ENDOSCOPY,BALL DIL,30MM  2020         UPPER GI ENDOSCOPY,BIOPSY  2020         VASCULAR SURGERY PROCEDURE UNLIST      cardiac cath NEG. Family History:  Family History   Problem Relation Age of Onset    Diabetes Mother     Elevated Lipids Mother    [place] Call Bladder Disease Mother     Headache Mother     Migraines Mother     Heart Disease Mother     Hypertension Mother     Stroke Mother     Other Mother         ANEURSYM BRAIN    Bleeding Prob Father     Cancer Father         LEUKEMIA    Diabetes Father     Elevated Lipids Father     Mental Retardation Sister     Psychiatric Disorder Sister     Cancer Brother         LUNGS       Social History:  Social History     Tobacco Use    Smoking status: Former Smoker     Quit date: 1994     Years since quittin.2    Smokeless tobacco: Never Used   Vaping Use    Vaping Use: Never used   Substance Use Topics    Alcohol use: Not Currently    Drug use: No       Allergies: Allergies   Allergen Reactions    Anesthetics - Amide Type - Select Amino Amides Shortness of Breath    Flexeril [Cyclobenzaprine] Hives    Tramadol Hives         Review of Systems   Review of Systems   Constitutional: Negative for chills and fever.    HENT: Negative for congestion, ear pain, rhinorrhea and sore throat. Eyes: Negative for photophobia. Respiratory: Negative for cough, chest tightness, shortness of breath and wheezing. Cardiovascular: Positive for leg swelling. Negative for chest pain and palpitations. Gastrointestinal: Negative for abdominal pain, diarrhea, nausea and vomiting. Genitourinary: Negative for dysuria, flank pain and hematuria. Musculoskeletal: Positive for arthralgias and gait problem. Negative for neck pain. Skin: Negative for rash and wound. Neurological: Negative for dizziness, syncope, light-headedness and headaches. Psychiatric/Behavioral: Negative for confusion. The patient is nervous/anxious. All other systems reviewed and are negative.         Physical Exam   General appearance -overweight, unkempt and in no distress  Eyes - pupils equal and reactive, extraocular eye movements intact  ENT - mucous membranes moist, pharynx normal without lesions  Neck - supple, no significant adenopathy; non-tender to palpation  Chest - clear to auscultation, no wheezes, rales or rhonchi; non-tender to palpation, dialysis catheter right upper chest  Heart - normal rate and regular rhythm, S1 and S2 normal, no murmurs noted  Abdomen - soft, nontender, nondistended, no masses or organomegaly  Musculoskeletal -tender to palpation right femur, right knee, and right tib-fib, no deformity noted deformity or swelling; normal ROM  Extremities - peripheral pulses normal, 1+ bilateral pedal edema in feet, dialysis fistula left upper extremity  Skin - normal coloration and turgor, no rashes  Neurological - alert, oriented x3, normal speech, no focal findings or movement disorder noted    Diagnostic Study Results     Labs -     Recent Results (from the past 24 hour(s))   CBC WITH AUTOMATED DIFF    Collection Time: 11/16/21 12:20 AM   Result Value Ref Range    WBC 19.9 (H) 4.1 - 11.1 K/uL    RBC 3.35 (L) 4.10 - 5.70 M/uL    HGB 10.2 (L) 12.1 - 17.0 g/dL HCT 30.9 (L) 36.6 - 50.3 %    MCV 92.2 80.0 - 99.0 FL    MCH 30.4 26.0 - 34.0 PG    MCHC 33.0 30.0 - 36.5 g/dL    RDW 15.0 (H) 11.5 - 14.5 %    PLATELET 535 658 - 589 K/uL    NRBC 0.0 0  WBC    ABSOLUTE NRBC 0.00 0.00 - 0.01 K/uL    NEUTROPHILS 95 (H) 32 - 75 %    BAND NEUTROPHILS 1 %    LYMPHOCYTES 2 (L) 12 - 49 %    MONOCYTES 2 (L) 5 - 13 %    EOSINOPHILS 0 0 - 7 %    BASOPHILS 0 0 - 1 %    IMMATURE GRANULOCYTES 0 0.0 - 0.5 %    ABS. NEUTROPHILS 19.1 (H) 1.8 - 8.0 K/UL    ABS. LYMPHOCYTES 0.4 (L) 0.8 - 3.5 K/UL    ABS. MONOCYTES 0.4 0.0 - 1.0 K/UL    ABS. EOSINOPHILS 0.0 0.0 - 0.4 K/UL    ABS. BASOPHILS 0.0 0.0 - 0.1 K/UL    ABS. IMM. GRANS. 0.0 0.00 - 0.04 K/UL    DF MANUAL      PLATELET COMMENTS Large Platelets      RBC COMMENTS TEARDROP CELLS  PRESENT        WBC COMMENTS SMUDGE CELLS SEEN     METABOLIC PANEL, COMPREHENSIVE    Collection Time: 11/16/21 12:20 AM   Result Value Ref Range    Sodium 130 (L) 136 - 145 mmol/L    Potassium 5.6 (H) 3.5 - 5.1 mmol/L    Chloride 96 (L) 97 - 108 mmol/L    CO2 27 21 - 32 mmol/L    Anion gap 7 5 - 15 mmol/L    Glucose 296 (H) 65 - 100 mg/dL    BUN 75 (H) 6 - 20 MG/DL    Creatinine 4.08 (H) 0.70 - 1.30 MG/DL    BUN/Creatinine ratio 18 12 - 20      GFR est AA 18 (L) >60 ml/min/1.73m2    GFR est non-AA 15 (L) >60 ml/min/1.73m2    Calcium 9.3 8.5 - 10.1 MG/DL    Bilirubin, total 0.5 0.2 - 1.0 MG/DL    ALT (SGPT) 31 12 - 78 U/L    AST (SGOT) 24 15 - 37 U/L    Alk.  phosphatase 144 (H) 45 - 117 U/L    Protein, total 5.5 (L) 6.4 - 8.2 g/dL    Albumin 2.0 (L) 3.5 - 5.0 g/dL    Globulin 3.5 2.0 - 4.0 g/dL    A-G Ratio 0.6 (L) 1.1 - 2.2     URINALYSIS W/ REFLEX CULTURE    Collection Time: 11/16/21 12:20 AM    Specimen: Urine   Result Value Ref Range    Color YELLOW/STRAW      Appearance CLOUDY (A) CLEAR      Specific gravity 1.010 1.003 - 1.030      pH (UA) 6.5 5.0 - 8.0      Protein 30 (A) NEG mg/dL    Glucose 250 (A) NEG mg/dL    Ketone Negative NEG mg/dL    Bilirubin Negative NEG      Blood SMALL (A) NEG      Urobilinogen 0.2 0.2 - 1.0 EU/dL    Nitrites Negative NEG      Leukocyte Esterase SMALL (A) NEG      WBC  0 - 4 /hpf    RBC 5-10 0 - 5 /hpf    Epithelial cells FEW FEW /lpf    Bacteria 4+ (A) NEG /hpf    UA:UC IF INDICATED URINE CULTURE ORDERED (A) CNI      Hyaline cast 0-2 0 - 5 /lpf   CULTURE, URINE    Collection Time: 11/16/21 12:20 AM    Specimen: Urine   Result Value Ref Range    Special Requests: NO SPECIAL REQUESTS  Reflexed from [id number]        [place] Count >100,000  COLONIES/mL        Culture result: GRAM NEGATIVE RODS (A)     CULTURE, BLOOD, PAIRED    Collection Time: 11/16/21  1:45 AM    Specimen: Blood   Result Value Ref Range    Special Requests: NO SPECIAL REQUESTS      Culture result: NO GROWTH AFTER 5 HOURS     BLOOD GAS,CHEM8,LACTIC ACID POC    Collection Time: 11/16/21  2:44 AM   Result Value Ref Range    Calcium, ionized (POC) 1.33 (H) 1.12 - 1.32 mmol/L    BICARBONATE 27 mmol/L    Base excess (POC) 2.6 mmol/L    Sample source VENOUS BLOOD      CO2, POC 27 (H) 19 - 24 MMOL/L    Sodium,  (L) 136 - 145 MMOL/L    Potassium, POC 5.0 3.5 - 5.5 MMOL/L    Chloride, POC 96 (L) 100 - 108 MMOL/L    Glucose,  (H) 74 - 106 MG/DL    Creatinine, POC 4.1 (H) 0.6 - 1.3 MG/DL    Lactic Acid (POC) 1.13 0.40 - 2.00 mmol/L    pH, venous (POC) 7.44 (H) 7.32 - 7.42      pCO2, venous (POC) 39.5 (L) 41 - 51 MMHG    pO2, venous (POC) 37 25 - 40 mmHg   GLUCOSE, POC    Collection Time: 11/16/21  4:27 AM   Result Value Ref Range    Glucose (POC) 301 (H) 65 - 117 mg/dL    Performed by Carole Mark RN    GLUCOSE, POC    Collection Time: 11/16/21  7:29 AM   Result Value Ref Range    Glucose (POC) 276 (H) 65 - 117 mg/dL    Performed by Karen Campbell    HEP B SURFACE AG    Collection Time: 11/16/21  8:21 AM   Result Value Ref Range    Hepatitis B surface Ag <0.10 Index    Hep B surface Ag Interp.  Negative NEG     HEP B SURFACE AB    Collection Time: 11/16/21  8:21 AM Result Value Ref Range    Hepatitis B surface Ab <3.10 mIU/mL    Hep B surface Ab Interp. NONREACTIVE NR     POTASSIUM    Collection Time: 11/16/21 10:00 AM   Result Value Ref Range    Potassium 4.8 3.5 - 5.1 mmol/L   GLUCOSE, POC    Collection Time: 11/16/21 11:23 AM   Result Value Ref Range    Glucose (POC) 188 (H) 65 - 117 mg/dL    Performed by Iris Angel    GLUCOSE, POC    Collection Time: 11/16/21  5:00 PM   Result Value Ref Range    Glucose (POC) 172 (H) 65 - 117 mg/dL    Performed by Cynthia Ojeda        Radiologic Studies -   XR CHEST SNGL V   Final Result   No evidence of acute cardiopulmonary process. No significant change   in left lung mass. XR KNEE RT 3 V   Final Result   No acute abnormality. XR TIB/FIB RT   Final Result   No acute abnormality. XR SHOULDER RT AP/LAT MIN 2 V   Final Result   No acute abnormality. XR FEMUR RT 2 VS   Final Result   No acute abnormality. CT Results  (Last 48 hours)    None        CXR Results  (Last 48 hours)    None            Medical Decision Making   I am the first provider for this patient. I reviewed the vital signs, available nursing notes, past medical history, past surgical history, family history and social history. Vital Signs-Reviewed the patient's vital signs. Patient Vitals for the past 12 hrs:   Temp Pulse Resp BP SpO2   11/15/21 2157 -- -- -- -- 95 %   11/15/21 2034 97.6 °F (36.4 °C) 83 18 (!) 139/53 98 %         Records Reviewed: Nursing Notes and Old Medical Records    Provider Notes (Medical Decision Making):   Differential diagnosis: Fracture, contusion, dislocation, sprain, electrolyte abnormality, UTI  Will obtain x-ray of right shoulder, right femur, knee, tib-fib. Will obtain CBC, CMP, UA. ED Course:   Initial assessment performed. The patients presenting problems have been discussed, and they are in agreement with the care plan formulated and outlined with them.   I have encouraged them to ask questions as they arise throughout their visit. Progress Notes:  ED Course as of 11/16/21 2026   Tue Nov 16, 2021   0242 Patient with leukocytosis and UTI. Will admit to hospitalist.  Case discussed with Arcadio Gupta (hospitalist) who will see and admit patient. Rocephin ordered. [AO]      ED Course User Index  [AO] Linda Portillo MD       Disposition:  Admit to hospitalist        Diagnosis     Clinical Impression:   1. Urinary tract infection with hematuria, site unspecified    2. ESRD (end stage renal disease) on dialysis ([facility] [address]    3.  Fall, initial encounter

## 2021-11-16 NOTE — ED NOTES
Bedside and Verbal shift change report given to Amena Hong RN (oncoming nurse) by Joni Hess RN (offgoing nurse). Report included the following information SBAR, Kardex, ED Summary, MAR, Recent Results and Med Rec Status.

## 2021-11-16 NOTE — PROGRESS NOTES
Patient seen and examined today    Frequent falls  Right leg pain  Urinary tract infection  End-stage renal disease on hemodialysis  Hyperkalemia  Type 2 diabetes mellitus with hyperglycemia  History of thyroid cancer with metastasis to brain  Congestive heart failure    Continue ceftriaxone.   PT OT eval  Consult renal for hemodialysis needs  Check labs in a.m. tomorrow  Courtesy consult to oncology  Patient is supposed to have gamma knife surgery this week
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
## 2021-11-16 NOTE — PROGRESS NOTES
Nephrology Progress Note  José Miguel Butt     [facility]. [email or web address]  Phone - [phone]   Patient: Lon Gomez    YOB: 1960        Date- 11/16/2021   Admit Date: 11/15/2021  CC: Follow up for esrd          IMPRESSION & PLAN:   · ESRD--[facility] on TTS   · S/p fall  · Hypertension  · Hyperkalemia  · hyponatremia  · Anemia of ckd  · Type 2 diabetes  · Back pain  · met to the right frontal lobe. 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    lung nodules    PLAN-   Hd today   Fluid restriction 1500 ml per day   k will improve with hd   Consult PT AND OT     Subjective: Interval History:   -  Patient came to er with c/o fall  He is on hd TTS at [facility]  He denies missing any hd    Objective:   Vitals:    11/16/21 0540 11/16/21 0652 11/16/21 0745 11/16/21 0914   BP:  (!) 174/59 (!) 147/48 (!) 170/53   Pulse:  79 72 79   Resp:  23 16    Temp:  97.8 °F (36.6 °C)     SpO2: 98% 98% 97% (!) 0%   Weight:       Height:          11/15 0701 - 11/16 0700  In: 50 [I.V.:50]  Out: -   Last 3 Recorded Weights in this Encounter    11/15/21 2034   Weight: 97.1 kg (214 lb)      Physical exam:   GEN:NAD NECK- Supple, no mass  RESP: No wheezing, Clear b/l  CVS: S1,S2  RRR  NEURO: Normal speech, NON FOCAL  EXT: + Edema   PSYCH: Normal Mood  RIGHT IJ PERMCATH +    Chart reviewed. Pertinent Notes reviewed. Data Review :  Recent Labs     11/16/21  0020   *   K 5.6*   CL 96*   CO2 27   BUN 75*   CREA 4.08*   *   CA 9.3     Recent Labs     11/16/21  0020   WBC 19.9*   HGB 10.2*   HCT 30.9*        No results for input(s): FE, TIBC, PSAT, FERR in the last 72 hours.    Medication list  reviewed  Current Facility-Administered Medications   Medication Dose Route Frequency    amLODIPine (NORVASC) tablet 10 mg  10 mg Oral DAILY    bumetanide (BUMEX) tablet 2 mg  2 mg Oral DAILY    carvediloL (COREG) tablet 6.25 mg  6.25 mg Oral BID WITH MEALS    dexAMETHasone (DECADRON) tablet 4 mg  4 mg Oral Q8H    hydrALAZINE (APRESOLINE) tablet 50 mg  50 mg Oral BID    levothyroxine (SYNTHROID) tablet 175 mcg  175 mcg Oral 6am    insulin lispro (HUMALOG) injection   SubCUTAneous AC&HS    glucose chewable tablet 16 g  4 Tablet Oral PRN    dextrose (D50W) injection syrg 12.5-25 g  12.5-25 g IntraVENous PRN    glucagon (GLUCAGEN) injection 1 mg  1 mg IntraMUSCular PRN    pantoprazole (PROTONIX) tablet 40 mg  40 mg Oral ACB    atorvastatin (LIPITOR) tablet 10 mg  10 mg Oral DAILY    sevelamer carbonate (RENVELA) tab 800 mg  800 mg Oral TID WITH MEALS    sodium bicarbonate tablet 1,300 mg  1,300 mg Oral BID    sodium chloride (NS) flush 5-40 mL  5-40 mL IntraVENous Q8H    sodium chloride (NS) flush 5-40 mL  5-40 mL IntraVENous PRN    acetaminophen (TYLENOL) tablet 650 mg  650 mg Oral Q6H PRN    Or    acetaminophen (TYLENOL) suppository 650 mg  650 mg Rectal Q6H PRN    polyethylene glycol (MIRALAX) packet 17 g  17 g Oral DAILY PRN    ondansetron (ZOFRAN ODT) tablet 4 mg  4 mg Oral Q8H PRN    Or    ondansetron (ZOFRAN) injection 4 mg  4 mg IntraVENous Q6H PRN    heparin (porcine) injection 5,000 Units  5,000 Units SubCUTAneous Q8H    [START ON 11/17/2021] cefTRIAXone (ROCEPHIN) 1 g in 0.9% sodium chloride (MBP/ADV) 50 mL MBP  1 g IntraVENous Q24H    morphine injection 2 mg  2 mg IntraVENous Q4H PRN    oxyCODONE IR (ROXICODONE) tablet 5 mg  5 mg Oral Q4H PRN    sodium chloride (NS) flush 5-40 mL  5-40 mL IntraVENous Q8H    sodium chloride (NS) flush 5-40 mL  5-40 mL IntraVENous PRN     Current Outpatient Medications   Medication Sig    sodium bicarbonate 650 mg tablet Take 2 Tablets by mouth two (2) times a day.  levothyroxine (SYNTHROID) 175 mcg tablet 1 full tab Mon-Sat but only 1/2 tab on sundays    metoclopramide HCl (REGLAN) 5 mg tablet TAKE 1 TABLET BY MOUTH ONCE DAILY AS NEEDED.  *DO NOT TAKE MORE THAN 3 TABLETS A DAY    NovoLIN N NPH U-100 Insulin 100 unit/mL injection INJECT 2 TO 10 UNITS SUBCUTANEOUSLY ONCE DAILY    lancets misc Use preferred brand; Check glucose 3-4 times daily, Diagnosis E11.22    carvediloL (COREG) 6.25 mg tablet Take 1 Tablet by mouth two (2) times daily (with meals).  hydrALAZINE (APRESOLINE) 50 mg tablet Take 1 Tablet by mouth two (2) times a day.  amLODIPine (NORVASC) 10 mg tablet Take 1 Tablet by mouth daily.  sevelamer carbonate (RENVELA) 800 mg tab tab TAKE 1 TABLET BY MOUTH THREE TIMES DAILY WITH MEALS    SORAfenib (NEXAVAR) 200 mg tablet Take 1 Tablet by mouth daily for 30 days.  pregabalin (LYRICA) 25 mg capsule Take 1 Capsule by mouth three (3) times daily. Max Daily Amount: 75 mg. (Patient not taking: Reported on 11/11/2021)    dexAMETHasone (DECADRON) 4 mg tablet Take 4 mg by mouth every eight (8) hours.  bumetanide (BUMEX) 2 mg tablet Take 1 Tablet by mouth daily.  omeprazole (PRILOSEC) 20 mg capsule Take 1 capsule by mouth once daily    simvastatin (ZOCOR) 20 mg tablet Take 1 Tab by mouth nightly.  Vitamin D2 1,250 mcg (50,000 unit) capsule TAKE 1 CAPSULE BY MOUTH ONCE A WEEK (Patient taking differently: Take 50,000 Units by mouth every Monday. TAKE 1 CAPSULE BY MOUTH ONCE A WEEK)    sodium zirconium cyclosilicate (Lokelma) 5 gram powder packet Take 5 g by mouth two (2) times a week.  On Fridays and Sundays    Ventolin HFA 90 mcg/actuation inhaler TAKE 1-2 PUFFS EVEERY 4-6 HOURS AS NEEDED FOR DYSPNEA AND WHEEZING          Keya Briones MD              [facility]  [facility]  [address], [address] [facility]  [address] [facility] [address], [address]  Phone - [phone]               Fax - [phone]

## 2021-11-16 NOTE — ACP (ADVANCE CARE PLANNING)
Advance Care Planning Note      NAME: Ramona Rice   :  1960   MRN:  [id number]     Date/Time:  2021 5:12 AM    Active Diagnoses:  Hospital Problems  Date Reviewed: 2021          Codes Class Noted POA    * (Principal) Fall ICD-10-CM: W19. Cullen Asa  ICD-9-CM: E888.9  2021 Unknown        UTI (urinary tract infection) ICD-10-CM: N39.0  ICD-9-CM: 599.0  2021 Yes        Brain metastases ([facility] [address] ICD-10-CM: C79.31  ICD-9-CM: 198.3  10/17/2021 Yes        Hyperkalemia ICD-10-CM: E87.5  ICD-9-CM: 276.7  10/15/2021 Yes        ESRD (end stage renal disease) on dialysis [facility]) ICD-10-CM: N18.6, Z99.2  ICD-9-CM: 585.6, V45.11  10/15/2021 Yes              These active diagnoses are of sufficient risk that focused discussion on advance care planning is indicated in order to allow the patient to thoughtfully consider personal goals of care, and if situations arise that prevent the ability to personally give input, to ensure appropriate representation of their personal desires for different levels and aggressiveness of care. Discussion:   Code status addressed and wants to be a Full Code. Patient wants central line and vasopressors if needed. Patient would also want a feeding tube, if needed, for nutritional support. Patient  would like to assign His daughter Garrett Valera  as the surrogate decision maker. Persons present and participating in discussion: Betty Lacey MD      Time Spent:   Total time spent face-to-face in education and discussion:   16  minutes.          Flako Blue MD   Hospitalist

## 2021-11-16 NOTE — PROGRESS NOTES
José Miguel Filomena         Rupert Brochure  HRS:[id number]   DDP:2/77/6392                       Pt seen on hd  bp stable  No sob        Pennie Smith MD  [facility]  [facility] [address], [address]  [place], [address]  Phone - [phone]         Fax - [phone] [facility] Office  [address] [place] [address]  Phone - [phone]        Fax - [phone]     [email or web address]

## 2021-11-16 NOTE — PROCEDURES
Hemodialysis / [phone]    Vitals Pre Post Assessment Pre Post   BP BP: (!) 140/63 (11/16/21 1330) 173/75 LOC A  & O x 3 No change   HR Pulse (Heart Rate): 75 (11/16/21 1330) 75 Lungs Dim bases No change   Resp Resp Rate: 16 (11/16/21 1330) 18 Cardiac S1S2 No change   Temp Temp: 98.4 °F (36.9 °C) (11/16/21 1215) 97.7 oral Skin Warm dry & intact No change   Weight  n/a n/a Edema Trace lower ext No change   Tele status yes yes Pain Pain Intensity 1: 4 (11/16/21 1202) 0     Orders   Duration: Start: 0677 End: 1600 Total: 3.45   Dialyzer: Dialyzer/Set Up Inspection: Revaclear (11/16/21 1215)   K Bath: Dialysate K (mEq/L): 2 (11/16/21 1215)   Ca Bath: Dialysate CA (mEq/L): 2.5 (11/16/21 1215)   Na: Dialysate NA (mEq/L): 140 (11/16/21 1215)   Bicarb: Dialysate HCO3 (mEq/L): 40 (11/16/21 1215)   Target Fluid Removal: Goal/Amount of Fluid to Remove (mL): 2000 mL (11/16/21 1215)     Access cvc   Type & Location: Right sub clav   Comments:                                        Labs   HBsAg (Antigen) / date:    Neg 11/1621                                           HBsAb (Antibody) / date: Susc 11/16/21   Source: Epic   Obtained/Reviewed  Critical Results Called HGB   Date Value Ref Range Status   11/16/2021 10.2 (L) 12.1 - 17.0 g/dL Final     Potassium   Date Value Ref Range Status   11/16/2021 4.8 3.5 - 5.1 mmol/L Final     Calcium   Date Value Ref Range Status   11/16/2021 9.3 8.5 - 10.1 MG/DL Final     BUN   Date Value Ref Range Status   11/16/2021 75 (H) 6 - 20 MG/DL Final     Creatinine   Date Value Ref Range Status   11/16/2021 4.08 (H) 0.70 - 1.30 MG/DL Final     Comment:     INVESTIGATED PER DELTA CHECK PROTOCOL        Meds Given   Name Dose Route   heparin 3800 iv               Adequacy / Fluid    Total Liters Process: 83.5   Net Fluid Removed: 1600ml      Comments   Time Out Done:   (Time) 1140   Admitting Diagnosis: ESRD   Consent obtained/signed: Informed Consent Verified: Yes (11/16/21 7178)   Khloe Butler / Kerri Cote # Machine Number: [id number] (11/16/21 1215)   Primary Nurse Rpt Pre: Cy Rodriguez   Primary Nurse Rpt Post: Cy Rodriguez   Pt Education: Access care   Care Plan: Continue HD   P[facility]     Tx Summary R sub clav CVC: Dressing CDI. No s/s of infection. Both lumens aspirate & flush well. Running well at . SBAR received from Primary RN. Pt arrived to HD suite A&Ox3. Consent signed & on file. 1215: Each catheter limb disinfected per p&p, caps removed, hubs disinfected per p&p. Each lumen aspirated for blood return and flushed with Normal Saline per policy. Labs drawn per request/ order. VSS. Dialysis Tx initiated. 1230: Pt. Resting well. Lines secure and visible  1315: pt. Resting well. Lines secure. Dr. Ramos  rounding  1330: pt. Stable, lines secure  1415: pt. Stable. Line secure. C/o feeling lightheaded, decreased goal.  1430: Pt. Resting well. Lines secure  1515: pt. Stable  1545: pt. Stable,  1600: Tx ended. VSS. Each dialysis catheter limb disinfected per p&p, all possible blood returned per p&p, and each dialysis hub disinfected per p&p. Each lumen flushed, post dialysis catheter Heparin dwell instilled per order, and caps applied. Bed locked and in the lowest position, call bell and belongings in reach. SBAR given to Primary, RN. Patient is stable at time of their/ my departure. All Dialysis related medications have been reviewed. Comments:           RN reviewed LPN assessment and completed RN assessment. RN completed patient assessment. RN reviewed technicians vital signs and procedure note. Tx completed. Reviewed by ESPERANZA Almonte

## 2021-11-16 NOTE — PROGRESS NOTES
Physical Therapy    Pt seen for eval. He continues to c/o significant pain in R knee and tolerates very little WB on it for scooting at edge of bed and could not tolerate attempts at stand or amb. He is A of 2 to come to edge of bed. At this time would recommend SNF rehab at d/c if pt is open to it. Also, question if pt may need further work up of R knee and/or CT given his level of pain and intolerance of WB? Full report to follow.     Sacha Maxwell, PT

## 2021-11-16 NOTE — PROGRESS NOTES
TRANSFER - OUT REPORT:    Verbal report given to Sudheer Cruz on Chantal Frye  being transferred to Oncology(unit) for ordered procedure       Report consisted of patients Situation, Background, Assessment and   Recommendations(SBAR). Information from the following report(s) Kardex was reviewed with the receiving nurse. Opportunity for questions and clarification was provided.       Patient transported with:   [employer]

## 2021-11-17 NOTE — PROGRESS NOTES
End of Shift Note    Bedside shift change report given to ESPERANZA Radford (oncoming nurse) by Josephine De La Cruz RN (offgoing nurse). Report included the following information SBAR, Kardex, Intake/Output and MAR    Shift worked:  [id number]     Shift summary and any significant changes:     No significant changes in patient condition. Scheduled medications were given, see MAR. IV line is flushed and patent. Patient requested medication for constipation. PRN Miralax was given. Hygiene care was provided. Frequent rounding has been done. Attempts to draw morning labs are unsuccessful. PICC nurse is called to draw labs. Concerns for physician to address:       Zone phone for oncoming shift:          Activity:  Activity Level: Bed Rest  Number times ambulated in hallways past shift: 0  Number of times OOB to chair past shift: 0    Cardiac:   Cardiac Monitoring: Yes           Access:   Current line(s): PIV     Genitourinary:   Urinary status: voiding    Respiratory:   O2 Device: Nasal cannula  Chronic home O2 use?: YES  Incentive spirometer at bedside: NO     GI:     Current diet:  ADULT DIET Regular; Low Sodium (2 gm); Low Potassium (Less than 3000 mg/day)  Passing flatus: YES  Tolerating current diet: YES       Pain Management:   Patient states pain is manageable on current regimen: YES    Skin:  Ge Score: 18  Interventions: speciality bed and internal/external urinary devices    Patient Safety:  Fall Score:  Total Score: 3  Interventions: bed/chair alarm, assistive device (walker, cane, etc), gripper socks and pt to call before getting OOB  High Fall Risk: Yes    Length of Stay:  Expected LOS: - - -  Actual LOS: 1      Josephine De La Cruz RN
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
## 2021-11-17 NOTE — PROGRESS NOTES
.I have reviewed discharge instructions with the patient. The patient verbalized understanding. Patient IV removed and personal belongings are with patient.

## 2021-11-17 NOTE — PROGRESS NOTES
Transition of Care Plan:     RUR: 31%  Disposition: Return to [facility] with family and resumption of [facility]  Follow up appointments: PCP, Nephrology  OP HD: Pt attends [facility] 7:15 AM chair time  DME needed: Pt has a rolling walker, dolomite walker, manual wheelchair, power wheelchair, and nocturnal O2  Transportation at Discharge: Pt's daughter, Lenny Rg or means to access home: yes   IM Medicare Letter: reviewed on 11/17/21  Is patient a BCPI-A Bundle: No                    If yes, was Bundle Letter given?:     Caregiver Contact: Pt's daughter, Angelica Jacob) [phone] (of note, pt's other daughter, Samara Benson) [phone] is listed on [facility] as healthcare decision maker if pt were to be unable to make his own medical decisions.)   Discharge Caregiver contacted prior to discharge? Yes,spoke with Wen    Chart reviewed. CM aware of discharge order. Met with pt at bedside to finalize and review d/c plan. Discussed recommendation for SNF placement at discharge. Pt declined SNF at this time, stating he wants to return to the hot with family and resumption of [address]. Pt voiced that his home is still under construction which is now expected to be completed by February 2022. Medicare pt has received, reviewed, and signed 2nd IM letter informing them of their right to appeal the discharge. Signed copy has been placed on pt bedside chart. Pt was provided with copy of letter to keep. CM contacted daughter, Kylee Lozano, by phone to notify of d /c today. Kylee Lozano will transport pt home around 2PM today and will provide portable O2 tank for pt to travel home with. CM has submitted resumption of care orders via [employer] to [facility]. Referral accepted. Information added to AVS for review. Pt to see PCP, Dr. Suri Pugh, on 11/24. Pt to see Oncologist on 11/29. CM contacted Dr. Pedraza Parents office to notify of pt d/c today.  Office will call pt directly after d/c to reschedule his gamma knife radiation therapy. No further CM needs identified at this time. Pt is ready for d/c from a CM standpoint. Assigned RN informed. Care Management Interventions  PCP Verified by CM: Yes  Palliative Care Criteria Met (RRAT>21 & CHF Dx)?: No  Mode of Transport at Discharge:  Other (see comment) (daughter)  Transition of Care Consult (CM Consult): Discharge Planning  Discharge Durable Medical Equipment: No  Physical Therapy Consult: Yes  Occupational Therapy Consult: Yes  Speech Therapy Consult: No  Support Systems: Child(puneet), Other Family Member(s), Caregiver/Home Care Staff  Confirm Follow Up Transport: Family  The Plan for Transition of Care is Related to the Following Treatment Goals : resumption of HHC  The Patient and/or Patient Representative was Provided with a Choice of Provider and Agrees with the Discharge Plan?: Yes  Name of the Patient Representative Who was Provided with a Choice of Provider and Agrees with the Discharge Plan: Júnior Ohara (daughter)  Freedom of Choice List was Provided with Basic Dialogue that Supports the Patient's Individualized Plan of Care/Goals, Treatment Preferences and Shares the Quality Data Associated with the Providers?: Yes   Resource Information Provided?: No  Discharge Location  Discharge Placement: Home with home health ([facility] [address] )    Aleah [place], [address], [address]
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
## 2021-11-17 NOTE — DISCHARGE INSTRUCTIONS
Patient Education        Preventing Falls: Care Instructions  Your Care Instructions     Getting around your home safely can be a challenge if you have injuries or health problems that make it easy for you to fall. Loose rugs and furniture in walkways are among the dangers for many older people who have problems walking or who have poor eyesight. People who have conditions such as arthritis, osteoporosis, or dementia also have to be careful not to fall. You can make your home safer with a few simple measures. Follow-up care is a key part of your treatment and safety. Be sure to make and go to all appointments, and call your doctor if you are having problems. It's also a good idea to know your test results and keep a list of the medicines you take. How can you care for yourself at home? Taking care of yourself  · You may get dizzy if you do not drink enough water. To prevent dehydration, drink plenty of fluids. Choose water and other clear liquids. If you have kidney, heart, or liver disease and have to limit fluids, talk with your doctor before you increase the amount of fluids you drink. · Exercise regularly to improve your strength, muscle tone, and balance. Walk if you can. Swimming may be a good choice if you cannot walk easily. · Have your vision and hearing checked each year or any time you notice a change. If you have trouble seeing and hearing, you might not be able to avoid objects and could lose your balance. · Know the side effects of the medicines you take. Ask your doctor or pharmacist whether the medicines you take can affect your balance. Sleeping pills or sedatives can affect your balance. · Limit the amount of alcohol you drink. Alcohol can impair your balance and other senses. · Ask your doctor whether calluses or corns on your feet need to be removed. If you wear loose-fitting shoes because of calluses or corns, you can lose your balance and fall.   · Talk to your doctor if you have numbness in your feet. Preventing falls at home  · Remove raised doorway thresholds, throw rugs, and clutter. Repair loose carpet or raised areas in the floor. · Move furniture and electrical cords to keep them out of walking paths. · Use nonskid floor wax, and wipe up spills right away, especially on ceramic tile floors. · If you use a walker or cane, put rubber tips on it. If you use crutches, clean the bottoms of them regularly with an abrasive pad, such as steel wool. · Keep your house well lit, especially [place], and outside walkways. Use night-lights in areas such as hallways and bathrooms. Add extra light switches or use remote switches (such as switches that go on or off when you clap your hands) to make it easier to turn lights on if you have to get up during the night. · Install sturdy handrails on stairways. · Move items in your cabinets so that the things you use a lot are on the lower shelves (about waist level). · Keep a cordless phone and a flashlight with new batteries by your bed. If possible, put a phone in each of the main rooms of your house, or carry a cell phone in case you fall and cannot reach a phone. Or, you can wear a device around your neck or wrist. You push a button that sends a signal for help. · Wear low-heeled shoes that fit well and give your feet good support. Use footwear with nonskid soles. Check the heels and soles of your shoes for wear. Repair or replace worn heels or soles. · Do not wear socks without shoes on wood floors. · Walk on the grass when the sidewalks are slippery. If you live in an area that gets snow and ice in the winter, sprinkle salt on slippery steps and sidewalks. Preventing falls in the bath  · Install grab bars and nonskid mats inside and outside your shower or tub and near the toilet and sinks. · Use shower chairs and bath benches. · Use a hand-held shower head that will allow you to sit while showering.   · Get into a tub or shower by putting the weaker leg in first. Get out of a tub or shower with your strong side first.  · Repair loose toilet seats and consider installing a raised toilet seat to make getting on and off the toilet easier. · Keep your bathroom door unlocked while you are in the shower. Where can you learn more? Go to http://www.victor.com/  Enter G117 in the search box to learn more about \"Preventing Falls: Care Instructions. \"  Current as of: December 7, 2020               Content Version: 13.0  © 9173-1523 [employer]. Care instructions adapted under license by [employer] (which disclaims liability or warranty for this information). If you have questions about a medical condition or this instruction, always ask your healthcare professional. [place] [address] any warranty or liability for your use of this information. Patient Education        Urinary Tract Infections (UTI) in Men: Care Instructions  Overview     A urinary tract infection, or UTI, is a term for an infection anywhere between the kidneys and the urethra. (The urethra is the tube that carries urine from the bladder to outside the body.) Most UTIs are bladder infections. They often cause pain or burning when you urinate. UTIs are caused by bacteria. This means they can be cured with antibiotics. Be sure to complete your treatment so that the infection does not get worse. Follow-up care is a key part of your treatment and safety. Be sure to make and go to all appointments, and call your doctor if you are having problems. It's also a good idea to know your test results and keep a list of the medicines you take. How can you care for yourself at home? · Take your antibiotics as prescribed. Do not stop taking them just because you feel better. You need to take the full course of antibiotics. · Take your medicines exactly as prescribed.  Your doctor may have prescribed a medicine, such as phenazopyridine (Pyridium), to help relieve pain when you urinate. This turns your urine orange. You may stop taking it when your symptoms get better. But be sure to take all of your antibiotics, which treat the infection. · Drink extra water for the next day or two. This will help make the urine less concentrated and help wash out the bacteria causing the infection. (If you have kidney, heart, or liver disease and have to limit your fluids, talk with your doctor before you increase your fluid intake.)  · Avoid drinks that are carbonated or have caffeine. They can irritate the bladder. · Urinate often. Try to empty your bladder each time. · To relieve pain, take a hot bath or lay a heating pad (set on low) over your lower belly or genital area. Never go to sleep with a heating pad in place. To help prevent UTIs  · Drink plenty of fluids. If you have kidney, heart, or liver disease and have to limit fluids, talk with your doctor before you increase the amount of fluids you drink. · Urinate when you have the urge. Do not hold your urine for a long time. Urinate before you go to sleep. · Keep your penis clean. Catheter care  If you have a drainage tube (catheter) in place, the following steps will help you care for it. · Always wash your hands before and after touching your catheter. · Check the area around the urethra for inflammation or signs of infection. Signs of infection include irritated, swollen, red, or tender skin, or pus around the catheter. · Clean the area around the catheter with soap and water two times a day. Dry with a clean towel afterward. · Do not apply powder or lotion to the skin around the catheter. To empty the urine collection bag   · Wash your hands with soap and water. · Without touching the drain spout, remove the spout from its sleeve at the bottom of the collection bag. Open the valve on the spout.   · Let the urine flow out of the bag and into the toilet or a container. Do not let the tubing or drain spout touch anything. · After you empty the bag, clean the end of the drain spout with tissue and water. Close the valve and put the drain spout back into its sleeve at the bottom of the collection bag. · Wash your hands with soap and water. When should you call for help? Call your doctor now or seek immediate medical care if:    · Symptoms such as a fever, chills, nausea, or vomiting get worse or happen for the first time.     · You have new pain in your back just below your rib cage. This is called flank pain.     · There is new blood or pus in your urine.     · You are not able to take or keep down your antibiotics. Watch closely for changes in your health, and be sure to contact your doctor if:    · You are not getting better after taking an antibiotic for 2 days.     · Your symptoms go away but then come back. Where can you learn more? Go to http://www.gray.com/  Enter E062 in the search box to learn more about \"Urinary Tract Infections (UTI) in Men: Care Instructions. \"  Current as of: February 10, 2021               Content Version: 13.0  © 1664-2131 [employer]. Care instructions adapted under license by [employer] (which disclaims liability or warranty for this information). If you have questions about a medical condition or this instruction, always ask your healthcare professional. [place] [address] any warranty or liability for your use of this information. Patient Education   Amoxicillin/Clavulanate Potassium (By mouth)   Amoxicillin (s-jfv-r-ZAINAB-in), Clavulanate Potassium (AOBE-oh-be-stepan lam-MQC-ff-um)  Treats infections. This medicine is a penicillin antibiotic. Brand Name(s): Augmentin, Augmentin ES-600, Augmentin XR   There may be other brand names for this medicine. When This Medicine Should Not Be Used: This medicine is not right for everyone.  Do not use it if you had an allergic reaction to amoxicillin, clavulanate, or a similar antibiotic (penicillin or cephalosporin), or if you had liver problems caused by Augmentin®. How to Use This Medicine:   Liquid, Tablet, Chewable Tablet, Long Acting Tablet  · Your doctor will tell you how much medicine to use. Do not use more than directed. · Take this medicine with a snack or at the beginning of a meal to help prevent nausea. · Chewable tablets: Chew the tablet completely before you swallow it. · Measure the oral liquid medicine with a marked measuring spoon, oral syringe, or medicine cup. Shake the medicine well just before you measure each dose. Rinse the spoon or dropper after each use. · Swallow the extended-release tablet whole. Do not crush, break, or chew it. · Take all of the medicine in your prescription to clear up your infection, even if you feel better after the first few doses. · Missed dose: Take a dose as soon as you remember. If it is almost time for your next dose, wait until then and take a regular dose. Do not take extra medicine to make up for a missed dose. · Tablet, extended-release tablet, chewable tablet: Store at room temperature, away from heat, moisture, and direct light. · Oral liquid: Store in the refrigerator. Do not freeze. · Throw away any unused oral liquid after 10 days. Drugs and Foods to Avoid:   Ask your doctor or pharmacist before using any other medicine, including over-the-counter medicines, vitamins, and herbal products. · Some medicines can affect how this medicine works. Tell your doctor if you are taking a blood thinner (such as warfarin), allopurinol, or probenecid. Warnings While Using This Medicine:   · Tell your doctor if you are pregnant or breastfeeding, or if you have kidney disease, liver disease, or mononucleosis (mono). · Birth control pills may not work as well while you are taking this medicine. Use another form of birth control to prevent pregnancy.   · This medicine can cause diarrhea. Call your doctor if the diarrhea becomes severe, does not stop, or is bloody. Do not take any medicine to stop diarrhea until you have talked to your doctor. Diarrhea can occur 2 months or more after you stop taking this medicine. · Tell any doctor or dentist who treats you that you are using this medicine. This medicine may affect certain medical test results. · Call your doctor if your symptoms do not improve or if they get worse. · The chewable tablet and oral liquid contain phenylalanine. Talk to your doctor before you use this medicine if you have phenylketonuria (PKU). · Keep all medicine out of the reach of children. Never share your medicine with anyone. Possible Side Effects While Using This Medicine:   Call your doctor right away if you notice any of these side effects:  · Allergic reaction: Itching or hives, swelling in your face or hands, swelling or tingling in your mouth or throat, chest tightness, trouble breathing  · Blistering, peeling, red skin rash  · Change in how much or how often you urinate  · Dark urine or pale stools, nausea, vomiting, loss of appetite, stomach pain, yellow eyes or skin  · Diarrhea that may contain blood, stomach cramps  If you notice these less serious side effects, talk with your doctor:   · Diaper rash  · Mild diarrhea, nausea, vomiting  · Tooth discoloration (in children)  If you notice other side effects that you think are caused by this medicine, tell your doctor. Call your doctor for medical advice about side effects. You may report side effects to FDA at 1-754-FDA-6135  © 2017 [facility] Information is for End User's use only and may not be sold, redistributed or otherwise used for commercial purposes. The above information is an  only. It is not intended as medical advice for individual conditions or treatments.  Talk to your doctor, nurse or pharmacist before following any medical regimen to see if it is safe and effective for you. HOSPITALIST DISCHARGE INSTRUCTIONS    NAME: Ramona Rice   :  1960   MRN:  [id number]     Date/Time:  2021 10:31 AM    ADMIT DATE: 11/15/2021   DISCHARGE DATE: 2021     Attending Physician: Shannen Soto NP    DISCHARGE DIAGNOSIS:    Ground-level fall, recurrent  Right leg pain secondary to above  Urinary tract infection  ESRD on HD TTS  Hyperkalemia  Hyponatremia  Type II DM, with hyperglycemia   History of thyroid cancer with metastasis to brain and lung  Congestive Heart Failure   Hypertension  Hyperlipidemia  GERD/PUD  History of TIA    Medications: Per above medication reconciliation. Pain Management: per above medications    Recommended diet: Cardiac Diet, Diabetic Diet and Renal Diet    Recommended activity: PT/OT per Home Health    Wound care: None    Indwelling devices:  None    Supplemental Oxygen: Chronic Oxygen,  2  LNC at baseline    Required Lab work: none    Glucose management:  per routine    Code status: Full        Outside physician follow up: Follow-up Information     Follow up With Specialties Details Why Contact Info    Radha Jones MD Internal Medicine On 2021 Follow - Up at 9:30am [address]  [address]      Nelia Ayala MD Neurosurgery Schedule an appointment as soon as possible for a visit office will call you to re-schedule gammaknife radiation after discharge. [address] [place] [address]  [address] [facility] [address]  [phone]      Roseanna Joseph MD Hematology and Oncology, Internal Medicine, Hematology, Oncology On 2021 Follow - Up at 10:30am  [facility] [address]  [facility] [address]  [address] [address] [phone]            [facility] to avoid frequent ED visits. [facility] can treat; pains, sprains, cuts, wounds, high fevers, upper respiratory infections and much more.   There medical team is equipped with all the tools necessary to provide advanced medical care in the comfort of you home, workplace, or location of need. The medical team consists of doctors, nurse practitioners, and EMTs. [facility] is available 7 days per week 9 am to 9 pm.   Request care by calling [phone] or by going online at [phone] [employer] unar      Information obtained by :  I understand that if any problems occur once I am at home I am to contact my physician. I understand and acknowledge receipt of the instructions indicated above.                                                                                                                                            Physician's or R.N.'s Signature                                                                  Date/Time                                                                                                                                              Patient or Repres

## 2021-11-17 NOTE — PROGRESS NOTES
Problem: Self Care Deficits Care Plan (Adult)  Goal: *Acute Goals and Plan of Care (Insert Text)  Description: FUNCTIONAL STATUS PRIOR TO ADMISSION: ambulated short distances with rolling walker in hotel room with supervision, mainly uses wheelchair, uses rollator to mobilize to dialysis, sponge bathes and sits in wheelchair and stands at sink to perform, performed ADLs on his own and uses AE, on 2L NC 24/7    HOME SUPPORT PRIOR TO ADMISSION: The patient lived with family in a hotel. Occupational Therapy Goals:  Initiated 11/16/2021  1. Patient will perform grooming seated with modified independence within 7 days. 2. Patient will perform lower body dressing with moderate assistance with AE  within 7 days. 3. Patient will perform toileting with moderate assistance  within 7 days. 4. Patient will demonstrate independence with home exercise program within 7 days. 5. Patient will transfer from bedside commode with minimal assistance using the least restrictive device and appropriate durable medical equipment within 7 days. Outcome: Progressing Towards Goal    OCCUPATIONAL THERAPY TREATMENT  Patient: Dale Lew (16 y.o. male)  Date: 11/17/2021  Diagnosis: Fall [W19. XXXA]   Fall       Precautions: Fall  Chart, occupational therapy assessment, plan of care, and goals were reviewed. ASSESSMENT  Patient continues with skilled OT services and is progressing towards goals. Patient is able to tolerate more activity today. He reports needing to use the restroom. He was able to transfer to the [facility] with CGA to [address] [place] [address]. He was unable to manage bowel hygiene, but was able to participate in clothing management. He was able to stand today up to 5 minutes at a time with CGA. Patient returned to bed with CG to [address] [place] [address]. Patient does not want to go to a SNF, and reports having assistance available for ADL as needed. Patient will continue to benefit from skilled OT treatment, with home health follow up.     Current Level of Function Impacting Discharge (ADLs): Min A to S    Other factors to consider for discharge: N/A         PLAN :  Patient continues to benefit from skilled intervention to address the above impairments. Continue treatment per established plan of care to address goals. Recommendation for discharge: (in order for the patient to meet his/her long term goals)  Occupational therapy at least 2 days/week in the home AND ensure assist and/or supervision for safety    This discharge recommendation:  Has been made in collaboration with the attending provider and/or case management    IF patient discharges home will need the following DME: patient owns DME required for discharge       SUBJECTIVE:   Patient stated My knee is much better today.     OBJECTIVE DATA SUMMARY:   Cognitive/Behavioral Status:  Neurologic State: Alert  Orientation Level: Oriented X4  Cognition: Follows commands  Perception: Appears intact  Perseveration: No perseveration noted  Safety/Judgement: Home safety; Fall prevention    Functional Mobility and Transfers for ADLs:  Bed Mobility:  Supine to Sit: Minimum assistance; Other (comment) (A with RLE)  Sit to Supine: Minimum assistance; Other (comment) (chelo with support of RLE)  Scooting: Contact guard assistance    Transfers:  Sit to Stand: Minimum assistance; Contact guard assistance; Assist x1; Other (comment) (depending upon surface; cues for hands)  Functional Transfers  Toilet Transfer : Contact guard assistance  Cues: Verbal cues provided  Adaptive Equipment: Bedside commode; Walker (comment)  Bed to Chair: Contact guard assistance; Minimum assistance; Assist x1; Other (comment) (with RW)    Balance:  Sitting: Intact  Standing: Impaired  Standing - Static: Constant support; Good; Other (comment) (with RW)  Standing - Dynamic : Constant support; Fair;  Other (comment) (RW)    ADL Intervention:       Grooming  Washing Hands: Set-up (seated using hand wipes)                   Lower Body Dressing Assistance  Socks: Moderate assistance  Position Performed: Long sitting on bed  Cues: Verbal cues provided    Toileting  Bladder Hygiene: Supervision  Bowel Hygiene: Total assistance (dependent)  Clothing Management: Moderate assistance  Cues: Verbal cues provided; Visual cues provided  Adaptive Equipment: Walker    Cognitive Retraining  Safety/Judgement: Home safety; Fall prevention    Pain:  7/10    Activity Tolerance:   Fair, desaturates with exertion and requires oxygen, and requires frequent rest breaks    After treatment patient left in no apparent distress:   Supine in bed, Call bell within reach, and Side rails x 3    COMMUNICATION/COLLABORATION:   The patients plan of care was discussed with: Physical therapist and Registered nurse.      Gricelda Hoffmann OTR/L  Time Calculation: 27 mins

## 2021-11-17 NOTE — PROGRESS NOTES
Problem: Diabetes Self-Management  Goal: *Disease process and treatment process  Description: Define diabetes and identify own type of diabetes; list 3 options for treating diabetes. Outcome: Resolved/Not Met  Goal: *Incorporating nutritional management into lifestyle  Description: Describe effect of type, amount and timing of food on blood glucose; list 3 methods for planning meals. Outcome: Resolved/Not Met  Goal: *Incorporating physical activity into lifestyle  Description: State effect of exercise on blood glucose levels. Outcome: Resolved/Not Met  Goal: *Developing strategies to promote health/change behavior  Description: Define the ABC's of diabetes; identify appropriate screenings, schedule and personal plan for screenings. Outcome: Resolved/Not Met  Goal: *Using medications safely  Description: State effect of diabetes medications on diabetes; name diabetes medication taking, action and side effects. Outcome: Resolved/Not Met  Goal: *Monitoring blood glucose, interpreting and using results  Description: Identify recommended blood glucose targets  and personal targets. Outcome: Resolved/Not Met  Goal: *Prevention, detection, treatment of acute complications  Description: List symptoms of hyper- and hypoglycemia; describe how to treat low blood sugar and actions for lowering  high blood glucose level. Outcome: Resolved/Not Met  Goal: *Prevention, detection and treatment of chronic complications  Description: Define the natural course of diabetes and describe the relationship of blood glucose levels to long term complications of diabetes.   Outcome: Resolved/Not Met  Goal: *Developing strategies to address psychosocial issues  Description: Describe feelings about living with diabetes; identify support needed and support network  Outcome: Resolved/Not Met  Goal: *Insulin pump training  Outcome: Resolved/Not Met  Goal: *Sick day guidelines  Outcome: Resolved/Not Met  Goal: *Patient Specific Goal (EDIT GOAL, INSERT TEXT)  Outcome: Resolved/Not Met

## 2021-11-17 NOTE — PROGRESS NOTES
Nephrology Progress Note  José Miguel Butt     [facility]. [facility]  Phone - [phone]   Patient: Michelle Dexter    YOB: 1960        Date- 11/17/2021   Admit Date: 11/15/2021  CC: Follow up for esrd         IMPRESSION & PLAN:   [facility]   S/p fall  Hypertension  Hyperkalemia  hyponatremia  Anemia of ckd  Type 2 diabetes  Back pain  met to the right frontal lobe. 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--h/o  total thyroidectomy   lung nodules    PLAN-  NO HD TODAY  Next hd on thursday     Subjective: Interval History:   S/p hd yesterday      11/16  Patient came to er with c/o fall  He is on hd TTS at [facility]  He denies missing any hd    Objective:   Vitals:    11/17/21 0015 11/17/21 0313 11/17/21 0755 11/17/21 1115   BP: (!) 159/51 (!) 159/49 (!) 161/59 (!) 140/41   Pulse:  78 80 73   Resp:  18 17 16   Temp:  98.4 °F (36.9 °C) 98.8 °F (37.1 °C) 98.4 °F (36.9 °C)   TempSrc:       SpO2:  98% 99% 98%   Weight:       Height:          11/16 0701 - 11/17 0700  In: 200 [P.O.:200]  Out: 2300 [Urine:700]  Last 3 Recorded Weights in this Encounter    11/15/21 2034   Weight: 97.1 kg (214 lb)      Physical exam:   GE- NAD  NECK- Supple, no mass  RESP: No wheezing, clear b/l  CVS: S1,S2  RRR  NEURO: Normal speech, non focal  EXT: + Edema   PSYCH: Normal Mood  RIGHT IJ PERMCATH +    Chart reviewed. Pertinent Notes reviewed. Data Review :  Recent Labs     11/17/21  1027 11/16/21  1000 11/16/21  0020   *  --  130*   K 4.7 4.8 5.6*   CL 97  --  96*   CO2 30  --  27   BUN 48*  --  75*   CREA 2.80*  --  4.08*   *  --  296*   CA 9.3  --  9.3     Recent Labs     11/17/21  1027 11/16/21  0020   WBC 17.8* 19.9*   HGB 9.6* 10.2*   HCT 29.1* 30.9*   * 159     No results for input(s): FE, TIBC, PSAT, FERR in the last 72 hours.    Medication list  reviewed  Current Facility-Administered Medications   Medication Dose Route Frequency    amLODIPine (NORVASC) tablet 10 mg  10 mg Oral DAILY    bumetanide (BUMEX) tablet 2 mg  2 mg Oral DAILY    carvediloL (COREG) tablet 6.25 mg  6.25 mg Oral BID WITH MEALS    dexAMETHasone (DECADRON) tablet 4 mg  4 mg Oral Q8H    hydrALAZINE (APRESOLINE) tablet 50 mg  50 mg Oral BID    levothyroxine (SYNTHROID) tablet 175 mcg  175 mcg Oral 6am    insulin lispro (HUMALOG) injection   SubCUTAneous AC&HS    glucose chewable tablet 16 g  4 Tablet Oral PRN    dextrose (D50W) injection syrg 12.5-25 g  12.5-25 g IntraVENous PRN    glucagon (GLUCAGEN) injection 1 mg  1 mg IntraMUSCular PRN    pantoprazole (PROTONIX) tablet 40 mg  40 mg Oral ACB    atorvastatin (LIPITOR) tablet 10 mg  10 mg Oral DAILY    sevelamer carbonate (RENVELA) tab 800 mg  800 mg Oral TID WITH MEALS    sodium bicarbonate tablet 1,300 mg  1,300 mg Oral BID    sodium chloride (NS) flush 5-40 mL  5-40 mL IntraVENous Q8H    acetaminophen (TYLENOL) tablet 650 mg  650 mg Oral Q6H PRN    Or    acetaminophen (TYLENOL) suppository 650 mg  650 mg Rectal Q6H PRN    polyethylene glycol (MIRALAX) packet 17 g  17 g Oral DAILY PRN    ondansetron (ZOFRAN ODT) tablet 4 mg  4 mg Oral Q8H PRN    Or    ondansetron (ZOFRAN) injection 4 mg  4 mg IntraVENous Q6H PRN    heparin (porcine) injection 5,000 Units  5,000 Units SubCUTAneous Q8H    cefTRIAXone (ROCEPHIN) 1 g in 0.9% sodium chloride (MBP/ADV) 50 mL MBP  1 g IntraVENous Q24H    morphine injection 2 mg  2 mg IntraVENous Q4H PRN    oxyCODONE IR (ROXICODONE) tablet 5 mg  5 mg Oral Q4H PRN    heparin (porcine) 1,000 unit/mL injection 1,900 Units  1,900 Units InterCATHeter DIALYSIS PRN    And    heparin (porcine) 1,000 unit/mL injection 1,900 Units  1,900 Units InterCATHeter DIALYSIS PRN    sodium chloride (NS) flush 5-40 mL  5-40 mL IntraVENous PRN          Liz Acevedo MD              [address] [facility]  [facility] [place] [facility] / [facility] / [facility] [address], [address]  [place], [place] [address]  Phone - [phone]               Fax - [phone]

## 2021-11-17 NOTE — PROGRESS NOTES
Received message from patient's nurse stating:    Patient requested medication for constipation         Discussion / orders:    Patient already has MiraLAX ordered daily as needed-brought this to patient's nurse's attention for administration           Please note that this note was dictated using Dragon computer voice recognition software. Quite often unanticipated grammatical, syntax, homophones, and other interpretive errors are inadvertently transcribed by the computer software. Please disregard these errors. Please excuse any errors that have escaped final proofreading.

## 2021-11-18 NOTE — TELEPHONE ENCOUNTER
Spoke with patient. He said that he needs transportation to & from dialysis. Informed him to call Nephrologist. He said he would call them.

## 2021-11-18 NOTE — TELEPHONE ENCOUNTER
Pt calling     States he needs something faxed in so that he can get transp to his dialysis apptmnts  He has an appt on Saturday 11-20-21     Best number to reach him is [phone]

## 2021-11-18 NOTE — TELEPHONE ENCOUNTER
[facility] called with critical lab. Urine culture E-Coli, in CC. Results given to Dr. Bethany Maloney.

## 2021-11-18 NOTE — PROGRESS NOTES
Care Transitions Outreach Attempt    Call within 2 business days of discharge: Yes   Attempted to reach patient for transitions of care follow up. Unable to reach patient on 21, now in ED [facility] ED with potential admission      Patient: Natan Mehta Patient : 1960 MRN: [id number]    Last Discharge [address]       Complaint Diagnosis Description Type Department Provider    21 Abdominal Pain  Bowel perforation [facility]) ED (ADMIT) Darylene Floro, MD; Josh. .. Was this an external facility discharge?  No       Noted following upcoming appointments from discharge chart review:   [facility] follow up appointment(s):   Future Appointments   Date Time Provider Ness Hawkins   2021  9:30 AM Tyesha Lenz MD [facility]   2021 10:30 AM Primitivo Corrigan MD [facility] BS AMB   12/3/2021 11:10 AM Darrell Prado MD [facility]     Non-[facility] follow up appointment(s): BRYCE

## 2021-11-19 PROBLEM — N18.6 END STAGE RENAL DISEASE (HCC): Status: ACTIVE | Noted: 2021-01-01

## 2021-11-19 PROBLEM — C73 THYROID CANCER (HCC): Status: ACTIVE | Noted: 2021-01-01

## 2021-11-19 PROBLEM — E11.9 DIABETES MELLITUS (HCC): Status: ACTIVE | Noted: 2021-01-01

## 2021-11-19 PROBLEM — K63.0 INTESTINAL DIVERTICULAR ABSCESS: Status: ACTIVE | Noted: 2021-01-01

## 2021-11-19 NOTE — PROGRESS NOTES
Hospitalist Progress Note    NAME: Sandy Phelps   :  1960   MRN:  [id number]       Assessment / Plan:    Perforated Viscus   treatment as per surgery  -Continue antibiotic therapy    ESRD on HD TTS  Nephrology Consult   continue hemodialysis    DMII  Thyroid cancer with metastasis to the brain and lung  HTN  HLD  GERD  Hx of TIA  SHOLA with chronic respiratory failure 2-3L NC  Hold all oral meds - can slowly resume once cleared for PO intake by Surgery  Pt has been taking Decadron 4mg q8hrs for edema associated with brain mets. Will ask Pharmacy to switch to IV while pt remains NPO. FS monitoring, SS       Code Status: As per primary team  DVT Prophylaxis: As per primary team         Subjective:     Chief Complaint / Reason for Physician Visit  . Discussed with RN events overnight. Patient stated he feels better,    Review of Systems:  Symptom Y/N Comments  Symptom Y/N Comments   Fever/Chills n   Chest Pain n    Poor Appetite    Edema     Cough    Abdominal Pain     Sputum    Joint Pain     SOB/NUÑEZ y   Pruritis/Rash     Nausea/vomit    Tolerating PT/OT     Diarrhea    Tolerating Diet n    Constipation n   Other       Could NOT obtain due to:      Objective:     VITALS:   Last 24hrs VS reviewed since prior progress note.  Most recent are:  Patient Vitals for the past 24 hrs:   Temp Pulse Resp BP SpO2   21 1514 97.7 °F (36.5 °C) 74 18 (!) 175/73 95 %   21 1403 97.9 °F (36.6 °C) 72 18 (!) 147/56 96 %   21 1202 97.7 °F (36.5 °C) 75 18 (!) 165/66 94 %   21 0751 97.9 °F (36.6 °C) 74 16 (!) 158/68 93 %   21 0715 -- -- -- (!) 167/68 96 %   21 0700 -- -- -- (!) 151/67 97 %   21 0645 -- -- -- (!) 152/64 96 %   21 0630 -- -- -- (!) 147/68 98 %   21 0615 -- -- -- (!) 155/64 96 %   21 0601 -- -- -- (!) 157/62 95 %   21 0545 -- -- -- (!) 121/57 96 %   21 0530 -- -- -- (!) 136/48 96 %   21 0515 -- -- -- 139/60 98 %   21 0500 -- -- -- (!) 140/55 98 %   11/19/21 0445 -- -- -- (!) 140/56 97 %   11/19/21 0430 -- -- -- (!) 136/54 97 %   11/19/21 0415 -- -- -- (!) 151/55 95 %   11/19/21 0400 -- -- -- (!) 152/61 97 %   11/19/21 0347 -- -- -- -- 96 %   11/19/21 0345 -- -- -- (!) 142/60 --   11/19/21 0330 -- -- -- (!) 144/61 --   11/19/21 0327 -- -- -- -- 93 %   11/19/21 0315 -- -- -- (!) 144/55 92 %   11/19/21 0303 -- -- -- -- 94 %   11/19/21 0300 -- -- -- 137/60 90 %   11/19/21 0245 -- -- -- (!) 133/56 --   11/19/21 0243 -- -- -- -- 93 %   11/19/21 0230 -- -- -- (!) 126/53 94 %   11/19/21 0215 -- -- -- (!) 123/49 93 %   11/19/21 0200 -- -- -- (!) 125/48 92 %   11/19/21 0145 -- -- -- (!) 133/49 92 %   11/19/21 0130 -- -- -- (!) 145/52 93 %   11/19/21 0115 -- -- -- (!) 139/50 92 %   11/19/21 0100 -- -- -- (!) 164/60 95 %   11/19/21 0045 -- -- -- (!) 149/61 94 %   11/19/21 0043 -- -- -- (!) 163/62 94 %   11/19/21 0031 -- -- -- (!) 118/45 91 %   11/18/21 2342 -- -- -- (!) 124/55 92 %   11/18/21 2327 -- -- -- (!) 155/62 90 %   11/18/21 2312 -- -- -- 137/61 92 %   11/18/21 2257 98.3 °F (36.8 °C) 82 16 131/61 92 %       Intake/Output Summary (Last 24 hours) at 11/19/2021 1524  Last data filed at 11/19/2021 0939  Gross per 24 hour   Intake 600 ml   Output 600 ml   Net 0 ml        I had a face to face encounter and independently examined this patient on 11/19/2021, as outlined below:  PHYSICAL EXAM:  General: WD, WN. Alert, cooperative, no acute distress    EENT:  EOMI. Anicteric sclerae. MMM  Resp:  CTA bilaterally, no wheezing or rales. No accessory muscle use  CV:  Regular  rhythm,  No edema  GI:  Soft, Non distended, Non tender. +Bowel sounds  Neurologic:  Alert and oriented X 3, normal speech,   Psych:   Good insight. Not anxious nor agitated  Skin:  No rashes.   No jaundice    Reviewed most current lab test results and cultures  YES  Reviewed most current radiology test results   YES  Review and summation of old records today    NO  Reviewed patient's current orders and MAR    YES  PMH/SH reviewed - no change compared to H&P  ________________________________________________________________________  Care Plan discussed with:    Comments   Patient y    Family      RN y    Care Manager     Consultant                        Multidiciplinary team rounds were held today with , nursing, pharmacist and clinical coordinator. Patient's plan of care was discussed; medications were reviewed and discharge planning was addressed. ________________________________________________________________________  Total NON critical care TIME:  35    Minutes    Total CRITICAL CARE TIME Spent:   Minutes non procedure based      Comments   >50% of visit spent in counseling and coordination of care y    ________________________________________________________________________  Rojas Monsalve MD     Procedures: see electronic medical records for all procedures/Xrays and details which were not copied into this note but were reviewed prior to creation of Plan. LABS:  I reviewed today's most current labs and imaging studies. Pertinent labs include:  Recent Labs     11/19/21  0551 11/18/21  2319 11/17/21  1027   WBC 12.5* 11.5* 17.8*   HGB 9.4* 10.3* 9.6*   HCT 28.7* 30.8* 29.1*   * 141* 118*     Recent Labs     11/19/21  0038 11/18/21  2319 11/17/21  1027   NA  --  131* 135*   K  --  4.4 4.7   CL  --  95* 97   CO2  --  26 30   GLU  --  365* 343*   BUN  --  45* 48*   CREA  --  2.65* 2.80*   CA  --  9.6 9.3   ALB  --  1.7*  --    TBILI  --  0.4  --    ALT  --  19  --    INR 1.0  --   --        Signed:  Rojas Monsalve MD

## 2021-11-19 NOTE — CONSULTS
Hospitalist Consultation Note    NAME:  Jun Vazquez   :   1960   MRN:   [id number]     ATTENDING: No admitting provider for patient encounter. PCP:  Juan Massey MD    Date/Time:  2021 12:47 AM      Recommendations/Plan:       Active Problems:    * No active hospital problems. *     Perforated Viscus  Care as per Surgery    ESRD on HD TTS  Nephrology Consult  Pt went to HD earlier today and does not require emergent HD    DMII  Thyroid cancer with metastasis to the brain and lung  HTN  HLD  GERD  Hx of TIA  SHOLA with chronic respiratory failure 2-3L NC  Hold all oral meds - can slowly resume once cleared for PO intake by Surgery  Pt has been taking Decadron 4mg q8hrs for edema associated with brain mets. Will ask Pharmacy to switch to IV while pt remains NPO. FS monitoring, SS  Has an appointment with [facility] on Dec 1st    Code Status: As per primary team  DVT Prophylaxis: As per primary team          Subjective:   REQUESTING PHYSICIAN:  REASON FOR CONSULT:      Tiburcio Nix is a 63 y.o.  male who I was asked to see for medical management. Pt states that he is still having significant abdominal pain. He states that this has been on going for the past \"couple of days. \" . He states that his pain is primarily located around his belly button and describes it as a crampy pain. He denies any blood in his stool from earlier today.       Past Medical History:   Diagnosis Date    Adverse effect of anesthesia     \" STOP BREATHING 1 TIME C ANESTH\"    Cancer ([address] 2004    thyroid cancer    Chronic kidney disease     [facility]    Chronic pain     BACK SHOULDER AND ARM    COVID-19 2021    Depression     Diabetes ([address]     GERD (gastroesophageal reflux disease)     Heart failure (HCC)     stage 1    Hypercholesterolemia     Hypertension     Nausea & vomiting     PUD (peptic ulcer disease)     Sleep apnea     doesn't wear cpap    Thyroid cancer ([address]     TIA (transient ischemic attack)     Vitamin D deficiency       Past Surgical History:   Procedure Laterality Date    HX HEART CATHETERIZATION      HX HEENT      THROAT SURGERY X 4    HX ORTHOPAEDIC      back     HX ORTHOPAEDIC      ARM AND SHOULDER    HX OTHER SURGICAL      thyroid, lymphnode    HX RETINAL DETACHMENT REPAIR      left eye    HX VASCULAR ACCESS      HD cather in chest    IR INJ FORAMIN EPID LUMB ANES/STER SNGL  10/20/2021    IR INSERT NON TUNL CVC OVER 5 YRS  2020    IR INSERT NON TUNL CVC OVER 5 YRS  2021    IR INSERT TUNL CVC W/O PORT OVER 5 YR  2020    IR INSERT TUNL CVC W/O PORT OVER 5 YR  2021    UPPER GI ENDOSCOPY,BALL DIL,30MM  2020         UPPER GI ENDOSCOPY,BIOPSY  2020         VASCULAR SURGERY PROCEDURE UNLIST      cardiac cath NEG. Social History     Tobacco Use    Smoking status: Former Smoker     Quit date: 1994     Years since quittin.2    Smokeless tobacco: Never Used   Substance Use Topics    Alcohol use: Not Currently      Family History   Problem Relation Age of Onset    Diabetes Mother     Elevated Lipids Mother    Mikhail Perales Bladder Disease Mother     Headache Mother    [employer] Migraines Mother     Heart Disease Mother     Hypertension Mother     Stroke Mother     Other Mother         ANEURSYM BRAIN    Bleeding Prob Father     Cancer Father         LEUKEMIA    Diabetes Father     Elevated Lipids Father     Mental Retardation Sister     Psychiatric Disorder Sister     Cancer Brother         LUNGS       Allergies   Allergen Reactions    Anesthetics - Amide Type - Select Amino Amides Shortness of Breath    Flexeril [Cyclobenzaprine] Hives    Tramadol Hives      Prior to Admission medications    Medication Sig Start Date End Date Taking? Authorizing Provider   amoxicillin-clavulanate (Augmentin) 875-125 mg per tablet Take 1 Tablet by mouth two (2) times a day.  21   Maki Alfred NP   docusate sodium (Colace) 100 mg capsule Take 1 Capsule by mouth two (2) times a day for 90 days. 11/17/21 2/15/22  Wolfgang De La Cruz NP   sodium bicarbonate 650 mg tablet Take 2 Tablets by mouth two (2) times a day. 11/13/21   Jennifer Tee MD   levothyroxine (SYNTHROID) 175 mcg tablet 1 full tab Mon-Sat but only 1/2 tab on sundays 11/5/21   Herb Prado MD   metoclopramide HCl (REGLAN) 5 mg tablet TAKE 1 TABLET BY MOUTH ONCE DAILY AS NEEDED. *DO NOT TAKE MORE THAN 3 TABLETS A DAY 11/5/21   Herb Prado MD   NovoLIN N NPH U-100 Insulin 100 unit/mL injection INJECT 2 TO 10 UNITS SUBCUTANEOUSLY ONCE DAILY 11/5/21   Jennifer Tee MD   lancets misc Use preferred brand; Check glucose 3-4 times daily, Diagnosis E11.22 11/5/21   Herb Prado MD   carvediloL (COREG) 6.25 mg tablet Take 1 Tablet by mouth two (2) times daily (with meals). 11/5/21   Herb Prado MD   hydrALAZINE (APRESOLINE) 50 mg tablet Take 1 Tablet by mouth two (2) times a day. 11/5/21   Herb Prado MD   amLODIPine (NORVASC) 10 mg tablet Take 1 Tablet by mouth daily. 11/5/21   Herb Prado MD   sevelamer carbonate (RENVELA) 800 mg tab tab TAKE 1 TABLET BY MOUTH THREE TIMES DAILY WITH MEALS 11/3/21   Herb Prado MD   SORAfenib (NEXAVAR) 200 mg tablet Take 1 Tablet by mouth daily for 30 days. 10/29/21 11/28/21  Kevin Chinchilla MD   dexAMETHasone (DECADRON) 4 mg tablet Take 4 mg by mouth every eight (8) hours. 10/21/21   Wolfgang De La Cruz NP   bumetanide (BUMEX) 2 mg tablet Take 1 Tablet by mouth daily. 7/28/21   Jamie Dsouza MD   omeprazole (PRILOSEC) 20 mg capsule Take 1 capsule by mouth once daily 6/11/21   Herb Prado MD   simvastatin (ZOCOR) 20 mg tablet Take 1 Tab by mouth nightly. 5/10/21   Herb Prado MD   Vitamin D2 1,250 mcg (50,000 unit) capsule TAKE 1 CAPSULE BY MOUTH ONCE A WEEK  Patient taking differently: Take 50,000 Units by mouth every Monday.  TAKE 1 CAPSULE BY MOUTH ONCE A WEEK 5/10/21   Herb Prado MD   sodium zirconium cyclosilicate (Lokelma) 5 gram powder packet Take 5 g by mouth two (2) times a week. On  and Sundays    Provider, Historical   Ventolin HFA 90 mcg/actuation inhaler TAKE 1-2 PUFFS EVEERY 4-6 HOURS AS NEEDED FOR DYSPNEA AND WHEEZING 20   Jeffrey Prado MD       REVIEW OF SYSTEMS:     Total of 12 systems reviewed as follows:   I am not able to complete the review of systems because:    The patient is intubated and sedated    The patient has altered mental status due to his acute medical problems    The patient has baseline aphasia from prior stroke(s)    The patient has baseline dementia and is not reliable historian                 POSITIVE= underlined text  Negative = text not underlined  General:  fever, chills, sweats, generalized weakness, weight loss/gain,      loss of appetite   Eyes:    blurred vision, eye pain, loss of vision, double vision  ENT:    rhinorrhea, pharyngitis   Respiratory:   cough, sputum production, SOB, wheezing, NUÑEZ, pleuritic pain   Cardiology:   chest pain, palpitations, orthopnea, PND, edema, syncope   Gastrointestinal:  abdominal pain , N/V, dysphagia, diarrhea, constipation, bleeding   Genitourinary:  frequency, urgency, dysuria, hematuria, incontinence   Muskuloskeletal :  arthralgia, myalgia   Hematology:  easy bruising, nose or gum bleeding, lymphadenopathy   Dermatological: rash, ulceration, pruritis   Endocrine:   hot flashes or polydipsia   Neurological:  headache, dizziness, confusion, focal weakness, paresthesia,     Speech difficulties, memory loss, gait disturbance  Psychological: Feelings of anxiety, depression, agitation    Objective:   VITALS:    Visit Vitals  BP (!) 124/55   Pulse 82   Temp 98.3 °F (36.8 °C)   Resp 16   SpO2 92%     Temp (24hrs), Av.3 °F (36.8 °C), Min:98.3 °F (36.8 °C), Max:98.3 °F (36.8 °C)      PHYSICAL EXAM:   General:    Alert, cooperative, ill appearing  HEENT: Atraumatic, anicteric sclerae, pink conjunctivae  Neck:  Supple, symmetrical  Lungs:   Clear to auscultation bilaterally. No Wheezing or Rhonchi. No rales. Chest wall:  No tenderness  No Accessory muscle use. Heart:   Regular  rhythm,  No  murmur   No edema  Abdomen:   Soft, TTP  Extremities: No cyanosis. No clubbing  Skin:     Not pale. Not Jaundiced  No rashes   Psych:  Good insight. Not depressed. Not anxious or agitated. Neurologic: EOMs intact. No facial asymmetry. No aphasia or slurred speech. Symmetrical strength, Alert and oriented X 4.     _______________________________________________________________________  Care Plan discussed with:    Comments   Patient x    Family      RN x    Care Manager                    Consultant:  x    ____________________________________________________________________  TOTAL TIME:     55 mins    Comments     Reviewed previous records   >50% of visit spent in counseling and coordination of care  Discussion with patient and/or family and questions answered       Critical Care Provided     Minutes non procedure based  ________________________________________________________________________  Signed: Tristan Pickering MD      Procedures: see electronic medical records for all procedures/Xrays and details which were not copied into this note but were reviewed prior to creation of Plan.     LAB DATA REVIEWED:    Recent Results (from the past 24 hour(s))   CBC WITH AUTOMATED DIFF    Collection Time: 11/18/21 11:19 PM   Result Value Ref Range    WBC 11.5 (H) 4.1 - 11.1 K/uL    RBC 3.34 (L) 4.10 - 5.70 M/uL    HGB 10.3 (L) 12.1 - 17.0 g/dL    HCT 30.8 (L) 36.6 - 50.3 %    MCV 92.2 80.0 - 99.0 FL    MCH 30.8 26.0 - 34.0 PG    MCHC 33.4 30.0 - 36.5 g/dL    RDW 14.6 (H) 11.5 - 14.5 %    PLATELET 525 (L) 998 - 400 K/uL    MPV 10.6 8.9 - 12.9 FL    NRBC 0.0 0  WBC    ABSOLUTE NRBC 0.00 0.00 - 0.01 K/uL    NEUTROPHILS 91 (H) 32 - 75 %    LYMPHOCYTES 3 (L) 12 - 49 %    MONOCYTES 4 (L) 5 - 13 %    EOSINOPHILS 0 0 - 7 %    BASOPHILS 0 0 - 1 %    IMMATURE GRANULOCYTES 2 (H) 0.0 - 0.5 %    ABS. NEUTROPHILS 10.5 (H) 1.8 - 8.0 K/UL    ABS. LYMPHOCYTES 0.3 (L) 0.8 - 3.5 K/UL    ABS. MONOCYTES 0.5 0.0 - 1.0 K/UL    ABS. EOSINOPHILS 0.0 0.0 - 0.4 K/UL    ABS. BASOPHILS 0.0 0.0 - 0.1 K/UL    ABS. IMM. GRANS. 0.2 (H) 0.00 - 0.04 K/UL    DF SMEAR SCANNED      RBC COMMENTS NORMOCYTIC, NORMOCHROMIC      WBC COMMENTS TOXIC GRANULATION     METABOLIC PANEL, COMPREHENSIVE    Collection Time: 11/18/21 11:19 PM   Result Value Ref Range    Sodium 131 (L) 136 - 145 mmol/L    Potassium 4.4 3.5 - 5.1 mmol/L    Chloride 95 (L) 97 - 108 mmol/L    CO2 26 21 - 32 mmol/L    Anion gap 10 5 - 15 mmol/L    Glucose 365 (H) 65 - 100 mg/dL    BUN 45 (H) 6 - 20 MG/DL    Creatinine 2.65 (H) 0.70 - 1.30 MG/DL    BUN/Creatinine ratio 17 12 - 20      GFR est AA 30 (L) >60 ml/min/1.73m2    GFR est non-AA 25 (L) >60 ml/min/1.73m2    Calcium 9.6 8.5 - 10.1 MG/DL    Bilirubin, total 0.4 0.2 - 1.0 MG/DL    ALT (SGPT) 19 12 - 78 U/L    AST (SGOT) 6 (L) 15 - 37 U/L    Alk.  phosphatase 118 (H) 45 - 117 U/L    Protein, total 5.0 (L) 6.4 - 8.2 g/dL    Albumin 1.7 (L) 3.5 - 5.0 g/dL    Globulin 3.3 2.0 - 4.0 g/dL    A-G Ratio 0.5 (L) 1.1 - 2.2     LIPASE    Collection Time: 11/18/21 11:19 PM   Result Value Ref Range    Lipase 36 (L) 73 - 393 U/L       _____________________________  Hospitalist: Aldo Cheney MD

## 2021-11-19 NOTE — PROGRESS NOTES
Met with patient's 2 daughters and son and other son apparently was on the phone with the son I was meeting with, reviewed all of the issues we discussed previously with the patient and daughter regarding the multiple comorbid conditions, current intestinal perforation, and all the children admit patient is overall declining in overall health and failing at home and is a very difficult medical management at home. We discussed surgical options versus antibiotic therapy to see if he improved and all agreed no surgery for now try medical therapy and they will be meeting with palliative care and other caregivers to discuss long-term issues and if surgery were indicated due to failure to improve or worsening situation decide whether to proceed with surgery or not.   We also discussed potential hospice and CODE STATUS, decision still up in the air

## 2021-11-19 NOTE — PROGRESS NOTES
TRANSFER - OUT REPORT:    Verbal report given to Divina Seth on Carmelo Gentle  being transferred to Room  3205 (unit) for routine progression of care       Report consisted of patients Situation, Background, Assessment and   Recommendations(SBAR). Information from the following report(s) SBAR, Kardex, ED Summary, Intake/Output, MAR and Recent Results was reviewed with the receiving nurse. Lines:   Peripheral IV 11/18/21 Right Antecubital (Active)        Opportunity for questions and clarification was provided.       Patient transported with:   Registered Nurse     Family notified of new room

## 2021-11-19 NOTE — ED NOTES
PT placed on 2L NC due to O2sat being 88-89% on room air, pt states that this is what he uses at home PRN. MD Moreland notified.

## 2021-11-19 NOTE — PROGRESS NOTES
Discussion with pt and family and palliative care, and goals, and I concur with pt decision for DNR, DNI, and no surgical intervention as outcome likely not favorable for pt recovery, Cont. Antibx, NPO, reassess over next few days, and as pt hopefully improves, then PO and goals for DC home on PO antibx and FU gamma knife per pt pref. Palliative care to readdress HOSPICE with pt Monday.  ? FU CT in 3 days abd

## 2021-11-19 NOTE — ED NOTES
Pt to room by EMS from home. PT here today with complaints of epigastric pain that started one hour ago. Pt states that the pain is 10/10 sharp pain. PT states that he has had this happen before but they didn't find anything wrong. PT states that he has also been constipated. PT states that his last BM ws this afternoon and after having a BM he took miralax and it upset his stomach. PT states that he normally has one BM per day. PT states that he also has been having burning with urination and going more frequently. PT states that he has a history of kidney failure, thyroid cancer, lung cancer, and brain cancer. PT states that he gets dialysis on T,TH,Sat and he got a full session today. Pt ANOX4, respirations even and unlabored, skin warm dry and intact, NAD noted.

## 2021-11-19 NOTE — CONSULTS
[address] [facility] [address] \[facility] [place] [facility]", [address] [facility], [address] [facility]  [address]  1960    Mr. 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 PMH of ESRD receiving HD and DM. He is on continuous O2. He was admitted yesterday with a small bowel mesenteric abscess. In light of his poor prognosis, the palliative medcine team has been discussing goals of care with patient and family. The family and patient are engaged in this discussion. He is currently a DNR. We discussed this case with palliative team and will assist as needed. We will hold off on consult at this time.        Signed By: Piotr Sousa NP     November 19, 2021

## 2021-11-19 NOTE — CONSULTS
José Miguel Gaming  BSB:[id number]   :1960     PATIENT SEEN  ESRD- TTS    NO HD INDICATED TODAY                        Missael Mayer MD  [facility]  [facility] [address], [facility], [address] [facility]  Phone - [phone]         Fax - [phone] [facility]  [address] [place] [address]  Phone - [phone]        Fax - [phone]     [email or web address]

## 2021-11-19 NOTE — PROGRESS NOTES
Nephrology Progress Note  José Miguel Butt     [email or web address]. 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   lung nodules    PLAN-   No hd indicated today   Lower ivf rate.  epogen for anemia   Waiting for palliative care eval   He lives in a hotel.  He wants to continue with hd for now- he wants to be treated for his BRAIN lesion. Subjective: Interval History:   -      Objective:   Vitals:    11/19/21 0645 11/19/21 0700 11/19/21 0715 11/19/21 0751   BP: (!) 152/64 (!) 151/67 (!) 167/68 (!) 158/68   Pulse:    74   Resp:    16   Temp:    97.9 °F (36.6 °C)   SpO2: 96% 97% 96% 93%      11/18 0701 - 11/19 0700  In: 600 [I.V.:600]  Out: -   There were no vitals filed for this visit. Physical exam:   GEN: NAD  NECK- Supple, no mass  RESP: No wheezing, clear b/l  CVS: S1,S2  RRR  NEURO: Normal speech, Non focal  ABDO: tender + ,   PSYCH: Normal Mood    Chart reviewed. Pertinent Notes reviewed. Data Review :  Recent Labs     11/18/21 2319 11/17/21  1027 11/16/21  1000   * 135*  --    K 4.4 4.7 4.8   CL 95* 97  --    CO2 26 30  --    BUN 45* 48*  --    CREA 2.65* 2.80*  --    * 343*  --    CA 9.6 9.3  --      Recent Labs     11/19/21  0551 11/18/21  2319 11/17/21  1027   WBC 12.5* 11.5* 17.8*   HGB 9.4* 10.3* 9.6*   HCT 28.7* 30.8* 29.1*   * 141* 118*     No results for input(s): FE, TIBC, PSAT, FERR in the last 72 hours.    Medication list  reviewed  Current Facility-Administered Medications   Medication Dose Route Frequency    0.9% sodium chloride infusion 50 mL/hr IntraVENous CONTINUOUS    HYDROcodone-acetaminophen (NORCO) 5-325 mg per tablet 1 Tablet  1 Tablet Oral Q4H PRN    HYDROmorphone (DILAUDID) injection 0.5-1 mg  0.5-1 mg IntraVENous Q2H PRN    ondansetron (ZOFRAN) injection 4 mg  4 mg IntraVENous Q4H PRN    acetaminophen (TYLENOL) tablet 650 mg  650 mg Oral Q6H PRN    HYDROcodone-acetaminophen (NORCO)  mg tablet 1 Tablet  1 Tablet Oral Q4H PRN    diphenhydrAMINE (BENADRYL) injection 12.5 mg  12.5 mg IntraVENous ONCE PRN    piperacillin-tazobactam (ZOSYN) 3.375 g in 0.9% sodium chloride (MBP/ADV) 100 mL MBP  3.375 g IntraVENous Q12H    dexamethasone (DECADRON) 4 mg/mL injection 4 mg  4 mg IntraVENous Q8H    insulin lispro (HUMALOG) injection   SubCUTAneous Q6H    glucose chewable tablet 16 g  4 Tablet Oral PRN    dextrose (D50W) injection syrg 12.5-25 g  12.5-25 g IntraVENous PRN    glucagon (GLUCAGEN) injection 1 mg  1 mg IntraMUSCular PRN     Current Outpatient Medications   Medication Sig    amoxicillin-clavulanate (Augmentin) 875-125 mg per tablet Take 1 Tablet by mouth two (2) times a day.  docusate sodium (Colace) 100 mg capsule Take 1 Capsule by mouth two (2) times a day for 90 days.  sodium bicarbonate 650 mg tablet Take 2 Tablets by mouth two (2) times a day.  levothyroxine (SYNTHROID) 175 mcg tablet 1 full tab Mon-Sat but only 1/2 tab on sundays    metoclopramide HCl (REGLAN) 5 mg tablet TAKE 1 TABLET BY MOUTH ONCE DAILY AS NEEDED. *DO NOT TAKE MORE THAN 3 TABLETS A DAY    NovoLIN N NPH U-100 Insulin 100 unit/mL injection INJECT 2 TO 10 UNITS SUBCUTANEOUSLY ONCE DAILY    lancets misc Use preferred brand; Check glucose 3-4 times daily, Diagnosis E11.22    carvediloL (COREG) 6.25 mg tablet Take 1 Tablet by mouth two (2) times daily (with meals).  hydrALAZINE (APRESOLINE) 50 mg tablet Take 1 Tablet by mouth two (2) times a day.  amLODIPine (NORVASC) 10 mg tablet Take 1 Tablet by mouth daily.     sevelamer carbonate (RENVELA) 800 mg tab tab TAKE 1 TABLET BY MOUTH THREE TIMES DAILY WITH MEALS    SORAfenib (NEXAVAR) 200 mg tablet Take 1 Tablet by mouth daily for 30 days.  dexAMETHasone (DECADRON) 4 mg tablet Take 4 mg by mouth every eight (8) hours.  bumetanide (BUMEX) 2 mg tablet Take 1 Tablet by mouth daily.  omeprazole (PRILOSEC) 20 mg capsule Take 1 capsule by mouth once daily    simvastatin (ZOCOR) 20 mg tablet Take 1 Tab by mouth nightly.  Vitamin D2 1,250 mcg (50,000 unit) capsule TAKE 1 CAPSULE BY MOUTH ONCE A WEEK (Patient taking differently: Take 50,000 Units by mouth every Monday. TAKE 1 CAPSULE BY MOUTH ONCE A WEEK)    sodium zirconium cyclosilicate (Lokelma) 5 gram powder packet Take 5 g by mouth two (2) times a week.  On Fridays and Sundays    Ventolin HFA 90 mcg/actuation inhaler TAKE 1-2 PUFFS EVEERY 4-6 HOURS AS NEEDED FOR DYSPNEA AND WHEEZING          Tomas Sahu MD              [facility]  [facility] / [facility] [address] [place], [address]  Phone - [phone]               Fax - [phone]

## 2021-11-19 NOTE — PROGRESS NOTES
Pt is a 62 yo multiple med issues,  DM,  ESRD , Hemodialysis, obesity, hypoalbuinemia, wheelchair bound , metastatic thyroid cancer, 2 days sp DC from med svc for UTI, now with abd pain,, WBC 11k, down from 17 k a week ago,  And CT reivewed with [facility], with 2 cm small bowel mesenteric abscess and min small bubbles periumbilical pneumoperitoneum, no focal mass. Per ER MD,  Tender umbilicus. Pt with progressive multiple lung mets and brain mets, on steroids for brain mets. Pt being admitted for IV antibx, discussed with Dr Cathi Landin who will manage medical issues and consult nephrology. If not improving, may need to consider laparotomy or laparoscopy,  But pt operative morbidity and mortality increased based on above.   Pt lives at home with dtr and home health and HD on TUES , [address], Sat.

## 2021-11-19 NOTE — H&P
Surgery History and Physical    Subjective:      Jun Vazquez is a 63 y.o. male who presents for evaluation of abdomen pain   Pt is a 62 yo multiple med issues,  DM,  ESRD , Hemodialysis, obesity, hypoalbuinemia, wheelchair bound , metastatic thyroid cancer, 2 days sp DC from [facility] svc for UTI, now with abd pain,, WBC 11k, down from 17 k a week ago,  And CT reivewed with [facility], with 2 cm small bowel mesenteric abscess and min small bubbles periumbilical pneumoperitoneum, no focal mass. Per ER MD,  Tender umbilicus. Pt with progressive multiple lung mets and brain mets, on steroids for brain mets. Pt being admitted for IV antibx, discussed with Dr Zuly Barragan who will manage medical issues and consult nephrology. If not improving, may need to consider laparotomy or laparoscopy,  But pt operative morbidity and mortality increased based on above. Pt lives at home with dtr and home health and HD on TUES , [address], Sat. Patient apparently had abdominal pain when he was in the hospital several days ago under the internal medicine service but it worsened. ,  Patient continues to have bowel movements, on review of his course overall medically the last year he admits he has had overall decline in his overall functionality, p.o. intake has been declining mobility declining, and has had a steady decline in overall function. It sounds like he is being taken care of at home by his daughter but with significant effort, as patient has declined skilled nursing facility    I had lengthy discussion with patient about his overall decline in health, from chronic issues such as diabetes, pulmonary disease, end-stage renal disease, metastatic thyroid cancer to brain and lung, overall poor mobility and function, decreased appetite, all of which are things that likely will not improve and now we have perforated bowel and it sounds like he has been on a steady decline.   We discussed the surgical options of laparoscopy possible laparotomy and resection of the perforated bowel likely small bowel possible primary anastomosis but I think his overall recovery from this would be poor and morbidity mortality high and almost certainly would commit him to needing skilled nursing facility which patient adamantly does not want. It is possible this will improve with antibiotics alone there is no acute surgical need at this point but it may come to this if he does not improve with IV antibiotics and n.p.o. status. In the meantime patient agrees to palliative care consult for long-term care issues and goals of care, patient was not ready to address DO NOT RESUSCITATE status, but will consult nephrology, palliative care, hematology oncology, hospitalist is to see patient, hospitalist team did not feel this was a hospitalist admission so I am admitting patient and obtaining multiple consultants. Plan will be for follow-up CT scan in several days as patient improves, n.p.o. for now, but if does not improve may need surgical intervention or consideration sooner.         LAB REVIEW:    Recent Results (from the past 48 hour(s))   GLUCOSE, POC    Collection Time: 11/17/21  8:32 AM   Result Value Ref Range    Glucose (POC) 257 (H) 65 - 117 mg/dL    Performed by Felicia Talley PCT    METABOLIC PANEL, BASIC    Collection Time: 11/17/21 10:27 AM   Result Value Ref Range    Sodium 135 (L) 136 - 145 mmol/L    Potassium 4.7 3.5 - 5.1 mmol/L    Chloride 97 97 - 108 mmol/L    CO2 30 21 - 32 mmol/L    Anion gap 8 5 - 15 mmol/L    Glucose 343 (H) 65 - 100 mg/dL    BUN 48 (H) 6 - 20 MG/DL    Creatinine 2.80 (H) 0.70 - 1.30 MG/DL    BUN/Creatinine ratio 17 12 - 20      GFR est AA 28 (L) >60 ml/min/1.73m2    GFR est non-AA 23 (L) >60 ml/min/1.73m2    Calcium 9.3 8.5 - 10.1 MG/DL   CBC W/O DIFF    Collection Time: 11/17/21 10:27 AM   Result Value Ref Range    WBC 17.8 (H) 4.1 - 11.1 K/uL    RBC 3.12 (L) 4.10 - 5.70 M/uL    HGB 9.6 (L) 12.1 - 17.0 g/dL    HCT 29.1 (L) 36.6 - 50.3 % MCV 93.3 80.0 - 99.0 FL    MCH 30.8 26.0 - 34.0 PG    MCHC 33.0 30.0 - 36.5 g/dL    RDW 14.9 (H) 11.5 - 14.5 %    PLATELET 238 (L) 474 - 400 K/uL    MPV 10.5 8.9 - 12.9 FL    NRBC 0.0 0  WBC    ABSOLUTE NRBC 0.00 0.00 - 0.01 K/uL   GLUCOSE, POC    Collection Time: 11/17/21 11:38 AM   Result Value Ref Range    Glucose (POC) 339 (H) 65 - 117 mg/dL    Performed by Darrell Rasheed PCT    CBC WITH AUTOMATED DIFF    Collection Time: 11/18/21 11:19 PM   Result Value Ref Range    WBC 11.5 (H) 4.1 - 11.1 K/uL    RBC 3.34 (L) 4.10 - 5.70 M/uL    HGB 10.3 (L) 12.1 - 17.0 g/dL    HCT 30.8 (L) 36.6 - 50.3 %    MCV 92.2 80.0 - 99.0 FL    MCH 30.8 26.0 - 34.0 PG    MCHC 33.4 30.0 - 36.5 g/dL    RDW 14.6 (H) 11.5 - 14.5 %    PLATELET 730 (L) 156 - 400 K/uL    MPV 10.6 8.9 - 12.9 FL    NRBC 0.0 0  WBC    ABSOLUTE NRBC 0.00 0.00 - 0.01 K/uL    NEUTROPHILS 91 (H) 32 - 75 %    LYMPHOCYTES 3 (L) 12 - 49 %    MONOCYTES 4 (L) 5 - 13 %    EOSINOPHILS 0 0 - 7 %    BASOPHILS 0 0 - 1 %    IMMATURE GRANULOCYTES 2 (H) 0.0 - 0.5 %    ABS. NEUTROPHILS 10.5 (H) 1.8 - 8.0 K/UL    ABS. LYMPHOCYTES 0.3 (L) 0.8 - 3.5 K/UL    ABS. MONOCYTES 0.5 0.0 - 1.0 K/UL    ABS. EOSINOPHILS 0.0 0.0 - 0.4 K/UL    ABS. BASOPHILS 0.0 0.0 - 0.1 K/UL    ABS. IMM.  GRANS. 0.2 (H) 0.00 - 0.04 K/UL    DF SMEAR SCANNED      RBC COMMENTS NORMOCYTIC, NORMOCHROMIC      WBC COMMENTS TOXIC GRANULATION     METABOLIC PANEL, COMPREHENSIVE    Collection Time: 11/18/21 11:19 PM   Result Value Ref Range    Sodium 131 (L) 136 - 145 mmol/L    Potassium 4.4 3.5 - 5.1 mmol/L    Chloride 95 (L) 97 - 108 mmol/L    CO2 26 21 - 32 mmol/L    Anion gap 10 5 - 15 mmol/L    Glucose 365 (H) 65 - 100 mg/dL    BUN 45 (H) 6 - 20 MG/DL    Creatinine 2.65 (H) 0.70 - 1.30 MG/DL    BUN/Creatinine ratio 17 12 - 20      GFR est AA 30 (L) >60 ml/min/1.73m2    GFR est non-AA 25 (L) >60 ml/min/1.73m2    Calcium 9.6 8.5 - 10.1 MG/DL    Bilirubin, total 0.4 0.2 - 1.0 MG/DL    ALT (SGPT) 19 12 - 78 U/L    AST (SGOT) 6 (L) 15 - 37 U/L    Alk. phosphatase 118 (H) 45 - 117 U/L    Protein, total 5.0 (L) 6.4 - 8.2 g/dL    Albumin 1.7 (L) 3.5 - 5.0 g/dL    Globulin 3.3 2.0 - 4.0 g/dL    A-G Ratio 0.5 (L) 1.1 - 2.2     LIPASE    Collection Time: 11/18/21 11:19 PM   Result Value Ref Range    Lipase 36 (L) 73 - 393 U/L   PROTHROMBIN TIME + INR    Collection Time: 11/19/21 12:38 AM   Result Value Ref Range    INR 1.0 0.9 - 1.1      Prothrombin time 10.3 9.0 - 11.1 sec   TYPE & SCREEN    Collection Time: 11/19/21 12:38 AM   Result Value Ref Range    Crossmatch Expiration 11/22/2021,2359     ABO/Rh(D) Angelica Ee POSITIVE     Antibody screen NEG    CBC WITH AUTOMATED DIFF    Collection Time: 11/19/21  5:51 AM   Result Value Ref Range    WBC 12.5 (H) 4.1 - 11.1 K/uL    RBC 3.09 (L) 4.10 - 5.70 M/uL    HGB 9.4 (L) 12.1 - 17.0 g/dL    HCT 28.7 (L) 36.6 - 50.3 %    MCV 92.9 80.0 - 99.0 FL    MCH 30.4 26.0 - 34.0 PG    MCHC 32.8 30.0 - 36.5 g/dL    RDW 14.6 (H) 11.5 - 14.5 %    PLATELET 978 (L) 088 - 400 K/uL    MPV 10.0 8.9 - 12.9 FL    NRBC 0.0 0  WBC    ABSOLUTE NRBC 0.00 0.00 - 0.01 K/uL    NEUTROPHILS 91 (H) 32 - 75 %    LYMPHOCYTES 3 (L) 12 - 49 %    MONOCYTES 4 (L) 5 - 13 %    EOSINOPHILS 1 0 - 7 %    BASOPHILS 0 0 - 1 %    IMMATURE GRANULOCYTES 1 (H) 0.0 - 0.5 %    ABS. NEUTROPHILS 11.4 (H) 1.8 - 8.0 K/UL    ABS. LYMPHOCYTES 0.4 (L) 0.8 - 3.5 K/UL    ABS. MONOCYTES 0.5 0.0 - 1.0 K/UL    ABS. EOSINOPHILS 0.1 0.0 - 0.4 K/UL    ABS. BASOPHILS 0.0 0.0 - 0.1 K/UL    ABS. IMM. GRANS. 0.1 (H) 0.00 - 0.04 K/UL    DF SMEAR SCANNED      RBC COMMENTS NORMOCYTIC, NORMOCHROMIC      WBC COMMENTS TOXIC GRANULATION         XR Results (maximum last 3): Results from [place] encounter on 11/15/21    XR CHEST SNGL V    Impression  No evidence of acute cardiopulmonary process. No significant change  in left lung mass. XR SHOULDER RT AP/LAT MIN 2 V    Impression  No acute abnormality.       XR TIB/FIB RT    Impression  No acute abnormality. CT Results (maximum last 3): Results from [place] encounter on 11/18/21    CT ABD PELV WO CONT    Impression  Small pneumoperitoneum, compatible with bowel perforation. Small left-sided  mesenteric abscess. Inflammatory changes in the left mesentery. Unchanged  numerous hepatic metastases. Unchanged left adrenal nodule. Large soft tissue  mass arising from the right hip is compatible with metastatic disease. The findings were called to Dr. Radha Hills on 11/19/2021 at 12:24 AM by Dr. Joshua Woodard. [phone]      Results from [place] encounter on 11/10/21    CT ABD W WO CONT    Impression  No evidence for liver metastases. Results from [place] encounter on 10/14/21    CT PELV W CONT    Impression  No masses or bone destruction. No significant abnormalities in the pelvis. Marked vascular calcification. MRI Results (maximum last 3): Results from [place] encounter on 10/14/21    MRI BRAIN W WO CONT    Impression  1. Intra-axial single enhancing lesion right frontal convexity with surrounding  edema compatible with brain metastases. 2. Moderate white matter disease, nonspecific. MRI LUMB SPINE W WO CONT    Impression  There is no evidence of osseous metastatic disease in the lumbar spine. No definite marrow signal abnormality. Minimal multilevel degenerative change. Moderate right foraminal stenosis at L5-S1. Nuclear Medicine Results (maximum last 3): Results from [place] encounter on 07/17/20    NM GASTRIC EMPTY STDY    Impression  IMPRESSION: Normal gastric emptying study. NM HEPATOBILIARY DUCT SCAN    Impression  IMPRESSION:  1. Nonvisualization of the duodenum at 90 minutes. Possible common bile duct  obstruction. 2. No cystic duct obstruction. Results from [place] encounter on 06/24/20    NM BONE SCAN [facility] BODY    Impression  IMPRESSION: No evidence of skeletal metastases. No change.       US Results (maximum last 3): Results from [place] encounter on 12/03/20    US RETROPERITONEUM COMP    Impression  IMPRESSION:  No hydronephrosis. Nonspecific acute on chronic medical renal disease. Results from [place] encounter on 08/15/20    US RETROPERITONEUM COMP    Impression  IMPRESSION: Renal cortical thinning. No overt hydronephrosis. Results from [place] encounter on 07/17/20    US ABD LTD    Impression  IMPRESSION:  Mild right hydronephrosis. Limited study due to body habitus. Past Medical History:   Diagnosis Date    Adverse effect of anesthesia     \" STOP BREATHING 1 TIME C ANESTH\"    Cancer ([address] 2004    thyroid cancer    [facility]     [facility]    Chronic pain     BACK SHOULDER AND ARM    COVID-19 04/2021    Depression     Diabetes ([address]     GERD (gastroesophageal reflux disease)     Heart failure (HCC)     stage 1    Hypercholesterolemia     Hypertension     Nausea & vomiting     PUD (peptic ulcer disease)     Sleep apnea     doesn't wear cpap    Thyroid cancer ([address]     TIA (transient ischemic attack) 2011    Vitamin D deficiency      Past Surgical History:   Procedure Laterality Date    HX HEART CATHETERIZATION      HX HEENT      THROAT SURGERY X 4    HX ORTHOPAEDIC      back     HX ORTHOPAEDIC      ARM AND SHOULDER    HX OTHER SURGICAL      thyroid, lymphnode    HX RETINAL DETACHMENT REPAIR      left eye    HX VASCULAR ACCESS      HD cather in chest    IR INJ FORAMIN EPID LUMB ANES/STER SNGL  10/20/2021    IR INSERT NON TUNL CVC OVER 5 YRS  8/18/2020    IR INSERT NON TUNL CVC OVER 5 YRS  7/23/2021    IR INSERT TUNL CVC W/O PORT OVER 5 YR  8/26/2020    IR INSERT TUNL CVC W/O PORT OVER 5 YR  7/27/2021    UPPER GI ENDOSCOPY,BALL DIL,30MM  7/17/2020         UPPER GI ENDOSCOPY,BIOPSY  7/17/2020         VASCULAR SURGERY PROCEDURE UNLIST      cardiac cath NEG.       Family History   Problem Relation Age of Onset    Diabetes Mother     Elevated Lipids Mother    Richelle Burger Bladder Disease Mother     Headache Mother    [employer] Migraines Mother     Heart Disease Mother     Hypertension Mother     Stroke Mother     Other Mother         ANEURSYM BRAIN    Bleeding Prob Father     Cancer Father         LEUKEMIA    Diabetes Father     Elevated Lipids Father     Mental Retardation Sister     Psychiatric Disorder Sister     Cancer Brother         LUNGS     Social History     Tobacco Use    Smoking status: Former Smoker     Quit date: 1994     Years since quittin.2    Smokeless tobacco: Never Used   Substance Use Topics    Alcohol use: Not Currently      Prior to Admission medications    Medication Sig Start Date End Date Taking? Authorizing Provider   amoxicillin-clavulanate (Augmentin) 875-125 mg per tablet Take 1 Tablet by mouth two (2) times a day. 21   Thomaston Cabot NP   docusate sodium (Colace) 100 mg capsule Take 1 Capsule by mouth two (2) times a day for 90 days. 11/17/21 2/15/22  Thomaston Cabot, NP   sodium bicarbonate 650 mg tablet Take 2 Tablets by mouth two (2) times a day. 21   Della Ahumada MD   levothyroxine (SYNTHROID) 175 mcg tablet 1 full tab Mon-Sat but only 1/2 tab on sundays 11/5/21   Fela Praod MD   metoclopramide HCl (REGLAN) 5 mg tablet TAKE 1 TABLET BY MOUTH ONCE DAILY AS NEEDED. *DO NOT TAKE MORE THAN 3 TABLETS A DAY 21   Fela Prado MD   NovoLIN N NPH U-100 Insulin 100 unit/mL injection INJECT 2 TO 10 UNITS SUBCUTANEOUSLY ONCE DAILY 21   Della Ahumada MD   lancets misc Use preferred brand; Check glucose 3-4 times daily, Diagnosis E11.22 21   Fela Prado MD   carvediloL (COREG) 6.25 mg tablet Take 1 Tablet by mouth two (2) times daily (with meals). 21   Fela Prado MD   hydrALAZINE (APRESOLINE) 50 mg tablet Take 1 Tablet by mouth two (2) times a day. 21   Fela Prado MD   amLODIPine (NORVASC) 10 mg tablet Take 1 Tablet by mouth daily.  21 Ernestina Rees MD   sevelamer carbonate (RENVELA) 800 mg tab tab TAKE 1 TABLET BY MOUTH THREE TIMES DAILY WITH MEALS 11/3/21   Evert Prado MD   SORAfenib (NEXAVAR) 200 mg tablet Take 1 Tablet by mouth daily for 30 days. 10/29/21 11/28/21  Mer Bundy MD   dexAMETHasone (DECADRON) 4 mg tablet Take 4 mg by mouth every eight (8) hours. 10/21/21   Della Monroe NP   bumetanide (BUMEX) 2 mg tablet Take 1 Tablet by mouth daily. 7/28/21   Luis Hull MD   omeprazole (PRILOSEC) 20 mg capsule Take 1 capsule by mouth once daily 6/11/21   Evert Prado MD   simvastatin (ZOCOR) 20 mg tablet Take 1 Tab by mouth nightly. 5/10/21   Evert Prado MD   Vitamin D2 1,250 mcg (50,000 unit) capsule TAKE 1 CAPSULE BY MOUTH ONCE A WEEK  Patient taking differently: Take 50,000 Units by mouth every Monday. TAKE 1 CAPSULE BY MOUTH ONCE A WEEK 5/10/21   Evert Prado MD   sodium zirconium cyclosilicate (Lokelma) 5 gram powder packet Take 5 g by mouth two (2) times a week. On Fridays and Sundays    ProviderAlisia   Ventolin HFA 90 mcg/actuation inhaler TAKE 1-2 PUFFS EVEERY 4-6 HOURS AS NEEDED FOR DYSPNEA AND WHEEZING 7/28/20   Evert Prado MD      Allergies   Allergen Reactions    Anesthetics - Amide Type - Select Amino Amides Shortness of Breath    Flexeril [Cyclobenzaprine] Hives    Tramadol Hives       Review of Systems   Constitutional: Positive for appetite change and fatigue. Respiratory: Positive for shortness of breath. Chronic dyspnea, on home oxygen   Cardiovascular: Negative. Gastrointestinal: Positive for abdominal pain. Negative for diarrhea. Genitourinary: Negative. She still makes urine   Musculoskeletal: Positive for arthralgias, gait problem and myalgias. Patient basically wheelchair-bound and significant lower extremity weakness   Hematological: Negative. Psychiatric/Behavioral: Negative.         Objective:     Patient Vitals for the past 8 hrs:   BP SpO2   11/19/21 0715 (!) 167/68 96 %   21 0700 (!) 151/67 97 %   21 0645 (!) 152/64 96 %   21 0630 (!) 147/68 98 %   21 0615 (!) 155/64 96 %   21 0601 (!) 157/62 95 %   21 0545 (!) 121/57 96 %   21 0530 (!) 136/48 96 %   21 0515 139/60 98 %   21 0500 (!) 140/55 98 %   21 0445 (!) 140/56 97 %   21 0430 (!) 136/54 97 %   21 0415 (!) 151/55 95 %   21 0400 (!) 152/61 97 %   21 0347 -- 96 %   21 0345 (!) 142/60 --   21 0330 (!) 144/61 --   21 0327 -- 93 %   21 0315 (!) 144/55 92 %   21 0303 -- 94 %   21 0300 137/60 90 %   21 0245 (!) 133/56 --   21 0243 -- 93 %   21 0230 (!) 126/53 94 %   21 0215 (!) 123/49 93 %   21 0200 (!) 125/48 92 %   21 0145 (!) 133/49 92 %   21 0130 (!) 145/52 93 %   21 0115 (!) 139/50 92 %   21 0100 (!) 164/60 95 %   21 0045 (!) 149/61 94 %   21 0043 (!) 163/62 94 %   21 0031 (!) 118/45 91 %       Temp (24hrs), Av.3 °F (36.8 °C), Min:98.3 °F (36.8 °C), Max:98.3 °F (36.8 °C)      Physical Exam  Vitals and nursing note reviewed. Constitutional:       Appearance: He is obese. Comments: Patient chronically ill-appearing looks much older than stated age, looks very weak and in poor overall health   HENT:      Head:      Comments: Steroid facies     Nose: Nose normal.   Cardiovascular:      Rate and Rhythm: Normal rate and regular rhythm.    Pulmonary:      Effort: Pulmonary effort is normal.      Comments: Decreased breath sounds bilaterally  Abdominal:      Comments: Morbidly obese, but soft, tenderness left upper and left mid abdomen with some mild guarding but no peritoneal signs and the right abdomen and suprapubic area are entirely nontender   Musculoskeletal:      Comments: Lower extremity edema very weak lower extremity function, multiple tattoos in the upper arms   Skin:     Comments: Multiple bruises and poor skin integrity and turgor   Neurological:      General: No focal deficit present. Mental Status: He is alert and oriented to person, place, and time. Psychiatric:         Mood and Affect: Mood normal.         Behavior: Behavior normal.         Thought Content: Thought content normal.         Judgment: Judgment normal.         Assessment:     Active Problems:    Microalbuminuria (8/31/2016)      Severe obesity (BMI 35.0-39. 9) with comorbidity ([address] (3/24/2018)      Metastatic carcinoma ([address] (8/19/2020)      Brain metastases ([address] (10/17/2021)      End stage renal disease ([address] (11/19/2021)      Diabetes mellitus ([address] (11/19/2021)      Thyroid cancer ([address] (11/19/2021)      Intestinal diverticular abscess (11/19/2021)        Plan:     I had lengthy discussion with patient about his overall decline in health, from chronic issues such as diabetes, pulmonary disease, end-stage renal disease, metastatic thyroid cancer to brain and lung, overall poor mobility and function, decreased appetite, all of which are things that likely will not improve and now we have perforated bowel and it sounds like he has been on a steady decline. We discussed the surgical options of laparoscopy possible laparotomy and resection of the perforated bowel likely small bowel possible primary anastomosis but I think his overall recovery from this would be poor and morbidity mortality high and almost certainly would commit him to needing skilled nursing facility which patient adamantly does not want. It is possible this will improve with antibiotics alone there is no acute surgical need at this point but it may come to this if he does not improve with IV antibiotics and n.p.o. status.   In the meantime patient agrees to palliative care consult for long-term care issues and goals of care, patient was not ready to address DO NOT RESUSCITATE status, but will consult nephrology, palliative care, hematology oncology, hospitalist is to see patient, hospitalist team did not feel this was a hospitalist admission so I am admitting patient and obtaining multiple consultants. Plan will be for follow-up CT scan in several days as patient improves, n.p.o. for now, but if does not improve may need surgical intervention or consideration sooner    We will continue IV antibiotics, n.p.o., follow-up CT, I had lengthy discussion with patient, patient's daughter Otoniel Schrader at [phone] and all concur with palliative care consult and above plan for medical therapy first and if patient comes to surgical intervention patient will have to decide whether he wants to engage in that or not or merely consider comfort care and palliative care. Patient is fairly adamant he does not want skilled nursing facility which will almost certainly be necessary if he has surgery. Patient's daughter acknowledges that he is becoming so medically needy at home that it is overwhelming and will be difficult to continue at home.     FACE TO FACE TIME: (Review of imaging, hx, records, EMR, discussion with pt and providers, and  pt exam)                                75   minutes    Signed By: Sampson Linares MD   [id number] [address] [facility]    November 19, 2021

## 2021-11-19 NOTE — ED NOTES
Bedside and Verbal shift change report given to Ernestina Kohli (oncoming nurse) by Anita Garcia (offgoing nurse). Report included the following information SBAR, ED Summary, Intake/Output and MAR.

## 2021-11-19 NOTE — PROGRESS NOTES
Admission assessment completed by this RN upon arrival to surgical-telemetry unit. Care plan and education initiated. Rosalia Benitez LPN to assume care of patient at this time.        Helen Mitchell RN

## 2021-11-19 NOTE — ED PROVIDER NOTES
EMERGENCY DEPARTMENT HISTORY AND PHYSICAL EXAM      Date: 11/18/2021  Patient Name: Leia Subramanian    History of Presenting Illness     No chief complaint on file. HPI: Leia Subramanian, 63 y.o. male presents to the ED with cc of abdominal pain. He points to the periumbilical region. This started suddenly today. He describes it as a constant crampy pain without exacerbating or alleviating factors. He did have a bowel movement today but said he had to strain. He denies any associated nausea, vomiting or fevers. He reports some chronic dysuria without any change in this. He has a history of ESRD, is on dialysis Tuesday Thursday and Saturday, has been compliant with all sessions. Denies any prior abdominal surgeries. There are no other complaints, changes, or physical findings at this time. PCP: Luz Cole MD    No current facility-administered medications on file prior to encounter. Current Outpatient Medications on File Prior to Encounter   Medication Sig Dispense Refill    amoxicillin-clavulanate (Augmentin) 875-125 mg per tablet Take 1 Tablet by mouth two (2) times a day. 3 Tablet 0    docusate sodium (Colace) 100 mg capsule Take 1 Capsule by mouth two (2) times a day for 90 days. 60 Capsule 2    sodium bicarbonate 650 mg tablet Take 2 Tablets by mouth two (2) times a day. 120 Tablet 2    levothyroxine (SYNTHROID) 175 mcg tablet 1 full tab Mon-Sat but only 1/2 tab on sundays 90 Tablet 3    metoclopramide HCl (REGLAN) 5 mg tablet TAKE 1 TABLET BY MOUTH ONCE DAILY AS NEEDED. *DO NOT TAKE MORE THAN 3 TABLETS A DAY 30 Tablet 0    NovoLIN N NPH U-100 Insulin 100 unit/mL injection INJECT 2 TO 10 UNITS SUBCUTANEOUSLY ONCE DAILY 30 mL 0    lancets misc Use preferred brand; Check glucose 3-4 times daily, Diagnosis E11.22 200 Lancet 3    carvediloL (COREG) 6.25 mg tablet Take 1 Tablet by mouth two (2) times daily (with meals).  60 Tablet 3    hydrALAZINE (APRESOLINE) 50 mg tablet Take 1 Tablet by mouth two (2) times a day. 60 Tablet 4    amLODIPine (NORVASC) 10 mg tablet Take 1 Tablet by mouth daily. 30 Tablet 5    sevelamer carbonate (RENVELA) 800 mg tab tab TAKE 1 TABLET BY MOUTH THREE TIMES DAILY WITH MEALS 90 Tablet 3    SORAfenib (NEXAVAR) 200 mg tablet Take 1 Tablet by mouth daily for 30 days. 30 Tablet 11    dexAMETHasone (DECADRON) 4 mg tablet Take 4 mg by mouth every eight (8) hours. 90 Tablet 0    bumetanide (BUMEX) 2 mg tablet Take 1 Tablet by mouth daily. 30 Tablet 0    omeprazole (PRILOSEC) 20 mg capsule Take 1 capsule by mouth once daily 90 Capsule 1    simvastatin (ZOCOR) 20 mg tablet Take 1 Tab by mouth nightly. 90 Tab 3    Vitamin D2 1,250 mcg (50,000 unit) capsule TAKE 1 CAPSULE BY MOUTH ONCE A WEEK (Patient taking differently: Take 50,000 Units by mouth every Monday. TAKE 1 CAPSULE BY MOUTH ONCE A WEEK) 4 Cap 5    sodium zirconium cyclosilicate (Lokelma) 5 gram powder packet Take 5 g by mouth two (2) times a week.  On Fridays and Sundays      Ventolin HFA 90 mcg/actuation inhaler TAKE 1-2 PUFFS EVEERY 4-6 HOURS AS NEEDED FOR DYSPNEA AND WHEEZING 1 Inhaler 2       Past History     Past Medical History:  Past Medical History:   Diagnosis Date    Adverse effect of anesthesia     \" STOP BREATHING 1 TIME C ANESTH\"    Cancer ([address] 2004    thyroid cancer    [facility]     [facility]    Chronic pain     BACK SHOULDER AND ARM    COVID-19 04/2021    Depression     Diabetes ([address]     GERD (gastroesophageal reflux disease)     Heart failure (HCC)     stage 1    Hypercholesterolemia     Hypertension     Nausea & vomiting     PUD (peptic ulcer disease)     Sleep apnea     doesn't wear cpap    Thyroid cancer ([address]     TIA (transient ischemic attack) 2011    Vitamin D deficiency        Past Surgical History:  Past Surgical History:   Procedure Laterality Date    HX HEART CATHETERIZATION      HX HEENT      THROAT SURGERY X 4    HX ORTHOPAEDIC      back     HX ORTHOPAEDIC      ARM AND SHOULDER    HX OTHER SURGICAL      thyroid, lymphnode    HX RETINAL DETACHMENT REPAIR      left eye    HX VASCULAR ACCESS      HD cather in chest    IR INJ FORAMIN EPID LUMB ANES/STER SNGL  10/20/2021    IR INSERT NON TUNL CVC OVER 5 YRS  2020    IR INSERT NON TUNL CVC OVER 5 YRS  2021    IR INSERT TUNL CVC W/O PORT OVER 5 YR  2020    IR INSERT TUNL CVC W/O PORT OVER 5 YR  2021    UPPER GI ENDOSCOPY,BALL DIL,30MM  2020         UPPER GI ENDOSCOPY,BIOPSY  2020         VASCULAR SURGERY PROCEDURE UNLIST      cardiac cath NEG. Family History:  Family History   Problem Relation Age of Onset    Diabetes Mother     Elevated Lipids Mother    Caremncita Fee Bladder Disease Mother     Headache Mother     Migraines Mother     Heart Disease Mother     Hypertension Mother     Stroke Mother     Other Mother         ANEURSYM BRAIN    Bleeding Prob Father     Cancer Father         LEUKEMIA    Diabetes Father     Elevated Lipids Father     Mental Retardation Sister     Psychiatric Disorder Sister     Cancer Brother         LUNGS       Social History:  Social History     Tobacco Use    Smoking status: Former Smoker     Quit date: 1994     Years since quittin.2    Smokeless tobacco: Never Used   Vaping Use    Vaping Use: Never used   Substance Use Topics    Alcohol use: Not Currently    Drug use: No       Allergies: Allergies   Allergen Reactions    Anesthetics - Amide Type - Select Amino Amides Shortness of Breath    Flexeril [Cyclobenzaprine] Hives    Tramadol Hives         Review of Systems   no fever  No ear pain  No eye pain  no shortness of breath  no chest pain  Reports abdominal pain  Reports dysuria  no leg pain  No rash  No lymphadenopathy  No weight loss    Physical Exam   Physical Exam  Constitutional:       General: He is not in acute distress. Appearance: He is not toxic-appearing.    HENT: Head: Normocephalic and atraumatic. Mouth/Throat:      Mouth: Mucous membranes are moist.   Eyes:      Extraocular Movements: Extraocular movements intact. Cardiovascular:      Rate and Rhythm: Normal rate and regular rhythm. Pulmonary:      Effort: Pulmonary effort is normal.      Breath sounds: Normal breath sounds. Abdominal:      Palpations: Abdomen is soft. Tenderness: There is abdominal tenderness. Comments: He is tender to palpation in the periumbilical region with some guarding   Musculoskeletal:         General: No deformity. Cervical back: Neck supple. Skin:     General: Skin is warm and dry. Neurological:      General: No focal deficit present. Mental Status: He is alert and oriented to person, place, and time. Psychiatric:         Mood and Affect: Mood normal.         Diagnostic Study Results     Labs -     Recent Results (from the past 24 hour(s))   CBC WITH AUTOMATED DIFF    Collection Time: 11/18/21 11:19 PM   Result Value Ref Range    WBC 11.5 (H) 4.1 - 11.1 K/uL    RBC 3.34 (L) 4.10 - 5.70 M/uL    HGB 10.3 (L) 12.1 - 17.0 g/dL    HCT 30.8 (L) 36.6 - 50.3 %    MCV 92.2 80.0 - 99.0 FL    MCH 30.8 26.0 - 34.0 PG    MCHC 33.4 30.0 - 36.5 g/dL    RDW 14.6 (H) 11.5 - 14.5 %    PLATELET 994 (L) 081 - 400 K/uL    MPV 10.6 8.9 - 12.9 FL    NRBC 0.0 0  WBC    ABSOLUTE NRBC 0.00 0.00 - 0.01 K/uL    NEUTROPHILS 91 (H) 32 - 75 %    LYMPHOCYTES 3 (L) 12 - 49 %    MONOCYTES 4 (L) 5 - 13 %    EOSINOPHILS 0 0 - 7 %    BASOPHILS 0 0 - 1 %    IMMATURE GRANULOCYTES 2 (H) 0.0 - 0.5 %    ABS. NEUTROPHILS 10.5 (H) 1.8 - 8.0 K/UL    ABS. LYMPHOCYTES 0.3 (L) 0.8 - 3.5 K/UL    ABS. MONOCYTES 0.5 0.0 - 1.0 K/UL    ABS. EOSINOPHILS 0.0 0.0 - 0.4 K/UL    ABS. BASOPHILS 0.0 0.0 - 0.1 K/UL    ABS. IMM.  GRANS. 0.2 (H) 0.00 - 0.04 K/UL    DF SMEAR SCANNED      RBC COMMENTS NORMOCYTIC, NORMOCHROMIC      WBC COMMENTS TOXIC GRANULATION     METABOLIC PANEL, COMPREHENSIVE    Collection Time: 11/18/21 11:19 PM   Result Value Ref Range    Sodium 131 (L) 136 - 145 mmol/L    Potassium 4.4 3.5 - 5.1 mmol/L    Chloride 95 (L) 97 - 108 mmol/L    CO2 26 21 - 32 mmol/L    Anion gap 10 5 - 15 mmol/L    Glucose 365 (H) 65 - 100 mg/dL    BUN 45 (H) 6 - 20 MG/DL    Creatinine 2.65 (H) 0.70 - 1.30 MG/DL    BUN/Creatinine ratio 17 12 - 20      GFR est AA 30 (L) >60 ml/min/1.73m2    GFR est non-AA 25 (L) >60 ml/min/1.73m2    Calcium 9.6 8.5 - 10.1 MG/DL    Bilirubin, total 0.4 0.2 - 1.0 MG/DL    ALT (SGPT) 19 12 - 78 U/L    AST (SGOT) 6 (L) 15 - 37 U/L    Alk. phosphatase 118 (H) 45 - 117 U/L    Protein, total 5.0 (L) 6.4 - 8.2 g/dL    Albumin 1.7 (L) 3.5 - 5.0 g/dL    Globulin 3.3 2.0 - 4.0 g/dL    A-G Ratio 0.5 (L) 1.1 - 2.2     LIPASE    Collection Time: 11/18/21 11:19 PM   Result Value Ref Range    Lipase 36 (L) 73 - 393 U/L       Radiologic Studies -   CT ABD PELV WO CONT   Final Result   Small pneumoperitoneum, compatible with bowel perforation. Small left-sided   mesenteric abscess. Inflammatory changes in the left mesentery. Unchanged   numerous hepatic metastases. Unchanged left adrenal nodule. Large soft tissue   mass arising from the right hip is compatible with metastatic disease. The findings were called to Dr. Sanju Alonzo on 11/19/2021 at 12:24 AM by Dr. Ricardo Castillo. 789           CT Results  (Last 48 hours)               11/18/21 5469  CT ABD PELV WO CONT Final result    Impression:  Small pneumoperitoneum, compatible with bowel perforation. Small left-sided   mesenteric abscess. Inflammatory changes in the left mesentery. Unchanged   numerous hepatic metastases. Unchanged left adrenal nodule. Large soft tissue   mass arising from the right hip is compatible with metastatic disease. The findings were called to Dr. Sanju Alonzo on 11/19/2021 at 12:24 AM by Dr. Ricardo Castillo.        789           Narrative:  EXAM: CT ABD PELV WO CONT       INDICATION: periumbilical pain, constipation COMPARISON: November 10, 2021       CONTRAST:  None. TECHNIQUE:    Thin axial images were obtained through the abdomen and pelvis. Coronal and   sagittal reformats were generated. Oral contrast was not administered. CT dose   reduction was achieved through use of a standardized protocol tailored for this   examination and automatic exposure control for dose modulation. The absence of intravenous contrast material reduces the sensitivity for   evaluation of the vasculature and solid organs. FINDINGS:    LOWER THORAX: Numerous pulmonary nodules are again seen, measuring up to 4 cm. LIVER: No visualized mass. BILIARY TREE: Gallbladder contains tiny calculi but demonstrates no wall   thickening or CBD is not dilated. SPLEEN: within normal limits. PANCREAS: No focal abnormality. ADRENALS: Unchanged 1.5 cm left adrenal nodule    KIDNEYS/URETERS: Atrophic kidneys. No hydronephrosis. No urinary tract calculi. STOMACH: Unremarkable. SMALL BOWEL: No dilatation or wall thickening. COLON: No dilatation or wall thickening. APPENDIX: Normal.   PERITONEUM: Small pneumoperitoneum. Inflammatory changes in the left mesentery   with 2.8 cm mesenteric abscess. RETROPERITONEUM: No lymphadenopathy or aortic aneurysm. REPRODUCTIVE ORGANS: Unremarkable. URINARY BLADDER: Contains a small amount of air, presumably related to recent   catheterization. BONES: 4.8 cm soft tissue mass along the posterior margin of the right femoral   neck. ABDOMINAL WALL: No mass or hernia. ADDITIONAL COMMENTS: N/A               CXR Results  (Last 48 hours)    None            Medical Decision Making   I am the first provider for this patient. I reviewed the vital signs, available nursing notes, past medical history, past surgical history, family history and social history. Vital Signs-Reviewed the patient's vital signs.   Patient Vitals for the past 24 hrs:   Temp Pulse Resp BP SpO2   11/18/21 2342 -- -- -- (!) 124/55 92 %   11/18/21 2327 -- -- -- (!) 155/62 90 %   11/18/21 2312 -- -- -- 137/61 92 %   11/18/21 2257 98.3 °F (36.8 °C) 82 16 131/61 92 %         Provider Notes (Medical Decision Making):   57-year-old male presenting with abdominal pain. Sudden onset periumbilical crampy pain concerning for possible early bowel obstruction, constipation, colitis, less likely pancreatitis, UTI. CT scan will be obtained. Given history of ESRD, will assess for any associated left-sided metabolic abnormalities. ED Course:     Initial assessment performed. The patients presenting problems have been discussed, and they are in agreement with the care plan formulated and outlined with them. I have encouraged them to ask questions as they arise throughout their visit. CBC with leukocytosis of 11.5, however downtrending from prior, baseline anemia. Basic metabolic panel with elevated creatinine consistent with known history of ESRD, otherwise no worrisome electrolyte abnormalities. CT scan shows small pneumoperitoneum compatible with bowel perforation small left-sided mesenteric abscess with inflammatory changes. I spoken with Dr. Colleen payne. IV antibiotics will be initiated. On reevaluation, patient is resting comfortably, vital signs stable, he feels improved after pain medications. Small fluid bolus to be given. Critical Care Time:         Disposition:  Admit    PLAN:  1. Current Discharge Medication List        2.    Follow-up Information    None       Return to ED if worse     Diagnosis     Clinical Impression: Acute abdominal pain with evidence of bowel perforation
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
## 2021-11-19 NOTE — PROGRESS NOTES
Spiritual Care Assessment/Progress Note  [facility]      NAME: Corby Baer      MRN: [id number]  AGE: 63 y.o.  SEX: male  Amish Affiliation: Adventism   Language: English     11/19/2021     Total Time (in minutes): 15     Spiritual Assessment begun in [facility] EMERGENCY DEPT through conversation with:         [x]Patient        [] Family    [] Friend(s)        Reason for Consult: Palliative Care, Initial/Spiritual Assessment     Spiritual beliefs: (Please include comment if needed)     [x] Identifies with a roselia tradition:         [] Supported by a roselia community:            [] Claims no spiritual orientation:           [] Seeking spiritual identity:                [] Adheres to an individual form of spirituality:           [] Not able to assess:                           Identified resources for coping:      [x] Prayer                               [] Music                  [] Guided Imagery     [x] Family/friends                 [] Pet visits     [] Devotional reading                         [] Unknown     [] Other:                                                Interventions offered during this visit: (See comments for more details)    Patient Interventions: Affirmation of emotions/emotional suffering, Catharsis/review of pertinent events in supportive environment, Initial/Spiritual assessment, patient floor, Iconic (affirming the presence of God/Higher Power), Coping skills reviewed/reinforced, Prayer (assurance of)           Plan of Care:     [] Support spiritual and/or cultural needs    [x] Support AMD and/or advance care planning process      [] Support grieving process   [] Coordinate Rites and/or Rituals    [] Coordination with community clergy   [x] No spiritual needs identified at this time   [] Detailed Plan of Care below (See Comments)  [] Make referral to Music Therapy  [] Make referral to Pet Therapy     [] Make referral to Addiction services  [] Make referral to [facility]  [] Make referral to Spiritual Care Partner  [] No future visits requested        [] Contact Spiritual Care for further referrals     Comments:  Reviewed chart prior to visit with patient in ER for spiritual assessment. Consulted with his nurse; he has been admitted and will transfer to [facility]. Visited with patient; no family present. He was quiet spoken as he shared briefly what is happening medically at this time. He seems to be coping effectively at this time. During conversation, he voiced that he wants both of his daughters to be his healthcare decision makers. Current Advance Medical Directive on file only lists one daughter. He indicated he wants to update advance medical directive to reflect current wishes.  will follow up later today or tomorrow to assist with completion of new Advance Medical Directive.        Tracy Hu MPS, [facility], Staff [facility] [address]    [address] Paging Service  [phone])
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
## 2021-11-20 NOTE — PROGRESS NOTES
Problem: Diabetes Self-Management  Goal: *Disease process and treatment process  Description: Define diabetes and identify own type of diabetes; list 3 options for treating diabetes. 11/20/2021 1758 by Elizabeth RAMIREZ  Outcome: Progressing Towards Goal  11/20/2021 1610 by Arin Su  Outcome: Progressing Towards Goal  Goal: *Incorporating nutritional management into lifestyle  Description: Describe effect of type, amount and timing of food on blood glucose; list 3 methods for planning meals. 11/20/2021 1758 by Elizabeth RAMIREZ  Outcome: Progressing Towards Goal  11/20/2021 1610 by Arin Su  Outcome: Progressing Towards Goal  Goal: *Incorporating physical activity into lifestyle  Description: State effect of exercise on blood glucose levels. 11/20/2021 1758 by Elizabeth RAMIREZ  Outcome: Progressing Towards Goal  11/20/2021 1610 by Arin Su  Outcome: Progressing Towards Goal  Goal: *Developing strategies to promote health/change behavior  Description: Define the ABC's of diabetes; identify appropriate screenings, schedule and personal plan for screenings. 11/20/2021 1758 by Elizabeth RAMIREZ  Outcome: Progressing Towards Goal  11/20/2021 1610 by Arin Su  Outcome: Progressing Towards Goal  Goal: *Using medications safely  Description: State effect of diabetes medications on diabetes; name diabetes medication taking, action and side effects. 11/20/2021 1758 by Elizabeth RAMIREZ  Outcome: Progressing Towards Goal  11/20/2021 1610 by Arin Su  Outcome: Progressing Towards Goal  Goal: *Monitoring blood glucose, interpreting and using results  Description: Identify recommended blood glucose targets  and personal targets.   11/20/2021 1758 by Elizabeth RAMIREZ  Outcome: Progressing Towards Goal  11/20/2021 1610 by Arin uS  Outcome: Progressing Towards Goal  Goal: *Prevention, detection, treatment of acute complications  Description: List symptoms of hyper- and hypoglycemia; describe how to treat low blood sugar and actions for lowering  high blood glucose level. 11/20/2021 1758 by Nury RAMIREZ  Outcome: Progressing Towards Goal  11/20/2021 1610 by Akin Balbuena  Outcome: Progressing Towards Goal  Goal: *Prevention, detection and treatment of chronic complications  Description: Define the natural course of diabetes and describe the relationship of blood glucose levels to long term complications of diabetes. 11/20/2021 1758 by Nury RAMIREZ  Outcome: Progressing Towards Goal  11/20/2021 1610 by Akin Balbuena  Outcome: Progressing Towards Goal  Goal: *Developing strategies to address psychosocial issues  Description: Describe feelings about living with diabetes; identify support needed and support network  11/20/2021 1758 by Akin Balbuena  Outcome: Progressing Towards Goal  11/20/2021 1610 by Akin Balbuena  Outcome: Progressing Towards Goal  Goal: *Insulin pump training  11/20/2021 1758 by Nury RAMIREZ  Outcome: Progressing Towards Goal  11/20/2021 1610 by Akin Balbuena  Outcome: Progressing Towards Goal  Goal: *Sick day guidelines  11/20/2021 1758 by Akin Balbuena  Outcome: Progressing Towards Goal  11/20/2021 1610 by Akin Balbuena  Outcome: Progressing Towards Goal  Goal: *Patient Specific Goal (EDIT GOAL, INSERT TEXT)  11/20/2021 1758 by Akin Balbuena  Outcome: Progressing Towards Goal  11/20/2021 1610 by Akin Balbuena  Outcome: Progressing Towards Goal     Problem: Patient Education: Go to Patient Education Activity  Goal: Patient/Family Education  11/20/2021 1758 by Nury RAMIREZ  Outcome: Progressing Towards Goal  11/20/2021 1610 by Akin Balbuena  Outcome: Progressing Towards Goal     Problem: Falls - Risk of  Goal: *Absence of Falls  Description: Document Clydene Bone Fall Risk and appropriate interventions in the flowsheet.   11/20/2021 1758 by Akin Balbuena  Outcome: Progressing Towards Goal  Note: Fall Risk Interventions:  Mobility Interventions: Bed/chair exit alarm, Communicate number of staff needed for ambulation/transfer, Patient to call before getting OOB         Medication Interventions: Bed/chair exit alarm, Patient to call before getting OOB, Teach patient to arise slowly    Elimination Interventions: Bed/chair exit alarm, Call light in reach, Patient to call for help with toileting needs, Toileting schedule/hourly rounds, Urinal in reach    History of Falls Interventions: Bed/chair exit alarm, Door open when patient unattended, Investigate reason for fall, Room close to nurse's station      11/20/2021 1610 by Joel RAMIREZ  Outcome: Progressing Towards Goal  Note: Fall Risk Interventions:  Mobility Interventions: Bed/chair exit alarm, Communicate number of staff needed for ambulation/transfer, Patient to call before getting OOB         Medication Interventions: Bed/chair exit alarm, Patient to call before getting OOB, Teach patient to arise slowly    Elimination Interventions: Bed/chair exit alarm, Call light in reach, Patient to call for help with toileting needs, Toileting schedule/hourly rounds, Urinal in reach    History of Falls Interventions: Bed/chair exit alarm, Door open when patient unattended, Investigate reason for fall, Room close to nurse's station         Problem: Patient Education: Go to Patient Education Activity  Goal: Patient/Family Education  11/20/2021 1758 by Joel RAMIREZ  Outcome: Progressing Towards Goal  11/20/2021 1610 by Magdaleno Cosme  Outcome: Progressing Towards Goal     Problem: Pressure Injury - Risk of  Goal: *Prevention of pressure injury  Description: Document Ge Scale and appropriate interventions in the flowsheet.   11/20/2021 1758 by Magdaleno Cosme  Outcome: Progressing Towards Goal  Note: Pressure Injury Interventions:  Sensory Interventions: Assess changes in LOC, Float heels, Keep linens dry and wrinkle-free, Minimize linen layers, Turn and reposition approx. every two hours (pillows and wedges if needed)         Activity Interventions: Assess need for specialty bed, PT/OT evaluation, Increase time out of bed    Mobility Interventions: HOB 30 degrees or less, Float heels, Pressure redistribution bed/mattress (bed type), Turn and reposition approx. every two hours(pillow and wedges)    Nutrition Interventions: Document food/fluid/supplement intake    Friction and Shear Interventions: Apply protective barrier, creams and emollients, HOB 30 degrees or less, Lift team/patient mobility team, Minimize layers             11/20/2021 1610 by Raisa Duarte  Outcome: Progressing Towards Goal  Note: Pressure Injury Interventions:  Sensory Interventions: Assess changes in LOC, Float heels, Keep linens dry and wrinkle-free, Minimize linen layers, Turn and reposition approx. every two hours (pillows and wedges if needed)         Activity Interventions: Assess need for specialty bed, PT/OT evaluation, Increase time out of bed    Mobility Interventions: HOB 30 degrees or less, Float heels, Pressure redistribution bed/mattress (bed type), Turn and reposition approx. every two hours(pillow and wedges)    Nutrition Interventions: Document food/fluid/supplement intake    Friction and Shear Interventions: Apply protective barrier, creams and emollients, HOB 30 degrees or less, Lift team/patient mobility team, Minimize layers                Problem: Patient Education: Go to Patient Education Activity  Goal: Patient/Family Education  11/20/2021 1758 by Suzan RAMIREZ  Outcome: Progressing Towards Goal  11/20/2021 1610 by Raisa Duarte  Outcome: Progressing Towards Goal     Problem: Risk for Spread of Infection  Goal: Prevent transmission of infectious organism to others  Description: Prevent the transmission of infectious organisms to other patients, staff members, and visitors.   11/20/2021 1758 by Raisa Duarte  Outcome: Progressing Towards Goal  11/20/2021 1610 by Han Saucedo  Outcome: Progressing Towards Goal     Problem: Patient Education:  Go to Education Activity  Goal: Patient/Family Education  11/20/2021 1758 by Han Saucedo  Outcome: Progressing Towards Goal  11/20/2021 1610 by aHn Saucedo  Outcome: Progressing Towards Goal

## 2021-11-20 NOTE — PROGRESS NOTES
TRANSFER - IN REPORT:    Verbal report received from [address] [place] [address] on [facility]  being received from [facility](unit) for ordered procedure      Report consisted of patients Situation, Background, Assessment and   Recommendations(SBAR). Information from the following report(s) Kardex was reviewed with the receiving nurse. Opportunity for questions and clarification was provided. Assessment completed upon patients arrival to unit and care assumed.

## 2021-11-20 NOTE — PROGRESS NOTES
End of Shift Note    Bedside shift change report given to [address] [place] [address] (oncoming nurse) by Fanny Jay RN (offgoing nurse). Report included the following information SBAR, Kardex, Intake/Output, MAR and Recent Results    Shift worked:  [id number]     Shift summary and any significant changes:     Pt rested well throughout the night. No complaints of pain or discomfort. Pt using urinal to void as needed, no BM overnight. Pt has not been up out of bed. Labs drawn this AM as ordered.       Concerns for physician to address:      Zone phone for oncoming shift:   Danny Abad, RN

## 2021-11-20 NOTE — PROGRESS NOTES
0 perfect served Chary Fu MD \"Pt is requesting strongly for medication that he takes outside the hospital to be considered. Medication is located in his outpatient my chart account.  He\" feels\" we are \"not taking correct care of him because lack of medication that is being given while he is inpatient\"    Waiting for a response

## 2021-11-20 NOTE — PROGRESS NOTES
Problem: Diabetes Self-Management  Goal: *Disease process and treatment process  Description: Define diabetes and identify own type of diabetes; list 3 options for treating diabetes. Outcome: Progressing Towards Goal  Goal: *Incorporating nutritional management into lifestyle  Description: Describe effect of type, amount and timing of food on blood glucose; list 3 methods for planning meals. Outcome: Progressing Towards Goal  Goal: *Incorporating physical activity into lifestyle  Description: State effect of exercise on blood glucose levels. Outcome: Progressing Towards Goal  Goal: *Developing strategies to promote health/change behavior  Description: Define the ABC's of diabetes; identify appropriate screenings, schedule and personal plan for screenings. Outcome: Progressing Towards Goal  Goal: *Using medications safely  Description: State effect of diabetes medications on diabetes; name diabetes medication taking, action and side effects. Outcome: Progressing Towards Goal  Goal: *Monitoring blood glucose, interpreting and using results  Description: Identify recommended blood glucose targets  and personal targets. Outcome: Progressing Towards Goal  Goal: *Prevention, detection, treatment of acute complications  Description: List symptoms of hyper- and hypoglycemia; describe how to treat low blood sugar and actions for lowering  high blood glucose level. Outcome: Progressing Towards Goal  Goal: *Prevention, detection and treatment of chronic complications  Description: Define the natural course of diabetes and describe the relationship of blood glucose levels to long term complications of diabetes.   Outcome: Progressing Towards Goal  Goal: *Developing strategies to address psychosocial issues  Description: Describe feelings about living with diabetes; identify support needed and support network  Outcome: Progressing Towards Goal  Goal: *Insulin pump training  Outcome: Progressing Towards Goal  Goal: *Sick day guidelines  Outcome: Progressing Towards Goal  Goal: *Patient Specific Goal (EDIT GOAL, INSERT TEXT)  Outcome: Progressing Towards Goal     Problem: Patient Education: Go to Patient Education Activity  Goal: Patient/Family Education  Outcome: Progressing Towards Goal     Problem: Falls - Risk of  Goal: *Absence of Falls  Description: Document Steven Sol Fall Risk and appropriate interventions in the flowsheet. Outcome: Progressing Towards Goal  Note: Fall Risk Interventions:  Mobility Interventions: Bed/chair exit alarm, Communicate number of staff needed for ambulation/transfer, Patient to call before getting OOB         Medication Interventions: Bed/chair exit alarm, Patient to call before getting OOB, Teach patient to arise slowly    Elimination Interventions: Bed/chair exit alarm, Call light in reach, Patient to call for help with toileting needs, Toileting schedule/hourly rounds, Urinal in reach    History of Falls Interventions: Bed/chair exit alarm, Door open when patient unattended, Investigate reason for fall, Room close to nurse's station         Problem: Patient Education: Go to Patient Education Activity  Goal: Patient/Family Education  Outcome: Progressing Towards Goal     Problem: Pressure Injury - Risk of  Goal: *Prevention of pressure injury  Description: Document Ge Scale and appropriate interventions in the flowsheet. Outcome: Progressing Towards Goal  Note: Pressure Injury Interventions:  Sensory Interventions: Assess changes in LOC, Float heels, Keep linens dry and wrinkle-free, Minimize linen layers, Turn and reposition approx. every two hours (pillows and wedges if needed)         Activity Interventions: Assess need for specialty bed, PT/OT evaluation, Increase time out of bed    Mobility Interventions: HOB 30 degrees or less, Float heels, Pressure redistribution bed/mattress (bed type), Turn and reposition approx.  every two hours(pillow and wedges)    Nutrition Interventions: Document food/fluid/supplement intake    Friction and Shear Interventions: Apply protective barrier, creams and emollients, HOB 30 degrees or less, Lift team/patient mobility team, Minimize layers                Problem: Patient Education: Go to Patient Education Activity  Goal: Patient/Family Education  Outcome: Progressing Towards Goal     Problem: Risk for Spread of Infection  Goal: Prevent transmission of infectious organism to others  Description: Prevent the transmission of infectious organisms to other patients, staff members, and visitors.   Outcome: Progressing Towards Goal     Problem: Patient Education:  Go to Education Activity  Goal: Patient/Family Education  Outcome: Progressing Towards Goal

## 2021-11-20 NOTE — PROGRESS NOTES
Assessment:    Visited with patient on Surg Tele unit to update patient's advance medical directive. Patient wants both of his daughters to act together as healthcare decision makers. Completed new document to reflect patient's current wishes.          NATHALY Dhaliwal, [facility], Staff [address]    [address] Paging Service  [phone]

## 2021-11-20 NOTE — PROGRESS NOTES
SURGERY PROGRESS NOTE      Admit Date: 2021      Subjective:     Patient states he feels better. Pain is gone. He denies nausea. Objective:     Visit Vitals  BP (!) 169/80   Pulse 79   Temp 98.7 °F (37.1 °C) (Oral)   Resp 18   Ht 5' 9\" (1.753 m)   Wt 92.5 kg (204 lb)   SpO2 94%   BMI 30.13 kg/m²        Temp (24hrs), Av.2 °F (36.8 °C), Min:97.7 °F (36.5 °C), Max:98.7 °F (37.1 °C)      701 - 1900  In: -   Out: 658 [GZESM:298]  1901 - 700  In: 2408 [P.O.:100;  I.V.:2308]  Out: 1400 [Urine:1400]    Physical Exam:    General:  alert, cooperative, no distress, appears stated age   Abdomen: soft, bowel sounds active, non-tender           Lab Results   Component Value Date/Time    WBC 13.2 (H) 2021 05:04 AM    HGB 9.9 (L) 2021 05:04 AM    Hemoglobin (POC) 8.4 (L) 2021 03:14 PM    Hemoglobin (POC) 14.6 2016 04:04 AM    HCT 30.3 (L) 2021 05:04 AM    Hematocrit (POC) 43 2016 04:04 AM    PLATELET 409 (L)  05:04 AM    MCV 94.7 2021 05:04 AM     Lab Results   Component Value Date/Time    GFR est non-AA 24 (L) 2021 05:04 AM    GFRNA, POC 12 (L) 2021 06:34 AM    GFR est AA 29 (L) 2021 05:04 AM    GFRAA, POC 15 (L) 2021 06:34 AM    Creatinine 2.70 (H) 2021 05:04 AM    Creatinine, POC 4.1 (H) 2021 02:44 AM    BUN 57 (H) 2021 05:04 AM    BUN (POC) 33 (H) 2016 04:04 AM    Sodium 139 2021 05:04 AM    Sodium (POC) 140 2021 06:34 AM    Sodium,  (L) 2021 02:44 AM    Potassium 4.9 2021 05:04 AM    Potassium (POC) 3.6 2021 06:34 AM    Potassium, POC 5.0 2021 02:44 AM    Chloride 107 2021 05:04 AM    Chloride, POC 96 (L) 2021 02:44 AM    CO2 26 2021 05:04 AM    Magnesium 1.9 2021 02:25 AM    Phosphorus 6.2 (H) 2021 08:12 AM    PTH, Intact 492.4 (H) 2021 03:04 PM       Assessment:     Active Problems:    Microalbuminuria (8/31/2016)      Severe obesity (BMI 35.0-39. 9) with comorbidity ([address] (3/24/2018)      Metastatic carcinoma ([address] (8/19/2020)      Brain metastases ([address] (10/17/2021)      End stage renal disease ([address] (11/19/2021)      Diabetes mellitus ([address] (11/19/2021)      Thyroid cancer ([address] (11/19/2021)      Intestinal diverticular abscess (11/19/2021)      DNR (do not resuscitate) ()      Goals of care, counseling/discussion ()      Debility ()      Need for emotional support ()      Patient with metastatic refractory papillary thyroid cancer admitted for small perforation and abscess in the small bowel mesentery. Patient does not want surgery. Symptomatically doing better. Plan:     1. Continue antibiotics  2. Clear liquid diet  3.   Likely discharge to hospice

## 2021-11-20 NOTE — PROGRESS NOTES
Nephrology Progress Note  Jos[facility] Miguel Butt     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   lung nodules    PLAN-   HD today. Subjective: Interval History:   -Due for hd today, Palliative seeing pt. Objective:   Vitals:    11/19/21 1514 11/19/21 2109 11/20/21 0249 11/20/21 0724   BP: (!) 175/73 (!) 150/67 (!) 151/57 (!) 183/78   Pulse: 74 82 80 82   Resp: 18 18 18 16   Temp: 97.7 °F (36.5 °C) 97.9 °F (36.6 °C) 98.5 °F (36.9 °C) 98.2 °F (36.8 °C)   SpO2: 95% 95% 95% 96%   Weight:       Height:          11/19 0701 - 11/20 0700  In: 1808 [P.O.:100; I.V.:1708]  Out: 1400 [Urine:1400]  Last 3 Recorded Weights in this Encounter    11/19/21 1346   Weight: 92.5 kg (204 lb)      Physical exam:   GEN: NAD  NECK- Supple, no mass  RESP: No wheezing, clear b/l  CVS: S1,S2  RRR  NEURO: Normal speech, Non focal  ABDO: tender + ,   PSYCH: Normal Mood    Chart reviewed. Pertinent Notes reviewed. Data Review :  Recent Labs     11/20/21  0504 11/18/21  2319 11/17/21  1027    131* 135*   K 4.9 4.4 4.7    95* 97   CO2 26 26 30   BUN 57* 45* 48*   CREA 2.70* 2.65* 2.80*   * 365* 343*   CA 9.9 9.6 9.3     Recent Labs     11/20/21  0504 11/19/21  0551 11/18/21  2319   WBC 13.2* 12.5* 11.5*   HGB 9.9* 9.4* 10.3*   HCT 30.3* 28.7* 30.8*   * 113* 141*     No results for input(s): FE, TIBC, PSAT, FERR in the last 72 hours.    Medication list  reviewed  Current Facility-Administered Medications   Medication Dose Route Frequency    0.9% sodium chloride infusion  50 mL/hr IntraVENous CONTINUOUS    HYDROcodone-acetaminophen (NORCO) 5-325 mg per tablet 1 Tablet  1 Tablet Oral Q4H PRN    HYDROmorphone (DILAUDID) injection 0.5-1 mg  0.5-1 mg IntraVENous Q2H PRN    ondansetron (ZOFRAN) injection 4 mg  4 mg IntraVENous Q4H PRN    acetaminophen (TYLENOL) tablet 650 mg  650 mg Oral Q6H PRN    HYDROcodone-acetaminophen (NORCO)  mg tablet 1 Tablet  1 Tablet Oral Q4H PRN    piperacillin-tazobactam (ZOSYN) 3.375 g in 0.9% sodium chloride (MBP/ADV) 100 mL MBP  3.375 g IntraVENous Q12H    dexamethasone (DECADRON) 4 mg/mL injection 4 mg  4 mg IntraVENous Q8H    insulin lispro (HUMALOG) injection   SubCUTAneous Q6H    glucose chewable tablet 16 g  4 Tablet Oral PRN    dextrose (D50W) injection syrg 12.5-25 g  12.5-25 g IntraVENous PRN    glucagon (GLUCAGEN) injection 1 mg  1 mg IntraMUSCular PRN          Eric Lewis MD              [facility]  [facility] / [facility], [address]  [place], [address]  Phone - [phone]               Fax - [phone]

## 2021-11-20 NOTE — ACP (ADVANCE CARE PLANNING)
Advance Care Planning   Advance Care Planning Inpatient Note  [address]  Spiritual Care Department    Today's Date: 11/20/2021  Unit: [facility] 3 SURG TELE    Received request from patient. Upon review of chart and communication with care team, patient's decision making abilities are not in question. Patient was present in the room during visit. Goals of ACP Conversation:  Establish healthcare decision makers    Health Care Decision Makers:      Primary Decision Maker: Valeria Coto - Daughter - [phone]    Primary Decision Maker: Reji Ferraro - Daughter - [phone]      Summary:  Completed [address] (Patient Wishes) on file:  Healthcare Power of /Advance Directive appointment of Health care agent     Assessment:    Visited with patient on Surg Tele unit to update patient's advance medical directive. Patient wants both of his daughters to act together as healthcare decision makers. Completed new document to reflect patient's current wishes.         Interventions:  Assisted in the completion of documents according to patient's wishes at this time  Encouraged ongoing ACP conversation with future decision makers and loved ones    Care Preferences Communicated:  No    Outcomes/Plan:  ACP Discussion Completed  New Advance Directive completed  Returned original document(s) to patient, as well as copies for distribution to appointed agents  Placed copy of new advance medical directive on patient's chart     Mario Pastor, [facility] on 11/20/2021 at 3:12 PM

## 2021-11-20 NOTE — PROGRESS NOTES
Hospitalist Progress Note    NAME: David Horowitz   :  1960   MRN:  [id number]       Assessment / Plan:    Perforated Viscus   treatment as per surgery  -Continue antibiotic therapy  -- started on clear liquid diet     ESRD on HD TTS  Nephrology Consult   continue hemodialysis    DMII  Thyroid cancer with metastasis to the brain and lung  HTN  HLD  GERD  Hx of TIA  SHOLA with chronic respiratory failure 2-3L NC  Hold all oral meds - can slowly resume once cleared for PO intake by Surgery  Pt has been taking Decadron 4mg q8hrs for edema associated with brain mets. Will Pharmacy to switch to IV while pt remains NPO. FS monitoring, SS       Code Status: As per primary team  DVT Prophylaxis: As per primary team         Subjective:     Chief Complaint / Reason for Physician Visit  . Discussed with RN events overnight. Patient stated he feels better,    Review of Systems:  Symptom Y/N Comments  Symptom Y/N Comments   Fever/Chills n   Chest Pain n    Poor Appetite    Edema     Cough    Abdominal Pain y    Sputum    Joint Pain     SOB/NUÑEZ y   Pruritis/Rash     Nausea/vomit    Tolerating PT/OT     Diarrhea    Tolerating Diet n    Constipation n   Other       Could NOT obtain due to:      Objective:     VITALS:   Last 24hrs VS reviewed since prior progress note.  Most recent are:  Patient Vitals for the past 24 hrs:   Temp Pulse Resp BP SpO2   21 0724 98.2 °F (36.8 °C) 82 16 (!) 183/78 96 %   21 0249 98.5 °F (36.9 °C) 80 18 (!) 151/57 95 %   21 2109 97.9 °F (36.6 °C) 82 18 (!) 150/67 95 %   21 1514 97.7 °F (36.5 °C) 74 18 (!) 175/73 95 %   21 1403 97.9 °F (36.6 °C) 72 18 (!) 147/56 96 %   21 1202 97.7 °F (36.5 °C) 75 18 (!) 165/66 94 %       Intake/Output Summary (Last 24 hours) at 2021 0918  Last data filed at 2021 7220  Gross per 24 hour   Intake 1808 ml   Output 1775 ml   Net 33 ml        I had a face to face encounter and independently examined this patient on 11/20/2021, as outlined below:  PHYSICAL EXAM:  General: WD, WN. Alert, cooperative, no acute distress    EENT:  EOMI. Anicteric sclerae. MMM  Resp:  CTA bilaterally, no wheezing or rales. No accessory muscle use  CV:  Regular  rhythm,  No edema  GI:  Soft, Non distended, Non tender. +Bowel sounds  Neurologic:  Alert  Psych:   Good insight. Not anxious nor agitated  Skin:  No rashes. No jaundice    Reviewed most current lab test results and cultures  YES  Reviewed most current radiology test results   YES  Review and summation of old records today    NO  Reviewed patient's current orders and MAR    YES  PMH/SH reviewed - no change compared to H&P  ________________________________________________________________________  Care Plan discussed with:    Comments   Patient y    Family      RN y    Care Manager     Consultant                        Multidiciplinary team rounds were held today with , nursing, pharmacist and clinical coordinator. Patient's plan of care was discussed; medications were reviewed and discharge planning was addressed. ________________________________________________________________________  Total NON critical care TIME:  35    Minutes    Total CRITICAL CARE TIME Spent:   Minutes non procedure based      Comments   >50% of visit spent in counseling and coordination of care y    ________________________________________________________________________  Latricia Sanchez MD     Procedures: see electronic medical records for all procedures/Xrays and details which were not copied into this note but were reviewed prior to creation of Plan. LABS:  I reviewed today's most current labs and imaging studies.   Pertinent labs include:  Recent Labs     11/20/21  0504 11/19/21  0551 11/18/21  2319   WBC 13.2* 12.5* 11.5*   HGB 9.9* 9.4* 10.3*   HCT 30.3* 28.7* 30.8*   * 113* 141*     Recent Labs     11/20/21  0504 11/19/21  0038 11/18/21  2319 11/17/21  1027     --  131* 135* K 4.9  --  4.4 4.7     --  95* 97   CO2 26  --  26 30   *  --  365* 343*   BUN 57*  --  45* 48*   CREA 2.70*  --  2.65* 2.80*   CA 9.9  --  9.6 9.3   ALB  --   --  1.7*  --    TBILI  --   --  0.4  --    ALT  --   --  19  --    INR  --  1.0  --   --        Signed:  Bhavya Robb MD

## 2021-11-20 NOTE — PROGRESS NOTES
End of Shift Note    Bedside shift change report given to ESPERANZA Chang (oncoming nurse) by Marques Montoya LPN (offgoing nurse). Report included the following information SBAR, Kardex, OR Summary, Procedure Summary, Intake/Output, MAR and Recent Results    Shift worked:  7am-7pm     Shift summary and any significant changes:     Plan o f care, monitor for bowel pain increasing, monitor vitals signs,     Concerns for physician to address:  Plan of care, labs     Zone phone for oncoming shift:   [phone]       Activity:  Activity Level: Up with Assistance  Number times ambulated in hallways past shift: 0  Number of times OOB to chair past shift: 0    Cardiac:   Cardiac Monitoring: No           Access:   Current line(s): PIV     Genitourinary:   Urinary status: voiding/urinal    Respiratory:   O2 Device: Nasal cannula  Chronic home O2 use?: N/A  Incentive spirometer at bedside:N/A     GI:  Last Bowel Movement Date: 11/18/21  Current diet:  DIET NPO Sips of Water with Meds  Passing flatus: NO  Tolerating current diet: YES       Pain Management:   Patient states pain is manageable on current regimen: YES    Skin:  Ge Score: 16  Interventions: turn team, speciality bed, float heels and nutritional support     Patient Safety:  Fall Score:  Total Score: 4  Interventions: gripper socks, pt to call before getting OOB and stay with me (per policy)  High Fall Risk: Yes    Length of Stay:  Expected LOS: - - -  Actual LOS: 0      Marques Montoya LPN

## 2021-11-20 NOTE — PROCEDURES
Hemodialysis / [phone]    Vitals Pre Post Assessment Pre Post   BP BP: (!) 184/85 (11/20/21 1115) 151/73 LOC A & O x 4 No change   HR Pulse (Heart Rate): 81 (11/20/21 1115) 88 Lungs clear No change   Resp Resp Rate: (!) 1 (11/20/21 1115) 18 Cardiac S1S2 No change   Temp Temp: 98.7 °F (37.1 °C) (11/20/21 1100) 98.6 oral Skin Warm dry & intact No change   Weight  n/a n/a Edema None  No change   Tele status yes yes Pain Pain Intensity 1: 0 (11/20/21 0724) 0     Orders   Duration: Start: 1100 End: 1415 Total: 3.15   Dialyzer: Dialyzer/Set Up Inspection: Revaclear (11/20/21 1100)   K Bath: Dialysate K (mEq/L): 2 (11/20/21 1100)   Ca Bath: Dialysate CA (mEq/L): 2.5 (11/20/21 1100)   Na: Dialysate NA (mEq/L): 138 (11/20/21 1100)   Bicarb: Dialysate HCO3 (mEq/L): 40 (11/20/21 1100)   Target Fluid Removal: Goal/Amount of Fluid to Remove (mL): 2000 mL (11/20/21 1100)     Access cvc   Type & Location: Right sub clav pc   Comments:  RN reviewed LPN assessment and completed RN assessment. RN completed patient assessment. RN reviewed technicians vital signs and procedure note. Tx completed. Reviewed by ESPERANZA Singh                                      Labs   HBsAg (Antigen) / date:   Neg 11/16/21                                            HBsAb (Antibody) / date: susc 11/16/21   Source: epic   Obtained/Reviewed  Critical Results Called HGB   Date Value Ref Range Status   11/20/2021 9.9 (L) 12.1 - 17.0 g/dL Final     Potassium   Date Value Ref Range Status   11/20/2021 4.9 3.5 - 5.1 mmol/L Final     Calcium   Date Value Ref Range Status   11/20/2021 9.9 8.5 - 10.1 MG/DL Final     BUN   Date Value Ref Range Status   11/20/2021 57 (H) 6 - 20 MG/DL Final     Creatinine   Date Value Ref Range Status   11/20/2021 2.70 (H) 0.70 - 1.30 MG/DL Final        Meds Given   Name Dose Route   Heparin  3800u iv               Adequacy / Fluid    Total Liters Process: 71.5   Net Fluid Removed: 2000ml      Comments   Time Out Done:   (Time) 1045   Admitting Diagnosis: ESRD   Consent obtained/signed: Informed Consent Verified: Yes (11/20/21 1100)   Machine / RO # Machine Number: Michael Mendoza (11/20/21 1100)   Primary Nurse Rpt Pre: Vladimir Max RN   Primary Nurse Rpt Post: Vladimir Max RN   Pt Education: Acces [facility]   Care Plan: Continue HD   [facility]     Tx Summary RIJ CVC: Dressing CDI. No s/s of infection. Both lumens aspirate & flush well. Running well at . SBAR received from Primary RN. I arrived to pt's room A&Ox4. Consent signed & on file. 1100: Each catheter limb disinfected per p&p, caps removed, hubs disinfected per p&p. Each lumen aspirated for blood return and flushed with Normal Saline per policy. Labs drawn per request/ order. VSS. Dialysis Tx initiated. 1130: pt.stable, lines secure and visible  1200: pt. Stable. .  1230: pt. Stable,lines secure and visible  1300: pt. Resting well. Lines secure  1330: pt. Stable resting well  1400: pt. Stable, lines secure  1415: Tx ended. VSS. Each dialysis catheter limb disinfected per p&p, all possible blood returned per p&p, and each dialysis hub disinfected per p&p. Each lumen flushed, post dialysis catheter Heparin dwell instilled per order, and caps applied. Bed locked and in the lowest position, call bell and belongings in reach. SBAR given to Primary, RN. Patient is stable at time of their/ my departure. All Dialysis related medications have been reviewed.      Comments:

## 2021-11-20 NOTE — PROGRESS NOTES
Problem: Diabetes Self-Management  Goal: *Disease process and treatment process  Description: Define diabetes and identify own type of diabetes; list 3 options for treating diabetes. Outcome: Progressing Towards Goal  Goal: *Incorporating nutritional management into lifestyle  Description: Describe effect of type, amount and timing of food on blood glucose; list 3 methods for planning meals. Outcome: Progressing Towards Goal  Goal: *Incorporating physical activity into lifestyle  Description: State effect of exercise on blood glucose levels. Outcome: Progressing Towards Goal  Goal: *Developing strategies to promote health/change behavior  Description: Define the ABC's of diabetes; identify appropriate screenings, schedule and personal plan for screenings. Outcome: Progressing Towards Goal  Goal: *Using medications safely  Description: State effect of diabetes medications on diabetes; name diabetes medication taking, action and side effects. Outcome: Progressing Towards Goal  Goal: *Monitoring blood glucose, interpreting and using results  Description: Identify recommended blood glucose targets  and personal targets. Outcome: Progressing Towards Goal  Goal: *Prevention, detection, treatment of acute complications  Description: List symptoms of hyper- and hypoglycemia; describe how to treat low blood sugar and actions for lowering  high blood glucose level. Outcome: Progressing Towards Goal  Goal: *Prevention, detection and treatment of chronic complications  Description: Define the natural course of diabetes and describe the relationship of blood glucose levels to long term complications of diabetes.   Outcome: Progressing Towards Goal  Goal: *Developing strategies to address psychosocial issues  Description: Describe feelings about living with diabetes; identify support needed and support network  Outcome: Progressing Towards Goal  Goal: *Insulin pump training  Outcome: Progressing Towards Goal  Goal: *Sick day guidelines  Outcome: Progressing Towards Goal  Goal: *Patient Specific Goal (EDIT GOAL, INSERT TEXT)  Outcome: Progressing Towards Goal     Problem: Patient Education: Go to Patient Education Activity  Goal: Patient/Family Education  Outcome: Progressing Towards Goal     Problem: Falls - Risk of  Goal: *Absence of Falls  Description: Document Olivarez Reason Fall Risk and appropriate interventions in the flowsheet. Outcome: Progressing Towards Goal  Note: Fall Risk Interventions:  Mobility Interventions: Bed/chair exit alarm, Communicate number of staff needed for ambulation/transfer, Patient to call before getting OOB         Medication Interventions: Bed/chair exit alarm, Patient to call before getting OOB, Teach patient to arise slowly    Elimination Interventions: Bed/chair exit alarm, Call light in reach, Patient to call for help with toileting needs, Toileting schedule/hourly rounds, Urinal in reach    History of Falls Interventions: Bed/chair exit alarm, Door open when patient unattended, Investigate reason for fall, Room close to nurse's station         Problem: Patient Education: Go to Patient Education Activity  Goal: Patient/Family Education  Outcome: Progressing Towards Goal     Problem: Pressure Injury - Risk of  Goal: *Prevention of pressure injury  Description: Document Ge Scale and appropriate interventions in the flowsheet. Outcome: Progressing Towards Goal  Note: Pressure Injury Interventions:  Sensory Interventions: Assess changes in LOC, Float heels, Keep linens dry and wrinkle-free, Minimize linen layers, Turn and reposition approx. every two hours (pillows and wedges if needed)         Activity Interventions: Assess need for specialty bed, PT/OT evaluation, Increase time out of bed    Mobility Interventions: HOB 30 degrees or less, Float heels, Pressure redistribution bed/mattress (bed type), Turn and reposition approx.  every two hours(pillow and wedges)    Nutrition Interventions: Document food/fluid/supplement intake    Friction and Shear Interventions: Apply protective barrier, creams and emollients, HOB 30 degrees or less, Lift team/patient mobility team, Minimize layers                Problem: Patient Education: Go to Patient Education Activity  Goal: Patient/Family Education  Outcome: Progressing Towards Goal     Problem: Risk for Spread of Infection  Goal: Prevent transmission of infectious organism to others  Description: Prevent the transmission of infectious organisms to other patients, staff members, and visitors.   Outcome: Progressing Towards Goal     Problem: Patient Education:  Go to Education Activity  Goal: Patient/Family Education  Outcome: Progressing Towards Goal

## 2021-11-20 NOTE — PROGRESS NOTES
[facility] 166 served Manohar Nevarez MD \"BP is 183/78 HR 82. Its been trending up over the last shift. Intervention? \"     Waiting for a response

## 2021-11-21 NOTE — PROGRESS NOTES
End of Shift Note    Bedside shift change report given to [address] (oncoming nurse) by Jim Lamas (offgoing nurse). Report included the following information SBAR, Kardex, Intake/Output and MAR    Shift worked:  07a-7p     Shift summary and any significant changes:    Bed bound  Room air   Tolerated   Tolerated   Family present   Concerns for physician to address: none   Zone phone for oncoming shift:  None     Activity:  Activity Level: Up with Assistance, Bed Rest  Number times ambulated in hallways past shift: 0  Number of times OOB to chair past shift: 0    Cardiac:   Cardiac Monitoring: No           Access:   Current line(s): PIV and port     Genitourinary:   Urinary status: voiding    Respiratory:   O2 Device: Nasal cannula  Chronic home O2 use?: NO  Incentive spirometer at bedside: YES     GI:  Last Bowel Movement Date: 11/18/21  Current diet:  ADULT DIET Clear Liquid  Passing flatus: YES  Tolerating current diet: YES       Pain Management:   Patient states pain is manageable on current regimen: YES    Skin:  Ge Score: 15  Interventions: speciality bed, float heels and increase time out of bed    Patient Safety:  Fall Score:  Total Score: 3  Interventions: bed/chair alarm, assistive device (walker, cane, etc), gripper socks, pt to call before getting OOB and stay with me (per policy)  High Fall Risk: Yes    Length of Stay:  Expected LOS: - - -  Actual LOS: Mitch Betancourt LPN  46/94/55  [id number]

## 2021-11-21 NOTE — PROGRESS NOTES
SURGERY PROGRESS NOTE      Admit Date: 2021      Subjective:     Patient denies pain and nausea. Tolerating PO liquids and passing flatus      Objective:     Visit Vitals  BP (!) 166/70   Pulse 78   Temp 97.8 °F (36.6 °C)   Resp 18   Ht 5' 9\" (1.753 m)   Wt 92.5 kg (204 lb)   SpO2 99%   BMI 30.13 kg/m²        Temp (24hrs), Av.2 °F (36.8 °C), Min:97.7 °F (36.5 °C), Max:98.7 °F (37.1 °C)      701 - 1900  In: 150 [P.O.:150]  Out: -   1901 -  0700  In: 0552 [P.O.:750;  I.V.:2783]  Out: 3775 [NTBEZ:0485]    Physical Exam:    General:  alert, cooperative, no distress, appears stated age   Abdomen: soft, bowel sounds active, non-tender           Lab Results   Component Value Date/Time    WBC 15.6 (H) 2021 01:59 AM    HGB 9.4 (L) 2021 01:59 AM    Hemoglobin (POC) 8.4 (L) 2021 03:14 PM    Hemoglobin (POC) 14.6 2016 04:04 AM    HCT 28.1 (L) 2021 01:59 AM    Hematocrit (POC) 43 2016 04:04 AM    PLATELET 905 (L)  01:59 AM    MCV 93.4 2021 01:59 AM     Lab Results   Component Value Date/Time    GFR est non-AA 38 (L) 2021 01:59 AM    GFRNA, POC 12 (L) 2021 06:34 AM    GFR est AA 46 (L) 2021 01:59 AM    GFRAA, POC 15 (L) 2021 06:34 AM    Creatinine 1.82 (H) 2021 01:59 AM    Creatinine, POC 4.1 (H) 2021 02:44 AM    BUN 31 (H) 2021 01:59 AM    BUN (POC) 33 (H) 2016 04:04 AM    Sodium 134 (L) 2021 01:59 AM    Sodium (POC) 140 2021 06:34 AM    Sodium,  (L) 2021 02:44 AM    Potassium 4.0 2021 01:59 AM    Potassium (POC) 3.6 2021 06:34 AM    Potassium, POC 5.0 2021 02:44 AM    Chloride 100 2021 01:59 AM    Chloride, POC 96 (L) 2021 02:44 AM    CO2 28 2021 01:59 AM    Magnesium 1.9 2021 02:25 AM    Phosphorus 6.2 (H) 2021 08:12 AM    PTH, Intact 492.4 (H) 2021 03:04 PM       Assessment:     Active Problems:    Microalbuminuria (8/31/2016)      Severe obesity (BMI 35.0-39. 9) with comorbidity ([address] (3/24/2018)      Metastatic carcinoma ([address] (8/19/2020)      Brain metastases ([address] (10/17/2021)      End stage renal disease ([address] (11/19/2021)      Diabetes mellitus ([address] (11/19/2021)      Thyroid cancer ([address] (11/19/2021)      Intestinal diverticular abscess (11/19/2021)      DNR (do not resuscitate) ()      Goals of care, counseling/discussion ()      Debility ()      Need for emotional support ()    Improving.  Anticipate discharge to SNF +/- hospice care in the morning     Plan:       Diet  full liquid

## 2021-11-21 NOTE — PROGRESS NOTES
Bedside shift change report given to Luann Gardner. MARIBETHN (oncoming nurse) by Garett Javier RN (offgoing nurse). Report included the following information SBAR, Kardex, Intake/Output, MAR and Recent Results.

## 2021-11-21 NOTE — PROGRESS NOTES
End of Shift Note    Bedside shift change report given to [address] [place] [address]  (oncoming nurse) by Lisbeth Hernandez (offgoing nurse). Report included the following information SBAR, Kardex, Intake/Output and MAR    Shift worked: 07a-7p qq     Shift summary and any significant changes:    Family present  Tolerated medication   Tolerated diet   Turns self   dialysis  pulled off 2 L   Agitated    Concerns for physician to address: none   Zone phone for oncoming shift:   none       Activity:  Activity Level: Up with Assistance  Number times ambulated in hallways past shift: 0  Number of times OOB to chair past shift: 0    Cardiac:   Cardiac Monitoring: No           Access:   Current line(s): PIV     Genitourinary:   Urinary status: voiding    Respiratory:   O2 Device: None (Room air)  Chronic home O2 use?: NO  Incentive spirometer at bedside: YES     GI:  Last Bowel Movement Date: 11/18/21  Current diet:  ADULT DIET Clear Liquid  Passing flatus: YES  Tolerating current diet: YES       Pain Management:   Patient states pain is manageable on current regimen: YES    Skin:  Ge Score: 16  Interventions: turn team, speciality bed, float heels, foam dressing and PT/OT consult    Patient Safety:  Fall Score:  Total Score: 4  Interventions: bed/chair alarm, assistive device (walker, cane, etc) and pt to call before getting OOB  High Fall Risk: Yes    Length of Stay:  Expected LOS: - - -  Actual LOS: Mitch Merrill LPN  24'/37/09  [id number]

## 2021-11-21 NOTE — PROGRESS NOTES
Problem: Diabetes Self-Management  Goal: *Disease process and treatment process  Description: Define diabetes and identify own type of diabetes; list 3 options for treating diabetes. Outcome: Progressing Towards Goal  Goal: *Incorporating nutritional management into lifestyle  Description: Describe effect of type, amount and timing of food on blood glucose; list 3 methods for planning meals. Outcome: Progressing Towards Goal  Goal: *Incorporating physical activity into lifestyle  Description: State effect of exercise on blood glucose levels. Outcome: Progressing Towards Goal  Goal: *Developing strategies to promote health/change behavior  Description: Define the ABC's of diabetes; identify appropriate screenings, schedule and personal plan for screenings. Outcome: Progressing Towards Goal  Goal: *Using medications safely  Description: State effect of diabetes medications on diabetes; name diabetes medication taking, action and side effects. Outcome: Progressing Towards Goal  Goal: *Monitoring blood glucose, interpreting and using results  Description: Identify recommended blood glucose targets  and personal targets. Outcome: Progressing Towards Goal  Goal: *Prevention, detection, treatment of acute complications  Description: List symptoms of hyper- and hypoglycemia; describe how to treat low blood sugar and actions for lowering  high blood glucose level. Outcome: Progressing Towards Goal  Goal: *Prevention, detection and treatment of chronic complications  Description: Define the natural course of diabetes and describe the relationship of blood glucose levels to long term complications of diabetes.   Outcome: Progressing Towards Goal  Goal: *Developing strategies to address psychosocial issues  Description: Describe feelings about living with diabetes; identify support needed and support network  Outcome: Progressing Towards Goal  Goal: *Insulin pump training  Outcome: Progressing Towards Goal  Goal: *Sick day guidelines  Outcome: Progressing Towards Goal  Goal: *Patient Specific Goal (EDIT GOAL, INSERT TEXT)  Outcome: Progressing Towards Goal     Problem: Patient Education: Go to Patient Education Activity  Goal: Patient/Family Education  Outcome: Progressing Towards Goal     Problem: Falls - Risk of  Goal: *Absence of Falls  Description: Document Guy Cease Fall Risk and appropriate interventions in the flowsheet. Outcome: Progressing Towards Goal  Note: Fall Risk Interventions:  Mobility Interventions: Bed/chair exit alarm         Medication Interventions: Bed/chair exit alarm    Elimination Interventions: Call light in reach, Patient to call for help with toileting needs    History of Falls Interventions: Door open when patient unattended         Problem: Patient Education: Go to Patient Education Activity  Goal: Patient/Family Education  Outcome: Progressing Towards Goal     Problem: Pressure Injury - Risk of  Goal: *Prevention of pressure injury  Description: Document Ge Scale and appropriate interventions in the flowsheet. Outcome: Progressing Towards Goal  Note: Pressure Injury Interventions:  Sensory Interventions: Assess changes in LOC, Float heels, Keep linens dry and wrinkle-free, Turn and reposition approx.  every two hours (pillows and wedges if needed)    Moisture Interventions: Check for incontinence Q2 hours and as needed, Apply protective barrier, creams and emollients    Activity Interventions: Increase time out of bed, Pressure redistribution bed/mattress(bed type)    Mobility Interventions: Float heels, HOB 30 degrees or less, Pressure redistribution bed/mattress (bed type)    Nutrition Interventions: Document food/fluid/supplement intake    Friction and Shear Interventions: HOB 30 degrees or less, Lift sheet, Lift team/patient mobility team                Problem: Patient Education: Go to Patient Education Activity  Goal: Patient/Family Education  Outcome: Progressing Towards Goal     Problem: Risk for Spread of Infection  Goal: Prevent transmission of infectious organism to others  Description: Prevent the transmission of infectious organisms to other patients, staff members, and visitors.   Outcome: Progressing Towards Goal     Problem: Patient Education:  Go to Education Activity  Goal: Patient/Family Education  Outcome: Progressing Towards Goal     Problem: Chronic Renal Failure  Goal: *Fluid and electrolytes stabilized  Outcome: Progressing Towards Goal     Problem: Patient Education: Go to Patient Education Activity  Goal: Patient/Family Education  Outcome: Progressing Towards Goal     Problem: General Infection Care Plan (Adult and Pediatric)  Goal: Improvement in signs and symptoms of infection  Outcome: Progressing Towards Goal  Goal: *Optimize nutritional status  Outcome: Progressing Towards Goal     Problem: Patient Education: Go to Patient Education Activity  Goal: Patient/Family Education  Outcome: Progressing Towards Goal     Problem: Infection - Risk of, Central Venous Catheter-Associated Bloodstream Infection  Goal: *Absence of infection signs and symptoms  Outcome: Progressing Towards Goal     Problem: Patient Education: Go to Patient Education Activity  Goal: Patient/Family Education  Outcome: Progressing Towards Goal

## 2021-11-21 NOTE — PROGRESS NOTES
1930 /58    2130 Hydralazine PRN given    2230 /52    0300 /71    0305 Hydralazine PRN given    0340 /49    0350 Perfect serve sent to NP regarding IVF- \"Pt /58 @ 2000, given 10mg hydralazine PRN BP @ 2200 142/52 /71 @ 0300, 10mg hydralazine given PRN BP @ 0340 154/49 Pt is ESRD, should we d/c his continuous fluids? \"    0410 Per NP, IVF to continue until d/c by nephrology (who ordered them). Will continue to monitor BP    0700 PT takes multiple medications not listed on his MAR; perfect serve sent to hospitalist to attempt to fix.       Madhav Christopher, RN

## 2021-11-21 NOTE — PROGRESS NOTES
Problem: Diabetes Self-Management  Goal: *Disease process and treatment process  Description: Define diabetes and identify own type of diabetes; list 3 options for treating diabetes. Outcome: Progressing Towards Goal  Goal: *Incorporating nutritional management into lifestyle  Description: Describe effect of type, amount and timing of food on blood glucose; list 3 methods for planning meals. Outcome: Progressing Towards Goal  Goal: *Incorporating physical activity into lifestyle  Description: State effect of exercise on blood glucose levels. Outcome: Progressing Towards Goal  Goal: *Developing strategies to promote health/change behavior  Description: Define the ABC's of diabetes; identify appropriate screenings, schedule and personal plan for screenings. Outcome: Progressing Towards Goal  Goal: *Using medications safely  Description: State effect of diabetes medications on diabetes; name diabetes medication taking, action and side effects. Outcome: Progressing Towards Goal  Goal: *Monitoring blood glucose, interpreting and using results  Description: Identify recommended blood glucose targets  and personal targets. Outcome: Progressing Towards Goal  Goal: *Prevention, detection, treatment of acute complications  Description: List symptoms of hyper- and hypoglycemia; describe how to treat low blood sugar and actions for lowering  high blood glucose level. Outcome: Progressing Towards Goal  Goal: *Prevention, detection and treatment of chronic complications  Description: Define the natural course of diabetes and describe the relationship of blood glucose levels to long term complications of diabetes.   Outcome: Progressing Towards Goal  Goal: *Developing strategies to address psychosocial issues  Description: Describe feelings about living with diabetes; identify support needed and support network  Outcome: Progressing Towards Goal  Goal: *Insulin pump training  Outcome: Progressing Towards Goal  Goal: *Sick day guidelines  Outcome: Progressing Towards Goal  Goal: *Patient Specific Goal (EDIT GOAL, INSERT TEXT)  Outcome: Progressing Towards Goal     Problem: Patient Education: Go to Patient Education Activity  Goal: Patient/Family Education  Outcome: Progressing Towards Goal     Problem: Falls - Risk of  Goal: *Absence of Falls  Description: Document Clydene Bone Fall Risk and appropriate interventions in the flowsheet. Outcome: Progressing Towards Goal  Note: Fall Risk Interventions:  Mobility Interventions: Bed/chair exit alarm, Communicate number of staff needed for ambulation/transfer, Patient to call before getting OOB         Medication Interventions: Bed/chair exit alarm    Elimination Interventions: Call light in reach, Patient to call for help with toileting needs    History of Falls Interventions: Consult care management for discharge planning, Bed/chair exit alarm, Vital signs minimum Q4HRs X 24 hrs (comment for end date)         Problem: Patient Education: Go to Patient Education Activity  Goal: Patient/Family Education  Outcome: Progressing Towards Goal     Problem: Pressure Injury - Risk of  Goal: *Prevention of pressure injury  Description: Document Ge Scale and appropriate interventions in the flowsheet. Outcome: Progressing Towards Goal  Note: Pressure Injury Interventions:  Sensory Interventions: Assess changes in LOC, Float heels, Keep linens dry and wrinkle-free, Turn and reposition approx.  every two hours (pillows and wedges if needed)    Moisture Interventions: Check for incontinence Q2 hours and as needed, Apply protective barrier, creams and emollients    Activity Interventions: Increase time out of bed, Pressure redistribution bed/mattress(bed type)    Mobility Interventions: Float heels, HOB 30 degrees or less, Pressure redistribution bed/mattress (bed type)    Nutrition Interventions: Document food/fluid/supplement intake    Friction and Shear Interventions: HOB 30 degrees or less, Lift sheet, Lift team/patient mobility team                Problem: Patient Education: Go to Patient Education Activity  Goal: Patient/Family Education  Outcome: Progressing Towards Goal     Problem: Risk for Spread of Infection  Goal: Prevent transmission of infectious organism to others  Description: Prevent the transmission of infectious organisms to other patients, staff members, and visitors.   Outcome: Progressing Towards Goal     Problem: Patient Education:  Go to Education Activity  Goal: Patient/Family Education  Outcome: Progressing Towards Goal     Problem: Chronic Renal Failure  Goal: *Fluid and electrolytes stabilized  Outcome: Progressing Towards Goal     Problem: Patient Education: Go to Patient Education Activity  Goal: Patient/Family Education  Outcome: Progressing Towards Goal     Problem: General Infection Care Plan (Adult and Pediatric)  Goal: Improvement in signs and symptoms of infection  Outcome: Progressing Towards Goal  Goal: *Optimize nutritional status  Outcome: Progressing Towards Goal     Problem: Patient Education: Go to Patient Education Activity  Goal: Patient/Family Education  Outcome: Progressing Towards Goal     Problem: Infection - Risk of, Central Venous Catheter-Associated Bloodstream Infection  Goal: *Absence of infection signs and symptoms  Outcome: Progressing Towards Goal     Problem: Patient Education: Go to Patient Education Activity  Goal: Patient/Family Education  Outcome: Progressing Towards Goal

## 2021-11-21 NOTE — PROGRESS NOTES
Received message from patient's nurse stating:    Pt /58 @ 2000, given 10mg hydralazine PRN  BP @ 2200 142/52  /71 @ 0300, 10mg hydralazine given PRN  BP @ 0340 154/49    Pt is ESRD, should we d/c his continuous fluids? Discussion / orders:    Has IV fluid going at 50 mL/h which was ordered by nephrologist.  Patient is on clear liquid diet only. We will continue current IV fluid unless otherwise changed or discontinued by nephrology. Please note that this note was dictated using Dragon computer voice recognition software. Quite often unanticipated grammatical, syntax, homophones, and other interpretive errors are inadvertently transcribed by the computer software. Please disregard these errors. Please excuse any errors that have escaped final proofreading.

## 2021-11-22 NOTE — PROGRESS NOTES
1120 perfect served Wlilian Brewster MD \"pts blood sugar is 373. needing extra coverage of Lispro please?  \"      Willian Brewster MD responded 1240 \"Give him 15 units\"

## 2021-11-22 NOTE — PROGRESS NOTES
Transition of Care Plan:     RUR: 28% \"high risk\"  Disposition: TBD- no PT/OT orders  Follow up appointments: PCP, Nephrology   OP HD: TTS HD 7:15 AM chair time at [facility]   DME needed: None  Transportation at Discharge: [facility] or means to access home:  Family will have access      IM Medicare Letter: Needed at d/c  Is patient a BCPI-A Bundle: No                     If yes, was Bundle Letter given?:     Caregiver Contact: Edgar Tiffanie, [phone]  Discharge Caregiver contacted prior to discharge? Update 1:51 PM  CM received phone call from pt's dtr/ Abhishek (Douglas Santana, [phone]) with questions regarding pt's discharge. Douglas Santana requesting clinical updates; encouraged dtr to contact pt's RN. CM discussed pt's wises to pursue rehab at discharge. CM informed pt's dtr that palliative NP spoke with pt today & plans for Oncology to follow-up to discuss treatment options if any. CM updates dtr that SNF referral sent to [address] [place] [address],[address] was declined at this time; CM to send addtl referrals if appropriate. Duoglas Santana reports she will be at [address] today around 5 PM.  Will continue to follow & assist with discharge planning. Initial note-  CM reviewed pt's chart. CM acknowledged consult to assist with hospice admission. CM met with pt at bedside who reports wanting rehab following discharge & return home with family once able. Pt able to make decisions & requests CM send SNF referral to [address] [place] [address],[address]. Of note, palliative team following pt & will assist with any further conversations relating to hospice. At this time pt wishes to move forward with treatment & pursue rehab/ SNF. Will continue to follow. CM completed LTSS screening today; assessment reference # [id number].     HEAVEN Wallace  Care Management

## 2021-11-22 NOTE — PROGRESS NOTES
[address] [facility] [address], [address] [facility], [address] [place] [address]  [phone]        Progress Note        Patient: Concha Goldsmith MRN: [id number]  SSN: [id number]    YOB: 1960  Age: 63 y.o. Sex: male        Diagnosis:     1. 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 He was diagnosed with papillary thyroid carcinoma in 2002. He underwent a total thyroidectomy by Dr. Melissa Laguna. This was followed by STRATTON ablation. He has been on TSH suppression since that time. CT shows disease progression in the chest. Latest MRI brain shows right frontal metastasis. He is now on HD and is on nasal O2. Patient with multiple recent hospitalizations and continued decline in performance status. Palliative medicine has been working with patient and family to discuss goals of care. Patient is weak and unable to ambulate independently.        Review of Systems:    Constitutional: weak  Eyes: negative  Ears, Nose, Mouth, Throat, and Face: hoarseness  Respiratory: dyspnea  Cardiovascular: negative  Gastrointestinal: negative  Genitourinary:negative  Integument/Breast: negative  Hematologic/Lymphatic: negative  Musculoskeletal:negative  Neurological: negative        Past Medical History:   Diagnosis Date    Adverse effect of anesthesia     \" STOP BREATHING 1 TIME C ANESTH\"    Cancer ([address] 2004    thyroid cancer    Chronic kidney disease     [facility] T-[facility]S    Chronic pain     BACK SHOULDER AND ARM    COVID-19 04/2021    Depression     Diabetes ([address]     GERD (gastroesophageal reflux disease)     Heart failure (HCC)     stage 1    Hypercholesterolemia     Hypertension     Nausea & vomiting     PUD (peptic ulcer disease)     Sleep apnea     doesn't wear cpap    Thyroid cancer ([address]     TIA (transient ischemic attack)     Vitamin D deficiency      Past Surgical History:   Procedure Laterality Date    HX HEART CATHETERIZATION      HX HEENT      THROAT SURGERY X 4    HX ORTHOPAEDIC      back     HX ORTHOPAEDIC      ARM AND SHOULDER    HX OTHER SURGICAL      thyroid, lymphnode    HX RETINAL DETACHMENT REPAIR      left eye    HX VASCULAR ACCESS      HD cather in chest    IR INJ FORAMIN EPID LUMB ANES/STER SNGL  10/20/2021    IR INSERT NON TUNL CVC OVER 5 YRS  2020    IR INSERT NON TUNL CVC OVER 5 YRS  2021    IR INSERT TUNL CVC W/O PORT OVER 5 YR  2020    IR INSERT TUNL CVC W/O PORT OVER 5 YR  2021    UPPER GI ENDOSCOPY,BALL DIL,30MM  2020         UPPER GI ENDOSCOPY,BIOPSY  2020         VASCULAR SURGERY PROCEDURE UNLIST      cardiac cath NEG. Family History   Problem Relation Age of Onset    Diabetes Mother     Elevated Lipids Mother    Carmencita Fee Bladder Disease Mother     Headache Mother    Marlin Self Migraines Mother     Heart Disease Mother     Hypertension Mother     Stroke Mother     Other Mother         ANEURSYM BRAIN    Bleeding Prob Father     Cancer Father         LEUKEMIA    Diabetes Father     Elevated Lipids Father     Mental Retardation Sister     Psychiatric Disorder Sister     Cancer Brother         LUNGS     Social History     Tobacco Use    Smoking status: Former Smoker     Quit date: 1994     Years since quittin.2    Smokeless tobacco: Never Used   Substance Use Topics    Alcohol use: Not Currently      Prior to Admission medications    Medication Sig Start Date End Date Taking? Authorizing Provider   amoxicillin-clavulanate (Augmentin) 875-125 mg per tablet Take 1 Tablet by mouth two (2) times a day.  21  Yes Whit Jackson NP   docusate sodium (Colace) 100 mg capsule Take 1 Capsule by mouth two (2) times a day for 90 days. 11/17/21 2/15/22 Yes Romulo Wynn NP   sodium bicarbonate 650 mg tablet Take 2 Tablets by mouth two (2) times a day. 11/13/21  Yes Amandeep Alfaro MD   levothyroxine (SYNTHROID) 175 mcg tablet 1 full tab Mon-Sat but only 1/2 tab on sundays 11/5/21  Yes Amandeep Alfaro MD   metoclopramide HCl (REGLAN) 5 mg tablet TAKE 1 TABLET BY MOUTH ONCE DAILY AS NEEDED. *DO NOT TAKE MORE THAN 3 TABLETS A DAY 11/5/21  Yes Amandeep Alfaro MD   NovoLIN N NPH U-100 Insulin 100 unit/mL injection INJECT 2 TO 10 UNITS SUBCUTANEOUSLY ONCE DAILY 11/5/21  Yes Amandeep Alfaro MD   lancets misc Use preferred brand; Check glucose 3-4 times daily, Diagnosis E11.22 11/5/21  Yes Naseem Prado MD   carvediloL (COREG) 6.25 mg tablet Take 1 Tablet by mouth two (2) times daily (with meals). 11/5/21  Yes Amandeep Alfaro MD   hydrALAZINE (APRESOLINE) 50 mg tablet Take 1 Tablet by mouth two (2) times a day. 11/5/21  Yes Amandeep Alfaro MD   amLODIPine (NORVASC) 10 mg tablet Take 1 Tablet by mouth daily. 11/5/21  Yes Amandeep Alfaro MD   sevelamer carbonate (RENVELA) 800 mg tab tab TAKE 1 TABLET BY MOUTH THREE TIMES DAILY WITH MEALS 11/3/21  Yes Amandeep Alfaro MD   SORAfenib (NEXAVAR) 200 mg tablet Take 1 Tablet by mouth daily for 30 days. 10/29/21 11/28/21 Yes Klaus Callejas MD   dexAMETHasone (DECADRON) 4 mg tablet Take 4 mg by mouth every eight (8) hours. 10/21/21  Yes Jericho Wynn NP   bumetanide (BUMEX) 2 mg tablet Take 1 Tablet by mouth daily. 7/28/21  Yes Stephanie Herron MD   omeprazole (PRILOSEC) 20 mg capsule Take 1 capsule by mouth once daily 6/11/21  Yes Amandeep Alfaro MD   simvastatin (ZOCOR) 20 mg tablet Take 1 Tab by mouth nightly. 5/10/21  Yes Amandeep Alfaro MD   Vitamin D2 1,250 mcg (50,000 unit) capsule TAKE 1 CAPSULE BY MOUTH ONCE A WEEK  Patient taking differently: Take 50,000 Units by mouth every Monday.  TAKE 1 CAPSULE BY MOUTH ONCE A WEEK 5/10/21  Yes Amandeep Alfaro MD   sodium zirconium cyclosilicate (Lokelma) 5 gram powder packet Take 5 g by mouth two (2) times a week. On Fridays and Sundays   Yes Provider, Historical   Ventolin HFA 90 mcg/actuation inhaler TAKE 1-2 PUFFS EVEERY 4-6 HOURS AS NEEDED FOR DYSPNEA AND WHEEZING 7/28/20  Yes Bird Prado MD              Allergies   Allergen Reactions    Anesthetics - Amide Type - Select Amino Amides Shortness of Breath    Flexeril [Cyclobenzaprine] Hives    Tramadol Hives           Objective:     Visit Vitals  BP (!) 160/68   Pulse 84   Temp 98.1 °F (36.7 °C)   Resp 17   Ht 5' 9\" (1.753 m)   Wt 204 lb (92.5 kg)   SpO2 100%   BMI 30.13 kg/m²       Physical Exam:    GENERAL: alert, cooperative, no distress, appears stated age  EYE: negative  LYMPHATIC: Cervical, supraclavicular, and axillary nodes normal.   THROAT & NECK: normal and no erythema or exudates noted. Scar from thyroidectomy. LUNG: clear to auscultation bilaterally  HEART: regular rate and rhythm  ABDOMEN: soft, non-tender  EXTREMITIES:  no edema  SKIN: Normal.  NEUROLOGIC: negative      MRI Results (most recent):  Results from Hospital Encounter encounter on 10/14/21    MRI BRAIN W WO CONT    Narrative  EXAM:  MRI BRAIN W WO CONT    INDICATION:    Brain METS, pt needs conscious sedation because of claustrophobia    COMPARISON:  None. CONTRAST: 20 ml IV ProHance. TECHNIQUE:  Multiplanar multisequence acquisition without and with contrast of the brain. FINDINGS:  There is an 11 x 12 mm ring-enhancing lesion with some surrounding edema in the  a right frontal convexity in view of the patient history this is likely a  metastases. X line there is no extra-axial fluid collection hemorrhage or shift. No additional enhancing lesion seen. Ventricles size is normal for age. Major vessels appear patent. There is moderate nonspecific white matter changes. Incidental mucosal thickening left maxillary sinus. Impression  1.  Intra-axial single enhancing lesion right frontal convexity with surrounding  edema compatible with brain metastases. 2. Moderate white matter disease, nonspecific. CT Results (most recent):  Results from Hospital Encounter encounter on 11/18/21    CT ABD PELV WO CONT    Narrative  EXAM: CT ABD PELV WO CONT    INDICATION: periumbilical pain, constipation    COMPARISON: November 10, 2021    CONTRAST:  None. TECHNIQUE:  Thin axial images were obtained through the abdomen and pelvis. Coronal and  sagittal reformats were generated. Oral contrast was not administered. CT dose  reduction was achieved through use of a standardized protocol tailored for this  examination and automatic exposure control for dose modulation. The absence of intravenous contrast material reduces the sensitivity for  evaluation of the vasculature and solid organs. FINDINGS:  LOWER THORAX: Numerous pulmonary nodules are again seen, measuring up to 4 cm. LIVER: No visualized mass. BILIARY TREE: Gallbladder contains tiny calculi but demonstrates no wall  thickening or CBD is not dilated. SPLEEN: within normal limits. PANCREAS: No focal abnormality. ADRENALS: Unchanged 1.5 cm left adrenal nodule  KIDNEYS/URETERS: Atrophic kidneys. No hydronephrosis. No urinary tract calculi. STOMACH: Unremarkable. SMALL BOWEL: No dilatation or wall thickening. COLON: No dilatation or wall thickening. APPENDIX: Normal.  PERITONEUM: Small pneumoperitoneum. Inflammatory changes in the left mesentery  with 2.8 cm mesenteric abscess. RETROPERITONEUM: No lymphadenopathy or aortic aneurysm. REPRODUCTIVE ORGANS: Unremarkable. URINARY BLADDER: Contains a small amount of air, presumably related to recent  catheterization. BONES: 4.8 cm soft tissue mass along the posterior margin of the right femoral  neck. ABDOMINAL WALL: No mass or hernia. ADDITIONAL COMMENTS: N/A    Impression  Small pneumoperitoneum, compatible with bowel perforation. Small left-sided  mesenteric abscess. Inflammatory changes in the left mesentery. Unchanged  numerous hepatic metastases. Unchanged left adrenal nodule. Large soft tissue  mass arising from the right hip is compatible with metastatic disease. The findings were called to Dr. Jung Brooks on 11/19/2021 at 12:24 AM by Dr. Ash Bird. [id number]      Assessment:     1. 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   Radio-iodine refractory               BRAF V600E mutation - detected              MSI - stable       ECOG PS 0  Intent of treatment - palliative      2002 Biopsy suggesting PTC                        S/p total thyroidectomy                        S/p STRATTON  [id number]  Reports negative WBS  10/10/16  Repeat surgery, 2 right paratrachial LN's                        Surgical Path: poorly differentiated PTC                        Patient with hoarseness of voice, persistent                          On 200mcg LT4    Lung nodules are progressing  MRI brain - right frontal metastasis    Due to significant progression of disease in the lungs and brain, I recommend he start systemic therapy. Due to CKD, Lenvatinib cannot be used. I recommend he start dose reduced Sorafenib 200 mg daily. He would also undergo gamma knife. Patient hospitalized 11/16/21 for UTI and fall at home. Now admitted for abdominal pain and found to have small bowel mesenteric abscess. Patient's overall performance status and mobility continue to decline. Patient wants to continue to pursue any treatment options. Discussed that performance status will need to improve significantly to be considered for any further treatment. Also re-enforced to patient that any treatment will be in a palliative intent and that his disease is NOT curable. Plan:     > Supportive care   > Will follow-up outpatient after discharge from SNF. Patient's performance status will need to significantly improve in order to be considered for any further treatment.       Signed by: Agus Mireles NP                     November 22, 2021

## 2021-11-22 NOTE — PROGRESS NOTES
Hospitalist Progress Note    NAME: Concha Goldsmith   :  1960   MRN:  [id number]       Assessment / Plan:    Perforated Viscus   treatment as per surgery  -Continue antibiotic therapy  -- started on clear liquid diet     ESRD on HD TTS  Nephrology Consult   continue hemodialysis    DMII  Thyroid cancer with metastasis to the brain and lung  HTN  HLD  GERD  Hx of TIA  SHOLA with chronic respiratory failure 2-3L NC  Hold all oral meds - can slowly resume once cleared for PO intake by Surgery  Pt has been taking Decadron 4mg q8hrs for edema associated with brain mets. Will Pharmacy to switch to IV while pt remains NPO. FS monitoring, SS       Code Status: As per primary team  DVT Prophylaxis: As per primary team         Subjective:     Chief Complaint / Reason for Physician Visit  . Discussed with RN events overnight. Patient stated he feels better,possible d/c to SNF    Review of Systems:  Symptom Y/N Comments  Symptom Y/N Comments   Fever/Chills n   Chest Pain n    Poor Appetite    Edema     Cough    Abdominal Pain y    Sputum    Joint Pain     SOB/NUÑEZ y   Pruritis/Rash     Nausea/vomit    Tolerating PT/OT     Diarrhea    Tolerating Diet n    Constipation n   Other       Could NOT obtain due to:      Objective:     VITALS:   Last 24hrs VS reviewed since prior progress note.  Most recent are:  Patient Vitals for the past 24 hrs:   Temp Pulse Resp BP SpO2   21 1418 98.1 °F (36.7 °C) 84 17 (!) 160/68 100 %   21 1026 97.6 °F (36.4 °C) 72 17 (!) 155/71 98 %   21 0718 97.9 °F (36.6 °C) 75 17 (!) 150/61 98 %   21 0404 97.8 °F (36.6 °C) 78 18 (!) 166/73 97 %   21 0017 98.3 °F (36.8 °C) 79 18 (!) 147/62 --   21 2104 97.9 °F (36.6 °C) -- 16 (!) 149/59 99 %   21 1736 98.1 °F (36.7 °C) 72 17 (!) 138/55 96 %       Intake/Output Summary (Last 24 hours) at 2021 1559  Last data filed at 2021 0805  Gross per 24 hour   Intake 200 ml   Output 700 ml   Net -500 ml I had a face to face encounter and independently examined this patient on 11/22/2021, as outlined below:  PHYSICAL EXAM:  General: WD, WN. Alert, cooperative, no acute distress    EENT:  EOMI. Anicteric sclerae. MMM  Resp:  CTA bilaterally, no wheezing or rales. No accessory muscle use  CV:  Regular  rhythm,  No edema  GI:  Soft, Non distended, Non tender. +Bowel sounds  Neurologic:  Alert  Psych:   Good insight. Not anxious nor agitated  Skin:  No rashes. No jaundice    Reviewed most current lab test results and cultures  YES  Reviewed most current radiology test results   YES  Review and summation of old records today    NO  Reviewed patient's current orders and MAR    YES  PMH/SH reviewed - no change compared to H&P  ________________________________________________________________________  Care Plan discussed with:    Comments   Patient y    Family      RN y    Care Manager     Consultant                        Multidiciplinary team rounds were held today with , nursing, pharmacist and clinical coordinator. Patient's plan of care was discussed; medications were reviewed and discharge planning was addressed. ________________________________________________________________________  Total NON critical care TIME:  35    Minutes    Total CRITICAL CARE TIME Spent:   Minutes non procedure based      Comments   >50% of visit spent in counseling and coordination of care y    ________________________________________________________________________  Case Montoya MD     Procedures: see electronic medical records for all procedures/Xrays and details which were not copied into this note but were reviewed prior to creation of Plan. LABS:  I reviewed today's most current labs and imaging studies.   Pertinent labs include:  Recent Labs     11/22/21  0427 11/21/21  0159 11/20/21  0504   WBC 11.7* 15.6* 13.2*   HGB 11.9* 9.4* 9.9*   HCT 36.2* 28.1* 30.3*   * 111* 112*     Recent Labs 11/22/21  0427 11/21/21  0159 11/20/21  0504   * 134* 139   K 4.8 4.0 4.9   CL 96* 100 107   CO2 30 28 26   * 208* 129*   BUN 53* 31* 57*   CREA 2.57* 1.82* 2.70*   CA 9.8 9.7 9.9       Signed:  Earlene Godoy MD

## 2021-11-22 NOTE — PROGRESS NOTES
1934 Bedside and Verbal shift change report given to (oncoming nurse) by LIZA Deng (offgoing nurse). Report included the following information SBAR, ED Summary and MAR.

## 2021-11-22 NOTE — PROGRESS NOTES
Patient called out for nurse to report unusual bruising has developed around right PIV site, antibiotic infusing. Stopped antibiotic. Have reached out to PICC team for new site. Left arm cannot be used. Right farm is not edematous, but is bruised. Patient wanted antibiotic to be stopped as well.

## 2021-11-22 NOTE — PROGRESS NOTES
End of Shift Note    Bedside shift change report given to Tanya Nelson RN (oncoming nurse) by Song Bell LPN (offgoing nurse). Report included the following information SBAR, Kardex, ED Summary, Procedure Summary, Intake/Output, MAR and Recent Results    Shift worked:  7am-7pm     Shift summary and any significant changes:     continued plan of care, IV Fluids discontinued. Palliative care and oncology following. Reg. Diet, is tolerating this diet. Concerns for physician to address:  Plan of care, Hospice/palliative care following. Oncology directives     Zone phone for oncoming shift:   [phone]       Activity:  Activity Level: Up with Assistance  Number times ambulated in hallways past shift: 0  Number of times OOB to chair past shift: 0    Cardiac:   Cardiac Monitoring: No           Access:   Current line(s): PIV     Genitourinary:   Urinary status: voiding    Respiratory:   O2 Device: Nasal cannula  Chronic home O2 use?: YES  Incentive spirometer at bedside: N/A     GI:  Last Bowel Movement Date: 11/18/21  Current diet:  ADULT DIET Regular  DIET ONE TIME MESSAGE  Passing flatus: YES  Tolerating current diet: YES       Pain Management:   Patient states pain is manageable on current regimen: YES    Skin:  Ge Score: 16  Interventions: turn team, float heels and nutritional support     Patient Safety:  Fall Score:  Total Score: 1  Interventions: bed/chair alarm, gripper socks and pt to call before getting OOB  High Fall Risk: Yes    Length of Stay:  Expected LOS: - - -  Actual LOS: [address], LPN

## 2021-11-22 NOTE — PROGRESS NOTES
Nephrology Progress Note  José Miguel Butt     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   lung nodules    PLAN-   Plan for HD tomorrow     Subjective: Interval History:   -Seen and examined today  - Feels better    Objective:   Vitals:    11/22/21 0017 11/22/21 0404 11/22/21 0718 11/22/21 1026   BP: (!) 147/62 (!) 166/73 (!) 150/61 (!) 155/71   Pulse: 79 78 75 72   Resp: 18 18 17 17   Temp: 98.3 °F (36.8 °C) 97.8 °F (36.6 °C) 97.9 °F (36.6 °C) 97.6 °F (36.4 °C)   TempSrc:       SpO2:  97% 98% 98%   Weight:       Height:          11/21 0701 - 11/22 0700  In: 450 [P.O.:450]  Out: 500 [Urine:500]  Last 3 Recorded Weights in this Encounter    11/19/21 1346   Weight: 92.5 kg (204 lb)      Physical exam:   GEN: NAD  NECK- Supple, no mass  RESP: No wheezing, clear b/l  CVS: S1,S2  RRR  NEURO: Normal speech, Non focal  ABDO: tender + ,   PSYCH: Normal Mood    Chart reviewed. Pertinent Notes reviewed. Data Review :  Recent Labs     11/22/21 0427 11/21/21 0159 11/20/21  0504   * 134* 139   K 4.8 4.0 4.9   CL 96* 100 107   CO2 30 28 26   BUN 53* 31* 57*   CREA 2.57* 1.82* 2.70*   * 208* 129*   CA 9.8 9.7 9.9     Recent Labs     11/22/21 0427 11/21/21 0159 11/20/21  0504   WBC 11.7* 15.6* 13.2*   HGB 11.9* 9.4* 9.9*   HCT 36.2* 28.1* 30.3*   * 111* 112*     No results for input(s): FE, TIBC, PSAT, FERR in the last 72 hours.    Medication list  reviewed  Current Facility-Administered Medications   Medication Dose Route Frequency    LORazepam (ATIVAN) injection 1 mg  1 mg IntraVENous Q6H PRN    hydrALAZINE (APRESOLINE) 20 mg/mL injection 10 mg  10 mg IntraVENous Q6H PRN    heparin (porcine) 1,000 unit/mL injection 3,800 Units  3,800 Units Hemodialysis DIALYSIS PRN    amLODIPine (NORVASC) tablet 10 mg  10 mg Oral DAILY    bumetanide (BUMEX) tablet 2 mg  2 mg Oral DAILY    carvediloL (COREG) tablet 6.25 mg  6.25 mg Oral BID WITH MEALS    dexAMETHasone (DECADRON) tablet 4 mg  4 mg Oral Q8H    hydrALAZINE (APRESOLINE) tablet 50 mg  50 mg Oral BID    albuterol-ipratropium (DUO-NEB) 2.5 MG-0.5 MG/3 ML  3 mL Nebulization Q6H PRN    HYDROmorphone (DILAUDID) injection 0.5-1 mg  0.5-1 mg IntraVENous Q2H PRN    ondansetron (ZOFRAN) injection 4 mg  4 mg IntraVENous Q4H PRN    acetaminophen (TYLENOL) tablet 650 mg  650 mg Oral Q6H PRN    HYDROcodone-acetaminophen (NORCO)  mg tablet 1 Tablet  1 Tablet Oral Q4H PRN    piperacillin-tazobactam (ZOSYN) 3.375 g in 0.9% sodium chloride (MBP/ADV) 100 mL MBP  3.375 g IntraVENous Q12H    insulin lispro (HUMALOG) injection   SubCUTAneous Q6H    glucose chewable tablet 16 g  4 Tablet Oral PRN    dextrose (D50W) injection syrg 12.5-25 g  12.5-25 g IntraVENous PRN    glucagon (GLUCAGEN) injection 1 mg  1 mg IntraMUSCular PRN          Roshni Cain MD              [facility]  [facility]  [address], [address]  Phone - [phone]               Fax - [phone]

## 2021-11-22 NOTE — DIABETES MGMT
[address] [facility]    CLINICAL NURSE SPECIALIST CONSULT     Initial Presentation   Sylvia Crane is a 63 y.o. male admitted 11/18/2021 with abdominal pain. HX:   Past Medical History:   Diagnosis Date    Adverse effect of anesthesia     \" STOP BREATHING 1 TIME C ANESTH\"    Cancer ([facility] 2004    thyroid cancer    Chronic kidney disease     [facility] T-[facility]-S    Chronic pain     BACK SHOULDER AND ARM    COVID-19 04/2021    Depression     Diabetes ([facility]     GERD (gastroesophageal reflux disease)     Heart failure (HCC)     stage 1    Hypercholesterolemia     Hypertension     Nausea & vomiting     PUD (peptic ulcer disease)     Sleep apnea     doesn't wear cpap    Thyroid cancer ([facility]     TIA (transient ischemic attack) 2011    Vitamin D deficiency         INITIAL DX:   Intestinal diverticular abscess [K63.0]  End stage renal disease (HCC) [N18.6]  Diabetes mellitus ([facility] [E11.9]  Thyroid cancer ([facility] [C73]     Current Treatment     TX: Insulin, BP management, ABx, Bumex, Coreg  Consulted by Provider for adrvanced diabetes nursing assessment and care for:    [x] Inpatient management strategy    Hospital Course   Clinical progress has been complicated by steroid use. Diabetes History   DM2. A1C 6.2 %. PTA Decadron for past 3 weeks. Diabetes-related Medical History  Acute complications  Persistent Hyperglycemia r/t steroid  Neurological complications  URI  Microvascular disease  Nephropathy  Macrovascular disease  TIA  Other associated conditions     HTN, CHF    Diabetes Medication History  Key Antihyperglycemic Medications             NovoLIN N NPH U-100 Insulin 100 unit/mL injection (Taking) INJECT 2 TO 10 UNITS SUBCUTANEOUSLY ONCE DAILY           Subjective   Ever since the steroids my numbers have been up.     Patient reports the following home diabetes self-management practices:   Eating pattern-Started on a regular adult diet while inpatient and eating well     Physical activity pattern -Limited due to Chronic illness     Monitoring pattern   [x] Testing BGs sufficiently to inform self-management adjustments    Taking medications pattern  [x] Consistent administration  [x] Affordable        Overall evaluation:    [x] Achieving A1c target with drug therapy & self-care practices     Objective   Physical exam  General Over-weight male in no acute distress and ill-appearin. Conversant and cooperative  Neuro  Alert, oriented   Vital Signs   Visit Vitals  BP (!) 155/71   Pulse 72   Temp 97.6 °F (36.4 °C)   Resp 17   Ht 5' 9\" (1.753 m)   Wt 92.5 kg (204 lb)   SpO2 98%   BMI 30.13 kg/m²   \. Laboratory  Recent Labs     11/22/21  0427 11/21/21  0159 11/20/21  0504   * 208* 129*   AGAP 6 6 6   WBC 11.7* 15.6* 13.2*   CREA 2.57* 1.82* 2.70*   GFRNA 26* 38* 24*       Factors impacting BG management  Factor Dose Comments   Nutrition:  Standard meals     Regular diet    Drugs:    Steroids       PTA 4mg decradron and while hospialized     Impairs insulin action  Decadron insulin dosing    >8mg dexamethosone = 0.4units/kg   6mg   dexamethosone = 0.3units/kg   4mg   dexamethosone = 0.2units/kg   2mg   dexamethosone = 0.1units/kg    Pain PRN Dilaudid    Infection IV Zosyn Abdominal abscess   Other:   Kidney function  Liver function      GFR 26  WNL     Blood glucose pattern      Significant diabetes-related events over the past 24-72 hours  11/22/21 FBG- 270. BG trends greater than 250. Remains on steroid. Required 25 units of correctional insulin.     Assessment and Plan   Nursing Diagnosis Risk for unstable blood glucose pattern   Nursing Intervention Domain 5100 Decision-making Support   Nursing Interventions Examined current inpatient diabetes control   Explored factors facilitating and impeding inpatient management  Identified self-management practices impeding diabetes control  Explored corrective strategies with patient and responsible inpatient provider Informed patient of rational for insulin strategy while hospitalized     Evaluation   This 63year old  male, with Type 2 diabetes, did achieve diabetes control prior to admission, as evidenced by A1c of 6.2 %. Long term steroid use PTA and while inpatient. Currently admitted for Abdominal pain r/t to intestinal diverticular abscess, metastatic carcinoma, brain mets and lung mets. While hospitalized patient persistently hyperglycemic which will require basal, pre-meal and correctional insulin. During this hospitalization, the patient has not achieved inpatient blood glucose target of 100-180mg/dl. Several factors have played a role in blood glucose management including:  [x] Critical nature of illness state  [x] Compromised insulin absorption or delivery  [x] Glucocorticoid use  [x]  Kidney dysfunction      The Subcutaneous Insulin Order set (2309) has been in use.  Hence, the next step in optimizing blood glucose control would be    [] Implement the Subcutaneous Insulin order set  [x]  Optimize basal insulin dosing   []  Add mealtime insulin    Blood glucose management would be improved by  [x]  Adding insulin to override impact of steroids      Recommendations     [x] Use of Subcutaneous Insulin Order set (1935)  Insulin Dosing Specific recommendation   Basal                                      (Based on weight, BMI & GFR)   [x] 0.4 units/kg/D   35 units Lantus Daily   Nutritional                                      (Based on CHO/dextrose load) [x] Normal sensitivity 3 units Humalog with meals   Corrective                                       (Useful in adjusting insulin dosing) [x] Normal sensitivity            Billing Code(s)   [x] 87075 IP subsequent hospital care - 35 minutes     Before making these care recommendations, I personally reviewed the hospitalization record, including notes, laboratory & diagnostic data and current medications, and examined the patient at the bedside (circumstances permitting) before making care recommendations.      Total minutes: [address], CNS   Delia Rasheed MSN, AG-CNS  Diabetes Clinical Nurse Specialist  [facility]  Access via [address] [place] [address]
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
## 2021-11-22 NOTE — PROGRESS NOTES
SURGERY PROGRESS NOTE      Admit Date: 2021      Subjective:     Patient feels well. Denies pain and nausea. Tolerating PO. Objective:     Visit Vitals  BP (!) 155/71   Pulse 72   Temp 97.6 °F (36.4 °C)   Resp 17   Ht 5' 9\" (1.753 m)   Wt 92.5 kg (204 lb)   SpO2 98%   BMI 30.13 kg/m²        Temp (24hrs), Av.9 °F (36.6 °C), Min:97.6 °F (36.4 °C), Max:98.3 °F (36.8 °C)      701 - 1900  In: -   Out: 200 [Urine:200]  1901 - 700  In: 3768 [P.O.:800;  I.V.:1075]  Out: 1075 [Urine:1075]    Physical Exam:    General:  alert, cooperative, no distress, appears stated age   Abdomen: soft, bowel sounds active, non-tender           Lab Results   Component Value Date/Time    WBC 11.7 (H) 2021 04:27 AM    HGB 11.9 (L) 2021 04:27 AM    Hemoglobin (POC) 8.4 (L) 2021 03:14 PM    Hemoglobin (POC) 14.6 2016 04:04 AM    HCT 36.2 (L) 2021 04:27 AM    Hematocrit (POC) 43 2016 04:04 AM    PLATELET 467 (L)  04:27 AM    MCV 93.5 2021 04:27 AM     Lab Results   Component Value Date/Time    GFR est non-AA 26 (L) 2021 04:27 AM    GFRNA, POC 12 (L) 2021 06:34 AM    GFR est AA 31 (L) 2021 04:27 AM    GFRAA, POC 15 (L) 2021 06:34 AM    Creatinine 2.57 (H) 2021 04:27 AM    Creatinine, POC 4.1 (H) 2021 02:44 AM    BUN 53 (H) 2021 04:27 AM    BUN (POC) 33 (H) 2016 04:04 AM    Sodium 132 (L) 2021 04:27 AM    Sodium (POC) 140 2021 06:34 AM    Sodium,  (L) 2021 02:44 AM    Potassium 4.8 2021 04:27 AM    Potassium (POC) 3.6 2021 06:34 AM    Potassium, POC 5.0 2021 02:44 AM    Chloride 96 (L) 2021 04:27 AM    Chloride, POC 96 (L) 2021 02:44 AM    CO2 30 2021 04:27 AM    Magnesium 1.9 2021 02:25 AM    Phosphorus 6.2 (H) 2021 08:12 AM    PTH, Intact 492.4 (H) 2021 03:04 PM       Assessment:     Active Problems:    Microalbuminuria (8/31/2016)      Severe obesity (BMI 35.0-39. 9) with comorbidity ([facility] (3/24/2018)      Metastatic carcinoma ([facility] (8/19/2020)      Brain metastases ([address] (10/17/2021)      End stage renal disease ([address] (11/19/2021)      Diabetes mellitus ([facility] (11/19/2021)      Thyroid cancer ([facility] (11/19/2021)      Intestinal diverticular abscess (11/19/2021)      DNR (do not resuscitate) ()      Goals of care, counseling/discussion ()      Debility ()      Need for emotional support ()    Doing well. Ready for discharge.   Patient requesting SNF admission     Plan:     Discharge to SNF

## 2021-11-23 NOTE — DIALYSIS
Hemodialysis / [phone]    Vitals Pre Post Assessment Pre Post   BP BP: 131/63 (11/23/21 1130) 127/67 LOC A&O x 3 A&O x 3   HR Pulse (Heart Rate): 72 (resting, eyes closed) (11/23/21 1130) 82 Lungs congestion congestion   Resp Resp Rate: 18 (11/23/21 1130) 17 Cardiac regular regular   Temp Temp: 98.5 °F (36.9 °C) (11/23/21 0955) 97.7 Skin Pale/warm Pale/warm   Weight Pre-Dialysis Weight:  (scale not working) (11/23/21 0955)  Edema generalized No edema   Tele status remote remote Pain Pain Intensity 1: 8 (11/23/21 1031) 7/10 medicaided/effective     Orders   Duration: Start: 1000 End: 1315 Total: 3.25 hr   Dialyzer: Dialyzer/Set Up Inspection: Gio Morelosole ([id number]) (11/23/21 0955)   K Bath: Dialysate K (mEq/L): 2 (11/23/21 0955)   Ca Bath: Dialysate CA (mEq/L): 2.5 (11/23/21 0955)   Na: Dialysate NA (mEq/L): 138 (11/23/21 0955)   Bicarb: Dialysate HCO3 (mEq/L): 40 (11/23/21 0955)   Target Fluid Removal: Goal/Amount of Fluid to Remove (mL): 2000 mL (11/23/21 0955)     Access   Type & Location: [facility] cath   Comments:                         In place, patent, drerssing dry and intact, no S/s of infection                Labs   HBsAg (Antigen) / date:     11/16/21 Negative                                          HBsAb (Antibody) / date: 11/16/21 [facility]   Source: Connect care   Obtained/Reviewed  Critical Results Called HGB   Date Value Ref Range Status   11/22/2021 11.9 (L) 12.1 - 17.0 g/dL Final     Potassium   Date Value Ref Range Status   11/22/2021 4.8 3.5 - 5.1 mmol/L Final     Calcium   Date Value Ref Range Status   11/22/2021 9.8 8.5 - 10.1 MG/DL Final     BUN   Date Value Ref Range Status   11/22/2021 53 (H) 6 - 20 MG/DL Final     Comment:     INVESTIGATED PER DELTA CHECK PROTOCOL     Creatinine   Date Value Ref Range Status   11/22/2021 2.57 (H) 0.70 - 1.30 MG/DL Final        Meds Given   Name Dose Route                    Adequacy / Fluid    Total Liters Process: 76 L   Net Fluid Removed: 1500 ml Comments   Time Out Done:   (Time) Yes, 5709   Admitting Diagnosis: Renal failure   Consent obtained/signed: Informed Consent Verified: Yes (11/20/21 1100)   Machine / RO # Machine Number: Royce Simmons (11/23/21 1997)   Primary Nurse Rpt Pre: Liz Henderson RN   Primary Nurse Rpt Post: Liz Henderson RN   Pt Education: Yes, r/t dialysis process   Care Plan: Continue HD as ordered   Pts outpatient clinic:      Tx Summary   Comments: Tolerated tx well, at end,  All blood in circuit returned with 300 ml NS. [address] cath bn place, patent, dressing dry and intact, no S/S of infection.

## 2021-11-23 NOTE — PROGRESS NOTES
End of Shift Note    Bedside shift change report given to *** (oncoming nurse) by Flakito Vann LPN (offgoing nurse). Report included the following information SBAR, Kardex, ED Summary, Procedure Summary, Intake/Output, MAR and Recent Results    Shift worked:  7am-8pm     Shift summary and any significant changes:     continue to monitor I&O, management of pain, soap suds enema given for constipation with fair results. B&O suppository for rectal spasms. Concerns for physician to address:  Plan of care, discharge planning     Zone phone for oncoming shift:   [phone]       Activity:  Activity Level: Up with Assistance, Dangle Side of Bed  Number times ambulated in hallways past shift: 0  Number of times OOB to chair past shift: 0    Cardiac:   Cardiac Monitoring: No           Access:   Current line(s): PIV     Genitourinary:   Urinary status: voiding    Respiratory:   O2 Device: Nasal cannula  Chronic home O2 use?: N/A  Incentive spirometer at bedside: N/A     GI:  Last Bowel Movement Date: 11/18/21  Current diet:  ADULT DIET Regular  DIET ONE TIME MESSAGE  Passing flatus: YES  Tolerating current diet: YES       Pain Management:   Patient states pain is manageable on current regimen: YES    Skin:  Ge Score: 18  Interventions: float heels and nutritional support     Patient Safety:  Fall Score:  Total Score: 3  Interventions: gripper socks and pt to call before getting OOB  High Fall Risk: Yes    Length of Stay:  Expected LOS: 5d 4h  Actual LOS: [address], LPN
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
## 2021-11-23 NOTE — PROGRESS NOTES
Received notification from bedside RN about patient with regards to: , needs Lispro coverage order    Intervention given: Lispro 13 units SQ x 1 dose ordered

## 2021-11-23 NOTE — PROGRESS NOTES
Hospitalist Progress Note    NAME: Cherelle Luong   :  1960   MRN:  [id number]       Assessment / Plan:    Perforated Viscus   treatment as per surgery  -Continue antibiotic therapy  -- started on diet       ESRD on HD TTS  Nephrology Consult   continue hemodialysis    DMII  Thyroid cancer with metastasis to the brain and lung  HTN  HLD  GERD  Hx of TIA  SHOLA with chronic respiratory failure 2-3L NC  Hold all oral meds - can slowly resume once cleared for PO intake by Surgery  Pt has been taking Decadron 4mg q8hrs for edema associated with brain mets. Will Pharmacy to switch to IV while pt remains NPO. FS monitoring, SS       Code Status: As per primary team  DVT Prophylaxis: As per primary team         Subjective:     Chief Complaint / Reason for Physician Visit  . Discussed with RN events overnight. Patient stated he feels better,possible d/c to SNF vs hospice     Review of Systems:  Symptom Y/N Comments  Symptom Y/N Comments   Fever/Chills n   Chest Pain n    Poor Appetite    Edema     Cough    Abdominal Pain y    Sputum    Joint Pain     SOB/NUÑEZ y   Pruritis/Rash     Nausea/vomit    Tolerating PT/OT     Diarrhea    Tolerating Diet n    Constipation n   Other       Could NOT obtain due to:      Objective:     VITALS:   Last 24hrs VS reviewed since prior progress note.  Most recent are:  Patient Vitals for the past 24 hrs:   Temp Pulse Resp BP SpO2   21 1245 -- (P) 78 (P) 18 (P) 138/65 --   21 1230 -- (P) 78 (P) 17 (P) 139/65 --   21 1215 -- (P) 79 (P) 20 (P) 135/66 --   21 1200 -- (P) 72 (P) 18 (!) (P) 129/58 --   21 1145 -- (P) 71 (P) 16 (P) 127/60 --   21 1130 -- 72 18 131/63 --   21 1115 -- 72 17 (!) 126/59 --   21 1100 -- 72 18 (!) 114/57 --   21 1045 -- 73 16 (!) 117/45 --   21 1030 -- 77 18 (!) 120/56 --   21 1015 -- 79 17 139/63 98 %   21 1000 -- 76 18 (!) 160/69 98 %   21 0955 98.5 °F (36.9 °C) 79 17 (!) 168/75 98 %   11/23/21 0839 98.7 °F (37.1 °C) 76 -- 127/71 100 %   11/23/21 0750 97.8 °F (36.6 °C) 76 18 (!) 149/60 100 %   11/23/21 0525 -- 72 -- (!) 158/63 --   11/23/21 0414 97.6 °F (36.4 °C) 74 18 (!) 162/67 99 %   11/22/21 1954 98 °F (36.7 °C) 81 -- (!) 156/57 97 %   11/22/21 1418 98.1 °F (36.7 °C) 84 17 (!) 160/68 100 %       Intake/Output Summary (Last 24 hours) at 11/23/2021 1345  Last data filed at 11/23/2021 8586  Gross per 24 hour   Intake 100 ml   Output 650 ml   Net -550 ml        I had a face to face encounter and independently examined this patient on 11/23/2021, as outlined below:  PHYSICAL EXAM:  General: WD, WN. Alert, cooperative, no acute distress    EENT:  EOMI. Anicteric sclerae. MMM  Resp:  CTA bilaterally, no wheezing or rales. No accessory muscle use  CV:  Regular  rhythm,  No edema  GI:  Soft, Non distended, Non tender. +Bowel sounds  Neurologic:  Alert  Psych:   Good insight. Not anxious nor agitated  Skin:  No rashes. No jaundice    Reviewed most current lab test results and cultures  YES  Reviewed most current radiology test results   YES  Review and summation of old records today    NO  Reviewed patient's current orders and MAR    YES  PMH/SH reviewed - no change compared to H&P  ________________________________________________________________________  Care Plan discussed with:    Comments   Patient y    Family      RN y    Care Manager     Consultant                        Multidiciplinary team rounds were held today with , nursing, pharmacist and clinical coordinator. Patient's plan of care was discussed; medications were reviewed and discharge planning was addressed.      ________________________________________________________________________  Total NON critical care TIME:  35    Minutes    Total CRITICAL CARE TIME Spent:   Minutes non procedure based      Comments   >50% of visit spent in counseling and coordination of care y ________________________________________________________________________  Doris Pineda MD     Procedures: see electronic medical records for all procedures/Xrays and details which were not copied into this note but were reviewed prior to creation of Plan. LABS:  I reviewed today's most current labs and imaging studies. Pertinent labs include:  Recent Labs     11/22/21 0427 11/21/21 0159   WBC 11.7* 15.6*   HGB 11.9* 9.4*   HCT 36.2* 28.1*   * 111*     Recent Labs     11/22/21 0427 11/21/21 0159   * 134*   K 4.8 4.0   CL 96* 100   CO2 30 28   * 208*   BUN 53* 31*   CREA 2.57* 1.82*   CA 9.8 9.7       Signed:  Doris Pineda MD

## 2021-11-23 NOTE — PROGRESS NOTES
SURGERY PROGRESS NOTE      Admit Date: 2021    Subjective:     Patient has no complaints      Objective:     Visit Vitals  /68   Pulse 76 Comment: HD completed   Temp 97.7 °F (36.5 °C) (Oral)   Resp 16   Ht 5' 9\" (1.753 m)   Wt 92.5 kg (204 lb)   SpO2 97%   BMI 30.13 kg/m²        Temp (24hrs), Av.1 °F (36.7 °C), Min:97.6 °F (36.4 °C), Max:98.7 °F (37.1 °C)      701 - 1900  In: -   Out: 1500   1901 -  0700  In: 100 [I.V.:100]  Out: 1350 [Urine:1350]    Physical Exam:    General:  alert, cooperative, no distress, appears stated age   Abdomen: soft, bowel sounds active, non-tender           Lab Results   Component Value Date/Time    WBC 11.7 (H) 2021 04:27 AM    HGB 11.9 (L) 2021 04:27 AM    Hemoglobin (POC) 8.4 (L) 2021 03:14 PM    Hemoglobin (POC) 14.6 2016 04:04 AM    HCT 36.2 (L) 2021 04:27 AM    Hematocrit (POC) 43 2016 04:04 AM    PLATELET 567 (L)  04:27 AM    MCV 93.5 2021 04:27 AM     Lab Results   Component Value Date/Time    GFR est non-AA 26 (L) 2021 04:27 AM    GFRNA, POC 12 (L) 2021 06:34 AM    GFR est AA 31 (L) 2021 04:27 AM    GFRAA, POC 15 (L) 2021 06:34 AM    Creatinine 2.57 (H) 2021 04:27 AM    Creatinine, POC 4.1 (H) 2021 02:44 AM    BUN 53 (H) 2021 04:27 AM    BUN (POC) 33 (H) 2016 04:04 AM    Sodium 132 (L) 2021 04:27 AM    Sodium (POC) 140 2021 06:34 AM    Sodium,  (L) 2021 02:44 AM    Potassium 4.8 2021 04:27 AM    Potassium (POC) 3.6 2021 06:34 AM    Potassium, POC 5.0 2021 02:44 AM    Chloride 96 (L) 2021 04:27 AM    Chloride, POC 96 (L) 2021 02:44 AM    CO2 30 2021 04:27 AM    Magnesium 1.9 2021 02:25 AM    Phosphorus 6.2 (H) 2021 08:12 AM    PTH, Intact 492.4 (H) 2021 03:04 PM       Assessment:     Active Problems:    Microalbuminuria (2016)      Severe obesity (BMI 35.0-39. 9) with comorbidity ([facility] [address] (3/24/2018)      Metastatic carcinoma ([facility] [address] (8/19/2020)      Brain metastases ([facility] [address] (10/17/2021)      End stage renal disease ([address] (11/19/2021)      Diabetes mellitus ([facility] [address] (11/19/2021)      Thyroid cancer ([facility] [address] (11/19/2021)      Intestinal diverticular abscess (11/19/2021)      DNR (do not resuscitate) ()      Goals of care, counseling/discussion ()      Debility ()      Need for emotional support ()    Ready for discharge once dispo is worked out    Plan:     Continue present treatment

## 2021-11-23 NOTE — PROGRESS NOTES
End of Shift Note    Bedside shift change report given to Shyam Hooper (oncoming nurse) by Michelle Cerda RN (offgoing nurse). Report included the following information SBAR, Kardex, Intake/Output and MAR    Shift worked:  7p-7a     Shift summary and any significant changes:    Pt. received medication for pain several times this shift-- see MAR. Pt required insulin for BG this shift.     Concerns for physician to address:  Pain      Zone phone for oncoming shift:              Michelle Cerda RN

## 2021-11-23 NOTE — PROGRESS NOTES
Fleets enema given to stimulate BM. Patient having complaints of rectal pain. Small stool passed, stool is soft. Patient reports cannot push to pass stool with out pain. Dilaudid IV given.

## 2021-11-23 NOTE — PROGRESS NOTES
Physical Therapy  PT consult received and chart reviewed. Patient currently receiving bedside dialysis. Will follow up at later time for evaluation.   Leia Ortiz, PT, DPT

## 2021-11-23 NOTE — DIABETES MGMT
[facility]    CLINICAL NURSE SPECIALIST CONSULT     Initial Presentation   Concha Infante is a 63 y.o. male admitted 11/18/2021 with abdominal pain. HX:   Past Medical History:   Diagnosis Date    Adverse effect of anesthesia     \" STOP BREATHING 1 TIME C ANESTH\"    Cancer ([facility] [address] 2004    thyroid cancer    Chronic kidney disease     [facility]    Chronic pain     BACK SHOULDER AND ARM    COVID-19 04/2021    Depression     Diabetes ([facility] [address]     GERD (gastroesophageal reflux disease)     Heart failure (HCC)     stage 1    Hypercholesterolemia     Hypertension     Nausea & vomiting     PUD (peptic ulcer disease)     Sleep apnea     doesn't wear cpap    Thyroid cancer ([facility] [address]     TIA (transient ischemic attack) 2011    Vitamin D deficiency         INITIAL DX:   Intestinal diverticular abscess [K63.0]  End stage renal disease (HCC) [N18.6]  Diabetes mellitus ([facility] [address] [E11.9]  Thyroid cancer ([facility] [address] [C73]     Current Treatment     TX: Insulin. BP management. ABx. Bumex. Steroids  Consulted by Provider for adrvanced diabetes nursing assessment and care for:    [x] Inpatient management strategy    Hospital Course   Clinical progress has been complicated by steroid use. 11/23/21 HD today. Patient complains of pain in back side. Diabetes History   DM2. A1C 6.2 %. PTA Decadron for past 3 weeks. Diabetes-related Medical History  Acute complications  Persistent Hyperglycemia r/t steroid  Neurological complications  URI  Microvascular disease  Nephropathy  Macrovascular disease  TIA  Other associated conditions     HTN, CHF    Diabetes Medication History  Key Antihyperglycemic Medications             NovoLIN N NPH U-100 Insulin 100 unit/mL injection (Taking) INJECT 2 TO 10 UNITS SUBCUTANEOUSLY ONCE DAILY         Subjective   I need pain medicine.     Patient reports the following home diabetes self-management practices:   Eating pattern-Started on a regular adult diet while inpatient and eating well  Physical activity pattern -Limited due to Chronic illness  Monitoring pattern   [x] Testing BGs sufficiently to inform self-management adjustments  Taking medications pattern  [x] Consistent administration  [x] Affordable    Overall evaluation:    [x] Achieving A1c target with drug therapy & self-care practices     Objective   Physical exam  General Over-weight male in no acute distress and ill-appearing  Neuro  Alert, oriented   Vital Signs   Visit Vitals  /68   Pulse 76   Temp 97.7 °F (36.5 °C) (Oral)   Resp 16   Ht 5' 9\" (1.753 m)   Wt 92.5 kg (204 lb)   SpO2 97%   BMI 30.13 kg/m²   \. Laboratory  Recent Labs     11/22/21  0427 11/21/21  0159   * 208*   AGAP 6 6   WBC 11.7* 15.6*   CREA 2.57* 1.82*   GFRNA 26* 38*     Factors impacting BG management  Factor Dose Comments   Nutrition:  Standard meals   Regular diet   Eating % of meals   Drugs:  Steroids     Decadron 4mg Q8 hrs  Had been on decadron PTA   Impairs insulin action     Pain Dilaudid PRN Rated @5   Infection Merrem Q24 hrs Abdominal abscess   Other:   Kidney function  Liver function    GFR 26  WNL     Blood glucose pattern      Significant diabetes-related events over the past 24-72 hours  11/22/21 FBG- 270. BG trends greater than 250. Remains on steroid.  Required 25 units of correctional insulin  11/23/21 BG pattern trending down into 120-250 range today after mealtime insulin added yesterday    Assessment and Plan   Nursing Diagnosis Risk for unstable blood glucose pattern   Nursing Intervention Domain 7094 Decision-making Support   Nursing Interventions Examined current inpatient diabetes control   Explored factors facilitating and impeding inpatient management  Identified self-management practices impeding diabetes control  Explored corrective strategies with patient and responsible inpatient provider   Informed patient of rational for insulin strategy while hospitalized     Evaluation   This 61 year old  male, with Type 2 diabetes, did achieve diabetes control prior to admission, as evidenced by A1c of 6.2 %. Long term steroid use PTA and while inpatient has exacerbated hyperglycemia. . Currently admitted for abdominal pain r/t to intestinal diverticular abscess, metastatic carcinoma, brain and lung mets. While hospitalized patient persistently hyperglycemic, requiring a multi-pronged approach including basal, nutritional and correctional insulin. Since basal insulin dosing @0.4 units/kg/D, would advance mealtime dosing as patient is eating well. Recommendations     [x] Use of Subcutaneous Insulin Order set (4212)  Insulin Dosing Specific recommendation   Basal                                      (Based on weight, BMI & GFR)   [x] 0.4 units/kg/D   Continue 35 units Lantus Daily   Nutritional                                      (Based on CHO/dextrose load) [x] Normal sensitivity Increase Humalog to 6 units with consumed meals   Corrective                                       (Useful in adjusting insulin dosing) [x] Normal sensitivity        Billing Code(s)   [x] 57765 IP subsequent hospital care - 35 minutes     Before making these care recommendations, I personally reviewed the hospitalization record, including notes, laboratory & diagnostic data and current medications, and examined the patient at the bedside (circumstances permitting) before making care recommendations.      Total minutes: 175 Frances Reese MSN, AG-CNS  BRIDGETT Carey     Diabetes Clinical Nurse Specialist  [facility]  Access via [facility]

## 2021-11-23 NOTE — PROGRESS NOTES
Nephrology Progress Note  José Miguel Butt     [facility]. 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   lung nodules    PLAN-   Plan for HD today   Hgb stable no need for EPO. Subjective: Interval History:   -Seen and examined today on HD  - Feels better    Objective:   Vitals:    11/23/21 1045 11/23/21 1100 11/23/21 1115 11/23/21 1130   BP: (!) 117/45 (!) 114/57 (!) 126/59 131/63   Pulse: 73 72 72 72   Resp: 16 18 17 18   Temp:       TempSrc:       SpO2:       Weight:       Height:          11/22 0701 - 11/23 0700  In: 100 [I.V.:100]  Out: 850 [Urine:850]  Last 3 Recorded Weights in this Encounter    11/19/21 1346   Weight: 92.5 kg (204 lb)      Physical exam:   GEN: NAD  NECK- Supple, no mass  RESP: No wheezing, clear b/l  CVS: S1,S2  RRR  NEURO: Normal speech, Non focal  ABDO: tender + ,   PSYCH: Normal Mood    Chart reviewed. Pertinent Notes reviewed. Data Review :  Recent Labs     11/22/21 0427 11/21/21 0159   * 134*   K 4.8 4.0   CL 96* 100   CO2 30 28   BUN 53* 31*   CREA 2.57* 1.82*   * 208*   CA 9.8 9.7     Recent Labs     11/22/21 0427 11/21/21 0159   WBC 11.7* 15.6*   HGB 11.9* 9.4*   HCT 36.2* 28.1*   * 111*     No results for input(s): FE, TIBC, PSAT, FERR in the last 72 hours.    Medication list  reviewed  Current Facility-Administered Medications   Medication Dose Route Frequency    polyethylene glycol (MIRALAX) packet 17 g  17 g Oral DAILY    meropenem (MERREM) 500 mg in 0.9% sodium chloride (MBP/ADV) 50 mL MBP  500 mg IntraVENous Q24H    insulin glargine (LANTUS) injection 35 Units  35 Units SubCUTAneous QHS    insulin lispro (HUMALOG) injection 3 Units  3 Units SubCUTAneous TIDAC    LORazepam (ATIVAN) injection 1 mg  1 mg IntraVENous Q6H PRN    hydrALAZINE (APRESOLINE) 20 mg/mL injection 10 mg  10 mg IntraVENous Q6H PRN    heparin (porcine) 1,000 unit/mL injection 3,800 Units  3,800 Units Hemodialysis DIALYSIS PRN    amLODIPine (NORVASC) tablet 10 mg  10 mg Oral DAILY    bumetanide (BUMEX) tablet 2 mg  2 mg Oral DAILY    carvediloL (COREG) tablet 6.25 mg  6.25 mg Oral BID WITH MEALS    dexAMETHasone (DECADRON) tablet 4 mg  4 mg Oral Q8H    hydrALAZINE (APRESOLINE) tablet 50 mg  50 mg Oral BID    albuterol-ipratropium (DUO-NEB) 2.5 MG-0.5 MG/3 ML  3 mL Nebulization Q6H PRN    HYDROmorphone (DILAUDID) injection 0.5-1 mg  0.5-1 mg IntraVENous Q2H PRN    ondansetron (ZOFRAN) injection 4 mg  4 mg IntraVENous Q4H PRN    acetaminophen (TYLENOL) tablet 650 mg  650 mg Oral Q6H PRN    HYDROcodone-acetaminophen (NORCO)  mg tablet 1 Tablet  1 Tablet Oral Q4H PRN    insulin lispro (HUMALOG) injection   SubCUTAneous Q6H    glucose chewable tablet 16 g  4 Tablet Oral PRN    dextrose (D50W) injection syrg 12.5-25 g  12.5-25 g IntraVENous PRN    glucagon (GLUCAGEN) injection 1 mg  1 mg IntraMUSCular PRN          Agus Mccoy MD              [facility]  [facility], [address], [address]  Phone - [phone]               Fax - [phone]

## 2021-11-24 NOTE — PROGRESS NOTES
SURGERY PROGRESS NOTE      Admit Date: 2021    Subjective:     Patient has no complaints. Tolerating diet. Had some result from enema (requested by patient) yesterday. He requests another today. Nursing notes stool output soft and formed, not hard or concerning for impaction.      Objective:     Visit Vitals  BP (!) 150/45   Pulse 79   Temp 97.6 °F (36.4 °C)   Resp 18   Ht 5' 9\" (1.753 m)   Wt 92.5 kg (204 lb)   SpO2 100%   BMI 30.13 kg/m²        Temp (24hrs), Av.6 °F (36.4 °C), Min:97.5 °F (36.4 °C), Max:97.8 °F (36.6 °C)      701 - 1900  In: 140 [P.O.:140]  Out: 0   1901 -  0700  In: 100 [I.V.:100]  Out: 2801 [Urine:1300]    Physical Exam:    General:  alert, cooperative, no distress, appears older than stated age   Abdomen: soft, bowel sounds active, non-tender           Lab Results   Component Value Date/Time    WBC 11.7 (H) 2021 04:27 AM    HGB 11.9 (L) 2021 04:27 AM    Hemoglobin (POC) 8.4 (L) 2021 03:14 PM    Hemoglobin (POC) 14.6 2016 04:04 AM    HCT 36.2 (L) 2021 04:27 AM    Hematocrit (POC) 43 2016 04:04 AM    PLATELET 335 (L)  04:27 AM    MCV 93.5 2021 04:27 AM     Lab Results   Component Value Date/Time    GFR est non-AA 26 (L) 2021 04:27 AM    GFRNA, POC 12 (L) 2021 06:34 AM    GFR est AA 31 (L) 2021 04:27 AM    GFRAA, POC 15 (L) 2021 06:34 AM    Creatinine 2.57 (H) 2021 04:27 AM    Creatinine, POC 4.1 (H) 2021 02:44 AM    BUN 53 (H) 2021 04:27 AM    BUN (POC) 33 (H) 2016 04:04 AM    Sodium 132 (L) 2021 04:27 AM    Sodium (POC) 140 2021 06:34 AM    Sodium,  (L) 2021 02:44 AM    Potassium 4.8 2021 04:27 AM    Potassium (POC) 3.6 2021 06:34 AM    Potassium, POC 5.0 2021 02:44 AM    Chloride 96 (L) 2021 04:27 AM    Chloride, POC 96 (L) 2021 02:44 AM    CO2 30 2021 04:27 AM    Magnesium 1.9 2021 02:25 AM Phosphorus 6.2 (H) 11/09/2021 08:12 AM    PTH, Intact 492.4 (H) 07/07/2021 03:04 PM       Assessment:     Active Problems:    Microalbuminuria (8/31/2016)      Severe obesity (BMI 35.0-39. 9) with comorbidity ([address] (3/24/2018)      Metastatic carcinoma ([address] (8/19/2020)      Brain metastases ([address] (10/17/2021)      End stage renal disease ([address] (11/19/2021)      Diabetes mellitus ([address] (11/19/2021)      Thyroid cancer ([address] (11/19/2021)      Intestinal diverticular abscess (11/19/2021)      DNR (do not resuscitate) ()      Goals of care, counseling/discussion ()      Debility ()      Need for emotional support ()    Ready for discharge once dispo is worked out    Plan:     Continue present treatment   Transfer to medicine service since no surgery performed or indicated during this stay. Patient would be excellent candidate for hospice given his health and advanced disease state. He is opting to [facility] and is seeking rehab placement.      Viviana Sandy NP

## 2021-11-24 NOTE — PROGRESS NOTES
Nephrology Progress Note  José Miguel Butt     [facility]. 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   lung nodules    PLAN-   Plan for HD tomorrow   Hgb stable no need for EPO. Subjective: Interval History:   -Seen and examined   - Feels better    Objective:   Vitals:    11/23/21 1541 11/23/21 1917 11/24/21 0246 11/24/21 0742   BP: (!) 162/52 (!) 156/75 (!) 157/64 (!) 150/45   Pulse: 88 81 84 79   Resp: 16 17 20 18   Temp: 97.6 °F (36.4 °C) 97.5 °F (36.4 °C) 97.8 °F (36.6 °C) 97.6 °F (36.4 °C)   TempSrc:       SpO2: 99% 100% 98% 100%   Weight:       Height:          11/23 0701 - 11/24 0700  In: -   Out: 2151 [Urine:650]  Last 3 Recorded Weights in this Encounter    11/19/21 1346   Weight: 92.5 kg (204 lb)      Physical exam:   GEN: NAD  NECK- Supple, no mass  RESP: No wheezing, clear b/l  CVS: S1,S2  RRR  NEURO: Normal speech, Non focal  ABDO: tender + ,   PSYCH: Normal Mood    Chart reviewed. Pertinent Notes reviewed. Data Review :  Recent Labs     11/22/21 0427   *   K 4.8   CL 96*   CO2 30   BUN 53*   CREA 2.57*   *   CA 9.8     Recent Labs     11/22/21 0427   WBC 11.7*   HGB 11.9*   HCT 36.2*   *     No results for input(s): FE, TIBC, PSAT, FERR in the last 72 hours.    Medication list  reviewed  Current Facility-Administered Medications   Medication Dose Route Frequency    bisacodyL (DULCOLAX) suppository 10 mg  10 mg Rectal DAILY    polyethylene glycol (MIRALAX) packet 17 g  17 g Oral DAILY    meropenem (MERREM) 500 mg in 0.9% sodium chloride (MBP/ADV) 50 mL MBP  500 mg IntraVENous Q24H    methocarbamoL (ROBAXIN) tablet 500 mg  500 mg Oral TID PRN    insulin glargine (LANTUS) injection 35 Units  35 Units SubCUTAneous QHS    insulin lispro (HUMALOG) injection 3 Units  3 Units SubCUTAneous TIDAC    LORazepam (ATIVAN) injection 1 mg  1 mg IntraVENous Q6H PRN    hydrALAZINE (APRESOLINE) 20 mg/mL injection 10 mg  10 mg IntraVENous Q6H PRN    heparin (porcine) 1,000 unit/mL injection 3,800 Units  3,800 Units Hemodialysis DIALYSIS PRN    amLODIPine (NORVASC) tablet 10 mg  10 mg Oral DAILY    bumetanide (BUMEX) tablet 2 mg  2 mg Oral DAILY    carvediloL (COREG) tablet 6.25 mg  6.25 mg Oral BID WITH MEALS    dexAMETHasone (DECADRON) tablet 4 mg  4 mg Oral Q8H    hydrALAZINE (APRESOLINE) tablet 50 mg  50 mg Oral BID    albuterol-ipratropium (DUO-NEB) 2.5 MG-0.5 MG/3 ML  3 mL Nebulization Q6H PRN    HYDROmorphone (DILAUDID) injection 0.5-1 mg  0.5-1 mg IntraVENous Q2H PRN    ondansetron (ZOFRAN) injection 4 mg  4 mg IntraVENous Q4H PRN    acetaminophen (TYLENOL) tablet 650 mg  650 mg Oral Q6H PRN    HYDROcodone-acetaminophen (NORCO)  mg tablet 1 Tablet  1 Tablet Oral Q4H PRN    insulin lispro (HUMALOG) injection   SubCUTAneous Q6H    glucose chewable tablet 16 g  4 Tablet Oral PRN    dextrose (D50W) injection syrg 12.5-25 g  12.5-25 g IntraVENous PRN    glucagon (GLUCAGEN) injection 1 mg  1 mg IntraMUSCular PRN          Lauren Raman MD              [facility]  [facility]  [address]  [place], [address]  Phone - [phone]               Fax - [phone]

## 2021-11-24 NOTE — PROGRESS NOTES
End of Shift Note    Bedside shift change report given to MANDY Mejia(oncoming nurse) by Nevin Rios RN (offgoing nurse). Report included the following information SBAR, Kardex and MAR    Shift worked:  7PM-7AM     Shift summary and any significant changes:     Patient continues to complain of constipation. prn dilaudid given for pain management this am.      Concerns for physician to address:  constipation      Zone phone for oncoming shift:

## 2021-11-24 NOTE — PROGRESS NOTES
Ambulatory Care Management Note     Reviewed pt's EMR. Pt discharged from [facility] on 11/09/21 (Dx: urosepsis, ESRD w/HD). Readmitted to same facility 11/15-11/17/21 s/p fall. Currently, pt is admitted at [facility] (DOA: 11/18/21) w/ESRD, Thyroid CA w/mets to lung/brain, Perforated viscus. At this time, the plan is to d/c to SNF vs Hospice.   ACM will con't to follow.  Jewel Lefort

## 2021-11-24 NOTE — PROGRESS NOTES
End of Shift Note    Bedside shift change report given to Farhat Vo (oncoming nurse) by Maryana Cosme (offgoing nurse). Report included the following information SBAR, Kardex, Intake/Output and MAR    Shift worked: 07a-7p     Shift summary and any significant changes:    3 L   Tolerated medication   Tolerated diet   Family present   PT/OT visit   Up in chair    Concerns for physician to address: none   Zone phone for oncoming shift:  none     Activity:  Activity Level: Up with Assistance  Number times ambulated in hallways past shift: 0  Number of times OOB to chair past shift: 0    Cardiac:   Cardiac Monitoring: No           Access:   Current line(s): PIV and port     Genitourinary:   Urinary status: voiding    Respiratory:   O2 Device: Nasal cannula  Chronic home O2 use?: YES  Incentive spirometer at bedside: YES     GI:  Last Bowel Movement Date: 11/23/21  Current diet:  ADULT DIET Regular  DIET ONE TIME MESSAGE  Passing flatus: NO  Tolerating current diet: YES       Pain Management:   Patient states pain is manageable on current regimen: YES    Skin:  Ge Score: 18  Interventions: speciality bed, increase time out of bed, foam dressing and PT/OT consult    Patient Safety:  Fall Score:  Total Score: 3  Interventions: assistive device (walker, cane, etc), gripper socks, pt to call before getting OOB and stay with me (per policy)  High Fall Risk: Yes    Length of Stay:  Expected LOS: 5d 4h  Actual LOS: Princesswendy Butler LPN  61/56/50  [id number]

## 2021-11-24 NOTE — PROGRESS NOTES
Problem: Diabetes Self-Management  Goal: *Disease process and treatment process  Description: Define diabetes and identify own type of diabetes; list 3 options for treating diabetes. Outcome: Progressing Towards Goal  Goal: *Incorporating nutritional management into lifestyle  Description: Describe effect of type, amount and timing of food on blood glucose; list 3 methods for planning meals. Outcome: Progressing Towards Goal  Goal: *Incorporating physical activity into lifestyle  Description: State effect of exercise on blood glucose levels. Outcome: Progressing Towards Goal  Goal: *Developing strategies to promote health/change behavior  Description: Define the ABC's of diabetes; identify appropriate screenings, schedule and personal plan for screenings. Outcome: Progressing Towards Goal  Goal: *Using medications safely  Description: State effect of diabetes medications on diabetes; name diabetes medication taking, action and side effects. Outcome: Progressing Towards Goal  Goal: *Monitoring blood glucose, interpreting and using results  Description: Identify recommended blood glucose targets  and personal targets. Outcome: Progressing Towards Goal  Goal: *Prevention, detection, treatment of acute complications  Description: List symptoms of hyper- and hypoglycemia; describe how to treat low blood sugar and actions for lowering  high blood glucose level. Outcome: Progressing Towards Goal  Goal: *Prevention, detection and treatment of chronic complications  Description: Define the natural course of diabetes and describe the relationship of blood glucose levels to long term complications of diabetes.   Outcome: Progressing Towards Goal  Goal: *Developing strategies to address psychosocial issues  Description: Describe feelings about living with diabetes; identify support needed and support network  Outcome: Progressing Towards Goal  Goal: *Insulin pump training  Outcome: Progressing Towards Goal  Goal: *Sick day guidelines  Outcome: Progressing Towards Goal  Goal: *Patient Specific Goal (EDIT GOAL, INSERT TEXT)  Outcome: Progressing Towards Goal     Problem: Patient Education: Go to Patient Education Activity  Goal: Patient/Family Education  Outcome: Progressing Towards Goal     Problem: Falls - Risk of  Goal: *Absence of Falls  Description: Document Gisele Lopez Fall Risk and appropriate interventions in the flowsheet. Outcome: Progressing Towards Goal  Note: Fall Risk Interventions:  Mobility Interventions: Patient to call before getting OOB         Medication Interventions: Patient to call before getting OOB    Elimination Interventions: Urinal in reach    History of Falls Interventions: Door open when patient unattended         Problem: Patient Education: Go to Patient Education Activity  Goal: Patient/Family Education  Outcome: Progressing Towards Goal     Problem: Pressure Injury - Risk of  Goal: *Prevention of pressure injury  Description: Document Ge Scale and appropriate interventions in the flowsheet. Outcome: Progressing Towards Goal  Note: Pressure Injury Interventions:  Sensory Interventions: Assess changes in LOC    Moisture Interventions: Absorbent underpads    Activity Interventions: Increase time out of bed    Mobility Interventions: Turn and reposition approx. every two hours(pillow and wedges)    Nutrition Interventions: Document food/fluid/supplement intake    Friction and Shear Interventions: HOB 30 degrees or less                Problem: Patient Education: Go to Patient Education Activity  Goal: Patient/Family Education  Outcome: Progressing Towards Goal     Problem: Risk for Spread of Infection  Goal: Prevent transmission of infectious organism to others  Description: Prevent the transmission of infectious organisms to other patients, staff members, and visitors.   Outcome: Progressing Towards Goal     Problem: Patient Education:  Go to Education Activity  Goal: Patient/Family Education  Outcome: Progressing Towards Goal     Problem: Chronic Renal Failure  Goal: *Fluid and electrolytes stabilized  Outcome: Progressing Towards Goal     Problem: Patient Education: Go to Patient Education Activity  Goal: Patient/Family Education  Outcome: Progressing Towards Goal     Problem: General Infection Care Plan (Adult and Pediatric)  Goal: Improvement in signs and symptoms of infection  Outcome: Progressing Towards Goal  Goal: *Optimize nutritional status  Outcome: Progressing Towards Goal     Problem: Patient Education: Go to Patient Education Activity  Goal: Patient/Family Education  Outcome: Progressing Towards Goal     Problem: Infection - Risk of, Central Venous Catheter-Associated Bloodstream Infection  Goal: *Absence of infection signs and symptoms  Outcome: Progressing Towards Goal     Problem: Patient Education: Go to Patient Education Activity  Goal: Patient/Family Education  Outcome: Progressing Towards Goal

## 2021-11-24 NOTE — PROGRESS NOTES
Problem: Mobility Impaired (Adult and Pediatric)  Goal: *Acute Goals and Plan of Care (Insert Text)  Description: FUNCTIONAL STATUS PRIOR TO ADMISSION: Patient required moderate assistance for basic and instrumental ADLs. Patient was requiring assistance for all mobility. Patient was using a wheelchair and had assistance for transfers. HOME SUPPORT PRIOR TO ADMISSION: The patient lived with daughter and her children and required moderate assistance  for transfers. .    Physical Therapy Goals  Initiated 11/24/2021  1. Patient will move from supine to sit and sit to supine  in bed with supervision/set-up within 7 day(s). 2.  Patient will transfer from bed to chair and chair to bed with minimal assistance/contact guard assist using the least restrictive device within 7 day(s). 3.  Patient will perform sit to stand with minimal assistance/contact guard assist within 7 day(s). 4.  Patient will ambulate with minimal assistance/contact guard assist for 12 feet with the least restrictive device within 7 day(s). Note:   PHYSICAL THERAPY EVALUATION  Patient: David Horowitz (45 y.o. male)  Date: 11/24/2021  Primary Diagnosis: Intestinal diverticular abscess [K63.0]  End stage renal disease ([facility] [address] [N18.6]  Diabetes mellitus ([facility] [address] [E11.9]  Thyroid cancer ([facility] [address] [C73]        Precautions: fall       ASSESSMENT  Based on the objective data described below, the patient presents with generalized weakness, impaired balance, decreased activity tolerance, and decreased functional mobility skills. Patient supine upon arrival; agreeable to mobility. Patient able to come to EOB with mod assist x 2 and able to maintain balance independently. Sit to stand with mod assist x 2. Patient able to take a few steps from bed to chair with rolling walker and mod assist.  Verbal cues required for patient to extend hips, knees. Up in chair at end of session.   Prior to admission, patient was living at a motel with daughter and her children. Daughter had been assisting patient but she is now 7 months pregnant and it is becoming more difficult. Recommend SNF as patient needs to increase strength, mobility prior to returning to [facility]. Patient does have potential to improve but activity tolerance may be an issue due to multiple medical issues. Current Level of Function Impacting Discharge (mobility/balance): mod assist x 2    Functional Outcome Measure: The patient scored 20/100 on the Barthel Index outcome measure which is indicative of dependent mobility. Other factors to consider for discharge: CA diagnosis/treatment     Patient will benefit from skilled therapy intervention to address the above noted impairments. PLAN :  Recommendations and Planned Interventions: bed mobility training, transfer training, gait training, and therapeutic activities      Frequency/Duration: Patient will be followed by physical therapy:  3 times a week to address goals. Recommendation for discharge: (in order for the patient to meet his/her long term goals)  Therapy up to 5 days/week in SNF setting    This discharge recommendation:  Has been made in collaboration with the attending provider and/or case management    IF patient discharges home will need the following DME: to be determined (TBD)         SUBJECTIVE:   Patient stated I don't know if I'll ever get better.     OBJECTIVE DATA SUMMARY:   HISTORY:    Past Medical History:   Diagnosis Date    Adverse effect of anesthesia     \" STOP BREATHING 1 TIME C ANESTH\"    Cancer ([facility] [address] 2004    thyroid cancer    Chronic kidney disease     [facility] T-TH-S    Chronic pain     BACK SHOULDER AND ARM    COVID-19 04/2021    Depression     Diabetes (HCC)     GERD (gastroesophageal reflux disease)     Heart failure (HCC)     stage 1    Hypercholesterolemia     Hypertension     Nausea & vomiting     PUD (peptic ulcer disease)     Sleep apnea     doesn't wear cpap    Thyroid cancer ([address]     TIA (transient ischemic attack) 2011    Vitamin D deficiency      Past Surgical History:   Procedure Laterality Date    HX HEART CATHETERIZATION      HX HEENT      THROAT SURGERY X 4    HX ORTHOPAEDIC      back     HX ORTHOPAEDIC      ARM AND SHOULDER    HX OTHER SURGICAL      thyroid, lymphnode    HX RETINAL DETACHMENT REPAIR      left eye    HX VASCULAR ACCESS      HD cather in chest    IR INJ FORAMIN EPID LUMB ANES/STER SNGL  10/20/2021    IR INSERT NON TUNL CVC OVER 5 YRS  8/18/2020    IR INSERT NON TUNL CVC OVER 5 YRS  7/23/2021    IR INSERT TUNL CVC W/O PORT OVER 5 YR  8/26/2020    IR INSERT TUNL CVC W/O PORT OVER 5 YR  7/27/2021    UPPER GI ENDOSCOPY,BALL DIL,30MM  7/17/2020         UPPER GI ENDOSCOPY,BIOPSY  7/17/2020         VASCULAR SURGERY PROCEDURE UNLIST      cardiac cath NEG. Personal factors and/or comorbidities impacting plan of care: weakness due to CA and mets    Home Situation  Home Environment: Other (comment) (motel)  # Steps to Enter: 0  Wheelchair Ramp: No  One/Two Story Residence: Other (Comment)  Living Alone: No  Support Systems: Child(puneet), Other Family Member(s)  Patient Expects to be Discharged to[de-identified] Unknown  Current DME Used/Available at Home: Walker, rolling, Wheelchair, Grab bars  Tub or Shower Type: Tub/Shower combination    EXAMINATION/PRESENTATION/DECISION MAKING:   Critical Behavior:  Neurologic State: Alert  Orientation Level: Oriented X4  Cognition: Appropriate decision making  Safety/Judgement: Awareness of environment, Insight into deficits  Hearing:   Auditory  Auditory Impairment: None  Skin:  scratches noted extremities  Edema: feet  Range Of Motion:  AROM: Generally decreased, functional (left shoulder not functional)           PROM: Generally decreased, functional           Strength:    Strength: Generally decreased, functional (left UE not functional.  Right LE 3-/5)                    Tone & Sensation:   Tone: Normal              Sensation: Intact Coordination:  Coordination: Generally decreased, functional  Vision:      Functional Mobility:  Bed Mobility:  Rolling: Moderate assistance  Supine to Sit: Moderate assistance; Assist x2     Scooting: Moderate assistance  Transfers:  Sit to Stand: Moderate assistance; Assist x2  Stand to Sit: Minimum assistance; Assist x2        Bed to Chair: Moderate assistance; Assist x2              Balance:   Sitting: Intact  Standing: Impaired  Standing - Static: Constant support; Fair  Standing - Dynamic : Constant support; Poor  Ambulation/Gait Training:  Distance (ft): 3 Feet (ft)  Assistive Device: Walker, rolling; Gait belt  Ambulation - Level of Assistance: Moderate assistance        Gait Abnormalities: Decreased step clearance; Shuffling gait  Right Side Weight Bearing: Full  Left Side Weight Bearing: Full        Speed/Maria De Jesus: Slow; Shuffled  Step Length: Left shortened; Right shortened               Functional Measure:  Barthel Index:    Bathin  Bladder: 5  Bowels: 5  Groomin  Dressin  Feedin  Mobility: 0  Stairs: 0  Toilet Use: 0  Transfer (Bed to Chair and Back): 5  Total: 20/100       The Barthel ADL Index: Guidelines  1. The index should be used as a record of what a patient does, not as a record of what a patient could do. 2. The main aim is to establish degree of independence from any help, physical or verbal, however minor and for whatever reason. 3. The need for supervision renders the patient not independent. 4. A patient's performance should be established using the best available evidence. Asking the patient, friends/relatives and nurses are the usual sources, but direct observation and common sense are also important. However direct testing is not needed. 5. Usually the patient's performance over the preceding 24-48 hours is important, but occasionally longer periods will be relevant. 6. Middle categories imply that the patient supplies over 50 per cent of the effort.   7. Use of aids to be independent is allowed. Score Interpretation (from [address] [facility])    Independent   60-79 Minimally independent   40-59 Partially dependent   20-39 Very dependent   <20 Totally dependent     -Shelby Longoria., Barthel, D.W. (1965). Functional evaluation: the Barthel Index. [facility] ([address] (1997). The Barthel activities of daily living index: self-reporting versus actual performance in the old (> or = 75 years). Journal [address] 45(7), [facility], J.J.M.F, Bell Smith., Hafsa Katz. (1999). Measuring the change in disability after inpatient rehabilitation; comparison of the responsiveness of the Barthel Index and Functional [place] Measure. Journal of Neurology, Neurosurgery, and Psychiatry, 66(4), 147-429. CONNIE Brown, HERB Martinez, & Celeste Whitehead, M.A. (2004) Assessment of post-stroke quality of life in cost-effectiveness studies: The usefulness of the Barthel Index and the EuroQoL-5D. Quality of Life Research, 15, 213-63            Physical Therapy Evaluation Charge Determination   History Examination Presentation Decision-Making   MEDIUM  Complexity : 1-2 comorbidities / personal factors will impact the outcome/ POC  MEDIUM Complexity : 3 Standardized tests and measures addressing body structure, function, activity limitation and / or participation in recreation  LOW Complexity : Stable, uncomplicated  LOW Complexity : FOTO score of       Based on the above components, the patient evaluation is determined to be of the following complexity level: LOW     Pain Rating:  No number given    Activity Tolerance:   Fair and requires rest breaks    After treatment patient left in no apparent distress:   Sitting in chair, Heels elevated for pressure relief, and Call bell within reach    COMMUNICATION/EDUCATION:   The patients plan of care was discussed with: Registered nurse.      Fall prevention education was provided and the patient/caregiver indicated understanding. and Patient/family agree to work toward stated goals and plan of care.     Thank you for this referral.  Edgardo Stroud, PT

## 2021-11-24 NOTE — PROGRESS NOTES
Transition of Care Plan:     RUR: 28% \"high risk\"  Disposition: SNF placement- [address]  Follow up appointments: PCP, Nephrology   >Resume OP HD: TTS HD 7:15 AM chair time at [facility]   DME needed: TBD  Transportation at [address] or means to access home:  Family will have access      IM Medicare Letter: Needed at d/c  Is patient a BCPI-A Bundle: No                     If yes, was Bundle Letter given?:     Caregiver Contact: Dtr- [place], [phone]  Discharge Caregiver contacted prior to discharge? CM reviewed pt's chart. CM followed up with pt this AM to further discuss discharge plan. [address] accepted pt & will continue to follow. IVET spoke with rehab admissions Joey Matt, [phone]) to provide updates. Pt currently on Meropenem (IV abx) to treat ESBL. Will confirm with attending MD if pt will remain admitted through duration of 10 day treatment. IVET met with pt & his dtr Braeden Jacobson) to provide update. Pt agreeable to SNF placement- inquired about his upcoming radiation appt. IVET reiterated to pt that radiation/ cancer tx would be held while at rehab. Pt appeared to understand but became overwhelmed/ tearful d/t pain. CM notified pt's RN & ended conversation. Pt will require medical transport at discharge. Will continue to follow.     HEAVEN Sánchez  Care Management

## 2021-11-24 NOTE — DIABETES MGMT
[facility]    CLINICAL NURSE SPECIALIST CONSULT     Initial Presentation   Cherelle Luong is a 63 y.o. male admitted 11/18/2021 with abdominal pain. HX:   Past Medical History:   Diagnosis Date    Adverse effect of anesthesia     \" STOP BREATHING 1 TIME C ANESTH\"    Cancer ([facility] [address] 2004    thyroid cancer    Chronic kidney disease     [facility] T-TH-S    Chronic pain     BACK SHOULDER AND ARM    COVID-19 04/2021    Depression     Diabetes ([facility] [address]     GERD (gastroesophageal reflux disease)     Heart failure (HCC)     stage 1    Hypercholesterolemia     Hypertension     Nausea & vomiting     PUD (peptic ulcer disease)     Sleep apnea     doesn't wear cpap    Thyroid cancer ([facility] [address]     TIA (transient ischemic attack) 2011    Vitamin D deficiency       INITIAL DX:   Intestinal diverticular abscess [K63.0]  End stage renal disease (HCC) [N18.6]  Diabetes mellitus ([facility] [address] [E11.9]  Thyroid cancer ([facility] [address] [C73]     Current Treatment     TX: Insulin. BP management. ABx. Bumex. Steroids  Consulted by Provider for adrvanced diabetes nursing assessment and care for:    [x] Inpatient management strategy    Hospital Course   Clinical progress has been complicated by steroid use. 11/23/21 HD today. Patient complains of pain in back side. 11/24/21 Feeling better today. Using pain medication    Diabetes History   DM2. A1C 6.2 %. PTA Decadron for past 3 weeks. Diabetes-related Medical History  Acute complications  Persistent Hyperglycemia r/t steroid  Neurological complications  URI  Microvascular disease  Nephropathy  Macrovascular disease  TIA  Other associated conditions     HTN, CHF    Diabetes Medication History  Key Antihyperglycemic Medications             NovoLIN N NPH U-100 Insulin 100 unit/mL injection (Taking) INJECT 2 TO 10 UNITS SUBCUTANEOUSLY ONCE DAILY         Subjective   I feel better than yesterday.     Patient reports the following home diabetes self-management practices:   Eating pattern-Started on a regular adult diet while inpatient and eating well  Physical activity pattern -Limited due to Chronic illness  Monitoring pattern   [x] Testing BGs sufficiently to inform self-management adjustments  Taking medications pattern  [x] Consistent administration  [x] Affordable    Overall evaluation:    [x] Achieving A1c target with drug therapy & self-care practices     Objective   Physical exam  General Over-weight male in no acute distress  Neuro  Alert, oriented   Vital Signs   Visit Vitals  BP (!) 150/45   Pulse 79   Temp 97.6 °F (36.4 °C)   Resp 18   Ht 5' 9\" (1.753 m)   Wt 92.5 kg (204 lb)   SpO2 100%   BMI 30.13 kg/m²   \. Laboratory  Recent Labs     11/22/21  0427   *   AGAP 6   WBC 11.7*   CREA 2.57*   GFRNA 26*     Factors impacting BG management  Factor Dose Comments   Nutrition:  Standard meals   Regular diet   Eating % of meals   Drugs:  Steroids     Decadron 4mg Q8 hrs  Had been on decadron PTA   Impairs insulin action     Pain Dilaudid PRN Rated @5   Infection Merrem Q24 hrs Abdominal abscess   Other:   Kidney function  Liver function    No new values     Blood glucose pattern      Significant diabetes-related events over the past 24-72 hours  11/22/21 FBG- 270. BG trends greater than 250. Remains on steroid.  Required 25 units of correctional insulin  11/23/21 BG pattern trending down into 120-250 range today after mealtime insulin added yesterday  11/24/21 BG ranging  in past 24 hours    Assessment and Plan   Nursing Diagnosis Risk for unstable blood glucose pattern   Nursing Intervention Domain 9147 Decision-making Support   Nursing Interventions Examined current inpatient diabetes control   Explored factors facilitating and impeding inpatient management  Identified self-management practices impeding diabetes control  Explored corrective strategies with patient and responsible inpatient provider   Informed patient of rational for insulin strategy while hospitalized     Evaluation   This 63year old  male, with Type 2 diabetes, did achieve diabetes control prior to admission, as evidenced by A1c of 6.2 %. Long term steroid use PTA and while inpatient has exacerbated hyperglycemia. . Currently admitted for abdominal pain r/t to intestinal diverticular abscess, metastatic carcinoma, brain and lung mets. While hospitalized patient persistently hyperglycemic, requiring a multi-pronged approach including basal, nutritional and correctional insulin. Patient eating 26- 50% of meals. With a fasting glucose of 94 this morning it may be appropriate to decrease current basal dose and change corrective scale to high sensitivity. Recommendations     [x] Use of Subcutaneous Insulin Order set (8708)  Insulin Dosing Specific recommendation   Basal                                      (Based on weight, BMI & GFR)   [x] 0.4 units/kg/D   Decrease to 30  units Lantus Daily   Nutritional                                      (Based on CHO/dextrose load) [x] Normal sensitivity Continue with  3 units of Humalog with consumed meals   Corrective                                       (Useful in adjusting insulin dosing) [x]  Change to high sensitivity         Billing Code(s)   [x] 28378 IP subsequent hospital care - 25 minutes     Before making these care recommendations, I personally reviewed the hospitalization record, including notes, laboratory & diagnostic data and current medications, and examined the patient at the bedside (circumstances permitting) before making care recommendations.      Total minutes: 25    Monica Finney MSN, AG-BRIDGETT Conrad, BRIDGETT     Diabetes Clinical Nurse Specialist  [facility]  Access via [employer]

## 2021-11-24 NOTE — PROGRESS NOTES
Hospitalist Progress Note    NAME: Marifer Mckeon   :  1960   MRN:  [id number]       Assessment / Plan:  Perforated Viscus  -surgery is following and recommended conservative management  -Started on regular diet by surgery      ESRD on HD TTS  -renal following for hemodialysis needs    ESBL urinary tract infection  -Continue meropenem. Will treat for a total of 10 days. DM II  -Continue home Lantus at 35 units. Continue insulin lispro 3 units 3 times daily. Continue insulin lispro sliding scale. Monitor blood sugar checks    Constipation  -Reports having a small bowel movement after enema yesterday  -XR kub given continued constipation. Continue scheduled Dulcolax and MiraLAX. Thyroid cancer with metastasis to the brain and lung  HTN  HLD  GERD  Hx of TIA  SHOLA with chronic respiratory failure 2-3L NC  -Continue home Norvasc, Coreg, hydralazine  -Continue Decadron    Palliative care team is following for goals of care discussion    Hospitalist will take over as primary as no surgeries planned. Disposition:  -Patient wants to go to [facility], get rehab and get treatment for his cancer  -He was recommended palliative care only by his oncologist.       Code Status:  DNR  DVT Prophylaxis:  Will start Lovenox         Subjective:     Chief Complaint / Reason for Physician Visit  Still reports some abdominal pain. Mild nausea but no vomiting. No fever  Still reports constipation      Objective:     VITALS:   Last 24hrs VS reviewed since prior progress note.  Most recent are:  Patient Vitals for the past 24 hrs:   Temp Pulse Resp BP SpO2   21 1210 97.8 °F (36.6 °C) 74 18 (!) 158/55 100 %   21 0742 97.6 °F (36.4 °C) 79 18 (!) 150/45 100 %   21 0246 97.8 °F (36.6 °C) 84 20 (!) 157/64 98 %   21 1917 97.5 °F (36.4 °C) 81 17 (!) 156/75 100 %   21 1541 97.6 °F (36.4 °C) 88 16 (!) 162/52 99 %       Intake/Output Summary (Last 24 hours) at 2021 1332  Last data filed at 11/24/2021 0350  Gross per 24 hour   Intake 140 ml   Output 651 ml   Net -511 ml        I had a face to face encounter and independently examined this patient on 11/24/2021, as outlined below:  PHYSICAL EXAM:  General: Alert, cooperative, no acute distress    EENT:  EOMI. Anicteric sclerae. MMM  Resp:  CTA bilaterally, no wheezing or rales. No accessory muscle use  CV:  Regular  rhythm,  No edema  GI:  Soft, appropriate tenderness present, no guarding or rigidity  Neurologic:  Alert, awake  Psych:   Pleasant  Skin:  No rashes. No jaundice    Reviewed most current lab test results and cultures  YES  Reviewed most current radiology test results   YES  Review and summation of old records today    NO  Reviewed patient's current orders and MAR    YES  PMH/ reviewed - no change compared to H&P  ________________________________________________________________________  Care Plan discussed with:    Comments   Patient y    Family      RN y    Care Manager     Consultant                        Multidiciplinary team rounds were held today with , nursing, pharmacist and clinical coordinator. Patient's plan of care was discussed; medications were reviewed and discharge planning was addressed. ________________________________________________________________________  Total NON critical care TIME:  35    Minutes    Total CRITICAL CARE TIME Spent:   Minutes non procedure based      Comments   >50% of visit spent in counseling and coordination of care y    ________________________________________________________________________  Pauline Ortega MD     Procedures: see electronic medical records for all procedures/Xrays and details which were not copied into this note but were reviewed prior to creation of Plan. LABS:  I reviewed today's most current labs and imaging studies.   Pertinent labs include:  Recent Labs     11/22/21  0427   WBC 11.7*   HGB 11.9*   HCT 36.2*   *     Recent Labs 11/22/21  0427   *   K 4.8   CL 96*   CO2 30   *   BUN 53*   CREA 2.57*   CA 9.8       Signed: Jalen Krishna MD

## 2021-11-25 NOTE — PROGRESS NOTES
Nephrology Progress Note  José Miguel Butt     www. 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   lung nodules    PLAN-   Plan for HD today.  Hgb stable no need for EPO. Subjective: Interval History:   -Seen and examined on HD  - Feels better    Objective:   Vitals:    11/25/21 0800 11/25/21 0815 11/25/21 0830 11/25/21 0845   BP: (!) 136/57 128/63 (!) 123/46 (!) (P) 113/44   Pulse: 71 78 69 (P) 67   Resp: 18 18 16 (P) 16   Temp:       TempSrc:       SpO2:       Weight:       Height:          11/24 0701 - 11/25 0700  In: 780 [P.O.:780]  Out: 950 [Urine:950]  Last 3 Recorded Weights in this Encounter    11/19/21 1346   Weight: 92.5 kg (204 lb)      Physical exam:   GEN: NAD  NECK- Supple, no mass  RESP: No wheezing, clear b/l  CVS: S1,S2  RRR  NEURO: Normal speech, Non focal  ABDO: tender + ,   PSYCH: Normal Mood    Chart reviewed. Pertinent Notes reviewed. Data Review :  Recent Labs     11/25/21  0309   *   K 5.4*   CL 96*   CO2 29   BUN 59*   CREA 2.76*   *   CA 10.2*     Recent Labs     11/25/21  0309   WBC 21.2*   HGB 9.9*   HCT 29.9*   *     No results for input(s): FE, TIBC, PSAT, FERR in the last 72 hours.    Medication list  reviewed  Current Facility-Administered Medications   Medication Dose Route Frequency    bisacodyL (DULCOLAX) suppository 10 mg  10 mg Rectal DAILY    insulin glargine (LANTUS) injection 30 Units  30 Units SubCUTAneous QHS    polyethylene glycol (MIRALAX) packet 17 g  17 g Oral DAILY    meropenem (MERREM) 500 mg in 0.9% sodium chloride (MBP/ADV) 50 mL MBP  500 mg IntraVENous Q24H    methocarbamoL (ROBAXIN) tablet 500 mg  500 mg Oral TID PRN    insulin lispro (HUMALOG) injection 3 Units  3 Units SubCUTAneous TIDAC    LORazepam (ATIVAN) injection 1 mg  1 mg IntraVENous Q6H PRN    hydrALAZINE (APRESOLINE) 20 mg/mL injection 10 mg  10 mg IntraVENous Q6H PRN    heparin (porcine) 1,000 unit/mL injection 3,800 Units  3,800 Units Hemodialysis DIALYSIS PRN    amLODIPine (NORVASC) tablet 10 mg  10 mg Oral DAILY    bumetanide (BUMEX) tablet 2 mg  2 mg Oral DAILY    carvediloL (COREG) tablet 6.25 mg  6.25 mg Oral BID WITH MEALS    dexAMETHasone (DECADRON) tablet 4 mg  4 mg Oral Q8H    hydrALAZINE (APRESOLINE) tablet 50 mg  50 mg Oral BID    albuterol-ipratropium (DUO-NEB) 2.5 MG-0.5 MG/3 ML  3 mL Nebulization Q6H PRN    HYDROmorphone (DILAUDID) injection 0.5-1 mg  0.5-1 mg IntraVENous Q2H PRN    ondansetron (ZOFRAN) injection 4 mg  4 mg IntraVENous Q4H PRN    acetaminophen (TYLENOL) tablet 650 mg  650 mg Oral Q6H PRN    HYDROcodone-acetaminophen (NORCO)  mg tablet 1 Tablet  1 Tablet Oral Q4H PRN    insulin lispro (HUMALOG) injection   SubCUTAneous Q6H    glucose chewable tablet 16 g  4 Tablet Oral PRN    dextrose (D50W) injection syrg 12.5-25 g  12.5-25 g IntraVENous PRN    glucagon (GLUCAGEN) injection 1 mg  1 mg IntraMUSCular PRN          MD [facility]  [facility]  [place], [address]  Phone - [phone]               Fax - [phone]

## 2021-11-25 NOTE — PROCEDURES
Hemodialysis / [phone]    Vitals Pre Post Assessment Pre Post   BP BP: (!) 165/70 (11/25/21 0745) 166/67 LOC A & O x 3 No change   HR Pulse (Heart Rate): 74 (11/25/21 0745) 70 Lungs clear No change   Resp Resp Rate: 18 (11/25/21 0745) 18 Cardiac S1S2 No change   Temp Temp: 98.4 °F (36.9 °C) (11/25/21 0730) 98.0 Skin Warm dry & intact No change   Weight Pre-Dialysis Weight:  (scale not working) (11/23/21 0955) n/a Edema None noted No change   Tele status yes yes Pain Pain Intensity 1: 0 (11/25/21 0335) No change     Orders   Duration: Start: 0730 End: 1045 Total: 3.15   Dialyzer: Dialyzer/Set Up Inspection: Lai Yao (11/25/21 0730)   K Bath: Dialysate K (mEq/L): 2 (11/25/21 0730)   Ca Bath: Dialysate CA (mEq/L): 2.5 (11/25/21 0730)   Na: Dialysate NA (mEq/L): 140 (11/25/21 0730)   Bicarb: Dialysate HCO3 (mEq/L): 40 (11/25/21 0730)   Target Fluid Removal: Goal/Amount of Fluid to Remove (mL): 2000 mL (11/25/21 0730)     Access  cvc   Type & Location: [facility]   Comments:        RN reviewed LPN assessment and completed RN assessment. RN completed patient assessment. RN reviewed technicians vital signs and procedure note. Tx completed.  Reviewed by ESPERANZA Triplett                              Labs   HBsAg (Antigen) / date:    Neg 11/16/21                                           HBsAb (Antibody) / date: Unknown 11/16/21    Source: epic   Obtained/Reviewed  Critical Results Called HGB   Date Value Ref Range Status   11/25/2021 9.9 (L) 12.1 - 17.0 g/dL Final     Potassium   Date Value Ref Range Status   11/25/2021 5.4 (H) 3.5 - 5.1 mmol/L Final     Calcium   Date Value Ref Range Status   11/25/2021 10.2 (H) 8.5 - 10.1 MG/DL Final     BUN   Date Value Ref Range Status   11/25/2021 59 (H) 6 - 20 MG/DL Final     Creatinine   Date Value Ref Range Status   11/25/2021 2.76 (H) 0.70 - 1.30 MG/DL Final        Meds Given   Name Dose Route   heparin [id number] iv               Adequacy / Fluid    Total Liters Process: 73.5   Net Fluid Removed: 1000ml      Comments   Time Out Done:   (Time) 0700   Admitting Diagnosis: esrd   Consent obtained/signed: Informed Consent Verified: Yes (11/25/21 0730)   Machine / RO # Machine Number: [id number] (11/25/21 0730)   Primary Nurse Rpt Pre: German Ramirez RN   Primary Nurse Rpt Post: German Ramirez RN   Pt Education: Access care   Care Plan: Continue HD   Pts outpatient clinic: [facility]      Tx Summary RIJ CVC: Dressing CDI. No s/s of infection. Both lumens aspirate & flush well. Running well at . SBAR received from Primary RN. I arrived to pt's room A&Ox3. Consent signed & on file. 0730: Each catheter limb disinfected per p&p, caps removed, hubs disinfected per p&p. Each lumen aspirated for blood return and flushed with Normal Saline per policy. Labs drawn per request/ order. VSS. Dialysis Tx initiated. 0800: pt. Stable,lines secure and visible  0830: pt. Stable, lines secure  0900: Pt. Stable, lines secure. Dr. Ventura Arevalo rounding   0930: pt. Resting well.  1000: pt. Stable,lines secure  1030: pt. Resting well.lines secure  1045: Tx ended. VSS. Each dialysis catheter limb disinfected per p&p, all possible blood returned per p&p, and each dialysis hub disinfected per p&p. Each lumen flushed, post dialysis catheter Heparin dwell instilled per order, and caps applied. Bed locked and in the lowest position, call bell and belongings in reach. SBAR given to Primary, RN. Patient is stable at time of their/ my departure. All Dialysis related medications have been reviewed.      Comments:

## 2021-11-25 NOTE — PROGRESS NOTES
TRANSFER - OUT REPORT:    Verbal report given to Murali Garcia RN on Concha Smiling  being transferred to SurgTele(unit) for ordered procedure       Report consisted of patients Situation, Background, Assessment and   Recommendations(SBAR). Information from the following report(s) Kardex was reviewed with the receiving nurse. Opportunity for questions and clarification was provided.       Patient transported with:   HD at bedside

## 2021-11-26 NOTE — PROGRESS NOTES
Left message for PICC team to place line for potential discharge today. MD states this could be a PICC or midline. Rapid covid test completed. [address] with PICC team returned call, stating that they are awaiting approval from nephrology for the PICC or midline. Made CM aware.

## 2021-11-26 NOTE — PROGRESS NOTES
Hospitalist Progress Note    NAME: Rochell Libman   :  1960   MRN:  [id number]       Assessment / Plan:  Perforated Viscus  -surgery is following and recommended conservative management  -Started on regular diet by surgery      ESRD on HD TTS  -renal following for hemodialysis needs    ESBL urinary tract infection  -Continue meropenem. Will treat for a total of 10 days. Consult PICC team for midline      DM II  -Continue home Lantus at 35 units. Continue insulin lispro 3 units 3 times daily. Continue insulin lispro sliding scale. Monitor blood sugar checks    Constipation  -Reports having a small bowel movement after enema   - Continue scheduled Dulcolax and MiraLAX. 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 Disposition:  -Patient wants to go to [facility], get rehab and get treatment for his cancer  -He was recommended palliative care only by his oncologist.       Code Status:  DNR  DVT Prophylaxis:  Will start Lovenox         Subjective:     Chief Complaint / Reason for Physician Visit  Reports abdominal pain but no nausea or vomiting. Objective:     VITALS:   Last 24hrs VS reviewed since prior progress note.  Most recent are:  Patient Vitals for the past 24 hrs:   Temp Pulse Resp BP SpO2   21 97.4 °F (36.3 °C) 85 18 (!) 152/51 93 %   21 1635 98.1 °F (36.7 °C) 78 18 (!) 141/48 98 %   21 1045 98 °F (36.7 °C) 73 18 (!) 166/67 --   21 1036 98 °F (36.7 °C) -- -- -- --   21 1030 -- 67 18 (!) 123/46 --   21 1015 -- 70 16 (!) 150/58 --   21 1000 -- 70 16 (!) 147/56 --   21 0945 -- 68 18 (!) 146/56 --   21 0930 -- 69 18 (!) 125/49 --   11/25/21 0915 -- 67 18 (!) 115/42 --   11/25/21 0900 -- 67 16 (!) 119/48 --   11/25/21 0845 -- 67 16 (!) 113/44 --   11/25/21 0830 -- 69 16 (!) 123/46 --   11/25/21 0815 -- 78 18 128/63 --   11/25/21 0800 -- 71 18 (!) 136/57 --   11/25/21 0745 -- 74 18 (!) 165/70 --   11/25/21 0730 98.4 °F (36.9 °C) 69 18 (!) 158/58 --   11/25/21 0335 98.2 °F (36.8 °C) 73 18 (!) 160/57 100 %       Intake/Output Summary (Last 24 hours) at 11/25/2021 2204  Last data filed at 11/25/2021 1142  Gross per 24 hour   Intake 240 ml   Output 2100 ml   Net -1860 ml        I had a face to face encounter and independently examined this patient on 11/25/2021, as outlined below:  PHYSICAL EXAM:  General: Alert, cooperative, no acute distress    EENT:  EOMI. Anicteric sclerae. MMM  Resp:  CTA bilaterally, no wheezing or rales. No accessory muscle use  CV:  Regular  rhythm,  No edema  GI:  Soft, appropriate tenderness present, no guarding or rigidity  Neurologic:  Alert, awake  Psych:   Pleasant  Skin:  No rashes. No jaundice    Reviewed most current lab test results and cultures  YES  Reviewed most current radiology test results   YES  Review and summation of old records today    NO  Reviewed patient's current orders and MAR    YES  PMH/ reviewed - no change compared to H&P  ________________________________________________________________________  Care Plan discussed with:    Comments   Patient y    Family      RN y    Care Manager     Consultant                        Multidiciplinary team rounds were held today with , nursing, pharmacist and clinical coordinator. Patient's plan of care was discussed; medications were reviewed and discharge planning was addressed.      ________________________________________________________________________  Total NON critical care TIME:  35    Minutes    Total CRITICAL CARE TIME Spent:   Minutes non procedure based      Comments   >50% of visit spent in counseling and coordination of care y    ________________________________________________________________________  Rere Sanchez MD     Procedures: see electronic medical records for all procedures/Xrays and details which were not copied into this note but were reviewed prior to creation of Plan. LABS:  I reviewed today's most current labs and imaging studies.   Pertinent labs include:  Recent Labs     11/25/21 0309   WBC 21.2*   HGB 9.9*   HCT 29.9*   *     Recent Labs     11/25/21 0309   *   K 5.4*   CL 96*   CO2 29   *   BUN 59*   CREA 2.76*   CA 10.2*       Signed: Rere Sanchez MD

## 2021-11-26 NOTE — ACP (ADVANCE CARE PLANNING)
Transition of Care Plan to SNF/Rehab    SNF/Rehab Transition:  Patient has been accepted to [facility] and meets criteria for admission. Patient will transported by [facility] and expected to leave at 4 PM.    Communication to Patient/Family:  Met with patient and dtr/ AYAAN Oden (identified care giver) and they are agreeable to the transition plan. Communication to SNF/Rehab:  Bedside RN, Britta Zeng, has been notified to update the transition plan to the facility and call report (phone number [phone]). Discharge information has been updated on the AVS.     Discharge instructions available via [facility]. Nursing Please include all hard scripts for controlled substances, med rec and dc summary, and AVS in packet.        SNF/Rehab Transition:  Patient to follow-up PCP/Specialist:    PCP: Ronni Cornelius MD  [address] [place] [address]  [phone]    Oncology: James Felipe, [address]  [phone]    Reviewed and confirmed with facility, [facility], can manage the patient care needs for the following:     Miguel Jin with (X) only those applicable:    Medication:  [x]  Medications will be available at the facility  [x]  IV Antibiotics: 500 mg by IntraVENous route every twenty-four (24) hours for 7 days  []  Controlled Substance - hard copy to be sent with patient   [x]  Weekly Labs   Documents:  [] Hard RX  [x] MAR  [x] Kardex  [x] AVS  [x]Transfer Summary  [x]Discharge   Equipment:  []  CPAP/BiPAP  []  Wound Vacuum  []  Day or Urinary Device  [x]  PICC/Central Line  []  Nebulizer  []  Ventilator   Treatment:  [x]Isolation: Contact for ESBL  []Surgical Drain Management  []Tracheostomy Care  []Dressing Changes  [x]Dialysis with transportation and chair time: Resume OP HD: TTS HD 6:15 AM chair time at [facility]  []Oxygen  []Daily Weights for Heart Failure   Dietary:  []Any diet limitations  []Tube Feedings   []Total Parenteral Management (TPN)   Eligible for Medicaid Long Term Services and Supports  Yes:  [] Eligible for medical assistance or will become eligible within 180 days and UAI completed. [] Provider/Patient and/or support system has requested screening. [x] UAI copy provided to patient or responsible party: assessment reference # [id number]. [] UAI unavailable at discharge will send once processed to SNF provider. [] UAI unavailable at discharged mailed to patient  No:   [] Private pay and is not financially eligible for Medicaid within the next 180 days. [] Reside out-of-state.   [] A residents of a state owned/operated facility that is licensed  by [employer] or [facility]  [] Enrollment in [facility] hospice services  [] [address] [place] [address] [facility]  [] Patient /Family declines to have screening completed or provide financial information for screening     Financial Resources:  Medicaid    [] Initiated and application pending   [x] Full coverage     Advanced Care Plan:  []Surrogate Decision Maker of Care  [x]POA: Angel Pacheco, [phone]  [x]Communicated Code Status: DNR   Other     HEAVEN Damian  Care Management

## 2021-11-26 NOTE — PROGRESS NOTES
Transition of Care Plan to SNF/Rehab    SNF/Rehab Transition:  Patient has been accepted to [facility] and meets criteria for admission. Patient will transported by [facility] and expected to leave at 5:15 PM.    Communication to Patient/Family:  Met with patient and dtr/ AYAAN Stanford (identified care giver) and they are agreeable to the transition plan. Communication to SNF/Rehab:  Bedside RN, Sejal Hamilton, has been notified to update the transition plan to the facility and call report (phone number [phone]). Discharge information has been updated on the AVS.     Discharge instructions available via [facility]. Nursing Please include all hard scripts for controlled substances, med rec and dc summary, and AVS in packet.        SNF/Rehab Transition:  Patient to follow-up PCP/Specialist:    PCP: Abdifatah Perry MD  [address] [place] [address]  [phone]    Oncology: Ahmet Cornelius, [address]  [phone]    Reviewed and confirmed with facility, [facility], can manage the patient care needs for the following:     Chantelle  with (X) only those applicable:    Medication:  [x]  Medications will be available at the facility  [x]  IV Antibiotics: 500 mg by IntraVENous route every twenty-four (24) hours for 7 days  []  Controlled Substance - hard copy to be sent with patient   [x]  Weekly Labs   Documents:  [] Hard RX  [x] MAR  [x] Kardex  [x] AVS  [x]Transfer Summary  [x]Discharge   Equipment:  []  CPAP/BiPAP  []  Wound Vacuum  []  Day or Urinary Device  [x]  PICC/Central Line  []  Nebulizer  []  Ventilator   Treatment:  [x]Isolation: Contact for ESBL  []Surgical Drain Management  []Tracheostomy Care  []Dressing Changes  [x]Dialysis with transportation and chair time: Resume OP HD: TTS HD 6:15 AM chair time at [address] [place] [address]  []Oxygen  []Daily Weights for Heart Failure   Dietary:  []Any diet limitations  []Tube Feedings   []Total Parenteral Management (TPN)   Eligible for Medicaid Long Term Services and Supports  Yes:  [] Eligible for medical assistance or will become eligible within 180 days and UAI completed. [] Provider/Patient and/or support system has requested screening. [x] UAI copy provided to patient or responsible party: assessment reference # [id number]. [] UAI unavailable at discharge will send once processed to SNF provider. [] UAI unavailable at discharged mailed to patient  No:   [] Private pay and is not financially eligible for Medicaid within the next 180 days. [] Reside out-of-state.   [] A residents of a state owned/operated facility that is licensed  by [address] [employer] [facility] or [facility]  [] Enrollment in [facility] hospice services  [] [address] [place] [address] [facility]  [] Patient /Family declines to have screening completed or provide financial information for screening     Financial Resources:  Medicaid    [] Initiated and application pending   [x] Full coverage     Advanced Care Plan:  []Surrogate Decision Maker of Care  [x]POA: Jesus Alberto Infante, [phone]  [x]Communicated Code Status: DNR   Other     HEAVEN Matute  Care Management

## 2021-11-26 NOTE — PROGRESS NOTES
Comprehensive Nutrition Assessment    Type and Reason for Visit: Initial, RD nutrition re-screen/LOS    Nutrition Recommendations/Plan:   · Continue liberalized diet as appropriate for optimal nutritional intake. · RD ordered Nepro shakes po TID with meals, prefers vanilla. · Please document % meals and supplements consumed in flowsheet I/O's under intake. Nutrition Assessment:     11/26: Chart reviewed; med noted for intestinal diverticular abscess. Pt also has thyroid ca with mets to brain and lungs. Hx of ESRD-HD. Palliative care has been involved with establishing goals of care. Pt interested in fighting illness. RD spoke with pt over the phone, reports tolerating regular diet well. Documented PO mostly avg 50%-75% of meals. Pt would like to add in nepro shakes, prefers vanilla. Also, requests 2 iced teas with equal at every meal; RD communicated pref to kitchen staff. Patient Vitals for the past 168 hrs:   % Diet Eaten   11/24/21 1747 51 - 75%   11/24/21 1214 76 - 100%   11/24/21 0929 26 - 50%   11/21/21 1748 76 - 100%   11/21/21 1100 76 - 100%   11/21/21 0848 76 - 100%   11/20/21 1517 26 - 50%   11/20/21 1200 26 - 50%   11/20/21 0630 0%   11/19/21 1206 26 - 50%     Estimated Daily Nutrient Needs:  Energy (kcal): 2076 - 2250 (BMR 1730 x 1.2-1. 3AF);  Weight Used for Energy Requirements: Current  Protein (g): 93 (1.0 g/kg bw); Weight Used for Protein Requirements: Current  Fluid (ml/day): 2000 ml/day; Method Used for Fluid Requirements: 1 ml/kcal    Nutrition Related Findings:  BM: 11/23; Labs: K+ 5.2, Na+ 132; Meds: miralax, pericolace      Wounds:    None       Current Nutrition Therapies:  DIET ONE TIME MESSAGE  ADULT ORAL NUTRITION SUPPLEMENT Breakfast, Lunch, Dinner; Renal Supplement  ADULT DIET Regular; Please send 2 iced tea's w/ equal on every lunch and dinner tray; prefers vanilla flavor Nepro also    Anthropometric Measures:  · Height:  5' 9\" (175.3 cm)  · Current Body Wt:  92.5 kg (203 lb 14.8 oz)     · Ideal Body Wt:  160 lbs:  127.5 %    · BMI Category:  Obese class 1 (BMI 30.0-34. 9)       Nutrition Diagnosis:   · Predicted inadequate energy intake related to catabolic illness as evidenced by  (increased kcals/protein per mat thyroid ca, need for ONS)    Nutrition Interventions:   Food and/or Nutrient Delivery: Continue current diet, Start oral nutrition supplement  Nutrition Education and Counseling: No recommendations at this time  Coordination of Nutrition Care: Continue to monitor while inpatient    Goals:  Trend PO intake >50% of meals + consume 2-3 bottles Nepro daily next 5-7 days       Nutrition Monitoring and Evaluation:   Behavioral-Environmental Outcomes: None identified  Food/Nutrient Intake Outcomes: Food and nutrient intake, Supplement intake  Physical Signs/Symptoms Outcomes: Biochemical data, Weight, Skin    Discharge Planning:    Continue current diet, Continue oral nutrition supplement     Electronically signed by Jalyn Schwartz RD on 11/26/2021 at 10:47 AM

## 2021-11-26 NOTE — PROGRESS NOTES
End of Shift Note    Bedside shift change report given to Gualberto Landry (oncoming nurse) by Viva Peabody, RN (offgoing nurse).   Report included the following information SBAR, Kardex, Intake/Output, MAR and Recent Results    Shift worked:  7a-7p     Shift summary and any significant changes:    Dialysis at bedside tthis morning, 1L taken off. soap walker enema administered today, midline to be placed by PICC, medicated for pain 4 times     Concerns for physician to address:  none     Zone phone for oncoming shift:   [phone]

## 2021-11-26 NOTE — PROGRESS NOTES
Received notification from bedside RN about patient with regards to: constipation, requesting additional bowel regimen medication  VS: /70, HR 78, RR 18, O2 sat 90%    Intervention given: Radha-colace BID, fleet enema PRN ordered

## 2021-11-26 NOTE — DISCHARGE INSTRUCTIONS
Patient Education   Patient Education        Abdominal Pain: Care Instructions  Your Care Instructions     Abdominal pain has many possible causes. Some aren't serious and get better on their own in a few days. Others need more testing and treatment. If your pain continues or gets worse, you need to be rechecked and may need more tests to find out what is wrong. You may need surgery to correct the problem. Don't ignore new symptoms, such as fever, nausea and vomiting, urination problems, pain that gets worse, and dizziness. These may be signs of a more serious problem. Your doctor may have recommended a follow-up visit in the next 8 to 12 hours. If you are not getting better, you may need more tests or treatment. The doctor has checked you carefully, but problems can develop later. If you notice any problems or new symptoms, get medical treatment right away. Follow-up care is a key part of your treatment and safety. Be sure to make and go to all appointments, and call your doctor if you are having problems. It's also a good idea to know your test results and keep a list of the medicines you take. How can you care for yourself at home? · Rest until you feel better. · To prevent dehydration, drink plenty of fluids. Choose water and other clear liquids until you feel better. If you have kidney, heart, or liver disease and have to limit fluids, talk with your doctor before you increase the amount of fluids you drink. · If your stomach is upset, eat mild foods, such as rice, dry toast or crackers, bananas, and applesauce. Try eating several small meals instead of two or three large ones. · Wait until 48 hours after all symptoms have gone away before you have spicy foods, alcohol, and drinks that contain caffeine. · Do not eat foods that are high in fat. · Avoid anti-inflammatory medicines such as aspirin, ibuprofen (Advil, Motrin), and naproxen (Aleve). These can cause stomach upset.  Talk to your doctor if you take daily aspirin for another health problem. When should you call for help? Call 911 anytime you think you may need emergency care. For example, call if:    · You passed out (lost consciousness).     · You pass maroon or very bloody stools.     · You vomit blood or what looks like coffee grounds.     · You have new, severe belly pain. Call your doctor now or seek immediate medical care if:    · Your pain gets worse, especially if it becomes focused in one area of your belly.     · You have a new or higher fever.     · Your stools are black and look like tar, or they have streaks of blood.     · You have unexpected vaginal bleeding.     · You have symptoms of a urinary tract infection. These may include:  ? Pain when you urinate. ? Urinating more often than usual.  ? Blood in your urine.     · You are dizzy or lightheaded, or you feel like you may faint. Watch closely for changes in your health, and be sure to contact your doctor if:    · You are not getting better after 1 day (24 hours). Where can you learn more? Go to http://www.gray.com/  Enter O580 in the search box to learn more about \"Abdominal Pain: Care Instructions. \"  Current as of: July 1, 2021               Content Version: 13.0  © 2006-2021 Healthwise, Incorporated. Care instructions adapted under license by [employer] (which disclaims liability or warranty for this information). If you have questions about a medical condition or this instruction, always ask your healthcare professional. [place] [address] any warranty or liability for your use of this information. Urinary Tract Infections (UTI) in Men: Care Instructions  Overview     A urinary tract infection, or UTI, is a term for an infection anywhere between the kidneys and the urethra. (The urethra is the tube that carries urine from the bladder to outside the body.) Most UTIs are bladder infections.  They often cause pain or burning when you urinate. UTIs are caused by bacteria. This means they can be cured with antibiotics. Be sure to complete your treatment so that the infection does not get worse. Follow-up care is a key part of your treatment and safety. Be sure to make and go to all appointments, and call your doctor if you are having problems. It's also a good idea to know your test results and keep a list of the medicines you take. How can you care for yourself at home? · Take your antibiotics as prescribed. Do not stop taking them just because you feel better. You need to take the full course of antibiotics. · Take your medicines exactly as prescribed. Your doctor may have prescribed a medicine, such as phenazopyridine (Pyridium), to help relieve pain when you urinate. This turns your urine orange. You may stop taking it when your symptoms get better. But be sure to take all of your antibiotics, which treat the infection. · Drink extra water for the next day or two. This will help make the urine less concentrated and help wash out the bacteria causing the infection. (If you have kidney, heart, or liver disease and have to limit your fluids, talk with your doctor before you increase your fluid intake.)  · Avoid drinks that are carbonated or have caffeine. They can irritate the bladder. · Urinate often. Try to empty your bladder each time. · To relieve pain, take a hot bath or lay a heating pad (set on low) over your lower belly or genital area. Never go to sleep with a heating pad in place. To help prevent UTIs  · Drink plenty of fluids. If you have kidney, heart, or liver disease and have to limit fluids, talk with your doctor before you increase the amount of fluids you drink. · Urinate when you have the urge. Do not hold your urine for a long time. Urinate before you go to sleep. · Keep your penis clean.   Catheter care  If you have a drainage tube (catheter) in place, the following steps will help you care for it.  · Always wash your hands before and after touching your catheter. · Check the area around the urethra for inflammation or signs of infection. Signs of infection include irritated, swollen, red, or tender skin, or pus around the catheter. · Clean the area around the catheter with soap and water two times a day. Dry with a clean towel afterward. · Do not apply powder or lotion to the skin around the catheter. To empty the urine collection bag   · Wash your hands with soap and water. · Without touching the drain spout, remove the spout from its sleeve at the bottom of the collection bag. Open the valve on the spout. · Let the urine flow out of the bag and into the toilet or a container. Do not let the tubing or drain spout touch anything. · After you empty the bag, clean the end of the drain spout with tissue and water. Close the valve and put the drain spout back into its sleeve at the bottom of the collection bag. · Wash your hands with soap and water. When should you call for help? Call your doctor now or seek immediate medical care if:    · Symptoms such as a fever, chills, nausea, or vomiting get worse or happen for the first time.     · You have new pain in your back just below your rib cage. This is called flank pain.     · There is new blood or pus in your urine.     · You are not able to take or keep down your antibiotics. Watch closely for changes in your health, and be sure to contact your doctor if:    · You are not getting better after taking an antibiotic for 2 days.     · Your symptoms go away but then come back. Where can you learn more? Go to http://www.gray.com/  Enter H792 in the search box to learn more about \"Urinary Tract Infections (UTI) in Men: Care Instructions. \"  Current as of: February 10, 2021               Content Version: 13.0  © 1188-2286 [employer].    Care instructions adapted under license by [employer] (which disclaims liability or warranty for this information). If you have questions about a medical condition or this instruction, always ask your healthcare professional. [address] any warranty or liability for your use of this information.

## 2021-11-26 NOTE — PROGRESS NOTES
End of Shift Note    Bedside shift change report given to Victor Manuel Quinones RN (oncoming nurse) by Garlon Schaumann, LPN (offgoing nurse). Report included the following information SBAR, Kardex, ED Summary, Intake/Output, MAR and Recent Results    Shift worked:  0831-4690     Shift summary and any significant changes:     Patient tolerated care well throughout shift. PRN medication given multiple times during night for pain. Complaints of pain in the sacral area during the night; contacted the NP and got PRN mineral oil fleet enema and pericolace bid. No significant changes noted during shift.      Concerns for physician to address:  none     Zone phone for oncoming shift:   Gabe Ortiz LPN

## 2021-11-26 NOTE — DISCHARGE SUMMARY
Hospitalist Discharge Summary     Patient ID:  Jun Vazquez  [id number]  63 y.o.  1960 11/18/2021    PCP on record: Juan Massey MD    Admit date: 11/18/2021  Discharge date and time: 11/26/2021    DISCHARGE DIAGNOSIS:    Perforated Viscus    CONSULTATIONS:  IP CONSULT TO GENERAL SURGERY  IP CONSULT TO NEPHROLOGY  IP CONSULT TO HEMATOLOGY  IP CONSULT TO HOSPITALIST    Excerpted HPI from H&P of Radha Rod MD:  Jun Vazquez is a 63 y.o. male who presents for evaluation of abdomen pain   Pt is a 64 yo multiple med issues,  DM,  ESRD , Hemodialysis, obesity, hypoalbuinemia, wheelchair bound , metastatic thyroid cancer, 2 days sp DC from med svc for UTI, now with abd pain,, WBC 11k, down from 17 k a week ago,  And CT reivewed with [facility], with 2 cm small bowel mesenteric abscess and min small bubbles periumbilical pneumoperitoneum, no focal mass. Per ER MD,  Tender umbilicus.  Pt with progressive multiple lung mets and brain mets, on steroids for brain mets.  Pt being admitted for IV antibx, discussed with Dr Zuly Barragan who will manage medical issues and consult nephrology.  If not improving, may need to consider laparotomy or laparoscopy,  But pt operative morbidity and mortality increased based on above.  Pt lives at home with dtr and home health and HD on TUES , Thurs, Sat.      Patient apparently had abdominal pain when he was in the hospital several days ago under the internal medicine service but it worsened. ,  Patient continues to have bowel movements, on review of his course overall medically the last year he admits he has had overall decline in his overall functionality, p.o. intake has been declining mobility declining, and has had a steady decline in overall function.   It sounds like he is being taken care of at home by his daughter but with significant effort, as patient has declined skilled nursing facility     I had lengthy discussion with patient about his overall decline in health, from chronic issues such as diabetes, pulmonary disease, end-stage renal disease, metastatic thyroid cancer to brain and lung, overall poor mobility and function, decreased appetite, all of which are things that likely will not improve and now we have perforated bowel and it sounds like he has been on a steady decline. We discussed the surgical options of laparoscopy possible laparotomy and resection of the perforated bowel likely small bowel possible primary anastomosis but I think his overall recovery from this would be poor and morbidity mortality high and almost certainly would commit him to needing skilled nursing facility which patient adamantly does not want. It is possible this will improve with antibiotics alone there is no acute surgical need at this point but it may come to this if he does not improve with IV antibiotics and n.p.o. status. In the meantime patient agrees to palliative care consult for long-term care issues and goals of care, patient was not ready to address DO NOT RESUSCITATE status, but will consult nephrology, palliative care, hematology oncology, hospitalist is to see patient, hospitalist team did not feel this was a hospitalist admission so I am admitting patient and obtaining multiple consultants. Plan will be for follow-up CT scan in several days as patient improves, n.p.o. for now, but if does not improve may need surgical intervention or consideration sooner.          ______________________________________________________________________  DISCHARGE SUMMARY/HOSPITAL COURSE:  for full details see H&P, daily progress notes, labs, consult notes. Perforated Viscus  -surgery is following and recommended conservative management  -Started on regular diet by surgery      ESRD on HD TTS  -renal following for hemodialysis needs     ESBL urinary tract infection  -Continue meropenem. Will treat for a total of 10 days.  Consult PICC team for midline       DM II  -Continue home Lantus at 35 units. Continue insulin lispro 3 units 3 times daily. Continue insulin lispro sliding scale. Monitor blood sugar checks     Constipation  -Reports having a small bowel movement after enema   - Continue scheduled Dulcolax and MiraLAX. -monitor for ileus if no improvement        Thyroid cancer with metastasis to the brain and lung  HTN  HLD  GERD  Hx of TIA  SHOLA with chronic respiratory failure 2-3L NC  -Continue home Norvasc, Coreg, hydralazine  -Continue Decadron     Palliative care team is following for goals of care discussion     Hospitalist will take over as primary as no surgeries planned.     Disposition:  -Patient wants to go to [facility], get rehab and get treatment for his cancer  -He was recommended palliative care only by his oncologist.        Code Status:  DNR  DVT Prophylaxis:  Will start Lovenox                    _______________________________________________________________________  Patient seen and examined by me on discharge day. Pertinent Findings:  Gen:    Not in distress  Chest: Clear lungs  CVS:   Regular rhythm. No edema  Abd:  Soft, not distended, not tender  Neuro:  Alert,   _______________________________________________________________________  DISCHARGE MEDICATIONS:   Current Discharge Medication List      START taking these medications    Details   insulin lispro (HUMALOG) 100 unit/mL injection INITIATE CORRECTIVE INSULIN PROTOCOL (DAGOBERTO):  RX DAGOBERTO Normal Sensitivity (Average weight)    AC (before meals), Q6H, and Q4H CORRECTIONAL SCALE only For Blood Sugar (mg/dl) of :             140-199=2 units            200-249=3 units  250-299=5 units  300-349=7 units  350 or greater = Call MD  Give in addition to basal medications. Do Not Hold for NPO    BEDTIME CORRECTIONAL sliding scale when scheduled:  200-249=2 units  250-299=3 units   300-349=4 units  350 or greater = Call MD  Give in addition to basal medications.   Do Not Hold for NPO Fast Acting - Administer Immediately - or within 15 minutes of start of meal, if mealtime coverage. Qty: 1 Each, Refills: 1  Start date: 11/26/2021      insulin glargine (LANTUS) 100 unit/mL injection 30 Units by SubCUTAneous route nightly. Qty: 1 mL, Refills: 0  Start date: 11/26/2021      meropenem 500 mg IVPB 500 mg by IntraVENous route every twenty-four (24) hours for 7 days. Qty: 7 Dose, Refills: 0  Start date: 11/27/2021, End date: 12/4/2021      polyethylene glycol (MIRALAX) 17 gram packet Take 1 Packet by mouth daily for 30 days. Qty: 30 Packet, Refills: 0  Start date: 11/27/2021, End date: 12/27/2021      senna-docusate (PERICOLACE) 8.6-50 mg per tablet Take 1 Tablet by mouth two (2) times a day for 30 days. Qty: 60 Tablet, Refills: 0  Start date: 11/26/2021, End date: 12/26/2021         CONTINUE these medications which have NOT CHANGED    Details   docusate sodium (Colace) 100 mg capsule Take 1 Capsule by mouth two (2) times a day for 90 days. Qty: 60 Capsule, Refills: 2      sodium bicarbonate 650 mg tablet Take 2 Tablets by mouth two (2) times a day. Qty: 120 Tablet, Refills: 2    Associated Diagnoses: ESRD (end stage renal disease) [facility]      levothyroxine (SYNTHROID) 175 mcg tablet 1 full tab Mon-Sat but only 1/2 tab on sundays  Qty: 90 Tablet, Refills: 3    Associated Diagnoses: Acquired hypothyroidism      carvediloL (COREG) 6.25 mg tablet Take 1 Tablet by mouth two (2) times daily (with meals). Qty: 60 Tablet, Refills: 3    Associated Diagnoses: Hypertension goal BP (blood pressure) < 130/80; Essential hypertension with goal blood pressure less than 130/80      hydrALAZINE (APRESOLINE) 50 mg tablet Take 1 Tablet by mouth two (2) times a day. Qty: 60 Tablet, Refills: 4    Associated Diagnoses: Hypertension goal BP (blood pressure) < 130/80; Essential hypertension with goal blood pressure less than 130/80      amLODIPine (NORVASC) 10 mg tablet Take 1 Tablet by mouth daily.   Qty: 30 Tablet, Refills: 5    Associated Diagnoses: Hypertension goal BP (blood pressure) < 130/80; Essential hypertension with goal blood pressure less than 130/80      sevelamer carbonate (RENVELA) 800 mg tab tab TAKE 1 TABLET BY MOUTH THREE TIMES DAILY WITH MEALS  Qty: 90 Tablet, Refills: 3      dexAMETHasone (DECADRON) 4 mg tablet Take 4 mg by mouth every eight (8) hours. Qty: 90 Tablet, Refills: 0      bumetanide (BUMEX) 2 mg tablet Take 1 Tablet by mouth daily. Qty: 30 Tablet, Refills: 0      omeprazole (PRILOSEC) 20 mg capsule Take 1 capsule by mouth once daily  Qty: 90 Capsule, Refills: 1    Associated Diagnoses: Gastroesophageal reflux disease with esophagitis without hemorrhage      simvastatin (ZOCOR) 20 mg tablet Take 1 Tab by mouth nightly. Qty: 90 Tab, Refills: 3    Associated Diagnoses: Dyslipidemia (high LDL; low HDL)      Vitamin D2 1,250 mcg (50,000 unit) capsule TAKE 1 CAPSULE BY MOUTH ONCE A WEEK  Qty: 4 Cap, Refills: 5    Associated Diagnoses: Vitamin D deficiency      Ventolin HFA 90 mcg/actuation inhaler TAKE 1-2 PUFFS EVEERY 4-6 HOURS AS NEEDED FOR DYSPNEA AND WHEEZING  Qty: 1 Inhaler, Refills: 2         STOP taking these medications       amoxicillin-clavulanate (Augmentin) 875-125 mg per tablet Comments:   Reason for Stopping:         metoclopramide HCl (REGLAN) 5 mg tablet Comments:   Reason for Stopping:         NovoLIN N NPH U-100 Insulin 100 unit/mL injection Comments:   Reason for Stopping:         lancets misc Comments:   Reason for Stopping:         SORAfenib (NEXAVAR) 200 mg tablet Comments:   Reason for Stopping:         sodium zirconium cyclosilicate (Lokelma) 5 gram powder packet Comments:   Reason for Stopping:                 Patient Follow Up Instructions: Activity: PT/OT Eval and Treat  Diet: Diabetic Diet  Wound Care: As directed    Follow-up with PCP  in 5 days.   Follow-up tests/labs     Follow-up Information     Follow up With Specialties Details Why Contact Info    Juan Massey MD Internal Medicine Schedule an appointment as soon as possible for a visit in 1 week  [facility]  [address]  82 Nader Durham MD Hematology and Oncology, Internal Medicine, Hematology, Oncology Schedule an appointment as soon as possible for a visit in 2 weeks  [address]  MOB [address]  [phone]      Medicaid transportation line   For your reference. Please call as needed with as much notice as possible to schedule wheelchair Arthurine Deck transport for medical appointments. Medicaid transportation line  Phone: Ligia Sheikh [address] [place] [address] [address] [place] [address]    Pricilla Soto MD Internal Medicine In 5 days  [facility]  [address]  82 Nader Durham MD Hematology and Oncology, Internal Medicine, Hematology, Oncology In 2 weeks  [address]  MOB 3 [address]  [facility]  [phone]          ________________________________________________________________    Risk of deterioration: Moderate    Condition at Discharge:  Stable  __________________________________________________________________    Disposition  SNF/LTC    ____________________________________________________________________    Code Status: DNR/DNI  ___________________________________________________________________      Total time in minutes spent coordinating this discharge (includes going over instructions, follow-up, prescriptions, and preparing report for sign off to her PCP) :  >30 minutes    Signed:   Ximena Orellana MD

## 2021-11-26 NOTE — PROGRESS NOTES
Hospital to [facility]                                                                        63 y.o.   male    [address]   Room: 3205/01    MRM 3 SURG TELE  Unit Phone# :  [phone]      [facility]   [place] [address]  Dept: [phone]  Loc: [phone]                    SITUATION     Admitted:  11/18/2021         Attending Provider:  Ludwig Magallanes MD       Consultations:  IP CONSULT TO GENERAL SURGERY  IP CONSULT TO NEPHROLOGY  IP CONSULT TO HEMATOLOGY  IP CONSULT TO HOSPITALIST    PCP:  Thang Eid MD   [phone]    Treatment Team: Attending Provider: Ludwig Magallanes MD; Consulting Provider: Pennie Billingsley MD; Consulting Provider: Boy Pickard MD; Consulting Provider: Karime Ying MD; Consulting Provider: Aryan Medeiros NP; Care Manager: Shavon Gambino; Utilization Review: John Raygoza RN    Admitting Dx: Intestinal diverticular abscess [K63.0]  End stage renal disease ([facility] [N18.6]  Diabetes mellitus ([facility] [E11.9]  Thyroid cancer ([facility] [address] [facility] [C73]       Principal Problem: <principal problem not specified>    * No surgery found * of      BY: * Surgery not found *             ON: * No surgery found *                  Code Status: DNR                Advance Directives:   Advance Care Planning 11/20/2021   Patient's [email or web address] is: Named in scanned ACP document   Primary Decision Maker Name -   Primary Decision Maker Phone Number -   Primary Decision Maker Relationship to Patient -   Confirm Advance Directive Yes, on file   Patient Would Like to Complete Advance Directive -   Does the patient have other document types -    (Send w/patient)   No Doesnt Have       Isolation:  Contact       MDRO: ESBL           BACKGROUND     Allergies:   Allergies   Allergen Reactions    Anesthetics - Amide Type - Select Amino Amides Shortness of Breath    Flexeril [Cyclobenzaprine] Hives  Tramadol Hives       Past Medical History:   Diagnosis Date    Adverse effect of anesthesia     \" STOP BREATHING 1 TIME C ANESTH\"    Cancer ([facility] 2004    thyroid cancer    Chronic kidney disease     [facility] T-TH-[facility]    Chronic pain     BACK SHOULDER AND ARM    COVID-19 04/2021    Depression     Diabetes ([facility]     GERD (gastroesophageal reflux disease)     Heart failure (HCC)     stage 1    Hypercholesterolemia     Hypertension     Nausea & vomiting     PUD (peptic ulcer disease)     Sleep apnea     doesn't wear cpap    Thyroid cancer ([facility]     TIA (transient ischemic attack) 2011    Vitamin D deficiency        Past Surgical History:   Procedure Laterality Date    HX HEART CATHETERIZATION      HX HEENT      THROAT SURGERY X 4    HX ORTHOPAEDIC      back     HX ORTHOPAEDIC      ARM AND SHOULDER    HX OTHER SURGICAL      thyroid, lymphnode    HX RETINAL DETACHMENT REPAIR      left eye    HX VASCULAR ACCESS      HD cather in chest    IR INJ FORAMIN EPID LUMB ANES/STER SNGL  10/20/2021    IR INSERT NON TUNL CVC OVER 5 YRS  8/18/2020    IR INSERT NON TUNL CVC OVER 5 YRS  7/23/2021    IR INSERT TUNL CVC W/O PORT OVER 5 YR  8/26/2020    IR INSERT TUNL CVC W/O PORT OVER 5 YR  7/27/2021    UPPER GI ENDOSCOPY,BALL DIL,30MM  7/17/2020         UPPER GI ENDOSCOPY,BIOPSY  7/17/2020         VASCULAR SURGERY PROCEDURE UNLIST      cardiac cath NEG. Medications Prior to Admission   Medication Sig    amoxicillin-clavulanate (Augmentin) 875-125 mg per tablet Take 1 Tablet by mouth two (2) times a day.  docusate sodium (Colace) 100 mg capsule Take 1 Capsule by mouth two (2) times a day for 90 days.  sodium bicarbonate 650 mg tablet Take 2 Tablets by mouth two (2) times a day.  levothyroxine (SYNTHROID) 175 mcg tablet 1 full tab Mon-Sat but only 1/2 tab on sundays    metoclopramide HCl (REGLAN) 5 mg tablet TAKE 1 TABLET BY MOUTH ONCE DAILY AS NEEDED.  *DO NOT TAKE MORE THAN 3 TABLETS A DAY    NovoLIN N NPH U-100 Insulin 100 unit/mL injection INJECT 2 TO 10 UNITS SUBCUTANEOUSLY ONCE DAILY    lancets misc Use preferred brand; Check glucose 3-4 times daily, Diagnosis E11.22    carvediloL (COREG) 6.25 mg tablet Take 1 Tablet by mouth two (2) times daily (with meals).  hydrALAZINE (APRESOLINE) 50 mg tablet Take 1 Tablet by mouth two (2) times a day.  amLODIPine (NORVASC) 10 mg tablet Take 1 Tablet by mouth daily.  sevelamer carbonate (RENVELA) 800 mg tab tab TAKE 1 TABLET BY MOUTH THREE TIMES DAILY WITH MEALS    SORAfenib (NEXAVAR) 200 mg tablet Take 1 Tablet by mouth daily for 30 days.  dexAMETHasone (DECADRON) 4 mg tablet Take 4 mg by mouth every eight (8) hours.  bumetanide (BUMEX) 2 mg tablet Take 1 Tablet by mouth daily.  omeprazole (PRILOSEC) 20 mg capsule Take 1 capsule by mouth once daily    simvastatin (ZOCOR) 20 mg tablet Take 1 Tab by mouth nightly.  Vitamin D2 1,250 mcg (50,000 unit) capsule TAKE 1 CAPSULE BY MOUTH ONCE A WEEK (Patient taking differently: Take 50,000 Units by mouth every Monday. TAKE 1 CAPSULE BY MOUTH ONCE A WEEK)    sodium zirconium cyclosilicate (Lokelma) 5 gram powder packet Take 5 g by mouth two (2) times a week. On Fridays and Sundays    Ventolin HFA 90 mcg/actuation inhaler TAKE 1-2 PUFFS EVEERY 4-6 HOURS AS NEEDED FOR DYSPNEA AND WHEEZING       Hard scripts included in transfer packet no    Vaccinations:    Immunization History   Administered Date(s) Administered    COVID-19, PFIZER, MRNA, LNP-S, PF, 30MCG/0.3ML DOSE 04/29/2021, 05/20/2021    Influenza Vaccine 11/02/2021    Influenza Vaccine (Quad) PF (>6 Mo Flulaval, Fluarix, and >3 Yrs Evangelina Jarreau 28987) 10/11/2016, 09/23/2020               The Charlson CoMorbitiy Index tool is an evidenced based tool that has more automatic generated information.  The tool looks at many different items such as the age of the patient, how many times they were admitted in the last calendar year, current length of stay in the hospital and their diagnosis. All of these items are pulled automatically from information documented in the chart from various places and will generate a score that predicts whether a patient is at low (less than 13), medium (13-20) or high (21 or greater) risk of being readmitted. ASSESSMENT                Temp: 98 °F (36.7 °C) (11/26/21 1028) Pulse (Heart Rate): 81 (11/26/21 1028)     Resp Rate: 17 (11/26/21 1028)           BP: (!) 151/48 (11/26/21 1028)     O2 Sat (%): 92 % (11/26/21 1028)     Weight: 92.5 kg (204 lb)    Height: 5' 9\" (175.3 cm) (11/26/21 1040)       If above not within 1 hour of discharge:    BP:_____  P:____  R:____ T:_____ O2 Sat: ___%  O2: ______    Active Orders   Diet    ADULT DIET Regular; Please send 2 iced tea's w/ equal on every lunch and dinner tray; prefers vanilla flavor Nepro also         Orientation: oriented to time, place, person and situation     Active Behaviors: None                                   Active Lines/Drains:  (Peg Tube / Day / CL or S/L?): yes    Urinary Status: Voiding     Last BM: Last Bowel Movement Date: 11/23/21     Skin Integrity: Other (comment) (scratches)             Mobility: Slightly limited   Weight Bearing Status: WBAT (Weight Bearing as Tolerated)      Gait Training  Assistive Device: Walker, rolling, Gait belt  Ambulation - Level of Assistance:  Moderate assistance  Distance (ft): 3 Feet (ft)         Lab Results   Component Value Date/Time    Glucose 117 (H) 11/26/2021 01:48 AM    Hemoglobin A1c 6.2 (H) 10/17/2021 04:57 AM    INR 1.0 11/19/2021 12:38 AM    INR 0.9 01/20/2017 05:17 AM    HGB 10.3 (L) 11/26/2021 01:48 AM    HGB 9.9 (L) 11/25/2021 03:09 AM        RECOMMENDATION     See After Visit Summary (AVS) for:  · Discharge instructions  · After [address]   · Special equipment needed (entered pre-discharge by Care Management)  · Medication Reconciliation    · Follow up Appointment(s)         Report given/sent by:  Ernestina Painting, RN                    Verbal report given to: Javi Rollins., MANDY           Estimated discharge time:  11/26/2021 at 1600.     1900 - [facility] at bedside; pt more cooperative. Midline intact. Pt's belongings were collected including 4 bags and 2 cellphones with .

## 2021-11-26 NOTE — PROGRESS NOTES
Midline insertion procedure note:  Patient has very limited vascular access. Procedure explained to patient along with risks and benefits. Reviewed this case with Dr. Megha Mishra from Nephrology, who approved midline placement for this patient. Procedure teaching completed. Pre-procedure assessment done. Patient denies questions or concerns at this time. Midline sign placed over the [facility]. Maximum sterile barrier precautions observed throughout procedure. Prior to accessing the vein, Lidocaine 1% 1ml was injected subcutaneously at site with no blood noted upon aspiration. Cannulated right basilic vein using ultrasound guidance. Inserted 4.5 Fr. single lumen midline to right upper arm. Blood return verified and flushed with 20ml normal saline. Sterile dressing applied with Biopatch, StatLock and occlusive dressing as per protocol. Curos caps applied to port. Patient tolerated procedure well with minimal blood loss. Reason for access : Long-Term Antibiotics  Complications related to insertion : None  See nursing message on [employer] for midline reminders. Midline is CT and MRI compatible. Inserted by : Kitty Foote / Vascular Access Nurse  Assisted by : Berry Bamberger, BSN, RN, [facility] / Vascular Access Nurse  Total Catheter Length:  15 cm  Internal Catheter Length : 8 cm   External Length : 7 cm   Right upper arm circumference : 29 cm  Catheter occupies 14% of vein. Type of Midline: ARROW  Ref#: [id number]  Lot#: [id number]  Expiration Date: 2022-09-30  Informed primary nurse, Hilary Mares RN midline is ready for use and to hang new infusion tubing prior to use.     Benny Her RN   Vascular Access Nurse

## 2021-11-26 NOTE — PROGRESS NOTES
Patient ready to discharge from CM standpoint. RN notified. Transition of Care Plan:     RUR: 31% \"high risk\"  Disposition: SNF placement- [facility]  ># for report: [phone]  >Rapid covid completed 11/26; results negative  Follow up appointments: PCP, Nephrology   >Resume OP HD: TTS HD 6:15 AM chair time at [facility]   DME needed: Rehab to provide  Transportation at [address] transport; [facility] at 4:00 PM  Keys or means to access home:  Family will have access      IM Medicare Letter: Reviewed & signed  Is patient a BCPI-A Bundle: No                     If yes, was Bundle Letter given?: [address], [phone]  Discharge Caregiver contacted prior to discharge? Yes- CM reviewed discharge plan with pt's 2 dtrs [address]    CM acknowledged discharge. Update 2:31 PM  CM reviewed discharge plan with pt at bedside & his dtr (Francisco, [phone]) via phone. Pt verbalized understanding & is agreeable to discharge plan. Pt's dtr aware pt does not have transportation benefits- has [employer] Medicaid only & will have to transport pt to & from HD. CM faxed d/c summary and flow sheets to [facility] (fax# [phone]) and notified Natalie Huizar of pt's discharge to rehab. [facility] secured for 4 PM today; PCS completed & on pt's chart. Medicare pt has received, reviewed, and signed 2nd IM letter informing them of their right to appeal the discharge. Signed copy has been placed on pt bedside chart. Initial note-  CM reviewed pt's chart. Discharge plan discussed during IDR; patient medically stable for discharge to rehab. PICC to be placed prior to discharge; awaiting confirmation from nephrology. CM spoke with [facility] [place], [phone]) to confirm facility can accept pt today. Pending rapid covid, facility can take pt. CM secured transport for 4 PM. Will continue to follow.     Care Management Interventions  PCP Verified by CM: Yes  Palliative Care Criteria Met (RRAT>21 & CHF Dx)?: Yes  Palliative Consult Recommended?: Yes  Mode of Transport at Discharge: [facility] Time of Discharge: 1600  Transition of Care Consult (CM Consult): SNF  Partner SNF: Yes  Discharge Durable Medical Equipment: No (Pt has a rolling walker, dolomite walker, manual wheelchair, power wheelchair, and nocturnal O2)  Physical Therapy Consult: No  Occupational Therapy Consult: No  Speech Therapy Consult: No  Support Systems: Child(puneet), Other Family Member(s)  Confirm Follow Up Transport: Family  The Plan for Transition of Care is Related to the Following Treatment Goals : SNF placement- [address]  The Patient and/or Patient Representative was Provided with a Choice of Provider and Agrees with the Discharge Plan?: Yes  Freedom of Choice List was Provided with Basic Dialogue that Supports the Patient's Individualized Plan of Care/Goals, Treatment Preferences and Shares the Quality Data Associated with the Providers?: Yes  Discharge Location  Discharge Placement: Skilled nursing facility    Lorice Rinne, MSW  Care Management

## 2021-11-26 NOTE — PROGRESS NOTES
Hospitalist Progress Note    NAME: Ge Mcclendon   :  1960   MRN:  [id number]       Assessment / Plan:  Perforated Viscus  -surgery is following and recommended conservative management  -Started on regular diet by surgery      ESRD on HD TTS  -renal following for hemodialysis needs    ESBL urinary tract infection  -Continue meropenem. Will treat for a total of 10 days. Consult PICC team for midline      DM II  -Continue home Lantus at 35 units. Continue insulin lispro 3 units 3 times daily. Continue insulin lispro sliding scale. Monitor blood sugar checks    Constipation  -Reports having a small bowel movement after enema   - Continue scheduled Dulcolax and MiraLAX. 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 Disposition:  -Patient wants to go to [facility], get rehab and get treatment for his cancer  -He was recommended palliative care only by his oncologist.       Code Status:  DNR  DVT Prophylaxis:  Will start Lovenox         Subjective:     Chief Complaint / Reason for Physician Visit  Reports abdominal pain but no nausea or vomiting. Objective:     VITALS:   Last 24hrs VS reviewed since prior progress note.  Most recent are:  Patient Vitals for the past 24 hrs:   Temp Pulse Resp BP SpO2   21 0703 98.2 °F (36.8 °C) 81 18 (!) 181/73 93 %   21 0213 98.2 °F (36.8 °C) 78 18 (!) 180/70 90 %   21 97.4 °F (36.3 °C) 85 18 (!) 152/51 93 %   21 1635 98.1 °F (36.7 °C) 78 18 (!) 141/48 98 %   21 1045 98 °F (36.7 °C) 73 18 (!) 166/67 --   21 1036 98 °F (36.7 °C) -- -- -- --   21 1030 -- 67 18 (!) 123/46 --   21 1015 -- 70 16 (!) 150/58 --   11/25/21 1000 -- 70 16 (!) 147/56 --   11/25/21 0945 -- 68 18 (!) 146/56 --   11/25/21 0930 -- 69 18 (!) 125/49 --   11/25/21 0915 -- 67 18 (!) 115/42 --   11/25/21 0900 -- 67 16 (!) 119/48 --       Intake/Output Summary (Last 24 hours) at 11/26/2021 0851  Last data filed at 11/25/2021 1142  Gross per 24 hour   Intake --   Output 1300 ml   Net -1300 ml        I had a face to face encounter and independently examined this patient on 11/26/2021, as outlined below:  PHYSICAL EXAM:  General: Alert, cooperative, no acute distress    EENT:  EOMI. Anicteric sclerae. MMM  Resp:  CTA bilaterally, no wheezing or rales. No accessory muscle use  CV:  Regular  rhythm,  No edema  GI:  Soft, appropriate tenderness present, no guarding or rigidity  Neurologic:  Alert, awake  Psych:   Pleasant  Skin:  No rashes. No jaundice    Reviewed most current lab test results and cultures  YES  Reviewed most current radiology test results   YES  Review and summation of old records today    NO  Reviewed patient's current orders and MAR    YES  PMH/ reviewed - no change compared to H&P  ________________________________________________________________________  Care Plan discussed with:    Comments   Patient y    Family      RN y    Care Manager     Consultant                        Multidiciplinary team rounds were held today with , nursing, pharmacist and clinical coordinator. Patient's plan of care was discussed; medications were reviewed and discharge planning was addressed.      ________________________________________________________________________  Total NON critical care TIME:  35    Minutes    Total CRITICAL CARE TIME Spent:   Minutes non procedure based      Comments   >50% of visit spent in counseling and coordination of care y    ________________________________________________________________________  Jose Angel Gao MD     Procedures: see electronic medical records for all procedures/Xrays and details which were not copied into this note but were reviewed prior to creation of Plan. LABS:  I reviewed today's most current labs and imaging studies. Pertinent labs include:  Recent Labs     11/26/21 0148 11/25/21 0309   WBC 25.0* 21.2*   HGB 10.3* 9.9*   HCT 31.3* 29.9*   * 124*     Recent Labs     11/26/21 0148 11/25/21 0309   * 131*   K 5.2* 5.4*   CL 97 96*   CO2 31 29   * 180*   BUN 40* 59*   CREA 2.32* 2.76*   CA 9.8 10.2*       Signed:  Alicia Carr MD

## 2021-11-28 NOTE — PROGRESS NOTES
Nephrology Progress Note  José Miguel Butt     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 ·  lung nodules     PLAN-  · Plan for HD today for hyperkalemia  · Hgb stable no need for EPO  · D/w Dr Mar Walls in ED  · Plan is to DC him back to SNF after HD. Subjective: Interval History:   -  Seen an examined. Well known to me from previous admission. Missed HD on Saturday. Presented today with SOB. Labs noted hyperkalemia. Renal Consult was requested for urgent HD. Objective:   Vitals:    11/28/21 0937 11/28/21 1030 11/28/21 1200   BP: (!) 184/65 (!) 129/49 (!) 131/45   Pulse: 80 72 66   Resp: 16 13 15   Temp: 97.7 °F (36.5 °C)     SpO2: 100% 96% 96%   Weight: 96.9 kg (213 lb 10 oz)     Height: 5' 9\" (1.753 m)        No intake/output data recorded. Last 3 Recorded Weights in this Encounter    11/28/21 0937   Weight: 96.9 kg (213 lb 10 oz)      Physical exam:   GEN: NAD  NECK- Supple, no mass  RESP: No wheezing, Clear b/l  CVS: S1,S2  RRR  NEURO: Normal speech, Non focal  EXT: No Edema   PSYCH: Normal Mood    Chart reviewed. Pertinent Notes reviewed. Data Review :  Recent Labs     11/28/21  1027 11/26/21  0148   * 132*   K 6.0* 5.2*    97   CO2 25 31   BUN 96* 40*   CREA 3.44* 2.32*   * 117*   CA 9.5 9.8     Recent Labs     11/28/21  1027 11/26/21  0148   WBC 27.9* 25.0*   HGB 10.3* 10.3*   HCT 30.3* 31.3*    149*     No results for input(s): FE, TIBC, PSAT, FERR in the last 72 hours.    Medication list  reviewed  Current Facility-Administered Medications   Medication Dose Route Frequency    ondansetron (ZOFRAN) injection 4 mg  4 mg IntraVENous NOW     Current Outpatient Medications   Medication Sig    insulin lispro (HUMALOG) 100 unit/mL injection INITIATE CORRECTIVE INSULIN PROTOCOL (DAGOBERTO):  RX DAGOBERTO Normal Sensitivity (Average weight)    AC (before meals), Q6H, and Q4H CORRECTIONAL SCALE only For Blood Sugar (mg/dl) of :             140-199=2 units            200-249=3 units  250-299=5 units  300-349=7 units  350 or greater = Call MD  Give in addition to basal medications. Do Not Hold for NPO    BEDTIME CORRECTIONAL sliding scale when scheduled:  200-249=2 units  250-299=3 units   300-349=4 units  350 or greater = Call MD  Give in addition to basal medications. Do Not Hold for NPO Fast Acting - Administer Immediately - or within 15 minutes of start of meal, if mealtime coverage.  insulin glargine (LANTUS) 100 unit/mL injection 30 Units by SubCUTAneous route nightly.  meropenem 500 mg IVPB 500 mg by IntraVENous route every twenty-four (24) hours for 7 days.  polyethylene glycol (MIRALAX) 17 gram packet Take 1 Packet by mouth daily for 30 days.  senna-docusate (PERICOLACE) 8.6-50 mg per tablet Take 1 Tablet by mouth two (2) times a day for 30 days.  docusate sodium (Colace) 100 mg capsule Take 1 Capsule by mouth two (2) times a day for 90 days.  sodium bicarbonate 650 mg tablet Take 2 Tablets by mouth two (2) times a day.  levothyroxine (SYNTHROID) 175 mcg tablet 1 full tab Mon-Sat but only 1/2 tab on sundays    carvediloL (COREG) 6.25 mg tablet Take 1 Tablet by mouth two (2) times daily (with meals).  hydrALAZINE (APRESOLINE) 50 mg tablet Take 1 Tablet by mouth two (2) times a day.  amLODIPine (NORVASC) 10 mg tablet Take 1 Tablet by mouth daily.     sevelamer carbonate (RENVELA) 800 mg tab tab TAKE 1 TABLET BY MOUTH THREE TIMES DAILY WITH MEALS    dexAMETHasone (DECADRON) 4 mg tablet Take 4 mg by mouth every eight (8) hours.  bumetanide (BUMEX) 2 mg tablet Take 1 Tablet by mouth daily.  omeprazole (PRILOSEC) 20 mg capsule Take 1 capsule by mouth once daily    simvastatin (ZOCOR) 20 mg tablet Take 1 Tab by mouth nightly.  Vitamin D2 1,250 mcg (50,000 unit) capsule TAKE 1 CAPSULE BY MOUTH ONCE A WEEK (Patient taking differently: Take 50,000 Units by mouth every Monday.  TAKE 1 CAPSULE BY MOUTH ONCE A WEEK)    Ventolin HFA 90 mcg/actuation inhaler TAKE 1-2 PUFFS EVEERY 4-6 HOURS AS NEEDED FOR DYSPNEA AND WHEEZING          Alicja Stanford, [address] [facility]  [facility] / [facility]  [address], [address]  [place], [address]  Phone - [phone]               Fax - [phone]

## 2021-11-28 NOTE — ED PROVIDER NOTES
EMERGENCY DEPARTMENT HISTORY AND PHYSICAL EXAM      Date: 11/28/2021  Patient Name: Suzi Beal    History of Presenting Illness     Chief Complaint   Patient presents with    Shortness of Breath     since last night. Came from [facility], states he was not being taken care of there. Vitals stable per EMS, no obvious distress. HD pt, MWF schedule    Back Pain     chronic       History Provided By: Patient    HPI: Suzi Beal, 63 y.o. male  presents to the ED with cc of shortness of breath. Patient states he has been short of breath all night long. He was recently admitted to the hospital for perforated viscus and lung cancer. He has been at [facility]. Overnight he had shortness of breath. He feels like the staff was ignoring him so this morning he personally called 911 from the facility. He states they are not doing anything to assist him. He states he is not receiving any antibiotics, although his record does show that he is receiving meropenem at the facility. His lung cancer is palliative. No active treatment.     Past History     Past Medical History:  Past Medical History:   Diagnosis Date    Adverse effect of anesthesia     \" STOP BREATHING 1 TIME C ANESTH\"    Cancer ([address] 2004    thyroid cancer    Chronic kidney disease     [facility] T-[facility]S    Chronic pain     BACK SHOULDER AND ARM    COVID-19 04/2021    Depression     Diabetes ([address]     GERD (gastroesophageal reflux disease)     Heart failure (HCC)     stage 1    Hypercholesterolemia     Hypertension     Nausea & vomiting     PUD (peptic ulcer disease)     Sleep apnea     doesn't wear cpap    Thyroid cancer ([address]     TIA (transient ischemic attack) 2011    Vitamin D deficiency        Past Surgical History:  Past Surgical History:   Procedure Laterality Date    HX HEART CATHETERIZATION      HX HEENT      THROAT SURGERY X 4    HX ORTHOPAEDIC      back     HX ORTHOPAEDIC      ARM AND SHOULDER    HX OTHER SURGICAL      thyroid, lymphnode    HX RETINAL DETACHMENT REPAIR      left eye    HX VASCULAR ACCESS      HD cather in chest    IR INJ FORAMIN EPID LUMB ANES/STER SNGL  10/20/2021    IR INSERT NON TUNL CVC OVER 5 YRS  8/18/2020    IR INSERT NON TUNL CVC OVER 5 YRS  7/23/2021    IR INSERT TUNL CVC W/O PORT OVER 5 YR  8/26/2020    IR INSERT TUNL CVC W/O PORT OVER 5 YR  7/27/2021    UPPER GI ENDOSCOPY,BALL DIL,30MM  7/17/2020         UPPER GI ENDOSCOPY,BIOPSY  7/17/2020         VASCULAR SURGERY PROCEDURE UNLIST      cardiac cath NEG. Medications:  No current facility-administered medications on file prior to encounter. Current Outpatient Medications on File Prior to Encounter   Medication Sig Dispense Refill    insulin lispro (HUMALOG) 100 unit/mL injection INITIATE CORRECTIVE INSULIN PROTOCOL (DAGOBERTO):  RX DAGOBERTO Normal Sensitivity (Average weight)    AC (before meals), Q6H, and Q4H CORRECTIONAL SCALE only For Blood Sugar (mg/dl) of :             140-199=2 units            200-249=3 units  250-299=5 units  300-349=7 units  350 or greater = Call MD  Give in addition to basal medications. Do Not Hold for NPO    BEDTIME CORRECTIONAL sliding scale when scheduled:  200-249=2 units  250-299=3 units   300-349=4 units  350 or greater = Call MD  Give in addition to basal medications. Do Not Hold for NPO Fast Acting - Administer Immediately - or within 15 minutes of start of meal, if mealtime coverage. 1 Each 1    insulin glargine (LANTUS) 100 unit/mL injection 30 Units by SubCUTAneous route nightly. 1 mL 0    meropenem 500 mg IVPB 500 mg by IntraVENous route every twenty-four (24) hours for 7 days. 7 Dose 0    polyethylene glycol (MIRALAX) 17 gram packet Take 1 Packet by mouth daily for 30 days. 30 Packet 0    senna-docusate (PERICOLACE) 8.6-50 mg per tablet Take 1 Tablet by mouth two (2) times a day for 30 days.  60 Tablet 0    docusate sodium (Colace) 100 mg capsule Take 1 Capsule by mouth two (2) times a day for 90 days. 60 Capsule 2    sodium bicarbonate 650 mg tablet Take 2 Tablets by mouth two (2) times a day. 120 Tablet 2    levothyroxine (SYNTHROID) 175 mcg tablet 1 full tab Mon-Sat but only 1/2 tab on sundays 90 Tablet 3    carvediloL (COREG) 6.25 mg tablet Take 1 Tablet by mouth two (2) times daily (with meals). 60 Tablet 3    hydrALAZINE (APRESOLINE) 50 mg tablet Take 1 Tablet by mouth two (2) times a day. 60 Tablet 4    amLODIPine (NORVASC) 10 mg tablet Take 1 Tablet by mouth daily. 30 Tablet 5    sevelamer carbonate (RENVELA) 800 mg tab tab TAKE 1 TABLET BY MOUTH THREE TIMES DAILY WITH MEALS 90 Tablet 3    dexAMETHasone (DECADRON) 4 mg tablet Take 4 mg by mouth every eight (8) hours. 90 Tablet 0    bumetanide (BUMEX) 2 mg tablet Take 1 Tablet by mouth daily. 30 Tablet 0    omeprazole (PRILOSEC) 20 mg capsule Take 1 capsule by mouth once daily 90 Capsule 1    simvastatin (ZOCOR) 20 mg tablet Take 1 Tab by mouth nightly. 90 Tab 3    Vitamin D2 1,250 mcg (50,000 unit) capsule TAKE 1 CAPSULE BY MOUTH ONCE A WEEK (Patient taking differently: Take 50,000 Units by mouth every Monday.  TAKE 1 CAPSULE BY MOUTH ONCE A WEEK) 4 Cap 5    Ventolin HFA 90 mcg/actuation inhaler TAKE 1-2 PUFFS EVEERY 4-6 HOURS AS NEEDED FOR DYSPNEA AND WHEEZING 1 Inhaler 2       Family History:  Family History   Problem Relation Age of Onset    Diabetes Mother     Elevated Lipids Mother    [place] Bladder Disease Mother     Headache Mother     Migraines Mother     Heart Disease Mother     Hypertension Mother     Stroke Mother     Other Mother         ANEURSYM BRAIN    Bleeding Prob Father     Cancer Father         LEUKEMIA    Diabetes Father     Elevated Lipids Father     Mental Retardation Sister     Psychiatric Disorder Sister     Cancer Brother         LUNGS       Social History:  Social History     Tobacco Use    Smoking status: Former Smoker     Quit date: 1994     Years since quittin.2    Smokeless tobacco: Never Used   Vaping Use    Vaping Use: Never used   Substance Use Topics    Alcohol use: Not Currently    Drug use: No       Allergies: Allergies   Allergen Reactions    Anesthetics - Amide Type - Select Amino Amides Shortness of Breath    Flexeril [Cyclobenzaprine] Hives    Tramadol Hives       All the above components of the past  history are auto-populated from the electronic record. They have been reviewed and the patient has been interviewed for any pertinent past history that pertains to the patient's chief complaint and reason for visit. Not all pre-populated components may be accurate at the time this note was generated. Review of Systems   Review of Systems   Constitutional: Negative for chills and fever. HENT: Negative for congestion, ear pain, rhinorrhea, sore throat and trouble swallowing. Eyes: Negative for visual disturbance. Respiratory: Positive for shortness of breath. Negative for cough and chest tightness. Cardiovascular: Negative for chest pain and palpitations. Gastrointestinal: Positive for nausea. Negative for abdominal pain, blood in stool, constipation, diarrhea and vomiting. Genitourinary: Negative for decreased urine volume, difficulty urinating, dysuria and frequency. Musculoskeletal: Negative for back pain and neck pain. Skin: Negative for color change and rash. Neurological: Negative for dizziness, weakness, light-headedness and headaches. Physical Exam   Physical Exam  Vitals and nursing note reviewed. Constitutional:       General: He is not in acute distress. Appearance: He is well-developed. He is ill-appearing (chronically). HENT:      Head: Normocephalic. Eyes:      Conjunctiva/sclera: Conjunctivae normal.   Cardiovascular:      Rate and Rhythm: Normal rate and regular rhythm. Pulmonary:      Effort: Pulmonary effort is normal. No accessory muscle usage or respiratory distress.    Abdominal:      General: There is no distension. Musculoskeletal:      Cervical back: Normal range of motion. Skin:     General: Skin is warm and dry. Neurological:      Mental Status: He is alert and oriented to person, place, and time. Due to the COVID-19 pandemic, in order to reduce the spread and transmission of the virus, some basic elements of the physical exam have been deferred to reduce direct or close contact with the patient unless they are deemed to be absolutely necessary, regardless of whether the virus is highly suspected or not. Diagnostic Study Results     Labs -     Recent Results (from the past 24 hour(s))   EKG, 12 LEAD, INITIAL    Collection Time: 11/28/21 10:18 AM   Result Value Ref Range    Ventricular Rate 71 BPM    Atrial Rate 71 BPM    P-R Interval 152 ms    QRS Duration 92 ms    Q-T Interval 356 ms    QTC Calculation (Bezet) 386 ms    Calculated P Axis 48 degrees    Calculated R Axis -45 degrees    Calculated T Axis 44 degrees    Diagnosis       Normal sinus rhythm  Pulmonary disease pattern  Left anterior fascicular block  When compared with ECG of 19-NOV-2021 00:35,  No significant change was found     CBC WITH AUTOMATED DIFF    Collection Time: 11/28/21 10:27 AM   Result Value Ref Range    WBC 27.9 (H) 4.1 - 11.1 K/uL    RBC 3.39 (L) 4.10 - 5.70 M/uL    HGB 10.3 (L) 12.1 - 17.0 g/dL    HCT 30.3 (L) 36.6 - 50.3 %    MCV 89.4 80.0 - 99.0 FL    MCH 30.4 26.0 - 34.0 PG    MCHC 34.0 30.0 - 36.5 g/dL    RDW 14.3 11.5 - 14.5 %    PLATELET 716 745 - 066 K/uL    MPV 10.4 8.9 - 12.9 FL    NRBC 0.0 0  WBC    ABSOLUTE NRBC 0.00 0.00 - 0.01 K/uL    NEUTROPHILS 84 (H) 32 - 75 %    BAND NEUTROPHILS 8 %    LYMPHOCYTES 2 (L) 12 - 49 %    MONOCYTES 3 (L) 5 - 13 %    EOSINOPHILS 0 0 - 7 %    BASOPHILS 0 0 - 1 %    METAMYELOCYTES 1 %    MYELOCYTES 2 %    IMMATURE GRANULOCYTES 0 0.0 - 0.5 %    ABS. NEUTROPHILS 25.7 (H) 1.8 - 8.0 K/UL    ABS. LYMPHOCYTES 0.6 (L) 0.8 - 3.5 K/UL    ABS.  MONOCYTES 0.8 0.0 - 1.0 K/UL ABS. EOSINOPHILS 0.0 0.0 - 0.4 K/UL    ABS. BASOPHILS 0.0 0.0 - 0.1 K/UL    ABS. IMM. GRANS. 0.0 0.00 - 0.04 K/UL    DF AUTOMATED      RBC COMMENTS NORMOCYTIC, NORMOCHROMIC      WBC COMMENTS TOXIC GRANULATION     METABOLIC PANEL, COMPREHENSIVE    Collection Time: 11/28/21 10:27 AM   Result Value Ref Range    Sodium 134 (L) 136 - 145 mmol/L    Potassium 6.0 (H) 3.5 - 5.1 mmol/L    Chloride 102 97 - 108 mmol/L    CO2 25 21 - 32 mmol/L    Anion gap 7 5 - 15 mmol/L    Glucose 106 (H) 65 - 100 mg/dL    BUN 96 (H) 6 - 20 MG/DL    Creatinine 3.44 (H) 0.70 - 1.30 MG/DL    BUN/Creatinine ratio 28 (H) 12 - 20      GFR est AA 22 (L) >60 ml/min/1.73m2    GFR est non-AA 18 (L) >60 ml/min/1.73m2    Calcium 9.5 8.5 - 10.1 MG/DL    Bilirubin, total 0.3 0.2 - 1.0 MG/DL    ALT (SGPT) 60 12 - 78 U/L    AST (SGOT) 13 (L) 15 - 37 U/L    Alk. phosphatase 126 (H) 45 - 117 U/L    Protein, total 4.8 (L) 6.4 - 8.2 g/dL    Albumin 1.8 (L) 3.5 - 5.0 g/dL    Globulin 3.0 2.0 - 4.0 g/dL    A-G Ratio 0.6 (L) 1.1 - 2.2     CK W/ REFLX CKMB    Collection Time: 11/28/21 10:27 AM   Result Value Ref Range    CK 22 (L) 39 - 308 U/L   TROPONIN-HIGH SENSITIVITY    Collection Time: 11/28/21 10:27 AM   Result Value Ref Range    Troponin-High Sensitivity 36 0 - 76 ng/L   NT-PRO BNP    Collection Time: 11/28/21 10:27 AM   Result Value Ref Range    NT pro-BNP 4,468 (H) <125 PG/ML       Radiologic Studies -   XR CHEST PORT   Final Result   Stable left mid lung mass. No acute cardiopulmonary process seen        CT Results  (Last 48 hours)    None        CXR Results  (Last 48 hours)               11/28/21 0956  XR CHEST PORT Final result    Impression:  Stable left mid lung mass. No acute cardiopulmonary process seen       Narrative:  EXAM: XR CHEST PORT       INDICATION: Shortness of breath       COMPARISON: 11/15/2021       FINDINGS: A portable AP radiograph of the chest was obtained at 0951 hours. The   patient is on a cardiac monitor.  The dual lumen catheter terminates at the   cavoatrial junction. The left lung 5.5 cm mass is redemonstrated. The cardiac   and mediastinal contours and pulmonary vascularity are normal.  The bones and   soft tissues are grossly within normal limits. Medical Decision Making     I reviewed the vital signs, available nursing notes, past medical history, past surgical history, family history and social history. Vital Signs-I have reviewed the vital signs that have been made available during the patient's emergency department visit. The vital signs auto-populated below are obtained mostly by electronic means through monitoring devices that have been downloaded into the patient's chart by the nursing staff. Some vital signs are not downloaded into the chart until after the patient has been discharged and this note has been completed, therefore some vital signs may not be available to the physician for review prior to patient's discharge or admission. The physician has reviewed the patient's triage vital signs, monitored the electronic monitoring devices remotely for any significant vital sign abnormalities, and have reviewed vital signs prior to discharge. Some vital signs reviewed at bedside or remotely utilizing electronic monitoring devices may be different than the vital signs downloaded into the electronic medical record. Some vital signs may be erroneous and inaccurate since they are obtained by electronic monitoring devices, and not all vital signs are verified for accuracy by nursing staff prior to downloading into the patient's chart. Patient Vitals for the past 24 hrs:   Temp Pulse Resp BP SpO2   11/28/21 1200 -- 66 15 (!) 131/45 96 %   11/28/21 1030 -- 72 13 (!) 129/49 96 %   11/28/21 0937 97.7 °F (36.5 °C) 80 16 (!) 184/65 100 %         Records Reviewed: Nursing notes for today's visit have been reviewed.   I have also reviewed most recent medical records pertinent to today's complaints, if available in our medical record system. I have also reviewed all labs and imaging results from previous results in comparison to results obtained today. If an EKG was obtained today, it has been compared to previous EKGs, if available. If arriving via EMS, the EMS report has been reviewed if made available to us within the patient's time in the emergency department. Provider Notes (Medical Decision Making):   Patient with a history of a perforated viscus, lung cancer with mets to the brain, and end-stage renal disease presents with shortness of breath. He has some cognitive difficulties likely due to the brain mets. Overnight he was feeling short of breath and did not feel that he was receiving care at the skilled nursing facility so he called 911 himself. He is not hypoxic. He is not in severe distress. X-ray does not reveal any infiltrates. Patient just recently discharged from the hospital.  Today he does have a significant leukocytosis but it is not significantly increased compared to when he was discharged. His white cell count was 25,000 when he was discharged. I assume this was addressed by the hospitalist.  May be likely due to his chronic steroid use. He does have a potassium of 6.0 today. Likely hypervolemic since he has not been dialyzed in about 3 to 4 days. Will consult with nephro today for dialysis. I do not see that he needs to be readmitted to the hospital.  Will reassess after dialysis and improved and go back to the skilled nursing facility. Case management is involved in. Will consider hospice consultation. Patient also is unhappy with his current placement and case management will address transfer to another facility but for now will at least be going back to [facility]. ED Course:   Initial assessment performed. The patients presenting problems have been discussed, and they are in agreement with the care plan formulated and outlined with them.   I have encouraged them to ask questions as they arise throughout their visit. ED Course as of 12/03/21 1817   Madelaine Warren Nov 28, 2021 2059 Patient is return to the ED after dialysis. We will give a dose of pain medications at his request.  It appears that his back pain is chronic in nature. His cardiac monitoring and pulse oximeter are normal.  Will discharge with plan for outpatient care as detailed by Dr. Maycol Hernandez earlier in patient's shift. [CC]      ED Course User Index  [CC] Abdias Barlow MD       Orders Placed This Encounter    XR CHEST  Port (Per MD Order)    CBC WITH AUTOMATED DIFF    METABOLIC PANEL, COMPREHENSIVE    CK W/ REFLX CKMB    TROPONIN-HIGH SENSITIVITY    NT-PRO BNP    SOB PANEL TRACKING (DO NOT DESELECT)    OXYGEN CANNULA (To maintain O2 sat greater than 92%) Consult MD if Hx. of COPD    PULSE OXIMETRY SPOT CHECK    EKG NOTEWRITER(ASAP ONLY)    IP CONSULT TO NEPHROLOGY    EKG, 12 LEAD, INITIAL    SALINE LOCK IV ONE TIME STAT    HEMODIALYSIS INPATIENT Duration (hr): 3.5; Dialyzer: Revaclear; Dialysate Bath:  K: 2; Dialysate Bath:  CA: 2.5; Dialysate Bath: Bicarb: 35; Sodium Profiling/Modeling: No; Weight Loss (kg): 2; Target Fluid Removal (mL): 2,000; Access: [employer]. ..  ondansetron (ZOFRAN) injection 4 mg    IP CONSULT TO CASE MANAGEMENT       EKG    Date/Time: 11/28/2021 10:18 AM  Performed by: Avinash Botello MD  Authorized by: Avinash Botello MD     ECG reviewed by ED Physician in the absence of a cardiologist: yes    Rate:     ECG rate:  71    ECG rate assessment: normal    Rhythm:     Rhythm: sinus rhythm    Ectopy:     Ectopy: none    QRS:     QRS axis:  Normal  Conduction:     Conduction: abnormal      Abnormal conduction: LAFB    ST segments:     ST segments:  Normal  T waves:     T waves: normal            Critical Care Time:   0    Disposition:  Discharge    The patient's emergency department evaluation is now complete.   I have reviewed all labs, imaging, and pertinent information. I have discussed all results with the patient and/or family. Based on our evaluation today I do believe that the patient is safe to be discharged home. The patient has been provided with at home instructions that are pertinent to their complaint today, although these may not be specific to the exact diagnosis. I have reviewed the patient's home medications and attempted to reconcile if not already done so by pharmacy or nursing staff. I have discussed all new prescriptions with the patient. The patient has been encouraged to follow-up with primary care doctor and/or specialist, and these have been discussed with the patient. The patient has been advised that they may return to the emergency department if they have any worsening symptoms and or new symptoms that are of concern to them. Verbal discharge instructions may have also been provided to the patient that may not be specifically contained in the written discharge instructions. The patient has been given opportunity to ask questions prior to discharge. PLAN:  1. Current Discharge Medication List        2. Follow-up Information     Follow up With Specialties Details Why Contact Info    Yasmin Villegas MD Internal Medicine   [id number]  [phone]          Return to ED if worse     Diagnosis     Clinical Impression:   1. ESRD needing dialysis ([facility] [address]    2. Other hypervolemia    3. Acute hyperkalemia    4.  SOB (shortness of breath)

## 2021-11-28 NOTE — DISCHARGE INSTRUCTIONS
It was a pleasure taking care of you in our Emergency Department today. We know that when you come to [facility] you are entrusting us with your health, comfort, and safety. Our physicians and nurses honor that trust, and truly appreciate the opportunity to care for you and your loved ones. We also value your feedback. If you receive a survey about your Emergency Department experience today, please fill it out. We care about our patients' feedback, and we listen to what you have to say. Please read over your discharge instructions as these contain pertinent information to help you in the healing process. These instructions include a list of prescriptions you were given today. Follow-up information is also noted on your discharge papers. There are attached instructions and information pertaining to the reason why you were seen in the emergency department today. These discharge instructions may not be for exactly why you were here, but may be the closest available instructions that we have. These include important advice for things that you can do at home to feel better, and reasons to return to the emergency department. The evaluation and treatment you received in the emergency department is not always definitive care. If follow-up with your primary care doctor or specialist was recommended, it is important that you make these appointments for follow-up care. You may need further testing, procedures, and/or medications to help you feel better. Further tests may be required that are not available in the emergency department. Failure to make these follow-up appointments may jeopardize your health. The emergency department is here for emergent stabilization and evaluation of life and limb threatening illness and/or injuries.   Further care through a specialist or primary care doctor may be required to assist in your healing and complete your treatment and/or evaluation. We may not always be able to make a diagnosis in the emergency department, or things may change that will alter your diagnosis. Our primary goal is to ensure that nothing serious is occurring and that you are stable to continue your treatment and evaluation at home as an outpatient. Of course, if things change, and you feel worse, you are always encouraged to return to the emergency department for re-evaluation. Lab Results Today:  Recent Results (from the past 8 hour(s))   EKG, 12 LEAD, INITIAL    Collection Time: 11/28/21 10:18 AM   Result Value Ref Range    Ventricular Rate 71 BPM    Atrial Rate 71 BPM    P-R Interval 152 ms    QRS Duration 92 ms    Q-T Interval 356 ms    QTC Calculation (Bezet) 386 ms    Calculated P Axis 48 degrees    Calculated R Axis -45 degrees    Calculated T Axis 44 degrees    Diagnosis       Normal sinus rhythm  Pulmonary disease pattern  Left anterior fascicular block  When compared with ECG of 19-NOV-2021 00:35,  No significant change was found     CBC WITH AUTOMATED DIFF    Collection Time: 11/28/21 10:27 AM   Result Value Ref Range    WBC 27.9 (H) 4.1 - 11.1 K/uL    RBC 3.39 (L) 4.10 - 5.70 M/uL    HGB 10.3 (L) 12.1 - 17.0 g/dL    HCT 30.3 (L) 36.6 - 50.3 %    MCV 89.4 80.0 - 99.0 FL    MCH 30.4 26.0 - 34.0 PG    MCHC 34.0 30.0 - 36.5 g/dL    RDW 14.3 11.5 - 14.5 %    PLATELET 496 825 - 513 K/uL    MPV 10.4 8.9 - 12.9 FL    NRBC 0.0 0  WBC    ABSOLUTE NRBC 0.00 0.00 - 0.01 K/uL    NEUTROPHILS 84 (H) 32 - 75 %    BAND NEUTROPHILS 8 %    LYMPHOCYTES 2 (L) 12 - 49 %    MONOCYTES 3 (L) 5 - 13 %    EOSINOPHILS 0 0 - 7 %    BASOPHILS 0 0 - 1 %    METAMYELOCYTES 1 %    MYELOCYTES 2 %    IMMATURE GRANULOCYTES 0 0.0 - 0.5 %    ABS. NEUTROPHILS 25.7 (H) 1.8 - 8.0 K/UL    ABS. LYMPHOCYTES 0.6 (L) 0.8 - 3.5 K/UL    ABS. MONOCYTES 0.8 0.0 - 1.0 K/UL    ABS. EOSINOPHILS 0.0 0.0 - 0.4 K/UL    ABS. BASOPHILS 0.0 0.0 - 0.1 K/UL    ABS. IMM.  GRANS. 0.0 0.00 - 0.04 K/UL    DF AUTOMATED      RBC COMMENTS NORMOCYTIC, NORMOCHROMIC      WBC COMMENTS TOXIC GRANULATION     METABOLIC PANEL, COMPREHENSIVE    Collection Time: 11/28/21 10:27 AM   Result Value Ref Range    Sodium 134 (L) 136 - 145 mmol/L    Potassium 6.0 (H) 3.5 - 5.1 mmol/L    Chloride 102 97 - 108 mmol/L    CO2 25 21 - 32 mmol/L    Anion gap 7 5 - 15 mmol/L    Glucose 106 (H) 65 - 100 mg/dL    BUN 96 (H) 6 - 20 MG/DL    Creatinine 3.44 (H) 0.70 - 1.30 MG/DL    BUN/Creatinine ratio 28 (H) 12 - 20      GFR est AA 22 (L) >60 ml/min/1.73m2    GFR est non-AA 18 (L) >60 ml/min/1.73m2    Calcium 9.5 8.5 - 10.1 MG/DL    Bilirubin, total 0.3 0.2 - 1.0 MG/DL    ALT (SGPT) 60 12 - 78 U/L    AST (SGOT) 13 (L) 15 - 37 U/L    Alk. phosphatase 126 (H) 45 - 117 U/L    Protein, total 4.8 (L) 6.4 - 8.2 g/dL    Albumin 1.8 (L) 3.5 - 5.0 g/dL    Globulin 3.0 2.0 - 4.0 g/dL    A-G Ratio 0.6 (L) 1.1 - 2.2     CK W/ REFLX CKMB    Collection Time: 11/28/21 10:27 AM   Result Value Ref Range    CK 22 (L) 39 - 308 U/L   TROPONIN-HIGH SENSITIVITY    Collection Time: 11/28/21 10:27 AM   Result Value Ref Range    Troponin-High Sensitivity 36 0 - 76 ng/L   NT-PRO BNP    Collection Time: 11/28/21 10:27 AM   Result Value Ref Range    NT pro-BNP 4,468 (H) <125 PG/ML        Radiology Results Today:  XR CHEST PORT    Result Date: 11/28/2021  Stable left mid lung mass.  No acute cardiopulmonary process seen

## 2021-11-28 NOTE — PROCEDURES
Hemodialysis / [phone]    Vitals Pre Post Assessment Pre Post   BP BP: (!) 144/54 (11/28/21 1635) 142/65 LOC Alert and oriented x 3  No change   HR Pulse (Heart Rate): 68 (11/28/21 1635) 70 Lungs Diminished No change   Resp Resp Rate: 16 (11/28/21 1635) 16 Cardiac RRR No change   Temp Temp: 98.1 °F (36.7 °C) (11/28/21 1635) 98 Skin Dry and intact No change   Weight    Edema none No change   Tele status   Pain Pain Intensity 1: 9 (11/28/21 0937) 10     Orders   Duration: Start: 2892 End: 0219 Total: 3.5 hours   Dialyzer: Dialyzer/Set Up Inspection: Latrell Dumont (11/28/21 1635)   K Bath: Dialysate K (mEq/L): 2 (11/28/21 1635)   Ca Bath: Dialysate CA (mEq/L): 2.5 (11/28/21 1635)   Na: Dialysate NA (mEq/L): 140 (11/28/21 1635)   Bicarb: Dialysate HCO3 (mEq/L): 35 (11/28/21 1635)   Target Fluid Removal: Goal/Amount of Fluid to Remove (mL): 1500 mL (11/28/21 1635)     Access   Type & Location: Right subclavian cvc tunneled, no s/s of infection, Each catheter limb disinfected per p&p, caps removed, hubs disinfected per p&p.        Comments:                                        Labs   HBsAg (Antigen) / date: Negative   11/16/2021                                           HBsAb (Antibody) / date: Susceptible 11/16/2021    Source:    Obtained/Reviewed  Critical Results Called HGB   Date Value Ref Range Status   11/28/2021 10.3 (L) 12.1 - 17.0 g/dL Final     Potassium   Date Value Ref Range Status   11/28/2021 6.0 (H) 3.5 - 5.1 mmol/L Final     Calcium   Date Value Ref Range Status   11/28/2021 9.5 8.5 - 10.1 MG/DL Final     BUN   Date Value Ref Range Status   11/28/2021 96 (H) 6 - 20 MG/DL Final     Comment:     INVESTIGATED PER DELTA CHECK PROTOCOL     Creatinine   Date Value Ref Range Status   11/28/2021 3.44 (H) 0.70 - 1.30 MG/DL Final     Comment:     INVESTIGATED PER DELTA CHECK PROTOCOL        Meds Given   Name Dose Route        none            Adequacy / Fluid    Total Liters Process: 77.6   Net Fluid Removed: 1500 ml      Comments   Time Out Done:   (Time) 1630   Admitting Diagnosis: Hyperkalemia   Consent obtained/signed: Informed Consent Verified: Yes (11/28/21 8113)   Machine / RO # Machine Number: [id number] (11/28/21 9693)   Primary Nurse Rpt Pre: Ronda Aleman RN   Primary Nurse Rpt Post: Alexandria Higgins RN   Pt Education: Low K diet   Care Plan: Continue nephrology plan of care   Pts outpatient clinic: [facility] Summary   Comments:                         4300- Labs, ordered and medications were reviewed. SBAR was given  primary nurse. HD was initiated using RJ cvc with no issues. 2005- HD was completed and tolerated well. Pt still complain of pain, primary nurse has been notified. All possible blood was returned without any issues. Each dialysis catheter limb disinfected per p&p, blood returned per p&p, each dialysis hub disinfected per p&p, post dialysis catheter dwell instilled per order, and caps applied. SBAR given to primary nurse. Pt vials remain stable during departure.

## 2021-11-28 NOTE — HOSPICE
[facility]  Consult noted. Reviewing chart. Will contact jamal Valdez shortly. 1:20--In to meet with pt who c/o nausea and not able to focus on a hospice conversation. Pt gave permission to contact jamal Valdez. Pt seemed to understand that hospice might be able to help with care brenda Patel.  Information brochure given with 24hr contact information. 1:30--T/C to jamal Valdez. Information given with 24hr contact information. Nick Valdez understands that dialysis can continue with hospice involvement given that ESRD is unrelated to the Cancer Dx. Pt is currently receiving palliative chemo with plans for XRT which would not be covered by hospice. Valeria plans to speak with her siblings and pt, no decisions made today re hospice involvement. Valeria to contact [phone] [facility] for further discussion or when decision made. Thank you for the opportunity to care for this pt and family. Please contact hospice at [phone] with any questions or concerns.
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
## 2021-11-28 NOTE — ED NOTES
Assumed care of pt from EMS. Pt is A+Ox4. Pt arrives with CC of SOB which began last night. Pt was admitted to [facility] on 11/26, states he doesn't feel that he is being taken care of there. Pt also complaining of back and leg pain which is chronic in nature. Pt states he is not currently SOB. Pt states that he feels he is not being taken care of at the facility. States staff refused to clean him up last night. Also states he did not receive IV abx at facility. Pt is a HD pt who gets dialysis MWF, has not missed any appointments. No edema noted upon exam. Pt has dialysis access in left chest and a midline in his right upper arm. Pt placed on monitor x3, call light in reach    1030 per MD, may use midline for blood work and meds.  Verified placement in xray    1625 transport arrived to transport pt to dialysis

## 2021-11-29 NOTE — PROGRESS NOTES
Transition of care outreach postponed for 14 days due to patient's discharge to SNF. Per EMR patient discharged to [address] [place] [address], will continue to monitor patient progress.

## 2021-12-01 NOTE — PROGRESS NOTES
Ambulatory Care Management Note      Date/Time:  12/1/2021 1:57 PM  Contacted Yuriy Sanchez at [facility]. She sts she spoke w/George Lemus at [facility] ([phone]) and she said they paid for his trip to HD today and was willing to receive the fax stating it is their (the facility's) responsibility to transport this pt. Pt's dtr, Darrion Petsimon, was updated w/this info.  /dla      Date/Time:  12/1/2021 12:46 PM  Contacted Yuriy Sanchez, [address] [place] at [facility], and explained the current situation w/pt and transportation to HD. She informed ACM that the facility is responsible for getting pt's to HD as scheduled and she will discuss this w/the DON to have her fax paperwork showing that the facility needs to absorb this cost as they are the responsible party. She asked this ACM to c/b in about an hr and she should have a solution. ACM contacted pt's dtr, Darrion Givens, to apprise her of the status of her concern./dla        Date/Time:  11/30/21 4:31 PM  Rec'd a VM from pt's dtr, [facility], earlier today notifying this ACM that her father has missed (2) HD appt's d/t no transportation at [facility] and was ultimately sent to the ER this past Sunday to receive HD. At [facility], pt was rec'ing HD w/o issue. ACM informed Darrion Tosha that this would be looked into and will call her back as soon as something is known. ACM then called Yuriy Sanchez at [facility] worked w/arranging pt's transportation previously, and left a VM requesting a c/b. At this time, no response has been rec'd from Yuriy Sanchez.   Will f/u on this tomorrow.  Christina Mijares

## 2021-12-03 NOTE — PROGRESS NOTES
Physician Progress Note      PATIENT:               Cullen Chery  CSN #:                  [id number]  :                       1960  ADMIT DATE:       2021 10:46 PM  Fernando Booth DATE:        2021 7:00 PM  RESPONDING  PROVIDER #:        Sussy Nunez MD          QUERY TEXT:    Patient admitted with abd pain. Noted documentation of  surger-Intestinal diverticular abscess  and  pn-mesenteric abscess. If possible, please document in progress notes and discharge summary if you are evaluating and /or treating any of the following: The medical record reflects the following:  Risk Factors: esrd, Thyroid cancer with metastasis to the brain and lung  Clinical Indicators:  CT scan showed \"Small pneumoperitoneum. Inflammatory changes in the left mesentery with 2.8 cm mesenteric abscess. \"   surgery-Intestinal diverticular abscess  Treatment: surg cx, conservative management  ThanksIsabella Rn/CDI   Options provided:  -- Mesenteric abscess  confirmed  -- Intestinal diverticular abscess  confirmed  -- Other - I will add my own diagnosis  -- Disagree - Not applicable / Not valid  -- Disagree - Clinically unable to determine / Unknown  -- Refer to Clinical Documentation Reviewer    PROVIDER RESPONSE TEXT:    After study, Mesenteric abscess confirmed .     Query created by: Sampson Jacinto on 2021 1:04 PM      Electronically signed by:  Sussy Nunez MD 12/3/2021 8:39 AM
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
## 2021-12-06 NOTE — HOSPICE
Patient is staying in a hotel room with his daughter her boyfriend and 3 children, the room is dirty. Patient is laying in bed when writer arrived he is alert and oriented x 3. Patient's lungs are clear abdomen is soft active bowel sounds. Patient is complaining of right leg hip pain' patient daughter stated he had 3 Norco this morning, which has not helped with pain. Writer called Yesenia Calderon Np order to give 2 Norco every 4 hours, until morphine is delivered this afternoon. Writer reviewed medications and care plan with daughter and patient and ask them to call with any questions or concerns.
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
## 2021-12-07 NOTE — HOSPICE
LMSW arrived at the patient's hotel room to complete a routine visit for [facility]. The LMSW was greeted at the patient's door by the patient's DTR Keanu Eastman. There were Wen's three small children present. The room was unkempt. The patient is a 64 y/o  male with a [facility] Dx. Thyroid cancer. The patient was received lying on a hospital bed watching TV upon arrival. Patient was alert and oriented x 4. Patient is bedbound and total care. Patient reported that his appetite is fair. Patient is able to sleep through the night. Patient's mood was appropriate and congruent with conversation. Joselynnicolasa Eastman stated that she is coping okay. Patient was a  for 30 years. [facility] was  [facility] who had passed in May of 2020. They four DTRS and three sons; Ravi Nicolas who lives in [place], Alex Ebonie who lives in [facility], Keanu Eastman who lives with the patient, Kera Brothers who lives in [facility], Alpesh who lives in the [place], Conrado Adams who lives in [place], and Nasim Stack who lives in the [place]. Patient stated that he has four brothers and one sister; Anayeli Pinedo who lives in [place], [place] who lives in [facility] [place] who lives in [place], [place] who lives in [place], and [place] who lives in [facility]. Patient belongs to the [facility] but does not receive support. LMSW provided community resources and emotional support.

## 2021-12-07 NOTE — HOSPICE
visit for pt in pain most of the day, and ongoing concerns over bowels. Prior to arrival pt had a BM per his own report. Also incont. of small liquid stool during this visit. Pain \"20\" at times, best it has been is \"8\". call to D, increased oxycodone to 20mg po every 4 hours prn. Pt had 10mg every 4 hours today plus 5mg MSIR for three doses. Instructed daughter to keep track of pain doses and relief as we are titrating meds up for comfort. Some SOB at rest, positional, denies cough. Saturation and vitals WNL. Incont. of urine. PPS 40, pt alert and able to make his needs known. Total care, needed hygiene provided and linen change at this visit. Instructed daughter on increase pain medication and also increased Senna to two tabs BID as current bowel regime not adequate. Family will call with any further needs. Living conditions are of concern. Crowded hotel room with many family members currently residing in the space. Area is dirty, foul odor, animal droppings on the floor. Pt found in soiled linen and urine soaked brief. Due to placement of the bed in the room, not able to get to pt on either side of bed for adequate hygiene. This RN cleaned him as well as I could, changed sheet and new brief applied. Caregiver not available to assist as she was preparing meal for two young children. Conditions do not allow for adequate hygiene and assessment of this patient.  aware.

## 2021-12-07 NOTE — HOSPICE
PRN visit made at request of patient's daughter to administer suppository for constipation, as pt will not allow her to do so. Upon arrival, pt resting in bed. Per pt, last BM 12/2. Administered suppository. Educated pt that hospice does not have resources to make visits simply to to administer suppositories, as this is a caregiver responsibility. Pt verbalized understanding. Per pt, pain level is 5/10. Dtr advised that morphine was just received ~9pm and she administered 1 dose. nstructed dtr to administer morphine q3 hrs overnight as needed. Also advised her to notify hospice Tuesday if pain meds (morphine and oxycodone) if pt continues to have pain, as dose/frequency adjustments can be made. Emphasized pt's comfort level is key. Dtr verbalized understanding.

## 2021-12-08 PROBLEM — Z51.5 HOSPICE CARE PATIENT: Status: ACTIVE | Noted: 2021-01-01

## 2021-12-08 NOTE — PROGRESS NOTES
Ambulatory Care Management Note    Date/Time:  12/8/2021 10:29 AM    Per review of pt's EMR, he is now admitted to [facility] while residing in the motel he has been in since March of this yr. Pt has made his wishes known and will not leave the [facility]/his family until after G[place] (to go to inpt hospice). Case resolved at this time.   Family has ACM's contact info for any questions, concerns or needs.  Flory Crane

## 2021-12-08 NOTE — HOSPICE
initial visit to provide comfort and spiritual guidance to the pt, family and CG as they make their journey towards EOL. Introduced spiritual care services to patient, daughter Sujey Schroeder and her boyfriend Meghana Arciniega. Educated on hospice philosophy and the support roles of the team; offered empathic listening processing transition to hospice. Reflected on patient and family's struggles and concerns/fears w/ dying: patient noted that pain/suffering during the dying process and reactions to morphine are his primary fears. Lack of hygiene and family's anticipatory grief negatively impacting quality of care. Completed spiritual history and reviewed coping resources: patient is Mabel Barrera, belongs to [facility]; maintains close relationship w/ God and actively engages in prayer everyday. Offered spiritual reflection on waterfall image; used poetry to examine how life's challenges can also grow beauty and resilience spiritually. Engaged music playing favorite hymns and offered prayer. Patient welcomes ongoing  support.     Ruperto et al. Assessing Spiritual Concerns in Palliative Care (Completed 21)  Need for Meaning in the Face of Sufferin (patient struggling w/ pain management)  Need for integrity, legacy, generativity: 1  Concerns about relationships: family and/or significant others: 3 (patient/family struggling w/ compound grief - spouse recently  from cancer 9 mos ago; family overwhelmed w/ caregiving challenges; living conditions overcrowded and unhygienic negatively impacting patient and family coping)  Concern or fear of dying or death: 2 (patient fears suffering during dying process and negative reactions w/ medications; family struggling w/ anticipatory grief and young grandchildren witnessing patient's death)  Issues related to making decisions about treatment: 2 (patient and family struggling w/ medication management)  R/S Struggle: 1 (patient belongs to [employer]; active in his Alexis Maxwell roselia, daily prayer and close relationship w/ God; welcomes ongoing  support)  0--no evidence of unmet need; (0*--further assessment needed to clarify needs)  1--some evidence of unmet need  2--substantial evidence of unmet need 3--evidence of severe unmet need  Total Spiritual Distress Score: 11/18 (Medium)

## 2021-12-09 NOTE — HOSPICE
prn visit for caregiver education on srk medication and end of life. On arrival patient laying in bed unresponsive. family has not given srk medications since last dose of morphine \"earlier\" in the day. Discussed with caregiver and family administration of morphine, lorazepam, and levsin for terminal secretions. Caregiver verbalizedunderstanding. changed lorazepam order to liquid as per Dr. Sary Browning order. Discussed patient's decline, EOL, and procedure at time of death with caregiver. Caregiver verbalized understanding. All questions answered.

## 2021-12-09 NOTE — HOSPICE
Arrived to hotel room for follow up visit from patient PRN visit last night for pain management and bowel regimen assistance. Patient is being bathed by HHA. Daughter, Sanjiv Luna, present along with family friend, Ramy Manning, and three young children under age 11. Two children in pull-ups and other child in [address] Children are crying and playing with rubber gloves that have been made into water balloons. Small dog under bed in corner. Visible dog feces and and urine in floor covering. Patient appears uncomfortable when turned to the left, but denies pain. He states he feels that the increase in medication is helping with pain management. Patient had two bowel movements last night and is taking two am and two pm senna. Additional senna ordered. RN gets two packs of wipes out of her car trunk stock and Chux and brings them to room. Foot coverings and gown worn in room. Patient repositioned and moved up in bed by RN and HHA. Patient wearing oxygen. He has a slight cough. Patient reports good appetite and states that he ate beef and broccoli yesterday. RN discussed with patient possible move to [facility] for pain management. RN also discusses caregiver breakdown possibilities. Patient refuses to discuss [facility] at this time, but will consider it if needed. RN confirms supply order will arrive today. Told family to contact hospice with questions, concerns, or change in patient status. Seymour administers medication during visit. Medical director, RNCM, and visit RN to see patient tomorrow.

## 2021-12-09 NOTE — HOSPICE
Saji Gonzales,  64,  at 1520 surrounded by family. [facility] called to  patient. Medications wasted by Delroy Padgett LPN for hospice and witnessed by this RN. Both nurses provided post-mortem care. DME company to be notified to  equipment. Family refused Dennard visit at this time. Family grieving appropriately. Dr. Deon Rogel notified by  email.

## 2021-12-09 NOTE — HOSPICE
prn visit for death support. On arrival patient unresponsive; ESPERANZA Lizama on her way to pronounce. Family grieving appropriately. .  Medications wasted. Assisted Dl with post mortum care.

## 2021-12-10 NOTE — HOSPICE
LMSW arrived at the patient's hotel room to complete a routine visit for [facility]. The LMSW was greeted at the patient's door by the patient's friend Ramses Hendrix. There were Wen's three small children along with other family members present. The room was unkempt. The patient is a 62 y/o  male with a [facility] Dx. Thyroid cancer. The patient was received lying on a hospital bed comatose upon arrival. Patient had vomited brown liquid fluid and appeared to be transitioning. LMSW instructed the family to turn the patient on his side to prevent aspiration. Conchita Javed stated that she is coping okay. LMSW delivered a DDNR and offered the [facility]. Family declined. Luan reported that Franky Briones contacted hospice for a nurse visit and requested this LMSW to follow up. LMSW contacted the patient's RNCM to provide an update. The RNCM stated that a nurse is scheduled to visit the patient and family. LMSW provided  emotional support.

## 2022-07-24 NOTE — TELEPHONE ENCOUNTER
Pt dtr, Valeria calling     [facility] told her they want to get the patient off of oxygen and to get a referral to a Pulmonary doctor         Also, needs refills for amlodipine and omeprazole - [facility]     Best number to reach  Her is [phone] Scribe Attestation (For Scribes USE Only)... Attending Attestation (For Attendings USE Only).../Scribe Attestation (For Scribes USE Only)...

## 2023-03-06 NOTE — PATIENT INSTRUCTIONS
Bowel Preparation: Before Your Child's Procedure  What is bowel preparation? Bowel preparation (or bowel prep) empties and cleans out your child's large intestine (colon). Bowel prep is done before tests that look inside the colon, such as a colonoscopy. These tests look for small growths (called polyps) or other problems like bleeding. The colon has to be empty and clean so the doctor can see problems. For many people, the bowel prep is worse than the test. The liquid your child may have to drink can taste bad. Mixing it with a sports drink like Gatorade can help make it taste better. And your child may feel hungry on the clear liquid diet. Your child may also spend a lot of time in the bathroom. But it's very important for your child to do the prep exactly as your doctor directs. If your child doesn't, the doctor can't see the entire lining of the colon. This may mean that your child has to repeat the test and will have to do the prep again. Follow-up care is a key part of your child's treatment and safety. Be sure to make and go to all appointments, and call your doctor if your child is having problems. It's also a good idea to know your child's test results and keep a list of the medicines your child takes. What happens before the procedure? Procedures can be stressful for both your child and you. This information will help you understand what you can expect. And it will help you safely prepare for your child's procedure. Preparing for the procedure    · Understand exactly what procedure is planned, along with the risks, benefits, and other options.     · Talk to your child about the procedure. Tell your child that it will help the doctor see inside his or her large intestine (colon). Hospitals know how to take care of children. The staff will do all they can to make it easier for your child.     · Plan for your child's recovery time.  He or she may need more of your time right after the procedure, both for care and for comfort. The day before the procedure    · A nurse may call you (or you may need to call the hospital). This is to confirm the time and date of your child's procedure and answer any questions.     · The doctor will give you detailed instructions about getting your child's bowel ready for the procedure.     · The doctor will ask you to change what your child eats in the 2 days before the test. When clear fluids are recommended, your child can have water, clear juices, clear broths, flavored ice pops (such as Popsicles), and gelatin (such as Jell-O). ? Don't give your child milk or juice with pulp, such as orange juice. These are not clear fluids. ? Don't let your child drink anything red or purple, such as grape juice or fruit punch. ? Don't give your child red or purple foods, such as grape ice pops or cherry gelatin. ? Make sure that your child drinks plenty of clear fluids to stay hydrated.     · The night before your child's test, he or she may have to take a laxative tablet or liquid medicine. The medicine can taste bad and may make your child feel sick to his or her stomach. But it's important for your child to drink all of it. Otherwise, the doctor may not be able to do the test and your child will have to do the prep and test again. Here are some tips:  ? Mix the medicine with a sports drink like Gatorade. This can help it taste better. ? Your child may find it easier to drink the medicine if you chill it in the refrigerator first.     · Keep your child at home the evening before the test. He or she will need to use the bathroom often.     · Don't let your child eat any solid foods after you start the bowel prep.     · Your doctor will tell you when to have your child stop drinking clear liquids before the procedure.     · Remember to follow your doctor's instructions about your child taking or stopping medicines before the procedure. This includes over-the-counter medicines. What happens on the day of the procedure? · Follow the instructions exactly about when your child should stop eating and drinking. If you don't, the procedure may be canceled. If your doctor told you to have your child take his or her medicines on the day of the procedure, have your child take them with only a sip of water.     · Your child may brush his or her teeth. But tell your child not to swallow any toothpaste or water. When should you call your doctor? · You have questions or concerns.     · You don't understand how to prepare your child for the procedure.     · Your child becomes ill before the procedure (such as fever, flu, or a cold).     · You need to reschedule or have changed your mind about your child having the procedure. Where can you learn more? Go to http://www.gray.com/  Enter B330 in the search box to learn more about \"Bowel Preparation: Before Your Child's Procedure. \"  Current as of: April 15, 2020               Content Version: 12.6  © 2006-2020 [employer]. Care instructions adapted under license by [employer] (which disclaims liability or warranty for this information). If you have questions about a medical condition or this instruction, always ask your healthcare professional. [place] [address] any warranty or liability for your use of this information. [employer] Counseling:  I discussed with the patient the risks of mycophenolate mofetil including but not limited to infection/immunosuppression, GI upset, hypokalemia, hypercholesterolemia, bone marrow suppression, lymphoproliferative disorders, malignancy, GI ulceration/bleed/perforation, colitis, interstitial lung disease, kidney failure, progressive multifocal leukoencephalopathy, and birth defects.  The patient understands that monitoring is required including a baseline creatinine and regular CBC testing. In addition, patient must alert us immediately if symptoms of infection or other concerning signs are noted.

## 2023-04-22 NOTE — ANESTHESIA POSTPROCEDURE EVALUATION
Assumed care from SAMEER Juárez.    Procedure(s):  LEFT UPPER ARM ARTERIOVENOUS FISTULA.    regional    Anesthesia Post Evaluation        Patient location during evaluation: PACU  Note status: Adequate. Level of consciousness: responsive to light touch  Pain management: satisfactory to patient  Airway patency: patent  Anesthetic complications: no  Cardiovascular status: acceptable  Respiratory status: acceptable  Hydration status: acceptable  Comments: +Post-Anesthesia Evaluation and Assessment    Patient: Apryl Arriola MRN: [id number]  SSN: [id number]   YOB: 1960  Age: 63 y.o. Sex: male      Cardiovascular Function/Vital Signs    BP (!) 165/56   Pulse 69   Temp 36.9 °C (98.5 °F)   Resp 12   Ht 5' 9.5\" (1.765 m)   Wt 99.2 kg (218 lb 11.1 oz)   SpO2 97%   BMI 31.83 kg/m²     Patient is status post Procedure(s):  LEFT UPPER ARM ARTERIOVENOUS FISTULA. Nausea/Vomiting: Controlled. Postoperative hydration reviewed and adequate. Pain:  Pain Scale 1: Numeric (0 - 10) (08/30/21 1100)  Pain Intensity 1: 0 (08/30/21 1100)   Managed. Neurological Status:   Neuro (WDL): Exceptions to WDL (08/30/21 1100)   At baseline. Mental Status and Level of Consciousness: Arousable. Pulmonary Status:   O2 Device: None (Room air) (08/30/21 1100)   Adequate oxygenation and airway patent. Complications related to anesthesia: None    Post-anesthesia assessment completed. No concerns. Signed By: Tyrell Lehman MD    8/30/2021  Post anesthesia nausea and vomiting:  controlled      INITIAL Post-op Vital signs:   Vitals Value Taken Time   /56 08/30/21 1115   Temp 36.9 °C (98.5 °F) 08/30/21 1100   Pulse 68 08/30/21 1118   Resp 16 08/30/21 1118   SpO2 97 % 08/30/21 1118   Vitals shown include unvalidated device data.

## 2023-06-12 NOTE — PROGRESS NOTES
RAPID RESPONSE TEAM    Responded to overhead rapid response CHEST PAIN to 3260    Patient c/o left lateral chest pain, 8/10, reproducible with palpation. Dr. Franki Savage at bedside. STAT EKG obtained-non-ischemic. No further cardiac workup per hospitalist. Restarted on HD this admission. Last UF Saturday. Oxygen saturation 85-87% on RA. Patient placed on NC 2L, sats improved to 95%. Patient reports chest pain resolved with oxygen. BP elevated. Received order for one time dose of hydralazine 20 mg IV. Primary nurse to administer scheduled meds. Visit Vitals  BP (!) 216/91   Pulse 72   Temp 97.9 °F (36.6 °C)   Resp 16   Ht 5' 9.5\" (1.765 m)   Wt 98.5 kg (217 lb 3.2 oz)   SpO2 93%   BMI 31.61 kg/m²     Patient to remain in 3260. Please call back if needed.       Andrew Ibrahim RN  RRT, [id number] The patient is Stable - Low risk of patient condition declining or worsening    Shift Goals  Clinical Goals: pain mgmt, PEG tube  Patient Goals: pain mgmt  Family Goals: NAIN    Progress made toward(s) clinical / shift goals:  pt meds passed per MAR, no injuries this shift, pt briefly educated on G tube information      Problem: Pain - Standard  Goal: Alleviation of pain or a reduction in pain to the patient’s comfort goal  Outcome: Progressing     Problem: Knowledge Deficit - Standard  Goal: Patient and family/care givers will demonstrate understanding of plan of care, disease process/condition, diagnostic tests and medications  Outcome: Progressing     Problem: Fall Risk  Goal: Patient will remain free from falls  Outcome: Progressing     Problem: Skin Integrity  Goal: Skin integrity is maintained or improved  Outcome: Progressing       Patient is not progressing towards the following goals:

## 2023-09-28 NOTE — PROGRESS NOTES
-Patient has thickening and discoloration of the toenails.  -Has a history of fungal infection currently following up with podiatrist.   -Continue taking Penlac  -Advised patient to wash his feet and keep it dry regularly.   Recommended patient of good foot hygiene and using Dr. Charlene Horne for his shoes Chief Complaint   Patient presents with   [facility] Follow Up     [address] admission 722-7/27       1. Have you been to the ER, urgent care clinic since your last visit? Hospitalized since your last visit? Yes When: [address] admitted 7/22-27/2021 for SOB, respiratory failure     2. Have you seen or consulted any other health care providers outside of the [address] [place] [address] since your last visit? Include any pap smears or colon screening.  No

## 2023-10-18 NOTE — PROGRESS NOTES
TRANSFER - IN REPORT:    Verbal report received from ESPERANZA Abreu on [facility]  being received from ED for ordered procedure      Report consisted of patients Situation, Background, Assessment and   Recommendations(SBAR). Information from the following report(s) SBAR, ED Summary, Recent Results and Cardiac Rhythm NSR was reviewed with the receiving nurse. Opportunity for questions and clarification was provided. Assessment completed upon patients arrival to unit and care assumed.          *Will bring to suite around noon today* no

## (undated) DEVICE — SUT PROL 6-0 24IN BV1 DA BLU --

## (undated) DEVICE — BLOCK BITE ENDOSCOPIC LF FM PROTCT

## (undated) DEVICE — BASIN EMSIS 16OZ GRAPHITE PLAS KID SHP MOLD GRAD FOR ORAL

## (undated) DEVICE — GOWN,SIRUS,NONRNF,SETINSLV,2XL,18/CS: Brand: MEDLINE

## (undated) DEVICE — PROBE VASC 8MHZ WTRPRF

## (undated) DEVICE — AV FISTULA - MRMC: Brand: MEDLINE INDUSTRIES, INC.

## (undated) DEVICE — BLOCK BITE ENDOSCP AD 21 MM W/ DIL BLU LF DISP

## (undated) DEVICE — SINGLE USE AIR/WATER, SUCTION AND BIOPSY VALVE SET WITH WATER JET CONNECTOR: Brand: ORCAPOD™

## (undated) DEVICE — SUT ETHLN 4-0 18IN PS2 BLK --

## (undated) DEVICE — BITE BLK ENDOSCP AD 54FR GRN POLYETH ENDOSCP W STRP SLD

## (undated) DEVICE — ROADRUNNER, HYDROPHILIC PC WIRE GUIDE WITH FLEXIBLE SHAFT AND SHORT TAPER: Brand: ROADRUNNER

## (undated) DEVICE — FOGARTY ARTERIAL EMBOLECTOMY CATHETER 3F 40CM: Brand: FOGARTY

## (undated) DEVICE — SUTURE PERMAHAND SZ 3-0 L30IN NONABSORBABLE BLK SILK BRAID A304H

## (undated) DEVICE — ESOPHAGEAL BALLOON DILATATION CATHETER: Brand: CRE FIXED WIRE

## (undated) DEVICE — SPONGE GZ W4XL4IN COT 12 PLY TYP VII WVN C FLD DSGN

## (undated) DEVICE — Device

## (undated) DEVICE — ELECTRODE,RADIOTRANSLUCENT,FOAM,5PK: Brand: MEDLINE

## (undated) DEVICE — NEEDLE HYPO 18GA L1.5IN PNK S STL HUB POLYPR SHLD REG BVL

## (undated) DEVICE — SYRINGE 50ML E/T

## (undated) DEVICE — SYR ASSEMB INFL BLLN 60ML --

## (undated) DEVICE — 1200 GUARD II KIT W/5MM TUBE W/O VAC TUBE: Brand: GUARDIAN

## (undated) DEVICE — GLOVE ORTHO 8   MSG9480

## (undated) DEVICE — VESSEL LOOPS,MINI, RED: Brand: DEVON

## (undated) DEVICE — SOL INJ SOD CL 0.9% 500ML BG --

## (undated) DEVICE — BLADE ASSEMB CLP HAIR FINE --

## (undated) DEVICE — FORCEPS BX L160CM DIA8MM GRSP DISECT CUP TIP NONLOCKING ROT

## (undated) DEVICE — BANDAGE,GAUZE,BULKEE II,4.5"X4.1YD,STRL: Brand: MEDLINE

## (undated) DEVICE — SUTURE VCRL SZ 3-0 L27IN ABSRB UD L26MM SH 1/2 CIR J416H

## (undated) DEVICE — SUTURE PROL SZ 5-0 L24IN NONABSORBABLE BLU RB-2 L13IN 1/2 8554H

## (undated) DEVICE — LABEL MED VASC MRMC STRL

## (undated) DEVICE — STAPLER SKIN H3.9MM WIRE DIA0.58MM CRWN 6.9MM 35 STPL ROT

## (undated) DEVICE — AGENT HEMSTAT W4XL4IN OXIDIZED REGENERATED CELOS ABSRB SFT

## (undated) DEVICE — DERMABOND SKIN ADH 0.7ML -- DERMABOND ADVANCED 12/BX